# Patient Record
Sex: FEMALE | Race: WHITE | NOT HISPANIC OR LATINO | Employment: FULL TIME | URBAN - METROPOLITAN AREA
[De-identification: names, ages, dates, MRNs, and addresses within clinical notes are randomized per-mention and may not be internally consistent; named-entity substitution may affect disease eponyms.]

---

## 2017-01-11 ENCOUNTER — PATIENT MESSAGE (OUTPATIENT)
Dept: OBSTETRICS AND GYNECOLOGY | Facility: CLINIC | Age: 57
End: 2017-01-11

## 2017-01-16 ENCOUNTER — TELEPHONE (OUTPATIENT)
Dept: OBSTETRICS AND GYNECOLOGY | Facility: CLINIC | Age: 57
End: 2017-01-16

## 2017-01-16 NOTE — TELEPHONE ENCOUNTER
----- Message from Sherri Guerra sent at 1/16/2017 12:04 PM CST -----  Contact: Self  Good afternoon,    Pt is requesting orders for a mammogram.    Pt can be reached at [phone]    Thank you!

## 2017-01-18 ENCOUNTER — TELEPHONE (OUTPATIENT)
Dept: OBSTETRICS AND GYNECOLOGY | Facility: CLINIC | Age: 57
End: 2017-01-18

## 2017-01-18 NOTE — TELEPHONE ENCOUNTER
----- Message from Андрей Hilario sent at 1/17/2017  3:54 PM CST -----  Contact: pt  Pt returning your call. Pt's # [phone]

## 2017-02-10 ENCOUNTER — OFFICE VISIT (OUTPATIENT)
Dept: OBSTETRICS AND GYNECOLOGY | Facility: CLINIC | Age: 57
End: 2017-02-10
Payer: COMMERCIAL

## 2017-02-10 VITALS
BODY MASS INDEX: 31.18 KG/M2 | SYSTOLIC BLOOD PRESSURE: 160 MMHG | HEIGHT: 66 IN | WEIGHT: 194 LBS | DIASTOLIC BLOOD PRESSURE: 100 MMHG

## 2017-02-10 DIAGNOSIS — Z12.39 BREAST CANCER SCREENING: ICD-10-CM

## 2017-02-10 DIAGNOSIS — Z01.419 ENCOUNTER FOR GYNECOLOGICAL EXAMINATION: Primary | ICD-10-CM

## 2017-02-10 PROCEDURE — 3077F SYST BP >= 140 MM HG: CPT | Mod: S$GLB,,, | Performed by: OBSTETRICS & GYNECOLOGY

## 2017-02-10 PROCEDURE — 99396 PREV VISIT EST AGE 40-64: CPT | Mod: S$GLB,,, | Performed by: OBSTETRICS & GYNECOLOGY

## 2017-02-10 PROCEDURE — 3080F DIAST BP >= 90 MM HG: CPT | Mod: S$GLB,,, | Performed by: OBSTETRICS & GYNECOLOGY

## 2017-02-10 PROCEDURE — 99999 PR PBB SHADOW E&M-EST. PATIENT-LVL III: CPT | Mod: PBBFAC,,, | Performed by: OBSTETRICS & GYNECOLOGY

## 2017-02-10 NOTE — MR AVS SNAPSHOT
"    [facility]  [address]  [place] [address]  Phone: [phone]  Fax: [phone]                  Renae Hou   2/10/2017 9:15 AM   Office Visit    Description:  Female : 1960   Provider:  Farrah Cook MD   Department:  [facility]           Reason for Visit     Well Woman           Diagnoses this Visit        Comments    Encounter for gynecological examination    -  Primary     Breast cancer screening                To Do List           Future Appointments        Provider Department Dept Phone    2017 11:40 AM NOM MAMMO2 SCREEN [facility] [phone]      Goals (5 Years of Data)     None      Follow-Up and Disposition     Return in about 1 year (around 2/10/2018).      [facility]     [facility] -  Assistance  Registered nurses in the [facility] provide clinical advisement, health education, appointment booking, and other advisory services.  Call for this free service at [phone].             Medications           Message regarding Medications     Verify the changes and/or additions to your medication regime listed below are the same as discussed with your clinician today.  If any of these changes or additions are incorrect, please notify your healthcare provider.             Verify that the below list of medications is an accurate representation of the medications you are currently taking.  If none reported, the list may be blank. If incorrect, please contact your healthcare provider. Carry this list with you in case of emergency.           Current Medications     omeprazole (PRILOSEC) 40 MG capsule TK 1 C PO QD    VITAMIN D2 50,000 unit capsule TAKE 1 CAPSULE BY MOUTH ONCE WEEKLY.    phentermine (ADIPEX-P) 37.5 mg tablet TK 1 T PO QD           Clinical Reference Information           Your Vitals Were     BP Height Weight Last Period BMI    160/100 5' 6" (1.676 m) " 88 kg (194 lb 0.1 oz) 11/25/2012 31.31 kg/m2      Blood Pressure          Most Recent Value    BP  (!)  160/100      Allergies as of 2/10/2017     Iodine    Shellfish Containing Products    Latex      Immunizations Administered on Date of Encounter - 2/10/2017     None      Orders Placed During Today's Visit     Future Labs/Procedures Expected by Expires    Mammo Digital Screening Bilat with Tomosynthesis CAD  2/10/2017 4/12/2018      Language Assistance Services     ATTENTION: Language assistance services are available, free of charge. Please call [phone].      ATENCIÓN: Si habla español, tiene a callahan disposición servicios gratuitos de asistencia lingüística. Llame al [phone].     CHÚ Ý: N?u b?n nói Ti?ng Vi?t, có các d?ch v? h? tr? ngôn ng? mi?n phí dành cho b?n. G?i s? [phone].         General acute hospital's [facility] complies with applicable Federal civil rights laws and does not discriminate on the basis of race, color, national origin, age, disability, or sex.

## 2017-02-10 NOTE — PROGRESS NOTES
Subjective:       Patient ID: Renae Hou is a 56 y.o. female.    Chief Complaint:  Well Woman (Annual Exam, C/o night sweats wants hormones checked. Last raiza 2 [facility], colonoscopy in July (Maria Alejandra))      History of Present Illness  - here for annual. C/o night sweats occurring most nights. Reports that BP was elevated at GI office but was normal at PCP's office. Is not on BP meds. Has had episodes of pressure in her ears; has seen ENT with negative w/u. Reports that BP at home has been 130s/80s. She reports that her hands are very swollen.    Past Medical History   Diagnosis Date    Fatty liver     Hypertension     Menopause     Obesity     Sleep apnea     Thyroid disease      Thyroid nodules.       Past Surgical History   Procedure Laterality Date    Cholecystectomy      Rectocele repair      Thyroid nodule removal      Bilateral salpingoophorectomy      Hysterectomy  2012     [facility]          Current Outpatient Prescriptions:     omeprazole (PRILOSEC) 40 MG capsule, TK 1 C PO QD, Disp: , Rfl: 2    VITAMIN D2 50,000 unit capsule, TAKE 1 CAPSULE BY MOUTH ONCE WEEKLY., Disp: 4 capsule, Rfl: 3    phentermine (ADIPEX-P) 37.5 mg tablet, TK 1 T PO QD, Disp: , Rfl: 0    Review of patient's allergies indicates:   Allergen Reactions    Iodine Anaphylaxis, Hives, Itching, Rash, Shortness Of Breath and Swelling     Other reaction(s): Difficulty breathing    Shellfish containing products Anaphylaxis, Hives, Itching, Rash, Shortness Of Breath and Swelling     Other reaction(s): Difficulty breathing    Latex        GYN & OB History  Patient's last menstrual period was 2012.   Date of Last Pap: No result found    OB History    Para Term  AB SAB TAB Ectopic Multiple Living   2 2 2       2      # Outcome Date GA Lbr Dieudonne/2nd Weight Sex Delivery Anes PTL Lv   2 Term      Vag-Spont  N Y   1 Term      Vag-Spont  N Y          Social History     Social History    Marital  "status:      Spouse name: N/A    Number of children: N/A    Years of education: N/A     Occupational History    Not on file.     Social History Main Topics    Smoking status: Former Smoker    Smokeless tobacco: Never Used      Comment: smoked socially decades ago - weekends only    Alcohol use Yes      Comment: SELDOMLY    Drug use: No    Sexual activity: Yes     Partners: Male     Birth control/ protection: Surgical, See Surgical Hx      Comment: DL BS&O 12/17/2012,       Other Topics Concern    Not on file     Social History Narrative       Family History   Problem Relation Age of Onset    Hypertension Mother     Hyperlipidemia Mother     Heart disease Mother 55     cad, valvular heart disease    Alcohol abuse Sister     No Known Problems Daughter     No Known Problems Daughter     Breast cancer Neg Hx     Colon cancer Neg Hx     Ovarian cancer Neg Hx     Diabetes Neg Hx        Review of Systems  Review of Systems   Respiratory: Negative for shortness of breath.    Cardiovascular: Negative for chest pain and palpitations.   Gastrointestinal: Negative for blood in stool, nausea and vomiting.   Genitourinary:        - see HPI   Skin: Negative for rash and wound.   Allergic/Immunologic: Negative for immunocompromised state.   Neurological: Negative for dizziness and syncope.   Hematological: Negative for adenopathy.   Psychiatric/Behavioral: Negative for behavioral problems.        Objective:     Vitals:    02/10/17 0907   BP: (!) 160/100   Weight: 88 kg (194 lb 0.1 oz)   Height: 5' 6" (1.676 m)       Physical Exam:   Constitutional: She is oriented to person, place, and time. She appears well-developed and well-nourished.        Pulmonary/Chest: Right breast exhibits no mass, no nipple discharge, no skin change, no tenderness and no swelling. Left breast exhibits no mass, no nipple discharge, no skin change, no tenderness and no swelling. Breasts are symmetrical.        Abdominal: " Soft. She exhibits no distension. There is no tenderness.     Genitourinary: Vagina normal. There is no tenderness or lesion on the right labia. There is no tenderness or lesion on the left labia. Uterus is absent. Right adnexum displays no mass, no tenderness and no fullness. Left adnexum displays no mass, no tenderness and no fullness. No vaginal discharge found. Cervix exhibits absence.           Musculoskeletal: Moves all extremeties.       Neurological: She is alert and oriented to person, place, and time.     Psychiatric: She has a normal mood and affect.        Assessment/ Plan:     Orders Placed This Encounter    Mammo Digital Screening Bilat with Tomosynthesis CAD       Renae Lai was seen today for well woman.    Diagnoses and all orders for this visit:    Encounter for gynecological examination    Breast cancer screening  -     Mammo Digital Screening Bilat with Tomosynthesis CAD; Future    - discussed that patient does not need labwork to check hormones: estradiol will be low and FSH will be high. Discussed HRT as a means of controlling menopausal symptoms. Will have to defer that for now since patient's BP is not controlled. Advised patient to see PCP. Patient will call me when BP is controlled to consider HRT.    Return in about 1 year (around 2/10/2018).

## 2017-02-23 ENCOUNTER — TELEPHONE (OUTPATIENT)
Dept: OBSTETRICS AND GYNECOLOGY | Facility: CLINIC | Age: 57
End: 2017-02-23

## 2017-02-23 NOTE — TELEPHONE ENCOUNTER
Dr Cook pt calling, regarding her mammo orders. Pt said that the billing dept called her trying to collect a payment and they told her the order isn't right for a normal mammo screening.If wrong can you change order or call pt to let her know that it is correct. Please call pt if any question [phone]

## 2017-02-23 NOTE — TELEPHONE ENCOUNTER
Spoke with pt and she stated DIS told her she was responsible to pay $50 for her mammogram.  I informed pt it probably is due to the mammo 3d order.  Pt verbalized understanding and will pay for the mammo order.

## 2017-02-24 ENCOUNTER — HOSPITAL ENCOUNTER (OUTPATIENT)
Dept: RADIOLOGY | Facility: HOSPITAL | Age: 57
Discharge: HOME | End: 2017-02-24
Attending: OBSTETRICS & GYNECOLOGY
Payer: COMMERCIAL

## 2017-02-24 DIAGNOSIS — Z12.31 VISIT FOR SCREENING MAMMOGRAM: ICD-10-CM

## 2017-02-24 DIAGNOSIS — Z12.39 BREAST CANCER SCREENING: ICD-10-CM

## 2017-02-24 PROCEDURE — 77067 SCR MAMMO BI INCL CAD: CPT | Mod: 26,,, | Performed by: RADIOLOGY

## 2017-02-24 PROCEDURE — 77063 BREAST TOMOSYNTHESIS BI: CPT | Mod: 26,,, | Performed by: RADIOLOGY

## 2017-02-24 PROCEDURE — 77067 SCR MAMMO BI INCL CAD: CPT | Mod: TC

## 2017-03-03 ENCOUNTER — TELEPHONE (OUTPATIENT)
Dept: SPINE | Facility: CLINIC | Age: 57
End: 2017-03-03

## 2017-03-03 ENCOUNTER — HOSPITAL ENCOUNTER (OUTPATIENT)
Dept: RADIOLOGY | Facility: HOSPITAL | Age: 57
Discharge: HOME | End: 2017-03-03
Attending: ORTHOPAEDIC SURGERY
Payer: COMMERCIAL

## 2017-03-03 DIAGNOSIS — M54.50 LUMBAR SPINE PAIN: ICD-10-CM

## 2017-03-03 DIAGNOSIS — M54.50 LUMBAR SPINE PAIN: Primary | ICD-10-CM

## 2017-03-03 PROCEDURE — 72100 X-RAY EXAM L-S SPINE 2/3 VWS: CPT | Mod: 26,,, | Performed by: RADIOLOGY

## 2017-03-03 PROCEDURE — 72100 X-RAY EXAM L-S SPINE 2/3 VWS: CPT | Mod: TC

## 2017-03-03 PROCEDURE — 72120 X-RAY BEND ONLY L-S SPINE: CPT | Mod: 26,,, | Performed by: RADIOLOGY

## 2017-03-07 ENCOUNTER — INITIAL CONSULT (OUTPATIENT)
Dept: ORTHOPEDICS | Facility: CLINIC | Age: 57
End: 2017-03-07
Payer: COMMERCIAL

## 2017-03-07 ENCOUNTER — TELEPHONE (OUTPATIENT)
Dept: ORTHOPEDICS | Facility: CLINIC | Age: 57
End: 2017-03-07

## 2017-03-07 VITALS — HEIGHT: 66 IN | DIASTOLIC BLOOD PRESSURE: 93 MMHG | SYSTOLIC BLOOD PRESSURE: 146 MMHG | HEART RATE: 90 BPM

## 2017-03-07 DIAGNOSIS — M54.50 LUMBAR SPINE PAIN: Primary | ICD-10-CM

## 2017-03-07 DIAGNOSIS — Z98.890 HISTORY OF LUMBAR DISCECTOMY: Primary | ICD-10-CM

## 2017-03-07 DIAGNOSIS — M43.10 SPONDYLOLISTHESIS, ACQUIRED: ICD-10-CM

## 2017-03-07 PROCEDURE — 3080F DIAST BP >= 90 MM HG: CPT | Mod: S$GLB,,, | Performed by: ORTHOPAEDIC SURGERY

## 2017-03-07 PROCEDURE — 99204 OFFICE O/P NEW MOD 45 MIN: CPT | Mod: S$GLB,,, | Performed by: ORTHOPAEDIC SURGERY

## 2017-03-07 PROCEDURE — 3077F SYST BP >= 140 MM HG: CPT | Mod: S$GLB,,, | Performed by: ORTHOPAEDIC SURGERY

## 2017-03-07 PROCEDURE — 99999 PR PBB SHADOW E&M-EST. PATIENT-LVL III: CPT | Mod: PBBFAC,,, | Performed by: ORTHOPAEDIC SURGERY

## 2017-03-07 PROCEDURE — 1160F RVW MEDS BY RX/DR IN RCRD: CPT | Mod: S$GLB,,, | Performed by: ORTHOPAEDIC SURGERY

## 2017-03-07 RX ORDER — VALSARTAN 160 MG/1
160 TABLET ORAL DAILY
COMMUNITY
End: 2018-03-14

## 2017-03-09 NOTE — PROGRESS NOTES
DATE: 3/9/2017  PATIENT: Renae Hou    Attending Physician: Dean Zayas M.D.    CHIEF COMPLAINT: Low back pain    HISTORY:  Renae Hou is a 56 y.o. female [employer] employee here for initial evaluation of low back and bilateral leg pain (Back - 5, Leg - 1). The pain has been present for years but is acutely worse over the past 2-3 months. There was no exacerbating incident. She does have a remote history of a lumbar laminectomy/discectomy. The patient describes the pain as aching in the back, particularly on the R side and tingling in the legs.  The pain is worse with getting up in the morning and with lumbar extension and improved by using a heating pad. There is bilateral associated numbness and tingling. There is BLE subjective weakness especially in the morning. Prior treatments have included narcotics, NSAIDs and PT in the past, but no recent injections.    The Patient denies myelopathic symptoms such as handwriting changes or difficulty with buttons/coins/keys. Denies perineal paresthesias, bowel/bladder dysfunction.    PAST MEDICAL/SURGICAL HISTORY:  Past Medical History:   Diagnosis Date    Fatty liver     Hypertension     Menopause     Obesity     Sleep apnea     Thyroid disease     Thyroid nodules.     Past Surgical History:   Procedure Laterality Date    BILATERAL SALPINGOOPHORECTOMY      CHOLECYSTECTOMY      HYSTERECT[facility]  12/17/2012    [facility]     RECTOCELE REPAIR      THYROID NODULE REMOVAL         Current Medications:   Current Outpatient Prescriptions:     omeprazole (PRILOSEC) 40 MG capsule, TK 1 C PO QD, Disp: , Rfl: 2    valsartan (DIOVAN) 160 MG tablet, Take 160 mg by mouth once daily., Disp: , Rfl:     VITAMIN D2 50,000 unit capsule, TAKE 1 CAPSULE BY MOUTH ONCE WEEKLY., Disp: 4 capsule, Rfl: 3    Social History:   Social History     Social History    Marital status:      Spouse name: N/A    Number of children: N/A    Years of education: N/A  "    Occupational History    Not on file.     Social History Main Topics    Smoking status: Former Smoker    Smokeless tobacco: Never Used      Comment: smoked socially decades ago - weekends only    Alcohol use Yes      Comment: SELDOMLY    Drug use: No    Sexual activity: Yes     Partners: Male     Birth control/ protection: Surgical, See Surgical Hx      Comment: [facility] BS&O 12/17/2012,       Other Topics Concern    Not on file     Social History Narrative       REVIEW OF SYSTEMS:  Constitution: Negative. Negative for chills, fever and night sweats.   Cardiovascular: Negative for chest pain and syncope.   Respiratory: Negative for cough and shortness of breath.   Gastrointestinal: See HPI. Negative for nausea/vomiting. Negative for abdominal pain.  Genitourinary: See HPI. Negative for discoloration or dysuria.  Hematologic/Lymphatic: Negative for bleeding/clotting disorders.   Musculoskeletal: Negative for falls and muscle weakness.   Neurological: See HPI. no history of seizures. no history of cranial surgery or shunts.  Neurological: See HPI. No seizures.   Endocrine: Negative for polydipsia, polyphagia and polyuria.   Allergic/Immunologic: Negative for hives and persistent infections.     EXAM:  BP (!) 146/93  Pulse 90  Ht 5' 6" (1.676 m)  LMP 11/25/2012    PHYSICAL EXAMINATION:    General: The patient is a very pleasant 56 y.o. female in no apparent distress, the patient is orientatied to person, place and time.  Psych: Normal mood and affect  HEENT: Vision grossly intact, hearing intact to the spoken word.  Lungs: Respirations unlabored.  Gait: Normal station and gait, no difficulty with toe or heel walk.   Skin: Dorsal lumbar skin negative for rashes, lesions, hairy patches. She does have a well healed midline lumbar scar. There is mild R sided lumbar tenderness to palpation.  Range of motion: Lumbar range of motion is acceptable.  Spinal Balance: Global saggital and coronal spinal balance " acceptable, no significant for scoliosis and kyphosis.  Musculoskeletal: No pain with the range of motion of the bilateral hips. No trochanteric tenderness to palpation.  Vascular: Bilateral lower extremities warm and well perfused, Dorsalis pedis pulses 2+ bilaterally.  Neurological: Normal strength and tone in all major motor groups in the bilateral lower extremities. Normal sensation to light touch in the L2-S1 dermatomes bilaterally.  Deep tendon reflexes symmetric 1+ in the bilateral lower extremities.  Negative Babinski bilaterally. Straight leg raise negative bilaterally.    IMAGING:      Today I personally reviewed AP, Lat and Flex/Ex  upright L-spine that demonstrate Grade I L4/5/S1 degenerative spondylolisthesis.     ASSESSMENT/PLAN:    Diagnoses and all orders for this visit:    History of lumbar discectomy  -     MRI Lumbar Spine W WO Contrast; Future    Spondylolisthesis, acquired  -     MRI Lumbar Spine W WO Contrast; Future      MRI indicated for refractory LBP and BLE radicular symptoms in the setting of a prior discectomy.

## 2017-03-10 ENCOUNTER — HOSPITAL ENCOUNTER (OUTPATIENT)
Dept: RADIOLOGY | Facility: HOSPITAL | Age: 57
Discharge: HOME | End: 2017-03-10
Attending: ORTHOPAEDIC SURGERY
Payer: COMMERCIAL

## 2017-03-10 DIAGNOSIS — M54.50 LUMBAR SPINE PAIN: ICD-10-CM

## 2017-03-10 PROCEDURE — 72148 MRI LUMBAR SPINE W/O DYE: CPT | Mod: TC

## 2017-03-10 PROCEDURE — 72148 MRI LUMBAR SPINE W/O DYE: CPT | Mod: 26,,, | Performed by: RADIOLOGY

## 2017-03-13 DIAGNOSIS — M43.10 SPONDYLOLISTHESIS, ACQUIRED: Primary | ICD-10-CM

## 2017-03-15 ENCOUNTER — TELEPHONE (OUTPATIENT)
Dept: INTERVENTIONAL RADIOLOGY/VASCULAR | Facility: HOSPITAL | Age: 57
End: 2017-03-15

## 2017-03-15 RX ORDER — PREDNISONE 50 MG/1
TABLET ORAL
Qty: 3 TABLET | Refills: 0 | Status: SHIPPED | OUTPATIENT
Start: 2017-03-15 | End: 2017-06-30

## 2017-03-15 NOTE — TELEPHONE ENCOUNTER
Phoned patient twice, also called spouse's number, no answer, left message informing her that her pre procedure prep (prednisone) will be sent to the pharmacy listed ([id number]) on her profile. Left IR clinic contact information and asked the she return my call.

## 2017-03-16 ENCOUNTER — HOSPITAL ENCOUNTER (OUTPATIENT)
Dept: INTERVENTIONAL RADIOLOGY/VASCULAR | Facility: HOSPITAL | Age: 57
Discharge: HOME | End: 2017-03-16
Attending: ORTHOPAEDIC SURGERY
Payer: COMMERCIAL

## 2017-03-16 VITALS
OXYGEN SATURATION: 97 % | SYSTOLIC BLOOD PRESSURE: 139 MMHG | RESPIRATION RATE: 16 BRPM | HEART RATE: 88 BPM | DIASTOLIC BLOOD PRESSURE: 88 MMHG

## 2017-03-16 DIAGNOSIS — M43.10 SPONDYLOLISTHESIS, ACQUIRED: ICD-10-CM

## 2017-03-16 PROCEDURE — 64483 NJX AA&/STRD TFRM EPI L/S 1: CPT | Mod: 50,,, | Performed by: RADIOLOGY

## 2017-03-16 PROCEDURE — 64483 NJX AA&/STRD TFRM EPI L/S 1: CPT | Mod: 50

## 2017-03-16 PROCEDURE — 25500020 PHARM REV CODE 255: Performed by: ORTHOPAEDIC SURGERY

## 2017-03-16 RX ADMIN — IOHEXOL 2 ML: 180 INJECTION INTRAVENOUS at 08:03

## 2017-03-16 NOTE — H&P
Radiology History & Physical      SUBJECTIVE:     Chief Complaint: chronic low back pain    History of Present Illness:  Renae Hou is a 56 y.o. female who presents for bilateral L4-5 TF.  Past Medical History:   Diagnosis Date    Fatty liver     Hypertension     Menopause     Obesity     Sleep apnea     Thyroid disease     Thyroid nodules.     Past Surgical History:   Procedure Laterality Date    BILATERAL SALPINGOOPHORECTOMY      CHOLECYSTECTOMY      HYSTERECTOMY  12/17/2012    [facility] BS[facility]     RECTOCELE REPAIR      THYROID NODULE REMOVAL         Home Meds:   Prior to Admission medications    Medication Sig Start Date End Date Taking? Authorizing Provider   omeprazole (PRILOSEC) 40 MG capsule TK 1 C PO QD 8/3/16   Historical Provider, MD   predniSONE (DELTASONE) 50 MG Tab Take 1 tab 13 hours prior to procedure then take 1 tab 6 hrs prior to procedure then take 1 tab 1 hour prior to procedure 3/15/17   MAURISIO Hendricks   valsartan (DIOVAN) 160 MG tablet Take 160 mg by mouth once daily.    Historical Provider, MD   VITAMIN D2 50,000 unit capsule TAKE 1 CAPSULE BY MOUTH ONCE WEEKLY. 1/12/16   Duc Medina Jr., MD     Anticoagulants/Antiplatelets: no anticoagulation    Allergies:   Review of patient's allergies indicates:   Allergen Reactions    Iodine Anaphylaxis, Hives, Itching, Rash, Shortness Of Breath and Swelling     Other reaction(s): Difficulty breathing    Shellfish containing products Anaphylaxis, Hives, Itching, Rash, Shortness Of Breath and Swelling     Other reaction(s): Difficulty breathing    Latex      Sedation History:  no adverse reactions    Review of Systems:   Hematological: no known coagulopathies  Respiratory: no shortness of breath  Cardiovascular: no chest pain  Gastrointestinal: no abdominal pain  Genito-Urinary: no dysuria  Musculoskeletal: positive for - pain in back - generalized  Neurological: no TIA or stroke symptoms         OBJECTIVE:     Vital Signs (Most  Recent)  Pulse: 88 (03/16/17 0739)  Resp: 16 (03/16/17 0739)  BP: 139/88 (03/16/17 0739)  SpO2: 97 % (03/16/17 0739)    Physical Exam:  ASA: 2  Mallampati: n/a    General: no acute distress  Mental Status: alert and oriented to person, place and time  HEENT: normocephalic, atraumatic  Chest: unlabored breathing  Heart: regular heart rate  Abdomen: nondistended  Extremity: moves all extremities    Laboratory  No results found for: INR    Lab Results   Component Value Date    WBC 5.43 08/12/2016    HGB 13.4 08/12/2016    HCT 38.8 08/12/2016    MCV 90 08/12/2016     08/12/2016      Lab Results   Component Value Date     08/12/2016     08/12/2016    K 3.9 08/12/2016     08/12/2016    CO2 27 08/12/2016    BUN 10 08/12/2016    CREATININE 0.8 08/12/2016    CALCIUM 9.5 08/12/2016    ALT 22 08/12/2016    AST 20 08/12/2016    ALBUMIN 3.6 08/12/2016    BILITOT 0.5 08/12/2016       ASSESSMENT/PLAN:     Sedation Plan: local  Patient will undergo bilateral TF L4-5 NEERAJ.    [facility]  Radiology PGY-3

## 2017-03-16 NOTE — PROGRESS NOTES
bilateral L4-5 TF injection complete, dressing applied. CDI. Pt resting comfortably, denies pain/discomfort. No acute events. Pt given discharge instructions/handouts. Pt verbalizes understanding. Pt to lobby via wheelchair.  to provide transport home.

## 2017-03-16 NOTE — DISCHARGE INSTRUCTIONS
For scheduling: Call Lenka at [phone]    For questions or concerns call: KEISHA MON-FRI 8 AM- 5PM [phone]. Radiology resident on call [phone].    For immediate concerns that are not emergent, you may call our radiology clinic at: [phone]

## 2017-03-16 NOTE — PROGRESS NOTES
Pt arrived to room 189 for bilateral TF L4-5 injections. Pt identified using two pt identifiers. Allergies reviewed. NAD noted. Awaiting consent and H&P at this time. WCTM.

## 2017-03-16 NOTE — IP AVS SNAPSHOT
[facility]  [address]  [facility] [address]  Phone: [phone]           Patient Discharge Instructions     Our goal is to set you up for success. This packet includes information on your condition, medications, and your home care. It will help you to care for yourself so you don't get sicker and need to go back to the hospital.     Please ask your nurse if you have any questions.        There are many details to remember when preparing to leave the hospital. Here is what you will need to do:    1. Take your medicine. If you are prescribed medications, review your Medication List in the following pages. You may have new medications to  at the pharmacy and others that you'll need to stop taking. Review the instructions for how and when to take your medications. Talk with your doctor or nurses if you are unsure of what to do.     2. Go to your follow-up appointments. Specific follow-up information is listed in the following pages. Your may be contacted by a transition nurse or clinical provider about future appointments. Be sure we have all of the phone numbers to reach you, if needed. Please contact your provider's office if you are unable to make an appointment.     3. Watch for warning signs. Your doctor or nurse will give you detailed warning signs to watch for and when to call for assistance. These instructions may also include educational information about your condition. If you experience any of warning signs to your health, call your doctor.               [facility]  Unless otherwise directed by your provider, please contact [facility], our nurse care line that is available for 24/7 assistance.     [phone] (toll-free)    Registered nurses in the [facility] provide clinical advisement, health education, appointment booking, and other advisory services.                    ** Verify the list of medication(s) below is accurate and up  to date. Carry this with you in case of emergency. If your medications have changed, please notify your healthcare provider.             Medication List      TAKE these medications        Additional Info                      omeprazole 40 MG capsule   Commonly known as:  PRILOSEC   Refills:  2    Instructions:  TK 1 C PO QD     Begin Date    AM    Noon    PM    Bedtime       predniSONE 50 MG Tab   Commonly known as:  DELTASONE   Quantity:  3 tablet   Refills:  0   Comments:  Take Benadryl 50mg with the last dose of Prednisone 1 hour before the procedure    Instructions:  Take 1 tab 13 hours prior to procedure then take 1 tab 6 hrs prior to procedure then take 1 tab 1 hour prior to procedure     Begin Date    AM    Noon    PM    Bedtime       valsartan 160 MG tablet   Commonly known as:  DIOVAN   Refills:  0   Dose:  160 mg    Instructions:  Take 160 mg by mouth once daily.     Begin Date    AM    Noon    PM    Bedtime       VITAMIN D2 50,000 unit Cap   Quantity:  4 capsule   Refills:  3   Generic drug:  ergocalciferol    Instructions:  TAKE 1 CAPSULE BY MOUTH ONCE WEEKLY.     Begin Date    AM    Noon    PM    Bedtime                  Please bring to all follow up appointments:    1. A copy of your discharge instructions.  2. All medicines you are currently taking in their original bottles.  3. Identification and insurance card.    Please arrive 15 minutes ahead of scheduled appointment time.    Please call 24 hours in advance if you must reschedule your appointment and/or time.            Discharge Instructions       For scheduling: Call Lenka at [phone]    For questions or concerns call: KEISHA MON-FRI 8 AM- 5PM [phone]. Radiology resident on call [phone].    For immediate concerns that are not emergent, you may call our radiology clinic at: [phone]      Discharge References/Attachments     INJECTION, LUMBAR EPIDURAL: RECOVERY AT HOME (ENGLISH)        Admission Information     Date & Time  Provider Department CSN    3/16/2017  7:30 AM Dean Zayas MD [facility] [id number]      Care Providers     Provider Role Specialty Primary office phone    Dean Zayas MD Attending Provider Orthopedic Surgery [phone]      Your Vitals Were     Last Period                   11/25/2012           Recent Lab Values        10/24/2014 9/29/2015 8/12/2016                     7:38 AM  8:39 AM  7:41 AM         A1C 5.9 5.8 5.7         Comment for A1C at  7:41 AM on 8/12/2016:  According to ADA guidelines, hemoglobin A1C <7.0% represents  optimal control in non-pregnant diabetic patients.  Different  metrics may apply to specific populations.   Standards of Medical Care in Diabetes - 2016.  For the purpose of screening for the presence of diabetes:  <5.7%     Consistent with the absence of diabetes  5.7-6.4%  Consistent with increasing risk for diabetes   (prediabetes)  >or=6.5%  Consistent with diabetes  Currently no consensus exists for use of hemoglobin A1C  for diagnosis of diabetes for children.        Allergies as of 3/16/2017        Reactions    Iodine Anaphylaxis, Hives, Itching, Rash, Shortness Of Breath, Swelling    Other reaction(s): Difficulty breathing    Shellfish Containing Products Anaphylaxis, Hives, Itching, Rash, Shortness Of Breath, Swelling    Other reaction(s): Difficulty breathing    Latex       Advance Directives     An advance directive is a document which, in the event you are no longer able to make decisions for yourself, tells your healthcare team what kind of treatment you do or do not want to receive, or who you would like to make those decisions for you.  If you do not currently have an advance directive, [facility] encourages you to create one.  For more information call:  [phone]), [phone] ([phone]),  or log on to [email or web address].        Language Assistance Services     ATTENTION: Language assistance services are available,  free of charge. Please call [phone].      ATENCIÓN: Si habla español, tiene a callahan disposición servicios gratuitos de asistencia lingüística. Llame al [phone].     CHÚ Ý: N?u b?n nói Ti?ng Vi?t, có các d?ch v? h? tr? ngôn ng? mi?n phí dành cho b?n. G?i s? [phone].         [facility] complies with applicable Federal civil rights laws and does not discriminate on the basis of race, color, national origin, age, disability, or sex.

## 2017-03-16 NOTE — PROCEDURES
Radiology Post-Procedure Note    Pre Op Diagnosis: LBP    Post Op Diagnosis: Same    Procedure: Lumbar Transforaminal NEERAJ    Procedure performed by: Nathaniel Benítez MD; Crispin Burnham (resident)    Written Informed Consent Obtained: Yes    Specimen Removed: NO    Estimated Blood Loss: Minimal    Findings: Contrast injection demonstrating outlining of L4 nerve roots consistent with appropriate positioning in the L4-5 foramina    Level injected: L4-L5  Needle used: 22 gauge  Dose:  40 mg Depo-methylprednisolonex2   10 mL Lidocaine 1% MPF    Patient tolerated procedure well.    Crispin Burnham  Radiology PGY-3

## 2017-05-24 ENCOUNTER — OFFICE VISIT (OUTPATIENT)
Dept: ORTHOPEDICS | Facility: CLINIC | Age: 57
End: 2017-05-24
Payer: COMMERCIAL

## 2017-05-24 VITALS — BODY MASS INDEX: 31.89 KG/M2 | HEIGHT: 66 IN | WEIGHT: 198.44 LBS

## 2017-05-24 DIAGNOSIS — Z98.890 HISTORY OF LUMBAR DISCECTOMY: Primary | ICD-10-CM

## 2017-05-24 DIAGNOSIS — M54.16 LUMBAR RADICULOPATHY: ICD-10-CM

## 2017-05-24 PROCEDURE — 99212 OFFICE O/P EST SF 10 MIN: CPT | Mod: S$GLB,,, | Performed by: ORTHOPAEDIC SURGERY

## 2017-05-24 PROCEDURE — 99999 PR PBB SHADOW E&M-EST. PATIENT-LVL III: CPT | Mod: PBBFAC,,, | Performed by: ORTHOPAEDIC SURGERY

## 2017-05-24 RX ORDER — MELOXICAM 15 MG/1
15 TABLET ORAL DAILY
Qty: 21 TABLET | Refills: 0 | Status: SHIPPED | OUTPATIENT
Start: 2017-05-24 | End: 2017-06-12 | Stop reason: SDUPTHER

## 2017-05-25 NOTE — PROGRESS NOTES
The patient is a 56F with a remote history of a L4/5 discectomy and a known L4/5 spondylolisthesis.    She recently had an epidural steroid injection that was very effective for her back pain.    She returns for evaluation of a 1 week history of LLE radiating pain.    This began spontaneously with no inciting event.    She has had episodes like this before but they have resolved spontaneously.    She has been taking motrin and using a heating pad.[    On exam there is no focal weakness.    Today we discussed options, I have recommended mobic for now.    If not improved in 3 weeks we can consider a MRI.    I spent 15 minutes with the patient of which greater than 1/2 the time was devoted to counciling the patient regarding treatment options.

## 2017-06-16 RX ORDER — MELOXICAM 15 MG/1
TABLET ORAL
Qty: 21 TABLET | Refills: 0 | Status: SHIPPED | OUTPATIENT
Start: 2017-06-16 | End: 2017-09-27 | Stop reason: SDUPTHER

## 2017-06-30 ENCOUNTER — OFFICE VISIT (OUTPATIENT)
Dept: OBSTETRICS AND GYNECOLOGY | Facility: CLINIC | Age: 57
End: 2017-06-30
Payer: COMMERCIAL

## 2017-06-30 VITALS
DIASTOLIC BLOOD PRESSURE: 74 MMHG | WEIGHT: 205.38 LBS | SYSTOLIC BLOOD PRESSURE: 122 MMHG | BODY MASS INDEX: 33.01 KG/M2 | HEIGHT: 66 IN

## 2017-06-30 DIAGNOSIS — R82.90 ABNORMAL URINE ODOR: ICD-10-CM

## 2017-06-30 DIAGNOSIS — R82.90 ABNORMAL URINALYSIS: Primary | ICD-10-CM

## 2017-06-30 PROCEDURE — 87086 URINE CULTURE/COLONY COUNT: CPT

## 2017-06-30 PROCEDURE — 81000 URINALYSIS NONAUTO W/SCOPE: CPT | Mod: S$GLB,,, | Performed by: NURSE PRACTITIONER

## 2017-06-30 PROCEDURE — 99999 PR PBB SHADOW E&M-EST. PATIENT-LVL III: CPT | Mod: PBBFAC,,, | Performed by: NURSE PRACTITIONER

## 2017-06-30 PROCEDURE — 99213 OFFICE O/P EST LOW 20 MIN: CPT | Mod: S$GLB,,, | Performed by: NURSE PRACTITIONER

## 2017-06-30 RX ORDER — PREDNISONE 20 MG/1
TABLET ORAL
Refills: 0 | COMMUNITY
Start: 2017-05-30 | End: 2017-06-30

## 2017-06-30 RX ORDER — HYDROXYZINE HYDROCHLORIDE 25 MG/1
TABLET, FILM COATED ORAL
COMMUNITY
Start: 2017-05-19 | End: 2017-06-30

## 2017-06-30 RX ORDER — LEVOFLOXACIN 500 MG/1
TABLET, FILM COATED ORAL
Refills: 0 | COMMUNITY
Start: 2017-05-30 | End: 2017-06-30

## 2017-06-30 RX ORDER — IVERMECTIN 10 MG/G
CREAM TOPICAL
COMMUNITY
Start: 2017-04-28 | End: 2018-04-02 | Stop reason: CLARIF

## 2017-06-30 RX ORDER — PHENTERMINE HYDROCHLORIDE 37.5 MG/1
TABLET ORAL
Refills: 2 | COMMUNITY
Start: 2017-06-01 | End: 2017-06-30

## 2017-06-30 RX ORDER — CANAGLIFLOZIN 100 MG/1
TABLET, FILM COATED ORAL
Refills: 3 | COMMUNITY
Start: 2017-06-01 | End: 2018-03-14

## 2017-06-30 RX ORDER — PROMETHAZINE HYDROCHLORIDE AND CODEINE PHOSPHATE 6.25; 1 MG/5ML; MG/5ML
SOLUTION ORAL
Refills: 0 | COMMUNITY
Start: 2017-05-30 | End: 2017-06-30

## 2017-07-02 LAB — BACTERIA UR CULT: NO GROWTH

## 2017-07-05 ENCOUNTER — TELEPHONE (OUTPATIENT)
Dept: OBSTETRICS AND GYNECOLOGY | Facility: CLINIC | Age: 57
End: 2017-07-05

## 2017-07-05 LAB
BILIRUB SERPL-MCNC: ABNORMAL MG/DL
BLOOD, POC UA: ABNORMAL
GLUCOSE UR QL STRIP: 1000
KETONES UR QL STRIP: 1.02
LEUKOCYTE ESTERASE URINE, POC: ABNORMAL
NITRITE, POC UA: ABNORMAL
PH, POC UA: 6
PROTEIN, POC: ABNORMAL
SPECIFIC GRAVITY, POC UA: 1
UROBILINOGEN, POC UA: ABNORMAL

## 2017-07-05 NOTE — TELEPHONE ENCOUNTER
Spoke with pt and informed her per Rachele Way NP;  Urine culture negative. Pt expressed verbal understanding. FRANKLIN

## 2017-07-05 NOTE — TELEPHONE ENCOUNTER
----- Message from Rachele Way NP sent at 7/5/2017 11:43 AM CDT -----  Call pt and tell her urine culture negative.

## 2017-07-06 NOTE — PROGRESS NOTES
Chief Complaint   Patient presents with    Follow-up     pt saw her gastro doctor who tested her urine and found leukocytes. Pt was told to fullow up with her OBGYN or PCP.      (Joey pt)      Renae Hou is a 56 y.o. female  who presents today after recent appt with her GI physian.  While at that visit, a clean catch urine was collected, and was (+) for Leukocytes, so her doctor recommended she follow-up with her OBGYN for further evaluation.  She denies having had any symptoms at the time of that visit.  Only symptom she notices now is that she thinks her urine occasionally has an unusual odor.     Patient's last menstrual period was 2012.  She does not complain of vaginal discharge.  She is not sexually active.  She has had HYSTERECTOMY.  She denies vaginal lesions, changes in soaps, detergents, or douching. No known h/o recurrent UTIs.    Associated Symptoms:  None    Past Medical History:   Diagnosis Date    Diabetes mellitus     Fatty liver     Hypertension     Menopause     Obesity     Sleep apnea     Thyroid disease     Thyroid nodules.     Past Surgical History:   Procedure Laterality Date    BILATERAL SALPINGOOPHORECTOMY      CHOLECYSTECTOMY      [facility]  [facility]     RECTOCELE REPAIR      THYROID NODULE REMOVAL       Social History   Substance Use Topics    Smoking status: Former Smoker    Smokeless tobacco: Never Used      Comment: smoked socially decades ago - weekends only    Alcohol use Yes      Comment: SELDOMLY     Family History   Problem Relation Age of Onset    Hypertension Mother     Hyperlipidemia Mother     Heart disease Mother 55     cad, valvular heart disease    Alcohol abuse Sister     No Known Problems Daughter     No Known Problems Daughter     Breast cancer Neg Hx     Colon cancer Neg Hx     Ovarian cancer Neg Hx     Diabetes Neg Hx      OB History    Para Term  AB Living   2 2 2     2   SAB TAB Ectopic  Multiple Live Births           2      # Outcome Date GA Lbr Dieudonne/2nd Weight Sex Delivery Anes PTL Lv   2 Term      Vag-Spont  N LEXI   1 Term      Vag-Spont  N LEXI            Vitals:    06/30/17 1026   BP: 122/74     Body mass index is 33.15 kg/m².    ROS:  GENERAL:  No fever, chills, fatigability or weight loss.  VULVAR:  No pain, no lesions and no itching.  VAGINAL:  No relaxation, no itching, no discharge, no abnormal bleeding; no lesions.     ABDOMEN:  No abdominal pain. No nausea/vomiting. No diarrhea/constipation.  denies flank pain.         BREAST:  Denies pain. No lumps. No discharge.  URINARY:  No incontinence, no nocturia, denies frequency; denies dysuria; denies hematuria.  CARDIOVASCULAR:  No chest pain. No shortness of breath. No leg cramps.  NEUROLOGICAL:  No headaches. No vision changes.    PHYSICAL EXAM:  GEN:  Appears well, in no apparent distress.  Well developed; well nourished.   soft, non-tender; bowel sounds normal  EXTERNAL GENITALIA: normal appearing vulva with no masses, tenderness or lesions  VAGINA: no abnormal discharge or lesions  CERVIX: absent  UTERUS: absent  ADNEXA: no masses palpable and nontender    Urine dipstick shows:   positive for glucose and positive for leukocytes (TRACE).        ASSESSMENT/PLAN:  Abnormal urinalysis  -     Urine culture    Abnormal urine odor  -     Urine culture  -     POCT URINALYSIS      EDUCATION:    Lifestyle changes to treat and prevent UTIs  The lifestyle changes below will help get rid of your UTI. They may also help prevent future UTIs.  · Drink plenty of fluids. This includes water, juice, or other caffeine-free drinks. Fluids help flush bacteria out of your body.  · Empty your bladder. Always empty your bladder when you feel the urge to urinate. And always urinate before going to sleep. Urine that stays in your bladder can lead to infection. Try to urinate before and after sex as well.  · Practice good personal hygiene. Wipe yourself from front to  back after using the toilet. This helps keep bacteria from getting into the urethra.  · Use condoms during sex. These help prevent UTIs caused by sexually transmitted bacteria. Also, avoid using spermicides during sex. These can increase the risk of UTIs. Choose other forms of birth control instead. For women who tend to get UTIs after sex, a low-dose of a preventive antibiotic may be used. Be sure to discuss this option with your health care provider.     ·     Follow up with your health care provider as directed. He or she may test to               make sure the infection has cleared. If necessary, additional treatment may be           started.

## 2017-08-25 ENCOUNTER — CLINICAL SUPPORT (OUTPATIENT)
Dept: INTERNAL MEDICINE | Facility: CLINIC | Age: 57
End: 2017-08-25
Payer: COMMERCIAL

## 2017-08-25 ENCOUNTER — OFFICE VISIT (OUTPATIENT)
Dept: INTERNAL MEDICINE | Facility: CLINIC | Age: 57
End: 2017-08-25
Payer: COMMERCIAL

## 2017-08-25 VITALS
WEIGHT: 202.81 LBS | BODY MASS INDEX: 32.74 KG/M2 | TEMPERATURE: 98 F | DIASTOLIC BLOOD PRESSURE: 94 MMHG | HEART RATE: 88 BPM | SYSTOLIC BLOOD PRESSURE: 128 MMHG

## 2017-08-25 DIAGNOSIS — Z00.00 ROUTINE GENERAL MEDICAL EXAMINATION AT A HEALTH CARE FACILITY: Primary | ICD-10-CM

## 2017-08-25 DIAGNOSIS — L30.4 INTERTRIGO: ICD-10-CM

## 2017-08-25 LAB
25(OH)D3+25(OH)D2 SERPL-MCNC: 38 NG/ML
ALBUMIN SERPL BCP-MCNC: 4.1 G/DL
ALP SERPL-CCNC: 79 U/L
ALT SERPL W/O P-5'-P-CCNC: 23 U/L
ANION GAP SERPL CALC-SCNC: 5 MMOL/L
AST SERPL-CCNC: 24 U/L
BILIRUB SERPL-MCNC: 0.4 MG/DL
BUN SERPL-MCNC: 22 MG/DL
CALCIUM SERPL-MCNC: 9.7 MG/DL
CHLORIDE SERPL-SCNC: 108 MMOL/L
CHOLEST/HDLC SERPL: 3.3 {RATIO}
CO2 SERPL-SCNC: 29 MMOL/L
CREAT SERPL-MCNC: 0.9 MG/DL
ERYTHROCYTE [DISTWIDTH] IN BLOOD BY AUTOMATED COUNT: 14 %
EST. GFR  (AFRICAN AMERICAN): >60 ML/MIN/1.73 M^2
EST. GFR  (NON AFRICAN AMERICAN): >60 ML/MIN/1.73 M^2
ESTIMATED AVG GLUCOSE: 111 MG/DL
GLUCOSE SERPL-MCNC: 90 MG/DL
HBA1C MFR BLD HPLC: 5.5 %
HCT VFR BLD AUTO: 40.4 %
HDL/CHOLESTEROL RATIO: 30 %
HDLC SERPL-MCNC: 220 MG/DL
HDLC SERPL-MCNC: 66 MG/DL
HGB BLD-MCNC: 14.4 G/DL
LDLC SERPL CALC-MCNC: 137.8 MG/DL
MCH RBC QN AUTO: 31 PG
MCHC RBC AUTO-ENTMCNC: 35.6 G/DL
MCV RBC AUTO: 87 FL
NONHDLC SERPL-MCNC: 154 MG/DL
PLATELET # BLD AUTO: 243 K/UL
PMV BLD AUTO: 9.4 FL
POTASSIUM SERPL-SCNC: 4.2 MMOL/L
PROT SERPL-MCNC: 7.4 G/DL
RBC # BLD AUTO: 4.64 M/UL
SODIUM SERPL-SCNC: 142 MMOL/L
TRIGL SERPL-MCNC: 81 MG/DL
TSH SERPL DL<=0.005 MIU/L-ACNC: 1 UIU/ML
WBC # BLD AUTO: 5.63 K/UL

## 2017-08-25 PROCEDURE — 36415 COLL VENOUS BLD VENIPUNCTURE: CPT

## 2017-08-25 PROCEDURE — 84443 ASSAY THYROID STIM HORMONE: CPT

## 2017-08-25 PROCEDURE — 80053 COMPREHEN METABOLIC PANEL: CPT

## 2017-08-25 PROCEDURE — 85027 COMPLETE CBC AUTOMATED: CPT

## 2017-08-25 PROCEDURE — 97750 PHYSICAL PERFORMANCE TEST: CPT | Mod: S$GLB,,, | Performed by: INTERNAL MEDICINE

## 2017-08-25 PROCEDURE — 80061 LIPID PANEL: CPT

## 2017-08-25 PROCEDURE — 99999 PR PBB SHADOW E&M-EST. PATIENT-LVL III: CPT | Mod: PBBFAC,,, | Performed by: INTERNAL MEDICINE

## 2017-08-25 PROCEDURE — 97802 MEDICAL NUTRITION INDIV IN: CPT | Mod: S$GLB,,, | Performed by: INTERNAL MEDICINE

## 2017-08-25 PROCEDURE — 99396 PREV VISIT EST AGE 40-64: CPT | Mod: S$GLB,,, | Performed by: INTERNAL MEDICINE

## 2017-08-25 PROCEDURE — 83036 HEMOGLOBIN GLYCOSYLATED A1C: CPT

## 2017-08-25 PROCEDURE — 82306 VITAMIN D 25 HYDROXY: CPT

## 2017-08-25 RX ORDER — CLOTRIMAZOLE AND BETAMETHASONE DIPROPIONATE 10; .64 MG/G; MG/G
CREAM TOPICAL 2 TIMES DAILY
Qty: 45 G | Refills: 1 | Status: SHIPPED | OUTPATIENT
Start: 2017-08-25 | End: 2018-04-02 | Stop reason: CLARIF

## 2017-08-25 NOTE — PROGRESS NOTES
"Nutrition Assessment  Client name:  Renae Hou  :  1960  Age:  56 y.o.  Gender:  female    Client states:  Very pleasant [employer] employee here for her annual [employer] physical.  Has met with me in the past, discussing weight loss strategies as she lost over 20# following the Opti Fast diet.  No longer follows such diet, however, and has since gained 20#.  Suffered back pain and so, stopped exercising and cooking as it pained her to stand for long periods of time.  Nonetheless, received an epidural steroid injection, which relieved back pain.  Has not resumed exercise although plans to in the near future.  Inquired about a "crash diet" to aid in rapid weight loss as she wishes to lose 20# by her daughter's wedding in late October.  Began drinking Slim Fast shakes two weeks ago in an attempt to reduce caloric intake.  Prior to such, was skipping lunch sporadically and thereafter, overeating in the afternoon and evenings.  Notes that as a result of weight gain, her blood pressure and glucose matt, which prompted prescription of Invokana daily.  Also, began taking various vitamin/mineral supplements as recommended by a friend.  Overall, desires to lose 10-20# by October.  Was very receptive toward nutrition education and recommendations, taking detailed notes throughout consult.    Past Medical History:   Diagnosis Date    Diabetes mellitus     Fatty liver     Hypertension     Menopause     Obesity     Sleep apnea     Thyroid disease     Thyroid nodules.       Social History    Marital status:    Employment:  [employer]  Social History   Substance Use Topics    Smoking status: Former Smoker    Smokeless tobacco: Never Used      Comment: smoked socially decades ago - weekends only    Alcohol use Yes      Comment: SELDOMLY        Current medications:  has a current medication list which includes the following prescription(s): clotrimazole-betamethasone 1-0.05%, invokana, meloxicam, " "omeprazole, soolantra, valsartan, and vitamin d2.  Vitamins, minerals, and/or supplements:  Vitamin D, Vitamin B complex, R-lipoic acid, Curcumin, Quercetin (antioxidant), Hesperidin (antioxidant), Mg, Vitamin C   Food allergies or intolerances:  Shellfish     Food History (*Recall indicative of the past 2 weeks only.)  Breakfast:  1 cup gluten free oatmeal + 1 cup skim milk + ½ banana + 1 scoop vanilla Slim Fast  Lunch:  Slim Fast shake mixed with water + grapefruit slices  Dinner:  1 cup beans without rice/salad/Slim Fast shake/2 slices pizza    Exercise History:  None    Lab Reports   Total Cholesterol:  220    Triglycerides:  81  HDL:  66  LDL:  137.8   Glucose:  90  HbA1c:  5.5%  BP:  128/94     Weight History  Height:  5' 6"     Weight:  202#  BMI:  32.7  % Body Fat:  Unable to assess (Patient canceled fitness consult.)    Diagnosis  RMR (Method:  [address] [employer]):  1528 kcal  Activity Factor:  1.3  JOB:  1986 - 500 = 1486 kcal    1.  Overweight related to inadequate physical activity and previous excessive energy intake as evidenced by BMI:  32.7.     2.  Altered nutrition-related laboratory values related to inadequate physical activity and previous improper food choices as evidenced by TC:  220; LDL:  137.8    Intervention    Goals:  1.  TC < 200; LDL < 130  2.  Achieve 10# weight loss by October 21 (daughter's wedding)  3.  Begin exercising 45 minutes 3x/week or more as tolerated  4.  Consider in-home exercise DVD's, such as Wendy Junior, when weather is inclement  5.  Modify breakfast to include 1 cup oatmeal + 1 cup skim milk + 4 egg whites  6.  Incorporate a mid-morning and mid-afternoon snack, consisting of lean protein and fiber (ex. low fat cheese + 1 serving Trisquits; 1/2 banana + 100 kcal nuts)  7.  Incorporate a 1/2 plate of non-starchy vegetables + 1/4 plate lean protein + 1/4 plate CHO with dinner    Reviewed CMP, lipid panel, and HbA1c, noting improvements in HbA1c despite 19# weight gain.  " Improvements likely attributed to medication initiation.  Nonetheless, elevation in TC and LDL noted, likely due to inactivity, improper food choices, and resulting weight gain.  Had a lengthy discussion regarding weight loss, including the importance of healthy lifestyle changes versus fad diets.  Discussed appropriate weight loss targets and rate of weight loss, strongly encouraging patient to begin exercising weekly.  Reviewed Wendy Junior's walk at home DVD's as an alternative for inclement weather.  Also, reviewed current food diary and provided specific recommendations on ways to improve the nutrient density of each meal and snack.  Provided healthy meal and snack choices and discouraged intake of Slim Fast powder as it contributes added sugar and CHO with little protein.    Handouts provided:  Meal Planning Guide  Restaurant Guide  Eat Fit Shopping List  Eat Fit Esthela  Fast Food Guide  Vitamin/Mineral Guide    Monitoring/Evaluation    Monitor the following:  Weight  BMI  % Body Fat  Caloric intake  Lipid panel  Blood pressure  HbA1c    Follow Up Plan:  Follow up with client in 1-2 years

## 2017-08-29 NOTE — PROGRESS NOTES
"Subjective:       Patient ID: Renae Hou is a 56 y.o. female.    Chief Complaint: Executive Health    HPIVery pleasant lady from [place] here for her Executive Health exam. Overall doing well and had no specific complaints. We discussed the fact that she has been recently started on Invokana for "pre-diabetes" by her local physician in June of this year. She is tolerating this medications well, but we discussed alternative medications that would also have benefit with weight loss, which has been a problem with her, as well controlling blood glucose levels in this setting, ie. Metformin. She is also being followed for fatty liver changes and I emphasized the importance of addressing this issue as well vis a vis future health consequences that could arise from this condition. The importance of weight loss via proper nutrition, exercise was discussed. She has noted a pruritic rash under her abdominal skin fold and, on exam, there is a small patch of erythema likely candidal in nature. I gave her a prescription for Lotrisone cream with instructions on it use. Other results on her her lab work showed elevated cholesterol level at 220 with excellent HDL fraction. All other parameters were unremarkable including liver function tests (transaminases).  Review of Systems   Constitutional: Negative for activity change, appetite change, fatigue and unexpected weight change.   HENT: Negative.    Eyes: Negative for visual disturbance.   Respiratory: Negative for cough, shortness of breath and wheezing.    Cardiovascular: Negative for chest pain, palpitations and leg swelling.   Gastrointestinal: Negative for abdominal distention, abdominal pain and blood in stool.   Genitourinary: Negative for difficulty urinating.   Musculoskeletal: Negative for arthralgias, back pain and joint swelling.   Skin: Positive for rash.   Neurological: Negative for dizziness, weakness, light-headedness and headaches.   Hematological: " Negative.        Objective:      Physical Exam   Constitutional: She is oriented to person, place, and time. She appears well-developed and well-nourished.   HENT:   Head: Normocephalic and atraumatic.   Right Ear: External ear normal.   Left Ear: External ear normal.   Mouth/Throat: Oropharynx is clear and moist. No oropharyngeal exudate.   Eyes: Conjunctivae are normal. Pupils are equal, round, and reactive to light. Right eye exhibits no discharge. Left eye exhibits no discharge. No scleral icterus.   Neck: Normal range of motion. Neck supple. No tracheal deviation present. No thyromegaly present.   Cardiovascular: Normal rate, regular rhythm, normal heart sounds and intact distal pulses.    No murmur heard.  Pulmonary/Chest: Effort normal and breath sounds normal. No respiratory distress. She has no wheezes. She exhibits no tenderness.   Abdominal: Soft. Bowel sounds are normal. She exhibits no distension. There is no tenderness.   Musculoskeletal: Normal range of motion. She exhibits no edema or tenderness.   Lymphadenopathy:     She has no cervical adenopathy.   Neurological: She is alert and oriented to person, place, and time. No cranial nerve deficit.   Skin: Skin is warm and dry. Rash noted. She is not diaphoretic. There is erythema.   Lower abdominal fold intertrigo.   Psychiatric: She has a normal mood and affect. Her behavior is normal. Judgment and thought content normal.   Nursing note and vitals reviewed.      Assessment:       1. Routine general medical examination at a health care facility    2. Intertrigo     3.     Hypercholesterolemia.   4.     Pre-diabetes?   5.     History of fatty liver - normal liver transaminases.  Plan:    1. Discuss with PCP the rationale for Invokana use vs metformin.         2. Adhere to low fat, cholesterol, carbohydrate diet and exercise daily.         3. Prescription for Lotrisone cream provided.         4. Return to clinic in 1 year or sooner if needed.

## 2017-09-03 ENCOUNTER — PATIENT MESSAGE (OUTPATIENT)
Dept: ORTHOPEDICS | Facility: CLINIC | Age: 57
End: 2017-09-03

## 2017-09-13 ENCOUNTER — TELEPHONE (OUTPATIENT)
Dept: ORTHOPEDICS | Facility: CLINIC | Age: 57
End: 2017-09-13

## 2017-09-13 DIAGNOSIS — M54.50 LUMBAR SPINE PAIN: Primary | ICD-10-CM

## 2017-09-20 ENCOUNTER — TELEPHONE (OUTPATIENT)
Dept: ORTHOPEDICS | Facility: CLINIC | Age: 57
End: 2017-09-20

## 2017-09-20 ENCOUNTER — PATIENT MESSAGE (OUTPATIENT)
Dept: ORTHOPEDICS | Facility: CLINIC | Age: 57
End: 2017-09-20

## 2017-09-20 DIAGNOSIS — M25.551 HIP PAIN, ACUTE, RIGHT: Primary | ICD-10-CM

## 2017-09-20 DIAGNOSIS — M25.552 HIP PAIN, ACUTE, LEFT: Primary | ICD-10-CM

## 2017-09-27 ENCOUNTER — HOSPITAL ENCOUNTER (OUTPATIENT)
Dept: RADIOLOGY | Facility: HOSPITAL | Age: 57
Discharge: HOME | End: 2017-09-27
Attending: ORTHOPAEDIC SURGERY
Payer: COMMERCIAL

## 2017-09-27 ENCOUNTER — OFFICE VISIT (OUTPATIENT)
Dept: ORTHOPEDICS | Facility: CLINIC | Age: 57
End: 2017-09-27
Payer: COMMERCIAL

## 2017-09-27 VITALS — HEIGHT: 66 IN | BODY MASS INDEX: 31.34 KG/M2 | WEIGHT: 195 LBS

## 2017-09-27 DIAGNOSIS — M54.50 LUMBAR SPINE PAIN: ICD-10-CM

## 2017-09-27 DIAGNOSIS — M70.62 TROCHANTERIC BURSITIS OF LEFT HIP: Primary | ICD-10-CM

## 2017-09-27 DIAGNOSIS — M25.552 HIP PAIN, ACUTE, LEFT: ICD-10-CM

## 2017-09-27 PROCEDURE — 72120 X-RAY BEND ONLY L-S SPINE: CPT | Mod: 26,,, | Performed by: RADIOLOGY

## 2017-09-27 PROCEDURE — 72100 X-RAY EXAM L-S SPINE 2/3 VWS: CPT | Mod: TC

## 2017-09-27 PROCEDURE — 73502 X-RAY EXAM HIP UNI 2-3 VIEWS: CPT | Mod: TC,LT

## 2017-09-27 PROCEDURE — 73502 X-RAY EXAM HIP UNI 2-3 VIEWS: CPT | Mod: 26,LT,, | Performed by: RADIOLOGY

## 2017-09-27 PROCEDURE — 72100 X-RAY EXAM L-S SPINE 2/3 VWS: CPT | Mod: 26,,, | Performed by: RADIOLOGY

## 2017-09-27 PROCEDURE — 99999 PR PBB SHADOW E&M-EST. PATIENT-LVL III: CPT | Mod: PBBFAC,,, | Performed by: ORTHOPAEDIC SURGERY

## 2017-09-27 PROCEDURE — 3008F BODY MASS INDEX DOCD: CPT | Mod: S$GLB,,, | Performed by: ORTHOPAEDIC SURGERY

## 2017-09-27 PROCEDURE — 99213 OFFICE O/P EST LOW 20 MIN: CPT | Mod: S$GLB,,, | Performed by: ORTHOPAEDIC SURGERY

## 2017-09-27 RX ORDER — MELOXICAM 15 MG/1
15 TABLET ORAL DAILY
Qty: 30 TABLET | Refills: 6 | Status: ON HOLD | OUTPATIENT
Start: 2017-09-27 | End: 2018-04-16 | Stop reason: HOSPADM

## 2017-09-27 RX ORDER — HYDROXYZINE HYDROCHLORIDE 25 MG/1
TABLET, FILM COATED ORAL
COMMUNITY
Start: 2017-09-04 | End: 2018-03-14

## 2017-09-27 RX ORDER — PHENTERMINE HYDROCHLORIDE 37.5 MG/1
TABLET ORAL
Refills: 1 | COMMUNITY
Start: 2017-09-10 | End: 2018-04-02 | Stop reason: CLARIF

## 2017-10-02 NOTE — PROGRESS NOTES
DATE: 10/2/2017  PATIENT: Renae Hou    Attending Physician: Dean Zayas M.D.    CHIEF COMPLAINT: low back pain    HISTORY:  Renae Hou is a 56 y.o. female with DM, HTN, RENAE, and thyroid disease here for initial evaluation of low back and left leg pain. The pain has been present for several years, but has been worse in the past two weeks. She has had ESIs in the past for low back pain which have been helpful. She reports that two weeks ago she was busier at work and noticed the following day that her back pain was worse. Her daughter is getting  in about a month and she would like to try another injection before the wedding. She also has pain over the lateral aspect of her left hip which radiates to her knee. This pain has been present for the past 6 weeks and is worse when she sleeps on her left side. She had been taking Mobic, which was helpful, but her prescription ran out. This pain is not present at rest, only with certain movements. There is no associated numbness and tingling. There is no subjective weakness.    The Patient denies myelopathic symptoms such as handwriting changes or difficulty with buttons/coins/keys. Denies perineal paresthesias, bowel/bladder dysfunction.    PAST MEDICAL/SURGICAL HISTORY:  Past Medical History:   Diagnosis Date    Diabetes mellitus     Fatty liver     Hypertension     Menopause     Obesity     Sleep apnea     Thyroid disease     Thyroid nodules.     Past Surgical History:   Procedure Laterality Date    BACK SURGERY  2002    L4, L5    BILATERAL SALPINGOOPHORECTOMY      CHOLECYSTECTOMY      HYSTERECTOMY  12/17/2012    On license of [facility] BS[facility]     RECTOCELE REPAIR      THYROID NODULE REMOVAL         Current Medications:   Current Outpatient Prescriptions:     ACCU-CHEK MARY ANNE PLUS METER [facility] [place], USE TO TEST BLOOD SUGAR D, Disp: , Rfl: 0    clotrimazole-betamethasone 1-0.05% (LOTRISONE) cream, Apply topically 2 (two) times daily., Disp: 45 g, Rfl: 1     omeprazole (PRILOSEC) 40 MG capsule, TK 1 C PO QD, Disp: , Rfl: 2    SOOLANTRA 1 % Crea, , Disp: , Rfl:     valsartan (DIOVAN) 160 MG tablet, Take 160 mg by mouth once daily., Disp: , Rfl:     hydrOXYzine HCl (ATARAX) 25 MG tablet, , Disp: , Rfl:     INVOKANA 100 mg Tab, TK 1 T PO QD, Disp: , Rfl: 3    meloxicam (MOBIC) 15 MG tablet, Take 1 tablet (15 mg total) by mouth once daily., Disp: 30 tablet, Rfl: 6    phentermine (ADIPEX-P) 37.5 mg tablet, TK 1 T PO QAM, Disp: , Rfl: 1    VITAMIN D2 50,000 unit capsule, TAKE 1 CAPSULE BY MOUTH ONCE WEEKLY., Disp: 4 capsule, Rfl: 3    Social History:   Social History     Social History    Marital status:      Spouse name: N/A    Number of children: N/A    Years of education: N/A     Occupational History    Not on file.     Social History Main Topics    Smoking status: Former Smoker    Smokeless tobacco: Never Used      Comment: smoked socially decades ago - weekends only    Alcohol use Yes      Comment: SELDOMLY    Drug use: No    Sexual activity: Yes     Partners: Male     Birth control/ protection: Surgical, See Surgical Hx      [facility],       Other Topics Concern    Not on file     Social History Narrative    No narrative on file       REVIEW OF SYSTEMS:  Constitution: Negative. Negative for chills, fever and night sweats.   Cardiovascular: Negative for chest pain and syncope.   Respiratory: Negative for cough and shortness of breath.   Gastrointestinal: See HPI. Negative for nausea/vomiting. Negative for abdominal pain.  Genitourinary: See HPI. Negative for discoloration or dysuria.  Hematologic/Lymphatic: Neg for bleeding/clotting disorders.   Musculoskeletal: Negative for falls and muscle weakness.   Neurological: See HPI. no history of seizures. no history of cranial surgery or shunts.  Neurological: See HPI. No seizures.   Endocrine: Negative for polydipsia, polyphagia and polyuria.   Allergic/Immunologic: Negative  "for hives and persistent infections.     EXAM:  Ht 5' 6" (1.676 m)   Wt 88.5 kg (195 lb)   LMP 11/25/2012   BMI 31.47 kg/m²     PHYSICAL EXAMINATION:    General: The patient is a pleasant 56 y.o. female in no apparent distress, the patient is orientatied to person, place and time.  Psych: Normal mood and affect  HEENT: Vision grossly intact, hearing intact to the spoken word.  Lungs: Respirations unlabored.  Gait: Normal station and gait, no difficulty with toe or heel walk.   Skin: Dorsal lumbar skin negative for rashes, lesions, hairy patches and surgical scars. There is mild lumbar tenderness to palpation.  Range of motion: Lumbar range of motion is acceptable.  Spinal Balance: Global saggital and coronal spinal balance acceptable, no significant for scoliosis and kyphosis.  Musculoskeletal: No pain with the range of motion of the bilateral hips. Bilateral trochanteric tenderness to palpation, L >>R   Vascular: Bilateral lower extremities warm and well perfused, Dorsalis pedis pulses 2+ bilaterally.  Neurological: Normal strength and tone in all major motor groups in the bilateral lower extremities. Normal sensation to light touch in the L2-S1 dermatomes bilaterally.  Deep tendon reflexes symmetric in the bilateral lower extremities.  Negative Babinski bilaterally. Straight leg raise negative bilaterally.    IMAGING:      Today I personally reviewed AP, Lat and Flex/Ex  upright L-spine that demonstrate grade I anterolisthesis of L4/5 and L5/S1 with associated facet arthropathy.     ASSESSMENT/PLAN:    Renae Lai was seen today for back pain and pain.    Diagnoses and all orders for this visit:    Trochanteric bursitis of left hip    Other orders  -     meloxicam (MOBIC) 15 MG tablet; Take 1 tablet (15 mg total) by mouth once daily.      Recommend continuing mobic. Will schedule NEERAJ. Troch injection in clinic today with toradol.     Procedure note:  After obtaining verbal consent, the correct site was " identified with the pt. A toradol injection was performed at the left greater trochanter using 1% plain Lidocaine and 30 mg of toradol. This was well tolerated.

## 2017-10-03 ENCOUNTER — PATIENT MESSAGE (OUTPATIENT)
Dept: ORTHOPEDICS | Facility: CLINIC | Age: 57
End: 2017-10-03

## 2017-10-05 ENCOUNTER — PATIENT MESSAGE (OUTPATIENT)
Dept: SPINE | Facility: CLINIC | Age: 57
End: 2017-10-05

## 2017-10-19 ENCOUNTER — PATIENT MESSAGE (OUTPATIENT)
Dept: ORTHOPEDICS | Facility: CLINIC | Age: 57
End: 2017-10-19

## 2017-10-19 ENCOUNTER — TELEPHONE (OUTPATIENT)
Dept: ORTHOPEDICS | Facility: CLINIC | Age: 57
End: 2017-10-19

## 2017-10-19 RX ORDER — HYDROCODONE BITARTRATE AND ACETAMINOPHEN 5; 325 MG/1; MG/1
1 TABLET ORAL EVERY 6 HOURS PRN
Qty: 60 TABLET | Refills: 0 | Status: SHIPPED | OUTPATIENT
Start: 2017-10-19 | End: 2017-10-29

## 2017-10-19 NOTE — TELEPHONE ENCOUNTER
----- Message from Dean Zayas MD sent at 10/19/2017  1:32 PM CDT -----  vicodin for this pt please.

## 2018-01-22 ENCOUNTER — PATIENT MESSAGE (OUTPATIENT)
Dept: OBSTETRICS AND GYNECOLOGY | Facility: CLINIC | Age: 58
End: 2018-01-22

## 2018-01-22 ENCOUNTER — PATIENT MESSAGE (OUTPATIENT)
Dept: ORTHOPEDICS | Facility: CLINIC | Age: 58
End: 2018-01-22

## 2018-01-22 ENCOUNTER — TELEPHONE (OUTPATIENT)
Dept: ORTHOPEDICS | Facility: CLINIC | Age: 58
End: 2018-01-22

## 2018-01-22 DIAGNOSIS — M54.50 LUMBAR SPINE PAIN: Primary | ICD-10-CM

## 2018-01-23 DIAGNOSIS — Z12.31 VISIT FOR SCREENING MAMMOGRAM: Primary | ICD-10-CM

## 2018-02-02 ENCOUNTER — OFFICE VISIT (OUTPATIENT)
Dept: ORTHOPEDICS | Facility: CLINIC | Age: 58
End: 2018-02-02
Payer: COMMERCIAL

## 2018-02-02 ENCOUNTER — HOSPITAL ENCOUNTER (OUTPATIENT)
Dept: RADIOLOGY | Facility: HOSPITAL | Age: 58
Discharge: HOME | End: 2018-02-02
Attending: ORTHOPAEDIC SURGERY
Payer: COMMERCIAL

## 2018-02-02 VITALS — HEIGHT: 66 IN | BODY MASS INDEX: 33.34 KG/M2 | WEIGHT: 207.44 LBS

## 2018-02-02 DIAGNOSIS — M54.50 LUMBAR SPINE PAIN: ICD-10-CM

## 2018-02-02 DIAGNOSIS — Z98.890 HISTORY OF LUMBAR DISCECTOMY: Primary | ICD-10-CM

## 2018-02-02 DIAGNOSIS — M43.10 SPONDYLOLISTHESIS, ACQUIRED: ICD-10-CM

## 2018-02-02 DIAGNOSIS — M54.16 LUMBAR RADICULOPATHY: ICD-10-CM

## 2018-02-02 PROCEDURE — 99999 PR PBB SHADOW E&M-EST. PATIENT-LVL III: CPT | Mod: PBBFAC,,, | Performed by: PHYSICIAN ASSISTANT

## 2018-02-02 PROCEDURE — 99213 OFFICE O/P EST LOW 20 MIN: CPT | Mod: S$GLB,,, | Performed by: PHYSICIAN ASSISTANT

## 2018-02-02 PROCEDURE — 72120 X-RAY BEND ONLY L-S SPINE: CPT | Mod: TC

## 2018-02-02 PROCEDURE — 72110 X-RAY EXAM L-2 SPINE 4/>VWS: CPT | Mod: 26,,, | Performed by: RADIOLOGY

## 2018-02-02 PROCEDURE — 3008F BODY MASS INDEX DOCD: CPT | Mod: S$GLB,,, | Performed by: PHYSICIAN ASSISTANT

## 2018-02-02 RX ORDER — METHYLPREDNISOLONE 4 MG/1
TABLET ORAL
Qty: 1 PACKAGE | Refills: 0 | Status: SHIPPED | OUTPATIENT
Start: 2018-02-02 | End: 2018-03-14

## 2018-02-02 RX ORDER — DIAZEPAM 2 MG/1
2 TABLET ORAL
Qty: 2 TABLET | Refills: 0 | Status: ON HOLD | OUTPATIENT
Start: 2018-02-02 | End: 2018-04-16 | Stop reason: HOSPADM

## 2018-02-02 NOTE — PROGRESS NOTES
"DATE: 2/2/2018  PATIENT: Renae Hou    Attending Physician: Dean Zayas M.D.    HISTORY:  Renae Hou is a 57 y.o. female who returns to me today for follow up of low back pain.  She was last seen by Dr. Zayas 10/2/2017.  At that time she was having pain related to left hip bursitis.  Today she is doing well but notes she has been having worsening tailbone pain and left posterolateral leg pain.  She also reports numbness and tingling in her legs.      The Patient denies myelopathic symptoms such as handwriting changes or difficulty with buttons/coins/keys. Denies perineal paresthesias, bowel/bladder dysfunction.    PMH/PSH/FamHx/SocHx:  Unchanged from prior visit    ROS:  REVIEW OF SYSTEMS:  Constitution: Negative. Negative for chills, fever and night sweats.   HENT: Negative for congestion and headaches.    Eyes: Negative for blurred vision, left vision loss and right vision loss.   Cardiovascular: Negative for chest pain and syncope.   Respiratory: Negative for cough and shortness of breath.    Endocrine: Negative for polydipsia, polyphagia and polyuria.   Hematologic/Lymphatic: Negative for bleeding problem. Does not bruise/bleed easily.   Skin: Negative for dry skin, itching and rash.   Musculoskeletal: Negative for falls and muscle weakness.   Gastrointestinal: Negative for abdominal pain and bowel incontinence.   Allergic/Immunologic: Negative for hives and persistent infections.  Genitourinary: Negative for urinary retention/incontinence and nocturia.   Neurological: Negative for disturbances in coordination, no myelopathic symptoms such as handwriting changes or difficulty with buttons, coins, keys or small objects. No loss of balance and seizures.   Psychiatric/Behavioral: Negative for depression. The patient does not have insomnia.   Denies perineal paresthesias, bowel or bladder incontinence    EXAM:  Ht 5' 6" (1.676 m)   Wt 94.1 kg (207 lb 7.3 oz)   LMP 11/25/2012   BMI 33.48 " kg/m²     My physical examination was notable for the following findings:     Normal station and gait, no difficulty with toe or heel walk.   Dorsal lumbar skin negative for rashes, lesions, hairy patches and surgical scars. There is mild lumbar tenderness to palpation.  Lumbar range of motion is acceptable.  Global saggital and coronal spinal balance acceptable, not significant for scoliosis and kyphosis.  No pain with the range of motion of the bilateral hips. No trochanteric tenderness to palpation.  Bilateral lower extremities warm and well perfused, dorsalis pedis pulses 2+ bilaterally.  Normal strength and tone in all major motor groups in the bilateral lower extremities. Normal sensation to light touch in the L2-S1 dermatomes bilaterally.  Deep tendon reflexes symmetric 2+ in the bilateral lower extremities.  Negative Babinski bilaterally. Straight leg raise positive bilaterally, L>R.      IMAGING:    Today I personally reviewed AP, Lat and Flex/Ex  upright L-spine that demonstrate grade I anterolisthesis of L4/5 and L5/S1.     MRI lumbar spine from 3/10/2017 shows moderate stenosis at L4/5 and mild stenosis at L5/S1.       ASSESSMENT/PLAN:    Renae Lai was seen today for pain.    Diagnoses and all orders for this visit:    History of lumbar discectomy  -     MRI Lumbar Spine Without Contrast; Future    Lumbar radiculopathy  -     MRI Lumbar Spine Without Contrast; Future    Spondylolisthesis, acquired  -     MRI Lumbar Spine Without Contrast; Future    Other orders  -     methylPREDNISolone (MEDROL DOSEPACK) 4 mg tablet; use as directed  -     diazePAM (VALIUM) 2 MG tablet; Take 1 tablet (2 mg total) by mouth On call Procedure (take 1 30 min prior to procedure, may take second if needed).    Will get a new MRI since she is having new and worsening symptoms since the previous MRI.  I will call with results.       Follow-up if symptoms worsen or fail to improve.

## 2018-02-09 ENCOUNTER — HOSPITAL ENCOUNTER (OUTPATIENT)
Dept: RADIOLOGY | Facility: HOSPITAL | Age: 58
Discharge: HOME | End: 2018-02-09
Attending: PHYSICIAN ASSISTANT
Payer: COMMERCIAL

## 2018-02-09 DIAGNOSIS — M54.16 LUMBAR RADICULOPATHY: ICD-10-CM

## 2018-02-09 DIAGNOSIS — Z98.890 HISTORY OF LUMBAR DISCECTOMY: ICD-10-CM

## 2018-02-09 DIAGNOSIS — M43.10 SPONDYLOLISTHESIS, ACQUIRED: ICD-10-CM

## 2018-02-09 PROCEDURE — 72148 MRI LUMBAR SPINE W/O DYE: CPT | Mod: 26,,, | Performed by: RADIOLOGY

## 2018-02-09 PROCEDURE — 72148 MRI LUMBAR SPINE W/O DYE: CPT | Mod: TC

## 2018-02-14 ENCOUNTER — PATIENT MESSAGE (OUTPATIENT)
Dept: ORTHOPEDICS | Facility: CLINIC | Age: 58
End: 2018-02-14

## 2018-02-19 ENCOUNTER — OFFICE VISIT (OUTPATIENT)
Dept: ORTHOPEDICS | Facility: CLINIC | Age: 58
End: 2018-02-19
Payer: COMMERCIAL

## 2018-02-19 DIAGNOSIS — M71.38 SYNOVIAL CYST OF LUMBAR FACET JOINT: ICD-10-CM

## 2018-02-19 DIAGNOSIS — M54.16 LUMBAR RADICULOPATHY: Primary | ICD-10-CM

## 2018-02-19 PROCEDURE — 3008F BODY MASS INDEX DOCD: CPT | Mod: S$GLB,,, | Performed by: ORTHOPAEDIC SURGERY

## 2018-02-19 PROCEDURE — 99999 PR PBB SHADOW E&M-EST. PATIENT-LVL II: CPT | Mod: PBBFAC,,, | Performed by: ORTHOPAEDIC SURGERY

## 2018-02-19 PROCEDURE — 99212 OFFICE O/P EST SF 10 MIN: CPT | Mod: S$GLB,,, | Performed by: ORTHOPAEDIC SURGERY

## 2018-02-19 NOTE — PROGRESS NOTES
The patient returns for follow up.    She has had a few weeks of low back and L buttock pain as well as LLE radicular symptoms.    This is different than her trocanteric pain.    Her recent MRI demonstrates an enlarged Left L4/5 facet cyst as well as a 2 level spondylolisthesis at L4/5/S1.    Today we discussed options, I have recommended a left L4/5 facet cyst aspiration/injection.    I spent 10 minutes with the patient of which greater than 1/2 the time was devoted to counciling the patient regarding treatment options.

## 2018-03-02 ENCOUNTER — SURGERY (OUTPATIENT)
Age: 58
End: 2018-03-02

## 2018-03-02 ENCOUNTER — HOSPITAL ENCOUNTER (OUTPATIENT)
Facility: OTHER | Age: 58
Discharge: HOME | End: 2018-03-02
Attending: ANESTHESIOLOGY | Admitting: ANESTHESIOLOGY
Payer: COMMERCIAL

## 2018-03-02 VITALS
TEMPERATURE: 98 F | HEART RATE: 76 BPM | SYSTOLIC BLOOD PRESSURE: 151 MMHG | RESPIRATION RATE: 18 BRPM | HEIGHT: 65 IN | BODY MASS INDEX: 33.32 KG/M2 | OXYGEN SATURATION: 96 % | DIASTOLIC BLOOD PRESSURE: 89 MMHG | WEIGHT: 200 LBS

## 2018-03-02 DIAGNOSIS — G89.29 CHRONIC PAIN: ICD-10-CM

## 2018-03-02 DIAGNOSIS — M71.38 SYNOVIAL CYST OF LUMBAR FACET JOINT: ICD-10-CM

## 2018-03-02 DIAGNOSIS — M47.9 OSTEOARTHRITIS OF SPINE, UNSPECIFIED SPINAL OSTEOARTHRITIS COMPLICATION STATUS, UNSPECIFIED SPINAL REGION: ICD-10-CM

## 2018-03-02 DIAGNOSIS — M47.816 LUMBAR FACET ARTHROPATHY: Primary | ICD-10-CM

## 2018-03-02 PROCEDURE — 25000003 PHARM REV CODE 250: Performed by: ANESTHESIOLOGY

## 2018-03-02 PROCEDURE — 25500020 PHARM REV CODE 255: Performed by: ANESTHESIOLOGY

## 2018-03-02 PROCEDURE — 64483 NJX AA&/STRD TFRM EPI L/S 1: CPT | Performed by: ANESTHESIOLOGY

## 2018-03-02 PROCEDURE — 64493 INJ PARAVERT F JNT L/S 1 LEV: CPT | Mod: LT,,, | Performed by: ANESTHESIOLOGY

## 2018-03-02 PROCEDURE — 64484 NJX AA&/STRD TFRM EPI L/S EA: CPT | Performed by: ANESTHESIOLOGY

## 2018-03-02 PROCEDURE — 63600175 PHARM REV CODE 636 W HCPCS: Performed by: ANESTHESIOLOGY

## 2018-03-02 PROCEDURE — 64493 INJ PARAVERT F JNT L/S 1 LEV: CPT | Performed by: ANESTHESIOLOGY

## 2018-03-02 PROCEDURE — 64483 NJX AA&/STRD TFRM EPI L/S 1: CPT | Mod: 59,LT,, | Performed by: ANESTHESIOLOGY

## 2018-03-02 PROCEDURE — 99152 MOD SED SAME PHYS/QHP 5/>YRS: CPT | Mod: ,,, | Performed by: ANESTHESIOLOGY

## 2018-03-02 RX ORDER — BUPIVACAINE HYDROCHLORIDE 2.5 MG/ML
INJECTION, SOLUTION EPIDURAL; INFILTRATION; INTRACAUDAL
Status: DISCONTINUED | OUTPATIENT
Start: 2018-03-02 | End: 2018-03-02 | Stop reason: HOSPADM

## 2018-03-02 RX ORDER — LIDOCAINE HYDROCHLORIDE 10 MG/ML
INJECTION INFILTRATION; PERINEURAL
Status: DISCONTINUED | OUTPATIENT
Start: 2018-03-02 | End: 2018-03-02 | Stop reason: HOSPADM

## 2018-03-02 RX ORDER — MIDAZOLAM HYDROCHLORIDE 1 MG/ML
INJECTION INTRAMUSCULAR; INTRAVENOUS
Status: DISCONTINUED | OUTPATIENT
Start: 2018-03-02 | End: 2018-03-02 | Stop reason: HOSPADM

## 2018-03-02 RX ORDER — DIPHENHYDRAMINE HYDROCHLORIDE 50 MG/ML
25 INJECTION INTRAMUSCULAR; INTRAVENOUS ONCE
Status: COMPLETED | OUTPATIENT
Start: 2018-03-02 | End: 2018-03-02

## 2018-03-02 RX ORDER — SODIUM CHLORIDE 9 MG/ML
500 INJECTION, SOLUTION INTRAVENOUS CONTINUOUS
Status: DISCONTINUED | OUTPATIENT
Start: 2018-03-02 | End: 2018-03-02 | Stop reason: HOSPADM

## 2018-03-02 RX ORDER — FENTANYL CITRATE 50 UG/ML
INJECTION, SOLUTION INTRAMUSCULAR; INTRAVENOUS
Status: DISCONTINUED | OUTPATIENT
Start: 2018-03-02 | End: 2018-03-02 | Stop reason: HOSPADM

## 2018-03-02 RX ORDER — DEXAMETHASONE SODIUM PHOSPHATE 4 MG/ML
INJECTION, SOLUTION INTRA-ARTICULAR; INTRALESIONAL; INTRAMUSCULAR; INTRAVENOUS; SOFT TISSUE
Status: DISCONTINUED | OUTPATIENT
Start: 2018-03-02 | End: 2018-03-02 | Stop reason: HOSPADM

## 2018-03-02 RX ADMIN — DIPHENHYDRAMINE HYDROCHLORIDE 25 MG: 50 INJECTION, SOLUTION INTRAMUSCULAR; INTRAVENOUS at 10:03

## 2018-03-02 RX ADMIN — MIDAZOLAM HYDROCHLORIDE 1 MG: 1 INJECTION, SOLUTION INTRAMUSCULAR; INTRAVENOUS at 10:03

## 2018-03-02 RX ADMIN — IOHEXOL 5 ML: 300 INJECTION, SOLUTION INTRAVENOUS at 10:03

## 2018-03-02 RX ADMIN — LIDOCAINE HYDROCHLORIDE 10 ML: 10 INJECTION, SOLUTION INFILTRATION; PERINEURAL at 10:03

## 2018-03-02 RX ADMIN — SODIUM CHLORIDE 500 ML: 900 INJECTION, SOLUTION INTRAVENOUS at 10:03

## 2018-03-02 RX ADMIN — DEXAMETHASONE SODIUM PHOSPHATE 10 MG: 4 INJECTION, SOLUTION INTRAMUSCULAR; INTRAVENOUS at 10:03

## 2018-03-02 RX ADMIN — FENTANYL CITRATE 50 MCG: 50 INJECTION, SOLUTION INTRAMUSCULAR; INTRAVENOUS at 10:03

## 2018-03-02 RX ADMIN — BUPIVACAINE HYDROCHLORIDE 10 ML: 2.5 INJECTION, SOLUTION EPIDURAL; INFILTRATION; INTRACAUDAL; PERINEURAL at 10:03

## 2018-03-02 NOTE — DISCHARGE INSTRUCTIONS
Home Care Instructions Pain Management:    1. DIET:   You may resume your normal diet today.   2. BATHING:   You may shower with luke warm water.  3. DRESSING:   You may remove your bandage today.   4. ACTIVITY LEVEL:   You may resume your normal activities 24 hrs after your procedure.  5. MEDICATIONS:   You may resume your normal medications today.   6. SPECIAL INSTRUCTIONS:   No heat to the injection site for 24 hrs including, bath or shower, heating pad, moist heat, or hot tubs.    Use ice pack to injection site for any pain or discomfort.  Apply ice packs for 20 minute intervals as needed.   If you have received any sedatives by mouth today you may not drive for 12 hours.    If you have received any sedation through your IV, you may not drive for 24 hrs.     PLEASE CALL YOUR DOCTOR IF:  1. Redness or swelling around the injection site.  2. Fever of 101 degrees  3. Drainage (pus) from the injection site.  4. For any continuous bleeding (some dried blood over the incision is normal.)    FOR EMERGENCIES:   If any unusual problems or difficulties occur during clinic hours, call [phone] or [phone].   Adult Procedural Sedation Instructions    Recovery After Procedural Sedation (Adult)  You have been given medicine by vein to make you sleep during your surgery. This may have included both a pain medicine and sleeping medicine. Most of the effects have worn off. But you may still have some drowsiness for the next 6 to 8 hours.  Home care  Follow these guidelines when you get home:  · For the next 8 hours, you should be watched by a responsible adult. This person should make sure your condition is not getting worse.  · Don't drink any alcohol for the next 24 hours.  · Don't drive, operate dangerous machinery, or make important business or personal decisions during the next 24 hours.  Note: Your healthcare provider may tell you not to take any medicine by mouth for pain or sleep in the next 4 hours. These medicines may  react with the medicines you were given in the hospital. This could cause a much stronger response than usual.  Follow-up care  Follow up with your healthcare provider if you are not alert and back to your usual level of activity within 12 hours.  When to seek medical advice  Call your healthcare provider right away if any of these occur:  · Drowsiness gets worse  · Weakness or dizziness gets worse  · Repeated vomiting  · You can't be awakened   Date Last Reviewed: 10/18/2016  © 9123-2738 [employer]. [address] [place] [address]. All rights reserved. This information is not intended as a substitute for professional medical care. Always follow your healthcare professional's instructions.      Thank you for allowing us to care for you today. You may receive a survey about the care we provided. Your feedback is valuable and helps us provide excellent care throughout the community.

## 2018-03-02 NOTE — OP NOTE
"Patient Name: Renae Hou  MRN: [id number]    INFORMED CONSENT: The procedure, risks, benefits and options were discussed with patient. There are no contraindications to the procedure. The patient expressed understanding and agreed to proceed. The personnel performing the procedure was discussed. I verify that I personally obtained Renae Lai's consent prior to the start of the procedure and the signed consent can be found on the patient's chart.    Procedure Date: 03/02/2018    Anesthesia: Topical    Pre Procedure diagnosis: Lumbar radiculopathy [M54.16]  Synovial cyst of lumbar facet joint [M71.38]  1. Lumbar facet arthropathy    2. Osteoarthritis of spine, unspecified spinal osteoarthritis complication status, unspecified spinal region    3. Synovial cyst of lumbar facet joint        Post-Procedure diagnosis: SAME      Sedation: Yes - Fentanyl 50 mcg and Midazolam 2 mg    PROCEDURE: left L4-5 FACET JOINT CYST ASPIRATION AND INJECTION      DESCRIPTION OF PROCEDURE:The patient was brought to the procedure room.  After performing time out. IV access was obtained prior to the procedure. The patient was positioned prone on the fluoroscopy table. Continuous hemodynamic monitoring was initiated including blood pressure, EKG, and pulse oximetry. The area of the lumbar spine was prepped with chlorhexidine and draped into a sterile field.Fluoroscopy was used to identify the location of left L4-5  joints respectivly. Skin anesthesia was achieved using 3 cc of Lidocaine 1% over the injection sites. A 25 gauge, 3 1/2" spinal needle was slowly inserted at each joint using APand oblique fluoroscopic imaging. Negative aspiration for 0.2 ML of serosanguinous fluid was confirmed. 0.3 cc of Omnipaque  dye was inserted to confirm placement A combination of 1 ml bupivacaine 0.25% and 5 mg decadron was injected at all joints. The needles were removed and bleeding was nil. A sterile dressing was applied. No specimens collected. " Renae Lai was taken back to the recovery room for further observation.         PROCEDURE:Left L4-5   TRANSFORAMINAL EPIDURAL STEROID INJECTION      DESCRIPTION OF PROCEDURE:  . The skin was prepped with chlorhexidine three times and draped in a sterile fashion. Skin anesthesia was achieved using 3 mL of lidocaine 1% over the respective injection site.     An oblique fluoroscopic view was obtained, with the superior articular process of the inferior vertebral body aligned with the pedicle. The tip of a 22-gauge 3.5-inch Quincke-type spinal needle was advanced toward the 6 oclock position of the pedicle under intermittent fluoroscopic guidance. Confirmation of proper needle position was made with AP, oblique, and lateral fluoroscopic views. Negative aspiration for blood or CSF was confirmed. 2 mL of Omnipaque 300 was injected. Live fluoroscopic imaging revealed a clear outline of the spinal nerve with proximal spread of agent through the neural foramen into the anterior epidural space. A total combination of 3 mL of Lidocaine 0.5% and 10 mg decadron was injected at each level. Contrast spread was noted from L3 to L5 level. There was no pain on injection. The needle was removed and bleeding was nil.  A sterile dressing was applied. Renae Lai was taken back to the recovery room for further observation.     Blood Loss: Nill  Specimen: None    Brad Cadet MD

## 2018-03-02 NOTE — DISCHARGE SUMMARY
Discharge Note  Short Stay      SUMMARY     Admit Date: 3/2/2018    Attending Physician: Brad Cadet      Discharge Physician: Brad Cadet      Discharge Date: 3/2/2018 11:11 AM    Final Diagnosis: Lumbar radiculopathy [M54.16]  Synovial cyst of lumbar facet joint [M71.38]    Disposition: Home or self care    Patient Instructions:   Current Discharge Medication List      CONTINUE these medications which have NOT CHANGED    Details   ACCU-CHEK MARY ANNE PLUS METER Misc USE TO TEST BLOOD SUGAR D  Refills: 0      clotrimazole-betamethasone 1-0.05% (LOTRISONE) cream Apply topically 2 (two) times daily.  Qty: 45 g, Refills: 1    Associated Diagnoses: Intertrigo      diazePAM (VALIUM) 2 MG tablet Take 1 tablet (2 mg total) by mouth On call Procedure (take 1 30 min prior to procedure, may take second if needed).  Qty: 2 tablet, Refills: 0      hydrOXYzine HCl (ATARAX) 25 MG tablet       INVOKANA 100 mg Tab TK 1 T PO QD  Refills: 3      meloxicam (MOBIC) 15 MG tablet Take 1 tablet (15 mg total) by mouth once daily.  Qty: 30 tablet, Refills: 6      methylPREDNISolone (MEDROL DOSEPACK) 4 mg tablet use as directed  Qty: 1 Package, Refills: 0      omeprazole (PRILOSEC) 40 MG capsule TK 1 C PO QD  Refills: 2      phentermine (ADIPEX-P) 37.5 mg tablet TK 1 T PO QAM  Refills: 1      SOOLANTRA 1 % Crea       valsartan (DIOVAN) 160 MG tablet Take 160 mg by mouth once daily.      VITAMIN D2 50,000 unit capsule TAKE 1 CAPSULE BY MOUTH ONCE WEEKLY.  Qty: 4 capsule, Refills: 3                 Discharge Diagnosis: Lumbar radiculopathy [M54.16]  Synovial cyst of lumbar facet joint [M71.38]  Condition on Discharge: Stable.  Diet on Discharge: Same as before.  Activity: as per instruction sheet.  Discharge to: Home with a responsible adult.  Follow up: as per Discharge instructions.

## 2018-03-05 ENCOUNTER — PATIENT MESSAGE (OUTPATIENT)
Dept: ORTHOPEDICS | Facility: CLINIC | Age: 58
End: 2018-03-05

## 2018-03-09 ENCOUNTER — HOSPITAL ENCOUNTER (OUTPATIENT)
Dept: RADIOLOGY | Facility: HOSPITAL | Age: 58
Discharge: HOME | End: 2018-03-09
Attending: OBSTETRICS & GYNECOLOGY
Payer: COMMERCIAL

## 2018-03-09 DIAGNOSIS — Z12.31 VISIT FOR SCREENING MAMMOGRAM: ICD-10-CM

## 2018-03-09 PROCEDURE — 77063 BREAST TOMOSYNTHESIS BI: CPT | Mod: 26,,, | Performed by: RADIOLOGY

## 2018-03-09 PROCEDURE — 77067 SCR MAMMO BI INCL CAD: CPT | Mod: TC

## 2018-03-09 PROCEDURE — 77067 SCR MAMMO BI INCL CAD: CPT | Mod: 26,,, | Performed by: RADIOLOGY

## 2018-03-09 RX ORDER — MELOXICAM 15 MG/1
TABLET ORAL
Qty: 21 TABLET | Refills: 0 | OUTPATIENT
Start: 2018-03-09

## 2018-03-10 ENCOUNTER — OFFICE VISIT (OUTPATIENT)
Dept: URGENT CARE | Facility: CLINIC | Age: 58
End: 2018-03-10
Payer: COMMERCIAL

## 2018-03-10 VITALS
OXYGEN SATURATION: 97 % | SYSTOLIC BLOOD PRESSURE: 159 MMHG | WEIGHT: 210 LBS | HEIGHT: 65 IN | RESPIRATION RATE: 18 BRPM | DIASTOLIC BLOOD PRESSURE: 101 MMHG | HEART RATE: 91 BPM | BODY MASS INDEX: 34.99 KG/M2 | TEMPERATURE: 99 F

## 2018-03-10 DIAGNOSIS — M54.42 LEFT-SIDED LOW BACK PAIN WITH LEFT-SIDED SCIATICA, UNSPECIFIED CHRONICITY: Primary | ICD-10-CM

## 2018-03-10 PROCEDURE — 99214 OFFICE O/P EST MOD 30 MIN: CPT | Mod: S$GLB,,, | Performed by: FAMILY MEDICINE

## 2018-03-10 RX ORDER — HYDROCODONE BITARTRATE AND ACETAMINOPHEN 5; 325 MG/1; MG/1
1 TABLET ORAL EVERY 8 HOURS PRN
Qty: 15 TABLET | Refills: 0 | Status: SHIPPED | OUTPATIENT
Start: 2018-03-10 | End: 2018-03-14 | Stop reason: SDUPTHER

## 2018-03-10 RX ORDER — CYCLOBENZAPRINE HCL 10 MG
10 TABLET ORAL NIGHTLY
Qty: 30 TABLET | Refills: 0 | Status: ON HOLD | OUTPATIENT
Start: 2018-03-10 | End: 2018-04-16 | Stop reason: HOSPADM

## 2018-03-10 NOTE — PATIENT INSTRUCTIONS
Follow up with your doctor in a few days.  Return to the urgent care or go to the ER if symptoms get worse.    Jordi Houston MD

## 2018-03-10 NOTE — PROGRESS NOTES
"Subjective:       Patient ID: Renae Hou is a 57 y.o. female.    Vitals:  height is 5' 5" (1.651 m) and weight is 95.3 kg (210 lb). Her oral temperature is 98.6 °F (37 °C). Her blood pressure is 159/101 (abnormal) and her pulse is 91. Her respiration is 18 and oxygen saturation is 97%.     Chief Complaint: Back Pain    Back Pain   This is a new problem. The current episode started in the past 7 days (3/4/2018). The problem occurs constantly. The problem has been gradually worsening since onset. The pain is present in the gluteal. The quality of the pain is described as aching, burning, cramping, shooting and stabbing. The pain radiates to the left thigh, left knee and left foot. The pain is at a severity of 10/10. The pain is severe. The pain is the same all the time. The symptoms are aggravated by sitting and standing. Associated symptoms include leg pain, numbness, pelvic pain and tingling. Pertinent negatives include no abdominal pain, bladder incontinence, bowel incontinence or dysuria. She has tried heat and NSAIDs for the symptoms. The treatment provided no relief.     Review of Systems   Constitution: Negative for malaise/fatigue.   Skin: Negative for color change and rash.   Musculoskeletal: Positive for back pain and stiffness. Negative for muscle cramps and muscle weakness.   Gastrointestinal: Negative for abdominal pain and bowel incontinence.   Genitourinary: Positive for pelvic pain. Negative for bladder incontinence, dysuria, hematuria and urgency.   Neurological: Positive for numbness and tingling. Negative for disturbances in coordination.       Objective:      Physical Exam   Constitutional: She is oriented to person, place, and time. She appears well-developed and well-nourished.   HENT:   Head: Normocephalic and atraumatic.   Eyes: EOM are normal. Pupils are equal, round, and reactive to light.   Neck: Normal range of motion. Neck supple. No JVD present. No tracheal deviation present. No " thyromegaly present.   Cardiovascular: Normal rate, regular rhythm and normal heart sounds.  Exam reveals no gallop and no friction rub.    No murmur heard.  Pulmonary/Chest: Breath sounds normal. No respiratory distress. She has no wheezes. She has no rales. She exhibits no tenderness.   Abdominal: Soft. Bowel sounds are normal. She exhibits no distension and no mass. There is no tenderness. There is no rebound and no guarding. No hernia.   Musculoskeletal:        Legs:  Lower back and left buttock tenderness, no swelling, no redness, no bruise.  Limited lower back ROM due to pain.   Lymphadenopathy:     She has no cervical adenopathy.   Neurological: She is alert and oriented to person, place, and time. She displays normal reflexes. No cranial nerve deficit. She exhibits normal muscle tone. Coordination normal.   Skin: Skin is warm. Capillary refill takes less than 2 seconds. No rash noted. No erythema. No pallor.   Psychiatric: She has a normal mood and affect. Her behavior is normal. Judgment and thought content normal.   Vitals reviewed.      Assessment:       1. Left-sided low back pain with left-sided sciatica, unspecified chronicity        Plan:         Left-sided low back pain with left-sided sciatica, unspecified chronicity  -     hydrocodone-acetaminophen 5-325mg (NORCO) 5-325 mg per tablet; Take 1 tablet by mouth every 8 (eight) hours as needed for Pain.  Dispense: 15 tablet; Refill: 0  -     cyclobenzaprine (FLEXERIL) 10 MG tablet; Take 1 tablet (10 mg total) by mouth every evening.  Dispense: 30 tablet; Refill: 0      Follow up with your doctor and pain management in a few days.  Return to the urgent care or go to the ER if symptoms get worse.    Jordi Houston MD

## 2018-03-14 ENCOUNTER — OFFICE VISIT (OUTPATIENT)
Dept: ORTHOPEDICS | Facility: CLINIC | Age: 58
End: 2018-03-14
Payer: COMMERCIAL

## 2018-03-14 VITALS — WEIGHT: 210 LBS | HEIGHT: 65 IN | BODY MASS INDEX: 34.99 KG/M2

## 2018-03-14 DIAGNOSIS — M54.16 LUMBAR RADICULOPATHY: ICD-10-CM

## 2018-03-14 DIAGNOSIS — M54.42 LEFT-SIDED LOW BACK PAIN WITH LEFT-SIDED SCIATICA, UNSPECIFIED CHRONICITY: ICD-10-CM

## 2018-03-14 DIAGNOSIS — M71.38 SYNOVIAL CYST OF LUMBAR FACET JOINT: Primary | ICD-10-CM

## 2018-03-14 PROCEDURE — 99212 OFFICE O/P EST SF 10 MIN: CPT | Mod: S$GLB,,, | Performed by: ORTHOPAEDIC SURGERY

## 2018-03-14 PROCEDURE — 99999 PR PBB SHADOW E&M-EST. PATIENT-LVL II: CPT | Mod: PBBFAC,,, | Performed by: ORTHOPAEDIC SURGERY

## 2018-03-14 RX ORDER — HYDROCODONE BITARTRATE AND ACETAMINOPHEN 5; 325 MG/1; MG/1
1 TABLET ORAL EVERY 6 HOURS PRN
Qty: 28 TABLET | Refills: 0 | Status: SHIPPED | OUTPATIENT
Start: 2018-03-14 | End: 2018-03-19

## 2018-03-15 NOTE — PROGRESS NOTES
The patient returns for follow up.    She has LLE radiculopathy in the setting of an L4-5-S1 spondylolisthesis and a L sided L4/5 facet cyst.    She had a recent aspiration/injection but only had 1 day of relief.    She is miserable and not going to work.    Today we discussed options, I have recommended she have another aspiration/injection to attempt to avoid surgery.    I spent 10 minutes with the patient of which greater than 1/2 the time was devoted to counciling the patient regarding treatment options.

## 2018-03-16 ENCOUNTER — HOSPITAL ENCOUNTER (OUTPATIENT)
Facility: OTHER | Age: 58
Discharge: HOME | End: 2018-03-16
Attending: ANESTHESIOLOGY | Admitting: ANESTHESIOLOGY
Payer: COMMERCIAL

## 2018-03-16 ENCOUNTER — SURGERY (OUTPATIENT)
Age: 58
End: 2018-03-16

## 2018-03-16 VITALS
RESPIRATION RATE: 18 BRPM | SYSTOLIC BLOOD PRESSURE: 136 MMHG | HEART RATE: 90 BPM | HEIGHT: 65 IN | OXYGEN SATURATION: 92 % | BODY MASS INDEX: 34.99 KG/M2 | TEMPERATURE: 98 F | DIASTOLIC BLOOD PRESSURE: 96 MMHG | WEIGHT: 210 LBS

## 2018-03-16 DIAGNOSIS — G89.29 CHRONIC PAIN: ICD-10-CM

## 2018-03-16 DIAGNOSIS — M47.816 LUMBAR FACET ARTHROPATHY: ICD-10-CM

## 2018-03-16 DIAGNOSIS — M47.9 OSTEOARTHRITIS OF SPINE, UNSPECIFIED SPINAL OSTEOARTHRITIS COMPLICATION STATUS, UNSPECIFIED SPINAL REGION: Primary | ICD-10-CM

## 2018-03-16 DIAGNOSIS — M71.38 SYNOVIAL CYST OF LUMBAR FACET JOINT: ICD-10-CM

## 2018-03-16 PROCEDURE — 63600175 PHARM REV CODE 636 W HCPCS: Performed by: ANESTHESIOLOGY

## 2018-03-16 PROCEDURE — 64493 INJ PARAVERT F JNT L/S 1 LEV: CPT | Performed by: ANESTHESIOLOGY

## 2018-03-16 PROCEDURE — 64493 INJ PARAVERT F JNT L/S 1 LEV: CPT | Mod: LT,,, | Performed by: ANESTHESIOLOGY

## 2018-03-16 PROCEDURE — 99152 MOD SED SAME PHYS/QHP 5/>YRS: CPT | Mod: ,,, | Performed by: ANESTHESIOLOGY

## 2018-03-16 PROCEDURE — 25000003 PHARM REV CODE 250: Performed by: ANESTHESIOLOGY

## 2018-03-16 RX ORDER — MIDAZOLAM HYDROCHLORIDE 1 MG/ML
INJECTION INTRAMUSCULAR; INTRAVENOUS
Status: DISCONTINUED | OUTPATIENT
Start: 2018-03-16 | End: 2018-03-16 | Stop reason: HOSPADM

## 2018-03-16 RX ORDER — DEXAMETHASONE SODIUM PHOSPHATE 4 MG/ML
INJECTION, SOLUTION INTRA-ARTICULAR; INTRALESIONAL; INTRAMUSCULAR; INTRAVENOUS; SOFT TISSUE
Status: DISCONTINUED | OUTPATIENT
Start: 2018-03-16 | End: 2018-03-16 | Stop reason: HOSPADM

## 2018-03-16 RX ORDER — LIDOCAINE HYDROCHLORIDE 10 MG/ML
INJECTION INFILTRATION; PERINEURAL
Status: DISCONTINUED | OUTPATIENT
Start: 2018-03-16 | End: 2018-03-16 | Stop reason: HOSPADM

## 2018-03-16 RX ORDER — DIPHENHYDRAMINE HYDROCHLORIDE 50 MG/ML
25 INJECTION INTRAMUSCULAR; INTRAVENOUS ONCE
Status: COMPLETED | OUTPATIENT
Start: 2018-03-16 | End: 2018-03-16

## 2018-03-16 RX ORDER — SODIUM CHLORIDE 9 MG/ML
500 INJECTION, SOLUTION INTRAVENOUS CONTINUOUS
Status: DISCONTINUED | OUTPATIENT
Start: 2018-03-16 | End: 2018-03-16 | Stop reason: HOSPADM

## 2018-03-16 RX ORDER — BUPIVACAINE HYDROCHLORIDE 2.5 MG/ML
INJECTION, SOLUTION EPIDURAL; INFILTRATION; INTRACAUDAL
Status: DISCONTINUED | OUTPATIENT
Start: 2018-03-16 | End: 2018-03-16 | Stop reason: HOSPADM

## 2018-03-16 RX ORDER — FENTANYL CITRATE 50 UG/ML
INJECTION, SOLUTION INTRAMUSCULAR; INTRAVENOUS
Status: DISCONTINUED | OUTPATIENT
Start: 2018-03-16 | End: 2018-03-16 | Stop reason: HOSPADM

## 2018-03-16 RX ADMIN — DEXAMETHASONE SODIUM PHOSPHATE 10 MG: 4 INJECTION, SOLUTION INTRAMUSCULAR; INTRAVENOUS at 02:03

## 2018-03-16 RX ADMIN — LIDOCAINE HYDROCHLORIDE 10 ML: 10 INJECTION, SOLUTION INFILTRATION; PERINEURAL at 02:03

## 2018-03-16 RX ADMIN — MIDAZOLAM HYDROCHLORIDE 2 MG: 1 INJECTION, SOLUTION INTRAMUSCULAR; INTRAVENOUS at 02:03

## 2018-03-16 RX ADMIN — DIPHENHYDRAMINE HYDROCHLORIDE 25 MG: 50 INJECTION, SOLUTION INTRAMUSCULAR; INTRAVENOUS at 01:03

## 2018-03-16 RX ADMIN — SODIUM CHLORIDE 500 ML: 0.9 INJECTION, SOLUTION INTRAVENOUS at 01:03

## 2018-03-16 RX ADMIN — BUPIVACAINE HYDROCHLORIDE 10 ML: 2.5 INJECTION, SOLUTION EPIDURAL; INFILTRATION; INTRACAUDAL; PERINEURAL at 02:03

## 2018-03-16 RX ADMIN — FENTANYL CITRATE 50 MCG: 50 INJECTION, SOLUTION INTRAMUSCULAR; INTRAVENOUS at 02:03

## 2018-03-16 NOTE — DISCHARGE INSTRUCTIONS
Home Care Instructions Pain Management:    1. DIET:   You may resume your normal diet today.   2. BATHING:   You may shower with luke warm water.  3. DRESSING:   You may remove your bandage today.   4. ACTIVITY LEVEL:   You may resume your normal activities 24 hrs after your procedure.  5. MEDICATIONS:   You may resume your normal medications today.   6. SPECIAL INSTRUCTIONS:   No heat to the injection site for 24 hrs including, bath or shower, heating pad, moist heat, or hot tubs.    Use ice pack to injection site for any pain or discomfort.  Apply ice packs for 20 minute intervals as needed.   If you have received any sedatives by mouth today you may not drive for 12 hours.    If you have received any sedation through your IV, you may not drive for 24 hrs.     PLEASE CALL YOUR DOCTOR IF:  1. Redness or swelling around the injection site.  2. Fever of 101 degrees  3. Drainage (pus) from the injection site.  4. For any continuous bleeding (some dried blood over the incision is normal.)    FOR EMERGENCIES:   If any unusual problems or difficulties occur during clinic hours, call [phone] or [phone].   Adult Procedural Sedation Instructions    Recovery After Procedural Sedation (Adult)  You have been given medicine by vein to make you sleep during your surgery. This may have included both a pain medicine and sleeping medicine. Most of the effects have worn off. But you may still have some drowsiness for the next 6 to 8 hours.  Home care  Follow these guidelines when you get home:  · For the next 8 hours, you should be watched by a responsible adult. This person should make sure your condition is not getting worse.  · Don't drink any alcohol for the next 24 hours.  · Don't drive, operate dangerous machinery, or make important business or personal decisions during the next 24 hours.  Note: Your healthcare provider may tell you not to take any medicine by mouth for pain or sleep in the next 4 hours. These medicines may  react with the medicines you were given in the hospital. This could cause a much stronger response than usual.  Follow-up care  Follow up with your healthcare provider if you are not alert and back to your usual level of activity within 12 hours.  When to seek medical advice  Call your healthcare provider right away if any of these occur:  · Drowsiness gets worse  · Weakness or dizziness gets worse  · Repeated vomiting  · You can't be awakened   Date Last Reviewed: 10/18/2016  © 7882-9810 [employer]. [address] [place] [address]. All rights reserved. This information is not intended as a substitute for professional medical care. Always follow your healthcare professional's instructions.      Thank you for allowing us to care for you today. You may receive a survey about the care we provided. Your feedback is valuable and helps us provide excellent care throughout the community.

## 2018-03-16 NOTE — OP NOTE
"Patient Name: Renae Hou  MRN: [id number]    INFORMED CONSENT: The procedure, risks, benefits and options were discussed with patient. There are no contraindications to the procedure. The patient expressed understanding and agreed to proceed. The personnel performing the procedure was discussed. I verify that I personally obtained Renae's consent prior to the start of the procedure and the signed consent can be found on the patient's chart.    Procedure Date: 03/16/2018    Anesthesia: Topical    Pre Procedure diagnosis: Synovial cyst of lumbar facet joint [M71.38]  1. Osteoarthritis of spine, unspecified spinal osteoarthritis complication status, unspecified spinal region    2. Lumbar facet arthropathy    3. Synovial cyst of lumbar facet joint    4. Chronic pain      Post-Procedure diagnosis: SAME      Sedation: Yes - Fentanyl 100 mcg and Midazolam 2 mg    PROCEDURE: LEFT L4-5 FACET JOINT CYST ASPIRATION INJECTION      DESCRIPTION OF PROCEDURE:The patient was brought to the procedure room.  After performing time out. IV access was obtained prior to the procedure. The patient was positioned prone on the fluoroscopy table. Continuous hemodynamic monitoring was initiated including blood pressure, EKG, and pulse oximetry. The area of the lumbar spine was prepped with chlorhexidine and draped into a sterile field.Fluoroscopy was used to identify the location of LEFT L4-5  joints respectivly. Skin anesthesia was achieved using 3 cc of Lidocaine 1% over the injection sites. A 25 gauge, 3 1/2" spinal needle was slowly inserted at each joint using APand oblique fluoroscopic imaging. 0.1ml of fluid aspirated. 0.5 cc of Omnipaque  dye was inserted to confirm placement A combination of 2 ml bupivacaine 0.25% and 10 mg decadron was injected at all joints. The needles were removed and bleeding was nil. A sterile dressing was applied. No specimens collected. Renae was taken back to the recovery room for further observation. "     Blood Loss: Nill  Specimen: None    Brad Cadet MD

## 2018-03-16 NOTE — H&P
"HPI  The pt is a 56 yo F who presents for left L4-L5 cyst aspiration and facet injection     PMHx, PSHx, Allergies, Medications reviewed in epic    ROS negative except pain complaints in HPI    OBJECTIVE:    BP (!) 148/94   Pulse 105   Temp 97.8 °F (36.6 °C) (Oral)   Resp 18   Ht 5' 5" (1.651 m)   Wt 95.3 kg (210 lb)   LMP 11/25/2012   SpO2 96%   Breastfeeding? No   BMI 34.95 kg/m²     PHYSICAL EXAMINATION:    GENERAL: Well appearing, in no acute distress, alert and oriented x3.  PSYCH:  Mood and affect appropriate.  SKIN: Skin color, texture, turgor normal, no rashes or lesions.  CV: RRR with palpation of the radial artery.  PULM: No evidence of respiratory difficulty, symmetric chest rise. Clear to auscultation.  NEURO: Cranial nerves grossly intact.    Plan:    Proceed with procedure as planned    Rebel Loredo  03/16/2018    "

## 2018-03-16 NOTE — DISCHARGE SUMMARY
Discharge Note  Short Stay      SUMMARY     Admit Date: 3/16/2018    Attending Physician: Brad Cadet      Discharge Physician: Brad Cadet      Discharge Date: 3/16/2018 2:59 PM    Procedure(s) (LRB):  BLOCK-NERVE-MEDIAL BRANCH-LUMBAR (Left)    Final Diagnosis: Synovial cyst of lumbar facet joint [M71.38]    Disposition: Home or self care    Patient Instructions:   Current Discharge Medication List      CONTINUE these medications which have NOT CHANGED    Details   ACCU-CHEK MARY ANNE PLUS METER Misc USE TO TEST BLOOD SUGAR D  Refills: 0      clotrimazole-betamethasone 1-0.05% (LOTRISONE) cream Apply topically 2 (two) times daily.  Qty: 45 g, Refills: 1    Associated Diagnoses: Intertrigo      cyclobenzaprine (FLEXERIL) 10 MG tablet Take 1 tablet (10 mg total) by mouth every evening.  Qty: 30 tablet, Refills: 0    Associated Diagnoses: Left-sided low back pain with left-sided sciatica, unspecified chronicity      diazePAM (VALIUM) 2 MG tablet Take 1 tablet (2 mg total) by mouth On call Procedure (take 1 30 min prior to procedure, may take second if needed).  Qty: 2 tablet, Refills: 0      hydrocodone-acetaminophen 5-325mg (NORCO) 5-325 mg per tablet Take 1 tablet by mouth every 6 (six) hours as needed for Pain.  Qty: 28 tablet, Refills: 0    Associated Diagnoses: Left-sided low back pain with left-sided sciatica, unspecified chronicity      meloxicam (MOBIC) 15 MG tablet Take 1 tablet (15 mg total) by mouth once daily.  Qty: 30 tablet, Refills: 6      omeprazole (PRILOSEC) 40 MG capsule TK 1 C PO QD  Refills: 2      phentermine (ADIPEX-P) 37.5 mg tablet TK 1 T PO QAM  Refills: 1      SOOLANTRA 1 % Crea                  Discharge Diagnosis: Synovial cyst of lumbar facet joint [M71.38]  Condition on Discharge: Stable.  Diet on Discharge: Same as before.  Activity: as per instruction sheet.  Discharge to: Home with a responsible adult.  Follow up: as per Discharge instructions.

## 2018-03-25 ENCOUNTER — PATIENT MESSAGE (OUTPATIENT)
Dept: ORTHOPEDICS | Facility: CLINIC | Age: 58
End: 2018-03-25

## 2018-03-26 ENCOUNTER — PATIENT MESSAGE (OUTPATIENT)
Dept: ORTHOPEDICS | Facility: CLINIC | Age: 58
End: 2018-03-26

## 2018-03-27 ENCOUNTER — HOSPITAL ENCOUNTER (EMERGENCY)
Facility: HOSPITAL | Age: 58
Discharge: HOME | End: 2018-03-27
Attending: EMERGENCY MEDICINE
Payer: COMMERCIAL

## 2018-03-27 VITALS
RESPIRATION RATE: 20 BRPM | BODY MASS INDEX: 33.32 KG/M2 | TEMPERATURE: 98 F | SYSTOLIC BLOOD PRESSURE: 138 MMHG | HEART RATE: 95 BPM | OXYGEN SATURATION: 98 % | HEIGHT: 65 IN | DIASTOLIC BLOOD PRESSURE: 106 MMHG | WEIGHT: 200 LBS

## 2018-03-27 DIAGNOSIS — M54.9 BACK PAIN WITH SCIATICA: Primary | ICD-10-CM

## 2018-03-27 DIAGNOSIS — M54.30 BACK PAIN WITH SCIATICA: Primary | ICD-10-CM

## 2018-03-27 LAB
BILIRUB UR QL STRIP: NEGATIVE
CLARITY UR REFRACT.AUTO: CLEAR
COLOR UR AUTO: YELLOW
GLUCOSE UR QL STRIP: NEGATIVE
HGB UR QL STRIP: NEGATIVE
KETONES UR QL STRIP: NEGATIVE
LEUKOCYTE ESTERASE UR QL STRIP: NEGATIVE
NITRITE UR QL STRIP: NEGATIVE
PH UR STRIP: 6 [PH] (ref 5–8)
PROT UR QL STRIP: NEGATIVE
SP GR UR STRIP: 1 (ref 1–1.03)
URN SPEC COLLECT METH UR: NORMAL
UROBILINOGEN UR STRIP-ACNC: NEGATIVE EU/DL

## 2018-03-27 PROCEDURE — 63600175 PHARM REV CODE 636 W HCPCS: Performed by: PHYSICIAN ASSISTANT

## 2018-03-27 PROCEDURE — 96372 THER/PROPH/DIAG INJ SC/IM: CPT

## 2018-03-27 PROCEDURE — 81003 URINALYSIS AUTO W/O SCOPE: CPT

## 2018-03-27 PROCEDURE — 99284 EMERGENCY DEPT VISIT MOD MDM: CPT | Mod: ,,, | Performed by: PHYSICIAN ASSISTANT

## 2018-03-27 PROCEDURE — 99283 EMERGENCY DEPT VISIT LOW MDM: CPT | Mod: 25

## 2018-03-27 PROCEDURE — 25000003 PHARM REV CODE 250: Performed by: PHYSICIAN ASSISTANT

## 2018-03-27 RX ORDER — OXYCODONE AND ACETAMINOPHEN 5; 325 MG/1; MG/1
2 TABLET ORAL
Status: COMPLETED | OUTPATIENT
Start: 2018-03-27 | End: 2018-03-27

## 2018-03-27 RX ORDER — KETOROLAC TROMETHAMINE 30 MG/ML
15 INJECTION, SOLUTION INTRAMUSCULAR; INTRAVENOUS
Status: COMPLETED | OUTPATIENT
Start: 2018-03-27 | End: 2018-03-27

## 2018-03-27 RX ORDER — HYDROCODONE BITARTRATE AND ACETAMINOPHEN 10; 325 MG/1; MG/1
TABLET ORAL EVERY 6 HOURS PRN
Status: ON HOLD | COMMUNITY
End: 2018-04-16 | Stop reason: HOSPADM

## 2018-03-27 RX ORDER — ONDANSETRON 4 MG/1
4 TABLET, ORALLY DISINTEGRATING ORAL
Status: COMPLETED | OUTPATIENT
Start: 2018-03-27 | End: 2018-03-27

## 2018-03-27 RX ORDER — TRAMADOL HYDROCHLORIDE 50 MG/1
50 TABLET ORAL EVERY 6 HOURS PRN
COMMUNITY
End: 2018-04-27

## 2018-03-27 RX ORDER — METHYLPREDNISOLONE 4 MG/1
TABLET ORAL
Qty: 1 PACKAGE | Refills: 0 | Status: SHIPPED | OUTPATIENT
Start: 2018-03-27 | End: 2018-04-02 | Stop reason: CLARIF

## 2018-03-27 RX ORDER — ORPHENADRINE CITRATE 30 MG/ML
60 INJECTION INTRAMUSCULAR; INTRAVENOUS
Status: COMPLETED | OUTPATIENT
Start: 2018-03-27 | End: 2018-03-27

## 2018-03-27 RX ADMIN — ONDANSETRON 4 MG: 4 TABLET, ORALLY DISINTEGRATING ORAL at 09:03

## 2018-03-27 RX ADMIN — ORPHENADRINE CITRATE 60 MG: 30 INJECTION INTRAMUSCULAR; INTRAVENOUS at 09:03

## 2018-03-27 RX ADMIN — KETOROLAC TROMETHAMINE 15 MG: 30 INJECTION, SOLUTION INTRAMUSCULAR at 09:03

## 2018-03-27 RX ADMIN — OXYCODONE HYDROCHLORIDE AND ACETAMINOPHEN 2 TABLET: 5; 325 TABLET ORAL at 09:03

## 2018-03-27 NOTE — ED PROVIDER NOTES
Encounter Date: 3/27/2018    SCRIBE #1 NOTE: I, Joanne Freeman, am scribing for, and in the presence of,  Dr. Danielle Eduardo . I have scribed the following portions of the note - the APC attestation.       History     Chief Complaint   Patient presents with    Back Pain     Hx of back pain. Seen by Dr. Zayas for the same.      56 y/o female presents to the ER with chief complaint of back pain.  She has pain in the left low back and buttock radiating into the entire left leg x 1 month. She reports pain worsening over the last 5 days. She denies recent injury or fall.    Patient reports constant pain rated 10/10 and is not scheduled to see her Ortho physician until 6 days.  She is followed by Ortho as well as pain management for this pain.   She is taking tramadol 4-5 times daily, flexeril 4 times daily, meloxicam 15 mg nightly.    She has tingling/numbness in the entire leg and radiating into the groin intermittently (since 3/2 procedure removal of synovial cyst, her physician is aware of this).  She denies bowel or bladder incontinence.    Her pain is better with laying down and is worse with sitting and standing for long periods.  She denies fever, chills, nausea, vomiting or additional complaints at this time.       The history is provided by the patient and medical records.     Review of patient's allergies indicates:   Allergen Reactions    Iodine Anaphylaxis, Hives, Itching, Rash, Shortness Of Breath and Swelling     Other reaction(s): Difficulty breathing    Shellfish containing products Anaphylaxis, Hives, Itching, Rash, Shortness Of Breath and Swelling     Other reaction(s): Difficulty breathing    Latex      Past Medical History:   Diagnosis Date    Diabetes mellitus     Fatty liver     Hypertension     Menopause     Obesity     Sleep apnea     Thyroid disease     Thyroid nodules.     Past Surgical History:   Procedure Laterality Date    BACK SURGERY  2002    L4, L5    BILATERAL  SALPINGOOPHORECTOMY      CHOLECYSTECTOMY      CYST REMOVAL      HYSTERECTOMY  12/17/2012    [facility]     RECTOCELE REPAIR      THYROID NODULE REMOVAL       Family History   Problem Relation Age of Onset    Hypertension Mother     Hyperlipidemia Mother     Heart disease Mother 55     cad, valvular heart disease    Alcohol abuse Sister     No Known Problems Daughter     No Known Problems Daughter     Breast cancer Neg Hx     Colon cancer Neg Hx     Ovarian cancer Neg Hx     Diabetes Neg Hx      Social History   Substance Use Topics    Smoking status: Former Smoker    Smokeless tobacco: Never Used      Comment: smoked socially decades ago - weekends only    Alcohol use Yes      Comment: SELDOMLY     Review of Systems   Constitutional: Negative for chills and fever.   HENT: Negative for sore throat.    Respiratory: Negative for shortness of breath.    Cardiovascular: Negative for chest pain.   Gastrointestinal: Negative for nausea and vomiting.   Genitourinary: Negative for difficulty urinating and dysuria.   Musculoskeletal: Positive for back pain. Negative for joint swelling.   Skin: Negative for rash.   Neurological: Negative for dizziness, syncope, weakness and light-headedness.   Hematological: Does not bruise/bleed easily.   Psychiatric/Behavioral: Negative for confusion.       Physical Exam     Initial Vitals [03/27/18 0822]   BP Pulse Resp Temp SpO2   (!) 164/113 109 16 99.2 °F (37.3 °C) 97 %      MAP       130         Physical Exam    Nursing note and vitals reviewed.  Constitutional: She appears well-developed and well-nourished. No distress.   HENT:   Head: Atraumatic.   Eyes: Conjunctivae and EOM are normal. Pupils are equal, round, and reactive to light.   Neck: Normal range of motion. Neck supple. No spinous process tenderness present.   Cardiovascular: Normal rate and regular rhythm.   Pulmonary/Chest: Breath sounds normal. No respiratory distress. She has no wheezes. She has no rhonchi.  "She has no rales.   Abdominal: Soft. Bowel sounds are normal. She exhibits no pulsatile midline mass. There is no tenderness.   Musculoskeletal:        Lumbar back: She exhibits pain. She exhibits normal range of motion, no bony tenderness, no swelling and no spasm.   Neurological: She is alert and oriented to person, place, and time. She has normal strength and normal reflexes. No cranial nerve deficit or sensory deficit.   Normal Gait.     Skin: No rash noted.   Psychiatric: She has a normal mood and affect.         ED Course   Procedures  Labs Reviewed   URINALYSIS, REFLEX TO URINE CULTURE    Narrative:     Preferred Collection Type->Urine, Clean Catch             Medical Decision Making:   History:   Old Medical Records: I decided to obtain old medical records.  Clinical Tests:   Lab Tests: Reviewed and Ordered       APC / Resident Notes:   The patient's back pain most consistent with lumbosacral radiculopathy.  Pain is ongoing for 1 month without history of trauma.  She had an MRI last month, which showed enlarged Left L4/5 facet cyst as "Multilevel lumbar spondylosis, worse at the level of L4-L5 and L5-S1 resulting in moderate to severe spinal canal stenosis at the level of L4-L5 and mild spinal canal stenosis at L3-L4 and L5-S1.".  She had aspiration of synovial cyst of left facet joint on 3/2, and epidural steroid injection performed by pain management on 3/16, which provided her with only a day of relief per the patient.    On my exam today, there are no signs of saddle anesthesia, incontinence, neurologic deficits, fevers, trauma or midline tenderness on history or physical to suggest cauda equina, infectious process, fracture or subluxation.   Patient was most recently seen by ortho on 2/19 and she is scheduled for f/u on 4/2.    I spoke with her Ortho physician at the patients request.  Dr. Zayas evaluated the patient in the ED and has recommended treatment with medrol dosepak and he will f/u with her " in clinic next week .    Patient has good relief of pain with percocet, norflex and toradol in the ED.     She is stable for discharge and is given ER return precautions and advised to follow up with PCP within 1 week for ER follow exam.     I discussed the care of this pt with my supervising MD.         Venkata Attestation:   Scribe #1: I performed the above scribed service and the documentation accurately describes the services I performed. I attest to the accuracy of the note.    Attending Attestation:     Physician Attestation Statement for NP/PA:   I discussed this assessment and plan of this patient with the NP/PA, but I did not personally examine the patient. The face to face encounter was performed by the NP/PA.    Other NP/PA Attestation Additions:    History of Present Illness: Patient who is followed by Dr. Zayas for her back pain. She reports her home meds are not helping.     Medical Decision Making: PA discussed case with  who agreed with plan to treat patient with steroids.                     Clinical Impression:   The encounter diagnosis was Back pain with sciatica.    Disposition:   Disposition: Discharged  Condition: Stable                        COY Ortiz  03/27/18 4748

## 2018-03-27 NOTE — ED TRIAGE NOTES
Presents to ER with an exacerbation of chronic low back pain that started about 4 days ago.  Pt identifiers checked and correct  LOC: The patient is awake, alert, aware of environment with an appropriate affect. Oriented x3, speaking appropriately  APPEARANCE: Pt resting comfortably, in no acute distress, pt is clean and well groomed, clothing properly fastened  SKIN: Skin warm, dry and intact, normal skin turgor, moist mucus membranes  RESPIRATORY: Airway is open and patent, respirations are spontaneous, even and unlabored, normal effort and rate  MUSCULOSKELETAL: No obvious deformities.    Patient HX:  Deep vaginal cuff pain - left   Sleep apnea   Shellfish allergy   Constipation, chronic   History of major abdominal surgery   Hyperlipidemia   Pre-diabetes   BMI 29.0-29.9,adult   Chronic pain

## 2018-04-02 ENCOUNTER — ANESTHESIA EVENT (OUTPATIENT)
Dept: SURGERY | Facility: HOSPITAL | Age: 58
DRG: 856 | End: 2018-04-02
Payer: COMMERCIAL

## 2018-04-02 ENCOUNTER — OFFICE VISIT (OUTPATIENT)
Dept: SPINE | Facility: CLINIC | Age: 58
End: 2018-04-02
Attending: ORTHOPAEDIC SURGERY
Payer: COMMERCIAL

## 2018-04-02 ENCOUNTER — PATIENT MESSAGE (OUTPATIENT)
Dept: SURGERY | Facility: HOSPITAL | Age: 58
End: 2018-04-02

## 2018-04-02 VITALS
WEIGHT: 200 LBS | DIASTOLIC BLOOD PRESSURE: 105 MMHG | HEART RATE: 71 BPM | BODY MASS INDEX: 33.32 KG/M2 | HEIGHT: 65 IN | SYSTOLIC BLOOD PRESSURE: 167 MMHG

## 2018-04-02 DIAGNOSIS — M54.16 LUMBAR RADICULOPATHY: Primary | ICD-10-CM

## 2018-04-02 DIAGNOSIS — M43.10 SPONDYLOLISTHESIS, ACQUIRED: ICD-10-CM

## 2018-04-02 DIAGNOSIS — M79.606 PAIN OF LOWER EXTREMITY, UNSPECIFIED LATERALITY: Primary | ICD-10-CM

## 2018-04-02 PROCEDURE — 99999 PR PBB SHADOW E&M-EST. PATIENT-LVL III: CPT | Mod: PBBFAC,,, | Performed by: ORTHOPAEDIC SURGERY

## 2018-04-02 PROCEDURE — 99213 OFFICE O/P EST LOW 20 MIN: CPT | Mod: S$GLB,,, | Performed by: ORTHOPAEDIC SURGERY

## 2018-04-02 RX ORDER — SODIUM CHLORIDE 9 MG/ML
INJECTION, SOLUTION INTRAVENOUS CONTINUOUS
Status: CANCELLED | OUTPATIENT
Start: 2018-04-02

## 2018-04-02 RX ORDER — MUPIROCIN 20 MG/G
OINTMENT TOPICAL
Status: CANCELLED | OUTPATIENT
Start: 2018-04-02

## 2018-04-02 NOTE — ANESTHESIA PREPROCEDURE EVALUATION
Anesthesia Assessment: Preoperative EQUATION    Planned Procedure: Procedure(s) (LRB):  FUSION-POSTERIOR LUMBAR INTERBODY FUSION L4/5, possible L5/S1 Depuy SNS: SSEP/EMG 1st Case (N/A)  Requested Anesthesia Type:General  Surgeon: Dean Zayas MD  Service: Neurosurgery  Known or anticipated Date of Surgery:4/10/2018    Optimization:  Anesthesia Preop Clinic Assessment  Indicated    Medical Opinion Indicated  DR NATH      Plan:    Testing:  Hematology Profile, BMP, PT/INR, PTT, T&S and UA     Consultation:IM Perioperative Hospitalist    Navigation: Tests Scheduled.              Consults scheduled.             Results will be tracked by Preop Clinic.             No anesthesia preop clinic visit.         [facility]  Anesthesia Pre-Operative Evaluation         Patient Name: Renae Hou  YOB: 1960  MRN: [id number]    SUBJECTIVE:     Pre-operative evaluation for Procedure(s) (LRB):  FUSION-POSTERIOR LUMBAR INTERBODY FUSION L4/5, possible L5/S1 Depuy SNS: SSEP/EMG 1st Case (N/A)     04/09/2018    Renae Hou is a 57 y.o. female w/ a significant PMHx of GERD (well controlled) RENAE (uses CPAP), HLD, HTN, obesity, and epidural abscess. Originally presented w/ fevers and back pain which radiated down left leg > right leg. MRI concerning for abscess. Orthopedics consults for surgical intervention.    Patient now presents for the above procedure(s).      LDA:       Peripheral IV - Single Lumen 04/07/18 2349 Left Antecubital (Active) 20G   Site Assessment Clean;Dry;Intact;No redness;No swelling 4/9/2018  7:30 AM   Line Status Saline locked 4/9/2018  7:30 AM   Dressing Status Clean;Intact;Dry 4/9/2018  7:30 AM   Dressing Intervention Dressing reinforced 4/8/2018  7:55 PM   Dressing Change Due 04/11/18 4/9/2018  7:30 AM   Site Change Due 04/11/18 4/9/2018  7:30 AM   Reason Not Rotated Not due 4/9/2018  7:30 AM   Number of days: 1       Prev airway: None documented.    Drips:  None documented.      Patient Active Problem List   Diagnosis    Sleep apnea    Shellfish allergy    Constipation, chronic    History of major abdominal surgery    Hyperlipidemia    Pre-diabetes    Class 1 obesity with body mass index (BMI) of 33.0 to 33.9 in adult    Chronic pain    Lumbar radiculopathy    Fever    Acid reflux    Essential hypertension    Fatty liver    Spondylolisthesis at L4-L5 level    Epidural abscess    Spondylolisthesis       Review of patient's allergies indicates:   Allergen Reactions    Iodine Anaphylaxis, Hives, Shortness Of Breath, Itching, Swelling and Rash     Other reaction(s): Difficulty breathing  Neck swelling     Shellfish containing products Anaphylaxis, Hives, Shortness Of Breath, Itching, Swelling and Rash     Other reaction(s): Difficulty breathing  Neck swelling     Latex Swelling     Patient un sure about it       Current Inpatient Medications:      No current facility-administered medications on file prior to encounter.      Current Outpatient Prescriptions on File Prior to Encounter   Medication Sig Dispense Refill    cyclobenzaprine (FLEXERIL) 10 MG tablet Take 1 tablet (10 mg total) by mouth every evening. 30 tablet 0    hydrocodone-acetaminophen 10-325mg (NORCO)  mg Tab Take by mouth every 6 (six) hours as needed for Pain.      omeprazole (PRILOSEC) 40 MG capsule TK 1 C PO QD- as needed for acid reflux  2    traMADol (ULTRAM) 50 mg tablet Take 50 mg by mouth every 6 (six) hours as needed for Pain.      ACCU-CHEK MARY ANNE PLUS METER Mis USE TO TEST BLOOD SUGAR D  0    diazePAM (VALIUM) 2 MG tablet Take 1 tablet (2 mg total) by mouth On call Procedure (take 1 30 min prior to procedure, may take second if needed). 2 tablet 0    meloxicam (MOBIC) 15 MG tablet Take 1 tablet (15 mg total) by mouth once daily. 30 tablet 6       Past Surgical History:   Procedure Laterality Date    BACK SURGERY  2002    L4, L5    BILATERAL SALPINGOOPHORECTOMY       CHOLECYSTECTOMY      CYST REMOVAL      HYSTERECTOMY  12/17/2012    [facility]     RECTOCELE REPAIR      THYROID NODULE REMOVAL         Social History     Social History    Marital status:      Spouse name: N/A    Number of children: N/A    Years of education: N/A     Occupational History    Not on file.     Social History Main Topics    Smoking status: Former Smoker     Quit date: 1988    Smokeless tobacco: Never Used      Comment: smoked socially decades ago - weekends only    Alcohol use Yes      Comment: SELDOMLY    Drug use: No    Sexual activity: Yes     Partners: Male     Birth control/ protection: Surgical, See Surgical Hx      Comment: [facility] 12/17/2012,       Other Topics Concern    Not on file     Social History Narrative    No narrative on file       OBJECTIVE:     Vital Signs Range (Last 24H):  Temp:  [36 °C (96.8 °F)-37.2 °C (98.9 °F)]   Pulse:  [85-96]   Resp:  [16-18]   BP: (123-155)/(74-87)   SpO2:  [94 %-98 %]       CBC:   Recent Labs      04/06/18   1349  04/07/18   2349   WBC  10.04  14.95*   RBC  5.23  4.80   HGB  15.8  14.6   HCT  46.2  42.8   PLT  318  305   MCV  88  89   MCH  30.2  30.4   MCHC  34.2  34.1       CMP:   Recent Labs      04/06/18   1349  04/07/18   2349   NA  140  138   K  3.8  3.9   CL  102  102   CO2  28  25   BUN  14  12   CREATININE  0.9  0.9   GLU  98  111*   CALCIUM  9.8  9.5   ALBUMIN   --   3.7   PROT   --   7.6   ALKPHOS   --   143*   ALT   --   62*   AST   --   60*   BILITOT   --   1.0       INR:  Recent Labs      04/06/18   1349   INR  0.9   APTT  23.4       Diagnostic Studies: No relevant studies.    EKG: No recent studies available.    2D ECHO:  No results found for this or any previous visit.      ASSESSMENT/PLAN:     Anesthesia Evaluation    I have reviewed the Patient Summary Reports.    I have reviewed the Nursing Notes.   I have reviewed the Medications.     Review of Systems  Anesthesia Hx:  No problems with previous Anesthesia   Denies Family Hx of Anesthesia complications.   Denies Personal Hx of Anesthesia complications.   Social:  Social Alcohol Use, Former Smoker    Hematology/Oncology:  Hematology Normal   Oncology Normal   Denies Current/Recent Cancer   EENT/Dental:   denies chronic allergic rhinitis   Cardiovascular:   Exercise tolerance: poor Hypertension Denies MI.  Denies CAD.    Denies CABG/stent.  Denies Dysrhythmias.          hyperlipidemia  Functional Capacity 2 METS    Pulmonary:   Denies COPD.  Denies Asthma.  Denies Recent URI. Sleep Apnea, CPAP    Renal/:   Denies Chronic Renal Disease.     Hepatic/GI:   GERD, well controlled Liver Disease,    Musculoskeletal:   Arthritis     Neurological:   Denies TIA. Denies CVA. Neuromuscular Disease,  Denies Seizures.    Endocrine:   Hypothyroidism  Diabetes    Psych:  Psychiatric Normal  Denies Psychiatric History.          Physical Exam  General:  Well nourished, Obesity    Airway/Jaw/Neck:  Airway Findings: Mouth Opening: Normal Tongue: Normal  General Airway Assessment: Adult  Mallampati: III  Improves to II with phonation.  TM Distance: Normal, at least 6 cm  Jaw/Neck Findings:  Neck ROM: Normal ROM      Dental:  Dental Findings: In tact   Chest/Lungs:  Chest/Lungs Findings: Clear to auscultation, Normal Respiratory Rate     Heart/Vascular:  Heart Findings: Rate: Normal  Rhythm: Regular Rhythm  Sounds: Normal     Abdomen:  Abdomen Findings:  Normal, Soft, Nontender     Musculoskeletal:  Musculoskeletal Findings: Normal   Skin:  Skin Findings: Normal    Mental Status:  Mental Status Findings:  Cooperative, Alert and Oriented         Anesthesia Plan  Type of Anesthesia, risks & benefits discussed:  Anesthesia Type:  general  Patient's Preference:   Intra-op Monitoring Plan: standard ASA monitors and arterial line  Intra-op Monitoring Plan Comments:   Post Op Pain Control Plan: multimodal analgesia, IV/PO Opioids PRN and per primary service following discharge from PACU  Post Op  Pain Control Plan Comments:   Induction:   IV  Beta Blocker:  Patient is not currently on a Beta-Blocker (No further documentation required).       Informed Consent: Patient understands risks and agrees with Anesthesia plan.  Questions answered. Anesthesia consent signed with patient.  ASA Score: 3     Day of Surgery Review of History & Physical:    H&P update referred to the surgeon.         Ready For Surgery From Anesthesia Perspective.

## 2018-04-03 ENCOUNTER — PATIENT MESSAGE (OUTPATIENT)
Dept: SURGERY | Facility: HOSPITAL | Age: 58
End: 2018-04-03

## 2018-04-03 DIAGNOSIS — Z98.890 S/P SPINAL SURGERY: Primary | ICD-10-CM

## 2018-04-05 ENCOUNTER — PATIENT MESSAGE (OUTPATIENT)
Dept: SURGERY | Facility: HOSPITAL | Age: 58
End: 2018-04-05

## 2018-04-05 NOTE — PROGRESS NOTES
The patient returns for follow up.    She has LLE radiculopathy in the setting of an L4-5-S1 spondylolisthesis and a L sided L4/5 facet cyst.    She is having a lot of pain, she has been to the ER and had another dosepak, but is still not able to tolerate working.    She is miserable and not going to work.    Today we discussed options, she has failed extensive conservative management and is ready for surgery.    The plan will be for an L4/5 TLIF, if she has significant L5/S1 instability I will fuse that level as well.    I spent 15 minutes with the patient of which greater than 1/2 the time was devoted to counciling the patient regarding treatment options.

## 2018-04-06 ENCOUNTER — INITIAL CONSULT (OUTPATIENT)
Dept: INTERNAL MEDICINE | Facility: CLINIC | Age: 58
DRG: 856 | End: 2018-04-06
Payer: COMMERCIAL

## 2018-04-06 VITALS
TEMPERATURE: 100 F | HEART RATE: 104 BPM | OXYGEN SATURATION: 97 % | BODY MASS INDEX: 33.33 KG/M2 | DIASTOLIC BLOOD PRESSURE: 101 MMHG | HEIGHT: 66 IN | WEIGHT: 207.38 LBS | SYSTOLIC BLOOD PRESSURE: 158 MMHG

## 2018-04-06 DIAGNOSIS — E66.9 CLASS 1 OBESITY WITH BODY MASS INDEX (BMI) OF 33.0 TO 33.9 IN ADULT, UNSPECIFIED OBESITY TYPE, UNSPECIFIED WHETHER SERIOUS COMORBIDITY PRESENT: ICD-10-CM

## 2018-04-06 DIAGNOSIS — K21.9 GASTROESOPHAGEAL REFLUX DISEASE, ESOPHAGITIS PRESENCE NOT SPECIFIED: ICD-10-CM

## 2018-04-06 DIAGNOSIS — M54.16 LUMBAR RADICULOPATHY: ICD-10-CM

## 2018-04-06 DIAGNOSIS — Z98.890 HISTORY OF MAJOR ABDOMINAL SURGERY: ICD-10-CM

## 2018-04-06 DIAGNOSIS — K76.0 FATTY LIVER: ICD-10-CM

## 2018-04-06 DIAGNOSIS — R73.03 PRE-DIABETES: ICD-10-CM

## 2018-04-06 DIAGNOSIS — G47.30 SLEEP APNEA, UNSPECIFIED TYPE: ICD-10-CM

## 2018-04-06 DIAGNOSIS — R50.9 FEVER, UNSPECIFIED FEVER CAUSE: ICD-10-CM

## 2018-04-06 DIAGNOSIS — Z01.818 PREOP EXAMINATION: Primary | ICD-10-CM

## 2018-04-06 DIAGNOSIS — I10 ESSENTIAL HYPERTENSION: ICD-10-CM

## 2018-04-06 DIAGNOSIS — K59.09 CONSTIPATION, CHRONIC: ICD-10-CM

## 2018-04-06 PROCEDURE — 99244 OFF/OP CNSLTJ NEW/EST MOD 40: CPT | Mod: S$GLB,,, | Performed by: HOSPITALIST

## 2018-04-06 PROCEDURE — 99999 PR PBB SHADOW E&M-EST. PATIENT-LVL III: CPT | Mod: PBBFAC,,, | Performed by: HOSPITALIST

## 2018-04-06 RX ORDER — MUPIROCIN 20 MG/G
OINTMENT TOPICAL
Refills: 0 | COMMUNITY
Start: 2018-04-03 | End: 2018-08-13

## 2018-04-06 RX ORDER — AMLODIPINE BESYLATE 5 MG/1
5 TABLET ORAL DAILY
Qty: 30 TABLET | Refills: 1 | Status: SHIPPED | OUTPATIENT
Start: 2018-04-06 | End: 2018-05-30 | Stop reason: SDUPTHER

## 2018-04-06 NOTE — LETTER
April 6, 2018      Dean Zayas MD  [address] [facility] [address]           [facility] - Pre Op Consult  [address] [address]  Phone: [phone]          Patient: Renae Hou   MR Number: [id number]   YOB: 1960   Date of Visit: 4/6/2018       Dear Dr. Dean Zayas:    Thank you for referring Renae Hou to me for evaluation. Attached you will find relevant portions of my assessment and plan of care.    If you have questions, please do not hesitate to call me. I look forward to following Renae Hou along with you.    Sincerely,    Marleny Corbett MD    Enclosure  CC:  Panchito Lindsay MD    If you would like to receive this communication electronically, please contact [email or web address] or [phone] to request more information on [employer] access.    For providers and/or their staff who would like to refer a patient to [facility], please contact us through our one-stop-shop provider referral line, [facility], at [phone].    If you feel you have received this communication in error or would no longer like to receive these types of communications, please e-mail [email or web address]

## 2018-04-06 NOTE — ASSESSMENT & PLAN NOTE
Possible sleep apnea- I suggest a sleep study and suggest caution with usage of medication that can cause respiratory suppression in the perioperative period  potential ramifications of untreated sleep apnea, which could include daytime sleepiness, hypertension, heart disease and stroke were discussed  Avoidance  supine sleep, weight gain and alcoholic beverages discussed since all of these can worsen RENAE

## 2018-04-06 NOTE — ASSESSMENT & PLAN NOTE
Will commence Amlodipine   Pedal edema effects discussed   Hypertension-  Blood pressure is acceptable . I suggest continuation of Amlodipine - during the entire perioperative period.I suggest addressing pain control as uncontrolled pain can increased blood pressure

## 2018-04-06 NOTE — OUTPATIENT SUBJECTIVE & OBJECTIVE
Outpatient Subjective & Objective     Chief complaint-Preoperative evaluation, Perioperative Medical management, complication reduction plan     Active cardiac conditions- none    Revised cardiac risk index predictors- none    Functional capacity -Examples of physical activity, was active until until Jan 2018 , was taking stairs at her job,   can take 1 flight of stairs----- She can undertake all the above activities without  chest pain,chest tightness, Shortness of breath ,dizziness,lightheadedness making her exercise tolerance more,  than 4 Mets.       Review of Systems   Constitutional: Negative for chills and fever.            Weight gain from reduced activity   HENT:        Sleep anea   Eyes:        No new visual changes   Respiratory:        No cough , phlegm  No Hemoptysis   Cardiovascular:        As noted   Gastrointestinal:        No overt GI/ blood losses  Bowel movements-  constipated   Endocrine:        Prednisone use > 20 mg daily for 3 weeks- None   Genitourinary: Negative for dysuria.        No urinary hesitancy    Musculoskeletal:        As above      Skin: Negative for rash.   Neurological: Negative for syncope.        No unilateral weakness   Hematological:        Current use of Anticoagulants  Current use of Antiplatelet agents  None    Psychiatric/Behavioral:        No Depression,Anxiety       No vascular stenting       Past Surgical History:   Procedure Laterality Date    BACK SURGERY  2002    L4, L5    BILATERAL SALPINGOOPHORECTOMY      CHOLECYSTECTOMY      CYST REMOVAL      HYSTERECTOMY  12/17/2012    [facility]     RECTOCELE REPAIR      THYROID NODULE REMOVAL         No anesthesia, bleeding, cardiac problems , PONV with previous surgeries/procedures.  Medications and Allergies reviewed in epic.   FH- No anesthesia, thrombosis/ bleeding  , early onset heart disease in family   Lives with  , help available post op     Physical Exam   HENT:   Head: Normocephalic.       Physical  "Exam   HENT:   Head: Normocephalic.     Constitutional- Vitals - Body mass index is 33.99 kg/m².,   Vitals:    04/06/18 1209   BP: (!) 158/101   Pulse: 104   Temp: 99.7 °F (37.6 °C)     General appearance-Conscious,Coherent  Eyes- No conjunctival icterus,pupils contact lenses ,  round  and reactive to light   ENT-Oral cavity- moist  , Hearing grossly normal   Neck- No thyromegaly ,Trachea -central, No jugular venous distension,   No Carotid Bruit   Cardiovascular -Heart Sounds- Normal  and  no murmur   , No gallop rhythm   Respiratory - Normal Respiratory Effort, Normal breath sounds,  no wheeze  and  no forced expiratory wheeze    Peripheral pitting pedal edema-- none , no calf pain   Gastrointestinal -Soft abdomen, No palpable masses, Non Tender,Liver,Spleen not palpable. No-- free fluid and shifting dullness  Musculoskeletal- No finger Clubbing. Strength grossly normal   Lymphatic-No Palpable cervical, axillary,Inguinal lymphadenopathy   Psychiatric - normal effect,Orientation  Rt Dorsalis pedis pulses-palpable    Lt Dorsalis pedis pulses- palpable   Rt Posterior tibial pulses -palpable   Left posterior tibial pulses -palpable   Miscellaneous -  no asterixis,  no dupuytren's contracture,  Surgical scarRUQ scars   and  no renal bruit  Blood pressure (!) 158/101, pulse 104, temperature 99.7 °F (37.6 °C), temperature source Oral, height 5' 5.5" (1.664 m), weight 94.1 kg (207 lb 6.4 oz), last menstrual period 11/25/2012, SpO2 97 %.      Investigations  Lab and Imaging have been reviewed in epic.    Review of Medicine tests    EKG- I had independently reviewed the EKG from--8/12/2016   It was reported to be showing     Normal sinus rhythm  Normal ECG  No previous ECGs available    Review of clinical lab tests:  Lab Results   Component Value Date    CREATININE 0.9 08/25/2017    HGB 14.4 08/25/2017     08/25/2017             Review of old records- Was done and information gathered regards to events leading to " surgery and health conditions of significance in the perioperative period.    Outpatient Subjective & Objective

## 2018-04-06 NOTE — PROGRESS NOTES
[facility] - Pre Op Consult  Progress Note    Patient Name: Renae Hou  MRN: [id number]  Date of Evaluation- 04/06/2018  PCP- Panchito Lindsay MD    Future cases for Renae Hou [[id number]]     Case ID Status Date Time Dieudonne Procedure Provider Location    [id number] [facility] 4/10/2018  7:00  FUSION-POSTERIOR LUMBAR INTERBODY FUSION L4/5, possible L5/S1 Depuy SNS: SSEP/EMG 1st Case Dean Zayas MD [[id number]] [facility] OR 2ND FLR          HPI:  History of present illness- I had the pleasure of meeting this pleasant 57 y.o. lady in the pre op clinic prior to her elective spine surgery. The patient is new to me .     I have obtained the history by speaking to the patient and by reviewing the electronic health records.    Events leading up to surgery / History of presenting illness -    Lumbar radiculopathy   Spondylolisthesis, acquired       She has been troubled with severe  low back pain radiating to Left foot and has numbness of the sole of the Left foot and left lateral calf pain for about 3 months  . Pain and numbness increases with activity and decreases with layong down .    No pain from this .No aggravating factors. No relieving factors     Relevant health conditions of significance for the perioperative period/ History of presenting illness -    Patient Active Problem List    Diagnosis Date Noted    Fever 04/06/2018     No urinary burning , cough, phlegm, diarrhea, rash      Acid reflux 04/06/2018    Essential hypertension 04/06/2018     BP elevation in March   Had head ache a few days ago      Fatty liver 04/06/2018    Lumbar radiculopathy 04/02/2018         Hyperlipidemia 08/12/2016    Pre-diabetes 08/12/2016     Had a history of Prediabetes   Last A1c from Aug 2017- 5.5      Class 1 obesity with body mass index (BMI) of 33.0 to 33.9 in adult 08/12/2016    Shellfish allergy 09/29/2015    Constipation, chronic 09/29/2015     On miralax      History of major abdominal surgery 09/11/2015     Had  Hysterectomy , cholecystectomy , rectocele repair      Sleep apnea 12/22/2014     Diagnosed with sleep apnea about 2010   Was given CPAP machine although not using it   Lost weight and not using any more            Subjective/ Objective:          Chief complaint-Preoperative evaluation, Perioperative Medical management, complication reduction plan     Active cardiac conditions- none    Revised cardiac risk index predictors- none    Functional capacity -Examples of physical activity, was active until until Jan 2018 , was taking stairs at her job,   can take 1 flight of stairs----- She can undertake all the above activities without  chest pain,chest tightness, Shortness of breath ,dizziness,lightheadedness making her exercise tolerance more,  than 4 Mets.       Review of Systems   Constitutional: Negative for chills and fever.            Weight gain from reduced activity   HENT:        Sleep anea   Eyes:        No new visual changes   Respiratory:        No cough , phlegm  No Hemoptysis   Cardiovascular:        As noted   Gastrointestinal:        No overt GI/ blood losses  Bowel movements-  constipated   Endocrine:        Prednisone use > 20 mg daily for 3 weeks- None   Genitourinary: Negative for dysuria.        No urinary hesitancy    Musculoskeletal:        As above      Skin: Negative for rash.   Neurological: Negative for syncope.        No unilateral weakness   Hematological:        Current use of Anticoagulants  Current use of Antiplatelet agents  None    Psychiatric/Behavioral:        No Depression,Anxiety       No vascular stenting       Past Surgical History:   Procedure Laterality Date    BACK SURGERY  2002    L4, L5    BILATERAL SALPINGOOPHORECTOMY      CHOLECYSTECTOMY      CYST REMOVAL      [facility]STERECTOMY  12/17/2012    [facility]     RECTOCELE REPAIR      THYROID NODULE REMOVAL         No anesthesia, bleeding, cardiac problems , PONV with previous surgeries/procedures.  Medications and Allergies  "reviewed in epic.   FH- No anesthesia, thrombosis/ bleeding  , early onset heart disease in family   Lives with  , help available post op     Physical Exam   HENT:   Head: Normocephalic.       Physical Exam   HENT:   Head: Normocephalic.     Constitutional- Vitals - Body mass index is 33.99 kg/m².,   Vitals:    04/06/18 1209   BP: (!) 158/101   Pulse: 104   Temp: 99.7 °F (37.6 °C)     General appearance-Conscious,Coherent  Eyes- No conjunctival icterus,pupils contact lenses ,  round  and reactive to light   ENT-Oral cavity- moist  , Hearing grossly normal   Neck- No thyromegaly ,Trachea -central, No jugular venous distension,   No Carotid Bruit   Cardiovascular -Heart Sounds- Normal  and  no murmur   , No gallop rhythm   Respiratory - Normal Respiratory Effort, Normal breath sounds,  no wheeze  and  no forced expiratory wheeze    Peripheral pitting pedal edema-- none , no calf pain   Gastrointestinal -Soft abdomen, No palpable masses, Non Tender,Liver,Spleen not palpable. No-- free fluid and shifting dullness  Musculoskeletal- No finger Clubbing. Strength grossly normal   Lymphatic-No Palpable cervical, axillary,Inguinal lymphadenopathy   Psychiatric - normal effect,Orientation  Rt Dorsalis pedis pulses-palpable    Lt Dorsalis pedis pulses- palpable   Rt Posterior tibial pulses -palpable   Left posterior tibial pulses -palpable   Miscellaneous -  no asterixis,  no dupuytren's contracture,  Surgical scarRUQ scars   and  no renal bruit  Blood pressure (!) 158/101, pulse 104, temperature 99.7 °F (37.6 °C), temperature source Oral, height 5' 5.5" (1.664 m), weight 94.1 kg (207 lb 6.4 oz), last menstrual period 11/25/2012, SpO2 97 %.      Investigations  Lab and Imaging have been reviewed in [facility].    Review of Medicine tests    EKG- I had independently reviewed the EKG from--8/12/2016   It was reported to be showing     Normal sinus rhythm  Normal ECG  No previous ECGs available    Review of clinical lab " tests:  Lab Results   Component Value Date    CREATININE 0.9 08/25/2017    HGB 14.4 08/25/2017     08/25/2017             Review of old records- Was done and information gathered regards to events leading to surgery and health conditions of significance in the perioperative period.        Preoperative cardiac risk assessment-  The patient does not have any active cardiac conditions . Revised cardiac risk index predictors-0 ---.Functional capacity is less and more than 4 Mets. She will be undergoing a Spine procedure that carries a intermediate risk     The estimated risk of the rate of adverse cardiac outcomes  0.4%    No further cardiac work up is indicated prior to proceeding with the surgery     Orders Placed This Encounter    Hemoglobin A1c    amLODIPine (NORVASC) 5 MG tablet       American Society of Anesthesiologists Physical status classification ( ASA ) class: 3     Postoperative pulmonary complication risk assessment:      ARISCAT ( Canet) risk index- risk class -  Low, if duration of surgery is under 3 hours, intermediate, if duration of surgery is over 3 hours        Assessment/Plan:     Lumbar radiculopathy  For surgery     Class 1 obesity with body mass index (BMI) of 33.0 to 33.9 in adult  Suggested weight loss    Constipation, chronic  Constipation- I suggest giving laxative regimen as opioid use,reduced ambulation  can increase the constipation     Sleep apnea    Possible sleep apnea- I suggest a sleep study and suggest caution with usage of medication that can cause respiratory suppression in the perioperative period  potential ramifications of untreated sleep apnea, which could include daytime sleepiness, hypertension, heart disease and stroke were discussed  Avoidance  supine sleep, weight gain and alcoholic beverages discussed since all of these can worsen RENAE         Fever  Due for urinalysis later today     Acid reflux  GERD-  I suggest continuation of the Proton pump inhibitor in the  perioperative period . I suggest aspiration precautions    Essential hypertension  Will commence Amlodipine   Pedal edema effects discussed   Hypertension-  Blood pressure is acceptable . I suggest continuation of Amlodipine - during the entire perioperative period.I suggest addressing pain control as uncontrolled pain can increased blood pressure     Fatty liver    No suggestion of cirrhosis of liver or hepatic decompensation         Preventive perioperative care    Thromboembolic prophylaxis:  Her risk factors for thrombosis include obesity, surgical procedure and age.I suggest  thromboembolic prophylaxis ( mechanical/pharmacological, weighing the risk benefits of pharmacological agent use considering suri procedural bleeding )  during the perioperative period.I suggested being active in the post operative period.      Postoperative pulmonary complication prophylaxis-Risk factors for post operative pulmonary complications include Sleep apnea  ASA class >2- I suggest incentive spirometry use, early ambulation and end tidal carbon dioxide monitoring  , oral care , head end of bed elevation     Renal complication prophylaxis-Risk factors for renal complications include hypertension . I suggest keeping her well hydrated.I suggested drinking 2 litre's of water a day      Surgical site Infection Prophylaxis-I  suggest appropriate antibiotic for Prophylaxis against Surgical site infections     In view of Spine procedure the patient  is at risk of postoperative urinary retention.  I suggest avoidance / minimizing the of  Benzodiazepines,Anticholinergic medication,antihistamines ( Benadryl) , if possible in the perioperative period. I suggest using the minimum possible use of opioids for the minimum period of time in the perioperative period. Benadryl avoidance suggested      This visit was focused on Preoperative evaluation, Perioperative Medical management, complication reduction plans. I suggest that the patient follows  up with primary care or relevant sub specialists for ongoing health care.    I appreciate the opportunity to be involved in this patients care. Please feel free to contact me if there were any questions about this consultation.    Patient is pending optimization- BP control , Labs    Patient was instructed to call and update me about any changes to health, changes to medication, office visits ,testing out side of the suri operative care center , hospitalizations between now and surgery     Marleny Corbett MD  Perioperative Medicine  [facility]   Pager [phone]  ------    4/6- 19 31    UA- no suggestion of UTI   CBC,- Hb,HCT,PLT BMP,INR,APTT-N  O POS.  A1c- 5.9   A1c discussed - weight loss  /106 this afternoon at 16.30   Took Amlodipine 5 PM  BP monitoring , low sodium diet discussed   -----    4/7- 22 42     Patient e mailed     4/7    9:37 a.m.  Temp 99.3  145/93    2:35 p.m.  Temp 99.5  134/86    4:30 p.m.  Temp 99.3  131/89    E mailed about temp 100.7 this evening  Spoke to her about temperature of 100.7   Has inability to put weight on the left lower extremity   Pain left gluteal area / hip area  No respiratory symptoms, rash, diarrhea  No clear focus of infection   Plan is presentation to Emergency room for evaluation of fever , inability to weight bear on the Left lower extremity  Spoke to ER physician and discussed her case   --------  4/8- 16 49     Chart reviewed     MRI     Marked inflammatory changes of the posterior paraspinal soft tissues with a small 1.0 x 0.4 x 0.8 cm rim enhancing collection in the left posterior paraspinous musculature at the level of L5, suggestive of small posterior paraspinous abscess.    Extension of the inflammatory changes into the thecal sac with small rim enhancing 7 mm abscess in the dorsal epidural space at the L4-L5 level.    Marked inflammatory changes and abnormal enhancement in the dorsal and anterior epidural space at the L4 and L5  levels, suggestive of associated phlegmonous changes in the intraspinal regions.    Had infectious disease evaluation     Epidural abscess  Planned for antibiotics   Plan for sending  operative specimens for culture  Called and spoke to her   Does not feel the fever   No weakness of legs, no bowel bladder incontinence  ------    4/9- 8 24     Corresponded to Surgeon on 4/8 -   Spoke to patient this AM  Feels much better  Follow up suggested about BP   /78 this AM  No fever

## 2018-04-06 NOTE — HPI
History of present illness- I had the pleasure of meeting this pleasant 57 y.o. lady in the pre op clinic prior to her elective spine surgery. The patient is new to me .     I have obtained the history by speaking to the patient and by reviewing the electronic health records.    Events leading up to surgery / History of presenting illness -    Lumbar radiculopathy   Spondylolisthesis, acquired       She has been troubled with severe  low back pain radiating to Left foot and has numbness of the sole of the Left foot and left lateral calf pain for about 3 months  . Pain and numbness increases with activity and decreases with layong down .    No pain from this .No aggravating factors. No relieving factors     Relevant health conditions of significance for the perioperative period/ History of presenting illness -    Patient Active Problem List    Diagnosis Date Noted    Fever 04/06/2018     No urinary burning , cough, phlegm, diarrhea, rash      Acid reflux 04/06/2018    Essential hypertension 04/06/2018     BP elevation in March   Had head ache a few days ago      Fatty liver 04/06/2018    Lumbar radiculopathy 04/02/2018         Hyperlipidemia 08/12/2016    Pre-diabetes 08/12/2016     Had a history of Prediabetes   Last A1c from Aug 2017- 5.5      Class 1 obesity with body mass index (BMI) of 33.0 to 33.9 in adult 08/12/2016    Shellfish allergy 09/29/2015    Constipation, chronic 09/29/2015     On miralax      History of major abdominal surgery 09/11/2015     Had Hysterectomy , cholecystectomy , rectocele repair      Sleep apnea 12/22/2014     Diagnosed with sleep apnea about 2010   Was given CPAP machine although not using it   Lost weight and not using any more

## 2018-04-06 NOTE — PATIENT INSTRUCTIONS
Your surgery has been scheduled for:___Tuesday 4/10___     You should report to:  _____[facility], located on the [place] side of the first floor of the [facility] ([phone])  ___X___The Second [facility] Center, located on the [facility] side of the Second floor of the [facility] ([phone])  ______3rd Floor SSCU located on the [facility] side of the [facility] [phone]    Please Note  -Tell your doctors if you take asprin, products containing aspirin, herbal medications or blood thinners, such as Coumadin, Ticlid, or Plavix. (Consult your provider regarding holding or stopping before surgery).  -Arrange for someone to drive you home following surgery. You will not be allowed to leave the surgical facility alone or drive yourself home following sedation and anesthesia.      Outpatient Encounter Prescriptions as of 4/6/2018   Medication Sig Note Dispense Refill    cyclobenzaprine (FLEXERIL) 10 MG tablet Take 1 tablet (10 mg total) by mouth every evening. 4/2/2018: TAKE AS SCHEDULED 30 tablet 0    omeprazole (PRILOSEC) 40 MG capsule TK 1 C PO QD- as needed for acid reflux 4/2/2018: TAKE AS SCHEDULED  2    POLYETHYLENE GLYCOL 3350 (MIRALAX ORAL) Take by mouth daily as needed. constipation       traMADol (ULTRAM) 50 mg tablet Take 50 mg by mouth every 6 (six) hours as needed for Pain. 4/2/2018: TAKE AS SCHEDULED IF NEEDED      ACCU-CHEK MARY ANNE PLUS METER [facility] USE TO TEST BLOOD SUGAR D 9/27/2017: Received from: External Pharmacy  0    amLODIPine (NORVASC) 5 MG tablet Take 1 tablet (5 mg total) by mouth once daily. 4/6/2018: Take as sched PM 30 tablet 1    diazePAM (VALIUM) 2 MG tablet Take 1 tablet (2 mg total) by mouth On call Procedure (take 1 30 min prior to procedure, may take second if needed). 4/6/2018: For MRI 2 tablet 0    hydrocodone-acetaminophen 10-325mg (NORCO)  mg Tab Take by mouth every 6 (six) hours as  needed for Pain. 4/6/2018: Currently out      meloxicam (MOBIC) 15 MG tablet Take 1 tablet (15 mg total) by mouth once daily. 4/2/2018: STOP 7 DAYS PRIOR TO SURGERY 30 tablet 6    mupirocin (BACTROBAN) 2 % ointment    0     No facility-administered encounter medications on file as of 4/6/2018.          Please review the instructions regarding your above listed medications.    Before Surgery  -Stop taking all herbal medications 14 days prior to surgery  -Stop taking asprin, products containing asprin 7 days before surgery  -Stop taking blood thinners   days before surgery  -Refrain from drinking alcoholic beverages for 24 hours before and after surgery  -Stop or limit smoking   days before surgery    Night before Surgery  -DO NOT EAT OR DRINK ANYTHING AFTER MIDNIGHT, INCLUDING GUM, HARD CANDY, MINTS, OR CHEWING TOBACCO  -Take a shower or bath (shower is recommended). Bathe with Hibiciens soap or an antibacterial soap from the neck down. If not supplied by your surgeon, Hibiciens soap will need to be purchased over the counter in the pharmacy. Rinse soap off thoroughly.  -Shampoo your hair with your regular shampoo    The Day of Surgery  -Take another bath or shower with Hibiciens or any antibacterial soap, to reduce the chance of infection.  -Take heart and blood pressure medications with a small sip of water, as advised by the perioperative team.  -Do not take fluid pills  -You may brush your teeth and rinse your mouth, but do not swallow any additional water.  -Do not apply perfumes, powder, body lotions, or deodorant on the day of surgery.  -Nail polish should be removed  -Do not wear makeup or moisturizer  -Wear comfortable clothes, such as a button front shirt and loose fitting pants.  -Leave all jewelry, including body piercings, and valuables at home.  -Bring any devices you will need after surgery such as crutches or canes.  -If you have sleep apnea, please bring your CPAP machine    In the event of your  physical conditions including the onset of a cold or respiratory illness, or if you have to delay or cancel your surgery, please notify your surgeon.     If you have any questions or concerns, please don't hesitate to call.    Sincerely,    Mya [phone]

## 2018-04-06 NOTE — ASSESSMENT & PLAN NOTE
4/7/2018   temp 100.7 this evening  Has inability to put weight on the left lower extremity   Pain left gluteal area / hip area  No respiratory symptoms, rash, diarrhea  No clear focus of infection   Plan is presentation to Emergency room for evaluation of fever , inability to weight bear on the Left lower extremity

## 2018-04-07 ENCOUNTER — HOSPITAL ENCOUNTER (INPATIENT)
Facility: HOSPITAL | Age: 58
LOS: 7 days | Discharge: HOME HEALTH | DRG: 856 | End: 2018-04-16
Attending: FAMILY MEDICINE | Admitting: ORTHOPAEDIC SURGERY
Payer: COMMERCIAL

## 2018-04-07 DIAGNOSIS — R50.9 FEVER: ICD-10-CM

## 2018-04-07 DIAGNOSIS — M43.10 SPONDYLOLISTHESIS: ICD-10-CM

## 2018-04-07 DIAGNOSIS — M60.08 ABSCESS OF PARASPINOUS MUSCLES: ICD-10-CM

## 2018-04-07 DIAGNOSIS — M54.50 LOWER BACK PAIN: ICD-10-CM

## 2018-04-07 DIAGNOSIS — M54.16 LUMBAR RADICULOPATHY: Primary | ICD-10-CM

## 2018-04-07 DIAGNOSIS — M54.9 BACK PAIN: ICD-10-CM

## 2018-04-07 DIAGNOSIS — M43.16 SPONDYLOLISTHESIS AT L4-L5 LEVEL: ICD-10-CM

## 2018-04-07 DIAGNOSIS — R50.9 FEVER, UNSPECIFIED FEVER CAUSE: ICD-10-CM

## 2018-04-07 DIAGNOSIS — G06.2 EPIDURAL ABSCESS: ICD-10-CM

## 2018-04-07 DIAGNOSIS — M43.10 SPONDYLOLISTHESIS, ACQUIRED: ICD-10-CM

## 2018-04-07 PROCEDURE — 63600175 PHARM REV CODE 636 W HCPCS: Performed by: FAMILY MEDICINE

## 2018-04-07 PROCEDURE — 96375 TX/PRO/DX INJ NEW DRUG ADDON: CPT

## 2018-04-07 PROCEDURE — 96365 THER/PROPH/DIAG IV INF INIT: CPT

## 2018-04-07 PROCEDURE — 80053 COMPREHEN METABOLIC PANEL: CPT

## 2018-04-07 PROCEDURE — 96376 TX/PRO/DX INJ SAME DRUG ADON: CPT

## 2018-04-07 PROCEDURE — 25000003 PHARM REV CODE 250: Performed by: FAMILY MEDICINE

## 2018-04-07 PROCEDURE — 85025 COMPLETE CBC W/AUTO DIFF WBC: CPT

## 2018-04-07 PROCEDURE — 87400 INFLUENZA A/B EACH AG IA: CPT | Mod: 59

## 2018-04-07 PROCEDURE — 86140 C-REACTIVE PROTEIN: CPT

## 2018-04-07 PROCEDURE — 96361 HYDRATE IV INFUSION ADD-ON: CPT

## 2018-04-07 PROCEDURE — 96367 TX/PROPH/DG ADDL SEQ IV INF: CPT

## 2018-04-07 PROCEDURE — 99285 EMERGENCY DEPT VISIT HI MDM: CPT | Mod: ,,, | Performed by: PHYSICIAN ASSISTANT

## 2018-04-07 PROCEDURE — 85651 RBC SED RATE NONAUTOMATED: CPT

## 2018-04-07 PROCEDURE — 83605 ASSAY OF LACTIC ACID: CPT

## 2018-04-07 PROCEDURE — 99285 EMERGENCY DEPT VISIT HI MDM: CPT | Mod: 25

## 2018-04-07 PROCEDURE — 81001 URINALYSIS AUTO W/SCOPE: CPT

## 2018-04-07 PROCEDURE — 84145 PROCALCITONIN (PCT): CPT

## 2018-04-07 RX ORDER — ONDANSETRON 4 MG/1
4 TABLET, ORALLY DISINTEGRATING ORAL
Status: COMPLETED | OUTPATIENT
Start: 2018-04-07 | End: 2018-04-07

## 2018-04-07 RX ORDER — MEPERIDINE HYDROCHLORIDE 50 MG/ML
25 INJECTION INTRAMUSCULAR; INTRAVENOUS; SUBCUTANEOUS
Status: COMPLETED | OUTPATIENT
Start: 2018-04-07 | End: 2018-04-07

## 2018-04-07 RX ADMIN — MEPERIDINE HYDROCHLORIDE 25 MG: 50 INJECTION, SOLUTION INTRAMUSCULAR; INTRAVENOUS; SUBCUTANEOUS at 11:04

## 2018-04-07 RX ADMIN — SODIUM CHLORIDE 1000 ML: 0.9 INJECTION, SOLUTION INTRAVENOUS at 11:04

## 2018-04-07 RX ADMIN — ONDANSETRON 4 MG: 4 TABLET, ORALLY DISINTEGRATING ORAL at 11:04

## 2018-04-08 PROBLEM — M43.16 SPONDYLOLISTHESIS AT L4-L5 LEVEL: Status: ACTIVE | Noted: 2018-04-08

## 2018-04-08 PROBLEM — M54.9 BACK PAIN: Status: ACTIVE | Noted: 2018-04-08

## 2018-04-08 PROBLEM — G06.2 EPIDURAL ABSCESS: Status: ACTIVE | Noted: 2018-04-08

## 2018-04-08 LAB
ALBUMIN SERPL BCP-MCNC: 3.7 G/DL
ALP SERPL-CCNC: 143 U/L
ALT SERPL W/O P-5'-P-CCNC: 62 U/L
ANION GAP SERPL CALC-SCNC: 11 MMOL/L
AST SERPL-CCNC: 60 U/L
BACTERIA #/AREA URNS AUTO: NORMAL /HPF
BASOPHILS # BLD AUTO: 0.06 K/UL
BASOPHILS NFR BLD: 0.4 %
BILIRUB SERPL-MCNC: 1 MG/DL
BILIRUB UR QL STRIP: NEGATIVE
BUN SERPL-MCNC: 12 MG/DL
CALCIUM SERPL-MCNC: 9.5 MG/DL
CHLORIDE SERPL-SCNC: 102 MMOL/L
CLARITY UR REFRACT.AUTO: CLEAR
CO2 SERPL-SCNC: 25 MMOL/L
COLOR UR AUTO: YELLOW
CREAT SERPL-MCNC: 0.9 MG/DL
CRP SERPL-MCNC: 75.1 MG/L
DIFFERENTIAL METHOD: ABNORMAL
EOSINOPHIL # BLD AUTO: 0.1 K/UL
EOSINOPHIL NFR BLD: 0.5 %
ERYTHROCYTE [DISTWIDTH] IN BLOOD BY AUTOMATED COUNT: 12.9 %
ERYTHROCYTE [SEDIMENTATION RATE] IN BLOOD BY WESTERGREN METHOD: 50 MM/HR
EST. GFR  (AFRICAN AMERICAN): >60 ML/MIN/1.73 M^2
EST. GFR  (NON AFRICAN AMERICAN): >60 ML/MIN/1.73 M^2
FLUAV AG SPEC QL IA: NEGATIVE
FLUBV AG SPEC QL IA: NEGATIVE
GLUCOSE SERPL-MCNC: 111 MG/DL
GLUCOSE UR QL STRIP: NEGATIVE
HCT VFR BLD AUTO: 42.8 %
HGB BLD-MCNC: 14.6 G/DL
HGB UR QL STRIP: NEGATIVE
HYALINE CASTS UR QL AUTO: 1 /LPF
IMM GRANULOCYTES # BLD AUTO: 0.06 K/UL
IMM GRANULOCYTES NFR BLD AUTO: 0.4 %
KETONES UR QL STRIP: NEGATIVE
LACTATE SERPL-SCNC: 0.7 MMOL/L
LACTATE SERPL-SCNC: 1.9 MMOL/L
LEUKOCYTE ESTERASE UR QL STRIP: ABNORMAL
LYMPHOCYTES # BLD AUTO: 2 K/UL
LYMPHOCYTES NFR BLD: 13.4 %
MCH RBC QN AUTO: 30.4 PG
MCHC RBC AUTO-ENTMCNC: 34.1 G/DL
MCV RBC AUTO: 89 FL
MICROSCOPIC COMMENT: NORMAL
MONOCYTES # BLD AUTO: 1.1 K/UL
MONOCYTES NFR BLD: 7.2 %
NEUTROPHILS # BLD AUTO: 11.7 K/UL
NEUTROPHILS NFR BLD: 78.1 %
NITRITE UR QL STRIP: NEGATIVE
NRBC BLD-RTO: 0 /100 WBC
PH UR STRIP: 6 [PH] (ref 5–8)
PLATELET # BLD AUTO: 305 K/UL
PMV BLD AUTO: 9.1 FL
POTASSIUM SERPL-SCNC: 3.9 MMOL/L
PROCALCITONIN SERPL IA-MCNC: 0.06 NG/ML
PROT SERPL-MCNC: 7.6 G/DL
PROT UR QL STRIP: NEGATIVE
RBC # BLD AUTO: 4.8 M/UL
RBC #/AREA URNS AUTO: 1 /HPF (ref 0–4)
SODIUM SERPL-SCNC: 138 MMOL/L
SP GR UR STRIP: 1.01 (ref 1–1.03)
SPECIMEN SOURCE: NORMAL
SQUAMOUS #/AREA URNS AUTO: 2 /HPF
URN SPEC COLLECT METH UR: ABNORMAL
UROBILINOGEN UR STRIP-ACNC: NEGATIVE EU/DL
WBC # BLD AUTO: 14.95 K/UL
WBC #/AREA URNS AUTO: 3 /HPF (ref 0–5)

## 2018-04-08 PROCEDURE — G8987 SELF CARE CURRENT STATUS: HCPCS | Mod: CJ

## 2018-04-08 PROCEDURE — 25000003 PHARM REV CODE 250: Performed by: PHYSICIAN ASSISTANT

## 2018-04-08 PROCEDURE — 25000003 PHARM REV CODE 250: Performed by: ORTHOPAEDIC SURGERY

## 2018-04-08 PROCEDURE — 63600175 PHARM REV CODE 636 W HCPCS: Performed by: ORTHOPAEDIC SURGERY

## 2018-04-08 PROCEDURE — 63600175 PHARM REV CODE 636 W HCPCS: Performed by: STUDENT IN AN ORGANIZED HEALTH CARE EDUCATION/TRAINING PROGRAM

## 2018-04-08 PROCEDURE — 25000003 PHARM REV CODE 250: Performed by: STUDENT IN AN ORGANIZED HEALTH CARE EDUCATION/TRAINING PROGRAM

## 2018-04-08 PROCEDURE — 25000003 PHARM REV CODE 250: Performed by: INTERNAL MEDICINE

## 2018-04-08 PROCEDURE — 25500020 PHARM REV CODE 255: Performed by: EMERGENCY MEDICINE

## 2018-04-08 PROCEDURE — 87040 BLOOD CULTURE FOR BACTERIA: CPT | Mod: 59

## 2018-04-08 PROCEDURE — G0378 HOSPITAL OBSERVATION PER HR: HCPCS

## 2018-04-08 PROCEDURE — 83605 ASSAY OF LACTIC ACID: CPT

## 2018-04-08 PROCEDURE — 99223 1ST HOSP IP/OBS HIGH 75: CPT | Mod: ,,, | Performed by: INTERNAL MEDICINE

## 2018-04-08 PROCEDURE — 97165 OT EVAL LOW COMPLEX 30 MIN: CPT

## 2018-04-08 PROCEDURE — A9585 GADOBUTROL INJECTION: HCPCS | Performed by: EMERGENCY MEDICINE

## 2018-04-08 PROCEDURE — G8988 SELF CARE GOAL STATUS: HCPCS | Mod: CI

## 2018-04-08 PROCEDURE — 63600175 PHARM REV CODE 636 W HCPCS: Performed by: INTERNAL MEDICINE

## 2018-04-08 PROCEDURE — 63600175 PHARM REV CODE 636 W HCPCS: Performed by: PHYSICIAN ASSISTANT

## 2018-04-08 RX ORDER — POLYETHYLENE GLYCOL 3350 17 G/17G
17 POWDER, FOR SOLUTION ORAL 2 TIMES DAILY PRN
Status: DISCONTINUED | OUTPATIENT
Start: 2018-04-08 | End: 2018-04-16 | Stop reason: HOSPADM

## 2018-04-08 RX ORDER — AMLODIPINE BESYLATE 5 MG/1
5 TABLET ORAL DAILY
Status: DISCONTINUED | OUTPATIENT
Start: 2018-04-08 | End: 2018-04-16 | Stop reason: HOSPADM

## 2018-04-08 RX ORDER — DIPHENHYDRAMINE HCL 25 MG
25 CAPSULE ORAL EVERY 6 HOURS PRN
Status: DISCONTINUED | OUTPATIENT
Start: 2018-04-08 | End: 2018-04-16 | Stop reason: HOSPADM

## 2018-04-08 RX ORDER — METHOCARBAMOL 750 MG/1
750 TABLET, FILM COATED ORAL 3 TIMES DAILY
Status: DISCONTINUED | OUTPATIENT
Start: 2018-04-08 | End: 2018-04-16 | Stop reason: HOSPADM

## 2018-04-08 RX ORDER — CALCIUM CARBONATE 200(500)MG
500 TABLET,CHEWABLE ORAL 3 TIMES DAILY PRN
Status: DISCONTINUED | OUTPATIENT
Start: 2018-04-08 | End: 2018-04-16 | Stop reason: HOSPADM

## 2018-04-08 RX ORDER — ACETAMINOPHEN 325 MG/1
650 TABLET ORAL EVERY 8 HOURS
Status: DISCONTINUED | OUTPATIENT
Start: 2018-04-09 | End: 2018-04-10

## 2018-04-08 RX ORDER — OXYCODONE HYDROCHLORIDE 5 MG/1
10 TABLET ORAL EVERY 4 HOURS PRN
Status: DISCONTINUED | OUTPATIENT
Start: 2018-04-08 | End: 2018-04-10

## 2018-04-08 RX ORDER — PREGABALIN 25 MG/1
75 CAPSULE ORAL NIGHTLY
Status: DISCONTINUED | OUTPATIENT
Start: 2018-04-08 | End: 2018-04-16 | Stop reason: HOSPADM

## 2018-04-08 RX ORDER — PROMETHAZINE HYDROCHLORIDE 12.5 MG/1
12.5 TABLET ORAL EVERY 6 HOURS PRN
Status: DISCONTINUED | OUTPATIENT
Start: 2018-04-08 | End: 2018-04-16 | Stop reason: HOSPADM

## 2018-04-08 RX ORDER — DOCUSATE SODIUM 100 MG/1
100 CAPSULE, LIQUID FILLED ORAL 2 TIMES DAILY
Status: DISCONTINUED | OUTPATIENT
Start: 2018-04-08 | End: 2018-04-10

## 2018-04-08 RX ORDER — ACETAMINOPHEN 325 MG/1
650 TABLET ORAL EVERY 6 HOURS
Status: DISCONTINUED | OUTPATIENT
Start: 2018-04-08 | End: 2018-04-08

## 2018-04-08 RX ORDER — MEPERIDINE HYDROCHLORIDE 50 MG/ML
12.5 INJECTION INTRAMUSCULAR; INTRAVENOUS; SUBCUTANEOUS
Status: COMPLETED | OUTPATIENT
Start: 2018-04-08 | End: 2018-04-08

## 2018-04-08 RX ORDER — OXYCODONE HYDROCHLORIDE 5 MG/1
10 TABLET ORAL ONCE
Status: COMPLETED | OUTPATIENT
Start: 2018-04-08 | End: 2018-04-08

## 2018-04-08 RX ORDER — MORPHINE SULFATE 4 MG/ML
3 INJECTION, SOLUTION INTRAMUSCULAR; INTRAVENOUS EVERY 4 HOURS PRN
Status: DISCONTINUED | OUTPATIENT
Start: 2018-04-08 | End: 2018-04-10

## 2018-04-08 RX ORDER — PANTOPRAZOLE SODIUM 40 MG/1
40 TABLET, DELAYED RELEASE ORAL DAILY
Status: DISCONTINUED | OUTPATIENT
Start: 2018-04-08 | End: 2018-04-16 | Stop reason: HOSPADM

## 2018-04-08 RX ORDER — OXYCODONE HYDROCHLORIDE 5 MG/1
5 TABLET ORAL EVERY 8 HOURS PRN
Status: DISCONTINUED | OUTPATIENT
Start: 2018-04-08 | End: 2018-04-08

## 2018-04-08 RX ORDER — ACETAMINOPHEN 10 MG/ML
1000 INJECTION, SOLUTION INTRAVENOUS EVERY 8 HOURS
Status: COMPLETED | OUTPATIENT
Start: 2018-04-08 | End: 2018-04-08

## 2018-04-08 RX ORDER — VANCOMYCIN 2 GRAM/500 ML IN 0.9 % SODIUM CHLORIDE INTRAVENOUS
2000
Status: DISCONTINUED | OUTPATIENT
Start: 2018-04-08 | End: 2018-04-08

## 2018-04-08 RX ORDER — RAMELTEON 8 MG/1
8 TABLET ORAL NIGHTLY PRN
Status: DISCONTINUED | OUTPATIENT
Start: 2018-04-08 | End: 2018-04-16 | Stop reason: HOSPADM

## 2018-04-08 RX ORDER — ONDANSETRON 4 MG/1
8 TABLET, FILM COATED ORAL EVERY 6 HOURS PRN
Status: DISCONTINUED | OUTPATIENT
Start: 2018-04-08 | End: 2018-04-16 | Stop reason: HOSPADM

## 2018-04-08 RX ORDER — OXYCODONE HYDROCHLORIDE 5 MG/1
5 TABLET ORAL EVERY 4 HOURS PRN
Status: DISCONTINUED | OUTPATIENT
Start: 2018-04-08 | End: 2018-04-10

## 2018-04-08 RX ORDER — GADOBUTROL 604.72 MG/ML
10 INJECTION INTRAVENOUS
Status: COMPLETED | OUTPATIENT
Start: 2018-04-08 | End: 2018-04-08

## 2018-04-08 RX ORDER — OXYCODONE HYDROCHLORIDE 5 MG/1
5 TABLET ORAL EVERY 6 HOURS PRN
Status: DISCONTINUED | OUTPATIENT
Start: 2018-04-08 | End: 2018-04-08

## 2018-04-08 RX ADMIN — CEFTRIAXONE SODIUM 2 G: 2 INJECTION, POWDER, FOR SOLUTION INTRAMUSCULAR; INTRAVENOUS at 03:04

## 2018-04-08 RX ADMIN — ACETAMINOPHEN 1000 MG: 10 INJECTION, SOLUTION INTRAVENOUS at 01:04

## 2018-04-08 RX ADMIN — METHOCARBAMOL 750 MG: 750 TABLET ORAL at 03:04

## 2018-04-08 RX ADMIN — ACETAMINOPHEN 1000 MG: 10 INJECTION, SOLUTION INTRAVENOUS at 09:04

## 2018-04-08 RX ADMIN — DOCUSATE SODIUM 100 MG: 50 CAPSULE, LIQUID FILLED ORAL at 09:04

## 2018-04-08 RX ADMIN — ACETAMINOPHEN 1000 MG: 10 INJECTION, SOLUTION INTRAVENOUS at 04:04

## 2018-04-08 RX ADMIN — GADOBUTROL 10 ML: 604.72 INJECTION INTRAVENOUS at 01:04

## 2018-04-08 RX ADMIN — PANTOPRAZOLE SODIUM 40 MG: 40 TABLET, DELAYED RELEASE ORAL at 09:04

## 2018-04-08 RX ADMIN — ONDANSETRON 8 MG: 4 TABLET, FILM COATED ORAL at 08:04

## 2018-04-08 RX ADMIN — OXYCODONE HYDROCHLORIDE 5 MG: 5 TABLET ORAL at 05:04

## 2018-04-08 RX ADMIN — PREGABALIN 75 MG: 25 CAPSULE ORAL at 09:04

## 2018-04-08 RX ADMIN — OXYCODONE HYDROCHLORIDE 5 MG: 5 TABLET ORAL at 09:04

## 2018-04-08 RX ADMIN — Medication 1250 MG: at 03:04

## 2018-04-08 RX ADMIN — METHOCARBAMOL 750 MG: 750 TABLET ORAL at 09:04

## 2018-04-08 RX ADMIN — OXYCODONE HYDROCHLORIDE 10 MG: 5 TABLET ORAL at 01:04

## 2018-04-08 RX ADMIN — OXYCODONE HYDROCHLORIDE 10 MG: 5 TABLET ORAL at 04:04

## 2018-04-08 RX ADMIN — VANCOMYCIN HYDROCHLORIDE 2000 MG: 10 INJECTION, POWDER, LYOPHILIZED, FOR SOLUTION INTRAVENOUS at 04:04

## 2018-04-08 RX ADMIN — AMLODIPINE BESYLATE 5 MG: 5 TABLET ORAL at 09:04

## 2018-04-08 RX ADMIN — MEPERIDINE HYDROCHLORIDE 12.5 MG: 50 INJECTION, SOLUTION INTRAMUSCULAR; INTRAVENOUS; SUBCUTANEOUS at 02:04

## 2018-04-08 RX ADMIN — MORPHINE SULFATE 3 MG: 4 INJECTION INTRAVENOUS at 08:04

## 2018-04-08 RX ADMIN — MEPERIDINE HYDROCHLORIDE 12.5 MG: 50 INJECTION, SOLUTION INTRAMUSCULAR; INTRAVENOUS; SUBCUTANEOUS at 03:04

## 2018-04-08 RX ADMIN — POLYETHYLENE GLYCOL 3350 17 G: 17 POWDER, FOR SOLUTION ORAL at 05:04

## 2018-04-08 NOTE — SUBJECTIVE & OBJECTIVE
Past Medical History:   Diagnosis Date    Diabetes mellitus     Fatty liver     Hypertension     Menopause     Obesity     Sleep apnea     Thyroid disease     Thyroid nodules.       Past Surgical History:   Procedure Laterality Date    BACK SURGERY  2002    L4, L5    BILATERAL SALPINGOOPHORECTOMY      CHOLECYSTECTOMY      CYST REMOVAL      HYSTERECTOMY  12/17/2012    [facility] BS&[facility]     RECTOCELE REPAIR      THYROID NODULE REMOVAL         Review of patient's allergies indicates:   Allergen Reactions    Iodine Anaphylaxis, Hives, Shortness Of Breath, Itching, Swelling and Rash     Other reaction(s): Difficulty breathing  Neck swelling     Shellfish containing products Anaphylaxis, Hives, Shortness Of Breath, Itching, Swelling and Rash     Other reaction(s): Difficulty breathing  Neck swelling     Latex Swelling     Patient un sure about it       Current Facility-Administered Medications   Medication    acetaminophen (10 mg/mL) injection 1,000 mg    [START ON 4/9/2018] acetaminophen tablet 650 mg    amLODIPine tablet 5 mg    calcium carbonate 200 mg calcium (500 mg) chewable tablet 500 mg    diphenhydrAMINE capsule 25 mg    docusate sodium capsule 100 mg    methocarbamol tablet 750 mg    ondansetron tablet 8 mg    oxyCODONE immediate release tablet 5 mg    pantoprazole EC tablet 40 mg    polyethylene glycol packet 17 g    pregabalin capsule 75 mg    promethazine tablet 12.5 mg    ramelteon tablet 8 mg     Family History     Problem Relation (Age of Onset)    Alcohol abuse Sister    Heart disease Mother (55)    Hyperlipidemia Mother    Hypertension Mother    No Known Problems Daughter, Daughter        Social History Main Topics    Smoking status: Former Smoker     Quit date: 1988    Smokeless tobacco: Never Used      Comment: smoked socially decades ago - weekends only    Alcohol use Yes      Comment: SELDOMLY    Drug use: No    Sexual activity: Yes     Partners: Male     Birth control/  "protection: Surgical, See Surgical Hx      Comment: [facility] BS&O 12/17/2012,       ROS   ROS: denies chest pain, shortness of breath, nausea, vomiting, numbness or tingling of extremities    Objective:     Vital Signs (Most Recent):  Temp: 98.8 °F (37.1 °C) (04/08/18 0600)  Pulse: 91 (04/08/18 0600)  Resp: 18 (04/08/18 0600)  BP: (!) 146/82 (04/08/18 0600)  SpO2: 96 % (04/08/18 0600) Vital Signs (24h Range):  Temp:  [98.2 °F (36.8 °C)-98.8 °F (37.1 °C)] 98.8 °F (37.1 °C)  Pulse:  [] 91  Resp:  [18] 18  SpO2:  [96 %-98 %] 96 %  BP: (126-172)/() 146/82     Weight: 90.7 kg (200 lb)  Height: 5' 5.5" (166.4 cm)  Body mass index is 32.78 kg/m².      Intake/Output Summary (Last 24 hours) at 04/08/18 0808  Last data filed at 04/08/18 0520   Gross per 24 hour   Intake             1300 ml   Output                0 ml   Net             1300 ml       Ortho/SPM Exam  General: The patient is a 57 y.o. female in no apparent distress, the patient is orientatied to person, place and time.  Psych: Normal mood and affect  HEENT: Vision grossly intact, hearing intact to the spoken word.  Lungs: Respirations unlabored.  Gait: Normal station and gait, no difficulty with toe or heel walk.   Skin: Cervical skin negative for rashes, lesions, hairy patches and surgical scars.  Range of motion: Cervical range of motion is acceptable. There is no tenderness to palpation.  Spinal Balance: Global saggital and coronal spinal balance acceptable, no significant for scoliosis and kyphosis.  Musculoskeletal: No pain with the range of motion of the bilateral shoulders and elbows. Normal bulk and contour of the bilateral hands.  Vascular: Bilateral hands warm and well perfused, Radial pulses 2+ bilaterally.  Neurological: Normal strength and tone in all major motor groups in the bilateral upper extremities. Normal sensation to light touch in the C5-T1 dermatomes bilaterally.  Neurological: Decreased strength to left knee extension and " hip flexion, otherwise normal strength and tone in all major motor groups in the bilateral upper and lower extremities. Decreased sensation over L4 and L5 nerve distribution, otherwise normal sensation to light touch in the L2-S1 dermatomes bilaterally.  Deep tendon reflexes symmetric in the bilateral upper and lower extremities.  Negative Inverted Radial Reflex and Zavala's bilaterally. Negative Babinski bilaterally.     Significant Labs: All pertinent labs within the past 24 hours have been reviewed.    Significant Imaging:  X-ray lumbar spine: L4-L5 grade 1 sponylolisthesis  MRI lumbar spine with and without contrast: increased signal within paraspinal musculature and epidural fluid collection

## 2018-04-08 NOTE — HPI
"Renae Hou is a 58 yo female with PMH of HTN, HLD presenting with acute on chronic exacerbation of low back pain and left leg pain. The pain has been worsening since Tuesday.  There is associated numbness and tingling to left lower extremity. There is subjective weakness.  Had a ESIs on 3/2/18 and 3/16/18. Did not take her temperature at home but felt "warm". Denies bowel or bladder problems. Has some weakness to left leg but no numbness.  MRI obtained and shows 1.0 x 0.4 x 0.8 cm rim enhancing fluid collection suggestive of paraspinous abscess at L5 with another 7 mm rim enhancing abscess L4-5. Patient is afebrile; WBC count is 14,000. Blood cultures are pending- NGTD. Was given vanc and rocephin in the ER. Currently not on antibiotics; ID consulted for recs.  "

## 2018-04-08 NOTE — PT/OT/SLP EVAL
Occupational Therapy   Evaluation    Name: Renae Hou  MRN: [id number]  Admitting Diagnosis:  <principal problem not specified>      Recommendations:     Discharge Recommendations: home health OT (may progress to home no needs)  Discharge Equipment Recommendations:   (TBD post-sx)  Barriers to discharge:  None    History:     Occupational Profile:  Living Environment: Pt lives with  in 2SH with BHR, cannot reach both at once. Home has tub and walk-in shower with built-in seat and grab bars.  Previous level of function: PTA, pt reports independence with ADLs, but decline in IADL completion 2/2 back pain. Pt was independent with all ADL and IADL, including driving, prior to Jan when back pain began. Pt's  completes all IADLs beginning 2-3 weeks ago and pt does not drive 2/2 use of multiple pain medications. Pt has not been able to work since back pain is intolerable while sitting upright at her desk. Pt has used a RW since Jan due to LLE pain and decreased ability to WB through LLE due to pain. Before pain worsened, pt was active in the community and enjoyed shopping and visiting friends  Roles and Routines: wife, works as admin for [employer]  Equipment Owned:  walker, rolling, grab bar  Assistance upon Discharge:  to assist as needed following d/c from hospital    Past Medical History:   Diagnosis Date    Diabetes mellitus     Fatty liver     Hypertension     Menopause     Obesity     Sleep apnea     Thyroid disease     Thyroid nodules.       Past Surgical History:   Procedure Laterality Date    BACK SURGERY  2002    L4, L5    BILATERAL SALPINGOOPHORECTOMY      CHOLECYSTECTOMY      CYST REMOVAL      HYSTERECTOMY  12/17/2012    [facility]     RECTOCELE REPAIR      THYROID NODULE REMOVAL         Subjective     Chief Complaint: pain  Patient/Family stated goals: regain function  Communicated with: RN prior to session.  Pain/Comfort:  · Pain Rating 1: 0/10  · Location - Side 1:  Left  · Location - Orientation 1: lower  · Location 1: back  · Pain Addressed 1: Reposition, Distraction  · Pain Rating Post-Intervention 1:  (Pt c/o pain with sitting EOB and ambulation, however did not rate)    Patients cultural, spiritual, Christianity conflicts given the current situation: none reported    Objective:     Patient found with:  (no lines connected)    General Precautions: Standard, fall   Orthopedic Precautions:N/A   Braces: N/A     Occupational Performance:    Bed Mobility:    · Patient completed Scooting/Bridging with modified independence and with side rail  · Patient completed Supine to Sit with modified independence, with side rail and elevated HOB  · Patient completed Sit to Supine with moderate assistance, with side rail and elevated HOB    Functional Mobility/Transfers:  · Patient completed Sit <> Stand Transfer with stand by assistance  with  no assistive device   · Patient completed Toilet Transfer Step Transfer technique with stand by assistance with  grab bars  · Functional Mobility: Pt ambulate 75 ft with SBA using hallway handrails; Pt noted to have decreased WB on LLE 2/2 pain    Activities of Daily Living:  · LB Dressing: maximal assistance to doff/don socks while seated EOB 2/2 pain    Cognitive/Visual Perceptual:  Cognitive/Psychosocial Skills:     -       Oriented to: Person, Place, Time and Situation   -       Follows Commands/attention:Follows multistep  commands  -       Communication: clear/fluent  -       Memory: No Deficits noted  -       Safety awareness/insight to disability: intact   -       Mood/Affect/Coping skills/emotional control: Appropriate to situation  Visual/Perceptual:      -Intact    Physical Exam:  Balance:    -       good sitting balance; good static standing balance, fair dynamic standing balance  Postural examination/scapula alignment:    -       No postural abnormalities identified  Skin integrity: Visible skin intact  Edema:  None noted  Sensation:    -    "    Impaired  Pt reports no numbness/tingling in UE, however LLE has numbness/tingling 2/2 pinched Lumbar nerve  Dominant hand:    -       L hand, pt reports ability to use R hand for tasks as well  Upper Extremity Range of Motion:     -       Right Upper Extremity: WFL, Pt c/o back pain at end range  -       Left Upper Extremity: WFL, Pt c/o back pain at end range  Upper Extremity Strength:    -       Right Upper Extremity: WFL  -       Left Upper Extremity: WFL   Strength:    -       Right Upper Extremity: WFL  -       Left Upper Extremity: WFL  Fine Motor Coordination:    -       Intact  Gross motor coordination:   WFL    Patient left HOB elevated with all lines intact, call button in reach and RN notified    AMPA 6 Click:  AMPAC Total Score: 18    Treatment & Education:  Pt educated on role of OT/POC and d/c plan of HH OT  Pt educated that OT will follow up post-sx and confirm d/c rec based on level of function that date  White board/communication board updated  Education:    Assessment:     Renae Hou is a 57 y.o. female with a medical diagnosis of <principal problem not specified>.  She presents with pain as limiting factor in safe functional mobility and self care.  Performance deficits affecting function are weakness, impaired sensation, impaired functional mobilty, impaired self care skills, impaired balance, pain, gait instability.      Rehab Prognosis:  Good; patient would benefit from acute skilled OT services to address these deficits and reach maximum level of function.         Clinical Decision Makin.  OT Low:  "Pt evaluation falls under low complexity for evaluation coding due to performance deficits noted in 1-3 areas as stated above and 0 co-morbities affecting current functional status. Data obtained from problem focused assessments. No modifications or assistance was required for completion of evaluation. Only brief occupational profile and history review completed."     Plan: "     Patient to be seen 3 x/week to address the above listed problems via self-care/home management, therapeutic activities, therapeutic exercises  · Plan of Care Expires: 05/08/18  · Plan of Care Reviewed with: patient    This Plan of care has been discussed with the patient who was involved in its development and understands and is in agreement with the identified goals and treatment plan    GOALS:    Occupational Therapy Goals        Problem: Occupational Therapy Goal    Goal Priority Disciplines Outcome Interventions   Occupational Therapy Goal     OT, PT/OT Ongoing (interventions implemented as appropriate)    Description:  Goals to be met by: 4/22/18     Patient will increase functional independence with ADLs by performing:    UE Dressing with Clinch.  LE Dressing with Clinch.  Grooming while standing at sink with Set-up Assistance.  Toileting from toilet with Clinch for hygiene and clothing management.   Toilet transfer to toilet with Clinch.  Ambulation for household distance with independence (no use of hallway rail)                      Time Tracking:     OT Date of Treatment: 04/08/18  OT Start Time: 0757  OT Stop Time: 0812  OT Total Time (min): 15 min    Billable Minutes:Evaluation 15    Morenita Garcia OT  4/8/2018

## 2018-04-08 NOTE — ED PROVIDER NOTES
Encounter Date: 4/7/2018    SCRIBE #1 NOTE: I, Beatriz Valdes, am scribing for, and in the presence of,  JANIE Dowell MD. I have scribed the following portions of the note - the APC attestation.       History     Chief Complaint   Patient presents with    Back Pain     pt reports lower back pain that radiates down legs bilaterally, states that  she is scheduled for disc fusion surgery on Tuesday, states that she took Tramadol and Flexirl at home with no relief      57 year old female with HTN, GERD, Fatty liver, DM, Thyroid disease presenting to the ED with the chief complaint of lower back pain and fever. Patient reports having chronic lower back pain for several months. She describes it as midline lower back radiating to her left buttocks, groin, and down her left leg. She has associated numbness and paresthesias in her left foot. Patient is scheduled to have an L4/5 fusion surgery on 4/10/18. She underwent pre-op evaluation 2 days ago and was noted to have a temperature of 99.7. Patient's pre-op lab work otherwise unremarkable. Patient was instructed to monitor her temperature and come to the ED for evaluation if she developed a fever. Patient reports having a temperature of 100.7 today at home. Patient reports having mild trouble urinating today, otherwise denies other infectious symptoms. She had 2 epidural steroid injections in March and denies having any subsequent complications. No recent trauma or other inciting injuries.           Review of patient's allergies indicates:   Allergen Reactions    Iodine Anaphylaxis, Hives, Shortness Of Breath, Itching, Swelling and Rash     Other reaction(s): Difficulty breathing  Neck swelling     Shellfish containing products Anaphylaxis, Hives, Shortness Of Breath, Itching, Swelling and Rash     Other reaction(s): Difficulty breathing  Neck swelling     Latex Swelling     Patient un sure about it     Past Medical History:   Diagnosis Date    Diabetes mellitus      Fatty liver     Hypertension     Menopause     Obesity     Sleep apnea     Thyroid disease     Thyroid nodules.     Past Surgical History:   Procedure Laterality Date    BACK SURGERY  2002    L4, L5    BILATERAL SALPINGOOPHORECTOMY      CHOLECYSTECTOMY      CYST REMOVAL      [facility]ST[facility]CT[facility]  12/17/2012    [facility]     RECTOCELE REPAIR      THYROID NODULE REMOVAL       Family History   Problem Relation Age of Onset    Hypertension Mother     Hyperlipidemia Mother     Heart disease Mother 55     cad, valvular heart disease    Alcohol abuse Sister     No Known Problems Daughter     No Known Problems Daughter     Breast cancer Neg Hx     Colon cancer Neg Hx     Ovarian cancer Neg Hx     Diabetes Neg Hx      Social History   Substance Use Topics    Smoking status: Former Smoker     Quit date: 1988    Smokeless tobacco: Never Used      Comment: smoked socially decades ago - weekends only    Alcohol use Yes      Comment: SELDOMLY     Review of Systems   Constitutional: Positive for fever. Negative for chills and fatigue.   HENT: Negative for congestion, sinus pain, sinus pressure, sore throat and trouble swallowing.    Eyes: Negative for pain and redness.   Respiratory: Negative for cough and shortness of breath.    Cardiovascular: Negative for chest pain.   Gastrointestinal: Negative for abdominal pain, diarrhea, nausea and vomiting.   Genitourinary: Positive for difficulty urinating. Negative for dysuria, flank pain, hematuria, pelvic pain and vaginal pain.   Musculoskeletal: Positive for back pain. Negative for neck pain and neck stiffness.   Skin: Negative for rash and wound.   Neurological: Negative for weakness, light-headedness and headaches.       Physical Exam     Initial Vitals [04/07/18 2040]   BP Pulse Resp Temp SpO2   (!) 172/102 (!) 115 18 98.2 °F (36.8 °C) 96 %      MAP       125.33         Physical Exam    Constitutional: She appears well-developed and well-nourished. She is not  diaphoretic. No distress.   HENT:   Head: Normocephalic and atraumatic.   Mouth/Throat: Oropharynx is clear and moist. No oropharyngeal exudate.   Eyes: EOM are normal. Pupils are equal, round, and reactive to light.   Neck: Normal range of motion. Neck supple.   Cardiovascular: Regular rhythm and intact distal pulses. Tachycardia present.    Pulmonary/Chest: Breath sounds normal. No respiratory distress. She has no wheezes.   Abdominal: Soft. Bowel sounds are normal. She exhibits no distension. There is no tenderness.   No CVA tenderness   Musculoskeletal:   There is midline lumbar tenderness. Positive L SLR.    Neurological: She is alert and oriented to person, place, and time. She has normal strength.   Skin: Skin is warm and dry. No abscess noted. No erythema.       Imaging Results          X-Ray Lumbar Spine Ap And Lateral (In process)                MRI Lumbar Spine W WO Cont (Final result)  Result time 04/08/18 03:15:28   Procedure changed from MRI Lumbar Spine With Contrast     Final result by Pavel Ingram MD (04/08/18 03:15:28)                 Impression:      No MR evidence of discitis osteomyelitis.    Marked inflammatory changes of the posterior paraspinal soft tissues with a small 1.0 x 0.4 x 0.8 cm rim enhancing collection in the left posterior paraspinous musculature at the level of L5, suggestive of small posterior paraspinous abscess.    Extension of the inflammatory changes into the thecal sac with small rim enhancing 7 mm abscess in the dorsal epidural space at the L4-L5 level.    Marked inflammatory changes and abnormal enhancement in the dorsal and anterior epidural space at the L4 and L5 levels, suggestive of associated phlegmonous changes in the intraspinal regions.    Multilevel spondylosis of the lumbar spine with greatest level of involvement at L4-5 resulting in severe central spinal canal stenosis, grossly similar to prior examination dated 02/09/2018.    COMMUNICATION  This critical  result was discovered/received at 02:40. The critical information above was relayed directly to Delmer EUBANKS on 4/08/2018 at 02:58.    Electronically signed by resident: Patrick Dixon  Date:    04/08/2018  Time:    01:58    Electronically signed by: Pavel Ingram MD  Date:    04/08/2018  Time:    03:15             Narrative:    EXAMINATION:  MRI LUMBAR SPINE W WO CONTRAST    CLINICAL HISTORY:  Low back pain, >6wks conservative tx, persistent-progressive sx, surgical candidate;Fever and Lumbar LBP, no other infectious symptoms. R/O discitis, epidural abscess, osteomyelitis; Schedule for L4/5 fusion on 4/10/18; Low back pain    TECHNIQUE:  Multiplanar, multisequence MR images were acquired from the thoracolumbar junction to the sacrum without and with 10 mL Gadavist intravenous contrast.    COMPARISON:  MRI lumbar spine without contrast 02/09/2018    FINDINGS:  Alignment: There is stepwise anterolisthesis of L4 on L5 and L5 on S1.  Lumbar spine demonstrates preservation of the normal lumbar lordosis.    Vertebrae: Vertebral body heights are maintained without marrow signal abnormality to suggest acute fracture or infiltrative marrow replacement process.    Discs: There is desiccation of the L4-5 and L5-S1 disc space with mild disc space height loss.  Remaining intervertebral disc spaces are well preserved.    Cord: Visualized conus and lumbar spinal nerve roots are normal in signal.  Spinal cord terminates at the L1 vertebral level.    Degenerative findings:    L1-L2: Mild bilateral facet hypertrophy without significant spinal canal stenosis or neural foraminal narrowing.    L2-L3: Mild bilateral facet hypertrophy without significant spinal canal stenosis or neural foraminal narrowing.    L3-L4: Mild bilateral facet hypertrophy and circumferential disc bulge results in mild central spinal canal stenosis without significant neural foraminal narrowing.    L4-L5: Grade 1 anterolisthesis along with moderate bilateral facet  hypertrophy, circumferential disc bulge results in severe central spinal canal stenosis as well as mild bilateral neural foraminal narrowing    L5-S1: Grade 1 anterolisthesis along with severe bilateral facet hypertrophy and circumferential disc bulge results in mild-to-moderate spinal canal stenosis along with mild left and moderate right neural foraminal narrowing.    Paraspinal muscles & soft tissues: There is STIR signal abnormality throughout the paraspinal soft tissues with a small rim enhancing fluid collection in the left posterior paraspinal musculature measuring approximately 1.0 x 0.4 cm x 0.8 (axial series 11, image 34) at the level of L5.  Additionally, there is extension of enhancing material into the spinal canal at the L4-5 vertebral level with phlegmon throughout both the dorsal and ventral epidural space and a small epidural fluid collection in the left dorsal lateral epidural space measuring approximately 0.7 cm (axial series 3, image 35).  No evidence for spondylodiscitis.                               X-Ray Chest AP Portable (Final result)  Result time 04/08/18 02:13:08    Final result by Joaquin Daniel MD (04/08/18 02:13:08)                 Impression:      No acute finding or detrimental change.      Electronically signed by: Joaquin Daniel MD  Date:    04/08/2018  Time:    02:13             Narrative:    EXAMINATION:  XR CHEST AP PORTABLE    CLINICAL HISTORY:  Fever, unspecified    TECHNIQUE:  Single frontal view of the chest was performed.    COMPARISON:  10/24/2014.    FINDINGS:  Cardiac silhouette is not enlarged.  No focal consolidation.  No sizable pleural effusion.  No pneumothorax.  No detrimental change.                              ED Course   Procedures  Labs Reviewed   CBC W/ AUTO DIFFERENTIAL - Abnormal; Notable for the following:        Result Value    WBC 14.95 (*)     MPV 9.1 (*)     Gran # (ANC) 11.7 (*)     Immature Grans (Abs) 0.06 (*)     Mono # 1.1 (*)     Gran% 78.1  (*)     Lymph% 13.4 (*)     All other components within normal limits   COMPREHENSIVE METABOLIC PANEL - Abnormal; Notable for the following:     Glucose 111 (*)     Alkaline Phosphatase 143 (*)     AST 60 (*)     ALT 62 (*)     All other components within normal limits   URINALYSIS, REFLEX TO URINE CULTURE - Abnormal; Notable for the following:     Leukocytes, UA Trace (*)     All other components within normal limits    Narrative:     Preferred Collection Type->Urine, Clean Catch   SEDIMENTATION RATE, MANUAL - Abnormal; Notable for the following:     Sed Rate 50 (*)     All other components within normal limits   C-REACTIVE PROTEIN - Abnormal; Notable for the following:     CRP 75.1 (*)     All other components within normal limits   CULTURE, BLOOD   CULTURE, BLOOD   LACTIC ACID, PLASMA   PROCALCITONIN   INFLUENZA A AND B ANTIGEN   URINALYSIS MICROSCOPIC    Narrative:     Preferred Collection Type->Urine, Clean Catch   LACTIC ACID, PLASMA             Medical Decision Making:   History:   Old Medical Records: I decided to obtain old medical records.  Clinical Tests:   Lab Tests: Reviewed and Ordered  Radiological Study: Ordered and Reviewed       APC / Resident Notes:   57 year old female with HTN, GERD, Fatty liver, DM, Thyroid disease presenting to the ED c/o lower back pain and fever. DDx includes but not limited to UTI, discitis, epidural abscess, osteomyelitis. Initial work-up started in intake. Will follow-up labs and imaging. Will pursue additional lower back imaging pending work-up. Patient given Demerol for pain.     1:05 AM - Syd Andersen PA-C  Work-up shows leukocytosis 14.9 (10 on 4/6), elevated CRP 75.1, Negative UTI on UA, Lactate 1.9, Influenza negative. Will proceed with Lumbar MRI to evaluate for infectious pathology.     3:18 AM - Syd Andersen PA-C  Received a call from radiology regarding MRI results and that there are inflammatory changes and a fluid collection around L4/L5 concerning for  paraspinous abscess. Patient started on Vanc and Rocephin in the ED. Discussed with ortho and they will come see her. They will admit the patient to their service for ongoing management. All of the patient's questions were answered. I have discussed the care of this patient with my supervising physician.                Attending Attestation:     Physician Attestation Statement for NP/PA:   I discussed this assessment and plan of this patient with the NP/PA, but I did not personally examine the patient. The face to face encounter was performed by the NP/PA.                     Clinical Impression:   The primary encounter diagnosis was Abscess of paraspinous muscles. Diagnoses of Fever, Lower back pain, and Back pain were also pertinent to this visit.    Disposition:   Disposition: Admitted                        Syd Andersen PA-C  04/08/18 0548

## 2018-04-08 NOTE — ED PROVIDER NOTES
Encounter Date: 4/7/2018    SCRIBE #1 NOTE: I, Griselda Brand, am scribing for, and in the presence of,  Dr. Chavez. I have scribed the following portions of the note - Other sections scribed: ESA MILLER.       History     Chief Complaint   Patient presents with    Back Pain     pt reports lower back pain that radiates down legs bilaterally, states that  she is scheduled for disc fusion surgery on Tuesday, states that she took Tramadol and Flexirl at home with no relief        Time patient was seen by the provider: 11:09 PM      This is a 57 y.o. female with co-morbidities including HTN, thyroid disease, sleep apnea, obesity, fatty liver and DM who presents to the ED with a chief complaint of back pain x 2 weeks, worsened last night. Patient endorses severe low back pain that radiates down her buttocks and legs, including her groin area and anterior thighs. States that she has missed 2 weeks of work because she has been unable to sit up or put pressure on her buttocks. She reportedly took 2 Tramadols this afternoon at approximately 5:00 PM with minimal relief. She additionally endorses fever of 100.7 this afternoon, notes that she has also had some difficulty producing urine recently. Patient denies sore throat, congestion, abdominal pain, nausea, vomiting, diarrhea.   She is having a disc fusion surgery on 4/10/18, and pre-op tests done yesterday showed low-grade fever that her Internist told her to monitor, but no evidence of signs of infection. She told her doctor about her 100.7 fever today and was told to report to the ED for further evaluation.       The history is provided by the patient and medical records.     Review of patient's allergies indicates:   Allergen Reactions    Iodine Anaphylaxis, Hives, Shortness Of Breath, Itching, Swelling and Rash     Other reaction(s): Difficulty breathing  Neck swelling     Shellfish containing products Anaphylaxis, Hives, Shortness Of Breath, Itching, Swelling and Rash      Other reaction(s): Difficulty breathing  Neck swelling     Latex Swelling     Patient un sure about it     Past Medical History:   Diagnosis Date    Diabetes mellitus     Fatty liver     Hypertension     Menopause     Obesity     Sleep apnea     Thyroid disease     Thyroid nodules.     Past Surgical History:   Procedure Laterality Date    BACK SURGERY  2002    L4, L5    BILATERAL SALPINGOOPHORECTOMY      CHOLECYSTECTOMY      CYST REMOVAL      [facility]  12/17/2012    [facility]     RECTOCELE REPAIR      THYROID NODULE REMOVAL       Family History   Problem Relation Age of Onset    Hypertension Mother     Hyperlipidemia Mother     Heart disease Mother 55     cad, valvular heart disease    Alcohol abuse Sister     No Known Problems Daughter     No Known Problems Daughter     Breast cancer Neg Hx     Colon cancer Neg Hx     Ovarian cancer Neg Hx     Diabetes Neg Hx      Social History   Substance Use Topics    Smoking status: Former Smoker     Quit date: 1988    Smokeless tobacco: Never Used      Comment: smoked socially decades ago - weekends only    Alcohol use Yes      Comment: SELDOMLY     Review of Systems   Constitutional: Positive for fever.   HENT: Negative for congestion and sore throat.    Respiratory: Negative for cough and shortness of breath.    Cardiovascular: Negative for chest pain.   Gastrointestinal: Negative for abdominal pain, diarrhea, nausea and vomiting.   Genitourinary: Positive for decreased urine volume.   Musculoskeletal: Positive for back pain.   Skin: Negative for rash and wound.   Neurological: Negative for dizziness and light-headedness.   Psychiatric/Behavioral: Negative for behavioral problems.       Physical Exam     Initial Vitals [04/07/18 2040]   BP Pulse Resp Temp SpO2   (!) 172/102 (!) 115 18 98.2 °F (36.8 °C) 96 %      MAP       125.33         Physical Exam    ED Course   Procedures  Labs Reviewed - No data to display          Medical Decision  Making:   History:   Old Medical Records: I decided to obtain old medical records.            Scribe Attestation:   Scribe #1: I performed the above scribed service and the documentation accurately describes the services I performed. I attest to the accuracy of the note.       See my progress note for initial intake impression and plan.         Clinical Impression:   There were no encounter diagnoses.                           Deni Chavez MD  04/08/18 [id number]

## 2018-04-08 NOTE — ED NOTES
Renae Hou, an 57 y.o. female presents to the ED  C/o lower back  Pain that radiates down both her legs but more the left than right. She reports   Sx  Is scheduled for Tuesday . Took rx meds with no relief. Rates pain 20/10 pain and describes it as pressure, burning, and stabbing - and is constant.   Denies loss of bowel and bladder    Chief Complaint   Patient presents with    Back Pain     pt reports lower back pain that radiates down legs bilaterally, states that  she is scheduled for disc fusion surgery on Tuesday, states that she took Tramadol and Flexirl at home with no relief      Review of patient's allergies indicates:   Allergen Reactions    Iodine Anaphylaxis, Hives, Shortness Of Breath, Itching, Swelling and Rash     Other reaction(s): Difficulty breathing  Neck swelling     Shellfish containing products Anaphylaxis, Hives, Shortness Of Breath, Itching, Swelling and Rash     Other reaction(s): Difficulty breathing  Neck swelling     Latex Swelling     Patient un sure about it     Past Medical History:   Diagnosis Date    Diabetes mellitus     Fatty liver     Hypertension     Menopause     Obesity     Sleep apnea     Thyroid disease     Thyroid nodules.

## 2018-04-08 NOTE — CONSULTS
"[facility]  Infectious Disease  Consult Note    Patient Name: Renae Hou  MRN: [id number]  Admission Date: 4/7/2018  Hospital Length of Stay: 0 days  Attending Physician: Dean Zayas MD  Primary Care Provider: Panchito Lindsay MD     Isolation Status: No active isolations    Patient information was obtained from patient and past medical records.      Inpatient consult to Infectious Diseases  Consult performed by: MELLISSA VITALE  Consult ordered by: TYREE GRACIA        Assessment/Plan:     Epidural abscess    57 year old female admitted with acute on chronic back pain; MRI shows lumbar epidural abscesses; ID consulted for abx recs:  - afebrile; WBC count 14,000  - blood culture NGTD  - MRI with abscesses noted at L4 and L5; no diskitis or osteomyelitis noted  - scheduled for fusion with Dr. Zayas on 4/10    Plan:  1. Recommend holding antibiotics given patient stable until patient goes to OR for washout and cultures are obtained; ideally, would prefer that hardware not be placed into infected space if possible  2. Would start empiric vancomycin and rocephin following surgery; start antibiotics over night if patient clinically declines or blood cultures turn positive  3. Will follow with you          Thank you for your consult. I will follow-up with patient. Please contact us if you have any additional questions.  COY Vega, pager: [phone]  Infectious Disease  [facility]    Subjective:     Principal Problem: <principal problem not specified>    HPI: Renae Hou is a 58 yo female with PMH of HTN, HLD presenting with acute on chronic exacerbation of low back pain and left leg pain. The pain has been worsening since Tuesday.  There is associated numbness and tingling to left lower extremity. There is subjective weakness.  Had a ESIs on 3/2/18 and 3/16/18. Did not take her temperature at home but felt "warm". Denies bowel or bladder " problems. Has some weakness to left leg but no numbness.  MRI obtained and shows 1.0 x 0.4 x 0.8 cm rim enhancing fluid collection suggestive of paraspinous abscess at L5 with another 7 mm rim enhancing abscess L4-5. Patient is afebrile; WBC count is 14,000. Blood cultures are pending- NGTD. Was given vanc and rocephin in the ER. Currently not on antibiotics; ID consulted for recs.    Past Medical History:   Diagnosis Date    Diabetes mellitus     Fatty liver     Hypertension     Menopause     Obesity     Sleep apnea     Thyroid disease     Thyroid nodules.       Past Surgical History:   Procedure Laterality Date    BACK SURGERY  2002    L4, L5    BILATERAL SALPINGOOPHORECTOMY      CHOLECYSTECTOMY      CYST REMOVAL      HYSTERECTOMY  12/17/2012    [facility]     RECTOCELE REPAIR      THYROID NODULE REMOVAL         Review of patient's allergies indicates:   Allergen Reactions    Iodine Anaphylaxis, Hives, Shortness Of Breath, Itching, Swelling and Rash     Other reaction(s): Difficulty breathing  Neck swelling     Shellfish containing products Anaphylaxis, Hives, Shortness Of Breath, Itching, Swelling and Rash     Other reaction(s): Difficulty breathing  Neck swelling     Latex Swelling     Patient un sure about it       Medications:  Prescriptions Prior to Admission   Medication Sig    amLODIPine (NORVASC) 5 MG tablet Take 1 tablet (5 mg total) by mouth once daily.    cyclobenzaprine (FLEXERIL) 10 MG tablet Take 1 tablet (10 mg total) by mouth every evening.    hydrocodone-acetaminophen 10-325mg (NORCO)  mg Tab Take by mouth every 6 (six) hours as needed for Pain.    omeprazole (PRILOSEC) 40 MG capsule TK 1 C PO QD- as needed for acid reflux    POLYETHYLENE GLYCOL 3350 (MIRALAX ORAL) Take by mouth daily as needed. constipation    traMADol (ULTRAM) 50 mg tablet Take 50 mg by mouth every 6 (six) hours as needed for Pain.    ACCU-CHEK MARY ANNE PLUS METER [facility] [place]OD SUGAR D     diazePAM (VALIUM) 2 MG tablet Take 1 tablet (2 mg total) by mouth On call Procedure (take 1 30 min prior to procedure, may take second if needed).    meloxicam (MOBIC) 15 MG tablet Take 1 tablet (15 mg total) by mouth once daily.    mupirocin (BACTROBAN) 2 % ointment      Antibiotics     None        Antifungals     None        Antivirals     None           Immunization History   Administered Date(s) Administered    Tdap 08/12/2016       Family History     Problem Relation (Age of Onset)    Alcohol abuse Sister    Heart disease Mother (55)    Hyperlipidemia Mother    Hypertension Mother    No Known Problems Daughter, Daughter        Social History     Social History    Marital status:      Spouse name: N/A    Number of children: N/A    Years of education: N/A     Social History Main Topics    Smoking status: Former Smoker     Quit date: 1988    Smokeless tobacco: Never Used      Comment: smoked socially decades ago - weekends only    Alcohol use Yes      Comment: SELDOMLY    Drug use: No    Sexual activity: Yes     Partners: Male     Birth control/ protection: Surgical, See Surgical Hx      Comment: [facility] BS&O 12/17/2012,       Other Topics Concern    None     Social History Narrative    None     Review of Systems   Constitutional: Positive for chills. Negative for diaphoresis and fever.   Respiratory: Negative for cough and shortness of breath.    Cardiovascular: Negative for chest pain and leg swelling.   Gastrointestinal: Negative for abdominal pain, constipation, diarrhea, nausea and vomiting.   Genitourinary: Negative for dysuria, frequency and hematuria.   Musculoskeletal: Positive for arthralgias (leg pain) and back pain. Negative for myalgias.   Skin: Negative for rash and wound.   Neurological: Positive for weakness. Negative for dizziness, light-headedness and headaches.   Psychiatric/Behavioral: Negative for agitation and behavioral problems. The patient is not nervous/anxious.       Objective:     Vital Signs (Most Recent):  Temp: 97.9 °F (36.6 °C) (04/08/18 1143)  Pulse: 91 (04/08/18 1143)  Resp: 18 (04/08/18 1143)  BP: (!) 155/84 (04/08/18 1143)  SpO2: 97 % (04/08/18 1143) Vital Signs (24h Range):  Temp:  [97.7 °F (36.5 °C)-98.8 °F (37.1 °C)] 97.9 °F (36.6 °C)  Pulse:  [] 91  Resp:  [16-18] 18  SpO2:  [93 %-98 %] 97 %  BP: (126-172)/() 155/84     Weight: 90.7 kg (200 lb)  Body mass index is 32.78 kg/m².    Estimated Creatinine Clearance: 77.5 mL/min (based on SCr of 0.9 mg/dL).    Physical Exam   Constitutional: She is oriented to person, place, and time. She appears well-developed and well-nourished. No distress.   HENT:   Head: Normocephalic and atraumatic.   Mouth/Throat: Mucous membranes are normal.   Eyes: Conjunctivae and EOM are normal. No scleral icterus.   Neck: Normal range of motion.   Cardiovascular: Normal rate, regular rhythm and normal heart sounds.  Exam reveals no gallop and no friction rub.    No murmur heard.  Pulmonary/Chest: Effort normal and breath sounds normal. No respiratory distress. She has no wheezes. She has no rales.   Abdominal: Soft. Bowel sounds are normal. She exhibits no distension and no mass. There is no hepatosplenomegaly. There is no tenderness. There is no rebound and no guarding.   Musculoskeletal: She exhibits no edema.   Pain with ROM of left hip  TTP lumbar spine   Neurological: She is alert and oriented to person, place, and time. She has normal strength. No cranial nerve deficit or sensory deficit.   Skin: Skin is warm, dry and intact. No rash noted.   Psychiatric: She has a normal mood and affect.       Significant Labs:   Blood Culture:   Recent Labs  Lab 04/08/18  0314   LABBLOO No Growth to date  No Growth to date     CBC:   Recent Labs  Lab 04/06/18  1349 04/07/18  2349   WBC 10.04 14.95*   HGB 15.8 14.6   HCT 46.2 42.8    305     CMP:   Recent Labs  Lab 04/06/18  1349 04/07/18  2349    138   K 3.8 3.9     102   CO2 28 25   GLU 98 111*   BUN 14 12   CREATININE 0.9 0.9   CALCIUM 9.8 9.5   PROT  --  7.6   ALBUMIN  --  3.7   BILITOT  --  1.0   ALKPHOS  --  143*   AST  --  60*   ALT  --  62*   ANIONGAP 10 11   EGFRNONAA >60.0 >60.0     Wound Culture: No results for input(s): LABAERO in the last 4320 hours.    Significant Imaging: I have reviewed all pertinent imaging results/findings within the past 24 hours.

## 2018-04-08 NOTE — ASSESSMENT & PLAN NOTE
57 year old female admitted with acute on chronic back pain; MRI shows lumbar epidural abscesses; ID consulted for abx recs:  - afebrile; WBC count 14,000  - blood culture NGTD  - MRI with abscesses noted at L4 and L5; no diskitis or osteomyelitis noted  - scheduled for fusion with Dr. Zayas on 4/10    Plan:  1. Recommend holding antibiotics given patient stable until patient goes to OR for washout and cultures are obtained; ideally, would prefer that hardware not be placed into infected space if possible  2. Would start empiric vancomycin and rocephin following surgery; start antibiotics over night if patient clinically declines or blood cultures turn positive  3. Will follow with you

## 2018-04-08 NOTE — ED NOTES
Assisted pt with patient comfort measures placing a wedge under pt's knees. Placed pt on BP cuff and pulse ox.

## 2018-04-08 NOTE — HPI
Renae Hou is a 56 yo female with PMH of HTN, HLD presenting with acute on chronic exacerbation of low back pain and left leg pain. The pain has been worsening since Tuesday.  There is associated numbness and tingling to left lower extremity. There is subjective weakness.  Had a ESIs on 3/2/18 and 3/16/18. Had a Tmax of 100.7 at home, but denies subjective fevers, chills, or sweats.    The Patient denies myelopathic symptoms such as handwriting changes or difficulty with buttons/coins/keys. Denies perineal paresthesias, bowel/bladder dysfunction.

## 2018-04-08 NOTE — PROVIDER PROGRESS NOTES - EMERGENCY DEPT.
Encounter Date: 4/7/2018    ED Physician Progress Notes           Patient is a 57-year-old female who was triaged to intake because of back pain.  In fact she is scheduled for lumbar disc surgery in 3 days.  She underwent her preop evaluation yesterday.  She was noted to have a low-grade fever, but no other infectious symptoms or physical signs.  She was told by the internist to monitor her temperature and otherther vital signs and report results to him today.    When she reported a temperature of 100.7  to this physician earlier today, she was referred to the emergency room for evaluation of fever and intractable back pain.  Patient is been having increasingly intractable back pain for at least the last few weeks.  Interestingly, she did undergo 2 epidural steroid injections last month.  She got only short-term relief from these.  She is not having any symptoms that would suggest acute respiratory, GI, or  or skin infection.  I'm concerned about the possibility of discitis given her recent epidural procedures and her complaints of intractable pain unresponsive to previously effective medicines.  We will start appropriate labs, initial hydration.  She does not appear to be septic.    Nevertheless, her evaluation may be complex and I will refer to the main ED for further disposition.  I will defer any decision on imaging to the ED physicians.

## 2018-04-08 NOTE — SUBJECTIVE & OBJECTIVE
Past Medical History:   Diagnosis Date    Diabetes mellitus     Fatty liver     Hypertension     Menopause     Obesity     Sleep apnea     Thyroid disease     Thyroid nodules.       Past Surgical History:   Procedure Laterality Date    BACK SURGERY  2002    L4, L5    BILATERAL SALPINGOOPHORECTOMY      CHOLECYSTECTOMY      CYST REMOVAL      HYSTERECTOMY  12/17/2012    [facility]     RECTOCELE REPAIR      THYROID NODULE REMOVAL         Review of patient's allergies indicates:   Allergen Reactions    Iodine Anaphylaxis, Hives, Shortness Of Breath, Itching, Swelling and Rash     Other reaction(s): Difficulty breathing  Neck swelling     Shellfish containing products Anaphylaxis, Hives, Shortness Of Breath, Itching, Swelling and Rash     Other reaction(s): Difficulty breathing  Neck swelling     Latex Swelling     Patient un sure about it       Medications:  Prescriptions Prior to Admission   Medication Sig    amLODIPine (NORVASC) 5 MG tablet Take 1 tablet (5 mg total) by mouth once daily.    cyclobenzaprine (FLEXERIL) 10 MG tablet Take 1 tablet (10 mg total) by mouth every evening.    hydrocodone-acetaminophen 10-325mg (NORCO)  mg Tab Take by mouth every 6 (six) hours as needed for Pain.    omeprazole (PRILOSEC) 40 MG capsule TK 1 C PO QD- as needed for acid reflux    POLYETHYLENE GLYCOL 3350 (MIRALAX ORAL) Take by mouth daily as needed. constipation    traMADol (ULTRAM) 50 mg tablet Take 50 mg by mouth every 6 (six) hours as needed for Pain.    ACCU-CHEK MARY ANNE PLUS METER [facility] USE TO TEST BLOOD SUGAR D    diazePAM (VALIUM) 2 MG tablet Take 1 tablet (2 mg total) by mouth On call Procedure (take 1 30 min prior to procedure, may take second if needed).    meloxicam (MOBIC) 15 MG tablet Take 1 tablet (15 mg total) by mouth once daily.    mupirocin (BACTROBAN) 2 % ointment      Antibiotics     None        Antifungals     None        Antivirals     None           Immunization History    Administered Date(s) Administered    Tdap 08/12/2016       Family History     Problem Relation (Age of Onset)    Alcohol abuse Sister    Heart disease Mother (55)    Hyperlipidemia Mother    Hypertension Mother    No Known Problems Daughter, Daughter        Social History     Social History    Marital status:      Spouse name: N/A    Number of children: N/A    Years of education: N/A     Social History Main Topics    Smoking status: Former Smoker     Quit date: 1988    Smokeless tobacco: Never Used      Comment: smoked socially decades ago - weekends only    Alcohol use Yes      Comment: SELDOMLY    Drug use: No    Sexual activity: Yes     Partners: Male     Birth control/ protection: Surgical, See Surgical Hx      Comment: [facility] BS[facility]O 12/17/2012,       Other Topics Concern    None     Social History Narrative    None     Review of Systems   Constitutional: Positive for chills. Negative for diaphoresis and fever.   Respiratory: Negative for cough and shortness of breath.    Cardiovascular: Negative for chest pain and leg swelling.   Gastrointestinal: Negative for abdominal pain, constipation, diarrhea, nausea and vomiting.   Genitourinary: Negative for dysuria, frequency and hematuria.   Musculoskeletal: Positive for arthralgias (leg pain) and back pain. Negative for myalgias.   Skin: Negative for rash and wound.   Neurological: Positive for weakness. Negative for dizziness, light-headedness and headaches.   Psychiatric/Behavioral: Negative for agitation and behavioral problems. The patient is not nervous/anxious.      Objective:     Vital Signs (Most Recent):  Temp: 97.9 °F (36.6 °C) (04/08/18 1143)  Pulse: 91 (04/08/18 1143)  Resp: 18 (04/08/18 1143)  BP: (!) 155/84 (04/08/18 1143)  SpO2: 97 % (04/08/18 1143) Vital Signs (24h Range):  Temp:  [97.7 °F (36.5 °C)-98.8 °F (37.1 °C)] 97.9 °F (36.6 °C)  Pulse:  [] 91  Resp:  [16-18] 18  SpO2:  [93 %-98 %] 97 %  BP: (126-172)/() 155/84      Weight: 90.7 kg (200 lb)  Body mass index is 32.78 kg/m².    Estimated Creatinine Clearance: 77.5 mL/min (based on SCr of 0.9 mg/dL).    Physical Exam   Constitutional: She is oriented to person, place, and time. She appears well-developed and well-nourished. No distress.   HENT:   Head: Normocephalic and atraumatic.   Mouth/Throat: Mucous membranes are normal.   Eyes: Conjunctivae and EOM are normal. No scleral icterus.   Neck: Normal range of motion.   Cardiovascular: Normal rate, regular rhythm and normal heart sounds.  Exam reveals no gallop and no friction rub.    No murmur heard.  Pulmonary/Chest: Effort normal and breath sounds normal. No respiratory distress. She has no wheezes. She has no rales.   Abdominal: Soft. Bowel sounds are normal. She exhibits no distension and no mass. There is no hepatosplenomegaly. There is no tenderness. There is no rebound and no guarding.   Musculoskeletal: She exhibits no edema.   Pain with ROM of left hip  TTP lumbar spine   Neurological: She is alert and oriented to person, place, and time. She has normal strength. No cranial nerve deficit or sensory deficit.   Skin: Skin is warm, dry and intact. No rash noted.   Psychiatric: She has a normal mood and affect.       Significant Labs:   Blood Culture:   Recent Labs  Lab 04/08/18  0314   LABBLOO No Growth to date  No Growth to date     CBC:   Recent Labs  Lab 04/06/18  1349 04/07/18  2349   WBC 10.04 14.95*   HGB 15.8 14.6   HCT 46.2 42.8    305     CMP:   Recent Labs  Lab 04/06/18  1349 04/07/18  2349    138   K 3.8 3.9    102   CO2 28 25   GLU 98 111*   BUN 14 12   CREATININE 0.9 0.9   CALCIUM 9.8 9.5   PROT  --  7.6   ALBUMIN  --  3.7   BILITOT  --  1.0   ALKPHOS  --  143*   AST  --  60*   ALT  --  62*   ANIONGAP 10 11   EGFRNONAA >60.0 >60.0     Wound Culture: No results for input(s): LABAERO in the last 4320 hours.    Significant Imaging: I have reviewed all pertinent imaging results/findings  within the past 24 hours.

## 2018-04-08 NOTE — H&P
[facility]  Orthopedics  H&P    Patient Name: Renae Hou  MRN: [id number]  Admission Date: 4/7/2018  Primary Care Provider: Panchito Lindsay MD      Subjective:     Principal Problem:<principal problem not specified>    Chief Complaint:   Chief Complaint   Patient presents with    Back Pain     pt reports lower back pain that radiates down legs bilaterally, states that  she is scheduled for disc fusion surgery on Tuesday, states that she took Tramadol and Flexirl at home with no relief         HPI: Renae Hou is a 56 yo female with PMH of HTN, HLD presenting with acute on chronic exacerbation of low back pain and left leg pain. The pain has been worsening since Tuesday.  There is associated numbness and tingling to left lower extremity. There is subjective weakness.  Had a ESIs on 3/2/18 and 3/16/18. Had a Tmax of 100.7 at home, but denies subjective fevers, chills, or sweats.    The Patient denies myelopathic symptoms such as handwriting changes or difficulty with buttons/coins/keys. Denies perineal paresthesias, bowel/bladder dysfunction.    Past Medical History:   Diagnosis Date    Diabetes mellitus     Fatty liver     Hypertension     Menopause     Obesity     Sleep apnea     Thyroid disease     Thyroid nodules.       Past Surgical History:   Procedure Laterality Date    BACK SURGERY  2002    L4, L5    BILATERAL SALPINGOOPHORECTOMY      CHOLECYSTECTOMY      CYST REMOVAL      HYSTERECTOMY  12/17/2012    [facility]     RECTOCELE REPAIR      THYROID NODULE REMOVAL         Review of patient's allergies indicates:   Allergen Reactions    Iodine Anaphylaxis, Hives, Shortness Of Breath, Itching, Swelling and Rash     Other reaction(s): Difficulty breathing  Neck swelling     Shellfish containing products Anaphylaxis, Hives, Shortness Of Breath, Itching, Swelling and Rash     Other reaction(s): Difficulty breathing  Neck swelling     Latex Swelling     Patient un sure  "about it       Current Facility-Administered Medications   Medication    acetaminophen (10 mg/mL) injection 1,000 mg    [START ON 4/9/2018] acetaminophen tablet 650 mg    amLODIPine tablet 5 mg    calcium carbonate 200 mg calcium (500 mg) chewable tablet 500 mg    diphenhydrAMINE capsule 25 mg    docusate sodium capsule 100 mg    methocarbamol tablet 750 mg    ondansetron tablet 8 mg    oxyCODONE immediate release tablet 5 mg    pantoprazole EC tablet 40 mg    polyethylene glycol packet 17 g    pregabalin capsule 75 mg    promethazine tablet 12.5 mg    ramelteon tablet 8 mg     Family History     Problem Relation (Age of Onset)    Alcohol abuse Sister    Heart disease Mother (55)    Hyperlipidemia Mother    Hypertension Mother    No Known Problems Daughter, Daughter        Social History Main Topics    Smoking status: Former Smoker     Quit date: 1988    Smokeless tobacco: Never Used      Comment: smoked socially decades ago - weekends only    Alcohol use Yes      Comment: SELDOMLY    Drug use: No    Sexual activity: Yes     Partners: Male     Birth control/ protection: Surgical, See Surgical Hx      [facility],       ROS   ROS: denies chest pain, shortness of breath, nausea, vomiting, numbness or tingling of extremities    Objective:     Vital Signs (Most Recent):  Temp: 98.8 °F (37.1 °C) (04/08/18 0600)  Pulse: 91 (04/08/18 0600)  Resp: 18 (04/08/18 0600)  BP: (!) 146/82 (04/08/18 0600)  SpO2: 96 % (04/08/18 0600) Vital Signs (24h Range):  Temp:  [98.2 °F (36.8 °C)-98.8 °F (37.1 °C)] 98.8 °F (37.1 °C)  Pulse:  [] 91  Resp:  [18] 18  SpO2:  [96 %-98 %] 96 %  BP: (126-172)/() 146/82     Weight: 90.7 kg (200 lb)  Height: 5' 5.5" (166.4 cm)  Body mass index is 32.78 kg/m².      Intake/Output Summary (Last 24 hours) at 04/08/18 0808  Last data filed at 04/08/18 0520   Gross per 24 hour   Intake             1300 ml   Output                0 ml   Net             " 1300 ml       Ortho/SPM Exam  General: The patient is a 57 y.o. female in no apparent distress, the patient is orientatied to person, place and time.  Psych: Normal mood and affect  HEENT: Vision grossly intact, hearing intact to the spoken word.  Lungs: Respirations unlabored.  Gait: Normal station and gait, no difficulty with toe or heel walk.   Skin: Cervical skin negative for rashes, lesions, hairy patches and surgical scars.  Range of motion: Cervical range of motion is acceptable. There is no tenderness to palpation.  Spinal Balance: Global saggital and coronal spinal balance acceptable, no significant for scoliosis and kyphosis.  Musculoskeletal: No pain with the range of motion of the bilateral shoulders and elbows. Normal bulk and contour of the bilateral hands.  Vascular: Bilateral hands warm and well perfused, Radial pulses 2+ bilaterally.  Neurological: Normal strength and tone in all major motor groups in the bilateral upper extremities. Normal sensation to light touch in the C5-T1 dermatomes bilaterally.  Neurological: Decreased strength to left knee extension and hip flexion, otherwise normal strength and tone in all major motor groups in the bilateral upper and lower extremities. Decreased sensation over L4 and L5 nerve distribution, otherwise normal sensation to light touch in the L2-S1 dermatomes bilaterally.  Deep tendon reflexes symmetric in the bilateral upper and lower extremities.  Negative Inverted Radial Reflex and Zavala's bilaterally. Negative Babinski bilaterally.     Significant Labs: All pertinent labs within the past 24 hours have been reviewed.    Significant Imaging:  X-ray lumbar spine: L4-L5 grade 1 sponylolisthesis  MRI lumbar spine with and without contrast: increased signal within paraspinal musculature and epidural fluid collection      Assessment/Plan:     Spondylolisthesis at L4-L5 level    57 y.o. female with L4-5 grade 1 spondylolisthesis and associated left sided facet  cyst with acute on chronic exacerbation of back pain    Pain control: multimodal  PT/OT: WBAT   DVT PPx: FCDs at all times when not ambulating  ID consult placed    Dispo: to OR Tuesday for L4-5 TLIF              Christiano Og MD  Orthopedics  [facility]

## 2018-04-08 NOTE — ASSESSMENT & PLAN NOTE
57 y.o. female with L4-5 grade 1 spondylolisthesis and associated left sided facet cyst with acute on chronic exacerbation of back pain    Pain control: multimodal  PT/OT: WBAT   DVT PPx: FCDs at all times when not ambulating  ID consult placed    Dispo: to OR Tuesday for L4-5 TLIF

## 2018-04-08 NOTE — NURSING
Pt arrived to unit from ED via stretcher. Ambulated from stretcher to bed independently. Pt alert and oriented. Pt states that pain is down from a 10+ to a 6. Oriented to bell and call light. Will monitor.

## 2018-04-08 NOTE — PLAN OF CARE
Problem: Occupational Therapy Goal  Goal: Occupational Therapy Goal  Goals to be met by: 4/22/18     Patient will increase functional independence with ADLs by performing:    UE Dressing with [place].  LE Dressing with [place].  Grooming while standing at sink with Set-up Assistance.  Toileting from toilet with [place] for hygiene and clothing management.   Toilet transfer to toilet with [place].  Ambulation for household distance with independence (no use of hallway rail)    Outcome: Ongoing (interventions implemented as appropriate)  Eval completed; goals established    Comments: Initiate OT LEE Garcia OT  4/8/2018

## 2018-04-08 NOTE — PLAN OF CARE
Problem: Patient Care Overview  Goal: Plan of Care Review  Outcome: Ongoing (interventions implemented as appropriate)  POC reviewed with patient and . AAOx4, VSS per patient trend. PRN pain medication and heat packs utilized throughout shift, managing effectively. Worked with OT, tolerated well, family to bring walker from home. PRN Miralax given x 1 for constipation. IV abx administered per MD order. No evidence of distress, call light within reach, remains free of falls.  at bedside, will continue to monitor.

## 2018-04-09 PROBLEM — M43.10 SPONDYLOLISTHESIS: Status: ACTIVE | Noted: 2018-04-09

## 2018-04-09 PROCEDURE — 99232 SBSQ HOSP IP/OBS MODERATE 35: CPT | Mod: ,,, | Performed by: PHYSICIAN ASSISTANT

## 2018-04-09 PROCEDURE — 63600175 PHARM REV CODE 636 W HCPCS: Performed by: INTERNAL MEDICINE

## 2018-04-09 PROCEDURE — 63600175 PHARM REV CODE 636 W HCPCS: Performed by: ORTHOPAEDIC SURGERY

## 2018-04-09 PROCEDURE — 25000003 PHARM REV CODE 250: Performed by: STUDENT IN AN ORGANIZED HEALTH CARE EDUCATION/TRAINING PROGRAM

## 2018-04-09 PROCEDURE — 20600001 HC STEP DOWN PRIVATE ROOM

## 2018-04-09 PROCEDURE — 25000003 PHARM REV CODE 250: Performed by: ORTHOPAEDIC SURGERY

## 2018-04-09 PROCEDURE — 97161 PT EVAL LOW COMPLEX 20 MIN: CPT

## 2018-04-09 PROCEDURE — 25000003 PHARM REV CODE 250: Performed by: INTERNAL MEDICINE

## 2018-04-09 RX ADMIN — ACETAMINOPHEN 650 MG: 325 TABLET ORAL at 05:04

## 2018-04-09 RX ADMIN — MORPHINE SULFATE 3 MG: 4 INJECTION INTRAVENOUS at 02:04

## 2018-04-09 RX ADMIN — DOCUSATE SODIUM 100 MG: 50 CAPSULE, LIQUID FILLED ORAL at 08:04

## 2018-04-09 RX ADMIN — ACETAMINOPHEN 650 MG: 325 TABLET ORAL at 02:04

## 2018-04-09 RX ADMIN — METHOCARBAMOL 750 MG: 750 TABLET ORAL at 08:04

## 2018-04-09 RX ADMIN — Medication 1250 MG: at 04:04

## 2018-04-09 RX ADMIN — CEFTRIAXONE SODIUM 2 G: 2 INJECTION, POWDER, FOR SOLUTION INTRAMUSCULAR; INTRAVENOUS at 02:04

## 2018-04-09 RX ADMIN — PREGABALIN 75 MG: 25 CAPSULE ORAL at 09:04

## 2018-04-09 RX ADMIN — OXYCODONE HYDROCHLORIDE 10 MG: 5 TABLET ORAL at 11:04

## 2018-04-09 RX ADMIN — Medication 1250 MG: at 03:04

## 2018-04-09 RX ADMIN — METHOCARBAMOL 750 MG: 750 TABLET ORAL at 02:04

## 2018-04-09 RX ADMIN — AMLODIPINE BESYLATE 5 MG: 5 TABLET ORAL at 08:04

## 2018-04-09 RX ADMIN — OXYCODONE HYDROCHLORIDE 10 MG: 5 TABLET ORAL at 07:04

## 2018-04-09 RX ADMIN — PANTOPRAZOLE SODIUM 40 MG: 40 TABLET, DELAYED RELEASE ORAL at 08:04

## 2018-04-09 RX ADMIN — ACETAMINOPHEN 650 MG: 325 TABLET ORAL at 09:04

## 2018-04-09 RX ADMIN — OXYCODONE HYDROCHLORIDE 5 MG: 5 TABLET ORAL at 06:04

## 2018-04-09 RX ADMIN — POLYETHYLENE GLYCOL 3350 17 G: 17 POWDER, FOR SOLUTION ORAL at 08:04

## 2018-04-09 NOTE — PLAN OF CARE
Problem: Physical Therapy Goal  Goal: Physical Therapy Goal  Goals to be met by: 10 days ()    Patient will increase functional independence with mobility by performin. Supine to sit with Modified [place]  2. Sit to supine with Modified [place]  3. Sit to stand transfer with Modified [place]  4. Gait  x 200 feet with Supervision using no assistive device  5. Lower extremity exercise program x30 reps per handout, with independence to improve muscular strength and endurance.       Outcome: Ongoing (interventions implemented as appropriate)  PT evaluation completed. POC initiated.    Shayy Lema, PT, DPT  2018

## 2018-04-09 NOTE — SUBJECTIVE & OBJECTIVE
"Principal Problem:<principal problem not specified>    Principal Orthopedic Problem: L4/5 spondylolisthesis    Interval History: ROBERT overnight. Pain well controlled. Afebrile.     Review of patient's allergies indicates:   Allergen Reactions    Iodine Anaphylaxis, Hives, Shortness Of Breath, Itching, Swelling and Rash     Other reaction(s): Difficulty breathing  Neck swelling     Shellfish containing products Anaphylaxis, Hives, Shortness Of Breath, Itching, Swelling and Rash     Other reaction(s): Difficulty breathing  Neck swelling     Latex Swelling     Patient un sure about it       Current Facility-Administered Medications   Medication    acetaminophen tablet 650 mg    amLODIPine tablet 5 mg    calcium carbonate 200 mg calcium (500 mg) chewable tablet 500 mg    cefTRIAXone (ROCEPHIN) 2 g in dextrose 5 % 50 mL IVPB    diphenhydrAMINE capsule 25 mg    docusate sodium capsule 100 mg    methocarbamol tablet 750 mg    morphine injection 3 mg    ondansetron tablet 8 mg    oxyCODONE immediate release tablet 10 mg    oxyCODONE immediate release tablet 5 mg    pantoprazole EC tablet 40 mg    polyethylene glycol packet 17 g    pregabalin capsule 75 mg    promethazine tablet 12.5 mg    ramelteon tablet 8 mg    vancomycin (VANCOCIN) 1,250 mg in sodium chloride 0.9% 250 mL IVPB     Objective:     Vital Signs (Most Recent):  Temp: 98.6 °F (37 °C) (04/09/18 0730)  Pulse: 93 (04/09/18 0730)  Resp: 18 (04/09/18 0730)  BP: 133/78 (04/09/18 0730)  SpO2: 95 % (04/09/18 0730) Vital Signs (24h Range):  Temp:  [96.8 °F (36 °C)-98.7 °F (37.1 °C)] 98.6 °F (37 °C)  Pulse:  [85-98] 93  Resp:  [16-18] 18  SpO2:  [93 %-98 %] 95 %  BP: (123-155)/(74-87) 133/78     Weight: 90.7 kg (200 lb)  Height: 5' 5.5" (166.4 cm)  Body mass index is 32.78 kg/m².      Intake/Output Summary (Last 24 hours) at 04/09/18 0819  Last data filed at 04/09/18 0404   Gross per 24 hour   Intake              950 ml   Output                0 ml "   Net              950 ml       Ortho/SPM Exam    UE:   D B T WF WE HI  R 5 5 5 5 5 5  L 5 5 5 5 5 5    SILT C5-T1  2+ Radial Pulse  Negative Zavala's  Normoreflexic Biceps, Triceps, Brachiradialis    LE:   IP Q H TA EHL GS  R 5 5 5 5 5 5  L 5 5 5 5 5 5    SILT L2-S1  2+ DP, PT pulses  Negative Clonus  Negative Babinski  Normoreflexic Knee Jerk and Achilles     Significant Labs:   CBC:   Recent Labs  Lab 04/07/18  2349   WBC 14.95*   HGB 14.6   HCT 42.8        All pertinent labs within the past 24 hours have been reviewed.    Significant Imaging: None

## 2018-04-09 NOTE — SUBJECTIVE & OBJECTIVE
Interval History: No AEON.  Afebrile. Back pain improved and controlled.  The patient denies any recent fever, chills, or sweats.      Review of Systems   Constitutional: Negative for activity change, chills, diaphoresis and fever.   Respiratory: Negative for cough, shortness of breath and wheezing.    Cardiovascular: Negative for chest pain.   Gastrointestinal: Negative for abdominal pain, constipation, diarrhea, nausea and vomiting.   Genitourinary: Negative for dysuria, frequency and urgency.   Musculoskeletal: Positive for back pain.   Neurological: Negative for dizziness.   Hematological: Does not bruise/bleed easily.     Objective:     Vital Signs (Most Recent):  Temp: 98.6 °F (37 °C) (04/09/18 0730)  Pulse: 93 (04/09/18 0730)  Resp: 18 (04/09/18 0730)  BP: 133/78 (04/09/18 0730)  SpO2: 95 % (04/09/18 0730) Vital Signs (24h Range):  Temp:  [96.8 °F (36 °C)-98.7 °F (37.1 °C)] 98.6 °F (37 °C)  Pulse:  [85-96] 93  Resp:  [16-18] 18  SpO2:  [94 %-98 %] 95 %  BP: (123-155)/(74-87) 133/78     Weight: 90.7 kg (200 lb)  Body mass index is 32.78 kg/m².    Estimated Creatinine Clearance: 77.5 mL/min (based on SCr of 0.9 mg/dL).    Physical Exam   Constitutional: She is oriented to person, place, and time. She appears well-developed and well-nourished. No distress.   HENT:   Head: Normocephalic and atraumatic.   Mouth/Throat: Mucous membranes are normal.   Eyes: Conjunctivae and EOM are normal. No scleral icterus.   Neck: Normal range of motion.   Cardiovascular: Normal rate, regular rhythm and normal heart sounds.  Exam reveals no gallop and no friction rub.    No murmur heard.  Pulmonary/Chest: Effort normal and breath sounds normal. No respiratory distress. She has no wheezes. She has no rales.   Abdominal: Soft. Bowel sounds are normal. She exhibits no distension and no mass. There is no hepatosplenomegaly. There is no tenderness. There is no rebound and no guarding.   Musculoskeletal: She exhibits no edema.   Pain  with ROM of left hip  TTP lumbar spine   Neurological: She is alert and oriented to person, place, and time. She has normal strength. No cranial nerve deficit or sensory deficit.   Skin: Skin is warm, dry and intact. No rash noted.   Psychiatric: She has a normal mood and affect.       Significant Labs:   Blood Culture:   Recent Labs  Lab 04/08/18  0314   LABBLOO No Growth to date  No Growth to date  No Growth to date  No Growth to date     CBC:   Recent Labs  Lab 04/07/18  2349   WBC 14.95*   HGB 14.6   HCT 42.8        CMP:   Recent Labs  Lab 04/07/18  2349      K 3.9      CO2 25   *   BUN 12   CREATININE 0.9   CALCIUM 9.5   PROT 7.6   ALBUMIN 3.7   BILITOT 1.0   ALKPHOS 143*   AST 60*   ALT 62*   ANIONGAP 11   EGFRNONAA >60.0     Wound Culture: No results for input(s): LABAERO in the last 4320 hours.  All pertinent labs within the past 24 hours have been reviewed.    Significant Imaging: I have reviewed all pertinent imaging results/findings within the past 24 hours.   X-Ray Lumbar Spine Ap And Lateral [[id number]] Resulted: 04/08/18 0520   Order Status: Completed Updated: 04/08/18 0523   Narrative:     EXAMINATION:  XR LUMBAR SPINE AP AND LATERAL    CLINICAL HISTORY:  low back pain, standing films;    TECHNIQUE:  AP, lateral and spot images were performed of the lumbar spine.    COMPARISON:  There is    FINDINGS:  Mild grade 1 anterolisthesis of L4 on L5 and L5 on S1 with mild disc space narrowing, discogenic change and facet arthropathy at these levels.  There is no evidence to suggest acute fracture..  Cholecystostomy clips present in the right upper quadrant.  Nonobstructive bowel gas pattern.  No free air or portal venous gas.   Impression:       No evidence of acute fracture.  Grade 1 anterolisthesis of L4 on L5 and L5 on S1 with associated facet arthropathy.    Electronically signed by resident: Patrick Dixon  Date: 04/08/2018  Time: 05:08    Electronically signed by:   MD Keny  Date: 04/08/2018  Time: 05:20   MRI Lumbar Spine W WO Cont [[id number]] Resulted: 04/08/18 0315   Order Status: Completed Updated: 04/08/18 0318   Narrative:     EXAMINATION:  MRI LUMBAR SPINE W WO CONTRAST    CLINICAL HISTORY:  Low back pain, >6wks conservative tx, persistent-progressive sx, surgical candidate;Fever and Lumbar LBP, no other infectious symptoms. R/O discitis, epidural abscess, osteomyelitis; Schedule for L4/5 fusion on 4/10/18; Low back pain    TECHNIQUE:  Multiplanar, multisequence MR images were acquired from the thoracolumbar junction to the sacrum without and with 10 mL Gadavist intravenous contrast.    COMPARISON:  MRI lumbar spine without contrast 02/09/2018    FINDINGS:  Alignment: There is stepwise anterolisthesis of L4 on L5 and L5 on S1.  Lumbar spine demonstrates preservation of the normal lumbar lordosis.    Vertebrae: Vertebral body heights are maintained without marrow signal abnormality to suggest acute fracture or infiltrative marrow replacement process.    Discs: There is desiccation of the L4-5 and L5-S1 disc space with mild disc space height loss.  Remaining intervertebral disc spaces are well preserved.    Cord: Visualized conus and lumbar spinal nerve roots are normal in signal.  Spinal cord terminates at the L1 vertebral level.    Degenerative findings:    L1-L2: Mild bilateral facet hypertrophy without significant spinal canal stenosis or neural foraminal narrowing.    L2-L3: Mild bilateral facet hypertrophy without significant spinal canal stenosis or neural foraminal narrowing.    L3-L4: Mild bilateral facet hypertrophy and circumferential disc bulge results in mild central spinal canal stenosis without significant neural foraminal narrowing.    L4-L5: Grade 1 anterolisthesis along with moderate bilateral facet hypertrophy, circumferential disc bulge results in severe central spinal canal stenosis as well as mild bilateral neural foraminal narrowing    L5-S1:  Grade 1 anterolisthesis along with severe bilateral facet hypertrophy and circumferential disc bulge results in mild-to-moderate spinal canal stenosis along with mild left and moderate right neural foraminal narrowing.    Paraspinal muscles & soft tissues: There is STIR signal abnormality throughout the paraspinal soft tissues with a small rim enhancing fluid collection in the left posterior paraspinal musculature measuring approximately 1.0 x 0.4 cm x 0.8 (axial series 11, image 34) at the level of L5.  Additionally, there is extension of enhancing material into the spinal canal at the L4-5 vertebral level with phlegmon throughout both the dorsal and ventral epidural space and a small epidural fluid collection in the left dorsal lateral epidural space measuring approximately 0.7 cm (axial series 3, image 35).  No evidence for spondylodiscitis.   Impression:       No MR evidence of discitis osteomyelitis.    Marked inflammatory changes of the posterior paraspinal soft tissues with a small 1.0 x 0.4 x 0.8 cm rim enhancing collection in the left posterior paraspinous musculature at the level of L5, suggestive of small posterior paraspinous abscess.    Extension of the inflammatory changes into the thecal sac with small rim enhancing 7 mm abscess in the dorsal epidural space at the L4-L5 level.    Marked inflammatory changes and abnormal enhancement in the dorsal and anterior epidural space at the L4 and L5 levels, suggestive of associated phlegmonous changes in the intraspinal regions.    Multilevel spondylosis of the lumbar spine with greatest level of involvement at L4-5 resulting in severe central spinal canal stenosis, grossly similar to prior examination dated 02/09/2018.    COMMUNICATION  This critical result was discovered/received at 02:40. The critical information above was relayed directly to Delmer EUBANKS on 4/08/2018 at 02:58.    Electronically signed by resident: Patrick Dixon  Date: 04/08/2018  Time:  01:58    Electronically signed by: Pavel Ingram MD  Date: 04/08/2018  Time: 03:15   X-Ray Chest AP Portable [[id number]] Resulted: 04/08/18 0213   Order Status: Completed Updated: 04/08/18 0216   Narrative:     EXAMINATION:  XR CHEST AP PORTABLE    CLINICAL HISTORY:  Fever, unspecified    TECHNIQUE:  Single frontal view of the chest was performed.    COMPARISON:  10/24/2014.    FINDINGS:  Cardiac silhouette is not enlarged.  No focal consolidation.  No sizable pleural effusion.  No pneumothorax.  No detrimental change.   Impression:       No acute finding or detrimental change.      Electronically signed by: Joaquin Daniel MD  Date: 04/08/2018  Time: 02:13

## 2018-04-09 NOTE — PLAN OF CARE
Problem: Patient Care Overview  Goal: Plan of Care Review  Outcome: Ongoing (interventions implemented as appropriate)  Pt AAOx4, VSS. PRN pain medication administered as requested. IV abx continued. NPO after midnight for surgery in AM . Pt verbalizes understanding. No acute changes during shift. Will continue to monitor.

## 2018-04-09 NOTE — PT/OT/SLP EVAL
"Physical Therapy Evaluation    Patient Name:  Renae Hou   MRN:  [id number]    Recommendations:     Discharge Recommendations:  home health PT   Discharge Equipment Recommendations: none   Barriers to discharge: None    Assessment:     Renae Hou is a 57 y.o. female admitted with a medical diagnosis of L4/5 spondylolisthesis. She presents with the following impairments/functional limitations:  weakness, impaired endurance, impaired self care skills, gait instability, impaired balance, impaired cardiopulmonary response to activity. Pt with scheduled L4-L5 TLIF on following day. Currently, pt able to ambulate with and without RW pending pain tolerance; however, pt not at baseline of mobility and req assistance for prolonged standing activities. Appropriate for discharge to home health PT services pending post surgical mobility level; reassessment will be needed for accurate DC placement.    Rehab Prognosis:  Good; patient would benefit from acute skilled PT services to address these deficits and reach maximum level of function.      Recent Surgery: * No surgery found *      Plan:     During this hospitalization, patient to be seen 3 x/week to address the above listed problems via therapeutic activities, therapeutic exercises, gait training  · Plan of Care Expires:  05/09/18   Plan of Care Reviewed with: patient    Subjective     Communicated with nsg prior to session.  Patient found supine in bed upon PT entry to room, agreeable to evaluation.      Chief Complaint: discomfort with movement  Patient comments/goals: " I am walking better today." per pt during session.   Pain/Comfort:  · Pain Rating 1: 0/10    Patients cultural, spiritual, Anabaptism conflicts given the current situation:      Living Environment:  Pt lives with  in 2 story home c/ (B) HR. Able to live on 1st level . Walk in shower and tub/shower present with built in seat and grab bars. (I) with mobility until last week; began req RW " usage for safety and assistance. Working and driving.     Objective:     Patient found with:   all lines intact    General Precautions: Standard, fall   Orthopedic Precautions:spinal precautions   Braces: N/A       Exams:  Cognitive Exam  Patient is oriented to Person, Place, Time and Situation and follows 100% of multi-step commands    Fine Motor Coordination    -       Intact  RLE heel shin and LLE heel shin   Postural Exam Patient presented with the following abnormalities:    -       No postural abnormalities identified   Sensation    -       Intact  light/touch (B) LE   Skin Integrity/Edema     -       Skin integrity: Visible skin intact  -       Edema: None noted in (B) LE   R LE ROM WFL   R LE Strength  WFL   L LE ROM WFL   L LE Strength  WFL       Functional Mobility  Bed Mobility  Scooting: stand by assistance  Supine to Sit: stand by assistance   Sit to Supine: stand by assistance   Transfers Sit to Stand:  stand by assistance with no AD for 1 trials     Gait Gait Distance: 200 ft without AD  Assistance Level: contact guard assistance  Description: slowed connie with minimal reciprocal arm swing during gait. Narrow base of support with difficulty avoiding objects due to back pain/discomfort         Balance   Static Sitting supervision   Dynamic Sitting supervision   Static Standing stand by assistance   Dynamic Standing contact guard assistance         AM-PAC 6 CLICK MOBILITY  Total Score:18       Therapeutic Activities and Exercises:    PT educated pt on the following  - role of PT  - PT POC (including frequency and duration while in hospital)  - discharge recommendation ( PT) and equipment needs (none)  - level of assistance currently req (1 person supervision)and safety precautions with [facility] [place]   All questions and concerns answered and addressed. White board updated with pertinent information. Ns notified.       Patient left supine with all lines intact and call button in reach.    GOALS:     Physical Therapy Goals        Problem: Physical Therapy Goal    Goal Priority Disciplines Outcome Goal Variances Interventions   Physical Therapy Goal     PT/OT, PT Ongoing (interventions implemented as appropriate)     Description:  Goals to be met by: 10 days ()    Patient will increase functional independence with mobility by performin. Supine to sit with Modified [place]  2. Sit to supine with Modified [place]  3. Sit to stand transfer with Modified [place]  4. Gait  x 200 feet with Supervision using no assistive device  5. Lower extremity exercise program x30 reps per handout, with independence to improve muscular strength and endurance.                         History:     Past Medical History:   Diagnosis Date    Diabetes mellitus     Fatty liver     Hypertension     Menopause     Obesity     Sleep apnea     Thyroid disease     Thyroid nodules.       Past Surgical History:   Procedure Laterality Date    BACK SURGERY      L4, L5    BILATERAL SALPINGOOPHORECTOMY      CHOLECYSTECTOMY      CYST REMOVAL      HYSTERECTOMY  2012    [facility]     RECTOCELE REPAIR      THYROID NODULE REMOVAL         Clinical Decision Making:     History  Co-morbidities and personal factors that may impact the plan of care Examination  Body Structures and Functions, activity limitations and participation restrictions that may impact the plan of care Clinical Presentation   Decision Making/ Complexity Score   Co-morbidities:   [] Time since onset of injury / illness / exacerbation  [] Status of current condition  []Patient's cognitive status and safety concerns    [] Multiple Medical Problems (see med hx)  Personal Factors:   [] Patient's age  [x] Prior Level of function   [x] Patient's home situation (environment and family support)  [] Patient's level of motivation  [] Expected progression of patient      HISTORY:(criteria)    [] 67613 - no personal factors/history    [x] 97263 - has  1-2 personal factor/comorbidity     [] 48736 - has >3 personal factor/comorbidity     Body Regions:  [] Objective examination findings  [] Head     []  Neck  [] Trunk   [] Upper Extremity  [x] Lower Extremity    Body Systems:  [] For communication ability, affect, cognition, language, and learning style: the assessment of the ability to make needs known, consciousness, orientation (person, place, and time), expected emotional /behavioral responses, and learning preferences (eg, learning barriers, education  needs)  [x] For the neuromuscular system: a general assessment of gross coordinated movement (eg, balance, gait, locomotion, transfers, and transitions) and motor function  (motor control and motor learning)  [] For the musculoskeletal system: the assessment of gross symmetry, gross range of motion, gross strength, height, and weight  [] For the integumentary system: the assessment of pliability(texture), presence of scar formation, skin color, and skin integrity  [] For cardiovascular/pulmonary system: the assessment of heart rate, respiratory rate, blood pressure, and edema     Activity limitations:    [] Patient's cognitive status and saf ety concerns          [x] Status of current condition      [] Weight bearing restriction  [] Cardiopulmunary Restriction    Participation Restrictions:   [] Goals and goal agreement with the patient     [] Rehab potential (prognosis) and probable outcome      Examination of Body System: (criteria)    [x] 04987 - addressing 1-2 elements    [] 24961 - addressing a total of 3 or more elements     [] 95242 -  Addressing a total of 4 or more elements         Clinical Presentation: (criteria)  Stable - 88998     On examination of body system using standardized tests and measures patient presents with 3 or more elements from any of the following: body structures and functions, activity limitations, and/or participation restrictions.  Leading to a clinical presentation that is  considered stable and/or uncomplicated                              Clinical Decision Making  (Eval Complexity):  Choose One     Time Tracking:     PT Received On: 04/09/18  PT Start Time: 1100     PT Stop Time: 1108  PT Total Time (min): 8 min     Billable Minutes: Evaluation 8      Shayy Lema, PT , DPT  04/09/2018

## 2018-04-09 NOTE — PLAN OF CARE
D/C assessment completed. Plan is for pt. to go to surgery tomorrow. Pt. lives with spouse. Will return home with family. SW/CM will continue to follow and facilitate any d/c needs identified.     Payor: [facility] / Plan: [facility] CHOICE PLUS / Product Type: Commercial /      Panchito Lindsay MD       [facility] [id number]  [place], [place] - [address] AT [facility] [address]  [address]  [place] [address]  Phone: [phone] Fax: [phone]         04/09/18 1105   Discharge Assessment   Assessment Type Discharge Planning Assessment   Confirmed/corrected address and phone number on facesheet? Yes   Assessment information obtained from? Patient   Expected Length of Stay (days) 4   Prior to hospitilization cognitive status: Alert/Oriented   Prior to hospitalization functional status: Independent   Current cognitive status: Alert/Oriented   Current Functional Status: Independent   Lives With spouse   Able to Return to Prior Arrangements yes   Is patient able to care for self after discharge? Yes  (with assistance from family)   Who are your caregiver(s) and their phone number(s)? Spouse  Yohan  [phone]   Patient's perception of discharge disposition home or selfcare   Readmission Within The Last 30 Days no previous admission in last 30 days   Patient currently being followed by outpatient case management? No   Patient currently receives any other outside agency services? No   Equipment Currently Used at Home none   Do you have any problems affording any of your prescribed medications? No   Is the patient taking medications as prescribed? yes   Does the patient have transportation home? Yes   Transportation Available family or friend will provide   Discharge Plan A Home;Home Health   Discharge Plan B Home;Home with family   Patient/Family In Agreement With Plan yes

## 2018-04-09 NOTE — PLAN OF CARE
Problem: Patient Care Overview  Goal: Plan of Care Review  No acute changes overnight. Pts pain well controlled. IV abx continued. Remains afebrile. No other issues noted. Will monitor.

## 2018-04-09 NOTE — PROGRESS NOTES
[facility]  Orthopedics  Progress Note    Patient Name: Renae Hou  MRN: [id number]  Admission Date: 4/7/2018  Hospital Length of Stay: 0 days  Attending Provider: Dean Zayas MD  Primary Care Provider: Panchito Lindsay MD    Subjective:     Principal Problem:<principal problem not specified>    Principal Orthopedic Problem: L4/5 spondylolisthesis    Interval History: ROBERT overnight. Pain well controlled. Afebrile.     Review of patient's allergies indicates:   Allergen Reactions    Iodine Anaphylaxis, Hives, Shortness Of Breath, Itching, Swelling and Rash     Other reaction(s): Difficulty breathing  Neck swelling     Shellfish containing products Anaphylaxis, Hives, Shortness Of Breath, Itching, Swelling and Rash     Other reaction(s): Difficulty breathing  Neck swelling     Latex Swelling     Patient un sure about it       Current Facility-Administered Medications   Medication    acetaminophen tablet 650 mg    amLODIPine tablet 5 mg    calcium carbonate 200 mg calcium (500 mg) chewable tablet 500 mg    cefTRIAXone (ROCEPHIN) 2 g in dextrose 5 % 50 mL IVPB    diphenhydrAMINE capsule 25 mg    docusate sodium capsule 100 mg    methocarbamol tablet 750 mg    morphine injection 3 mg    ondansetron tablet 8 mg    oxyCODONE immediate release tablet 10 mg    oxyCODONE immediate release tablet 5 mg    pantoprazole EC tablet 40 mg    polyethylene glycol packet 17 g    pregabalin capsule 75 mg    promethazine tablet 12.5 mg    ramelteon tablet 8 mg    vancomycin (VANCOCIN) 1,250 mg in sodium chloride 0.9% 250 mL IVPB     Objective:     Vital Signs (Most Recent):  Temp: 98.6 °F (37 °C) (04/09/18 0730)  Pulse: 93 (04/09/18 0730)  Resp: 18 (04/09/18 0730)  BP: 133/78 (04/09/18 0730)  SpO2: 95 % (04/09/18 0730) Vital Signs (24h Range):  Temp:  [96.8 °F (36 °C)-98.7 °F (37.1 °C)] 98.6 °F (37 °C)  Pulse:  [85-98] 93  Resp:  [16-18] 18  SpO2:  [93 %-98 %] 95 %  BP:  "(123-155)/(74-87) 133/78     Weight: 90.7 kg (200 lb)  Height: 5' 5.5" (166.4 cm)  Body mass index is 32.78 kg/m².      Intake/Output Summary (Last 24 hours) at 04/09/18 0819  Last data filed at 04/09/18 0404   Gross per 24 hour   Intake              950 ml   Output                0 ml   Net              950 ml       Ortho/SPM Exam    UE:   D B T WF WE HI  R 5 5 5 5 5 5  L 5 5 5 5 5 5    SILT C5-T1  2+ Radial Pulse  Negative Zavala's  Normoreflexic Biceps, Triceps, Brachiradialis    LE:   IP Q H TA EHL GS  R 5 5 5 5 5 5  L 5 5 5 5 5 5    SILT L2-S1  2+ DP, PT pulses  Negative Clonus  Negative Babinski  Normoreflexic Knee Jerk and Achilles     Significant Labs:   CBC:   Recent Labs  Lab 04/07/18  2349   WBC 14.95*   HGB 14.6   HCT 42.8        All pertinent labs within the past 24 hours have been reviewed.    Significant Imaging: None    Assessment/Plan:     Spondylolisthesis at L4-L5 level    57 y.o. female with L4-5 grade 1 spondylolisthesis and associated left sided facet cyst with acute on chronic exacerbation of back pain -- possible injection contamination     Pain control: multimodal  PT/OT: WBAT   DVT PPx: FCDs at all times when not ambulating  ID saw and evaluated--started on broad spec Abx     Dispo: to OR Tuesday for L4-5 TLIF. NPO at midnight                 Lito Ochoa MD  Orthopedics  [facility]  "

## 2018-04-09 NOTE — ASSESSMENT & PLAN NOTE
57 y.o. female with L4-5 grade 1 spondylolisthesis and associated left sided facet cyst with acute on chronic exacerbation of back pain -- possible injection contamination     Pain control: multimodal  PT/OT: WBAT   DVT PPx: FCDs at all times when not ambulating  ID saw and evaluated--started on broad spec Abx     Dispo: to OR Tuesday for L4-5 TLIF. NPO at midnight

## 2018-04-10 ENCOUNTER — ANESTHESIA (OUTPATIENT)
Dept: SURGERY | Facility: HOSPITAL | Age: 58
DRG: 856 | End: 2018-04-10
Payer: COMMERCIAL

## 2018-04-10 PROBLEM — M54.16 LUMBAR RADICULOPATHY: Status: ACTIVE | Noted: 2018-04-10

## 2018-04-10 PROBLEM — M43.10 SPONDYLOLISTHESIS, ACQUIRED: Status: ACTIVE | Noted: 2018-04-10

## 2018-04-10 PROBLEM — G06.2 EPIDURAL ABSCESS: Status: ACTIVE | Noted: 2018-04-10

## 2018-04-10 PROBLEM — M60.08 ABSCESS OF PARASPINOUS MUSCLES: Status: ACTIVE | Noted: 2018-04-10

## 2018-04-10 PROBLEM — M43.10 SPONDYLOLISTHESIS, ACQUIRED: Status: ACTIVE | Noted: 2018-04-07

## 2018-04-10 LAB
ABO + RH BLD: NORMAL
ANION GAP SERPL CALC-SCNC: 10 MMOL/L
BASOPHILS # BLD AUTO: 0.03 K/UL
BASOPHILS NFR BLD: 0.4 %
BLD GP AB SCN CELLS X3 SERPL QL: NORMAL
BUN SERPL-MCNC: 8 MG/DL
CALCIUM SERPL-MCNC: 8.4 MG/DL
CHLORIDE SERPL-SCNC: 105 MMOL/L
CO2 SERPL-SCNC: 24 MMOL/L
CREAT SERPL-MCNC: 0.7 MG/DL
DIFFERENTIAL METHOD: NORMAL
EOSINOPHIL # BLD AUTO: 0.3 K/UL
EOSINOPHIL NFR BLD: 3.4 %
ERYTHROCYTE [DISTWIDTH] IN BLOOD BY AUTOMATED COUNT: 13.2 %
EST. GFR  (AFRICAN AMERICAN): >60 ML/MIN/1.73 M^2
EST. GFR  (NON AFRICAN AMERICAN): >60 ML/MIN/1.73 M^2
GLUCOSE SERPL-MCNC: 159 MG/DL
GRAM STN SPEC: NORMAL
HCT VFR BLD AUTO: 33.8 %
HCT VFR BLD AUTO: 38.6 %
HGB BLD-MCNC: 11.7 G/DL
HGB BLD-MCNC: 12.9 G/DL
IMM GRANULOCYTES # BLD AUTO: 0.02 K/UL
IMM GRANULOCYTES NFR BLD AUTO: 0.3 %
LYMPHOCYTES # BLD AUTO: 1.8 K/UL
LYMPHOCYTES NFR BLD: 24.1 %
MCH RBC QN AUTO: 30.1 PG
MCHC RBC AUTO-ENTMCNC: 33.4 G/DL
MCV RBC AUTO: 90 FL
MONOCYTES # BLD AUTO: 0.6 K/UL
MONOCYTES NFR BLD: 8.3 %
NEUTROPHILS # BLD AUTO: 4.8 K/UL
NEUTROPHILS NFR BLD: 63.5 %
NRBC BLD-RTO: 0 /100 WBC
PLATELET # BLD AUTO: 293 K/UL
PMV BLD AUTO: 9.5 FL
POTASSIUM SERPL-SCNC: 3.5 MMOL/L
RBC # BLD AUTO: 4.28 M/UL
SODIUM SERPL-SCNC: 139 MMOL/L
VANCOMYCIN TROUGH SERPL-MCNC: 10.3 UG/ML
WBC # BLD AUTO: 7.59 K/UL

## 2018-04-10 PROCEDURE — 20600001 HC STEP DOWN PRIVATE ROOM

## 2018-04-10 PROCEDURE — 87075 CULTR BACTERIA EXCEPT BLOOD: CPT

## 2018-04-10 PROCEDURE — 63600175 PHARM REV CODE 636 W HCPCS: Performed by: ANESTHESIOLOGY

## 2018-04-10 PROCEDURE — 0SG00AJ FUSION OF LUMBAR VERTEBRAL JOINT WITH INTERBODY FUSION DEVICE, POSTERIOR APPROACH, ANTERIOR COLUMN, OPEN APPROACH: ICD-10-PCS | Performed by: ORTHOPAEDIC SURGERY

## 2018-04-10 PROCEDURE — 87206 SMEAR FLUORESCENT/ACID STAI: CPT

## 2018-04-10 PROCEDURE — 22214 INCIS 1 VERTEBRAL SEG LUMBAR: CPT | Mod: 51,62,, | Performed by: ORTHOPAEDIC SURGERY

## 2018-04-10 PROCEDURE — 63600175 PHARM REV CODE 636 W HCPCS: Performed by: ORTHOPAEDIC SURGERY

## 2018-04-10 PROCEDURE — 0QB20ZZ EXCISION OF RIGHT PELVIC BONE, OPEN APPROACH: ICD-10-PCS | Performed by: ORTHOPAEDIC SURGERY

## 2018-04-10 PROCEDURE — 22633 ARTHRD CMBN 1NTRSPC LUMBAR: CPT | Mod: 62,,, | Performed by: NEUROLOGICAL SURGERY

## 2018-04-10 PROCEDURE — 63600175 PHARM REV CODE 636 W HCPCS: Performed by: NURSE ANESTHETIST, CERTIFIED REGISTERED

## 2018-04-10 PROCEDURE — 87070 CULTURE OTHR SPECIMN AEROBIC: CPT | Mod: 59

## 2018-04-10 PROCEDURE — 85014 HEMATOCRIT: CPT

## 2018-04-10 PROCEDURE — 85018 HEMOGLOBIN: CPT

## 2018-04-10 PROCEDURE — 22842 INSERT SPINE FIXATION DEVICE: CPT | Mod: ,,, | Performed by: ORTHOPAEDIC SURGERY

## 2018-04-10 PROCEDURE — 25000003 PHARM REV CODE 250: Performed by: ORTHOPAEDIC SURGERY

## 2018-04-10 PROCEDURE — 36000710: Performed by: ORTHOPAEDIC SURGERY

## 2018-04-10 PROCEDURE — 22214 INCIS 1 VERTEBRAL SEG LUMBAR: CPT | Mod: 51,62,, | Performed by: NEUROLOGICAL SURGERY

## 2018-04-10 PROCEDURE — 71000039 HC RECOVERY, EACH ADD'L HOUR: Performed by: ORTHOPAEDIC SURGERY

## 2018-04-10 PROCEDURE — 22633 ARTHRD CMBN 1NTRSPC LUMBAR: CPT | Mod: 62,,, | Performed by: ORTHOPAEDIC SURGERY

## 2018-04-10 PROCEDURE — 87102 FUNGUS ISOLATION CULTURE: CPT | Mod: 59

## 2018-04-10 PROCEDURE — 37000009 HC ANESTHESIA EA ADD 15 MINS: Performed by: ORTHOPAEDIC SURGERY

## 2018-04-10 PROCEDURE — D9220A PRA ANESTHESIA: Mod: ANES,,, | Performed by: ANESTHESIOLOGY

## 2018-04-10 PROCEDURE — 0SG0071 FUSION OF LUMBAR VERTEBRAL JOINT WITH AUTOLOGOUS TISSUE SUBSTITUTE, POSTERIOR APPROACH, POSTERIOR COLUMN, OPEN APPROACH: ICD-10-PCS | Performed by: ORTHOPAEDIC SURGERY

## 2018-04-10 PROCEDURE — 37000008 HC ANESTHESIA 1ST 15 MINUTES: Performed by: ORTHOPAEDIC SURGERY

## 2018-04-10 PROCEDURE — 99499 UNLISTED E&M SERVICE: CPT | Mod: ,,, | Performed by: PHYSICIAN ASSISTANT

## 2018-04-10 PROCEDURE — 27800903 OPTIME MED/SURG SUP & DEVICES OTHER IMPLANTS: Performed by: ORTHOPAEDIC SURGERY

## 2018-04-10 PROCEDURE — 63267 EXCISE INTRSPINL LESION LMBR: CPT | Mod: 59,62,, | Performed by: ORTHOPAEDIC SURGERY

## 2018-04-10 PROCEDURE — 63267 EXCISE INTRSPINL LESION LMBR: CPT | Mod: 59,62,, | Performed by: NEUROLOGICAL SURGERY

## 2018-04-10 PROCEDURE — 63600175 PHARM REV CODE 636 W HCPCS: Performed by: INTERNAL MEDICINE

## 2018-04-10 PROCEDURE — 99221 1ST HOSP IP/OBS SF/LOW 40: CPT | Mod: 57,,, | Performed by: ORTHOPAEDIC SURGERY

## 2018-04-10 PROCEDURE — D9220A PRA ANESTHESIA: Mod: CRNA,,, | Performed by: NURSE ANESTHETIST, CERTIFIED REGISTERED

## 2018-04-10 PROCEDURE — 0SB20ZZ EXCISION OF LUMBAR VERTEBRAL DISC, OPEN APPROACH: ICD-10-PCS | Performed by: ORTHOPAEDIC SURGERY

## 2018-04-10 PROCEDURE — 25000003 PHARM REV CODE 250: Performed by: NURSE ANESTHETIST, CERTIFIED REGISTERED

## 2018-04-10 PROCEDURE — 86901 BLOOD TYPING SEROLOGIC RH(D): CPT

## 2018-04-10 PROCEDURE — 0SG3071 FUSION OF LUMBOSACRAL JOINT WITH AUTOLOGOUS TISSUE SUBSTITUTE, POSTERIOR APPROACH, POSTERIOR COLUMN, OPEN APPROACH: ICD-10-PCS | Performed by: ORTHOPAEDIC SURGERY

## 2018-04-10 PROCEDURE — 20937 SP BONE AGRFT MORSEL ADD-ON: CPT | Mod: ,,, | Performed by: ORTHOPAEDIC SURGERY

## 2018-04-10 PROCEDURE — 00BY0ZZ EXCISION OF LUMBAR SPINAL CORD, OPEN APPROACH: ICD-10-PCS | Performed by: ORTHOPAEDIC SURGERY

## 2018-04-10 PROCEDURE — 85025 COMPLETE CBC W/AUTO DIFF WBC: CPT

## 2018-04-10 PROCEDURE — 20936 SP BONE AGRFT LOCAL ADD-ON: CPT | Mod: ,,, | Performed by: ORTHOPAEDIC SURGERY

## 2018-04-10 PROCEDURE — 0SG30AJ FUSION OF LUMBOSACRAL JOINT WITH INTERBODY FUSION DEVICE, POSTERIOR APPROACH, ANTERIOR COLUMN, OPEN APPROACH: ICD-10-PCS | Performed by: ORTHOPAEDIC SURGERY

## 2018-04-10 PROCEDURE — C9290 INJ, BUPIVACAINE LIPOSOME: HCPCS | Performed by: ORTHOPAEDIC SURGERY

## 2018-04-10 PROCEDURE — 87205 SMEAR GRAM STAIN: CPT | Mod: 59

## 2018-04-10 PROCEDURE — 25000003 PHARM REV CODE 250: Performed by: INTERNAL MEDICINE

## 2018-04-10 PROCEDURE — 22853 INSJ BIOMECHANICAL DEVICE: CPT | Mod: ,,, | Performed by: ORTHOPAEDIC SURGERY

## 2018-04-10 PROCEDURE — C1729 CATH, DRAINAGE: HCPCS | Performed by: ORTHOPAEDIC SURGERY

## 2018-04-10 PROCEDURE — 22634 ARTHRD CMBN 1NTRSPC EA ADDL: CPT | Mod: 62,,, | Performed by: NEUROLOGICAL SURGERY

## 2018-04-10 PROCEDURE — 87116 MYCOBACTERIA CULTURE: CPT | Mod: 59

## 2018-04-10 PROCEDURE — 25000003 PHARM REV CODE 250: Performed by: STUDENT IN AN ORGANIZED HEALTH CARE EDUCATION/TRAINING PROGRAM

## 2018-04-10 PROCEDURE — 0KBG0ZZ EXCISION OF LEFT TRUNK MUSCLE, OPEN APPROACH: ICD-10-PCS | Performed by: ORTHOPAEDIC SURGERY

## 2018-04-10 PROCEDURE — 80048 BASIC METABOLIC PNL TOTAL CA: CPT

## 2018-04-10 PROCEDURE — 36000711: Performed by: ORTHOPAEDIC SURGERY

## 2018-04-10 PROCEDURE — 80202 ASSAY OF VANCOMYCIN: CPT

## 2018-04-10 PROCEDURE — 22634 ARTHRD CMBN 1NTRSPC EA ADDL: CPT | Mod: 62,,, | Performed by: ORTHOPAEDIC SURGERY

## 2018-04-10 PROCEDURE — 27201423 OPTIME MED/SURG SUP & DEVICES STERILE SUPPLY: Performed by: ORTHOPAEDIC SURGERY

## 2018-04-10 PROCEDURE — 63600175 PHARM REV CODE 636 W HCPCS

## 2018-04-10 PROCEDURE — 71000033 HC RECOVERY, INTIAL HOUR: Performed by: ORTHOPAEDIC SURGERY

## 2018-04-10 PROCEDURE — 86920 COMPATIBILITY TEST SPIN: CPT

## 2018-04-10 PROCEDURE — 94761 N-INVAS EAR/PLS OXIMETRY MLT: CPT

## 2018-04-10 PROCEDURE — C1713 ANCHOR/SCREW BN/BN,TIS/BN: HCPCS | Performed by: ORTHOPAEDIC SURGERY

## 2018-04-10 PROCEDURE — 87176 TISSUE HOMOGENIZATION CULTR: CPT

## 2018-04-10 DEVICE — SCREW BONE SPINAL CORT 5X45MM: Type: IMPLANTABLE DEVICE | Site: SPINE LUMBAR | Status: FUNCTIONAL

## 2018-04-10 DEVICE — SPACER T-PAL 10X28X9MM: Type: IMPLANTABLE DEVICE | Site: SPINE LUMBAR | Status: FUNCTIONAL

## 2018-04-10 DEVICE — MATRIX BONE CELLR VIVIGEN 5CC: Type: IMPLANTABLE DEVICE | Site: SPINE LUMBAR | Status: FUNCTIONAL

## 2018-04-10 DEVICE — SPACER T-PAL 10X28X10MM: Type: IMPLANTABLE DEVICE | Site: SPINE LUMBAR | Status: FUNCTIONAL

## 2018-04-10 DEVICE — SCREW INNER SINGLE SET TITANIU: Type: IMPLANTABLE DEVICE | Site: SPINE LUMBAR | Status: FUNCTIONAL

## 2018-04-10 DEVICE — SCREW BONE SPINAL 5X40MM TIT: Type: IMPLANTABLE DEVICE | Site: SPINE LUMBAR | Status: FUNCTIONAL

## 2018-04-10 DEVICE — ROD SPINAL EXPEDIUM 65MM: Type: IMPLANTABLE DEVICE | Site: SPINE LUMBAR | Status: FUNCTIONAL

## 2018-04-10 RX ORDER — FENTANYL CITRATE 50 UG/ML
25 INJECTION, SOLUTION INTRAMUSCULAR; INTRAVENOUS
Status: DISCONTINUED | OUTPATIENT
Start: 2018-04-10 | End: 2018-04-16 | Stop reason: HOSPADM

## 2018-04-10 RX ORDER — FENTANYL CITRATE 50 UG/ML
INJECTION, SOLUTION INTRAMUSCULAR; INTRAVENOUS
Status: COMPLETED
Start: 2018-04-10 | End: 2018-04-10

## 2018-04-10 RX ORDER — LIDOCAINE HYDROCHLORIDE AND EPINEPHRINE 10; 10 MG/ML; UG/ML
INJECTION, SOLUTION INFILTRATION; PERINEURAL
Status: DISCONTINUED | OUTPATIENT
Start: 2018-04-10 | End: 2018-04-10 | Stop reason: HOSPADM

## 2018-04-10 RX ORDER — ROCURONIUM BROMIDE 10 MG/ML
INJECTION, SOLUTION INTRAVENOUS
Status: DISCONTINUED | OUTPATIENT
Start: 2018-04-10 | End: 2018-04-10

## 2018-04-10 RX ORDER — FENTANYL CITRATE 50 UG/ML
25 INJECTION, SOLUTION INTRAMUSCULAR; INTRAVENOUS EVERY 5 MIN PRN
Status: COMPLETED | OUTPATIENT
Start: 2018-04-10 | End: 2018-04-10

## 2018-04-10 RX ORDER — SODIUM CHLORIDE 0.9 % (FLUSH) 0.9 %
3 SYRINGE (ML) INJECTION
Status: DISCONTINUED | OUTPATIENT
Start: 2018-04-10 | End: 2018-04-10 | Stop reason: HOSPADM

## 2018-04-10 RX ORDER — OXYCODONE HYDROCHLORIDE 5 MG/1
5 TABLET ORAL
Status: DISCONTINUED | OUTPATIENT
Start: 2018-04-10 | End: 2018-04-16 | Stop reason: HOSPADM

## 2018-04-10 RX ORDER — MUPIROCIN 20 MG/G
OINTMENT TOPICAL
Status: DISCONTINUED | OUTPATIENT
Start: 2018-04-10 | End: 2018-04-10

## 2018-04-10 RX ORDER — OXYCODONE HYDROCHLORIDE 5 MG/1
15 TABLET ORAL
Status: DISCONTINUED | OUTPATIENT
Start: 2018-04-10 | End: 2018-04-16 | Stop reason: HOSPADM

## 2018-04-10 RX ORDER — HYDROMORPHONE HYDROCHLORIDE 1 MG/ML
1 INJECTION, SOLUTION INTRAMUSCULAR; INTRAVENOUS; SUBCUTANEOUS
Status: DISCONTINUED | OUTPATIENT
Start: 2018-04-10 | End: 2018-04-10

## 2018-04-10 RX ORDER — OXYCODONE HYDROCHLORIDE 5 MG/1
TABLET ORAL
Status: DISPENSED
Start: 2018-04-10 | End: 2018-04-10

## 2018-04-10 RX ORDER — PROMETHAZINE HYDROCHLORIDE 12.5 MG/1
12.5 TABLET ORAL EVERY 6 HOURS PRN
Qty: 30 TABLET | Refills: 0 | Status: SHIPPED | OUTPATIENT
Start: 2018-04-10 | End: 2018-07-06

## 2018-04-10 RX ORDER — METHOCARBAMOL 750 MG/1
750 TABLET, FILM COATED ORAL 3 TIMES DAILY
Qty: 30 TABLET | Refills: 0 | Status: SHIPPED | OUTPATIENT
Start: 2018-04-10 | End: 2018-04-20

## 2018-04-10 RX ORDER — MAG HYDROX/ALUMINUM HYD/SIMETH 200-200-20
30 SUSPENSION, ORAL (FINAL DOSE FORM) ORAL
Status: DISCONTINUED | OUTPATIENT
Start: 2018-04-10 | End: 2018-04-16 | Stop reason: HOSPADM

## 2018-04-10 RX ORDER — GLYCOPYRROLATE 0.2 MG/ML
INJECTION INTRAMUSCULAR; INTRAVENOUS
Status: DISCONTINUED | OUTPATIENT
Start: 2018-04-10 | End: 2018-04-10

## 2018-04-10 RX ORDER — KETAMINE HYDROCHLORIDE 100 MG/ML
INJECTION, SOLUTION INTRAMUSCULAR; INTRAVENOUS
Status: DISCONTINUED | OUTPATIENT
Start: 2018-04-10 | End: 2018-04-10

## 2018-04-10 RX ORDER — OXYCODONE HYDROCHLORIDE 5 MG/1
10 TABLET ORAL
Status: DISCONTINUED | OUTPATIENT
Start: 2018-04-10 | End: 2018-04-16 | Stop reason: HOSPADM

## 2018-04-10 RX ORDER — PROPOFOL 10 MG/ML
VIAL (ML) INTRAVENOUS
Status: DISCONTINUED | OUTPATIENT
Start: 2018-04-10 | End: 2018-04-10

## 2018-04-10 RX ORDER — SUCCINYLCHOLINE CHLORIDE 20 MG/ML
INJECTION INTRAMUSCULAR; INTRAVENOUS
Status: DISCONTINUED | OUTPATIENT
Start: 2018-04-10 | End: 2018-04-10

## 2018-04-10 RX ORDER — OXYCODONE HCL 10 MG/1
10 TABLET, FILM COATED, EXTENDED RELEASE ORAL EVERY 12 HOURS
Status: COMPLETED | OUTPATIENT
Start: 2018-04-10 | End: 2018-04-11

## 2018-04-10 RX ORDER — BACITRACIN 50000 [IU]/1
INJECTION, POWDER, FOR SOLUTION INTRAMUSCULAR
Status: DISCONTINUED | OUTPATIENT
Start: 2018-04-10 | End: 2018-04-10 | Stop reason: HOSPADM

## 2018-04-10 RX ORDER — LIDOCAINE HCL/PF 100 MG/5ML
SYRINGE (ML) INTRAVENOUS
Status: DISCONTINUED | OUTPATIENT
Start: 2018-04-10 | End: 2018-04-10

## 2018-04-10 RX ORDER — SODIUM CHLORIDE 9 MG/ML
INJECTION, SOLUTION INTRAVENOUS CONTINUOUS
Status: DISCONTINUED | OUTPATIENT
Start: 2018-04-10 | End: 2018-04-10

## 2018-04-10 RX ORDER — ACETAMINOPHEN 10 MG/ML
INJECTION, SOLUTION INTRAVENOUS
Status: DISCONTINUED | OUTPATIENT
Start: 2018-04-10 | End: 2018-04-10

## 2018-04-10 RX ORDER — BUPIVACAINE HYDROCHLORIDE AND EPINEPHRINE 5; 5 MG/ML; UG/ML
INJECTION, SOLUTION EPIDURAL; INTRACAUDAL; PERINEURAL
Status: DISCONTINUED | OUTPATIENT
Start: 2018-04-10 | End: 2018-04-10 | Stop reason: HOSPADM

## 2018-04-10 RX ORDER — NEOSTIGMINE METHYLSULFATE 1 MG/ML
INJECTION, SOLUTION INTRAVENOUS
Status: DISCONTINUED | OUTPATIENT
Start: 2018-04-10 | End: 2018-04-10

## 2018-04-10 RX ORDER — MUPIROCIN 20 MG/G
1 OINTMENT TOPICAL 2 TIMES DAILY
Status: COMPLETED | OUTPATIENT
Start: 2018-04-10 | End: 2018-04-15

## 2018-04-10 RX ORDER — DEXAMETHASONE SODIUM PHOSPHATE 4 MG/ML
INJECTION, SOLUTION INTRA-ARTICULAR; INTRALESIONAL; INTRAMUSCULAR; INTRAVENOUS; SOFT TISSUE
Status: DISCONTINUED | OUTPATIENT
Start: 2018-04-10 | End: 2018-04-10

## 2018-04-10 RX ORDER — VANCOMYCIN HYDROCHLORIDE 1 G/20ML
INJECTION, POWDER, LYOPHILIZED, FOR SOLUTION INTRAVENOUS
Status: DISCONTINUED | OUTPATIENT
Start: 2018-04-10 | End: 2018-04-10 | Stop reason: HOSPADM

## 2018-04-10 RX ORDER — MIDAZOLAM HYDROCHLORIDE 1 MG/ML
INJECTION, SOLUTION INTRAMUSCULAR; INTRAVENOUS
Status: DISCONTINUED | OUTPATIENT
Start: 2018-04-10 | End: 2018-04-10

## 2018-04-10 RX ORDER — ACETAMINOPHEN 10 MG/ML
1000 INJECTION, SOLUTION INTRAVENOUS EVERY 6 HOURS
Status: DISPENSED | OUTPATIENT
Start: 2018-04-10 | End: 2018-04-11

## 2018-04-10 RX ORDER — BUPIVACAINE HYDROCHLORIDE AND EPINEPHRINE 2.5; 5 MG/ML; UG/ML
INJECTION, SOLUTION EPIDURAL; INFILTRATION; INTRACAUDAL; PERINEURAL
Status: DISCONTINUED | OUTPATIENT
Start: 2018-04-10 | End: 2018-04-10 | Stop reason: HOSPADM

## 2018-04-10 RX ORDER — FENTANYL CITRATE 50 UG/ML
INJECTION, SOLUTION INTRAMUSCULAR; INTRAVENOUS
Status: DISCONTINUED | OUTPATIENT
Start: 2018-04-10 | End: 2018-04-10

## 2018-04-10 RX ORDER — DOCUSATE SODIUM 100 MG/1
100 CAPSULE, LIQUID FILLED ORAL 2 TIMES DAILY PRN
Qty: 60 CAPSULE | Refills: 0 | Status: SHIPPED | OUTPATIENT
Start: 2018-04-10 | End: 2018-07-06

## 2018-04-10 RX ORDER — ONDANSETRON 2 MG/ML
INJECTION INTRAMUSCULAR; INTRAVENOUS
Status: DISCONTINUED | OUTPATIENT
Start: 2018-04-10 | End: 2018-04-10

## 2018-04-10 RX ORDER — SODIUM CHLORIDE 9 MG/ML
INJECTION, SOLUTION INTRAVENOUS CONTINUOUS
Status: ACTIVE | OUTPATIENT
Start: 2018-04-10 | End: 2018-04-11

## 2018-04-10 RX ORDER — OXYCODONE AND ACETAMINOPHEN 10; 325 MG/1; MG/1
1 TABLET ORAL EVERY 4 HOURS PRN
Qty: 90 TABLET | Refills: 0 | Status: SHIPPED | OUTPATIENT
Start: 2018-04-10 | End: 2018-05-03 | Stop reason: SDUPTHER

## 2018-04-10 RX ADMIN — LIDOCAINE HYDROCHLORIDE 75 MG: 20 INJECTION, SOLUTION INTRAVENOUS at 07:04

## 2018-04-10 RX ADMIN — SODIUM CHLORIDE: 0.9 INJECTION, SOLUTION INTRAVENOUS at 11:04

## 2018-04-10 RX ADMIN — ONDANSETRON 4 MG: 2 INJECTION INTRAMUSCULAR; INTRAVENOUS at 10:04

## 2018-04-10 RX ADMIN — OXYCODONE HYDROCHLORIDE 15 MG: 5 TABLET ORAL at 06:04

## 2018-04-10 RX ADMIN — PROPOFOL 150 MG: 10 INJECTION, EMULSION INTRAVENOUS at 07:04

## 2018-04-10 RX ADMIN — KETAMINE HYDROCHLORIDE 30 MG: 100 INJECTION, SOLUTION, CONCENTRATE INTRAMUSCULAR; INTRAVENOUS at 08:04

## 2018-04-10 RX ADMIN — SODIUM CHLORIDE 1000 ML: 0.9 INJECTION, SOLUTION INTRAVENOUS at 06:04

## 2018-04-10 RX ADMIN — ACETAMINOPHEN 1000 MG: 10 INJECTION, SOLUTION INTRAVENOUS at 08:04

## 2018-04-10 RX ADMIN — NEOSTIGMINE METHYLSULFATE 4 MG: 1 INJECTION INTRAVENOUS at 10:04

## 2018-04-10 RX ADMIN — FENTANYL CITRATE 25 MCG: 50 INJECTION, SOLUTION INTRAMUSCULAR; INTRAVENOUS at 12:04

## 2018-04-10 RX ADMIN — SODIUM CHLORIDE, SODIUM GLUCONATE, SODIUM ACETATE, POTASSIUM CHLORIDE, MAGNESIUM CHLORIDE, SODIUM PHOSPHATE, DIBASIC, AND POTASSIUM PHOSPHATE: .53; .5; .37; .037; .03; .012; .00082 INJECTION, SOLUTION INTRAVENOUS at 09:04

## 2018-04-10 RX ADMIN — OXYCODONE HYDROCHLORIDE 10 MG: 10 TABLET, FILM COATED, EXTENDED RELEASE ORAL at 08:04

## 2018-04-10 RX ADMIN — CEFTRIAXONE SODIUM 2 G: 2 INJECTION, POWDER, FOR SOLUTION INTRAMUSCULAR; INTRAVENOUS at 03:04

## 2018-04-10 RX ADMIN — OXYCODONE HYDROCHLORIDE 10 MG: 5 TABLET ORAL at 11:04

## 2018-04-10 RX ADMIN — FENTANYL CITRATE 25 MCG: 50 INJECTION, SOLUTION INTRAMUSCULAR; INTRAVENOUS at 11:04

## 2018-04-10 RX ADMIN — FENTANYL CITRATE 50 MCG: 50 INJECTION, SOLUTION INTRAMUSCULAR; INTRAVENOUS at 09:04

## 2018-04-10 RX ADMIN — ACETAMINOPHEN 1000 MG: 10 INJECTION, SOLUTION INTRAVENOUS at 02:04

## 2018-04-10 RX ADMIN — PREGABALIN 75 MG: 25 CAPSULE ORAL at 08:04

## 2018-04-10 RX ADMIN — MIDAZOLAM HYDROCHLORIDE 2 MG: 1 INJECTION, SOLUTION INTRAMUSCULAR; INTRAVENOUS at 07:04

## 2018-04-10 RX ADMIN — FENTANYL CITRATE 50 MCG: 50 INJECTION, SOLUTION INTRAMUSCULAR; INTRAVENOUS at 11:04

## 2018-04-10 RX ADMIN — METHOCARBAMOL 750 MG: 750 TABLET ORAL at 03:04

## 2018-04-10 RX ADMIN — OXYCODONE HYDROCHLORIDE 15 MG: 5 TABLET ORAL at 10:04

## 2018-04-10 RX ADMIN — Medication 1250 MG: at 04:04

## 2018-04-10 RX ADMIN — DEXAMETHASONE SODIUM PHOSPHATE 8 MG: 4 INJECTION, SOLUTION INTRAMUSCULAR; INTRAVENOUS at 07:04

## 2018-04-10 RX ADMIN — MUPIROCIN 1 G: 20 OINTMENT TOPICAL at 08:04

## 2018-04-10 RX ADMIN — ROCURONIUM BROMIDE 40 MG: 10 INJECTION, SOLUTION INTRAVENOUS at 08:04

## 2018-04-10 RX ADMIN — SODIUM CHLORIDE, SODIUM GLUCONATE, SODIUM ACETATE, POTASSIUM CHLORIDE, MAGNESIUM CHLORIDE, SODIUM PHOSPHATE, DIBASIC, AND POTASSIUM PHOSPHATE: .53; .5; .37; .037; .03; .012; .00082 INJECTION, SOLUTION INTRAVENOUS at 08:04

## 2018-04-10 RX ADMIN — METHOCARBAMOL 750 MG: 750 TABLET ORAL at 08:04

## 2018-04-10 RX ADMIN — OXYCODONE HYDROCHLORIDE 10 MG: 10 TABLET, FILM COATED, EXTENDED RELEASE ORAL at 12:04

## 2018-04-10 RX ADMIN — FENTANYL CITRATE 100 MCG: 50 INJECTION, SOLUTION INTRAMUSCULAR; INTRAVENOUS at 07:04

## 2018-04-10 RX ADMIN — SUCCINYLCHOLINE CHLORIDE 140 MG: 20 INJECTION, SOLUTION INTRAMUSCULAR; INTRAVENOUS at 07:04

## 2018-04-10 RX ADMIN — MUPIROCIN: 20 OINTMENT TOPICAL at 06:04

## 2018-04-10 RX ADMIN — GLYCOPYRROLATE 0.4 MG: 0.2 INJECTION, SOLUTION INTRAMUSCULAR; INTRAVENOUS at 10:04

## 2018-04-10 RX ADMIN — ROCURONIUM BROMIDE 10 MG: 10 INJECTION, SOLUTION INTRAVENOUS at 07:04

## 2018-04-10 NOTE — SUBJECTIVE & OBJECTIVE
"Principal Problem:<principal problem not specified>    Principal Orthopedic Problem: L4/5 spondylolisthesis    Interval History: ROBERT overnight. Pain well controlled. Afebrile. NPO since  Midnight.     Review of patient's allergies indicates:   Allergen Reactions    Iodine Anaphylaxis, Hives, Shortness Of Breath, Itching, Swelling and Rash     Other reaction(s): Difficulty breathing  Neck swelling     Shellfish containing products Anaphylaxis, Hives, Shortness Of Breath, Itching, Swelling and Rash     Other reaction(s): Difficulty breathing  Neck swelling     Latex Swelling     Patient un sure about it       Current Facility-Administered Medications   Medication    0.9%  NaCl infusion    acetaminophen tablet 650 mg    amLODIPine tablet 5 mg    calcium carbonate 200 mg calcium (500 mg) chewable tablet 500 mg    cefTRIAXone (ROCEPHIN) 2 g in dextrose 5 % 50 mL IVPB    diphenhydrAMINE capsule 25 mg    docusate sodium capsule 100 mg    methocarbamol tablet 750 mg    morphine injection 3 mg    mupirocin 2 % ointment    ondansetron tablet 8 mg    oxyCODONE immediate release tablet 10 mg    oxyCODONE immediate release tablet 5 mg    pantoprazole EC tablet 40 mg    polyethylene glycol packet 17 g    pregabalin capsule 75 mg    promethazine tablet 12.5 mg    ramelteon tablet 8 mg    vancomycin (VANCOCIN) 1,250 mg in sodium chloride 0.9% 250 mL IVPB     Objective:     Vital Signs (Most Recent):  Temp: 96.8 °F (36 °C) (04/10/18 0300)  Pulse: 90 (04/10/18 0300)  Resp: 18 (04/10/18 0300)  BP: 120/75 (04/10/18 0300)  SpO2: (!) 64 % (04/10/18 0300) Vital Signs (24h Range):  Temp:  [96.8 °F (36 °C)-100.4 °F (38 °C)] 96.8 °F (36 °C)  Pulse:  [] 90  Resp:  [18] 18  SpO2:  [64 %-98 %] 64 %  BP: (116-152)/(59-85) 120/75     Weight: 90.7 kg (200 lb)  Height: 5' 5.5" (166.4 cm)  Body mass index is 32.78 kg/m².    No intake or output data in the 24 hours ending 04/10/18 0610    Ortho/SPM " Exam      UE:   D B T WF WE HI  R 5 5 5 5 5 5  L 5 5 5 5 5 5    SILT C5-T1  2+ Radial Pulse  Negative Zavala's  Normoreflexic Biceps, Triceps, Brachiradialis    LE:   IP Q H TA EHL GS  R 5 5 5 5 5 5  L 5 5 5 5 5 5    SILT L2-S1  2+ DP, PT pulses  Negative Clonus  Negative Babinski  Normoreflexic Knee Jerk and Achilles     Significant Labs:   CBC:     Recent Labs  Lab 04/10/18  0409   WBC 7.59   HGB 12.9   HCT 38.6        All pertinent labs within the past 24 hours have been reviewed.    Significant Imaging: None

## 2018-04-10 NOTE — OP NOTE
DATE OF PROCEDURE:  04/10/2018    SURGEON:  Dean Zayas M.D.    CO-SURGEON:  Duc Patiño M.D. of the Neurosurgery Service.    PREOPERATIVE DIAGNOSES:  L4-L5 and L5-S1, grade I spondylolisthesis with large   left-sided L4-L5 facet cyst, possible epidural abscess as well as paraspinal   muscle abscess.    POSTOPERATIVE DIAGNOSES:  L4-L5 and L5-S1, grade I spondylolisthesis with large   left-sided L4-L5 facet cyst, possible epidural abscess as well as paraspinal   muscle abscess.    PROCEDURES PERFORMED:  1.  Posterior lumbar interbody fusion, L4-L5 and L5-S1.  2.  Posterior segmental instrumentation, L4 to S1.  3.  L4 hemilaminectomy and L5 laminectomy for evacuation of intraspinal   extradural mass:  left-sided facet cyst.  4.  Application of titanium intervertebral spacer device to L4-L5 and L5-S1 disk   defects.  5 . Jesus osteotomy at the L4-L5 level.  6.  Right-sided iliac crest bone graft, nonstructural, harvested through a   separate fascial incision.    ANESTHESIA:  General endotracheal anesthesia.    ESTIMATED BLOOD LOSS:  400 mL    IMPLANTS USED:  DePuy Expedium and a size 9 titanium T-PAL cage at the L4-L5   disk space and a size 10 titanium T-PAL disk spacer at the L5-S1 level.    SPECIMENS:  Paraspinal mass sent for pathology as well as culture, as well as   epidural cultures and culture of facet cyst.    NEUROLOGICAL MONITORING:  Somatosensory-evoked potentials, free running EMG as   well as EMG stimulation of all screws.  Please note that there were no changes   to stable and reliable bilateral upper and lower extremity SSEP throughout the   entire procedure.  No significant EMG activity.  All screws stimulated at or   greater than 20 milliamperes.    REASON FOR OPERATION AND BRIEF HISTORY AND PHYSICAL:  Renae Hou is a   57-year-old female with known 2-level lumbar spondylolisthesis, a large   left-sided L4-L5 facet cyst and refractory left lower extremity radiculopathy.    The patient  recently presented after epidural injection for fevers, elevated   white blood cell count, inflammatory markers where imaging demonstrated a   potential left paraspinal muscle abscess as well as epidural extension of the   infection.  An extensive discussion was had with the patient regarding the   options for treatment.  I have recommended that we proceed forward with   operative intervention consisting of a L4 to S1 laminectomy and fusion,   decompression of her facet cyst and evacuation of any infection.  Informed   consent was thus obtained.  We decided to proceed for surgery.    DESCRIPTION OF PROCEDURE:  The patient met in the preoperative area where her   low back was marked as the operative site.  Subsequently, she was brought to the   Operating Room where general endotracheal anesthesia was induced in standard   fashion.  A Blackmon catheter was then inserted in standard sterile fashion.  The   patient was then flipped prone onto a Randall table with 4 post-pads.  The arms   were held in 90/90 position.  The head was secured on facial pillow.  All bony   prominences were padded and personally checked by me, paying special attention   to the eyes, nose, mouth, axilla, elbows, chest, hips, hands, genitalia and   knees and feet.  Being satisfied with positioning and confirming this to be   adequate with Anesthesia, the patient's lower back was prepped and draped in   normal sterile fashion.    A full timeout was then called identifying the patient, the procedural site and   levels, the availability of all instruments and implants and no specific   nursing, surgical, anesthetic or neurological monitoring concerns.  Finding it   was safe to proceed with surgery, the patient was given a weight appropriate   dose of antibiotics by the Anesthesia Service.    Next, we infiltrated the planned incision with 10 mL of 1% lidocaine with   epinephrine.    Next, we made a midline lumbar skin incision and performed a  dissection down to   the dorsal fascia.  I then performed a suprafascial dissection out to the   right-sided PSIS and made a fascial incision directly overlying it.  I then   performed a subperiosteal exposure of the right PSIS and used a Leksell rongeur   to make a small cortical window.  A straight curette was used to remove   approximately 25 mL of cancellous iliac crest bone graft.  The bone graft site   was then thoroughly irrigated, packed with Gelfoam and vancomycin and the   overlying fascia was closed with #1 Vicryl in a running fashion.  My attention   then returned to midline where we made a midline fascial incision and performed   a midline exposure of what we took to be the L4, L5 and S1 lamina; L3-L4, L4-L5   and L5-S1 facet joints and the bilateral L4, L5 and S1 transverse processes as   well as sacral ala.  Please note that a questionable left paraspinal muscle   abscess was encountered and completely excised.  At the conclusion of my   preliminary exposure, I placed a marker in what we took the left S1 pedicle,   took a lateral radiograph and thus confirmed level.  I then finalized my   exposure.  Next, I performed a standard L4-L5 and L5-S1 facetectomies with an   osteotome.  Next, I placed freehand pedicle screws using anatomic landmarks and   freehand technique into the L4, L5 and S1 pedicles bilaterally.  AP and lateral   radiographs were taken, demonstrating correct levels as adequate position of all   implants.    Next, I turned my attention to the neurological decompression portion of the   procedure.  A high speed drill was used to thin the L4 lamina to reveal the   origin of the ligamentum flavum.  An osteotome was then used to remove any   residual inferior articular process of L4 bilaterally to reveal the ligamentum   flavum.  We then released ligamentum flavum from its origin and resected it with   a Kerrison punch.  A dorsal mass was identified in the region of the left L4-L5   facet  and we left it intact for now.  We then continued our decompression from   side to side.  We then performed Jesus osteotomies in a standard fashion with an   osteotome.  Next, we performed our L4-L5 interbody from the left side by   protecting the thecal sac with a nerve root retractor and coagulating any   epidural vessels with bipolar electrocautery.  We then performed a subtotal   L4-L5 diskectomy in standard fashion.  This was then sized for a size 9 spacer.    This was then thoroughly irrigated.  Iliac crest bone graft was packed   anteriorly into the cage and a size 9 titanium T-PAL cage was then inserted in   standard fashion and confirmed to be in adequate position as well as the correct   level fluoroscopically.  Next, we continued our decompression of L5 using a   high-speed drill to perform a trough style L5 laminectomy.  Next, we entered   into the left-sided L4-L5 facet cyst.  It was very adherent to the dura.  It   contained a combination of mucinous material as well as a thick synovial type   fluid that was sent for culture.  We then thoroughly evacuated the cyst.  Next,   we performed a posterior lumbar interbody fusion at L5-S1 in a standard fashion   similar to at L4-L5, except using a size 10 titanium T-PAL spacer.  The wound   was then thoroughly irrigated with pulsatile lavage and AP and lateral   radiographs taken, demonstrating correct levels as well as adequate position of   all cages.  Rods were then measured, cut, contoured and locked into place after   gentle compression to improve lumbar lordosis.  Final radiographs taken,   demonstrating correct levels as well as adequate position of all implants.  The   wound was then thoroughly irrigated.  All bony surfaces from L4 to S1   decorticated with a high-speed drill and the patient's local bone graft mixed   with 5 mL of ViviGen and 1 gram vancomycin powder was laid dorsally along the   transverse processes from L4 to S1.  A deep drain was  then placed and the   overlying fascia was closed with #1 Vicryl in an interrupted figure-of-eight   fashion.  The superficial layer was then irrigated and closed over a drain with   2-0 Vicryl, 3-0 Monocryl, Dermabond and Steri-Strips.  Please note, we placed an   additional 500 mg of vancomycin powder into the superficial space.  A soft   dressing was applied.  The drains were secured in place with 2-0 silk suture.    The patient then flipped supine, extubated and transferred to the Recovery Room   in stable condition.      JUSTIFICATION OF COSURGEON:  This was a complex spine deformity.  The combined   efforts of two attending surgeons were indicated to help expedite surgery,   decrease blood loss and improve outcomes.

## 2018-04-10 NOTE — PLAN OF CARE
Problem: Patient Care Overview  Goal: Plan of Care Review  Outcome: Ongoing (interventions implemented as appropriate)  Patient came back from surgery at 3 pm. Patient reports no pain when pain was 10 before surgery. Patient started on IV antibiotics.

## 2018-04-10 NOTE — ASSESSMENT & PLAN NOTE
57 year old female admitted with acute on chronic back pain; MRI shows lumbar epidural abscesses; ID consulted for abx recs:  - blood culture NGTD  - MRI with abscesses noted at L4 and L5; no diskitis or osteomyelitis noted - most likely secondary to lisy  - scheduled for fusion with Dr. Zayas on 4/10  - stable non septic    Plan:  1. Continue Vanc and ceftriaxone  2. Sx in am  3. Will follow with you

## 2018-04-10 NOTE — PROGRESS NOTES
Patient in OR at time of being seen.  Chart reviewed:  Tmax 100.4 and T current 98.2  WBC WNL.   Blood cultures NGTD    Plan:  Will see in am  Continue current abx    Guero Johnson PA-C

## 2018-04-10 NOTE — PROGRESS NOTES
[facility]  Orthopedics  Progress Note    Patient Name: Renae Hou  MRN: [id number]  Admission Date: 4/7/2018  Hospital Length of Stay: 1 days  Attending Provider: Dean Zayas MD  Primary Care Provider: Panchito Lindsay MD  Follow-up For: Procedure(s) (LRB):  FUSION-POSTERIOR LUMBAR INTERBODY FUSION L4/5, possible L5/S1 Depuy SNS: SSEP/EMG 1st Case (N/A)    Post-Operative Day: Day of Surgery  Subjective:     Principal Problem:<principal problem not specified>    Principal Orthopedic Problem: L4/5 spondylolisthesis    Interval History: ROBERT overnight. Pain well controlled. Afebrile. NPO since  Midnight.     Review of patient's allergies indicates:   Allergen Reactions    Iodine Anaphylaxis, Hives, Shortness Of Breath, Itching, Swelling and Rash     Other reaction(s): Difficulty breathing  Neck swelling     Shellfish containing products Anaphylaxis, Hives, Shortness Of Breath, Itching, Swelling and Rash     Other reaction(s): Difficulty breathing  Neck swelling     Latex Swelling     Patient un sure about it       Current Facility-Administered Medications   Medication    0.9%  NaCl infusion    acetaminophen tablet 650 mg    amLODIPine tablet 5 mg    calcium carbonate 200 mg calcium (500 mg) chewable tablet 500 mg    cefTRIAXone (ROCEPHIN) 2 g in dextrose 5 % 50 mL IVPB    diphenhydrAMINE capsule 25 mg    docusate sodium capsule 100 mg    methocarbamol tablet 750 mg    morphine injection 3 mg    mupirocin 2 % ointment    ondansetron tablet 8 mg    oxyCODONE immediate release tablet 10 mg    oxyCODONE immediate release tablet 5 mg    pantoprazole EC tablet 40 mg    polyethylene glycol packet 17 g    pregabalin capsule 75 mg    promethazine tablet 12.5 mg    ramelteon tablet 8 mg    vancomycin (VANCOCIN) 1,250 mg in sodium chloride 0.9% 250 mL IVPB     Objective:     Vital Signs (Most Recent):  Temp: 96.8 °F (36 °C) (04/10/18 0300)  Pulse: 90 (04/10/18 0300)  Resp: 18  "(04/10/18 0300)  BP: 120/75 (04/10/18 0300)  SpO2: (!) 64 % (04/10/18 0300) Vital Signs (24h Range):  Temp:  [96.8 °F (36 °C)-100.4 °F (38 °C)] 96.8 °F (36 °C)  Pulse:  [] 90  Resp:  [18] 18  SpO2:  [64 %-98 %] 64 %  BP: (116-152)/(59-85) 120/75     Weight: 90.7 kg (200 lb)  Height: 5' 5.5" (166.4 cm)  Body mass index is 32.78 kg/m².    No intake or output data in the 24 hours ending 04/10/18 0610    Ortho/SPM Exam      UE:   D B T WF WE HI  R 5 5 5 5 5 5  L 5 5 5 5 5 5    SILT C5-T1  2+ Radial Pulse  Negative Zavala's  Normoreflexic Biceps, Triceps, Brachiradialis    LE:   IP Q H TA EHL GS  R 5 5 5 5 5 5  L 5 5 5 5 5 5    SILT L2-S1  2+ DP, PT pulses  Negative Clonus  Negative Babinski  Normoreflexic Knee Jerk and Achilles     Significant Labs:   CBC:     Recent Labs  Lab 04/10/18  0409   WBC 7.59   HGB 12.9   HCT 38.6        All pertinent labs within the past 24 hours have been reviewed.    Significant Imaging: None    Assessment/Plan:     Spondylolisthesis at L4-L5 level    57 y.o. female with L4-5 grade 1 spondylolisthesis and associated left sided facet cyst with acute on chronic exacerbation of back pain -- possible injection contamination     Pain control: multimodal  PT/OT: WBAT   DVT PPx: FCDs at all times when not ambulating  Continue IV abx per ID recs. Will obtain cx in OR    Dispo: to OR today for L4-5 TLIF.                 Lito Ochoa MD  Orthopedics  [facility]  "

## 2018-04-10 NOTE — ASSESSMENT & PLAN NOTE
57 y.o. female with L4-5 grade 1 spondylolisthesis and associated left sided facet cyst with acute on chronic exacerbation of back pain -- possible injection contamination     Pain control: multimodal  PT/OT: WBAT   DVT PPx: FCDs at all times when not ambulating  Continue IV abx per ID recs. Will obtain cx in OR    Dispo: to OR today for L4-5 TLIF.

## 2018-04-10 NOTE — PT/OT/SLP DISCHARGE
Physical Therapy Discharge Summary    Name: Renae Hou  MRN: [id number]   Principal Problem: <principal problem not specified>     Patient Discharged from acute Physical Therapy on 4/10/2018.  Please refer to prior PT noted date on 4/10/2018 for functional status.     Assessment:     Patient appropriate for care in another setting.    Objective:     GOALS:    Physical Therapy Goals        Problem: Physical Therapy Goal    Goal Priority Disciplines Outcome Goal Variances Interventions   Physical Therapy Goal     PT/OT, PT Ongoing (interventions implemented as appropriate)     Description:  Goals to be met by: 10 days ()    Patient will increase functional independence with mobility by performin. Supine to sit with Modified [place]  2. Sit to supine with Modified [place]  3. Sit to stand transfer with Modified [place]  4. Gait  x 200 feet with Supervision using no assistive device  5. Lower extremity exercise program x30 reps per handout, with independence to improve muscular strength and endurance.                         Reasons for Discontinuation of Therapy Services  Transfer to alternate level of care.  Pt underwent surgical procedure on 4/10    Plan:     Patient Discharged to: TBDMateus Lema, PT, DPT  4/10/2018

## 2018-04-10 NOTE — PLAN OF CARE
Problem: Physical Therapy Goal  Goal: Physical Therapy Goal  Goals to be met by: 10 days ()    Patient will increase functional independence with mobility by performin. Supine to sit with Modified [place]  2. Sit to supine with Modified [place]  3. Sit to stand transfer with Modified [place]  4. Gait  x 200 feet with Supervision using no assistive device  5. Lower extremity exercise program x30 reps per handout, with independence to improve muscular strength and endurance.        Outcome: Outcome(s) achieved Date Met: 04/10/18  Orders discontinued due to pt undergoing surgical procedure on 4/10. Will need new orders when medically appropriate.    Shayy Lema, PT, DPT  4/10/2018

## 2018-04-10 NOTE — NURSING TRANSFER
Nursing Transfer Note      4/10/2018     Transfer to 1048    Transfer via stretcher    Transfer with scd's on, ivpump, 2 hemavac drains burroughs w/urimeter, eugenio's on    Transported by PCT    Medicines sent: NS , ofirmive infusing    Chart send with patient:yes    Notified: returning to patient room 1048 after 1400 visit    Patient reassessed at:4/10/2018 1415

## 2018-04-10 NOTE — TRANSFER OF CARE
"Anesthesia Transfer of Care Note    Patient: Renae Hou    Procedure(s) Performed: Procedure(s) (LRB):  FUSION-POSTERIOR LUMBAR INTERBODY FUSION L4/5, possible L5/S1 Depuy SNS: SSEP/EMG 1st Case (N/A)    Patient location: PACU    Anesthesia Type: general    Transport from OR: Transported from OR on 6-10 L/min O2 by face mask with adequate spontaneous ventilation    Post pain: adequate analgesia    Post assessment: no apparent anesthetic complications    Post vital signs: stable    Level of consciousness: awake, alert and oriented    Nausea/Vomiting: no nausea/vomiting    Complications: none    Transfer of care protocol was followed      Last vitals:   Visit Vitals  /75 (BP Location: Right arm, Patient Position: Lying)   Pulse 90   Temp 36 °C (96.8 °F) (Oral)   Resp 18   Ht 5' 5.5" (1.664 m)   Wt 90.7 kg (200 lb)   LMP 11/25/2012   SpO2 (!) 64%   Breastfeeding? No   BMI 32.78 kg/m²     "

## 2018-04-10 NOTE — PROGRESS NOTES
"[facility]  Infectious Disease  Progress Note    Patient Name: Renae Hou  MRN: [id number]  Admission Date: 4/7/2018  Length of Stay: 0 days  Attending Physician: Dean Zayas MD  Primary Care Provider: Panchito Lindsay MD    Isolation Status: No active isolations  Assessment/Plan:      Epidural abscess    57 year old female admitted with acute on chronic back pain; MRI shows lumbar epidural abscesses; ID consulted for abx recs:  - blood culture NGTD  - MRI with abscesses noted at L4 and L5; no diskitis or osteomyelitis noted - most likely secondary to lisy  - scheduled for fusion with Dr. Zayas on 4/10  - stable non septic    Plan:  1. Continue Vanc and ceftriaxone  2. Sx in am  3. Will follow with you            Anticipated Disposition: tbd    Thank you for your consult. I will follow-up with patient. Please contact us if you have any additional questions.    COY Clement  Infectious Disease  [facility]    Subjective:     Principal Problem:<principal problem not specified>    HPI: Renae Hou is a 56 yo female with PMH of HTN, HLD presenting with acute on chronic exacerbation of low back pain and left leg pain. The pain has been worsening since Tuesday.  There is associated numbness and tingling to left lower extremity. There is subjective weakness.  Had a ESIs on 3/2/18 and 3/16/18. Did not take her temperature at home but felt "warm". Denies bowel or bladder problems. Has some weakness to left leg but no numbness.  MRI obtained and shows 1.0 x 0.4 x 0.8 cm rim enhancing fluid collection suggestive of paraspinous abscess at L5 with another 7 mm rim enhancing abscess L4-5. Patient is afebrile; WBC count is 14,000. Blood cultures are pending- NGTD. Was given vanc and rocephin in the ER. Currently not on antibiotics; ID consulted for recs.  Interval History: No AEON.  Afebrile. Back pain improved and controlled.  The patient denies any recent fever, " chills, or sweats.      Review of Systems   Constitutional: Negative for activity change, chills, diaphoresis and fever.   Respiratory: Negative for cough, shortness of breath and wheezing.    Cardiovascular: Negative for chest pain.   Gastrointestinal: Negative for abdominal pain, constipation, diarrhea, nausea and vomiting.   Genitourinary: Negative for dysuria, frequency and urgency.   Musculoskeletal: Positive for back pain.   Neurological: Negative for dizziness.   Hematological: Does not bruise/bleed easily.     Objective:     Vital Signs (Most Recent):  Temp: 98.6 °F (37 °C) (04/09/18 0730)  Pulse: 93 (04/09/18 0730)  Resp: 18 (04/09/18 0730)  BP: 133/78 (04/09/18 0730)  SpO2: 95 % (04/09/18 0730) Vital Signs (24h Range):  Temp:  [96.8 °F (36 °C)-98.7 °F (37.1 °C)] 98.6 °F (37 °C)  Pulse:  [85-96] 93  Resp:  [16-18] 18  SpO2:  [94 %-98 %] 95 %  BP: (123-155)/(74-87) 133/78     Weight: 90.7 kg (200 lb)  Body mass index is 32.78 kg/m².    Estimated Creatinine Clearance: 77.5 mL/min (based on SCr of 0.9 mg/dL).    Physical Exam   Constitutional: She is oriented to person, place, and time. She appears well-developed and well-nourished. No distress.   HENT:   Head: Normocephalic and atraumatic.   Mouth/Throat: Mucous membranes are normal.   Eyes: Conjunctivae and EOM are normal. No scleral icterus.   Neck: Normal range of motion.   Cardiovascular: Normal rate, regular rhythm and normal heart sounds.  Exam reveals no gallop and no friction rub.    No murmur heard.  Pulmonary/Chest: Effort normal and breath sounds normal. No respiratory distress. She has no wheezes. She has no rales.   Abdominal: Soft. Bowel sounds are normal. She exhibits no distension and no mass. There is no hepatosplenomegaly. There is no tenderness. There is no rebound and no guarding.   Musculoskeletal: She exhibits no edema.   Pain with ROM of left hip  TTP lumbar spine   Neurological: She is alert and oriented to person, place, and time. She  has normal strength. No cranial nerve deficit or sensory deficit.   Skin: Skin is warm, dry and intact. No rash noted.   Psychiatric: She has a normal mood and affect.       Significant Labs:   Blood Culture:   Recent Labs  Lab 04/08/18 0314   LABBLOO No Growth to date  No Growth to date  No Growth to date  No Growth to date     CBC:   Recent Labs  Lab 04/07/18  2349   WBC 14.95*   HGB 14.6   HCT 42.8        CMP:   Recent Labs  Lab 04/07/18  2349      K 3.9      CO2 25   *   BUN 12   CREATININE 0.9   CALCIUM 9.5   PROT 7.6   ALBUMIN 3.7   BILITOT 1.0   ALKPHOS 143*   AST 60*   ALT 62*   ANIONGAP 11   EGFRNONAA >60.0     Wound Culture: No results for input(s): LABAERO in the last 4320 hours.  All pertinent labs within the past 24 hours have been reviewed.    Significant Imaging: I have reviewed all pertinent imaging results/findings within the past 24 hours.   X-Ray Lumbar Spine Ap And Lateral [[id number]] Resulted: 04/08/18 0520   Order Status: Completed Updated: 04/08/18 0523   Narrative:     EXAMINATION:  XR LUMBAR SPINE AP AND LATERAL    CLINICAL HISTORY:  low back pain, standing films;    TECHNIQUE:  AP, lateral and spot images were performed of the lumbar spine.    COMPARISON:  There is    FINDINGS:  Mild grade 1 anterolisthesis of L4 on L5 and L5 on S1 with mild disc space narrowing, discogenic change and facet arthropathy at these levels.  There is no evidence to suggest acute fracture..  Cholecystostomy clips present in the right upper quadrant.  Nonobstructive bowel gas pattern.  No free air or portal venous gas.   Impression:       No evidence of acute fracture.  Grade 1 anterolisthesis of L4 on L5 and L5 on S1 with associated facet arthropathy.    Electronically signed by resident: Patrick Dixon  Date: 04/08/2018  Time: 05:08    Electronically signed by: Cara Bustillo MD  Date: 04/08/2018  Time: 05:20   MRI Lumbar Spine W WO Cont [[id number]] Resulted: 04/08/18 0315   Order  Status: Completed Updated: 04/08/18 0318   Narrative:     EXAMINATION:  MRI LUMBAR SPINE W WO CONTRAST    CLINICAL HISTORY:  Low back pain, >6wks conservative tx, persistent-progressive sx, surgical candidate;Fever and Lumbar LBP, no other infectious symptoms. R/O discitis, epidural abscess, osteomyelitis; Schedule for L4/5 fusion on 4/10/18; Low back pain    TECHNIQUE:  Multiplanar, multisequence MR images were acquired from the thoracolumbar junction to the sacrum without and with 10 mL Gadavist intravenous contrast.    COMPARISON:  MRI lumbar spine without contrast 02/09/2018    FINDINGS:  Alignment: There is stepwise anterolisthesis of L4 on L5 and L5 on S1.  Lumbar spine demonstrates preservation of the normal lumbar lordosis.    Vertebrae: Vertebral body heights are maintained without marrow signal abnormality to suggest acute fracture or infiltrative marrow replacement process.    Discs: There is desiccation of the L4-5 and L5-S1 disc space with mild disc space height loss.  Remaining intervertebral disc spaces are well preserved.    Cord: Visualized conus and lumbar spinal nerve roots are normal in signal.  Spinal cord terminates at the L1 vertebral level.    Degenerative findings:    L1-L2: Mild bilateral facet hypertrophy without significant spinal canal stenosis or neural foraminal narrowing.    L2-L3: Mild bilateral facet hypertrophy without significant spinal canal stenosis or neural foraminal narrowing.    L3-L4: Mild bilateral facet hypertrophy and circumferential disc bulge results in mild central spinal canal stenosis without significant neural foraminal narrowing.    L4-L5: Grade 1 anterolisthesis along with moderate bilateral facet hypertrophy, circumferential disc bulge results in severe central spinal canal stenosis as well as mild bilateral neural foraminal narrowing    L5-S1: Grade 1 anterolisthesis along with severe bilateral facet hypertrophy and circumferential disc bulge results in  mild-to-moderate spinal canal stenosis along with mild left and moderate right neural foraminal narrowing.    Paraspinal muscles & soft tissues: There is STIR signal abnormality throughout the paraspinal soft tissues with a small rim enhancing fluid collection in the left posterior paraspinal musculature measuring approximately 1.0 x 0.4 cm x 0.8 (axial series 11, image 34) at the level of L5.  Additionally, there is extension of enhancing material into the spinal canal at the L4-5 vertebral level with phlegmon throughout both the dorsal and ventral epidural space and a small epidural fluid collection in the left dorsal lateral epidural space measuring approximately 0.7 cm (axial series 3, image 35).  No evidence for spondylodiscitis.   Impression:       No MR evidence of discitis osteomyelitis.    Marked inflammatory changes of the posterior paraspinal soft tissues with a small 1.0 x 0.4 x 0.8 cm rim enhancing collection in the left posterior paraspinous musculature at the level of L5, suggestive of small posterior paraspinous abscess.    Extension of the inflammatory changes into the thecal sac with small rim enhancing 7 mm abscess in the dorsal epidural space at the L4-L5 level.    Marked inflammatory changes and abnormal enhancement in the dorsal and anterior epidural space at the L4 and L5 levels, suggestive of associated phlegmonous changes in the intraspinal regions.    Multilevel spondylosis of the lumbar spine with greatest level of involvement at L4-5 resulting in severe central spinal canal stenosis, grossly similar to prior examination dated 02/09/2018.    COMMUNICATION  This critical result was discovered/received at 02:40. The critical information above was relayed directly to Delmer EUBANKS on 4/08/2018 at 02:58.    Electronically signed by resident: Patrick Dixon  Date: 04/08/2018  Time: 01:58    Electronically signed by: Pavel Ingram MD  Date: 04/08/2018  Time: 03:15   X-Ray Chest AP Portable [[id number]]  Resulted: 04/08/18 0213   Order Status: Completed Updated: 04/08/18 0216   Narrative:     EXAMINATION:  XR CHEST AP PORTABLE    CLINICAL HISTORY:  Fever, unspecified    TECHNIQUE:  Single frontal view of the chest was performed.    COMPARISON:  10/24/2014.    FINDINGS:  Cardiac silhouette is not enlarged.  No focal consolidation.  No sizable pleural effusion.  No pneumothorax.  No detrimental change.   Impression:       No acute finding or detrimental change.      Electronically signed by: Joaquin Daniel MD  Date: 04/08/2018  Time: 02:13

## 2018-04-11 LAB
ANION GAP SERPL CALC-SCNC: 9 MMOL/L
BUN SERPL-MCNC: 7 MG/DL
CALCIUM SERPL-MCNC: 8.5 MG/DL
CHLORIDE SERPL-SCNC: 106 MMOL/L
CO2 SERPL-SCNC: 23 MMOL/L
CREAT SERPL-MCNC: 0.7 MG/DL
EST. GFR  (AFRICAN AMERICAN): >60 ML/MIN/1.73 M^2
EST. GFR  (NON AFRICAN AMERICAN): >60 ML/MIN/1.73 M^2
GLUCOSE SERPL-MCNC: 98 MG/DL
HCT VFR BLD AUTO: 32 %
HGB BLD-MCNC: 10.7 G/DL
POTASSIUM SERPL-SCNC: 3.6 MMOL/L
SODIUM SERPL-SCNC: 138 MMOL/L

## 2018-04-11 PROCEDURE — 25000003 PHARM REV CODE 250: Performed by: ORTHOPAEDIC SURGERY

## 2018-04-11 PROCEDURE — 63600175 PHARM REV CODE 636 W HCPCS: Performed by: ORTHOPAEDIC SURGERY

## 2018-04-11 PROCEDURE — 25000003 PHARM REV CODE 250: Performed by: INTERNAL MEDICINE

## 2018-04-11 PROCEDURE — 97168 OT RE-EVAL EST PLAN CARE: CPT

## 2018-04-11 PROCEDURE — 85014 HEMATOCRIT: CPT

## 2018-04-11 PROCEDURE — 97165 OT EVAL LOW COMPLEX 30 MIN: CPT

## 2018-04-11 PROCEDURE — 36415 COLL VENOUS BLD VENIPUNCTURE: CPT

## 2018-04-11 PROCEDURE — 94799 UNLISTED PULMONARY SVC/PX: CPT

## 2018-04-11 PROCEDURE — 85018 HEMOGLOBIN: CPT

## 2018-04-11 PROCEDURE — 80048 BASIC METABOLIC PNL TOTAL CA: CPT

## 2018-04-11 PROCEDURE — 25000003 PHARM REV CODE 250: Performed by: STUDENT IN AN ORGANIZED HEALTH CARE EDUCATION/TRAINING PROGRAM

## 2018-04-11 PROCEDURE — 20600001 HC STEP DOWN PRIVATE ROOM

## 2018-04-11 PROCEDURE — 97530 THERAPEUTIC ACTIVITIES: CPT

## 2018-04-11 PROCEDURE — 63600175 PHARM REV CODE 636 W HCPCS: Performed by: INTERNAL MEDICINE

## 2018-04-11 RX ORDER — ACETAMINOPHEN 325 MG/1
650 TABLET ORAL EVERY 6 HOURS PRN
Status: DISCONTINUED | OUTPATIENT
Start: 2018-04-11 | End: 2018-04-16 | Stop reason: HOSPADM

## 2018-04-11 RX ADMIN — CEFTRIAXONE SODIUM 2 G: 2 INJECTION, POWDER, FOR SOLUTION INTRAMUSCULAR; INTRAVENOUS at 02:04

## 2018-04-11 RX ADMIN — OXYCODONE HYDROCHLORIDE 15 MG: 5 TABLET ORAL at 04:04

## 2018-04-11 RX ADMIN — PANTOPRAZOLE SODIUM 40 MG: 40 TABLET, DELAYED RELEASE ORAL at 09:04

## 2018-04-11 RX ADMIN — METHOCARBAMOL 750 MG: 750 TABLET ORAL at 08:04

## 2018-04-11 RX ADMIN — FENTANYL CITRATE 25 MCG: 50 INJECTION, SOLUTION INTRAMUSCULAR; INTRAVENOUS at 07:04

## 2018-04-11 RX ADMIN — Medication 1250 MG: at 03:04

## 2018-04-11 RX ADMIN — OXYCODONE HYDROCHLORIDE 10 MG: 10 TABLET, FILM COATED, EXTENDED RELEASE ORAL at 08:04

## 2018-04-11 RX ADMIN — MUPIROCIN 1 G: 20 OINTMENT TOPICAL at 09:04

## 2018-04-11 RX ADMIN — OXYCODONE HYDROCHLORIDE 10 MG: 5 TABLET ORAL at 02:04

## 2018-04-11 RX ADMIN — AMLODIPINE BESYLATE 5 MG: 5 TABLET ORAL at 09:04

## 2018-04-11 RX ADMIN — ACETAMINOPHEN 650 MG: 325 TABLET ORAL at 05:04

## 2018-04-11 RX ADMIN — OXYCODONE HYDROCHLORIDE 10 MG: 10 TABLET, FILM COATED, EXTENDED RELEASE ORAL at 09:04

## 2018-04-11 RX ADMIN — PREGABALIN 75 MG: 25 CAPSULE ORAL at 08:04

## 2018-04-11 RX ADMIN — ONDANSETRON 8 MG: 4 TABLET, FILM COATED ORAL at 04:04

## 2018-04-11 RX ADMIN — METHOCARBAMOL 750 MG: 750 TABLET ORAL at 09:04

## 2018-04-11 RX ADMIN — SODIUM CHLORIDE: 0.9 INJECTION, SOLUTION INTRAVENOUS at 09:04

## 2018-04-11 RX ADMIN — METHOCARBAMOL 750 MG: 750 TABLET ORAL at 02:04

## 2018-04-11 RX ADMIN — ACETAMINOPHEN 1000 MG: 10 INJECTION, SOLUTION INTRAVENOUS at 09:04

## 2018-04-11 RX ADMIN — MUPIROCIN 1 G: 20 OINTMENT TOPICAL at 10:04

## 2018-04-11 RX ADMIN — Medication 1250 MG: at 04:04

## 2018-04-11 NOTE — PLAN OF CARE
Problem: Patient Care Overview  Goal: Plan of Care Review  Outcome: Ongoing (interventions implemented as appropriate)  Patient AAO x4, calm and cooperative. No acute events overnight. Pain medication administered for pain control as needed. No fall and injuries overnight. Antibiotics administered per MD orders. Drain output per flow sheet. Pt stable will continue to monitor.

## 2018-04-11 NOTE — PROGRESS NOTES
[facility]  Orthopedics  Progress Note    Patient Name: Renae Hou  MRN: [id number]  Admission Date: 4/7/2018  Hospital Length of Stay: 2 days  Attending Provider: Dean Zayas MD  Primary Care Provider: Panchito Lindsay MD  Follow-up For: Procedure(s) (LRB):  FUSION-POSTERIOR LUMBAR INTERBODY FUSION L4/5, L5/S1 (N/A)    Post-Operative Day: 1 Day Post-Op  Subjective:     Principal Problem:Spondylolisthesis at L4-L5 level    Principal Orthopedic Problem: L4/5 spondylolisthesis    Interval History: ROBERT overnight. Pain 9/10 this AM but hasnt gotten IV breakthrough meds. Afebrile. Some numbness in her left heel but this is consistent with baseline     Review of patient's allergies indicates:   Allergen Reactions    Iodine Anaphylaxis, Hives, Shortness Of Breath, Itching, Swelling and Rash     Other reaction(s): Difficulty breathing  Neck swelling     Shellfish containing products Anaphylaxis, Hives, Shortness Of Breath, Itching, Swelling and Rash     Other reaction(s): Difficulty breathing  Neck swelling     Latex Swelling     Patient un sure about it       Current Facility-Administered Medications   Medication    0.9%  NaCl infusion    acetaminophen (10 mg/mL) injection 1,000 mg    aluminum-magnesium hydroxide-simethicone 200-200-20 mg/5 mL suspension 30 mL    amLODIPine tablet 5 mg    calcium carbonate 200 mg calcium (500 mg) chewable tablet 500 mg    cefTRIAXone (ROCEPHIN) 2 g in dextrose 5 % 50 mL IVPB    diphenhydrAMINE capsule 25 mg    fentaNYL injection 25 mcg    methocarbamol tablet 750 mg    mupirocin 2 % ointment 1 g    ondansetron tablet 8 mg    oxyCODONE 12 hr tablet 10 mg    oxyCODONE immediate release tablet 10 mg    oxyCODONE immediate release tablet 15 mg    oxyCODONE immediate release tablet 5 mg    pantoprazole EC tablet 40 mg    polyethylene glycol packet 17 g    pregabalin capsule 75 mg    promethazine tablet 12.5 mg    ramelteon tablet 8 mg     "vancomycin (VANCOCIN) 1,250 mg in sodium chloride 0.9% 250 mL IVPB     Objective:     Vital Signs (Most Recent):  Temp: 99.3 °F (37.4 °C) (04/11/18 0508)  Pulse: 108 (04/11/18 0508)  Resp: 18 (04/11/18 0508)  BP: (!) 141/78 (04/11/18 0508)  SpO2: 95 % (04/11/18 0508) Vital Signs (24h Range):  Temp:  [97.9 °F (36.6 °C)-99.3 °F (37.4 °C)] 99.3 °F (37.4 °C)  Pulse:  [] 108  Resp:  [15-20] 18  SpO2:  [91 %-99 %] 95 %  BP: (121-177)/() 141/78     Weight: 90.7 kg (200 lb)  Height: 5' 5" (165.1 cm)  Body mass index is 33.28 kg/m².      Intake/Output Summary (Last 24 hours) at 04/11/18 0757  Last data filed at 04/11/18 0517   Gross per 24 hour   Intake             3325 ml   Output             3655 ml   Net             -330 ml       Ortho/SPM Exam      UE:   D B T WF WE HI  R 5 5 5 5 5 5  L 5 5 5 5 5 5    SILT C5-T1  2+ Radial Pulse  Negative Zavala's  Normoreflexic Biceps, Triceps, Brachiradialis    LE:   IP Q H TA EHL GS  R 5 5 5 5 5 5  L 5 5 5 5 5 5    SILT L2-S1  2+ DP, PT pulses  Negative Clonus  Negative Babinski  Normoreflexic Knee Jerk and Achilles     Significant Labs:   CBC:     Recent Labs  Lab 04/10/18  0409 04/10/18  1142 04/11/18  0448   WBC 7.59  --   --    HGB 12.9 11.7* 10.7*   HCT 38.6 33.8* 32.0*     --   --      All pertinent labs within the past 24 hours have been reviewed.    Significant Imaging: None    Assessment/Plan:     * Spondylolisthesis at L4-L5 level    57 y.o. female POD1 L4-S1 TLIF, possible infected facet     Pain control: multimodal  PT/OT: WBAT   DVT PPx: FCDs at all times when not ambulating  Continue IV abx per ID recs. FU OR cultures.   Hb 11 this AM  Drains to stay in until Friday    Dispo: home possibly friday                Lito Ochoa MD  Orthopedics  [facility]  "

## 2018-04-11 NOTE — ASSESSMENT & PLAN NOTE
57 y.o. female POD1 L4-S1 TLIF, possible infected facet     Pain control: multimodal  PT/OT: WBAT   DVT PPx: FCDs at all times when not ambulating  Continue IV abx per ID recs. FU OR cultures.   Hb 11 this AM  Drains to stay in until Friday    Dispo: home possibly friday

## 2018-04-11 NOTE — PT/OT/SLP DISCHARGE
Occupational Therapy Discharge Summary    Renae Hou  MRN: [id number]   Principal Problem: Spondylolisthesis at L4-L5 level      Patient Discharged from acute Occupational Therapy on 4/10/18.  Please refer to prior OT note dated 4/8/18 for functional status.    Assessment:      Patient has not met goals.    Objective:     GOALS:    Occupational Therapy Goals        Problem: Occupational Therapy Goal    Goal Priority Disciplines Outcome Interventions   Occupational Therapy Goal     OT, PT/OT Ongoing (interventions implemented as appropriate)    Description:  Goals to be met by: 4/22/18     Patient will increase functional independence with ADLs by performing:    UE Dressing with [place].  LE Dressing with [place].  Grooming while standing at sink with Set-up Assistance.  Toileting from toilet with [place] for hygiene and clothing management.   Toilet transfer to toilet with [place].  Ambulation for household distance with independence (no use of hallway rail)                      Reasons for Discontinuation of Therapy Services  Pt had sx on 4/10.      Plan:     Patient Discharged to: Surgery    MARIETTA Marino  4/11/2018

## 2018-04-11 NOTE — ASSESSMENT & PLAN NOTE
57 year old female admitted with acute on chronic back pain; MRI shows lumbar epidural abscesses; ID consulted for abx recs:  - blood culture NGTD  - MRI with abscesses noted at L4 and L5; no diskitis or osteomyelitis noted - most likely secondary to lisy  - s/p fusion with Dr. Zayas on 4/10 - possible abscess found  - OR cultures nGTD  - stable non septic    Plan:  1. Continue Vanc and ceftriaxone - vanc trough goal 15-20, trough prior to 4th dose  2. FU cultures  3. Will follow with you

## 2018-04-11 NOTE — PLAN OF CARE
Problem: Fall Risk (Adult)  Intervention: Safety Precautions  Remains free of falls       Problem: Patient Care Overview  Goal: Plan of Care Review  Outcome: Ongoing (interventions implemented as appropriate)  Pt still has drains in with bloody output, iv antibiotics, pt was advanced from clear liquids to regular diet, voided, still pending Bm, +gas

## 2018-04-11 NOTE — PROGRESS NOTES
"[facility]  Infectious Disease  Progress Note    Patient Name: Renae Huo  MRN: [id number]  Admission Date: 4/7/2018  Length of Stay: 2 days  Attending Physician: Dean Zayas MD  Primary Care Provider: Panchito Lindsay MD    Isolation Status: No active isolations  Assessment/Plan:      Epidural abscess    57 year old female admitted with acute on chronic back pain; MRI shows lumbar epidural abscesses; ID consulted for abx recs:  - blood culture NGTD  - MRI with abscesses noted at L4 and L5; no diskitis or osteomyelitis noted - most likely secondary to lisy  - s/p fusion with Dr. Zayas on 4/10 - possible abscess found  - OR cultures nGTD  - stable non septic    Plan:  1. Continue Vanc and ceftriaxone - vanc trough goal 15-20, trough prior to 4th dose  2. FU cultures  3. Will follow with you            Anticipated Disposition: tbd    Thank you for your consult. I will follow-up with patient. Please contact us if you have any additional questions.    COY Clement  Infectious Disease  [facility]    Subjective:     Principal Problem:Spondylolisthesis at L4-L5 level    HPI: Renae Hou is a 58 yo female with PMH of HTN, HLD presenting with acute on chronic exacerbation of low back pain and left leg pain. The pain has been worsening since Tuesday.  There is associated numbness and tingling to left lower extremity. There is subjective weakness.  Had a ESIs on 3/2/18 and 3/16/18. Did not take her temperature at home but felt "warm". Denies bowel or bladder problems. Has some weakness to left leg but no numbness.  MRI obtained and shows 1.0 x 0.4 x 0.8 cm rim enhancing fluid collection suggestive of paraspinous abscess at L5 with another 7 mm rim enhancing abscess L4-5. Patient is afebrile; WBC count is 14,000. Blood cultures are pending- NGTD. Was given vanc and rocephin in the ER. Currently not on antibiotics; ID consulted for recs.  Interval History: No " AEON.  Afebrile and WBC WNL.  C/O back pain but different and overall improved.  The patient denies any recent fever, chills, or sweats.       Review of Systems   Constitutional: Negative for activity change, chills, diaphoresis and fever.   Respiratory: Negative for cough, shortness of breath and wheezing.    Cardiovascular: Negative for chest pain.   Gastrointestinal: Negative for abdominal pain, constipation, diarrhea, nausea and vomiting.   Genitourinary: Negative for dysuria, frequency and urgency.   Musculoskeletal: Positive for back pain.   Neurological: Negative for dizziness.   Hematological: Does not bruise/bleed easily.     Objective:     Vital Signs (Most Recent):  Temp: (!) 101.3 °F (38.5 °C) (04/11/18 1748)  Pulse: 108 (04/11/18 1515)  Resp: 18 (04/11/18 1515)  BP: 125/73 (04/11/18 1515)  SpO2: (!) 94 % (04/11/18 1515) Vital Signs (24h Range):  Temp:  [98.7 °F (37.1 °C)-101.3 °F (38.5 °C)] 101.3 °F (38.5 °C)  Pulse:  [105-109] 108  Resp:  [16-18] 18  SpO2:  [94 %-98 %] 94 %  BP: (125-177)/() 125/73     Weight: 90.7 kg (200 lb)  Body mass index is 33.28 kg/m².    Estimated Creatinine Clearance: 98.7 mL/min (based on SCr of 0.7 mg/dL).    Physical Exam   Constitutional: She is oriented to person, place, and time. She appears well-developed and well-nourished. No distress.   HENT:   Head: Normocephalic and atraumatic.   Mouth/Throat: Mucous membranes are normal.   Eyes: Conjunctivae and EOM are normal. No scleral icterus.   Neck: Normal range of motion.   Cardiovascular: Normal rate, regular rhythm and normal heart sounds.  Exam reveals no gallop and no friction rub.    No murmur heard.  Pulmonary/Chest: Effort normal and breath sounds normal. No respiratory distress. She has no wheezes. She has no rales.   Abdominal: Soft. Bowel sounds are normal. She exhibits no distension and no mass. There is no hepatosplenomegaly. There is no tenderness. There is no rebound and no guarding.   Musculoskeletal:  She exhibits no edema.   Neurological: She is alert and oriented to person, place, and time. She has normal strength. No cranial nerve deficit or sensory deficit.   Skin: Skin is warm, dry and intact. No rash noted.   Psychiatric: She has a normal mood and affect.       Significant Labs:   Blood Culture:     Recent Labs  Lab 04/08/18  0314   LABBLOO No Growth to date  No Growth to date  No Growth to date  No Growth to date  No Growth to date  No Growth to date  No Growth to date  No Growth to date     CBC:     Recent Labs  Lab 04/10/18  0409 04/10/18  1142 04/11/18  0448   WBC 7.59  --   --    HGB 12.9 11.7* 10.7*   HCT 38.6 33.8* 32.0*     --   --      CMP:     Recent Labs  Lab 04/10/18  1142 04/11/18  0448    138   K 3.5 3.6    106   CO2 24 23   * 98   BUN 8 7   CREATININE 0.7 0.7   CALCIUM 8.4* 8.5*   ANIONGAP 10 9   EGFRNONAA >60.0 >60.0     Wound Culture:     Recent Labs  Lab 04/10/18  1132 04/10/18  1137 04/10/18  1145   LABAERO No growth No growth No growth     All pertinent labs within the past 24 hours have been reviewed.    Significant Imaging: I have reviewed all pertinent imaging results/findings within the past 24 hours.

## 2018-04-11 NOTE — SUBJECTIVE & OBJECTIVE
Interval History: No AEON.  Afebrile and WBC WNL.  C/O back pain but different and overall improved.  The patient denies any recent fever, chills, or sweats.       Review of Systems   Constitutional: Negative for activity change, chills, diaphoresis and fever.   Respiratory: Negative for cough, shortness of breath and wheezing.    Cardiovascular: Negative for chest pain.   Gastrointestinal: Negative for abdominal pain, constipation, diarrhea, nausea and vomiting.   Genitourinary: Negative for dysuria, frequency and urgency.   Musculoskeletal: Positive for back pain.   Neurological: Negative for dizziness.   Hematological: Does not bruise/bleed easily.     Objective:     Vital Signs (Most Recent):  Temp: (!) 101.3 °F (38.5 °C) (04/11/18 1748)  Pulse: 108 (04/11/18 1515)  Resp: 18 (04/11/18 1515)  BP: 125/73 (04/11/18 1515)  SpO2: (!) 94 % (04/11/18 1515) Vital Signs (24h Range):  Temp:  [98.7 °F (37.1 °C)-101.3 °F (38.5 °C)] 101.3 °F (38.5 °C)  Pulse:  [105-109] 108  Resp:  [16-18] 18  SpO2:  [94 %-98 %] 94 %  BP: (125-177)/() 125/73     Weight: 90.7 kg (200 lb)  Body mass index is 33.28 kg/m².    Estimated Creatinine Clearance: 98.7 mL/min (based on SCr of 0.7 mg/dL).    Physical Exam   Constitutional: She is oriented to person, place, and time. She appears well-developed and well-nourished. No distress.   HENT:   Head: Normocephalic and atraumatic.   Mouth/Throat: Mucous membranes are normal.   Eyes: Conjunctivae and EOM are normal. No scleral icterus.   Neck: Normal range of motion.   Cardiovascular: Normal rate, regular rhythm and normal heart sounds.  Exam reveals no gallop and no friction rub.    No murmur heard.  Pulmonary/Chest: Effort normal and breath sounds normal. No respiratory distress. She has no wheezes. She has no rales.   Abdominal: Soft. Bowel sounds are normal. She exhibits no distension and no mass. There is no hepatosplenomegaly. There is no tenderness. There is no rebound and no guarding.    Musculoskeletal: She exhibits no edema.   Neurological: She is alert and oriented to person, place, and time. She has normal strength. No cranial nerve deficit or sensory deficit.   Skin: Skin is warm, dry and intact. No rash noted.   Psychiatric: She has a normal mood and affect.       Significant Labs:   Blood Culture:     Recent Labs  Lab 04/08/18  0314   LABBLOO No Growth to date  No Growth to date  No Growth to date  No Growth to date  No Growth to date  No Growth to date  No Growth to date  No Growth to date     CBC:     Recent Labs  Lab 04/10/18  0409 04/10/18  1142 04/11/18  0448   WBC 7.59  --   --    HGB 12.9 11.7* 10.7*   HCT 38.6 33.8* 32.0*     --   --      CMP:     Recent Labs  Lab 04/10/18  1142 04/11/18  0448    138   K 3.5 3.6    106   CO2 24 23   * 98   BUN 8 7   CREATININE 0.7 0.7   CALCIUM 8.4* 8.5*   ANIONGAP 10 9   EGFRNONAA >60.0 >60.0     Wound Culture:     Recent Labs  Lab 04/10/18  1132 04/10/18  1137 04/10/18  1145   LABAERO No growth No growth No growth     All pertinent labs within the past 24 hours have been reviewed.    Significant Imaging: I have reviewed all pertinent imaging results/findings within the past 24 hours.

## 2018-04-11 NOTE — PLAN OF CARE
Problem: Occupational Therapy Goal  Goal: Occupational Therapy Goal  Goals to be met by: 4/18/18    Patient will increase functional independence with ADLs by performing:    LE Dressing with Set-up Assistance.  Grooming while standing at sink with Set-up Assistance.  Toileting from toilet with Set-up Assistance for hygiene and clothing management.   Rolling to Bilateral with [place].   Supine to sit with [place].  Stand pivot transfers with Modified [place].  Toilet transfer to toilet with Modified [place].         Outcome: Ongoing (interventions implemented as appropriate)  Evaluation completed and POC established.    MARIETTA Segovia

## 2018-04-11 NOTE — SUBJECTIVE & OBJECTIVE
Principal Problem:Spondylolisthesis at L4-L5 level    Principal Orthopedic Problem: L4/5 spondylolisthesis    Interval History: ROBERT overnight. Pain 9/10 this AM but hasnt gotten IV breakthrough meds. Afebrile. Some numbness in her left heel but this is consistent with baseline     Review of patient's allergies indicates:   Allergen Reactions    Iodine Anaphylaxis, Hives, Shortness Of Breath, Itching, Swelling and Rash     Other reaction(s): Difficulty breathing  Neck swelling     Shellfish containing products Anaphylaxis, Hives, Shortness Of Breath, Itching, Swelling and Rash     Other reaction(s): Difficulty breathing  Neck swelling     Latex Swelling     Patient un sure about it       Current Facility-Administered Medications   Medication    0.9%  NaCl infusion    acetaminophen (10 mg/mL) injection 1,000 mg    aluminum-magnesium hydroxide-simethicone 200-200-20 mg/5 mL suspension 30 mL    amLODIPine tablet 5 mg    calcium carbonate 200 mg calcium (500 mg) chewable tablet 500 mg    cefTRIAXone (ROCEPHIN) 2 g in dextrose 5 % 50 mL IVPB    diphenhydrAMINE capsule 25 mg    fentaNYL injection 25 mcg    methocarbamol tablet 750 mg    mupirocin 2 % ointment 1 g    ondansetron tablet 8 mg    oxyCODONE 12 hr tablet 10 mg    oxyCODONE immediate release tablet 10 mg    oxyCODONE immediate release tablet 15 mg    oxyCODONE immediate release tablet 5 mg    pantoprazole EC tablet 40 mg    polyethylene glycol packet 17 g    pregabalin capsule 75 mg    promethazine tablet 12.5 mg    ramelteon tablet 8 mg    vancomycin (VANCOCIN) 1,250 mg in sodium chloride 0.9% 250 mL IVPB     Objective:     Vital Signs (Most Recent):  Temp: 99.3 °F (37.4 °C) (04/11/18 0508)  Pulse: 108 (04/11/18 0508)  Resp: 18 (04/11/18 0508)  BP: (!) 141/78 (04/11/18 0508)  SpO2: 95 % (04/11/18 0508) Vital Signs (24h Range):  Temp:  [97.9 °F (36.6 °C)-99.3 °F (37.4 °C)] 99.3 °F (37.4 °C)  Pulse:  [] 108  Resp:  [15-20] 18  SpO2:   "[91 %-99 %] 95 %  BP: (121-177)/() 141/78     Weight: 90.7 kg (200 lb)  Height: 5' 5" (165.1 cm)  Body mass index is 33.28 kg/m².      Intake/Output Summary (Last 24 hours) at 04/11/18 0757  Last data filed at 04/11/18 0517   Gross per 24 hour   Intake             3325 ml   Output             3655 ml   Net             -330 ml       Ortho/SPM Exam      UE:   D B T WF WE HI  R 5 5 5 5 5 5  L 5 5 5 5 5 5    SILT C5-T1  2+ Radial Pulse  Negative Zavala's  Normoreflexic Biceps, Triceps, Brachiradialis    LE:   IP Q H TA EHL GS  R 5 5 5 5 5 5  L 5 5 5 5 5 5    SILT L2-S1  2+ DP, PT pulses  Negative Clonus  Negative Babinski  Normoreflexic Knee Jerk and Achilles     Significant Labs:   CBC:     Recent Labs  Lab 04/10/18  0409 04/10/18  1142 04/11/18  0448   WBC 7.59  --   --    HGB 12.9 11.7* 10.7*   HCT 38.6 33.8* 32.0*     --   --      All pertinent labs within the past 24 hours have been reviewed.    Significant Imaging: None  "

## 2018-04-11 NOTE — OP NOTE
DATE OF SURGERY: 4/10/18    PREOPERATIVE DIAGNOSIS:  1. L4-L5 and L5-S1, grade I spondylolisthesis with large   left-sided L4-L5 facet cyst, possible epidural abscess as well as paraspinal   muscle abscess.    POSTOPERATIVE DIAGNOSIS:  Same.    PROCEDURE PERFORMED:  1. Posterior lumbar fusion, L4-S1  2. Posterior segmental fixation with pedicle screws and rods, L4-S1 (DePuy Synthes)  3. Transforaminal lumbar interbody fusion, L4-5 and L5-S1  4.  Application of titanium interbody spacer, L4-5 and L5-S1 (DePuy Synthes)  5.  L4 hemilaminectomy and L5 laminectomy for evacuation of intraspinal   extradural mass:  left-sided facet cyst.  6. Jesus osteotomy, L4-5  7.  [place] of nonstructural iliac crest autograft through a separate fascial incision  8.  Use of intraoperative fluoroscopy  9.  Use of intraoperative neuro-monitoring with stimulation  10. Vivigen and ocal  bone grafting    PRIMARY SURGEON: Dean Zayas M.D.    CO-SURGEON: Duc Patiño M.D.    ANESTHESIA: GETA    ESTIMATED BLOOD LOSS: 400mL    COMPLICATIONS: None    SPECIMENS SENT: Paraspinal mass sent for pathology and culture; cultures of epidural tissue and facet cyst    FINDINGS: None    INDICATIONS:    See. Dr. Zayas's note.    PROCEDURE:    For details about patient intubation, anesthesia induction, positioning, and localization please see Dr. Zayas's separate operative note.  My involvement in this case began at the time of the incision.    A linear incision was made from L4-S1 using a #10 blade.  Midline suprafascial dissection was carried out with Bovie electrocautery.  The right posterior superior iliac crest was then exposed and a fascial incision was made with the Bovie electrocautery over the iliac crest.  Cancellous iliac crest autograft was harvested with curettes and saved for later arthrodesis.  The cavity was irrigated with sterile normal saline and packed with Gelfoam and vancomycin powder.  A watertight closure was achieved with  a running UR6 needle.    A midline fascial incision was made with Bovie electrocautery.  Subperiosteal dissection was carried out with Bovie electrocautery and Arzola elevators to expose the posterior elements from L4-S1. During the exposure, paraspinal soft tissue suspicious for infection was resected and sent for microbiology.   A pedicle finder was placed into the presumed left S1 pedicle and confirmed on lateral x-ray.  Medial facetectomies were performed bilaterally at L4-5 and L5-S1 with the osteotome.  L4-S1 pedicle screws were placed bilaterally using freehand technique and anatomic landmarks. AP and lateral x-ray confirmed excellent position of the hardware. The screws were also stimulated with the neuro monitoring probe, and found to stimulate above a threshold of 20 milliamperes.    Attention was turned to performance of a decompressive left hemilaminectomy and left foraminotomy at L4-5 using a combination of the Leksell rongeur, Kerrison punches, and curettes. We also performed a L4-5 Jesus osteotomy in the usual fashion with the osteotome. We encountered a large compressive left sided facet cyst adherent to dura along the left lateral recess, and we carefully peeled it off the dura and decompressed it. We also cultured the contents of this cyst and sent it to microbiology. At the end of these maneuvers the thecal sac and nerve roots at L4-5 were found to be well decompressed using a [place] probe.    Attention was turned to performance of a transforaminal lumbar interbody fusion at L4-5 from a left-sided approach.  A nerve root retractor was used to retract the thecal sac medially and expose the L4-5 disc space.  Epidural veins were cauterized and divided.  An annulotomy was performed with a 15 blade.  A pituitary rongeur was used to remove disc material.  The endplates were prepared with disc desi, rasps, and curettes.  A size 9 trial was placed into the disc space and found to fit snugly.  The  previously harvested nonstructural iliac crest was packed into the disc space and a size 9 titanium cage, packed with more iliac crest, was countersunk into the disc space. AP and lateral x-ray showed excellent position of all hardware.     We then performed a full laminectomy and a TLIF at L5-S1 using identical maneuvers. At this level we used a size 10 titanium cage, packed with more iliac crest.    Titanium rods were then sized, contoured and reduced into the tulip heads. Set screws were finally tightened.  The wound was copiously irrigated with sterile normal saline and a dilute Betadine solution.  The transverse processes and sacral alae from L4-S1 were decorticated bilaterally with a high-speed drill.  The remaining iliac crest and Vivigen were placed posteriorly for arthrodesis from L4-S1. Final xrays showed excellent position of all hardware.    This concludes my involvement in this case.  For details about wound closure, drain placement, patient extubation and OR disposition please see Dr. Zayas's separate operative note.  During my involvement the patient appeared to tolerate the procedure well from a hemodynamic and neuro monitoring standpoint, and all counts were correct.    JUSTIFICATION OF COSURGEON:  This was a complex spine deformity.  The combined   efforts of two attending surgeons were indicated to help expedite surgery,   decrease blood loss and improve outcomes.

## 2018-04-12 LAB
ANION GAP SERPL CALC-SCNC: 7 MMOL/L
BASOPHILS # BLD AUTO: 0.03 K/UL
BASOPHILS NFR BLD: 0.3 %
BUN SERPL-MCNC: 5 MG/DL
CALCIUM SERPL-MCNC: 8.4 MG/DL
CHLORIDE SERPL-SCNC: 100 MMOL/L
CO2 SERPL-SCNC: 29 MMOL/L
CREAT SERPL-MCNC: 0.7 MG/DL
DIFFERENTIAL METHOD: ABNORMAL
EOSINOPHIL # BLD AUTO: 0.2 K/UL
EOSINOPHIL NFR BLD: 1.4 %
ERYTHROCYTE [DISTWIDTH] IN BLOOD BY AUTOMATED COUNT: 13.3 %
EST. GFR  (AFRICAN AMERICAN): >60 ML/MIN/1.73 M^2
EST. GFR  (NON AFRICAN AMERICAN): >60 ML/MIN/1.73 M^2
GLUCOSE SERPL-MCNC: 115 MG/DL
HCT VFR BLD AUTO: 30.1 %
HGB BLD-MCNC: 10 G/DL
IMM GRANULOCYTES # BLD AUTO: 0.06 K/UL
IMM GRANULOCYTES NFR BLD AUTO: 0.6 %
LYMPHOCYTES # BLD AUTO: 1.5 K/UL
LYMPHOCYTES NFR BLD: 14.5 %
MCH RBC QN AUTO: 29.7 PG
MCHC RBC AUTO-ENTMCNC: 33.2 G/DL
MCV RBC AUTO: 89 FL
MONOCYTES # BLD AUTO: 0.7 K/UL
MONOCYTES NFR BLD: 6.8 %
NEUTROPHILS # BLD AUTO: 8 K/UL
NEUTROPHILS NFR BLD: 76.4 %
NRBC BLD-RTO: 0 /100 WBC
PLATELET # BLD AUTO: 312 K/UL
PMV BLD AUTO: 8.8 FL
POTASSIUM SERPL-SCNC: 3.4 MMOL/L
RBC # BLD AUTO: 3.37 M/UL
SODIUM SERPL-SCNC: 136 MMOL/L
VANCOMYCIN TROUGH SERPL-MCNC: 7.6 UG/ML
WBC # BLD AUTO: 10.5 K/UL

## 2018-04-12 PROCEDURE — 25000003 PHARM REV CODE 250: Performed by: STUDENT IN AN ORGANIZED HEALTH CARE EDUCATION/TRAINING PROGRAM

## 2018-04-12 PROCEDURE — 99233 SBSQ HOSP IP/OBS HIGH 50: CPT | Mod: ,,, | Performed by: NURSE PRACTITIONER

## 2018-04-12 PROCEDURE — 63600175 PHARM REV CODE 636 W HCPCS: Performed by: INTERNAL MEDICINE

## 2018-04-12 PROCEDURE — 85025 COMPLETE CBC W/AUTO DIFF WBC: CPT

## 2018-04-12 PROCEDURE — 97530 THERAPEUTIC ACTIVITIES: CPT

## 2018-04-12 PROCEDURE — 20600001 HC STEP DOWN PRIVATE ROOM

## 2018-04-12 PROCEDURE — 63600175 PHARM REV CODE 636 W HCPCS: Performed by: ORTHOPAEDIC SURGERY

## 2018-04-12 PROCEDURE — 80202 ASSAY OF VANCOMYCIN: CPT

## 2018-04-12 PROCEDURE — 25000003 PHARM REV CODE 250: Performed by: ORTHOPAEDIC SURGERY

## 2018-04-12 PROCEDURE — 80048 BASIC METABOLIC PNL TOTAL CA: CPT

## 2018-04-12 PROCEDURE — 25000003 PHARM REV CODE 250: Performed by: INTERNAL MEDICINE

## 2018-04-12 PROCEDURE — 36415 COLL VENOUS BLD VENIPUNCTURE: CPT

## 2018-04-12 PROCEDURE — 97164 PT RE-EVAL EST PLAN CARE: CPT

## 2018-04-12 RX ORDER — HEPARIN SODIUM 5000 [USP'U]/ML
5000 INJECTION, SOLUTION INTRAVENOUS; SUBCUTANEOUS EVERY 8 HOURS
Status: DISCONTINUED | OUTPATIENT
Start: 2018-04-12 | End: 2018-04-16 | Stop reason: HOSPADM

## 2018-04-12 RX ADMIN — HEPARIN SODIUM 5000 UNITS: 5000 INJECTION, SOLUTION INTRAVENOUS; SUBCUTANEOUS at 02:04

## 2018-04-12 RX ADMIN — ACETAMINOPHEN 650 MG: 325 TABLET ORAL at 11:04

## 2018-04-12 RX ADMIN — POLYETHYLENE GLYCOL 3350 17 G: 17 POWDER, FOR SOLUTION ORAL at 03:04

## 2018-04-12 RX ADMIN — Medication 1250 MG: at 05:04

## 2018-04-12 RX ADMIN — METHOCARBAMOL 750 MG: 750 TABLET ORAL at 02:04

## 2018-04-12 RX ADMIN — ONDANSETRON 8 MG: 4 TABLET, FILM COATED ORAL at 06:04

## 2018-04-12 RX ADMIN — MUPIROCIN 1 G: 20 OINTMENT TOPICAL at 10:04

## 2018-04-12 RX ADMIN — PREGABALIN 75 MG: 25 CAPSULE ORAL at 09:04

## 2018-04-12 RX ADMIN — Medication 1250 MG: at 03:04

## 2018-04-12 RX ADMIN — METHOCARBAMOL 750 MG: 750 TABLET ORAL at 09:04

## 2018-04-12 RX ADMIN — PANTOPRAZOLE SODIUM 40 MG: 40 TABLET, DELAYED RELEASE ORAL at 09:04

## 2018-04-12 RX ADMIN — CEFTRIAXONE SODIUM 2 G: 2 INJECTION, POWDER, FOR SOLUTION INTRAMUSCULAR; INTRAVENOUS at 04:04

## 2018-04-12 RX ADMIN — PROMETHAZINE HYDROCHLORIDE 12.5 MG: 12.5 TABLET ORAL at 10:04

## 2018-04-12 RX ADMIN — OXYCODONE HYDROCHLORIDE 5 MG: 5 TABLET ORAL at 06:04

## 2018-04-12 RX ADMIN — AMLODIPINE BESYLATE 5 MG: 5 TABLET ORAL at 09:04

## 2018-04-12 RX ADMIN — ACETAMINOPHEN 650 MG: 325 TABLET ORAL at 08:04

## 2018-04-12 RX ADMIN — OXYCODONE HYDROCHLORIDE 5 MG: 5 TABLET ORAL at 03:04

## 2018-04-12 NOTE — SUBJECTIVE & OBJECTIVE
Principal Problem:Spondylolisthesis at L4-L5 level    Principal Orthopedic Problem: L4/5 spondylolisthesis    Interval History: ROBERT overnight. Febrile to 101 overnight. [place] removed yesterday. Pain controlled. Feels like she needs to have a BM. Nausea with dinner yesterday      Review of patient's allergies indicates:   Allergen Reactions    Iodine Anaphylaxis, Hives, Shortness Of Breath, Itching, Swelling and Rash     Other reaction(s): Difficulty breathing  Neck swelling     Shellfish containing products Anaphylaxis, Hives, Shortness Of Breath, Itching, Swelling and Rash     Other reaction(s): Difficulty breathing  Neck swelling     Latex Swelling     Patient un sure about it       Current Facility-Administered Medications   Medication    acetaminophen tablet 650 mg    aluminum-magnesium hydroxide-simethicone 200-200-20 mg/5 mL suspension 30 mL    amLODIPine tablet 5 mg    calcium carbonate 200 mg calcium (500 mg) chewable tablet 500 mg    cefTRIAXone (ROCEPHIN) 2 g in dextrose 5 % 50 mL IVPB    diphenhydrAMINE capsule 25 mg    fentaNYL injection 25 mcg    methocarbamol tablet 750 mg    mupirocin 2 % ointment 1 g    ondansetron tablet 8 mg    oxyCODONE immediate release tablet 10 mg    oxyCODONE immediate release tablet 15 mg    oxyCODONE immediate release tablet 5 mg    pantoprazole EC tablet 40 mg    polyethylene glycol packet 17 g    pregabalin capsule 75 mg    promethazine tablet 12.5 mg    ramelteon tablet 8 mg    vancomycin (VANCOCIN) 1,250 mg in sodium chloride 0.9% 250 mL IVPB     Objective:     Vital Signs (Most Recent):  Temp: 99.9 °F (37.7 °C) (04/12/18 0613)  Pulse: (!) 112 (04/12/18 0430)  Resp: 18 (04/12/18 0430)  BP: 138/86 (04/12/18 0430)  SpO2: 95 % (04/12/18 0430) Vital Signs (24h Range):  Temp:  [99 °F (37.2 °C)-101.3 °F (38.5 °C)] 99.9 °F (37.7 °C)  Pulse:  [105-119] 112  Resp:  [18] 18  SpO2:  [94 %-96 %] 95 %  BP: (125-144)/(73-89) 138/86     Weight: 90.7 kg (200  "lb)  Height: 5' 5" (165.1 cm)  Body mass index is 33.28 kg/m².      Intake/Output Summary (Last 24 hours) at 04/12/18 0737  Last data filed at 04/12/18 0500   Gross per 24 hour   Intake             1700 ml   Output              585 ml   Net             1115 ml       Ortho/SPM Exam      UE:   D B T WF WE HI  R 5 5 5 5 5 5  L 5 5 5 5 5 5    SILT C5-T1  2+ Radial Pulse  Negative Zavala's  Normoreflexic Biceps, Triceps, Brachiradialis    LE:   IP Q H TA EHL GS  R 5 5 5 5 5 5  L 5 5 5 5 5 5    SILT L2-S1  2+ DP, PT pulses  Negative Clonus  Negative Babinski  Normoreflexic Knee Jerk and Achilles     Significant Labs:   CBC:     Recent Labs  Lab 04/10/18  1142 04/11/18  0448   HGB 11.7* 10.7*   HCT 33.8* 32.0*     All pertinent labs within the past 24 hours have been reviewed.    Significant Imaging: None  "

## 2018-04-12 NOTE — PT/OT/SLP RE-EVAL
"Physical Therapy Re-evaluation    Patient Name:  Renae Hou   MRN:  [id number]    Recommendations:     Discharge Recommendations:  outpatient PT   Discharge Equipment Recommendations: none   Barriers to discharge: None    Assessment:     Renae Hou is a 57 y.o. female admitted with a medical diagnosis of Spondylolisthesis at L4-L5 level.  She presents with the following impairments/functional limitations:  weakness, impaired endurance, impaired balance, gait instability, orthopedic precautions, impaired cardiopulmonary response to activity. During today's re-evaluation, pt req RW usage to ensure stability and to assist with mobility with incr pain occurring. Overall, pt is ambulatory and with improved pain management and mobility, is expected to continue improve with mobility req less assistance from RW. Appropriate for discharge to OP PT to address remaining deficits at MD discretion.    Rehab Prognosis:  Good; patient would benefit from acute skilled PT services to address these deficits and reach maximum level of function.      Recent Surgery: Procedure(s) (LRB):  FUSION-POSTERIOR LUMBAR INTERBODY FUSION L4/5, L5/S1 (N/A) 2 Days Post-Op    Plan:     During this hospitalization, patient to be seen 3 x/week to address the above listed problems via gait training, therapeutic activities, therapeutic exercises  · Plan of Care Expires:  05/12/18   Plan of Care Reviewed with: patient    Subjective     Communicated with nsg prior to session.  Patient found supine in bed upon PT entry to room, agreeable to evaluation.      Chief Complaint: discomfort in hips  Patient comments/goals: "I don't know why this hurts so much" per pt during session.   Pain/Comfort:  · Pain Rating 1: 5/10  · Location - Side 1: Bilateral  · Location 1: hip  · Pain Addressed 1: Reposition, Distraction  · Pain Rating Post-Intervention 1: 0/10    Patients cultural, spiritual, Congregational conflicts given the current situation:  "       Objective:     Patient found with: hemovac     General Precautions: Standard, fall   Orthopedic Precautions:spinal precautions   Braces: N/A       Exams:  Cognitive Exam  Patient is oriented to Person, Place, Time and Situation and follows 100% of multi-step commands    Fine Motor Coordination    -       Intact  RLE heel shin and LLE heel shin   Postural Exam Patient presented with the following abnormalities:    -       No postural abnormalities identified   Sensation    -       Intact  light/touch (B) LE   Skin Integrity/Edema     -       Skin integrity: Visible skin intact  -       Edema: Mild to (B) LE   R LE ROM WFL   R LE Strength  WFL   L LE ROM WFL   L LE Strength  WFL       Functional Mobility  Bed Mobility  Scooting: supervision  Supine to Sit: supervision      Transfers Sit to Stand:  supervision with rolling walker for 2 trials     Gait Gait Distance: 150 ft with RW  Assistance Level: contact guard assistance  Description: slowed connie in hallway with incr reliance of RW through (B) UE support. Wide base of support denoted as well.          Balance   Static Sitting contact guard assistance   Dynamic Sitting contact guard assistance   Static Standing contact guard assistance   Dynamic Standing contact guard assistance       AM-PAC 6 CLICK MOBILITY  Total Score:18       Therapeutic Activities and Exercises:    PT educated pt on the following  - role of PT  - PT POC (including frequency and duration while in hospital)  - discharge recommendation (OP PT) and equipment needs (none)  - level of assistance currently req (1 person assistance)and safety precautions with [facility] staff   All questions and concerns answered and addressed. White board updated with pertinent information. Nsg notified.       Therapist provided education regarding spinal precautions, ambulation in hospital with RW, ambulation with family, and safety with both. Educated on progression of mobility and OP PT services once discharged  from PT services. Pt verbalized agreement.     Patient left up in chair with all lines intact and call button in reach.    GOALS:    Physical Therapy Goals     Not on file          Multidisciplinary Problems (Resolved)        Problem: Physical Therapy Goal    Goal Priority Disciplines Outcome Goal Variances Interventions   Physical Therapy Goal   (Resolved)     PT/OT, PT Outcome(s) achieved     Description:  Goals to be met by: 10 days ()    Patient will increase functional independence with mobility by performin. Supine to sit with Modified [place]  2. Sit to supine with Modified [place]  3. Sit to stand transfer with Modified [place]  4. Gait  x 200 feet with Supervision using no assistive device  5. Lower extremity exercise program x30 reps per handout, with independence to improve muscular strength and endurance.                         History:     Past Medical History:   Diagnosis Date    Fatty liver     Hypertension     Menopause     Obesity     Sleep apnea     Thyroid disease     Thyroid nodules.       Past Surgical History:   Procedure Laterality Date    BACK SURGERY      L4, L5    BILATERAL SALPINGOOPHORECTOMY      CHOLECYSTECTOMY      CYST REMOVAL      [facility]     RECTOCELE REPAIR      THYROID NODULE REMOVAL         Time Tracking:     PT Received On: 18  PT Start Time: 08     PT Stop Time: 0855  PT Total Time (min): 17 min     Billable Minutes: Re-eval 8 and Therapeutic Activity 9      Shayy Lema, PT, DPT  2018

## 2018-04-12 NOTE — ANESTHESIA POSTPROCEDURE EVALUATION
"Anesthesia Post Evaluation    Patient: Renae Hou    Procedure(s) Performed: Procedure(s) (LRB):  FUSION-POSTERIOR LUMBAR INTERBODY FUSION L4/5, L5/S1 (N/A)    Final Anesthesia Type: general  Patient location during evaluation: PACU  Patient participation: Yes- Able to Participate  Level of consciousness: awake and alert  Post-procedure vital signs: reviewed and stable  Pain management: adequate  Airway patency: patent  PONV status at discharge: No PONV  Anesthetic complications: no      Cardiovascular status: stable  Respiratory status: unassisted and spontaneous ventilation  Hydration status: euvolemic  Follow-up not needed.        Visit Vitals  BP (!) 145/83 (BP Location: Left arm, Patient Position: Lying)   Pulse 108   Temp 37.6 °C (99.7 °F) (Oral)   Resp 16   Ht 5' 5" (1.651 m)   Wt 90.7 kg (200 lb)   LMP 11/25/2012   SpO2 (!) 93%   Breastfeeding? No   BMI 33.28 kg/m²       Pain/Ai Score: Pain Assessment Performed: Yes (4/12/2018  5:59 AM)  Presence of Pain: denies (4/12/2018  5:59 AM)  Pain Rating Prior to Med Admin: 5 (4/12/2018  6:17 AM)  Pain Rating Post Med Admin: 2 (4/11/2018  9:26 PM)      "

## 2018-04-12 NOTE — SUBJECTIVE & OBJECTIVE
Interval History:  POD #2 L4-S1 TLIF, evacuation of ngozi side facet cyst, resection of paraspinal soft tissue suspicious for infection.   Fevers last night and this morning.  Blackmon was removed. Denies cough, SOB, dysuria. Complains of back pain.  Mild nausea, and headache.  No vomiting diarrhea.  No congestion/sore throat. No complaints calf pain/tenderness. Complains of fatigue. She has been up ambulating.      Surgical cultures NGTD. Gram stains negative.     Review of Systems   Constitutional: Positive for fever. Negative for activity change, chills and diaphoresis.   HENT: Negative for congestion, rhinorrhea and sore throat.    Respiratory: Negative for cough, shortness of breath and wheezing.    Cardiovascular: Negative for chest pain and leg swelling.   Gastrointestinal: Positive for constipation and nausea. Negative for abdominal pain, diarrhea and vomiting.   Genitourinary: Negative for dysuria, frequency and urgency.   Musculoskeletal: Positive for back pain.   Neurological: Positive for headaches. Negative for dizziness.   Hematological: Does not bruise/bleed easily.   Psychiatric/Behavioral: Negative for agitation and behavioral problems.     Objective:     Vital Signs (Most Recent):  Temp: 99.7 °F (37.6 °C) (04/12/18 0741)  Pulse: 108 (04/12/18 0741)  Resp: 16 (04/12/18 0741)  BP: (!) 145/83 (04/12/18 0741)  SpO2: (!) 93 % (04/12/18 0741) Vital Signs (24h Range):  Temp:  [99.3 °F (37.4 °C)-101.3 °F (38.5 °C)] 99.7 °F (37.6 °C)  Pulse:  [105-119] 108  Resp:  [16-18] 16  SpO2:  [93 %-96 %] 93 %  BP: (125-145)/(73-89) 145/83     Weight: 90.7 kg (200 lb)  Body mass index is 33.28 kg/m².    Estimated Creatinine Clearance: 98.7 mL/min (based on SCr of 0.7 mg/dL).    Physical Exam   Constitutional: She is oriented to person, place, and time. She appears well-developed and well-nourished. No distress.   HENT:   Head: Normocephalic and atraumatic.   Mouth/Throat: Mucous membranes are normal.   Eyes: Conjunctivae  and EOM are normal. No scleral icterus.   Neck: Normal range of motion.   Cardiovascular: Normal rate, regular rhythm and normal heart sounds.  Exam reveals no gallop and no friction rub.    No murmur heard.  Pulmonary/Chest: Effort normal and breath sounds normal. No respiratory distress. She has no wheezes. She has no rales.   Abdominal: Soft. Bowel sounds are normal. She exhibits no distension and no mass. There is no hepatosplenomegaly. There is no tenderness. There is no rebound and no guarding.   Musculoskeletal: She exhibits no edema.   Lumbar surgical site with 2 drains intact, mild sanguinous drainage.     No lower extremity tenderness, edema, erythema    Neurological: She is alert and oriented to person, place, and time. She has normal strength. No cranial nerve deficit or sensory deficit.   Skin: Skin is warm, dry and intact. No rash noted.   Psychiatric: She has a normal mood and affect. Her behavior is normal.       Significant Labs:   Blood Culture:     Recent Labs  Lab 04/08/18  0314   LABBLOO No Growth to date  No Growth to date  No Growth to date  No Growth to date  No Growth to date  No Growth to date  No Growth to date  No Growth to date  No Growth to date  No Growth to date     CBC:     Recent Labs  Lab 04/10/18  1142 04/11/18  0448   HGB 11.7* 10.7*   HCT 33.8* 32.0*     CMP:     Recent Labs  Lab 04/10/18  1142 04/11/18  0448    138   K 3.5 3.6    106   CO2 24 23   * 98   BUN 8 7   CREATININE 0.7 0.7   CALCIUM 8.4* 8.5*   ANIONGAP 10 9   EGFRNONAA >60.0 >60.0     Wound Culture:     Recent Labs  Lab 04/10/18  1132 04/10/18  1137 04/10/18  1145   LABAERO No growth No growth No growth     All pertinent labs within the past 24 hours have been reviewed.    Significant Imaging: I have reviewed all pertinent imaging results/findings within the past 24 hours.

## 2018-04-12 NOTE — ASSESSMENT & PLAN NOTE
57 y.o. female POD2 L4-S1 TLIF, possible infected facet     Pain control: multimodal  PT/OT: WBAT   DVT PPx: FCDs at all times when not ambulating  Continue IV abx per ID recs. FU OR cultures.   Hb stable   Drains to stay in until Friday    Dispo: home possibly Friday pending ID recs. PICC line?

## 2018-04-12 NOTE — ASSESSMENT & PLAN NOTE
57 year old female admitted with acute on chronic back pain; MRI shows lumbar epidural abscesses at L4-L5. Possibly 2/2 recent epidural steroid injections?  Now POD #2 L4-S1 TLIF.  Paraspinal infected soft tissue found, possible infected facet, left cyst removed.   Surgical cultures NGTD.  Gram stain negative. Blood cultures NGTD.   Febrile to 101.3 over past 24 hours.  No leukocytosis.  Blackmon has been removed, no dysuria, no cough, no new symptoms.      Plan:  1. Continue IV Vancomycin and ceftriaxone.  Vancomycin trough low at 7.6 on 1250 q 12 hours.  Increase to 1500 q 12 hours (done)  2. Cbc, bmp today (ordered)  3. Follow up cultures.   4. Blood cultures if fevers persist > 101.  5.  Anticipate 6 weeks of IV antibiotics.   6.  Will follow up tomorrow.     Data reviewed and plan discussed with ID staff  Discussed with Primary Team

## 2018-04-12 NOTE — PROGRESS NOTES
[facility]  Orthopedics  Progress Note    Patient Name: Renae Hou  MRN: [id number]  Admission Date: 4/7/2018  Hospital Length of Stay: 3 days  Attending Provider: Dean Zayas MD  Primary Care Provider: Panchito Lindsay MD  Follow-up For: Procedure(s) (LRB):  FUSION-POSTERIOR LUMBAR INTERBODY FUSION L4/5, L5/S1 (N/A)    Post-Operative Day: 2 Days Post-Op  Subjective:     Principal Problem:Spondylolisthesis at L4-L5 level    Principal Orthopedic Problem: L4/5 spondylolisthesis    Interval History: ROBERT overnight. Febrile to 101 overnight. Blackmon removed yesterday. Pain controlled. Feels like she needs to have a BM. Nausea with dinner yesterday      Review of patient's allergies indicates:   Allergen Reactions    Iodine Anaphylaxis, Hives, Shortness Of Breath, Itching, Swelling and Rash     Other reaction(s): Difficulty breathing  Neck swelling     Shellfish containing products Anaphylaxis, Hives, Shortness Of Breath, Itching, Swelling and Rash     Other reaction(s): Difficulty breathing  Neck swelling     Latex Swelling     Patient un sure about it       Current Facility-Administered Medications   Medication    acetaminophen tablet 650 mg    aluminum-magnesium hydroxide-simethicone 200-200-20 mg/5 mL suspension 30 mL    amLODIPine tablet 5 mg    calcium carbonate 200 mg calcium (500 mg) chewable tablet 500 mg    cefTRIAXone (ROCEPHIN) 2 g in dextrose 5 % 50 mL IVPB    diphenhydrAMINE capsule 25 mg    fentaNYL injection 25 mcg    methocarbamol tablet 750 mg    mupirocin 2 % ointment 1 g    ondansetron tablet 8 mg    oxyCODONE immediate release tablet 10 mg    oxyCODONE immediate release tablet 15 mg    oxyCODONE immediate release tablet 5 mg    pantoprazole EC tablet 40 mg    polyethylene glycol packet 17 g    pregabalin capsule 75 mg    promethazine tablet 12.5 mg    ramelteon tablet 8 mg    vancomycin (VANCOCIN) 1,250 mg in sodium chloride 0.9% 250 mL IVPB  "    Objective:     Vital Signs (Most Recent):  Temp: 99.9 °F (37.7 °C) (04/12/18 0613)  Pulse: (!) 112 (04/12/18 0430)  Resp: 18 (04/12/18 0430)  BP: 138/86 (04/12/18 0430)  SpO2: 95 % (04/12/18 0430) Vital Signs (24h Range):  Temp:  [99 °F (37.2 °C)-101.3 °F (38.5 °C)] 99.9 °F (37.7 °C)  Pulse:  [105-119] 112  Resp:  [18] 18  SpO2:  [94 %-96 %] 95 %  BP: (125-144)/(73-89) 138/86     Weight: 90.7 kg (200 lb)  Height: 5' 5" (165.1 cm)  Body mass index is 33.28 kg/m².      Intake/Output Summary (Last 24 hours) at 04/12/18 0737  Last data filed at 04/12/18 0500   Gross per 24 hour   Intake             1700 ml   Output              585 ml   Net             1115 ml       Ortho/SPM Exam      UE:   D B T WF WE HI  R 5 5 5 5 5 5  L 5 5 5 5 5 5    SILT C5-T1  2+ Radial Pulse  Negative Zavala's  Normoreflexic Biceps, Triceps, Brachiradialis    LE:   IP Q H TA EHL GS  R 5 5 5 5 5 5  L 5 5 5 5 5 5    SILT L2-S1  2+ DP, PT pulses  Negative Clonus  Negative Babinski  Normoreflexic Knee Jerk and Achilles     Significant Labs:   CBC:     Recent Labs  Lab 04/10/18  1142 04/11/18  0448   HGB 11.7* 10.7*   HCT 33.8* 32.0*     All pertinent labs within the past 24 hours have been reviewed.    Significant Imaging: None    Assessment/Plan:     * Spondylolisthesis at L4-L5 level    57 y.o. female POD2 L4-S1 TLIF, possible infected facet     Pain control: multimodal  PT/OT: WBAT   DVT PPx: FCDs at all times when not ambulating  Continue IV abx per ID recs. FU OR cultures.   Hb stable   Drains to stay in until Friday    Dispo: home possibly Friday pending ID recs. PICC line?                Lito Ochoa MD  Orthopedics  [facility]  "

## 2018-04-12 NOTE — PLAN OF CARE
Problem: Patient Care Overview  Goal: Plan of Care Review  Outcome: Ongoing (interventions implemented as appropriate)  Pt remains free from falls. Assisted pt to bathroom several times, uses walker. Pain controlled. Refuses to wear teds/Scds. Inc to back clean dry and intact. Hemovac's with minimal drainage. Pt c/o constipation, Miralax given without results.

## 2018-04-12 NOTE — PLAN OF CARE
"Problem: Patient Care Overview  Goal: Plan of Care Review  Outcome: Ongoing (interventions implemented as appropriate)  Patient with new leg discomfort described by her as "extreme restlessness not really pain". Patient with low grade fever and headache resolved by tylenol. Ambulating to bathroom with walker. Drains intact but no output. Incision dressing intact and dry. Still no bowel movement and patient has poor appetite. Low grade fever and tachycardia, all other vitals WNL.       "

## 2018-04-13 LAB
BACTERIA BLD CULT: NORMAL
BACTERIA BLD CULT: NORMAL
BACTERIA SPEC AEROBE CULT: NO GROWTH
BACTERIA SPEC AEROBE CULT: NO GROWTH
BASOPHILS # BLD AUTO: 0.03 K/UL
BASOPHILS NFR BLD: 0.3 %
BILIRUB UR QL STRIP: NEGATIVE
CLARITY UR REFRACT.AUTO: CLEAR
COLOR UR AUTO: NORMAL
CRP SERPL-MCNC: 181 MG/L
DIFFERENTIAL METHOD: ABNORMAL
EOSINOPHIL # BLD AUTO: 0.3 K/UL
EOSINOPHIL NFR BLD: 3.4 %
ERYTHROCYTE [DISTWIDTH] IN BLOOD BY AUTOMATED COUNT: 13.2 %
ERYTHROCYTE [SEDIMENTATION RATE] IN BLOOD BY WESTERGREN METHOD: 98 MM/HR
GLUCOSE UR QL STRIP: NEGATIVE
HCT VFR BLD AUTO: 29 %
HGB BLD-MCNC: 9.7 G/DL
HGB UR QL STRIP: NEGATIVE
IMM GRANULOCYTES # BLD AUTO: 0.03 K/UL
IMM GRANULOCYTES NFR BLD AUTO: 0.3 %
KETONES UR QL STRIP: NEGATIVE
LEUKOCYTE ESTERASE UR QL STRIP: NEGATIVE
LYMPHOCYTES # BLD AUTO: 1.8 K/UL
LYMPHOCYTES NFR BLD: 18.5 %
MCH RBC QN AUTO: 29.7 PG
MCHC RBC AUTO-ENTMCNC: 33.4 G/DL
MCV RBC AUTO: 89 FL
MONOCYTES # BLD AUTO: 0.6 K/UL
MONOCYTES NFR BLD: 5.9 %
NEUTROPHILS # BLD AUTO: 7.1 K/UL
NEUTROPHILS NFR BLD: 71.6 %
NITRITE UR QL STRIP: NEGATIVE
NRBC BLD-RTO: 0 /100 WBC
PH UR STRIP: 8 [PH] (ref 5–8)
PLATELET # BLD AUTO: 341 K/UL
PMV BLD AUTO: 8.9 FL
PROCALCITONIN SERPL IA-MCNC: 0.14 NG/ML
PROT UR QL STRIP: NEGATIVE
RBC # BLD AUTO: 3.27 M/UL
SP GR UR STRIP: 1.01 (ref 1–1.03)
URN SPEC COLLECT METH UR: NORMAL
UROBILINOGEN UR STRIP-ACNC: NEGATIVE EU/DL
WBC # BLD AUTO: 9.89 K/UL

## 2018-04-13 PROCEDURE — 85651 RBC SED RATE NONAUTOMATED: CPT

## 2018-04-13 PROCEDURE — 63600175 PHARM REV CODE 636 W HCPCS: Performed by: INTERNAL MEDICINE

## 2018-04-13 PROCEDURE — 99233 SBSQ HOSP IP/OBS HIGH 50: CPT | Mod: ,,, | Performed by: PHYSICIAN ASSISTANT

## 2018-04-13 PROCEDURE — 81003 URINALYSIS AUTO W/O SCOPE: CPT

## 2018-04-13 PROCEDURE — 36415 COLL VENOUS BLD VENIPUNCTURE: CPT

## 2018-04-13 PROCEDURE — 86140 C-REACTIVE PROTEIN: CPT

## 2018-04-13 PROCEDURE — 63600175 PHARM REV CODE 636 W HCPCS: Performed by: NURSE PRACTITIONER

## 2018-04-13 PROCEDURE — 25000003 PHARM REV CODE 250: Performed by: ORTHOPAEDIC SURGERY

## 2018-04-13 PROCEDURE — 20600001 HC STEP DOWN PRIVATE ROOM

## 2018-04-13 PROCEDURE — 25000003 PHARM REV CODE 250: Performed by: INTERNAL MEDICINE

## 2018-04-13 PROCEDURE — 63600175 PHARM REV CODE 636 W HCPCS: Performed by: PHYSICIAN ASSISTANT

## 2018-04-13 PROCEDURE — 25000003 PHARM REV CODE 250: Performed by: STUDENT IN AN ORGANIZED HEALTH CARE EDUCATION/TRAINING PROGRAM

## 2018-04-13 PROCEDURE — 25000003 PHARM REV CODE 250: Performed by: NURSE PRACTITIONER

## 2018-04-13 PROCEDURE — 87040 BLOOD CULTURE FOR BACTERIA: CPT | Mod: 59

## 2018-04-13 PROCEDURE — 85025 COMPLETE CBC W/AUTO DIFF WBC: CPT

## 2018-04-13 PROCEDURE — 84145 PROCALCITONIN (PCT): CPT

## 2018-04-13 RX ORDER — CEFEPIME HYDROCHLORIDE 2 G/1
2 INJECTION, POWDER, FOR SOLUTION INTRAVENOUS
Status: DISCONTINUED | OUTPATIENT
Start: 2018-04-13 | End: 2018-04-14

## 2018-04-13 RX ADMIN — PREGABALIN 75 MG: 25 CAPSULE ORAL at 09:04

## 2018-04-13 RX ADMIN — METHOCARBAMOL 750 MG: 750 TABLET ORAL at 08:04

## 2018-04-13 RX ADMIN — ACETAMINOPHEN 650 MG: 325 TABLET ORAL at 05:04

## 2018-04-13 RX ADMIN — AMLODIPINE BESYLATE 5 MG: 5 TABLET ORAL at 08:04

## 2018-04-13 RX ADMIN — VANCOMYCIN HYDROCHLORIDE 1500 MG: 10 INJECTION, POWDER, LYOPHILIZED, FOR SOLUTION INTRAVENOUS at 09:04

## 2018-04-13 RX ADMIN — MUPIROCIN 1 G: 20 OINTMENT TOPICAL at 08:04

## 2018-04-13 RX ADMIN — VANCOMYCIN HYDROCHLORIDE 1500 MG: 10 INJECTION, POWDER, LYOPHILIZED, FOR SOLUTION INTRAVENOUS at 05:04

## 2018-04-13 RX ADMIN — PANTOPRAZOLE SODIUM 40 MG: 40 TABLET, DELAYED RELEASE ORAL at 08:04

## 2018-04-13 RX ADMIN — CEFEPIME 2 G: 2 INJECTION, POWDER, FOR SOLUTION INTRAVENOUS at 10:04

## 2018-04-13 RX ADMIN — METHOCARBAMOL 750 MG: 750 TABLET ORAL at 02:04

## 2018-04-13 RX ADMIN — METHOCARBAMOL 750 MG: 750 TABLET ORAL at 09:04

## 2018-04-13 RX ADMIN — OXYCODONE HYDROCHLORIDE 10 MG: 5 TABLET ORAL at 10:04

## 2018-04-13 RX ADMIN — MUPIROCIN 1 G: 20 OINTMENT TOPICAL at 09:04

## 2018-04-13 NOTE — SUBJECTIVE & OBJECTIVE
Interval History: No AEON.   Tmax 101, T current 99.2.  Blood and OR cultures no growth. Does not feel well.  Sweats resolved and feels slight feverish today. The patient denies any recent fever, chills, or sweats.      Review of Systems   Constitutional: Negative for activity change, chills, diaphoresis and fever.   Respiratory: Negative for cough, shortness of breath and wheezing.    Cardiovascular: Negative for chest pain.   Gastrointestinal: Negative for abdominal pain, constipation, diarrhea, nausea and vomiting.   Genitourinary: Negative for dysuria, frequency and urgency.   Neurological: Negative for dizziness.   Hematological: Does not bruise/bleed easily.     Objective:     Vital Signs (Most Recent):  Temp: 99.1 °F (37.3 °C) (04/13/18 0744)  Pulse: 94 (04/13/18 0744)  Resp: 16 (04/13/18 0744)  BP: 119/66 (04/13/18 0744)  SpO2: (!) 94 % (04/13/18 0744) Vital Signs (24h Range):  Temp:  [99.1 °F (37.3 °C)-101 °F (38.3 °C)] 99.1 °F (37.3 °C)  Pulse:  [] 94  Resp:  [16-18] 16  SpO2:  [94 %-98 %] 94 %  BP: (101-127)/(57-69) 119/66     Weight: 90.7 kg (200 lb)  Body mass index is 33.28 kg/m².    Estimated Creatinine Clearance: 98.7 mL/min (based on SCr of 0.7 mg/dL).    Physical Exam   Constitutional: She is oriented to person, place, and time. She appears well-developed and well-nourished. No distress.   HENT:   Head: Normocephalic and atraumatic.   Mouth/Throat: Mucous membranes are normal.   Eyes: Conjunctivae and EOM are normal. No scleral icterus.   Neck: Normal range of motion.   Cardiovascular: Normal rate, regular rhythm and normal heart sounds.  Exam reveals no gallop and no friction rub.    No murmur heard.  Pulmonary/Chest: Effort normal and breath sounds normal. No respiratory distress. She has no wheezes. She has no rales.   Abdominal: Soft. Bowel sounds are normal. She exhibits no distension and no mass. There is no hepatosplenomegaly. There is no tenderness. There is no rebound and no  guarding.   Musculoskeletal: She exhibits no edema.   Lumbar surgical site with 2 drains intact, mild sanguinous drainage.     No lower extremity tenderness, edema, erythema    Neurological: She is alert and oriented to person, place, and time. She has normal strength. No cranial nerve deficit or sensory deficit.   Skin: Skin is warm, dry and intact. No rash noted.   Possible slight rash on back   Psychiatric: She has a normal mood and affect. Her behavior is normal.       Significant Labs:   Blood Culture:   Recent Labs  Lab 04/08/18  0314   LABBLOO No growth after 5 days.  No growth after 5 days.     CBC:   Recent Labs  Lab 04/12/18  1039 04/13/18  0541   WBC 10.50 9.89   HGB 10.0* 9.7*   HCT 30.1* 29.0*    341     CMP:   Recent Labs  Lab 04/12/18  1039      K 3.4*      CO2 29   *   BUN 5*   CREATININE 0.7   CALCIUM 8.4*   ANIONGAP 7*   EGFRNONAA >60.0     Respiratory Culture: No results for input(s): GSRESP, RESPIRATORYC in the last 4320 hours.  Urine Culture: No results for input(s): LABURIN in the last 4320 hours.  Urine Studies:   Recent Labs  Lab 04/07/18  2349   COLORU Yellow   APPEARANCEUA Clear   PHUR 6.0   SPECGRAV 1.010   PROTEINUA Negative   GLUCUA Negative   KETONESU Negative   BILIRUBINUA Negative   OCCULTUA Negative   NITRITE Negative   UROBILINOGEN Negative   LEUKOCYTESUR Trace*   RBCUA 1   WBCUA 3   BACTERIA Rare   SQUAMEPITHEL 2   HYALINECASTS 1     Wound Culture:   Recent Labs  Lab 04/10/18  1132 04/10/18  1137 04/10/18  1145   LABAERO No growth No growth No growth     All pertinent labs within the past 24 hours have been reviewed.    Significant Imaging: I have reviewed all pertinent imaging results/findings within the past 24 hours.   X-Ray Lumbar Spine Ap And Lateral [[id number]] Resulted: 04/13/18 1026   Order Status: Completed Updated: 04/13/18 1029   Narrative:     EXAMINATION:  XR LUMBAR SPINE AP AND LATERAL    CLINICAL HISTORY:  Spondylolisthesis;post op. sitting  or standing please;    TECHNIQUE:  Two views of the lumbar spine were obtained, with AP, lateral, and lumbosacral lateral projections submitted.    COMPARISON:  Comparison is made to 04/08/2018.    FINDINGS:  Postoperative changes are now identified relating to an L4/S1 spinal fusion procedure.  Bilateral pedicle screws are seen within L4, L5, and S1, attached to vertical stabilization bars, and there are disc implants/spacers at L4-5 and L5-S1 as well.  There also appears to have been a posterior osseous fusion from L4-S1 and a L5 laminectomy.  Minor degrees of anterolisthesis of L4 in relation to L5 and L5 in relation to S1 are again observed, with alignment at levels superior to L4-5 being normal.  No interval disc narrowing.  Vertebral endplates are well defined.  No osseous destructive process.  Right upper quadrant surgical clips are again incidentally observed.   Impression:       Postoperative changes of L4/S1 instrumented spinal fusion are now observed.  No unusual postoperative findings or detrimental interval change since the preoperative exam of 04/08/2018 appreciated.      Electronically signed by: Otis Pascual MD  Date: 04/13/2018  Time: 10:26   SURG Fl Surgery FLuoro Greater Than 1 Hour [[id number]] Resulted: 04/10/18 1043   Order Status: Completed Updated: 04/10/18 1043   Narrative:     See OP Notes for results.    Impression:     See OP Notes for results.             This procedure was auto-finalized by: [facility] [employer]     X-Ray Lumbar Spine Ap And Lateral [[id number]] Resulted: 04/08/18 0520   Order Status: Completed Updated: 04/08/18 0523   Narrative:     EXAMINATION:  XR LUMBAR SPINE AP AND LATERAL    CLINICAL HISTORY:  low back pain, standing films;    TECHNIQUE:  AP, lateral and spot images were performed of the lumbar spine.    COMPARISON:  There is    FINDINGS:  Mild grade 1 anterolisthesis of L4 on L5 and L5 on S1 with mild disc space narrowing, discogenic change and facet arthropathy at  these levels.  There is no evidence to suggest acute fracture..  Cholecystostomy clips present in the right upper quadrant.  Nonobstructive bowel gas pattern.  No free air or portal venous gas.   Impression:       No evidence of acute fracture.  Grade 1 anterolisthesis of L4 on L5 and L5 on S1 with associated facet arthropathy.    Electronically signed by resident: Patrick Dixon  Date: 04/08/2018  Time: 05:08    Electronically signed by: Cara Bustillo MD  Date: 04/08/2018  Time: 05:20   MRI Lumbar Spine W WO Cont [[id number]] Resulted: 04/08/18 0315   Order Status: Completed Updated: 04/08/18 0318   Narrative:     EXAMINATION:  MRI LUMBAR SPINE W WO CONTRAST    CLINICAL HISTORY:  Low back pain, >6wks conservative tx, persistent-progressive sx, surgical candidate;Fever and Lumbar LBP, no other infectious symptoms. R/O discitis, epidural abscess, osteomyelitis; Schedule for L4/5 fusion on 4/10/18; Low back pain    TECHNIQUE:  Multiplanar, multisequence MR images were acquired from the thoracolumbar junction to the sacrum without and with 10 mL Gadavist intravenous contrast.    COMPARISON:  MRI lumbar spine without contrast 02/09/2018    FINDINGS:  Alignment: There is stepwise anterolisthesis of L4 on L5 and L5 on S1.  Lumbar spine demonstrates preservation of the normal lumbar lordosis.    Vertebrae: Vertebral body heights are maintained without marrow signal abnormality to suggest acute fracture or infiltrative marrow replacement process.    Discs: There is desiccation of the L4-5 and L5-S1 disc space with mild disc space height loss.  Remaining intervertebral disc spaces are well preserved.    Cord: Visualized conus and lumbar spinal nerve roots are normal in signal.  Spinal cord terminates at the L1 vertebral level.    Degenerative findings:    L1-L2: Mild bilateral facet hypertrophy without significant spinal canal stenosis or neural foraminal narrowing.    L2-L3: Mild bilateral facet hypertrophy without significant  spinal canal stenosis or neural foraminal narrowing.    L3-L4: Mild bilateral facet hypertrophy and circumferential disc bulge results in mild central spinal canal stenosis without significant neural foraminal narrowing.    L4-L5: Grade 1 anterolisthesis along with moderate bilateral facet hypertrophy, circumferential disc bulge results in severe central spinal canal stenosis as well as mild bilateral neural foraminal narrowing    L5-S1: Grade 1 anterolisthesis along with severe bilateral facet hypertrophy and circumferential disc bulge results in mild-to-moderate spinal canal stenosis along with mild left and moderate right neural foraminal narrowing.    Paraspinal muscles & soft tissues: There is STIR signal abnormality throughout the paraspinal soft tissues with a small rim enhancing fluid collection in the left posterior paraspinal musculature measuring approximately 1.0 x 0.4 cm x 0.8 (axial series 11, image 34) at the level of L5.  Additionally, there is extension of enhancing material into the spinal canal at the L4-5 vertebral level with phlegmon throughout both the dorsal and ventral epidural space and a small epidural fluid collection in the left dorsal lateral epidural space measuring approximately 0.7 cm (axial series 3, image 35).  No evidence for spondylodiscitis.   Impression:       No MR evidence of discitis osteomyelitis.    Marked inflammatory changes of the posterior paraspinal soft tissues with a small 1.0 x 0.4 x 0.8 cm rim enhancing collection in the left posterior paraspinous musculature at the level of L5, suggestive of small posterior paraspinous abscess.    Extension of the inflammatory changes into the thecal sac with small rim enhancing 7 mm abscess in the dorsal epidural space at the L4-L5 level.    Marked inflammatory changes and abnormal enhancement in the dorsal and anterior epidural space at the L4 and L5 levels, suggestive of associated phlegmonous changes in the intraspinal  regions.    Multilevel spondylosis of the lumbar spine with greatest level of involvement at L4-5 resulting in severe central spinal canal stenosis, grossly similar to prior examination dated 02/09/2018.    COMMUNICATION  This critical result was discovered/received at 02:40. The critical information above was relayed directly to Delmer EUBANKS on 4/08/2018 at 02:58.    Electronically signed by resident: Patrick Dixon  Date: 04/08/2018  Time: 01:58    Electronically signed by: Pavel Ingram MD  Date: 04/08/2018  Time: 03:15   X-Ray Chest AP Portable [[id number]] Resulted: 04/08/18 0213   Order Status: Completed Updated: 04/08/18 0216   Narrative:     EXAMINATION:  XR CHEST AP PORTABLE    CLINICAL HISTORY:  Fever, unspecified    TECHNIQUE:  Single frontal view of the chest was performed.    COMPARISON:  10/24/2014.    FINDINGS:  Cardiac silhouette is not enlarged.  No focal consolidation.  No sizable pleural effusion.  No pneumothorax.  No detrimental change.   Impression:       No acute finding or detrimental change.      Electronically signed by: Joaquin Daniel MD  Date: 04/08/2018  Time: 02:13

## 2018-04-13 NOTE — PLAN OF CARE
POD 3 L4-S1 TLIF. PT/OT recommending outpatient rehab and DME. Rolling walker ordered for bedside delivery this morning. Discharge pending final ID recommendations. PICC line order placed this morning by MD. Potential for IV antibiotics x 6 weeks. SW and CM will continue to follow for any additional needs. Plan A to discharge home with family support and plans for outpatient rehab as soon as medically stable. Plan B to discharge home with home health.       04/13/18 1117   Discharge Reassessment   Assessment Type Discharge Planning Reassessment   Provided patient/caregiver education on the expected discharge date and the discharge plan Yes   Do you have any problems affording any of your prescribed medications? TBD   Discharge Plan A Home with family;Other  (outpateint rehab)   Discharge Plan B Home with family;Home Health   Patient choice form signed by patient/caregiver N/A   Can the patient answer the patient profile reliably? Yes, cognitively intact   How does the patient rate their overall health at the present time? Good   Describe the patient's ability to walk at the present time. Walks with the help of equipment   How often would a person be available to care for the patient? Whenever needed   Number of comorbid conditions (as recorded on the chart) Five or more   During the past month, has the patient often been bothered by feeling down, depressed or hopeless? No   During the past month, has the patient often been bothered by little interest or pleasure in doing things? No

## 2018-04-13 NOTE — PROGRESS NOTES
[facility]  Infectious Disease  Progress Note    Patient Name: Renae Hou  MRN: [id number]  Admission Date: 4/7/2018  Length of Stay: 4 days  Attending Physician: Dean Zayas MD  Primary Care Provider: Panchito Lindsay MD    Isolation Status: No active isolations  Assessment/Plan:      Epidural abscess    57 year old female admitted with acute on chronic back pain; MRI shows lumbar epidural abscesses at L4-L5. Possibly 2/2 recent epidural steroid injections?  Now POD #3 L4-S1 TLIF.  Paraspinal infected soft tissue found, possible infected facet, left cyst removed.   Surgical cultures NGTD.  Gram stain negative. Blood cultures NGTD.   Febrile to 101 over past 24 hours.  No leukocytosis.  Blackmon has been removed, no dysuria, no cough, no new symptoms. Patient feels unwell but no identifiable source of fever.     Plan:  1. Continue IV Vancomycin but dc ceftriaxone and start cefepime for broader coverage given fever.  Vancomycin trough low at 7.6 on 1250 q 12 hours.  Increased to 1500 q 12 hours (done).  Trough at 1730 4/14  2. Check UA and consider CXR if fevers persist.  3. Follow up cultures.   4. Blood cultures today.  5.  Anticipate 6 weeks of IV antibiotics.   6. Instructed patient to use spirometry or cough 2x/hr   7. Will follow up tomorrow.     Data reviewed and plan discussed with ID staff          Contact Dr. Bere Painter MD after hours and Delia Flores NP during day with any questions.    Anticipated Disposition: tbd    Thank you for your consult. I will follow-up with patient. Please contact us if you have any additional questions.    COY Clement  Infectious Disease  [facility]    Subjective:     Principal Problem:Spondylolisthesis at L4-L5 level    HPI: Renae Hou is a 56 yo female with PMH of HTN, HLD presenting with acute on chronic exacerbation of low back pain and left leg pain. The pain has been worsening since Tuesday.  There is  "associated numbness and tingling to left lower extremity. There is subjective weakness.  Had a ESIs on 3/2/18 and 3/16/18. Did not take her temperature at home but felt "warm". Denies bowel or bladder problems. Has some weakness to left leg but no numbness.  MRI obtained and shows 1.0 x 0.4 x 0.8 cm rim enhancing fluid collection suggestive of paraspinous abscess at L5 with another 7 mm rim enhancing abscess L4-5. Patient is afebrile; WBC count is 14,000. Blood cultures are pending- NGTD. Was given vanc and rocephin in the ER. Currently not on antibiotics; ID consulted for recs.  Interval History: No AEON.   Tmax 101, T current 99.2.  Blood and OR cultures no growth. Does not feel well.  Sweats resolved and feels slight feverish today. The patient denies any recent fever, chills, or sweats.      Review of Systems   Constitutional: Negative for activity change, chills, diaphoresis and fever.   Respiratory: Negative for cough, shortness of breath and wheezing.    Cardiovascular: Negative for chest pain.   Gastrointestinal: Negative for abdominal pain, constipation, diarrhea, nausea and vomiting.   Genitourinary: Negative for dysuria, frequency and urgency.   Neurological: Negative for dizziness.   Hematological: Does not bruise/bleed easily.     Objective:     Vital Signs (Most Recent):  Temp: 99.1 °F (37.3 °C) (04/13/18 0744)  Pulse: 94 (04/13/18 0744)  Resp: 16 (04/13/18 0744)  BP: 119/66 (04/13/18 0744)  SpO2: (!) 94 % (04/13/18 0744) Vital Signs (24h Range):  Temp:  [99.1 °F (37.3 °C)-101 °F (38.3 °C)] 99.1 °F (37.3 °C)  Pulse:  [] 94  Resp:  [16-18] 16  SpO2:  [94 %-98 %] 94 %  BP: (101-127)/(57-69) 119/66     Weight: 90.7 kg (200 lb)  Body mass index is 33.28 kg/m².    Estimated Creatinine Clearance: 98.7 mL/min (based on SCr of 0.7 mg/dL).    Physical Exam   Constitutional: She is oriented to person, place, and time. She appears well-developed and well-nourished. No distress.   HENT:   Head: Normocephalic " and atraumatic.   Mouth/Throat: Mucous membranes are normal.   Eyes: Conjunctivae and EOM are normal. No scleral icterus.   Neck: Normal range of motion.   Cardiovascular: Normal rate, regular rhythm and normal heart sounds.  Exam reveals no gallop and no friction rub.    No murmur heard.  Pulmonary/Chest: Effort normal and breath sounds normal. No respiratory distress. She has no wheezes. She has no rales.   Abdominal: Soft. Bowel sounds are normal. She exhibits no distension and no mass. There is no hepatosplenomegaly. There is no tenderness. There is no rebound and no guarding.   Musculoskeletal: She exhibits no edema.   Lumbar surgical site with 2 drains intact, mild sanguinous drainage.     No lower extremity tenderness, edema, erythema    Neurological: She is alert and oriented to person, place, and time. She has normal strength. No cranial nerve deficit or sensory deficit.   Skin: Skin is warm, dry and intact. No rash noted.   Possible slight rash on back   Psychiatric: She has a normal mood and affect. Her behavior is normal.       Significant Labs:   Blood Culture:   Recent Labs  Lab 04/08/18  0314   LABBLOO No growth after 5 days.  No growth after 5 days.     CBC:   Recent Labs  Lab 04/12/18  1039 04/13/18  0541   WBC 10.50 9.89   HGB 10.0* 9.7*   HCT 30.1* 29.0*    341     CMP:   Recent Labs  Lab 04/12/18  1039      K 3.4*      CO2 29   *   BUN 5*   CREATININE 0.7   CALCIUM 8.4*   ANIONGAP 7*   EGFRNONAA >60.0     Respiratory Culture: No results for input(s): GSRESP, RESPIRATORYC in the last 4320 hours.  Urine Culture: No results for input(s): LABURIN in the last 4320 hours.  Urine Studies:   Recent Labs  Lab 04/07/18  2349   COLORU Yellow   APPEARANCEUA Clear   PHUR 6.0   SPECGRAV 1.010   PROTEINUA Negative   GLUCUA Negative   KETONESU Negative   BILIRUBINUA Negative   OCCULTUA Negative   NITRITE Negative   UROBILINOGEN Negative   LEUKOCYTESUR Trace*   RBCUA 1   WBCUA 3    BACTERIA Rare   SQUAMEPITHEL 2   HYALINECASTS 1     Wound Culture:   Recent Labs  Lab 04/10/18  1132 04/10/18  1137 04/10/18  1145   LABAERO No growth No growth No growth     All pertinent labs within the past 24 hours have been reviewed.    Significant Imaging: I have reviewed all pertinent imaging results/findings within the past 24 hours.   X-Ray Lumbar Spine Ap And Lateral [[id number]] Resulted: 04/13/18 1026   Order Status: Completed Updated: 04/13/18 1029   Narrative:     EXAMINATION:  XR LUMBAR SPINE AP AND LATERAL    CLINICAL HISTORY:  Spondylolisthesis;post op. sitting or standing please;    TECHNIQUE:  Two views of the lumbar spine were obtained, with AP, lateral, and lumbosacral lateral projections submitted.    COMPARISON:  Comparison is made to 04/08/2018.    FINDINGS:  Postoperative changes are now identified relating to an L4/S1 spinal fusion procedure.  Bilateral pedicle screws are seen within L4, L5, and S1, attached to vertical stabilization bars, and there are disc implants/spacers at L4-5 and L5-S1 as well.  There also appears to have been a posterior osseous fusion from L4-S1 and a L5 laminectomy.  Minor degrees of anterolisthesis of L4 in relation to L5 and L5 in relation to S1 are again observed, with alignment at levels superior to L4-5 being normal.  No interval disc narrowing.  Vertebral endplates are well defined.  No osseous destructive process.  Right upper quadrant surgical clips are again incidentally observed.   Impression:       Postoperative changes of L4/S1 instrumented spinal fusion are now observed.  No unusual postoperative findings or detrimental interval change since the preoperative exam of 04/08/2018 appreciated.      Electronically signed by: Otis Pascual MD  Date: 04/13/2018  Time: 10:26   SURG Fl Surgery FLuoro Greater Than 1 Hour [[id number]] Resulted: 04/10/18 1043   Order Status: Completed Updated: 04/10/18 1043   Narrative:     See OP Notes for results.    Impression:      See OP Notes for results.             This procedure was auto-finalized by: [facility]     X-Ray Lumbar Spine Ap And Lateral [[id number]] Resulted: 04/08/18 0520   Order Status: Completed Updated: 04/08/18 0523   Narrative:     EXAMINATION:  XR LUMBAR SPINE AP AND LATERAL    CLINICAL HISTORY:  low back pain, standing films;    TECHNIQUE:  AP, lateral and spot images were performed of the lumbar spine.    COMPARISON:  There is    FINDINGS:  Mild grade 1 anterolisthesis of L4 on L5 and L5 on S1 with mild disc space narrowing, discogenic change and facet arthropathy at these levels.  There is no evidence to suggest acute fracture..  Cholecystostomy clips present in the right upper quadrant.  Nonobstructive bowel gas pattern.  No free air or portal venous gas.   Impression:       No evidence of acute fracture.  Grade 1 anterolisthesis of L4 on L5 and L5 on S1 with associated facet arthropathy.    Electronically signed by resident: Patrick Dixon  Date: 04/08/2018  Time: 05:08    Electronically signed by: Cara Bustillo MD  Date: 04/08/2018  Time: 05:20   MRI Lumbar Spine W WO Cont [[id number]] Resulted: 04/08/18 0315   Order Status: Completed Updated: 04/08/18 0318   Narrative:     EXAMINATION:  MRI LUMBAR SPINE W WO CONTRAST    CLINICAL HISTORY:  Low back pain, >6wks conservative tx, persistent-progressive sx, surgical candidate;Fever and Lumbar LBP, no other infectious symptoms. R/O discitis, epidural abscess, osteomyelitis; Schedule for L4/5 fusion on 4/10/18; Low back pain    TECHNIQUE:  Multiplanar, multisequence MR images were acquired from the thoracolumbar junction to the sacrum without and with 10 mL Gadavist intravenous contrast.    COMPARISON:  MRI lumbar spine without contrast 02/09/2018    FINDINGS:  Alignment: There is stepwise anterolisthesis of L4 on L5 and L5 on S1.  Lumbar spine demonstrates preservation of the normal lumbar lordosis.    Vertebrae: Vertebral body heights are maintained without  marrow signal abnormality to suggest acute fracture or infiltrative marrow replacement process.    Discs: There is desiccation of the L4-5 and L5-S1 disc space with mild disc space height loss.  Remaining intervertebral disc spaces are well preserved.    Cord: Visualized conus and lumbar spinal nerve roots are normal in signal.  Spinal cord terminates at the L1 vertebral level.    Degenerative findings:    L1-L2: Mild bilateral facet hypertrophy without significant spinal canal stenosis or neural foraminal narrowing.    L2-L3: Mild bilateral facet hypertrophy without significant spinal canal stenosis or neural foraminal narrowing.    L3-L4: Mild bilateral facet hypertrophy and circumferential disc bulge results in mild central spinal canal stenosis without significant neural foraminal narrowing.    L4-L5: Grade 1 anterolisthesis along with moderate bilateral facet hypertrophy, circumferential disc bulge results in severe central spinal canal stenosis as well as mild bilateral neural foraminal narrowing    L5-S1: Grade 1 anterolisthesis along with severe bilateral facet hypertrophy and circumferential disc bulge results in mild-to-moderate spinal canal stenosis along with mild left and moderate right neural foraminal narrowing.    Paraspinal muscles & soft tissues: There is STIR signal abnormality throughout the paraspinal soft tissues with a small rim enhancing fluid collection in the left posterior paraspinal musculature measuring approximately 1.0 x 0.4 cm x 0.8 (axial series 11, image 34) at the level of L5.  Additionally, there is extension of enhancing material into the spinal canal at the L4-5 vertebral level with phlegmon throughout both the dorsal and ventral epidural space and a small epidural fluid collection in the left dorsal lateral epidural space measuring approximately 0.7 cm (axial series 3, image 35).  No evidence for spondylodiscitis.   Impression:       No MR evidence of discitis  osteomyelitis.    Marked inflammatory changes of the posterior paraspinal soft tissues with a small 1.0 x 0.4 x 0.8 cm rim enhancing collection in the left posterior paraspinous musculature at the level of L5, suggestive of small posterior paraspinous abscess.    Extension of the inflammatory changes into the thecal sac with small rim enhancing 7 mm abscess in the dorsal epidural space at the L4-L5 level.    Marked inflammatory changes and abnormal enhancement in the dorsal and anterior epidural space at the L4 and L5 levels, suggestive of associated phlegmonous changes in the intraspinal regions.    Multilevel spondylosis of the lumbar spine with greatest level of involvement at L4-5 resulting in severe central spinal canal stenosis, grossly similar to prior examination dated 02/09/2018.    COMMUNICATION  This critical result was discovered/received at 02:40. The critical information above was relayed directly to Delmer EUBANKS on 4/08/2018 at 02:58.    Electronically signed by resident: Patrick Dixon  Date: 04/08/2018  Time: 01:58    Electronically signed by: Pavel Ingram MD  Date: 04/08/2018  Time: 03:15   X-Ray Chest AP Portable [[id number]] Resulted: 04/08/18 0213   Order Status: Completed Updated: 04/08/18 0216   Narrative:     EXAMINATION:  XR CHEST AP PORTABLE    CLINICAL HISTORY:  Fever, unspecified    TECHNIQUE:  Single frontal view of the chest was performed.    COMPARISON:  10/24/2014.    FINDINGS:  Cardiac silhouette is not enlarged.  No focal consolidation.  No sizable pleural effusion.  No pneumothorax.  No detrimental change.   Impression:       No acute finding or detrimental change.      Electronically signed by: Joaqiun Daniel MD  Date: 04/08/2018  Time: 02:13

## 2018-04-13 NOTE — SUBJECTIVE & OBJECTIVE
Principal Problem:Spondylolisthesis at L4-L5 level    Principal Orthopedic Problem: L4/5 spondylolisthesis    Interval History: ROBERT overnight. Febrile to 101 overnight. Main complaint is headaches that come along with fever, not positional. Pain very well controlled. ID continues to follow.       Review of patient's allergies indicates:   Allergen Reactions    Iodine Anaphylaxis, Hives, Shortness Of Breath, Itching, Swelling and Rash     Other reaction(s): Difficulty breathing  Neck swelling     Shellfish containing products Anaphylaxis, Hives, Shortness Of Breath, Itching, Swelling and Rash     Other reaction(s): Difficulty breathing  Neck swelling     Latex Swelling     Patient un sure about it       Current Facility-Administered Medications   Medication    acetaminophen tablet 650 mg    aluminum-magnesium hydroxide-simethicone 200-200-20 mg/5 mL suspension 30 mL    amLODIPine tablet 5 mg    calcium carbonate 200 mg calcium (500 mg) chewable tablet 500 mg    cefTRIAXone (ROCEPHIN) 2 g in dextrose 5 % 50 mL IVPB    diphenhydrAMINE capsule 25 mg    fentaNYL injection 25 mcg    heparin (porcine) injection 5,000 Units    methocarbamol tablet 750 mg    mupirocin 2 % ointment 1 g    ondansetron tablet 8 mg    oxyCODONE immediate release tablet 10 mg    oxyCODONE immediate release tablet 15 mg    oxyCODONE immediate release tablet 5 mg    pantoprazole EC tablet 40 mg    polyethylene glycol packet 17 g    pregabalin capsule 75 mg    promethazine tablet 12.5 mg    ramelteon tablet 8 mg    vancomycin (VANCOCIN) 1,500 mg in sodium chloride 0.9% 250 mL IVPB     Objective:     Vital Signs (Most Recent):  Temp: 100 °F (37.8 °C) (04/13/18 0453)  Pulse: 102 (04/13/18 0453)  Resp: 18 (04/13/18 0453)  BP: 127/62 (04/13/18 0453)  SpO2: 97 % (04/13/18 0453) Vital Signs (24h Range):  Temp:  [99.2 °F (37.3 °C)-101 °F (38.3 °C)] 100 °F (37.8 °C)  Pulse:  [102-114] 102  Resp:  [16-18] 18  SpO2:  [93 %-98 %] 97  "%  BP: (101-145)/(57-83) 127/62     Weight: 90.7 kg (200 lb)  Height: 5' 5" (165.1 cm)  Body mass index is 33.28 kg/m².      Intake/Output Summary (Last 24 hours) at 04/13/18 0620  Last data filed at 04/12/18 1800   Gross per 24 hour   Intake              800 ml   Output                0 ml   Net              800 ml       Ortho/SPM Exam      UE:   D B T WF WE HI  R 5 5 5 5 5 5  L 5 5 5 5 5 5    SILT C5-T1  2+ Radial Pulse  Negative Zavala's  Normoreflexic Biceps, Triceps, Brachiradialis    LE:   IP Q H TA EHL GS  R 5 5 5 5 5 5  L 5 5 5 5 5 5    SILT L2-S1  2+ DP, PT pulses  Negative Clonus  Negative Babinski  Normoreflexic Knee Jerk and Achilles     Significant Labs:   CBC:     Recent Labs  Lab 04/12/18  1039   WBC 10.50   HGB 10.0*   HCT 30.1*        All pertinent labs within the past 24 hours have been reviewed.    Significant Imaging: None  "

## 2018-04-13 NOTE — ASSESSMENT & PLAN NOTE
57 y.o. female POD3 L4-S1 TLIF, possible infected facet     Pain control: multimodal  PT/OT: WBAT   DVT PPx: FCDs at all times when not ambulating  Continue IV abx per ID recs. FU OR cultures. Final ID recs pending. Will need IV abx at home  Drains to be removed this AM -- x rays to follow   Heparin TID    Dispo: home once final ID recs in. PICC line order placed

## 2018-04-13 NOTE — PROGRESS NOTES
[facility]  Orthopedics  Progress Note    Patient Name: Renae Hou  MRN: [id number]  Admission Date: 4/7/2018  Hospital Length of Stay: 4 days  Attending Provider: Dean Zayas MD  Primary Care Provider: Panchito Lindsay MD  Follow-up For: Procedure(s) (LRB):  FUSION-POSTERIOR LUMBAR INTERBODY FUSION L4/5, L5/S1 (N/A)    Post-Operative Day: 3 Days Post-Op  Subjective:     Principal Problem:Spondylolisthesis at L4-L5 level    Principal Orthopedic Problem: L4/5 spondylolisthesis    Interval History: ROBERT overnight. Febrile to 101 overnight. Main complaint is headaches that come along with fever, not positional. Pain very well controlled. ID continues to follow.       Review of patient's allergies indicates:   Allergen Reactions    Iodine Anaphylaxis, Hives, Shortness Of Breath, Itching, Swelling and Rash     Other reaction(s): Difficulty breathing  Neck swelling     Shellfish containing products Anaphylaxis, Hives, Shortness Of Breath, Itching, Swelling and Rash     Other reaction(s): Difficulty breathing  Neck swelling     Latex Swelling     Patient un sure about it       Current Facility-Administered Medications   Medication    acetaminophen tablet 650 mg    aluminum-magnesium hydroxide-simethicone 200-200-20 mg/5 mL suspension 30 mL    amLODIPine tablet 5 mg    calcium carbonate 200 mg calcium (500 mg) chewable tablet 500 mg    cefTRIAXone (ROCEPHIN) 2 g in dextrose 5 % 50 mL IVPB    diphenhydrAMINE capsule 25 mg    fentaNYL injection 25 mcg    heparin (porcine) injection 5,000 Units    methocarbamol tablet 750 mg    mupirocin 2 % ointment 1 g    ondansetron tablet 8 mg    oxyCODONE immediate release tablet 10 mg    oxyCODONE immediate release tablet 15 mg    oxyCODONE immediate release tablet 5 mg    pantoprazole EC tablet 40 mg    polyethylene glycol packet 17 g    pregabalin capsule 75 mg    promethazine tablet 12.5 mg    ramelteon tablet 8 mg    vancomycin  "(VANCOCIN) 1,500 mg in sodium chloride 0.9% 250 mL IVPB     Objective:     Vital Signs (Most Recent):  Temp: 100 °F (37.8 °C) (04/13/18 0453)  Pulse: 102 (04/13/18 0453)  Resp: 18 (04/13/18 0453)  BP: 127/62 (04/13/18 0453)  SpO2: 97 % (04/13/18 0453) Vital Signs (24h Range):  Temp:  [99.2 °F (37.3 °C)-101 °F (38.3 °C)] 100 °F (37.8 °C)  Pulse:  [102-114] 102  Resp:  [16-18] 18  SpO2:  [93 %-98 %] 97 %  BP: (101-145)/(57-83) 127/62     Weight: 90.7 kg (200 lb)  Height: 5' 5" (165.1 cm)  Body mass index is 33.28 kg/m².      Intake/Output Summary (Last 24 hours) at 04/13/18 0620  Last data filed at 04/12/18 1800   Gross per 24 hour   Intake              800 ml   Output                0 ml   Net              800 ml       Ortho/SPM Exam      UE:   D B T WF WE HI  R 5 5 5 5 5 5  L 5 5 5 5 5 5    SILT C5-T1  2+ Radial Pulse  Negative Zavala's  Normoreflexic Biceps, Triceps, Brachiradialis    LE:   IP Q H TA EHL GS  R 5 5 5 5 5 5  L 5 5 5 5 5 5    SILT L2-S1  2+ DP, PT pulses  Negative Clonus  Negative Babinski  Normoreflexic Knee Jerk and Achilles     Significant Labs:   CBC:     Recent Labs  Lab 04/12/18  1039   WBC 10.50   HGB 10.0*   HCT 30.1*        All pertinent labs within the past 24 hours have been reviewed.    Significant Imaging: None    Assessment/Plan:     * Spondylolisthesis at L4-L5 level    57 y.o. female POD3 L4-S1 TLIF, possible infected facet     Pain control: multimodal  PT/OT: WBAT   DVT PPx: FCDs at all times when not ambulating  Continue IV abx per ID recs. FU OR cultures. Final ID recs pending. Will need IV abx at home  Drains to be removed this AM -- x rays to follow   Heparin TID    Dispo: home once final ID recs in. PICC line order placed                Lito Ochoa MD  Orthopedics  [facility]  "

## 2018-04-13 NOTE — PROGRESS NOTES
"[facility]  Infectious Disease  Progress Note    Patient Name: Renae Hou  MRN: [id number]  Admission Date: 4/7/2018  Length of Stay: 3 days  Attending Physician: Dean Zayas MD  Primary Care Provider: Panchito Lindsay MD    Isolation Status: No active isolations  Assessment/Plan:      Epidural abscess    57 year old female admitted with acute on chronic back pain; MRI shows lumbar epidural abscesses at L4-L5. Possibly 2/2 recent epidural steroid injections?  Now POD #2 L4-S1 TLIF.  Paraspinal infected soft tissue found, possible infected facet, left cyst removed.   Surgical cultures NGTD.  Gram stain negative. Blood cultures NGTD.   Febrile to 101.3 over past 24 hours.  No leukocytosis.  Blackmon has been removed, no dysuria, no cough, no new symptoms.      Plan:  1. Continue IV Vancomycin and ceftriaxone.  Vancomycin trough low at 7.6 on 1250 q 12 hours.  Increase to 1500 q 12 hours (done)  2. Cbc, bmp today (ordered)  3. Follow up cultures.   4. Blood cultures if fevers persist > 101.  5.  Anticipate 6 weeks of IV antibiotics.   6.  Will follow up tomorrow.     Data reviewed and plan discussed with ID staff  Discussed with Primary Team               Thank you.   Please call for any questions or concerns.  PADMINI Ríos, ANP-C  [phone]    Subjective:     Principal Problem:Spondylolisthesis at L4-L5 level    HPI: Renae Hou is a 58 yo female with PMH of HTN, HLD presenting with acute on chronic exacerbation of low back pain and left leg pain. The pain has been worsening since Tuesday.  There is associated numbness and tingling to left lower extremity. There is subjective weakness.  Had a ESIs on 3/2/18 and 3/16/18. Did not take her temperature at home but felt "warm". Denies bowel or bladder problems. Has some weakness to left leg but no numbness.  MRI obtained and shows 1.0 x 0.4 x 0.8 cm rim enhancing fluid collection suggestive of paraspinous abscess at L5 with another 7 " mm rim enhancing abscess L4-5. Patient is afebrile; WBC count is 14,000. Blood cultures are pending- NGTD. Was given vanc and rocephin in the ER. Currently not on antibiotics; ID consulted for recs.  Interval History:  POD #2 L4-S1 TLIF, evacuation of ngozi side facet cyst, resection of paraspinal soft tissue suspicious for infection.   Fevers last night and this morning.  Blackmon was removed. Denies cough, SOB, dysuria. Complains of back pain.  Mild nausea, and headache.  No vomiting diarrhea.  No congestion/sore throat. No complaints calf pain/tenderness. Complains of fatigue. She has been up ambulating.      Surgical cultures NGTD. Gram stains negative.     Review of Systems   Constitutional: Positive for fever. Negative for activity change, chills and diaphoresis.   HENT: Negative for congestion, rhinorrhea and sore throat.    Respiratory: Negative for cough, shortness of breath and wheezing.    Cardiovascular: Negative for chest pain and leg swelling.   Gastrointestinal: Positive for constipation and nausea. Negative for abdominal pain, diarrhea and vomiting.   Genitourinary: Negative for dysuria, frequency and urgency.   Musculoskeletal: Positive for back pain.   Neurological: Positive for headaches. Negative for dizziness.   Hematological: Does not bruise/bleed easily.   Psychiatric/Behavioral: Negative for agitation and behavioral problems.     Objective:     Vital Signs (Most Recent):  Temp: 99.7 °F (37.6 °C) (04/12/18 0741)  Pulse: 108 (04/12/18 0741)  Resp: 16 (04/12/18 0741)  BP: (!) 145/83 (04/12/18 0741)  SpO2: (!) 93 % (04/12/18 0741) Vital Signs (24h Range):  Temp:  [99.3 °F (37.4 °C)-101.3 °F (38.5 °C)] 99.7 °F (37.6 °C)  Pulse:  [105-119] 108  Resp:  [16-18] 16  SpO2:  [93 %-96 %] 93 %  BP: (125-145)/(73-89) 145/83     Weight: 90.7 kg (200 lb)  Body mass index is 33.28 kg/m².    Estimated Creatinine Clearance: 98.7 mL/min (based on SCr of 0.7 mg/dL).    Physical Exam   Constitutional: She is oriented  to person, place, and time. She appears well-developed and well-nourished. No distress.   HENT:   Head: Normocephalic and atraumatic.   Mouth/Throat: Mucous membranes are normal.   Eyes: Conjunctivae and EOM are normal. No scleral icterus.   Neck: Normal range of motion.   Cardiovascular: Normal rate, regular rhythm and normal heart sounds.  Exam reveals no gallop and no friction rub.    No murmur heard.  Pulmonary/Chest: Effort normal and breath sounds normal. No respiratory distress. She has no wheezes. She has no rales.   Abdominal: Soft. Bowel sounds are normal. She exhibits no distension and no mass. There is no hepatosplenomegaly. There is no tenderness. There is no rebound and no guarding.   Musculoskeletal: She exhibits no edema.   Lumbar surgical site with 2 drains intact, mild sanguinous drainage.     No lower extremity tenderness, edema, erythema    Neurological: She is alert and oriented to person, place, and time. She has normal strength. No cranial nerve deficit or sensory deficit.   Skin: Skin is warm, dry and intact. No rash noted.   Psychiatric: She has a normal mood and affect. Her behavior is normal.       Significant Labs:   Blood Culture:     Recent Labs  Lab 04/08/18  0314   LABBLOO No Growth to date  No Growth to date  No Growth to date  No Growth to date  No Growth to date  No Growth to date  No Growth to date  No Growth to date  No Growth to date  No Growth to date     CBC:     Recent Labs  Lab 04/10/18  1142 04/11/18  0448   HGB 11.7* 10.7*   HCT 33.8* 32.0*     CMP:     Recent Labs  Lab 04/10/18  1142 04/11/18  0448    138   K 3.5 3.6    106   CO2 24 23   * 98   BUN 8 7   CREATININE 0.7 0.7   CALCIUM 8.4* 8.5*   ANIONGAP 10 9   EGFRNONAA >60.0 >60.0     Wound Culture:     Recent Labs  Lab 04/10/18  1132 04/10/18  1137 04/10/18  1145   LABAERO No growth No growth No growth     All pertinent labs within the past 24 hours have been reviewed.    Significant Imaging:  I have reviewed all pertinent imaging results/findings within the past 24 hours.

## 2018-04-13 NOTE — PLAN OF CARE
Problem: Patient Care Overview  Goal: Plan of Care Review  Outcome: Ongoing (interventions implemented as appropriate)  Patient AAO x4, calm and cooperative. Family at bedside.  Pt temp at start of shift 101.0F, Tylenol administered. Vanc dose increased by Infectious disease team. No complaint of pain overnight. Pt stable will continue to monitor.

## 2018-04-13 NOTE — PLAN OF CARE
PT/OT recs for rolling walker. CM ordered a rolling walker from [facility] with [facility]. DME for bedside delivery.    Patricia Kirkland RN, CM  [facility]  [phone]

## 2018-04-13 NOTE — ASSESSMENT & PLAN NOTE
57 year old female admitted with acute on chronic back pain; MRI shows lumbar epidural abscesses at L4-L5. Possibly 2/2 recent epidural steroid injections?  Now POD #3 L4-S1 TLIF.  Paraspinal infected soft tissue found, possible infected facet, left cyst removed.   Surgical cultures NGTD.  Gram stain negative. Blood cultures NGTD.   Febrile to 101 over past 24 hours.  No leukocytosis.  Blackmon has been removed, no dysuria, no cough, no new symptoms. Patient feels unwell but no identifiable source of fever.     Plan:  1. Continue IV Vancomycin but dc ceftriaxone and start cefepime for broader coverage given fever.  Vancomycin trough low at 7.6 on 1250 q 12 hours.  Increased to 1500 q 12 hours (done).  Trough at 1730 4/14  2. Check UA and consider CXR if fevers persist.  3. Follow up cultures.   4. Blood cultures today.  5.  Anticipate 6 weeks of IV antibiotics.   6. Instructed patient to use spirometry or cough 2x/hr   7. Will follow up tomorrow.     Data reviewed and plan discussed with ID staff

## 2018-04-14 LAB
BACTERIA SPEC AEROBE CULT: NO GROWTH
BLD PROD TYP BPU: NORMAL
BLD PROD TYP BPU: NORMAL
BLOOD UNIT EXPIRATION DATE: NORMAL
BLOOD UNIT EXPIRATION DATE: NORMAL
BLOOD UNIT TYPE CODE: 5100
BLOOD UNIT TYPE CODE: 5100
BLOOD UNIT TYPE: NORMAL
BLOOD UNIT TYPE: NORMAL
CODING SYSTEM: NORMAL
CODING SYSTEM: NORMAL
DISPENSE STATUS: NORMAL
DISPENSE STATUS: NORMAL
TRANS ERYTHROCYTES VOL PATIENT: NORMAL ML
TRANS ERYTHROCYTES VOL PATIENT: NORMAL ML
VANCOMYCIN TROUGH SERPL-MCNC: 20.3 UG/ML

## 2018-04-14 PROCEDURE — 36569 INSJ PICC 5 YR+ W/O IMAGING: CPT

## 2018-04-14 PROCEDURE — 94799 UNLISTED PULMONARY SVC/PX: CPT

## 2018-04-14 PROCEDURE — 25000003 PHARM REV CODE 250: Performed by: STUDENT IN AN ORGANIZED HEALTH CARE EDUCATION/TRAINING PROGRAM

## 2018-04-14 PROCEDURE — 25000003 PHARM REV CODE 250: Performed by: NURSE PRACTITIONER

## 2018-04-14 PROCEDURE — 20600001 HC STEP DOWN PRIVATE ROOM

## 2018-04-14 PROCEDURE — 63600175 PHARM REV CODE 636 W HCPCS: Performed by: NURSE PRACTITIONER

## 2018-04-14 PROCEDURE — 63600175 PHARM REV CODE 636 W HCPCS: Performed by: PHYSICIAN ASSISTANT

## 2018-04-14 PROCEDURE — C1751 CATH, INF, PER/CENT/MIDLINE: HCPCS

## 2018-04-14 PROCEDURE — 80202 ASSAY OF VANCOMYCIN: CPT

## 2018-04-14 PROCEDURE — 99233 SBSQ HOSP IP/OBS HIGH 50: CPT | Mod: ,,, | Performed by: NURSE PRACTITIONER

## 2018-04-14 PROCEDURE — 25000003 PHARM REV CODE 250: Performed by: ORTHOPAEDIC SURGERY

## 2018-04-14 PROCEDURE — 02HV33Z INSERTION OF INFUSION DEVICE INTO SUPERIOR VENA CAVA, PERCUTANEOUS APPROACH: ICD-10-PCS | Performed by: INTERNAL MEDICINE

## 2018-04-14 PROCEDURE — 76937 US GUIDE VASCULAR ACCESS: CPT

## 2018-04-14 RX ADMIN — MUPIROCIN 1 G: 20 OINTMENT TOPICAL at 10:04

## 2018-04-14 RX ADMIN — OXYCODONE HYDROCHLORIDE 10 MG: 5 TABLET ORAL at 04:04

## 2018-04-14 RX ADMIN — METHOCARBAMOL 750 MG: 750 TABLET ORAL at 03:04

## 2018-04-14 RX ADMIN — VANCOMYCIN HYDROCHLORIDE 1500 MG: 10 INJECTION, POWDER, LYOPHILIZED, FOR SOLUTION INTRAVENOUS at 01:04

## 2018-04-14 RX ADMIN — VANCOMYCIN HYDROCHLORIDE 1500 MG: 10 INJECTION, POWDER, LYOPHILIZED, FOR SOLUTION INTRAVENOUS at 07:04

## 2018-04-14 RX ADMIN — PREGABALIN 75 MG: 25 CAPSULE ORAL at 10:04

## 2018-04-14 RX ADMIN — ACETAMINOPHEN 650 MG: 325 TABLET ORAL at 06:04

## 2018-04-14 RX ADMIN — CEFTRIAXONE SODIUM 2 G: 2 INJECTION, POWDER, FOR SOLUTION INTRAMUSCULAR; INTRAVENOUS at 10:04

## 2018-04-14 RX ADMIN — CEFEPIME 2 G: 2 INJECTION, POWDER, FOR SOLUTION INTRAVENOUS at 10:04

## 2018-04-14 RX ADMIN — PANTOPRAZOLE SODIUM 40 MG: 40 TABLET, DELAYED RELEASE ORAL at 10:04

## 2018-04-14 RX ADMIN — METHOCARBAMOL 750 MG: 750 TABLET ORAL at 10:04

## 2018-04-14 RX ADMIN — AMLODIPINE BESYLATE 5 MG: 5 TABLET ORAL at 10:04

## 2018-04-14 NOTE — PROGRESS NOTES
[facility]  Orthopedics  Progress Note    Patient Name: Renae Hou  MRN: [id number]  Admission Date: 4/7/2018  Hospital Length of Stay: 5 days  Attending Provider: Dean Zayas MD  Primary Care Provider: Panchito Lindsay MD  Follow-up For: Procedure(s) (LRB):  FUSION-POSTERIOR LUMBAR INTERBODY FUSION L4/5, L5/S1 (N/A)    Post-Operative Day: 4 Days Post-Op  Subjective:     Principal Problem:Spondylolisthesis at L4-L5 level    Principal Orthopedic Problem: L4/5 spondylolisthesis    Interval History: ROBERT overnight. AF.  Pain very well controlled.  ID continues to follow.   No PT yesterday.    Review of patient's allergies indicates:   Allergen Reactions    Iodine Anaphylaxis, Hives, Shortness Of Breath, Itching, Swelling and Rash     Other reaction(s): Difficulty breathing  Neck swelling     Shellfish containing products Anaphylaxis, Hives, Shortness Of Breath, Itching, Swelling and Rash     Other reaction(s): Difficulty breathing  Neck swelling     Latex Swelling     Patient un sure about it       Current Facility-Administered Medications   Medication    acetaminophen tablet 650 mg    aluminum-magnesium hydroxide-simethicone 200-200-20 mg/5 mL suspension 30 mL    amLODIPine tablet 5 mg    calcium carbonate 200 mg calcium (500 mg) chewable tablet 500 mg    ceFEPIme injection 2 g    diphenhydrAMINE capsule 25 mg    fentaNYL injection 25 mcg    heparin (porcine) injection 5,000 Units    methocarbamol tablet 750 mg    mupirocin 2 % ointment 1 g    ondansetron tablet 8 mg    oxyCODONE immediate release tablet 10 mg    oxyCODONE immediate release tablet 15 mg    oxyCODONE immediate release tablet 5 mg    pantoprazole EC tablet 40 mg    polyethylene glycol packet 17 g    pregabalin capsule 75 mg    promethazine tablet 12.5 mg    ramelteon tablet 8 mg    vancomycin (VANCOCIN) 1,500 mg in sodium chloride 0.9% 250 mL IVPB     Objective:     Vital Signs (Most Recent):  Temp:  "98.4 °F (36.9 °C) (04/14/18 1036)  Pulse: 94 (04/14/18 1036)  Resp: 16 (04/14/18 1036)  BP: 122/72 (04/14/18 1036)  SpO2: 95 % (04/14/18 1036) Vital Signs (24h Range):  Temp:  [98.4 °F (36.9 °C)-99.5 °F (37.5 °C)] 98.4 °F (36.9 °C)  Pulse:  [] 94  Resp:  [16] 16  SpO2:  [91 %-100 %] 95 %  BP: (108-132)/(64-72) 122/72     Weight: 90.7 kg (200 lb)  Height: 5' 5" (165.1 cm)  Body mass index is 33.28 kg/m².      Intake/Output Summary (Last 24 hours) at 04/14/18 1415  Last data filed at 04/14/18 0600   Gross per 24 hour   Intake              370 ml   Output                0 ml   Net              370 ml       Ortho/SPM Exam      UE:   D B T WF WE HI  R 5 5 5 5 5 5  L 5 5 5 5 5 5    SILT C5-T1  2+ Radial Pulse  Negative Zavala's  Normoreflexic Biceps, Triceps, Brachiradialis    LE:   IP Q H TA EHL GS  R 5 5 5 5 5 5  L 5 5 5 5 5 5    SILT L2-S1  2+ DP, PT pulses  Negative Clonus  Negative Babinski  Normoreflexic Knee Jerk and Achilles     Significant Labs:   CBC:     Recent Labs  Lab 04/13/18  0541   WBC 9.89   HGB 9.7*   HCT 29.0*        All pertinent labs within the past 24 hours have been reviewed.    Significant Imaging: None    Assessment/Plan:     * Spondylolisthesis at L4-L5 level    57 y.o. female POD4 L4-S1 TLIF, possible infected facet     Pain control: multimodal  PT/OT: WBAT   DVT PPx: FCDs at all times when not ambulating  Continue IV abx per ID recs. FU OR cultures. Final ID recs pending. Will need IV abx at home  Heparin TID    Dispo: home once final ID recs in. PICC line order placed                Jean Cantu MD  Orthopedics  [facility]  "

## 2018-04-14 NOTE — PLAN OF CARE
Problem: Patient Care Overview  Goal: Plan of Care Review  Outcome: Ongoing (interventions implemented as appropriate)  Patient AAO x4,calm and cooperative. Family at bedside. Antibiotics administered during shift. Pt Afebrile overnight. PRN pain meds administered for pain management. Pt stable will continue to monitor.

## 2018-04-14 NOTE — SUBJECTIVE & OBJECTIVE
"Principal Problem:Spondylolisthesis at L4-L5 level    Principal Orthopedic Problem: L4/5 spondylolisthesis    Interval History: ROBERT overnight. AF.  Pain very well controlled.  ID continues to follow.   No PT yesterday.    Review of patient's allergies indicates:   Allergen Reactions    Iodine Anaphylaxis, Hives, Shortness Of Breath, Itching, Swelling and Rash     Other reaction(s): Difficulty breathing  Neck swelling     Shellfish containing products Anaphylaxis, Hives, Shortness Of Breath, Itching, Swelling and Rash     Other reaction(s): Difficulty breathing  Neck swelling     Latex Swelling     Patient un sure about it       Current Facility-Administered Medications   Medication    acetaminophen tablet 650 mg    aluminum-magnesium hydroxide-simethicone 200-200-20 mg/5 mL suspension 30 mL    amLODIPine tablet 5 mg    calcium carbonate 200 mg calcium (500 mg) chewable tablet 500 mg    ceFEPIme injection 2 g    diphenhydrAMINE capsule 25 mg    fentaNYL injection 25 mcg    heparin (porcine) injection 5,000 Units    methocarbamol tablet 750 mg    mupirocin 2 % ointment 1 g    ondansetron tablet 8 mg    oxyCODONE immediate release tablet 10 mg    oxyCODONE immediate release tablet 15 mg    oxyCODONE immediate release tablet 5 mg    pantoprazole EC tablet 40 mg    polyethylene glycol packet 17 g    pregabalin capsule 75 mg    promethazine tablet 12.5 mg    ramelteon tablet 8 mg    vancomycin (VANCOCIN) 1,500 mg in sodium chloride 0.9% 250 mL IVPB     Objective:     Vital Signs (Most Recent):  Temp: 98.4 °F (36.9 °C) (04/14/18 1036)  Pulse: 94 (04/14/18 1036)  Resp: 16 (04/14/18 1036)  BP: 122/72 (04/14/18 1036)  SpO2: 95 % (04/14/18 1036) Vital Signs (24h Range):  Temp:  [98.4 °F (36.9 °C)-99.5 °F (37.5 °C)] 98.4 °F (36.9 °C)  Pulse:  [] 94  Resp:  [16] 16  SpO2:  [91 %-100 %] 95 %  BP: (108-132)/(64-72) 122/72     Weight: 90.7 kg (200 lb)  Height: 5' 5" (165.1 cm)  Body mass index is 33.28 " kg/m².      Intake/Output Summary (Last 24 hours) at 04/14/18 1415  Last data filed at 04/14/18 0600   Gross per 24 hour   Intake              370 ml   Output                0 ml   Net              370 ml       Ortho/SPM Exam      UE:   D B T WF WE HI  R 5 5 5 5 5 5  L 5 5 5 5 5 5    SILT C5-T1  2+ Radial Pulse  Negative Zavala's  Normoreflexic Biceps, Triceps, Brachiradialis    LE:   IP Q H TA EHL GS  R 5 5 5 5 5 5  L 5 5 5 5 5 5    SILT L2-S1  2+ DP, PT pulses  Negative Clonus  Negative Babinski  Normoreflexic Knee Jerk and Achilles     Significant Labs:   CBC:     Recent Labs  Lab 04/13/18  0541   WBC 9.89   HGB 9.7*   HCT 29.0*        All pertinent labs within the past 24 hours have been reviewed.    Significant Imaging: None

## 2018-04-14 NOTE — PLAN OF CARE
Problem: Patient Care Overview  Goal: Plan of Care Review  Outcome: Ongoing (interventions implemented as appropriate)  Patient ambulating in joseph multiple times. She has been refusing the scheduled heparin and SCDs/TEDs, education was done and patient states she understands. No fevers on day shift and no tylenol was given either. Blood cultures drawn in the morning. Patient's pain in back is minimal, mainly the discomfort comes from her legs. No neurological changes. She was able to have a bowel movement today. Vitals WNL.

## 2018-04-14 NOTE — ASSESSMENT & PLAN NOTE
57 y.o. female POD4 L4-S1 TLIF, possible infected facet     Pain control: multimodal  PT/OT: WBAT   DVT PPx: FCDs at all times when not ambulating  Continue IV abx per ID recs. FU OR cultures. Final ID recs pending. Will need IV abx at home  Heparin TID    Dispo: home once final ID recs in. PICC line order placed

## 2018-04-14 NOTE — PROGRESS NOTES
"[facility]  Infectious Disease  Progress Note    Patient Name: Renae Huo  MRN: [id number]  Admission Date: 4/7/2018  Length of Stay: 5 days  Attending Physician: Dean Zayas MD  Primary Care Provider: Panchito Lindsay MD    Isolation Status: No active isolations  Assessment/Plan:      Epidural abscess    57 year old female admitted with acute on chronic back pain; MRI shows lumbar epidural abscesses at L4-L5. Possibly 2/2 recent epidural steroid injections?  Now POD #4 L4-S1 TLIF.  Paraspinal infected soft tissue found, possible infected facet, left cyst removed.   Surgical remain with cultures NGTD.  Gram stain negative.  Had been febrile, but no fevers past 36 hours.  Abx broadened yesterday to cefepime, but looking at med sheet, fevers had resolved prior to initiation of cefepime.  No leukocytosis.  Repeat blood cultures NGTD.  U/A negative.  No respiratory symptoms.  No obvious source.  Today patient feels "great", no complaints, eager to go home    Plan:  1.  Continue IV Vancomycin 1500 q 12 hours.  Trough pending for 1730.  Will make adjustment based on trough  2.  Stop cefepime and start ceftriaxone 2 grams IV q 24 hours.   3.  Will need weekly cbc, cmp, esr, crp and vancomycin trough (on Monday's please) and fax results to ID at [phone]  4.  Anticipate 6 weeks of IV antibiotics from date of surgery - Estimated end date 5/22/18  5.  ID follow up 10-14 days.   6.  Will have final recommendations in a.m.  When can adjust vancomycin dose     Data reviewed and plan discussed with ID staff  Discussed with Primary Team       Data reviewed and plan discussed with ID staff          Thank you.   Please call for any questions or concerns.  Delia Flores, APRN, ANP-C  [phone]    Subjective:     Principal Problem:Spondylolisthesis at L4-L5 level    HPI: Renae Hou is a 56 yo female with PMH of HTN, HLD presenting with acute on chronic exacerbation of low back pain and left leg " "pain. The pain has been worsening since Tuesday.  There is associated numbness and tingling to left lower extremity. There is subjective weakness.  Had a ESIs on 3/2/18 and 3/16/18. Did not take her temperature at home but felt "warm". Denies bowel or bladder problems. Has some weakness to left leg but no numbness.  MRI obtained and shows 1.0 x 0.4 x 0.8 cm rim enhancing fluid collection suggestive of paraspinous abscess at L5 with another 7 mm rim enhancing abscess L4-5. Patient is afebrile; WBC count is 14,000. Blood cultures are pending- NGTD. Was given vanc and rocephin in the ER. Currently not on antibiotics; ID consulted for recs.  Interval History: Feels great today and is ready to go home. Afebrile past 36 hours.  PICC placed. Blood cultures negative. U/A negative.  Tissue cultures with NGTD.      Review of Systems   Constitutional: Negative for activity change, chills, diaphoresis and fever.   Respiratory: Negative for cough, shortness of breath and wheezing.    Cardiovascular: Negative for chest pain.   Gastrointestinal: Negative for abdominal pain, constipation, diarrhea, nausea and vomiting.   Genitourinary: Negative for dysuria, frequency and urgency.   Neurological: Negative for dizziness.   Hematological: Does not bruise/bleed easily.     Objective:     Vital Signs (Most Recent):  Temp: 98.4 °F (36.9 °C) (04/14/18 1036)  Pulse: 94 (04/14/18 1036)  Resp: 16 (04/14/18 1036)  BP: 122/72 (04/14/18 1036)  SpO2: 95 % (04/14/18 1036) Vital Signs (24h Range):  Temp:  [98.4 °F (36.9 °C)-99.5 °F (37.5 °C)] 98.4 °F (36.9 °C)  Pulse:  [] 94  Resp:  [16] 16  SpO2:  [91 %-100 %] 95 %  BP: (108-132)/(64-72) 122/72     Weight: 90.7 kg (200 lb)  Body mass index is 33.28 kg/m².    Estimated Creatinine Clearance: 98.7 mL/min (based on SCr of 0.7 mg/dL).    Physical Exam   Constitutional: She is oriented to person, place, and time. She appears well-developed and well-nourished. No distress.   HENT:   Head: " Normocephalic and atraumatic.   Mouth/Throat: Mucous membranes are normal.   Eyes: Conjunctivae and EOM are normal. No scleral icterus.   Neck: Normal range of motion.   Cardiovascular: Normal rate, regular rhythm and normal heart sounds.  Exam reveals no gallop and no friction rub.    No murmur heard.  Pulmonary/Chest: Effort normal and breath sounds normal. No respiratory distress. She has no wheezes. She has no rales.   Abdominal: Soft. Bowel sounds are normal. She exhibits no distension and no mass. There is no hepatosplenomegaly. There is no tenderness. There is no rebound and no guarding.   Musculoskeletal: She exhibits no edema.   Drains removed. Surgical dressings c/d/i    No lower extremity tenderness, edema, erythema    Neurological: She is alert and oriented to person, place, and time. She has normal strength. No cranial nerve deficit or sensory deficit.   Skin: Skin is warm, dry and intact. No rash noted.   No rash observed     Psychiatric: She has a normal mood and affect. Her behavior is normal.       Significant Labs:   Blood Culture:   Recent Labs  Lab 04/08/18  0314 04/13/18  0934   LABBLOO No growth after 5 days.  No growth after 5 days. No Growth to date  No Growth to date  No Growth to date  No Growth to date     CBC:   Recent Labs  Lab 04/13/18  0541   WBC 9.89   HGB 9.7*   HCT 29.0*        CMP: No results for input(s): NA, K, CL, CO2, GLU, BUN, CREATININE, CALCIUM, PROT, ALBUMIN, BILITOT, ALKPHOS, AST, ALT, ANIONGAP, EGFRNONAA in the last 48 hours.    Invalid input(s): ESTGFAFRICA  Urine Culture: No results for input(s): LABURIN in the last 4320 hours.  Urine Studies:   Recent Labs  Lab 04/07/18  2349 04/13/18  1854   COLORU Yellow Straw   APPEARANCEUA Clear Clear   PHUR 6.0 8.0   SPECGRAV 1.010 1.010   PROTEINUA Negative Negative   GLUCUA Negative Negative   KETONESU Negative Negative   BILIRUBINUA Negative Negative   OCCULTUA Negative Negative   NITRITE Negative Negative    UROBILINOGEN Negative Negative   LEUKOCYTESUR Trace* Negative   RBCUA 1  --    WBCUA 3  --    BACTERIA Rare  --    SQUAMEPITHEL 2  --    HYALINECASTS 1  --      Wound Culture:   Recent Labs  Lab 04/10/18  1132 04/10/18  1137 04/10/18  1145   LABAERO No growth No growth No growth       Significant Imaging: I have reviewed all pertinent imaging results/findings within the past 24 hours.

## 2018-04-14 NOTE — ASSESSMENT & PLAN NOTE
"57 year old female admitted with acute on chronic back pain; MRI shows lumbar epidural abscesses at L4-L5. Possibly 2/2 recent epidural steroid injections?  Now POD #4 L4-S1 TLIF.  Paraspinal infected soft tissue found, possible infected facet, left cyst removed.   Surgical remain with cultures NGTD.  Gram stain negative.  Had been febrile, but no fevers past 36 hours.  Abx broadened yesterday to cefepime, but looking at med sheet, fevers had resolved prior to initiation of cefepime.  No leukocytosis.  Repeat blood cultures NGTD.  U/A negative.  No respiratory symptoms.  No obvious source.  Today patient feels "great", no complaints, eager to go home    Plan:  1.  Continue IV Vancomycin 1500 q 12 hours.  Trough pending for 1730.  Will make adjustment based on trough  2.  Stop cefepime and start ceftriaxone 2 grams IV q 24 hours.   3.  Will need weekly cbc, cmp, esr, crp and vancomycin trough (on Monday's please) and fax results to ID at [phone]  4.  Anticipate 6 weeks of IV antibiotics from date of surgery - Estimated end date 5/22/18  5.  ID follow up 10-14 days.   6.  Will have final recommendations in a.m.  When can adjust vancomycin dose     Data reviewed and plan discussed with ID staff  Discussed with Primary Team       Data reviewed and plan discussed with ID staff  "

## 2018-04-14 NOTE — SUBJECTIVE & OBJECTIVE
Interval History: Feels great today and is ready to go home. Afebrile past 36 hours.  PICC placed. Blood cultures negative. U/A negative.  Tissue cultures with NGTD.      Review of Systems   Constitutional: Negative for activity change, chills, diaphoresis and fever.   Respiratory: Negative for cough, shortness of breath and wheezing.    Cardiovascular: Negative for chest pain.   Gastrointestinal: Negative for abdominal pain, constipation, diarrhea, nausea and vomiting.   Genitourinary: Negative for dysuria, frequency and urgency.   Neurological: Negative for dizziness.   Hematological: Does not bruise/bleed easily.     Objective:     Vital Signs (Most Recent):  Temp: 98.4 °F (36.9 °C) (04/14/18 1036)  Pulse: 94 (04/14/18 1036)  Resp: 16 (04/14/18 1036)  BP: 122/72 (04/14/18 1036)  SpO2: 95 % (04/14/18 1036) Vital Signs (24h Range):  Temp:  [98.4 °F (36.9 °C)-99.5 °F (37.5 °C)] 98.4 °F (36.9 °C)  Pulse:  [] 94  Resp:  [16] 16  SpO2:  [91 %-100 %] 95 %  BP: (108-132)/(64-72) 122/72     Weight: 90.7 kg (200 lb)  Body mass index is 33.28 kg/m².    Estimated Creatinine Clearance: 98.7 mL/min (based on SCr of 0.7 mg/dL).    Physical Exam   Constitutional: She is oriented to person, place, and time. She appears well-developed and well-nourished. No distress.   HENT:   Head: Normocephalic and atraumatic.   Mouth/Throat: Mucous membranes are normal.   Eyes: Conjunctivae and EOM are normal. No scleral icterus.   Neck: Normal range of motion.   Cardiovascular: Normal rate, regular rhythm and normal heart sounds.  Exam reveals no gallop and no friction rub.    No murmur heard.  Pulmonary/Chest: Effort normal and breath sounds normal. No respiratory distress. She has no wheezes. She has no rales.   Abdominal: Soft. Bowel sounds are normal. She exhibits no distension and no mass. There is no hepatosplenomegaly. There is no tenderness. There is no rebound and no guarding.   Musculoskeletal: She exhibits no edema.   Drains  removed. Surgical dressings c/d/i    No lower extremity tenderness, edema, erythema    Neurological: She is alert and oriented to person, place, and time. She has normal strength. No cranial nerve deficit or sensory deficit.   Skin: Skin is warm, dry and intact. No rash noted.   No rash observed     Psychiatric: She has a normal mood and affect. Her behavior is normal.       Significant Labs:   Blood Culture:   Recent Labs  Lab 04/08/18  0314 04/13/18  0934   LABBLOO No growth after 5 days.  No growth after 5 days. No Growth to date  No Growth to date  No Growth to date  No Growth to date     CBC:   Recent Labs  Lab 04/13/18  0541   WBC 9.89   HGB 9.7*   HCT 29.0*        CMP: No results for input(s): NA, K, CL, CO2, GLU, BUN, CREATININE, CALCIUM, PROT, ALBUMIN, BILITOT, ALKPHOS, AST, ALT, ANIONGAP, EGFRNONAA in the last 48 hours.    Invalid input(s): ESTGFAFRICA  Urine Culture: No results for input(s): LABURIN in the last 4320 hours.  Urine Studies:   Recent Labs  Lab 04/07/18  2349 04/13/18  1854   COLORU Yellow Straw   APPEARANCEUA Clear Clear   PHUR 6.0 8.0   SPECGRAV 1.010 1.010   PROTEINUA Negative Negative   GLUCUA Negative Negative   KETONESU Negative Negative   BILIRUBINUA Negative Negative   OCCULTUA Negative Negative   NITRITE Negative Negative   UROBILINOGEN Negative Negative   LEUKOCYTESUR Trace* Negative   RBCUA 1  --    WBCUA 3  --    BACTERIA Rare  --    SQUAMEPITHEL 2  --    HYALINECASTS 1  --      Wound Culture:   Recent Labs  Lab 04/10/18  1132 04/10/18  1137 04/10/18  1145   LABAERO No growth No growth No growth       Significant Imaging: I have reviewed all pertinent imaging results/findings within the past 24 hours.

## 2018-04-14 NOTE — CONSULTS
Double lumen PICC placed to(R) BRACHIAL vein.   36cm in length, 0cm exposed, and 41cm arm circumference.  Lot # [id number]

## 2018-04-14 NOTE — PLAN OF CARE
[facility]    HOME HEALTH ORDERS  FACE TO FACE ENCOUNTER    Patient Name: Renae Hou  YOB: 1960    PCP: Panchito Lindsay MD   PCP Address: [address] / [place] [address]  PCP Phone Number: [phone]  PCP Fax: [phone]    Encounter Date: 04/14/2018    Admit to Home Health    Diagnoses:  Active Hospital Problems    Diagnosis  POA    *Spondylolisthesis at L4-L5 level [M43.16]  Not Applicable    Epidural abscess [G06.2]  Yes    Abscess of paraspinous muscles [M62.89]  Yes    Lumbar radiculopathy [M54.16]  Yes    Spondylolisthesis, acquired [M43.10]  Yes     Added automatically from request for surgery [id number]      Spondylolisthesis [M43.10]  Not Applicable      Resolved Hospital Problems    Diagnosis Date Resolved POA   No resolved problems to display.       Future Appointments  Date Time Provider Department Center   5/1/2018 10:00 AM Mila Jones PA-C [facility]           I have seen and examined this patient face to face today. My clinical findings that support the need for the home health skilled services and home bound status are the following:  Medical restrictions requiring assistance of another human to leave home due to  IV infusion Needs.    Allergies:  Review of patient's allergies indicates:   Allergen Reactions    Iodine Anaphylaxis, Hives, Shortness Of Breath, Itching, Swelling and Rash     Other reaction(s): Difficulty breathing  Neck swelling     Shellfish containing products Anaphylaxis, Hives, Shortness Of Breath, Itching, Swelling and Rash     Other reaction(s): Difficulty breathing  Neck swelling     Latex Swelling     Patient un sure about it       Diet: regular diet    Activities: activity as tolerated    Nursing:   SN to complete comprehensive assessment including routine vital signs. Instruct on disease process and s/s of complications to report to MD. Review/verify medication list sent home with the patient at  time of discharge  and instruct patient/caregiver as needed. Frequency may be adjusted depending on start of care date.    Notify MD if SBP > 160 or < 90; DBP > 90 or < 50; HR > 120 or < 50; Temp > 101; Other:         CONSULTS:    Physical Therapy to evaluate and treat. Evaluate for home safety and equipment needs; Establish/upgrade home exercise program. Perform / instruct on therapeutic exercises, gait training, transfer training, and Range of Motion.  Occupational Therapy to evaluate and treat. Evaluate home environment for safety and equipment needs. Perform/Instruct on transfers, ADL training, ROM, and therapeutic exercises.  Aide to provide assistance with personal care, ADLs, and vital signs.    MISCELLANEOUS CARE:  Routine Skin for Bedridden Patients: Instruct patient/caregiver to apply moisture barrier cream to all skin folds and wet areas in perineal area daily and after baths and all bowel movements.  Home Infusion Therapy:   SN to perform Infusion Therapy/Central Line Care.  Review Central Line Care & Central Line Flush with patient.    Administer (drug and dose): vancomycin 1500mg q12 hours    Last dose given: 4/16/18 0600                         Home dose due: 4/16/18 1800  Estimated end date: 5/22/18    Administer (drug and dose): ceftriaxone 2g q24 hours    Last dose given: 4/15/18 at 2300                        Home dose due: 4/16/18 at 2300  Estimated end date: 5/22/18    Scrub the Hub: Prior to accessing the line, always perform a 30 second alcohol scrub  Each lumen of the central line is to be flushed at least daily with 10 mL Normal Saline and 3 mL Heparin flush (100 units/mL)  Skilled Nurse (SN) may draw blood from IV access  Blood Draw Procedure:   - Aspirate at least 5 mL of blood   - Discard   - Obtain specimen   - Change posiflow cap   - Flush with 20 mL Normal Saline followed by a                 3-5 mL Heparin flush (100 units/mL)  Central :   - Sterile dressing changes  are done weekly and as needed.   - Use chlor-hexadine scrub to cleanse site, apply Biopatch to insertion site,       apply securement device dressing   - Posi-flow caps are changed weekly and after EVERY lab draw.   - If sterile gauze is under dressing to control oozing,                 dressing change must be performed every 24 hours until gauze is not needed.    Will need weekly cbc, cmp, esr, crp and vancomycin trough (on Monday's please) and fax results to ID at [phone]    WOUND CARE ORDERS  n/a      Medications: Review discharge medications with patient and family and provide education.      Current Discharge Medication List      START taking these medications    Details   docusate sodium (COLACE) 100 MG capsule Take 1 capsule (100 mg total) by mouth 2 (two) times daily as needed for Constipation.  Qty: 60 capsule, Refills: 0      methocarbamol (ROBAXIN) 750 MG Tab Take 1 tablet (750 mg total) by mouth 3 (three) times daily.  Qty: 30 tablet, Refills: 0      oxyCODONE-acetaminophen (PERCOCET)  mg per tablet Take 1 tablet by mouth every 4 (four) hours as needed for Pain.  Qty: 90 tablet, Refills: 0      promethazine (PHENERGAN) 12.5 MG Tab Take 1 tablet (12.5 mg total) by mouth every 6 (six) hours as needed (for nausea).  Qty: 30 tablet, Refills: 0         CONTINUE these medications which have NOT CHANGED    Details   amLODIPine (NORVASC) 5 MG tablet Take 1 tablet (5 mg total) by mouth once daily.  Qty: 30 tablet, Refills: 1      hydrocodone-acetaminophen 10-325mg (NORCO)  mg Tab Take by mouth every 6 (six) hours as needed for Pain.      omeprazole (PRILOSEC) 40 MG capsule TK 1 C PO QD- as needed for acid reflux  Refills: 2      POLYETHYLENE GLYCOL 3350 (MIRALAX ORAL) Take by mouth daily as needed. constipation      traMADol (ULTRAM) 50 mg tablet Take 50 mg by mouth every 6 (six) hours as needed for Pain.      ACCU-CHEK MARY ANNE PLUS METER Misc USE TO TEST BLOOD SUGAR D  Refills: 0      diazePAM  (VALIUM) 2 MG tablet Take 1 tablet (2 mg total) by mouth On call Procedure (take 1 30 min prior to procedure, may take second if needed).  Qty: 2 tablet, Refills: 0      meloxicam (MOBIC) 15 MG tablet Take 1 tablet (15 mg total) by mouth once daily.  Qty: 30 tablet, Refills: 6      mupirocin (BACTROBAN) 2 % ointment Refills: 0         STOP taking these medications       cyclobenzaprine (FLEXERIL) 10 MG tablet Comments:   Reason for Stopping:               I certify that this patient is confined to her home and needs intermittent skilled nursing care, physical therapy and occupational therapy.

## 2018-04-15 PROCEDURE — 63600175 PHARM REV CODE 636 W HCPCS: Performed by: NURSE PRACTITIONER

## 2018-04-15 PROCEDURE — 97530 THERAPEUTIC ACTIVITIES: CPT

## 2018-04-15 PROCEDURE — 25000003 PHARM REV CODE 250: Performed by: NURSE PRACTITIONER

## 2018-04-15 PROCEDURE — 25000003 PHARM REV CODE 250: Performed by: STUDENT IN AN ORGANIZED HEALTH CARE EDUCATION/TRAINING PROGRAM

## 2018-04-15 PROCEDURE — 20600001 HC STEP DOWN PRIVATE ROOM

## 2018-04-15 PROCEDURE — 25000003 PHARM REV CODE 250: Performed by: ORTHOPAEDIC SURGERY

## 2018-04-15 RX ADMIN — MUPIROCIN 1 G: 20 OINTMENT TOPICAL at 01:04

## 2018-04-15 RX ADMIN — AMLODIPINE BESYLATE 5 MG: 5 TABLET ORAL at 08:04

## 2018-04-15 RX ADMIN — METHOCARBAMOL 750 MG: 750 TABLET ORAL at 09:04

## 2018-04-15 RX ADMIN — METHOCARBAMOL 750 MG: 750 TABLET ORAL at 05:04

## 2018-04-15 RX ADMIN — METHOCARBAMOL 750 MG: 750 TABLET ORAL at 08:04

## 2018-04-15 RX ADMIN — VANCOMYCIN HYDROCHLORIDE 1500 MG: 10 INJECTION, POWDER, LYOPHILIZED, FOR SOLUTION INTRAVENOUS at 05:04

## 2018-04-15 RX ADMIN — OXYCODONE HYDROCHLORIDE 10 MG: 5 TABLET ORAL at 01:04

## 2018-04-15 RX ADMIN — PREGABALIN 75 MG: 25 CAPSULE ORAL at 09:04

## 2018-04-15 RX ADMIN — VANCOMYCIN HYDROCHLORIDE 1500 MG: 10 INJECTION, POWDER, LYOPHILIZED, FOR SOLUTION INTRAVENOUS at 06:04

## 2018-04-15 RX ADMIN — PANTOPRAZOLE SODIUM 40 MG: 40 TABLET, DELAYED RELEASE ORAL at 08:04

## 2018-04-15 NOTE — PROGRESS NOTES
[facility]  Infectious Disease  Progress Note    Patient Name: Renae Hou  MRN: [id number]  Admission Date: 4/7/2018  Length of Stay: 6 days  Attending Physician: Dean Zayas MD  Primary Care Provider: Panchito Lindsay MD    Isolation Status: No active isolations  Assessment/Plan:      Epidural abscess       57 year old female admitted with acute on chronic back pain; MRI shows lumbar epidural abscesses at L4-L5. Possibly 2/2 recent epidural steroid injections?  Now POD #5 L4-S1 TLIF.  Paraspinal infected soft tissue found, possible infected facet, left cyst removed.   Surgical remain with cultures NGTD.  Gram stain negative.  Had been febrile, but no fevers past 60 hours.  Abx broadened to cefepime on 4/13, but looking at med sheet, fevers had resolved prior to initiation of cefepime.  No leukocytosis.  Repeat blood cultures NGTD.  U/A negative.  No respiratory symptoms.  Patient feels good. Ready to go home.      Plan/Discharge Recommendations:  1.  Continue IV Vancomycin 1500 q 12 hours.  Trough of 20.3 drawn yesterday was not true trough as am dose had been given several hours late. True trough likely lower.  Please ask Home Health to repeat in am.    2. Will check bmp and cbc this am  3.  Continue ceftriaxone 2 grams IV q 24 hours.   4.  Will need weekly cbc, cmp, esr, crp and vancomycin trough (on Monday's please) and fax results to ID at [phone]  5.  Anticipate 6 weeks of IV antibiotics from date of surgery - Estimated end date 5/22/18  6.  ID follow up 10-14 days.     Data reviewed and plan discussed with ID staff  Discussed with Primary Team                   Subjective:     Principal Problem:Spondylolisthesis at L4-L5 level    HPI: Renae Hou is a 56 yo female with PMH of HTN, HLD presenting with acute on chronic exacerbation of low back pain and left leg pain. The pain has been worsening since Tuesday.  There is associated numbness and tingling to left lower  "extremity. There is subjective weakness.  Had a ESIs on 3/2/18 and 3/16/18. Did not take her temperature at home but felt "warm". Denies bowel or bladder problems. Has some weakness to left leg but no numbness.  MRI obtained and shows 1.0 x 0.4 x 0.8 cm rim enhancing fluid collection suggestive of paraspinous abscess at L5 with another 7 mm rim enhancing abscess L4-5. Patient is afebrile; WBC count is 14,000. Blood cultures are pending- NGTD. Was given vanc and rocephin in the ER. Currently not on antibiotics; ID consulted for recs.    Interval History: No acute events overnight. Remains afebrile after antibiotic de-escalation.  Continues to feel good.  Vanc trough yesterday 20.3, but was not true trough as review of MAR shows morning dose had been given several hours late.  For discharge today.     Review of Systems   Constitutional: Negative for activity change, chills, diaphoresis and fever.   Respiratory: Negative for cough, shortness of breath and wheezing.    Cardiovascular: Negative for chest pain.   Gastrointestinal: Negative for abdominal pain, constipation, diarrhea, nausea and vomiting.   Genitourinary: Negative for dysuria, frequency and urgency.   Neurological: Negative for dizziness.   Hematological: Does not bruise/bleed easily.     Objective:     Vital Signs (Most Recent):  Temp: 98.8 °F (37.1 °C) (04/15/18 0353)  Pulse: 99 (04/15/18 0353)  Resp: 14 (04/15/18 0353)  BP: 121/78 (04/15/18 0353)  SpO2: 95 % (04/15/18 0353) Vital Signs (24h Range):  Temp:  [98.4 °F (36.9 °C)-98.9 °F (37.2 °C)] 98.8 °F (37.1 °C)  Pulse:  [] 99  Resp:  [14-18] 14  SpO2:  [95 %-98 %] 95 %  BP: (121-131)/(67-79) 121/78     Weight: 90.7 kg (200 lb)  Body mass index is 33.28 kg/m².    Estimated Creatinine Clearance: 98.7 mL/min (based on SCr of 0.7 mg/dL).    Physical Exam   Constitutional: She is oriented to person, place, and time. She appears well-developed and well-nourished. No distress.   HENT:   Head: " Normocephalic and atraumatic.   Mouth/Throat: Mucous membranes are normal.   Eyes: Conjunctivae and EOM are normal. No scleral icterus.   Neck: Normal range of motion.   Cardiovascular: Normal rate, regular rhythm and normal heart sounds.  Exam reveals no gallop and no friction rub.    No murmur heard.  Pulmonary/Chest: Effort normal and breath sounds normal. No respiratory distress. She has no wheezes. She has no rales.   Abdominal: Soft. Bowel sounds are normal. She exhibits no distension and no mass. There is no hepatosplenomegaly. There is no tenderness. There is no rebound and no guarding.   Musculoskeletal: She exhibits no edema.   Drains removed. Surgical dressings c/d/i    No lower extremity tenderness, edema, erythema    Neurological: She is alert and oriented to person, place, and time. She has normal strength. No cranial nerve deficit or sensory deficit.   Skin: Skin is warm, dry and intact. No rash noted.   No rash observed     Psychiatric: She has a normal mood and affect. Her behavior is normal.       Significant Labs:   BMP: No results for input(s): GLU, NA, K, CL, CO2, BUN, CREATININE, CALCIUM, MG in the last 48 hours.  CBC: No results for input(s): WBC, HGB, HCT, PLT in the last 48 hours.  CMP: No results for input(s): NA, K, CL, CO2, GLU, BUN, CREATININE, CALCIUM, PROT, ALBUMIN, BILITOT, ALKPHOS, AST, ALT, ANIONGAP, EGFRNONAA in the last 48 hours.    Invalid input(s): ESTGFAFRICA  Wound Culture:   Recent Labs  Lab 04/10/18  1132 04/10/18  1137 04/10/18  1145   LABAERO No growth No growth No growth       Significant Imaging: I have reviewed all pertinent imaging results/findings within the past 24 hours.

## 2018-04-15 NOTE — PLAN OF CARE
CM asked by Cindy, charge nurse on 10th floor, to review pt and see if she can d/c home today with HH and IV ABX. CM reviewed chart, per previous CM note they were waiting on final ID recs. She stated pt has d/c order for this morning. Advised her I will review to see if d/c today would be possible and call her back.     0900- Spoke to Priya, on call for [facility], advised her of this pt, and she stated they do not have a referral on this pt. She stated this pt will require prior auth from her insurance co before they can provide services and that can not be obtained until tomorrow. Advised I will add [facility] tot he treatment team and fax over the HH orders and ID note to [phone]. She stated the office will work on auth 1st thing in the  Morning. Charge nurse Cindy up-dated on this information.     Information faxed to [facility] at the nu,[facility] above with fax confirmation e-mail recv'd.     Pt will need HH arranged.

## 2018-04-15 NOTE — NURSING
Called and spoke with Ortho resident re: barriers for d/c home today. See CM note. MD will cancel d/c order. Patient updated and bedside RN updated.

## 2018-04-15 NOTE — PLAN OF CARE
Problem: Occupational Therapy Goal  Goal: Occupational Therapy Goal  Goals to be met by: 4/18/18    Patient will increase functional independence with ADLs by performing:    LE Dressing with Set-up Assistance.  Grooming while standing at sink with Set-up Assistance.  Toileting from toilet with Set-up Assistance for hygiene and clothing management.   Rolling to Bilateral with [place].   Supine to sit with [place].  Stand pivot transfers with Modified [place].  Toilet transfer to toilet with Modified [place].          Outcome: Outcome(s) achieved Date Met: 04/15/18  Pt d/c'ed from OT 4/15/2018 as pt with no further acute OT needs at this time.

## 2018-04-15 NOTE — SUBJECTIVE & OBJECTIVE
Interval History: No acute events overnight. Remains afebrile after antibiotic de-escalation.  Continues to feel good.  Vanc trough yesterday 20.3, but was not true trough as review of MAR shows morning dose had been given several hours late.  For discharge today.     Review of Systems   Constitutional: Negative for activity change, chills, diaphoresis and fever.   Respiratory: Negative for cough, shortness of breath and wheezing.    Cardiovascular: Negative for chest pain.   Gastrointestinal: Negative for abdominal pain, constipation, diarrhea, nausea and vomiting.   Genitourinary: Negative for dysuria, frequency and urgency.   Neurological: Negative for dizziness.   Hematological: Does not bruise/bleed easily.     Objective:     Vital Signs (Most Recent):  Temp: 98.8 °F (37.1 °C) (04/15/18 0353)  Pulse: 99 (04/15/18 0353)  Resp: 14 (04/15/18 0353)  BP: 121/78 (04/15/18 0353)  SpO2: 95 % (04/15/18 0353) Vital Signs (24h Range):  Temp:  [98.4 °F (36.9 °C)-98.9 °F (37.2 °C)] 98.8 °F (37.1 °C)  Pulse:  [] 99  Resp:  [14-18] 14  SpO2:  [95 %-98 %] 95 %  BP: (121-131)/(67-79) 121/78     Weight: 90.7 kg (200 lb)  Body mass index is 33.28 kg/m².    Estimated Creatinine Clearance: 98.7 mL/min (based on SCr of 0.7 mg/dL).    Physical Exam   Constitutional: She is oriented to person, place, and time. She appears well-developed and well-nourished. No distress.   HENT:   Head: Normocephalic and atraumatic.   Mouth/Throat: Mucous membranes are normal.   Eyes: Conjunctivae and EOM are normal. No scleral icterus.   Neck: Normal range of motion.   Cardiovascular: Normal rate, regular rhythm and normal heart sounds.  Exam reveals no gallop and no friction rub.    No murmur heard.  Pulmonary/Chest: Effort normal and breath sounds normal. No respiratory distress. She has no wheezes. She has no rales.   Abdominal: Soft. Bowel sounds are normal. She exhibits no distension and no mass. There is no hepatosplenomegaly. There is no  tenderness. There is no rebound and no guarding.   Musculoskeletal: She exhibits no edema.   Surgical dressings c/d/i       Neurological: She is alert and oriented to person, place, and time. She has normal strength. No cranial nerve deficit or sensory deficit.   Skin: Skin is warm, dry and intact. No rash noted.   Psychiatric: She has a normal mood and affect. Her behavior is normal.       Significant Labs:   BMP: No results for input(s): GLU, NA, K, CL, CO2, BUN, CREATININE, CALCIUM, MG in the last 48 hours.  CBC: No results for input(s): WBC, HGB, HCT, PLT in the last 48 hours.  CMP: No results for input(s): NA, K, CL, CO2, GLU, BUN, CREATININE, CALCIUM, PROT, ALBUMIN, BILITOT, ALKPHOS, AST, ALT, ANIONGAP, EGFRNONAA in the last 48 hours.    Invalid input(s): ESTGFAFR[place]  Wound Culture:   Recent Labs  Lab 04/10/18  1132 04/10/18  1137 04/10/18  1145   LABAERO No growth No growth No growth       Significant Imaging: I have reviewed all pertinent imaging results/findings within the past 24 hours.

## 2018-04-15 NOTE — PROGRESS NOTES
[facility]  Orthopedics  Progress Note    Patient Name: Renae Hou  MRN: [id number]  Admission Date: 4/7/2018  Hospital Length of Stay: 6 days  Attending Provider: Dean Zayas MD  Primary Care Provider: Panchito Lindsay MD  Follow-up For: Procedure(s) (LRB):  FUSION-POSTERIOR LUMBAR INTERBODY FUSION L4/5, L5/S1 (N/A)    Post-Operative Day: 5 Days Post-Op  Subjective:     Principal Problem:Spondylolisthesis at L4-L5 level    Principal Orthopedic Problem: L4/5 spondylolisthesis    Interval History: ROBERT overnight. AF.  Pain very well controlled.  ID continues to follow.   No PT yesterday.    Review of patient's allergies indicates:   Allergen Reactions    Iodine Anaphylaxis, Hives, Shortness Of Breath, Itching, Swelling and Rash     Other reaction(s): Difficulty breathing  Neck swelling     Shellfish containing products Anaphylaxis, Hives, Shortness Of Breath, Itching, Swelling and Rash     Other reaction(s): Difficulty breathing  Neck swelling     Latex Swelling     Patient un sure about it       Current Facility-Administered Medications   Medication    acetaminophen tablet 650 mg    aluminum-magnesium hydroxide-simethicone 200-200-20 mg/5 mL suspension 30 mL    amLODIPine tablet 5 mg    calcium carbonate 200 mg calcium (500 mg) chewable tablet 500 mg    cefTRIAXone (ROCEPHIN) 2 g in dextrose 5 % 50 mL IVPB    diphenhydrAMINE capsule 25 mg    fentaNYL injection 25 mcg    heparin (porcine) injection 5,000 Units    methocarbamol tablet 750 mg    mupirocin 2 % ointment 1 g    ondansetron tablet 8 mg    oxyCODONE immediate release tablet 10 mg    oxyCODONE immediate release tablet 15 mg    oxyCODONE immediate release tablet 5 mg    pantoprazole EC tablet 40 mg    polyethylene glycol packet 17 g    pregabalin capsule 75 mg    promethazine tablet 12.5 mg    ramelteon tablet 8 mg    vancomycin (VANCOCIN) 1,500 mg in sodium chloride 0.9% 250 mL IVPB     Objective:  "    Vital Signs (Most Recent):  Temp: 98.8 °F (37.1 °C) (04/15/18 0353)  Pulse: 99 (04/15/18 0353)  Resp: 14 (04/15/18 0353)  BP: 121/78 (04/15/18 0353)  SpO2: 95 % (04/15/18 0353) Vital Signs (24h Range):  Temp:  [98.4 °F (36.9 °C)-98.9 °F (37.2 °C)] 98.8 °F (37.1 °C)  Pulse:  [] 99  Resp:  [14-18] 14  SpO2:  [95 %-98 %] 95 %  BP: (121-131)/(67-79) 121/78     Weight: 90.7 kg (200 lb)  Height: 5' 5" (165.1 cm)  Body mass index is 33.28 kg/m².      Intake/Output Summary (Last 24 hours) at 04/15/18 0711  Last data filed at 04/15/18 0515   Gross per 24 hour   Intake              850 ml   Output                0 ml   Net              850 ml       Ortho/SPM Exam      UE:   D B T WF WE HI  R 5 5 5 5 5 5  L 5 5 5 5 5 5    SILT C5-T1  2+ Radial Pulse  Negative Zavala's  Normoreflexic Biceps, Triceps, Brachiradialis    LE:   IP Q H TA EHL GS  R 5 5 5 5 5 5  L 5 5 5 5 5 5    SILT L2-S1  2+ DP, PT pulses  Negative Clonus  Negative Babinski  Normoreflexic Knee Jerk and Achilles     Significant Labs:   CBC:   No results for input(s): WBC, HGB, HCT, PLT in the last 48 hours.  All pertinent labs within the past 24 hours have been reviewed.    Significant Imaging: None    Assessment/Plan:     * Spondylolisthesis at L4-L5 level    57 y.o. female POD5 L4-S1 TLIF, possible infected facet     Pain control: multimodal  PT/OT: WBAT   DVT PPx: FCDs at all times when not ambulating  Continue IV abx per ID recs. FU OR cultures. Final ID recs pending. Will need IV abx at home  Heparin TID    Dispo: home once final ID recs in                Jean Cantu MD  Orthopedics  [facility]  "

## 2018-04-15 NOTE — PLAN OF CARE
Problem: Patient Care Overview  Goal: Plan of Care Review  Outcome: Ongoing (interventions implemented as appropriate)  POC reviewed with patient. AAOx4. VSS. No complaints of pain. No nausea. Afebrile. IV abx administered. Pt ambulating to restroom w/ walker. Refusing heparin/SCDs. NAD noted. Will continue to monitor.

## 2018-04-15 NOTE — PT/OT/SLP PROGRESS
Occupational Therapy   Treatment & Discharge    Name: Renae Hou  MRN: [id number]  Admitting Diagnosis:  Spondylolisthesis at L4-L5 level  5 Days Post-Op    Recommendations:     Discharge Recommendations: outpatient PT  Discharge Equipment Recommendations:  walker, rolling  Barriers to discharge:  None    Subjective     Communicated with: RN (margarette) prior to session. Pt agreeable to therapy treatment.   Pain/Comfort:  · Pain Rating 1: 0/10  · Pain Rating Post-Intervention 1: 0/10    Patients cultural, spiritual, Quaker conflicts given the current situation: none reported     Objective:     Patient found with: telemetry    General Precautions: Standard, fall   Orthopedic Precautions:spinal precautions   Braces: N/A     Occupational Performance:    Bed Mobility:    · Patient completed Rolling/Turning to Right with supervision  · Utilizing log roll method  · Patient completed Scooting/Bridging with supervision  · Patient completed Supine to Sit with supervision   · From side-lying position with HOB elevated     Functional Mobility/Transfers:  · Patient completed Sit <> Stand Transfer with supervision  with  rolling walker from EOB with bed in lowest position   · Functional Mobility: Pt engaging in functional mobility to simulate household distances approx 200 ft with supervision utilizing RW for support, without any noted LOB or rest breaks in order to maximize activity tolerance required for engagement in occupations of choice.     Activities of Daily Living:  · LB Dressing: stand by assistance     · Pt educated on compensatory strategies for LB dressing to maintain spinal precautions   · Pt able to access BLE's to don/doff socks via 4 point position while seated at EOB    Patient left seated at EOB with all lines intact, call button in reach and RN notified    AMPA 6 Click:  AMPA Total Score: 24    Treatment & Education:  -Pt re-educated on spinal precautions and impact on ADLs  -Communicated d/c from  skilled acute OT  -Communication board updated   Education:    Assessment:     Renae Hou is a 57 y.o. female with a medical diagnosis of Spondylolisthesis at L4-L5 level. Pt with no further acute OT needs at this time. Will d/c OT orders 4/15/2018.     Plan:     Patient to be seen 3 x/week to address the above listed problems via self-care/home management, therapeutic activities, therapeutic exercises  · Plan of Care Expires: 04/15/18  · Plan of Care Reviewed with: patient    This Plan of care has been discussed with the patient who was involved in its development and understands and is in agreement with the identified goals and treatment plan    GOALS:    Occupational Therapy Goals     Not on file          Multidisciplinary Problems (Resolved)        Problem: Occupational Therapy Goal    Goal Priority Disciplines Outcome Interventions   Occupational Therapy Goal   (Resolved)     OT, PT/OT Outcome(s) achieved    Description:  Goals to be met by: 4/18/18    Patient will increase functional independence with ADLs by performing:    LE Dressing with Set-up Assistance.  Grooming while standing at sink with Set-up Assistance.  Toileting from toilet with Set-up Assistance for hygiene and clothing management.   Rolling to Bilateral with [place].   Supine to sit with [place].  Stand pivot transfers with Modified [place].  Toilet transfer to toilet with Modified [place].                           Time Tracking:     OT Date of Treatment: 04/15/18  OT Start Time: 1036  OT Stop Time: 1047  OT Total Time (min): 11 min    Billable Minutes:Therapeutic Activity 11 minutes    Bee Leach, OT  4/15/2018

## 2018-04-15 NOTE — PLAN OF CARE
Problem: Patient Care Overview  Goal: Plan of Care Review  Patient afebrile throughout shift. Vital signs stable. Patient able to ambulate well with walker and ambulated in the joseph today with her . Patient only reported pain later in the evening when asked and received po pain medication once.

## 2018-04-16 VITALS
DIASTOLIC BLOOD PRESSURE: 74 MMHG | BODY MASS INDEX: 33.32 KG/M2 | TEMPERATURE: 98 F | HEIGHT: 65 IN | SYSTOLIC BLOOD PRESSURE: 128 MMHG | HEART RATE: 95 BPM | RESPIRATION RATE: 18 BRPM | WEIGHT: 200 LBS | OXYGEN SATURATION: 100 %

## 2018-04-16 PROCEDURE — 25000003 PHARM REV CODE 250: Performed by: NURSE PRACTITIONER

## 2018-04-16 PROCEDURE — 97116 GAIT TRAINING THERAPY: CPT

## 2018-04-16 PROCEDURE — 25000003 PHARM REV CODE 250: Performed by: STUDENT IN AN ORGANIZED HEALTH CARE EDUCATION/TRAINING PROGRAM

## 2018-04-16 PROCEDURE — 63600175 PHARM REV CODE 636 W HCPCS: Performed by: NURSE PRACTITIONER

## 2018-04-16 RX ADMIN — METHOCARBAMOL 750 MG: 750 TABLET ORAL at 02:04

## 2018-04-16 RX ADMIN — VANCOMYCIN HYDROCHLORIDE 1500 MG: 10 INJECTION, POWDER, LYOPHILIZED, FOR SOLUTION INTRAVENOUS at 06:04

## 2018-04-16 RX ADMIN — CEFTRIAXONE SODIUM 2 G: 2 INJECTION, POWDER, FOR SOLUTION INTRAMUSCULAR; INTRAVENOUS at 12:04

## 2018-04-16 RX ADMIN — PANTOPRAZOLE SODIUM 40 MG: 40 TABLET, DELAYED RELEASE ORAL at 08:04

## 2018-04-16 RX ADMIN — METHOCARBAMOL 750 MG: 750 TABLET ORAL at 08:04

## 2018-04-16 RX ADMIN — AMLODIPINE BESYLATE 5 MG: 5 TABLET ORAL at 08:04

## 2018-04-16 NOTE — PROGRESS NOTES
[facility]  Orthopedics  Progress Note    Patient Name: Renae Hou  MRN: [id number]  Admission Date: 4/7/2018  Hospital Length of Stay: 7 days  Attending Provider: Dean Zayas MD  Primary Care Provider: Panchito Lindsay MD  Follow-up For: Procedure(s) (LRB):  FUSION-POSTERIOR LUMBAR INTERBODY FUSION L4/5, L5/S1 (N/A)    Post-Operative Day: 6 Days Post-Op  Subjective:     Principal Problem:Spondylolisthesis at L4-L5 level    Principal Orthopedic Problem: L4/5 spondylolisthesis    Interval History: ROBERT overnight. AF.  Pain very well controlled.  Waiting for HH IV abx. Wants to go home.     Review of patient's allergies indicates:   Allergen Reactions    Iodine Anaphylaxis, Hives, Shortness Of Breath, Itching, Swelling and Rash     Other reaction(s): Difficulty breathing  Neck swelling     Shellfish containing products Anaphylaxis, Hives, Shortness Of Breath, Itching, Swelling and Rash     Other reaction(s): Difficulty breathing  Neck swelling     Latex Swelling     Patient un sure about it       Current Facility-Administered Medications   Medication    acetaminophen tablet 650 mg    aluminum-magnesium hydroxide-simethicone 200-200-20 mg/5 mL suspension 30 mL    amLODIPine tablet 5 mg    calcium carbonate 200 mg calcium (500 mg) chewable tablet 500 mg    cefTRIAXone (ROCEPHIN) 2 g in dextrose 5 % 50 mL IVPB    diphenhydrAMINE capsule 25 mg    fentaNYL injection 25 mcg    heparin (porcine) injection 5,000 Units    methocarbamol tablet 750 mg    ondansetron tablet 8 mg    oxyCODONE immediate release tablet 10 mg    oxyCODONE immediate release tablet 15 mg    oxyCODONE immediate release tablet 5 mg    pantoprazole EC tablet 40 mg    polyethylene glycol packet 17 g    pregabalin capsule 75 mg    promethazine tablet 12.5 mg    ramelteon tablet 8 mg    vancomycin (VANCOCIN) 1,500 mg in sodium chloride 0.9% 250 mL IVPB     Objective:     Vital Signs (Most Recent):  Temp:  "98.7 °F (37.1 °C) (04/16/18 0730)  Pulse: 92 (04/16/18 0730)  Resp: 18 (04/16/18 0730)  BP: 118/72 (04/16/18 0730)  SpO2: 95 % (04/16/18 0730) Vital Signs (24h Range):  Temp:  [98.6 °F (37 °C)-99.1 °F (37.3 °C)] 98.7 °F (37.1 °C)  Pulse:  [] 92  Resp:  [13-18] 18  SpO2:  [95 %-99 %] 95 %  BP: (118-147)/(72-92) 118/72     Weight: 90.7 kg (200 lb)  Height: 5' 5" (165.1 cm)  Body mass index is 33.28 kg/m².      Intake/Output Summary (Last 24 hours) at 04/16/18 0828  Last data filed at 04/16/18 0300   Gross per 24 hour   Intake              550 ml   Output                0 ml   Net              550 ml       Ortho/SPM Exam      UE:   D B T WF WE HI  R 5 5 5 5 5 5  L 5 5 5 5 5 5    SILT C5-T1  2+ Radial Pulse  Negative Zavala's  Normoreflexic Biceps, Triceps, Brachiradialis    LE:   IP Q H TA EHL GS  R 5 5 5 5 5 5  L 5 5 5 5 5 5    SILT L2-S1  2+ DP, PT pulses  Negative Clonus  Negative Babinski  Normoreflexic Knee Jerk and Achilles     Significant Labs:   CBC:   No results for input(s): WBC, HGB, HCT, PLT in the last 48 hours.  All pertinent labs within the past 24 hours have been reviewed.    Significant Imaging: None    Assessment/Plan:     * Spondylolisthesis at L4-L5 level    57 y.o. female POD6 L4-S1 TLIF, possible infected facet     Pain control: multimodal  PT/OT: WBAT   DVT PPx: FCDs at all times when not ambulating  Continue IV abx per ID recs. FU OR cultures. Final ID recs pending. Will need IV abx at home  Heparin TID    Dispo: home today. Awaiting set up for                 Lito Ochoa MD  Orthopedics  [facility]  "

## 2018-04-16 NOTE — ASSESSMENT & PLAN NOTE
57 y.o. female POD6 L4-S1 TLIF, possible infected facet     Pain control: multimodal  PT/OT: WBAT   DVT PPx: FCDs at all times when not ambulating  Continue IV abx per ID recs. FU OR cultures. Final ID recs pending. Will need IV abx at home  Heparin TID    Dispo: home today. Awaiting set up for HH

## 2018-04-16 NOTE — PLAN OF CARE
4:01 PM  SW received call from Latoya with [facility] who stated they will deliver medications to pt's home tonight. Pt is due to get next dose at hospital tonight at 6:00PM. Informed RN, Inna.     Moon Hannah, STEPHEN   [facility]  Ext [phone]'

## 2018-04-16 NOTE — PLAN OF CARE
Addendum on 4/16/18 at 1045: CM notified that [facility] can not accept patient's insurance either. SW to set patient up with [facility]; see SW notes for details.    Addendum on 4/16/18 at 0945: CM notified that [facility] ([facility]) can not accept patient's insurance. SW to set patient up with [facility].    POD 6 L4-S1 TLIF. PT/OT ordered to eval and treat. PT/OT recommended outpatient rehab and a rolling walker. Patient's rolling walker delivered to patient's bedside last week. Discharge pending SW setting up IV infusions with [facility]. Estimated end date 5/22/18. SW and CM will continue to follow for any additional needs. Discharge and follow-up instructions to be completed by the bedside nurse.    Future Appointments  Date Time Provider Department Center   4/27/2018 11:00 AM Bere Painter MD [facility] ID [address]   5/1/2018 10:00 AM Mila Jones PA-C [facility] SP[facility] [address]      04/16/18 0910   Final Note   Assessment Type Final Discharge Note   Discharge Disposition Home  ([facility] for IV antibiotics)   What phone number can be called within the next 1-3 days to see how you are doing after discharge? ([phone])   Hospital Follow Up  Appt(s) scheduled? Yes   Discharge plans and expectations educations in teach back method with documentation complete? Yes

## 2018-04-16 NOTE — PLAN OF CARE
Problem: Patient Care Overview  Goal: Plan of Care Review  Outcome: Ongoing (interventions implemented as appropriate)  no changes. Patient to discharge after 18:00 dose of vanco finished infusing.

## 2018-04-16 NOTE — PLAN OF CARE
Problem: Physical Therapy Goal  Goal: Physical Therapy Goal  Goals to be met by: 10 days ()    Patient will increase functional independence with mobility by performin. Supine to sit with Modified [place] Met 18  2. Sit to supine with Modified [place]  3. Sit to stand transfer with Modified [place] Met 18  4. Gait  x 200 feet with Supervision using no assistive device   5. Lower extremity exercise program x30 reps per handout, with independence to improve muscular strength and endurance.        Outcome: Ongoing (interventions implemented as appropriate)  2 goals met today

## 2018-04-16 NOTE — SUBJECTIVE & OBJECTIVE
"Principal Problem:Spondylolisthesis at L4-L5 level    Principal Orthopedic Problem: L4/5 spondylolisthesis    Interval History: ROBERT overnight. AF.  Pain very well controlled.  Waiting for HH IV abx. Wants to go home.     Review of patient's allergies indicates:   Allergen Reactions    Iodine Anaphylaxis, Hives, Shortness Of Breath, Itching, Swelling and Rash     Other reaction(s): Difficulty breathing  Neck swelling     Shellfish containing products Anaphylaxis, Hives, Shortness Of Breath, Itching, Swelling and Rash     Other reaction(s): Difficulty breathing  Neck swelling     Latex Swelling     Patient un sure about it       Current Facility-Administered Medications   Medication    acetaminophen tablet 650 mg    aluminum-magnesium hydroxide-simethicone 200-200-20 mg/5 mL suspension 30 mL    amLODIPine tablet 5 mg    calcium carbonate 200 mg calcium (500 mg) chewable tablet 500 mg    cefTRIAXone (ROCEPHIN) 2 g in dextrose 5 % 50 mL IVPB    diphenhydrAMINE capsule 25 mg    fentaNYL injection 25 mcg    heparin (porcine) injection 5,000 Units    methocarbamol tablet 750 mg    ondansetron tablet 8 mg    oxyCODONE immediate release tablet 10 mg    oxyCODONE immediate release tablet 15 mg    oxyCODONE immediate release tablet 5 mg    pantoprazole EC tablet 40 mg    polyethylene glycol packet 17 g    pregabalin capsule 75 mg    promethazine tablet 12.5 mg    ramelteon tablet 8 mg    vancomycin (VANCOCIN) 1,500 mg in sodium chloride 0.9% 250 mL IVPB     Objective:     Vital Signs (Most Recent):  Temp: 98.7 °F (37.1 °C) (04/16/18 0730)  Pulse: 92 (04/16/18 0730)  Resp: 18 (04/16/18 0730)  BP: 118/72 (04/16/18 0730)  SpO2: 95 % (04/16/18 0730) Vital Signs (24h Range):  Temp:  [98.6 °F (37 °C)-99.1 °F (37.3 °C)] 98.7 °F (37.1 °C)  Pulse:  [] 92  Resp:  [13-18] 18  SpO2:  [95 %-99 %] 95 %  BP: (118-147)/(72-92) 118/72     Weight: 90.7 kg (200 lb)  Height: 5' 5" (165.1 cm)  Body mass index is 33.28 " kg/m².      Intake/Output Summary (Last 24 hours) at 04/16/18 0828  Last data filed at 04/16/18 0300   Gross per 24 hour   Intake              550 ml   Output                0 ml   Net              550 ml       Ortho/SPM Exam      UE:   D B T WF WE HI  R 5 5 5 5 5 5  L 5 5 5 5 5 5    SILT C5-T1  2+ Radial Pulse  Negative Zavala's  Normoreflexic Biceps, Triceps, Brachiradialis    LE:   IP Q H TA EHL GS  R 5 5 5 5 5 5  L 5 5 5 5 5 5    SILT L2-S1  2+ DP, PT pulses  Negative Clonus  Negative Babinski  Normoreflexic Knee Jerk and Achilles     Significant Labs:   CBC:   No results for input(s): WBC, HGB, HCT, PLT in the last 48 hours.  All pertinent labs within the past 24 hours have been reviewed.    Significant Imaging: None

## 2018-04-16 NOTE — PT/OT/SLP PROGRESS
Physical Therapy Treatment    Patient Name:  Renae Hou   MRN:  [id number]    Recommendations:     Discharge Recommendations:  outpatient PT   Discharge Equipment Recommendations: none   Barriers to discharge: None    Assessment:     Renae Hou is a 57 y.o. female admitted with a medical diagnosis of Spondylolisthesis at L4-L5 level.  She presents with the following impairments/functional limitations:  weakness, impaired endurance, gait instability, impaired balance, orthopedic precautions demonstrating mod I for bed mobility and transfers, supervision for ambulation using Rw, and SBA for stair negotiation with L rail. Pt demonstrates good stability and safety awareness with ability to maintain spinal precautions during all functional mobility. Pt is appropriate to return home with assistance from spouse upon acute discharge with OP PT recommendations.     Rehab Prognosis:  good; patient would benefit from acute skilled PT services to address these deficits and reach maximum level of function.      Recent Surgery: Procedure(s) (LRB):  FUSION-POSTERIOR LUMBAR INTERBODY FUSION L4/5, L5/S1 (N/A) 6 Days Post-Op    Plan:     During this hospitalization, patient to be seen 3 x/week to address the above listed problems via gait training, therapeutic activities, therapeutic exercises  · Plan of Care Expires:  05/12/18   Plan of Care Reviewed with: patient    Subjective     Communicated with pt and nurse prior to session.  Patient found R sidelying in bed upon PT entry to room, agreeable to treatment.      Chief Complaint: slight weakness and impaired balance requiring use of RW  Patient comments/goals: to return home  Pain/Comfort:  · Pain Rating 1: 0/10  · Pain Rating Post-Intervention 1: 0/10    Patients cultural, spiritual, Baptist conflicts given the current situation: none    Objective:     Patient found with: PICC line     General Precautions: Standard, fall   Orthopedic Precautions:spinal precautions    Braces: N/A     Functional Mobility:  · Bed Mobility:     · Supine to Sit: modified independence  · Transfers:     · Sit to Stand:  modified independence with rolling walker  · Gait: 400 feet using RW with supervision with pt demonstrating good mechanics but slightly slow connie  · Balance: pt is able to stand to accept min challenges without LOB  · Stairs:  Pt ascended/descended 5 stair(s) with No Assistive Device with left handrail with Stand-by Assistance.       AM-PAC 6 CLICK MOBILITY  Turning over in bed (including adjusting bedclothes, sheets and blankets)?: 4  Sitting down on and standing up from a chair with arms (e.g., wheelchair, bedside commode, etc.): 4  Moving from lying on back to sitting on the side of the bed?: 4  Moving to and from a bed to a chair (including a wheelchair)?: 4  Need to walk in hospital room?: 3  Climbing 3-5 steps with a railing?: 3  Total Score: 22       Therapeutic Activities and Exercises:   PT provided pt education on:   -role of PT and POC   -importance of OOB activity   -LE exercises to perform independently   -encourage amb in hallway with assistance from spouse/staff      Patient left up in chair with call button in reach..    GOALS:    Physical Therapy Goals        Problem: Physical Therapy Goal    Goal Priority Disciplines Outcome Goal Variances Interventions   Physical Therapy Goal     PT/OT, PT Ongoing (interventions implemented as appropriate)     Description:  Goals to be met by: 10 days ()    Patient will increase functional independence with mobility by performin. Supine to sit with Modified [place] Met 18  2. Sit to supine with Modified [place]  3. Sit to stand transfer with Modified [place] Met 18  4. Gait  x 200 feet with Supervision using no assistive device   5. Lower extremity exercise program x30 reps per handout, with independence to improve muscular strength and endurance.                          Time Tracking:     PT  Received On: 04/16/18  PT Start Time: 1121     PT Stop Time: 1131  PT Total Time (min): 10 min     Billable Minutes: Gait Training 10    Treatment Type: Treatment  PT/PTA: PT     PTA Visit Number: 0     Coleen Rush, PT  04/16/2018

## 2018-04-16 NOTE — PLAN OF CARE
9:41 AM  QUINCY received notification that [facility] cannot take pt's insurance. Contacted Priya with [facility] to indicate if she can accept insurance. [facility] cannot take pt. Called Cailin with [employer] who stated they will forward referral to another company that may be able to accept pt.   10:45 AM  QUINCY received call from Cailin with [employer] who stated the infusion company, [facility] is in network with pt's insurance. Called [facility] [phone] and spoke with Cuco who stated to fax information to [phone]. Faxed clinicals. [facility] stated a representative will come to hospital to teach pt and they will also find nursing for home health.       Moon Hannah, STEPHEN   [facility]  Ext [phone]

## 2018-04-16 NOTE — DISCHARGE INSTRUCTIONS
DR. SARA SINGLETON    POSTOPERATIVE LUMBAR FUSION INSTRUCTIONS    Antibiotics: You do not need additional antibiotics at home.    NSAIDs: Please refrain from taking ibuprofen (Advil), naproxen (Aleve), and other non steroidal anti-inflammatory medications (NSAIDs) as they may inhibit bone healing in the postoperative period.    Wound Care: You may remove your dressing and shower 7 days after surgery. Until then please keep your wound clean and dry. Sponge baths are acceptable. Do not go in a pool or hot tub until seen in clinic. Please leave the small steri-strips covering your wound in place until they fall off naturally (2 weeks). You may notice clear suture ends hanging from the sides of your incense after the steri-strips are removed, it is ok to clip these with scissors.    Brace: You may be prescribed a brace, please wear this when up and walking, it is not necessary to wear at night when sleeping.    Pain: You will be given a prescription for pain medicines and muscle relaxers before discharge. If you pain is not adequately controlled with these medications, please call the number below.    Infection: Signs of infection include increasing wound drainage and redness around the wound, as well as a temperature over 101.5 degrees. It is unnecessary to take your temperature on a routine basis. Please call the below number if you are concerned about an infection.    Driving and Work: It is ok to return to driving and work as long as you are not taking narcotic pain medications and can walk greater than 100 feet. Please do not lift over 10 pounds or participate in exercise or sports until cleared by Dr. Zayas.    Deep Venous Thrombosis (Blood Clots): Symptoms include swelling in the legs and shortness of breath. Please call the office or proceed to the nearest emergency room if you have any of these symptoms.    Physical Therapy: The best physical therapy after surgery is walking. Please try to walk as much as  possible.    Follow-up: You will be scheduled for a follow-up appointment in 2-3 weeks.    Questions: During business hours please call [phone] for routine questions. For after hours questions please call [phone] and ask to speak with the orthopaedic resident on call.

## 2018-04-16 NOTE — PLAN OF CARE
Problem: Patient Care Overview  Goal: Plan of Care Review  Outcome: Ongoing (interventions implemented as appropriate)  POC reviewed with pt and family. Questions and concerns addressed. No acute events overnight,VSS.  Ambulating in room with walker. Dressing to back CDI. Pt refusing SCD's and heparin. Continuing to monitor.

## 2018-04-17 ENCOUNTER — PATIENT MESSAGE (OUTPATIENT)
Dept: INFECTIOUS DISEASES | Facility: CLINIC | Age: 58
End: 2018-04-17

## 2018-04-17 ENCOUNTER — TELEPHONE (OUTPATIENT)
Dept: INFECTIOUS DISEASES | Facility: HOSPITAL | Age: 58
End: 2018-04-17

## 2018-04-17 ENCOUNTER — PATIENT MESSAGE (OUTPATIENT)
Dept: SPINE | Facility: CLINIC | Age: 58
End: 2018-04-17

## 2018-04-17 LAB
BACTERIA SPEC ANAEROBE CULT: NORMAL

## 2018-04-17 NOTE — TELEPHONE ENCOUNTER
Telephone call to patient.  She was to have vancomycin trough drawn yesterday. Infusion company is [employer]?  Unclear is she has Home Health drawing blood or if she is going to infusion company for blood draws.  No answer. Left voicemail to call me

## 2018-04-17 NOTE — DISCHARGE SUMMARY
[facility]  Spine Surgery  Discharge Summary      Patient Name: Renae Hou  MRN: [id number]  Admission Date: 4/7/2018  Hospital Length of Stay: 7 days  Discharge Date and Time: 4/16/2018  8:17 PM  Attending Physician: Stephanie att. providers found   Discharging Provider: Lito Ochoa MD  Primary Care Provider: Panchito Lindsay MD     HPI: \    Renae Hou is a 57 y.o. year old female who presented to [facility] with a chief complaint of back and leg pain. After a thorough history and physical with appropriate imaging, the patient was found to have a spondylolisthesis and facet cyst. The decision was made to proceed with L4-S1 TLID for surgical fixation. No guarantees were made. Verbal and written consent was obtained. All risks and benefits were explained in full detail and the patient and family agreed to proceed. On the day of surgery, the patient underwent a TLIF without complication. There was concern for infection of the facet cyst and she was placed on IV abx. They recommended 6 weeks of IV abx.The patient was transported from the operative suite to the PACU in stable condition. The patient was then transferred to the floor for post surgical care and close monitoring. No complications were encountered. All electrolytes were replaced as necessary and the patient remained hemodynamically stable throughout their hospital stay. A PICC line was placed. Physical therapy was consulted to assist with mobility and transfers and they felt that his progress was sufficient for discharge home. The patient's pain was well controlled on oral medications and they wanted to be discharged home. The patient was scheduled for a follow up appointment in 3 weeks with Dr. Zayas.  Wound care instructions were explained in detail to the patient and family.             Procedure(s) (LRB):  FUSION-POSTERIOR LUMBAR INTERBODY FUSION L4/5, L5/S1 (N/A)         Consults:   Consults         Status Ordering Provider  "    Inpatient consult to Infectious Diseases  Once     Provider:  (Not yet assigned)    Completed TYREE GRACIA     Inpatient consult to Orthopedic Surgery  Once     Provider:  (Not yet assigned)    Completed CHIN GARCIA     Inpatient consult to PICC team ([facility])  Once     Provider:  (Not yet assigned)    Completed SANDRA CAT Diagnostic Studies: Microbiology: Wound Culture: NGTD    Pending Diagnostic Studies:     Procedure Component Value Units Date/Time    VANCOMYCIN, TROUGH before 4th dose [[id number]] Collected:  04/14/18 1730    Order Status:  Sent Lab Status:  No result     Specimen:  Blood from Blood         Final Active Diagnoses:    Diagnosis Date Noted POA    PRINCIPAL PROBLEM:  Spondylolisthesis at L4-L5 level [M43.16] 04/08/2018 Not Applicable    Epidural abscess [G06.2] 04/10/2018 Yes    Abscess of paraspinous muscles [M62.89] 04/10/2018 Yes    Lumbar radiculopathy [M54.16] 04/10/2018 Yes    Spondylolisthesis, acquired [M43.10] 04/10/2018 Yes    Spondylolisthesis [M43.10] 04/09/2018 Not Applicable      Problems Resolved During this Admission:    Diagnosis Date Noted Date Resolved POA      Discharged Condition: good    Disposition: Home or Self Care    Follow Up:  Follow-up Information     Dean Zayas MD In 3 weeks.    Specialties:  Orthopedic Surgery, Spine Surgery  Why:  For wound re-check, For suture removal  Contact information:  [address] [facility] [address]  [phone]                 Patient Instructions:     WALKER FOR HOME USE   Order Specific Question Answer Comments   Type of Walker: Adult (5'4"-6'6")    With wheels? Yes    Height: 5' 5" (1.651 m)    Weight: 90.7 kg (200 lb)    Length of need (1-99 months): 99    Does patient have medical equipment at home? none    Please check all that apply: Patient is unable to safely ambulate without equipment.    Please check all that apply: Walker will be used for gait training.    Vendor: " [facility]    Expected Date of Delivery: 4/13/2018      Activity as tolerated     Notify your health care provider if you experience any of the following:  temperature >100.4     Notify your health care provider if you experience any of the following:  persistent nausea and vomiting or diarrhea     Notify your health care provider if you experience any of the following:  severe uncontrolled pain     Notify your health care provider if you experience any of the following:  redness, tenderness, or signs of infection (pain, swelling, redness, odor or green/yellow discharge around incision site)     Notify your health care provider if you experience any of the following:  difficulty breathing or increased cough     Notify your health care provider if you experience any of the following:  severe persistent headache     Notify your health care provider if you experience any of the following:  worsening rash     Notify your health care provider if you experience any of the following:  persistent dizziness, light-headedness, or visual disturbances     Notify your health care provider if you experience any of the following:  increased confusion or weakness     Change dressing (specify)   Order Comments: See patient instructions       Medications:  Reconciled Home Medications:      Medication List      START taking these medications    cefTRIAXone 2 g in dextrose 5 % 50 mL 2 g/50 mL Pgbk IVPB  Commonly known as:  ROCEPHIN  Inject 50 mLs (2 g total) into the vein once daily.     docusate sodium 100 MG capsule  Commonly known as:  COLACE  Take 1 capsule (100 mg total) by mouth 2 (two) times daily as needed for Constipation.     methocarbamol 750 MG Tab  Commonly known as:  ROBAXIN  Take 1 tablet (750 mg total) by mouth 3 (three) times daily.     oxyCODONE-acetaminophen  mg per tablet  Commonly known as:  PERCOCET  Take 1 tablet by mouth every 4 (four) hours as needed for Pain.     promethazine 12.5 MG Tab  Commonly known  as:  PHENERGAN  Take 1 tablet (12.5 mg total) by mouth every 6 (six) hours as needed (for nausea).     sodium chloride 0.9% SolP 250 mL with vancomycin 1,000 mg SolR 1,500 mg  Inject 1,500 mg into the vein every 12 (twelve) hours.        CONTINUE taking these medications    ACCU-CHEK MARY ANNE PLUS METER Misc  Generic drug:  blood-glucose meter  USE TO TEST BLOOD SUGAR D     amLODIPine 5 MG tablet  Commonly known as:  NORVASC  Take 1 tablet (5 mg total) by mouth once daily.     MIRALAX ORAL  Take by mouth daily as needed. constipation     mupirocin 2 % ointment  Commonly known as:  BACTROBAN     omeprazole 40 MG capsule  Commonly known as:  PRILOSEC  TK 1 C PO QD- as needed for acid reflux     traMADol 50 mg tablet  Commonly known as:  ULTRAM  Take 50 mg by mouth every 6 (six) hours as needed for Pain.        STOP taking these medications    cyclobenzaprine 10 MG tablet  Commonly known as:  FLEXERIL     diazePAM 2 MG tablet  Commonly known as:  VALIUM     hydrocodone-acetaminophen 10-325mg  mg Tab  Commonly known as:  NORCO     meloxicam 15 MG tablet  Commonly known as:  SIVA Ochoa MD  General Surgery  [facility]

## 2018-04-18 LAB
BACTERIA BLD CULT: NORMAL
BACTERIA BLD CULT: NORMAL

## 2018-04-19 NOTE — PT/OT/SLP DISCHARGE
Physical Therapy Discharge Summary    Name: Renae Hou  MRN: [id number]   Principal Problem: Spondylolisthesis at L4-L5 level     Patient Discharged from acute Physical Therapy on 17.  Please refer to prior PT noted date on 18 for functional status.     Assessment:     Goals partially met.    Objective:     GOALS:    Physical Therapy Goals        Problem: Physical Therapy Goal    Goal Priority Disciplines Outcome Goal Variances Interventions   Physical Therapy Goal     PT/OT, PT Ongoing (interventions implemented as appropriate)     Description:  Goals to be met by: 10 days ()    Patient will increase functional independence with mobility by performin. Supine to sit with Modified [place] [address]  2. Sit to supine with Modified [place]  3. Sit to stand transfer with Modified [place] Met 18  4. Gait  x 200 feet with Supervision using no assistive device   5. Lower extremity exercise program x30 reps per handout, with independence to improve muscular strength and endurance.                          Reasons for Discontinuation of Therapy Services  Transfer to alternate level of care.      Plan:     Patient Discharged to: Home with Home Health Service with recs for progression to OPPT.    Coleen Rush, PT  2018

## 2018-04-19 NOTE — PHYSICIAN QUERY
PT Name: Renae Hou  MR #: [id number]    Physician Query Form - Cause and Effect Relationship Clarification      CDS: Norma Medrano RN            Contact information: [email or web address]    This form is a permanent document in the medical record.     Query Date: April 19, 2018    By submitting this query, we are merely seeking further clarification of documentation. Please utilize your independent clinical judgment when addressing the question(s) below.    The Medical record contains the following:  Supporting Clinical Findings   Location in record   57 y.o. female with L4-5 grade 1 spondylolisthesis and associated left sided facet cyst with acute on chronic exacerbation of back pain -- possible injection contamination                                                                   Ortho PN 4/9   The patient recently presented after epidural injection for fevers, elevated white blood cell count, inflammatory markers where imaging demonstrated a potential left paraspinal muscle abscess as well as epidural extension of the infection  Please note that a questionable left paraspinal muscle   abscess was encountered and completely excised                                                                                     Op note 4/11   57 year old female admitted with acute on chronic back pain; MRI shows lumbar epidural abscesses at L4-L5. Possibly 2/2 recent epidural steroid injections? ID PN 4/15     Provider, please clarify if there is any correlation between the epidural steroid injection and the epidural/paraspinal abscess .           Are the conditions:     [  ] Due to or associated with each other     [  ] Unrelated to each other     [x  ] Other (Please Specify): _____________it is possible____________     [  ] Clinically Undetermined

## 2018-04-20 ENCOUNTER — TELEPHONE (OUTPATIENT)
Dept: INFECTIOUS DISEASES | Facility: CLINIC | Age: 58
End: 2018-04-20

## 2018-04-20 NOTE — TELEPHONE ENCOUNTER
Infectious Disease - Lab follow up   Labs of 4/20/18    Dx: Epidural abscess L4-L5 - Cultures negative to date  Antibiotics: IV vancomycin 1500 q 12 hours.  IV ceftriaxone 2 grams q 24 hours   Estimated End Date:  5/22/18    WBC - 5.7  H/H - 10/30  Platelets - 504  Creatinine - .6  ESR - 1  CRP - 16.8 ( baseline 181)  AST/ALT - 29/34  Vancomycin Trough - 12.7    Would hold dose steady for now. Repeat is scheduled for Monday.    Has follow up 4/27 with Dr. Painter

## 2018-04-23 ENCOUNTER — TELEPHONE (OUTPATIENT)
Dept: INFECTIOUS DISEASES | Facility: CLINIC | Age: 58
End: 2018-04-23

## 2018-04-23 NOTE — TELEPHONE ENCOUNTER
WBC - 5.7  H/H - 10/30  Platelets - 504  Creatinine - .6  ESR - 1  CRP - 16.8 ( baseline 181)

## 2018-04-24 ENCOUNTER — TELEPHONE (OUTPATIENT)
Dept: INFECTIOUS DISEASES | Facility: CLINIC | Age: 58
End: 2018-04-24

## 2018-04-24 NOTE — TELEPHONE ENCOUNTER
4/23/2018  LFTs increasing, will need to continue to monitor ALT/AST    WBC 5.3  Vanc trough 14.7    ALT 96  AST 85  AlkPhos 290    CRP 12.8 (<5)

## 2018-04-25 ENCOUNTER — PATIENT MESSAGE (OUTPATIENT)
Dept: INFECTIOUS DISEASES | Facility: CLINIC | Age: 58
End: 2018-04-25

## 2018-04-26 ENCOUNTER — TELEPHONE (OUTPATIENT)
Dept: INFECTIOUS DISEASES | Facility: CLINIC | Age: 58
End: 2018-04-26

## 2018-04-26 RX ORDER — METHOCARBAMOL 750 MG/1
750 TABLET, FILM COATED ORAL 3 TIMES DAILY
Qty: 90 TABLET | Refills: 0 | Status: SHIPPED | OUTPATIENT
Start: 2018-04-26 | End: 2018-05-25 | Stop reason: SDUPTHER

## 2018-04-26 NOTE — TELEPHONE ENCOUNTER
Patient reports not feeling well today, fatigued, difficult to get out of bed, took temperature 100.6  Denied any redness, swelling, tenderness over picc line  No back pain, no pain over surgical site  No abdominal pain, no diarrhea    On last labs, LFTs were rising - possible cholestatic picture from ceftriaxone. Advised patient to discontinue ceftriaxone for now     Patient with follow-up appointment tomorrow.

## 2018-04-27 ENCOUNTER — OFFICE VISIT (OUTPATIENT)
Dept: INFECTIOUS DISEASES | Facility: CLINIC | Age: 58
End: 2018-04-27
Payer: COMMERCIAL

## 2018-04-27 ENCOUNTER — INFUSION (OUTPATIENT)
Dept: INFECTIOUS DISEASES | Facility: HOSPITAL | Age: 58
End: 2018-04-27
Attending: INTERNAL MEDICINE
Payer: COMMERCIAL

## 2018-04-27 ENCOUNTER — HOSPITAL ENCOUNTER (EMERGENCY)
Facility: HOSPITAL | Age: 58
Discharge: HOME | End: 2018-04-27
Attending: FAMILY MEDICINE
Payer: COMMERCIAL

## 2018-04-27 VITALS
HEIGHT: 66 IN | WEIGHT: 209.44 LBS | TEMPERATURE: 99 F | BODY MASS INDEX: 33.66 KG/M2 | HEART RATE: 110 BPM | SYSTOLIC BLOOD PRESSURE: 156 MMHG | DIASTOLIC BLOOD PRESSURE: 98 MMHG

## 2018-04-27 VITALS
HEART RATE: 117 BPM | OXYGEN SATURATION: 98 % | RESPIRATION RATE: 16 BRPM | TEMPERATURE: 99 F | SYSTOLIC BLOOD PRESSURE: 135 MMHG | WEIGHT: 209 LBS | HEIGHT: 68 IN | BODY MASS INDEX: 31.67 KG/M2 | DIASTOLIC BLOOD PRESSURE: 89 MMHG

## 2018-04-27 VITALS — DIASTOLIC BLOOD PRESSURE: 90 MMHG | HEART RATE: 95 BPM | SYSTOLIC BLOOD PRESSURE: 150 MMHG

## 2018-04-27 DIAGNOSIS — R50.9 FEVER, UNSPECIFIED FEVER CAUSE: ICD-10-CM

## 2018-04-27 DIAGNOSIS — G06.2 EPIDURAL ABSCESS: Primary | ICD-10-CM

## 2018-04-27 DIAGNOSIS — M60.08 ABSCESS OF PARASPINOUS MUSCLES: ICD-10-CM

## 2018-04-27 DIAGNOSIS — T78.40XA ALLERGIC REACTION, INITIAL ENCOUNTER: Primary | ICD-10-CM

## 2018-04-27 DIAGNOSIS — T82.898A OCCLUDED PICC LINE, INITIAL ENCOUNTER: Primary | ICD-10-CM

## 2018-04-27 DIAGNOSIS — R74.01 TRANSAMINITIS: ICD-10-CM

## 2018-04-27 DIAGNOSIS — M43.16 SPONDYLOLISTHESIS AT L4-L5 LEVEL: ICD-10-CM

## 2018-04-27 LAB
ALBUMIN SERPL BCP-MCNC: 3.7 G/DL
ALP SERPL-CCNC: 277 U/L
ALT SERPL W/O P-5'-P-CCNC: 82 U/L
ANION GAP SERPL CALC-SCNC: 11 MMOL/L
AST SERPL-CCNC: 55 U/L
BASOPHILS # BLD AUTO: 0.06 K/UL
BASOPHILS NFR BLD: 0.8 %
BILIRUB SERPL-MCNC: 0.2 MG/DL
BUN SERPL-MCNC: 12 MG/DL
CALCIUM SERPL-MCNC: 9.9 MG/DL
CHLORIDE SERPL-SCNC: 106 MMOL/L
CO2 SERPL-SCNC: 25 MMOL/L
CREAT SERPL-MCNC: 0.8 MG/DL
DIFFERENTIAL METHOD: ABNORMAL
EOSINOPHIL # BLD AUTO: 0.3 K/UL
EOSINOPHIL NFR BLD: 3.3 %
ERYTHROCYTE [DISTWIDTH] IN BLOOD BY AUTOMATED COUNT: 14.6 %
EST. GFR  (AFRICAN AMERICAN): >60 ML/MIN/1.73 M^2
EST. GFR  (NON AFRICAN AMERICAN): >60 ML/MIN/1.73 M^2
GLUCOSE SERPL-MCNC: 146 MG/DL
HCT VFR BLD AUTO: 36.5 %
HGB BLD-MCNC: 12 G/DL
IMM GRANULOCYTES # BLD AUTO: 0.06 K/UL
IMM GRANULOCYTES NFR BLD AUTO: 0.8 %
LYMPHOCYTES # BLD AUTO: 1.4 K/UL
LYMPHOCYTES NFR BLD: 18.1 %
MCH RBC QN AUTO: 30.8 PG
MCHC RBC AUTO-ENTMCNC: 32.9 G/DL
MCV RBC AUTO: 94 FL
MONOCYTES # BLD AUTO: 0.3 K/UL
MONOCYTES NFR BLD: 3.4 %
NEUTROPHILS # BLD AUTO: 5.8 K/UL
NEUTROPHILS NFR BLD: 73.6 %
NRBC BLD-RTO: 0 /100 WBC
PLATELET # BLD AUTO: 407 K/UL
PMV BLD AUTO: 8.7 FL
POTASSIUM SERPL-SCNC: 4.5 MMOL/L
PROT SERPL-MCNC: 7.8 G/DL
RBC # BLD AUTO: 3.9 M/UL
SODIUM SERPL-SCNC: 142 MMOL/L
WBC # BLD AUTO: 7.83 K/UL

## 2018-04-27 PROCEDURE — 96372 THER/PROPH/DIAG INJ SC/IM: CPT | Mod: 59

## 2018-04-27 PROCEDURE — 63600175 PHARM REV CODE 636 W HCPCS: Performed by: INTERNAL MEDICINE

## 2018-04-27 PROCEDURE — 99999 PR PBB SHADOW E&M-EST. PATIENT-LVL III: CPT | Mod: PBBFAC,,, | Performed by: INTERNAL MEDICINE

## 2018-04-27 PROCEDURE — 99283 EMERGENCY DEPT VISIT LOW MDM: CPT

## 2018-04-27 PROCEDURE — 99283 EMERGENCY DEPT VISIT LOW MDM: CPT | Mod: ,,, | Performed by: PHYSICIAN ASSISTANT

## 2018-04-27 PROCEDURE — 96374 THER/PROPH/DIAG INJ IV PUSH: CPT

## 2018-04-27 PROCEDURE — 96375 TX/PRO/DX INJ NEW DRUG ADDON: CPT

## 2018-04-27 PROCEDURE — 85025 COMPLETE CBC W/AUTO DIFF WBC: CPT

## 2018-04-27 PROCEDURE — 99215 OFFICE O/P EST HI 40 MIN: CPT | Mod: S$GLB,,, | Performed by: INTERNAL MEDICINE

## 2018-04-27 PROCEDURE — 25000003 PHARM REV CODE 250: Performed by: INTERNAL MEDICINE

## 2018-04-27 PROCEDURE — 80053 COMPREHEN METABOLIC PANEL: CPT

## 2018-04-27 RX ORDER — ERGOCALCIFEROL 1.25 MG/1
CAPSULE ORAL
COMMUNITY
End: 2018-08-13

## 2018-04-27 RX ORDER — DICYCLOMINE HYDROCHLORIDE 20 MG/1
TABLET ORAL
COMMUNITY
End: 2018-07-06

## 2018-04-27 RX ORDER — THYROID 60 MG/1
TABLET ORAL
COMMUNITY
End: 2018-07-06

## 2018-04-27 RX ORDER — HYDROXYZINE HYDROCHLORIDE 25 MG/1
TABLET, FILM COATED ORAL
COMMUNITY
End: 2018-07-06

## 2018-04-27 RX ORDER — GABAPENTIN 300 MG/1
CAPSULE ORAL
COMMUNITY
End: 2018-07-06

## 2018-04-27 RX ORDER — ACETAMINOPHEN AND CODEINE PHOSPHATE 300; 30 MG/1; MG/1
TABLET ORAL
COMMUNITY
End: 2018-04-27

## 2018-04-27 RX ORDER — AMOXICILLIN 875 MG/1
TABLET, FILM COATED ORAL
COMMUNITY
End: 2018-04-27

## 2018-04-27 RX ORDER — DIPHENHYDRAMINE HYDROCHLORIDE 50 MG/ML
25 INJECTION INTRAMUSCULAR; INTRAVENOUS ONCE
Status: COMPLETED | OUTPATIENT
Start: 2018-04-27 | End: 2018-04-27

## 2018-04-27 RX ORDER — CLOTRIMAZOLE AND BETAMETHASONE DIPROPIONATE 10; .64 MG/G; MG/G
CREAM TOPICAL
COMMUNITY
End: 2019-07-10

## 2018-04-27 RX ORDER — HYDROCODONE BITARTRATE AND ACETAMINOPHEN 5; 325 MG/1; MG/1
TABLET ORAL
COMMUNITY
End: 2018-04-27

## 2018-04-27 RX ORDER — VALSARTAN 80 MG/1
TABLET ORAL
COMMUNITY
End: 2018-04-27

## 2018-04-27 RX ORDER — LISINOPRIL 10 MG/1
TABLET ORAL
COMMUNITY
End: 2018-04-27

## 2018-04-27 RX ORDER — METHYLPREDNISOLONE SOD SUCC 125 MG
125 VIAL (EA) INJECTION ONCE
Status: COMPLETED | OUTPATIENT
Start: 2018-04-27 | End: 2018-04-27

## 2018-04-27 RX ORDER — PHENTERMINE HYDROCHLORIDE 37.5 MG/1
CAPSULE ORAL
COMMUNITY
End: 2018-04-27

## 2018-04-27 RX ORDER — CELECOXIB 200 MG/1
CAPSULE ORAL
COMMUNITY
End: 2018-07-06

## 2018-04-27 RX ORDER — CEFUROXIME AXETIL 250 MG/1
TABLET ORAL
COMMUNITY
End: 2018-04-27

## 2018-04-27 RX ORDER — PHENTERMINE HYDROCHLORIDE 37.5 MG/1
TABLET ORAL
COMMUNITY
End: 2018-07-06

## 2018-04-27 RX ORDER — PENICILLIN V POTASSIUM 500 MG/1
TABLET, FILM COATED ORAL
COMMUNITY
End: 2018-04-27

## 2018-04-27 RX ORDER — SULFAMETHOXAZOLE AND TRIMETHOPRIM 800; 160 MG/1; MG/1
TABLET ORAL
COMMUNITY
End: 2018-07-06

## 2018-04-27 RX ORDER — CYCLOBENZAPRINE HCL 5 MG
TABLET ORAL
COMMUNITY
End: 2018-04-27

## 2018-04-27 RX ORDER — CLONIDINE HYDROCHLORIDE 0.1 MG/1
TABLET ORAL
COMMUNITY
End: 2018-04-27

## 2018-04-27 RX ORDER — EPINEPHRINE 0.3 MG/.3ML
0.3 INJECTION SUBCUTANEOUS ONCE
Status: COMPLETED | OUTPATIENT
Start: 2018-04-27 | End: 2018-04-27

## 2018-04-27 RX ORDER — LEVOFLOXACIN 500 MG/1
TABLET, FILM COATED ORAL
COMMUNITY
End: 2018-04-27

## 2018-04-27 RX ORDER — HYDROCHLOROTHIAZIDE 12.5 MG/1
TABLET ORAL
COMMUNITY
End: 2018-07-06

## 2018-04-27 RX ORDER — CLONIDINE HYDROCHLORIDE 0.2 MG/1
TABLET ORAL
COMMUNITY
End: 2018-07-06

## 2018-04-27 RX ORDER — CLINDAMYCIN HYDROCHLORIDE 300 MG/1
CAPSULE ORAL
COMMUNITY
End: 2018-07-06

## 2018-04-27 RX ORDER — IBUPROFEN AND FAMOTIDINE 26.6; 8 MG/1; MG/1
TABLET ORAL
COMMUNITY
End: 2018-04-27

## 2018-04-27 RX ORDER — BUTALBITAL, ACETAMINOPHEN AND CAFFEINE 50; 325; 40 MG/1; MG/1; MG/1
TABLET ORAL
COMMUNITY
End: 2018-04-27

## 2018-04-27 RX ORDER — VALSARTAN 160 MG/1
TABLET ORAL
COMMUNITY
End: 2018-07-06

## 2018-04-27 RX ORDER — ETODOLAC 400 MG/1
TABLET, FILM COATED ORAL
COMMUNITY
End: 2018-04-27

## 2018-04-27 RX ADMIN — EPINEPHRINE 0.3 MG: 0.3 INJECTION INTRAMUSCULAR at 03:04

## 2018-04-27 RX ADMIN — METHYLPREDNISOLONE SODIUM SUCCINATE 125 MG: 125 INJECTION, POWDER, FOR SOLUTION INTRAMUSCULAR; INTRAVENOUS at 03:04

## 2018-04-27 RX ADMIN — DIPHENHYDRAMINE HYDROCHLORIDE 25 MG: 50 INJECTION INTRAMUSCULAR; INTRAVENOUS at 02:04

## 2018-04-27 RX ADMIN — ALTEPLASE 2 MG: 2.2 INJECTION, POWDER, LYOPHILIZED, FOR SOLUTION INTRAVENOUS at 01:04

## 2018-04-27 NOTE — ED NOTES
LOC: The patient is awake, alert, aware of environment with an appropriate affect. Oriented x4, speaking appropriately  APPEARANCE: Pt resting comfortably, in no acute distress, pt is clean and well groomed, clothing properly fastened  SKIN:The skin is warm and dry, color consistent with ethnicity, patient has normal skin turgor and moist mucus membranes  MUSCULOSKELETAL: Patient moving all extremities spontaneously, no obvious swelling or deformities noted. Right brachial PICC noted.   NEUROLOGIC: PERRLA, facial expression is symmetrical, patient moving all extremities spontaneously, normal sensation in all extremities when touched with a finger.  Follows all commands appropriately  HEAD/FACE: The head is rounded; normocephalic and symmetrical.   RESPIRATORY:Airway is open and patent, raspy voice noted, respirations are spontaneous, patient has a normal effort and rate, no accessory muscle use noted. RR 20 equal and unlabored.   CARDIAC: Normal rate and rhythm, no peripheral edema noted, capillary refill < 3 seconds, bilateral radial pulses 2+.  ABDOMEN: S/ND/NT, normoactive bowel sounds present in all four quadrants. Normal stool pattern. Jugular veins are non-distended.

## 2018-04-27 NOTE — ED TRIAGE NOTES
"Pt arrives to the ED via personal transport from the the infusion area/ [facility] with CC of an allergic reaction to CathFlow. Pt chest tightness, restlessness, and flushing. Pt was given Benadryl, SQ epi, and solumedrol. Pt reports that the first dose of Benadryl "helped but everything came back". Pt reports that the second dose of Benadryl, SQ Epi, and Solumedrol. Pt airway is open and patent. Pt is alert and oriented and in no acute distress at this time.    "

## 2018-04-27 NOTE — ED PROVIDER NOTES
Encounter Date: 4/27/2018       History     Chief Complaint   Patient presents with    Allergic Reaction     allergic reaction to cathflow at infusion clinic, was given benadryl, solumedrol, and subq epi.       The patient is a 57-year-old female with past medical history of hypertension who was recently discharged on IV antibiotics who presents to the emergency department due to of allergic reaction.  Patient states she went for lab work today and PICC was flushed with cath flow.  Patient states that after this occurrence she started having tightness in her throat as well as severe agitation around 215.  Patient states this resolved after 30 min.  Patient denies any fevers, chills, chest pain, nausea, vomiting, or any other complaints.          Review of patient's allergies indicates:   Allergen Reactions    Cathflo activase [alteplase]      Tightness in chest, raspy throat, restless legs, hotness all over    Iodine Anaphylaxis, Hives, Shortness Of Breath, Itching, Swelling and Rash     Other reaction(s): Difficulty breathing  Neck swelling     Shellfish containing products Anaphylaxis, Hives, Shortness Of Breath, Itching, Swelling and Rash     Other reaction(s): Difficulty breathing  Neck swelling     Heparin analogues      Restless legs, tightness in chest, and hot all over    Latex Swelling     Patient un sure about it    Iodine and iodide containing products      Past Medical History:   Diagnosis Date    Fatty liver     Hypertension     Menopause     Obesity     Sleep apnea     Thyroid disease     Thyroid nodules.     Past Surgical History:   Procedure Laterality Date    BACK SURGERY  2002    L4, L5    BILATERAL SALPINGOOPHORECTOMY      CHOLECYSTECTOMY      CYST REMOVAL      HYSTERECT[facility]  12/17/2012    [facility]     RECTOCELE REPAIR      THYROID NODULE REMOVAL       Family History   Problem Relation Age of Onset    Hypertension Mother     Hyperlipidemia Mother     Heart disease Mother 55      cad, valvular heart disease    Alcohol abuse Sister     No Known Problems Daughter     No Known Problems Daughter     Breast cancer Neg Hx     Colon cancer Neg Hx     Ovarian cancer Neg Hx     Diabetes Neg Hx      Social History   Substance Use Topics    Smoking status: Former Smoker     Packs/day: 0.25     Types: Cigarettes     Quit date: 1988    Smokeless tobacco: Never Used      Comment: smoked socially decades ago - weekends only    Alcohol use Yes      Comment: SELDOMLY     Review of Systems   Constitutional: Negative for activity change, appetite change, diaphoresis, fatigue and fever.   HENT: Negative for congestion, dental problem, drooling, ear pain, facial swelling, sore throat and trouble swallowing.         Throat tightness    Eyes: Negative for pain, discharge and visual disturbance.   Respiratory: Negative for apnea, cough, chest tightness and shortness of breath.    Cardiovascular: Negative for chest pain and palpitations.   Gastrointestinal: Negative for abdominal distention, anal bleeding, blood in stool, diarrhea, nausea and vomiting.   Endocrine: Negative for cold intolerance and polydipsia.   Genitourinary: Negative for decreased urine volume, difficulty urinating, enuresis, frequency and hematuria.   Musculoskeletal: Negative for arthralgias, gait problem, myalgias and neck stiffness.   Skin: Negative for color change and pallor.   Allergic/Immunologic: Negative for environmental allergies.   Neurological: Negative for dizziness, syncope, numbness and headaches.   Psychiatric/Behavioral: Positive for agitation. Negative for confusion and dysphoric mood.       Physical Exam     Initial Vitals [04/27/18 1522]   BP Pulse Resp Temp SpO2   (!) 141/89 (!) 112 18 99.5 °F (37.5 °C) 99 %      MAP       106.33         Physical Exam    Nursing note and vitals reviewed.  Constitutional: She appears well-developed and well-nourished. She is not diaphoretic. No distress.   HENT:   Head:  Normocephalic and atraumatic.   Neck: Normal range of motion. Neck supple.   Cardiovascular: Normal rate, regular rhythm and normal heart sounds. Exam reveals no gallop and no friction rub.    No murmur heard.  Pulmonary/Chest: Breath sounds normal. She has no wheezes. She has no rhonchi. She has no rales.   Abdominal: Soft. Bowel sounds are normal. There is no tenderness. There is no rebound and no guarding.   Musculoskeletal: Normal range of motion.   Neurological: She is alert and oriented to person, place, and time.   Skin: Skin is warm and dry. No rash noted. No erythema.   Psychiatric: She has a normal mood and affect.         ED Course   Procedures  Labs Reviewed   CBC W/ AUTO DIFFERENTIAL - Abnormal; Notable for the following:        Result Value    RBC 3.90 (*)     Hematocrit 36.5 (*)     RDW 14.6 (*)     Platelets 407 (*)     MPV 8.7 (*)     Immature Granulocytes 0.8 (*)     Immature Grans (Abs) 0.06 (*)     Gran% 73.6 (*)     Mono% 3.4 (*)     All other components within normal limits   COMPREHENSIVE METABOLIC PANEL - Abnormal; Notable for the following:     Glucose 146 (*)     Alkaline Phosphatase 277 (*)     AST 55 (*)     ALT 82 (*)     All other components within normal limits                   APC / Resident Notes:   The patient is a 57-year-old female with past medical history of hypertension who was recently discharged on IV antibiotics who presents to the emergency department due to of allergic reaction.  Physical exam reveals female no acute distress. Heart regular rate and rhythm.  Lungs clear to auscultation bilaterally.  Abdomen soft nontender nondistended. Will obtain lab work.  If patient remains asymptomatic will discharge home in stable condition.  Plan of care discussed with attending physician and he is agreeable.                 Clinical Impression:   The encounter diagnosis was Allergic reaction, initial encounter.    Disposition:   Disposition: Discharged  Condition:  Stable                        Ann Marie Mcfarland PA-C  04/27/18 2040

## 2018-04-27 NOTE — PROGRESS NOTES
Flushed line per protocol.  Line flushes fine but no blood return noted.  Cathtflo ordered and administered.  After 30 minute dwell time, pt began with complaints of restless legs, tightness in chest and feeling like throat was closing.  Cathflo withdrawn from line.  Vitals obtained and stable. Dr. Painter notified and ordered IV benadryl.  Benadryl 25 mg administered IV.  Pt began having immediate relief of symptoms.  Pt re accessed 10 minutes later, symptoms almost completley resolved.  CATHFLO & HEPARIN ADDED TO MEDICATION ALLERGY LIST.       About 5 minutes later, chest tightness returned along with restless legs and trouble swallowing. Pt connected to EKG machine, vitals obtained and Dr. Painter at pt side.  Rapid response called.  Additional dose of benadryl 25mg IV, Solumedrol 100mg IV, and one dose of Epi pen 0.3mg ordered and administered per Dr. Painter.  Rapid response team arrived and transported pt to ED via wheel chair with .

## 2018-04-27 NOTE — PROGRESS NOTES
Subjective:      Patient ID: Renae Hou is a 57 y.o. female.    Chief Complaint: Follow-up for epidural abscess    History of Present Illness  57-year-old female with history of HTN presents for follow-up of epidural abscess. Patient presented to ED 4/8/2018 with acute on chronic back pain and left leg pain, weakness, numbness, tingling. Patient had NEERAJ 3/2/2018, 3/16/18. MRI showed 1.0 x 0.4 x 0.8 cm rim enhancing fluid collection suggestive of paraspinous abscess at L5 with another 7 mm rim enhancing abscess L4-5.  Patient was given dose of vanc / ceftriaxone in ED. On 4/10/2018, patient underwent posterior lumbar interbody fusion, L4-L5 and L5-S1, L4 hemilaminectomy and L5 laminectomy for evacuation of intraspinal extradural mass (cyst), and application of titanium intervertebral spacer device to L4-L5 and L5-S1 disk defects. OR cultures were negative.  Patient was placed on vancomycin and ceftriaxone.  Patient had episode of low grade fevers - patient noted improvement in symptoms with switch to cefepime.  She was then transitioned back to ceftriaxone given no evidence of Pseudomonas infection.  She also noted feeling warm/sweaty after receiving SC heparin in hospital - she was put in the shower to cool off, but heparin was not added to her allergy list.    Since discharge, patient reports having daily low-grade fever 99 in mid-afternoon. Associated with malaise, fatigue. Starting yesterday, patient started having pain and swelling behind her left ear along with fever to 100.6. Denied having sore throat, congestion, difficulty hearing. She was advised to discontinue her ceftriaxone given recent elevation in LFTs. Denied any pain, tenderness, swelling around PICC line, although noted nurse unable to draw labs from PICC line. Denied any SOB, CP, sore throat, cough, n/v/d, abdominal pain. Denied having any back pain.     Review of Systems   Constitution: Positive for chills, fever, weakness, malaise/fatigue  and night sweats. Negative for decreased appetite, weight gain and weight loss.   HENT: Positive for ear pain. Negative for congestion, hearing loss, hoarse voice, sore throat and tinnitus.    Eyes: Negative for blurred vision, redness and visual disturbance.   Cardiovascular: Negative for chest pain, leg swelling and palpitations.   Respiratory: Negative for cough, hemoptysis, shortness of breath and sputum production.    Hematologic/Lymphatic: Negative for adenopathy. Does not bruise/bleed easily.   Skin: Negative for dry skin, itching, rash and suspicious lesions.   Musculoskeletal: Negative for back pain, joint pain, myalgias and neck pain.   Gastrointestinal: Negative for abdominal pain, constipation, diarrhea, heartburn, nausea and vomiting.   Genitourinary: Negative for dysuria, flank pain, frequency, hematuria, hesitancy and urgency.   Neurological: Negative for dizziness, headaches, numbness and paresthesias.   Psychiatric/Behavioral: Negative for depression and memory loss. The patient does not have insomnia and is not nervous/anxious.      Objective:   Physical Exam   Constitutional: She is oriented to person, place, and time. She appears well-developed and well-nourished. No distress.   HENT:   Head: Normocephalic and atraumatic.   Right Ear: External ear normal.   Left Ear: External ear normal.   Mouth/Throat: Oropharynx is clear and moist. No oropharyngeal exudate.   Eyes: Conjunctivae and EOM are normal.   Neck: Normal range of motion. Neck supple.   Pain over posterior left ear   Cardiovascular: Normal rate, regular rhythm and normal heart sounds.  Exam reveals no friction rub.    No murmur heard.  Pulmonary/Chest: Effort normal and breath sounds normal. No respiratory distress. She has no wheezes. She has no rales.   Abdominal: Soft. Bowel sounds are normal. She exhibits no distension. There is no tenderness. There is no guarding.   Musculoskeletal: Normal range of motion. She exhibits no edema.    Back incision 10 cm - c/d/i, no surrounding redness, swelling, tenderness   Neurological: She is alert and oriented to person, place, and time.   Skin: Skin is warm and dry. No rash noted. She is not diaphoretic. No erythema.   Psychiatric: She has a normal mood and affect. Her behavior is normal.   Vitals reviewed.    Significant labs reviewed:  4/23/2018     WBC 5.3  Vanc trough 14.7     ALT 96  AST 85  AlkPhos 290     CRP 12.8 (<5)    Assessment:   57-year-old female   - Paraspinous abscess at L5, L4-5 epidural abscess - culture negative    - s/p posterior lumbar interbody fusion, L4-L5 and L5-S1, L4 hemilaminectomy and L5 laminectomy for evacuation of intraspinal extradural mass (cyst), and application of titanium intervertebral spacer device to L4-L5 and L5-S1 disk defects 410/2018   - Low grade fevers  - Transaminitis    Concern for possible line infection given persistent low grade fevers - patient declined admission for further evaluation.  Currently, patient well appearing with no evidence of sepsis - will obtain blood cultures and exchange PICC line as outpatient. Patient with no back pain, but did note feeling better inpatient when ceftriaxone switched to cefepime.  Also, patient with transaminitis with elevated alkphos - concern for adverse reaction from ceftriaxone - will discontinue ceftriaxone and start cefepime.    Plan:   - Apply alteplase for clotted PICC line  - CBC with diff, CMP, blood cultures x2 today  - Discontinue ceftriaxone  - Start cefepime 2g q8 hours  - Continue vancomycin, goal trough 15-20  - Replace PICC line - okay to exchange over wire    Bere Painter MD MPH  Infectious Diseases [facility]    ADDENDUM:    Patient developed chest tightness, sweating after administration of alteplase.  She was given diphenhydramine 25 mg IV x2, methylpred 100 mg x1, epi 0.3 mg IM  She was sent to the ED for further monitoring.  Alteplase added to her allergy list.    Patient then noted she had similar  feeling of warmth, restless leg with heparin SC in hospital - one episode she was put in the shower to cool off.  Unclear if patient's ongoing malaise and low grade fevers at home related to heparin flushes.    ADDENDUM #2  Patient was discharged from the ED  Tried to call both patient and  at home for instructions on line flushes given possible adverse reaction to heparin.

## 2018-04-28 ENCOUNTER — PATIENT MESSAGE (OUTPATIENT)
Dept: INFECTIOUS DISEASES | Facility: CLINIC | Age: 58
End: 2018-04-28

## 2018-04-30 ENCOUNTER — TELEPHONE (OUTPATIENT)
Dept: INFECTIOUS DISEASES | Facility: HOSPITAL | Age: 58
End: 2018-04-30

## 2018-04-30 ENCOUNTER — TELEPHONE (OUTPATIENT)
Dept: INFECTIOUS DISEASES | Facility: CLINIC | Age: 58
End: 2018-04-30

## 2018-04-30 NOTE — TELEPHONE ENCOUNTER
Patient's medication was changed form Ceftriaxon to Cefepime 2g q8 hours IV , per Madina Jackson.    Patient's IV infusion is with [facility] in [place], [place].    Spoke to Romulo Cleveland of IV infusion medication change. [phone].

## 2018-05-01 ENCOUNTER — OFFICE VISIT (OUTPATIENT)
Dept: INFECTIOUS DISEASES | Facility: CLINIC | Age: 58
End: 2018-05-01
Payer: COMMERCIAL

## 2018-05-01 ENCOUNTER — OFFICE VISIT (OUTPATIENT)
Dept: ORTHOPEDICS | Facility: CLINIC | Age: 58
End: 2018-05-01
Payer: COMMERCIAL

## 2018-05-01 ENCOUNTER — HOSPITAL ENCOUNTER (OUTPATIENT)
Dept: INTERVENTIONAL RADIOLOGY/VASCULAR | Facility: HOSPITAL | Age: 58
Discharge: HOME | End: 2018-05-01
Attending: INTERNAL MEDICINE
Payer: COMMERCIAL

## 2018-05-01 VITALS
WEIGHT: 211 LBS | DIASTOLIC BLOOD PRESSURE: 91 MMHG | TEMPERATURE: 98 F | HEART RATE: 115 BPM | SYSTOLIC BLOOD PRESSURE: 139 MMHG | HEIGHT: 68 IN | BODY MASS INDEX: 31.98 KG/M2

## 2018-05-01 VITALS — WEIGHT: 211 LBS | HEIGHT: 68 IN | BODY MASS INDEX: 31.98 KG/M2

## 2018-05-01 DIAGNOSIS — Z98.1 S/P LUMBAR FUSION: Primary | ICD-10-CM

## 2018-05-01 DIAGNOSIS — Z79.2 LONG TERM (CURRENT) USE OF ANTIBIOTICS: ICD-10-CM

## 2018-05-01 DIAGNOSIS — R74.01 TRANSAMINITIS: ICD-10-CM

## 2018-05-01 DIAGNOSIS — G06.2 EPIDURAL ABSCESS: Primary | ICD-10-CM

## 2018-05-01 DIAGNOSIS — M43.16 SPONDYLOLISTHESIS AT L4-L5 LEVEL: ICD-10-CM

## 2018-05-01 DIAGNOSIS — M60.08 ABSCESS OF PARASPINOUS MUSCLES: ICD-10-CM

## 2018-05-01 PROBLEM — R50.9 FEVER: Status: RESOLVED | Noted: 2018-04-06 | Resolved: 2018-05-01

## 2018-05-01 PROCEDURE — 99215 OFFICE O/P EST HI 40 MIN: CPT | Mod: S$GLB,,, | Performed by: INTERNAL MEDICINE

## 2018-05-01 PROCEDURE — 99024 POSTOP FOLLOW-UP VISIT: CPT | Mod: S$GLB,,, | Performed by: PHYSICIAN ASSISTANT

## 2018-05-01 PROCEDURE — 99999 PR PBB SHADOW E&M-EST. PATIENT-LVL III: CPT | Mod: PBBFAC,,, | Performed by: INTERNAL MEDICINE

## 2018-05-01 PROCEDURE — 99999 PR PBB SHADOW E&M-EST. PATIENT-LVL IV: CPT | Mod: PBBFAC,,, | Performed by: PHYSICIAN ASSISTANT

## 2018-05-01 NOTE — PROGRESS NOTES
Date: 05/01/2018    Supervising Physician: Dean Zayas M.D.    Date of Surgery: 4/10/2018    Procedure: L4/5/S1 TLIF for grade I spondylolisthesis with large   left-sided L4-L5 facet cyst, possible epidural abscess as well as paraspinal muscle abscess.    History: Renae Hou is seen today for follow-up following the above listed procedure. Overall the patient is doing well but today notes she does not have any pain.   Pain is well controlled with current pain medication.  She is trying to taking only the muscle relaxer instead of the percocet.   she denies fever, chills, and sweats since the time of the surgery.  She saw ID this morning.  She is still on IV antibiotics.       Exam: Post op dressing taken down.  Incision is healing well, clean, dry and intact.   There is no sign of infection. Neuro exam is stable. No signs of DVT.    Radiographs: no new imaging today    Assessment/Plan: 3 weeks post op.    Doing well postoperatively. No refills required.  No lifting more than 10 lbs.      I will plan to see the patient back for the next postop visit in 3 weeks with imaging.     Thank you for the opportunity to participate in this patient's care. Please give me a call if there are any concerns or questions.

## 2018-05-02 NOTE — PROGRESS NOTES
Subjective:      Patient ID: Renae Hou is a 57 y.o. female.     Chief Complaint: Follow-up for epidural abscess     History of Present Illness  57-year-old female with history of HTN presents for follow-up of epidural abscess.     Summary of illness:  Patient presented to ED 4/8/2018 with acute on chronic back pain and left leg pain, weakness, numbness, tingling. Patient had NEERAJ 3/2/2018, 3/16/18. MRI showed 1.0 x 0.4 x 0.8 cm rim enhancing fluid collection suggestive of paraspinous abscess at L5 with another 7 mm rim enhancing abscess L4-5.  Patient was given dose of vanc / ceftriaxone in ED. On 4/10/2018, patient underwent posterior lumbar interbody fusion, L4-L5 and L5-S1, L4 hemilaminectomy and L5 laminectomy for evacuation of intraspinal extradural mass (cyst), and application of titanium intervertebral spacer device to L4-L5 and L5-S1 disk defects. OR cultures were negative.  Patient was placed on vancomycin and ceftriaxone.  Patient had episode of low grade fevers - patient noted improvement in symptoms with switch to cefepime.  She was then transitioned back to ceftriaxone given no evidence of Pseudomonas infection.  She also noted feeling warm/sweaty after receiving SC heparin in hospital - she was put in the shower to cool off, but heparin was not added to her allergy list.       Review of Systems   Constitutional: Positive for chills. Negative for diaphoresis, fever and weight loss.   HENT: Negative for congestion, sinus pain and sore throat.    Eyes: Negative for photophobia and pain.   Respiratory: Negative for cough, sputum production and shortness of breath.    Cardiovascular: Negative for chest pain and leg swelling.   Gastrointestinal: Negative for abdominal pain, diarrhea, nausea and vomiting.   Genitourinary: Negative for dysuria and hematuria.   Musculoskeletal: Negative for joint pain.   Skin: Negative for rash.   Neurological: Negative for focal weakness and headaches.    Psychiatric/Behavioral: Negative for depression. The patient is not nervous/anxious.          Objective:   Physical Exam   Constitutional: She is oriented to person, place, and time. She appears well-developed and well-nourished. No distress.   HENT:   Head: Normocephalic and atraumatic.   Eyes: Conjunctivae and EOM are normal.   Neck: Normal range of motion. Neck supple.   Cardiovascular: Normal rate.    Pulmonary/Chest: Effort normal. No respiratory distress.   Abdominal: Soft. She exhibits no distension.   Musculoskeletal: Normal range of motion. She exhibits no edema.   Neurological: She is alert and oriented to person, place, and time.   Skin: Skin is warm and dry. No rash noted. She is not diaphoretic. No erythema.   PICC line with no surrounding redness, swelling, pain.   Psychiatric: She has a normal mood and affect. Her behavior is normal.   Vitals reviewed.       Significant labs reviewed:  4/30/2018  WBC 6.0  Cr 0.6  ALT 46  AST 25  AlkPhos 199  CRP 3.9    Vanc trough 13.3    4/23/2018     WBC 5.3  Vanc trough 14.7     ALT 96  AST 85  AlkPhos 290     CRP 12.8 (<5)     Assessment:   57-year-old female   - Paraspinous abscess at L5, L4-5 epidural abscess - culture negative    - s/p posterior lumbar interbody fusion, L4-L5 and L5-S1, L4 hemilaminectomy and L5 laminectomy for evacuation of intraspinal extradural mass (cyst), and application of titanium intervertebral spacer device to L4-L5 and L5-S1 disk defects 410/2018   - Transaminitis, resolved off ceftriaxone  - TPA, heparin allergy    Patient reports feeling better after ceftriaxone and heparin were discontinued - also notable decrease in LFTs.     Plan:   - Start cefepime 2g q8 hours today  - Continue vancomycin for now  - qweekly CBC with diff, CMP, ERS, CRP  - Follow-up in 3 weeks     Bere Painter MD MPH  Infectious Diseases [facility]

## 2018-05-03 ENCOUNTER — PATIENT MESSAGE (OUTPATIENT)
Dept: SPINE | Facility: CLINIC | Age: 58
End: 2018-05-03

## 2018-05-03 DIAGNOSIS — Z98.890 S/P SPINAL SURGERY: Primary | ICD-10-CM

## 2018-05-04 ENCOUNTER — PATIENT MESSAGE (OUTPATIENT)
Dept: ORTHOPEDICS | Facility: CLINIC | Age: 58
End: 2018-05-04

## 2018-05-04 RX ORDER — OXYCODONE AND ACETAMINOPHEN 10; 325 MG/1; MG/1
1 TABLET ORAL EVERY 4 HOURS PRN
Qty: 90 TABLET | Refills: 0 | Status: SHIPPED | OUTPATIENT
Start: 2018-05-04 | End: 2018-05-25

## 2018-05-05 ENCOUNTER — PATIENT MESSAGE (OUTPATIENT)
Dept: INTERNAL MEDICINE | Facility: CLINIC | Age: 58
End: 2018-05-05

## 2018-05-09 ENCOUNTER — TELEPHONE (OUTPATIENT)
Dept: INFECTIOUS DISEASES | Facility: CLINIC | Age: 58
End: 2018-05-09

## 2018-05-09 NOTE — TELEPHONE ENCOUNTER
5/7/2018  WBC 6.3  Hgb 11.3  Plt 311    Vanc trough 12.6    Cr 0.6    CRP 3.2    Vanc not currently therapeutic -  Patient with no cultures with MRSA - will ask patient to discontinue vancomycin, continue cefepime alone

## 2018-05-10 LAB
FUNGUS SPEC CULT: NORMAL

## 2018-05-17 ENCOUNTER — TELEPHONE (OUTPATIENT)
Dept: INFECTIOUS DISEASES | Facility: CLINIC | Age: 58
End: 2018-05-17

## 2018-05-21 ENCOUNTER — OFFICE VISIT (OUTPATIENT)
Dept: ORTHOPEDICS | Facility: CLINIC | Age: 58
End: 2018-05-21
Payer: COMMERCIAL

## 2018-05-21 ENCOUNTER — HOSPITAL ENCOUNTER (OUTPATIENT)
Dept: RADIOLOGY | Facility: HOSPITAL | Age: 58
Discharge: HOME | End: 2018-05-21
Attending: PHYSICIAN ASSISTANT
Payer: COMMERCIAL

## 2018-05-21 ENCOUNTER — INFUSION (OUTPATIENT)
Dept: INFECTIOUS DISEASES | Facility: HOSPITAL | Age: 58
End: 2018-05-21
Attending: PHYSICIAN ASSISTANT
Payer: COMMERCIAL

## 2018-05-21 ENCOUNTER — OFFICE VISIT (OUTPATIENT)
Dept: INFECTIOUS DISEASES | Facility: CLINIC | Age: 58
End: 2018-05-21
Payer: COMMERCIAL

## 2018-05-21 ENCOUNTER — OFFICE VISIT (OUTPATIENT)
Dept: INTERNAL MEDICINE | Facility: CLINIC | Age: 58
End: 2018-05-21
Payer: COMMERCIAL

## 2018-05-21 VITALS
HEIGHT: 66 IN | DIASTOLIC BLOOD PRESSURE: 88 MMHG | BODY MASS INDEX: 35.47 KG/M2 | SYSTOLIC BLOOD PRESSURE: 129 MMHG | WEIGHT: 220.69 LBS | HEART RATE: 105 BPM | HEIGHT: 66 IN | TEMPERATURE: 98 F | WEIGHT: 220.69 LBS | BODY MASS INDEX: 35.47 KG/M2

## 2018-05-21 VITALS — DIASTOLIC BLOOD PRESSURE: 96 MMHG | HEART RATE: 68 BPM | SYSTOLIC BLOOD PRESSURE: 150 MMHG

## 2018-05-21 DIAGNOSIS — I10 ESSENTIAL HYPERTENSION: Primary | ICD-10-CM

## 2018-05-21 DIAGNOSIS — Z98.1 S/P LUMBAR FUSION: Primary | ICD-10-CM

## 2018-05-21 DIAGNOSIS — M60.08 ABSCESS OF PARASPINOUS MUSCLES: ICD-10-CM

## 2018-05-21 DIAGNOSIS — M43.16 SPONDYLOLISTHESIS AT L4-L5 LEVEL: ICD-10-CM

## 2018-05-21 DIAGNOSIS — R53.83 FATIGUE, UNSPECIFIED TYPE: ICD-10-CM

## 2018-05-21 DIAGNOSIS — E55.9 VITAMIN D DEFICIENCY: ICD-10-CM

## 2018-05-21 DIAGNOSIS — G06.2 EPIDURAL ABSCESS: Primary | ICD-10-CM

## 2018-05-21 DIAGNOSIS — R73.9 HYPERGLYCEMIA: ICD-10-CM

## 2018-05-21 DIAGNOSIS — Z98.1 S/P LUMBAR FUSION: ICD-10-CM

## 2018-05-21 PROCEDURE — 99024 POSTOP FOLLOW-UP VISIT: CPT | Mod: S$GLB,,, | Performed by: PHYSICIAN ASSISTANT

## 2018-05-21 PROCEDURE — 99999 PR PBB SHADOW E&M-EST. PATIENT-LVL IV: CPT | Mod: PBBFAC,,, | Performed by: PHYSICIAN ASSISTANT

## 2018-05-21 PROCEDURE — 99999 PR PBB SHADOW E&M-EST. PATIENT-LVL II: CPT | Mod: PBBFAC,,, | Performed by: INTERNAL MEDICINE

## 2018-05-21 PROCEDURE — 72100 X-RAY EXAM L-S SPINE 2/3 VWS: CPT | Mod: TC

## 2018-05-21 PROCEDURE — 72100 X-RAY EXAM L-S SPINE 2/3 VWS: CPT | Mod: 26,,, | Performed by: RADIOLOGY

## 2018-05-21 PROCEDURE — 99214 OFFICE O/P EST MOD 30 MIN: CPT | Mod: S$GLB,,, | Performed by: INTERNAL MEDICINE

## 2018-05-21 PROCEDURE — 99999 PR PBB SHADOW E&M-EST. PATIENT-LVL III: CPT | Mod: PBBFAC,,, | Performed by: INTERNAL MEDICINE

## 2018-05-21 NOTE — PROGRESS NOTES
Subjective:       Patient ID: Renae Hou is a 57 y.o. female.    Chief Complaint: Follow-up    HP[place]leasant [place] here for post follow up. She is s/p L4/L5/S1 TLIF 2o Grade I spondylolisthesis as well as excision of a large L sided L4?L5 facet cyst and epidural and paraspinal abscess excision as well on 4-. She did well post op, but reports having had issues with the use of heparin while hospitalized. Immediately upon receiving the heparin she felt hot, became flushed, felt weak. She refused to receive any more heparin even in flushing the PICC line that was placed. She was advised not to take any more heparin as it could result in a mores severe rxn. Fortunately her PICC line was removed today as she completed her 6 weeks of abx's as per ID. She reports also, feeling sx's similar to hypoglycemia as she becomes weak, diaphoretic, tremulous; is motivated to eat sweets and sx's resolve. She salcedo not have hx of DM, but during her last [facility] exam, I did caution her about her FBS that were in the pre-diabetic range. Her blood work during her hospitalization last month did show elevated glucose level, not sure if fasting or not. I will repeat fasting labs and proceed from there. She was also started on amlodipine by the physician who saw her for her pre op clearance as her pressures were elevated at the time. I may decide to change this if her blood work merits a change, ie. shows evidence of DM.  Review of Systems   Constitutional: Positive for appetite change, fatigue and unexpected weight change.        As per HPI.  Has gained over 10 # since her hospitalization.   Respiratory: Negative for cough and shortness of breath.    Cardiovascular: Negative for chest pain, palpitations and leg swelling.   Gastrointestinal: Negative for abdominal distention, abdominal pain and blood in stool.   Endocrine: Positive for polyphagia. Negative for polydipsia and polyuria.   Genitourinary: Negative  for difficulty urinating.   Musculoskeletal: Negative for arthralgias, back pain and joint swelling.   Neurological: Negative for dizziness, weakness, light-headedness and headaches.   Hematological: Negative.        Objective:      Physical Exam   Constitutional: She is oriented to person, place, and time. She appears well-developed and well-nourished. No distress.   HENT:   Head: Normocephalic and atraumatic.   Right Ear: External ear normal.   Left Ear: External ear normal.   Mouth/Throat: Oropharynx is clear and moist. No oropharyngeal exudate.   Neck: Normal range of motion. Neck supple. No JVD present. No thyromegaly present.   Cardiovascular: Normal rate, regular rhythm, normal heart sounds and intact distal pulses.    No murmur heard.  Pulmonary/Chest: Effort normal and breath sounds normal. No respiratory distress. She exhibits no tenderness.   Abdominal: Soft. Bowel sounds are normal. She exhibits no distension. There is no tenderness.   Musculoskeletal: Normal range of motion. She exhibits no edema.   Lymphadenopathy:     She has no cervical adenopathy.   Neurological: She is alert and oriented to person, place, and time. No cranial nerve deficit. Coordination normal.   Skin: Skin is warm and dry. No rash noted. She is not diaphoretic. No erythema.   Psychiatric: She has a normal mood and affect. Her behavior is normal. Judgment and thought content normal.   Nursing note and vitals reviewed.      Assessment:       1. Fatigue, unspecified type    2. Hyperglycemia    3. Vitamin D deficiency    4. Abscess of paraspinous muscles    5. Essential hypertension      6.    S/p L spine surgery as detailed above.  Plan:    1. Obtain further labs.         2. Call with results.         3. Continue with current medications.         4. Follow up with [facility] as indicated.         5. RTC prn and as scheduled for her annual exam.

## 2018-05-21 NOTE — PROGRESS NOTES
Date: 05/21/2018    Supervising Physician: Dean Zayas M.D.    Date of Surgery: 4/10/2018    Procedure: L4/5/S1 TLIF for grade I spondylolisthesis with large   left-sided L4-L5 facet cyst, possible epidural abscess as well as paraspinal muscle abscess.    History: Renae Hou is seen today for follow-up following the above listed procedure. Overall the patient is doing well but today notes she does not have any pain.   She has occasional burning in the left leg but this is miproving.  Pain is well controlled with current pain medication.  She is taking robaxin or percocet occasionally.   she denies fever, chills, and sweats since the time of the surgery.  She saw ID this morning.  She has discontinued antibiotics.       Exam:  Incision is healing well.   There is no sign of infection. Neuro exam is stable. No signs of DVT.    Radiographs: imaging today shows hardware in place, no evidence of failure.     Assessment/Plan: 6 weeks post op.    Doing well postoperatively. No refills required.  No lifting more than 15 lbs.  She will return to work with half days next week.  She will return full time 6/11.    I will plan to see the patient back for the next postop visit in 6 weeks with imaging.     Thank you for the opportunity to participate in this patient's care. Please give me a call if there are any concerns or questions.

## 2018-05-21 NOTE — LETTER
May 21, 2018                 [facility]  [address]  [place] [address]  Phone: [phone]   Patient: Renae Hou   MR Number: [id number]   YOB: 1960   Date of Visit: 5/21/2018     To whom it may concern,    Renae Hou was seen in the [facility]opedic Spine clinic 5/21/2018.  She is able to return to work with half days starting 5/29/2018.  She will return to full days starting 6/11/2018.      If you have any questions or concerns, please contact the office at [phone].        Sincerely,        Mila Jones PA-C

## 2018-05-21 NOTE — PROGRESS NOTES
Subjective:      Patient ID: Renae Hou is a 57 y.o. female.     Chief Complaint: Follow-up for epidural abscess     History of Present Illness  57-year-old female with history of HTN presents for follow-up of epidural abscess.     Summary of illness:  Patient presented to ED 4/8/2018 with acute on chronic back pain and left leg pain, weakness, numbness, tingling. Patient had NEERAJ 3/2/2018, 3/16/18. MRI showed 1.0 x 0.4 x 0.8 cm rim enhancing fluid collection suggestive of paraspinous abscess at L5 with another 7 mm rim enhancing abscess L4-5.  Patient was given dose of vanc / ceftriaxone in ED. On 4/10/2018, patient underwent posterior lumbar interbody fusion, L4-L5 and L5-S1, L4 hemilaminectomy and L5 laminectomy for evacuation of intraspinal extradural mass (cyst), and application of titanium intervertebral spacer device to L4-L5 and L5-S1 disk defects. OR cultures were negative.  Patient was placed on vancomycin and ceftriaxone.  Patient had episode of low grade fevers - patient noted improvement in symptoms with switch to cefepime.  She was then transitioned back to ceftriaxone given no evidence of Pseudomonas infection.  She also noted feeling warm/sweaty after receiving SC heparin in hospital - she was put in the shower to cool off, but heparin was not added to her allergy list.     On last visit, patient was not feeling well, reported low-grade fevers, malaise, fatigue. Patient also with clotted PICC line - TPA administered but patient subsequently developed anaphylaxis (throat closing, difficulty breathing, hives). She was given epi, diphenhydramine, and solumedrol, sent to ED for further evaluation, discharged home as patient stabilized. Heparin flushes, vancomycin, and ceftriaxone were discontinued.  Patient started on cefepime.       Patient reports after stopping heparin, she started feeling better with resolution of fevers, malaise, and fatigue.  Patient denied having any low back pain.  She does  have some shooting pain in her left leg, worsens after her physical therapy. Incision has healed, no drainage, surrounding redness, swelling, pain.     Review of Systems   Constitutional: Positive for diaphoresis. Negative for chills, fever and weight loss.   HENT: Negative for congestion, sinus pain and sore throat.    Eyes: Negative for photophobia and pain.   Respiratory: Negative for cough, sputum production and shortness of breath.    Cardiovascular: Negative for chest pain and leg swelling.   Gastrointestinal: Negative for abdominal pain, diarrhea, nausea and vomiting.   Genitourinary: Negative for dysuria and hematuria.   Musculoskeletal: Negative for joint pain.   Skin: Negative for rash.   Neurological: Negative for focal weakness and headaches.   Psychiatric/Behavioral: Negative for depression. The patient is not nervous/anxious.         Objective:   Physical Exam   Constitutional: She is oriented to person, place, and time. She appears well-developed and well-nourished. No distress.   HENT:   Head: Normocephalic and atraumatic.   Eyes: Conjunctivae and EOM are normal.   Neck: Normal range of motion. Neck supple.   Cardiovascular: Normal rate.    Pulmonary/Chest: Effort normal. No respiratory distress.   Abdominal: Soft. She exhibits no distension.   Musculoskeletal: Normal range of motion. She exhibits no edema.   Back incision 10 cm - c/d/i, no surrounding redness, swelling, tenderness   Neurological: She is alert and oriented to person, place, and time.   Skin: Skin is warm and dry. No rash noted. She is not diaphoretic. No erythema.   Psychiatric: She has a normal mood and affect. Her behavior is normal.   Vitals reviewed.         Significant labs reviewed:  5/14/2018     WBC 6.0  Hgb 11.7  Plt 333     Cr 0.6     Assessment:   57-year-old female   - Chronic low back pain 2/2 spondylosthesis  - Paraspinous abscess at L5, L4-5 epidural abscess - culture negative    - s/p posterior lumbar interbody  fusion, L4-L5 and L5-S1, L4 hemilaminectomy and L5 laminectomy for evacuation of intraspinal extradural mass (cyst), and application of titanium intervertebral spacer device to L4-L5 and L5-S1 disk defects 4/10/2018      Patient completed 6 week course of antibiotics with resolution of back pain - will discontinue antibiotics.  Patient with washout and placement of hardware - however,     Plan:   - Discontinue cefepime  - Pull PICC line  - Follow-up with neurosurgery    RTC prn    Bere Painter MD MPH  Infectious Diseases [facility]

## 2018-05-22 ENCOUNTER — PATIENT MESSAGE (OUTPATIENT)
Dept: SPINE | Facility: CLINIC | Age: 58
End: 2018-05-22

## 2018-05-22 ENCOUNTER — LAB VISIT (OUTPATIENT)
Dept: LAB | Facility: HOSPITAL | Age: 58
End: 2018-05-22
Attending: INTERNAL MEDICINE
Payer: COMMERCIAL

## 2018-05-22 DIAGNOSIS — E55.9 VITAMIN D DEFICIENCY: ICD-10-CM

## 2018-05-22 DIAGNOSIS — R53.83 FATIGUE, UNSPECIFIED TYPE: ICD-10-CM

## 2018-05-22 DIAGNOSIS — R73.9 HYPERGLYCEMIA: ICD-10-CM

## 2018-05-22 LAB
25(OH)D3+25(OH)D2 SERPL-MCNC: 16 NG/ML
ALBUMIN SERPL BCP-MCNC: 3.8 G/DL
ALP SERPL-CCNC: 117 U/L
ALT SERPL W/O P-5'-P-CCNC: 27 U/L
ANION GAP SERPL CALC-SCNC: 11 MMOL/L
AST SERPL-CCNC: 25 U/L
BILIRUB SERPL-MCNC: 0.4 MG/DL
BUN SERPL-MCNC: 17 MG/DL
CALCIUM SERPL-MCNC: 9.8 MG/DL
CHLORIDE SERPL-SCNC: 106 MMOL/L
CO2 SERPL-SCNC: 26 MMOL/L
CREAT SERPL-MCNC: 0.7 MG/DL
EST. GFR  (AFRICAN AMERICAN): >60 ML/MIN/1.73 M^2
EST. GFR  (NON AFRICAN AMERICAN): >60 ML/MIN/1.73 M^2
ESTIMATED AVG GLUCOSE: 103 MG/DL
GLUCOSE SERPL-MCNC: 115 MG/DL
HBA1C MFR BLD HPLC: 5.2 %
POTASSIUM SERPL-SCNC: 4.4 MMOL/L
PROT SERPL-MCNC: 7.1 G/DL
SODIUM SERPL-SCNC: 143 MMOL/L
TSH SERPL DL<=0.005 MIU/L-ACNC: 1.08 UIU/ML

## 2018-05-22 PROCEDURE — 83036 HEMOGLOBIN GLYCOSYLATED A1C: CPT

## 2018-05-22 PROCEDURE — 84443 ASSAY THYROID STIM HORMONE: CPT

## 2018-05-22 PROCEDURE — 82306 VITAMIN D 25 HYDROXY: CPT

## 2018-05-22 PROCEDURE — 80053 COMPREHEN METABOLIC PANEL: CPT

## 2018-05-22 PROCEDURE — 36415 COLL VENOUS BLD VENIPUNCTURE: CPT | Mod: PO

## 2018-05-24 ENCOUNTER — PATIENT MESSAGE (OUTPATIENT)
Dept: SPINE | Facility: CLINIC | Age: 58
End: 2018-05-24

## 2018-05-25 DIAGNOSIS — Z98.890 S/P SPINAL SURGERY: ICD-10-CM

## 2018-05-25 RX ORDER — OXYCODONE AND ACETAMINOPHEN 5; 325 MG/1; MG/1
1 TABLET ORAL EVERY 4 HOURS PRN
Qty: 42 TABLET | Refills: 0 | Status: SHIPPED | OUTPATIENT
Start: 2018-05-25 | End: 2018-07-06

## 2018-05-25 RX ORDER — METHOCARBAMOL 750 MG/1
750 TABLET, FILM COATED ORAL 3 TIMES DAILY
Qty: 90 TABLET | Refills: 0 | Status: SHIPPED | OUTPATIENT
Start: 2018-05-25 | End: 2018-06-24

## 2018-05-25 RX ORDER — OXYCODONE AND ACETAMINOPHEN 10; 325 MG/1; MG/1
1 TABLET ORAL EVERY 4 HOURS PRN
Qty: 90 TABLET | Refills: 0 | Status: CANCELLED | OUTPATIENT
Start: 2018-05-25

## 2018-05-30 ENCOUNTER — PATIENT MESSAGE (OUTPATIENT)
Dept: INTERNAL MEDICINE | Facility: CLINIC | Age: 58
End: 2018-05-30

## 2018-05-30 DIAGNOSIS — I10 HYPERTENSION, UNSPECIFIED TYPE: Primary | ICD-10-CM

## 2018-05-31 ENCOUNTER — TELEPHONE (OUTPATIENT)
Dept: PAIN MEDICINE | Facility: CLINIC | Age: 58
End: 2018-05-31

## 2018-05-31 RX ORDER — AMLODIPINE BESYLATE 5 MG/1
5 TABLET ORAL DAILY
Qty: 90 TABLET | Refills: 2 | Status: SHIPPED | OUTPATIENT
Start: 2018-05-31 | End: 2018-06-14

## 2018-05-31 NOTE — TELEPHONE ENCOUNTER
Hello, this is staff I've received your request I'm returning your call from the pain management clinic at [facility]. I just wanted to let you know that I'm working on getting an answer for you by you call back . Pt was informed via voice mail to contact our office to schedule f/u

## 2018-05-31 NOTE — TELEPHONE ENCOUNTER
Contacted and spoke with pt regarding f/u . Pt stated she no longer needs pain management,as the pt had surgery in April.

## 2018-06-01 ENCOUNTER — PATIENT MESSAGE (OUTPATIENT)
Dept: ORTHOPEDICS | Facility: CLINIC | Age: 58
End: 2018-06-01

## 2018-06-06 ENCOUNTER — PATIENT MESSAGE (OUTPATIENT)
Dept: INTERNAL MEDICINE | Facility: CLINIC | Age: 58
End: 2018-06-06

## 2018-06-08 DIAGNOSIS — Z00.00 ROUTINE GENERAL MEDICAL EXAMINATION AT A HEALTH CARE FACILITY: Primary | ICD-10-CM

## 2018-06-11 ENCOUNTER — PATIENT MESSAGE (OUTPATIENT)
Dept: RESEARCH | Facility: HOSPITAL | Age: 58
End: 2018-06-11

## 2018-06-12 LAB
ACID FAST MOD KINY STN SPEC: NORMAL
MYCOBACTERIUM SPEC QL CULT: NORMAL

## 2018-06-13 ENCOUNTER — PATIENT MESSAGE (OUTPATIENT)
Dept: ORTHOPEDICS | Facility: CLINIC | Age: 58
End: 2018-06-13

## 2018-06-14 ENCOUNTER — OFFICE VISIT (OUTPATIENT)
Dept: INTERNAL MEDICINE | Facility: CLINIC | Age: 58
End: 2018-06-14
Payer: COMMERCIAL

## 2018-06-14 VITALS
BODY MASS INDEX: 35.72 KG/M2 | TEMPERATURE: 98 F | HEART RATE: 70 BPM | WEIGHT: 221.31 LBS | DIASTOLIC BLOOD PRESSURE: 100 MMHG | SYSTOLIC BLOOD PRESSURE: 158 MMHG

## 2018-06-14 DIAGNOSIS — I10 ESSENTIAL HYPERTENSION: Primary | ICD-10-CM

## 2018-06-14 DIAGNOSIS — Z98.890 HISTORY OF LUMBOSACRAL SPINE SURGERY: ICD-10-CM

## 2018-06-14 DIAGNOSIS — R10.84 GENERALIZED ABDOMINAL PAIN: ICD-10-CM

## 2018-06-14 DIAGNOSIS — E55.9 VITAMIN D DEFICIENCY: ICD-10-CM

## 2018-06-14 PROCEDURE — 99214 OFFICE O/P EST MOD 30 MIN: CPT | Mod: S$GLB,,, | Performed by: INTERNAL MEDICINE

## 2018-06-14 PROCEDURE — 99999 PR PBB SHADOW E&M-EST. PATIENT-LVL III: CPT | Mod: PBBFAC,,, | Performed by: INTERNAL MEDICINE

## 2018-06-14 RX ORDER — HYDROCHLOROTHIAZIDE 12.5 MG/1
12.5 TABLET ORAL DAILY
Qty: 90 TABLET | Refills: 2 | Status: SHIPPED | OUTPATIENT
Start: 2018-06-14 | End: 2018-07-27 | Stop reason: ALTCHOICE

## 2018-06-14 RX ORDER — ASPIRIN 325 MG
50000 TABLET, DELAYED RELEASE (ENTERIC COATED) ORAL
Qty: 12 CAPSULE | Refills: 0 | Status: SHIPPED | OUTPATIENT
Start: 2018-06-14 | End: 2018-08-31 | Stop reason: SDUPTHER

## 2018-06-14 NOTE — PROGRESS NOTES
"Subjective:       Patient ID: Renae Hou is a 57 y.o. female.    Chief Complaint: Follow-up and Hypertension    HPIMiromelia Hou is here today for follow up HTN. She is s/p L spine surgery and doing well. She was started on amlodipine while hospitalized for BP issues. She continued it post op, but called my office last week c/o LE edema and stopped her medication. We looked over her medicine records that indicate that hse had been on other BP meds in the past, but she stopped them as well. I cautioned her about contacting her PCP or myself in these instances and not stop the meds suddenly. She reports feeling "bloated" and also reports pain and swelling of her abdomen R quadrant. On exam of that area, I palpate no masses although she did exhibit some tenderness to palpation. I will obtain abdominal US and proceed from there.  I discussed her recent blood work that showed decreased vitamin D levels at 16. I gave her rx kamaljit supplementation 50 K units weekly x 12 weeks. All other parameters were unremarkable.  Review of Systems   Constitutional: Positive for fatigue.   Respiratory: Negative for cough and shortness of breath.    Cardiovascular: Negative for chest pain and leg swelling.   Gastrointestinal: Positive for abdominal distention and abdominal pain. Negative for blood in stool, nausea and vomiting.   Genitourinary: Negative for difficulty urinating and dysuria.   All other systems reviewed and are negative.      Objective:      Physical Exam   Constitutional: She is oriented to person, place, and time. She appears well-developed and well-nourished. No distress.   HENT:   Head: Normocephalic and atraumatic.   Right Ear: External ear normal.   Left Ear: External ear normal.   Mouth/Throat: Oropharynx is clear and moist. No oropharyngeal exudate.   Cardiovascular: Normal rate, regular rhythm, normal heart sounds and intact distal pulses.    Pulmonary/Chest: Effort normal and breath sounds normal. No " respiratory distress. She has no wheezes. She exhibits no tenderness.   Abdominal: Soft. Bowel sounds are normal. She exhibits no distension and no mass. There is tenderness. There is no guarding.   Mild tenderness R quadrant.   Musculoskeletal: Normal range of motion. She exhibits no edema.   Neurological: She is alert and oriented to person, place, and time.   Skin: Skin is warm and dry. She is not diaphoretic.   Psychiatric: She has a normal mood and affect. Her behavior is normal.   Nursing note and vitals reviewed.      Assessment:       1. Essential hypertension    2. Vitamin D deficiency    3. Generalized abdominal pain     4.     S/P L spine surgery.   Plan:    1. Start HCTZ 12. 5 mgs daily.         2. Start vitamin D 50 K units weekly x 12 weeks.         3. Obtain abdominal US. Call with results.         4. RTC 1 month for follow up.

## 2018-06-15 ENCOUNTER — HOSPITAL ENCOUNTER (OUTPATIENT)
Dept: RADIOLOGY | Facility: HOSPITAL | Age: 58
Discharge: HOME | End: 2018-06-15
Attending: INTERNAL MEDICINE
Payer: COMMERCIAL

## 2018-06-15 DIAGNOSIS — R10.84 GENERALIZED ABDOMINAL PAIN: ICD-10-CM

## 2018-06-15 PROCEDURE — 76700 US EXAM ABDOM COMPLETE: CPT | Mod: TC

## 2018-06-15 PROCEDURE — 76700 US EXAM ABDOM COMPLETE: CPT | Mod: 26,,, | Performed by: RADIOLOGY

## 2018-06-18 ENCOUNTER — PATIENT MESSAGE (OUTPATIENT)
Dept: INTERNAL MEDICINE | Facility: CLINIC | Age: 58
End: 2018-06-18

## 2018-07-05 ENCOUNTER — PATIENT MESSAGE (OUTPATIENT)
Dept: ORTHOPEDICS | Facility: CLINIC | Age: 58
End: 2018-07-05

## 2018-07-05 DIAGNOSIS — M79.672 LEFT FOOT PAIN: Primary | ICD-10-CM

## 2018-07-06 ENCOUNTER — HOSPITAL ENCOUNTER (OUTPATIENT)
Dept: RADIOLOGY | Facility: HOSPITAL | Age: 58
Discharge: HOME | End: 2018-07-06
Attending: PHYSICIAN ASSISTANT
Payer: COMMERCIAL

## 2018-07-06 ENCOUNTER — OFFICE VISIT (OUTPATIENT)
Dept: ORTHOPEDICS | Facility: CLINIC | Age: 58
End: 2018-07-06
Attending: PHYSICIAN ASSISTANT
Payer: COMMERCIAL

## 2018-07-06 VITALS — BODY MASS INDEX: 35.57 KG/M2 | HEIGHT: 66 IN | WEIGHT: 221.31 LBS

## 2018-07-06 DIAGNOSIS — Z98.1 S/P LUMBAR FUSION: Primary | ICD-10-CM

## 2018-07-06 DIAGNOSIS — Z98.1 S/P LUMBAR FUSION: ICD-10-CM

## 2018-07-06 DIAGNOSIS — M79.672 LEFT FOOT PAIN: ICD-10-CM

## 2018-07-06 PROCEDURE — 73630 X-RAY EXAM OF FOOT: CPT | Mod: TC,LT

## 2018-07-06 PROCEDURE — 72100 X-RAY EXAM L-S SPINE 2/3 VWS: CPT | Mod: 26,,, | Performed by: RADIOLOGY

## 2018-07-06 PROCEDURE — 73630 X-RAY EXAM OF FOOT: CPT | Mod: 26,LT,, | Performed by: RADIOLOGY

## 2018-07-06 PROCEDURE — 72100 X-RAY EXAM L-S SPINE 2/3 VWS: CPT | Mod: TC

## 2018-07-06 PROCEDURE — 99999 PR PBB SHADOW E&M-EST. PATIENT-LVL III: CPT | Mod: PBBFAC,,, | Performed by: PHYSICIAN ASSISTANT

## 2018-07-06 PROCEDURE — 99024 POSTOP FOLLOW-UP VISIT: CPT | Mod: S$GLB,,, | Performed by: PHYSICIAN ASSISTANT

## 2018-07-06 NOTE — PROGRESS NOTES
Date: 07/06/2018    Supervising Physician: Dean Zayas M.D.    Date of Surgery: 4/10/2018    Procedure: L4/5/S1 TLIF for grade I spondylolisthesis with large   left-sided L4-L5 facet cyst, possible epidural abscess as well as paraspinal muscle abscess.    History: Renae Hou is seen today for follow-up following the above listed procedure. Overall the patient is doing well but today notes she does not have any pain.   She does have pain in the left foot that has been present since surgery.  She is not taking anything for pain.  She has discontinued antibiotics.       Exam:  Incision is healed.   There is no sign of infection. Neuro exam is stable. No signs of DVT.    Radiographs: imaging today shows hardware in place, no evidence of failure.     Assessment/Plan: 12 weeks post op.    Doing well postoperatively. Progress activities as tolerated.      I will plan to see the patient back for the next postop visit in 3 months.     Thank you for the opportunity to participate in this patient's care. Please give me a call if there are any concerns or questions.

## 2018-07-13 ENCOUNTER — TELEPHONE (OUTPATIENT)
Dept: INTERNAL MEDICINE | Facility: CLINIC | Age: 58
End: 2018-07-13

## 2018-07-14 ENCOUNTER — OFFICE VISIT (OUTPATIENT)
Dept: URGENT CARE | Facility: CLINIC | Age: 58
End: 2018-07-14
Payer: COMMERCIAL

## 2018-07-14 VITALS
HEART RATE: 102 BPM | SYSTOLIC BLOOD PRESSURE: 147 MMHG | DIASTOLIC BLOOD PRESSURE: 101 MMHG | RESPIRATION RATE: 19 BRPM | OXYGEN SATURATION: 97 % | WEIGHT: 221 LBS | TEMPERATURE: 100 F | BODY MASS INDEX: 35.52 KG/M2 | HEIGHT: 66 IN

## 2018-07-14 DIAGNOSIS — J32.9 SINUSITIS, UNSPECIFIED CHRONICITY, UNSPECIFIED LOCATION: Primary | ICD-10-CM

## 2018-07-14 PROCEDURE — 96372 THER/PROPH/DIAG INJ SC/IM: CPT | Mod: S$GLB,,, | Performed by: NURSE PRACTITIONER

## 2018-07-14 PROCEDURE — 3008F BODY MASS INDEX DOCD: CPT | Mod: CPTII,S$GLB,, | Performed by: NURSE PRACTITIONER

## 2018-07-14 PROCEDURE — 3077F SYST BP >= 140 MM HG: CPT | Mod: CPTII,S$GLB,, | Performed by: NURSE PRACTITIONER

## 2018-07-14 PROCEDURE — 3080F DIAST BP >= 90 MM HG: CPT | Mod: CPTII,S$GLB,, | Performed by: NURSE PRACTITIONER

## 2018-07-14 PROCEDURE — 99214 OFFICE O/P EST MOD 30 MIN: CPT | Mod: 25,S$GLB,, | Performed by: NURSE PRACTITIONER

## 2018-07-14 RX ORDER — BETAMETHASONE SODIUM PHOSPHATE AND BETAMETHASONE ACETATE 3; 3 MG/ML; MG/ML
6 INJECTION, SUSPENSION INTRA-ARTICULAR; INTRALESIONAL; INTRAMUSCULAR; SOFT TISSUE
Status: COMPLETED | OUTPATIENT
Start: 2018-07-14 | End: 2018-07-14

## 2018-07-14 RX ORDER — AZITHROMYCIN 250 MG/1
TABLET, FILM COATED ORAL
Qty: 6 TABLET | Refills: 0 | Status: SHIPPED | OUTPATIENT
Start: 2018-07-14 | End: 2018-08-13

## 2018-07-14 RX ADMIN — BETAMETHASONE SODIUM PHOSPHATE AND BETAMETHASONE ACETATE 6 MG: 3; 3 INJECTION, SUSPENSION INTRA-ARTICULAR; INTRALESIONAL; INTRAMUSCULAR; SOFT TISSUE at 01:07

## 2018-07-14 NOTE — PATIENT INSTRUCTIONS
Use an antihistmine such as Claritin, Zyrtec or Allegra to dry you out.     Use pseudoephedrine (behind the counter) to decongest. Pseudoephedrine  30 mg up to 240 mg /day. It can raise your blood pressure and give you palpitations.    Use mucinex (guaifenisin) to break up mucous up to 2400mg/day to loosen any mucous. The mucinex DM pill has a cough suppressant that can be used at night to stop the tickle at the back of your throat.    Use Nasal Saline to mechanically move any post nasal drip from your eustachian tube or from the back of your throat.    Use Afrin in each nare for no longer than 3 days, as it is addictive. It can also dry out your mucous membranes and cause elevated blood pressure.    Use Flonase 1-2 sprays/nostril per day. It is a local acting steroid nasal spray, if you develop a bloody nose, stop using the medication immediately.    Use warm salt water gargles to ease your throat pain. Warm salt water gargles as needed for sore throat-  1/2 tsp salt to 1 cup warm water, gargle as desired.    Sometimes Nyquil at night is beneficial to help you get some rest, however it is sedating and it does have an antihistamine, and tylenol.    Take Antibiotics as directed and to completion.    Tylenol or Ibuprofen as needed for fevers.    Follow-up with your PCP or return to Urgent Care for worsening of symptoms.    Sinusitis (Antibiotic Treatment)    The sinuses are air-filled spaces within the bones of the face. They connect to the inside of the nose. Sinusitis is an inflammation of the tissue lining the sinus cavity. Sinus inflammation can occur during a cold. It can also be due to allergies to pollens and other particles in the air. Sinusitis can cause symptoms of sinus congestion and fullness. A sinus infection causes fever, headache and facial pain. There is often green or yellow drainage from the nose or into the back of the throat (post-nasal drip). You have been given antibiotics to treat this  condition.  Home care:  · Take the full course of antibiotics as instructed. Do not stop taking them, even if you feel better.  · Drink plenty of water, hot tea, and other liquids. This may help thin mucus. It also may promote sinus drainage.  · Heat may help soothe painful areas of the face. Use a towel soaked in hot water. Or,  the shower and direct the hot spray onto your face. Using a vaporizer along with a menthol rub at night may also help.   · An expectorant containing guaifenesin may help thin the mucus and promote drainage from the sinuses.  · Over-the-counter decongestants may be used unless a similar medicine was prescribed. Nasal sprays work the fastest. Use one that contains phenylephrine or oxymetazoline. First blow the nose gently. Then use the spray. Do not use these medicines more often than directed on the label or symptoms may get worse. You may also use tablets containing pseudoephedrine. Avoid products that combine ingredients, because side effects may be increased. Read labels. You can also ask the pharmacist for help. (NOTE: Persons with high blood pressure should not use decongestants. They can raise blood pressure.)  · Over-the-counter antihistamines may help if allergies contributed to your sinusitis.    · Do not use nasal rinses or irrigation during an acute sinus infection, unless told to by your health care provider. Rinsing may spread the infection to other sinuses.  · Use acetaminophen or ibuprofen to control pain, unless another pain medicine was prescribed. (If you have chronic liver or kidney disease or ever had a stomach ulcer, talk with your doctor before using these medicines. Aspirin should never be used in anyone under 18 years of age who is ill with a fever. It may cause severe liver damage.)  · Don't smoke. This can worsen symptoms.  Follow-up care  Follow up with your healthcare provider or our staff if you are not improving within the next week.  When to seek  medical advice  Call your healthcare provider if any of these occur:  · Facial pain or headache becoming more severe  · Stiff neck  · Unusual drowsiness or confusion  · Swelling of the forehead or eyelids  · Vision problems, including blurred or double vision  · Fever of 100.4ºF (38ºC) or higher, or as directed by your healthcare provider  · Seizure  · Breathing problems  · Symptoms not resolving within 10 days  Date Last Reviewed: 4/13/2015  © 0516-9569 [employer]. [address] [place] [id number]. All rights reserved. This information is not intended as a substitute for professional medical care. Always follow your healthcare professional's instructions.      Please return here or go to the Emergency Department for any concerns or worsening of condition.  If you were prescribed antibiotics, please take them to completion.  If you were prescribed a narcotic medication, do not drive or operate heavy equipment or machinery while taking these medications.  Please follow up with your primary care doctor or specialist as needed.    If you  smoke, please stop smoking.

## 2018-07-14 NOTE — PROGRESS NOTES
"Subjective:       Patient ID: Renae Hou is a 57 y.o. female.    Vitals:  height is 5' 6" (1.676 m) and weight is 100.2 kg (221 lb). Her oral temperature is 100 °F (37.8 °C). Her blood pressure is 147/101 (abnormal) and her pulse is 102. Her respiration is 19 and oxygen saturation is 97%.     Chief Complaint: URI    Patient presents with complaints of worsening sinus congestion with fevers x 2 days. Patient reports fevers at home up to 100.2. Denies any chills, chest pains, SOB, abdominal pain, nausea, vomiting, sore throat, or recent illnesses. Patient reports taking tylenol for her symptoms and fever.       URI    This is a new problem. The current episode started yesterday. The problem has been unchanged. There has been no fever. Associated symptoms include congestion, headaches, a plugged ear sensation (left ear) and sinus pain. Pertinent negatives include no abdominal pain, chest pain, coughing, ear pain, nausea, neck pain, sore throat or wheezing. She has tried acetaminophen and antihistamine for the symptoms. The treatment provided mild relief.     Review of Systems   Constitution: Negative for chills, fever and malaise/fatigue.   HENT: Positive for congestion, hoarse voice and sinus pain. Negative for ear pain and sore throat.    Eyes: Negative for discharge and redness.   Cardiovascular: Negative for chest pain, dyspnea on exertion and leg swelling.   Respiratory: Negative for cough, shortness of breath, sputum production and wheezing.    Musculoskeletal: Positive for myalgias. Negative for neck pain.   Gastrointestinal: Negative for abdominal pain and nausea.   Neurological: Positive for headaches.       Objective:      Physical Exam   Constitutional: She is oriented to person, place, and time. She appears well-developed and well-nourished. She is cooperative.  Non-toxic appearance. She does not appear ill. No distress.   Hoarseness   HENT:   Head: Normocephalic and atraumatic.   Right Ear: Hearing, " tympanic membrane, external ear and ear canal normal. No drainage or tenderness. Tympanic membrane is not injected and not erythematous. No middle ear effusion.   Left Ear: Hearing, external ear and ear canal normal. No drainage or tenderness. No foreign bodies. Tympanic membrane is not injected and not erythematous. A middle ear effusion (clear) is present.   Nose: Mucosal edema and rhinorrhea present. No nasal deformity. No epistaxis. Right sinus exhibits maxillary sinus tenderness. Right sinus exhibits no frontal sinus tenderness. Left sinus exhibits maxillary sinus tenderness. Left sinus exhibits no frontal sinus tenderness.   Mouth/Throat: Uvula is midline and mucous membranes are normal. No trismus in the jaw. Normal dentition. No uvula swelling. No oropharyngeal exudate, posterior oropharyngeal edema or posterior oropharyngeal erythema.   Eyes: Conjunctivae and lids are normal. Right eye exhibits no discharge. Left eye exhibits no discharge. No scleral icterus.   Sclera clear bilat   Neck: Trachea normal, normal range of motion, full passive range of motion without pain and phonation normal. Neck supple.   Cardiovascular: Normal rate, regular rhythm, normal heart sounds, intact distal pulses and normal pulses.    Pulmonary/Chest: Effort normal and breath sounds normal. No respiratory distress.   Abdominal: Soft. Normal appearance and bowel sounds are normal. She exhibits no distension, no pulsatile midline mass and no mass. There is no tenderness.   Musculoskeletal: Normal range of motion. She exhibits no edema or deformity.   Lymphadenopathy:     She has no cervical adenopathy.   Neurological: She is alert and oriented to person, place, and time. She exhibits normal muscle tone. Coordination normal.   Skin: Skin is warm, dry and intact. She is not diaphoretic. No pallor.   Psychiatric: She has a normal mood and affect. Her speech is normal and behavior is normal. Judgment and thought content normal.  Cognition and memory are normal.   Nursing note and vitals reviewed.      Assessment:       1. Sinusitis, unspecified chronicity, unspecified location        Plan:         Sinusitis, unspecified chronicity, unspecified location  -     betamethasone acetate-betamethasone sodium phosphate injection 6 mg; Inject 1 mL (6 mg total) into the muscle one time.  -     azithromycin (Z-KIMI) 250 MG tablet; Take 2 tablets by mouth on day 1; Take 1 tablet by mouth on days 2-5  Dispense: 6 tablet; Refill: 0      Patient Instructions     Use an antihistmine such as Claritin, Zyrtec or Allegra to dry you out.     Use pseudoephedrine (behind the counter) to decongest. Pseudoephedrine  30 mg up to 240 mg /day. It can raise your blood pressure and give you palpitations.    Use mucinex (guaifenisin) to break up mucous up to 2400mg/day to loosen any mucous. The mucinex DM pill has a cough suppressant that can be used at night to stop the tickle at the back of your throat.    Use Nasal Saline to mechanically move any post nasal drip from your eustachian tube or from the back of your throat.    Use Afrin in each nare for no longer than 3 days, as it is addictive. It can also dry out your mucous membranes and cause elevated blood pressure.    Use Flonase 1-2 sprays/nostril per day. It is a local acting steroid nasal spray, if you develop a bloody nose, stop using the medication immediately.    Use warm salt water gargles to ease your throat pain. Warm salt water gargles as needed for sore throat-  1/2 tsp salt to 1 cup warm water, gargle as desired.    Sometimes Nyquil at night is beneficial to help you get some rest, however it is sedating and it does have an antihistamine, and tylenol.    Take Antibiotics as directed and to completion.    Tylenol or Ibuprofen as needed for fevers.    Follow-up with your PCP or return to Urgent Care for worsening of symptoms.    Sinusitis (Antibiotic Treatment)    The sinuses are air-filled spaces within  the bones of the face. They connect to the inside of the nose. Sinusitis is an inflammation of the tissue lining the sinus cavity. Sinus inflammation can occur during a cold. It can also be due to allergies to pollens and other particles in the air. Sinusitis can cause symptoms of sinus congestion and fullness. A sinus infection causes fever, headache and facial pain. There is often green or yellow drainage from the nose or into the back of the throat (post-nasal drip). You have been given antibiotics to treat this condition.  Home care:  · Take the full course of antibiotics as instructed. Do not stop taking them, even if you feel better.  · Drink plenty of water, hot tea, and other liquids. This may help thin mucus. It also may promote sinus drainage.  · Heat may help soothe painful areas of the face. Use a towel soaked in hot water. Or,  the shower and direct the hot spray onto your face. Using a vaporizer along with a menthol rub at night may also help.   · An expectorant containing guaifenesin may help thin the mucus and promote drainage from the sinuses.  · Over-the-counter decongestants may be used unless a similar medicine was prescribed. Nasal sprays work the fastest. Use one that contains phenylephrine or oxymetazoline. First blow the nose gently. Then use the spray. Do not use these medicines more often than directed on the label or symptoms may get worse. You may also use tablets containing pseudoephedrine. Avoid products that combine ingredients, because side effects may be increased. Read labels. You can also ask the pharmacist for help. (NOTE: Persons with high blood pressure should not use decongestants. They can raise blood pressure.)  · Over-the-counter antihistamines may help if allergies contributed to your sinusitis.    · Do not use nasal rinses or irrigation during an acute sinus infection, unless told to by your health care provider. Rinsing may spread the infection to other  sinuses.  · Use acetaminophen or ibuprofen to control pain, unless another pain medicine was prescribed. (If you have chronic liver or kidney disease or ever had a stomach ulcer, talk with your doctor before using these medicines. Aspirin should never be used in anyone under 18 years of age who is ill with a fever. It may cause severe liver damage.)  · Don't smoke. This can worsen symptoms.  Follow-up care  Follow up with your healthcare provider or our staff if you are not improving within the next week.  When to seek medical advice  Call your healthcare provider if any of these occur:  · Facial pain or headache becoming more severe  · Stiff neck  · Unusual drowsiness or confusion  · Swelling of the forehead or eyelids  · Vision problems, including blurred or double vision  · Fever of 100.4ºF (38ºC) or higher, or as directed by your healthcare provider  · Seizure  · Breathing problems  · Symptoms not resolving within 10 days  Date Last Reviewed: 4/13/2015  © 6371-8920 [employer]. [address] [place] [address], [place] [address]. All rights reserved. This information is not intended as a substitute for professional medical care. Always follow your healthcare professional's instructions.      Please return here or go to the Emergency Department for any concerns or worsening of condition.  If you were prescribed antibiotics, please take them to completion.  If you were prescribed a narcotic medication, do not drive or operate heavy equipment or machinery while taking these medications.  Please follow up with your primary care doctor or specialist as needed.    If you  smoke, please stop smoking.

## 2018-07-18 ENCOUNTER — PATIENT MESSAGE (OUTPATIENT)
Dept: ORTHOPEDICS | Facility: CLINIC | Age: 58
End: 2018-07-18

## 2018-07-27 ENCOUNTER — OFFICE VISIT (OUTPATIENT)
Dept: INTERNAL MEDICINE | Facility: CLINIC | Age: 58
End: 2018-07-27
Payer: COMMERCIAL

## 2018-07-27 ENCOUNTER — OFFICE VISIT (OUTPATIENT)
Dept: ORTHOPEDICS | Facility: CLINIC | Age: 58
End: 2018-07-27
Payer: COMMERCIAL

## 2018-07-27 VITALS — DIASTOLIC BLOOD PRESSURE: 98 MMHG | HEART RATE: 68 BPM | SYSTOLIC BLOOD PRESSURE: 140 MMHG

## 2018-07-27 VITALS — BODY MASS INDEX: 33.56 KG/M2 | HEIGHT: 68 IN | WEIGHT: 221.44 LBS

## 2018-07-27 DIAGNOSIS — I10 ESSENTIAL HYPERTENSION: Primary | ICD-10-CM

## 2018-07-27 DIAGNOSIS — K76.0 FATTY LIVER: ICD-10-CM

## 2018-07-27 DIAGNOSIS — Z98.1 STATUS POST LUMBAR SPINAL FUSION: Primary | ICD-10-CM

## 2018-07-27 DIAGNOSIS — G06.2 EPIDURAL ABSCESS: ICD-10-CM

## 2018-07-27 PROCEDURE — 3077F SYST BP >= 140 MM HG: CPT | Mod: CPTII,S$GLB,, | Performed by: INTERNAL MEDICINE

## 2018-07-27 PROCEDURE — 99213 OFFICE O/P EST LOW 20 MIN: CPT | Mod: S$GLB,,, | Performed by: INTERNAL MEDICINE

## 2018-07-27 PROCEDURE — 3077F SYST BP >= 140 MM HG: CPT | Mod: CPTII,S$GLB,, | Performed by: ORTHOPAEDIC SURGERY

## 2018-07-27 PROCEDURE — 3080F DIAST BP >= 90 MM HG: CPT | Mod: CPTII,S$GLB,, | Performed by: INTERNAL MEDICINE

## 2018-07-27 PROCEDURE — 3080F DIAST BP >= 90 MM HG: CPT | Mod: CPTII,S$GLB,, | Performed by: ORTHOPAEDIC SURGERY

## 2018-07-27 PROCEDURE — 99999 PR PBB SHADOW E&M-EST. PATIENT-LVL III: CPT | Mod: PBBFAC,,, | Performed by: INTERNAL MEDICINE

## 2018-07-27 PROCEDURE — 99999 PR PBB SHADOW E&M-EST. PATIENT-LVL II: CPT | Mod: PBBFAC,,, | Performed by: ORTHOPAEDIC SURGERY

## 2018-07-27 PROCEDURE — 99212 OFFICE O/P EST SF 10 MIN: CPT | Mod: S$GLB,,, | Performed by: ORTHOPAEDIC SURGERY

## 2018-07-27 PROCEDURE — 3008F BODY MASS INDEX DOCD: CPT | Mod: CPTII,S$GLB,, | Performed by: ORTHOPAEDIC SURGERY

## 2018-07-27 RX ORDER — PREGABALIN 75 MG/1
75 CAPSULE ORAL NIGHTLY
Qty: 30 CAPSULE | Refills: 2 | Status: SHIPPED | OUTPATIENT
Start: 2018-07-27 | End: 2018-09-07

## 2018-07-27 RX ORDER — LOSARTAN POTASSIUM AND HYDROCHLOROTHIAZIDE 12.5; 5 MG/1; MG/1
1 TABLET ORAL DAILY
Qty: 90 TABLET | Refills: 3 | Status: SHIPPED | OUTPATIENT
Start: 2018-07-27 | End: 2019-01-18

## 2018-07-27 NOTE — PROGRESS NOTES
Subjective:       Patient ID: Renae Hou is a 57 y.o. female.    Chief Complaint: Hypertension    HPIMiss Renae is here today for follow up HTN. I had started her on HCTZ last visit 2o HTN> She had been placed on amlodipine by another physician, but she did not tolerate the LE edema it caused. I started her on HCTZ which she has tolerated well, but has noted PM/AM headaches as before and have not improved. She also has had issues with allergies lately and was recently seen in  for this and given a Z-Pack. She is still with some allergy sx's, but reports the development of diarrhea this week. I will monitor and consider testing for C-dif if not improved. She is also having knee pain issues and has appt with Ortho later today.  I also gave her copy of the abdominal US report that showed evidence of fatty liver. I discussed the implications of this finding vis jimmie future health issues and encouraged her to loose weight. Specific recommendations were provided.  Review of Systems   Respiratory: Negative for cough and shortness of breath.    Cardiovascular: Negative for chest pain, palpitations and leg swelling.       Objective:      Physical Exam   Constitutional: She is oriented to person, place, and time. She appears well-developed and well-nourished. No distress.   HENT:   Head: Normocephalic and atraumatic.   Right Ear: External ear normal.   Left Ear: External ear normal.   Mouth/Throat: Oropharynx is clear and moist. No oropharyngeal exudate.   Cardiovascular: Normal rate, regular rhythm, normal heart sounds and intact distal pulses.    No murmur heard.  Pulmonary/Chest: Effort normal and breath sounds normal. No respiratory distress. She has no wheezes. She exhibits no tenderness.   Abdominal: Soft. Bowel sounds are normal. She exhibits no distension and no mass. There is no tenderness.   Musculoskeletal: Normal range of motion. She exhibits no edema.   Neurological: She is alert and oriented to person,  place, and time. No cranial nerve deficit.   Skin: Skin is dry. She is not diaphoretic.   Psychiatric: She has a normal mood and affect. Her behavior is normal.   Nursing note and vitals reviewed.      Assessment:       1. Essential hypertension    2. Fatty liver        Plan:      1. Stop HCTZ 12.5 mgs.           2. Start Losartan/HCTZ 50/12.5 mgs daily.           3. Monitor BP; keep diary.           4. Loose weight.           5. RTC 1 month.

## 2018-07-31 NOTE — PROGRESS NOTES
Date of Surgery: 4/10/2018    Procedure: L4/5/S1 TLIF for grade I spondylolisthesis with large   left-sided L4-L5 facet cyst, possible epidural abscess as well as paraspinal muscle abscess.    History: Renae Hou is seen today for follow-up following the above listed procedure. Overall the patient is doing well, she has had 2 episodes of her left leg giving way. She also has L dorsal foot pain, this has been present since surgery.    Exam:  Incision is healed.   There is no sign of infection. Neuro exam is stable. No signs of DVT. There is mild allodynia over the L dorsal foot.    Radiographs: imaging today shows hardware in place, no evidence of failure.     Assessment/Plan: 3.5 months postop    Doing well postoperatively. Continue activities as tolerated. We will watch this pain.    I spent 10 minutes with the patient of which greater than 1/2 the time was devoted to counciling the patient regarding treatment options.

## 2018-08-08 ENCOUNTER — PATIENT MESSAGE (OUTPATIENT)
Dept: OBSTETRICS AND GYNECOLOGY | Facility: CLINIC | Age: 58
End: 2018-08-08

## 2018-08-13 ENCOUNTER — OFFICE VISIT (OUTPATIENT)
Dept: OBSTETRICS AND GYNECOLOGY | Facility: CLINIC | Age: 58
End: 2018-08-13
Payer: COMMERCIAL

## 2018-08-13 ENCOUNTER — PATIENT MESSAGE (OUTPATIENT)
Dept: INTERNAL MEDICINE | Facility: CLINIC | Age: 58
End: 2018-08-13

## 2018-08-13 VITALS
BODY MASS INDEX: 35.98 KG/M2 | HEIGHT: 66 IN | DIASTOLIC BLOOD PRESSURE: 88 MMHG | SYSTOLIC BLOOD PRESSURE: 124 MMHG | WEIGHT: 223.88 LBS

## 2018-08-13 DIAGNOSIS — N89.8 VAGINAL ODOR: Primary | ICD-10-CM

## 2018-08-13 DIAGNOSIS — R10.2 VAGINAL PAIN: ICD-10-CM

## 2018-08-13 DIAGNOSIS — N39.3 STRESS INCONTINENCE IN FEMALE: ICD-10-CM

## 2018-08-13 LAB
BILIRUB SERPL-MCNC: ABNORMAL MG/DL
BLOOD URINE, POC: ABNORMAL
COLOR, POC UA: YELLOW
GLUCOSE UR QL STRIP: ABNORMAL
KETONES UR QL STRIP: ABNORMAL
LEUKOCYTE ESTERASE URINE, POC: ABNORMAL
NITRITE, POC UA: ABNORMAL
PH, POC UA: 5
PROTEIN, POC: ABNORMAL
SPECIFIC GRAVITY, POC UA: 1.02
UROBILINOGEN, POC UA: ABNORMAL

## 2018-08-13 PROCEDURE — 81002 URINALYSIS NONAUTO W/O SCOPE: CPT | Mod: S$GLB,,, | Performed by: NURSE PRACTITIONER

## 2018-08-13 PROCEDURE — 3008F BODY MASS INDEX DOCD: CPT | Mod: CPTII,S$GLB,, | Performed by: NURSE PRACTITIONER

## 2018-08-13 PROCEDURE — 99213 OFFICE O/P EST LOW 20 MIN: CPT | Mod: S$GLB,,, | Performed by: NURSE PRACTITIONER

## 2018-08-13 PROCEDURE — 87086 URINE CULTURE/COLONY COUNT: CPT

## 2018-08-13 PROCEDURE — 87660 TRICHOMONAS VAGIN DIR PROBE: CPT

## 2018-08-13 PROCEDURE — 3079F DIAST BP 80-89 MM HG: CPT | Mod: CPTII,S$GLB,, | Performed by: NURSE PRACTITIONER

## 2018-08-13 PROCEDURE — 99999 PR PBB SHADOW E&M-EST. PATIENT-LVL III: CPT | Mod: PBBFAC,,, | Performed by: NURSE PRACTITIONER

## 2018-08-13 PROCEDURE — 3074F SYST BP LT 130 MM HG: CPT | Mod: CPTII,S$GLB,, | Performed by: NURSE PRACTITIONER

## 2018-08-13 RX ORDER — PREDNISONE 20 MG/1
TABLET ORAL
Refills: 0 | COMMUNITY
Start: 2018-08-10 | End: 2018-08-31 | Stop reason: ALTCHOICE

## 2018-08-14 ENCOUNTER — TELEPHONE (OUTPATIENT)
Dept: GASTROENTEROLOGY | Facility: CLINIC | Age: 58
End: 2018-08-14

## 2018-08-14 ENCOUNTER — PATIENT MESSAGE (OUTPATIENT)
Dept: OBSTETRICS AND GYNECOLOGY | Facility: CLINIC | Age: 58
End: 2018-08-14

## 2018-08-14 DIAGNOSIS — R63.5 UNEXPLAINED WEIGHT GAIN: ICD-10-CM

## 2018-08-14 DIAGNOSIS — R14.0 ABDOMINAL BLOATING: Primary | ICD-10-CM

## 2018-08-14 LAB
CANDIDA RRNA VAG QL PROBE: NEGATIVE
G VAGINALIS RRNA GENITAL QL PROBE: NEGATIVE
T VAGINALIS RRNA GENITAL QL PROBE: NEGATIVE

## 2018-08-14 NOTE — TELEPHONE ENCOUNTER
----- Message from Eligio Garcia MA sent at 8/14/2018 11:39 AM CDT -----  Contact: Pt   Pt called and would like to schedule a appt regarding stomach pain.      Pt can be reached at [phone].      Thanks

## 2018-08-15 ENCOUNTER — TELEPHONE (OUTPATIENT)
Dept: GASTROENTEROLOGY | Facility: CLINIC | Age: 58
End: 2018-08-15

## 2018-08-15 LAB
BACTERIA UR CULT: NORMAL
BACTERIA UR CULT: NORMAL

## 2018-08-15 NOTE — TELEPHONE ENCOUNTER
Spoke with pt. I let her know that we are working on her referral. Will fax request of information form to Dr. Bess for colonoscopy and any additional records.

## 2018-08-15 NOTE — TELEPHONE ENCOUNTER
----- Message from Martha Raul sent at 8/14/2018  1:15 PM CDT -----  Contact: self - [phone]  power - returning your call re appt - please call patient at

## 2018-08-16 ENCOUNTER — TELEPHONE (OUTPATIENT)
Dept: OBSTETRICS AND GYNECOLOGY | Facility: CLINIC | Age: 58
End: 2018-08-16

## 2018-08-16 NOTE — TELEPHONE ENCOUNTER
----- Message from Rachele Way NP sent at 8/16/2018  4:57 PM CDT -----  Call pt and tell her urine culture negative.  (If taking antibiotics, she can stop them)

## 2018-08-19 NOTE — PROGRESS NOTES
Chief Complaint: Problem:     Chief Complaint   Patient presents with    Urinary Frequency     unable to empty all the way - leaking - rectal issues as well - pt states never fully empty     Vaginal Discharge     vaginal odor        (Dr. Cook patient)    Last Pap:  No result found HYST      HPI:      Renae Hou is a 57 y.o.  who presents today for c/o vaginal odor & occasional discharge, as well as intermittent increased frequency of urination.  State she also leaks urine on occasion (with cough, sneeze, laugh).    LMP: Patient's last menstrual period was 2012.   Ms. Hou is currently sexually active with a single male partner.   She declines STD screening today with her examination.      Past Medical History:   Diagnosis Date    Fatty liver     Hypertension     Menopause     Obesity     Sleep apnea     Thyroid disease     Thyroid nodules.     Past Surgical History:   Procedure Laterality Date    BACK SURGERY      L4, L5    BILATERAL SALPINGOOPHORECTOMY      CHOLECYSTECTOMY      CYST REMOVAL      HYST[facility]CT[facility]  2012    [facility]     RECTOCELE REPAIR      THYROID NODULE REMOVAL       Social History     Socioeconomic History    Marital status:      Spouse name: Not on file    Number of children: Not on file    Years of education: Not on file    Highest education level: Not on file   Social Needs    Financial resource strain: Not on file    Food insecurity - worry: Not on file    Food insecurity - inability: Not on file    Transportation needs - medical: Not on file    Transportation needs - non-medical: Not on file   Occupational History    Not on file   Tobacco Use    Smoking status: Former Smoker     Packs/day: 0.25     Types: Cigarettes     Last attempt to quit:      Years since quittin.6    Smokeless tobacco: Never Used    Tobacco comment: smoked socially decades ago - weekends only   Substance and Sexual Activity    Alcohol use: Yes      "Comment: SELDOMLY    Drug use: No    Sexual activity: Yes     Partners: Male     Birth control/protection: Surgical, See Surgical Hx     Comment: [facility] BS&O 2012,     Other Topics Concern    Not on file   Social History Narrative    Not on file     Family History   Problem Relation Age of Onset    Hypertension Mother     Hyperlipidemia Mother     Heart disease Mother 55        cad, valvular heart disease    Alcohol abuse Sister     No Known Problems Daughter     No Known Problems Daughter     Breast cancer Neg Hx     Colon cancer Neg Hx     Ovarian cancer Neg Hx     Diabetes Neg Hx      OB History      Para Term  AB Living    2 2 2     2    SAB TAB Ectopic Multiple Live Births            2          ROS:     GENERAL: Denies unintentional weight gain or weight loss. Feeling well overall.   SKIN: Denies rash or lesions.   HEENT: Denies headaches, or vision changes.   CARDIOVASCULAR: Denies palpitations or chest pain.   RESPIRATORY: Denies shortness of breath or dyspnea on exertion.  BREASTS: Denies pain, lumps, or nipple discharge.   ABDOMEN: Denies abdominal pain, constipation, diarrhea, nausea, vomiting, change in appetite.  URINARY: Reports increased frequency and occasional stress incontinence; denies dysuria, hematuria.  NEUROLOGIC: Denies syncope or weakness.   PSYCHIATRIC: Denies depression, anxiety or mood swings.  VULVAR: No pain, no lesions and no itching.  VAGINAL: No relaxation, no itching, no abnormal bleeding and no lesions.  Reports vaginal odor and occasional d/c.    Physical Exam:      PHYSICAL EXAM:  /88   Ht 5' 6" (1.676 m)   Wt 101.5 kg (223 lb 14 oz)   LMP 2012   BMI 36.13 kg/m²   Body mass index is 36.13 kg/m².     APPEARANCE: Well nourished, well developed, in no acute distress.  PSYCH: Appropriate mood and affect.  CHEST: Normal respiratory effort.  ABDOMEN: Soft.  No tenderness or masses.    PELVIC: Normal external genitalia without " lesions.  Normal hair distribution.  Adequate perineal body, normal urethral meatus.  Vagina moist and well rugated without lesions; scant amount white discharge.  Cervix surgically absent.  No significant cystocele or rectocele.  Bimanual exam shows uterus to be surgically absent.  Adnexa without masses or tenderness.      Assessment/Plan:     Vaginal odor  -     Vaginosis Screen by DNA Probe    Stress incontinence in female  -     Ambulatory Referral to Urogynecology  -     POCT urine dipstick without microscope  -     Urine culture    Vaginal pain  -     Ambulatory Referral to Urogynecology      Counseling:     Patient was counseled today on current ASCCP pap guidelines, the recommendation for yearly pelvic exams, healthy diet and exercise routines, annual mammograms and breast self awareness. She is to see her PCP for other health maintenance.     Follow-up if symptoms worsen or fail to improve.

## 2018-08-21 ENCOUNTER — PATIENT MESSAGE (OUTPATIENT)
Dept: INTERNAL MEDICINE | Facility: CLINIC | Age: 58
End: 2018-08-21

## 2018-08-22 ENCOUNTER — TELEPHONE (OUTPATIENT)
Dept: GASTROENTEROLOGY | Facility: CLINIC | Age: 58
End: 2018-08-22

## 2018-08-22 NOTE — TELEPHONE ENCOUNTER
----- Message from Radha Watkins MA sent at 8/21/2018 12:08 PM CDT -----  Contact: self [phone]      ----- Message -----  From: Rena Banda  Sent: 8/21/2018  11:41 AM  To: [facility] Clinical Staff    Pt called she would like to see a gastro doctor for her stomach problems, but she doesn't know what kind of stomach problems she is having    Contact: self [phone]

## 2018-08-24 ENCOUNTER — TELEPHONE (OUTPATIENT)
Dept: BARIATRICS | Facility: CLINIC | Age: 58
End: 2018-08-24

## 2018-08-24 NOTE — TELEPHONE ENCOUNTER
Received message from Dr. Ratliff.  Called patient.  Discussed bariatric surgery options.  Scheduled bariatric class seminar for 9/11/18.  Pt to also verify insurance benefits with her insurance company.  appt slip mailed.

## 2018-08-30 ENCOUNTER — TELEPHONE (OUTPATIENT)
Dept: BARIATRICS | Facility: CLINIC | Age: 58
End: 2018-08-30

## 2018-08-30 NOTE — TELEPHONE ENCOUNTER
----- Message from Berta Villalobos sent at 8/30/2018 11:24 AM CDT -----  Pt is calling asking to speak with the nurse in regards to her insurance verification, please call the pt at [phone]

## 2018-08-31 ENCOUNTER — OFFICE VISIT (OUTPATIENT)
Dept: INTERNAL MEDICINE | Facility: CLINIC | Age: 58
End: 2018-08-31
Payer: COMMERCIAL

## 2018-08-31 VITALS
WEIGHT: 225 LBS | HEIGHT: 66 IN | SYSTOLIC BLOOD PRESSURE: 118 MMHG | DIASTOLIC BLOOD PRESSURE: 79 MMHG | BODY MASS INDEX: 36.16 KG/M2

## 2018-08-31 DIAGNOSIS — E55.9 VITAMIN D DEFICIENCY: ICD-10-CM

## 2018-08-31 DIAGNOSIS — I10 ESSENTIAL HYPERTENSION: Primary | ICD-10-CM

## 2018-08-31 DIAGNOSIS — N39.3 SUI (STRESS URINARY INCONTINENCE, FEMALE): ICD-10-CM

## 2018-08-31 DIAGNOSIS — R19.7 DIARRHEA, UNSPECIFIED TYPE: ICD-10-CM

## 2018-08-31 PROCEDURE — 99999 PR PBB SHADOW E&M-EST. PATIENT-LVL III: CPT | Mod: PBBFAC,,, | Performed by: INTERNAL MEDICINE

## 2018-08-31 PROCEDURE — 3074F SYST BP LT 130 MM HG: CPT | Mod: CPTII,S$GLB,, | Performed by: INTERNAL MEDICINE

## 2018-08-31 PROCEDURE — 3078F DIAST BP <80 MM HG: CPT | Mod: CPTII,S$GLB,, | Performed by: INTERNAL MEDICINE

## 2018-08-31 PROCEDURE — 99213 OFFICE O/P EST LOW 20 MIN: CPT | Mod: S$GLB,,, | Performed by: INTERNAL MEDICINE

## 2018-08-31 PROCEDURE — 3008F BODY MASS INDEX DOCD: CPT | Mod: CPTII,S$GLB,, | Performed by: INTERNAL MEDICINE

## 2018-08-31 RX ORDER — CHOLECALCIFEROL (VITAMIN D3) 1250 MCG
CAPSULE ORAL
Qty: 12 CAPSULE | Refills: 0 | Status: SHIPPED | OUTPATIENT
Start: 2018-08-31 | End: 2018-09-07 | Stop reason: ALTCHOICE

## 2018-09-01 ENCOUNTER — LAB VISIT (OUTPATIENT)
Dept: LAB | Facility: HOSPITAL | Age: 58
End: 2018-09-01
Attending: INTERNAL MEDICINE
Payer: COMMERCIAL

## 2018-09-01 ENCOUNTER — PATIENT MESSAGE (OUTPATIENT)
Dept: INTERNAL MEDICINE | Facility: CLINIC | Age: 58
End: 2018-09-01

## 2018-09-01 DIAGNOSIS — R19.7 DIARRHEA, UNSPECIFIED TYPE: ICD-10-CM

## 2018-09-01 PROCEDURE — 87338 HPYLORI STOOL AG IA: CPT

## 2018-09-04 ENCOUNTER — PATIENT MESSAGE (OUTPATIENT)
Dept: ORTHOPEDICS | Facility: CLINIC | Age: 58
End: 2018-09-04

## 2018-09-04 NOTE — PROGRESS NOTES
Subjective:       Patient ID: Renae Hou is a 57 y.o. female.    Chief Complaint: Follow-up (Blood Pressure ); GI Problem; Sinusitis; Rash (Rt hand ); and Diarrhea    HPIMiss Renae is here today for follow up HTN. Overall doing well. Her BP is better controlled and feels well. Her HA have resolved and is tolerating the BP med well. She reports however episodic diarrhea - at times watery - w/o significant abdominal pain. She gets an occassional cramp, but resolves on its own. I will obtain stool for H pylori and consider C diff if indicated. She also reports issue with urinary leakage that happens sporadically. Hs some nocturia, but no hx of UTI's. I will male arrangements for her to be seem in UroGyn for evaluation.  Review of Systems   All other systems reviewed and are negative.      Objective:      Physical Exam   Constitutional: She appears well-developed and well-nourished. No distress.   Cardiovascular: Normal rate, regular rhythm, normal heart sounds and intact distal pulses.   No murmur heard.  Pulmonary/Chest: Effort normal and breath sounds normal. No respiratory distress. She has no wheezes. She exhibits no tenderness.   Abdominal: Soft. Bowel sounds are normal. She exhibits no distension and no mass. There is no tenderness.   Musculoskeletal: Normal range of motion. She exhibits no edema or tenderness.   Neurological: She is alert.   Skin: Skin is warm and dry. She is not diaphoretic.   Psychiatric: She has a normal mood and affect. Her behavior is normal.   Vitals reviewed.      Assessment:       1. Essential hypertension    2. Diarrhea, unspecified type    3. CHRISTINE (stress urinary incontinence, female)        Plan:      1. Obtain studies as above.         2. Continue with current BP meds.         3. Refer to GYN.         4. Call with results and treat accordingly.         5. RTC 3 months or sooner if needed.

## 2018-09-06 ENCOUNTER — TELEPHONE (OUTPATIENT)
Dept: UROGYNECOLOGY | Facility: CLINIC | Age: 58
End: 2018-09-06

## 2018-09-06 DIAGNOSIS — Z98.1 STATUS POST LUMBAR SPINAL FUSION: Primary | ICD-10-CM

## 2018-09-06 NOTE — TELEPHONE ENCOUNTER
----- Message from Priya Catherine sent at 9/6/2018 12:38 PM CDT -----  Contact: NICOLE JACOB [[id number]]            Name of Who is Calling: NICOLE JACOB [[id number]]      What is the request in detail: patient would like to schedule a sooner appt than 10/31/18 with doctor. Please call     Can the clinic reply by [facility]: no    What Number to Call Back if not in [facility]: [phone]

## 2018-09-06 NOTE — TELEPHONE ENCOUNTER
Spoke with pt and rescheduled her consult appointment for a sooner day as pt requested, new appointment letter placed in the mail, pt voiced understanding and call was ended.

## 2018-09-07 ENCOUNTER — HOSPITAL ENCOUNTER (OUTPATIENT)
Dept: RADIOLOGY | Facility: HOSPITAL | Age: 58
Discharge: HOME | End: 2018-09-07
Attending: PHYSICIAN ASSISTANT
Payer: COMMERCIAL

## 2018-09-07 ENCOUNTER — OFFICE VISIT (OUTPATIENT)
Dept: ORTHOPEDICS | Facility: CLINIC | Age: 58
End: 2018-09-07
Payer: COMMERCIAL

## 2018-09-07 VITALS — HEIGHT: 66 IN | BODY MASS INDEX: 36.17 KG/M2 | WEIGHT: 225.06 LBS

## 2018-09-07 DIAGNOSIS — Z98.1 STATUS POST LUMBAR SPINAL FUSION: ICD-10-CM

## 2018-09-07 DIAGNOSIS — Z98.1 STATUS POST LUMBAR SPINAL FUSION: Primary | ICD-10-CM

## 2018-09-07 PROCEDURE — 72100 X-RAY EXAM L-S SPINE 2/3 VWS: CPT | Mod: 26,,, | Performed by: RADIOLOGY

## 2018-09-07 PROCEDURE — 3008F BODY MASS INDEX DOCD: CPT | Mod: CPTII,S$GLB,, | Performed by: PHYSICIAN ASSISTANT

## 2018-09-07 PROCEDURE — 72100 X-RAY EXAM L-S SPINE 2/3 VWS: CPT | Mod: TC

## 2018-09-07 PROCEDURE — 99213 OFFICE O/P EST LOW 20 MIN: CPT | Mod: S$GLB,,, | Performed by: PHYSICIAN ASSISTANT

## 2018-09-07 PROCEDURE — 99999 PR PBB SHADOW E&M-EST. PATIENT-LVL III: CPT | Mod: PBBFAC,,, | Performed by: PHYSICIAN ASSISTANT

## 2018-09-07 RX ORDER — PREGABALIN 150 MG/1
150 CAPSULE ORAL NIGHTLY
Qty: 30 CAPSULE | Refills: 2 | Status: SHIPPED | OUTPATIENT
Start: 2018-09-07 | End: 2018-11-30

## 2018-09-07 NOTE — PROGRESS NOTES
Date: 09/07/2018    Supervising Physician: Dean Zayas M.D.    Date of Surgery: 4/10/2018    Procedure: L4/5/S1 TLIF for grade I spondylolisthesis with large   left-sided L4-L5 facet cyst, possible epidural abscess as well as paraspinal muscle abscess.        History: Renae Hou is seen today for follow-up following the above listed procedure. Overall the patient is doing well but today notes over the last month she has had increased back pain at night.   She says the lyrica is helping the pain in her left leg, but she is having trouble sleeping because of pain in the back.  No fevers or chills.  She has tried robaxin occasionally for pain with little relief.       Exam: Incision is healed.   There is no sign of infection. Neuro exam is stable. No signs of DVT.    Radiographs: imaging today shows hardware in place, no evidence of failure.     CRP - 7.9  CBC - normal  ESR- pending      Assessment/Plan:     Patient seen and examined with Dr. Zayas.  There is no evidence of hardware failure on imaging.  Labs are normal.  There is no evidence of infection.  Referral for PT placed today.  Will increase Lyrica to 150mg nightly.

## 2018-09-08 LAB — H PYLORI AG STL QL: NOT DETECTED

## 2018-09-10 ENCOUNTER — PATIENT MESSAGE (OUTPATIENT)
Dept: ORTHOPEDICS | Facility: CLINIC | Age: 58
End: 2018-09-10

## 2018-09-10 ENCOUNTER — PATIENT MESSAGE (OUTPATIENT)
Dept: INTERNAL MEDICINE | Facility: CLINIC | Age: 58
End: 2018-09-10

## 2018-09-11 ENCOUNTER — PATIENT MESSAGE (OUTPATIENT)
Dept: SPINE | Facility: CLINIC | Age: 58
End: 2018-09-11

## 2018-09-19 ENCOUNTER — PATIENT MESSAGE (OUTPATIENT)
Dept: INTERNAL MEDICINE | Facility: CLINIC | Age: 58
End: 2018-09-19

## 2018-09-19 ENCOUNTER — INITIAL CONSULT (OUTPATIENT)
Dept: UROGYNECOLOGY | Facility: CLINIC | Age: 58
End: 2018-09-19
Payer: COMMERCIAL

## 2018-09-19 VITALS
BODY MASS INDEX: 37.21 KG/M2 | DIASTOLIC BLOOD PRESSURE: 86 MMHG | SYSTOLIC BLOOD PRESSURE: 132 MMHG | HEIGHT: 66 IN | WEIGHT: 231.5 LBS

## 2018-09-19 DIAGNOSIS — N39.46 MIXED STRESS AND URGE URINARY INCONTINENCE: ICD-10-CM

## 2018-09-19 DIAGNOSIS — N81.11 CYSTOCELE, MIDLINE: ICD-10-CM

## 2018-09-19 DIAGNOSIS — N81.6 RECTOCELE, FEMALE: ICD-10-CM

## 2018-09-19 DIAGNOSIS — R35.1 NOCTURIA MORE THAN TWICE PER NIGHT: ICD-10-CM

## 2018-09-19 DIAGNOSIS — N39.41 URGE INCONTINENCE: Primary | ICD-10-CM

## 2018-09-19 DIAGNOSIS — N95.2 VAGINAL ATROPHY: ICD-10-CM

## 2018-09-19 DIAGNOSIS — N94.10 DYSPAREUNIA, FEMALE: ICD-10-CM

## 2018-09-19 DIAGNOSIS — K59.09 CHRONIC CONSTIPATION: ICD-10-CM

## 2018-09-19 PROCEDURE — 99245 OFF/OP CONSLTJ NEW/EST HI 55: CPT | Mod: 25,S$GLB,, | Performed by: OBSTETRICS & GYNECOLOGY

## 2018-09-19 PROCEDURE — 99999 PR PBB SHADOW E&M-EST. PATIENT-LVL III: CPT | Mod: PBBFAC,,, | Performed by: OBSTETRICS & GYNECOLOGY

## 2018-09-19 PROCEDURE — 51701 INSERT BLADDER CATHETER: CPT | Mod: S$GLB,,, | Performed by: OBSTETRICS & GYNECOLOGY

## 2018-09-19 PROCEDURE — 87086 URINE CULTURE/COLONY COUNT: CPT

## 2018-09-19 RX ORDER — ESTRADIOL 0.1 MG/G
CREAM VAGINAL
Qty: 42.5 G | Refills: 11 | Status: SHIPPED | OUTPATIENT
Start: 2018-09-19 | End: 2018-11-30

## 2018-09-19 RX ORDER — ERGOCALCIFEROL 1.25 MG/1
50000 CAPSULE ORAL
COMMUNITY
End: 2018-11-02

## 2018-09-19 NOTE — PATIENT INSTRUCTIONS
Bladder Irritants  Certain foods and drinks have been associated with worsening symptoms of urinary frequency, urgency, urge incontinence, or bladder pain. If you suffer from any of these conditions, you may wish to try eliminating one or more of these foods from your diet and see if your symptoms improve. If bladder symptoms are related to dietary factors, strict adherence to a diet thateliminates the food should bring marked relief in 10 days. Once you are feeling better, you can begin to add foods back into your diet, one at a time. If symptoms return, you will be able to identify the irritant. As you add foods back to your diet it is very important that you drink significant amounts of water.    -----------------------------------------------------------------------------------------------  List of Common Bladder Irritants*  Alcoholic beverages  Apples and apple juice  Cantaloupe  Carbonated beverages  Chili and spicy foods  Chocolate  Citrus fruit  Coffee (including decaffeinated)  Cranberries and cranberry juice  Grapes  Guava  Milk Products: milk, cheese, cottage cheese, yogurt, ice cream  Peaches  Pineapple  Plums  Strawberries  Sugar especially artificial sweeteners, saccharin, aspartame, corn sweeteners, honey, fructose, sucrose, lactose  Tea  Tomatoes and tomato juice  Vitamin B complex  Vinegar  *Most people are not sensitive to ALL of these products; your goal is to find the foods that make YOUR symptoms worse.  ---------------------------------------------------------------------------------------------------    Low-acid fruit substitutions include apricots, papaya, pears and watermelon. Coffee drinkers can drink Kava or other lowacid instant drinks. Tea drinkers can substitute non-citrus herbal and sun brewed teas. Calcium carbonate co-buffered with calcium ascorbate can be substituted for Vitamin C. Prelief is a dietary supplement that works as an acid blocker for the bladder.    Where to get more  information:        Overcoming Bladder Disorders by Zeina Douglas and Kay Segal, 1990        You Dont Have to Live with Cystitis! By Grisel Myles, 1988  · http://www.urologymanagement.org/oab    ---------------------------------------------------------------------------------    Fiber Information Sheet  Your doctor has recommended that you follow a high fiber diet. The addition of fiber to your diet can make an enormous difference in your bowel control and regularity. Fiber helps people whether they lose stool or have trouble with constipation. Fiber works by bulking the stool and keeping it formed, yet making the movement soft and easy to pass. Fiber helps keep moisture within the stool so that neither diarrhea nor hard stool occurs. Fiber makes the bowels work more regularly, but it is not a laxative. An additional bonus from eating a high fiber diet is that your risk of cancer is reduced, too.    Most of us eat some high fiber foods already, but nearly all of us do not eat the necessary amount. For example, a slice of whole wheat bread contains only about 10% of the daily recommended amount of fiber. This means if you are relying on only whole wheat bread to meet the recommended fiber requirements, you would need to eat  between 10-18 slices every day! Please note that fiber is NOT in any meat or dairy product. It is only found in grains, vegetables and fruits. The recommended daily fiber intake is 20-25 grams. Foods having high fiber content include:     Fiber One Cereal, ½ cup 13.0 g   Todd beans, ¾ cup 10.4 g   Wheat bran cereal, 1 oz 10.0 g   Kidney beans, ¾ cup 9.3 g   All Bran Cereal, ½ cup 6.0 g   Oat Bran Cereal, hot, 1 oz 4.0 g   Banana, 1medium 3.8 g   Canned pears, ½ cup 3.7 g   3 prunes or ¼ cup raisins 3.5 g   Whole Wheat Total, 1 cup 3.0 g   Carrots, ½ cup 3.2 g   Apple, small 2.8 g   Broccoli, ½ cup 2.8 g   Cauliflower, ½ cup 2.6 g   Oatmeal, 1 oz 2.5  g   Whole Wheat Toast 2.0 g   Cheerios, 1 1/3 cup 2.0 g   Baked potato with skin 2.0 g   Corn, ½ cup 1.9 g   Popcorn, 3 cups 1.9 g   Orange, medium 1.9 g   Granola bar 1.0 g   Lettuce, ½ cup 0.9 g    If you dont think that you can get enough fiber through your everyday diet, there are many good fiber supplements you can take along with eating your high fiber diet. Some of these are: Metamucil (1 heaping teaspoon or 1-2 wafers), Citrucel (1 tablespoon), Fiberall (1-2 wafers or 1 teaspoon), Perdium (2 rounded teaspoons) and 1-2 teaspoons unprocessed bran (to mix with foods)    You may need to use the fiber supplement up to 3-4 times daily to produce normal elimination. Please follow specific package directions or call us for help in regulating the dose. You may notice some bloating and/or increased gas at first. These symptoms can be relieved by adding fiber to your diet slowly. Once your body gets used to this increased fiber, these symptoms will go away.   ---------------------------------------------------------------------------  1) Lower extremity swelling:  --increased  --please let Dr. Lindsay know    2)  Mixed urinary incontinence, urge > stress:    --urine C&S  --mild LE/PB decreased sensation; consider contribution but sacral reflexes and motor grossly intact:  Continue PT to rehab areas  --work on weight loss: even losing 5-10 lbs can help  --Empty bladder every 3 hours.  Empty well: wait a minute, lean forward on toilet.    --Avoid dietary irritants (see sheet).  Keep diary x 3-5 days to determine your irritants.  --KEGELS: do 10 in AM and 10 in PM, holding each x 10 seconds.  When you feel urge to go, STOP, KEGEL, and when urge has passed, then go to bathroom.  Consider PT in future.    --URGE: consider medication in future. Takes 2-4 weeks to see if will have effect.  For dry mouth: get sour, sugar free lozenge or gum.    --STRESS:  Pessary vs. Sling.     3)  Vaginal atrophy (dryness):  Use 0.5 gram  of estrogen cream in vagina nightly x 2 weeks, then twice a week thereafter. Also use small amount with finger around opening at same frequency.   --using can help urinary urgency/frequency/bladder spasms, help with reducing UTIs, and may may intercourse more comfortable    4)  Stage 2 cystocele/rectocele:  --discussed  --observation for now    5)  Dyspareunia (pain with intercourse):  --triggered by manipulation of scar band at mid cuff  --treat vaginal dryness to see if better if tissue more supple  --if not, could consider surgical release    6)  Nocturia (nighttime urination): stop fluids 2 hours before bed/no water by bed.  If have leg swelling:  Elevate feet above chest x 1 hour before bed to get excess fluid off.  Can also use support hose (knee highs).    --please let Dr. Lindsay know about increased swelling in LE    7)  Constipation:  --hydrate well  Controlling constipation may help bladder urgency/leakage and fiber may better control cholesterol and blood glucose.  Start daily fiber.  Tasteless is benefiber.  Take 1 tsp of fiber powder (psyllium or other sugar-free powder).  Mix in 8 oz of water.  Take x 3-5 days.  Then, increase fiber by 1 tsp every 3-5 days until stool is easy to pass.  Stop and continue at that dose.   Do not exceed 6 tsps/day.  May also use over the counter stool softener 1-2 x/day.  AVOID laxatives.  --try to use miralax as a last resort    8)  RTC 3 months.

## 2018-09-19 NOTE — PROGRESS NOTES
[facility] [address] [place] [address]    Renae Hou  [id number]  1960  September 30, 2018    Consulting Physician: Panchito Lindsay MD   GYN: Joey Sloan M.D.: Panchito Lindsay MD    Chief Complaint   Patient presents with    Consult     incontinence/leakage/prolapse       HPI:     1)  UI:  (+) CHRISTINE (cough, sneeze).  Most leakage is when she stands up after sitting--feels some pressure/things moving & when they settle, she has large gush.  Also has some leakage when bends to tie shoes. (+) UUI (mostly key in lock).  Had UI before but feels it's worse since disc fusion in Apr 2018.  Does have some L leg numbness residual since surgery.  On lyrica for nerve inflammation L.  Starting PT today.  Also has variable ability to feel urge to urinate--sometimes this is present/sometimes not.  Can also urinate better after she has a BM.    (--) pads. Changes underwear more frequently.   Think she will need to start.   Daytime frequency: Q 5 hours. Was going more frequently during back surgery.  Nocturia: Yes: 3/night.   (--) dysuria,  (--) hematuria,  (--) frequent UTIs.  (--) complete bladder emptying. PV to help with variable 2nd volume.     2)  POP:  Feels like something is not quite right in vagina.   (--) vaginal bleeding. (--) vaginal discharge. (--) sexually active--avoiding due to pain deep on L side (chronic)--was told she needed estrogen but this caused burning.  (+) dyspareunia.  (--)  Vaginal dryness.  (--) vaginal estrogen use.     3)  BM:  (+) constipation/straining. Takes miralax as needed. Lately, feels BMs are better.     (--) chronic diarrhea. Did have some loose stool x 2 months this summer. All eval neg.   (--) hematochezia. Has some BRB on wiping--has hemorrhoids.   (--) fecal incontinence.  (--) fecal smearing/urgency.  (--) complete evacuation. H/o rectocele repair in past--used to splint vaginally.  Now, has to splint perianally.      Past Medical History  Past Medical History:   Diagnosis Date    Abscess of paraspinous muscles     Allergy 2018    Epidural abscess 4/10/2018    Fatty liver     GERD (gastroesophageal reflux disease) 2015    Hypertension     Lumbar radiculopathy     Menopause     Obesity     Obstructive sleep apnea on CPAP     Thyroid disease     Thyroid nodules.        Past Surgical History  Past Surgical History:   Procedure Laterality Date    ADENOIDECTOMY      BACK SURGERY      L4, L5    BILATERAL SALPINGOOPHORECTOMY      BLOCK-NERVE-MEDIAL BRANCH-LUMBAR Left 3/16/2018    [facility]    CHOLECYSTECTOMY      CYST REMOVAL      NEERAJ-TRANSFORAMINAL Left 3/2/2018    [facility]    FUSION-POSTERIOR LUMBAR INTERBODY FUSION L4/5, L5/S1 N/A 4/10/2018    [facility] 2ND F[facility]    HYSTERECTOMY  2012    [facility]     INJECTION-FACET Left 3/2/2018    [facility]    RECTOCELE REPAIR      SPINE SURGERY      THYROID NODULE REMOVAL      TONSILLECTOMY  1995   Rectocele repair ~ (Juwan IV).    LSC adriana    Hysterectomy: Yes   Date: .  Indication: menorrhagia/fibroids.    Type:  robotic  Cervix present: No  Ovaries present: No  Other procedures at time of hysterectomy:  none    Past Ob History     x 2.  C/s x 0.    Largest infant weight: 7#2oz.   no FAVD. no episiotomy.  +ob lac.     Gynecologic History  LMP: Patient's last menstrual period was 2012.  Age of menarche: 13  Age of menopause: with RATL2012  Menstrual history: h/o menorrhagia  Pap test: post RATLH.  History of abnormal paps: No.  History of STIs:  No  Mammogram: Date of last: 2018.  Result: Normal  Colonoscopy: Date of last: ~.  Result:  Benign polyps per report.  Repeat due:  ~.    DEXA:  Date of last: recently.  Result:  Normal per report.      Family  History  Family History   Problem Relation Age of Onset    Hypertension Mother     Hyperlipidemia Mother     Heart disease Mother 55        cad, valvular heart disease    Alcohol abuse Sister     Stroke Father     No Known Problems Daughter     No Known Problems Daughter     Breast cancer Neg Hx     Colon cancer Neg Hx     Ovarian cancer Neg Hx     Diabetes Neg Hx       Colon CA: No  Breast CA: No  GYN CA: No   CA: No    Social History  Social History     Tobacco Use   Smoking Status Former Smoker    Packs/day: 0.25    Years: 32.00    Pack years: 8.00    Types: Cigarettes    Last attempt to quit:     Years since quittin.7   Smokeless Tobacco Never Used   Tobacco Comment    smoked socially decades ago - weekends only     Social History     Substance and Sexual Activity   Alcohol Use No    Frequency: Never    Comment: SELDOMLY   .    Social History     Substance and Sexual Activity   Drug Use No     The patient is .  Resides in [place] [address].  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      Allergies  Review of patient's allergies indicates:   Allergen Reactions    Cathflo activase [alteplase] Anaphylaxis     Tightness in chest, raspy throat, restless legs, hotness all over    Iodine Anaphylaxis, Hives, Shortness Of Breath, Itching, Swelling and Rash     Other reaction(s): Difficulty breathing  Neck swelling     Shellfish containing products Anaphylaxis, Hives, Shortness Of Breath, Itching, Swelling and Rash     Other reaction(s): Difficulty breathing  Neck swelling     Heparin analogues      Restless legs, tightness in chest, and hot all over    Latex Swelling     Patient un sure about it    Iodine and iodide containing products        Medications  Current Outpatient Medications on File Prior to Visit   Medication Sig Dispense Refill    clotrimazole-betamethasone 1-0.05% (LOTRISONE) cream clotrimazole-betamethasone 1 %-0.05 % topical  "cream      ergocalciferol (VITAMIN D2) 50,000 unit Cap Take 50,000 Units by mouth every 7 days.      losartan-hydrochlorothiazide 50-12.5 mg (HYZAAR) 50-12.5 mg per tablet Take 1 tablet by mouth once daily. 90 tablet 3    POLYETHYLENE GLYCOL 3350 (MIRALAX ORAL) Take by mouth daily as needed. constipation      pregabalin (LYRICA) 150 MG capsule Take 1 capsule (150 mg total) by mouth nightly. 30 capsule 2     No current facility-administered medications on file prior to visit.        Review of Systems A 14 point ROS was reviewed with pertinent positives as noted above in the history of present illness.      Constitutional: negative  Eyes: negative  Endocrine: negative  Gastrointestinal: negative  Cardiovascular: negative  Respiratory: negative  Allergic/Immunologic: negative  Integumentary: negative  Psychiatric: negative  Musculoskeletal: negative   Ear/Nose/Throat: negative  Neurologic: negative  Genitourinary: SEE HPI  Hematologic/Lymphatic: negative   Breast: negative    Urogynecologic Exam  /86 (BP Location: Left arm, Patient Position: Sitting, BP Method: Large (Manual))   Ht 5' 6" (1.676 m)   Wt 105 kg (231 lb 7.7 oz)   LMP 11/25/2012   BMI 37.36 kg/m²     GENERAL APPEARANCE:  The patient is well-developed, well-nourished.   Neck:  Supple with no thyromegaly, no carotid bruits.  Heart:  Regular rate and rhythm, no murmurs, rubs or gallops.  Lungs:  Clear.  No CVA tenderness.  Abdomen:  Soft, nontender, nondistended, no hepatosplenomegaly.  Incisions:  LSC well-healed    PELVIC:    External genitalia:  Normal Bartholins, Skenes and labia bilaterally.    Urethra:  No caruncle, diverticulum or masses.  (+) hypermobility.    Vagina:  Atrophy (+) , no bladder masses or tender, no discharge.  Small band of tissue at mid cuff--TTP--this is where she feels dyspareunia.   Cervix:  absent  Uterus: uterus absent  Adnexa: Not palpable.    POP-Q:  Aa 0; Ba 0; C -7; Ap 0; Bp 0.  Genital hiatus 3, perineal body " 2, total vaginal length 10-11.    NEUROLOGIC:  Cranial nerves 2 through 12 intact.  Strength 5/5.  DTRs 2+ lower extremities.  S2 through 4 normal, mild decrease sensation L vs R (also in LE).  Sacral reflexes intact.    EXT: JUDD, 2+ pulses bilaterally, no C/C/E    COUGH STRESS TEST:  negative  KEGEL: 1 /5    RECTAL:    External:  Normal, (--) hemorrhoids, (--) dovetailing.   Internal: deferred    PVR: 20 mL    Impression    1. Urge incontinence    2. Vaginal atrophy    3. Dyspareunia, female    4. Cystocele, midline    5. Rectocele, female    6. Chronic constipation    7. Mixed stress and urge urinary incontinence    8. Nocturia more than twice per night        Initial Plan  The patient was counseled regarding these issues. The patient was given a summary sheet containing each of these issues with possible options for evaluation and management. When appropriate, we also reviewed computer-generated diagrams specific to their diagnoses..  All questions were addressed to the patient's satisfaction.    1) Lower extremity swelling:  --increased  --please let Dr. Lindsay know    2)  Mixed urinary incontinence, urge > stress:    --urine C&S  --mild LE/PB decreased sensation; consider contribution but sacral reflexes and motor grossly intact:  Continue PT to rehab areas  --work on weight loss: even losing 5-10 lbs can help  --Empty bladder every 3 hours.  Empty well: wait a minute, lean forward on toilet.    --Avoid dietary irritants (see sheet).  Keep diary x 3-5 days to determine your irritants.  --KEGELS: do 10 in AM and 10 in PM, holding each x 10 seconds.  When you feel urge to go, STOP, KEGEL, and when urge has passed, then go to bathroom.  Consider PT in future.    --URGE: consider medication in future. Takes 2-4 weeks to see if will have effect.  For dry mouth: get sour, sugar free lozenge or gum.    --STRESS:  Pessary vs. Sling.     3)  Vaginal atrophy (dryness):  Use 0.5 gram of estrogen cream in vagina  nightly x 2 weeks, then twice a week thereafter. Also use small amount with finger around opening at same frequency.   --using can help urinary urgency/frequency/bladder spasms, help with reducing UTIs, and may may intercourse more comfortable    4)  Stage 2 cystocele/rectocele:  --discussed  --observation for now    5)  Dyspareunia (pain with intercourse):  --triggered by manipulation of scar band at mid cuff  --treat vaginal dryness to see if better if tissue more supple  --if not, could consider surgical release    6)  Nocturia (nighttime urination): stop fluids 2 hours before bed/no water by bed.  If have leg swelling:  Elevate feet above chest x 1 hour before bed to get excess fluid off.  Can also use support hose (knee highs).    --please let Dr. Lindsay know about increased swelling in LE    7)  Constipation:  --hydrate well  Controlling constipation may help bladder urgency/leakage and fiber may better control cholesterol and blood glucose.  Start daily fiber.  Tasteless is benefiber.  Take 1 tsp of fiber powder (psyllium or other sugar-free powder).  Mix in 8 oz of water.  Take x 3-5 days.  Then, increase fiber by 1 tsp every 3-5 days until stool is easy to pass.  Stop and continue at that dose.   Do not exceed 6 tsps/day.  May also use over the counter stool softener 1-2 x/day.  AVOID laxatives.  --try to use miralax as a last resort    8)  RTC 3 months.     Approximately 60 min were spent in consult, 90 % in discussion.     Thank you for requesting consultation of your patient.  I look forward to participating in their care.    Jarocho Cabrera  Female Pelvic Medicine and Reconstructive Surgery  [facility] [place], [place]

## 2018-09-19 NOTE — LETTER
September 30, 2018      Panchito Lindsay MD  [address]  [facility] [id number]           [facility] [address] [place] [address]  Phone: [phone]          Patient: Renae Hou   MR Number: [id number]   YOB: 1960   Date of Visit: 9/19/2018       Dear Dr. Panchito Lindsay:    Thank you for referring Renae Hou to me for evaluation. Attached you will find relevant portions of my assessment and plan of care.    If you have questions, please do not hesitate to call me. I look forward to following Renae Hou along with you.    Sincerely,    Jarocho Cabrera MD    Enclosure  CC:  No Recipients    If you would like to receive this communication electronically, please contact [email or web address] or [phone] to request more information on [employer] Link access.    For providers and/or their staff who would like to refer a patient to [facility], please contact us through our one-stop-shop provider referral line, [facility] , at [phone].    If you feel you have received this communication in error or would no longer like to receive these types of communications, please e-mail [email or web address]

## 2018-09-20 ENCOUNTER — PATIENT MESSAGE (OUTPATIENT)
Dept: INTERNAL MEDICINE | Facility: CLINIC | Age: 58
End: 2018-09-20

## 2018-09-21 ENCOUNTER — TELEPHONE (OUTPATIENT)
Dept: INTERNAL MEDICINE | Facility: CLINIC | Age: 58
End: 2018-09-21

## 2018-09-21 ENCOUNTER — TELEPHONE (OUTPATIENT)
Dept: VASCULAR SURGERY | Facility: CLINIC | Age: 58
End: 2018-09-21

## 2018-09-21 ENCOUNTER — INITIAL CONSULT (OUTPATIENT)
Dept: BARIATRICS | Facility: CLINIC | Age: 58
End: 2018-09-21
Payer: COMMERCIAL

## 2018-09-21 ENCOUNTER — LAB VISIT (OUTPATIENT)
Dept: LAB | Facility: HOSPITAL | Age: 58
End: 2018-09-21
Attending: INTERNAL MEDICINE
Payer: COMMERCIAL

## 2018-09-21 VITALS
BODY MASS INDEX: 37.09 KG/M2 | HEIGHT: 66 IN | WEIGHT: 230.81 LBS | DIASTOLIC BLOOD PRESSURE: 64 MMHG | HEART RATE: 101 BPM | SYSTOLIC BLOOD PRESSURE: 118 MMHG

## 2018-09-21 DIAGNOSIS — G47.33 OBSTRUCTIVE SLEEP APNEA ON CPAP: ICD-10-CM

## 2018-09-21 DIAGNOSIS — R60.0 LOCALIZED EDEMA: Primary | ICD-10-CM

## 2018-09-21 DIAGNOSIS — R10.33 PERIUMBILICAL PAIN: ICD-10-CM

## 2018-09-21 DIAGNOSIS — R60.9 EDEMA, UNSPECIFIED TYPE: ICD-10-CM

## 2018-09-21 DIAGNOSIS — K21.9 GASTROESOPHAGEAL REFLUX DISEASE, ESOPHAGITIS PRESENCE NOT SPECIFIED: ICD-10-CM

## 2018-09-21 DIAGNOSIS — E66.09 CLASS 2 OBESITY DUE TO EXCESS CALORIES WITH BODY MASS INDEX (BMI) OF 37.0 TO 37.9 IN ADULT, UNSPECIFIED WHETHER SERIOUS COMORBIDITY PRESENT: Primary | ICD-10-CM

## 2018-09-21 DIAGNOSIS — I10 ESSENTIAL HYPERTENSION: ICD-10-CM

## 2018-09-21 DIAGNOSIS — Z98.890 HISTORY OF MAJOR ABDOMINAL SURGERY: ICD-10-CM

## 2018-09-21 DIAGNOSIS — K76.0 FATTY LIVER: ICD-10-CM

## 2018-09-21 DIAGNOSIS — E78.5 HYPERLIPIDEMIA, UNSPECIFIED HYPERLIPIDEMIA TYPE: ICD-10-CM

## 2018-09-21 DIAGNOSIS — R60.9 EDEMA, UNSPECIFIED TYPE: Primary | ICD-10-CM

## 2018-09-21 DIAGNOSIS — R10.31 RIGHT LOWER QUADRANT ABDOMINAL PAIN: ICD-10-CM

## 2018-09-21 DIAGNOSIS — R06.09 DOE (DYSPNEA ON EXERTION): ICD-10-CM

## 2018-09-21 DIAGNOSIS — G47.33 OSA ON CPAP: ICD-10-CM

## 2018-09-21 DIAGNOSIS — R73.03 PRE-DIABETES: ICD-10-CM

## 2018-09-21 DIAGNOSIS — R60.0 EDEMA, LOWER EXTREMITY: Primary | ICD-10-CM

## 2018-09-21 DIAGNOSIS — R10.9 ABDOMINAL PAIN, UNSPECIFIED ABDOMINAL LOCATION: ICD-10-CM

## 2018-09-21 PROBLEM — G06.2 EPIDURAL ABSCESS: Status: RESOLVED | Noted: 2018-04-10 | Resolved: 2018-09-21

## 2018-09-21 LAB
ANION GAP SERPL CALC-SCNC: 11 MMOL/L
BACTERIA UR CULT: NO GROWTH
BUN SERPL-MCNC: 17 MG/DL
CALCIUM SERPL-MCNC: 9.9 MG/DL
CHLORIDE SERPL-SCNC: 103 MMOL/L
CO2 SERPL-SCNC: 27 MMOL/L
CREAT SERPL-MCNC: 0.9 MG/DL
EST. GFR  (AFRICAN AMERICAN): >60 ML/MIN/1.73 M^2
EST. GFR  (NON AFRICAN AMERICAN): >60 ML/MIN/1.73 M^2
GLUCOSE SERPL-MCNC: 82 MG/DL
POTASSIUM SERPL-SCNC: 3.9 MMOL/L
SODIUM SERPL-SCNC: 141 MMOL/L

## 2018-09-21 PROCEDURE — 99999 PR PBB SHADOW E&M-EST. PATIENT-LVL V: CPT | Mod: PBBFAC,,, | Performed by: NURSE PRACTITIONER

## 2018-09-21 PROCEDURE — 99205 OFFICE O/P NEW HI 60 MIN: CPT | Mod: S$GLB,,, | Performed by: NURSE PRACTITIONER

## 2018-09-21 PROCEDURE — 3078F DIAST BP <80 MM HG: CPT | Mod: CPTII,S$GLB,, | Performed by: NURSE PRACTITIONER

## 2018-09-21 PROCEDURE — 36415 COLL VENOUS BLD VENIPUNCTURE: CPT

## 2018-09-21 PROCEDURE — 3074F SYST BP LT 130 MM HG: CPT | Mod: CPTII,S$GLB,, | Performed by: NURSE PRACTITIONER

## 2018-09-21 PROCEDURE — 80048 BASIC METABOLIC PNL TOTAL CA: CPT

## 2018-09-21 PROCEDURE — 3008F BODY MASS INDEX DOCD: CPT | Mod: CPTII,S$GLB,, | Performed by: NURSE PRACTITIONER

## 2018-09-21 NOTE — LETTER
September 21, 2018      Panchito Lindsay MD  [address]  [facility] [address]       [facility] - Bariatric Surgery  [address]  [place] [address]  Phone: [phone]  Fax: [phone]   Patient: Renae Hou   MR Number: [id number]   YOB: 1960   Date of Visit: 9/21/2018     Dear Dr. Lindsay:    Thank you for referring Renae Hou to me for evaluation. Below are the relevant portions of my assessment and plan of care.    BARIATRIC NEW PATIENT EVALUATION     CHIEF COMPLAINT:   Morbid Obesity Body mass index is 37.26 kg/m². and inability to lose weight.     HISTORY OF PRESENT ILLNESS:  Renae Hou  is a 57 y.o. female presenting for morbid obesity Body mass index is 37.26 kg/m². and inability to lose weight. The patient has tried OptiFast and was able to lose 30 pounds. She maintained wt loss for 6 months until she began experiencing severe back pain. Her highest wt is 230#. Wt difficulty began in adulthood.     GERD. On PPI daily for 2 years.   Denies previous EGD.    DIAGNOSIS:  1. Morbid Obesity with Body mass index is 37.26 kg/m². and inability to lose weight.  2. Co-morbidities: prediabetes, HTN, HLD, GERD, RENAE on CPAP, chronic back pain  H/o multiple abdominal surgeries.     PLAN:The patient is a potential candidate for Bariatric Surgery. she is interested in sleeve gastrectomy with Dr. Ratliff. We discussed that sleeve can make reflux worse. The surgery and post-op care were discussed in detail with the patient. All questions were answered.     She understands that Bariatric surgery is a tool to aid in weight loss and that she needs to be committed to the diet and exercise post-operatively for successful weight loss.  Discussed expected weight loss outcomes after surgery which is 50% of the excess weight on her frame.  Discussed with patient that bariatric surgery is not the easy way out and that it will take plenty of dedication on the patient's part to  be successful. Also discussed the possibility of weight regain if the patient strays from the diet guidelines or exercise requirements. Patient verbalized understanding and wishes to proceed with the work-up.     ORDERS:  1. Chest X-Ray, EKG and EGD   Let's see what the EGD says given concern for worsening reflux with sleeve.   2. Psychological Consult, Bariatric Dietician Consult and PCP Clearance  3. Bariatric Labs: BMP, CBC, Folate Serum, H. pylori, HgA1C, Hepatic Panel/LFT, Iron & TIBC, Lipid Profile, Magnesium, Phosphate, T3, T4, TSH, Free T4, Vitamin B12, and Vitamin B1.  4. She would like referral to GI. KUB today.  5. Please follow-up with Sleep Med given ESS score  6. 6 month MSD per insurance    If you have questions, please do not hesitate to call me. I look forward to following Renae along with you.    Sincerely,      MAURISIO Mancuso   Section of Bariatric Surgery  [facility]    MARTIR/jeanmarie

## 2018-09-21 NOTE — PATIENT INSTRUCTIONS
Prior to surgery you will need to complete:  - Dietitian consult and follow up appointments as needed  - EGD  - PCP clearance  - Labs  - Chest X-ray  - EKG  - Psychological evaluation, Please call psychiatry [phone] to schedule  - GI referral was placed per your request  - 6 month MSD    In preparation for bariatric surgery, please complete the following:   · Discuss your current medications with your primary care provider, remember your medications will need to be crushed, chewable, or in liquid form for the first 3-6 months after a gastric bypass or sleeve.  For a gastric band, your medications will need to be crushed indefinitely.    · If you take a blood thinner such as: Coumadin (warfarin), Pradaxa (dabigatran), or Plavix (clopidogrel), you will need to speak with your prescribing provider on how or if this medication can be stopped before surgery.   · If you take a medication for depression or anxiety, you will need to begin crushing or opening the capsule 1-3 months prior to surgery.  Remember to discuss this with the psychologist or psychiatrist that you see.   · If you take medication for arthritis on a daily basis that is considered a non-steroidal anti-inflammatory (NSAID), please discuss with your prescribing physician an alternative medication.  After having gastric bypass or gastric sleeve, this group of medications is not appropriate to take due to increased risk of bleeding stomach ulcers.      DEFINITIONS  Appointments: Pre-scheduled meetings or consultations with any physician, advanced practice provider, dietitian, or psychologist, and labs, imaging studies, sleep studies, etc.   Late cancellation: Cancelling an appointment 24-48 hours prior to scheduled time.  No-Show appointment:  is when    You do NOT arrive to your appointment at the time its scheduled.   You call to cancel or cancel via [facility] less than 24 hours in advance of your scheduled appointment   You show up 15 minutes  AFTER your scheduled appointment time without any notification of being late.     POLICY  1. You are allowed up to 3 cancellations for appointments.    After 3 cancellations your case will be placed on hold for 2 months and after that time you can resume the program.   2. You are allowed only 1 no-show for an appointment.    You will be re-scheduled one time and if there is a 2nd no-show at any point, your case will be placed on hold for 3 months.  After 3 months you can resume the program.     3. Upon resuming the program after being placed on hold for either above mentioned reasons, if you have a late cancel or no show for any appointment, the bariatric team will review if youre an appropriate candidate for surgery at the monthly meeting.

## 2018-09-21 NOTE — PROGRESS NOTES
"BARIATRIC NEW PATIENT EVALUATION    CHIEF COMPLAINT:   Morbid Obesity Body mass index is 37.26 kg/m². and inability to lose weight.    HISTORY OF PRESENT ILLNESS:  Renae Hou  is a 57 y.o. female presenting for morbid obesity Body mass index is 37.26 kg/m². and inability to lose weight. The patient has tried OptiFast and was able to lose 30 pounds. She maintained wt loss for 6 months until she began experiencing severe back pain. Her highest wt is 230#. Wt difficulty began in adulthood.    GERD. On PPI daily for 2 years.   Denies previous EGD.    Recent L4-S1 fusion 4/2018.  She reports wt gain and abd pain, mild intermittent umbilical and RLQ "feels bruised", since surgery.  She has gained 30 pounds since her surgery.  HB was rare, now overnight 2-3x/week since recent back surgery.  She reports that she has discussed these changes with her PCP.  She feels she is retaining fluid and will meet with PCP today.  She also requests referral to GI to discuss her abd pain.    PAST MEDICAL HISTORY:  Past Medical History:   Diagnosis Date    Abscess of paraspinous muscles     Allergy 4/2018    Epidural abscess 4/10/2018    Fatty liver     GERD (gastroesophageal reflux disease) 2015    Hypertension     Lumbar radiculopathy     Menopause     Obesity     Obstructive sleep apnea on CPAP     Thyroid disease     Thyroid nodules.     PAST SURGICAL HISTORY:  Past Surgical History:   Procedure Laterality Date    ADENOIDECTOMY  1995    BACK SURGERY  2002    L4, L5    BILATERAL SALPINGOOPHORECTOMY      BLOCK-NERVE-MEDIAL BRANCH-LUMBAR Left 3/16/2018    [facility]    CHOLECYSTECTOMY      CYST REMOVAL      NEERAJ-TRANSFORAMINAL Left 3/2/2018    [facility]    FUSION-POSTERIOR LUMBAR INTERBODY FUSION L4/5, L5/S1 N/A 4/10/2018    [facility] 2ND FLR    HYSTERECTOMY  12/17/2012    [facility]     " INJECTION-FACET Left 3/2/2018    [facility] PAIN MGT    RECTOCELE REPAIR      SPINE SURGERY  2018    THYROID NODULE REMOVAL      TONSILLECTOMY       FAMILY HISTORY:  Family History   Problem Relation Age of Onset    Hypertension Mother     Hyperlipidemia Mother     Heart disease Mother 55        cad, valvular heart disease    Alcohol abuse Sister     Stroke Father     No Known Problems Daughter     No Known Problems Daughter     Breast cancer Neg Hx     Colon cancer Neg Hx     Ovarian cancer Neg Hx     Diabetes Neg Hx      SOCIAL HISTORY:  Social History     Socioeconomic History    Marital status:      Spouse name: Not on file    Number of children: Not on file    Years of education: Not on file    Highest education level: Not on file   Social Needs    Financial resource strain: Not on file    Food insecurity - worry: Not on file    Food insecurity - inability: Not on file    Transportation needs - medical: Not on file    Transportation needs - non-medical: Not on file   Occupational History    Not on file   Tobacco Use    Smoking status: Former Smoker     Packs/day: 0.25     Years: 32.00     Pack years: 8.00     Types: Cigarettes     Last attempt to quit:      Years since quittin.7    Smokeless tobacco: Never Used    Tobacco comment: smoked socially decades ago - weekends only   Substance and Sexual Activity    Alcohol use: No     Frequency: Never     Comment: SELDOMLY    Drug use: No    Sexual activity: Not Currently     Partners: Male     Birth control/protection: See Surgical Hx, None     Comment: [facility] BS&O 2012,     Other Topics Concern    Not on file   Social History Narrative    . 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       MEDICATIONS:  Current Outpatient Medications   Medication Sig Dispense Refill    clotrimazole-betamethasone 1-0.05% (LOTRISONE) cream clotrimazole-betamethasone 1 %-0.05 % topical cream      ergocalciferol  (VITAMIN D2) 50,000 unit Cap Take 50,000 Units by mouth every 7 days.      losartan-hydrochlorothiazide 50-12.5 mg (HYZAAR) 50-12.5 mg per tablet Take 1 tablet by mouth once daily. 90 tablet 3    omeprazole (PRILOSEC) 40 MG capsule TK 1 C PO QD- as needed for acid reflux  2    POLYETHYLENE GLYCOL 3350 (MIRALAX ORAL) Take by mouth daily as needed. constipation      pregabalin (LYRICA) 150 MG capsule Take 1 capsule (150 mg total) by mouth nightly. 30 capsule 2    estradiol (ESTRACE) 0.01 % (0.1 mg/gram) vaginal cream 0.5 g in vagina nightly x 2 weeks, then twice a week thereafter. Also use small amount with finger around opening at same frequency. 42.5 g 11     No current facility-administered medications for this visit.        ALLERGIES:  Review of patient's allergies indicates:   Allergen Reactions    Cathflo activase [alteplase] Anaphylaxis     Tightness in chest, raspy throat, restless legs, hotness all over    Iodine Anaphylaxis, Hives, Shortness Of Breath, Itching, Swelling and Rash     Other reaction(s): Difficulty breathing  Neck swelling     Shellfish containing products Anaphylaxis, Hives, Shortness Of Breath, Itching, Swelling and Rash     Other reaction(s): Difficulty breathing  Neck swelling     Heparin analogues      Restless legs, tightness in chest, and hot all over    Latex Swelling     Patient un sure about it    Iodine and iodide containing products        ROS:  Review of Systems   Constitutional: Negative for chills, fever and malaise/fatigue.   Respiratory: Negative for cough, shortness of breath and wheezing.    Cardiovascular: Negative for chest pain and palpitations.   Gastrointestinal: Positive for abdominal pain (umbilical, RLQ) and heartburn. Negative for blood in stool, constipation, diarrhea, melena, nausea and vomiting.   Genitourinary: Negative for frequency and urgency.   Musculoskeletal: Positive for back pain, joint pain, myalgias and neck pain.   Neurological: Negative for  "dizziness, tingling and headaches.   Psychiatric/Behavioral: Negative for depression. The patient is not nervous/anxious.        PE:  Vitals:    09/21/18 1307   BP: 118/64   Pulse: 101   Weight: 104.7 kg (230 lb 13.2 oz)   Height: 5' 6" (1.676 m)       Physical Exam   Constitutional: She is oriented to person, place, and time. She appears well-developed and well-nourished. No distress.   Morbidly obese   HENT:   Head: Normocephalic and atraumatic.   Eyes: Conjunctivae are normal. Right eye exhibits no discharge. Left eye exhibits no discharge. No scleral icterus.   Neck: Neck supple.   Cardiovascular: Normal rate, regular rhythm and normal heart sounds. Exam reveals no gallop and no friction rub.   No murmur heard.  Pulmonary/Chest: Effort normal and breath sounds normal. No respiratory distress. She has no wheezes. She has no rales.   Abdominal: Soft. Bowel sounds are normal. She exhibits no distension and no mass. There is tenderness (epigastric and mild RLQ tenderness). There is no rebound and no guarding. No hernia.   Musculoskeletal: Normal range of motion. She exhibits no edema.   Neurological: She is alert and oriented to person, place, and time.   Skin: Skin is warm, dry and intact. Capillary refill takes less than 2 seconds. No rash noted. She is not diaphoretic. No erythema. No pallor.   Psychiatric: She has a normal mood and affect. Her speech is normal and behavior is normal. Judgment and thought content normal.   Nursing note and vitals reviewed.     DIAGNOSIS:  1. Morbid Obesity with Body mass index is 37.26 kg/m². and inability to lose weight.  2. Co-morbidities: prediabetes, HTN, HLD, GERD, RENAE on CPAP, chronic back pain  H/o multiple abdominal surgeries.    PLAN:  The patient is a potential candidate for Bariatric Surgery. she is interested in sleeve gastrectomy with Dr. Ratliff. We discussed that sleeve can make reflux worse. The surgery and post-op care were discussed in detail with the " patient. All questions were answered.    she understands that bariatric surgery is a tool to aid in weight loss and that she needs to be committed to the diet and exercise post-operatively for successful weight loss.  Discussed expected weight loss outcomes after surgery which is 50% of the excess weight on her frame.  Discussed with patient that bariatric surgery is not the easy way out and that it will take plenty of dedication on the patient's part to be successful. Also discussed the possibility of weight regain if the patient strays from the diet guidelines or exercise requirements. Patient verbalized understanding and wishes to proceed with the work-up.    ORDERS:  1. Chest X-Ray, EKG and EGD   Let's see what the EGD says given concern for worsening reflux with sleeve.   2. Psychological Consult, Bariatric Dietician Consult and PCP Clearance  3. Bariatric Labs: BMP, CBC, Folate Serum, H. pylori, HgA1C, Hepatic Panel/LFT, Iron & TIBC, Lipid Profile, Magnesium, Phosphate, T3, T4, TSH, Free T4, Vitamin B12, and Vitamin B1.  4. She would like referral to GI. JODEE today.  5. Please follow-up with Sleep Med given ESS score  6. 6 month MSD per insurance      Primary Physician: Panchito Lindsay MD  RTC: As scheduled.    60 minute visit, over 50% of time spent counseling patient face to face on surgical options, risks, benefits, expected diet, recommended exercise regimen, and expected weight loss.

## 2018-09-26 ENCOUNTER — PATIENT MESSAGE (OUTPATIENT)
Dept: ORTHOPEDICS | Facility: CLINIC | Age: 58
End: 2018-09-26

## 2018-09-28 ENCOUNTER — CLINICAL SUPPORT (OUTPATIENT)
Dept: INTERNAL MEDICINE | Facility: CLINIC | Age: 58
End: 2018-09-28
Payer: COMMERCIAL

## 2018-09-28 ENCOUNTER — OFFICE VISIT (OUTPATIENT)
Dept: INTERNAL MEDICINE | Facility: CLINIC | Age: 58
End: 2018-09-28
Payer: COMMERCIAL

## 2018-09-28 ENCOUNTER — HOSPITAL ENCOUNTER (OUTPATIENT)
Dept: CARDIOLOGY | Facility: CLINIC | Age: 58
Discharge: HOME | End: 2018-09-28
Payer: COMMERCIAL

## 2018-09-28 ENCOUNTER — CLINICAL SUPPORT (OUTPATIENT)
Dept: INTERNAL MEDICINE | Facility: CLINIC | Age: 58
End: 2018-09-28
Attending: INTERNAL MEDICINE
Payer: COMMERCIAL

## 2018-09-28 ENCOUNTER — IMMUNIZATION (OUTPATIENT)
Dept: INTERNAL MEDICINE | Facility: CLINIC | Age: 58
End: 2018-09-28
Payer: COMMERCIAL

## 2018-09-28 ENCOUNTER — HOSPITAL ENCOUNTER (OUTPATIENT)
Dept: RADIOLOGY | Facility: HOSPITAL | Age: 58
Discharge: HOME | End: 2018-09-28
Attending: NURSE PRACTITIONER
Payer: COMMERCIAL

## 2018-09-28 VITALS
HEIGHT: 66 IN | TEMPERATURE: 98 F | OXYGEN SATURATION: 98 % | WEIGHT: 233 LBS | BODY MASS INDEX: 37.45 KG/M2 | SYSTOLIC BLOOD PRESSURE: 126 MMHG | DIASTOLIC BLOOD PRESSURE: 70 MMHG | HEART RATE: 86 BPM

## 2018-09-28 DIAGNOSIS — Z00.00 ROUTINE GENERAL MEDICAL EXAMINATION AT A HEALTH CARE FACILITY: Primary | ICD-10-CM

## 2018-09-28 DIAGNOSIS — E66.09 CLASS 2 OBESITY DUE TO EXCESS CALORIES WITH BODY MASS INDEX (BMI) OF 37.0 TO 37.9 IN ADULT, UNSPECIFIED WHETHER SERIOUS COMORBIDITY PRESENT: ICD-10-CM

## 2018-09-28 DIAGNOSIS — Z23 NEED FOR INFLUENZA VACCINATION: ICD-10-CM

## 2018-09-28 DIAGNOSIS — G47.33 OSA ON CPAP: ICD-10-CM

## 2018-09-28 DIAGNOSIS — R06.02 SHORTNESS OF BREATH: ICD-10-CM

## 2018-09-28 DIAGNOSIS — I10 ESSENTIAL HYPERTENSION: ICD-10-CM

## 2018-09-28 DIAGNOSIS — R63.5 WEIGHT GAIN: ICD-10-CM

## 2018-09-28 DIAGNOSIS — Z00.00 ROUTINE GENERAL MEDICAL EXAMINATION AT A HEALTH CARE FACILITY: ICD-10-CM

## 2018-09-28 DIAGNOSIS — K21.9 GASTROESOPHAGEAL REFLUX DISEASE, ESOPHAGITIS PRESENCE NOT SPECIFIED: ICD-10-CM

## 2018-09-28 DIAGNOSIS — R73.03 PRE-DIABETES: ICD-10-CM

## 2018-09-28 DIAGNOSIS — R60.0 LOWER EXTREMITY EDEMA: Primary | ICD-10-CM

## 2018-09-28 DIAGNOSIS — E78.5 HYPERLIPIDEMIA, UNSPECIFIED HYPERLIPIDEMIA TYPE: ICD-10-CM

## 2018-09-28 DIAGNOSIS — R10.9 ABDOMINAL PAIN, UNSPECIFIED ABDOMINAL LOCATION: ICD-10-CM

## 2018-09-28 LAB
25(OH)D3+25(OH)D2 SERPL-MCNC: 56 NG/ML
ALBUMIN SERPL BCP-MCNC: 3.7 G/DL
ALP SERPL-CCNC: 92 U/L
ALT SERPL W/O P-5'-P-CCNC: 38 U/L
ANION GAP SERPL CALC-SCNC: 9 MMOL/L
AST SERPL-CCNC: 30 U/L
BILIRUB SERPL-MCNC: 0.6 MG/DL
BUN SERPL-MCNC: 16 MG/DL
CALCIUM SERPL-MCNC: 9.6 MG/DL
CHLORIDE SERPL-SCNC: 108 MMOL/L
CHOLEST SERPL-MCNC: 230 MG/DL
CHOLEST/HDLC SERPL: 4 {RATIO}
CO2 SERPL-SCNC: 24 MMOL/L
CREAT SERPL-MCNC: 0.9 MG/DL
ERYTHROCYTE [DISTWIDTH] IN BLOOD BY AUTOMATED COUNT: 14.4 %
EST. GFR  (AFRICAN AMERICAN): >60 ML/MIN/1.73 M^2
EST. GFR  (NON AFRICAN AMERICAN): >60 ML/MIN/1.73 M^2
ESTIMATED AVG GLUCOSE: 128 MG/DL
GLUCOSE SERPL-MCNC: 117 MG/DL
HBA1C MFR BLD HPLC: 6.1 %
HCT VFR BLD AUTO: 39.8 %
HDLC SERPL-MCNC: 58 MG/DL
HDLC SERPL: 25.2 %
HGB BLD-MCNC: 13.1 G/DL
LDLC SERPL CALC-MCNC: 132.4 MG/DL
MCH RBC QN AUTO: 29.2 PG
MCHC RBC AUTO-ENTMCNC: 32.9 G/DL
MCV RBC AUTO: 89 FL
NONHDLC SERPL-MCNC: 172 MG/DL
PLATELET # BLD AUTO: 297 K/UL
PMV BLD AUTO: 9.6 FL
POTASSIUM SERPL-SCNC: 3.9 MMOL/L
PROT SERPL-MCNC: 7 G/DL
RBC # BLD AUTO: 4.48 M/UL
SODIUM SERPL-SCNC: 141 MMOL/L
TRIGL SERPL-MCNC: 198 MG/DL
TSH SERPL DL<=0.005 MIU/L-ACNC: 1.43 UIU/ML
WBC # BLD AUTO: 7.12 K/UL

## 2018-09-28 PROCEDURE — 82306 VITAMIN D 25 HYDROXY: CPT

## 2018-09-28 PROCEDURE — 3079F DIAST BP 80-89 MM HG: CPT | Mod: CPTII,S$GLB,, | Performed by: INTERNAL MEDICINE

## 2018-09-28 PROCEDURE — 80061 LIPID PANEL: CPT

## 2018-09-28 PROCEDURE — 99214 OFFICE O/P EST MOD 30 MIN: CPT | Mod: S$GLB,,, | Performed by: FAMILY MEDICINE

## 2018-09-28 PROCEDURE — 3074F SYST BP LT 130 MM HG: CPT | Mod: CPTII,S$GLB,, | Performed by: FAMILY MEDICINE

## 2018-09-28 PROCEDURE — 3078F DIAST BP <80 MM HG: CPT | Mod: CPTII,S$GLB,, | Performed by: FAMILY MEDICINE

## 2018-09-28 PROCEDURE — 90471 IMMUNIZATION ADMIN: CPT | Mod: S$GLB,,, | Performed by: INTERNAL MEDICINE

## 2018-09-28 PROCEDURE — 84443 ASSAY THYROID STIM HORMONE: CPT

## 2018-09-28 PROCEDURE — 74019 RADEX ABDOMEN 2 VIEWS: CPT | Mod: TC

## 2018-09-28 PROCEDURE — 99999 PR PBB SHADOW E&M-EST. PATIENT-LVL III: CPT | Mod: PBBFAC,,, | Performed by: INTERNAL MEDICINE

## 2018-09-28 PROCEDURE — 93000 ELECTROCARDIOGRAM COMPLETE: CPT | Mod: S$GLB,,, | Performed by: INTERNAL MEDICINE

## 2018-09-28 PROCEDURE — 99999 PR PBB SHADOW E&M-EST. PATIENT-LVL I: CPT | Mod: PBBFAC,,,

## 2018-09-28 PROCEDURE — 3008F BODY MASS INDEX DOCD: CPT | Mod: CPTII,S$GLB,, | Performed by: FAMILY MEDICINE

## 2018-09-28 PROCEDURE — 97802 MEDICAL NUTRITION INDIV IN: CPT | Mod: S$GLB,,, | Performed by: INTERNAL MEDICINE

## 2018-09-28 PROCEDURE — 80053 COMPREHEN METABOLIC PANEL: CPT

## 2018-09-28 PROCEDURE — 85027 COMPLETE CBC AUTOMATED: CPT

## 2018-09-28 PROCEDURE — 99386 PREV VISIT NEW AGE 40-64: CPT | Mod: S$GLB,,, | Performed by: INTERNAL MEDICINE

## 2018-09-28 PROCEDURE — 90686 IIV4 VACC NO PRSV 0.5 ML IM: CPT | Mod: S$GLB,,, | Performed by: INTERNAL MEDICINE

## 2018-09-28 PROCEDURE — 99999 PR PBB SHADOW E&M-EST. PATIENT-LVL IV: CPT | Mod: PBBFAC,,, | Performed by: FAMILY MEDICINE

## 2018-09-28 PROCEDURE — 97750 PHYSICAL PERFORMANCE TEST: CPT | Mod: S$GLB,,, | Performed by: INTERNAL MEDICINE

## 2018-09-28 PROCEDURE — 83036 HEMOGLOBIN GLYCOSYLATED A1C: CPT

## 2018-09-28 PROCEDURE — 3074F SYST BP LT 130 MM HG: CPT | Mod: CPTII,S$GLB,, | Performed by: INTERNAL MEDICINE

## 2018-09-28 PROCEDURE — 74019 RADEX ABDOMEN 2 VIEWS: CPT | Mod: 26,,, | Performed by: RADIOLOGY

## 2018-09-28 RX ORDER — METFORMIN HYDROCHLORIDE 500 MG/1
500 TABLET ORAL
Qty: 90 TABLET | Refills: 3 | Status: SHIPPED | OUTPATIENT
Start: 2018-09-28 | End: 2018-11-30

## 2018-09-28 RX ORDER — PANTOPRAZOLE SODIUM 40 MG/1
40 TABLET, DELAYED RELEASE ORAL DAILY
Qty: 14 TABLET | Refills: 0 | Status: SHIPPED | OUTPATIENT
Start: 2018-09-28 | End: 2018-11-02 | Stop reason: SDUPTHER

## 2018-09-28 NOTE — PROGRESS NOTES
"Nutrition Assessment  Client name:  Renae Hou  :  1960  Age:  57 y.o.  Gender:  female    Client states:  Very pleasant [employer] employee here for her annual [employer] physical.  Has met with me in the past, discussing weight loss strategies although unfortunately admits that she has unintentionally gained over 30# this past year and 10# over the past two weeks.  Attributes recent weight gain to fluid retention, complaining of increased swelling in joints, which she plans to discuss with MD today.  Underwent back surgery this past April and is currently participating in physical therapy although admits that despite improvement in back pain since surgery, she is experiencing more SOB and GERD, which she realizes is the result of weight gain.  Does not formally exercise at this time despite knowledge of its benefits and importance.  Adds that her "diet has been horrible this year" as she and her  have been dining out more frequently and selecting more unhealthy foods.  Consulted with bariatric surgery earlier this month as she desires to undergo the sleeve gastrectomy next year.  Understands the benefits versus risks of the procedure and the importance of lifestyle modifications pre and post.  Explains that this past year was difficult for her as she underwent back surgery and thereafter, developed infections and new drug allergies.  Is open-minded toward nutrition education and recommendations received and requested sample meal plans as she suffers from fatigue at night and so, lacks the desire to cook.  Plans to speak with  regarding such as he returns home before her and so, could begin preparing dinner.  Overall, desires to lose weight so as to improve chronic disease state and feel better, physically and mentally.     Anthropometrics  Height:  5' 5.5"     Weight:  234#  BMI:  38.4  % Body Fat:  44.23%  UBW (2017):  202#  UBW (2016):  183#    Clinical Signs/Symptoms  N/V/D:  " None  Appetite (Good, Fair, or Poor):  Good      Past Medical History:   Diagnosis Date    Abscess of paraspinous muscles     Allergy 2018    Epidural abscess 4/10/2018    Fatty liver     GERD (gastroesophageal reflux disease)     Hypertension     Lumbar radiculopathy     Menopause     Obesity     Obstructive sleep apnea on CPAP     Thyroid disease     Thyroid nodules.       Past Surgical History:   Procedure Laterality Date    ADENOIDECTOMY      BACK SURGERY      L4, L5    BILATERAL SALPINGOOPHORECTOMY      BLOCK-NERVE-MEDIAL BRANCH-LUMBAR Left 3/16/2018    [facility]    CHOLECYSTECTOMY      CYST REMOVAL      NEERAJ-TRANSFORAMINAL Left 3/2/2018    [facility]    FUSION-POSTERIOR LUMBAR INTERBODY FUSION L4/5, L5/S1 N/A 4/10/2018    [facility] 2ND FLR    HYSTERECTOMY  2012    [facility]     INJECTION-FACET Left 3/2/2018    [facility]    RECTOCELE REPAIR      SPINE SURGERY      THYROID NODULE REMOVAL      TONSILLECTOMY         Medications    has a current medication list which includes the following prescription(s): clotrimazole-betamethasone 1-0.05%, ergocalciferol, estradiol, losartan-hydrochlorothiazide 50-12.5 mg, omeprazole, polyethylene glycol 3350, and pregabalin.    Vitamins, Minerals, and/or Supplements:  Vitamin D     Food Allergies or Intolerances:  Shellfish     Social History    Marital status:    Employment:  [employer]    Social History     Tobacco Use    Smoking status: Former Smoker     Packs/day: 0.25     Years: 32.00     Pack years: 8.00     Types: Cigarettes     Last attempt to quit:      Years since quittin.7    Smokeless tobacco: Never Used    Tobacco comment: smoked socially decades ago - weekends only   Substance Use Topics    Alcohol use: No      Frequency: Never     Comment: SELDOMLY        Lab Reports   Total Cholesterol:  230    Triglycerides:  198  HDL:  58  LDL:  132.4   Glucose:  117  HbA1c:  6.1%  BP:  132/90     Food History  Breakfast:  Yogurt + fruit + granola + coffee  Mid-morning Snack:  None  Lunch:  6 inch [employer] tuna fish sandwich/skips  Mid-afternoon Snack:  None  Dinner:  Fish tacos made with snapper, spicy gonzalez, and cabbage/hamburger/pizza  H.S. Snack:  None  *Fluid intake:  Water    Exercise History:  None    Cultural/Spiritual/Personal Preferences:  None identified    Support System:  Spouse    State of Change:  Contemplation    Barriers to Change:  Physical limitations    Diagnosis    1.  Overweight related to inadequate physical activity, excessive energy intake, and improper food choices as evidenced by BMI:  38.4.     2.  Altered nutrition-related laboratory values related to inadequate physical activity, improper food choices, and >30# weight gain x 1 year as evidenced by TC:  230; TGL:  198; LDL:  132.4.    Intervention    RMR (Method:  Body [place]):  2010 kcal  Activity Factor:  1.3  JOB:  2613 - 1000 = 1613 kcal    Goals:  1.  Achieve 10% weight loss x 6 months, or goal weight of 207#  2.  Begin exercising, aiming for 30 minutes 5x/week as tolerated  3.  Reduce dining out frequency by 50%, increasing number of meals prepared at home  4.  Replace one meal with meal replacement shake (ex. Premier Protein shake + 2 servings fruit)  5.  Aim for 4800-6172 kcal daily    Nutrition Education  Had a lengthy discussion with patient regarding the complications of obesity and weight loss history as medical records indicate a 32# weight gain since last year, totaling 51# weight gain since 2016, which patient attributes to increased energy intake, poor food choices, and inactivity.  Has since consulted with the bariatric department as she wishes to pursue the sleeve gastrectomy next year.  Discussed the risks versus benefits of weight loss surgery  in addition to explaining the importance of lifestyle modifications pre and post surgery for long term success.  Encouraged patient to achieve 10% weight loss over the next 6 months via improved food choices and physical activity initiation.  Advised reduction in dining out frequency by 50% and conversely, increased number of meals prepared at home.  Provided patient with 5-day sample menus as requested and encouraged her to speak with  regarding cooking assistance and support.  Stressed the overall importance of behavior modifications so as to promote sustainable weight loss and improved chronic disease state, advising patient to focus on one change at a time for enhanced goal adoption and long term compliance.       Patient verbalized understanding of nutrition education and recommendations received.    Handouts Provided  Meal Planning Guide  Restaurant Guide  Eat Fit Shopping List  Eat Fit Esthela  Fast Food Guide  Vitamin/Mineral Guide  1800 kcal 5-Day Menus    Monitoring/Evaluation    Monitor the following:  Weight  BMI  % Body Fat  Caloric intake  Lipid panel  Blood pressure  Glucose  HbA1c    Follow Up Plan:  Follow up with client in 1-2 years

## 2018-09-28 NOTE — PROGRESS NOTES
Subjective:      Patient ID: Renae Hou is a 57 y.o. female.    Chief Complaint: Establish Care      HPI:  Renae Hou is a 57 year old female with chronic pain, fatty liver, gastroesophageal reflux disease, hypertension, hyperlipidemia, lumbar radiculopathy, obesity, prediabetes, spondylolisthesis, and obstructive sleep apnea who presents to clinic today to establish care and with a chief complaint of ankle swelling and weight gain.    Had blood work done per previous PCP today; glucose 117, total cholesterol 230, triglycerides 198, 10 year risk score 3.4%, A1C 6.1% (from 5.2% previously).  Also had abdominal X-ray due to umbilical pain which radiates to the right lower quadrant, no significant abnormality noted on exam.  Abdominal pain is intermittent, mild, not present currently.    Ankle swelling:  Intermittent issue; began a couple of months ago.  Occurs at least once per week.  States she feels mildly short of breath when her ankles are swollen.  Denies associated wheezing or orthopnea.  Sleeps on 1-2 pillows but does keep her bed inclined.  Endorses ~10 lbs. weight gain over the past 2 weeks.  States her mother had a history of congestive heart failure.  States she has an ultrasound of the veins for her lower extremities scheduled; ordered per previous PCP.    GERD:  Worsening.  Waking patient up from sleep at night with vomitus in her throat.  Sleeps on an inclined bed without improvement.  Taking Prilosec 40 mg by mouth daily.  Has not noticed any particular foods to be triggers for her symptoms.  Does not smoke, does not drink.  Has appointment with GI in November.    Hypertension:  Within goal today.  Taking losartan-hydrochlorothiazide 50-12.5 mg by mouth daily.  Denies any recent associated headaches or vision changes.  Does not add salt to her food or when cooking.  Does not exercise.    Hyperlipidemia:  10 year risk score 3.4%.  Counseled on importance of low cholesterol diet, increased daily  aerobic exercise, and weight loss.  Recommended Mediterranean diet.    Obesity:  States she has gained 10 lbs over the past 2 weeks.  Does not exercise.  Seen in bariatric clinic.  Has appointment with nutritionist scheduled.    Prediabetes:  A1C 6.1% from 5.2% previously.  Not currently taking any diabetic medications.    Obstructive sleep apnea:  Has CPAP machine at home but does not use.  Symptoms stable.    Vitamin D deficiency:  Vitamin D level normal on recent blood work.  Taking vitamin D2 50,000 units once weekly.    Health Care Maintenance:  Influenza vaccination:  Will get today.  Last tetanus booster:  8/12/16  Last routine labs:  Today.  Last mammogram:  3/9/18  Last colonoscopy:  August 2017 per Dr. Bess, [facility]      Past Medical History:   Diagnosis Date    Abscess of paraspinous muscles     Allergy 4/2018    Epidural abscess 4/10/2018    Fatty liver     GERD (gastroesophageal reflux disease) 2015    Hypertension     Lumbar radiculopathy     Menopause     Obesity     Obstructive sleep apnea on CPAP     Thyroid disease     Thyroid nodules.       Past Surgical History:   Procedure Laterality Date    ADENOIDECTOMY  1995    BACK SURGERY  2002    L4, L5    BILATERAL SALPINGOOPHORECTOMY      BLOCK-NERVE-MEDIAL BRANCH-LUMBAR Left 3/16/2018    [facility]    CHOLECYSTECTOMY      CYST REMOVAL      NEERAJ-TRANSFORAMINAL Left 3/2/2018    [facility]    FUSION-POSTERIOR LUMBAR INTERBODY FUSION L4/5, L5/S1 N/A 4/10/2018    [facility] OR 2ND FLR    HYSTERECTOMY  12/17/2012    [facility]     INJECTION-FACET Left 3/2/2018    [facility]    RECTOCELE REPAIR      SPINE SURGERY  2018    THYROID NODULE REMOVAL      TONSILLECTOMY  1995       Family History   Problem Relation Age of Onset    Hypertension Mother     Hyperlipidemia Mother     Heart  disease Mother 55        cad, valvular heart disease    Alcohol abuse Sister     Stroke Father     No Known Problems Daughter     No Known Problems Daughter     Breast cancer Neg Hx     Colon cancer Neg Hx     Ovarian cancer Neg Hx     Diabetes Neg Hx        Social History     Socioeconomic History    Marital status:      Spouse name: None    Number of children: None    Years of education: None    Highest education level: None   Social Needs    Financial resource strain: None    Food insecurity - worry: None    Food insecurity - inability: None    Transportation needs - medical: None    Transportation needs - non-medical: None   Occupational History    None   Tobacco Use    Smoking status: Former Smoker     Packs/day: 0.25     Years: 32.00     Pack years: 8.00     Types: Cigarettes     Last attempt to quit:      Years since quittin.7    Smokeless tobacco: Never Used    Tobacco comment: smoked socially decades ago - weekends only   Substance and Sexual Activity    Alcohol use: No     Frequency: Never     Comment: SELDOMLY    Drug use: No    Sexual activity: Not Currently     Partners: Male     Birth control/protection: See Surgical Hx, None     Comment: [facility] BS&O 2012,     Other Topics Concern    None   Social History Narrative    . 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       Review of Systems   Constitutional: Positive for activity change and unexpected weight change. Negative for chills, fatigue and fever.   HENT: Negative for congestion, hearing loss, nosebleeds, rhinorrhea, sore throat and trouble swallowing.    Eyes: Negative for pain, discharge and visual disturbance.   Respiratory: Negative for cough, chest tightness, shortness of breath and wheezing.    Cardiovascular: Negative for chest pain and palpitations.   Gastrointestinal: Negative for abdominal distention, abdominal pain, blood in stool, constipation, nausea and vomiting.   Endocrine: Negative for polydipsia and  "polyuria.   Genitourinary: Negative for decreased urine volume, difficulty urinating, dysuria, hematuria, menstrual problem and urgency.   Musculoskeletal: Positive for arthralgias and joint swelling. Negative for back pain, myalgias and neck pain.   Skin: Negative for color change and rash.   Neurological: Negative for dizziness, tremors, weakness, light-headedness, numbness and headaches.   Psychiatric/Behavioral: Negative for agitation, behavioral problems, confusion and dysphoric mood. The patient is not nervous/anxious.      Objective:     Vitals:    09/28/18 1423   BP: 126/70   BP Location: Left arm   Patient Position: Sitting   BP Method: Large (Manual)   Pulse: 86   Temp: 98 °F (36.7 °C)   TempSrc: Oral   SpO2: 98%   Weight: 105.7 kg (233 lb)   Height: 5' 6" (1.676 m)       Physical Exam   Constitutional: She is oriented to person, place, and time. She appears well-developed and well-nourished.   Obese   HENT:   Head: Normocephalic and atraumatic.   Right Ear: External ear normal.   Left Ear: External ear normal.   Nose: Nose normal.   Mouth/Throat: Oropharynx is clear and moist.   Eyes: Conjunctivae and EOM are normal. Pupils are equal, round, and reactive to light.   Neck: Normal range of motion. Neck supple. No tracheal deviation present.   Cardiovascular: Normal rate, regular rhythm and normal heart sounds. Exam reveals no gallop and no friction rub.   No murmur heard.  1+ pitting edema to bilateral ankles   Pulmonary/Chest: Effort normal and breath sounds normal. No respiratory distress. She has no wheezes. She has no rales.   Abdominal: Soft. Bowel sounds are normal. She exhibits no distension. There is no tenderness. There is no rebound and no guarding.   Musculoskeletal: Normal range of motion. She exhibits no edema or deformity.        Right ankle: She exhibits swelling.        Left ankle: She exhibits swelling.   Lymphadenopathy:     She has no cervical adenopathy.   Neurological: She is alert and " oriented to person, place, and time. Coordination normal.   Skin: Skin is warm and dry.   Psychiatric: She has a normal mood and affect. Her behavior is normal.   Nursing note and vitals reviewed.     Assessment:      1. Lower extremity edema    2. Weight gain    3. Shortness of breath    4. Pre-diabetes    5. Essential hypertension    6. Hyperlipidemia, unspecified hyperlipidemia type    7. Class 2 obesity due to excess calories with body mass index (BMI) of 37.0 to 37.9 in adult, unspecified whether serious comorbidity present    8. Gastroesophageal reflux disease, esophagitis presence not specified    9. RENAE on CPAP    10. Need for influenza vaccination      Plan:   Renae was seen today for establish care.    Diagnoses and all orders for this visit:    Lower extremity edema; weight gain; shortness of breath  -     2D Echo w/ Color Flow Doppler; Future ordered to rule out congestive heart failure.  Recommended patient adhere to a low sodium diet, increased daily aerobic exercise, elevated the legs when at rest, and OTC compression stockings.    Pre-diabetes  -     Worsening with recent A1C 6.1% from 5.2% previously.  Start metFORMIN (GLUCOPHAGE) 500 MG tablet; Take 1 tablet (500 mg total) by mouth daily with breakfast.  Counseled patient on potential adverse effects of this medication with instructions to notify me if experiencing any adverse effects.  Return to clinic in 6 months for repeat A1C.  Counseled patient on the importance of avoidance of simple sugars and increased daily aerobic exercise.    Essential hypertension        -     Well controlled today, continue current regimen.    Hyperlipidemia, unspecified hyperlipidemia type        -     Counseled patient on the importance of a low fat/low cholesterol diet such as the Mediterranean diet, increased daily aerobic exercise, and weight loss.    Class 2 obesity due to excess calories with body mass index (BMI) of 37.0 to 37.9 in adult, unspecified whether  serious comorbidity present        -     Counseled patient on the importance of a low fat/low cholesterol diet such as the Mediterranean diet, increased daily aerobic exercise, and weight loss.  Recommended patient start a calorie count.  To follow up with nutritionist.  Continue follow up with bariatric clinic.    Gastroesophageal reflux disease, esophagitis presence not specified  -     Suspend Prilosec.  Start pantoprazole (PROTONIX) 40 MG tablet; Take 1 tablet (40 mg total) by mouth once daily for 14 days.  Counseled patient on the risks of long term PPI use including osteoporosis.  Protonix to be used for only 2 weeks for symptom control, then recommended patient try OTC Zantac for daily maintenance, if ineffective ok to restart Prilosec and follow up with GI in November.  Instructed patient to call or return to clinic if symptoms worsening.    RENAE on CPAP        -     Recommended patient begin using her CPAP machine again.    Need for influenza vaccination        -     Fluzone to be administered today.

## 2018-09-28 NOTE — PROGRESS NOTES
Subjective:       Patient ID: Renae Hou is a 57 y.o. female.    Chief Complaint: No chief complaint on file.    HPI   Pt. Has no significant cardiovascular or pulmonary history. Pt. Has a Hx of hypertension for which she takes an ARB.    Physical Limitations: Pt. Had L4/L5/S1 fusion in April.  She still has some discomfort when standing and walking for long periods of time as well as when lifting and sleeping.  She is seeing a physical therapist twice a week.  The push-up, curl-up, and sit and reach tests have been deferred.  Pt. Reported putting on about 30 lbs since her surgery.  She stated that her ankles have been extremely swollen and she has been putting on a lot of weight in a short period of time.  She also mentioned that she is short of breath and lethargic when her ankles are swollen.  I checked her ankles for pitting and did not find any evidence of pitting.  I explained to her what pitting is and showed her how she can check for it herself.  I explained to her that these symptoms of rapid weight gain, edema, and SOB can be signs of a heart issue and that she should discuss it with her doctor.  I also encouraged her to see a cardiologist and inquire about getting an echo done.  I told her that she needs to get clearance from both her doctor and PT before beginning any exercise program.       Current exercise routine:  Patient does not follow any formal exercise or flexibility routine at the current time other than going to physical therapy twice a week.    Goals:  Pt. Has a long term goal weight of 150 lbs and a year goal weight of 190 lbs.    Fun Facts: Pt. Was very friendly and engaged.  She is concerned about her recent weight gain and seemed motivated to make the necessary changes to better her health.      Review of Systems    Objective:     The fitness evaluation results are as follows:  D.O.S. 9/28/2018 8/12/2016   Height (in): 65.5 65.75   Weight (lbs): 234 183   BMI: 38.64409 29.937467    Body Fat (%): 44.23 34.70   Waist (cm): 112 93   Hip (cm): 127 107   WHR: 0.88 0.87   RBP (mmHg): 118/82 130/80   RHR (bpm): 70 68    Strength R (lbs)t: 68.67652 67.825828    Strength Lt (lbs): 68.72051 69.935271   Push-up Assessment: Deferred 0   Curl-up Assessment: Deferred 0   Flexibility Testing (cm): Deferred 31   REE (kcals): 2010 1260       Physical Exam    Assessment:     Age/gender stratified assessment:  Resting BP: Within Normal Limits   Body Fat %: Poor   WHR Risk Factor: High Risk    Strength R: average    Strength L: above average   Upper Body Endurance: Deferred   Abdominal Endurance: Deferred   Lower body Flexibiltiy: Deferred       1. Routine general medical examination at a health care facility        Plan:       Recommended fitness guidelines:    -150 minutes of moderate intensity aerobic exercise per week or 75 minutes of vigorous intensity aerobic exercise per week.  Get clearance from your doctor and physical therapist before beginning any exercise regimen.      -2 to 4 days per week of resistance training for each muscle group.  Once cleared by your doctor and physical therapist, start to incorporate the upper and lower body resistance training program along with the core stabilization program given during the evaluation.    -Daily stretching with a hold of at least 30 seconds per muscle group.  Practice the stretches given to you by your physical therapist, daily.

## 2018-09-30 PROBLEM — N81.11 CYSTOCELE, MIDLINE: Status: ACTIVE | Noted: 2018-09-30

## 2018-09-30 PROBLEM — N95.2 VAGINAL ATROPHY: Status: ACTIVE | Noted: 2018-09-30

## 2018-09-30 PROBLEM — R35.1 NOCTURIA MORE THAN TWICE PER NIGHT: Status: ACTIVE | Noted: 2018-09-30

## 2018-09-30 PROBLEM — N94.10 DYSPAREUNIA, FEMALE: Status: ACTIVE | Noted: 2018-09-30

## 2018-09-30 PROBLEM — N81.6 RECTOCELE, FEMALE: Status: ACTIVE | Noted: 2018-09-30

## 2018-09-30 PROBLEM — N39.46 MIXED STRESS AND URGE URINARY INCONTINENCE: Status: ACTIVE | Noted: 2018-09-30

## 2018-10-05 ENCOUNTER — HOSPITAL ENCOUNTER (OUTPATIENT)
Dept: VASCULAR SURGERY | Facility: CLINIC | Age: 58
Discharge: HOME | End: 2018-10-05
Attending: SURGERY
Payer: COMMERCIAL

## 2018-10-05 ENCOUNTER — HOSPITAL ENCOUNTER (OUTPATIENT)
Dept: RADIOLOGY | Facility: HOSPITAL | Age: 58
Discharge: HOME | End: 2018-10-05
Attending: NURSE PRACTITIONER
Payer: COMMERCIAL

## 2018-10-05 ENCOUNTER — CLINICAL SUPPORT (OUTPATIENT)
Dept: BARIATRICS | Facility: CLINIC | Age: 58
End: 2018-10-05
Payer: COMMERCIAL

## 2018-10-05 ENCOUNTER — HOSPITAL ENCOUNTER (OUTPATIENT)
Dept: CARDIOLOGY | Facility: CLINIC | Age: 58
Discharge: HOME | End: 2018-10-05
Attending: FAMILY MEDICINE
Payer: COMMERCIAL

## 2018-10-05 ENCOUNTER — HOSPITAL ENCOUNTER (OUTPATIENT)
Dept: CARDIOLOGY | Facility: CLINIC | Age: 58
Discharge: HOME | End: 2018-10-05
Payer: COMMERCIAL

## 2018-10-05 VITALS — BODY MASS INDEX: 37.52 KG/M2 | HEIGHT: 66 IN | WEIGHT: 233.44 LBS

## 2018-10-05 VITALS
DIASTOLIC BLOOD PRESSURE: 86 MMHG | BODY MASS INDEX: 37.64 KG/M2 | SYSTOLIC BLOOD PRESSURE: 126 MMHG | HEART RATE: 64 BPM | WEIGHT: 233.19 LBS

## 2018-10-05 DIAGNOSIS — E66.9 CLASS 2 OBESITY WITH BODY MASS INDEX (BMI) OF 37.0 TO 37.9 IN ADULT, UNSPECIFIED OBESITY TYPE, UNSPECIFIED WHETHER SERIOUS COMORBIDITY PRESENT: ICD-10-CM

## 2018-10-05 DIAGNOSIS — E66.09 CLASS 2 OBESITY DUE TO EXCESS CALORIES WITH BODY MASS INDEX (BMI) OF 37.0 TO 37.9 IN ADULT, UNSPECIFIED WHETHER SERIOUS COMORBIDITY PRESENT: ICD-10-CM

## 2018-10-05 DIAGNOSIS — R60.0 LOWER EXTREMITY EDEMA: ICD-10-CM

## 2018-10-05 DIAGNOSIS — Z71.3 DIETARY COUNSELING: ICD-10-CM

## 2018-10-05 DIAGNOSIS — G47.33 OSA ON CPAP: ICD-10-CM

## 2018-10-05 DIAGNOSIS — R60.0 EDEMA, LOWER EXTREMITY: ICD-10-CM

## 2018-10-05 DIAGNOSIS — R06.02 SHORTNESS OF BREATH: ICD-10-CM

## 2018-10-05 DIAGNOSIS — R73.03 PRE-DIABETES: ICD-10-CM

## 2018-10-05 LAB
DIASTOLIC DYSFUNCTION: NO
ESTIMATED PA SYSTOLIC PRESSURE: 19.32
RETIRED EF AND QEF - SEE NOTES: 65 (ref 55–65)
TRICUSPID VALVE REGURGITATION: NORMAL

## 2018-10-05 PROCEDURE — 71046 X-RAY EXAM CHEST 2 VIEWS: CPT | Mod: TC,FY

## 2018-10-05 PROCEDURE — 99999 PR PBB SHADOW E&M-EST. PATIENT-LVL II: CPT | Mod: PBBFAC,,, | Performed by: DIETITIAN, REGISTERED

## 2018-10-05 PROCEDURE — 93970 EXTREMITY STUDY: CPT | Mod: S$GLB,,, | Performed by: SURGERY

## 2018-10-05 PROCEDURE — 93306 TTE W/DOPPLER COMPLETE: CPT | Mod: S$GLB,,, | Performed by: INTERNAL MEDICINE

## 2018-10-05 PROCEDURE — 99499 UNLISTED E&M SERVICE: CPT | Mod: S$GLB,,, | Performed by: DIETITIAN, REGISTERED

## 2018-10-05 PROCEDURE — 93000 ELECTROCARDIOGRAM COMPLETE: CPT | Mod: S$GLB,,, | Performed by: INTERNAL MEDICINE

## 2018-10-05 PROCEDURE — 71046 X-RAY EXAM CHEST 2 VIEWS: CPT | Mod: 26,,, | Performed by: RADIOLOGY

## 2018-10-05 PROCEDURE — 97802 MEDICAL NUTRITION INDIV IN: CPT | Mod: S$GLB,,, | Performed by: DIETITIAN, REGISTERED

## 2018-10-05 NOTE — PROGRESS NOTES
Subjective:       Patient ID: Renae Hou is a 57 y.o. female.    Chief Complaint: Executive Health    HPIVery pleasant lady from [place] here for her Executive Health exam. Overall doing well today, but had been having blood pressure issue these last few weeks. I saw her on a couple of occassions prior to this visito monitor her blood pressures. She was having issues with the amlodipine causing significant edema of her legs. This medications was stopped and she was placed on hydrochlorothiazide (HCTZ) that improved her edema, but her blood pressure continued to be on the high. I therefore started her on the combination of Losartan/HCTZ and has tolerated the medication well and her pressures improved. She reports feeling better regarding her headaches that she was experiencing, but is not pleased with her body habitus as she has gained some weight these las few months. We discussed the importance of proper nutrition, exercise, weight loss vis a vis future health issues. She appreared motivated to do so.  I am sending copies of her studies including blood work that showed mild increase in fasting glucose level at 117 with A1-C at 6.1. Her lipid profile showed moderately increase in cholesterol and triglycerides. All other parameters were within normal limits including EKG.  Review of Systems   All other systems reviewed and are negative.      Objective:      Physical Exam   Constitutional: She is oriented to person, place, and time. She appears well-developed and well-nourished. No distress.   HENT:   Head: Normocephalic.   Right Ear: External ear normal.   Left Ear: External ear normal.   Mouth/Throat: Oropharynx is clear and moist. No oropharyngeal exudate.   Eyes: Conjunctivae and EOM are normal. Pupils are equal, round, and reactive to light. Right eye exhibits no discharge. Left eye exhibits no discharge. No scleral icterus.   Neck: Normal range of motion. Neck supple. No JVD present. No thyromegaly  present.   Cardiovascular: Normal rate, regular rhythm, normal heart sounds and intact distal pulses.   Pulmonary/Chest: Effort normal and breath sounds normal. No respiratory distress. She has no wheezes. She exhibits no tenderness.   Abdominal: Soft. Bowel sounds are normal. She exhibits no distension and no mass. There is no tenderness.   Musculoskeletal: Normal range of motion. She exhibits edema. She exhibits no tenderness.   Lymphadenopathy:     She has no cervical adenopathy.   Neurological: She is alert and oriented to person, place, and time. No cranial nerve deficit. Coordination normal.   Skin: Skin is warm and dry. No rash noted. She is not diaphoretic. No erythema.   Psychiatric: She has a normal mood and affect. Her behavior is normal. Judgment and thought content normal.   Nursing note and vitals reviewed.      Assessment:       1. Routine general medical examination at a health care facility     2.     Hypertension - improved.   3.     Hyperlipidemia.   4.     Pre-diabetes.   5.     Lower extremity edema - multifactorial.      Plan:    1. Continue with current medications.         2. Adhere to dietary/exercise recommendations as discussed.         3. Return to clinic in 3 months or sooner if needed.

## 2018-10-05 NOTE — PATIENT INSTRUCTIONS
Goals:   Continue to review Bariatric Nutrition Guidebook at home and call with any questions.  Work on expanding variety of vegetables.  Work on gradually cutting back on starchy CHO in the diet.  Begin trying various protein supplements to determine preference.  5-6 meals per day.  Start including protein supplements in the diet plan daily.  More grocery shopping and meal preparation at home.  Increase exercise.  keep food log.    Meal Ideas for Regular Bariatric Diet  *Recipes and products available at [email or web address]      Breakfast: (15-20g protein)    - Egg white omelet: 2 egg whites or ½ cup Egg Beaters. (Optional proteins: cheese, shrimp, black beans, chicken, sliced turkey) (Optional veggies: tomatoes, salsa, spinach, mushrooms, onions, green peppers, or small slice avocado)     - Egg and sausage: 1 egg or ¼ cup Egg Beaters (any variety), with 1 pastor or 2 links of Turkey sausage or Veggie breakfast sausage ([employer] or [employer])    - Crust-less breakfast quiche: To make a glass pie dish, mix 4oz part skim Ricotta, 1 cup skim milk, and 2 eggs as your base. Add protein: shredded cheese, sliced lean ham or turkey, turkey grubbs/sausage. Add veggies: tomato, onion, green onion, mushroom, green pepper, spinach, etc.    - Yogurt parfait: Mix 1 - 6oz container Dannon Light N Fit vanilla yogurt, with ¼ cup crushed unsalted nuts    - Cottage cheese and fruit: ½ cup part-skim cottage cheese or ricotta cheese topped with fresh fruit or sugar free preserves     - Yahaira Vargas's Vanilla Egg custard* (add 2 Tbsp instant coffee granules to make Cappuccino Custard*)    - Hi-Protein café latte (skim milk, decaf coffee, 1 scoop protein powder). Optional to add Sugar free syrup or extract flavoring.    - Breakfast Lox: spread fat free cream cheese on slices of smoked salmon. Serve over scrambled or egg over easy (sauteed with nonstick cookspray) OR on a cucumber slice    - Eggwhich: Scramble or cook 1 large egg  over easy using nonstick cookspray. Place between 2 slices of Monegasque grubbs and low fat cheese.     Lunch: (20-30g protein)    - ½ cup Black bean soup (Homemade or Progresso), with ¼ cup shredded low-fat cheese. Top with chopped tomato or fresh salsa.     - Lean deli turkey breast and low-fat sliced cheese, mustard or light gonzalez to moisten, rolled up together, or wrapped in a Cristian lettuce leaf    - Chicken salad made from dinner leftovers, moisten with low-fat salad dressing or light gonzalez. Also try leftover salmon, shrimp, tuna or boiled eggs. Serve ½ cup over dark green salad    - Fat-free canned refried beans, topped with ¼ cup shredded low-fat cheese. Top with chopped tomato or fresh salsa.     - Greek salad: Top mixed greens with 1-2oz grilled chicken, tomatoes, red onions, 2-3 kalamata olives, and sprinkle lightly with feta cheese. Spritz with Balsamic vinegar to taste.     - Crust-less lunch quiche: To make a glass pie dish, mix 4oz part skim Ricotta, 1 cup skim milk, and 2 eggs as your base. Add protein: shredded cheese, sliced lean ham or turkey, shrimp, chicken. Add veggies: tomato, onion, green onion, mushroom, green pepper, spinach, artichoke, broccoli, etc.    - Pizza bake: spread a  alanna evan mushroom with tomato sauce, low-fat shredded mozzarella and turkey pepperoni or Leslie grubbs. Add any veggies. Roast for 10-15 minutes, until cheese melted.     - Cucumber crab bites: Spread ¼ cup crab dip (lump crabmeat + light cream cheese and green onions) over sliced cucumber.     - Chicken with light spinach and artichoke dip*: Puree in : 6oz cooked and drained spinach, 2 cloves garlic, 1 can cannelloni beans, ½ cup chopped green onions, 1 can drained artichoke hearts (not marinated in oil), lemon juice and basil. Mix in 2oz chopped up chicken.    Supper: (20-30g protein)    - Serve grilled fish over dark green salad tossed with low-fat dressing, served with grilled asparagus pendleton     -  Rotisserie chicken salad: served with sliced strawberries, walnuts, fat-free feta cheese crumbles and 1 tbsp Bernals Own Light Raspberry [place] Vinaigrette    - Shrimp cocktail: Dip cold boiled shrimp in homemade low-sugar cocktail sauce (1/2 cup Tiff One Carb ketchup, 2 tbsp horseradish, 1/4 tsp hot sauce, 1 tsp Worcestershire sauce, 1 tbsp freshly-squeezed lemon juice). Serve with dark green salad, walnuts, and crumbled blue cheese drizzled with olive oil and Balsamic vinegar    - Tuna Melt: Spread tuna salad onto 2 thick slices of tomato. Top with low-fat cheese and broil until cheese is melted. May also be made with chicken salad of shrimp salad. [place] with different types of cheeses.    - Chicken or beef fajitas (no tortilla, rice, beans, chips). Top meat and veggies w/ fresh salsa, fat free sour cream.     - Homemade low-fat Chili using extra lean ground beef or ground turkey. Top with shredded cheese and salsa as desired. May add dollop fat-free sour cream if desired    - Chicken parmesan: Top chicken breast w/ low sugar marinara sauce, mozzarella cheese and bake until chicken reaches 165*.  Serve w/ spaghetti SQUASH or Kazakh cut green beans    - Dinner Omelet with shrimp or chicken and onion, green peppers and chives.    - No noodle lasagna: Use sliced zucchini or eggplant in place of noodles.  Layer with part skim ricotta cheese and low sugar meat sauce (use very lean ground beef or ground turkey).    - Mexican chicken bake: Bake chunks of chicken breast or thigh with taco seasoning, Pace brand enchilada sauce, green onions and low-fat cheese. Serve with ¼ cup black beans or fat free refried beans topped with chopped tomatoes or salsa.    - Giuseppe frozen meatballs, simmered in Classico Marinara sauce. Different flavors of salsa or spaghetti sauce create different dishes! Sprinkle with parmesan cheese. Serve with grilled or steamed veggies, or a dark green salad.    - Simmer boneless skinless  chicken thigh chunks in Classico Marinara sauce or roasted salsa until tender with chopped onion, bell pepper, garlic, mushrooms, spinach, etc.     - Hamburger or veggie burger, without the bun, dressed the way you like. Served with grilled or steamed veggies.    - Eggplant parmesan: Bake slices of eggplant at 350 degrees for 15 minutes. Layer tomato sauce, sliced eggplant and low-fat mozzarella cheese in a baking dish and cover with foil. Bake 30-40 more minutes or until bubbly. Uncover and bake at 400 degrees for about 15 more minutes, or until top is slightly crisp.    - Fish tacos: grilled/baked white fish, wrapped in Cristian lettuce leaf, topped with salsa, shredded low-fat cheese, and light coleslaw.    - Chicken hilton: Sprinkle chicken w/ 1 tsp of hidden mySBX ranch dip mix. Then grill chicken and top with black beans, salsa and 1 tsp fat free sour cream.     - Cauliflower pizza crust: Use cauliflower as crust (see recipe on pinterest, no flour!). Top w/ low fat cheese, turkey pepperoni and veggies and bake again    - chicken or turkey crust pizza: use ground chicken or turkey instead of cauliflower, spread in [place] and bake at 350 for about 20-30 minutes(may want to add garlic, black pepper, oregano and other herbs to ground meat mixture).  Remove and top w/ low fat cheese, turkey pepperoni and veggies and bake again for another 10 minutes or until cheese is browned.     Snacks: (100-200 calories; >5g protein)    - 1 low-fat cheese stick with 8 cherry tomatoes or 1 serving fresh fruit  - 4 thin slices fat-free turkey breast and 1 slice low-fat cheese  - 4 thin slices fat-free honey ham with wedge of melon  - 6-8 edamame pods (equivalent to about 1/4 cup edamame without pods).   - 1/4 cup unsalted nuts with ½ cup fruit  - 6-oz container Dannon Light n Fit vanilla yogurt, topped with 1oz unsalted nuts         - apple, celery or baby carrots spread with 2 Tbsp PB2  - apple slices with 1 oz slice low-fat  cheese  - Apple slices dipped in 2 Tbsp of PB2  - celery, cucumber, bell pepper or baby carrots dipped in ¼ cup hummus bean spread or light spinach and artichoke dip (*recipe in lunch section)  - celery, cucumber, baby carrots dipped in high protein greek yogurt (Mix 16 oz plain greek yogurt + 1 packet of hidden valley ranch dip mix)  - Jero Links Beef Steak - 14g protein! (similar to beef jerky)  - 2 wedges [place] - Light Herb & Garlic Cheese with sliced cucumber or green bell pepper  - 1/2 cup low-fat cottage cheese with ¼ cup fruit or ¼ cup salsa  - RTD Protein drinks: Atkins, Low Carb Slim Fast, EAS light, Muscle Milk Light, etc.  - Homemade Protein drinks: GNC Soy95, Isopure, Nectar, UNJURY, Whey Gourmet, etc. Mix 1 scoop powder with 8oz skim/1% milk or light soymilk.  - Protein bars: Atkins, EAS, Pure Protein, Think Thin, Detour, etc. Must have 0-4 grams sugar - Read the label.    Takeout Options: No more than twice/week  Deli - Salads (no pasta or rice), meats, cheeses. Roasted chicken. Lox (salmon)    Mexican - Platters which don't include tortillas, chips, or rice. Go easy on the beans. Example: Fajitas without the tortillas. Ask the  not to bring chips to the table if they are too tempting.    Greek - Meat or fish and vegetable, but no bread or rice. Including hummus, baba ganoush, etc, is OK. Most sit-down Greek restaurants can provide you with cucumber slices for dipping instead of peyton bread.    Fast Food (Avoid as much as possible) - Salads (no croutons and limit salad dressing to 2 tbsp), grilled chicken sandwich without the bun and ask for no gonzalez. Normas low fat chili or Taco Bell pintos and cheese.    BBQ - The meats are fine if you ask for sauces on the side, but most of the traditional side dishes are loaded with carbs. Basim slaw, baked beans and BBQ sauce are typically made with sugar.    Chinese - Nothing deep-fried, no rice or noodles. Many Chinese sauces have starch and sugar in  them, so you'll have to use your judgement. If you find that these sauces trigger cravings, or cause Dumping, you can ask for the sauce to be made without sugar or just use soy sauce.    1200- 1500 calorie Sample meal plan  80-120g protein per day.   Protein drinks and bars: 0-4 grams sugar  Drink lots of water throughout the day and exercise!  MENU # 1  Breakfast: 2 eggs, 1 turkey sausage pastor, 1 apple  Snack: Atkins bar  Lunch: 2 roll-ups (sliced turkey, low-fat sliced cheese, mustard), 12 baby carrots dipped in 1 Tbsp natural peanut butter  Mid-Day Snack: ¼ cup unsalted almonds, ½ cup fruit  Dinner: 1 chicken thigh simmered in tomato sauce + 2 Tbsp mozzarella cheese, ½ cup black beans, 1/2 cup steamed carrots  Evening Snack: 1/2 cup grapes with 4 cubes low-fat cheddar cheese   ___________________________________________________  MENU # 2  Breakfast: 200 calorie protein drink  Mid-morning snack : ¼ cup unsalted nuts, medium banana  Lunch: 3oz tuna or chicken salad made with 2 tbsp light gonzalez, over salad with tomatoes and cucumbers.   Snack: low-fat cheese stick  Dinner: 3oz hamburger pastor, slice of low-fat cheese, 1 cup boiled yellow squash and zucchini.   Snack: 6oz light yogurt  _______________________________________________________  Menu #3  Breakfast: 6oz plain Greek yogurt + fruit (½ banana, ½ cup fruit - pineapple, blueberries, strawberries, peach), may add Splenda to nataly.  Lunch: Grilled  chicken breast w/ slice low-fat pepper louie cheese, 1/2 cup grilled/baked asparagus and small salad with Salad Spritzer.    Mid-Day snack: 200 calorie protein drink   Dinner: 4oz Grilled fish, ½ cup white beans, ½ cup cooked spinach  Evening Snack: fudgsicle no-sugar-added    Menu # 4  Breakfast: 1 scoop vanilla protein powder + 4oz skim milk + 4oz coffee   Snack: Pure protein bar  Lunch: 2 Lettuce tacos: 3oz seasoned ground turkey wrapped in a Cristian lettuce leaves with 1 Tbsp shredded cheese and dollop low-fat  sour cream  Snack: ½ cup cottage cheese, ½ cup pineapple chunks  Dinner: Shrimp omelet: 2 eggs, ½ cup shrimp, green onions, and shredded cheese  ______________________________________________________  Menu #5  Breakfast: 1 cup low-fat cottage cheese, ½ cup peaches (no sugar added)  Snack: 1 apple, 4 cubes cheese  Lunch: 3oz baked pork chop, 1 cup okra and tomato stew  Snack: 1/2 cup black beans + salsa + dollop light sour cream  Dinner: Caprese chicken salad: 3 oz chicken breast, 1oz fresh mozzarella, sliced tomato, 1 Tbsp olive oil, basil  Snack: sugar-free popsicle    Menu #6  Breakfast: ½ cup part-skim ricotta cheese, 2 Tbsp sugar-free strawberry preserves, sprinkle of slivered almonds  Snack: 1 orange  Lunch: 3 oz canned chicken, 1oz shredded cheddar cheese, ½  cup black beans, 2 Tbsp salsa  Snack: 200 calorie Protein drink  Dinner: 4 oz grilled salmon steak, over mixed green salad with 2 tbsp light dressing      Bariatric Diet Grocery List      High in Protein:   ? Canned tuna or chicken (packed in water)  ? Lean ground turkey breast or ground round  ? Turkey or chicken (no skin)  ? Lean pork or beef   ? Scrambled, poached, or boiled eggs  ? Baked or broiled fish and seafood (not fried!)  ? Beans, edamame and lentils  ? Low fat deli meats: turkey, chicken, ham, roast beef  ? 1% or Skim Milk, Lactaid, or Soymilk  ? Low-fat cheese, cottage cheese, mozzarella string cheese, ricotta  ? Light yogurt, FF/SF frozen yogurt, custard, SF pudding  ? Protein drinks and protein bars with 0-4 grams sugar     Fruits, Vegetables and Snacks   - Green beans, broccoli, cauliflower, spinach, asparagus, carrots, lima beans, yellow squash, zucchini, etc.  - Apple, pineapple, peach, grapes, banana, watermelon, oranges, etc.  - Fruit canned in its own juice or in water (not in syrup)  - Raw veggies  - Lettuce: dark greens like spinach and Cristian  - Unsalted Nuts  - Jero Links beef jerky     Fluids:   Skim/1% milk, Lactaid,  Soymilk  Sugar-free beverages  (decaf and non-carbonated)  Decaf coffee & decaf tea   Water

## 2018-10-05 NOTE — PROGRESS NOTES
"NUTRITIONAL CONSULT    Referring Physician: Duc Ratliff M.D.  Reason for MNT Referral: Initial assessment for sleeve gastrectomy work-up    PAST MEDICAL HISTORY:   57 y.o. female  Body mass index is 37.68 kg/m²..  Weight history includes in 1987 was about 130lbs before kids, exercised and felt good.  Dieting attempts include OptiFast and was able to lose 30 pounds.    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       CLINICAL DATA:  57 y.o.-year-old White female.  Height: 5'6"  Weight: 233 lbs  IBW: 144 lbs  BMI: 37.68  The patient's goal weight (50% EBW): 188 lbs  Personal goal weight: 150 lbs    Goal for Bariatric Surgery: to improve health, to improve quality of life and to lose weight    NUTRITIONAL NEEDS:  1659 x 1.2 activity factor = 1991 kcal -750 kcal for wt loss = 1240 Calories (using [place]. Jeor Equation)  79-98 Grams Protein (1.2-1.5 gm/kg IBW)    NUTRITION & HEALTH HISTORY:  Greatest challenge: not knowing what to eat and lack of energy    Current diet recall:   Breakfast: yogurt with berries and granola  Lunch: barcenas   Dinner: omelete or hummus with peyton bread, shish-kabob or hamburger or will start buying blue apron (next week x 3/wk)  Snack: triscuits with cheese or chips and salsa    Current Diet:  Meal pattern: 3  Meals and 0-1 snacks  Protein supplements: 1/day  Vegetables: Likes a variety. Eats once per week.  Fruits: Likes a variety. Eats almost daily.  Beverages: water and coffee with creamer and sometimes with sugar or without  Dining out: Daily. Mostly restaurants and take-out.  Cooking at home: Weekly. Mostly baked and grilled meat, fish, starchy CHO and vegetables.    Exercise:  Current " exercise: PT x 2/wk - and does exercises daily - will start walking when it gets cooler  Restrictions to exercise: back pain    Vitamins / Minerals / Herbs:   none      Labs:   Reviewed     Food Allergies:   Iodine and shellfish     Social:  Works regular daytime shifts.  Lives with .  Grocery shopping and food prep patient completes.  Patient believes the household will be supportive after surgery.  Alcohol: Socially. Once to twice a year  Smoking: None.    ASSESSMENT:  · Patient reports attempts at weight loss, only to regain lost weight.  · Patient demonstrated knowledge of healthy eating behaviors and exercise patterns; admits to not eating healthy and not exercising at this point.  · Patient states willingness to change lifestyle and make behavior modifications as evidenced by plan to choose healthier choices (will go to store today and ordered Blue apron next week)..    Insurance requires 6 medically supervised diet prior to consideration for bariatric surgery.    Barriers to Education: none    Stage of change: determination    NUTRITION DIAGNOSIS:    Obesity related to Excessive calorie intake and Physical inactivity as evidence by BMI.    BARIATRIC DIET DISCUSSION/PLAN:  Discussed diet after surgery and related to patient's food record.  Reviewed nutrition guidelines for before and after surgery.  Answered all questions.  Continue to review Bariatric Nutrition Guidebook at home and call with any questions.  Work on expanding variety of vegetables.  Work on gradually cutting back on starchy CHO in the diet.  Begin trying various protein supplements to determine preference.  5-6 meals per day.  Start including protein supplements in the diet plan daily.  More grocery shopping and meal preparation at home.  Increase exercise.  Return to clinic.  keep food log    RECOMMENDATIONS:  Patient is a potential candidate for bariatric surgery.    Needs 5 additional visit(s) with RD.    Patient verbalized  understanding.    Expect good  compliance after surgery at this time.    Communicated nutrition plan with bariatric team.    SESSION TIME:  60 minutes

## 2018-10-07 ENCOUNTER — PATIENT MESSAGE (OUTPATIENT)
Dept: INTERNAL MEDICINE | Facility: CLINIC | Age: 58
End: 2018-10-07

## 2018-10-08 ENCOUNTER — TELEPHONE (OUTPATIENT)
Dept: INTERNAL MEDICINE | Facility: CLINIC | Age: 58
End: 2018-10-08

## 2018-10-08 ENCOUNTER — PATIENT MESSAGE (OUTPATIENT)
Dept: INTERNAL MEDICINE | Facility: CLINIC | Age: 58
End: 2018-10-08

## 2018-10-08 DIAGNOSIS — I82.409 ACUTE DEEP VEIN THROMBOSIS (DVT) OF OTHER VEIN OF LOWER EXTREMITY, UNSPECIFIED LATERALITY: Primary | ICD-10-CM

## 2018-10-08 NOTE — TELEPHONE ENCOUNTER
Called and discussed starting anticoagulation therapy with patient.  Will start Xarelto 15 mg BID for 21 days then 20 mg PO QD.  Discussed possible risks and adverse effects.  Cautioned patient on falls.  To see me Friday.  Patient demonstrated her understanding, all questions answered.

## 2018-10-08 NOTE — TELEPHONE ENCOUNTER
----- Message from Cristal Stoll sent at 10/8/2018  3:03 PM CDT -----  Contact: self/[phone]  Type: Returning a call    Who left a message?     When did the practice call? Today    Comments:   Please advise.        Thanks

## 2018-10-09 ENCOUNTER — TELEPHONE (OUTPATIENT)
Dept: ORTHOPEDICS | Facility: CLINIC | Age: 58
End: 2018-10-09

## 2018-10-09 ENCOUNTER — TELEPHONE (OUTPATIENT)
Dept: PSYCHIATRY | Facility: CLINIC | Age: 58
End: 2018-10-09

## 2018-10-09 ENCOUNTER — PATIENT MESSAGE (OUTPATIENT)
Dept: ORTHOPEDICS | Facility: CLINIC | Age: 58
End: 2018-10-09

## 2018-10-10 ENCOUNTER — PATIENT MESSAGE (OUTPATIENT)
Dept: ORTHOPEDICS | Facility: CLINIC | Age: 58
End: 2018-10-10

## 2018-10-10 ENCOUNTER — TELEPHONE (OUTPATIENT)
Dept: BARIATRICS | Facility: CLINIC | Age: 58
End: 2018-10-10

## 2018-10-10 ENCOUNTER — DOCUMENTATION ONLY (OUTPATIENT)
Dept: BARIATRICS | Facility: CLINIC | Age: 58
End: 2018-10-10

## 2018-10-10 NOTE — TELEPHONE ENCOUNTER
----- Message from MAURISIO Mancuso sent at 10/5/2018  9:40 AM CDT -----  Please have her start a daily 500mcg B12 supplement.

## 2018-10-10 NOTE — TELEPHONE ENCOUNTER
----- Message from Nestor Tamayo sent at 10/10/2018  4:18 PM CDT -----  Patient Returning Call from [facility]    Who Left Message for Patient:Jean  Communication Preference:[phone]  Additional Information:

## 2018-10-12 ENCOUNTER — OFFICE VISIT (OUTPATIENT)
Dept: INTERNAL MEDICINE | Facility: CLINIC | Age: 58
End: 2018-10-12
Payer: COMMERCIAL

## 2018-10-12 VITALS
TEMPERATURE: 99 F | SYSTOLIC BLOOD PRESSURE: 116 MMHG | OXYGEN SATURATION: 99 % | BODY MASS INDEX: 36.6 KG/M2 | DIASTOLIC BLOOD PRESSURE: 72 MMHG | WEIGHT: 227.75 LBS | HEIGHT: 66 IN | HEART RATE: 93 BPM

## 2018-10-12 DIAGNOSIS — I82.492 DEEP VEIN THROMBOSIS (DVT) OF OTHER VEIN OF LEFT LOWER EXTREMITY, UNSPECIFIED CHRONICITY: Primary | ICD-10-CM

## 2018-10-12 PROCEDURE — 3078F DIAST BP <80 MM HG: CPT | Mod: CPTII,S$GLB,, | Performed by: FAMILY MEDICINE

## 2018-10-12 PROCEDURE — 99999 PR PBB SHADOW E&M-EST. PATIENT-LVL IV: CPT | Mod: PBBFAC,,, | Performed by: FAMILY MEDICINE

## 2018-10-12 PROCEDURE — 3074F SYST BP LT 130 MM HG: CPT | Mod: CPTII,S$GLB,, | Performed by: FAMILY MEDICINE

## 2018-10-12 PROCEDURE — 99213 OFFICE O/P EST LOW 20 MIN: CPT | Mod: S$GLB,,, | Performed by: FAMILY MEDICINE

## 2018-10-12 PROCEDURE — 3008F BODY MASS INDEX DOCD: CPT | Mod: CPTII,S$GLB,, | Performed by: FAMILY MEDICINE

## 2018-10-12 NOTE — PROGRESS NOTES
Subjective:      Patient ID: Renae Hou is a 57 y.o. female.    Chief Complaint: Follow-up      HPI:  Renae Hou is a 57 year old female with chronic pain, fatty liver, gastroesophageal reflux disease, hypertension, hyperlipidemia, lumbar radiculopathy, obesity, prediabetes, spondylolisthesis, and obstructive sleep apnea who presents to clinic today for follow up on DVT.    Had US Left lower extremity per previous PCP which showed chronic DVT of the left peroneal vein and significant reflux in the GSV at the mid thigh as well as the proximal FV.  Started on Xarelto 15 mg PO BID for 21 days, then 20 mg PO QD.  1st blood clot for patient.  Has suspended her estrace cream, will follow up with her OBGYN regarding this.  Denies history of tobacco use, OCPs, recent travel, or known clotting disorder.  Did have some limited mobility after low back surgery in April of this year and subsequent post-operative complications.  Has been having some lower extremity edema for approximately 1 month and muscle cramping to left lower extremity.    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       Past Surgical History:   Procedure Laterality Date    ADENOIDECTOMY  1995    BACK SURGERY  2002    L4, L5    BILATERAL SALPINGOOPHORECTOMY      BLOCK-NERVE-MEDIAL BRANCH-LUMBAR Left 3/16/2018    [facility]    CHOLECYSTECTOMY      CYST REMOVAL      NEERAJ-TRANSFORAMINAL Left 3/2/2018    [facility]    FUSION-POSTERIOR LUMBAR INTERBODY FUSION L4/5, L5/S1 N/A 4/10/2018    [facility] at [facility] OR 2ND FLR    HYSTERECTOMY  12/17/2012    [facility]  BS&O     INJECTION-FACET Left 3/2/2018    [facility] PAIN MGT    RECTOCELE REPAIR      SPINE SURGERY  2018    THYROID NODULE REMOVAL      TONSILLECTOMY         Family History   Problem Relation Age of Onset    Hypertension Mother     Hyperlipidemia Mother     Heart disease Mother 55        cad, valvular heart disease    Alcohol abuse Sister     Stroke Father     No Known Problems Daughter     No Known Problems Daughter     Breast cancer Neg Hx     Colon cancer Neg Hx     Ovarian cancer Neg Hx     Diabetes Neg Hx        Social History     Socioeconomic History    Marital status:      Spouse name: None    Number of children: None    Years of education: None    Highest education level: None   Social Needs    Financial resource strain: None    Food insecurity - worry: None    Food insecurity - inability: None    Transportation needs - medical: None    Transportation needs - non-medical: None   Occupational History    None   Tobacco Use    Smoking status: Former Smoker     Packs/day: 0.25     Years: 32.00     Pack years: 8.00     Types: Cigarettes     Last attempt to quit:      Years since quittin.8    Smokeless tobacco: Never Used    Tobacco comment: smoked socially decades ago - weekends only   Substance and Sexual Activity    Alcohol use: No     Frequency: Never     Comment: SELDOMLY    Drug use: No    Sexual activity: Not Currently     Partners: Male     Birth control/protection: See Surgical Hx, None     Comment: [facility] BS&O 2012,     Other Topics Concern    None   Social History Narrative    . 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       Review of Systems   Constitutional: Negative for activity change, chills, fatigue, fever and unexpected weight change.   HENT: Negative for congestion, hearing loss, nosebleeds, rhinorrhea, sore throat and trouble swallowing.    Eyes: Negative for pain, discharge and visual disturbance.   Respiratory:  "Negative for cough, chest tightness, shortness of breath and wheezing.    Cardiovascular: Negative for chest pain and palpitations.   Gastrointestinal: Negative for abdominal distention, abdominal pain, blood in stool, constipation, diarrhea, nausea and vomiting.   Endocrine: Negative for polydipsia and polyuria.   Genitourinary: Negative for decreased urine volume, difficulty urinating, dysuria and hematuria.   Musculoskeletal: Negative for arthralgias, back pain, joint swelling, myalgias and neck pain.   Skin: Negative for color change and rash.   Neurological: Negative for dizziness, tremors, weakness, light-headedness, numbness and headaches.   Psychiatric/Behavioral: Negative for agitation, behavioral problems, confusion and dysphoric mood. The patient is not nervous/anxious.      Objective:     Vitals:    10/12/18 1526   BP: 116/72   Pulse: 93   Temp: 99.2 °F (37.3 °C)   SpO2: 99%   Weight: 103.3 kg (227 lb 11.8 oz)   Height: 5' 5.5" (1.664 m)       Physical Exam   Constitutional: She is oriented to person, place, and time. She appears well-developed and well-nourished.   HENT:   Head: Normocephalic and atraumatic.   Right Ear: External ear normal.   Left Ear: External ear normal.   Nose: Nose normal.   Mouth/Throat: Oropharynx is clear and moist.   Eyes: Conjunctivae and EOM are normal. Pupils are equal, round, and reactive to light.   Neck: Normal range of motion. Neck supple. No tracheal deviation present.   Cardiovascular: Normal rate, regular rhythm and normal heart sounds. Exam reveals no gallop and no friction rub.   No murmur heard.  Pulmonary/Chest: Effort normal and breath sounds normal. No respiratory distress. She has no wheezes. She has no rales.   Abdominal: Soft. Bowel sounds are normal. She exhibits no distension. There is no tenderness. There is no rebound and no guarding.   Musculoskeletal: Normal range of motion. She exhibits no edema or deformity.        Right lower leg: She exhibits no " tenderness, no swelling and no deformity.        Left lower leg: She exhibits no tenderness, no swelling and no deformity.   Neurological: She is alert and oriented to person, place, and time. Coordination normal.   Skin: Skin is warm and dry.   Psychiatric: She has a normal mood and affect. Her behavior is normal.   Nursing note and vitals reviewed.     Assessment:      1. Deep vein thrombosis (DVT) of other vein of left lower extremity, unspecified chronicity      Plan:   Renae was seen today for follow-up.    Diagnoses and all orders for this visit:    Deep vein thrombosis (DVT) of other vein of left lower extremity, unspecified chronicity  -     Complete course of Xarelto 15 mg PO BID for 21 days then start rivaroxaban (XARELTO) 20 mg Tab; Take 1 tablet (20 mg total) by mouth daily with dinner or evening meal for at least 3 months.  Return to clinic for any worsening of her lower extremity swelling.  Instructed patient to seek immediate medical attention for the development of any significant chest pain, shortness of breath, or palpitations.  Tylenol PRN myalgias/cramps.  Continue daily exercise.  RTC after completion of 3 months of Xarelto to reassess.

## 2018-10-14 ENCOUNTER — PATIENT MESSAGE (OUTPATIENT)
Dept: INTERNAL MEDICINE | Facility: CLINIC | Age: 58
End: 2018-10-14

## 2018-10-19 ENCOUNTER — TELEPHONE (OUTPATIENT)
Dept: BARIATRICS | Facility: CLINIC | Age: 58
End: 2018-10-19

## 2018-10-19 ENCOUNTER — DOCUMENTATION ONLY (OUTPATIENT)
Dept: BARIATRICS | Facility: CLINIC | Age: 58
End: 2018-10-19

## 2018-10-19 NOTE — PROGRESS NOTES
Blood thinner Xaralto- Not doing endoscope yet due to blood clot. Will be on the blood thinner until January 2018.

## 2018-10-23 ENCOUNTER — OFFICE VISIT (OUTPATIENT)
Dept: PSYCHIATRY | Facility: CLINIC | Age: 58
End: 2018-10-23
Payer: COMMERCIAL

## 2018-10-23 DIAGNOSIS — E66.01 MORBID OBESITY DUE TO EXCESS CALORIES: ICD-10-CM

## 2018-10-23 DIAGNOSIS — I10 ESSENTIAL HYPERTENSION: ICD-10-CM

## 2018-10-23 DIAGNOSIS — E78.5 HYPERLIPIDEMIA, UNSPECIFIED HYPERLIPIDEMIA TYPE: ICD-10-CM

## 2018-10-23 DIAGNOSIS — M54.16 LUMBAR RADICULOPATHY: ICD-10-CM

## 2018-10-23 DIAGNOSIS — K21.9 GASTROESOPHAGEAL REFLUX DISEASE, ESOPHAGITIS PRESENCE NOT SPECIFIED: ICD-10-CM

## 2018-10-23 DIAGNOSIS — R73.03 PRE-DIABETES: ICD-10-CM

## 2018-10-23 DIAGNOSIS — G47.33 OBSTRUCTIVE SLEEP APNEA ON CPAP: ICD-10-CM

## 2018-10-23 DIAGNOSIS — Z01.818 PREOP EXAMINATION: Primary | ICD-10-CM

## 2018-10-23 PROCEDURE — 96102 PR PSYCHOLOGIC TESTING BY TECHNICIAN: CPT | Mod: 59,S$GLB,, | Performed by: PSYCHOLOGIST

## 2018-10-23 PROCEDURE — 96101 PR PSYCHOLOGIC TESTING BY PSYCH/PHYS: CPT | Mod: S$GLB,,, | Performed by: PSYCHOLOGIST

## 2018-10-23 PROCEDURE — 99999 PR PBB SHADOW E&M-EST. PATIENT-LVL II: CPT | Mod: PBBFAC,,, | Performed by: PSYCHOLOGIST

## 2018-10-23 PROCEDURE — 90791 PSYCH DIAGNOSTIC EVALUATION: CPT | Mod: S$GLB,,, | Performed by: PSYCHOLOGIST
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
## 2018-10-23 NOTE — Clinical Note
CLEAR.  There are no overt psychological contraindications for bariatric surgery. Please do not hesitate to contact me with any questions or concerns. JR Gokul

## 2018-10-23 NOTE — PSYCH TESTING
[facility] [address] [place]  [address]  [phone]    REPORT OF PSYCHOLOGICAL TESTING    NAME: Renae Hou  OC #: [id number]  : 1960    REFERRED BY: Duc Ratliff Jr., M.D.    EVALUATED BY:  Araceli Howard, Ph.D., Clinical Psychologist  ADAIR Rader Psychometrician    DATES OF EVALUATION: 10/18/2018, 10/23/2018    EVALUATION PROCEDURES AND TIMES:  Conducted by Psychologist:  Interpretation and report of test data  Integration of information from diagnostic interview, medical record, and testing data  Conducted by Technician:  [place] Multiphasic Personality Inventory - 2 - Restructured Form (MMPI-2-RF)  [facility] Behavioral Medicine Diagnostic (MBMD)  CPT Codes and Time:  50098 - 2 hours; 88551 - 2 hours    EVALUATION FINDINGS:  Before this evaluation was initiated, the purposes and process of the assessment and the limits of confidentiality were discussed with the patient who expressed understanding of these issues and orally consented to proceed with the evaluation.    Ms. Renae Hou is a 58-year-old White  female who is pursuing bariatric surgery to improve her health and quality of life.  She denied past psychiatric history and treatment.  She denied any history of suicidality or non-suicidal self-injury.  She does not report current psychiatric problems, adjustment issues, or eating disorder symptoms.      Ms. Hou has struggled with weight since  when her first daughter was born.  Factors that have contributed to her weight gain over the years include:  breads, pasta, potatoes, and fast food.  She occasionally engaged in late-night eating, but has not done this since 2017.  In addition, Ms. Hou acknowledges that she was an emotional eater, with stress as her primary emotional trigger to overeat.  Her triggers included raising children, finances, going to work, caregiving for her father ([phone]), and her s rehabilitation  for alcohol abuse.  She has not engaged in emotional eating since 2013.  She is working on increasing her coping mechanisms for stress, including playing games on her phone, doing crossword puzzles, and playing [employer].  She has tried many weight loss methods on her own (i.e., Weight Watchers, Optifast) with some success (down to 180 lb. with Optifast), and believes that her biggest weight loss challenge is lack of structure and meal preparation.  Her motivation for seeking surgery now is to improve health and quality of life.  Her postsurgical goals include bending over easier, improving health conditions, breathing better, improving self-image (I dont want to keep buying bigger clothes), and being present for her granddaughter and future grandchildren.    At her initial consultation with MAURISIO Mancuso, on 09/21/2018, her BMI was 37.26.  Her medical comorbidities include: Obstructive sleep apnea on CPAP; Essential hypertension; Gastroesophageal reflux disease, esophagitis presence not specified; Hyperlipidemia, unspecified hyperlipidemia type; Pre-diabetes; Fatty liver; History of major abdominal surgery; Right lower quadrant abdominal pain; Periumbilical pain; Abdominal pain, unspecified abdominal location.  She completed a nutritional consultation with Rosette Umana RD, bariatric nutritionist, and reports that she has made many changes to her diet since beginning the program.  She has a good understanding regarding the risks and benefits of the procedure and appears motivated for change.  She was fully cooperative and engaged in the assessment process.     Ms. Hou produced an interpretable MMPI-2-RF profile. Of note, validity scale results suggest Ms. Hou tended to portray herself in an overly positive and well-adjusted presentation, which may indicate an underestimation of problems assessed by this test.  Still, this type of responding is not an uncommon style for candidates to assume given the  demands of the testing situation.  This profile should be interpreted with these issues in mind.  Interpersonally, Ms. Hou describes others as trustworthy and does not hold cynical beliefs about them.  Although there are no indications of emotional problems, disordered thinking, or behavioral issues, these problems cannot be ruled-out due to indications of under-reporting described earlier.     The MBMD indicated that Ms. Hou is not reporting any significant psychiatric distress at this time. She typically demonstrates conformity to the expectations of others, particularly those of medical professionals. She is generally afraid of expressing strong negative emotions, and this tendency may result in tension-related somatic ailments. Though she identified no coping skills that are measured by this assessment, testing suggests that she is quite capable of handling the psychological discomfort of surgery.  She also reports strong social support from family and friends.  According to test data, psychological factors are unlikely to contribute to excessive medical complications for Ms. Hou.  Her responses suggest that, compared to other patients seeking bariatric surgery, she is likely to experience an improved quality of life after surgery, and she is likely to follow through on making behavioral changes that will lead to optimal surgery results.  Test data does not suggest the need for psychological intervention at this time.    DIAGNOSTIC IMPRESSIONS:    ICD-10-CM ICD-9-CM   1. Preop examination Z01.818 V72.84   2. Morbid obesity due to excess calories E66.01 278.01   3. Obstructive sleep apnea on CPAP G47.33 327.23    Z99.89 V46.8   4. Gastroesophageal reflux disease, esophagitis presence not specified K21.9 530.81   5. Pre-diabetes R73.03 790.29   6. Essential hypertension I10 401.9   7. Hyperlipidemia, unspecified hyperlipidemia type E78.5 272.4   8. Lumbar radiculopathy M54.16 724.4       SUMMARY AND  RECOMMENDATIONS:  Ms. Hou has a long history of weight problems and is pursuing bariatric surgery at this time in an effort to improve her health and quality of life.  Test results should be considered valid, and indicate that she reports no current psychiatric symptoms or adjustment problems.  She reports good social support, demonstrates several characteristics that make her a good candidate for surgery, and testing suggests that she will benefit in multiple ways from bariatric surgery.  Test results show NO overt psychological contraindications for surgery.

## 2018-10-26 ENCOUNTER — DOCUMENTATION ONLY (OUTPATIENT)
Dept: BARIATRICS | Facility: CLINIC | Age: 58
End: 2018-10-26

## 2018-10-26 NOTE — PROGRESS NOTES
GI  Consult 11/08/18 awaiting CT and EGD results  Bariatric Dashboard updated  - Ashley Sanches PA-C

## 2018-10-30 ENCOUNTER — PATIENT MESSAGE (OUTPATIENT)
Dept: ORTHOPEDICS | Facility: CLINIC | Age: 58
End: 2018-10-30

## 2018-10-30 ENCOUNTER — TELEPHONE (OUTPATIENT)
Dept: ORTHOPEDICS | Facility: CLINIC | Age: 58
End: 2018-10-30

## 2018-10-30 NOTE — TELEPHONE ENCOUNTER
----- Message from Brad Tamayo sent at 10/30/2018 10:12 AM CDT -----  Contact: self  Patient states received a message from staff to call the office in regards to her appointment Patient can be reach at

## 2018-11-02 ENCOUNTER — OFFICE VISIT (OUTPATIENT)
Dept: GASTROENTEROLOGY | Facility: CLINIC | Age: 58
End: 2018-11-02
Payer: COMMERCIAL

## 2018-11-02 VITALS
HEIGHT: 66 IN | DIASTOLIC BLOOD PRESSURE: 85 MMHG | BODY MASS INDEX: 37.31 KG/M2 | WEIGHT: 232.13 LBS | SYSTOLIC BLOOD PRESSURE: 122 MMHG | HEART RATE: 87 BPM

## 2018-11-02 DIAGNOSIS — K21.9 GASTROESOPHAGEAL REFLUX DISEASE, ESOPHAGITIS PRESENCE NOT SPECIFIED: ICD-10-CM

## 2018-11-02 DIAGNOSIS — R10.84 GENERALIZED ABDOMINAL PAIN: ICD-10-CM

## 2018-11-02 DIAGNOSIS — R14.0 BLOATING: Primary | ICD-10-CM

## 2018-11-02 PROCEDURE — 99204 OFFICE O/P NEW MOD 45 MIN: CPT | Mod: S$GLB,,, | Performed by: INTERNAL MEDICINE

## 2018-11-02 PROCEDURE — 99999 PR PBB SHADOW E&M-EST. PATIENT-LVL IV: CPT | Mod: PBBFAC,,, | Performed by: INTERNAL MEDICINE

## 2018-11-02 PROCEDURE — 3074F SYST BP LT 130 MM HG: CPT | Mod: CPTII,S$GLB,, | Performed by: INTERNAL MEDICINE

## 2018-11-02 PROCEDURE — 3079F DIAST BP 80-89 MM HG: CPT | Mod: CPTII,S$GLB,, | Performed by: INTERNAL MEDICINE

## 2018-11-02 PROCEDURE — 3008F BODY MASS INDEX DOCD: CPT | Mod: CPTII,S$GLB,, | Performed by: INTERNAL MEDICINE

## 2018-11-02 RX ORDER — PANTOPRAZOLE SODIUM 40 MG/1
40 TABLET, DELAYED RELEASE ORAL DAILY
Qty: 30 TABLET | Refills: 3 | Status: SHIPPED | OUTPATIENT
Start: 2018-11-02 | End: 2019-01-18

## 2018-11-02 NOTE — PROGRESS NOTES
REASON FOR VISIT:  Heartburn, acid regurgitation and abdominal bloating.    HISTORY OF PRESENT ILLNESS:  Ms. Hou is a 58-year-old who has been   complaining of retrosternal burning and acid regurgitation ongoing for many   months, but mostly after her low back surgery where she had multiple lumbar   vertebrae fused.  She has been complaining of retrosternal burning and acid   regurgitation usually in the evening after her supper and even wakes her up from   sleep.  She also feels constantly bloated in her abdomen with discomfort in the   right upper and left upper quadrants.  She denies any vomiting.  Her appetite   is normal.  She does not notice any particular issues with dairy products.  It   is not certain if she has been tested for gluten sensitivity in the past.    Recently, she was given a two-week course of pantoprazole, which seemed to help   her with the heartburn issues.  Her bowels move regular.  No blood in the stool.    She has had multiple colonoscopies in the past for colon polyps and it is done   every five years with prior gastroenterologist.    PAST MEDICAL, SURGICAL, SOCIAL AND FAMILY HISTORY:  Reviewed.    MEDICATIONS AND ALLERGIES:  Reviewed.    REVIEW OF SYSTEMS:  CONSTITUTIONAL:  No fever, no chills, no weight loss.  Appetite is normal.  EYES:  No visual changes.  ENT:  No odynophagia or hoarseness of voice.  CARDIOVASCULAR:  No angina or palpitation.  RESPIRATORY:  No shortness of breath or wheezing.  GENITOURINARY:  No dysuria or frequency.  MUSCULOSKELETAL:  No myalgia, no arthralgia.  SKIN:  No pruritus or eczema.  NEUROLOGIC:  No headache, no seizure.  PSYCHIATRIC:  No anxiety or depression.  GASTROINTESTINAL:  See HPI.    PHYSICAL EXAMINATION:  VITAL SIGNS:  See EPIC.  GENERAL:  Awake, alert and oriented x3, in no acute distress.  NECK:  Supple, no carotid bruit.  No cervical adenopathy.  ABDOMEN:  Obese, soft, nontender and nondistended.  No masses appreciable.    Bowel sounds are  normal.  Ascites not clinically detectable.  No abdominal   bruits heard.  EYES:  Conjunctivae anicteric.  ENT:  Oropharynx, mucosa moist.  CARDIOVASCULAR:  S1, S2 normal.  RESPIRATORY:  Bilateral air entry equal.  SKIN:  No palmar erythema or spider angiomata.  NEUROLOGIC:  No asterixis.  PSYCHIATRY:  Affect appropriate.  LOWER EXTREMITY:  No pedal edema.    IMPRESSION:  1.  Heartburn with acid regurgitation.  2.  Chronic abdominal bloating with upper abdominal pain.    RECOMMENDATIONS:  1.  Check tTg-IgA, total IgA.  2.  Schedule CT of the abdomen and pelvis with p.o. and IV contrast.  3.  We will consider an upper endoscopy after the above investigation.  4.  The patient will start taking pantoprazole 40 mg 30 minutes before supper.  5.  Further recommendation based on above.      ACT/HN  dd: 11/02/2018 16:55:50 (CDT)  td: 11/03/2018 10:17:58 (CDT)  Doc ID   #[id number]  Job ID #[id number]    CC:

## 2018-11-02 NOTE — LETTER
November 2, 2018      MAURISIO Mancuso  [address] [address]           [facility]  [address] [address]  Phone: [phone]  Fax: [phone]          Patient: Renae Hou   MR Number: [id number]   YOB: 1960   Date of Visit: 11/2/2018       Dear Mel Robles:    Thank you for referring Renae Hou to me for evaluation. Attached you will find relevant portions of my assessment and plan of care.    If you have questions, please do not hesitate to call me. I look forward to following Renae Hou along with you.    Sincerely,    Raphael Eduardo MD    Enclosure  CC:  No Recipients    If you would like to receive this communication electronically, please contact [email or web address] or [phone] to request more information on [facility] access.    For providers and/or their staff who would like to refer a patient to [facility], please contact us through our one-stop-shop provider referral line, Bret Subramanian, at [phone].    If you feel you have received this communication in error or would no longer like to receive these types of communications, please e-mail [email or web address]

## 2018-11-05 ENCOUNTER — TELEPHONE (OUTPATIENT)
Dept: GASTROENTEROLOGY | Facility: CLINIC | Age: 58
End: 2018-11-05

## 2018-11-05 NOTE — TELEPHONE ENCOUNTER
----- Message from Raphael Eduardo MD sent at 11/5/2018  2:00 PM CST -----  Blood test for Celiac disease is negative.

## 2018-11-06 ENCOUNTER — TELEPHONE (OUTPATIENT)
Dept: GASTROENTEROLOGY | Facility: CLINIC | Age: 58
End: 2018-11-06

## 2018-11-06 NOTE — TELEPHONE ENCOUNTER
----- Message from Mary Hodge sent at 11/6/2018  1:12 PM CST -----  Contact: Pt:[phone]  .Patient Returning Call from [facility]    Who Left Message for Patient:Liliam Johnson MA  Communication Preference:Pt:[phone]  Additional Information:

## 2018-11-06 NOTE — TELEPHONE ENCOUNTER
----- Message from Mary Burgos MA sent at 11/6/2018  1:45 PM CST -----  Contact: cell# [phone]   Patient Returning Call from [facility]    Who Left Message for Patient: Renatocee    Communication Preference: cell# [phone]    Additional Information: Returning a call re: her lab results

## 2018-11-07 ENCOUNTER — PATIENT MESSAGE (OUTPATIENT)
Dept: ORTHOPEDICS | Facility: CLINIC | Age: 58
End: 2018-11-07

## 2018-11-08 ENCOUNTER — HOSPITAL ENCOUNTER (OUTPATIENT)
Facility: HOSPITAL | Age: 58
Discharge: HOME | End: 2018-11-09
Attending: EMERGENCY MEDICINE | Admitting: HOSPITALIST
Payer: COMMERCIAL

## 2018-11-08 ENCOUNTER — DOCUMENTATION ONLY (OUTPATIENT)
Dept: CARDIOLOGY | Facility: CLINIC | Age: 58
End: 2018-11-08

## 2018-11-08 ENCOUNTER — PATIENT MESSAGE (OUTPATIENT)
Dept: ORTHOPEDICS | Facility: CLINIC | Age: 58
End: 2018-11-08

## 2018-11-08 DIAGNOSIS — G47.33 OBSTRUCTIVE SLEEP APNEA ON CPAP: ICD-10-CM

## 2018-11-08 DIAGNOSIS — I10 ESSENTIAL HYPERTENSION: ICD-10-CM

## 2018-11-08 DIAGNOSIS — I82.5Z2 CHRONIC DEEP VEIN THROMBOSIS (DVT) OF DISTAL VEIN OF LEFT LOWER EXTREMITY: ICD-10-CM

## 2018-11-08 DIAGNOSIS — R07.9 CHEST PAIN: Primary | ICD-10-CM

## 2018-11-08 PROBLEM — I82.432 DEEP VEIN THROMBOSIS (DVT) OF POPLITEAL VEIN OF LEFT LOWER EXTREMITY: Status: ACTIVE | Noted: 2018-11-08

## 2018-11-08 PROBLEM — I82.432 DEEP VEIN THROMBOSIS (DVT) OF POPLITEAL VEIN OF LEFT LOWER EXTREMITY: Status: RESOLVED | Noted: 2018-11-08 | Resolved: 2018-11-08

## 2018-11-08 PROBLEM — I82.409 DEEP VEIN THROMBOSIS (DVT) OF LOWER EXTREMITY: Status: ACTIVE | Noted: 2018-11-08

## 2018-11-08 LAB
ALBUMIN SERPL BCP-MCNC: 3.6 G/DL
ALP SERPL-CCNC: 88 U/L
ALT SERPL W/O P-5'-P-CCNC: 30 U/L
ANION GAP SERPL CALC-SCNC: 9 MMOL/L
ASCENDING AORTA: 2.56 CM
AST SERPL-CCNC: 27 U/L
BASOPHILS # BLD AUTO: 0.06 K/UL
BASOPHILS NFR BLD: 0.8 %
BILIRUB SERPL-MCNC: 0.4 MG/DL
BNP SERPL-MCNC: <10 PG/ML
BSA FOR ECHO PROCEDURE: 2.19 M2
BUN SERPL-MCNC: 17 MG/DL
BUN SERPL-MCNC: 18 MG/DL (ref 6–30)
CALCIUM SERPL-MCNC: 9.1 MG/DL
CHLORIDE SERPL-SCNC: 104 MMOL/L (ref 95–110)
CHLORIDE SERPL-SCNC: 106 MMOL/L
CHOLEST SERPL-MCNC: 190 MG/DL
CHOLEST/HDLC SERPL: 4 {RATIO}
CO2 SERPL-SCNC: 25 MMOL/L
CREAT SERPL-MCNC: 0.9 MG/DL
CREAT SERPL-MCNC: 0.9 MG/DL (ref 0.5–1.4)
CV ECHO LV RWT: 0.3 CM
CV STRESS BASE HR: 77
DIASTOLIC BLOOD PRESSURE: 88
DIFFERENTIAL METHOD: NORMAL
DOP CALC LVOT AREA: 3.08 CM2
DOP CALC LVOT DIAMETER: 1.98 CM
DOP CALC LVOT STROKE VOLUME: 62.84 CM3
DOP CALCLVOT PEAK VEL VTI: 20.42 CM
E WAVE DECELERATION TIME: 234.03 MSEC
E/A RATIO: 0.89
E/E' RATIO: 9.44
ECHO LV POSTERIOR WALL: 0.63 CM (ref 0.6–1.1)
EOSINOPHIL # BLD AUTO: 0.4 K/UL
EOSINOPHIL NFR BLD: 5.1 %
ERYTHROCYTE [DISTWIDTH] IN BLOOD BY AUTOMATED COUNT: 14.2 %
EST. GFR  (AFRICAN AMERICAN): >60 ML/MIN/1.73 M^2
EST. GFR  (NON AFRICAN AMERICAN): >60 ML/MIN/1.73 M^2
FRACTIONAL SHORTENING: 21 % (ref 28–44)
GLUCOSE SERPL-MCNC: 106 MG/DL
GLUCOSE SERPL-MCNC: 111 MG/DL (ref 70–110)
HCT VFR BLD AUTO: 39 %
HCT VFR BLD CALC: 38 %PCV (ref 36–54)
HDLC SERPL-MCNC: 48 MG/DL
HDLC SERPL: 25.3 %
HGB BLD-MCNC: 13.4 G/DL
IMM GRANULOCYTES # BLD AUTO: 0.02 K/UL
IMM GRANULOCYTES NFR BLD AUTO: 0.3 %
INTERVENTRICULAR SEPTUM: 0.9 CM (ref 0.6–1.1)
IVRT: 0.08 MSEC
LA MAJOR: 5.73 CM
LA MINOR: 5.66 CM
LA WIDTH: 3.76 CM
LDLC SERPL CALC-MCNC: 112 MG/DL
LEFT ATRIUM SIZE: 3.16 CM
LEFT ATRIUM VOLUME INDEX: 26.3 ML/M2
LEFT ATRIUM VOLUME: 57.51 CM3
LEFT INTERNAL DIMENSION IN SYSTOLE: 3.32 CM (ref 2.1–4)
LEFT VENTRICLE DIASTOLIC VOLUME INDEX: 35.8 ML/M2
LEFT VENTRICLE DIASTOLIC VOLUME: 78.4 ML
LEFT VENTRICLE MASS INDEX: 43.6 G/M2
LEFT VENTRICLE SYSTOLIC VOLUME INDEX: 20.5 ML/M2
LEFT VENTRICLE SYSTOLIC VOLUME: 44.79 ML
LEFT VENTRICULAR INTERNAL DIMENSION IN DIASTOLE: 4.2 CM (ref 3.5–6)
LEFT VENTRICULAR MASS: 95.49 G
LV LATERAL E/E' RATIO: 8.5
LV SEPTAL E/E' RATIO: 10.63
LYMPHOCYTES # BLD AUTO: 1.9 K/UL
LYMPHOCYTES NFR BLD: 25.4 %
MAGNESIUM SERPL-MCNC: 2.1 MG/DL
MCH RBC QN AUTO: 31 PG
MCHC RBC AUTO-ENTMCNC: 34.4 G/DL
MCV RBC AUTO: 90 FL
MONOCYTES # BLD AUTO: 0.5 K/UL
MONOCYTES NFR BLD: 7.1 %
MV PEAK A VEL: 0.96 M/S
MV PEAK E VEL: 0.85 M/S
MV STENOSIS PRESSURE HALF TIME: 67.87 MS
MV VALVE AREA P 1/2 METHOD: 3.24 CM2
NEUTROPHILS # BLD AUTO: 4.5 K/UL
NEUTROPHILS NFR BLD: 61.3 %
NONHDLC SERPL-MCNC: 142 MG/DL
NRBC BLD-RTO: 0 /100 WBC
OHS CV CPX 1 MINUTE RECOVERY HEART RATE: 101 BPM
OHS CV CPX 85 PERCENT MAX PREDICTED HEART RATE MALE: 132
OHS CV CPX ESTIMATED METS: 7
OHS CV CPX MAX PREDICTED HEART RATE: 155
OHS CV CPX PATIENT IS FEMALE: 1
OHS CV CPX PATIENT IS MALE: 0
OHS CV CPX PEAK DIASTOLIC BLOOD PRESSURE: 94 MMHG
OHS CV CPX PEAK HEAR RATE: 146
OHS CV CPX PEAK RATE PRESSURE PRODUCT: NORMAL
OHS CV CPX PEAK SYSTOLIC BLOOD PRESSURE: 139
OHS CV CPX PERCENT MAX PREDICTED HEART RATE ACHIEVED: 94
OHS CV CPX PERCENT TARGET HEART RATE ACHIEVED: 110.61
OHS CV CPX RATE PRESSURE PRODUCT PRESENTING: 9933
OHS CV CPX TARGET HEART RATE: 132
PLATELET # BLD AUTO: 283 K/UL
PMV BLD AUTO: 9.4 FL
POC IONIZED CALCIUM: 1.07 MMOL/L (ref 1.06–1.42)
POC TCO2 (MEASURED): 27 MMOL/L (ref 23–29)
POTASSIUM BLD-SCNC: 4.3 MMOL/L (ref 3.5–5.1)
POTASSIUM SERPL-SCNC: 4.5 MMOL/L
PROT SERPL-MCNC: 7.1 G/DL
PULM VEIN S/D RATIO: 1.7
PV PEAK D VEL: 0.3 M/S
PV PEAK S VEL: 0.51 M/S
RA MAJOR: 4.56 CM
RA WIDTH: 3.65 CM
RBC # BLD AUTO: 4.32 M/UL
RIGHT VENTRICULAR END-DIASTOLIC DIMENSION: 3.47 CM
SAMPLE: ABNORMAL
SINUS: 2.66 CM
SODIUM BLD-SCNC: 141 MMOL/L (ref 136–145)
SODIUM SERPL-SCNC: 140 MMOL/L
STJ: 2.46 CM
STRESS ECHO POST EXERCISE DUR MIN: 4 MIN
STRESS ECHO POST EXERCISE DUR SEC: 45
SYSTOLIC BLOOD PRESSURE: 129
TDI LATERAL: 0.1
TDI SEPTAL: 0.08
TDI: 0.09
TRICUSPID ANNULAR PLANE SYSTOLIC EXCURSION: 1.19 CM
TRIGL SERPL-MCNC: 150 MG/DL
TROPONIN I SERPL DL<=0.01 NG/ML-MCNC: <0.006 NG/ML
TROPONIN I SERPL DL<=0.01 NG/ML-MCNC: <0.006 NG/ML
WBC # BLD AUTO: 7.31 K/UL

## 2018-11-08 PROCEDURE — 99220 PR INITIAL OBSERVATION CARE,LEVL III: CPT | Mod: ,,, | Performed by: PHYSICIAN ASSISTANT

## 2018-11-08 PROCEDURE — 85025 COMPLETE CBC W/AUTO DIFF WBC: CPT

## 2018-11-08 PROCEDURE — 93010 ELECTROCARDIOGRAM REPORT: CPT | Mod: 77,,, | Performed by: INTERNAL MEDICINE

## 2018-11-08 PROCEDURE — 99285 EMERGENCY DEPT VISIT HI MDM: CPT | Mod: ,,,

## 2018-11-08 PROCEDURE — 93005 ELECTROCARDIOGRAM TRACING: CPT

## 2018-11-08 PROCEDURE — 83880 ASSAY OF NATRIURETIC PEPTIDE: CPT

## 2018-11-08 PROCEDURE — G0378 HOSPITAL OBSERVATION PER HR: HCPCS

## 2018-11-08 PROCEDURE — 94660 CPAP INITIATION&MGMT: CPT

## 2018-11-08 PROCEDURE — 84484 ASSAY OF TROPONIN QUANT: CPT

## 2018-11-08 PROCEDURE — 27000190 HC CPAP FULL FACE MASK W/VALVE

## 2018-11-08 PROCEDURE — 96360 HYDRATION IV INFUSION INIT: CPT

## 2018-11-08 PROCEDURE — 93005 ELECTROCARDIOGRAM TRACING: CPT | Mod: 59

## 2018-11-08 PROCEDURE — 83735 ASSAY OF MAGNESIUM: CPT

## 2018-11-08 PROCEDURE — 80061 LIPID PANEL: CPT

## 2018-11-08 PROCEDURE — 93010 ELECTROCARDIOGRAM REPORT: CPT | Mod: ,,, | Performed by: INTERNAL MEDICINE

## 2018-11-08 PROCEDURE — 63600175 PHARM REV CODE 636 W HCPCS

## 2018-11-08 PROCEDURE — 80053 COMPREHEN METABOLIC PANEL: CPT

## 2018-11-08 PROCEDURE — 99900035 HC TECH TIME PER 15 MIN (STAT)

## 2018-11-08 PROCEDURE — 25000003 PHARM REV CODE 250: Performed by: PHYSICIAN ASSISTANT

## 2018-11-08 PROCEDURE — 99285 EMERGENCY DEPT VISIT HI MDM: CPT | Mod: 25

## 2018-11-08 PROCEDURE — 25000003 PHARM REV CODE 250: Performed by: EMERGENCY MEDICINE

## 2018-11-08 RX ORDER — ONDANSETRON 2 MG/ML
4 INJECTION INTRAMUSCULAR; INTRAVENOUS EVERY 8 HOURS PRN
Status: DISCONTINUED | OUTPATIENT
Start: 2018-11-08 | End: 2018-11-09 | Stop reason: HOSPADM

## 2018-11-08 RX ORDER — LOSARTAN POTASSIUM AND HYDROCHLOROTHIAZIDE 12.5; 5 MG/1; MG/1
1 TABLET ORAL DAILY
Status: DISCONTINUED | OUTPATIENT
Start: 2018-11-09 | End: 2018-11-09 | Stop reason: HOSPADM

## 2018-11-08 RX ORDER — NAPROXEN SODIUM 220 MG/1
162 TABLET, FILM COATED ORAL
Status: COMPLETED | OUTPATIENT
Start: 2018-11-08 | End: 2018-11-08

## 2018-11-08 RX ORDER — NITROGLYCERIN 0.4 MG/1
0.4 TABLET SUBLINGUAL
Status: COMPLETED | OUTPATIENT
Start: 2018-11-08 | End: 2018-11-08

## 2018-11-08 RX ORDER — HYDROCODONE BITARTRATE AND ACETAMINOPHEN 10; 325 MG/1; MG/1
1 TABLET ORAL EVERY 4 HOURS PRN
Status: DISCONTINUED | OUTPATIENT
Start: 2018-11-08 | End: 2018-11-09 | Stop reason: HOSPADM

## 2018-11-08 RX ORDER — HYDROCODONE BITARTRATE AND ACETAMINOPHEN 5; 325 MG/1; MG/1
1 TABLET ORAL EVERY 4 HOURS PRN
Status: DISCONTINUED | OUTPATIENT
Start: 2018-11-08 | End: 2018-11-09 | Stop reason: HOSPADM

## 2018-11-08 RX ORDER — NITROGLYCERIN 0.4 MG/1
0.4 TABLET SUBLINGUAL
Status: DISCONTINUED | OUTPATIENT
Start: 2018-11-08 | End: 2018-11-08

## 2018-11-08 RX ORDER — SODIUM CHLORIDE 0.9 % (FLUSH) 0.9 %
5 SYRINGE (ML) INJECTION
Status: DISCONTINUED | OUTPATIENT
Start: 2018-11-08 | End: 2018-11-09 | Stop reason: HOSPADM

## 2018-11-08 RX ORDER — PANTOPRAZOLE SODIUM 40 MG/1
40 TABLET, DELAYED RELEASE ORAL DAILY
Status: DISCONTINUED | OUTPATIENT
Start: 2018-11-09 | End: 2018-11-09 | Stop reason: HOSPADM

## 2018-11-08 RX ORDER — PREGABALIN 75 MG/1
150 CAPSULE ORAL NIGHTLY
Status: DISCONTINUED | OUTPATIENT
Start: 2018-11-08 | End: 2018-11-09 | Stop reason: HOSPADM

## 2018-11-08 RX ADMIN — PREGABALIN 150 MG: 75 CAPSULE ORAL at 08:11

## 2018-11-08 RX ADMIN — SODIUM CHLORIDE 500 ML: 0.9 INJECTION, SOLUTION INTRAVENOUS at 08:11

## 2018-11-08 RX ADMIN — NITROGLYCERIN 0.4 MG: 0.4 TABLET SUBLINGUAL at 10:11

## 2018-11-08 RX ADMIN — ASPIRIN 81 MG CHEWABLE TABLET 162 MG: 81 TABLET CHEWABLE at 08:11

## 2018-11-08 NOTE — MEDICAL/APP STUDENT
History     Chief Complaint   Patient presents with    Chest Pain     has blood clot in leg and is on xarelto; woke up this morning with tightness in chest and sweating; denies SOB.      Pt is a 57yo presenting to the ED for chest pain. Pt stated that the pain woke her up at 1am on 11/8/2018. She described the pain as a pressure, band-like, bilaterally under her breast. She then went back to sleep and awoke to go to work. At work, she noticed a centralized, substernal pressure and diaphoresis which prompted her to come to the ED. She denies pain radiation to her neck or arm. No SOB.    Pt has a hx of HTN, DM, HLD, and DVT on Xarelto. She states she has been having a week history of leg pain preceding this chest pain. However she denies leg swelling or erythema. She has no hx of MI or chest pain similar to this event.      The history is provided by the patient.       Past Medical History:   Diagnosis Date    Abscess of paraspinous muscles     Allergy 4/2018    DVT (deep venous thrombosis)     left leg    Epidural abscess 4/10/2018    Fatty liver     GERD (gastroesophageal reflux disease) 2015    Hypertension     Lumbar radiculopathy     Menopause     Obesity     Obstructive sleep apnea on CPAP     Thyroid disease     Thyroid nodules.       Past Surgical History:   Procedure Laterality Date    ADENOIDECTOMY  1995    BACK SURGERY  2002    L4, L5    BILATERAL SALPINGOOPHORECTOMY      BLOCK-NERVE-MEDIAL BRANCH-LUMBAR Left 3/16/2018    [facility]    CHOLECYSTECTOMY      CYST REMOVAL      NEERAJ-TRANSFORAMINAL Left 3/2/2018    [facility]    FUSION-POSTERIOR LUMBAR INTERBODY FUSION L4/5, L5/S1 N/A 4/10/2018    [facility]ND F[facility]    HYSTERECTOMY  12/17/2012    [facility]     INJECTION-FACET Left 3/2/2018    [facility]    RECTOCELE REPAIR      SPINE  SURGERY  2018    THYROID NODULE REMOVAL      TONSILLECTOMY         Family History   Problem Relation Age of Onset    Hypertension Mother     Hyperlipidemia Mother     Heart disease Mother 55        cad, valvular heart disease    Alcohol abuse Sister     Stroke Father     No Known Problems Daughter     No Known Problems Daughter     Breast cancer Neg Hx     Colon cancer Neg Hx     Ovarian cancer Neg Hx     Diabetes Neg Hx     Esophageal cancer Neg Hx        Social History     Tobacco Use    Smoking status: Former Smoker     Packs/day: 0.25     Years: 32.00     Pack years: 8.00     Types: Cigarettes     Last attempt to quit:      Years since quittin.8    Smokeless tobacco: Never Used    Tobacco comment: smoked socially decades ago - weekends only   Substance Use Topics    Alcohol use: No     Frequency: Never    Drug use: No       Review of Systems    Physical Exam   /76   Pulse 75   Temp 98 °F (36.7 °C) (Oral)   Resp 20   Wt 104.3 kg (230 lb)   LMP 2012   SpO2 96%   Breastfeeding? No   BMI 37.69 kg/m²     Physical Exam    ED Course

## 2018-11-08 NOTE — ED NOTES
Pt resting quietly on stretcher; remains on continuous cardiac and pulse ox monitoring with non-invasive blood pressure to cycle every 30 minutes.  VS stable; NSR noted. Pt reporting no chest pain at this time.  Admission orders received.  Pt updated on admission process; awaiting bed assignment. Bed locked in lowest position; side rails up and locked x 2; call light, bedside table, and personal belongings within reach.  Pt instructed to alert nurse for assistance and before attempting to get out of bed; verbalizes understanding.  Pt denies needs or complaints at this time; will continue to monitor.

## 2018-11-08 NOTE — ED PROVIDER NOTES
Encounter Date: 11/8/2018       History     Chief Complaint   Patient presents with    Chest Pain     has blood clot in leg and is on xarelto; woke up this morning with tightness in chest and sweating; denies SOB.      Pt is a 59yo presenting to the ED for chest pain. Pt stated that the pain woke her up at 1am today. She described the pain as a pressure, band-like, bilaterally under her breast. She then went back to sleep and awoke to go to work. At work, she noticed a centralized, substernal pressure and diaphoresis which prompted her to come to the ED. She c/o radiation to left arm. No SOB. Pain is not pleuritic.     Pt has a hx of HTN, DM, HLD, and DVT on Xarelto. She states she has been having a week history of leg pain preceding this chest pain. However she denies leg swelling or erythema. She has no hx of MI or chest pain similar to this event.                 Review of patient's allergies indicates:   Allergen Reactions    Cathflo activase [alteplase] Anaphylaxis     Tightness in chest, raspy throat, restless legs, hotness all over    Iodine Anaphylaxis, Hives, Shortness Of Breath, Itching, Swelling and Rash     Other reaction(s): Difficulty breathing  Neck swelling     Shellfish containing products Anaphylaxis, Hives, Shortness Of Breath, Itching, Swelling and Rash     Other reaction(s): Difficulty breathing  Neck swelling     Heparin analogues      Restless legs, tightness in chest, and hot all over    Latex Swelling     Patient un sure about it    Iodine and iodide containing products      Past Medical History:   Diagnosis Date    Abscess of paraspinous muscles     Allergy 4/2018    DVT (deep venous thrombosis)     left leg    Epidural abscess 4/10/2018    Fatty liver     GERD (gastroesophageal reflux disease) 2015    Hypertension     Lumbar radiculopathy     Menopause     Obesity     Obstructive sleep apnea on CPAP     Thyroid disease     Thyroid nodules.     Past Surgical History:    Procedure Laterality Date    ADENOIDECTOMY  1995    BACK SURGERY      L4, L5    BILATERAL SALPINGOOPHORECTOMY      BLOCK-NERVE-MEDIAL BRANCH-LUMBAR Left 3/16/2018    [facility]    CHOLECYSTECTOMY      CYST REMOVAL      NEERAJ-TRANSFORAMINAL Left 3/2/2018    [facility]    FUSION-POSTERIOR LUMBAR INTERBODY FUSION L4/5, L5/S1 N/A 4/10/2018    [facility] OR 2ND FLR    [facility]Y  2012    [facility]     INJECTION-FACET Left 3/2/2018    [facility]    RECTOCELE REPAIR      SPINE SURGERY      THYROID NODULE REMOVAL      TONSILLECTOMY       Family History   Problem Relation Age of Onset    Hypertension Mother     Hyperlipidemia Mother     Heart disease Mother 55        cad, valvular heart disease    Alcohol abuse Sister     Stroke Father     No Known Problems Daughter     No Known Problems Daughter     Breast cancer Neg Hx     Colon cancer Neg Hx     Ovarian cancer Neg Hx     Diabetes Neg Hx     Esophageal cancer Neg Hx      Social History     Tobacco Use    Smoking status: Former Smoker     Packs/day: 0.25     Years: 32.00     Pack years: 8.00     Types: Cigarettes     Last attempt to quit:      Years since quittin.8    Smokeless tobacco: Never Used    Tobacco comment: smoked socially decades ago - weekends only   Substance Use Topics    Alcohol use: No     Frequency: Never    Drug use: No     Review of Systems   Constitutional: Negative for chills, diaphoresis, fatigue and fever.   HENT: Negative for sore throat.    Eyes: Negative for visual disturbance.   Respiratory: Negative for chest tightness and shortness of breath.    Cardiovascular: Positive for chest pain. Negative for palpitations and leg swelling.   Gastrointestinal: Negative for abdominal pain, nausea and vomiting.   Genitourinary: Negative for dysuria.    Musculoskeletal: Negative for back pain and myalgias.   Skin: Negative for rash.   Neurological: Negative for syncope, weakness, light-headedness and headaches.   Hematological: Does not bruise/bleed easily.   Psychiatric/Behavioral: The patient is not nervous/anxious.        Physical Exam     Initial Vitals [11/08/18 0757]   BP Pulse Resp Temp SpO2   133/88 83 18 98 °F (36.7 °C) 97 %      MAP       --         Physical Exam    Vitals reviewed.  Constitutional: Vital signs are normal. She appears well-developed and well-nourished. She is not diaphoretic. No distress.   HENT:   Head: Normocephalic and atraumatic.   Nose: Nose normal.   Mouth/Throat: Oropharynx is clear and moist.   Eyes: EOM and lids are normal. Pupils are equal, round, and reactive to light. Lids are everted and swept, no foreign bodies found.   Neck: Trachea normal and normal range of motion. Neck supple.   Cardiovascular: Normal rate, regular rhythm, intact distal pulses and normal pulses.   Pulmonary/Chest: She has no wheezes. She has no rhonchi. She has no rales. She exhibits no tenderness.   Abdominal: Soft. Normal appearance and bowel sounds are normal. There is no tenderness. There is no rebound and no guarding.   Musculoskeletal: She exhibits no edema.   Neurological: She is alert and oriented to person, place, and time. No sensory deficit.   Skin: Skin is warm. Capillary refill takes less than 2 seconds. No rash noted. No cyanosis.   Psychiatric: She has a normal mood and affect.         ED Course   Procedures  Labs Reviewed   ISTAT PROCEDURE - Abnormal; Notable for the following components:       Result Value    POC Glucose 111 (*)     All other components within normal limits   CBC W/ AUTO DIFFERENTIAL   COMPREHENSIVE METABOLIC PANEL   TROPONIN I   B-TYPE NATRIURETIC PEPTIDE   MAGNESIUM   LIPID PANEL   TROPONIN I   ISTAT CHEM8        ECG Results          Repeat EKG 12-lead (Final result)  Result time 11/08/18 11:50:56    Final result by  Interface, [facility] (11/08/18 11:50:56)                 Narrative:    Test Reason : R07.9,  Blood Pressure : 119/074 mmHG  Vent. Rate : 068 BPM     Atrial Rate : 068 BPM     P-R Int : 156 ms          QRS Dur : 084 ms      QT Int : 406 ms       P-R-T Axes : 040 038 049 degrees     QTc Int : 431 ms    Normal sinus rhythm  Normal ECG  When compared with ECG of 05-OCT-2018 07:53,  No significant change was found  Confirmed by SOHAM RAYMUNDO MD ([id number]) on 11/8/2018 11:50:47 AM    Referred By: ERASTO   SELF           Confirmed By:SOHAM RAYMUNDO MD                            Imaging Results          X-Ray Chest PA And Lateral (Final result)  Result time 11/08/18 11:13:43    Final result by Kadeem Palencia III, MD (11/08/18 11:13:43)                 Impression:      See above    No acute process seen.      Electronically signed by: Kadeem Palencia MD  Date:    11/08/2018  Time:    11:13             Narrative:    EXAMINATION:  XR CHEST PA AND LATERAL    CLINICAL HISTORY:  Chest pain, unspecified    FINDINGS:  Heart size is normal.  Lungs are clear.                                 Medical Decision Making:   History:   Old Medical Records: I decided to obtain old medical records.  Old Records Summarized: records from clinic visits.  Initial Assessment:   Pt is a 57yo presenting to the ED for chest pain. Pain began around 1a this morning below bilateral breast. Patient fell back asleep then chest pain returned mid sternal with associated diaphoresis and radiation to left arm. Patient normotensive in NaD.  Differential Diagnosis:   DDX includes but is not limited to ACS, electrolyte abnormality, musculoskeletal. Considered but do not suspect PE. Patient anticoagulated, not tachypnic or tachycardic and denies shortness of breath or pleuritic chest pain.   Independently Interpreted Test(s):   I have ordered and independently interpreted X-rays - see summary below.  I have ordered and independently interpreted EKG Reading(s) - see  summary below  Clinical Tests:   Lab Tests: Ordered and Reviewed  Radiological Study: Ordered and Reviewed  Medical Tests: Ordered and Reviewed  ED Management:  EKG shows NSR at rate of 83bpm.     1st troponin is negative. Repeat EKG shows NSR. Pain relieved with 1 nitro .4mg. Will admit to obs for troponin trend.    I have discussed the treatment and management of this patient with my supervisory physician, and we agree on the plan of care.                         Clinical Impression:   The encounter diagnosis was Chest pain.      Disposition:   Disposition: Placed in Observation  Condition: Stable                        Jazmine Carreon PA-C  11/08/18 4556

## 2018-11-08 NOTE — ED TRIAGE NOTES
"Pt states she felt "drained" when she got to work.  Report onset of "sweating profusely" and "tightness in chest".  Pt reports waking up around 1am with the feeling of a "band under breasts" which lasted approx 1 hr.  Pt states she saw the nurse at work who recommended she come to ED.    "

## 2018-11-08 NOTE — PROGRESS NOTES
Patient identified by 2 identifiers. Denies previous reactions to blood transfusions and allergies reviewed.  Procedure explained & consent obtained.  18 g IV in place to Lt FA, flushed w/ 10cc NS pre & post contrast administration.  3cc Optison administered, echo images obtained.  Pt tolerated procedure well.  Pt transferred back to room via stretcher accompanied by escort in stable condition.

## 2018-11-08 NOTE — PROGRESS NOTES
Pt arrived to OBS 3083 from ED via stretcher. Pt aao x 4 ambulated from stretcher to chair with steady gait. Pt denies CP and SOB. Telemetry monitor in place. Pt oriented to OBS floor and RM.  POC reviewed with Pt and SO. Diet menu given. Safety precautions maintained. Bed in low position wheels locked. Side rails up x 2. Call light within reach.

## 2018-11-08 NOTE — ED NOTES
Report received from offgoing nurseLurdes; care assumed.     Normal rate, regular rhythm.  Heart sounds S1, S2.  No murmurs, rubs or gallops.

## 2018-11-09 ENCOUNTER — PATIENT MESSAGE (OUTPATIENT)
Dept: GASTROENTEROLOGY | Facility: CLINIC | Age: 58
End: 2018-11-09

## 2018-11-09 VITALS
WEIGHT: 230 LBS | HEART RATE: 82 BPM | RESPIRATION RATE: 17 BRPM | BODY MASS INDEX: 38.32 KG/M2 | SYSTOLIC BLOOD PRESSURE: 127 MMHG | HEIGHT: 65 IN | OXYGEN SATURATION: 99 % | TEMPERATURE: 98 F | DIASTOLIC BLOOD PRESSURE: 78 MMHG

## 2018-11-09 PROBLEM — R07.9 CHEST PAIN: Status: RESOLVED | Noted: 2018-11-08 | Resolved: 2018-11-09

## 2018-11-09 PROCEDURE — 94761 N-INVAS EAR/PLS OXIMETRY MLT: CPT

## 2018-11-09 PROCEDURE — 27000221 HC OXYGEN, UP TO 24 HOURS

## 2018-11-09 PROCEDURE — 25000003 PHARM REV CODE 250: Performed by: PHYSICIAN ASSISTANT

## 2018-11-09 PROCEDURE — G0378 HOSPITAL OBSERVATION PER HR: HCPCS

## 2018-11-09 PROCEDURE — 25000003 PHARM REV CODE 250

## 2018-11-09 PROCEDURE — 99900035 HC TECH TIME PER 15 MIN (STAT)

## 2018-11-09 PROCEDURE — 99217 PR OBSERVATION CARE DISCHARGE: CPT | Mod: ,,, | Performed by: PHYSICIAN ASSISTANT

## 2018-11-09 RX ADMIN — HYDROCODONE BITARTRATE AND ACETAMINOPHEN 1 TABLET: 10; 325 TABLET ORAL at 01:11

## 2018-11-09 RX ADMIN — PANTOPRAZOLE SODIUM 40 MG: 40 TABLET, DELAYED RELEASE ORAL at 08:11

## 2018-11-09 RX ADMIN — LOSARTAN POTASSIUM AND HYDROCHLOROTHIAZIDE 1 TABLET: 12.5; 5 TABLET ORAL at 08:11

## 2018-11-09 NOTE — NURSING
Patient discharged. IV discontinued and canula intact. Patient has steady gait and preferred to walk to her car where her  is waiting. No respiratory distress noted.

## 2018-11-09 NOTE — NURSING
Discharge noted. IV discontinued with canula intact. Not distress noted. Patient's  will be coming to get patient.

## 2018-11-09 NOTE — ASSESSMENT & PLAN NOTE
58 y.o. to who presents with new onset chest pain/tightness. Pain is not reproducible on exam.    - EKG without ST-segment elevation or depression  - initial troponin 0.006, will trend Q6H  - Relieved with nitroglycerin x 1  - CXR demonstrating no acute process, BNP <10  - Exercise stress ECHO ordered for further evaluation   - risk factors: family history of CAD/MI, HTN, HLD, hx of smoker  - CBC, CMP (goal Mag>2, K>4) daily; lipid panel ordered  - cardiac telemetry

## 2018-11-09 NOTE — SUBJECTIVE & OBJECTIVE
Past Medical History:   Diagnosis Date    Abscess of paraspinous muscles     Allergy 4/2018    DVT (deep venous thrombosis)     left leg    Epidural abscess 4/10/2018    Fatty liver     GERD (gastroesophageal reflux disease) 2015    Hypertension     Lumbar radiculopathy     Menopause     Obesity     Obstructive sleep apnea on CPAP     Thyroid disease     Thyroid nodules.       Past Surgical History:   Procedure Laterality Date    ADENOIDECTOMY  1995    BACK SURGERY  2002    L4, L5    BILATERAL SALPINGOOPHORECTOMY      BLOCK-NERVE-MEDIAL BRANCH-LUMBAR Left 3/16/2018    [facility]    CHOLECYSTECTOMY      CYST REMOVAL      NEERAJ-TRANSFORAMINAL Left 3/2/2018    [facility]    FUSION-POSTERIOR LUMBAR INTERBODY FUSION L4/5, L5/S1 N/A 4/10/2018    [facility] at [facility] OR 2ND FLR    HYSTERECTOMY  12/17/2012    [facility]     INJECTION-FACET Left 3/2/2018    [facility] at [facility]    RECTOCELE REPAIR      SPINE SURGERY  2018    THYROID NODULE REMOVAL      TONSILLECTOMY  1995       Review of patient's allergies indicates:   Allergen Reactions    Cathflo activase [alteplase] Anaphylaxis     Tightness in chest, raspy throat, restless legs, hotness all over    Iodine Anaphylaxis, Hives, Shortness Of Breath, Itching, Swelling and Rash     Other reaction(s): Difficulty breathing  Neck swelling     Shellfish containing products Anaphylaxis, Hives, Shortness Of Breath, Itching, Swelling and Rash     Other reaction(s): Difficulty breathing  Neck swelling     Heparin analogues      Restless legs, tightness in chest, and hot all over    Latex Swelling     Patient un sure about it    Iodine and iodide containing products        No current facility-administered medications on file prior to encounter.      Current Outpatient Medications on File Prior to Encounter   Medication Sig     losartan-hydrochlorothiazide 50-12.5 mg (HYZAAR) 50-12.5 mg per tablet Take 1 tablet by mouth once daily.    metFORMIN (GLUCOPHAGE) 500 MG tablet Take 1 tablet (500 mg total) by mouth daily with breakfast.    pantoprazole (PROTONIX) 40 MG tablet Take 1 tablet (40 mg total) by mouth once daily.    pregabalin (LYRICA) 150 MG capsule Take 1 capsule (150 mg total) by mouth nightly.    rivaroxaban (XARELTO) 20 mg Tab Take 1 tablet (20 mg total) by mouth daily with dinner or evening meal.    clotrimazole-betamethasone 1-0.05% (LOTRISONE) cream clotrimazole-betamethasone 1 %-0.05 % topical cream    estradiol (ESTRACE) 0.01 % (0.1 mg/gram) vaginal cream 0.5 g in vagina nightly x 2 weeks, then twice a week thereafter. Also use small amount with finger around opening at same frequency.    POLYETHYLENE GLYCOL 3350 (MIRALAX ORAL) Take by mouth daily as needed. constipation     Family History     Problem Relation (Age of Onset)    Alcohol abuse Sister    Heart disease Mother (55)    Hyperlipidemia Mother    Hypertension Mother    No Known Problems Daughter, Daughter    Stroke Father        Tobacco Use    Smoking status: Former Smoker     Packs/day: 0.25     Years: 32.00     Pack years: 8.00     Types: Cigarettes     Last attempt to quit:      Years since quittin.8    Smokeless tobacco: Never Used    Tobacco comment: smoked socially decades ago - weekends only   Substance and Sexual Activity    Alcohol use: No     Frequency: Never    Drug use: No    Sexual activity: Not Currently     Partners: Male     Birth control/protection: See Surgical Hx, None     Comment: VINI BS&O 2012,       Review of Systems   Constitutional: Positive for diaphoresis. Negative for activity change, appetite change and fever.   HENT: Negative for congestion and dental problem.    Eyes: Negative for photophobia and visual disturbance.   Respiratory: Negative for chest tightness, shortness of breath and wheezing.     Cardiovascular: Positive for chest pain. Negative for palpitations.   Gastrointestinal: Negative for abdominal distention and abdominal pain.   Genitourinary: Negative for difficulty urinating and dyspareunia.   Musculoskeletal: Negative for arthralgias, gait problem, neck pain and neck stiffness.   Skin: Negative for color change and pallor.   Neurological: Positive for headaches. Negative for dizziness, syncope, facial asymmetry and weakness.   Psychiatric/Behavioral: Negative for agitation and behavioral problems.     Objective:     Vital Signs (Most Recent):  Temp: 98 °F (36.7 °C) (11/08/18 1207)  Pulse: 82 (11/08/18 1500)  Resp: 15 (11/08/18 1310)  BP: (!) 143/80 (11/08/18 1310)  SpO2: 96 % (11/08/18 1310) Vital Signs (24h Range):  Temp:  [98 °F (36.7 °C)] 98 °F (36.7 °C)  Pulse:  [70-83] 82  Resp:  [13-20] 15  SpO2:  [95 %-100 %] 96 %  BP: (105-143)/(64-88) 143/80     Weight: 104.3 kg (230 lb)  Body mass index is 38.27 kg/m².    Physical Exam   Constitutional: She is oriented to person, place, and time. She appears well-nourished. No distress.   HENT:   Head: Normocephalic.   Eyes: EOM are normal.   Neck: Normal range of motion.   Cardiovascular: Normal rate and regular rhythm.   Pulmonary/Chest: Effort normal and breath sounds normal. No respiratory distress. She has no wheezes. She exhibits no tenderness.   Abdominal: Soft. Distention: baseline.   Neurological: She is alert and oriented to person, place, and time. No cranial nerve deficit.   Skin: Skin is warm and dry.   Psychiatric: She has a normal mood and affect. Her behavior is normal.   Nursing note and vitals reviewed.        CRANIAL NERVES     CN III, IV, VI   Extraocular motions are normal.        Significant Labs: All pertinent labs within the past 24 hours have been reviewed.    Significant Imaging: I have reviewed all pertinent imaging results/findings within the past 24 hours.

## 2018-11-09 NOTE — PLAN OF CARE
Problem: Patient Care Overview  Goal: Plan of Care Review  Outcome: Ongoing (interventions implemented as appropriate)  Nadn; pt remained free from chest pain, falls, injury throughout the night; no complaints or needs

## 2018-11-09 NOTE — ASSESSMENT & PLAN NOTE
- c/w Xarelto  - throbbing with episode today, concern for showering into chest  - VQ scan and lower ext doppler ordered

## 2018-11-09 NOTE — HPI
Renae Hou is a 58 y.o. lady with HTN, DM, HLD, and DVT on Xarelto who presented with  chest pain/tightness. The pt reports that the initial episode woke her up at 1am. She described it as a band-like sensation, bilaterally under her breast. She then went back to sleep and symptoms improved. While at work this morning, she experienced a centralized, substernal pressure-like sensation in her chest that occurred while sitting at her desk. She reports associated diaphoresis and throbbing in her left leg but denies dyspnea, headaches, palpitations, or radiation to her neck or arm.     She states she has been having a week history of leg pain preceding this chest pain. However she denies leg swelling or erythema. She has no hx of MI or chest pain but does report a significant family history.

## 2018-11-09 NOTE — PLAN OF CARE
Problem: Patient Care Overview  Goal: Plan of Care Review  Patient alert and oriented x 3. Denies chest pain. Tele NSR. Had VQ this afternoon. Medicated for afternoon headache. Patient sleep well with the Cpap machine and wants to wear one at home. Steady gait walking. No respiratory distress at rest noted. Good appetite noted. Rest between care. Call light in reach. Reviewed POC with patient.

## 2018-11-09 NOTE — PLAN OF CARE
11/09/18 1437   Discharge Assessment   Assessment Type Discharge Planning Assessment   Confirmed/corrected address and phone number on facesheet? Yes   Assessment information obtained from? Patient   Expected Length of Stay (days) 1   Communicated expected length of stay with patient/caregiver yes   Prior to hospitilization cognitive status: Alert/Oriented   Prior to hospitalization functional status: Independent   Current cognitive status: Alert/Oriented   Current Functional Status: Independent   Lives With spouse  (spouse, Yohan Hou ([phone]))   Able to Return to Prior Arrangements yes   Is patient able to care for self after discharge? Yes   Patient's perception of discharge disposition home or selfcare   Readmission Within The Last 30 Days no previous admission in last 30 days   Patient currently being followed by outpatient case management? No   Patient currently receives any other outside agency services? No   Equipment Currently Used at Home glucometer;BIPAP;shower chair;grab bar   Do you have any problems affording any of your prescribed medications? No   Is the patient taking medications as prescribed? yes   Does the patient have transportation home? Yes   Transportation Available family or friend will provide  (spouse)   Does the patient receive services at the [facility]? No   Discharge Plan A Home with family   Discharge Plan B Home Health   Patient/Family In Agreement With Plan yes     Dx: chest pain  Pharm: [facility]  Hosp f/u appt: Dr. Franko Garza (PCP) on 11/23/18 at 0900

## 2018-11-09 NOTE — H&P
[facility] Hospital Medicine  History & Physical    Patient Name: Renae Hou  MRN: [id number]  Admission Date: 11/8/2018  Attending Physician: Cindy Chavez MD   Primary Care Provider: Franko Garza MD    [facility] Medicine Team: [facility] E David Givens PA-C     Patient information was obtained from patient and ER records.     Subjective:     Principal Problem:Chest pain    Chief Complaint:   Chief Complaint   Patient presents with    Chest Pain     has blood clot in leg and is on xarelto; woke up this morning with tightness in chest and sweating; denies SOB.         HPI: Renae Hou is a 58 y.o. lady with HTN, DM, HLD, and DVT on Xarelto who presented with  chest pain/tightness. The pt reports that the initial episode woke her up at 1am. She described it as a band-like sensation, bilaterally under her breast. She then went back to sleep and symptoms improved. While at work this morning, she experienced a centralized, substernal pressure-like sensation in her chest that occurred while sitting at her desk. She reports associated diaphoresis and throbbing in her left leg but denies dyspnea, headaches, palpitations, or radiation to her neck or arm.     She states she has been having a week history of leg pain preceding this chest pain. However she denies leg swelling or erythema. She has no hx of MI or chest pain but does report a significant family history.         Past Medical History:   Diagnosis Date    Abscess of paraspinous muscles     Allergy 4/2018    DVT (deep venous thrombosis)     left leg    Epidural abscess 4/10/2018    Fatty liver     GERD (gastroesophageal reflux disease) 2015    Hypertension     Lumbar radiculopathy     Menopause     Obesity     Obstructive sleep apnea on CPAP     Thyroid disease     Thyroid nodules.       Past Surgical History:   Procedure Laterality Date    ADENOIDECTOMY  1995    BACK SURGERY  2002    L4, L5    BILATERAL  SALPINGOOPHORECTOMY      BLOCK-NERVE-MEDIAL BRANCH-LUMBAR Left 3/16/2018    [facility]    CHOLECYSTECTOMY      CYST REMOVAL      NEERAJ-TRANSFORAMINAL Left 3/2/2018    [facility]    FUSION-POSTERIOR LUMBAR INTERBODY FUSION L4/5, L5/S1 N/A 4/10/2018    [facility]    HYSTERECTOMY  12/17/2012    [facility] BS[facility]     INJECTION-FACET Left 3/2/2018    [facility]    RECTOCELE REPAIR      SPINE SURGERY  2018    THYROID NODULE REMOVAL      TONSILLECTOMY  1995       Review of patient's allergies indicates:   Allergen Reactions    Cathflo activase [alteplase] Anaphylaxis     Tightness in chest, raspy throat, restless legs, hotness all over    Iodine Anaphylaxis, Hives, Shortness Of Breath, Itching, Swelling and Rash     Other reaction(s): Difficulty breathing  Neck swelling     Shellfish containing products Anaphylaxis, Hives, Shortness Of Breath, Itching, Swelling and Rash     Other reaction(s): Difficulty breathing  Neck swelling     Heparin analogues      Restless legs, tightness in chest, and hot all over    Latex Swelling     Patient un sure about it    Iodine and iodide containing products        No current facility-administered medications on file prior to encounter.      Current Outpatient Medications on File Prior to Encounter   Medication Sig    losartan-hydrochlorothiazide 50-12.5 mg (HYZAAR) 50-12.5 mg per tablet Take 1 tablet by mouth once daily.    metFORMIN (GLUCOPHAGE) 500 MG tablet Take 1 tablet (500 mg total) by mouth daily with breakfast.    pantoprazole (PROTONIX) 40 MG tablet Take 1 tablet (40 mg total) by mouth once daily.    pregabalin (LYRICA) 150 MG capsule Take 1 capsule (150 mg total) by mouth nightly.    rivaroxaban (XARELTO) 20 mg Tab Take 1 tablet (20 mg total) by mouth daily with dinner or evening meal.     clotrimazole-betamethasone 1-0.05% (LOTRISONE) cream clotrimazole-betamethasone 1 %-0.05 % topical cream    estradiol (ESTRACE) 0.01 % (0.1 mg/gram) vaginal cream 0.5 g in vagina nightly x 2 weeks, then twice a week thereafter. Also use small amount with finger around opening at same frequency.    POLYETHYLENE GLYCOL 3350 (MIRALAX ORAL) Take by mouth daily as needed. constipation     Family History     Problem Relation (Age of Onset)    Alcohol abuse Sister    Heart disease Mother (55)    Hyperlipidemia Mother    Hypertension Mother    No Known Problems Daughter, Daughter    Stroke Father        Tobacco Use    Smoking status: Former Smoker     Packs/day: 0.25     Years: 32.00     Pack years: 8.00     Types: Cigarettes     Last attempt to quit:      Years since quittin.8    Smokeless tobacco: Never Used    Tobacco comment: smoked socially decades ago - weekends only   Substance and Sexual Activity    Alcohol use: No     Frequency: Never    Drug use: No    Sexual activity: Not Currently     Partners: Male     Birth control/protection: See Surgical Hx, None     Comment: VINI BS&O 2012,       Review of Systems   Constitutional: Positive for diaphoresis. Negative for activity change, appetite change and fever.   HENT: Negative for congestion and dental problem.    Eyes: Negative for photophobia and visual disturbance.   Respiratory: Negative for chest tightness, shortness of breath and wheezing.    Cardiovascular: Positive for chest pain. Negative for palpitations.   Gastrointestinal: Negative for abdominal distention and abdominal pain.   Genitourinary: Negative for difficulty urinating and dyspareunia.   Musculoskeletal: Negative for arthralgias, gait problem, neck pain and neck stiffness.   Skin: Negative for color change and pallor.   Neurological: Positive for headaches. Negative for dizziness, syncope, facial asymmetry and weakness.   Psychiatric/Behavioral: Negative for agitation and  behavioral problems.     Objective:     Vital Signs (Most Recent):  Temp: 98 °F (36.7 °C) (11/08/18 1207)  Pulse: 82 (11/08/18 1500)  Resp: 15 (11/08/18 1310)  BP: (!) 143/80 (11/08/18 1310)  SpO2: 96 % (11/08/18 1310) Vital Signs (24h Range):  Temp:  [98 °F (36.7 °C)] 98 °F (36.7 °C)  Pulse:  [70-83] 82  Resp:  [13-20] 15  SpO2:  [95 %-100 %] 96 %  BP: (105-143)/(64-88) 143/80     Weight: 104.3 kg (230 lb)  Body mass index is 38.27 kg/m².    Physical Exam   Constitutional: She is oriented to person, place, and time. She appears well-nourished. No distress.   HENT:   Head: Normocephalic.   Eyes: EOM are normal.   Neck: Normal range of motion.   Cardiovascular: Normal rate and regular rhythm.   Pulmonary/Chest: Effort normal and breath sounds normal. No respiratory distress. She has no wheezes. She exhibits no tenderness.   Abdominal: Soft. Distention: baseline.   Neurological: She is alert and oriented to person, place, and time. No cranial nerve deficit.   Skin: Skin is warm and dry.   Psychiatric: She has a normal mood and affect. Her behavior is normal.   Nursing note and vitals reviewed.        CRANIAL NERVES     CN III, IV, VI   Extraocular motions are normal.        Significant Labs: All pertinent labs within the past 24 hours have been reviewed.    Significant Imaging: I have reviewed all pertinent imaging results/findings within the past 24 hours.    Assessment/Plan:     * Chest pain    58 y.o. to who presents with new onset chest pain/tightness. Pain is not reproducible on exam.    - EKG without ST-segment elevation or depression  - initial troponin 0.006, will trend Q6H  - Relieved with nitroglycerin x 1  - CXR demonstrating no acute process, BNP <10  - Exercise stress ECHO ordered for further evaluation   - risk factors: family history of CAD/MI, HTN, HLD, hx of smoker  - CBC, CMP (goal Mag>2, K>4) daily; lipid panel ordered  - cardiac telemetry     Deep vein thrombosis (DVT) of lower extremity    - c/w  Xarelto  - throbbing with episode today, concern for showering into chest  - VQ scan and lower ext doppler ordered     Hypertension    Stable  - c/w home meds     Obstructive sleep apnea on CPAP    - CPAP ordered       VTE Risk Mitigation (From admission, onward)        Ordered     rivaroxaban tablet 20 mg  With dinner      11/08/18 1930             David Givens PA-C  Department of Hospital Medicine   [facility]

## 2018-11-12 NOTE — PLAN OF CARE
11/12/18 0658   Final Note   Assessment Type Final Discharge Note     Patient discharged home with no needs on 11/9/18.

## 2018-11-14 ENCOUNTER — PATIENT MESSAGE (OUTPATIENT)
Dept: BARIATRICS | Facility: CLINIC | Age: 58
End: 2018-11-14

## 2018-11-15 NOTE — HOSPITAL COURSE
Renae Hou was placed in observation for ACS rule out. Troponin's were negative and cardiac stress test was negative for ischemia (exercised for 4 minutes and 45 seconds on a high ramp protocol, corresponding to a functional capacity of 7 METS, achieving a peak heart rate of 146 bpm, which is 94% of the age predicted maximum heart rate. The patient achieved 110.61% of the 132 bpm target heart rate). VQ scan was also obtained as pt with recent distal DVT (on anticoagulation) and reported pain in the leg at the time of symptoms--- VQ revealed low probability for PE. Pt discharged home with follow-up.

## 2018-11-15 NOTE — DISCHARGE SUMMARY
[facility] Hospital Medicine  Discharge Summary      Patient Name: Renae Hou  MRN: [id number]  Admission Date: 11/8/2018  Hospital Length of Stay: 0 days  Discharge Date and Time: 11/9/2018  5:59 PM  Attending Physician: Cindy Chavez MD  Discharging Provider: David Givens PA-C  Primary Care Provider: Franko Garza MD  Hospital Medicine Team: [facility] David Givens PA-C    HPI:   Renae Hou is a 58 y.o. lady with HTN, DM, HLD, and DVT on Xarelto who presented with  chest pain/tightness. The pt reports that the initial episode woke her up at 1am. She described it as a band-like sensation, bilaterally under her breast. She then went back to sleep and symptoms improved. While at work this morning, she experienced a centralized, substernal pressure-like sensation in her chest that occurred while sitting at her desk. She reports associated diaphoresis and throbbing in her left leg but denies dyspnea, headaches, palpitations, or radiation to her neck or arm.     She states she has been having a week history of leg pain preceding this chest pain. However she denies leg swelling or erythema. She has no hx of MI or chest pain but does report a significant family history.         * No surgery found *      Hospital Course:   Renae Hou was placed in observation for ACS rule out. Troponin's were negative and cardiac stress test was negative for ischemia (exercised for 4 minutes and 45 seconds on a high ramp protocol, corresponding to a functional capacity of 7 METS, achieving a peak heart rate of 146 bpm, which is 94% of the age predicted maximum heart rate. The patient achieved 110.61% of the 132 bpm target heart rate). VQ scan was also obtained as pt with recent distal DVT (on anticoagulation) and reported pain in the leg at the time of symptoms--- VQ revealed low probability for PE. Pt discharged home with follow-up.       Consults:     Deep vein thrombosis (DVT) of  lower extremity    Distal DVT no appreciated on repeat imaging   - c/w Xarelto  - throbbing with episode today, concern for showering into chest therefore VQ scan completed but was neg     Hypertension    Stable  - c/w home meds       Final Active Diagnoses:    Diagnosis Date Noted POA    Deep vein thrombosis (DVT) of lower extremity [I82.409] 11/08/2018 Yes    Hypertension [I10]  Yes    Obstructive sleep apnea on CPAP [G47.33, Z99.89]  Not Applicable      Problems Resolved During this Admission:    Diagnosis Date Noted Date Resolved POA    PRINCIPAL PROBLEM:  Chest pain [R07.9] 11/08/2018 11/09/2018 Yes    Deep vein thrombosis (DVT) of popliteal vein of left lower extremity [I82.432] 11/08/2018 11/08/2018 Unknown       Discharged Condition: stable    Disposition: Home or Self Care    Follow Up:  Follow-up Information     Franko Garza MD On 11/23/2018.    Specialty:  Family Medicine  Why:  at 9:00 AM  Contact information:  [address]  [place] [id number] [phone]                 Patient Instructions:      Diet Cardiac     Notify your health care provider if you experience any of the following:  severe uncontrolled pain     Notify your health care provider if you experience any of the following:  persistent dizziness, light-headedness, or visual disturbances     Activity as tolerated       Significant Diagnostic Studies: Labs: All labs within the past 24 hours have been reviewed    Pending Diagnostic Studies:     None         Medications:  Reconciled Home Medications:      Medication List      CONTINUE taking these medications    clotrimazole-betamethasone 1-0.05% cream  Commonly known as:  LOTRISONE  clotrimazole-betamethasone 1 %-0.05 % topical cream     estradiol 0.01 % (0.1 mg/gram) vaginal cream  Commonly known as:  ESTRACE  0.5 g in vagina nightly x 2 weeks, then twice a week thereafter. Also use small amount with finger around opening at same frequency.     losartan-hydrochlorothiazide  50-12.5 mg 50-12.5 mg per tablet  Commonly known as:  HYZAAR  Take 1 tablet by mouth once daily.     metFORMIN 500 MG tablet  Commonly known as:  GLUCOPHAGE  Take 1 tablet (500 mg total) by mouth daily with breakfast.     MIRALAX ORAL  Take by mouth daily as needed. constipation     pantoprazole 40 MG tablet  Commonly known as:  PROTONIX  Take 1 tablet (40 mg total) by mouth once daily.     pregabalin 150 MG capsule  Commonly known as:  LYRICA  Take 1 capsule (150 mg total) by mouth nightly.     rivaroxaban 20 mg Tab  Commonly known as:  XARELTO  Take 1 tablet (20 mg total) by mouth daily with dinner or evening meal.            Indwelling Lines/Drains at time of discharge:   Lines/Drains/Airways          None          Time spent on the discharge of patient: 30 minutes  Patient was seen and examined on the date of discharge and determined to be suitable for discharge.         David Givens PA-C  Department of Hospital Medicine  [facility]

## 2018-11-15 NOTE — ASSESSMENT & PLAN NOTE
Distal DVT no appreciated on repeat imaging   - c/w Xarelto  - throbbing with episode today, concern for showering into chest therefore VQ scan completed but was neg

## 2018-11-23 ENCOUNTER — HOSPITAL ENCOUNTER (OUTPATIENT)
Dept: RADIOLOGY | Facility: HOSPITAL | Age: 58
Discharge: HOME | End: 2018-11-23
Attending: INTERNAL MEDICINE
Payer: COMMERCIAL

## 2018-11-23 ENCOUNTER — OFFICE VISIT (OUTPATIENT)
Dept: INTERNAL MEDICINE | Facility: CLINIC | Age: 58
End: 2018-11-23
Payer: COMMERCIAL

## 2018-11-23 ENCOUNTER — CLINICAL SUPPORT (OUTPATIENT)
Dept: BARIATRICS | Facility: CLINIC | Age: 58
End: 2018-11-23
Payer: COMMERCIAL

## 2018-11-23 VITALS
BODY MASS INDEX: 39.05 KG/M2 | SYSTOLIC BLOOD PRESSURE: 118 MMHG | OXYGEN SATURATION: 97 % | TEMPERATURE: 99 F | HEIGHT: 65 IN | WEIGHT: 234.38 LBS | DIASTOLIC BLOOD PRESSURE: 80 MMHG | HEART RATE: 87 BPM

## 2018-11-23 VITALS — BODY MASS INDEX: 39.07 KG/M2 | WEIGHT: 234.81 LBS

## 2018-11-23 DIAGNOSIS — E66.9 OBESITY (BMI 30-39.9): ICD-10-CM

## 2018-11-23 DIAGNOSIS — I82.5Z2 CHRONIC DEEP VEIN THROMBOSIS (DVT) OF DISTAL VEIN OF LEFT LOWER EXTREMITY: ICD-10-CM

## 2018-11-23 DIAGNOSIS — R07.9 EXERTIONAL CHEST PAIN: ICD-10-CM

## 2018-11-23 DIAGNOSIS — I10 ESSENTIAL HYPERTENSION: ICD-10-CM

## 2018-11-23 DIAGNOSIS — G47.33 OBSTRUCTIVE SLEEP APNEA ON CPAP: ICD-10-CM

## 2018-11-23 DIAGNOSIS — Z71.3 DIETARY COUNSELING: ICD-10-CM

## 2018-11-23 DIAGNOSIS — R10.84 GENERALIZED ABDOMINAL PAIN: ICD-10-CM

## 2018-11-23 PROCEDURE — 99213 OFFICE O/P EST LOW 20 MIN: CPT | Mod: S$GLB,,, | Performed by: FAMILY MEDICINE

## 2018-11-23 PROCEDURE — 3074F SYST BP LT 130 MM HG: CPT | Mod: CPTII,S$GLB,, | Performed by: FAMILY MEDICINE

## 2018-11-23 PROCEDURE — A9698 NON-RAD CONTRAST MATERIALNOC: HCPCS | Performed by: INTERNAL MEDICINE

## 2018-11-23 PROCEDURE — 3008F BODY MASS INDEX DOCD: CPT | Mod: CPTII,S$GLB,, | Performed by: FAMILY MEDICINE

## 2018-11-23 PROCEDURE — 74176 CT ABD & PELVIS W/O CONTRAST: CPT | Mod: 26,,, | Performed by: RADIOLOGY

## 2018-11-23 PROCEDURE — 97803 MED NUTRITION INDIV SUBSEQ: CPT | Mod: S$GLB,,, | Performed by: DIETITIAN, REGISTERED

## 2018-11-23 PROCEDURE — 74176 CT ABD & PELVIS W/O CONTRAST: CPT | Mod: TC

## 2018-11-23 PROCEDURE — 99999 PR PBB SHADOW E&M-EST. PATIENT-LVL III: CPT | Mod: PBBFAC,,, | Performed by: DIETITIAN, REGISTERED

## 2018-11-23 PROCEDURE — 99499 UNLISTED E&M SERVICE: CPT | Mod: S$GLB,,, | Performed by: DIETITIAN, REGISTERED

## 2018-11-23 PROCEDURE — 99999 PR PBB SHADOW E&M-EST. PATIENT-LVL IV: CPT | Mod: PBBFAC,,, | Performed by: FAMILY MEDICINE

## 2018-11-23 PROCEDURE — 25500020 PHARM REV CODE 255: Performed by: INTERNAL MEDICINE

## 2018-11-23 PROCEDURE — 3079F DIAST BP 80-89 MM HG: CPT | Mod: CPTII,S$GLB,, | Performed by: FAMILY MEDICINE

## 2018-11-23 RX ADMIN — Medication 450 ML: at 01:11

## 2018-11-23 RX ADMIN — Medication 450 ML: at 02:11

## 2018-11-23 NOTE — PATIENT INSTRUCTIONS
1200- 1500 calorie Sample meal plan  80-120g protein per day.   Protein drinks and bars: 0-4 grams sugar  Drink lots of water throughout the day and exercise!  MENU # 1  Breakfast: 2 eggs, 1 turkey sausage pastor, 1 apple  Snack: Atkins bar  Lunch: 2 roll-ups (sliced turkey, low-fat sliced cheese, mustard), 12 baby carrots dipped in 1 Tbsp natural peanut butter  Mid-Day Snack: ¼ cup unsalted almonds, ½ cup fruit  Dinner: 1 chicken thigh simmered in tomato sauce + 2 Tbsp mozzarella cheese, ½ cup black beans, 1/2 cup steamed carrots  Evening Snack: 1/2 cup grapes with 4 cubes low-fat cheddar cheese   ___________________________________________________  MENU # 2  Breakfast: 200 calorie protein drink  Mid-morning snack : ¼ cup unsalted nuts, medium banana  Lunch: 3oz tuna or chicken salad made with 2 tbsp light gonzalez, over salad with tomatoes and cucumbers.   Snack: low-fat cheese stick  Dinner: 3oz hamburger pastor, slice of low-fat cheese, 1 cup boiled yellow squash and zucchini.   Snack: 6oz light yogurt  _______________________________________________________  Menu #3  Breakfast: 6oz plain Greek yogurt + fruit (½ banana, ½ cup fruit - pineapple, blueberries, strawberries, peach), may add Splenda to nataly.  Lunch: Grilled  chicken breast w/ slice low-fat pepper louie cheese, 1/2 cup grilled/baked asparagus and small salad with Salad Spritzer.    Mid-Day snack: 200 calorie protein drink   Dinner: 4oz Grilled fish, ½ cup white beans, ½ cup cooked spinach  Evening Snack: fudgsicle no-sugar-added    Menu # 4  Breakfast: 1 scoop vanilla protein powder + 4oz skim milk + 4oz coffee   Snack: Pure protein bar  Lunch: 2 Lettuce tacos: 3oz seasoned ground turkey wrapped in a Cristian lettuce leaves with 1 Tbsp shredded cheese and dollop low-fat sour cream  Snack: ½ cup cottage cheese, ½ cup pineapple chunks  Dinner: Shrimp omelet: 2 eggs, ½ cup shrimp, green onions, and shredded  cheese  ______________________________________________________  Menu #5  Breakfast: 1 cup low-fat cottage cheese, ½ cup peaches (no sugar added)  Snack: 1 apple, 4 cubes cheese  Lunch: 3oz baked pork chop, 1 cup okra and tomato stew  Snack: 1/2 cup black beans + salsa + dollop light sour cream  Dinner: Caprese chicken salad: 3 oz chicken breast, 1oz fresh mozzarella, sliced tomato, 1 Tbsp olive oil, basil  Snack: sugar-free popsicle    Menu #6  Breakfast: ½ cup part-skim ricotta cheese, 2 Tbsp sugar-free strawberry preserves, sprinkle of slivered almonds  Snack: 1 orange  Lunch: 3 oz canned chicken, 1oz shredded cheddar cheese, ½  cup black beans, 2 Tbsp salsa  Snack: 200 calorie Protein drink  Dinner: 4 oz grilled salmon steak, over mixed green salad with 2 tbsp light dressing    Meal Ideas for Regular Bariatric Diet  *Recipes and products available at [email or web address].com      Breakfast: (15-20g protein)    - Egg white omelet: 2 egg whites or ½ cup Egg Beaters. (Optional proteins: cheese, shrimp, black beans, chicken, sliced turkey) (Optional veggies: tomatoes, salsa, spinach, mushrooms, onions, green peppers, or small slice avocado)     - Egg and sausage: 1 egg or ¼ cup Egg Beaters (any variety), with 1 pastor or 2 links of Turkey sausage or Veggie breakfast sausage (Moogi or InSequent)    - Crust-less breakfast quiche: To make a glass pie dish, mix 4oz part skim Ricotta, 1 cup skim milk, and 2 eggs as your base. Add protein: shredded cheese, sliced lean ham or turkey, turkey grubbs/sausage. Add veggies: tomato, onion, green onion, mushroom, green pepper, spinach, etc.    - Yogurt parfait: Mix 1 - 6oz container Dannon Light N Fit vanilla yogurt, with ¼ cup crushed unsalted nuts    - Cottage cheese and fruit: ½ cup part-skim cottage cheese or ricotta cheese topped with fresh fruit or sugar free preserves     - Yahaira Vargas's Vanilla Egg custard* (add 2 Tbsp instant coffee granules to make Cappuccino  Custard*)    - Hi-Protein café latte (skim milk, decaf coffee, 1 scoop protein powder). Optional to add Sugar free syrup or extract flavoring.    - Breakfast Lox: spread fat free cream cheese on slices of smoked salmon. Serve over scrambled or egg over easy (sauteed with nonstick cookspray) OR on a cucumber slice    - Eggwhich: Scramble or cook 1 large egg over easy using nonstick cookspray. Place between 2 slices of Welsh grubbs and low fat cheese.     Lunch: (20-30g protein)    - ½ cup Black bean soup (Homemade or Progresso), with ¼ cup shredded low-fat cheese. Top with chopped tomato or fresh salsa.     - Lean deli turkey breast and low-fat sliced cheese, mustard or light gonzalez to moisten, rolled up together, or wrapped in a Cristian lettuce leaf    - Chicken salad made from dinner leftovers, moisten with low-fat salad dressing or light gonzalez. Also try leftover salmon, shrimp, tuna or boiled eggs. Serve ½ cup over dark green salad    - Fat-free canned refried beans, topped with ¼ cup shredded low-fat cheese. Top with chopped tomato or fresh salsa.     - Greek salad: Top mixed greens with 1-2oz grilled chicken, tomatoes, red onions, 2-3 kalamata olives, and sprinkle lightly with feta cheese. Spritz with Balsamic vinegar to taste.     - Crust-less lunch quiche: To make a glass pie dish, mix 4oz part skim Ricotta, 1 cup skim milk, and 2 eggs as your base. Add protein: shredded cheese, sliced lean ham or turkey, shrimp, chicken. Add veggies: tomato, onion, green onion, mushroom, green pepper, spinach, artichoke, broccoli, etc.    - Pizza bake: spread a  alanna evan mushroom with tomato sauce, low-fat shredded mozzarella and turkey pepperoni or [place] grubbs. Add any veggies. Roast for 10-15 minutes, until cheese melted.     - Cucumber crab bites: Spread ¼ cup crab dip (lump crabmeat + light cream cheese and green onions) over sliced cucumber.     - Chicken with light spinach and artichoke dip*: Puree in food  processor: 6oz cooked and drained spinach, 2 cloves garlic, 1 can cannelloni beans, ½ cup chopped green onions, 1 can drained artichoke hearts (not marinated in oil), lemon juice and basil. Mix in 2oz chopped up chicken.    Supper: (20-30g protein)    - Serve grilled fish over dark green salad tossed with low-fat dressing, served with grilled asparagus pendleton     - Rotisserie chicken salad: served with sliced strawberries, walnuts, fat-free feta cheese crumbles and 1 tbsp Bernals Own Light Raspberry [place] Vinaigrette    - Shrimp cocktail: Dip cold boiled shrimp in homemade low-sugar cocktail sauce (1/2 cup Tiff One Carb ketchup, 2 tbsp horseradish, 1/4 tsp hot sauce, 1 tsp Worcestershire sauce, 1 tbsp freshly-squeezed lemon juice). Serve with dark green salad, walnuts, and crumbled blue cheese drizzled with olive oil and Balsamic vinegar    - Tuna Melt: Spread tuna salad onto 2 thick slices of tomato. Top with low-fat cheese and broil until cheese is melted. May also be made with chicken salad of shrimp salad. [place] with different types of cheeses.    - Chicken or beef fajitas (no tortilla, rice, beans, chips). Top meat and veggies w/ fresh salsa, fat free sour cream.     - Homemade low-fat Chili using extra lean ground beef or ground turkey. Top with shredded cheese and salsa as desired. May add dollop fat-free sour cream if desired    - Chicken parmesan: Top chicken breast w/ low sugar marinara sauce, mozzarella cheese and bake until chicken reaches 165*.  Serve w/ spaghetti SQUASH or Danish cut green beans    - Dinner Omelet with shrimp or chicken and onion, green peppers and chives.    - No noodle lasagna: Use sliced zucchini or eggplant in place of noodles.  Layer with part skim ricotta cheese and low sugar meat sauce (use very lean ground beef or ground turkey).    - Mexican chicken bake: Bake chunks of chicken breast or thigh with taco seasoning, Pace brand enchilada sauce, green onions and low-fat  cheese. Serve with ¼ cup black beans or fat free refried beans topped with chopped tomatoes or salsa.    - Giuseppe frozen meatballs, simmered in Classico Marinara sauce. Different flavors of salsa or spaghetti sauce create different dishes! Sprinkle with parmesan cheese. Serve with grilled or steamed veggies, or a dark green salad.    - Simmer boneless skinless chicken thigh chunks in Classico Marinara sauce or roasted salsa until tender with chopped onion, bell pepper, garlic, mushrooms, spinach, etc.     - Hamburger or veggie burger, without the bun, dressed the way you like. Served with grilled or steamed veggies.    - Eggplant parmesan: Bake slices of eggplant at 350 degrees for 15 minutes. Layer tomato sauce, sliced eggplant and low-fat mozzarella cheese in a baking dish and cover with foil. Bake 30-40 more minutes or until bubbly. Uncover and bake at 400 degrees for about 15 more minutes, or until top is slightly crisp.    - Fish tacos: grilled/baked white fish, wrapped in Cristian lettuce leaf, topped with salsa, shredded low-fat cheese, and light coleslaw.    - Chicken hilton: Sprinkle chicken w/ 1 tsp of hidden valley ranch dip mix. Then grill chicken and top with black beans, salsa and 1 tsp fat free sour cream.     - Cauliflower pizza crust: Use cauliflower as crust (see recipe on pricilla, no flour!). Top w/ low fat cheese, turkey pepperoni and veggies and bake again    - chicken or turkey crust pizza: use ground chicken or turkey instead of cauliflower, spread in [place] and bake at 350 for about 20-30 minutes(may want to add garlic, black pepper, oregano and other herbs to ground meat mixture).  Remove and top w/ low fat cheese, turkey pepperoni and veggies and bake again for another 10 minutes or until cheese is browned.     Snacks: (100-200 calories; >5g protein)    - 1 low-fat cheese stick with 8 cherry tomatoes or 1 serving fresh fruit  - 4 thin slices fat-free turkey breast and 1 slice low-fat  cheese  - 4 thin slices fat-free honey ham with wedge of melon  - 6-8 edamame pods (equivalent to about 1/4 cup edamame without pods).   - 1/4 cup unsalted nuts with ½ cup fruit  - 6-oz container Dannon Light n Fit vanilla yogurt, topped with 1oz unsalted nuts         - apple, celery or baby carrots spread with 2 Tbsp PB2  - apple slices with 1 oz slice low-fat cheese  - Apple slices dipped in 2 Tbsp of PB2  - celery, cucumber, bell pepper or baby carrots dipped in ¼ cup hummus bean spread or light spinach and artichoke dip (*recipe in lunch section)  - celery, cucumber, baby carrots dipped in high protein greek yogurt (Mix 16 oz plain greek yogurt + 1 packet of hidden valley ranch dip mix)  - Jero Links Beef Steak - 14g protein! (similar to beef jerky)  - 2 wedges [place] - Light Herb & Garlic Cheese with sliced cucumber or green bell pepper  - 1/2 cup low-fat cottage cheese with ¼ cup fruit or ¼ cup salsa  - RTD Protein drinks: Atkins, Low Carb Slim Fast, EAS light, Muscle Milk Light, etc.  - Homemade Protein drinks: GNC Soy95, Isopure, Nectar, UNJURY, Whey Gourmet, etc. Mix 1 scoop powder with 8oz skim/1% milk or light soymilk.  - Protein bars: Atkins, EAS, Pure Protein, Think Thin, Detour, etc. Must have 0-4 grams sugar - Read the label.    Takeout Options: No more than twice/week  Deli - Salads (no pasta or rice), meats, cheeses. Roasted chicken. Lox (salmon)    Mexican - Platters which don't include tortillas, chips, or rice. Go easy on the beans. Example: Fajitas without the tortillas. Ask the  not to bring chips to the table if they are too tempting.    Greek - Meat or fish and vegetable, but no bread or rice. Including hummus, baba ganoush, etc, is OK. Most sit-down Greek restaurants can provide you with cucumber slices for dipping instead of peyton bread.    Fast Food (Avoid as much as possible) - Salads (no croutons and limit salad dressing to 2 tbsp), grilled chicken sandwich without the bun  and ask for no gonzalez. Normas low fat chili or Taco Bell pintos and cheese.    BBQ - The meats are fine if you ask for sauces on the side, but most of the traditional side dishes are loaded with carbs. Basim slaw, baked beans and BBQ sauce are typically made with sugar.    Chinese - Nothing deep-fried, no rice or noodles. Many Chinese sauces have starch and sugar in them, so you'll have to use your judgement. If you find that these sauces trigger cravings, or cause Dumping, you can ask for the sauce to be made without sugar or just use soy sauce.

## 2018-11-23 NOTE — PROGRESS NOTES
Subjective:      Patient ID: Renae Hou is a 58 y.o. female.    Chief Complaint: Follow-up      HPI:  Renae Hou is a 57 year old female with chronic pain, fatty liver, gastroesophageal reflux disease, hypertension, hyperlipidemia, lumbar radiculopathy, obesity, prediabetes, spondylolisthesis, and obstructive sleep apnea who presents to clinic today for hospital follow up.    Hospitalized 11/8/18 - 11/9/18 due to chest pressure and tightness; substernal and band like under her ribs.  Was walking from elevator to desk at work when symptoms began.  Endorses associated diaphoresis and shortness of breath at that time.  Did not improve with rest.  Saw nurse at work (Micaela) and told to go to the ED; also told her pulse was high at that time.  Presented to ED with chest pain and associated leg pain.  Given nitroglycerin, symptoms resolved after administration of nitroglycerin.  Kept for observation.  Troponins negative.  Stress echo showed no signs of ischemia.  VQ scan with low probability for PE. US LE without signs of DVT; patient notes that the US tech did not examine the area in her calf where her clot was previously noted to.  Discharged home.  States she has had some mild recurrences of her symptoms; once it woke her up from sleep 11/11/18.  Stayed awake for 1.5 hours then was able to go back to sleep.  Also complains of right upper back pain when walking for a while which is fairly reproducible.  Denies any associated anxiety.  States her chest discomfort is not reproducible to palpation.  New problem for patient.  Denies associated palpitations.  Denies presence of symptoms currently.  Of note patient also on Protonix for GERD; has CT ABD/pelvis pending ordered per GI.      Past Medical History:   Diagnosis Date    Abscess of paraspinous muscles     Allergy 4/2018    DVT (deep venous thrombosis)     left leg    Epidural abscess 4/10/2018    Fatty liver     GERD (gastroesophageal reflux disease)  2015    Hypertension     Lumbar radiculopathy     Menopause     Obesity     Obstructive sleep apnea on CPAP     Thyroid disease     Thyroid nodules.       Past Surgical History:   Procedure Laterality Date    ADENOIDECTOMY  1995    BACK SURGERY      L4, L5    BILATERAL SALPINGOOPHORECTOMY      BLOCK-NERVE-MEDIAL BRANCH-LUMBAR Left 3/16/2018    [facility]    CHOLECYSTECTOMY      CYST REMOVAL      NEERAJ-TRANSFORAMINAL Left 3/2/2018    [facility]    FUSION-POSTERIOR LUMBAR INTERBODY FUSION L4/5, L5/S1 N/A 4/10/2018    [facility]formed [facility]as [facility] at [facility] OR 2ND [facility]    HYSTERECTOMY  2012    [facility]     INJECTION-FACET Left 3/2/2018    [facility]    RECTOCELE REPAIR      SPINE SURGERY      THYROID NODULE REMOVAL      TONSILLECTOMY         Family History   Problem Relation Age of Onset    Hypertension Mother     Hyperlipidemia Mother     Heart disease Mother 55        cad, valvular heart disease    Alcohol abuse Sister     Stroke Father     No Known Problems Daughter     No Known Problems Daughter     Breast cancer Neg Hx     Colon cancer Neg Hx     Ovarian cancer Neg Hx     Diabetes Neg Hx     Esophageal cancer Neg Hx        Social History     Socioeconomic History    Marital status:      Spouse name: None    Number of children: None    Years of education: None    Highest education level: None   Social Needs    Financial resource strain: None    Food insecurity - worry: None    Food insecurity - inability: None    Transportation needs - medical: None    Transportation needs - non-medical: None   Occupational History    None   Tobacco Use    Smoking status: Former Smoker     Packs/day: 0.25     Years: 32.00     Pack years: 8.00     Types: Cigarettes     Last attempt to quit:      Years since quittin.9    Smokeless  "tobacco: Never Used    Tobacco comment: smoked socially decades ago - weekends only   Substance and Sexual Activity    Alcohol use: No     Frequency: Never    Drug use: No    Sexual activity: Not Currently     Partners: Male     Birth control/protection: See Surgical Hx, None     Comment: [facility] BS&O 12/17/2012,     Other Topics Concern    None   Social History Narrative    . 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       Review of Systems   Constitutional: Negative for chills, fatigue and fever.   HENT: Negative for congestion, hearing loss, nosebleeds, rhinorrhea, sore throat and trouble swallowing.    Eyes: Negative for pain and visual disturbance.   Respiratory: Negative for cough, shortness of breath and wheezing.    Cardiovascular: Negative for chest pain and palpitations.   Gastrointestinal: Negative for abdominal distention, abdominal pain, constipation, diarrhea, nausea and vomiting.   Genitourinary: Negative for decreased urine volume, difficulty urinating, dysuria, hematuria and urgency.   Musculoskeletal: Negative for arthralgias, back pain and myalgias.   Skin: Negative for color change and rash.   Neurological: Negative for dizziness, tremors, weakness, light-headedness, numbness and headaches.   Psychiatric/Behavioral: Negative for agitation, behavioral problems and confusion. The patient is not nervous/anxious.      Objective:     Vitals:    11/23/18 0855   BP: 118/80   BP Location: Left arm   Patient Position: Sitting   BP Method: Large (Manual)   Pulse: 87   Temp: 98.5 °F (36.9 °C)   SpO2: 97%   Weight: 106.3 kg (234 lb 5.6 oz)   Height: 5' 5" (1.651 m)       Physical Exam   Constitutional: She is oriented to person, place, and time. She appears well-developed and well-nourished.   Obese.   HENT:   Head: Normocephalic and atraumatic.   Right Ear: External ear normal.   Left Ear: External ear normal.   Nose: Nose normal.   Mouth/Throat: Oropharynx is clear and moist.   Eyes: Conjunctivae and EOM are normal. " Pupils are equal, round, and reactive to light.   Neck: Normal range of motion. Neck supple. No tracheal deviation present.   Cardiovascular: Normal rate, regular rhythm and normal heart sounds. Exam reveals no gallop and no friction rub.   No murmur heard.  Pulmonary/Chest: Effort normal and breath sounds normal. No respiratory distress. She has no wheezes. She has no rales.   Abdominal: Soft. Bowel sounds are normal. She exhibits no distension. There is no tenderness. There is no rebound and no guarding.   Musculoskeletal: Normal range of motion. She exhibits no edema or deformity.   No tenderness to palpation over the sternum or anterior chest.   Lymphadenopathy:     She has no cervical adenopathy.   Neurological: She is alert and oriented to person, place, and time. Coordination normal.   Skin: Skin is warm and dry.   Psychiatric: She has a normal mood and affect. Her behavior is normal.   Nursing note and vitals reviewed.     Assessment:      1. Exertional chest pain    2. Chronic deep vein thrombosis (DVT) of distal vein of left lower extremity      Plan:   Renae was seen today for follow-up.    Diagnoses and all orders for this visit:    Exertional chest pain        -     Ambulatory Referral to Cardiology for further evaluation and recommendations.  Asymptomatic currently.  Noted improvement with nitroglycerin.  Symptoms likely not cardiac in origin with negative work up during hospitalization though would prefer reassurance from cardiology.  Denies associated palpitations.  Denies associated anxiety.  Does have GERD, on Protonix which may be a contributing factor.  Pain not reproducible on exam.    Chronic deep vein thrombosis (DVT) of distal vein of left lower extremity  -     To follow up in January once completed 3 months of anticoagulation; consider repeat US at that time.

## 2018-11-23 NOTE — PROGRESS NOTES
NUTRITION NOTE    Referring Physician: Duc Ratliff M.D.  Reason for MNT Referral: 6 months Medically Supervised Diet pending Gastric Sleeve    Patient presents for 2nd visit for MSD with weight at a standstill over the past month. Started making some dietary and lifestyle changes. Bought Optifast shakes (4g sugar, 16g prot and 160 krysten) and bars (16g prot and 1-2g sugar) and plans to start using them soon. Started doing [employer] meals 3 nights/week to limit dining out. Those meals are healthy, but not quite low carb. Still drinking coffee daily with sugar.    CLINICAL DATA:  58 y.o. female.    Current Weight: 234 lbs  Weight Change Since Initial Visit: 0 lbs  Body mass index is 39.07 kg/m².    NUTRITIONAL NEEDS:  1659 x 1.2 activity factor = 1991 kcal -750 kcal for wt loss = 1240 Calories (using [address] [address] Jeor Equation)  79-98 Grams Protein (1.2-1.5 gm/kg IBW)    CURRENT DIET:  Regular diet.  Verbal Diet Recall: Food records are not present.    B: 1 slice ww toast with PB  L: 16oz cup yogurt with granola  D: [employer] meals 3 times/week (grilled chicken with carrots and farro salad OR Greek-spiced beef, cous cous, kale with tahini dressing OR jalapeno burger on bun with goat cheese and roasted carrots, chicken tostadas with black beans and sweet potatoes) or home cooked meal (pork loin with peas - did not eat the mashed potatoes OR bowl of lima beans with smoked sausage)    Diet Includes:   Meal Pattern: Same.  Protein Supplements: none.  Snacking: Not a snacker. Snacks on weekends include healthy choices, junk foods and sweets. Yogurt, granola bars.  Vegetables: Likes a variety. Eats daily.  Fruits: Likes a variety. Eats almost daily.  Beverages: water, diet tea, coffee with sugar and 1% milk  Dining Out:  Reduced lately.   Cooking at home: Daily. Mostly baked, grilled and smothered meat, fish, starchy CHO and vegetables.    Exercise:  Current exercise: PT x 2/wk   Restrictions to exercise: back  pain     Vitamins / Minerals / Herbs:   Vit D otc 2000 IU daily  B12 swallow pill  Bought B-complex - not taking       Labs:   Reviewed      Food Allergies:   Iodine and shellfish      Social:  Works regular daytime shifts.  Lives with .  Grocery shopping and food prep patient completes.  Patient believes the household will be supportive after surgery.  Alcohol: Socially. Once to twice a year  Smoking: None.    ASSESSMENT:  Patient demonstrates some willingness to change lifestyle habits as evidenced by increased vegetables, reduced dining out and healthier cooking at home.    Doing fairly well with working on greatest challenges (not knowing what to eat and lack of energy).    Barriers to Education:  none  Stage of Change:  contemplation    NUTRITION DIAGNOSIS:  Obesity related to Excessive carbohydrate intake as evidence by BMI.  Status: Same    PLAN:    Diet: Adjust diet plan.  Continue to review Bariatric Nutrition Guidebook at home and call with any questions.  Work on Bariatric Nutrition Checklist.  Work on expanding variety of vegetables.  Work on gradually cutting back on starchy CHO in the diet.  5-6 meals per day.  Start including protein supplements in the diet plan daily.   Eliminate sugar from coffee.  Try adding salad to dinner meal and cutting back on starchy CHO portion.    Exercise: Increase.   Walking with a goal of 2-3 times/week    Behavior Modification: Begin to document food & activity logs daily.    Weight loss prior to surgery (5-10% TBW): 11-23 lbs    Return to clinic in one month.  Needs 4 additional visit(s) with RD for MSD.    Communicated nutrition plan with bariatric team.    SESSION TIME:  30 minutes

## 2018-11-27 ENCOUNTER — TELEPHONE (OUTPATIENT)
Dept: GASTROENTEROLOGY | Facility: CLINIC | Age: 58
End: 2018-11-27

## 2018-11-27 ENCOUNTER — TELEPHONE (OUTPATIENT)
Dept: CARDIOLOGY | Facility: CLINIC | Age: 58
End: 2018-11-27

## 2018-11-27 DIAGNOSIS — R00.2 PALPITATION: Primary | ICD-10-CM

## 2018-11-27 NOTE — TELEPHONE ENCOUNTER
----- Message from Raphael Eduardo MD sent at 11/27/2018  7:59 AM CST -----  CT scan looks fine. Is she feeling better on Pantoprazole?

## 2018-11-28 ENCOUNTER — TELEPHONE (OUTPATIENT)
Dept: GASTROENTEROLOGY | Facility: CLINIC | Age: 58
End: 2018-11-28

## 2018-11-28 ENCOUNTER — PATIENT MESSAGE (OUTPATIENT)
Dept: GASTROENTEROLOGY | Facility: CLINIC | Age: 58
End: 2018-11-28

## 2018-11-28 NOTE — TELEPHONE ENCOUNTER
Dr. Eduardo,  Patient states she feeling better while taking the pantoprazole and she will call if she needs a follow up appt  'She states she has an enlarged liver also   Please advise

## 2018-11-28 NOTE — TELEPHONE ENCOUNTER
----- Message from Rena Paul MA sent at 11/27/2018  3:06 PM CST -----  Contact: self  [phone]  Patient Returning Call from [facility]    Who Left Message for Patient:  adonis   Communication Preference:  Phone# above  Additional Information:  na

## 2018-11-29 ENCOUNTER — PATIENT MESSAGE (OUTPATIENT)
Dept: CARDIOLOGY | Facility: CLINIC | Age: 58
End: 2018-11-29

## 2018-11-30 ENCOUNTER — OFFICE VISIT (OUTPATIENT)
Dept: CARDIOLOGY | Facility: CLINIC | Age: 58
End: 2018-11-30
Payer: COMMERCIAL

## 2018-11-30 ENCOUNTER — OFFICE VISIT (OUTPATIENT)
Dept: ORTHOPEDICS | Facility: CLINIC | Age: 58
End: 2018-11-30
Payer: COMMERCIAL

## 2018-11-30 VITALS
BODY MASS INDEX: 38.67 KG/M2 | WEIGHT: 232.13 LBS | HEART RATE: 94 BPM | HEIGHT: 66 IN | DIASTOLIC BLOOD PRESSURE: 80 MMHG | BODY MASS INDEX: 37.35 KG/M2 | HEIGHT: 65 IN | SYSTOLIC BLOOD PRESSURE: 120 MMHG | WEIGHT: 232.38 LBS

## 2018-11-30 DIAGNOSIS — R07.89 OTHER CHEST PAIN: Primary | ICD-10-CM

## 2018-11-30 DIAGNOSIS — Z98.1 S/P LUMBAR FUSION: Primary | ICD-10-CM

## 2018-11-30 PROCEDURE — 3008F BODY MASS INDEX DOCD: CPT | Mod: CPTII,S$GLB,, | Performed by: PHYSICIAN ASSISTANT

## 2018-11-30 PROCEDURE — 3079F DIAST BP 80-89 MM HG: CPT | Mod: CPTII,S$GLB,,

## 2018-11-30 PROCEDURE — 3008F BODY MASS INDEX DOCD: CPT | Mod: CPTII,S$GLB,,

## 2018-11-30 PROCEDURE — 99204 OFFICE O/P NEW MOD 45 MIN: CPT | Mod: S$GLB,,,

## 2018-11-30 PROCEDURE — 99999 PR PBB SHADOW E&M-EST. PATIENT-LVL IV: CPT | Mod: PBBFAC,,,

## 2018-11-30 PROCEDURE — 3078F DIAST BP <80 MM HG: CPT | Mod: CPTII,S$GLB,, | Performed by: PHYSICIAN ASSISTANT

## 2018-11-30 PROCEDURE — 99999 PR PBB SHADOW E&M-EST. PATIENT-LVL III: CPT | Mod: PBBFAC,,, | Performed by: PHYSICIAN ASSISTANT

## 2018-11-30 PROCEDURE — 3074F SYST BP LT 130 MM HG: CPT | Mod: CPTII,S$GLB,,

## 2018-11-30 PROCEDURE — 3074F SYST BP LT 130 MM HG: CPT | Mod: CPTII,S$GLB,, | Performed by: PHYSICIAN ASSISTANT

## 2018-11-30 PROCEDURE — 99213 OFFICE O/P EST LOW 20 MIN: CPT | Mod: S$GLB,,, | Performed by: PHYSICIAN ASSISTANT

## 2018-11-30 NOTE — PROGRESS NOTES
"    Cardiology Clinic Note  Reason for Visit: Chest pain    HPI:   This is a 57yo lady/gentleman with history of HLD, RENAE, obesity, HTN, fatty liver, prediabetes, post-op DVT after disk fusion, and worsening GERD who presented to the ED with chest pain on 11/08.     As per prior note: The pain occurred when she "was walking from elevator to desk at work when symptoms began.  Endorses associated diaphoresis and shortness of breath at that time.  Did not improve with rest.  Saw nurse at work (Micaela) and told to go to the ED; also told her pulse was high at that time.  Presented to ED with chest pain and associated leg pain.  Given nitroglycerin, symptoms resolved after administration of nitroglycerin.  Kept for observation.  Troponins negative.  Stress echo showed no signs of ischemia.  VQ scan with low probability for PE. US LE without signs of DVT; patient notes that the US tech did not examine the area in her calf where her clot was previously noted to."     On further chart review, her blood pressure have been in the 110s, and even during ED encounter SBP was 134.    She had similar symptoms at rest that woke her from sleep on 11/11. She had a band-like pain across her chest, that evolved to be pressure-like. She had similar symptoms at night, and reports worsening GERD.    She has stopped all medications except for GERD and blood pressure as she felt like they were making her feel poorly. Denies symptoms of claudication      ROS:    Constitution: Negative for fever, chills, weight loss or gain, positive for malaise  HENT: Negative for sore throat, rhinorrhea, or headache.  Eyes: Negative for blurred or double vision.   Cardiovascular: See above  Pulmonary: Negative for SOB   Gastrointestinal: Negative for abdominal pain, nausea, vomiting, or diarrhea.   : Negative for dysuria.   Neurological: Negative for focal weakness or sensory changes.  PMH:     Past Medical History:   Diagnosis Date    Abscess of " paraspinous muscles     Allergy 4/2018    DVT (deep venous thrombosis)     left leg    Epidural abscess 4/10/2018    Fatty liver     GERD (gastroesophageal reflux disease) 2015    Hypertension     Lumbar radiculopathy     Menopause     Obesity     Obstructive sleep apnea on CPAP     Thyroid disease     Thyroid nodules.     Past Surgical History:   Procedure Laterality Date    ADENOIDECTOMY  1995    BACK SURGERY  2002    L4, L5    BILATERAL SALPINGOOPHORECTOMY      BLOCK-NERVE-MEDIAL BRANCH-LUMBAR Left 3/16/2018    [facility]    CHOLECYSTECTOMY      CYST REMOVAL      NEERAJ-TRANSFORAMINAL Left 3/2/2018    [facility]    FUSION-POSTERIOR LUMBAR INTERBODY FUSION L4/5, L5/S1 N/A 4/10/2018    [facility]    HYSTERECTOMY  12/17/2012    [facility]     INJECTION-FACET Left 3/2/2018    [facility] at [facility]    RECTOCELE REPAIR      SPINE SURGERY  2018    THYROID NODULE REMOVAL      TONSILLECTOMY  1995     Allergies:     Review of patient's allergies indicates:   Allergen Reactions    Cathflo activase [alteplase] Anaphylaxis     Tightness in chest, raspy throat, restless legs, hotness all over    Iodine Anaphylaxis, Hives, Shortness Of Breath, Itching, Swelling and Rash     Other reaction(s): Difficulty breathing  Neck swelling     Shellfish containing products Anaphylaxis, Hives, Shortness Of Breath, Itching, Swelling and Rash     Other reaction(s): Difficulty breathing  Neck swelling     Heparin analogues      Restless legs, tightness in chest, and hot all over    Latex Swelling     Patient un sure about it    Iodine and iodide containing products      Medications:     Current Outpatient Medications on File Prior to Visit   Medication Sig Dispense Refill    clotrimazole-betamethasone 1-0.05% (LOTRISONE) cream clotrimazole-betamethasone 1 %-0.05 %  "topical cream      losartan-hydrochlorothiazide 50-12.5 mg (HYZAAR) 50-12.5 mg per tablet Take 1 tablet by mouth once daily. 90 tablet 3    pantoprazole (PROTONIX) 40 MG tablet Take 1 tablet (40 mg total) by mouth once daily. 30 tablet 3    POLYETHYLENE GLYCOL 3350 (MIRALAX ORAL) Take by mouth daily as needed. constipation      pregabalin (LYRICA) 150 MG capsule Take 1 capsule (150 mg total) by mouth nightly. 30 capsule 2     No current facility-administered medications on file prior to visit.      Social History:     Social History     Tobacco Use    Smoking status: Former Smoker     Packs/day: 0.25     Years: 32.00     Pack years: 8.00     Types: Cigarettes     Last attempt to quit:      Years since quittin.9    Smokeless tobacco: Never Used    Tobacco comment: smoked socially decades ago - weekends only   Substance Use Topics    Alcohol use: No     Frequency: Never     Comment: very rare     Family History:     Family History   Problem Relation Age of Onset    Hypertension Mother     Hyperlipidemia Mother     Heart disease Mother 55        cad, valvular heart disease    Alcohol abuse Sister     Stroke Father     No Known Problems Daughter     No Known Problems Daughter     Breast cancer Neg Hx     Colon cancer Neg Hx     Ovarian cancer Neg Hx     Diabetes Neg Hx     Esophageal cancer Neg Hx      Physical Exam:   /80 (BP Location: Left arm, Patient Position: Sitting, BP Method: Large (Automatic))   Pulse 94   Ht 5' 5.5" (1.664 m)   Wt 105.4 kg (232 lb 5.8 oz)   LMP 2012   BMI 38.08 kg/m²      Constitutional: No distress, obese, conversant  HEENT: Sclera anicteric, PERRLA, EOMI  Neck: No JVD, no masses, good movement  CV: RRR, S1 and S2 normal, no additional heart sounds or murmurs.  Pulm: Clear to auscultation bilaterally with symmetrical expansion.  GI: Abdomen soft, non-tender, good bowel sounds  Extremities: Both extremities intact and grossly normal, skin is warm, " 1+ edema noted. 1+ DP pulses on legs bilaterally, 1+ PT on left, no PT palpable on right.  Skin: No ecchymosis, erythema, or ulcers  Psych: AOx3, appropriate affect  Neuro: CNII-XII intact, no focal deficits      Labs:     Lab Results   Component Value Date     11/08/2018    K 4.5 11/08/2018     11/08/2018    CO2 25 11/08/2018    BUN 17 11/08/2018    CREATININE 0.9 11/08/2018    ANIONGAP 9 11/08/2018     Lab Results   Component Value Date    HGBA1C 6.1 (H) 10/05/2018     Lab Results   Component Value Date    BNP <10 11/08/2018    Lab Results   Component Value Date    WBC 7.31 11/08/2018    HGB 13.4 11/08/2018    HCT 38 11/08/2018     11/08/2018    GRAN 4.5 11/08/2018    GRAN 61.3 11/08/2018     Lab Results   Component Value Date    CHOL 190 11/08/2018    HDL 48 11/08/2018    LDLCALC 112.0 11/08/2018    TRIG 150 11/08/2018          Imaging:    Stress ECHO 11/08  · Left ventricle ejection fraction is low normal at 55%  · Left ventricle shows concentric remodeling.  · Normal LV diastolic function.  · No wall motion abnormalities.  · RV systolic function is normal.  · The patient reported SOB (non-anginal) and chest discomfort during the stress test.  · There were no arrhythmias during stress.  · The test was stopped secondary to shortness of breath.  · No pericardial effusion.  · Overall, the patient's exercise capacity was mildly impaired.  · The EKG portion of this study is negative for myocardial ischemia.  · The stress echo portion of this study is negative for myocardial ischemia.  · No ischemia by ECG and echo despite the symptoms at rest that worsened with stress and later returned to the baseline level      EKG: NSR, normal axis, non-specific T-wave change in AvL  Assessment:   1. Chest pain -- in the setting of negative stress testing, normal EKG, and low ASCVD this is likely Reflux associated. If not improving with reflux management will consider coronary CT for further risk  stratification  2. GERD -- poorly controlled  3. HLD - well controlled with  (ASCVD = 3.4%)  4. HTN -- well controlled  5. DVT -- continue xarelto    Plan:   Recommended she restart the Xarelto, if symptoms of fatigue recur, will switch to Eliquis 5mg BID  Can continue to hold metformin and estrace cream  Continue her blood pressure medication  Discussed dietary strategies and head of bed elevation to limit GERD  Counseled on exercise  RTC PRN if symptoms persist    Signed:  William Newsome MD  Cardiology Fellow  Pager - [phone]  11/30/2018 1:10 PM

## 2018-11-30 NOTE — PROGRESS NOTES
Date: 11/30/2018    Supervising Physician: Dean Zayas M.D.    Date of Surgery: 4/10/2018    Procedure: L4/5/S1 TLIF for grade I spondylolisthesis with large   left-sided L4-L5 facet cyst, possible epidural abscess as well as paraspinal muscle abscess.        History: Renae Hou is seen today for follow-up following the above listed procedure. Overall the patient is doing well but today notes she does not have any pain.  She is not taking anything for pain.  She completed PT and is very pleased.  She has no complaints today.       Exam: Incision is healed.   There is no sign of infection. Neuro exam is stable. No signs of DVT.    Radiographs: no new imaging today.      Assessment/Plan:     Patient is doing well.  Follow up at a year from surgery.

## 2018-12-07 ENCOUNTER — PATIENT MESSAGE (OUTPATIENT)
Dept: ORTHOPEDICS | Facility: CLINIC | Age: 58
End: 2018-12-07

## 2018-12-08 ENCOUNTER — PATIENT MESSAGE (OUTPATIENT)
Dept: INTERNAL MEDICINE | Facility: CLINIC | Age: 58
End: 2018-12-08

## 2018-12-10 ENCOUNTER — PATIENT MESSAGE (OUTPATIENT)
Dept: PSYCHIATRY | Facility: CLINIC | Age: 58
End: 2018-12-10

## 2018-12-10 NOTE — TELEPHONE ENCOUNTER
Hi - not sure if one of you may be able to help her with this.  I'm thinking she meant to e-mail the bariatric department since I am unable to prescribed medications.  Thanks!    JR

## 2018-12-10 NOTE — TELEPHONE ENCOUNTER
I was going to tell her Dr. Florentino, but I thought it'd be best to let one of you respond since you will know better than me.  Thanks again.  JR

## 2018-12-17 ENCOUNTER — TELEPHONE (OUTPATIENT)
Dept: BARIATRICS | Facility: CLINIC | Age: 58
End: 2018-12-17

## 2018-12-17 NOTE — TELEPHONE ENCOUNTER
12/17/18 at 4:14pm - called to reschedule appt, left voicemail.    ----- Message from Paddy Sanches PA-C sent at 12/17/2018  9:49 AM CST -----  Jen Dinero,  This lady would like to reschedule with you from Dec 19 th. I received a message in my in box. If you could reach out to her and let her know your schedule that'd be great.     Ashley

## 2018-12-19 ENCOUNTER — PATIENT MESSAGE (OUTPATIENT)
Dept: BARIATRICS | Facility: CLINIC | Age: 58
End: 2018-12-19

## 2018-12-26 ENCOUNTER — OFFICE VISIT (OUTPATIENT)
Dept: URGENT CARE | Facility: CLINIC | Age: 58
End: 2018-12-26
Payer: COMMERCIAL

## 2018-12-26 VITALS
TEMPERATURE: 101 F | HEART RATE: 106 BPM | HEIGHT: 65 IN | RESPIRATION RATE: 19 BRPM | WEIGHT: 232 LBS | SYSTOLIC BLOOD PRESSURE: 158 MMHG | OXYGEN SATURATION: 96 % | BODY MASS INDEX: 38.65 KG/M2 | DIASTOLIC BLOOD PRESSURE: 94 MMHG

## 2018-12-26 DIAGNOSIS — R50.9 FEVER, UNSPECIFIED FEVER CAUSE: Primary | ICD-10-CM

## 2018-12-26 DIAGNOSIS — R52 BODY ACHES: ICD-10-CM

## 2018-12-26 DIAGNOSIS — J06.9 UPPER RESPIRATORY TRACT INFECTION, UNSPECIFIED TYPE: ICD-10-CM

## 2018-12-26 DIAGNOSIS — J98.01 ACUTE BRONCHOSPASM: ICD-10-CM

## 2018-12-26 LAB
CTP QC/QA: YES
FLUAV AG NPH QL: NEGATIVE
FLUBV AG NPH QL: NEGATIVE

## 2018-12-26 PROCEDURE — 3080F DIAST BP >= 90 MM HG: CPT | Mod: CPTII,S$GLB,, | Performed by: INTERNAL MEDICINE

## 2018-12-26 PROCEDURE — 87804 INFLUENZA ASSAY W/OPTIC: CPT | Mod: QW,S$GLB,, | Performed by: INTERNAL MEDICINE

## 2018-12-26 PROCEDURE — 3077F SYST BP >= 140 MM HG: CPT | Mod: CPTII,S$GLB,, | Performed by: INTERNAL MEDICINE

## 2018-12-26 PROCEDURE — 99214 OFFICE O/P EST MOD 30 MIN: CPT | Mod: 25,S$GLB,, | Performed by: INTERNAL MEDICINE

## 2018-12-26 PROCEDURE — 96372 THER/PROPH/DIAG INJ SC/IM: CPT | Mod: S$GLB,,, | Performed by: INTERNAL MEDICINE

## 2018-12-26 PROCEDURE — 3008F BODY MASS INDEX DOCD: CPT | Mod: CPTII,S$GLB,, | Performed by: INTERNAL MEDICINE

## 2018-12-26 RX ORDER — BETAMETHASONE SODIUM PHOSPHATE AND BETAMETHASONE ACETATE 3; 3 MG/ML; MG/ML
9 INJECTION, SUSPENSION INTRA-ARTICULAR; INTRALESIONAL; INTRAMUSCULAR; SOFT TISSUE ONCE
Status: COMPLETED | OUTPATIENT
Start: 2018-12-26 | End: 2018-12-26

## 2018-12-26 RX ADMIN — BETAMETHASONE SODIUM PHOSPHATE AND BETAMETHASONE ACETATE 9 MG: 3; 3 INJECTION, SUSPENSION INTRA-ARTICULAR; INTRALESIONAL; INTRAMUSCULAR; SOFT TISSUE at 04:12

## 2018-12-26 NOTE — PROGRESS NOTES
"Subjective:       Patient ID: Renae Hou is a 58 y.o. female.    Vitals:  height is 5' 5" (1.651 m) and weight is 105.2 kg (232 lb). Her oral temperature is 101.3 °F (38.5 °C) (abnormal). Her blood pressure is 158/94 (abnormal) and her pulse is 106. Her respiration is 19 and oxygen saturation is 96%.     Chief Complaint: Fever and Neck Pain    Fever    This is a new problem. The current episode started yesterday. The problem has been gradually worsening. The maximum temperature noted was 102 to 102.9 F. The temperature was taken using an oral thermometer. Associated symptoms include congestion and headaches. Pertinent negatives include no chest pain, coughing, diarrhea, nausea, rash, sore throat, urinary pain or vomiting. Treatments tried: Motrin, tylenol. The treatment provided mild relief.   Neck Pain    This is a new problem. The current episode started yesterday. The problem occurs constantly. The problem has been gradually worsening. The pain is associated with nothing. The pain is at a severity of 7/10. The pain is moderate. Nothing aggravates the symptoms. The pain is same all the time. Associated symptoms include a fever and headaches. Pertinent negatives include no chest pain or weakness. Treatments tried: Motrin. The treatment provided moderate relief.       Constitution: Positive for fever. Negative for chills and fatigue.   HENT: Positive for congestion. Negative for sore throat.    Neck: Positive for neck pain. Negative for painful lymph nodes.   Cardiovascular: Negative for chest pain and leg swelling.   Eyes: Negative for double vision and blurred vision.   Respiratory: Negative for cough and shortness of breath.    Gastrointestinal: Negative for nausea, vomiting and diarrhea.   Genitourinary: Negative for dysuria, frequency, urgency and history of kidney stones.   Musculoskeletal: Negative for joint pain, joint swelling, muscle cramps and muscle ache.   Skin: Negative for color change, pale, " rash and bruising.   Allergic/Immunologic: Negative for seasonal allergies.   Neurological: Positive for headaches. Negative for dizziness, history of vertigo, light-headedness and passing out.   Hematologic/Lymphatic: Negative for swollen lymph nodes.   Psychiatric/Behavioral: Negative for nervous/anxious, sleep disturbance and depression. The patient is not nervous/anxious.        Objective:      Physical Exam   Constitutional: She appears well-developed and well-nourished. She appears ill.   HENT:   Head: Normocephalic and atraumatic.   Nose: Mucosal edema present.   Mouth/Throat: Posterior oropharyngeal edema and posterior oropharyngeal erythema present.   Eyes: Conjunctivae and EOM are normal. Pupils are equal, round, and reactive to light.   Neck: Normal range of motion. Neck supple.   Cardiovascular: Normal rate and regular rhythm.   Pulmonary/Chest: Effort normal.   Scant exp wheeze   Nursing note and vitals reviewed.      Assessment:       1. Fever, unspecified fever cause    2. Body aches    3. Acute bronchospasm    4. Upper respiratory tract infection, unspecified type        Plan:         Fever, unspecified fever cause  -     POCT Influenza A/B    Body aches  -     POCT Influenza A/B    Acute bronchospasm  -     betamethasone acetate-betamethasone sodium phosphate injection 9 mg    Upper respiratory tract infection, unspecified type

## 2019-01-03 ENCOUNTER — TELEPHONE (OUTPATIENT)
Dept: INTERNAL MEDICINE | Facility: CLINIC | Age: 59
End: 2019-01-03

## 2019-01-10 ENCOUNTER — CLINICAL SUPPORT (OUTPATIENT)
Dept: BARIATRICS | Facility: CLINIC | Age: 59
End: 2019-01-10
Payer: COMMERCIAL

## 2019-01-10 VITALS — WEIGHT: 233.69 LBS | HEIGHT: 65 IN | BODY MASS INDEX: 38.93 KG/M2

## 2019-01-10 DIAGNOSIS — G47.33 OSA ON CPAP: ICD-10-CM

## 2019-01-10 DIAGNOSIS — R73.03 PRE-DIABETES: ICD-10-CM

## 2019-01-10 DIAGNOSIS — E66.9 CLASS 2 OBESITY WITH BODY MASS INDEX (BMI) OF 38.0 TO 38.9 IN ADULT, UNSPECIFIED OBESITY TYPE, UNSPECIFIED WHETHER SERIOUS COMORBIDITY PRESENT: ICD-10-CM

## 2019-01-10 DIAGNOSIS — Z71.3 NUTRITIONAL COUNSELING: ICD-10-CM

## 2019-01-10 DIAGNOSIS — I10 ESSENTIAL HYPERTENSION: ICD-10-CM

## 2019-01-10 PROCEDURE — 99499 NO LOS: ICD-10-PCS | Mod: S$GLB,,, | Performed by: DIETITIAN, REGISTERED

## 2019-01-10 PROCEDURE — 97803 PR MED NUTR THER, SUBSQ, INDIV, EA 15 MIN: ICD-10-PCS | Mod: S$GLB,,, | Performed by: DIETITIAN, REGISTERED

## 2019-01-10 PROCEDURE — 99999 PR PBB SHADOW E&M-EST. PATIENT-LVL II: CPT | Mod: PBBFAC,,, | Performed by: DIETITIAN, REGISTERED

## 2019-01-10 PROCEDURE — 99499 UNLISTED E&M SERVICE: CPT | Mod: S$GLB,,, | Performed by: DIETITIAN, REGISTERED

## 2019-01-10 PROCEDURE — 99999 PR PBB SHADOW E&M-EST. PATIENT-LVL II: ICD-10-PCS | Mod: PBBFAC,,, | Performed by: DIETITIAN, REGISTERED

## 2019-01-10 PROCEDURE — 97803 MED NUTRITION INDIV SUBSEQ: CPT | Mod: S$GLB,,, | Performed by: DIETITIAN, REGISTERED

## 2019-01-10 NOTE — PROGRESS NOTES
NUTRITION NOTE     Referring Physician: Duc Ratliff M.D.  Reason for MNT Referral: 6 months Medically Supervised Diet pending Gastric Sleeve     Patient presents for 3rd visit for MSD with -1lbs weight loss over the past month. Started making some dietary and lifestyle changes. Pt planning on seeing Dr. Florentino next month - c/o always hungry especially at night.     CLINICAL DATA:  58 y.o. female.     Current Weight: 233 lbs  Weight Change Since Initial Visit: -1 lbs  Body mass index is 38.89 kg/m².     NUTRITIONAL NEEDS:  1659 x 1.2 activity factor = 1991 kcal -750 kcal for wt loss = 1240 Calories (using [place] [address] Jeor Equation)  79-98 Grams Protein (1.2-1.5 gm/kg IBW)     CURRENT DIET:  Regular diet.  Verbal Diet Recall: Food records are not present.     B: yogurt or 2 eggs  L: protein bar  Snack: protein shake  D: meatballs x 2 large or cereal with skim milk     Diet Includes:   Meal Pattern: Improved  Protein Supplements: none.  Snacking: Not a snacker. Snacks on weekends include healthy choices. Yogurt, granola bars.  Vegetables: Likes a variety. Eats daily.  Fruits: Likes a variety. Eats almost daily.  Beverages: water, diet tea, coffee without sugar and skim milk  Dining Out:  Reduced lately.   Cooking at home: Daily. Mostly baked, grilled and smothered meat, fish and vegetables.     Exercise:  Current exercise: planning to start gym (x3/wk)  Restrictions to exercise: none (back pain better)     Vitamins / Minerals / Herbs:   Vit D otc 2000 IU daily - not taking at this time      Labs:   Reviewed      Food Allergies:   Iodine and shellfish - also going to allergist within next month     Social:  Works regular daytime shifts.  Lives with .  Grocery shopping and food prep patient completes.  Patient believes the household will be supportive after surgery.  Alcohol: Socially. Once to twice a year  Smoking: None.     ASSESSMENT:  Patient demonstrates some willingness to change lifestyle habits as  evidenced by increased vegetables, reduced dining out and healthier cooking at home.     Doing fair with working on greatest challenges (not knowing what to eat and lack of energy).     Barriers to Education:  none  Stage of Change:  determination and action     NUTRITION DIAGNOSIS:  Obesity related to Excessive carbohydrate intake as evidence by BMI.  Status: Same     PLAN:     Diet: Adjust diet plan.  Work on expanding variety of vegetables.  Work on gradually cutting back on starchy CHO in the diet.  5-6 meals per day.  Start including protein supplements in the diet plan daily.      Exercise: Increase.   Walking with a goal of 2-3 times/week     Behavior Modification: Continue to document food & activity logs daily.     Weight loss prior to surgery (5-10% TBW): 11-23 lbs     Return to clinic in one month.  Needs 3 additional visit(s) with RD for MSD.     Communicated nutrition plan with bariatric team.     SESSION TIME:  30 minutes

## 2019-01-13 ENCOUNTER — PATIENT MESSAGE (OUTPATIENT)
Dept: OBSTETRICS AND GYNECOLOGY | Facility: CLINIC | Age: 59
End: 2019-01-13

## 2019-01-15 DIAGNOSIS — Z12.31 VISIT FOR SCREENING MAMMOGRAM: Primary | ICD-10-CM

## 2019-01-18 ENCOUNTER — OFFICE VISIT (OUTPATIENT)
Dept: UROGYNECOLOGY | Facility: CLINIC | Age: 59
End: 2019-01-18
Payer: COMMERCIAL

## 2019-01-18 VITALS
DIASTOLIC BLOOD PRESSURE: 90 MMHG | HEIGHT: 65 IN | SYSTOLIC BLOOD PRESSURE: 150 MMHG | BODY MASS INDEX: 39.45 KG/M2 | WEIGHT: 236.75 LBS

## 2019-01-18 DIAGNOSIS — K59.09 CHRONIC CONSTIPATION: ICD-10-CM

## 2019-01-18 DIAGNOSIS — N81.11 CYSTOCELE, MIDLINE: ICD-10-CM

## 2019-01-18 DIAGNOSIS — N94.10 DYSPAREUNIA, FEMALE: ICD-10-CM

## 2019-01-18 DIAGNOSIS — N39.46 URINARY INCONTINENCE, MIXED: ICD-10-CM

## 2019-01-18 DIAGNOSIS — I82.5Z2 CHRONIC DEEP VEIN THROMBOSIS (DVT) OF DISTAL VEIN OF LEFT LOWER EXTREMITY: ICD-10-CM

## 2019-01-18 DIAGNOSIS — N39.46 MIXED STRESS AND URGE URINARY INCONTINENCE: ICD-10-CM

## 2019-01-18 DIAGNOSIS — G89.29 OTHER CHRONIC PAIN: ICD-10-CM

## 2019-01-18 DIAGNOSIS — N81.6 RECTOCELE, FEMALE: ICD-10-CM

## 2019-01-18 DIAGNOSIS — R10.2 VAGINAL PAIN: Primary | ICD-10-CM

## 2019-01-18 DIAGNOSIS — N95.2 VAGINAL ATROPHY: ICD-10-CM

## 2019-01-18 DIAGNOSIS — R35.1 NOCTURIA MORE THAN TWICE PER NIGHT: ICD-10-CM

## 2019-01-18 PROCEDURE — 3008F BODY MASS INDEX DOCD: CPT | Mod: CPTII,S$GLB,, | Performed by: OBSTETRICS & GYNECOLOGY

## 2019-01-18 PROCEDURE — 3080F DIAST BP >= 90 MM HG: CPT | Mod: CPTII,S$GLB,, | Performed by: OBSTETRICS & GYNECOLOGY

## 2019-01-18 PROCEDURE — 99213 PR OFFICE/OUTPT VISIT, EST, LEVL III, 20-29 MIN: ICD-10-PCS | Mod: S$GLB,,, | Performed by: OBSTETRICS & GYNECOLOGY

## 2019-01-18 PROCEDURE — 3008F PR BODY MASS INDEX (BMI) DOCUMENTED: ICD-10-PCS | Mod: CPTII,S$GLB,, | Performed by: OBSTETRICS & GYNECOLOGY

## 2019-01-18 PROCEDURE — 99999 PR PBB SHADOW E&M-EST. PATIENT-LVL IV: CPT | Mod: PBBFAC,,, | Performed by: OBSTETRICS & GYNECOLOGY

## 2019-01-18 PROCEDURE — 99213 OFFICE O/P EST LOW 20 MIN: CPT | Mod: S$GLB,,, | Performed by: OBSTETRICS & GYNECOLOGY

## 2019-01-18 PROCEDURE — 3080F PR MOST RECENT DIASTOLIC BLOOD PRESSURE >= 90 MM HG: ICD-10-PCS | Mod: CPTII,S$GLB,, | Performed by: OBSTETRICS & GYNECOLOGY

## 2019-01-18 PROCEDURE — 99999 PR PBB SHADOW E&M-EST. PATIENT-LVL IV: ICD-10-PCS | Mod: PBBFAC,,, | Performed by: OBSTETRICS & GYNECOLOGY

## 2019-01-18 PROCEDURE — 3077F PR MOST RECENT SYSTOLIC BLOOD PRESSURE >= 140 MM HG: ICD-10-PCS | Mod: CPTII,S$GLB,, | Performed by: OBSTETRICS & GYNECOLOGY

## 2019-01-18 PROCEDURE — 3077F SYST BP >= 140 MM HG: CPT | Mod: CPTII,S$GLB,, | Performed by: OBSTETRICS & GYNECOLOGY

## 2019-01-18 RX ORDER — SILVER SULFADIAZINE 10 G/1000G
CREAM TOPICAL DAILY
Qty: 50 G | Refills: 11 | Status: SHIPPED | OUTPATIENT
Start: 2019-01-18 | End: 2019-07-10

## 2019-01-18 RX ORDER — OMEPRAZOLE 40 MG/1
CAPSULE, DELAYED RELEASE ORAL
Refills: 4 | COMMUNITY
Start: 2018-12-17 | End: 2019-01-18

## 2019-01-18 RX ORDER — LIDOCAINE HYDROCHLORIDE 20 MG/ML
JELLY TOPICAL 2 TIMES DAILY PRN
Qty: 30 ML | Refills: 11 | Status: SHIPPED | OUTPATIENT
Start: 2019-01-18 | End: 2019-07-10

## 2019-01-18 RX ORDER — MOMETASONE FUROATE 1 MG/G
OINTMENT TOPICAL
Refills: 3 | COMMUNITY
Start: 2018-11-21 | End: 2019-07-10

## 2019-01-18 NOTE — PROGRESS NOTES
Urogyn follow up  01/27/2019    [facility]  [address] [address]    Renae Hou  [id number]  1960      Renae Hou is a 58 y.o. here for a urogyn follow up.    1)  UI:  (+) CHRISTINE (cough, sneeze).  Most leakage is when she stands up after sitting--feels some pressure/things moving & when they settle, she has large gush.  Also has some leakage when bends to tie shoes. (+) UUI (mostly key in lock).  Had UI before but feels it's worse since disc fusion in Apr 2018.  Does have some L leg numbness residual since surgery.  On lyrica for nerve inflammation L.  Starting PT today.  Also has variable ability to feel urge to urinate--sometimes this is present/sometimes not.  Can also urinate better after she has a BM.    (--) pads. Changes underwear more frequently.   Think she will need to start.   Daytime frequency: Q 5 hours. Was going more frequently during back surgery.  Nocturia: Yes: 3/night.   (--) dysuria,  (--) hematuria,  (--) frequent UTIs.  (--) complete bladder emptying. PV to help with variable 2nd volume.     2)  POP:  Feels like something is not quite right in vagina.   (--) vaginal bleeding. (--) vaginal discharge. (--) sexually active--avoiding due to pain deep on L side (chronic)--was told she needed estrogen but this caused burning.  (+) dyspareunia.  (--)  Vaginal dryness.  (--) vaginal estrogen use.     3)  BM:  (+) constipation/straining. Takes miralax as needed. Lately, feels BMs are better.     (--) chronic diarrhea. Did have some loose stool x 2 months this summer. All eval neg.   (--) hematochezia. Has some BRB on wiping--has hemorrhoids.   (--) fecal incontinence.  (--) fecal smearing/urgency.  (--) complete evacuation. H/o rectocele repair in past--used to splint vaginally.  Now, has to splint perianally.     --initial exam:  PELVIC:    External genitalia:  Normal Bartholins, Skenes and labia bilaterally.    Urethra:  No caruncle,  diverticulum or masses.  (+) hypermobility.    Vagina:  Atrophy (+) , no bladder masses or tender, no discharge.  Small band of tissue at mid cuff--TTP--this is where she feels dyspareunia.   Cervix:  absent  Uterus: uterus absent  Adnexa: Not palpable.    POP-Q:  Aa 0; Ba 0; C -7; Ap 0; Bp 0.  Genital hiatus 3, perineal body 2, total vaginal length 10-11.    Past Medical History  Past Medical History:   Diagnosis Date    Abscess of paraspinous muscles     Allergy 2018    DVT (deep venous thrombosis)     left leg    Epidural abscess 4/10/2018    Fatty liver     GERD (gastroesophageal reflux disease)     Hypertension     Lumbar radiculopathy     Menopause     Obesity     Obstructive sleep apnea on CPAP     Thyroid disease     Thyroid nodules.        Past Surgical History  Past Surgical History:   Procedure Laterality Date    ADENOIDECTOMY      BACK SURGERY      L4, L5    BILATERAL SALPINGOOPHORECTOMY      BLOCK-NERVE-MEDIAL BRANCH-LUMBAR Left 3/16/2018    [facility]    CHOLECYSTECTOMY      CYST REMOVAL      NEERAJ-TRANSFORAMINAL Left 3/2/2018    [facility] at [facility]    FUSION-POSTERIOR LUMBAR INTERBODY FUSION L4/5, L5/S1 N/A 4/10/2018    [facility] OR 2ND FLR    HYSTERECTOMY  2012    [facility]     INJECTION-FACET Left 3/2/2018    [facility] at [facility]    RECTOCELE REPAIR      SPINE SURGERY  2018    THYROID NODULE REMOVAL      TONSILLECTOMY     Rectocele repair ~ (Juwan IV).    LSC adriana    Hysterectomy: Yes   Date: .  Indication: menorrhagia/fibroids.    Type:  robotic  Cervix present: No  Ovaries present: No  Other procedures at time of hysterectomy:  none    Past Ob History     x 2.  C/s x 0.    Largest infant weight: 7#2oz.   no FAVD. no episiotomy.  +ob lac.     Gynecologic History  LMP: Patient's last menstrual period  was 11/25/2012.  Age of menarche: 13  Age of menopause: with RATLH 2012  Menstrual history: h/o menorrhagia  Pap test: post RATLH.  History of abnormal paps: No.  History of STIs:  No  Mammogram: Date of last: 1/2018.  Result: Normal  Colonoscopy: Date of last: ~2017.  Result:  Benign polyps per report.  Repeat due:  ~2022.    DEXA:  Date of last: recently.  Result:  Normal per report.      Issues include:  Patient Active Problem List   Diagnosis    RENAE on CPAP    Shellfish allergy    Chronic constipation    History of major abdominal surgery    Hyperlipidemia    Pre-diabetes    Class 2 obesity due to excess calories in adult    Chronic pain    Lumbar radiculopathy    Acid reflux    Essential hypertension    Fatty liver    Spondylolisthesis at L4-L5 level    Spondylolisthesis    Abscess of paraspinous muscles    Spondylolisthesis, acquired    Obstructive sleep apnea on CPAP    Hypertension    GERD (gastroesophageal reflux disease)    Vaginal atrophy    Dyspareunia, female    Nocturia more than twice per night    Mixed stress and urge urinary incontinence    Rectocele, female    Cystocele, midline    Edema, lower extremity    Deep vein thrombosis (DVT) of lower extremity    Exertional chest pain       History since last visit:     1) Lower extremity swelling:  --was Dx with VTE--receiving Tx    2)  Mixed urinary incontinence, urge > stress:    --only did Kegels with back PT  --after done urinating, has a small gush of urine  --was having mild LE/PB decreased sensation; consider contribution but sacral reflexes and motor grossly intact:  Continue PT to rehab areas--improved  --has been trying to work more  --Empty bladder every 3 hours.  Empty well: wait a minute, lean forward on toilet.    --Avoid dietary irritants (see sheet).  Keep diary x 3-5 days to determine your irritants.  --KEGELS: do 10 in AM and 10 in PM, holding each x 10 seconds.  When you feel urge to go, STOP, KEGEL, and when  "urge has passed, then go to bathroom.  Consider PT in future.    --URGE: consider medication in future. Takes 2-4 weeks to see if will have effect.  For dry mouth: get sour, sugar free lozenge or gum.    --STRESS:  Pessary vs. Sling.     3)  Vaginal atrophy (dryness):  Did start but stopped due to VTE.     4)  Stage 2 cystocele/rectocele:  --no symptoms    5)  Dyspareunia (pain with intercourse):  --hasn't been really sexually active    6)  Nocturia (nighttime urination):   --trying to limit fluid    7)  Constipation:  --improved  --not taking anything  --trying to eat lots of salads    Medications:    Current Outpatient Medications:     clotrimazole-betamethasone 1-0.05% (LOTRISONE) cream, clotrimazole-betamethasone 1 %-0.05 % topical cream, Disp: , Rfl:     mometasone (ELOCON) 0.1 % ointment, ALTON TO RASH BID, Disp: , Rfl: 3    POLYETHYLENE GLYCOL 3350 (MIRALAX ORAL), Take by mouth daily as needed. constipation, Disp: , Rfl:     lidocaine HCL 2% (XYLOCAINE) 2 % jelly, Apply topically 2 (two) times daily as needed., Disp: 30 mL, Rfl: 11    omeprazole (PRILOSEC) 40 MG capsule, TK 1 C PO QD, Disp: , Rfl: 4    phentermine (ADIPEX-P) 37.5 mg tablet, Take 1 tablet (37.5 mg total) by mouth before breakfast., Disp: 30 tablet, Rfl: 2    silver sulfADIAZINE 1% (SILVADENE) 1 % cream, Apply topically once daily., Disp: 50 g, Rfl: 11    ROS:  As per HPI.      Exam  BP (!) 150/90   Ht 5' 5" (1.651 m)   Wt 107.4 kg (236 lb 12.4 oz)   LMP 11/25/2012   BMI 39.40 kg/m²   General: alert and oriented, no acute distress  Respiratory: normal respiratory effort  Abd: soft, non-tender, non-distended    Pelvic  deferred    Impression  1. Vaginal pain  silver sulfADIAZINE 1% (SILVADENE) 1 % cream    lidocaine HCL 2% (XYLOCAINE) 2 % jelly   2. Urinary incontinence, mixed  Ambulatory consult to Physical Therapy   3. Other chronic pain     4. Cystocele, midline     5. Dyspareunia, female     6. Mixed stress and urge urinary " incontinence     7. Nocturia more than twice per night     8. Vaginal atrophy     9. Chronic deep vein thrombosis (DVT) of distal vein of left lower extremity     10. Chronic constipation     11. Rectocele, female       We reviewed the above issues and discussed options for short-term versus long-term management of her problems.   Plan:     1)  Mixed urinary incontinence, urge > stress:    --urine C&S  --mild LE/PB decreased sensation; consider contribution but sacral reflexes and motor grossly intact:  Continue PT to rehab areas  --work on weight loss: even losing 5-10 lbs can help  --Empty bladder every 3 hours.  Empty well: wait a minute, lean forward on toilet.    --Avoid dietary irritants (see sheet).  Keep diary x 3-5 days to determine your irritants.  --start pelvic floor PT.  Call in a few days to make appt:  Kalie Ng ([employer] [facility] [employer]): (p) [phone].  (f) [phone].    --URGE: consider medication in future. Takes 2-4 weeks to see if will have effect.  For dry mouth: get sour, sugar free lozenge or gum.    --STRESS:  Pessary vs. Sling.     2)  Vaginal atrophy (dryness):    --if ok with MD for blood clot: Use 0.5 gram of estrogen cream in vagina nightly x 2 weeks, then twice a week thereafter. Also use small amount with finger around opening at same frequency.   --for now: use silvadene cream: dime-sized amount with finger in vagina 3-7 times a week at night.   --using can help urinary urgency/frequency/bladder spasms, help with reducing UTIs, and may may intercourse more comfortable    3)  Stage 2 cystocele/rectocele:  --discussed  --observation for now    5)  Dyspareunia (pain with intercourse):  --start PT:  Use silvadene before/after if needed. Use lidocaine gel before/after if needed (will numb).   --triggered by manipulation of scar band at mid cuff  --treat vaginal dryness to see if better if tissue more supple  --if not, could consider surgical release    4)  Nocturia  (nighttime urination): stop fluids 2 hours before bed/no water by bed.  If have leg swelling:  Elevate feet above chest x 1 hour before bed to get excess fluid off.  Can also use support hose (knee highs).      5)  Constipation:  --hydrate well  --high fiber diet  Controlling constipation may help bladder urgency/leakage and fiber may better control cholesterol and blood glucose.  Start daily fiber.  Tasteless is benefiber.  Take 1 tsp of fiber powder (psyllium or other sugar-free powder).  Mix in 8 oz of water.  Take x 3-5 days.  Then, increase fiber by 1 tsp every 3-5 days until stool is easy to pass.  Stop and continue at that dose.   Do not exceed 6 tsps/day.  May also use over the counter stool softener 1-2 x/day.  AVOID laxatives.  --try to use miralax as a last resort    6)  RTC 3 months.     30 minutes were spent in face to face time with this patient  100 % of this time was spent in counseling and/or coordination of care     Jarocho Cabrera MD  [facility]  Division of Female Pelvic Medicine and Reconstructive Surgery  Department of Obstetrics & Gynecology

## 2019-01-18 NOTE — PATIENT INSTRUCTIONS
1)  Mixed urinary incontinence, urge > stress:    --urine C&S  --mild LE/PB decreased sensation; consider contribution but sacral reflexes and motor grossly intact:  Continue PT to rehab areas  --work on weight loss: even losing 5-10 lbs can help  --Empty bladder every 3 hours.  Empty well: wait a minute, lean forward on toilet.    --Avoid dietary irritants (see sheet).  Keep diary x 3-5 days to determine your irritants.  --start pelvic floor PT.  Call in a few days to make appt:  Kalie Ng ([facility] [employer]): (p) [phone].  (f) [phone].    --URGE: consider medication in future. Takes 2-4 weeks to see if will have effect.  For dry mouth: get sour, sugar free lozenge or gum.    --STRESS:  Pessary vs. Sling.     2)  Vaginal atrophy (dryness):    --if ok with MD for blood clot: Use 0.5 gram of estrogen cream in vagina nightly x 2 weeks, then twice a week thereafter. Also use small amount with finger around opening at same frequency.   --for now: use silvadene cream: dime-sized amount with finger in vagina 3-7 times a week at night.   --using can help urinary urgency/frequency/bladder spasms, help with reducing UTIs, and may may intercourse more comfortable    3)  Stage 2 cystocele/rectocele:  --discussed  --observation for now    5)  Dyspareunia (pain with intercourse):  --start PT:  Use silvadene before/after if needed. Use lidocaine gel before/after if needed (will numb).   --triggered by manipulation of scar band at mid cuff  --treat vaginal dryness to see if better if tissue more supple  --if not, could consider surgical release    4)  Nocturia (nighttime urination): stop fluids 2 hours before bed/no water by bed.  If have leg swelling:  Elevate feet above chest x 1 hour before bed to get excess fluid off.  Can also use support hose (knee highs).      5)  Constipation:  --hydrate well  --high fiber diet  Controlling constipation may help bladder urgency/leakage and fiber may better  control cholesterol and blood glucose.  Start daily fiber.  Tasteless is benefiber.  Take 1 tsp of fiber powder (psyllium or other sugar-free powder).  Mix in 8 oz of water.  Take x 3-5 days.  Then, increase fiber by 1 tsp every 3-5 days until stool is easy to pass.  Stop and continue at that dose.   Do not exceed 6 tsps/day.  May also use over the counter stool softener 1-2 x/day.  AVOID laxatives.  --try to use miralax as a last resort    6)  RTC 3 months.

## 2019-01-21 ENCOUNTER — PATIENT MESSAGE (OUTPATIENT)
Dept: BARIATRICS | Facility: CLINIC | Age: 59
End: 2019-01-21

## 2019-01-23 ENCOUNTER — PATIENT MESSAGE (OUTPATIENT)
Dept: ADMINISTRATIVE | Facility: OTHER | Age: 59
End: 2019-01-23

## 2019-01-24 ENCOUNTER — PATIENT MESSAGE (OUTPATIENT)
Dept: INTERNAL MEDICINE | Facility: CLINIC | Age: 59
End: 2019-01-24

## 2019-01-24 NOTE — PROGRESS NOTES
Subjective:       Patient ID: Renae Hou is a 58 y.o. female.    Chief Complaint: Consult    CC:    Current attempts at weight loss: New pt to me, referred by No referring provider defined for this encounter. , with Patient Active Problem List:     RENAE on CPAP     Shellfish allergy     Chronic constipation     History of major abdominal surgery     Hyperlipidemia     Pre-diabetes     Class 2 obesity due to excess calories in adult     Chronic pain     Lumbar radiculopathy     Acid reflux     Essential hypertension     Fatty liver     Spondylolisthesis at L4-L5 level     Spondylolisthesis     Abscess of paraspinous muscles     Spondylolisthesis, acquired     Obstructive sleep apnea on CPAP     Hypertension     GERD (gastroesophageal reflux disease)     Vaginal atrophy     Dyspareunia, female     Nocturia more than twice per night     Mixed stress and urge urinary incontinence     Rectocele, female     Cystocele, midline     Edema, lower extremity     Deep vein thrombosis (DVT) of lower extremity     Exertional chest pain     Lab Results       Component                Value               Date                       ALT                      30                  11/08/2018                 AST                      27                  11/08/2018                 ALKPHOS                  88                  11/08/2018                 BILITOT                  0.4                 11/08/2018                Lab Results       Component                Value               Date                       HGBA1C                   6.1 (H)             10/05/2018                 HGBA1C                   6.1 (H)             09/28/2018                 HGBA1C                   5.2                 05/22/2018            Lab Results       Component                Value               Date                       LDLCALC                  112.0               11/08/2018                 CREATININE               0.9                 11/08/2018               States constantly hungry.  Waked up in middle of night hungry. SHe wakes up starving in middle of night. Has made it hard to stick with her diet changes for surgery. Still has about 2- 3 months to go before surgery.    Previous diet attempts:  WW years ago. Optifast at . Lost a lot of weight. Kept off until she got to her back surgery.     History of medication for loss: Was last on phentermine about a 1 year ago. Only SE.     Heaviest weight:     Lightest weight: 130#    Goal weight:236#      Last eye exam:   Last year.   No glaucoma per pt                                Provider:    Typical eating patterns:      B: yogurt or 2 eggs  L: protein bar  Snack: protein shake  D: meatballs x 2 large or cereal with skim milk     Diet Includes:   Meal Pattern: Improved  Protein Supplements: none.  Snacking: Not a snacker. Snacks on weekends include healthy choices. Yogurt, granola bars.  Vegetables: Likes a variety. Eats daily.  Fruits: Likes a variety. Eats almost daily.  Beverages: water, diet tea, coffee without sugar and skim milk  Dining Out:  Reduced lately.   Cooking at home: Daily. Mostly baked, grilled and smothered meat, fish and vegetables.  Willingness to change:     EKG: · Left ventricle ejection fraction is low normal at 55%  · Left ventricle shows concentric remodeling.  · Normal LV diastolic function.  · No wall motion abnormalities.  · RV systolic function is normal.  · The patient reported SOB (non-anginal) and chest discomfort during the stress test.  · There were no arrhythmias during stress.  · The test was stopped secondary to shortness of breath.  · No pericardial effusion.  · Overall, the patient's exercise capacity was mildly impaired.  · The EKG portion of this study is negative for myocardial ischemia.  · The stress echo portion of this study is negative for myocardial ischemia.  · No ischemia by ECG and echo despite the symptoms at rest that worsened with stress and later returned to the  "baseline level    BMR: 1632      Review of Systems   Constitutional: Negative for chills and fever.   Respiratory: Negative for shortness of breath.         Uses CPAP,but not regularly. Mask is uncomfortable   Cardiovascular: Positive for leg swelling. Negative for chest pain.   Gastrointestinal: Positive for constipation. Negative for diarrhea.        Denies GERD   Genitourinary: Positive for urgency. Negative for difficulty urinating and dysuria.   Musculoskeletal: Positive for arthralgias. Negative for back pain.   Neurological: Negative for dizziness and light-headedness.   Psychiatric/Behavioral: Negative for dysphoric mood. The patient is not nervous/anxious.        Objective:     /72   Pulse 84   Ht 5' 5" (1.651 m)   Wt 105 kg (231 lb 7.7 oz)   LMP 11/25/2012   BMI 38.52 kg/m²    Physical Exam   Constitutional: She is oriented to person, place, and time. She appears well-developed. No distress.   Obese   HENT:   Head: Normocephalic and atraumatic.   Mouth/Throat: No oropharyngeal exudate.   Eyes: EOM are normal. Pupils are equal, round, and reactive to light. No scleral icterus.   Neck: Normal range of motion. Neck supple. No thyromegaly present.   Cardiovascular: Normal rate and normal heart sounds. Exam reveals no gallop and no friction rub.   No murmur heard.  Pulmonary/Chest: Effort normal and breath sounds normal. No respiratory distress. She has no wheezes.   Abdominal: Soft. Bowel sounds are normal. She exhibits no distension. There is no tenderness.   2 finger breadth diastasis recti   Musculoskeletal: Normal range of motion. She exhibits edema.   Neurological: She is alert and oriented to person, place, and time. No cranial nerve deficit.   Skin: Skin is warm and dry. No erythema.   Psychiatric: She has a normal mood and affect. Her behavior is normal. Judgment normal.   Vitals reviewed.      Assessment:       1. Class 2 severe obesity with body mass index (BMI) of 35 to 39.9 with serious " comorbidity        Plan:         1. Class 2 severe obesity with body mass index (BMI) of 35 to 39.9 with serious comorbidity    - phentermine (ADIPEX-P) 37.5 mg tablet; Take 1 tablet (37.5 mg total) by mouth before breakfast.  Dispense: 30 tablet; Refill: 2    Patient warned of common side effects of phentermine including anxiety, insomnia, palpitations and increased blood pressure. It was also explained that it is for short-term usage along with diet and exercise, and that stopping the medication without making lifestyle changes will result in regain of weight. Patient states understanding.     Weight loss medications are controlled substances.  They require routine follow up. Prescription or pills that are lost or destroyed will not be replaced.       Start phentermine with 1/2 pill a day for at least 1 week to see if that will control your appetite.  Go up to a full pill when needed.     To taper phentermine on last Rx: Take 1 pill daily for 2 weeks, then 1/2 pill daily for 1 week. Take 1/2 pill every other day until complete.     Get the full face mask for CPAP.     Follow diet instructions per Rosette.

## 2019-01-25 ENCOUNTER — PATIENT MESSAGE (OUTPATIENT)
Dept: INTERNAL MEDICINE | Facility: CLINIC | Age: 59
End: 2019-01-25

## 2019-01-25 ENCOUNTER — OFFICE VISIT (OUTPATIENT)
Dept: BARIATRICS | Facility: CLINIC | Age: 59
End: 2019-01-25
Payer: COMMERCIAL

## 2019-01-25 ENCOUNTER — PATIENT MESSAGE (OUTPATIENT)
Dept: BARIATRICS | Facility: CLINIC | Age: 59
End: 2019-01-25

## 2019-01-25 ENCOUNTER — PATIENT MESSAGE (OUTPATIENT)
Dept: OBSTETRICS AND GYNECOLOGY | Facility: CLINIC | Age: 59
End: 2019-01-25

## 2019-01-25 ENCOUNTER — OFFICE VISIT (OUTPATIENT)
Dept: INTERNAL MEDICINE | Facility: CLINIC | Age: 59
End: 2019-01-25
Payer: COMMERCIAL

## 2019-01-25 VITALS
OXYGEN SATURATION: 96 % | BODY MASS INDEX: 38.57 KG/M2 | WEIGHT: 231.5 LBS | HEART RATE: 86 BPM | DIASTOLIC BLOOD PRESSURE: 72 MMHG | TEMPERATURE: 99 F | SYSTOLIC BLOOD PRESSURE: 128 MMHG | HEIGHT: 65 IN

## 2019-01-25 VITALS
HEIGHT: 65 IN | HEART RATE: 84 BPM | SYSTOLIC BLOOD PRESSURE: 130 MMHG | BODY MASS INDEX: 38.57 KG/M2 | WEIGHT: 231.5 LBS | DIASTOLIC BLOOD PRESSURE: 72 MMHG

## 2019-01-25 DIAGNOSIS — E66.09 CLASS 2 OBESITY DUE TO EXCESS CALORIES WITH BODY MASS INDEX (BMI) OF 37.0 TO 37.9 IN ADULT, UNSPECIFIED WHETHER SERIOUS COMORBIDITY PRESENT: ICD-10-CM

## 2019-01-25 DIAGNOSIS — I82.5Z2 CHRONIC DEEP VEIN THROMBOSIS (DVT) OF DISTAL VEIN OF LEFT LOWER EXTREMITY: Primary | ICD-10-CM

## 2019-01-25 DIAGNOSIS — E66.01 CLASS 2 SEVERE OBESITY WITH BODY MASS INDEX (BMI) OF 35 TO 39.9 WITH SERIOUS COMORBIDITY: Primary | ICD-10-CM

## 2019-01-25 DIAGNOSIS — I10 ESSENTIAL HYPERTENSION: ICD-10-CM

## 2019-01-25 PROCEDURE — 3078F PR MOST RECENT DIASTOLIC BLOOD PRESSURE < 80 MM HG: ICD-10-PCS | Mod: CPTII,S$GLB,, | Performed by: INTERNAL MEDICINE

## 2019-01-25 PROCEDURE — 3074F SYST BP LT 130 MM HG: CPT | Mod: CPTII,S$GLB,, | Performed by: FAMILY MEDICINE

## 2019-01-25 PROCEDURE — 3078F PR MOST RECENT DIASTOLIC BLOOD PRESSURE < 80 MM HG: ICD-10-PCS | Mod: CPTII,S$GLB,, | Performed by: FAMILY MEDICINE

## 2019-01-25 PROCEDURE — 99215 PR OFFICE/OUTPT VISIT, EST, LEVL V, 40-54 MIN: ICD-10-PCS | Mod: S$GLB,,, | Performed by: INTERNAL MEDICINE

## 2019-01-25 PROCEDURE — 3008F BODY MASS INDEX DOCD: CPT | Mod: CPTII,S$GLB,, | Performed by: FAMILY MEDICINE

## 2019-01-25 PROCEDURE — 99213 OFFICE O/P EST LOW 20 MIN: CPT | Mod: S$GLB,,, | Performed by: FAMILY MEDICINE

## 2019-01-25 PROCEDURE — 3078F DIAST BP <80 MM HG: CPT | Mod: CPTII,S$GLB,, | Performed by: INTERNAL MEDICINE

## 2019-01-25 PROCEDURE — 99999 PR PBB SHADOW E&M-EST. PATIENT-LVL IV: CPT | Mod: PBBFAC,,, | Performed by: INTERNAL MEDICINE

## 2019-01-25 PROCEDURE — 99213 PR OFFICE/OUTPT VISIT, EST, LEVL III, 20-29 MIN: ICD-10-PCS | Mod: S$GLB,,, | Performed by: FAMILY MEDICINE

## 2019-01-25 PROCEDURE — 99215 OFFICE O/P EST HI 40 MIN: CPT | Mod: S$GLB,,, | Performed by: INTERNAL MEDICINE

## 2019-01-25 PROCEDURE — 3008F PR BODY MASS INDEX (BMI) DOCUMENTED: ICD-10-PCS | Mod: CPTII,S$GLB,, | Performed by: FAMILY MEDICINE

## 2019-01-25 PROCEDURE — 3075F PR MOST RECENT SYSTOLIC BLOOD PRESS GE 130-139MM HG: ICD-10-PCS | Mod: CPTII,S$GLB,, | Performed by: INTERNAL MEDICINE

## 2019-01-25 PROCEDURE — 3008F BODY MASS INDEX DOCD: CPT | Mod: CPTII,S$GLB,, | Performed by: INTERNAL MEDICINE

## 2019-01-25 PROCEDURE — 99999 PR PBB SHADOW E&M-EST. PATIENT-LVL IV: ICD-10-PCS | Mod: PBBFAC,,, | Performed by: INTERNAL MEDICINE

## 2019-01-25 PROCEDURE — 99999 PR PBB SHADOW E&M-EST. PATIENT-LVL IV: ICD-10-PCS | Mod: PBBFAC,,, | Performed by: FAMILY MEDICINE

## 2019-01-25 PROCEDURE — 3078F DIAST BP <80 MM HG: CPT | Mod: CPTII,S$GLB,, | Performed by: FAMILY MEDICINE

## 2019-01-25 PROCEDURE — 3008F PR BODY MASS INDEX (BMI) DOCUMENTED: ICD-10-PCS | Mod: CPTII,S$GLB,, | Performed by: INTERNAL MEDICINE

## 2019-01-25 PROCEDURE — 99999 PR PBB SHADOW E&M-EST. PATIENT-LVL IV: CPT | Mod: PBBFAC,,, | Performed by: FAMILY MEDICINE

## 2019-01-25 PROCEDURE — 3074F PR MOST RECENT SYSTOLIC BLOOD PRESSURE < 130 MM HG: ICD-10-PCS | Mod: CPTII,S$GLB,, | Performed by: FAMILY MEDICINE

## 2019-01-25 PROCEDURE — 3075F SYST BP GE 130 - 139MM HG: CPT | Mod: CPTII,S$GLB,, | Performed by: INTERNAL MEDICINE

## 2019-01-25 RX ORDER — OMEPRAZOLE 40 MG/1
CAPSULE, DELAYED RELEASE ORAL
Refills: 4 | COMMUNITY
Start: 2019-01-19 | End: 2019-08-01 | Stop reason: SDUPTHER

## 2019-01-25 RX ORDER — PHENTERMINE HYDROCHLORIDE 37.5 MG/1
37.5 TABLET ORAL
Qty: 30 TABLET | Refills: 2 | Status: SHIPPED | OUTPATIENT
Start: 2019-01-25 | End: 2019-02-24

## 2019-01-25 NOTE — PATIENT INSTRUCTIONS
Patient warned of common side effects of phentermine including anxiety, insomnia, palpitations and increased blood pressure. It was also explained that it is for short-term usage along with diet and exercise, and that stopping the medication without making lifestyle changes will result in regain of weight. Patient states understanding.     Weight loss medications are controlled substances.  They require routine follow up. Prescription or pills that are lost or destroyed will not be replaced.       Start phentermine with 1/2 pill a day for at least 1 week to see if that will control your appetite.  Go up to a full pill when needed.     To taper phentermine on last Rx: Take 1 pill daily for 2 weeks, then 1/2 pill daily for 1 week. Take 1/2 pill every other day until complete.     Get the full face mask for CPAP.     Follow diet instructions per Rosette.

## 2019-01-25 NOTE — PROGRESS NOTES
Subjective:      Patient ID: Renae Hou is a 58 y.o. female.    Chief Complaint: Follow-up      HPI:  Renae Hou is a 58 year old female with chronic pain, fatty liver, gastroesophageal reflux disease, hypertension, hyperlipidemia, lumbar radiculopathy, obesity, prediabetes, spondylolisthesis, and obstructive sleep apnea who presents to clinic today for follow up on DVT and hypertension    9/21/18 US Venous legs bilateral Color flow evaluation of the lower extremity demonstrates a chronic, occlusive thrombus in one of the paired peroneal veins. There is no evidence of venous thrombosis in the remaining deep or superficial veins. Incidental finding of significant reflux is noted in the GSV at the mid thigh as well as the proximal FV.    11/8/18 US venous legs bilateral No evidence of deep venous thrombosis in either lower extremity.  States US tech was not as thorough as previous.    States she does still get intermittent bilateral lower extremity edema in the evenings left worse than right.  Using compression stockings, low sodium diet, and elevating when at rest.  Completed 3 months of Xarelto, no longer taking.    Discontinued lisinopril-HCTZ 50-12.5 1-2 weeks ago with consultation with cardiology.  Feels much better than she did before.  BP within goal today.  Has set up and received materials for digital HTN program.    To meet with nutritionist regarding weight.      Past Medical History:   Diagnosis Date    Abscess of paraspinous muscles     Allergy 4/2018    DVT (deep venous thrombosis)     left leg    Epidural abscess 4/10/2018    Fatty liver     GERD (gastroesophageal reflux disease) 2015    Hypertension     Lumbar radiculopathy     Menopause     Obesity     Obstructive sleep apnea on CPAP     Thyroid disease     Thyroid nodules.       Past Surgical History:   Procedure Laterality Date    ADENOIDECTOMY  1995    BACK SURGERY  2002    L4, L5    BILATERAL SALPINGOOPHORECTOMY       BLOCK-NERVE-MEDIAL BRANCH-LUMBAR Left 3/16/2018    [facility]    CHOLECYSTECTOMY      CYST REMOVAL      NEERAJ-TRANSFORAMINAL Left 3/2/2018    [facility]    FUSION-POSTERIOR LUMBAR INTERBODY FUSION L4/5, L5/S1 N/A 4/10/2018    [facility]    HYSTERECTOMY  2012    [facility]     INJECTION-FACET Left 3/2/2018    [facility]    RECTOCELE REPAIR      SPINE SURGERY  2018    THYROID NODULE REMOVAL      TONSILLECTOMY         Family History   Problem Relation Age of Onset    Hypertension Mother     Hyperlipidemia Mother     Heart disease Mother 55        cad, valvular heart disease    Alcohol abuse Sister     Stroke Father     No Known Problems Daughter     No Known Problems Daughter     Breast cancer Neg Hx     Colon cancer Neg Hx     Ovarian cancer Neg Hx     Diabetes Neg Hx     Esophageal cancer Neg Hx        Social History     Socioeconomic History    Marital status:      Spouse name: None    Number of children: None    Years of education: None    Highest education level: None   Social Needs    Financial resource strain: None    Food insecurity - worry: None    Food insecurity - inability: None    Transportation needs - medical: None    Transportation needs - non-medical: None   Occupational History    None   Tobacco Use    Smoking status: Former Smoker     Packs/day: 0.25     Years: 32.00     Pack years: 8.00     Types: Cigarettes     Last attempt to quit:      Years since quittin.0    Smokeless tobacco: Never Used    Tobacco comment: smoked socially decades ago - weekends only   Substance and Sexual Activity    Alcohol use: No     Frequency: Never     Comment: very rare    Drug use: No    Sexual activity: Not Currently     Partners: Male     Birth control/protection: See Surgical Hx, None      "Comment: [facility] BS[facility] 12/17/2012,     Other Topics Concern    None   Social History Narrative    . 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       Review of Systems   Constitutional: Negative for activity change, chills, fatigue, fever and unexpected weight change.   HENT: Negative for congestion, hearing loss, nosebleeds, rhinorrhea, sore throat and trouble swallowing.    Eyes: Negative for pain, discharge and visual disturbance.   Respiratory: Negative for cough, chest tightness, shortness of breath and wheezing.    Cardiovascular: Negative for chest pain and palpitations.   Gastrointestinal: Negative for abdominal distention, abdominal pain, blood in stool, constipation, diarrhea, nausea and vomiting.   Endocrine: Negative for polydipsia and polyuria.   Genitourinary: Negative for decreased urine volume, difficulty urinating, dysuria, hematuria, menstrual problem and urgency.   Musculoskeletal: Positive for joint swelling. Negative for arthralgias, back pain, myalgias and neck pain.   Skin: Negative for color change and rash.   Neurological: Negative for dizziness, tremors, weakness, light-headedness, numbness and headaches.   Psychiatric/Behavioral: Negative for agitation, behavioral problems, confusion and dysphoric mood. The patient is not nervous/anxious.      Objective:     Vitals:    01/25/19 1159   BP: 128/72   BP Location: Left arm   Patient Position: Sitting   BP Method: Large (Manual)   Pulse: 86   Temp: 98.5 °F (36.9 °C)   TempSrc: Oral   SpO2: 96%   Weight: 105 kg (231 lb 8 oz)   Height: 5' 5" (1.651 m)       Physical Exam   Constitutional: She is oriented to person, place, and time. She appears well-developed and well-nourished.   HENT:   Head: Normocephalic and atraumatic.   Right Ear: External ear normal.   Left Ear: External ear normal.   Nose: Nose normal.   Mouth/Throat: Oropharynx is clear and moist.   Eyes: Conjunctivae and EOM are normal. Pupils are equal, round, and reactive to light.   Neck: Normal range " of motion. Neck supple. No tracheal deviation present.   Cardiovascular: Normal rate, regular rhythm and normal heart sounds. Exam reveals no gallop and no friction rub.   No murmur heard.  Pulmonary/Chest: Effort normal and breath sounds normal. No respiratory distress. She has no wheezes. She has no rales.   Abdominal: Soft. Bowel sounds are normal. She exhibits no distension. There is no tenderness. There is no rebound and no guarding.   Musculoskeletal: Normal range of motion. She exhibits no edema or deformity.   Left calf circumference 45 cm, right calf circumference 46 cm.  Gilda's sign negative.   Neurological: She is alert and oriented to person, place, and time. Coordination normal.   Skin: Skin is warm and dry.   Psychiatric: She has a normal mood and affect. Her behavior is normal.   Nursing note and vitals reviewed.     Assessment:      1. Chronic deep vein thrombosis (DVT) of distal vein of left lower extremity    2. Essential hypertension    3. Class 2 obesity due to excess calories with body mass index (BMI) of 37.0 to 37.9 in adult, unspecified whether serious comorbidity present      Plan:   Renae was seen today for follow-up.    Diagnoses and all orders for this visit:    Chronic deep vein thrombosis (DVT) of distal vein of left lower extremity  -     US Lower Extremity Veins Bilateral; Future; will restart Xarelto if DVT persistent    Essential hypertension        -     Within goal today.  Continue digital HTN program, check BP daily, to notify me if persistently above 130/80.    Class 2 obesity due to excess calories with body mass index (BMI) of 37.0 to 37.9 in adult, unspecified whether serious comorbidity present        -     Follow up with nutritionist.  Continue current regimen as prescribed by bariatric medicine.  Increase daily aerobic exercise.

## 2019-01-28 ENCOUNTER — HOSPITAL ENCOUNTER (OUTPATIENT)
Dept: RADIOLOGY | Facility: HOSPITAL | Age: 59
Discharge: HOME | End: 2019-01-28
Attending: FAMILY MEDICINE
Payer: COMMERCIAL

## 2019-01-28 ENCOUNTER — PATIENT OUTREACH (OUTPATIENT)
Dept: OTHER | Facility: OTHER | Age: 59
End: 2019-01-28

## 2019-01-28 DIAGNOSIS — I82.5Z2 CHRONIC DEEP VEIN THROMBOSIS (DVT) OF DISTAL VEIN OF LEFT LOWER EXTREMITY: ICD-10-CM

## 2019-01-28 PROCEDURE — 93970 EXTREMITY STUDY: CPT | Mod: TC

## 2019-01-28 PROCEDURE — 93970 EXTREMITY STUDY: CPT | Mod: 26,,, | Performed by: RADIOLOGY

## 2019-01-28 PROCEDURE — 93970 US LOWER EXTREMITY VEINS BILATERAL: ICD-10-PCS | Mod: 26,,, | Performed by: RADIOLOGY

## 2019-01-28 NOTE — LETTER
Beatriz Lynch PharmD  [address] [place] [address]     Dear Renae Hou,    Welcome to the [facility]!           My name is Beatriz Lynch PharmD and I am your dedicated Digital Medicine clinician.  As an expert in medication management, I will help ensure that the medications you are taking continue to provide you with the intended benefits.        I am Tanja Yeh and I will be your health  for the duration of the program.  My  job is to help you identify lifestyle changes to improve your blood pressure control.  We will talk about nutrition, exercise, and other ways that you may be able to adjust your current habits to better your health. Together, we will work to improve your overall health and encourage you to meet your goals for a healthier lifestyle.    What we expect from YOU:    You will need to take blood pressure readings multiple times a week and no less than one reading per week.   It is important that you take your measurements at different times during the day, when possible.     What you should expect from your Digital Medicine Care Team:   We will provide you with education about high blood pressure, including lifestyle changes that could help you to control your blood pressure.   We will review your weekly readings and provide you with monthly blood pressure progress reports after you have been in the program for more than 30 days.   We will send monthly progress reports on your blood pressure control to your physician so they can follow along with your progress as well.    You will be able to reach me by phone at  or through your [facility] account by clicking my name under Care Team on the right side of the home screen.    I look forward to working with you to achieve your blood pressure goals!    Sincerely,  Beatriz Lynch PharmD  Your personal clinician    Please visit [email or web address] to  learn more about high blood pressure and what you can do lower your blood pressure.                                                                                           Renae Hou  [address] [facility] [address]

## 2019-01-28 NOTE — PROGRESS NOTES
Digital Medicine Enrollment Call    Introduced Mrs. eRnae Hou to Digital Medicine.     Discussed program expectations and requirements.    Introduced digital medicine care team.     Reviewed the importance of self-monitoring for digital medicine participation.     Reviewed that the Digital Medicine team is not available for emergencies and instructed the patient to call 911 or [facility] ([phone] or [phone]) if one arises.                Last 5 Patient Entered Readings                                      Current 30 Day Average: 139/91     Recent Readings 1/28/2019 1/28/2019 1/28/2019 1/27/2019 1/27/2019    SBP (mmHg) 141 130 133 142 145    DBP (mmHg) 90 84 88 86 91    Pulse 82 83 82 80 84

## 2019-02-07 ENCOUNTER — LAB VISIT (OUTPATIENT)
Dept: LAB | Facility: HOSPITAL | Age: 59
End: 2019-02-07
Payer: COMMERCIAL

## 2019-02-07 ENCOUNTER — OFFICE VISIT (OUTPATIENT)
Dept: ALLERGY | Facility: CLINIC | Age: 59
End: 2019-02-07
Payer: COMMERCIAL

## 2019-02-07 VITALS
OXYGEN SATURATION: 98 % | HEIGHT: 65 IN | SYSTOLIC BLOOD PRESSURE: 130 MMHG | BODY MASS INDEX: 38.35 KG/M2 | DIASTOLIC BLOOD PRESSURE: 84 MMHG | WEIGHT: 230.19 LBS | HEART RATE: 83 BPM

## 2019-02-07 DIAGNOSIS — Z88.9 HISTORY OF ADVERSE DRUG REACTION: ICD-10-CM

## 2019-02-07 DIAGNOSIS — J31.0 RHINITIS, UNSPECIFIED TYPE: Primary | ICD-10-CM

## 2019-02-07 DIAGNOSIS — R21 RASH: ICD-10-CM

## 2019-02-07 DIAGNOSIS — J31.0 RHINITIS, UNSPECIFIED TYPE: ICD-10-CM

## 2019-02-07 PROCEDURE — 3075F SYST BP GE 130 - 139MM HG: CPT | Mod: CPTII,S$GLB,, | Performed by: STUDENT IN AN ORGANIZED HEALTH CARE EDUCATION/TRAINING PROGRAM

## 2019-02-07 PROCEDURE — 3008F BODY MASS INDEX DOCD: CPT | Mod: CPTII,S$GLB,, | Performed by: STUDENT IN AN ORGANIZED HEALTH CARE EDUCATION/TRAINING PROGRAM

## 2019-02-07 PROCEDURE — 99999 PR PBB SHADOW E&M-EST. PATIENT-LVL IV: ICD-10-PCS | Mod: PBBFAC,,, | Performed by: STUDENT IN AN ORGANIZED HEALTH CARE EDUCATION/TRAINING PROGRAM

## 2019-02-07 PROCEDURE — 86003 ALLG SPEC IGE CRUDE XTRC EA: CPT

## 2019-02-07 PROCEDURE — 99203 PR OFFICE/OUTPT VISIT, NEW, LEVL III, 30-44 MIN: ICD-10-PCS | Mod: S$GLB,,, | Performed by: STUDENT IN AN ORGANIZED HEALTH CARE EDUCATION/TRAINING PROGRAM

## 2019-02-07 PROCEDURE — 3079F PR MOST RECENT DIASTOLIC BLOOD PRESSURE 80-89 MM HG: ICD-10-PCS | Mod: CPTII,S$GLB,, | Performed by: STUDENT IN AN ORGANIZED HEALTH CARE EDUCATION/TRAINING PROGRAM

## 2019-02-07 PROCEDURE — 3008F PR BODY MASS INDEX (BMI) DOCUMENTED: ICD-10-PCS | Mod: CPTII,S$GLB,, | Performed by: STUDENT IN AN ORGANIZED HEALTH CARE EDUCATION/TRAINING PROGRAM

## 2019-02-07 PROCEDURE — 3079F DIAST BP 80-89 MM HG: CPT | Mod: CPTII,S$GLB,, | Performed by: STUDENT IN AN ORGANIZED HEALTH CARE EDUCATION/TRAINING PROGRAM

## 2019-02-07 PROCEDURE — 86003 ALLG SPEC IGE CRUDE XTRC EA: CPT | Mod: 59

## 2019-02-07 PROCEDURE — 82785 ASSAY OF IGE: CPT

## 2019-02-07 PROCEDURE — 99203 OFFICE O/P NEW LOW 30 MIN: CPT | Mod: S$GLB,,, | Performed by: STUDENT IN AN ORGANIZED HEALTH CARE EDUCATION/TRAINING PROGRAM

## 2019-02-07 PROCEDURE — 99999 PR PBB SHADOW E&M-EST. PATIENT-LVL IV: CPT | Mod: PBBFAC,,, | Performed by: STUDENT IN AN ORGANIZED HEALTH CARE EDUCATION/TRAINING PROGRAM

## 2019-02-07 PROCEDURE — 36415 COLL VENOUS BLD VENIPUNCTURE: CPT

## 2019-02-07 PROCEDURE — 3075F PR MOST RECENT SYSTOLIC BLOOD PRESS GE 130-139MM HG: ICD-10-PCS | Mod: CPTII,S$GLB,, | Performed by: STUDENT IN AN ORGANIZED HEALTH CARE EDUCATION/TRAINING PROGRAM

## 2019-02-07 RX ORDER — FLUTICASONE PROPIONATE 50 MCG
2 SPRAY, SUSPENSION (ML) NASAL 2 TIMES DAILY
Qty: 2 BOTTLE | Refills: 5 | Status: SHIPPED | OUTPATIENT
Start: 2019-02-07 | End: 2019-07-12

## 2019-02-07 NOTE — LETTER
February 7, 2019      Franko Garza MD  [address]  [facility] [address]           [facility] - Allergy/ Immunology  [address]  [facility] [address]  Phone: [phone]  Fax: [phone]          Patient: Renae Hou   MR Number: [id number]   YOB: 1960   Date of Visit: 2/7/2019       Dear Dr. Franko Garza:    Thank you for referring Renae Hou to me for evaluation. Attached you will find relevant portions of my assessment and plan of care.    If you have questions, please do not hesitate to call me. I look forward to following Renae Hou along with you.    Sincerely,    Iva Miranda MD    Enclosure  CC:  No Recipients    If you would like to receive this communication electronically, please contact [email or web address] or [phone] to request more information on [employer] access.    For providers and/or their staff who would like to refer a patient to [facility], please contact us through our one-stop-shop provider referral line, [facility], at [phone].    If you feel you have received this communication in error or would no longer like to receive these types of communications, please e-mail [email or web address]

## 2019-02-07 NOTE — PROGRESS NOTES
Allergy Clinic Note  [facility]    Subjective:      Patient ID: Renae Hou is a 58 y.o. female.    Chief Complaint: Other (systemics to shellfish and certain meds.  has epi pen, eye swelling and tearing and nasal congestion)      Referring Provider: Franko Garza    History of Present Illness:  58-year-old female is referred for evaluation of multiple potentially allergic syndromes.    Problem 1:  Rhinitis.  Client reports that in her office environment she experiences nasal congestion and red runny eyes.  Other symptoms include runny nose, postnasal drip, nasal itching, sneezing, puffy eyelids, and deep ear itching.  She denies any chest symptoms, skin symptoms, or throat symptoms.  Symptoms occur predominantly in the office which she describes as dirty.  She has had no previous testing.  She obtains relief from Allegra D or Claritin.    Problem 2:  Shellfish allergy.  Client reports that for the last several decades eating shrimp causes throat tightness and chest tightness sometimes associated with a rash.  To this date she continues to experience chest pain, labored breathing when being near cooking or we heating shellfish.  On 1 occasion she also had chemosis.  She has no problems eating fish.    Problem 3:  Possible food allergy.  Patient reports postprandial abdominal pain and bloating as well as a diffuse swollen sensation.  It is not clear to me whether there is visible swelling.    Problem 4: adverse reaction to heparin.  Patient reports that when she was given heparin infusion in the hospital she experienced a hot sensation, flushing and heart racing associated with an inability to stay still.  She experience similar but milder symptoms when using a heparin flush for her PICC line at home.    Other allergic syndromes  - Band-Aids cause swollen angry skin around the site  - left ear popping  - adverse reaction to Alteplase      Additional History:   Past medical history is  significant for chronic back pain and hypertension.  She is status post tonsillectomy and possible adenoidectomy at age 32.  Family history is significant for seafood allergy in her daughter and rhinitis and another daughter. Client   reports that she quit smoking about 31 years ago. Her smoking use included cigarettes. She has a 8.00 pack-year smoking history. she has never used smokeless tobacco.  Exposures are notable for occasional dogs in the home (grand pets) and her  smokes outside..  She describes her office environment as discussed sting she thinks there may be mold.  No exposure to unusual substances.     Patient Active Problem List   Diagnosis    RENAE on CPAP    Shellfish allergy    Chronic constipation    History of major abdominal surgery    Hyperlipidemia    Pre-diabetes    Class 2 obesity due to excess calories in adult    Chronic pain    Lumbar radiculopathy    Acid reflux    Essential hypertension    Fatty liver    Spondylolisthesis at L4-L5 level    Spondylolisthesis    Abscess of paraspinous muscles    Spondylolisthesis, acquired    Obstructive sleep apnea on CPAP    Hypertension    GERD (gastroesophageal reflux disease)    Vaginal atrophy    Dyspareunia, female    Nocturia more than twice per night    Mixed stress and urge urinary incontinence    Rectocele, female    Cystocele, midline    Edema, lower extremity    Deep vein thrombosis (DVT) of lower extremity    Exertional chest pain     Current Outpatient Medications on File Prior to Visit   Medication Sig Dispense Refill    phentermine (ADIPEX-P) 37.5 mg tablet Take 1 tablet (37.5 mg total) by mouth before breakfast. 30 tablet 2    clotrimazole-betamethasone 1-0.05% (LOTRISONE) cream clotrimazole-betamethasone 1 %-0.05 % topical cream      lidocaine HCL 2% (XYLOCAINE) 2 % jelly Apply topically 2 (two) times daily as needed. 30 mL 11    mometasone (ELOCON) 0.1 % ointment ALTON TO RASH BID  3    omeprazole  "(PRILOSEC) 40 MG capsule TK 1 C PO QD  4    POLYETHYLENE GLYCOL 3350 (MIRALAX ORAL) Take by mouth daily as needed. constipation      silver sulfADIAZINE 1% (SILVADENE) 1 % cream Apply topically once daily. 50 g 11     No current facility-administered medications on file prior to visit.          Review of Systems   Constitutional: Negative for chills and fever.   HENT: Positive for congestion and sore throat. Negative for ear discharge and nosebleeds.         Ear crackling   Eyes: Negative for discharge and redness.   Respiratory: Negative for hemoptysis, sputum production and stridor.    Gastrointestinal: Negative for blood in stool, melena and vomiting.   Genitourinary: Negative for hematuria.   Skin: Positive for itching. Negative for rash.   Neurological: Negative for seizures and loss of consciousness.       Objective:   /84   Pulse 83   Ht 5' 5" (1.651 m)   Wt 104.4 kg (230 lb 2.6 oz)   LMP 11/25/2012   SpO2 98%   BMI 38.30 kg/m²       Physical Exam   Constitutional: She is oriented to person, place, and time and well-developed, well-nourished, and in no distress.   HENT:   Head: Normocephalic and atraumatic.   Nose: Nose normal.   Mouth/Throat: No oropharyngeal exudate.   Right TM normal.  Left TM retracted.  Right nares red and friable.  Left nares pink without significant turbinates swelling.  Oropharynx is benign.  Tongue is not coated.   Eyes: Conjunctivae are normal. No scleral icterus.   Neck: Neck supple.   Cardiovascular: Normal rate, regular rhythm, normal heart sounds and intact distal pulses.   Pulmonary/Chest: Effort normal and breath sounds normal. No stridor. No respiratory distress. She has no wheezes.   Abdominal: She exhibits no distension. There is no tenderness.   Musculoskeletal: She exhibits no edema or deformity.   Lymphadenopathy:     She has no cervical adenopathy.   Neurological: She is alert and oriented to person, place, and time.   Skin: No rash noted. No erythema. "   Psychiatric: Memory and affect normal.       Data: none      Assessment:     1. Rhinitis, unspecified type    2. Rash    3. History of adverse drug reaction to heparin        Plan:     Renae was seen today for other.    Diagnoses and all orders for this visit:    Rhinitis, unspecified type  -     IgE; Future  -     Dermatophagoides [place]; Future  -     Dermatophagoides Pteronyssinus; Future  -     [place]; Future  -     Lenny; Future  -     [place]; Future  -     English Plantain; Future  -     Oak Pecan; Future  -     Pecan; Future  -     M[place]; Future  -     Ragweed; Future  -     Alternaria; Future  -     Aspergillus; Future  -     Cat; Future  -     Cockroach; Future  -     Dog; Future  -     Allergen - Botrytis Cinerea; Future  -     Chaetomium globosum IgE; Future  -     Cladosporium IgE; Future  -     Curvularia lunata IgE; Future  -     Setomalanomma rostrata IgE; Future  -     Allergen, Rhizopus Nigricans; Future  -     Stemphyllium IgE; Future  -     Phoma betae IgE; Future  -     Penicillium IgE; Future    Rash  -     Rast Allergen-Latex; Future    History of adverse drug reaction to heparin       -     skin testing to heparin next visit      Patient Instructions   Testing  Blood work for airborne allergy testing today       Check portal in one week for results or call [phone]       Contact me with questions or concerns       I will contact you if anything needs immediate attention.    Skin testing to foods and heparin in the future.  Need to be off Allegra-D/Claritin/all antihistamine for 7 days prior.      Treatment    Flonase (= fluticasone) nasal spray:  2 squirts each nostril twice a day   Remember to aim out toward your ear.   Needs to be used regularly 5-14 days for full effect.              Follow-up in about 2 years (around 2/7/2021) for skin testing to foods and heparin..    Iva Miranda MD

## 2019-02-07 NOTE — PATIENT INSTRUCTIONS
Testing  Blood work for airborne allergy testing today       Check portal in one week for results or call [phone]       Contact me with questions or concerns       I will contact you if anything needs immediate attention.    Skin testing to foods and heparin in the future.  Need to be off Allegra-D/Claritin/all antihistamine for 7 days prior.      Treatment    Flonase (= fluticasone) nasal spray:  2 squirts each nostril twice a day   Remember to aim out toward your ear.   Needs to be used regularly 5-14 days for full effect.

## 2019-02-08 LAB — IGE SERPL-ACNC: <35 IU/ML

## 2019-02-11 LAB
A ALTERNATA IGE QN: <0.35 KU/L
A FUMIGATUS IGE QN: <0.35 KU/L
ALLERGEN CHAETOMIUM GLOBOSUM IGE: <0.35 KU/L
ALLERGEN LATEX IGE: <0.35 KU/L
B CINEREA IGE QN: <0.35 KU/L
BERMUDA GRASS IGE QN: <0.35 KU/L
C HERBARUM IGE QN: <0.35 KU/L
C LUNATA IGE QN: <0.35 KU/L
CAT DANDER IGE QN: <0.35 KU/L
CEDAR IGE QN: <0.35 KU/L
CHAETOMIUM GLOB. CLASS: NORMAL
D FARINAE IGE QN: 1.21 KU/L
D PTERONYSS IGE QN: 1.16 KU/L
DEPRECATED A ALTERNATA IGE RAST QL: NORMAL
DEPRECATED A FUMIGATUS IGE RAST QL: NORMAL
DEPRECATED B CINEREA IGE RAST QL: NORMAL
DEPRECATED BERMUDA GRASS IGE RAST QL: NORMAL
DEPRECATED C HERBARUM IGE RAST QL: NORMAL
DEPRECATED C LUNATA IGE RAST QL: NORMAL
DEPRECATED CAT DANDER IGE RAST QL: NORMAL
DEPRECATED CEDAR IGE RAST QL: NORMAL
DEPRECATED D FARINAE IGE RAST QL: ABNORMAL
DEPRECATED D PTERONYSS IGE RAST QL: ABNORMAL
DEPRECATED DOG DANDER IGE RAST QL: NORMAL
DEPRECATED ELDER IGE RAST QL: NORMAL
DEPRECATED ENGL PLANTAIN IGE RAST QL: NORMAL
DEPRECATED P BETAE IGE RAST QL: NORMAL
DEPRECATED P NOTATUM IGE RAST QL: NORMAL
DEPRECATED PECAN/HICK TREE IGE RAST QL: NORMAL
DEPRECATED R NIGRICANS IGE RAST QL: NORMAL
DEPRECATED ROACH IGE RAST QL: NORMAL
DEPRECATED S ROSTRATA IGE RAST QL: NORMAL
DEPRECATED TIMOTHY IGE RAST QL: NORMAL
DEPRECATED WEST RAGWEED IGE RAST QL: NORMAL
DEPRECATED WHITE OAK IGE RAST QL: NORMAL
DOG DANDER IGE QN: <0.35 KU/L
ELDER IGE QN: <0.35 KU/L
ENGL PLANTAIN IGE QN: <0.35 KU/L
LATEX CLASS: NORMAL
P BETAE IGE QN: <0.35 KU/L
P NOTATUM IGE QN: <0.35 KU/L
PECAN/HICK TREE IGE QN: <0.35 KU/L
R NIGRICANS IGE QN: <0.35 KU/L
ROACH IGE QN: <0.35 KU/L
S ROSTRATA IGE QN: <0.35 KU/L
STEMPHYLIUM HERBARUM CLASS: NORMAL
STEMPHYLLIUM, IGE: <0.35 KU/L
TIMOTHY IGE QN: <0.35 KU/L
WEST RAGWEED IGE QN: <0.35 KU/L
WHITE OAK IGE QN: <0.35 KU/L

## 2019-02-18 NOTE — PROGRESS NOTES
Allergy Clinic Note  [facility]    Subjective:          Chief Complaint: Allergy Testing (food and heparin)      Allergy problem list  Rhinitis  History of shellfish allergy manifest by throat tightness, chest tightness, and labored breathing  Rule out food allergy manifest by postprandial abdominal pain and bloating  Rule out heparin allergy manifest by hot sensation, flushing and heart racing  Status post tonsillectomy  History of remote smoking   current passive smoke exposure  (hypertension)    History of Present Illness: Renae Hou is a 58 y.o. female with history of seafood allergy, GI symptoms and immediate symptoms after heparin injection who returns at my request to follow up symptoms and to assess potential triggers including food and heparin    No new problems or complaints.    Additional History:   Interval Hx is unremarkable. Client  reports that she quit smoking about 31 years ago. Her smoking use included cigarettes. She has a 8.00 pack-year smoking history. she has never used smokeless tobacco.   Past medical, family, and social histories are unchanged.       Patient Active Problem List   Diagnosis    RENAE on CPAP    Shellfish allergy    Chronic constipation    History of major abdominal surgery    Hyperlipidemia    Pre-diabetes    Class 2 obesity due to excess calories in adult    Chronic pain    Lumbar radiculopathy    Acid reflux    Essential hypertension    Fatty liver    Spondylolisthesis at L4-L5 level    Spondylolisthesis    Abscess of paraspinous muscles    Spondylolisthesis, acquired    Obstructive sleep apnea on CPAP    Hypertension    GERD (gastroesophageal reflux disease)    Vaginal atrophy    Dyspareunia, female    Nocturia more than twice per night    Mixed stress and urge urinary incontinence    Rectocele, female    Cystocele, midline    Edema, lower extremity    Deep vein thrombosis (DVT) of lower extremity    Exertional chest pain  "    Current Outpatient Medications on File Prior to Visit   Medication Sig Dispense Refill    clotrimazole-betamethasone 1-0.05% (LOTRISONE) cream clotrimazole-betamethasone 1 %-0.05 % topical cream      fluticasone (FLONASE) 50 mcg/actuation nasal spray 2 sprays (100 mcg total) by Each Nare route 2 (two) times daily. 2 Bottle 5    lidocaine HCL 2% (XYLOCAINE) 2 % jelly Apply topically 2 (two) times daily as needed. 30 mL 11    mometasone (ELOCON) 0.1 % ointment ALTON TO RASH BID  3    omeprazole (PRILOSEC) 40 MG capsule TK 1 C PO QD  4    phentermine (ADIPEX-P) 37.5 mg tablet Take 1 tablet (37.5 mg total) by mouth before breakfast. 30 tablet 2    POLYETHYLENE GLYCOL 3350 (MIRALAX ORAL) Take by mouth daily as needed. constipation      silver sulfADIAZINE 1% (SILVADENE) 1 % cream Apply topically once daily. 50 g 11     No current facility-administered medications on file prior to visit.          Review of Systems   Constitutional: Negative for chills and fever.   HENT: Negative for ear discharge and nosebleeds.    Eyes: Negative for discharge and redness.   Respiratory: Negative for hemoptysis, sputum production and stridor.    Cardiovascular: Negative for chest pain and palpitations.   Gastrointestinal: Negative for blood in stool, melena and vomiting.   Genitourinary: Negative for dysuria and hematuria.   Skin: Negative for itching and rash.   Neurological: Negative for seizures and loss of consciousness.       Objective:   Temp 98.5 °F (36.9 °C) (Oral)   Ht 5' 5" (1.651 m)   Wt 104.1 kg (229 lb 8 oz)   LMP 11/25/2012   BMI 38.19 kg/m²       Physical Exam    Data:   Prick to prick testing with heparin for IV flush - negative with appropriate controls  Skin prick to foods (02/21/2019) entirely negative, including crab, lobster, and shrimp.    Aeroallergen testing by the serum Immunocap method (02/07/2019) was mildly positive to dust mites only.  She was negative to an extended panel of molds and also to " latex.   Class II   dust mites (Df and Dp)  Total serum IgE <35  Assessment:     1. Abdominal pain, postprandial    2. Flushing reaction    3. Shellfish allergy    4. Allergic rhinitis due to house dust mite        Plan:         Renae was seen today for allergy testing.    Diagnoses and all orders for this visit:    Abdominal pain, postprandial  No evidence of food allergy by skin testing.  Consider GI evaluation    Flushing reaction following heparin injected is not due to an IgE mediated allergy.  It can be avoided by injecting or infusing more slowly    Shellfish allergy  Skin tests are now negative   recommend blinded challenge at high risk allergy clinic    Allergic rhinitis due to house dust mite.  This explains worsening rhinitis symptoms at work.         Patient Instructions   Flushing reaction from heparin  This is not a true allergy (skin test is negative.  It can be avoided by giving the injection/infusion very slowly    Hx seafood allergy  Skin tests negative  Recommend blinded challenge in the High Risk Allergy Clinic    Mild dust mite allergy  This explains worsened symptoms at work  Consider dust proof cover on pillow.    Abdominal pain and bloating after meals  Consider seeing a GI specialist      No Follow-up on file.    Iva Miranda MD

## 2019-02-21 ENCOUNTER — OFFICE VISIT (OUTPATIENT)
Dept: ALLERGY | Facility: CLINIC | Age: 59
End: 2019-02-21
Payer: COMMERCIAL

## 2019-02-21 VITALS — TEMPERATURE: 99 F | HEIGHT: 65 IN | BODY MASS INDEX: 38.24 KG/M2 | WEIGHT: 229.5 LBS

## 2019-02-21 DIAGNOSIS — Z91.013 SHELLFISH ALLERGY: ICD-10-CM

## 2019-02-21 DIAGNOSIS — R10.9 ABDOMINAL PAIN, UNSPECIFIED ABDOMINAL LOCATION: Primary | ICD-10-CM

## 2019-02-21 DIAGNOSIS — J30.89 ALLERGIC RHINITIS DUE TO HOUSE DUST MITE: ICD-10-CM

## 2019-02-21 DIAGNOSIS — R23.2 FLUSHING REACTION: ICD-10-CM

## 2019-02-21 PROCEDURE — 95004 PR ALLERGY SKIN TESTS,ALLERGENS: ICD-10-PCS | Mod: S$GLB,,, | Performed by: STUDENT IN AN ORGANIZED HEALTH CARE EDUCATION/TRAINING PROGRAM

## 2019-02-21 PROCEDURE — 95004 PERQ TESTS W/ALRGNC XTRCS: CPT | Mod: S$GLB,,, | Performed by: STUDENT IN AN ORGANIZED HEALTH CARE EDUCATION/TRAINING PROGRAM

## 2019-02-21 PROCEDURE — 95018 PR PERQ&IC ALLG TEST DRUGS/BIOL: ICD-10-PCS | Mod: S$GLB,,, | Performed by: STUDENT IN AN ORGANIZED HEALTH CARE EDUCATION/TRAINING PROGRAM

## 2019-02-21 PROCEDURE — 99999 PR PBB SHADOW E&M-EST. PATIENT-LVL IV: ICD-10-PCS | Mod: PBBFAC,,, | Performed by: STUDENT IN AN ORGANIZED HEALTH CARE EDUCATION/TRAINING PROGRAM

## 2019-02-21 PROCEDURE — 3008F BODY MASS INDEX DOCD: CPT | Mod: CPTII,S$GLB,, | Performed by: STUDENT IN AN ORGANIZED HEALTH CARE EDUCATION/TRAINING PROGRAM

## 2019-02-21 PROCEDURE — 99999 PR PBB SHADOW E&M-EST. PATIENT-LVL IV: CPT | Mod: PBBFAC,,, | Performed by: STUDENT IN AN ORGANIZED HEALTH CARE EDUCATION/TRAINING PROGRAM

## 2019-02-21 PROCEDURE — 95018 ALL TSTG PERQ&IQ DRUGS/BIOL: CPT | Mod: S$GLB,,, | Performed by: STUDENT IN AN ORGANIZED HEALTH CARE EDUCATION/TRAINING PROGRAM

## 2019-02-21 PROCEDURE — 99213 OFFICE O/P EST LOW 20 MIN: CPT | Mod: 25,S$GLB,, | Performed by: STUDENT IN AN ORGANIZED HEALTH CARE EDUCATION/TRAINING PROGRAM

## 2019-02-21 PROCEDURE — 99213 PR OFFICE/OUTPT VISIT, EST, LEVL III, 20-29 MIN: ICD-10-PCS | Mod: 25,S$GLB,, | Performed by: STUDENT IN AN ORGANIZED HEALTH CARE EDUCATION/TRAINING PROGRAM

## 2019-02-21 PROCEDURE — 3008F PR BODY MASS INDEX (BMI) DOCUMENTED: ICD-10-PCS | Mod: CPTII,S$GLB,, | Performed by: STUDENT IN AN ORGANIZED HEALTH CARE EDUCATION/TRAINING PROGRAM

## 2019-02-21 NOTE — Clinical Note
No evidence heparin allergy by skin testing.  If needed in future, recommend injecting/infusing very slowly.Thank you.Iva

## 2019-02-21 NOTE — PATIENT INSTRUCTIONS
Flushing reaction from heparin  This is not a true allergy (skin test is negative.  It can be avoided by giving the injection/infusion very slowly    Hx seafood allergy  Skin tests negative  Recommend blinded challenge in the High Risk Allergy Clinic    Mild dust mite allergy  Recommend dust proof cover on pillow.    Abdominal pain and bloating after meals  Consider seeing a GI specialist

## 2019-02-22 ENCOUNTER — CLINICAL SUPPORT (OUTPATIENT)
Dept: BARIATRICS | Facility: CLINIC | Age: 59
End: 2019-02-22
Payer: COMMERCIAL

## 2019-02-22 VITALS — WEIGHT: 223.13 LBS | HEIGHT: 65 IN | BODY MASS INDEX: 37.18 KG/M2

## 2019-02-22 DIAGNOSIS — G47.33 OBSTRUCTIVE SLEEP APNEA ON CPAP: ICD-10-CM

## 2019-02-22 DIAGNOSIS — Z71.3 NUTRITIONAL COUNSELING: ICD-10-CM

## 2019-02-22 DIAGNOSIS — R73.03 PRE-DIABETES: ICD-10-CM

## 2019-02-22 DIAGNOSIS — E66.9 CLASS 2 OBESITY WITH BODY MASS INDEX (BMI) OF 37.0 TO 37.9 IN ADULT, UNSPECIFIED OBESITY TYPE, UNSPECIFIED WHETHER SERIOUS COMORBIDITY PRESENT: ICD-10-CM

## 2019-02-22 DIAGNOSIS — I10 ESSENTIAL HYPERTENSION: ICD-10-CM

## 2019-02-22 PROCEDURE — 99999 PR PBB SHADOW E&M-EST. PATIENT-LVL III: ICD-10-PCS | Mod: PBBFAC,,, | Performed by: DIETITIAN, REGISTERED

## 2019-02-22 PROCEDURE — 99499 UNLISTED E&M SERVICE: CPT | Mod: S$GLB,,, | Performed by: DIETITIAN, REGISTERED

## 2019-02-22 PROCEDURE — 97803 PR MED NUTR THER, SUBSQ, INDIV, EA 15 MIN: ICD-10-PCS | Mod: S$GLB,,, | Performed by: DIETITIAN, REGISTERED

## 2019-02-22 PROCEDURE — 99999 PR PBB SHADOW E&M-EST. PATIENT-LVL III: CPT | Mod: PBBFAC,,, | Performed by: DIETITIAN, REGISTERED

## 2019-02-22 PROCEDURE — 99499 NO LOS: ICD-10-PCS | Mod: S$GLB,,, | Performed by: DIETITIAN, REGISTERED

## 2019-02-22 PROCEDURE — 97803 MED NUTRITION INDIV SUBSEQ: CPT | Mod: S$GLB,,, | Performed by: DIETITIAN, REGISTERED

## 2019-02-22 NOTE — PATIENT INSTRUCTIONS
Adjust diet plan.  Continue cutting back on starchy CHO in the diet.  5-6 meals per day.  Start including protein supplements in the diet plan daily.      Exercise: Increase.   Walking with a goal of 2-3 times/week     Behavior Modification: Continue to document food & activity logs daily.

## 2019-02-22 NOTE — PROGRESS NOTES
NUTRITION NOTE     Referring Physician: Duc Ratliff M.D.  Reason for MNT Referral: 6 months Medically Supervised Diet pending Gastric Sleeve     Patient presents for 4th visit for MSD with -10lbs weight loss over the past month. Started making some dietary and lifestyle changes. Pt started on phentermine, now not hungry all day long and more energy.    CLINICAL DATA:  58 y.o. female.     Current Weight: 222 lbs  Weight Change Since Initial Visit: -11 lbs  Body mass index is 37.13 kg/m².     NUTRITIONAL NEEDS:  1659 x 1.2 activity factor = 1991 kcal -750 kcal for wt loss = 1240 Calories (using [place] [address] Jeor Equation)  79-98 Grams Protein (1.2-1.5 gm/kg IBW)     CURRENT DIET:  Regular diet.  Verbal Diet Recall: Food records are not present.     Breakfast: yogurt with fruit and granola (1/2) or 2 eggs  Snack: rest of yogurt  Lunch: tuna with whole wheat tortillas  Snack: none  Dinner: salad with protein or eggs or hamburger pastor with cauliflower tater tots     Diet Includes:   Meal Pattern: Improved  Protein Supplements: none. Has the optifast shakes  Snacking: Not a snacker. Snacks on weekends include healthy choices. Yogurt, granola bars.  Vegetables: Likes a variety. Eats daily.  Fruits: Likes a variety. Eats almost daily.  Beverages: water, diet tea, coffee without sugar and skim milk  Dining Out:  Reduced lately.   Cooking at home: Daily. Mostly baked, grilled and smothered meat, fish and vegetables.     Exercise:  Current exercise: Increased - walking at lunch or night and taking stairs at work   Restrictions to exercise: back feeling better      Vitamins / Minerals / Herbs:   Vit D otc 2000 IU daily      Labs:   Reviewed      Food Allergies:   Iodine and shellfish - also going to allergist within next month     Social:  Works regular daytime shifts.  Lives with .  Grocery shopping and food prep patient completes.  Patient believes the household will be supportive after  surgery.  Alcohol: Socially. Once to twice a year  Smoking: None.     ASSESSMENT:  Patient demonstrates some willingness to change lifestyle habits as evidenced by increased vegetables, reduced dining out and healthier cooking at home.     Doing fair with working on greatest challenges (not knowing what to eat and lack of energy).     Barriers to Education:  none  Stage of Change:  determination and action     NUTRITION DIAGNOSIS:  Obesity related to Excessive carbohydrate intake as evidence by BMI.  Status: Improved     PLAN:     Diet: Adjust diet plan.  Continue cutting back on starchy CHO in the diet.  5-6 meals per day.  Start including protein supplements in the diet plan daily.      Exercise: Increase.   Walking with a goal of 2-3 times/week     Behavior Modification: Continue to document food & activity logs daily.     Weight loss prior to surgery (5-10% TBW): 11-23 lbs     Return to clinic in one month.  Needs 2 additional visit(s) with RD for MSD.     Communicated nutrition plan with bariatric team.     SESSION TIME:  30 minutes

## 2019-02-27 ENCOUNTER — PATIENT OUTREACH (OUTPATIENT)
Dept: OTHER | Facility: OTHER | Age: 59
End: 2019-02-27

## 2019-02-27 NOTE — PROGRESS NOTES
"Last 5 Patient Entered Readings                                      Current 30 Day Average: 143/94     Recent Readings 2/18/2019 2/11/2019 2/7/2019 2/6/2019 2/5/2019    SBP (mmHg) 146 141 147 143 153    DBP (mmHg) 95 88 95 89 87    Pulse 86 73 80 82 89        Pt attributes lack of readings to getting home from work late and not having time to take it. Asked pt to take 2-3 readings per week so that we can monitor.     Digital Medicine: Health  Introduction    Introduced Mrs. Renae Hou to Digital Medicine. Discussed health  role and recommended lifestyle modifications.    Lifestyle Assessment:  Current Dietary Habits(i.e. low sodium, food labels, dining out): Pt reports working with bariatrics RD and working on decreasing portions and carb intake. Discussed benefits of maintaining a low sodium diet on BP control and AHA sodium recs. Also discussed meal prep as a way to achieve this and other dietary goals. Will send info for meal prep bernardino in [employer] message.     Exercise: Discussed importance of regular activity on BP control. Pt currently focusing more on diet at this time, so will address more at future encounters.     Alcohol/Tobacco: Deferred.    Medication Adherence: Pt not currently on meds. States that she was "feeling bad" on all meds, so PCP stopped them to see what BP was without them.    Reviewed AHA/AACE recommendations:  Limit sodium intake to <2000mg/day  Perform 150 minutes of physical activity per week    Reviewed the importance of self-monitoring, medication adherence, and that the health  can be used as a resource for lifestyle modifications to help reduce or maintain a healthy lifestyle.  Reviewed that the Digital Medicine team is not available for emergencies and instructed the patient to call 911 or [facility] ([phone] or [phone]) if one arises.    "

## 2019-02-28 ENCOUNTER — PATIENT OUTREACH (OUTPATIENT)
Dept: OTHER | Facility: OTHER | Age: 59
End: 2019-02-28
Payer: COMMERCIAL

## 2019-02-28 PROCEDURE — 99091 COLLJ & INTERPJ DATA EA 30 D: CPT | Mod: ,,, | Performed by: PHARMACIST

## 2019-02-28 PROCEDURE — 99091 PR DIGITAL MEDICINE SERVICES, HYPERTENSION, ESTABLISHED: ICD-10-PCS | Mod: ,,, | Performed by: PHARMACIST

## 2019-02-28 NOTE — PROGRESS NOTES
Last 5 Patient Entered Readings                                      Current 30 Day Average: 144/94     Recent Readings 2/27/2019 2/27/2019 2/18/2019 2/11/2019 2/7/2019    SBP (mmHg) 152 152 146 141 147    DBP (mmHg) 93 103 95 88 95    Pulse 67 72 86 73 80        Called patient for digital medicine clinical pharmacist introduction. No answer. Left message for call back    Beatriz Hughes Pharm.D.   Digital Medicine Clinical Pharmacist  [phone]

## 2019-03-06 PROCEDURE — 99091 PR DIGITAL MEDICINE SERVICES, HYPERTENSION, ESTABLISHED: ICD-10-PCS | Mod: ,,,

## 2019-03-06 PROCEDURE — 99091 COLLJ & INTERPJ DATA EA 30 D: CPT | Mod: ,,,

## 2019-03-07 RX ORDER — PHENTERMINE HYDROCHLORIDE 37.5 MG/1
37.5 CAPSULE ORAL EVERY MORNING
COMMUNITY
End: 2019-03-22

## 2019-03-07 NOTE — PROGRESS NOTES
Last 5 Patient Entered Readings                                      Current 30 Day Average: 146/91     Recent Readings 3/6/2019 3/6/2019 2/28/2019 2/27/2019 2/27/2019    SBP (mmHg) 144 151 140 152 152    DBP (mmHg) 87 82 87 93 103    Pulse 100 104 83 67 72        Called patient for digital medicine clinical pharmacist introduction. Patient is not currently taking any BP medications. She reports taking losartan/ HCTZ in the past but she felt bad and was advised to stop it. She reports sitting on the sofa to monitor BP. She hopes to lose weight and manage her BP without medications.     Depression- not indicated per on boarding questionnaire.     RENAE- diagnosed. Not on CPAP    1) BP exceeds goal of <130/<80. Encouraged sodium restriction and exercise.   2) Discussed proper BP monitoring technique and BP goal  3) Patient knows to contact me with any non-urgent clinical changes or concerns. Go to ED or call [facility] on Call for emergencies.   4) Will defer lifestyle counseling to digital medicine health .   5) Will continue to follow up.     Beatriz Lynch, Pharm.D.   Digital Medicine Clinical Pharmacist  [phone]

## 2019-03-22 ENCOUNTER — APPOINTMENT (OUTPATIENT)
Dept: RADIOLOGY | Facility: OTHER | Age: 59
End: 2019-03-22
Attending: OBSTETRICS & GYNECOLOGY
Payer: COMMERCIAL

## 2019-03-22 ENCOUNTER — OFFICE VISIT (OUTPATIENT)
Dept: OBSTETRICS AND GYNECOLOGY | Facility: CLINIC | Age: 59
End: 2019-03-22
Payer: COMMERCIAL

## 2019-03-22 VITALS
SYSTOLIC BLOOD PRESSURE: 142 MMHG | BODY MASS INDEX: 37.15 KG/M2 | DIASTOLIC BLOOD PRESSURE: 90 MMHG | HEIGHT: 65 IN | WEIGHT: 223 LBS | HEIGHT: 65 IN | BODY MASS INDEX: 36.87 KG/M2 | WEIGHT: 221.31 LBS

## 2019-03-22 DIAGNOSIS — Z01.419 ENCOUNTER FOR GYNECOLOGICAL EXAMINATION: Primary | ICD-10-CM

## 2019-03-22 DIAGNOSIS — Z12.31 VISIT FOR SCREENING MAMMOGRAM: ICD-10-CM

## 2019-03-22 PROCEDURE — 3080F DIAST BP >= 90 MM HG: CPT | Mod: CPTII,S$GLB,, | Performed by: OBSTETRICS & GYNECOLOGY

## 2019-03-22 PROCEDURE — 77067 SCR MAMMO BI INCL CAD: CPT | Mod: 26,,, | Performed by: RADIOLOGY

## 2019-03-22 PROCEDURE — 99396 PREV VISIT EST AGE 40-64: CPT | Mod: S$GLB,,, | Performed by: OBSTETRICS & GYNECOLOGY

## 2019-03-22 PROCEDURE — 77063 MAMMO DIGITAL SCREENING BILAT WITH TOMOSYNTHESIS_CAD: ICD-10-PCS | Mod: 26,,, | Performed by: RADIOLOGY

## 2019-03-22 PROCEDURE — 3080F PR MOST RECENT DIASTOLIC BLOOD PRESSURE >= 90 MM HG: ICD-10-PCS | Mod: CPTII,S$GLB,, | Performed by: OBSTETRICS & GYNECOLOGY

## 2019-03-22 PROCEDURE — 99999 PR PBB SHADOW E&M-EST. PATIENT-LVL III: ICD-10-PCS | Mod: PBBFAC,,, | Performed by: OBSTETRICS & GYNECOLOGY

## 2019-03-22 PROCEDURE — 77067 SCR MAMMO BI INCL CAD: CPT | Mod: TC,PN

## 2019-03-22 PROCEDURE — 3077F PR MOST RECENT SYSTOLIC BLOOD PRESSURE >= 140 MM HG: ICD-10-PCS | Mod: CPTII,S$GLB,, | Performed by: OBSTETRICS & GYNECOLOGY

## 2019-03-22 PROCEDURE — 77067 MAMMO DIGITAL SCREENING BILAT WITH TOMOSYNTHESIS_CAD: ICD-10-PCS | Mod: 26,,, | Performed by: RADIOLOGY

## 2019-03-22 PROCEDURE — 3077F SYST BP >= 140 MM HG: CPT | Mod: CPTII,S$GLB,, | Performed by: OBSTETRICS & GYNECOLOGY

## 2019-03-22 PROCEDURE — 77063 BREAST TOMOSYNTHESIS BI: CPT | Mod: 26,,, | Performed by: RADIOLOGY

## 2019-03-22 PROCEDURE — 99396 PR PREVENTIVE VISIT,EST,40-64: ICD-10-PCS | Mod: S$GLB,,, | Performed by: OBSTETRICS & GYNECOLOGY

## 2019-03-22 PROCEDURE — 99999 PR PBB SHADOW E&M-EST. PATIENT-LVL III: CPT | Mod: PBBFAC,,, | Performed by: OBSTETRICS & GYNECOLOGY

## 2019-03-22 RX ORDER — PHENTERMINE HYDROCHLORIDE 37.5 MG/1
TABLET ORAL
Refills: 2 | COMMUNITY
Start: 2019-02-24 | End: 2019-05-24

## 2019-03-22 NOTE — PROGRESS NOTES
Subjective:       Patient ID: Renae Hou is a 58 y.o. female.    Chief Complaint:  Well Woman (mammo 3/22/2019 )      History of Present Illness  - here for annual. Had mammogram this am. Feels like has a vaginal odor. Uses only Dove soap to clean. Thinks it's because she's overweight and sweats a lot.    Past Medical History:   Diagnosis Date    Abscess of paraspinous muscles     Allergy 4/2018    DVT (deep venous thrombosis)     left leg    Epidural abscess 4/10/2018    Fatty liver     GERD (gastroesophageal reflux disease) 2015    Hypertension     Lumbar radiculopathy     Menopause     Obesity     Obstructive sleep apnea on CPAP     Thyroid disease     Thyroid nodules.       Past Surgical History:   Procedure Laterality Date    ADENOIDECTOMY  1995    BACK SURGERY  2002    L4, L5    BILATERAL SALPINGOOPHORECTOMY      BLOCK-NERVE-MEDIAL BRANCH-LUMBAR Left 3/16/2018    [facility]    CHOLECYSTECTOMY      CYST REMOVAL      NEERAJ-TRANSFORAMINAL Left 3/2/2018    [facility]    FUSION-POSTERIOR LUMBAR INTERBODY FUSION L4/5, L5/S1 N/A 4/10/2018    [facility]    HYSTERECTOMY  12/17/2012    [facility]     INJECTION-FACET Left 3/2/2018    [facility]    RECTOCELE REPAIR      SPINE SURGERY  2018    THYROID NODULE REMOVAL      TONSILLECTOMY  1995         Current Outpatient Medications:     clotrimazole-betamethasone 1-0.05% (LOTRISONE) cream, clotrimazole-betamethasone 1 %-0.05 % topical cream, Disp: , Rfl:     fluticasone (FLONASE) 50 mcg/actuation nasal spray, 2 sprays (100 mcg total) by Each Nare route 2 (two) times daily., Disp: 2 Bottle, Rfl: 5    lidocaine HCL 2% (XYLOCAINE) 2 % jelly, Apply topically 2 (two) times daily as needed., Disp: 30 mL, Rfl: 11    mometasone (ELOCON) 0.1 % ointment, ALTON TO RASH BID, Disp: , Rfl: 3     omeprazole (PRILOSEC) 40 MG capsule, TK 1 C PO QD, Disp: , Rfl: 4    phentermine (ADIPEX-P) 37.5 mg tablet, TK 1 T PO D B YUE, Disp: , Rfl: 2    POLYETHYLENE GLYCOL 3350 (MIRALAX ORAL), Take by mouth daily as needed. constipation, Disp: , Rfl:     silver sulfADIAZINE 1% (SILVADENE) 1 % cream, Apply topically once daily., Disp: 50 g, Rfl: 11    Review of patient's allergies indicates:   Allergen Reactions    Cathflo activase [alteplase] Anaphylaxis     Tightness in chest, raspy throat, restless legs, hotness all over    Heparin analogues      Restless legs, tightness in chest, and hot all over    Shellfish containing products Anaphylaxis, Hives, Shortness Of Breath, Itching, Swelling and Rash     Other reaction(s): Difficulty breathing  Neck swelling     Latex Swelling     Patient un sure about it    Iodine and iodide containing products        GYN & OB History  Patient's last menstrual period was 2012.   Date of Last Pap: No result found    OB History    Para Term  AB Living   2 2 2     2   SAB TAB Ectopic Multiple Live Births           2      # Outcome Date GA Lbr Dieudonne/2nd Weight Sex Delivery Anes PTL Lv   2 Term      Vag-Spont  N LEXI   1 Term      Vag-Spont  N LEXI          Social History     Socioeconomic History    Marital status:      Spouse name: Not on file    Number of children: Not on file    Years of education: Not on file    Highest education level: Not on file   Social Needs    Financial resource strain: Not on file    Food insecurity - worry: Not on file    Food insecurity - inability: Not on file    Transportation needs - medical: Not on file    Transportation needs - non-medical: Not on file   Occupational History    Not on file   Tobacco Use    Smoking status: Former Smoker     Packs/day: 0.25     Years: 32.00     Pack years: 8.00     Types: Cigarettes     Last attempt to quit:      Years since quittin.2    Smokeless tobacco: Never Used     "Tobacco comment: smoked socially decades ago - weekends only   Substance and Sexual Activity    Alcohol use: No     Frequency: Never     Comment: very rare    Drug use: No    Sexual activity: Not Currently     Partners: Male     Birth control/protection: See Surgical Hx, None     Comment: [facility] 12/17/2012,     Other Topics Concern    Not on file   Social History Narrative    . 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       Family History   Problem Relation Age of Onset    Hypertension Mother     Hyperlipidemia Mother     Heart disease Mother 55        cad, valvular heart disease    Alcohol abuse Sister     Stroke Father     No Known Problems Daughter     No Known Problems Daughter     Breast cancer Neg Hx     Colon cancer Neg Hx     Ovarian cancer Neg Hx     Diabetes Neg Hx     Esophageal cancer Neg Hx        Review of Systems  Review of Systems   Respiratory: Negative for shortness of breath.    Cardiovascular: Negative for chest pain and palpitations.   Gastrointestinal: Negative for blood in stool, nausea and vomiting.   Genitourinary:        - see HPI   Skin: Negative for rash and wound.   Allergic/Immunologic: Negative for immunocompromised state.   Neurological: Negative for dizziness and syncope.   Hematological: Negative for adenopathy.   Psychiatric/Behavioral: Negative for behavioral problems.        Objective:     Vitals:    03/22/19 0952   BP: (!) 142/90   Weight: 100.4 kg (221 lb 5.5 oz)   Height: 5' 5" (1.651 m)       Physical Exam:   Constitutional: She is oriented to person, place, and time. She appears well-developed and well-nourished.        Pulmonary/Chest: Right breast exhibits no mass, no nipple discharge, no skin change, no tenderness and no swelling. Left breast exhibits no mass, no nipple discharge, no skin change, no tenderness and no swelling. Breasts are symmetrical.        Abdominal: Soft. She exhibits no distension. There is no tenderness.     Genitourinary: Vagina normal. " There is no tenderness or lesion on the right labia. There is no tenderness or lesion on the left labia. Uterus is absent. Right adnexum displays no mass, no tenderness and no fullness. Left adnexum displays no mass, no tenderness and no fullness. No vaginal discharge found. Cervix exhibits absence.           Musculoskeletal: Moves all extremeties.       Neurological: She is alert and oriented to person, place, and time.     Psychiatric: She has a normal mood and affect.        Assessment/ Plan:          Renae was seen today for well woman.    Diagnoses and all orders for this visit:    Encounter for gynecological examination    - reassured: no vaginal discharge/odor    Follow-up in about 1 year (around 3/22/2020) for annual exam.

## 2019-03-28 ENCOUNTER — PATIENT MESSAGE (OUTPATIENT)
Dept: BARIATRICS | Facility: CLINIC | Age: 59
End: 2019-03-28

## 2019-04-01 ENCOUNTER — PATIENT OUTREACH (OUTPATIENT)
Dept: OTHER | Facility: OTHER | Age: 59
End: 2019-04-01

## 2019-04-01 ENCOUNTER — PATIENT MESSAGE (OUTPATIENT)
Dept: ORTHOPEDICS | Facility: CLINIC | Age: 59
End: 2019-04-01

## 2019-04-01 DIAGNOSIS — I10 ESSENTIAL HYPERTENSION: Primary | ICD-10-CM

## 2019-04-01 NOTE — PROGRESS NOTES
Last 5 Patient Entered Readings                                      Current 30 Day Average: 144/92     Recent Readings 3/31/2019 3/25/2019 3/22/2019 3/20/2019 3/20/2019    SBP (mmHg) 150 153 142 140 150    DBP (mmHg) 96 94 96 93 96    Pulse 83 101 93 99 99          Called patient for BP follow up. No answer. Left message for call back    Beatriz Hughes, Pharm.D.   [facility] Clinical Pharmacist  [phone]

## 2019-04-02 DIAGNOSIS — Z00.00 ROUTINE GENERAL MEDICAL EXAMINATION AT A HEALTH CARE FACILITY: Primary | ICD-10-CM

## 2019-04-03 ENCOUNTER — PATIENT OUTREACH (OUTPATIENT)
Dept: OTHER | Facility: OTHER | Age: 59
End: 2019-04-03

## 2019-04-03 ENCOUNTER — PATIENT MESSAGE (OUTPATIENT)
Dept: BARIATRICS | Facility: CLINIC | Age: 59
End: 2019-04-03

## 2019-04-03 NOTE — PROGRESS NOTES
Last 5 Patient Entered Readings                                      Current 30 Day Average: 145/92     Recent Readings 4/2/2019 3/31/2019 3/25/2019 3/22/2019 3/20/2019    SBP (mmHg) 147 150 153 142 140    DBP (mmHg) 89 96 94 96 93    Pulse 79 83 101 93 99            Digital Medicine: Health  Follow Up    Left voicemail to follow up with Mrs. Renae Hou.  Current BP average 145/92 mmHg is not at goal, <130/80 mmHg.  WCB in 1 week.

## 2019-04-05 ENCOUNTER — CLINICAL SUPPORT (OUTPATIENT)
Dept: BARIATRICS | Facility: CLINIC | Age: 59
End: 2019-04-05
Payer: COMMERCIAL

## 2019-04-05 VITALS — BODY MASS INDEX: 37.25 KG/M2 | WEIGHT: 223.56 LBS | HEIGHT: 65 IN

## 2019-04-05 DIAGNOSIS — Z71.3 NUTRITIONAL COUNSELING: ICD-10-CM

## 2019-04-05 DIAGNOSIS — R73.03 PRE-DIABETES: ICD-10-CM

## 2019-04-05 DIAGNOSIS — E66.9 CLASS 2 OBESITY WITH BODY MASS INDEX (BMI) OF 37.0 TO 37.9 IN ADULT, UNSPECIFIED OBESITY TYPE, UNSPECIFIED WHETHER SERIOUS COMORBIDITY PRESENT: ICD-10-CM

## 2019-04-05 DIAGNOSIS — I10 ESSENTIAL HYPERTENSION: ICD-10-CM

## 2019-04-05 DIAGNOSIS — G47.33 OSA ON CPAP: ICD-10-CM

## 2019-04-05 PROCEDURE — 97803 PR MED NUTR THER, SUBSQ, INDIV, EA 15 MIN: ICD-10-PCS | Mod: S$GLB,,, | Performed by: DIETITIAN, REGISTERED

## 2019-04-05 PROCEDURE — 99499 NO LOS: ICD-10-PCS | Mod: S$GLB,,, | Performed by: DIETITIAN, REGISTERED

## 2019-04-05 PROCEDURE — 99499 UNLISTED E&M SERVICE: CPT | Mod: S$GLB,,, | Performed by: DIETITIAN, REGISTERED

## 2019-04-05 PROCEDURE — 97803 MED NUTRITION INDIV SUBSEQ: CPT | Mod: S$GLB,,, | Performed by: DIETITIAN, REGISTERED

## 2019-04-05 PROCEDURE — 99999 PR PBB SHADOW E&M-EST. PATIENT-LVL II: ICD-10-PCS | Mod: PBBFAC,,, | Performed by: DIETITIAN, REGISTERED

## 2019-04-05 PROCEDURE — 99999 PR PBB SHADOW E&M-EST. PATIENT-LVL II: CPT | Mod: PBBFAC,,, | Performed by: DIETITIAN, REGISTERED

## 2019-04-05 NOTE — PROGRESS NOTES
NUTRITION NOTE     Referring Physician: Duc Ratliff M.D.  Reason for MNT Referral: 6 months Medically Supervised Diet pending Gastric Sleeve     Patient presents for 5th visit for MSD with +1lbs weight gain over the past month. Started making some dietary and lifestyle changes. Pt started on phentermine, now not hungry all day long and more energy.     CLINICAL DATA:  58 y.o. female.     Current Weight: 223 lbs  Weight Change Since Initial Visit: -10 lbs  Body mass index is 37.13 kg/m².     NUTRITIONAL NEEDS:  1659 x 1.2 activity factor = 1991 kcal -750 kcal for wt loss = 1240 Calories (using [place] [address]. Jeor Equation)  79-98 Grams Protein (1.2-1.5 gm/kg IBW)     CURRENT DIET:  Regular diet.  Verbal Diet Recall: Food records are not present.     Breakfast: yogurt with fruit and granola (1/2) or 2 eggs  Snack: rest of yogurt  Lunch: salad with chicken  Snack: none  Dinner: salad with protein or eggs or hamburger pastor with cauliflower tater tots or veal parmesan (without pasta) or fast food     Diet Includes:   Meal Pattern: Improved  Protein Supplements: none. Has the optifast shakes  Snacking: Not a snacker. Snacks on weekends include healthy choices. Yogurt, granola bars.  Vegetables: Likes a variety. Eats daily.  Fruits: Likes a variety. Eats almost daily.  Beverages: water, diet tea, coffee without sugar and skim milk  Dining Out:  Reduced lately.   Cooking at home: Daily. Mostly baked, grilled and smothered meat, fish and vegetables.     Exercise:  Current exercise: Decreased due to work being busy  Restrictions to exercise: back feeling better      Vitamins / Minerals / Herbs:   Vit D otc 2000 IU daily      Labs:   Reviewed      Food Allergies:   Iodine and shellfish - but seeing allergist next month to do more testing (doing the drinks)     Social:  Works regular daytime shifts.  Lives with .  Grocery shopping and food prep patient completes.  Patient believes the household will be supportive  after surgery.  Alcohol: Socially. Once to twice a year  Smoking: None.     ASSESSMENT:  Patient demonstrates some willingness to change lifestyle habits as evidenced by increased vegetables, reduced dining out and healthier cooking at home.     Doing fair with working on greatest challenges (not knowing what to eat and lack of energy).     Barriers to Education:  none  Stage of Change:  determination and action     NUTRITION DIAGNOSIS:  Obesity related to Excessive carbohydrate intake as evidence by BMI.  Status: Improved     PLAN:     Diet: Adjust diet plan.  Continue cutting back on starchy CHO in the diet.  5-6 meals per day. Try shakes for snack before dinner.  Start including protein supplements in the diet plan daily.   Needs EKG scheduled.     Exercise: Increase.   Walking with a goal of 2-3 times/week     Behavior Modification: Continue to document food & activity logs daily.     Weight loss prior to surgery (5-10% TBW): 11-23 lbs     Return to clinic in one month.  Needs 1 additional visit(s) with RD for MSD.     Communicated nutrition plan with bariatric team.     SESSION TIME:  30 minutes

## 2019-04-05 NOTE — PATIENT INSTRUCTIONS
Adjust diet plan.  Continue cutting back on starchy CHO in the diet.  5-6 meals per day. Try shakes for snack before dinner.  Start including protein supplements in the diet plan daily.   Needs EKG scheduled.     Exercise: Increase.   Walking with a goal of 2-3 times/week     Behavior Modification: Continue to document food & activity logs daily.    Healthy Eating on the go ~ Pre and Post-Surgery     (*) Means this meal is appropriate for pre-surgery consumption because it is >30g of protein per meal. Post-surgery, may want to split meal in half or save a small portion for a snack.     Vanitas Kitchen  Item  Calories  Protein (g)   Mediterranean Lamb [place]  350 22   *Chicken Kabobs 290 41   *[place] Kabobs 330 40   Shrimp Kabobs 170 23   *Steak Kabobs 490 42   *Cauliflower Rice Bowl- chicken (salmon, lamb)  490 41   Mediterranean Chicken 260 34   *Protein Power Plate 520 41   Side items: Greek side salad, fruit salad, roasted vegetables, baked falafel       Mcelroys   Item  Calories Protein (g)   Artisan Grilled Chicken [place], no bun  <380 36   *Tinoco Ranch Grilled Chicken Salad, no dressing <320 42   Double Hamburger, no bun <440 25   Side items: apple slices, side salad       Normas   Item Calories Protein (g)   Grilled Chicken [place], no bun  <370 34   Kitzmiller Chicken Salad, half size, no dressing 320  22   Apple Pecan Chicken Salad, half size, no dressing  340 20   Large Chili 250 21   Side items: garden or caesar side salad (no croutons), apple bites       Burger Jaime   Item Calories Protein (g)   Grilled Chicken [place], no bun <470 37   Whopper Jr., no bun <310 13   Double Hamburger, no bun  <390 23   *Chicken Garden Salad, no croutons, use ½ packet of dressing  <520 40   Side items: garden side salad,         Chick-yvette-A  Item Calories Protein (g)   Grilled Chicken [place], no bun  <310 29   Grilled Nuggets, 8 count 140 25   Grilled Chicken Market Salad with light balsamic dressing 330 28   Small  Chicken Tortilla Soup, no tortilla strips 340 23   Side items: side salad, superfood side salad, carrot raisin salad, fruit cup,          Sonic  Item Calories Protein (g)   Jr. Kerr, no bun  <330 15   Classic Grilled Chicken [place], no bun <490 31   Hearty Witt, no fritos 140 10       Jatins   Item Calories Protein (g)   Blackened Chicken Tenders, 3 piece 170 26   Side items: green beans             Gray Pacheco   Item Calories Protein (g)   Unwich: any sandwich made wrapped in lettuce instead of bread. Ask for no gonzalez.      Original Roast Beef Unwich 260 16   Hudson Breast Unwich 230 14   Perfect Ethiopian Unwich 340 22   Ham and Provolone Unwich 350 19   Tuna Salad Unwich 280 11   The Veggie Unwich 410 17   Side items: dill pickle          Chipotle  Item Calories Protein (g)   *Salad with your choice of meat, beans, fresh tomato salsa, fajita veggies, and guacamole (NO rice) ~375 ~40        Applebees  Item Calories Protein (g)   Grilled Chicken Breast 290 38   Turkmen Shrimp Salad, no wonton strips, use ½ dressing <390 26   Blackened Shrimp Caesar Salad, no croutons, use ½ dressing  <660 25   *Grilled Chicken Caesar Salad, no croutons, use ½ dressing <770 47   Notus [place] with Maple Mustard Glaze 350 37   Side Grilled Shrimp Skewer 110 27   Side items: steamed broccoli, garlicky green beans,               [employer]  Item Calories Protein (g)   *Spinach & Mushroom Omelette, no hollandaise sauce  <890 46   *Garden Omelette 840 46   Egg White Vegetable Omelette 380 29   *Grilled Chicken & Veggie Salad, use ½ dressing  <680 38     Olive Garden   Item Calories Protein (g)   *Herb-Grilled [place] 460 45   House salad with, no croutons. (Add grilled chicken or shrimp) <400 25   Side items: steamed broccoli       Walk Ons   Tuna Tini 410 30   Venison Witt- Bowl 330 14   Chicken Berry Pecan Salad      Side items: seasonal fruit, broccoli, green beans side salad

## 2019-04-09 RX ORDER — HYDROCHLOROTHIAZIDE 12.5 MG/1
12.5 TABLET ORAL DAILY
Qty: 30 TABLET | Refills: 5 | Status: SHIPPED | OUTPATIENT
Start: 2019-04-09 | End: 2019-05-10

## 2019-04-09 NOTE — PROGRESS NOTES
"Last 5 Patient Entered Readings                                      Current 30 Day Average: 145/93     Recent Readings 4/8/2019 4/7/2019 4/4/2019 4/3/2019 4/2/2019    SBP (mmHg) 150 143 141 147 147    DBP (mmHg) 94 94 93 87 89    Pulse 101 81 81 92 79        Called patient for BP follow up. She is doing well with no complaints related to BP. She says she is very busy with work and her daughter just had a baby. She says she doesn't always eat like she should. She reports eating grilled chicken salads with vinaigrette dressing. Her  seasons the chicken with jorge chachere's. She is open to starting a BP medications but says "I don't like valsartan." She says she tried ACE-I in the past and experienced a cough and CCB caused swelling.     1) BP exceeds goal of <130/<80. Start HCTZ 12.5 mg QD  2) Patient knows to contact me with any non-urgent clinical changes or concerns. Go to ED or call [facility] for emergencies.   3) Will defer lifestyle counseling to digital medicine health .   4) Will continue to follow up. BMP 4/24/19    Beatriz Lynch Pharm.D.   Digital Medicine Clinical Pharmacist  [phone]        "

## 2019-04-10 PROCEDURE — 99091 COLLJ & INTERPJ DATA EA 30 D: CPT | Mod: ,,,

## 2019-04-10 PROCEDURE — 99091 PR DIGITAL MEDICINE SERVICES, HYPERTENSION, ESTABLISHED: ICD-10-PCS | Mod: ,,,

## 2019-04-11 NOTE — ASSESSMENT & PLAN NOTE
57 year old female admitted with acute on chronic back pain; MRI shows lumbar epidural abscesses at L4-L5. Possibly 2/2 recent epidural steroid injections?  Now POD #5 L4-S1 TLIF.  Paraspinal infected soft tissue found, possible infected facet, left cyst removed.   Surgical remain with cultures NGTD.  Gram stain negative.  Had been febrile, but no fevers past 60 hours.  Abx broadened to cefepime on 4/13, but looking at med sheet, fevers had resolved prior to initiation of cefepime.  No leukocytosis.  Repeat blood cultures NGTD.  U/A negative.  No respiratory symptoms.  Patient feels good. Ready to go home.      Plan/Discharge Recommendations:  1.  Continue IV Vancomycin 1500 q 12 hours.  Trough of 20.3 drawn yesterday was not true trough as am dose had been given several hours late. True trough likely lower.  Please ask Home Health to repeat in am.    2. Will check bmp and cbc this am  3.  Continue ceftriaxone 2 grams IV q 24 hours.   4.  Will need weekly cbc, cmp, esr, crp and vancomycin trough (on Monday's please) and fax results to ID at [phone]  5.  Anticipate 6 weeks of IV antibiotics from date of surgery - Estimated end date 5/22/18  6.  ID follow up 10-14 days.     Data reviewed and plan discussed with ID staff  Discussed with Primary Team          Single Mechanical/Accidental Fall

## 2019-04-16 NOTE — PROGRESS NOTES
Last 5 Patient Entered Readings                                      Current 30 Day Average: 144/92     Recent Readings 4/15/2019 4/14/2019 4/13/2019 4/12/2019 4/11/2019    SBP (mmHg) 138 143 136 143 133    DBP (mmHg) 84 91 89 90 88    Pulse 92 97 85 88 105            Digital Medicine: Health  Follow Up    Left voicemail to follow up with Mrs. Renae Hou.  Current BP average 144/92 mmHg is not at goal, <130/80 mmHg.  WCB in 1 week.    4/18 - Pt returned call when I was unavailable. Called pt back and LMFCB.

## 2019-04-18 NOTE — PROGRESS NOTES
Last 5 Patient Entered Readings                                      Current 30 Day Average: 143/91     Recent Readings 4/17/2019 4/16/2019 4/15/2019 4/14/2019 4/13/2019    SBP (mmHg) 135 132 138 143 136    DBP (mmHg) 86 81 84 91 89    Pulse 107 112 92 97 85        Digital Medicine: Health  Follow Up    Lifestyle Modifications:    1.Dietary Modifications (Sodium intake <2,000mg/day, food labels, dining out): Pt reports difficulty with maintaining a low sodium and balanced diet 2/2 a busy work schedule. States that work is going to start to slow down and that she plans to get back on track with meal planning and eating more balanced meals. Encouraged to follow through with this.     2.Physical Activity: Deferred.     3.Medication Therapy: Patient has been compliant with the medication regimen.    4.Patient has the following medication side effects/concerns: Pt noted some HUA with starting HCTZ, Advised pt to discuss this with PharmD.  (Frequency/Alleviating factors/Precipitating factors, etc.)     Follow up with Mrs. Renae Hou completed. No further questions or concerns. Will continue to follow up to achieve health goals.

## 2019-04-25 ENCOUNTER — PATIENT OUTREACH (OUTPATIENT)
Dept: OTHER | Facility: OTHER | Age: 59
End: 2019-04-25

## 2019-04-25 ENCOUNTER — LAB VISIT (OUTPATIENT)
Dept: LAB | Facility: HOSPITAL | Age: 59
End: 2019-04-25
Payer: COMMERCIAL

## 2019-04-25 DIAGNOSIS — I10 ESSENTIAL HYPERTENSION: ICD-10-CM

## 2019-04-25 LAB
ANION GAP SERPL CALC-SCNC: 10 MMOL/L (ref 8–16)
BUN SERPL-MCNC: 15 MG/DL (ref 6–20)
CALCIUM SERPL-MCNC: 9.7 MG/DL (ref 8.7–10.5)
CHLORIDE SERPL-SCNC: 104 MMOL/L (ref 95–110)
CO2 SERPL-SCNC: 29 MMOL/L (ref 23–29)
CREAT SERPL-MCNC: 0.8 MG/DL (ref 0.5–1.4)
EST. GFR  (AFRICAN AMERICAN): >60 ML/MIN/1.73 M^2
EST. GFR  (NON AFRICAN AMERICAN): >60 ML/MIN/1.73 M^2
GLUCOSE SERPL-MCNC: 114 MG/DL (ref 70–110)
POTASSIUM SERPL-SCNC: 3.5 MMOL/L (ref 3.5–5.1)
SODIUM SERPL-SCNC: 143 MMOL/L (ref 136–145)

## 2019-04-25 PROCEDURE — 36415 COLL VENOUS BLD VENIPUNCTURE: CPT

## 2019-04-25 PROCEDURE — 80048 BASIC METABOLIC PNL TOTAL CA: CPT

## 2019-04-25 NOTE — PROGRESS NOTES
Last 5 Patient Entered Readings                                      Current 30 Day Average: 143/91     Recent Readings 4/24/2019 4/23/2019 4/22/2019 4/22/2019 4/22/2019    SBP (mmHg) 136 146 145 152 154    DBP (mmHg) 87 88 93 93 94    Pulse 88 98 81 80 80        Hypertension Medications     hydroCHLOROthiazide (HYDRODIURIL) 12.5 MG Tab Take 1 tablet (12.5 mg total) by mouth once daily.     Called patient for BP follow up. Patient says that since starting the HCTZ she feels more swollen and feels like she is holding more fluids. She forgot to get labs done yesterday but plans to go today.     1) BP exceeds goal of <130/<80. Continue HCTZ.   2) Patient knows to contact me with any non-urgent clinical changes or concerns. Go to ED or call [facility] for emergencies.   3) Will defer lifestyle counseling to digital medicine health .   4) Will continue to follow up. BMP today. Will consider increasing HCTZ     Beatriz Lynch, Pharm.D.   Digital Medicine Clinical Pharmacist  [phone]

## 2019-04-26 ENCOUNTER — PATIENT OUTREACH (OUTPATIENT)
Dept: OTHER | Facility: OTHER | Age: 59
End: 2019-04-26

## 2019-04-26 NOTE — PROGRESS NOTES
Last 5 Patient Entered Readings                                      Current 30 Day Average: 143/91     Recent Readings 4/25/2019 4/24/2019 4/23/2019 4/22/2019 4/22/2019    SBP (mmHg) 145 136 146 145 152    DBP (mmHg) 88 87 88 93 93    Pulse 92 88 98 81 80        Hypertension Medications     hydroCHLOROthiazide (HYDRODIURIL) 12.5 MG Tab Take 1 tablet (12.5 mg total) by mouth once daily.     Called patient for BP/ BMP follow up.  She still complains of edema and says that her feet are retaining fluid. She denies eating salty foods. She says in the past she was retaining fluids and she got off of the BP medications and it resolved.     1) BP exceeds goal of <130/<80. Increase HCTZ to 25 mg QD   2) Reviewed BMP. K 3.5. Encouraged increased sources of dietary K.   3) Patient knows to contact me with any non-urgent clinical changes or concerns. Go to ED or call [facility] for emergencies.   4) Will defer lifestyle counseling to digital medicine health .   5) Will continue to follow up. Will schedule follow up BMP at next encounter.     Beatriz Lynch, Pharm.D.  [facility] Digital Medicine Clinical Pharmacist  [phone]

## 2019-04-30 ENCOUNTER — TELEPHONE (OUTPATIENT)
Dept: ENDOSCOPY | Facility: HOSPITAL | Age: 59
End: 2019-04-30

## 2019-04-30 NOTE — TELEPHONE ENCOUNTER
Attempted to contact patient to confirm appt for 5/3/19.    Patient did not answer and message was left for her to call Endoscopy scheduling department if she needs to cancel or reschedule

## 2019-05-01 ENCOUNTER — PATIENT MESSAGE (OUTPATIENT)
Dept: ORTHOPEDICS | Facility: CLINIC | Age: 59
End: 2019-05-01

## 2019-05-01 ENCOUNTER — TELEPHONE (OUTPATIENT)
Dept: ORTHOPEDICS | Facility: CLINIC | Age: 59
End: 2019-05-01

## 2019-05-01 DIAGNOSIS — M50.30 DDD (DEGENERATIVE DISC DISEASE), CERVICAL: Primary | ICD-10-CM

## 2019-05-02 ENCOUNTER — ANESTHESIA EVENT (OUTPATIENT)
Dept: ENDOSCOPY | Facility: HOSPITAL | Age: 59
End: 2019-05-02
Payer: COMMERCIAL

## 2019-05-03 ENCOUNTER — ANESTHESIA (OUTPATIENT)
Dept: ENDOSCOPY | Facility: HOSPITAL | Age: 59
End: 2019-05-03
Payer: COMMERCIAL

## 2019-05-03 ENCOUNTER — HOSPITAL ENCOUNTER (OUTPATIENT)
Facility: HOSPITAL | Age: 59
Discharge: HOME | End: 2019-05-03
Attending: INTERNAL MEDICINE | Admitting: INTERNAL MEDICINE
Payer: COMMERCIAL

## 2019-05-03 VITALS
DIASTOLIC BLOOD PRESSURE: 83 MMHG | OXYGEN SATURATION: 97 % | TEMPERATURE: 98 F | HEART RATE: 84 BPM | SYSTOLIC BLOOD PRESSURE: 136 MMHG | RESPIRATION RATE: 16 BRPM

## 2019-05-03 DIAGNOSIS — K21.9 GERD (GASTROESOPHAGEAL REFLUX DISEASE): ICD-10-CM

## 2019-05-03 PROCEDURE — 63600175 PHARM REV CODE 636 W HCPCS: Performed by: NURSE ANESTHETIST, CERTIFIED REGISTERED

## 2019-05-03 PROCEDURE — 25000003 PHARM REV CODE 250: Performed by: INTERNAL MEDICINE

## 2019-05-03 PROCEDURE — 43239 EGD BIOPSY SINGLE/MULTIPLE: CPT | Mod: ,,, | Performed by: INTERNAL MEDICINE

## 2019-05-03 PROCEDURE — 88342 TISSUE SPECIMEN TO PATHOLOGY - SURGERY: ICD-10-PCS | Mod: 26,,, | Performed by: PATHOLOGY

## 2019-05-03 PROCEDURE — 27201012 HC FORCEPS, HOT/COLD, DISP: Performed by: INTERNAL MEDICINE

## 2019-05-03 PROCEDURE — 37000009 HC ANESTHESIA EA ADD 15 MINS: Performed by: INTERNAL MEDICINE

## 2019-05-03 PROCEDURE — 43239 PR EGD, FLEX, W/BIOPSY, SGL/MULTI: ICD-10-PCS | Mod: ,,, | Performed by: INTERNAL MEDICINE

## 2019-05-03 PROCEDURE — E9220 PRA ENDO ANESTHESIA: HCPCS | Mod: ,,, | Performed by: NURSE ANESTHETIST, CERTIFIED REGISTERED

## 2019-05-03 PROCEDURE — 88305 TISSUE EXAM BY PATHOLOGIST: CPT | Performed by: PATHOLOGY

## 2019-05-03 PROCEDURE — 43239 EGD BIOPSY SINGLE/MULTIPLE: CPT | Performed by: INTERNAL MEDICINE

## 2019-05-03 PROCEDURE — E9220 PRA ENDO ANESTHESIA: ICD-10-PCS | Mod: ,,, | Performed by: NURSE ANESTHETIST, CERTIFIED REGISTERED

## 2019-05-03 PROCEDURE — 88342 IMHCHEM/IMCYTCHM 1ST ANTB: CPT | Mod: 26,,, | Performed by: PATHOLOGY

## 2019-05-03 PROCEDURE — 88305 TISSUE SPECIMEN TO PATHOLOGY - SURGERY: ICD-10-PCS | Mod: 26,,, | Performed by: PATHOLOGY

## 2019-05-03 PROCEDURE — 25000003 PHARM REV CODE 250: Performed by: NURSE ANESTHETIST, CERTIFIED REGISTERED

## 2019-05-03 PROCEDURE — 37000008 HC ANESTHESIA 1ST 15 MINUTES: Performed by: INTERNAL MEDICINE

## 2019-05-03 PROCEDURE — 88305 TISSUE EXAM BY PATHOLOGIST: CPT | Mod: 26,,, | Performed by: PATHOLOGY

## 2019-05-03 RX ORDER — GLYCOPYRROLATE 0.2 MG/ML
INJECTION INTRAMUSCULAR; INTRAVENOUS
Status: DISCONTINUED | OUTPATIENT
Start: 2019-05-03 | End: 2019-05-03

## 2019-05-03 RX ORDER — ONDANSETRON 2 MG/ML
INJECTION INTRAMUSCULAR; INTRAVENOUS
Status: DISCONTINUED | OUTPATIENT
Start: 2019-05-03 | End: 2019-05-03

## 2019-05-03 RX ORDER — SODIUM CHLORIDE 9 MG/ML
INJECTION, SOLUTION INTRAVENOUS CONTINUOUS
Status: DISCONTINUED | OUTPATIENT
Start: 2019-05-03 | End: 2019-05-03 | Stop reason: HOSPADM

## 2019-05-03 RX ORDER — LIDOCAINE HCL/PF 100 MG/5ML
SYRINGE (ML) INTRAVENOUS
Status: DISCONTINUED | OUTPATIENT
Start: 2019-05-03 | End: 2019-05-03

## 2019-05-03 RX ORDER — PROPOFOL 10 MG/ML
VIAL (ML) INTRAVENOUS
Status: DISCONTINUED | OUTPATIENT
Start: 2019-05-03 | End: 2019-05-03

## 2019-05-03 RX ADMIN — PROPOFOL 30 MG: 10 INJECTION, EMULSION INTRAVENOUS at 09:05

## 2019-05-03 RX ADMIN — GLYCOPYRROLATE 0.2 MG: 0.2 INJECTION, SOLUTION INTRAMUSCULAR; INTRAVENOUS at 09:05

## 2019-05-03 RX ADMIN — PROPOFOL 150 MG: 10 INJECTION, EMULSION INTRAVENOUS at 09:05

## 2019-05-03 RX ADMIN — SODIUM CHLORIDE: 0.9 INJECTION, SOLUTION INTRAVENOUS at 09:05

## 2019-05-03 RX ADMIN — ONDANSETRON 4 MG: 2 INJECTION INTRAMUSCULAR; INTRAVENOUS at 09:05

## 2019-05-03 RX ADMIN — LIDOCAINE HYDROCHLORIDE 140 MG: 20 INJECTION, SOLUTION INTRAVENOUS at 09:05

## 2019-05-03 NOTE — PROVATION PATIENT INSTRUCTIONS
Discharge Summary/Instructions after an Endoscopic Procedure  Patient Name: Renae Hou  Patient MRN: [id number]  Patient YOB: 1960  Friday, May 03, 2019  Carlin Sanchez MD  RESTRICTIONS:  During your procedure today, you received medications for sedation.  These   medications may affect your judgment, balance and coordination.  Therefore,   for 24 hours, you have the following restrictions:   - DO NOT drive a car, operate machinery, make legal/financial decisions,   sign important papers or drink alcohol.    ACTIVITY:  Today: no heavy lifting, straining or running due to procedural   sedation/anesthesia.  The following day: return to full activity including work.  DIET:  Eat and drink normally unless instructed otherwise.     TREATMENT FOR COMMON SIDE EFFECTS:  - Mild abdominal pain, nausea, belching, bloating or excessive gas:  rest,   eat lightly and use a heating pad.  - Sore Throat: treat with throat lozenges and/or gargle with warm salt   water.  - Because air was used during the procedure, expelling large amounts of air   from your rectum or belching is normal.  - If a bowel prep was taken, you may not have a bowel movement for 1-3 days.    This is normal.  SYMPTOMS TO WATCH FOR AND REPORT TO YOUR PHYSICIAN:  1. Abdominal pain or bloating, other than gas cramps.  2. Chest pain.  3. Back pain.  4. Signs of infection such as: chills or fever occurring within 24 hours   after the procedure.  5. Rectal bleeding, which would show as bright red, maroon, or black stools.   (A tablespoon of blood from the rectum is not serious, especially if   hemorrhoids are present.)  6. Vomiting.  7. Weakness or dizziness.  GO DIRECTLY TO THE NEAREST EMERGENCY ROOM IF YOU HAVE ANY OF THE FOLLOWING:      Difficulty breathing              Chills and/or fever over 101 F   Persistent vomiting and/or vomiting blood   Severe abdominal pain   Severe chest pain   Black, tarry stools   Bleeding- more than one  tablespoon   Any other symptom or condition that you feel may need urgent attention  Your doctor recommends these additional instructions:  If any biopsies were taken, your doctors clinic will contact you in 1 to 2   weeks with any results.  - Discharge patient to home.   - Follow an antireflux regimen.   - Await pathology results.   - Telephone endoscopist for pathology results in 2 weeks.   - The findings and recommendations were discussed with the patient.   - Return to referring physician.  For questions, problems or results please call your physician - Carlin Sanchez MD at Work:  [phone].  [facility], EMERGENCY ROOM PHONE NUMBER: [phone]  IF A COMPLICATION OR EMERGENCY SITUATION ARISES AND YOU ARE UNABLE TO REACH   YOUR PHYSICIAN - GO DIRECTLY TO THE EMERGENCY ROOM.  Carlin Sanchez MD  5/3/2019 9:58:14 AM  This report has been verified and signed electronically.  [employer]ATION

## 2019-05-03 NOTE — ANESTHESIA PREPROCEDURE EVALUATION
05/03/2019  Renae Hou is a 58 y.o., female.  Past Medical History:   Diagnosis Date    Abscess of paraspinous muscles     Allergy 4/2018    DVT (deep venous thrombosis)     left leg    Epidural abscess 4/10/2018    Fatty liver     GERD (gastroesophageal reflux disease) 2015    Hypertension     Lumbar radiculopathy     Menopause     Obesity     Obstructive sleep apnea on CPAP     Thyroid disease     Thyroid nodules.     Past Surgical History:   Procedure Laterality Date    ADENOIDECTOMY  1995    BACK SURGERY  2002    L4, L5    BILATERAL SALPINGOOPHORECTOMY      BLOCK-NERVE-MEDIAL BRANCH-LUMBAR Left 3/16/2018    [facility]    CHOLECYSTECTOMY      CYST REMOVAL      NEERAJ-TRANSFORAMINAL Left 3/2/2018    [facility]    FUSION-POSTERIOR LUMBAR INTERBODY FUSION L4/5, L5/S1 N/A 4/10/2018    [facility]ND F[facility]    HYSTERECTOMY  12/17/2012    [facility] BS[facility]     INJECTION-FACET Left 3/2/2018    [facility] at [facility]    RECTOCELE REPAIR      SPINE SURGERY  2018    THYROID NODULE REMOVAL      TONSILLECTOMY  1995         Anesthesia Evaluation    I have reviewed the Patient Summary Reports.     I have reviewed the Medications.     Review of Systems  Anesthesia Hx:  No problems with previous Anesthesia  Neg history of prior surgery. Denies Family Hx of Anesthesia complications.   Denies Personal Hx of Anesthesia complications.   Hematology/Oncology:  Hematology Normal   Oncology Normal     EENT/Dental:EENT/Dental Normal   Cardiovascular:   Exercise tolerance: good Hypertension    Pulmonary:   Sleep Apnea    Renal/:  Renal/ Normal     Hepatic/GI:   GERD Liver Disease,    Musculoskeletal:  Musculoskeletal Normal    Neurological:   Neuromuscular Disease,     Endocrine:  Endocrine Normal    Dermatological:  Skin Normal    Psych:  Psychiatric Normal           Physical Exam  General:  Well nourished    Airway/Jaw/Neck:       Eyes/Ears/Nose:  EYES/EARS/NOSE FINDINGS: Normal    Chest/Lungs:  Chest/Lungs Clear    Heart/Vascular:  Heart Findings: Normal Heart murmur: negative Vascular Findings: Normal    Abdomen:  Abdomen Findings: Normal    Musculoskeletal:  Musculoskeletal Findings: Normal   Skin:  Skin Findings: Normal    Mental Status:  Mental Status Findings:  Cooperative, Alert and Oriented         Anesthesia Plan  Type of Anesthesia, risks & benefits discussed:  Anesthesia Type:  general  Patient's Preference: general  Intra-op Monitoring Plan: standard ASA monitors  Intra-op Monitoring Plan Comments:   Post Op Pain Control Plan: multimodal analgesia  Post Op Pain Control Plan Comments:   Induction:   IV  Beta Blocker:         Informed Consent: Patient understands risks and agrees with Anesthesia plan.  Questions answered. Anesthesia consent signed with patient.  ASA Score: 2     Day of Surgery Review of History & Physical: I have interviewed and examined the patient. I have reviewed the patient's H&P dated:  There are no significant changes.  H&P update referred to the surgeon.         Ready For Surgery From Anesthesia Perspective.

## 2019-05-03 NOTE — PLAN OF CARE
Plan of care discussed with patient. All questions and concerns addressed and answered. Free of pain or distress. PIV removed without complications. VS stable. Procedure report and discharge instructions gone over and patient aware of restrictions and when to resume medications. Patient in agreement.    1032  Dr. Luo at bedside

## 2019-05-03 NOTE — H&P
[facility]  History & Physical    Subjective:      Chief Complaint/Reason for Admission:    EGD    Renae Hou is a 58 y.o. female.    Past Medical History:   Diagnosis Date    Abscess of paraspinous muscles     Allergy 2018    DVT (deep venous thrombosis)     left leg    Epidural abscess 4/10/2018    Fatty liver     GERD (gastroesophageal reflux disease)     Hypertension     Lumbar radiculopathy     Menopause     Obesity     Obstructive sleep apnea on CPAP     Thyroid disease     Thyroid nodules.     Past Surgical History:   Procedure Laterality Date    ADENOIDECTOMY      BACK SURGERY      L4, L5    BILATERAL SALPINGOOPHORECTOMY      BLOCK-NERVE-MEDIAL BRANCH-LUMBAR Left 3/16/2018    [facility]    CHOLECYSTECTOMY      CYST REMOVAL      NEERAJ-TRANSFORAMINAL Left 3/2/2018    [facility]    FUSION-POSTERIOR LUMBAR INTERBODY FUSION L4/5, L5/S1 N/A 4/10/2018    [facility] 2ND FLR    HYSTERECTOMY  2012    [facility]     INJECTION-FACET Left 3/2/2018    [facility]    RECTOCELE REPAIR      SPINE SURGERY      THYROID NODULE REMOVAL      TONSILLECTOMY       Family History   Problem Relation Age of Onset    Hypertension Mother     Hyperlipidemia Mother     Heart disease Mother 55        cad, valvular heart disease    Alcohol abuse Sister     Stroke Father     No Known Problems Daughter     No Known Problems Daughter     Breast cancer Neg Hx     Colon cancer Neg Hx     Ovarian cancer Neg Hx     Diabetes Neg Hx     Esophageal cancer Neg Hx      Social History     Tobacco Use    Smoking status: Former Smoker     Packs/day: 0.25     Years: 32.00     Pack years: 8.00     Types: Cigarettes     Last attempt to quit:      Years since quittin.3    Smokeless tobacco: Never Used     Tobacco comment: smoked socially decades ago - weekends only   Substance Use Topics    Alcohol use: No     Frequency: Never     Comment: very rare    Drug use: No       PTA Medications   Medication Sig    hydroCHLOROthiazide (HYDRODIURIL) 12.5 MG Tab Take 1 tablet (12.5 mg total) by mouth once daily.    clotrimazole-betamethasone 1-0.05% (LOTRISONE) cream clotrimazole-betamethasone 1 %-0.05 % topical cream    fluticasone (FLONASE) 50 mcg/actuation nasal spray 2 sprays (100 mcg total) by Each Nare route 2 (two) times daily.    lidocaine HCL 2% (XYLOCAINE) 2 % jelly Apply topically 2 (two) times daily as needed.    mometasone (ELOCON) 0.1 % ointment ALTON TO RASH BID    omeprazole (PRILOSEC) 40 MG capsule TK 1 C PO QD    phentermine (ADIPEX-P) 37.5 mg tablet TK 1 T PO D B YUE    POLYETHYLENE GLYCOL 3350 (MIRALAX ORAL) Take by mouth daily as needed. constipation    silver sulfADIAZINE 1% (SILVADENE) 1 % cream Apply topically once daily.     Review of patient's allergies indicates:   Allergen Reactions    Cathflo activase [alteplase] Anaphylaxis     Tightness in chest, raspy throat, restless legs, hotness all over    Heparin analogues      Restless legs, tightness in chest, and hot all over    Shellfish containing products Anaphylaxis, Hives, Shortness Of Breath, Itching, Swelling and Rash     Other reaction(s): Difficulty breathing  Neck swelling     Latex Swelling     Patient un sure about it    Iodine and iodide containing products         Review of Systems   Constitutional: Negative for chills, fever and weight loss.   Respiratory: Negative for shortness of breath and wheezing.    Cardiovascular: Negative for chest pain.   Gastrointestinal: Negative for abdominal pain.       Objective:      Vital Signs (Most Recent)  Temp: 98.8 °F (37.1 °C) (05/03/19 0909)  Pulse: 86 (05/03/19 0909)  Resp: 14 (05/03/19 0909)  BP: 130/88 (05/03/19 0909)  SpO2: 98 % (05/03/19 0909)    Vital Signs Range (Last 24H):  Temp:   [98.8 °F (37.1 °C)]   Pulse:  [86]   Resp:  [14]   BP: (130)/(88)   SpO2:  [98 %]     Physical Exam   Constitutional: She appears well-developed and well-nourished.   Cardiovascular: Normal rate.   Pulmonary/Chest: Effort normal.   Abdominal: Soft.   Skin: Skin is warm and dry.   Psychiatric: She has a normal mood and affect. Her behavior is normal. Judgment and thought content normal.         Assessment:      Active Hospital Problems    Diagnosis  POA    GERD (gastroesophageal reflux disease) [K21.9]  Yes      Resolved Hospital Problems   No resolved problems to display.       Plan:    EGD for pre bariatric surgery evaluation.

## 2019-05-03 NOTE — TRANSFER OF CARE
Anesthesia Transfer of Care Note    Patient: Renae Hou    Procedure(s) Performed: Procedure(s) (LRB):  ESOPHAGOGASTRODUODENOSCOPY (EGD) (N/A)    Patient location: PACU    Anesthesia Type: general    Transport from OR: Transported from OR on room air with adequate spontaneous ventilation    Post pain: adequate analgesia    Post assessment: no apparent anesthetic complications and tolerated procedure well    Post vital signs: stable    Level of consciousness: sedated    Nausea/Vomiting: no nausea/vomiting    Complications: none    Transfer of care protocol was followed      Last vitals:   Visit Vitals  /88 (BP Location: Left arm, Patient Position: Sitting)   Pulse 86   Temp 37.1 °C (98.8 °F) (Oral)   Resp 14   LMP 11/25/2012   SpO2 98%   Breastfeeding? No

## 2019-05-05 PROCEDURE — 99091 PR DIGITAL MEDICINE SERVICES, HYPERTENSION, ESTABLISHED: ICD-10-PCS | Mod: ,,,

## 2019-05-05 PROCEDURE — 99091 COLLJ & INTERPJ DATA EA 30 D: CPT | Mod: ,,,

## 2019-05-06 ENCOUNTER — OFFICE VISIT (OUTPATIENT)
Dept: ORTHOPEDICS | Facility: CLINIC | Age: 59
End: 2019-05-06
Payer: COMMERCIAL

## 2019-05-06 ENCOUNTER — HOSPITAL ENCOUNTER (OUTPATIENT)
Dept: RADIOLOGY | Facility: HOSPITAL | Age: 59
Discharge: HOME | End: 2019-05-06
Attending: PHYSICIAN ASSISTANT
Payer: COMMERCIAL

## 2019-05-06 VITALS — BODY MASS INDEX: 37.8 KG/M2 | WEIGHT: 226.88 LBS | HEIGHT: 65 IN

## 2019-05-06 DIAGNOSIS — M54.12 CERVICAL RADICULOPATHY: Primary | ICD-10-CM

## 2019-05-06 DIAGNOSIS — M50.30 DDD (DEGENERATIVE DISC DISEASE), CERVICAL: ICD-10-CM

## 2019-05-06 PROCEDURE — 3008F BODY MASS INDEX DOCD: CPT | Mod: CPTII,S$GLB,, | Performed by: PHYSICIAN ASSISTANT

## 2019-05-06 PROCEDURE — 99999 PR PBB SHADOW E&M-EST. PATIENT-LVL III: ICD-10-PCS | Mod: PBBFAC,,, | Performed by: PHYSICIAN ASSISTANT

## 2019-05-06 PROCEDURE — 72050 X-RAY EXAM NECK SPINE 4/5VWS: CPT | Mod: 26,,, | Performed by: RADIOLOGY

## 2019-05-06 PROCEDURE — 99999 PR PBB SHADOW E&M-EST. PATIENT-LVL III: CPT | Mod: PBBFAC,,, | Performed by: PHYSICIAN ASSISTANT

## 2019-05-06 PROCEDURE — 72050 X-RAY EXAM NECK SPINE 4/5VWS: CPT | Mod: TC

## 2019-05-06 PROCEDURE — 3008F PR BODY MASS INDEX (BMI) DOCUMENTED: ICD-10-PCS | Mod: CPTII,S$GLB,, | Performed by: PHYSICIAN ASSISTANT

## 2019-05-06 PROCEDURE — 99213 PR OFFICE/OUTPT VISIT, EST, LEVL III, 20-29 MIN: ICD-10-PCS | Mod: S$GLB,,, | Performed by: PHYSICIAN ASSISTANT

## 2019-05-06 PROCEDURE — 72050 XR CERVICAL SPINE AP LAT WITH FLEX EXTEN: ICD-10-PCS | Mod: 26,,, | Performed by: RADIOLOGY

## 2019-05-06 PROCEDURE — 99213 OFFICE O/P EST LOW 20 MIN: CPT | Mod: S$GLB,,, | Performed by: PHYSICIAN ASSISTANT

## 2019-05-06 RX ORDER — MELOXICAM 15 MG/1
15 TABLET ORAL DAILY
Qty: 30 TABLET | Refills: 0 | Status: SHIPPED | OUTPATIENT
Start: 2019-05-06 | End: 2019-06-05

## 2019-05-06 RX ORDER — METHOCARBAMOL 750 MG/1
750 TABLET, FILM COATED ORAL 3 TIMES DAILY PRN
Qty: 30 TABLET | Refills: 0 | Status: SHIPPED | OUTPATIENT
Start: 2019-05-06 | End: 2019-08-23

## 2019-05-06 NOTE — PROGRESS NOTES
DATE: 5/6/2019  PATIENT: Renae Hou    Supervising Physician: Dean Zayas M.D.    CHIEF COMPLAINT: neck pain    HISTORY:  Renae Hou is a 58 y.o. female previously seen by us for back pain, s/p L4/5/S1 TLIF for grade I spondylolisthesis with large   left-sided L4-L5 facet cyst, possible epidural abscess as well as paraspinal muscle abscess here for initial evaluation of neck and right arm pain (Neck - 7, Arm - 7). The pain has been present for many years but has progressively worsened over the last 3 months. The patient describes the pain as aching. The pain is worse with extension and at night and improved by nothing in particular. There is associated numbness and tingling, primarily in the thumb. There is no subjective weakness. Prior treatments have included motrin and physical therapy, but no ESIs or surgery.     The patient denies myelopathic symptoms such as handwriting changes or difficulty with buttons/coins/keys. Denies perineal paresthesias, bowel/bladder dysfunction.    PAST MEDICAL/SURGICAL HISTORY:  Past Medical History:   Diagnosis Date    Abscess of paraspinous muscles     Allergy 4/2018    DVT (deep venous thrombosis)     left leg    Epidural abscess 4/10/2018    Fatty liver     GERD (gastroesophageal reflux disease) 2015    Hypertension     Lumbar radiculopathy     Menopause     Obesity     Obstructive sleep apnea on CPAP     Thyroid disease     Thyroid nodules.     Past Surgical History:   Procedure Laterality Date    ADENOIDECTOMY  1995    BACK SURGERY  2002    L4, L5    BILATERAL SALPINGOOPHORECTOMY      BLOCK-NERVE-MEDIAL BRANCH-LUMBAR Left 3/16/2018    [facility]    CHOLECYSTECTOMY      CYST REMOVAL      NEERAJ-TRANSFORAMINAL Left 3/2/2018    [facility]    ESOPHAGOGASTRODUODENOSCOPY (EGD) N/A 5/3/2019    [facility]     FUSION-POSTERIOR LUMBAR INTERBODY FUSION L4/5, L5/S1 N/A 4/10/2018    Per[facility] at [facility] OR 2ND FLR    HYSTERECTOMY  12/17/2012    On license of [facility] BS&O     INJECTION-FACET Left 3/2/2018    [facility] PAIN MGT    RECTOCELE REPAIR      SPINE SURGERY  2018    THYROID NODULE REMOVAL      TONSILLECTOMY  1995       Medications:  Current Outpatient Medications on File Prior to Visit   Medication Sig Dispense Refill    clotrimazole-betamethasone 1-0.05% (LOTRISONE) cream clotrimazole-betamethasone 1 %-0.05 % topical cream      fluticasone (FLONASE) 50 mcg/actuation nasal spray 2 sprays (100 mcg total) by Each Nare route 2 (two) times daily. 2 Bottle 5    hydroCHLOROthiazide (HYDRODIURIL) 12.5 MG Tab Take 1 tablet (12.5 mg total) by mouth once daily. 30 tablet 5    lidocaine HCL 2% (XYLOCAINE) 2 % jelly Apply topically 2 (two) times daily as needed. 30 mL 11    mometasone (ELOCON) 0.1 % ointment ALTON TO RASH BID  3    omeprazole (PRILOSEC) 40 MG capsule TK 1 C PO QD  4    phentermine (ADIPEX-P) 37.5 mg tablet TK 1 T PO D B YUE  2    POLYETHYLENE GLYCOL 3350 (MIRALAX ORAL) Take by mouth daily as needed. constipation      silver sulfADIAZINE 1% (SILVADENE) 1 % cream Apply topically once daily. 50 g 11     No current facility-administered medications on file prior to visit.        Social History:   Social History     Socioeconomic History    Marital status:      Spouse name: Not on file    Number of children: Not on file    Years of education: Not on file    Highest education level: Not on file   Occupational History    Not on file   Social Needs    Financial resource strain: Not on file    Food insecurity:     Worry: Not on file     Inability: Not on file    Transportation needs:     Medical: Not on file     Non-medical: Not on file   Tobacco Use    Smoking status: Former Smoker     Packs/day: 0.25     Years: 32.00     Pack years: 8.00     Types:  "Cigarettes     Last attempt to quit: 1988     Years since quittin.3    Smokeless tobacco: Never Used    Tobacco comment: smoked socially decades ago - weekends only   Substance and Sexual Activity    Alcohol use: No     Frequency: Never     Comment: very rare    Drug use: No    Sexual activity: Not Currently     Partners: Male     Birth control/protection: See Surgical Hx, None     Comment: [facility] BS&O 2012,     Lifestyle    Physical activity:     Days per week: Not on file     Minutes per session: Not on file    Stress: Not on file   Relationships    Social connections:     Talks on phone: Not on file     Gets together: Not on file     Attends Restoration service: Not on file     Active member of club or organization: Not on file     Attends meetings of clubs or organizations: Not on file     Relationship status: Not on file   Other Topics Concern    Not on file   Social History Narrative    . 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       REVIEW OF SYSTEMS:  Constitution: Negative. Negative for chills, fever and night sweats.   Cardiovascular: Negative for chest pain and syncope.   Respiratory: Negative for cough and shortness of breath.   Gastrointestinal: See HPI. Negative for nausea/vomiting. Negative for abdominal pain.  Genitourinary: See HPI. Negative for discoloration or dysuria.  Skin: Negative for dry skin, itching and rash.   Hematologic/Lymphatic: Negative for bleeding problem. Does not bruise/bleed easily.   Musculoskeletal: Negative for falls and muscle weakness.   Neurological: See HPI. No seizures.   Endocrine: Negative for polydipsia, polyphagia and polyuria.   Allergic/Immunologic: Negative for hives and persistent infections.  Psychiatric/Behavioral: Negative for depression and insomnia.         EXAM:  Ht 5' 5" (1.651 m)   Wt 102.9 kg (226 lb 13.7 oz)   LMP 2012   BMI 37.75 kg/m²     General: The patient is a very pleasant 58 y.o. female in no apparent distress, the patient is " oriented to person, place and time.  Psych: Normal mood and affect  HEENT: Vision grossly intact, hearing intact to the spoken word.  Lungs: Respirations unlabored.  Gait: Normal station and gait, no difficulty with toe or heel walk.   Skin: Cervical skin negative for rashes, lesions, hairy patches and surgical scars.  Range of motion: Cervical range of motion is acceptable. There is mild tenderness to palpation.  Spinal Balance: Global saggital and coronal spinal balance acceptable, no significant for scoliosis and kyphosis.  Musculoskeletal: No pain with the range of motion of the bilateral shoulders and elbows. Normal bulk and contour of the bilateral hands.  Vascular: Bilateral hands warm and well perfused, radial pulses 2+ bilaterally.  Neurological: Normal strength and tone in all major motor groups in the bilateral upper and lower extremities. Normal sensation to light touch in the C5-T1 and L2-S1 dermatomes bilaterally.  Deep tendon reflexes symmetric 2+ in the bilateral upper and lower extremities.  Negative Inverted Radial Reflex and Zavala's bilaterally. Negative Babinski bilaterally.     IMAGING:   Today I personally reviewed AP, Lat and Flex/Ex  upright C-spine films that demonstrate C4/5 and C6/7 disc space narrowing.  There is retrolisthesis of C4/5.       Body mass index is 37.75 kg/m².    Hemoglobin A1C   Date Value Ref Range Status   10/05/2018 6.1 (H) 4.0 - 5.6 % Final     Comment:     ADA Screening Guidelines:  5.7-6.4%  Consistent with prediabetes  >or=6.5%  Consistent with diabetes  High levels of fetal hemoglobin interfere with the HbA1C  assay. Heterozygous hemoglobin variants (HbS, HgC, etc)do  not significantly interfere with this assay.   However, presence of multiple variants may affect accuracy.     09/28/2018 6.1 (H) 4.0 - 5.6 % Final     Comment:     ADA Screening Guidelines:  5.7-6.4%  Consistent with prediabetes  >or=6.5%  Consistent with diabetes  High levels of fetal hemoglobin  interfere with the HbA1C  assay. Heterozygous hemoglobin variants (HbS, HgC, etc)do  not significantly interfere with this assay.   However, presence of multiple variants may affect accuracy.     05/22/2018 5.2 4.0 - 5.6 % Final     Comment:     According to ADA guidelines, hemoglobin A1c <7.0% represents  optimal control in non-pregnant diabetic patients. Different  metrics may apply to specific patient populations.   Standards of Medical Care in Diabetes-2016.  For the purpose of screening for the presence of diabetes:  <5.7%     Consistent with the absence of diabetes  5.7-6.4%  Consistent with increasing risk for diabetes   (prediabetes)  >or=6.5%  Consistent with diabetes  Currently, no consensus exists for use of hemoglobin A1c  for diagnosis of diabetes for children.  This Hemoglobin A1c assay has significant interference with fetal   hemoglobin   (HbF). The results are invalid for patients with abnormal amounts of   HbF,   including those with known Hereditary Persistence   of Fetal Hemoglobin. Heterozygous hemoglobin variants (HbAS, HbAC,   HbAD, HbAE, HbA2) do not significantly interfere with this assay;   however, presence of multiple variants in a sample may impact the %   interference.             ASSESSMENT/PLAN:    Renae was seen today for neck pain and shoulder pain.    Diagnoses and all orders for this visit:    Cervical radiculopathy  -     MRI Cervical Spine Without Contrast; Future    Other orders  -     meloxicam (MOBIC) 15 MG tablet; Take 1 tablet (15 mg total) by mouth once daily.  -     methocarbamol (ROBAXIN) 750 MG Tab; Take 1 tablet (750 mg total) by mouth 3 (three) times daily as needed. As needed for muscle spasms      MRI cervical spine.  Follow up after the MRI to discuss results and further treatment.       No follow-ups on file.

## 2019-05-07 NOTE — ANESTHESIA POSTPROCEDURE EVALUATION
Anesthesia Post Evaluation    Patient: Renae Hou    Procedure(s) Performed: Procedure(s) (LRB):  ESOPHAGOGASTRODUODENOSCOPY (EGD) (N/A)    Final Anesthesia Type: general  Patient location during evaluation: PACU  Patient participation: Yes- Able to Participate  Level of consciousness: awake and alert and oriented  Pain management: adequate  Airway patency: patent  PONV status at discharge: No PONV  Anesthetic complications: no      Cardiovascular status: blood pressure returned to baseline and hemodynamically stable  Respiratory status: unassisted  Hydration status: euvolemic  Follow-up not needed.            Event Time     Out of Recovery 10:37:57          Pain/Ai Score: No data recorded

## 2019-05-10 ENCOUNTER — TELEPHONE (OUTPATIENT)
Dept: ENDOSCOPY | Facility: HOSPITAL | Age: 59
End: 2019-05-10

## 2019-05-10 ENCOUNTER — PATIENT MESSAGE (OUTPATIENT)
Dept: BARIATRICS | Facility: CLINIC | Age: 59
End: 2019-05-10

## 2019-05-10 ENCOUNTER — HOSPITAL ENCOUNTER (OUTPATIENT)
Dept: RADIOLOGY | Facility: HOSPITAL | Age: 59
Discharge: HOME | End: 2019-05-10
Attending: PHYSICIAN ASSISTANT
Payer: COMMERCIAL

## 2019-05-10 ENCOUNTER — PATIENT OUTREACH (OUTPATIENT)
Dept: OTHER | Facility: OTHER | Age: 59
End: 2019-05-10

## 2019-05-10 DIAGNOSIS — M54.12 CERVICAL RADICULOPATHY: ICD-10-CM

## 2019-05-10 DIAGNOSIS — I10 ESSENTIAL HYPERTENSION: ICD-10-CM

## 2019-05-10 PROCEDURE — 72141 MRI NECK SPINE W/O DYE: CPT | Mod: 26,,, | Performed by: RADIOLOGY

## 2019-05-10 PROCEDURE — 72141 MRI CERVICAL SPINE WITHOUT CONTRAST: ICD-10-PCS | Mod: 26,,, | Performed by: RADIOLOGY

## 2019-05-10 PROCEDURE — 72141 MRI NECK SPINE W/O DYE: CPT | Mod: TC

## 2019-05-10 RX ORDER — HYDROCHLOROTHIAZIDE 25 MG/1
25 TABLET ORAL DAILY
Qty: 30 TABLET | Refills: 5 | Status: SHIPPED | OUTPATIENT
Start: 2019-05-10 | End: 2019-08-02

## 2019-05-10 NOTE — PROGRESS NOTES
Last 5 Patient Entered Readings                                      Current 30 Day Average: 140/90     Recent Readings 5/9/2019 5/8/2019 5/7/2019 5/6/2019 5/5/2019    SBP (mmHg) 132 121 122 145 139    DBP (mmHg) 84 84 80 95 88    Pulse 100 96 106 91 80        Hypertension Medications     hydroCHLOROthiazide (HYDRODIURIL) 12.5 MG Tab Take 1 tablet (12.5 mg total) by mouth once daily.- has been taking 25 mg QD     Called patient for BP follow up. Overall, she is doing well today. She says that she has noticed her BP is trending down. She also says the swelling in her feet and ankles is better. She says that she has not been sleeping very well, but no complaints otherwise. She thinks this may be related to work- related stress. She has an employee health appointment in a couple of weeks and will likely get routine labs done.     1) BP exceeds goal of <130/<80. Slightly trending down. Continue current BP medications.   2) Patient knows to contact me with any non-urgent clinical changes or concerns. Go to ED or call [facility] for emergencies.   3) Will defer lifestyle counseling to digital medicine health .   4) Will continue to follow up. Will follow future labs     Beatriz Hughes, Pharm.D.   Digital Medicine Clinical Pharmacist  [phone]

## 2019-05-13 DIAGNOSIS — Z00.00 ROUTINE GENERAL MEDICAL EXAMINATION AT A HEALTH CARE FACILITY: Primary | ICD-10-CM

## 2019-05-14 ENCOUNTER — OFFICE VISIT (OUTPATIENT)
Dept: ORTHOPEDICS | Facility: CLINIC | Age: 59
End: 2019-05-14
Payer: COMMERCIAL

## 2019-05-14 ENCOUNTER — PATIENT MESSAGE (OUTPATIENT)
Dept: ALLERGY | Facility: CLINIC | Age: 59
End: 2019-05-14

## 2019-05-14 VITALS — WEIGHT: 228.38 LBS | BODY MASS INDEX: 38.05 KG/M2 | HEIGHT: 65 IN

## 2019-05-14 DIAGNOSIS — M54.12 CERVICAL RADICULOPATHY: Primary | ICD-10-CM

## 2019-05-14 PROCEDURE — 99213 OFFICE O/P EST LOW 20 MIN: CPT | Mod: S$GLB,,, | Performed by: PHYSICIAN ASSISTANT

## 2019-05-14 PROCEDURE — 3008F PR BODY MASS INDEX (BMI) DOCUMENTED: ICD-10-PCS | Mod: CPTII,S$GLB,, | Performed by: PHYSICIAN ASSISTANT

## 2019-05-14 PROCEDURE — 3008F BODY MASS INDEX DOCD: CPT | Mod: CPTII,S$GLB,, | Performed by: PHYSICIAN ASSISTANT

## 2019-05-14 PROCEDURE — 99213 PR OFFICE/OUTPT VISIT, EST, LEVL III, 20-29 MIN: ICD-10-PCS | Mod: S$GLB,,, | Performed by: PHYSICIAN ASSISTANT

## 2019-05-14 PROCEDURE — 99999 PR PBB SHADOW E&M-EST. PATIENT-LVL III: ICD-10-PCS | Mod: PBBFAC,,, | Performed by: PHYSICIAN ASSISTANT

## 2019-05-14 PROCEDURE — 99999 PR PBB SHADOW E&M-EST. PATIENT-LVL III: CPT | Mod: PBBFAC,,, | Performed by: PHYSICIAN ASSISTANT

## 2019-05-14 NOTE — PROGRESS NOTES
"DATE: 5/15/2019  PATIENT: Renae Hou    Attending Physician: Dean Zayas M.D.    HISTORY:  Renae Hou is a 58 y.o. female who returns to me today for MRI results.  She was last seen by me 5/6/2019.  Today she is doing well but notes the medications help somewhat but she still has pain in the neck and right arm.    The Patient denies myelopathic symptoms such as handwriting changes or difficulty with buttons/coins/keys. Denies perineal paresthesias, bowel/bladder dysfunction.    PMH/PSH/FamHx/SocHx:  Unchanged from prior visit    ROS:  REVIEW OF SYSTEMS:  Constitution: Negative. Negative for chills, fever and night sweats.   HENT: Negative for congestion and headaches.    Eyes: Negative for blurred vision, left vision loss and right vision loss.   Cardiovascular: Negative for chest pain and syncope.   Respiratory: Negative for cough and shortness of breath.    Endocrine: Negative for polydipsia, polyphagia and polyuria.   Hematologic/Lymphatic: Negative for bleeding problem. Does not bruise/bleed easily.   Skin: Negative for dry skin, itching and rash.   Musculoskeletal: Negative for falls and muscle weakness.   Gastrointestinal: Negative for abdominal pain and bowel incontinence.   Allergic/Immunologic: Negative for hives and persistent infections.  Genitourinary: Negative for urinary retention/incontinence and nocturia.   Neurological: Negative for disturbances in coordination, no myelopathic symptoms such as handwriting changes or difficulty with buttons, coins, keys or small objects. No loss of balance and seizures.   Psychiatric/Behavioral: Negative for depression. The patient does not have insomnia.   Denies myelopathic symptoms, perineal paresthesias, bowel or bladder incontinence    EXAM:  Ht 5' 5" (1.651 m)   Wt 103.6 kg (228 lb 6.3 oz)   LMP 11/25/2012   BMI 38.01 kg/m²     Physical exam stable.  Neuro exam stable.     IMAGING:  No new imaging today.    Today I personally re-reviewed " AP, Lat and Flex/Ex  upright C-spine films that demonstrate C4/5 and C6/7 disc space narrowing.  There is retrolisthesis of C4/5.     MRI cervical spine demonstrates moderate right neural foraminal narrowing at C4/5.       ASSESSMENT/PLAN:    Renae was seen today for follow-up.    Diagnoses and all orders for this visit:    Cervical radiculopathy        Plan for cervical NEERAJ.  We will get this scheduled with IR.        No follow-ups on file.

## 2019-05-16 ENCOUNTER — PATIENT MESSAGE (OUTPATIENT)
Dept: BARIATRICS | Facility: CLINIC | Age: 59
End: 2019-05-16

## 2019-05-20 ENCOUNTER — PATIENT MESSAGE (OUTPATIENT)
Dept: ORTHOPEDICS | Facility: CLINIC | Age: 59
End: 2019-05-20

## 2019-05-21 ENCOUNTER — PATIENT MESSAGE (OUTPATIENT)
Dept: ORTHOPEDICS | Facility: CLINIC | Age: 59
End: 2019-05-21

## 2019-05-21 ENCOUNTER — OFFICE VISIT (OUTPATIENT)
Dept: ALLERGY | Facility: CLINIC | Age: 59
End: 2019-05-21
Payer: COMMERCIAL

## 2019-05-21 VITALS
BODY MASS INDEX: 37.57 KG/M2 | SYSTOLIC BLOOD PRESSURE: 140 MMHG | OXYGEN SATURATION: 96 % | DIASTOLIC BLOOD PRESSURE: 90 MMHG | WEIGHT: 225.5 LBS | HEART RATE: 91 BPM | HEIGHT: 65 IN

## 2019-05-21 DIAGNOSIS — M54.12 CERVICAL RADICULOPATHY: Primary | ICD-10-CM

## 2019-05-21 DIAGNOSIS — Z91.013 PERSONAL HISTORY OF ALLERGY TO SHELLFISH: Primary | ICD-10-CM

## 2019-05-21 PROCEDURE — 99499 NO LOS: ICD-10-PCS | Mod: S$GLB,,, | Performed by: ALLERGY & IMMUNOLOGY

## 2019-05-21 PROCEDURE — 95076 PR INGESTION CHALLENGE TEST; INITIAL 120 MIN: ICD-10-PCS | Mod: S$GLB,,, | Performed by: ALLERGY & IMMUNOLOGY

## 2019-05-21 PROCEDURE — 95079 INGEST CHALLENGE ADDL 60 MIN: CPT | Mod: S$GLB,,, | Performed by: ALLERGY & IMMUNOLOGY

## 2019-05-21 PROCEDURE — 95079 PR INGESTION CHALLENGE TEST; EA ADD'L 60 MIN: ICD-10-PCS | Mod: S$GLB,,, | Performed by: ALLERGY & IMMUNOLOGY

## 2019-05-21 PROCEDURE — 99999 PR PBB SHADOW E&M-EST. PATIENT-LVL IV: CPT | Mod: PBBFAC,,, | Performed by: ALLERGY & IMMUNOLOGY

## 2019-05-21 PROCEDURE — 95076 INGEST CHALLENGE INI 120 MIN: CPT | Mod: S$GLB,,, | Performed by: ALLERGY & IMMUNOLOGY

## 2019-05-21 PROCEDURE — 99999 PR PBB SHADOW E&M-EST. PATIENT-LVL IV: ICD-10-PCS | Mod: PBBFAC,,, | Performed by: ALLERGY & IMMUNOLOGY

## 2019-05-21 PROCEDURE — 99499 UNLISTED E&M SERVICE: CPT | Mod: S$GLB,,, | Performed by: ALLERGY & IMMUNOLOGY

## 2019-05-21 NOTE — PROGRESS NOTES
"Subjective:       Patient ID: Renae Hou is a 58 y.o. female.    Chief Complaint:  Other (here for double blind shrimp challenge, no antihistamines, a little anxious)      HPI     Renae Hou is a 58 y.o. female with PMH of negative SPT to shellfish but with reactions concerning for food allergy vs globus reaction.  She returns today for a double blind shrimp challenge.     She reports feeling at her baseline with no acute complaints.       Review of Systems   Constitutional: Negative for fever.   HENT: Negative for sore throat.    Respiratory: Negative for cough, shortness of breath and wheezing.    Cardiovascular: Negative for chest pain.   Gastrointestinal: Negative for abdominal pain, nausea and vomiting.   Skin: Negative for rash.   Neurological: Negative for headaches.        Objective:    Physical Exam   Constitutional: She is oriented to person, place, and time. She appears well-developed and well-nourished. No distress.   HENT:   Head: Normocephalic and atraumatic.   Mouth/Throat: Oropharynx is clear and moist. No oropharyngeal exudate.   Eyes: Conjunctivae are normal.   Neck: Normal range of motion.   Pulmonary/Chest: Effort normal.   Musculoskeletal: Normal range of motion.   Neurological: She is alert and oriented to person, place, and time.   Skin: Skin is warm. No rash noted.   Psychiatric: She has a normal mood and affect.   BP (!) 140/90   Pulse 91   Ht 5' 5" (1.651 m)   Wt 102.3 kg (225 lb 8.5 oz)   LMP 11/25/2012   SpO2 96%   BMI 37.53 kg/m²       Laboratory:   Prick to prick testing with heparin for IV flush - negative with appropriate controls  Skin prick to foods (02/21/2019) entirely negative, including crab, lobster, and shrimp.    PROCEDURE:  Double Blind Shrimp Challenge    Cake 1: At 10am, patient had 1/8 of what was later found out to be a placebo (salmon cake).  At 10:05am, she started c/o tongue prickling and throat discomfort/hoarse voice.  No objective findings or " other complaints.  As this was later found out to be placebo, we moved on to the second cake.    Cake 2: At 10:14am she was given about 1/8 of a seafood cake.  She did not have any acute complaints.  At 10:34am, she was given 1/4 of the cake.  She had no acute complaints.  This cake was then revealed to be another placebo (salmon).     Cake 3: At 10:44am, she was given 1/2 of a seafood cake.  At 11:05 am she was given the other half of the seafood cake.  No complaints with either dose.  This cake was then revealed to be a shrimp cake.    Cake 4: At 11:26am, she was given a seafood cake.  After about 10 minutes, she complained of some mild tongue tingling that resolved after about 5-10 minutes.  This cake was then revealed to be another shrimp cake.    At 11:56am, she was given 1 fried shrimp (she refused to eat more than 1).  She was observed for about an hour with no signs or symptoms of an adverse reaction.    She was discharged home at 11:58am.       Assessment:       1. Personal history of allergy to shellfish         Plan:       Renae was seen today for other.    Diagnoses and all orders for this visit:    Personal history of allergy to shellfish: She has had negative skin testing to shellfish.  She successfully passed a double blind shrimp challenge today.  Ok to continue eat shellfish at home.      Lauren Hook MD  Allergy Fellow

## 2019-05-24 ENCOUNTER — CLINICAL SUPPORT (OUTPATIENT)
Dept: INTERNAL MEDICINE | Facility: CLINIC | Age: 59
End: 2019-05-24
Attending: INTERNAL MEDICINE
Payer: COMMERCIAL

## 2019-05-24 ENCOUNTER — HOSPITAL ENCOUNTER (OUTPATIENT)
Dept: RADIOLOGY | Facility: CLINIC | Age: 59
Discharge: HOME | End: 2019-05-24
Attending: INTERNAL MEDICINE
Payer: COMMERCIAL

## 2019-05-24 ENCOUNTER — CLINICAL SUPPORT (OUTPATIENT)
Dept: INTERNAL MEDICINE | Facility: CLINIC | Age: 59
End: 2019-05-24
Payer: COMMERCIAL

## 2019-05-24 ENCOUNTER — CLINICAL SUPPORT (OUTPATIENT)
Dept: BARIATRICS | Facility: CLINIC | Age: 59
End: 2019-05-24
Payer: COMMERCIAL

## 2019-05-24 ENCOUNTER — PATIENT MESSAGE (OUTPATIENT)
Dept: OTHER | Facility: OTHER | Age: 59
End: 2019-05-24

## 2019-05-24 ENCOUNTER — PATIENT MESSAGE (OUTPATIENT)
Dept: INTERNAL MEDICINE | Facility: CLINIC | Age: 59
End: 2019-05-24

## 2019-05-24 ENCOUNTER — OFFICE VISIT (OUTPATIENT)
Dept: INTERNAL MEDICINE | Facility: CLINIC | Age: 59
End: 2019-05-24
Payer: COMMERCIAL

## 2019-05-24 ENCOUNTER — HOSPITAL ENCOUNTER (OUTPATIENT)
Dept: RADIOLOGY | Facility: HOSPITAL | Age: 59
Discharge: HOME | End: 2019-05-24
Attending: INTERNAL MEDICINE
Payer: COMMERCIAL

## 2019-05-24 ENCOUNTER — HOSPITAL ENCOUNTER (OUTPATIENT)
Dept: CARDIOLOGY | Facility: CLINIC | Age: 59
Discharge: HOME | End: 2019-05-24
Payer: COMMERCIAL

## 2019-05-24 VITALS
SYSTOLIC BLOOD PRESSURE: 130 MMHG | DIASTOLIC BLOOD PRESSURE: 88 MMHG | HEART RATE: 88 BPM | OXYGEN SATURATION: 98 % | WEIGHT: 227.06 LBS | HEIGHT: 65 IN | BODY MASS INDEX: 37.83 KG/M2

## 2019-05-24 VITALS — HEIGHT: 65 IN | BODY MASS INDEX: 37.5 KG/M2 | WEIGHT: 225.06 LBS

## 2019-05-24 DIAGNOSIS — Z00.00 ROUTINE GENERAL MEDICAL EXAMINATION AT A HEALTH CARE FACILITY: ICD-10-CM

## 2019-05-24 DIAGNOSIS — Z71.3 NUTRITIONAL COUNSELING: ICD-10-CM

## 2019-05-24 DIAGNOSIS — G47.33 OSA ON CPAP: ICD-10-CM

## 2019-05-24 DIAGNOSIS — E66.9 CLASS 2 OBESITY WITH BODY MASS INDEX (BMI) OF 37.0 TO 37.9 IN ADULT, UNSPECIFIED OBESITY TYPE, UNSPECIFIED WHETHER SERIOUS COMORBIDITY PRESENT: ICD-10-CM

## 2019-05-24 DIAGNOSIS — I10 ESSENTIAL HYPERTENSION: ICD-10-CM

## 2019-05-24 DIAGNOSIS — Z00.00 ROUTINE GENERAL MEDICAL EXAMINATION AT A HEALTH CARE FACILITY: Primary | ICD-10-CM

## 2019-05-24 DIAGNOSIS — R73.03 PRE-DIABETES: ICD-10-CM

## 2019-05-24 DIAGNOSIS — Z13.89 SCREENING FOR MULTIPLE CONDITIONS: Primary | ICD-10-CM

## 2019-05-24 LAB
25(OH)D3+25(OH)D2 SERPL-MCNC: 29 NG/ML (ref 30–96)
ALBUMIN SERPL BCP-MCNC: 3.9 G/DL (ref 3.5–5.2)
ALP SERPL-CCNC: 89 U/L (ref 55–135)
ALT SERPL W/O P-5'-P-CCNC: 37 U/L (ref 10–44)
ANION GAP SERPL CALC-SCNC: 8 MMOL/L (ref 8–16)
AST SERPL-CCNC: 30 U/L (ref 10–40)
BILIRUB SERPL-MCNC: 0.4 MG/DL (ref 0.1–1)
BUN SERPL-MCNC: 19 MG/DL (ref 6–20)
CALCIUM SERPL-MCNC: 10.1 MG/DL (ref 8.7–10.5)
CHLORIDE SERPL-SCNC: 104 MMOL/L (ref 95–110)
CHOLEST SERPL-MCNC: 209 MG/DL (ref 120–199)
CHOLEST/HDLC SERPL: 3.7 {RATIO} (ref 2–5)
CO2 SERPL-SCNC: 31 MMOL/L (ref 23–29)
CREAT SERPL-MCNC: 0.8 MG/DL (ref 0.5–1.4)
ERYTHROCYTE [DISTWIDTH] IN BLOOD BY AUTOMATED COUNT: 13.5 % (ref 11.5–14.5)
EST. GFR  (AFRICAN AMERICAN): >60 ML/MIN/1.73 M^2
EST. GFR  (NON AFRICAN AMERICAN): >60 ML/MIN/1.73 M^2
ESTIMATED AVG GLUCOSE: 126 MG/DL (ref 68–131)
GLUCOSE SERPL-MCNC: 119 MG/DL (ref 70–110)
HBA1C MFR BLD HPLC: 6 % (ref 4–5.6)
HCT VFR BLD AUTO: 44.8 % (ref 37–48.5)
HDLC SERPL-MCNC: 57 MG/DL (ref 40–75)
HDLC SERPL: 27.3 % (ref 20–50)
HGB BLD-MCNC: 14.9 G/DL (ref 12–16)
LDLC SERPL CALC-MCNC: 126 MG/DL (ref 63–159)
MCH RBC QN AUTO: 30.6 PG (ref 27–31)
MCHC RBC AUTO-ENTMCNC: 33.3 G/DL (ref 32–36)
MCV RBC AUTO: 92 FL (ref 82–98)
NONHDLC SERPL-MCNC: 152 MG/DL
PLATELET # BLD AUTO: 300 K/UL (ref 150–350)
PMV BLD AUTO: 9.4 FL (ref 9.2–12.9)
POTASSIUM SERPL-SCNC: 4.4 MMOL/L (ref 3.5–5.1)
PROT SERPL-MCNC: 7.2 G/DL (ref 6–8.4)
RBC # BLD AUTO: 4.87 M/UL (ref 4–5.4)
SODIUM SERPL-SCNC: 143 MMOL/L (ref 136–145)
TRIGL SERPL-MCNC: 130 MG/DL (ref 30–150)
TSH SERPL DL<=0.005 MIU/L-ACNC: 1.49 UIU/ML (ref 0.4–4)
WBC # BLD AUTO: 6.76 K/UL (ref 3.9–12.7)

## 2019-05-24 PROCEDURE — 99999 PR PBB SHADOW E&M-EST. PATIENT-LVL IV: ICD-10-PCS | Mod: PBBFAC,,, | Performed by: INTERNAL MEDICINE

## 2019-05-24 PROCEDURE — 83036 HEMOGLOBIN GLYCOSYLATED A1C: CPT

## 2019-05-24 PROCEDURE — 97802 MEDICAL NUTRITION INDIV IN: CPT | Mod: S$GLB,,, | Performed by: INTERNAL MEDICINE

## 2019-05-24 PROCEDURE — 99396 PREV VISIT EST AGE 40-64: CPT | Mod: S$GLB,,, | Performed by: INTERNAL MEDICINE

## 2019-05-24 PROCEDURE — 99999 PR PBB SHADOW E&M-EST. PATIENT-LVL III: CPT | Mod: PBBFAC,,, | Performed by: DIETITIAN, REGISTERED

## 2019-05-24 PROCEDURE — 71046 XR CHEST PA AND LATERAL: ICD-10-PCS | Mod: 26,,, | Performed by: RADIOLOGY

## 2019-05-24 PROCEDURE — 99499 NO LOS: ICD-10-PCS | Mod: S$GLB,,, | Performed by: DIETITIAN, REGISTERED

## 2019-05-24 PROCEDURE — 99499 UNLISTED E&M SERVICE: CPT | Mod: S$GLB,,, | Performed by: DIETITIAN, REGISTERED

## 2019-05-24 PROCEDURE — 84443 ASSAY THYROID STIM HORMONE: CPT

## 2019-05-24 PROCEDURE — 99396 PR PREVENTIVE VISIT,EST,40-64: ICD-10-PCS | Mod: S$GLB,,, | Performed by: INTERNAL MEDICINE

## 2019-05-24 PROCEDURE — 97750 PHYSICAL PERFORMANCE TEST: CPT | Mod: S$GLB,,, | Performed by: INTERNAL MEDICINE

## 2019-05-24 PROCEDURE — 99999 PR PBB SHADOW E&M-EST. PATIENT-LVL III: ICD-10-PCS | Mod: PBBFAC,,, | Performed by: DIETITIAN, REGISTERED

## 2019-05-24 PROCEDURE — 93005 ELECTROCARDIOGRAM TRACING: CPT | Mod: PBBFAC | Performed by: INTERNAL MEDICINE

## 2019-05-24 PROCEDURE — 77080 DXA BONE DENSITY AXIAL: CPT | Mod: TC

## 2019-05-24 PROCEDURE — 93010 ELECTROCARDIOGRAM REPORT: CPT | Mod: S$PBB,,, | Performed by: INTERNAL MEDICINE

## 2019-05-24 PROCEDURE — 97803 MED NUTRITION INDIV SUBSEQ: CPT | Mod: S$GLB,,, | Performed by: DIETITIAN, REGISTERED

## 2019-05-24 PROCEDURE — 97750 PR PHYSICAL PERFORMANCE TEST: ICD-10-PCS | Mod: S$GLB,,, | Performed by: INTERNAL MEDICINE

## 2019-05-24 PROCEDURE — 82306 VITAMIN D 25 HYDROXY: CPT

## 2019-05-24 PROCEDURE — 71046 X-RAY EXAM CHEST 2 VIEWS: CPT | Mod: TC,FY

## 2019-05-24 PROCEDURE — 99999 PR PBB SHADOW E&M-EST. PATIENT-LVL IV: CPT | Mod: PBBFAC,,, | Performed by: INTERNAL MEDICINE

## 2019-05-24 PROCEDURE — 97802 PR MED NUTR THER, 1ST, INDIV, EA 15 MIN: ICD-10-PCS | Mod: S$GLB,,, | Performed by: INTERNAL MEDICINE

## 2019-05-24 PROCEDURE — 80061 LIPID PANEL: CPT

## 2019-05-24 PROCEDURE — 93010 EKG 12-LEAD: ICD-10-PCS | Mod: S$PBB,,, | Performed by: INTERNAL MEDICINE

## 2019-05-24 PROCEDURE — 80053 COMPREHEN METABOLIC PANEL: CPT

## 2019-05-24 PROCEDURE — 77080 DEXA BONE DENSITY SPINE HIP: ICD-10-PCS | Mod: 26,,, | Performed by: INTERNAL MEDICINE

## 2019-05-24 PROCEDURE — 77080 DXA BONE DENSITY AXIAL: CPT | Mod: 26,,, | Performed by: INTERNAL MEDICINE

## 2019-05-24 PROCEDURE — 97803 PR MED NUTR THER, SUBSQ, INDIV, EA 15 MIN: ICD-10-PCS | Mod: S$GLB,,, | Performed by: DIETITIAN, REGISTERED

## 2019-05-24 PROCEDURE — 71046 X-RAY EXAM CHEST 2 VIEWS: CPT | Mod: 26,,, | Performed by: RADIOLOGY

## 2019-05-24 PROCEDURE — 85027 COMPLETE CBC AUTOMATED: CPT

## 2019-05-24 NOTE — PROGRESS NOTES
Subjective:       Patient ID: Renae Hou is a 58 y.o. female.    Chief Complaint: No chief complaint on file.    HPI   Pt. Has no significant cardiovascular or pulmonary history.  Patient has a history of hypertension for which she takes a diuretic.    Physical Limitations: Patient has surgery on L4/L5/S1 last year.  While her low back is feeling better, she now experiences numbness is her left leg from time to time.  Patient has a bulging disc at C4/C5 for which she will be seeing a doctor to receive an injection.  Patient also stated that he knees felt sore and were giving her trouble today.  The push-up, curl-up, and sit and reach test were deferred due to this new neck issue.      Current exercise routine:  Patient currently attends physical therapy where she performs core strengthening exercises and stretches, once a week.  Patient stretches daily.  Patient does not follow any more aerobic or resistance training program at the current time.     Goals:  Patient has a long term goal weight of 150 lbs and a year goal weight of 190 lbs.  Patient stated that she has gone through the 6 month prep process to receive bariatric surgery and she is hoping to schedule the surgery for late June of this year.    Fun Facts:  Patient was very friendly and engaged.  Patient expressed an interest in starting a regina class.  Patient is motivated to lose weight but doesn't seem like she's ready to put in the physical work to do so.      Review of Systems    Objective:     The fitness evaluation results are as follows:  D.O.S. 5/24/2019 9/28/2018 8/12/2016   Height (in): 65.25 65.5 65.75   Weight (lbs): 226 234 183   BMI: 37.704523 38.80130 29.073739   Body Fat (%): 43.75 44.23 34.70   Waist (cm): 111 112 93   Hip (cm): 124 127 107   WHR: 0.90 0.88 0.87   RBP (mmHg): 134/90 118/82 130/80   RHR (bpm): 78 70 68    Strength R (lbs)t: 61.015778 68.026998 67.987015    Strength Lt (lbs): 73.209070 68.339616 69.861668    Push-up Assessment: Deferred Deferred 0   Curl-up Assessment: Deferred Deferred 0   Flexibility Testing (cm): Deferred Deferred 31   REE (kcals): 2250 2010 1260         Physical Exam    Assessment:     Age/gender stratified assessment:  Resting BP: Elevated   Body Fat %: Poor   WHR Risk Factor: High Risk    Strength R: Average    Strength L: Above Average   Upper Body Endurance: Deferred   Abdominal Endurance: Deferred   Lower body Flexibiltiy: Deferred       1. Routine general medical examination at a health care facility        Plan:       Recommended fitness guidelines:    -150 minutes of moderate intensity aerobic exercise per week or 75 minutes of vigorous intensity aerobic exercise per week.  Try to walk for 30 minutes, 5 days a week.    -2 to 4 days per week of resistance training for each muscle group.  Ask your physcial therapist for some safe resistance training exercises.      -Daily stretching with a hold of at least 30 seconds per muscle group.  Practice your stretches prescribed by the physical therapist, daily.

## 2019-05-24 NOTE — PROGRESS NOTES
NUTRITION NOTE     Referring Physician: Duc Ratliff M.D.  Reason for MNT Referral: 6 months Medically Supervised Diet pending Gastric Sleeve     Patient presents for 6th visit for MSD with +2lbs weight gain over the past month. Started making some dietary and lifestyle changes. Pt started on phentermine but has stopped due to BP meds and not sleeping well. Pt reports did have some cake this past month - niece got .     CLINICAL DATA:  58 y.o. female.     Current Weight: 225 lbs  Weight Change Since Initial Visit: -8 lbs  Body mass index is 37.46 kg/m².     NUTRITIONAL NEEDS:  1659 x 1.2 activity factor = 1991 kcal -750 kcal for wt loss = 1240 Calories (using [place] [address] Jeor Equation)  79-98 Grams Protein (1.2-1.5 gm/kg IBW)     CURRENT DIET:  Regular diet.  Verbal Diet Recall: Food records are not present.     Breakfast: yogurt with fruit and granola (1/2) or 2 eggs  Snack: kind protein bar or none  Lunch: salad with chicken or protein soup  Snack: kind protein bar or none  Dinner: salad with grilled chicken or eggs or hamburger pastor/steak with cauliflower tater tots/brussel sprouts/broccoli or taco meat with avocado     Diet Includes:   Meal Pattern: Improved  Protein Supplements: protein broth, protein hot chocolate - from bariatric pal   Snacking: Inadequate but consuming sometimes;   Vegetables: Likes a variety. Eats daily.  Fruits: Likes a variety. Eats almost daily.  Beverages: water, diet tea, coffee without sugar and skim milk  Dining Out:  Reduced lately.   Cooking at home: Daily. Mostly baked, grilled and smothered meat, fish and vegetables.     Exercise:  Current exercise: PT on Tuesdays  Restrictions to exercise: back feeling better      Vitamins / Minerals / Herbs:   Vit D otc 2000 IU daily   MV     Labs:   Reviewed      Food Allergies:   Iodine and shellfish - allergist reported no allergy but pt still feels she did not tolerate well (still had iching and throat felt like it wanted to  close)     Social:  Works regular daytime shifts.  Lives with .  Grocery shopping and food prep patient completes.  Patient believes the household will be supportive after surgery.  Alcohol: Socially. Once to twice a year  Smoking: None.     ASSESSMENT:  Patient demonstrates some willingness to change lifestyle habits as evidenced by increased vegetables, reduced dining out and healthier cooking at home.     Doing fair with working on greatest challenges (not knowing what to eat and lack of energy).     Barriers to Education:  none  Stage of Change:  determination and action     NUTRITION DIAGNOSIS:  Obesity related to Excessive carbohydrate intake as evidence by BMI.  Status: Improved     PLAN:  Pt is a good candidate for surgery.     Diet: Adjust diet plan.  Continue cutting back on starchy CHO in the diet.  5-6 meals per day. Try shakes for snack before dinner.  Start including protein supplements in the diet plan daily.   Start shopping for your bariatric vitamins     Exercise: Increase.   Walking with a goal of 2-3 times/week     Behavior Modification: Continue to document food & activity logs daily.     Weight loss prior to surgery (5-10% TBW): 11-23 lbs     Communicated nutrition plan with bariatric team.     SESSION TIME:  30 minutes

## 2019-05-24 NOTE — PROGRESS NOTES
"Nutrition Assessment  Client name:  Renae Hou  :  1960  Age:  58 y.o.  Gender:  female    Client states:  Very pleasant [employer] employee here for her annual Executive Health physical.  Familiar to me due to previous nutrition consults, during which time we discussed weight loss strategies.  Met with bariatric RD earlier this morning for her sixth assessment as she is undergoing the gastric sleeve procedure this summer.  Met with RD over the past six months, in which she lost ~10# and thereafter, regained ~5#.  Admits to "good days and bad days" in regards to her eating habits.  Reviewed Facebook posts of other patients who have undergone the procedure and realizes the importance of medical compliance pre and post surgery.  Is confident she will be a "good patient!"  Admits, however, that her food and activity logs have been inconsistent here lately as she has had heightened stress at work.  Is not formally exercising at this time although began physical therapy this past week to reduce neck pain.  Also, underwent allergy testing which returned negative.  Appears doubtful regarding results, however, recalling recent symptoms after consuming seafood/shellfish.  Overall, is excited and anxious for surgery as she is frustrated with her current weight status and ready to feel better.  Hopes that surgery will help to resolve her ongoing feelings of hunger.       Anthropometrics  Height:  5' 5.25"     Weight:  226#  BMI:  37.3  % Body Fat:  43.75%    Clinical Signs/Symptoms  N/V/D:  None  Appetite (Good, Fair, or Poor):  Good      Past Medical History:   Diagnosis Date    Abscess of paraspinous muscles     Allergy 2018    DVT (deep venous thrombosis)     left leg    Epidural abscess 4/10/2018    Fatty liver     GERD (gastroesophageal reflux disease) 2015    Hypertension     Lumbar radiculopathy     Menopause     Obesity     Obstructive sleep apnea on CPAP     Thyroid disease     Thyroid nodules. "       Past Surgical History:   Procedure Laterality Date    ADENOIDECTOMY  1995    BACK SURGERY      L4, L5    BILATERAL SALPINGOOPHORECTOMY      BLOCK-NERVE-MEDIAL BRANCH-LUMBAR Left 3/16/2018    [facility]    CHOLECYSTECTOMY      CYST REMOVAL      NEERAJ-TRANSFORAMINAL Left 3/2/2018    [facility] at [facility]    ESOPHAGOGASTRODUODENOSCOPY (EGD) N/A 5/3/2019    [facility]formed by Carlin [facility] at [facility] (4TH FLR)    FUSION-POSTERIOR LUMBAR INTERBODY FUSION L4/5, L5/S1 N/A 4/10/2018    Performed by Dean [facility]as [facility] at [facility] OR 2ND FLR    HYSTERECTOMY  2012    [facility]     INJECTION-FACET Left 3/2/2018    [facility] at [facility]    RECTOCELE REPAIR      SPINE SURGERY      THYROID NODULE REMOVAL      TONSILLECTOMY         Medications    has a current medication list which includes the following prescription(s): clotrimazole-betamethasone 1-0.05%, fluticasone propionate, hydrochlorothiazide, lidocaine hcl 2%, meloxicam, methocarbamol, mometasone, omeprazole, polyethylene glycol 3350, and silver sulfadiazine 1%.    Vitamins, Minerals, and/or Supplements:  MVI, Vitamin D     Food/Medication Interactions:  Reviewed     Food Allergies or Intolerances:  NKFA although believes she has a shellfish allergy     Social History    Marital status:    Employment:  [employer]    Social History     Tobacco Use    Smoking status: Former Smoker     Packs/day: 0.25     Years: 32.00     Pack years: 8.00     Types: Cigarettes     Last attempt to quit:      Years since quittin.4    Smokeless tobacco: Never Used    Tobacco comment: smoked socially decades ago - weekends only   Substance Use Topics    Alcohol use: No     Frequency: Never     Comment: very rare        Lab Reports   Total Cholesterol:  209    Triglycerides:  130  HDL:  57  LDL:  126   Glucose:  119  HbA1c:  6%  BP:  130/88      Food History  Breakfast:  1 boiled egg/yogurt  Mid-morning Snack:  Kind bar  Lunch:  Bariatric soup/salad/skips/[employer] tuna sandwich  Mid-afternoon Snack:  None  Dinner:  Salad with grilled chicken/taco salad/hamburger without bun + broccoli  H.S. Snack:  Orange juice  *Fluid intake:  Orange juice, water, coffee    Exercise History:  Physical therapy 1x/week    Cultural/Spiritual/Personal Preferences:  None identified    Support System:  Spouse    State of Change:  Action    Barriers to Change:  None    Diagnosis    1.  Overweight related to inadequate physical activity, excessive energy intake, and improper food choices as evidenced by BMI:  37.3.     2.  Altered nutrition-related laboratory values related to inadequate physical activity, improper food choices, and obesity as evidenced by glucose:  119; HbA1c:  6%.    Intervention    RMR (Method:  Body Clinton):  2250 kcal  Activity Factor:  1.3  JOB:  2925 - 500 = 2425 kcal (*or as directed by bariatric team)    Goals:  1.  Adhere to dietary recommendations provided by bariatric RD  2.  Achieve medical compliance pre and post bariatric surgery  3.  FBS < 110; HbA1c < 5.7%    Nutrition Education  Reviewed CMP, lipid panel, and HbA1c, noting improvement in lipids despite maintenance of borderline high FBS and HbA1c, indicative of pre-diabetes according to ADA.  Explained such parameters to patient in addition to risk factors for DM.  Fitness consult revealed 8# weight loss and ~0.5% body fat reduction since last year.  Again, discussed the risks versus benefits of weight loss surgery in addition to stressing the importance of lifestyle modifications pre and post surgery for long term success.  Strongly encouraged medical compliance and open communication with bariatric team.  Deferred questions regarding specific bariatric supplements to bariatric RD.      Patient verbalized understanding of nutrition education and recommendations received.    Handouts  Provided  Patient politely declined handouts as she has received them in the past.    Monitoring/Evaluation    Monitor the following:  Weight  BMI  % Body Fat  Caloric intake  Lipid panel  Glucose  HbA1c    Follow Up Plan:  Communication with referring healthcare provider is unnecessary at this time as patient presented as part of annual wellness exam.  However, will follow up with patient in 1-2 years.

## 2019-05-24 NOTE — LETTER
May 27, 2019    Renae Hou  [facility] [facility] [place] [address]             [facility] - Internal Medicine  [address]  [place] [address]  Phone: [phone]  Fax: [phone] Dear Ms. Hou:    It was a pleasure to meet you and perform your Executive Physical.    This letter will serve as a brief summary of the history, physical findings, and laboratory/studies performed and recommendations at this time.    Reason for visit:  Executive health preventive physical examination    Past medical history:  DVT, hypertension, sleep apnea, thyroid disease, status post back surgery, status post cholecystectomy, status post hysterectomy, thyroid nodule removal    Medications:  See med list    Allergies:  Heparin, iodine    Family history:  Coronary artery disease    REVIEW OF SYSTEMS:  GENERAL:  No fever, chills, fatigability, or weight loss.    SKIN:  No rashes, itching, or changes in color or texture of skin.    HEAD:  No headaches or recent head trauma.    Vital signs blood pressure 130/88, pulse 88, weight 103 kg    HEENT:  Pupils are equal, round, and reactive to light.  Tympanic            membranes are clear bilaterally.  Oropharynx is within normal limits.        NECK:  Supple.                                                               LYMPH:  No neck or axillary lymphadenopathy.                                 LUNGS:  Clear to auscultation bilaterally.                                   CARDIOVASCULAR:  Regular rate and rhythm without murmurs, rubs, or gallops.  ABDOMEN:  Positive bowel sounds, nontender, and nondistended.  No            hepatosplenomegaly.                                                          EXTREMITIES:  No cyanosis, clubbing, or edema.                                   NEURO:  Cranial nerves 2-12 are grossly intact.  No sensory deficits.        Motor 5/5 all extremities.  Deep tendon reflexes +2 throughout.    Laboratory data/studies attached.      As we discussed  while you were in the office, you are in the pre diabetes range.  The nutritionist will discuss this with you at your visit today.  I recommend you take an over-the-counter vitamin-D supplement daily.  You reported having a colonoscopy outside of our institution.    I would recommend a high fiber, lean protein diet that is high in complex (slow) carbohydrates such as non-starchy vegetables and whole grains.  Regular vigorous exercise 3-4 x weekly for 30-45 minutes would also be ideal.       At this time, you appear to be in good medical condition.  I look forward to seeing you again next year.        If you have any questions or concerns, please don't hesitate to call.    Sincerely,      Patricia Mcneill MD

## 2019-05-24 NOTE — PATIENT INSTRUCTIONS
Adjust diet plan.  Continue cutting back on starchy CHO in the diet.  5-6 meals per day. Try shakes for snack before dinner.  Start including protein supplements in the diet plan daily.   Start shopping for your bariatric vitamins     Exercise: Increase.   Walking with a goal of 2-3 times/week     Behavior Modification: Continue to document food & activity logs daily.    Required Vitamin/Mineral Supplements:    1. Multivitamins - one taken twice a day       2. Iron- 18 mg total daily (may be included in multivitamin)     3. Super B-complex with 50 mg Thiamine (Vitamin B1)- once daily  4. Calcium Citrate + Vitamin D- 500 mg three times per day  5. Vitamin B12- 500 mcg sublingual daily or monthly injections     Sleeve Gastrectomy: No swallowing large whole vitamins/minerals for 2 weeks after surgery  Gastric Bypass: No swallowing large whole vitamins/minerals for 1 month after surgery    *NO gummy multivitamins due to poor quality and sugar content.  *Do NOT take calcium citrate and Iron within 2 hours of each other due to poor absorption.  *Pills may be swallowed if the size of a No. 2 pencil eraser (or smaller). They may be cut to this size with a pill cutter and swallowed if tolerated. Compare to this size:        Suggested vitamins and where to find them:    [facility]:   o Flintstones Complete (chewables, not gummies) or Centrum Adult Multivitamins  o Super B-Complex tablets with 50 mg Thiamine  o Calcium Citrate petite tablets (NOT CALTRATE brand)  o Sublingual Vitamin B12    Order from www.bariatricadvantage.com  o Chewables-Complete OR Ultra multi formula w/ iron capsules  o Iron chewables  o Calcium Citrate Chewy bites-500 (chocolate, peanut butter and caramel flavored) OR unflavored powder mix OR tablets  o Sublingual Vitamin B12 (black cherry or peppermint flavored)    Order from www.celebratevitamins.com  o Multi-complete chewable OR capsules  o Calcium citrate: Celebrate soft chews OR Calcet creamy bites  OR Calcium PLUS tablets  o B-12 sublingual quick melt  *Available for purchase in [facility]    [facility]:  o Flintstones complete   o [place] Liquid calcium Citrate  o B-1, 100mg tablet  o Sublingual B12  *Celebrate protein powders, snacks and vitamins are available for purchase as well.

## 2019-05-27 ENCOUNTER — PATIENT MESSAGE (OUTPATIENT)
Dept: ORTHOPEDICS | Facility: CLINIC | Age: 59
End: 2019-05-27

## 2019-05-27 ENCOUNTER — TELEPHONE (OUTPATIENT)
Dept: INTERVENTIONAL RADIOLOGY/VASCULAR | Facility: CLINIC | Age: 59
End: 2019-05-27

## 2019-05-27 NOTE — TELEPHONE ENCOUNTER
Left message for pt to return my call. Need to schedule IR procedure.  Please forward call to [phone].  Thanks

## 2019-05-27 NOTE — TELEPHONE ENCOUNTER
Patient returned call. Schedule pt for IR procedure Monday 6/10/2019 at 10:30 am. Appointment confirm and letter mail.

## 2019-05-28 NOTE — PROGRESS NOTES
[facility]  [address]  [facility] [address]  Phone: [phone]  Fax: [phone] Dear Ms. Hou:    It was a pleasure to meet you and perform your Executive Physical.    This letter will serve as a brief summary of the history, physical findings, and laboratory/studies performed and recommendations at this time.    Reason for visit:  Executive health preventive physical examination    Past medical history:  DVT, hypertension, sleep apnea, thyroid disease, status post back surgery, status post cholecystectomy, status post hysterectomy, thyroid nodule removal    Medications:  See med list    Allergies:  Heparin, iodine    Family history:  Coronary artery disease    REVIEW OF SYSTEMS:  GENERAL:  No fever, chills, fatigability, or weight loss.    SKIN:  No rashes, itching, or changes in color or texture of skin.    HEAD:  No headaches or recent head trauma.    Vital signs blood pressure 130/88, pulse 88, weight 103 kg    HEENT:  Pupils are equal, round, and reactive to light.  Tympanic            membranes are clear bilaterally.  Oropharynx is within normal limits.        NECK:  Supple.                                                               LYMPH:  No neck or axillary lymphadenopathy.                                 LUNGS:  Clear to auscultation bilaterally.                                   CARDIOVASCULAR:  Regular rate and rhythm without murmurs, rubs, or gallops.  ABDOMEN:  Positive bowel sounds, nontender, and nondistended.  No            hepatosplenomegaly.                                                          EXTREMITIES:  No cyanosis, clubbing, or edema.                                   NEURO:  Cranial nerves 2-12 are grossly intact.  No sensory deficits.        Motor 5/5 all extremities.  Deep tendon reflexes +2 throughout.    Laboratory data/studies attached.      As we discussed while you were in the office, you are in the pre diabetes range.  The nutritionist  will discuss this with you at your visit today.  I recommend you take an over-the-counter vitamin-D supplement daily.  You reported having a colonoscopy outside of our institution.    I would recommend a high fiber, lean protein diet that is high in complex (slow) carbohydrates such as non-starchy vegetables and whole grains.  Regular vigorous exercise 3-4 x weekly for 30-45 minutes would also be ideal.       At this time, you appear to be in good medical condition.  I look forward to seeing you again next year.        If you have any questions or concerns, please don't hesitate to call.    Sincerely,      Patricia Mcneill MD

## 2019-05-31 ENCOUNTER — PATIENT OUTREACH (OUTPATIENT)
Dept: OTHER | Facility: OTHER | Age: 59
End: 2019-05-31

## 2019-05-31 NOTE — PROGRESS NOTES
Last 5 Patient Entered Readings                                      Current 30 Day Average: 134/85     Recent Readings 5/30/2019 5/29/2019 5/27/2019 5/26/2019 5/24/2019    SBP (mmHg) 132 129 139 141 124    DBP (mmHg) 81 82 90 87 80    Pulse 104 93 80 94 76            Digital Medicine: Health  Follow Up    Left voicemail to follow up with Mrs. Renae Hou.  Current BP average 134/85 mmHg is not at goal, <130/80 mmHg.  WCB in 2 weeks.

## 2019-06-02 PROCEDURE — 99091 PR DIGITAL MEDICINE SERVICES, HYPERTENSION, ESTABLISHED: ICD-10-PCS | Mod: ,,,

## 2019-06-02 PROCEDURE — 99091 COLLJ & INTERPJ DATA EA 30 D: CPT | Mod: ,,,

## 2019-06-03 ENCOUNTER — PATIENT MESSAGE (OUTPATIENT)
Dept: BARIATRICS | Facility: CLINIC | Age: 59
End: 2019-06-03

## 2019-06-06 ENCOUNTER — TELEPHONE (OUTPATIENT)
Dept: BARIATRICS | Facility: CLINIC | Age: 59
End: 2019-06-06

## 2019-06-06 DIAGNOSIS — G47.33 OSA ON CPAP: ICD-10-CM

## 2019-06-06 DIAGNOSIS — I82.4Y9 DEEP VEIN THROMBOSIS (DVT) OF PROXIMAL LOWER EXTREMITY, UNSPECIFIED CHRONICITY, UNSPECIFIED LATERALITY: Primary | ICD-10-CM

## 2019-06-06 DIAGNOSIS — Z98.84 S/P LAPAROSCOPIC SLEEVE GASTRECTOMY: ICD-10-CM

## 2019-06-06 DIAGNOSIS — I10 ESSENTIAL HYPERTENSION: ICD-10-CM

## 2019-06-06 DIAGNOSIS — E66.9 OBESITY (BMI 30-39.9): ICD-10-CM

## 2019-06-06 DIAGNOSIS — E66.9 OBESITY, UNSPECIFIED CLASSIFICATION, UNSPECIFIED OBESITY TYPE, UNSPECIFIED WHETHER SERIOUS COMORBIDITY PRESENT: ICD-10-CM

## 2019-06-06 DIAGNOSIS — R73.03 PRE-DIABETES: ICD-10-CM

## 2019-06-06 NOTE — TELEPHONE ENCOUNTER
Spoke with patient and scheduled preop appts/ surgery date/ post op appts. All dates and times agreed upon. Pt aware that must have PCP clearance within 60 days of surgery date signed and in chart for preop clearance. Pt aware that protein liquid diet start date is 7/16/19. Pt aware all appts can be seen in my [facility] patient portal at this time and appt reminders mailed to patient's address today by RN. Given office phone and fax number for any future questions/concerns.  Also discussed not having any procedures involving anesthesia or antibiotics for 30 days post op and she does not have issues with UTI's.

## 2019-06-07 ENCOUNTER — TELEPHONE (OUTPATIENT)
Dept: INTERVENTIONAL RADIOLOGY/VASCULAR | Facility: HOSPITAL | Age: 59
End: 2019-06-07

## 2019-06-10 ENCOUNTER — HOSPITAL ENCOUNTER (OUTPATIENT)
Dept: INTERVENTIONAL RADIOLOGY/VASCULAR | Facility: HOSPITAL | Age: 59
Discharge: HOME | End: 2019-06-10
Attending: PHYSICIAN ASSISTANT
Payer: COMMERCIAL

## 2019-06-10 VITALS
SYSTOLIC BLOOD PRESSURE: 152 MMHG | DIASTOLIC BLOOD PRESSURE: 85 MMHG | HEART RATE: 82 BPM | RESPIRATION RATE: 16 BRPM | OXYGEN SATURATION: 96 %

## 2019-06-10 DIAGNOSIS — M54.12 CERVICAL RADICULOPATHY: ICD-10-CM

## 2019-06-10 PROCEDURE — 25500020 PHARM REV CODE 255: Performed by: PHYSICIAN ASSISTANT

## 2019-06-10 PROCEDURE — 63600175 PHARM REV CODE 636 W HCPCS: Performed by: RADIOLOGY

## 2019-06-10 PROCEDURE — 64479 NJX AA&/STRD TFRM EPI C/T 1: CPT | Mod: RT,,, | Performed by: RADIOLOGY

## 2019-06-10 PROCEDURE — 64479 IR EPIDURAL TRANSFORAMINAL INJ 1ST VERT CERV THOR UNI: ICD-10-PCS | Mod: RT,,, | Performed by: RADIOLOGY

## 2019-06-10 PROCEDURE — A4215 STERILE NEEDLE: HCPCS

## 2019-06-10 RX ORDER — MIDAZOLAM HYDROCHLORIDE 1 MG/ML
INJECTION INTRAMUSCULAR; INTRAVENOUS CODE/TRAUMA/SEDATION MEDICATION
Status: COMPLETED | OUTPATIENT
Start: 2019-06-10 | End: 2019-06-10

## 2019-06-10 RX ADMIN — IOHEXOL 1 ML: 180 INJECTION INTRAVENOUS at 10:06

## 2019-06-10 RX ADMIN — MIDAZOLAM HYDROCHLORIDE 2 MG: 1 INJECTION, SOLUTION INTRAMUSCULAR; INTRAVENOUS at 10:06

## 2019-06-10 NOTE — SEDATION DOCUMENTATION
Pt arrived to room 190 for cervical NEERAJ . Pt awake, alert, and oriented. Awaiting consent. 22 gauge PIV to right forearm for meds.

## 2019-06-10 NOTE — DISCHARGE INSTRUCTIONS
For scheduling: Call Lenka at [phone]    For questions or concerns call: KEISHA MON-FRI 8 AM- 5PM [phone]. Radiology resident on call [phone].    For immediate concerns that are not emergent, you may call our radiology clinic at: [phone]

## 2019-06-10 NOTE — PROCEDURES
Radiology Post-Procedure Note    Pre Op Diagnosis: right cervical radiculopathy    Post Op Diagnosis: Same    Procedure: Cervical TFESI    Procedure performed by: Duc Aguirre MD    Written Informed Consent Obtained: Yes    Specimen Removed: NO    Estimated Blood Loss: Minimal    Findings: good needle positin in the right C4-5 foramen was confirmed by injection of 1cc Omnipaque 180 contrast    Level injected: C4-5 right  Needle used: 22 gauge  Dose:  10 mg Dexamethasone   2 mL Lidocaine 1% MPF    Patient tolerated procedure well.    Duc Aguirre MD  Attending Radiologist  Interventional Neuroradiology

## 2019-06-10 NOTE — SEDATION DOCUMENTATION
Pt given discharge instructions and handout and voices understanding. Dsg to right neck CDI.  IV d/c'd with cath tip intact.  NAD noted.  Pt to garage via w/c acc. By RN to meet family.

## 2019-06-10 NOTE — H&P
Radiology Minor Procedure Note   r  Procedure Requested: Transforaminal NEERAJ right C4-5    History of Present Illness:  Renae Hou is a 58 y.o. female with history of right neck pain and cervical radiculopathy.  Past Medical History:   Diagnosis Date    Abscess of paraspinous muscles     Allergy 4/2018    DVT (deep venous thrombosis)     left leg    Epidural abscess 4/10/2018    Fatty liver     GERD (gastroesophageal reflux disease) 2015    Hypertension     Lumbar radiculopathy     Menopause     Obesity     Obstructive sleep apnea on CPAP     Thyroid disease     Thyroid nodules.     Past Surgical History:   Procedure Laterality Date    ADENOIDECTOMY  1995    BACK SURGERY  2002    L4, L5    BILATERAL SALPINGOOPHORECTOMY      BLOCK-NERVE-MEDIAL BRANCH-LUMBAR Left 3/16/2018    [facility]    CHOLECYSTECTOMY      CYST REMOVAL      NEERAJ-TRANSFORAMINAL Left 3/2/2018    [facility]    ESOPHAGOGASTRODUODENOSCOPY (EGD) N/A 5/3/2019    Performed by Carlin Sanchez MD at [facility] ENDO (4TH FLR)    FUSION-POSTERIOR LUMBAR INTERBODY FUSION L4/5, L5/S1 N/A 4/10/2018    Performed by Dean Zayas MD at [facility] OR 2ND FLR    HYSTERECTOMY  12/17/2012    [facility]     INJECTION-FACET Left 3/2/2018    [facility]    RECTOCELE REPAIR      SPINE SURGERY  2018    THYROID NODULE REMOVAL      TONSILLECTOMY  1995       Allergies:   Review of patient's allergies indicates:   Allergen Reactions    Cathflo activase [alteplase] Anaphylaxis     Tightness in chest, raspy throat, restless legs, hotness all over    Heparin analogues      Restless legs, tightness in chest, and hot all over    Latex Swelling     Patient un sure about it       Current Inpatient Meds:   Home Meds:   Prior to Admission medications    Medication Sig Start Date End Date Taking? Authorizing Provider    clotrimazole-betamethasone 1-0.05% (LOTRISONE) cream clotrimazole-betamethasone 1 %-0.05 % topical cream    Historical Provider, MD   fluticasone (FLONASE) 50 mcg/actuation nasal spray 2 sprays (100 mcg total) by Each Nare route 2 (two) times daily. 2/7/19   Iva Miranda MD   hydroCHLOROthiazide (HYDRODIURIL) 25 MG tablet Take 1 tablet (25 mg total) by mouth once daily. 5/10/19 5/9/20  Franko Garza MD   lidocaine HCL 2% (XYLOCAINE) 2 % jelly Apply topically 2 (two) times daily as needed. 1/18/19   Jarocho Cabrera MD   methocarbamol (ROBAXIN) 750 MG Tab Take 1 tablet (750 mg total) by mouth 3 (three) times daily as needed. As needed for muscle spasms 5/6/19   Mila Jones PA-C   mometasone (ELOCON) 0.1 % ointment ALTON TO RASH BID 11/21/18   Historical Provider, MD   omeprazole (PRILOSEC) 40 MG capsule TK 1 C PO QD 1/19/19   Historical Provider, MD   POLYETHYLENE GLYCOL 3350 (MIRALAX ORAL) Take by mouth daily as needed. constipation    Historical Provider, MD   silver sulfADIAZINE 1% (SILVADENE) 1 % cream Apply topically once daily. 1/18/19   Jarocho Cabrera MD      Anticoagulants/Antiplatelets: no anticoagulation    Labs:  Lab Results   Component Value Date    INR 0.9 04/06/2018       Lab Results   Component Value Date    WBC 6.76 05/24/2019    HGB 14.9 05/24/2019    HCT 44.8 05/24/2019    MCV 92 05/24/2019     05/24/2019      Lab Results   Component Value Date     (H) 05/24/2019     05/24/2019    K 4.4 05/24/2019     05/24/2019    CO2 31 (H) 05/24/2019    BUN 19 05/24/2019    CREATININE 0.8 05/24/2019    CALCIUM 10.1 05/24/2019    MG 2.1 11/08/2018    ALT 37 05/24/2019    AST 30 05/24/2019    ALBUMIN 3.9 05/24/2019       Objective:  Vitals:     PE:  No Upper Extremity clubbing, cyanosis or edema    Plan: Transforaminal NEERAJ right C4-5 TFESI  Duc Aguirre MD  Attending Radiologist  Interventional Neuroradiology

## 2019-06-14 ENCOUNTER — PATIENT MESSAGE (OUTPATIENT)
Dept: BARIATRICS | Facility: CLINIC | Age: 59
End: 2019-06-14

## 2019-06-17 NOTE — PROGRESS NOTES
Last 5 Patient Entered Readings                                      Current 30 Day Average: 133/83     Recent Readings 6/16/2019 6/15/2019 6/14/2019 6/13/2019 6/12/2019    SBP (mmHg) 131 134 138 133 131    DBP (mmHg) 87 89 93 85 81    Pulse 74 81 92 80 98          Digital Medicine: Health  Follow Up    Called pt for f/u. States that she has been working on maintaining a low sodium diet but limiting processed foods, cooking at home, and not cooking with salt. She will also be having bariatric surgery on 7/23. Reports little change in activity. Praised pt for making dietary changes and encouraged to continue. Also discussed benefits of regular activity on wt loss and BP control.    Follow up with  Renae LANA Hou completed. No further questions or concerns. Will continue to follow up to achieve health goals.

## 2019-06-22 ENCOUNTER — NURSE TRIAGE (OUTPATIENT)
Dept: ADMINISTRATIVE | Facility: CLINIC | Age: 59
End: 2019-06-22

## 2019-06-22 NOTE — TELEPHONE ENCOUNTER
Reason for Disposition   Minor cut or scratch    Protocols used: CUTS AND [id number]  Pt states she was walking in [employer] parking lot and ran into metal sticking out the back of a truck. metal cut face on area of mole about the size of pencil eraser. Pt denies lac open > 1/4 inch.  - bleeding stopped after holding pressure for a few minutes , rates pain 6. Tetanus 2016. rec home care. Warning given when to call back. Call back wit questions

## 2019-07-10 ENCOUNTER — DOCUMENTATION ONLY (OUTPATIENT)
Dept: BARIATRICS | Facility: CLINIC | Age: 59
End: 2019-07-10

## 2019-07-10 ENCOUNTER — OFFICE VISIT (OUTPATIENT)
Dept: BARIATRICS | Facility: CLINIC | Age: 59
End: 2019-07-10
Payer: COMMERCIAL

## 2019-07-10 VITALS
SYSTOLIC BLOOD PRESSURE: 127 MMHG | WEIGHT: 231.06 LBS | DIASTOLIC BLOOD PRESSURE: 74 MMHG | BODY MASS INDEX: 38.5 KG/M2 | HEART RATE: 92 BPM | HEIGHT: 65 IN

## 2019-07-10 DIAGNOSIS — K21.9 GASTROESOPHAGEAL REFLUX DISEASE, ESOPHAGITIS PRESENCE NOT SPECIFIED: ICD-10-CM

## 2019-07-10 DIAGNOSIS — I82.5Z2 CHRONIC DEEP VEIN THROMBOSIS (DVT) OF DISTAL VEIN OF LEFT LOWER EXTREMITY: ICD-10-CM

## 2019-07-10 DIAGNOSIS — E66.09 CLASS 2 OBESITY DUE TO EXCESS CALORIES WITH BODY MASS INDEX (BMI) OF 38.0 TO 38.9 IN ADULT, UNSPECIFIED WHETHER SERIOUS COMORBIDITY PRESENT: Primary | ICD-10-CM

## 2019-07-10 DIAGNOSIS — G47.33 OSA ON CPAP: ICD-10-CM

## 2019-07-10 DIAGNOSIS — I10 ESSENTIAL HYPERTENSION: ICD-10-CM

## 2019-07-10 PROCEDURE — 3008F PR BODY MASS INDEX (BMI) DOCUMENTED: ICD-10-PCS | Mod: CPTII,S$GLB,, | Performed by: SURGERY

## 2019-07-10 PROCEDURE — 3078F PR MOST RECENT DIASTOLIC BLOOD PRESSURE < 80 MM HG: ICD-10-PCS | Mod: CPTII,S$GLB,, | Performed by: SURGERY

## 2019-07-10 PROCEDURE — 3074F PR MOST RECENT SYSTOLIC BLOOD PRESSURE < 130 MM HG: ICD-10-PCS | Mod: CPTII,S$GLB,, | Performed by: SURGERY

## 2019-07-10 PROCEDURE — 3078F DIAST BP <80 MM HG: CPT | Mod: CPTII,S$GLB,, | Performed by: SURGERY

## 2019-07-10 PROCEDURE — 99091 COLLJ & INTERPJ DATA EA 30 D: CPT | Mod: ,,,

## 2019-07-10 PROCEDURE — 99091 PR DIGITAL MEDICINE SERVICES, HYPERTENSION, ESTABLISHED: ICD-10-PCS | Mod: ,,,

## 2019-07-10 PROCEDURE — 3074F SYST BP LT 130 MM HG: CPT | Mod: CPTII,S$GLB,, | Performed by: SURGERY

## 2019-07-10 PROCEDURE — 3008F BODY MASS INDEX DOCD: CPT | Mod: CPTII,S$GLB,, | Performed by: SURGERY

## 2019-07-10 PROCEDURE — 99999 PR PBB SHADOW E&M-EST. PATIENT-LVL III: CPT | Mod: PBBFAC,,, | Performed by: SURGERY

## 2019-07-10 PROCEDURE — 99999 PR PBB SHADOW E&M-EST. PATIENT-LVL III: ICD-10-PCS | Mod: PBBFAC,,, | Performed by: SURGERY

## 2019-07-10 PROCEDURE — 99213 PR OFFICE/OUTPT VISIT, EST, LEVL III, 20-29 MIN: ICD-10-PCS | Mod: S$GLB,,, | Performed by: SURGERY

## 2019-07-10 PROCEDURE — 99213 OFFICE O/P EST LOW 20 MIN: CPT | Mod: S$GLB,,, | Performed by: SURGERY

## 2019-07-10 RX ORDER — OMEPRAZOLE 40 MG/1
40 CAPSULE, DELAYED RELEASE ORAL EVERY MORNING
Qty: 30 CAPSULE | Refills: 2 | Status: SHIPPED | OUTPATIENT
Start: 2019-07-10 | End: 2021-05-14

## 2019-07-10 RX ORDER — ONDANSETRON 8 MG/1
8 TABLET, ORALLY DISINTEGRATING ORAL EVERY 6 HOURS PRN
Qty: 30 TABLET | Refills: 0 | Status: SHIPPED | OUTPATIENT
Start: 2019-07-10 | End: 2019-10-30

## 2019-07-10 RX ORDER — POLYETHYLENE GLYCOL 3350 17 G/17G
17 POWDER, FOR SOLUTION ORAL DAILY
Qty: 510 G | Refills: 3 | Status: SHIPPED | OUTPATIENT
Start: 2019-07-10 | End: 2019-10-30

## 2019-07-10 RX ORDER — PROMETHAZINE HYDROCHLORIDE 25 MG/1
SUPPOSITORY RECTAL
Qty: 15 SUPPOSITORY | Refills: 0 | Status: SHIPPED | OUTPATIENT
Start: 2019-07-10 | End: 2019-10-30

## 2019-07-10 RX ORDER — HYDROCODONE BITARTRATE AND ACETAMINOPHEN 7.5; 325 MG/15ML; MG/15ML
15 SOLUTION ORAL 4 TIMES DAILY PRN
Qty: 473 ML | Refills: 0 | Status: SHIPPED | OUTPATIENT
Start: 2019-07-10 | End: 2019-08-23

## 2019-07-10 RX ORDER — METOCLOPRAMIDE HYDROCHLORIDE 5 MG/ML
10 INJECTION INTRAMUSCULAR; INTRAVENOUS ONCE
Status: CANCELLED | OUTPATIENT
Start: 2019-07-10 | End: 2019-07-10

## 2019-07-10 RX ORDER — FAMOTIDINE 10 MG/ML
20 INJECTION INTRAVENOUS ONCE
Status: CANCELLED | OUTPATIENT
Start: 2019-07-10 | End: 2019-07-10

## 2019-07-10 RX ORDER — SODIUM CITRATE AND CITRIC ACID MONOHYDRATE 334; 500 MG/5ML; MG/5ML
30 SOLUTION ORAL ONCE
Status: CANCELLED | OUTPATIENT
Start: 2019-07-10 | End: 2019-07-10

## 2019-07-10 NOTE — H&P (VIEW-ONLY)
Subjective:  The patient is a 58 y.o. obese female who presents for pre op for gastric sleeve surgery.  The patient has tried OptiFast and was able to lose 30 pounds. She maintained wt loss for 6 months until she began experiencing severe back pain. Her highest wt is 230#. Wt difficulty began in adulthood.  All workup has been reviewed in clinic today and there is nothing on the review that would prevent us from proceeding with surgery.  All questions were answered in clinic today prior to leaving.  Body mass index is 38.45 kg/m².       Patient Active Problem List    Diagnosis Date Noted    Exertional chest pain 11/23/2018    Deep vein thrombosis (DVT) of lower extremity 11/08/2018    Edema, lower extremity     Vaginal atrophy 09/30/2018    Dyspareunia, female 09/30/2018    Nocturia more than twice per night 09/30/2018    Mixed stress and urge urinary incontinence 09/30/2018    Rectocele, female 09/30/2018    Cystocele, midline 09/30/2018    Obstructive sleep apnea on CPAP     Hypertension     Lumbar radiculopathy 04/10/2018    Abscess of paraspinous muscles 04/10/2018    Spondylolisthesis, acquired 04/10/2018    Spondylolisthesis 04/09/2018    Spondylolisthesis at L4-L5 level 04/08/2018    Acid reflux 04/06/2018    Essential hypertension 04/06/2018    Fatty liver 04/06/2018    Chronic pain 03/02/2018    Hyperlipidemia 08/12/2016    Pre-diabetes 08/12/2016    Class 2 obesity due to excess calories in adult 08/12/2016    Shellfish allergy 09/29/2015    Chronic constipation 09/29/2015    History of major abdominal surgery 09/11/2015    GERD (gastroesophageal reflux disease) 01/01/2015    RENAE on CPAP 12/22/2014     Past Medical History:   Diagnosis Date    Abscess of paraspinous muscles     Allergy 4/2018    DVT (deep venous thrombosis)     left leg    Epidural abscess 4/10/2018    Fatty liver     GERD (gastroesophageal reflux disease) 2015    Hypertension     Lumbar radiculopathy      Menopause     Obesity     Obstructive sleep apnea on CPAP     Thyroid disease     Thyroid nodules.      Past Surgical History:   Procedure Laterality Date    ADENOIDECTOMY      BACK SURGERY      L4, L5    BILATERAL SALPINGOOPHORECTOMY      BLOCK-NERVE-MEDIAL BRANCH-LUMBAR Left 3/16/2018    [facility]    CHOLECYSTECTOMY      CYST REMOVAL      NEERAJ-TRANSFORAMINAL Left 3/2/2018    [facility]    ESOPHAGOGASTRODUODENOSCOPY (EGD) N/A 5/3/2019    [facility]formed by Carlin [facility] at [facility] ENDO (4TH FLR)    FUSION-POSTERIOR LUMBAR INTERBODY FUSION L4/5, L5/S1 N/A 4/10/2018    Performed by Dean [facility]as [facility] at [facility] OR 2ND FLR    HYSTERECTOMY  2012    [facility]     INJECTION-FACET Left 3/2/2018    [facility]    RECTOCELE REPAIR      SPINE SURGERY  2018    THYROID NODULE REMOVAL      TONSILLECTOMY          (Not in a hospital admission)  Review of patient's allergies indicates:   Allergen Reactions    Cathflo activase [alteplase] Anaphylaxis     Tightness in chest, raspy throat, restless legs, hotness all over    Heparin analogues      Restless legs, tightness in chest, and hot all over    Latex Swelling     Patient un sure about it      Social History     Tobacco Use    Smoking status: Former Smoker     Packs/day: 0.25     Years: 32.00     Pack years: 8.00     Types: Cigarettes     Last attempt to quit:      Years since quittin.5    Smokeless tobacco: Never Used    Tobacco comment: smoked socially decades ago - weekends only   Substance Use Topics    Alcohol use: No     Frequency: Monthly or less     Drinks per session: 1 or 2     Binge frequency: Never     Comment: very rare      Family History   Problem Relation Age of Onset    Hypertension Mother     Hyperlipidemia Mother     Heart disease Mother 55        cad, valvular heart disease    Alcohol  "abuse Sister     Stroke Father     No Known Problems Daughter     No Known Problems Daughter     Breast cancer Neg Hx     Colon cancer Neg Hx     Ovarian cancer Neg Hx     Diabetes Neg Hx     Esophageal cancer Neg Hx         Review of Systems  Constitutional: negative for anorexia, chills and fatigue  Eyes: negative for icterus, irritation and redness  Respiratory: negative for cough and dyspnea on exertion  Cardiovascular: negative for chest pain and chest pressure/discomfort  Gastrointestinal: negative for abdominal pain, change in bowel habits, constipation and diarrhea  Musculoskeletal:negative for arthralgias and back pain  Neurological: negative for coordination problems and dizziness  Behavioral/Psych: negative for anxiety and bad mood    Objective:  Vital signs in last 24 hours:  Vitals:    07/10/19 1516   Pulse: 92   Weight: 104.8 kg (231 lb 0.7 oz)   Height: 5' 5" (1.651 m)       Presurgery Weight: 231 lbs  Excess Weight: 90  Weight History Current Weight Total Weight Loss EWL   1/25/2019 231 -231 -   7/10/2019 231 0 0       General appearance: alert, appears stated age and cooperative  Head: Normocephalic, without obvious abnormality, atraumatic  Eyes: negative findings: lids and lashes normal and conjunctivae and sclerae normal  Neck: supple, symmetrical, trachea midline and thyroid not enlarged, symmetric, no tenderness/mass/nodules  Lungs: clear to auscultation bilaterally  Heart: regular rate and rhythm, S1, S2 normal, no murmur, click, rub or gallop  Abdomen: soft, non-tender; bowel sounds normal; no masses,  no organomegaly  Extremities: extremities normal, atraumatic, no cyanosis or edema  Skin: Skin color, texture, turgor normal. No rashes or lesions  Neurologic: Grossly normal    Data Review:  Psych: Cleared  Nutrition: Cleared  PCP: Awaiting  CXR: WNL  EKG: WNL  Vascular: Awaiting recs  · Echo: Left ventricle ejection fraction is low normal at 55%  · Left ventricle shows concentric " remodeling.  · Normal LV diastolic function.  · No wall motion abnormalities.  · RV systolic function is normal.  · The patient reported SOB (non-anginal) and chest discomfort during the stress test.  · There were no arrhythmias during stress.  · The test was stopped secondary to shortness of breath.  · No pericardial effusion.  · Overall, the patient's exercise capacity was mildly impaired.  · The EKG portion of this study is negative for myocardial ischemia.  · The stress echo portion of this study is negative for myocardial ischemia.  No ischemia by ECG and echo despite the symptoms at rest that worsened with stress and later returned to the baseline level  Labs: No abnormalities that would prevent proceeding with surgery.    Assessment/Plan:  Morbid obesity with failure of conservative therapy.    The patient was informed that risks include, but are not limited to: death, leak, obstruction, bleeding, and sepsis. Any of these could require further surgery. Other risks include DVT, PE, pneumonia, wound dehiscence, hernia, wound infection, the need for dilatations and the inability to lose appropriate weight and keep it off.     We discussed that our goal is to ameliorate her medical problems and not to obtain a specific body mass index. She understands the risks and benefits and wishes to proceed with the procedure. She has signed a consent form.       Duc Ratliff MD

## 2019-07-10 NOTE — PROGRESS NOTES
Pt will use bariatric pal hot chocolate and soup, bariatric pal latte mixed with water protein shakes.  If using protein powder, reminded pt of mixing with water for days 1 and 2, by day 3 may try using skim, 1% milk or unsweetened almond/soy milk.  By Day 4, may use RTD protein shakes as tolerated  Pt has the following vitamins and minerals to start taking once discharged from hospital  Multivitamin Multivitamoin Bariatric Pal 45 mg iron  B-complex with 50 mg capsule  Calcium citrate has at home 500 mg three times per day - has at home  Vitamin B-12 injection   Reviewed dosage and timing of vitamin/mineral guidelines.  Reviewed nutritional guidelines for protein and fluid requirements for week 1 and week 2 post-surgery.  Handout provided to log protein and fluid daily.  1 ounce medicine cups provided for patient to measure fluid intake after surgery.  Reviewed common nutritional concerns and prevention tips after bariatric surgery  Pt verbalized understanding of information provided with appropriate questions and comments.

## 2019-07-10 NOTE — PROGRESS NOTES
Subjective:  The patient is a 58 y.o. obese female who presents for pre op for gastric sleeve surgery.  The patient has tried OptiFast and was able to lose 30 pounds. She maintained wt loss for 6 months until she began experiencing severe back pain. Her highest wt is 230#. Wt difficulty began in adulthood.  All workup has been reviewed in clinic today and there is nothing on the review that would prevent us from proceeding with surgery.  All questions were answered in clinic today prior to leaving.  Body mass index is 38.45 kg/m².       Patient Active Problem List    Diagnosis Date Noted    Exertional chest pain 11/23/2018    Deep vein thrombosis (DVT) of lower extremity 11/08/2018    Edema, lower extremity     Vaginal atrophy 09/30/2018    Dyspareunia, female 09/30/2018    Nocturia more than twice per night 09/30/2018    Mixed stress and urge urinary incontinence 09/30/2018    Rectocele, female 09/30/2018    Cystocele, midline 09/30/2018    Obstructive sleep apnea on CPAP     Hypertension     Lumbar radiculopathy 04/10/2018    Abscess of paraspinous muscles 04/10/2018    Spondylolisthesis, acquired 04/10/2018    Spondylolisthesis 04/09/2018    Spondylolisthesis at L4-L5 level 04/08/2018    Acid reflux 04/06/2018    Essential hypertension 04/06/2018    Fatty liver 04/06/2018    Chronic pain 03/02/2018    Hyperlipidemia 08/12/2016    Pre-diabetes 08/12/2016    Class 2 obesity due to excess calories in adult 08/12/2016    Shellfish allergy 09/29/2015    Chronic constipation 09/29/2015    History of major abdominal surgery 09/11/2015    GERD (gastroesophageal reflux disease) 01/01/2015    RENAE on CPAP 12/22/2014     Past Medical History:   Diagnosis Date    Abscess of paraspinous muscles     Allergy 4/2018    DVT (deep venous thrombosis)     left leg    Epidural abscess 4/10/2018    Fatty liver     GERD (gastroesophageal reflux disease) 2015    Hypertension     Lumbar radiculopathy      Menopause     Obesity     Obstructive sleep apnea on CPAP     Thyroid disease     Thyroid nodules.      Past Surgical History:   Procedure Laterality Date    ADENOIDECTOMY      BACK SURGERY      L4, L5    BILATERAL SALPINGOOPHORECTOMY      BLOCK-NERVE-MEDIAL BRANCH-LUMBAR Left 3/16/2018    [facility]    CHOLECYSTECTOMY      CYST REMOVAL      NEERAJ-TRANSFORAMINAL Left 3/2/2018    [facility]    ESOPHAGOGASTRODUODENOSCOPY (EGD) N/A 5/3/2019    [facility] ENDO (4TH FLR)    FUSION-POSTERIOR LUMBAR INTERBODY FUSION L4/5, L5/S1 N/A 4/10/2018    [facility] at [facility] OR 2ND FLR    HYSTERECTOMY  2012    [facility]     INJECTION-FACET Left 3/2/2018    [facility]    RECTOCELE REPAIR      SPINE SURGERY  2018    THYROID NODULE REMOVAL      TONSILLECTOMY          (Not in a hospital admission)  Review of patient's allergies indicates:   Allergen Reactions    Cathflo activase [alteplase] Anaphylaxis     Tightness in chest, raspy throat, restless legs, hotness all over    Heparin analogues      Restless legs, tightness in chest, and hot all over    Latex Swelling     Patient un sure about it      Social History     Tobacco Use    Smoking status: Former Smoker     Packs/day: 0.25     Years: 32.00     Pack years: 8.00     Types: Cigarettes     Last attempt to quit:      Years since quittin.5    Smokeless tobacco: Never Used    Tobacco comment: smoked socially decades ago - weekends only   Substance Use Topics    Alcohol use: No     Frequency: Monthly or less     Drinks per session: 1 or 2     Binge frequency: Never     Comment: very rare      Family History   Problem Relation Age of Onset    Hypertension Mother     Hyperlipidemia Mother     Heart disease Mother 55        cad, valvular heart disease    Alcohol  "abuse Sister     Stroke Father     No Known Problems Daughter     No Known Problems Daughter     Breast cancer Neg Hx     Colon cancer Neg Hx     Ovarian cancer Neg Hx     Diabetes Neg Hx     Esophageal cancer Neg Hx         Review of Systems  Constitutional: negative for anorexia, chills and fatigue  Eyes: negative for icterus, irritation and redness  Respiratory: negative for cough and dyspnea on exertion  Cardiovascular: negative for chest pain and chest pressure/discomfort  Gastrointestinal: negative for abdominal pain, change in bowel habits, constipation and diarrhea  Musculoskeletal:negative for arthralgias and back pain  Neurological: negative for coordination problems and dizziness  Behavioral/Psych: negative for anxiety and bad mood    Objective:  Vital signs in last 24 hours:  Vitals:    07/10/19 1516   Pulse: 92   Weight: 104.8 kg (231 lb 0.7 oz)   Height: 5' 5" (1.651 m)       Presurgery Weight: 231 lbs  Excess Weight: 90  Weight History Current Weight Total Weight Loss EWL   1/25/2019 231 -231 -   7/10/2019 231 0 0       General appearance: alert, appears stated age and cooperative  Head: Normocephalic, without obvious abnormality, atraumatic  Eyes: negative findings: lids and lashes normal and conjunctivae and sclerae normal  Neck: supple, symmetrical, trachea midline and thyroid not enlarged, symmetric, no tenderness/mass/nodules  Lungs: clear to auscultation bilaterally  Heart: regular rate and rhythm, S1, S2 normal, no murmur, click, rub or gallop  Abdomen: soft, non-tender; bowel sounds normal; no masses,  no organomegaly  Extremities: extremities normal, atraumatic, no cyanosis or edema  Skin: Skin color, texture, turgor normal. No rashes or lesions  Neurologic: Grossly normal    Data Review:  Psych: Cleared  Nutrition: Cleared  PCP: Awaiting  CXR: WNL  EKG: WNL  Vascular: Awaiting recs  · Echo: Left ventricle ejection fraction is low normal at 55%  · Left ventricle shows concentric " remodeling.  · Normal LV diastolic function.  · No wall motion abnormalities.  · RV systolic function is normal.  · The patient reported SOB (non-anginal) and chest discomfort during the stress test.  · There were no arrhythmias during stress.  · The test was stopped secondary to shortness of breath.  · No pericardial effusion.  · Overall, the patient's exercise capacity was mildly impaired.  · The EKG portion of this study is negative for myocardial ischemia.  · The stress echo portion of this study is negative for myocardial ischemia.  No ischemia by ECG and echo despite the symptoms at rest that worsened with stress and later returned to the baseline level  Labs: No abnormalities that would prevent proceeding with surgery.    Assessment/Plan:  Morbid obesity with failure of conservative therapy.    The patient was informed that risks include, but are not limited to: death, leak, obstruction, bleeding, and sepsis. Any of these could require further surgery. Other risks include DVT, PE, pneumonia, wound dehiscence, hernia, wound infection, the need for dilatations and the inability to lose appropriate weight and keep it off.     We discussed that our goal is to ameliorate her medical problems and not to obtain a specific body mass index. She understands the risks and benefits and wishes to proceed with the procedure. She has signed a consent form.       Duc Ratliff MD

## 2019-07-11 ENCOUNTER — OFFICE VISIT (OUTPATIENT)
Dept: VASCULAR SURGERY | Facility: CLINIC | Age: 59
End: 2019-07-11
Attending: SURGERY
Payer: COMMERCIAL

## 2019-07-11 ENCOUNTER — HOSPITAL ENCOUNTER (OUTPATIENT)
Dept: VASCULAR SURGERY | Facility: CLINIC | Age: 59
Discharge: HOME | End: 2019-07-11
Attending: SURGERY
Payer: COMMERCIAL

## 2019-07-11 ENCOUNTER — PATIENT MESSAGE (OUTPATIENT)
Dept: BARIATRICS | Facility: CLINIC | Age: 59
End: 2019-07-11

## 2019-07-11 VITALS
TEMPERATURE: 99 F | BODY MASS INDEX: 36.63 KG/M2 | WEIGHT: 227.94 LBS | HEART RATE: 88 BPM | SYSTOLIC BLOOD PRESSURE: 131 MMHG | DIASTOLIC BLOOD PRESSURE: 88 MMHG | HEIGHT: 66 IN

## 2019-07-11 DIAGNOSIS — I82.4Y9 DEEP VEIN THROMBOSIS (DVT) OF PROXIMAL LOWER EXTREMITY, UNSPECIFIED CHRONICITY, UNSPECIFIED LATERALITY: ICD-10-CM

## 2019-07-11 DIAGNOSIS — I82.409 DEEP VEIN THROMBOSIS (DVT) OF LOWER EXTREMITY, UNSPECIFIED CHRONICITY, UNSPECIFIED LATERALITY, UNSPECIFIED VEIN: Primary | ICD-10-CM

## 2019-07-11 DIAGNOSIS — Z86.718 PERSONAL HISTORY OF DVT (DEEP VEIN THROMBOSIS): ICD-10-CM

## 2019-07-11 DIAGNOSIS — I82.412 DEEP VEIN THROMBOSIS (DVT) OF FEMORAL VEIN OF LEFT LOWER EXTREMITY, UNSPECIFIED CHRONICITY: Primary | ICD-10-CM

## 2019-07-11 DIAGNOSIS — Z01.818 PRE-OP EVALUATION: ICD-10-CM

## 2019-07-11 PROCEDURE — 99214 OFFICE O/P EST MOD 30 MIN: CPT | Mod: S$GLB,,, | Performed by: SURGERY

## 2019-07-11 PROCEDURE — 99999 PR PBB SHADOW E&M-EST. PATIENT-LVL IV: ICD-10-PCS | Mod: PBBFAC,,, | Performed by: SURGERY

## 2019-07-11 PROCEDURE — 99999 PR PBB SHADOW E&M-EST. PATIENT-LVL IV: CPT | Mod: PBBFAC,,, | Performed by: SURGERY

## 2019-07-11 PROCEDURE — 93970 EXTREMITY STUDY: CPT | Mod: S$GLB,,, | Performed by: SURGERY

## 2019-07-11 PROCEDURE — 93970 PR US DUPLEX, UPPER OR LOWER EXT VENOUS,COMPLETE BILAT: ICD-10-PCS | Mod: S$GLB,,, | Performed by: SURGERY

## 2019-07-11 PROCEDURE — 99214 PR OFFICE/OUTPT VISIT, EST, LEVL IV, 30-39 MIN: ICD-10-PCS | Mod: S$GLB,,, | Performed by: SURGERY

## 2019-07-11 NOTE — LETTER
July 12, 2019      Duc Ratliff Jr., MD  [address] [address]           [facility]  [address]  [facility] [address]  Phone: [phone]  Fax: [phone]          Patient: Renae Hou   MR Number: [id number]   YOB: 1960   Date of Visit: 7/11/2019       Dear Dr. Duc Ratliff Jr.:    Thank you for referring Renae Hou to me for evaluation. Attached you will find relevant portions of my assessment and plan of care.    If you have questions, please do not hesitate to call me. I look forward to following Renae Hou along with you.    Sincerely,    Rodney Rico MD    Enclosure  CC:  No Recipients    If you would like to receive this communication electronically, please contact [email or web address] or [phone] to request more information on [employer] Link access.    For providers and/or their staff who would like to refer a patient to [facility], please contact us through our one-stop-shop provider referral line, [facility], at [phone].    If you feel you have received this communication in error or would no longer like to receive these types of communications, please e-mail [email or web address]

## 2019-07-11 NOTE — H&P (VIEW-ONLY)
Renae Hou  07/11/2019    HPI:  Patient is a 58 y.o. female with a h/o HTN, GERD who is here today for evaluation of  IVC filter placement with upcoming lap sleeve gastrectomy planned for 7/23/19. She has a hx of DVT previously (LLE) in July 2018 after L4-L5-S1 fusion. She denies PE. She was started on Xarelto to tx the DVT, she finished the Rx in January. Outside of this event, she has never had DVT/PE previously. She c/o mild L leg swelling and discomfort that onset last year and occurs intermittently (daily, usually while at rest). She describes it as an ache.   No ASA, Statin, or other anticoag currently.   Denies MI/stroke  Tobacco use: denies, quit 31 years ago    Past Medical History:   Diagnosis Date    Abscess of paraspinous muscles     Allergy 4/2018    DVT (deep venous thrombosis)     left leg    Epidural abscess 4/10/2018    Fatty liver     GERD (gastroesophageal reflux disease) 2015    Hypertension     Lumbar radiculopathy     Menopause     Obesity     Obstructive sleep apnea on CPAP     Thyroid disease     Thyroid nodules.     Past Surgical History:   Procedure Laterality Date    ADENOIDECTOMY  1995    BACK SURGERY  2002    L4, L5    BILATERAL SALPINGOOPHORECTOMY      BLOCK-NERVE-MEDIAL BRANCH-LUMBAR Left 3/16/2018    [facility]    CHOLECYSTECTOMY      CYST REMOVAL      NEERAJ-TRANSFORAMINAL Left 3/2/2018    [facility]    ESOPHAGOGASTRODUODENOSCOPY (EGD) N/A 5/3/2019    [facility] (4TH FLR)    FUSION-POSTERIOR LUMBAR INTERBODY FUSION L4/5, L5/S1 N/A 4/10/2018    [facility] at [facility] OR 2ND FLR    HYSTERECTOMY  12/17/2012    [facility]     INJECTION-FACET Left 3/2/2018    [facility]    RECTOCELE REPAIR      SPINE SURGERY  2018    THYROID NODULE REMOVAL      TONSILLECTOMY  1995     Family History    Problem Relation Age of Onset    Hypertension Mother     Hyperlipidemia Mother     Heart disease Mother 55        cad, valvular heart disease    Alcohol abuse Sister     Stroke Father     No Known Problems Daughter     No Known Problems Daughter     Breast cancer Neg Hx     Colon cancer Neg Hx     Ovarian cancer Neg Hx     Diabetes Neg Hx     Esophageal cancer Neg Hx      Social History     Socioeconomic History    Marital status:      Spouse name: Not on file    Number of children: Not on file    Years of education: Not on file    Highest education level: Not on file   Occupational History    Not on file   Social Needs    Financial resource strain: Not hard at all    Food insecurity:     Worry: Never true     Inability: Never true    Transportation needs:     Medical: No     Non-medical: No   Tobacco Use    Smoking status: Former Smoker     Packs/day: 0.25     Years: 32.00     Pack years: 8.00     Types: Cigarettes     Last attempt to quit:      Years since quittin.5    Smokeless tobacco: Never Used    Tobacco comment: smoked socially decades ago - weekends only   Substance and Sexual Activity    Alcohol use: No     Frequency: Monthly or less     Drinks per session: 1 or 2     Binge frequency: Never     Comment: very rare    Drug use: No    Sexual activity: Not Currently     Partners: Male     Birth control/protection: See Surgical Hx, None     Comment: VINI BS&O 2012,     Lifestyle    Physical activity:     Days per week: Patient refused     Minutes per session: 0 min    Stress: Not at all   Relationships    Social connections:     Talks on phone: More than three times a week     Gets together: Once a week     Attends Jew service: Not on file     Active member of club or organization: No     Attends meetings of clubs or organizations: Patient refused     Relationship status:    Other Topics Concern    Not on file   Social History Narrative     . 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       Current Outpatient Medications:     fluticasone (FLONASE) 50 mcg/actuation nasal spray, 2 sprays (100 mcg total) by Each Nare route 2 (two) times daily., Disp: 2 Bottle, Rfl: 5    hydroCHLOROthiazide (HYDRODIURIL) 25 MG tablet, Take 1 tablet (25 mg total) by mouth once daily., Disp: 30 tablet, Rfl: 5    hydrocodone-acetaminophen (HYCET) solution 7.5-325 mg/15mL, Take 15 mLs by mouth 4 (four) times daily as needed for Pain., Disp: 473 mL, Rfl: 0    methocarbamol (ROBAXIN) 750 MG Tab, Take 1 tablet (750 mg total) by mouth 3 (three) times daily as needed. As needed for muscle spasms, Disp: 30 tablet, Rfl: 0    omeprazole (PRILOSEC) 40 MG capsule, TK 1 C PO QD, Disp: , Rfl: 4    omeprazole (PRILOSEC) 40 MG capsule, Take 1 capsule (40 mg total) by mouth every morning. Open capsule and take with apple sauce, Disp: 30 capsule, Rfl: 2    ondansetron (ZOFRAN-ODT) 8 MG TbDL, Dissolve 1 tablet (8 mg total) by mouth every 6 (six) hours as needed., Disp: 30 tablet, Rfl: 0    polyethylene glycol (GLYCOLAX) 17 gram/dose powder, Mix 1 capful (17 g) with liquid and take by mouth once daily., Disp: 510 g, Rfl: 3    POLYETHYLENE GLYCOL 3350 (MIRALAX ORAL), Take by mouth daily as needed. constipation, Disp: , Rfl:     promethazine (PHENERGAN) 25 MG suppository, Unwrap and place 1 rectally every 6 hours as needed, Disp: 15 suppository, Rfl: 0    REVIEW OF SYSTEMS:  General: negative; ENT: negative; Allergy and Immunology: negative; Hematological and Lymphatic: Negative; Endocrine: negative; Respiratory: no cough, shortness of breath, or wheezing; Cardiovascular: no chest pain or dyspnea on exertion; Gastrointestinal: no abdominal pain/back, change in bowel habits, or bloody stools; Genito-Urinary: no dysuria, trouble voiding, or hematuria; Musculoskeletal: negative  Neurological: no TIA or stroke symptoms; Psychiatric: no nervousness, anxiety or depression.    PHYSICAL EXAM:   Right Arm BP -  Sittin/92 (19 1139)  Left Arm BP - Sittin/88 (19 1139)  Pulse: 88  Temp: 98.5 °F (36.9 °C)      General appearance:  Alert, well-appearing, and in no distress.  Oriented to person, place, and time   Neurological: Normal speech, no focal findings noted; CN II - XII grossly intact           Musculoskeletal: Digits/nail without cyanosis/clubbing.  Normal muscle strength/tone.                 Neck: Supple, no significant adenopathy; thyroid is not enlarged                  No carotid bruit can be auscultated                Chest:  Clear to auscultation, no wheezes, rales or rhonchi, symmetric air entry     No use of accessory muscles             Cardiac: Normal rate and regular rhythm, S1 and S2 normal; PMI non-displaced          Abdomen: Soft, nontender, nondistended, no masses or organomegaly     No rebound tenderness noted; bowel sounds normal     Pulsatile aortic mass is not palpable.     No groin adenopathy      Extremities:   2+ femoral pulses bilaterally     2+ pedal pulses palpable.     No pre-tibial edema     No ulcerations    LAB RESULTS:  Lab Results   Component Value Date    K 4.4 2019    K 3.5 2019    K 4.5 2018    CREATININE 0.8 2019    CREATININE 0.8 2019    CREATININE 0.9 2018     Lab Results   Component Value Date    WBC 6.76 2019    WBC 7.31 2018    WBC 6.43 10/05/2018    HCT 44.8 2019    HCT 38 2018    HCT 39.0 2018     2019     2018     10/05/2018     Lab Results   Component Value Date    HGBA1C 6.0 (H) 2019    HGBA1C 6.1 (H) 10/05/2018    HGBA1C 6.1 (H) 2018     IMAGING:    IMP/PLAN:  58 y.o. female with PMHx of HTN, GERD who presents for evaluation for IVC filter placement prior to planned lap sleeve gastrectomy with Dr. Ratliff. She had a DVT in her L leg last year following L4-L5-S1 fusion in 2018.  Given her previous VTE following surgery and upcoming  bariatric surgery, will plan for IVC filter placement and retrieval 4-6 weeks postop. She understands the risks, including filter malposition, thrombosis, and venous injury, and wishes to proceed.     1) IVC Filter Placement in the coming weeks    Rodney Rico MD FACS  Vascular/Endovascular Surgery

## 2019-07-11 NOTE — PROGRESS NOTES
Renae Hou  07/11/2019    HPI:  Patient is a 58 y.o. female with a h/o HTN, GERD who is here today for evaluation of  IVC filter placement with upcoming lap sleeve gastrectomy planned for 7/23/19. She has a hx of DVT previously (LLE) in July 2018 after L4-L5-S1 fusion. She denies PE. She was started on Xarelto to tx the DVT, she finished the Rx in January. Outside of this event, she has never had DVT/PE previously. She c/o mild L leg swelling and discomfort that onset last year and occurs intermittently (daily, usually while at rest). She describes it as an ache.   No ASA, Statin, or other anticoag currently.   Denies MI/stroke  Tobacco use: denies, quit 31 years ago    Past Medical History:   Diagnosis Date    Abscess of paraspinous muscles     Allergy 4/2018    DVT (deep venous thrombosis)     left leg    Epidural abscess 4/10/2018    Fatty liver     GERD (gastroesophageal reflux disease) 2015    Hypertension     Lumbar radiculopathy     Menopause     Obesity     Obstructive sleep apnea on CPAP     Thyroid disease     Thyroid nodules.     Past Surgical History:   Procedure Laterality Date    ADENOIDECTOMY  1995    BACK SURGERY  2002    L4, L5    BILATERAL SALPINGOOPHORECTOMY      BLOCK-NERVE-MEDIAL BRANCH-LUMBAR Left 3/16/2018    [facility]    CHOLECYSTECTOMY      CYST REMOVAL      NEERAJ-TRANSFORAMINAL Left 3/2/2018    [facility]    ESOPHAGOGASTRODUODENOSCOPY (EGD) N/A 5/3/2019    [facility] ENDO (4TH FLR)    FUSION-POSTERIOR LUMBAR INTERBODY FUSION L4/5, L5/S1 N/A 4/10/2018    [facility] at [facility] OR 2ND FLR    HYSTERECTOMY  12/17/2012    [facility]     INJECTION-FACET Left 3/2/2018    [facility]    RECTOCELE REPAIR      SPINE SURGERY  2018    THYROID NODULE REMOVAL      TONSILLECTOMY  1995     Family History    Problem Relation Age of Onset    Hypertension Mother     Hyperlipidemia Mother     Heart disease Mother 55        cad, valvular heart disease    Alcohol abuse Sister     Stroke Father     No Known Problems Daughter     No Known Problems Daughter     Breast cancer Neg Hx     Colon cancer Neg Hx     Ovarian cancer Neg Hx     Diabetes Neg Hx     Esophageal cancer Neg Hx      Social History     Socioeconomic History    Marital status:      Spouse name: Not on file    Number of children: Not on file    Years of education: Not on file    Highest education level: Not on file   Occupational History    Not on file   Social Needs    Financial resource strain: Not hard at all    Food insecurity:     Worry: Never true     Inability: Never true    Transportation needs:     Medical: No     Non-medical: No   Tobacco Use    Smoking status: Former Smoker     Packs/day: 0.25     Years: 32.00     Pack years: 8.00     Types: Cigarettes     Last attempt to quit:      Years since quittin.5    Smokeless tobacco: Never Used    Tobacco comment: smoked socially decades ago - weekends only   Substance and Sexual Activity    Alcohol use: No     Frequency: Monthly or less     Drinks per session: 1 or 2     Binge frequency: Never     Comment: very rare    Drug use: No    Sexual activity: Not Currently     Partners: Male     Birth control/protection: See Surgical Hx, None     Comment: VINI BS&O 2012,     Lifestyle    Physical activity:     Days per week: Patient refused     Minutes per session: 0 min    Stress: Not at all   Relationships    Social connections:     Talks on phone: More than three times a week     Gets together: Once a week     Attends Catholic service: Not on file     Active member of club or organization: No     Attends meetings of clubs or organizations: Patient refused     Relationship status:    Other Topics Concern    Not on file   Social History Narrative     . 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       Current Outpatient Medications:     fluticasone (FLONASE) 50 mcg/actuation nasal spray, 2 sprays (100 mcg total) by Each Nare route 2 (two) times daily., Disp: 2 Bottle, Rfl: 5    hydroCHLOROthiazide (HYDRODIURIL) 25 MG tablet, Take 1 tablet (25 mg total) by mouth once daily., Disp: 30 tablet, Rfl: 5    hydrocodone-acetaminophen (HYCET) solution 7.5-325 mg/15mL, Take 15 mLs by mouth 4 (four) times daily as needed for Pain., Disp: 473 mL, Rfl: 0    methocarbamol (ROBAXIN) 750 MG Tab, Take 1 tablet (750 mg total) by mouth 3 (three) times daily as needed. As needed for muscle spasms, Disp: 30 tablet, Rfl: 0    omeprazole (PRILOSEC) 40 MG capsule, TK 1 C PO QD, Disp: , Rfl: 4    omeprazole (PRILOSEC) 40 MG capsule, Take 1 capsule (40 mg total) by mouth every morning. Open capsule and take with apple sauce, Disp: 30 capsule, Rfl: 2    ondansetron (ZOFRAN-ODT) 8 MG TbDL, Dissolve 1 tablet (8 mg total) by mouth every 6 (six) hours as needed., Disp: 30 tablet, Rfl: 0    polyethylene glycol (GLYCOLAX) 17 gram/dose powder, Mix 1 capful (17 g) with liquid and take by mouth once daily., Disp: 510 g, Rfl: 3    POLYETHYLENE GLYCOL 3350 (MIRALAX ORAL), Take by mouth daily as needed. constipation, Disp: , Rfl:     promethazine (PHENERGAN) 25 MG suppository, Unwrap and place 1 rectally every 6 hours as needed, Disp: 15 suppository, Rfl: 0    REVIEW OF SYSTEMS:  General: negative; ENT: negative; Allergy and Immunology: negative; Hematological and Lymphatic: Negative; Endocrine: negative; Respiratory: no cough, shortness of breath, or wheezing; Cardiovascular: no chest pain or dyspnea on exertion; Gastrointestinal: no abdominal pain/back, change in bowel habits, or bloody stools; Genito-Urinary: no dysuria, trouble voiding, or hematuria; Musculoskeletal: negative  Neurological: no TIA or stroke symptoms; Psychiatric: no nervousness, anxiety or depression.    PHYSICAL EXAM:   Right Arm BP -  Sittin/92 (19 1139)  Left Arm BP - Sittin/88 (19 1139)  Pulse: 88  Temp: 98.5 °F (36.9 °C)      General appearance:  Alert, well-appearing, and in no distress.  Oriented to person, place, and time   Neurological: Normal speech, no focal findings noted; CN II - XII grossly intact           Musculoskeletal: Digits/nail without cyanosis/clubbing.  Normal muscle strength/tone.                 Neck: Supple, no significant adenopathy; thyroid is not enlarged                  No carotid bruit can be auscultated                Chest:  Clear to auscultation, no wheezes, rales or rhonchi, symmetric air entry     No use of accessory muscles             Cardiac: Normal rate and regular rhythm, S1 and S2 normal; PMI non-displaced          Abdomen: Soft, nontender, nondistended, no masses or organomegaly     No rebound tenderness noted; bowel sounds normal     Pulsatile aortic mass is not palpable.     No groin adenopathy      Extremities:   2+ femoral pulses bilaterally     2+ pedal pulses palpable.     No pre-tibial edema     No ulcerations    LAB RESULTS:  Lab Results   Component Value Date    K 4.4 2019    K 3.5 2019    K 4.5 2018    CREATININE 0.8 2019    CREATININE 0.8 2019    CREATININE 0.9 2018     Lab Results   Component Value Date    WBC 6.76 2019    WBC 7.31 2018    WBC 6.43 10/05/2018    HCT 44.8 2019    HCT 38 2018    HCT 39.0 2018     2019     2018     10/05/2018     Lab Results   Component Value Date    HGBA1C 6.0 (H) 2019    HGBA1C 6.1 (H) 10/05/2018    HGBA1C 6.1 (H) 2018     IMAGING:    IMP/PLAN:  58 y.o. female with PMHx of HTN, GERD who presents for evaluation for IVC filter placement prior to planned lap sleeve gastrectomy with Dr. Ratliff. She had a DVT in her L leg last year following L4-L5-S1 fusion in 2018.  Given her previous VTE following surgery and upcoming  bariatric surgery, will plan for IVC filter placement and retrieval 4-6 weeks postop. She understands the risks, including filter malposition, thrombosis, and venous injury, and wishes to proceed.     1) IVC Filter Placement in the coming weeks    Rodney Rico MD FACS  Vascular/Endovascular Surgery

## 2019-07-12 ENCOUNTER — OFFICE VISIT (OUTPATIENT)
Dept: INTERNAL MEDICINE | Facility: CLINIC | Age: 59
End: 2019-07-12
Payer: COMMERCIAL

## 2019-07-12 VITALS
SYSTOLIC BLOOD PRESSURE: 138 MMHG | HEART RATE: 113 BPM | HEIGHT: 65 IN | WEIGHT: 230.19 LBS | BODY MASS INDEX: 38.35 KG/M2 | OXYGEN SATURATION: 97 % | DIASTOLIC BLOOD PRESSURE: 82 MMHG | TEMPERATURE: 99 F

## 2019-07-12 DIAGNOSIS — Z01.818 PRE-OP EXAMINATION: Primary | ICD-10-CM

## 2019-07-12 PROCEDURE — 99999 PR PBB SHADOW E&M-EST. PATIENT-LVL III: CPT | Mod: PBBFAC,,, | Performed by: FAMILY MEDICINE

## 2019-07-12 PROCEDURE — 3008F BODY MASS INDEX DOCD: CPT | Mod: CPTII,S$GLB,, | Performed by: FAMILY MEDICINE

## 2019-07-12 PROCEDURE — 93010 ELECTROCARDIOGRAM REPORT: CPT | Mod: S$GLB,,, | Performed by: INTERNAL MEDICINE

## 2019-07-12 PROCEDURE — 3079F DIAST BP 80-89 MM HG: CPT | Mod: CPTII,S$GLB,, | Performed by: FAMILY MEDICINE

## 2019-07-12 PROCEDURE — 3079F PR MOST RECENT DIASTOLIC BLOOD PRESSURE 80-89 MM HG: ICD-10-PCS | Mod: CPTII,S$GLB,, | Performed by: FAMILY MEDICINE

## 2019-07-12 PROCEDURE — 3075F PR MOST RECENT SYSTOLIC BLOOD PRESS GE 130-139MM HG: ICD-10-PCS | Mod: CPTII,S$GLB,, | Performed by: FAMILY MEDICINE

## 2019-07-12 PROCEDURE — 93010 EKG 12-LEAD: ICD-10-PCS | Mod: S$GLB,,, | Performed by: INTERNAL MEDICINE

## 2019-07-12 PROCEDURE — 93005 EKG 12-LEAD: ICD-10-PCS | Mod: S$GLB,,, | Performed by: FAMILY MEDICINE

## 2019-07-12 PROCEDURE — 99213 PR OFFICE/OUTPT VISIT, EST, LEVL III, 20-29 MIN: ICD-10-PCS | Mod: S$GLB,,, | Performed by: FAMILY MEDICINE

## 2019-07-12 PROCEDURE — 93005 ELECTROCARDIOGRAM TRACING: CPT | Mod: S$GLB,,, | Performed by: FAMILY MEDICINE

## 2019-07-12 PROCEDURE — 99213 OFFICE O/P EST LOW 20 MIN: CPT | Mod: S$GLB,,, | Performed by: FAMILY MEDICINE

## 2019-07-12 PROCEDURE — 3008F PR BODY MASS INDEX (BMI) DOCUMENTED: ICD-10-PCS | Mod: CPTII,S$GLB,, | Performed by: FAMILY MEDICINE

## 2019-07-12 PROCEDURE — 99999 PR PBB SHADOW E&M-EST. PATIENT-LVL III: ICD-10-PCS | Mod: PBBFAC,,, | Performed by: FAMILY MEDICINE

## 2019-07-12 PROCEDURE — 3075F SYST BP GE 130 - 139MM HG: CPT | Mod: CPTII,S$GLB,, | Performed by: FAMILY MEDICINE

## 2019-07-12 NOTE — PROGRESS NOTES
Subjective:      Patient ID: Renae Hou is a 58 y.o. female.    Chief Complaint: Pre-op Exam      HPI:  Renae Hou is a 58 year old female with chronic pain, fatty liver, gastroesophageal reflux disease, hypertension, hyperlipidemia, lumbar radiculopathy, obesity, prediabetes, spondylolisthesis, and obstructive sleep apnea who presents to clinic today for surgical clearance.    To have gastric sleeve 7/23/19 per Dr. Ratliff.  Also to have IVC filter placed 7/19/19 per Dr. Rico.    ?Can take care of self, such as eat, dress, or use the toilet (1 MET):  YES  ?Can walk up a flight of steps or a hill or walk on level ground at 3 to 4 mph (4 METs):  YES  ?Can do heavy work around the house, such as scrubbing floors or lifting or moving heavy furniture, or climb two flights of stairs (between 4 and 10 METs):  YES  ?Can participate in strenuous sports such as swimming, singles tennis, football, basketball, and skiing (>10 METs):  YES    Had back surgery last year; denies adverse reactions to anesthesia.  Did have adverse reaction to heparin and alteplase.  Heparin caused her to feel agitated and like her body was on fire.  Alteplase caused chest tightness, difficulty breathing, and restless legs.      Past Medical History:   Diagnosis Date    Abscess of paraspinous muscles     Allergy 4/2018    DVT (deep venous thrombosis)     left leg    Epidural abscess 4/10/2018    Fatty liver     GERD (gastroesophageal reflux disease) 2015    Hypertension     Lumbar radiculopathy     Menopause     Obesity     Obstructive sleep apnea on CPAP     Thyroid disease     Thyroid nodules.       Past Surgical History:   Procedure Laterality Date    ADENOIDECTOMY  1995    BACK SURGERY  2002    L4, L5    BILATERAL SALPINGOOPHORECTOMY      BLOCK-NERVE-MEDIAL BRANCH-LUMBAR Left 3/16/2018    [facility]    CHOLECYSTECTOMY      CYST REMOVAL      NEERAJ-TRANSFORAMINAL Left  3/2/2018    [facility] Bard [facility] [place] [facility]    ESOPHAGOGASTRODUODENOSCOPY (EGD) N/A 5/3/2019    [facility] ENDO (4TH FLR)    FUSION-POSTERIOR LUMBAR INTERBODY FUSION L4/5, L5/S1 N/A 4/10/2018    [facility] at [facility] OR 2ND FLR    HYSTERECTOMY  2012    [facility]     INJECTION-FACET Left 3/2/2018    [facility]    RECTOCELE REPAIR      SPINE SURGERY      THYROID NODULE REMOVAL      TONSILLECTOMY         Family History   Problem Relation Age of Onset    Hypertension Mother     Hyperlipidemia Mother     Heart disease Mother 55        cad, valvular heart disease    Alcohol abuse Sister     Stroke Father     No Known Problems Daughter     No Known Problems Daughter     Breast cancer Neg Hx     Colon cancer Neg Hx     Ovarian cancer Neg Hx     Diabetes Neg Hx     Esophageal cancer Neg Hx        Social History     Socioeconomic History    Marital status:      Spouse name: Not on file    Number of children: Not on file    Years of education: Not on file    Highest education level: Not on file   Occupational History    Not on file   Social Needs    Financial resource strain: Not hard at all    Food insecurity:     Worry: Never true     Inability: Never true    Transportation needs:     Medical: No     Non-medical: No   Tobacco Use    Smoking status: Former Smoker     Packs/day: 0.25     Years: 32.00     Pack years: 8.00     Types: Cigarettes     Last attempt to quit:      Years since quittin.5    Smokeless tobacco: Never Used    Tobacco comment: smoked socially decades ago - weekends only   Substance and Sexual Activity    Alcohol use: No     Frequency: Monthly or less     Drinks per session: 1 or 2     Binge frequency: Never     Comment: very rare    Drug use: No    Sexual activity: Not Currently     Partners: Male     Birth control/protection: See  "Surgical Hx, None     Comment: VINI BS&O 12/17/2012,     Lifestyle    Physical activity:     Days per week: Patient refused     Minutes per session: 0 min    Stress: Not at all   Relationships    Social connections:     Talks on phone: More than three times a week     Gets together: Once a week     Attends Yazidism service: Not on file     Active member of club or organization: No     Attends meetings of clubs or organizations: Patient refused     Relationship status:    Other Topics Concern    Not on file   Social History Narrative    . 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       Review of Systems   Constitutional: Negative for activity change and unexpected weight change.   HENT: Negative for hearing loss, rhinorrhea and trouble swallowing.    Eyes: Negative for discharge and visual disturbance.   Respiratory: Negative for chest tightness and wheezing.    Cardiovascular: Negative for chest pain and palpitations.   Gastrointestinal: Negative for blood in stool, constipation, diarrhea and vomiting.   Endocrine: Negative for polydipsia and polyuria.   Genitourinary: Negative for difficulty urinating, dysuria, hematuria and menstrual problem.   Musculoskeletal: Negative for neck pain.   Neurological: Negative for weakness and headaches.   Psychiatric/Behavioral: Negative for confusion and dysphoric mood.     Objective:     Vitals:    07/12/19 1521   BP: 138/82   BP Location: Left arm   Patient Position: Sitting   BP Method: Medium (Manual)   Pulse: (!) 113   Temp: 98.7 °F (37.1 °C)   TempSrc: Oral   SpO2: 97%   Weight: 104.4 kg (230 lb 2.6 oz)   Height: 5' 5" (1.651 m)       Physical Exam   Constitutional: She is oriented to person, place, and time. She appears well-developed and well-nourished.   HENT:   Head: Normocephalic and atraumatic.   Right Ear: External ear normal.   Left Ear: External ear normal.   Nose: Nose normal.   Mouth/Throat: Oropharynx is clear and moist.   Eyes: Pupils are equal, round, and " reactive to light. Conjunctivae and EOM are normal.   Neck: Normal range of motion. Neck supple. No tracheal deviation present.   Cardiovascular: Normal rate, regular rhythm and normal heart sounds. Exam reveals no gallop and no friction rub.   No murmur heard.  Pulmonary/Chest: Effort normal and breath sounds normal. No respiratory distress. She has no wheezes. She has no rales.   Abdominal: Soft. Bowel sounds are normal. She exhibits no distension. There is no tenderness. There is no rebound and no guarding.   Musculoskeletal: Normal range of motion. She exhibits no edema or deformity.   Neurological: She is alert and oriented to person, place, and time. Coordination normal.   Skin: Skin is warm and dry.   Psychiatric: She has a normal mood and affect. Her behavior is normal.   Nursing note and vitals reviewed.     Assessment:      1. Pre-op examination      Plan:   Renae was seen today for pre-op exam.    Diagnoses and all orders for this visit:    Pre-op examination  -     IN OFFICE EKG 12-LEAD (to Muse):  Normal sinus rhythm, normal ECG, no signs of ischemia noted        -     Low risk patient for intermediate risk surgery.  Cleared for surgery and anesthesia.

## 2019-07-16 ENCOUNTER — TELEPHONE (OUTPATIENT)
Dept: BARIATRICS | Facility: CLINIC | Age: 59
End: 2019-07-16

## 2019-07-16 NOTE — TELEPHONE ENCOUNTER
Called patient to discuss liquid diet and vitamins.    Pt using     Discussion:  -  gms of protein per day  - 600-800 calories per day   - Less than 4gm sugar per shake  - SF, Decaf, non-carbonated Fluids  - No Fruits, juices, yogurt or pudding on liquid diet  - No vitamins or minerals for 1 week prior to surgery    Remind pt per nursing and medical team to inform our department if taking antibiotics within the 30 days post bariatric surgery or it any other surgeries/procedures are scheduled within 30 days after bariatric surgery.    Reminded pt of pre-op and surgery dates.    Pt to call back with any questions.  ----- Message from Tasha Baird RN sent at 6/6/2019  4:18 PM CDT -----  Regarding: liquid diet  1 week liquid diet starts 7/16/19  sleeve surgery scheduled 7/23/19  preop appt scheduled 7/10/19

## 2019-07-17 ENCOUNTER — PATIENT MESSAGE (OUTPATIENT)
Dept: BARIATRICS | Facility: CLINIC | Age: 59
End: 2019-07-17

## 2019-07-17 NOTE — TELEPHONE ENCOUNTER
Called pt to discuss preop liquid diet.  Left Vm that I would send msg via [facility] because I am at another [facility] location today or will call her back tomorrow.

## 2019-07-17 NOTE — TELEPHONE ENCOUNTER
----- Message from Avni Ruth sent at 7/17/2019  1:06 PM CDT -----  Contact: pt  Patient Returning Call from [facility]    Who Left Message for Patient: Neisha     Communication Preference: [phone]     Additional Information:

## 2019-07-18 ENCOUNTER — TELEPHONE (OUTPATIENT)
Dept: VASCULAR SURGERY | Facility: CLINIC | Age: 59
End: 2019-07-18

## 2019-07-19 ENCOUNTER — HOSPITAL ENCOUNTER (OUTPATIENT)
Facility: HOSPITAL | Age: 59
Discharge: HOME | End: 2019-07-19
Attending: SURGERY | Admitting: SURGERY
Payer: COMMERCIAL

## 2019-07-19 VITALS
HEIGHT: 65 IN | BODY MASS INDEX: 38.32 KG/M2 | TEMPERATURE: 97 F | HEART RATE: 74 BPM | WEIGHT: 230 LBS | OXYGEN SATURATION: 97 % | DIASTOLIC BLOOD PRESSURE: 73 MMHG | RESPIRATION RATE: 16 BRPM | SYSTOLIC BLOOD PRESSURE: 110 MMHG

## 2019-07-19 DIAGNOSIS — I82.409 DEEP VEIN THROMBOSIS (DVT) OF LOWER EXTREMITY, UNSPECIFIED CHRONICITY, UNSPECIFIED LATERALITY, UNSPECIFIED VEIN: ICD-10-CM

## 2019-07-19 DIAGNOSIS — I82.5Z2 CHRONIC DEEP VEIN THROMBOSIS (DVT) OF DISTAL VEIN OF LEFT LOWER EXTREMITY: Primary | ICD-10-CM

## 2019-07-19 PROCEDURE — 37191 PR INSERT IVC FILTER: ICD-10-PCS | Mod: ,,, | Performed by: SURGERY

## 2019-07-19 PROCEDURE — 99152 MOD SED SAME PHYS/QHP 5/>YRS: CPT | Performed by: SURGERY

## 2019-07-19 PROCEDURE — 37191 INS ENDOVAS VENA CAVA FILTR: CPT | Performed by: SURGERY

## 2019-07-19 PROCEDURE — 99152 MOD SED SAME PHYS/QHP 5/>YRS: CPT | Mod: ,,, | Performed by: SURGERY

## 2019-07-19 PROCEDURE — C1769 GUIDE WIRE: HCPCS | Performed by: SURGERY

## 2019-07-19 PROCEDURE — 99152 PR MOD CONSCIOUS SEDATION, SAME PHYS, 5+ YRS, FIRST 15 MIN: ICD-10-PCS | Mod: ,,, | Performed by: SURGERY

## 2019-07-19 PROCEDURE — 25500020 PHARM REV CODE 255: Performed by: SURGERY

## 2019-07-19 PROCEDURE — C1880 VENA CAVA FILTER: HCPCS | Performed by: SURGERY

## 2019-07-19 PROCEDURE — C1894 INTRO/SHEATH, NON-LASER: HCPCS | Performed by: SURGERY

## 2019-07-19 PROCEDURE — 25000003 PHARM REV CODE 250: Performed by: SURGERY

## 2019-07-19 PROCEDURE — 63600175 PHARM REV CODE 636 W HCPCS: Performed by: SURGERY

## 2019-07-19 PROCEDURE — 99153 MOD SED SAME PHYS/QHP EA: CPT | Performed by: SURGERY

## 2019-07-19 PROCEDURE — 37191 INS ENDOVAS VENA CAVA FILTR: CPT | Mod: ,,, | Performed by: SURGERY

## 2019-07-19 DEVICE — OPTION ELITE RETRIEVABLE VENA CAVA FILTER SUITABLE FOR JUGULAR OR FEMORAL DELIVERY
Type: IMPLANTABLE DEVICE | Site: VEIN | Status: FUNCTIONAL
Brand: OPTION ELITE RETRIEVABLE VENA CAVA FILTER SYSTEM

## 2019-07-19 RX ORDER — FENTANYL CITRATE 50 UG/ML
INJECTION, SOLUTION INTRAMUSCULAR; INTRAVENOUS
Status: DISCONTINUED | OUTPATIENT
Start: 2019-07-19 | End: 2019-07-19 | Stop reason: HOSPADM

## 2019-07-19 RX ORDER — LIDOCAINE HYDROCHLORIDE 20 MG/ML
INJECTION, SOLUTION EPIDURAL; INFILTRATION; INTRACAUDAL; PERINEURAL
Status: DISCONTINUED | OUTPATIENT
Start: 2019-07-19 | End: 2019-07-19 | Stop reason: HOSPADM

## 2019-07-19 RX ORDER — MIDAZOLAM HYDROCHLORIDE 1 MG/ML
INJECTION, SOLUTION INTRAMUSCULAR; INTRAVENOUS
Status: DISCONTINUED | OUTPATIENT
Start: 2019-07-19 | End: 2019-07-19 | Stop reason: HOSPADM

## 2019-07-19 RX ORDER — LIDOCAINE HYDROCHLORIDE 10 MG/ML
1 INJECTION, SOLUTION EPIDURAL; INFILTRATION; INTRACAUDAL; PERINEURAL ONCE
Status: DISCONTINUED | OUTPATIENT
Start: 2019-07-19 | End: 2019-07-19 | Stop reason: HOSPADM

## 2019-07-19 RX ORDER — MUPIROCIN 20 MG/G
OINTMENT TOPICAL
Status: DISCONTINUED | OUTPATIENT
Start: 2019-07-19 | End: 2019-07-19 | Stop reason: HOSPADM

## 2019-07-19 NOTE — Clinical Note
[employer] Option Elite Retrievable Vena Cava Filter placement. REF [id number] LOT [id number] EXP 11/27/2021

## 2019-07-19 NOTE — PROGRESS NOTES
Pt DC'd per MD order. Discharge instructions given including activity, wound care, S&S of infections, future appointments, and when to call MD. Medications reviewed including when to take next dose. PIV DC'd, catheter tip intact. Pt refused transport and ambulated off unit to meet  at front entrance.

## 2019-07-19 NOTE — BRIEF OP NOTE
Brief Operative Note  Date: 07/19/2019    Surgeon(s) and Role:     * Rodney Rico MD - Primary    Pre-op Diagnosis:  Deep vein thrombosis (DVT) of lower extremity, unspecified chronicity, unspecified laterality, unspecified vein [I82.409];    Post-op Diagnosis:  Same    Procedure(s):    1) IVC filter placement   2) venogram  3) US guided access L CFV      Surgeon:Rodney Rico MD  Vascular & Endovascular Surgery           Anesthesia: Local MAC    Findings/Key Components:  Successful IVC filter placement     EBL: Minimal         Specimens (From admission, onward)    None          I attest to being present for the procedure and performing the case.  Rodney Rico MD  Vascular & Endovascular Surgery     Discharge Note  SUMMARY    Admit Date: 7/19/2019    Attending Physician: Rodney Rico MD  Vascular & Endovascular Surgery       Discharge Physician: Rodney Rico MD  Vascular & Endovascular Surgery       Discharge Date: 07/19/2019    Final Diagnosis: Deep vein thrombosis (DVT) of lower extremity, unspecified chronicity, unspecified laterality, unspecified vein [I82.409]    Disposition: Home or self-care    Patient Instructions:   Current Discharge Medication List      CONTINUE these medications which have NOT CHANGED    Details   hydroCHLOROthiazide (HYDRODIURIL) 25 MG tablet Take 1 tablet (25 mg total) by mouth once daily.  Qty: 30 tablet, Refills: 5    Associated Diagnoses: Essential hypertension      !! omeprazole (PRILOSEC) 40 MG capsule TK 1 C PO QD  Refills: 4      !! omeprazole (PRILOSEC) 40 MG capsule Take 1 capsule (40 mg total) by mouth every morning. Open capsule and take with apple sauce  Qty: 30 capsule, Refills: 2    Associated Diagnoses: Class 2 obesity due to excess calories with body mass index (BMI) of 38.0 to 38.9 in adult, unspecified whether serious comorbidity present      !! polyethylene glycol (GLYCOLAX) 17 gram/dose powder Mix 1 capful (17 g) with liquid and take by  mouth once daily.  Qty: 510 g, Refills: 3    Associated Diagnoses: Class 2 obesity due to excess calories with body mass index (BMI) of 38.0 to 38.9 in adult, unspecified whether serious comorbidity present      !! POLYETHYLENE GLYCOL 3350 (MIRALAX ORAL) Take by mouth daily as needed. constipation      hydrocodone-acetaminophen (HYCET) solution 7.5-325 mg/15mL Take 15 mLs by mouth 4 (four) times daily as needed for Pain.  Qty: 473 mL, Refills: 0    Associated Diagnoses: Class 2 obesity due to excess calories with body mass index (BMI) of 38.0 to 38.9 in adult, unspecified whether serious comorbidity present      methocarbamol (ROBAXIN) 750 MG Tab Take 1 tablet (750 mg total) by mouth 3 (three) times daily as needed. As needed for muscle spasms  Qty: 30 tablet, Refills: 0      ondansetron (ZOFRAN-ODT) 8 MG TbDL Dissolve 1 tablet (8 mg total) by mouth every 6 (six) hours as needed.  Qty: 30 tablet, Refills: 0    Associated Diagnoses: Class 2 obesity due to excess calories with body mass index (BMI) of 38.0 to 38.9 in adult, unspecified whether serious comorbidity present      promethazine (PHENERGAN) 25 MG suppository Unwrap and place 1 rectally every 6 hours as needed  Qty: 15 suppository, Refills: 0    Associated Diagnoses: Class 2 obesity due to excess calories with body mass index (BMI) of 38.0 to 38.9 in adult, unspecified whether serious comorbidity present       !! - Potential duplicate medications found. Please discuss with provider.          Diet:  Resume pre-operative diet    Activity:  Ad nella    Follow-up:  Follow-up in clinic with Dr Rico within 1-2 weeks; please call clinic nurse at

## 2019-07-19 NOTE — INTERVAL H&P NOTE
The patient has been examined and the H&P has been reviewed:    I concur with the findings and no changes have occurred since H&P was written.    Anesthesia/Surgery risks, benefits and alternative options discussed and understood by patient/family.          Active Hospital Problems    Diagnosis  POA    Deep vein thrombosis (DVT) of lower extremity [I82.409]  Yes      Resolved Hospital Problems   No resolved problems to display.

## 2019-07-19 NOTE — PLAN OF CARE
Problem: Adult Inpatient Plan of Care  Goal: Plan of Care Review  Outcome: Ongoing (interventions implemented as appropriate)  Received report from Wendy. Patient s/p IVC filter, AAOx3. VSS, no c/o pain or discomfort at this time, resp even and unlabored. Gauze/tegaderm dressing to L groin is CDI. No active bleeding. No hematoma noted. Post procedure protocol reviewed with patient. Understanding verbalized. Nurse call bell within reach. Will continue to monitor per post procedure protocol.

## 2019-07-19 NOTE — Clinical Note
dry, intact, no bleeding and no hematoma. RCFV hemostasis achieved, gauze and tegaderm to site. No complications

## 2019-07-19 NOTE — DISCHARGE SUMMARY
Ms. Hou is a 59 yo female who presents for IVC Filter placement on 7/19/19 with upcoming lap sleeve gastrectomy and PMHx of DVT. She tolerated the procedure well and there were no complications. The patient was tolerating a clear liquid diet and ambulating without difficulty. Pain was well-controlled with prn meds. Follow up information was provided and Renae Hou was deemed ready for discharge.

## 2019-07-19 NOTE — PLAN OF CARE
Problem: Adult Inpatient Plan of Care  Goal: Plan of Care Review  Outcome: Ongoing (interventions implemented as appropriate)  Patient arrived to room. PIV placed. Admit assessment completed. Plan of care discussed with patient. Will monitor. Report called to Rocael

## 2019-07-19 NOTE — DISCHARGE INSTRUCTIONS
1. Do not strain or lift anything greater than 5 lb for 1 week.  2. Do not drive or operate any dangerous machinery for 24 hours.   3. Keep the dressing on, clean, and dry for 24 hours.   4. After 24 hours, the dressing may be removed and a shower is allowed.   5. Clean the area with mild soap and water and then cover it with a bandage.   6. Once the skin has healed, bathing in a tub or swimming is allowed.   7. Inspect the groin site daily and report to the physician any swelling at the site that   cannot be controlled with manual pressure for 10 minutes, unusual pain at the   access site or affected extremity, unusual swelling at the access site, or signs or   symptoms of infection such as redness, pain, or fever.   Call 911 if you have:   Bleeding from the puncture site that you cannot stop by doing the following:   Relax and lie down right away. Keep your leg flat and apply firm pressure to the site using your fingers and a gauze pad. Keep the pressure on for 20 minutes. Continue this until the bleeding stops. This may take awhile. When bleeding stops, cover the site with a sterile bandage and keep your leg still as much as possible.      Recovery After Procedural Sedation (Adult)  You have been given medicine by vein to make you sleep during your surgery. This may have included both a pain medicine and sleeping medicine. Most of the effects have worn off. But you may still have some drowsiness for the next 6 to 8 hours.  Home care  Follow these guidelines when you get home:  · For the next 8 hours, you should be watched by a responsible adult. This person should make sure your condition is not getting worse.  · Don't drink any alcohol for the next 24 hours.  · Don't drive, operate dangerous machinery, or make important business or personal decisions during the next 24 hours.  Note: Your healthcare provider may tell you not to take any medicine by mouth for pain or sleep in the next 4 hours. These medicines may  react with the medicines you were given in the hospital. This could cause a much stronger response than usual.  Follow-up care  Follow up with your healthcare provider if you are not alert and back to your usual level of activity within 12 hours.  When to seek medical advice  Call your healthcare provider right away if any of these occur:  · Drowsiness gets worse  · Weakness or dizziness gets worse  · Repeated vomiting  · You can't be awakened   Date Last Reviewed: 10/18/2016  © 8305-7181 [employer], [employer]. [address] [place] [address], [place] [address]. All rights reserved. This information is not intended as a substitute for professional medical care. Always follow your healthcare professional's instructions.

## 2019-07-22 ENCOUNTER — TELEPHONE (OUTPATIENT)
Dept: BARIATRICS | Facility: CLINIC | Age: 59
End: 2019-07-22

## 2019-07-22 ENCOUNTER — ANESTHESIA EVENT (OUTPATIENT)
Dept: SURGERY | Facility: HOSPITAL | Age: 59
DRG: 621 | End: 2019-07-22
Payer: COMMERCIAL

## 2019-07-22 NOTE — OP NOTE
DATE OF PROCEDURE:  07/19/2019    SURGEON:  Rodney Rico M.D.    PREOPERATIVE DIAGNOSIS:  History of DVT.    POSTOPERATIVE DIAGNOSES:  1.  History of DVT.  2.  Upcoming bariatric procedure.    PROCEDURES PERFORMED:  1.  IVC filter placement.  2.  Venogram.  3.  Ultrasound-guided access to the left common femoral vein.  4.  Conscious sedation time, 60 minutes.    ESTIMATED BLOOD LOSS:  Minimal.    COMPLICATIONS:  None.    ANESTHESIA:  Moderate sedation plus local.    DESCRIPTION OF PROCEDURE:  The patient is a 58-year-old female who recently   presented to my office for evaluation for IVC filter placement.  She is   undergoing bariatric surgery in the upcoming week.  She has a history of   previous left lower extremity DVT in July 2018, following an L4, L5 and S1   spinal fusion.  She denies history of pulmonary embolism.  Based on her history   of venous thromboembolism and upcoming bariatric surgery, the decision to place   a temporary IVC filter was made.    The patient was identified as Renae Hou and brought to the Catheterization   Laboratory.  She was positioned supine on the table and prepped and draped in   standard sterile fashion to include a wide prep of both groins.  After a team   wide timeout during which the procedure was agreed upon by all room staff,   ultrasound was used to examine both right and left common femoral veins.  It was   noted that the right common femoral vein appeared to be stenotic.  The left   appeared to be widely patent and a subcutaneous wheal of 1% lidocaine was raised   over the left common femoral artery.  Using ultrasound guidance, a   micropuncture needle was guided into the left common femoral artery and a   micropuncture wire was advanced under fluoroscopic guidance into the iliac vein.    The micropuncture needle was removed and a micropuncture sheath was placed   over the wire.  A 1-cm skin incision was made.  The tract was dilated with the   hemostat and  through the micropuncture sheath, a 0.035 Wholey wire was advanced   under fluoroscopic guidance into the inferior vena cava with no difficulty.    The IVC filter sheath was placed over the wire, which was then removed.  A   venogram clearly demonstrated the inferior vena cava anatomy and lowest renal   vein.  The filter was brought onto the field, flushed and advanced through the   catheter in a normal fashion.  Based on the angiographic road mapping, the   filter was deployed without incident.  At this point, repeat venogram was   pursued showing good placement of the filter and all wires and catheters were   removed.  Ten minutes of venous pressure were held over the access points and   hemostasis was ensured prior to application of the bandage.  The patient was   transferred to the Recovery Room in stable condition.    Rodney BROOKS, was present for the entirety of the operation and   supervised 60 minutes of moderate sedation.  For exact dosages of medications,   please see the nursing record.      CB/IN  dd: 07/22/2019 12:32:29 (CDT)  td: 07/22/2019 12:49:51 (CDT)  Doc ID   #[id number]  Job ID #[id number]    CC:

## 2019-07-22 NOTE — ANESTHESIA PREPROCEDURE EVALUATION
[facility]  Anesthesia Pre-Operative Evaluation         Patient Name: Renae Hou  YOB: 1960  MRN: [id number]    SUBJECTIVE:     Pre-operative evaluation for Procedure(s) (LRB):  GASTRECTOMY, SLEEVE, LAPAROSCOPIC, with intraop EGD [id number] (N/A)     07/22/2019    Renae Hou is a 58 y.o. female w/ a significant PMHx of DVT s/p IVC filter 7/11/19, GERD (well controlled), RENAE (uses CPAP), HLD, HTN, obesity. She developed a DVT without PE in July 2018 following lumbar fusion. She was started on Xarelto, which she completed in January 2019. IVC filter placed prophylactically given previous provoked DVT.    Patient now presents for the above procedure(s).      LDA: None documented.     Prev airway: 4/10/18 for lumbar fusion: ETT by DL, grade 1 view, complicated by short neck and obesity. No complications. No mask ventilation attempted    Drips: None documented.      Patient Active Problem List   Diagnosis    RENAE on CPAP    Shellfish allergy    Chronic constipation    History of major abdominal surgery    Hyperlipidemia    Pre-diabetes    Class 2 obesity due to excess calories in adult    Chronic pain    Lumbar radiculopathy    Acid reflux    Essential hypertension    Fatty liver    Spondylolisthesis at L4-L5 level    Spondylolisthesis    Abscess of paraspinous muscles    Spondylolisthesis, acquired    Obstructive sleep apnea on CPAP    Hypertension    GERD (gastroesophageal reflux disease)    Vaginal atrophy    Dyspareunia, female    Nocturia more than twice per night    Mixed stress and urge urinary incontinence    Rectocele, female    Cystocele, midline    Edema, lower extremity    Deep vein thrombosis (DVT) of lower extremity    Exertional chest pain    Personal history of DVT (deep vein thrombosis)    Pre-op evaluation       Review of patient's allergies indicates:   Allergen Reactions    Cathflo activase [alteplase] Anaphylaxis     Tightness in  chest, raspy throat, restless legs, hotness all over    Heparin analogues      Restless legs, tightness in chest, and hot all over    Latex Swelling     Patient un sure about it       Current Inpatient Medications:      No current facility-administered medications on file prior to encounter.      Current Outpatient Medications on File Prior to Encounter   Medication Sig Dispense Refill    hydroCHLOROthiazide (HYDRODIURIL) 25 MG tablet Take 1 tablet (25 mg total) by mouth once daily. 30 tablet 5    methocarbamol (ROBAXIN) 750 MG Tab Take 1 tablet (750 mg total) by mouth 3 (three) times daily as needed. As needed for muscle spasms 30 tablet 0    omeprazole (PRILOSEC) 40 MG capsule TK 1 C PO QD  4    POLYETHYLENE GLYCOL 3350 (MIRALAX ORAL) Take by mouth daily as needed. constipation         Past Surgical History:   Procedure Laterality Date    ADENOIDECTOMY  1995    BACK SURGERY  2002    L4, L5    BILATERAL SALPINGOOPHORECTOMY      BLOCK-NERVE-MEDIAL BRANCH-LUMBAR Left 3/16/2018    [facility]    CHOLECYSTECTOMY      CYST REMOVAL      NEERAJ-TRANSFORAMINAL Left 3/2/2018    [facility]    ESOPHAGOGASTRODUODENOSCOPY (EGD) N/A 5/3/2019    [facility] ENDO (4TH FLR)    FUSION-POSTERIOR LUMBAR INTERBODY FUSION L4/5, L5/S1 N/A 4/10/2018    [facility]formed by Dean [facility]ayas [facility] at [facility] OR 2ND FLR    HYSTERECTOMY  12/17/2012    [facility]     INJECTION-FACET Left 3/2/2018    [facility]    IVC filter placement Right 7/19/2019    [facility] at [facility] CATH LAB    RECTOCELE REPAIR      SPINE SURGERY  2018    THYROID NODULE REMOVAL      TONSILLECTOMY  1995       Social History     Socioeconomic History    Marital status:      Spouse name: Not on file    Number of children: Not on file    Years of education: Not on file    Highest  education level: Not on file   Occupational History    Not on file   Social Needs    Financial resource strain: Not hard at all    Food insecurity:     Worry: Never true     Inability: Never true    Transportation needs:     Medical: No     Non-medical: No   Tobacco Use    Smoking status: Former Smoker     Packs/day: 0.25     Years: 32.00     Pack years: 8.00     Types: Cigarettes     Last attempt to quit:      Years since quittin.5    Smokeless tobacco: Never Used    Tobacco comment: smoked socially decades ago - weekends only   Substance and Sexual Activity    Alcohol use: No     Frequency: Monthly or less     Drinks per session: 1 or 2     Binge frequency: Never     Comment: very rare    Drug use: No    Sexual activity: Not Currently     Partners: Male     Birth control/protection: See Surgical Hx, None     Comment: DLALEIDA BS&O 2012,     Lifestyle    Physical activity:     Days per week: Patient refused     Minutes per session: 0 min    Stress: Not at all   Relationships    Social connections:     Talks on phone: More than three times a week     Gets together: Once a week     Attends Sikh service: Not on file     Active member of club or organization: No     Attends meetings of clubs or organizations: Patient refused     Relationship status:    Other Topics Concern    Not on file   Social History Narrative    . 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       OBJECTIVE:     Vital Signs Range (Last 24H):         Significant Labs:  Lab Results   Component Value Date    WBC 6.76 2019    HGB 14.9 2019    HCT 44.8 2019     2019    CHOL 209 (H) 2019    TRIG 130 2019    HDL 57 2019    ALT 37 2019    AST 30 2019     2019    K 4.4 2019     2019    CREATININE 0.8 2019    BUN 19 2019    CO2 31 (H) 2019    TSH 1.494 2019    INR 0.9 2018    HGBA1C 6.0 (H) 2019        Diagnostic Studies: No relevant studies.    EKG:   Vent. Rate : 083 BPM     Atrial Rate : 083 BPM     P-R Int : 154 ms          QRS Dur : 084 ms      QT Int : 370 ms       P-R-T Axes : 042 028 058 degrees     QTc Int : 434 ms    Normal sinus rhythm  Normal ECG  When compared with ECG of 24-MAY-2019 08:18,  No significant change was found  Confirmed by Santi Ziegler MD ([id number]) on 7/12/2019 5:42:43 PM    2D ECHO:  Echo stress test 11/8/2018:  · Left ventricle ejection fraction is low normal at 55%  · Left ventricle shows concentric remodeling.  · Normal LV diastolic function.  · No wall motion abnormalities.  · RV systolic function is normal.  · The patient reported SOB (non-anginal) and chest discomfort during the stress test.  · There were no arrhythmias during stress.  · The test was stopped secondary to shortness of breath.  · No pericardial effusion.  · Overall, the patient's exercise capacity was mildly impaired.  · The EKG portion of this study is negative for myocardial ischemia.  · The stress echo portion of this study is negative for myocardial ischemia.  · No ischemia by ECG and echo despite the symptoms at rest that worsened with stress and later returned to the baseline level      ASSESSMENT/PLAN:         Anesthesia Evaluation    I have reviewed the Patient Summary Reports.    I have reviewed the Nursing Notes.   I have reviewed the Medications.     Review of Systems  Anesthesia Hx:  No problems with previous Anesthesia  Denies Family Hx of Anesthesia complications.   Denies Personal Hx of Anesthesia complications.   Social:  Social Alcohol Use, Former Smoker    Hematology/Oncology:  Hematology Normal   Oncology Normal   Denies Current/Recent Cancer   EENT/Dental:   denies chronic allergic rhinitis   Cardiovascular:   Exercise tolerance: poor Hypertension, well controlled Denies MI.  Denies CAD.    Denies CABG/stent.  Denies Dysrhythmias.          hyperlipidemia Walks a mile without difficulty.  Functional Capacity 2 METS    Pulmonary:   Denies COPD.  Denies Asthma.  Denies Shortness of breath.  Denies Recent URI. Sleep Apnea    Renal/:   Denies Chronic Renal Disease.     Hepatic/GI:   GERD, well controlled Liver Disease,    Musculoskeletal:   Arthritis     Neurological:   Denies TIA. Denies CVA. Neuromuscular Disease,  Denies Seizures.    Endocrine:   Denies Diabetes. Hypothyroidism  Diabetes    Psych:  Psychiatric Normal  Denies Psychiatric History.          Physical Exam  General:  Well nourished, Obesity    Airway/Jaw/Neck:  Airway Findings: Mouth Opening: Normal Tongue: Normal  General Airway Assessment: Adult, Average  Mallampati: II  TM Distance: Normal, at least 6 cm        Eyes/Ears/Nose:  EYES/EARS/NOSE FINDINGS: Normal    Chest/Lungs:  Chest/Lungs Clear    Heart/Vascular:  Heart Findings: Normal Heart murmur: negative Vascular Findings: Normal    Abdomen:  Abdomen Findings: Normal    Musculoskeletal:  Musculoskeletal Findings: Normal   Skin:  Skin Findings: Normal    Mental Status:  Mental Status Findings:  Cooperative, Alert and Oriented         Anesthesia Plan  Type of Anesthesia, risks & benefits discussed:  Anesthesia Type:  general  Patient's Preference:   Intra-op Monitoring Plan: standard ASA monitors  Intra-op Monitoring Plan Comments:   Post Op Pain Control Plan: multimodal analgesia, IV/PO Opioids PRN and per primary service following discharge from PACU  Post Op Pain Control Plan Comments:   Induction:   IV  Beta Blocker:  Patient is not currently on a Beta-Blocker (No further documentation required).       Informed Consent:    ASA Score: 3     Day of Surgery Review of History & Physical:    H&P update referred to the surgeon.         Ready For Surgery From Anesthesia Perspective.     Below is cardiology note just after the echo     EKG: NSR, normal axis, non-specific T-wave change in AvL  Assessment:   1. Chest pain -- in the setting of negative stress testing, normal EKG, and low  ASCVD this is likely Reflux associated. If not improving with reflux management will consider coronary CT for further risk stratification  2. GERD -- poorly controlled  3. HLD - well controlled with  (ASCVD = 3.4%)  4. HTN -- well controlled  5. DVT -- continue xarelto     Plan:   Recommended she restart the Xarelto, if symptoms of fatigue recur, will switch to Eliquis 5mg BID  Can continue to hold metformin and estrace cream  Continue her blood pressure medication  Discussed dietary strategies and head of bed elevation to limit GERD  Counseled on exercise  RTC PRN if symptoms persist

## 2019-07-22 NOTE — TELEPHONE ENCOUNTER
Notified patient of arrival time to the [facility] at 1045 with expected surgery start time 1245 on 7/23/19.   Instructed patient regarding pre-op instructions including npo status, showering and preop medications to hold/take per anesthesia/preop.  Instructed patient on the s/s of dehydration and for patient to call at the first sign of dehydration.  Informed patient that someone from bariatrics will be call them 1 week post op to review diet/fluid intake and to ensure adequate hydration.   Pt verbalized understanding. Pt given office phone number for any additional questions/concerns. Also reminded not to take ABx or have elective procedures for 30 days after surgery unless she checks with us first.  She verbalized understanding.

## 2019-07-23 ENCOUNTER — HOSPITAL ENCOUNTER (INPATIENT)
Facility: HOSPITAL | Age: 59
LOS: 1 days | Discharge: HOME | DRG: 621 | End: 2019-07-24
Attending: SURGERY | Admitting: SURGERY
Payer: COMMERCIAL

## 2019-07-23 ENCOUNTER — ANESTHESIA (OUTPATIENT)
Dept: SURGERY | Facility: HOSPITAL | Age: 59
DRG: 621 | End: 2019-07-23
Payer: COMMERCIAL

## 2019-07-23 DIAGNOSIS — E66.01 CLASS 2 SEVERE OBESITY DUE TO EXCESS CALORIES WITH SERIOUS COMORBIDITY AND BODY MASS INDEX (BMI) OF 38.0 TO 38.9 IN ADULT: Primary | ICD-10-CM

## 2019-07-23 DIAGNOSIS — E66.09 CLASS 2 OBESITY DUE TO EXCESS CALORIES WITH BODY MASS INDEX (BMI) OF 38.0 TO 38.9 IN ADULT, UNSPECIFIED WHETHER SERIOUS COMORBIDITY PRESENT: ICD-10-CM

## 2019-07-23 PROBLEM — E66.812 CLASS 2 OBESITY DUE TO EXCESS CALORIES WITH BODY MASS INDEX (BMI) OF 38.0 TO 38.9 IN ADULT: Status: ACTIVE | Noted: 2019-07-23

## 2019-07-23 PROCEDURE — 63600175 PHARM REV CODE 636 W HCPCS: Performed by: STUDENT IN AN ORGANIZED HEALTH CARE EDUCATION/TRAINING PROGRAM

## 2019-07-23 PROCEDURE — 43775 LAP SLEEVE GASTRECTOMY: CPT | Mod: ,,, | Performed by: SURGERY

## 2019-07-23 PROCEDURE — 36000710: Performed by: SURGERY

## 2019-07-23 PROCEDURE — 12000002 HC ACUTE/MED SURGE SEMI-PRIVATE ROOM

## 2019-07-23 PROCEDURE — 71000039 HC RECOVERY, EACH ADD'L HOUR: Performed by: SURGERY

## 2019-07-23 PROCEDURE — 25000003 PHARM REV CODE 250: Performed by: GENERAL PRACTICE

## 2019-07-23 PROCEDURE — D9220A PRA ANESTHESIA: ICD-10-PCS | Mod: ANES,,, | Performed by: ANESTHESIOLOGY

## 2019-07-23 PROCEDURE — 37000009 HC ANESTHESIA EA ADD 15 MINS: Performed by: SURGERY

## 2019-07-23 PROCEDURE — S0028 INJECTION, FAMOTIDINE, 20 MG: HCPCS | Performed by: SURGERY

## 2019-07-23 PROCEDURE — 27800903 OPTIME MED/SURG SUP & DEVICES OTHER IMPLANTS: Performed by: SURGERY

## 2019-07-23 PROCEDURE — 27201423 OPTIME MED/SURG SUP & DEVICES STERILE SUPPLY: Performed by: SURGERY

## 2019-07-23 PROCEDURE — 25000003 PHARM REV CODE 250: Performed by: NURSE ANESTHETIST, CERTIFIED REGISTERED

## 2019-07-23 PROCEDURE — 63600175 PHARM REV CODE 636 W HCPCS

## 2019-07-23 PROCEDURE — S0028 INJECTION, FAMOTIDINE, 20 MG: HCPCS | Performed by: GENERAL PRACTICE

## 2019-07-23 PROCEDURE — D9220A PRA ANESTHESIA: Mod: CRNA,,, | Performed by: NURSE ANESTHETIST, CERTIFIED REGISTERED

## 2019-07-23 PROCEDURE — 94761 N-INVAS EAR/PLS OXIMETRY MLT: CPT

## 2019-07-23 PROCEDURE — 27000221 HC OXYGEN, UP TO 24 HOURS

## 2019-07-23 PROCEDURE — 63600175 PHARM REV CODE 636 W HCPCS: Performed by: GENERAL PRACTICE

## 2019-07-23 PROCEDURE — 63600175 PHARM REV CODE 636 W HCPCS: Performed by: SURGERY

## 2019-07-23 PROCEDURE — 37000008 HC ANESTHESIA 1ST 15 MINUTES: Performed by: SURGERY

## 2019-07-23 PROCEDURE — 63600175 PHARM REV CODE 636 W HCPCS: Performed by: ANESTHESIOLOGY

## 2019-07-23 PROCEDURE — D9220A PRA ANESTHESIA: ICD-10-PCS | Mod: CRNA,,, | Performed by: NURSE ANESTHETIST, CERTIFIED REGISTERED

## 2019-07-23 PROCEDURE — 36000711: Performed by: SURGERY

## 2019-07-23 PROCEDURE — 25000003 PHARM REV CODE 250: Performed by: ANESTHESIOLOGY

## 2019-07-23 PROCEDURE — 43775 PR LAP, GAST RESTRICT PROC, LONGITUDINAL GASTRECTOMY: ICD-10-PCS | Mod: ,,, | Performed by: SURGERY

## 2019-07-23 PROCEDURE — 25000003 PHARM REV CODE 250: Performed by: STUDENT IN AN ORGANIZED HEALTH CARE EDUCATION/TRAINING PROGRAM

## 2019-07-23 PROCEDURE — 88307 TISSUE EXAM BY PATHOLOGIST: CPT | Mod: 26,,, | Performed by: PATHOLOGY

## 2019-07-23 PROCEDURE — D9220A PRA ANESTHESIA: Mod: ANES,,, | Performed by: ANESTHESIOLOGY

## 2019-07-23 PROCEDURE — 25000003 PHARM REV CODE 250: Performed by: SURGERY

## 2019-07-23 PROCEDURE — 63600175 PHARM REV CODE 636 W HCPCS: Performed by: NURSE ANESTHETIST, CERTIFIED REGISTERED

## 2019-07-23 PROCEDURE — 88307 TISSUE EXAM BY PATHOLOGIST: CPT | Performed by: PATHOLOGY

## 2019-07-23 PROCEDURE — 71000033 HC RECOVERY, INTIAL HOUR: Performed by: SURGERY

## 2019-07-23 PROCEDURE — S0028 INJECTION, FAMOTIDINE, 20 MG: HCPCS | Performed by: NURSE ANESTHETIST, CERTIFIED REGISTERED

## 2019-07-23 PROCEDURE — 88307 TISSUE SPECIMEN TO PATHOLOGY - SURGERY: ICD-10-PCS | Mod: 26,,, | Performed by: PATHOLOGY

## 2019-07-23 DEVICE — SEAMGUARD ESCHELON 60 MM.: Type: IMPLANTABLE DEVICE | Site: ABDOMEN | Status: FUNCTIONAL

## 2019-07-23 RX ORDER — CEFAZOLIN SODIUM 1 G/3ML
2 INJECTION, POWDER, FOR SOLUTION INTRAMUSCULAR; INTRAVENOUS
Status: COMPLETED | OUTPATIENT
Start: 2019-07-23 | End: 2019-07-23

## 2019-07-23 RX ORDER — SODIUM CHLORIDE 9 MG/ML
INJECTION, SOLUTION INTRAVENOUS CONTINUOUS
Status: DISCONTINUED | OUTPATIENT
Start: 2019-07-23 | End: 2019-07-23

## 2019-07-23 RX ORDER — FAMOTIDINE 10 MG/ML
20 INJECTION INTRAVENOUS 2 TIMES DAILY
Status: DISCONTINUED | OUTPATIENT
Start: 2019-07-23 | End: 2019-07-24

## 2019-07-23 RX ORDER — FAMOTIDINE 10 MG/ML
INJECTION INTRAVENOUS
Status: DISCONTINUED | OUTPATIENT
Start: 2019-07-23 | End: 2019-07-23

## 2019-07-23 RX ORDER — SODIUM CHLORIDE 0.9 % (FLUSH) 0.9 %
3 SYRINGE (ML) INJECTION
Status: DISCONTINUED | OUTPATIENT
Start: 2019-07-23 | End: 2019-07-23

## 2019-07-23 RX ORDER — MIDAZOLAM HYDROCHLORIDE 1 MG/ML
INJECTION, SOLUTION INTRAMUSCULAR; INTRAVENOUS
Status: DISCONTINUED | OUTPATIENT
Start: 2019-07-23 | End: 2019-07-23

## 2019-07-23 RX ORDER — PROPOFOL 10 MG/ML
VIAL (ML) INTRAVENOUS
Status: DISCONTINUED | OUTPATIENT
Start: 2019-07-23 | End: 2019-07-23

## 2019-07-23 RX ORDER — PHENYLEPHRINE HYDROCHLORIDE 10 MG/ML
INJECTION INTRAVENOUS
Status: DISCONTINUED | OUTPATIENT
Start: 2019-07-23 | End: 2019-07-23

## 2019-07-23 RX ORDER — ROCURONIUM BROMIDE 10 MG/ML
INJECTION, SOLUTION INTRAVENOUS
Status: DISCONTINUED | OUTPATIENT
Start: 2019-07-23 | End: 2019-07-23

## 2019-07-23 RX ORDER — HYDROMORPHONE HCL IN 0.9% NACL 6 MG/30 ML
PATIENT CONTROLLED ANALGESIA SYRINGE INTRAVENOUS CONTINUOUS
Status: DISCONTINUED | OUTPATIENT
Start: 2019-07-23 | End: 2019-07-24

## 2019-07-23 RX ORDER — LIDOCAINE HCL/PF 100 MG/5ML
SYRINGE (ML) INTRAVENOUS
Status: DISCONTINUED | OUTPATIENT
Start: 2019-07-23 | End: 2019-07-23

## 2019-07-23 RX ORDER — BUPIVACAINE HYDROCHLORIDE 2.5 MG/ML
INJECTION, SOLUTION EPIDURAL; INFILTRATION; INTRACAUDAL
Status: DISCONTINUED | OUTPATIENT
Start: 2019-07-23 | End: 2019-07-23 | Stop reason: HOSPADM

## 2019-07-23 RX ORDER — SODIUM CHLORIDE 9 MG/ML
INJECTION, SOLUTION INTRAVENOUS CONTINUOUS
Status: DISCONTINUED | OUTPATIENT
Start: 2019-07-23 | End: 2019-07-24

## 2019-07-23 RX ORDER — METOCLOPRAMIDE HYDROCHLORIDE 5 MG/ML
10 INJECTION INTRAMUSCULAR; INTRAVENOUS ONCE
Status: COMPLETED | OUTPATIENT
Start: 2019-07-23 | End: 2019-07-23

## 2019-07-23 RX ORDER — FENTANYL CITRATE 50 UG/ML
INJECTION, SOLUTION INTRAMUSCULAR; INTRAVENOUS
Status: DISCONTINUED | OUTPATIENT
Start: 2019-07-23 | End: 2019-07-23

## 2019-07-23 RX ORDER — FAMOTIDINE 10 MG/ML
INJECTION INTRAVENOUS
Status: DISPENSED
Start: 2019-07-23 | End: 2019-07-24

## 2019-07-23 RX ORDER — ACETAMINOPHEN 10 MG/ML
1000 INJECTION, SOLUTION INTRAVENOUS ONCE
Status: COMPLETED | OUTPATIENT
Start: 2019-07-23 | End: 2019-07-23

## 2019-07-23 RX ORDER — DEXAMETHASONE SODIUM PHOSPHATE 4 MG/ML
INJECTION, SOLUTION INTRA-ARTICULAR; INTRALESIONAL; INTRAMUSCULAR; INTRAVENOUS; SOFT TISSUE
Status: DISCONTINUED | OUTPATIENT
Start: 2019-07-23 | End: 2019-07-23

## 2019-07-23 RX ORDER — SODIUM CITRATE AND CITRIC ACID MONOHYDRATE 334; 500 MG/5ML; MG/5ML
30 SOLUTION ORAL ONCE
Status: COMPLETED | OUTPATIENT
Start: 2019-07-23 | End: 2019-07-23

## 2019-07-23 RX ORDER — FAMOTIDINE 10 MG/ML
20 INJECTION INTRAVENOUS ONCE
Status: COMPLETED | OUTPATIENT
Start: 2019-07-23 | End: 2019-07-23

## 2019-07-23 RX ORDER — MEPERIDINE HYDROCHLORIDE 50 MG/ML
12.5 INJECTION INTRAMUSCULAR; INTRAVENOUS; SUBCUTANEOUS EVERY 10 MIN PRN
Status: DISCONTINUED | OUTPATIENT
Start: 2019-07-23 | End: 2019-07-23 | Stop reason: HOSPADM

## 2019-07-23 RX ORDER — LIDOCAINE HYDROCHLORIDE AND EPINEPHRINE 10; 10 MG/ML; UG/ML
INJECTION, SOLUTION INFILTRATION; PERINEURAL
Status: DISCONTINUED | OUTPATIENT
Start: 2019-07-23 | End: 2019-07-23 | Stop reason: HOSPADM

## 2019-07-23 RX ORDER — ACETAMINOPHEN 10 MG/ML
INJECTION, SOLUTION INTRAVENOUS
Status: DISCONTINUED | OUTPATIENT
Start: 2019-07-23 | End: 2019-07-23

## 2019-07-23 RX ORDER — NALOXONE HCL 0.4 MG/ML
0.02 VIAL (ML) INJECTION
Status: DISCONTINUED | OUTPATIENT
Start: 2019-07-23 | End: 2019-07-24

## 2019-07-23 RX ORDER — HYDROMORPHONE HCL IN 0.9% NACL 6 MG/30 ML
PATIENT CONTROLLED ANALGESIA SYRINGE INTRAVENOUS
Status: COMPLETED
Start: 2019-07-23 | End: 2019-07-23

## 2019-07-23 RX ORDER — HYDROMORPHONE HYDROCHLORIDE 1 MG/ML
0.2 INJECTION, SOLUTION INTRAMUSCULAR; INTRAVENOUS; SUBCUTANEOUS EVERY 5 MIN PRN
Status: DISCONTINUED | OUTPATIENT
Start: 2019-07-23 | End: 2019-07-23 | Stop reason: HOSPADM

## 2019-07-23 RX ORDER — ONDANSETRON 2 MG/ML
INJECTION INTRAMUSCULAR; INTRAVENOUS
Status: DISCONTINUED | OUTPATIENT
Start: 2019-07-23 | End: 2019-07-23

## 2019-07-23 RX ORDER — ONDANSETRON 2 MG/ML
4 INJECTION INTRAMUSCULAR; INTRAVENOUS EVERY 6 HOURS PRN
Status: DISCONTINUED | OUTPATIENT
Start: 2019-07-23 | End: 2019-07-24

## 2019-07-23 RX ADMIN — FENTANYL CITRATE 50 MCG: 50 INJECTION, SOLUTION INTRAMUSCULAR; INTRAVENOUS at 02:07

## 2019-07-23 RX ADMIN — LIDOCAINE HYDROCHLORIDE 100 MG: 20 INJECTION, SOLUTION INTRAVENOUS at 02:07

## 2019-07-23 RX ADMIN — ACETAMINOPHEN 1000 MG: 10 INJECTION, SOLUTION INTRAVENOUS at 02:07

## 2019-07-23 RX ADMIN — SODIUM CHLORIDE: 0.9 INJECTION, SOLUTION INTRAVENOUS at 03:07

## 2019-07-23 RX ADMIN — MIDAZOLAM HYDROCHLORIDE 2 MG: 1 INJECTION, SOLUTION INTRAMUSCULAR; INTRAVENOUS at 02:07

## 2019-07-23 RX ADMIN — SODIUM CHLORIDE 1000 ML: 0.9 INJECTION, SOLUTION INTRAVENOUS at 11:07

## 2019-07-23 RX ADMIN — SODIUM CITRATE AND CITRIC ACID MONOHYDRATE 30 ML: 500; 334 SOLUTION ORAL at 11:07

## 2019-07-23 RX ADMIN — PHENYLEPHRINE HYDROCHLORIDE 100 MCG: 10 INJECTION INTRAVENOUS at 03:07

## 2019-07-23 RX ADMIN — FAMOTIDINE 20 MG: 10 INJECTION, SOLUTION INTRAVENOUS at 02:07

## 2019-07-23 RX ADMIN — ACETAMINOPHEN 1000 MG: 10 INJECTION, SOLUTION INTRAVENOUS at 09:07

## 2019-07-23 RX ADMIN — CEFAZOLIN 2 G: 330 INJECTION, POWDER, FOR SOLUTION INTRAMUSCULAR; INTRAVENOUS at 02:07

## 2019-07-23 RX ADMIN — ROCURONIUM BROMIDE 10 MG: 10 INJECTION, SOLUTION INTRAVENOUS at 02:07

## 2019-07-23 RX ADMIN — ONDANSETRON 4 MG: 2 INJECTION INTRAMUSCULAR; INTRAVENOUS at 02:07

## 2019-07-23 RX ADMIN — FAMOTIDINE 20 MG: 10 INJECTION, SOLUTION INTRAVENOUS at 11:07

## 2019-07-23 RX ADMIN — ROCURONIUM BROMIDE 40 MG: 10 INJECTION, SOLUTION INTRAVENOUS at 02:07

## 2019-07-23 RX ADMIN — FENTANYL CITRATE 50 MCG: 50 INJECTION, SOLUTION INTRAMUSCULAR; INTRAVENOUS at 03:07

## 2019-07-23 RX ADMIN — FAMOTIDINE 20 MG: 10 INJECTION, SOLUTION INTRAVENOUS at 09:07

## 2019-07-23 RX ADMIN — DEXAMETHASONE SODIUM PHOSPHATE 8 MG: 4 INJECTION, SOLUTION INTRAMUSCULAR; INTRAVENOUS at 02:07

## 2019-07-23 RX ADMIN — ONDANSETRON 4 MG: 2 INJECTION INTRAMUSCULAR; INTRAVENOUS at 04:07

## 2019-07-23 RX ADMIN — Medication: at 03:07

## 2019-07-23 RX ADMIN — SODIUM CHLORIDE, SODIUM GLUCONATE, SODIUM ACETATE, POTASSIUM CHLORIDE, MAGNESIUM CHLORIDE, SODIUM PHOSPHATE, DIBASIC, AND POTASSIUM PHOSPHATE: .53; .5; .37; .037; .03; .012; .00082 INJECTION, SOLUTION INTRAVENOUS at 02:07

## 2019-07-23 RX ADMIN — SUGAMMADEX 200 MG: 100 INJECTION, SOLUTION INTRAVENOUS at 03:07

## 2019-07-23 RX ADMIN — METOCLOPRAMIDE 10 MG: 5 INJECTION, SOLUTION INTRAMUSCULAR; INTRAVENOUS at 11:07

## 2019-07-23 RX ADMIN — PROMETHAZINE HYDROCHLORIDE 6.25 MG: 25 INJECTION INTRAMUSCULAR; INTRAVENOUS at 05:07

## 2019-07-23 RX ADMIN — PROPOFOL 200 MG: 10 INJECTION, EMULSION INTRAVENOUS at 02:07

## 2019-07-23 RX ADMIN — ONDANSETRON 4 MG: 2 INJECTION INTRAMUSCULAR; INTRAVENOUS at 03:07

## 2019-07-23 RX ADMIN — PROMETHAZINE HYDROCHLORIDE 6.25 MG: 25 INJECTION INTRAMUSCULAR; INTRAVENOUS at 04:07

## 2019-07-23 NOTE — ANESTHESIA RELEASE NOTE
"Anesthesia Release from PACU Note    Patient: Renae Hou    Procedure(s) Performed: Procedure(s) (LRB):  GASTRECTOMY, SLEEVE, LAPAROSCOPIC, with intraop EGD 23770 (N/A)    Anesthesia type: general    Post pain: Adequate analgesia    Post assessment: no apparent anesthetic complications, tolerated procedure well and no evidence of recall    Last Vitals:   Visit Vitals  BP (!) 143/80   Pulse 89   Temp 36.4 °C (97.5 °F) (Oral)   Resp 20   Ht 5' 5" (1.651 m)   Wt 100.9 kg (222 lb 7.1 oz)   LMP 11/25/2012   SpO2 (!) 92%   BMI 37.02 kg/m²       Post vital signs: stable    Level of consciousness: awake, alert  and oriented    Nausea/Vomiting: no nausea/no vomiting    Complications: none    Airway Patency: patent    Respiratory: unassisted    Cardiovascular: stable and blood pressure at baseline    Hydration: euvolemic  "

## 2019-07-23 NOTE — BRIEF OP NOTE
[facility]  Brief Operative Note    SUMMARY     Surgery Date: 7/23/2019     Surgeon(s) and Role:     * Duc Ratliff Jr., MD - Primary     * Rashel Allred MD - Resident - Assisting    Pre-op Diagnosis:  Deep vein thrombosis (DVT) of proximal lower extremity, unspecified chronicity, unspecified laterality [I82.4Y9]  RENAE on CPAP [G47.33, Z99.89]  Essential hypertension [I10]  Pre-diabetes [R73.03]  Obesity (BMI 30-39.9) [E66.9]  Obesity, unspecified classification, unspecified obesity type, unspecified whether serious comorbidity present [E66.9]  BMI 37.0-37.9, adult [Z68.37]    Post-op Diagnosis:  Post-Op Diagnosis Codes:     * Deep vein thrombosis (DVT) of proximal lower extremity, unspecified chronicity, unspecified laterality [I82.4Y9]     * RENAE on CPAP [G47.33, Z99.89]     * Essential hypertension [I10]     * Pre-diabetes [R73.03]     * Obesity (BMI 30-39.9) [E66.9]     * Obesity, unspecified classification, unspecified obesity type, unspecified whether serious comorbidity present [E66.9]     * BMI 37.0-37.9, adult [Z68.37]    Procedure(s) (LRB):  GASTRECTOMY, SLEEVE, LAPAROSCOPIC, with intraop EGD 27129 (N/A)    Anesthesia: General    Description of the findings of the procedure: Sleeve over 42 Fr Bougie. Stomach patent without evidence of leak on intra-op EGD.    Estimated Blood Loss: 5 mL    Total IV Fluids: Per Anesthesia         Specimens:   Specimen (12h ago, onward)    Start     Ordered    07/23/19 1522  Specimen to Pathology - Surgery  Once     Comments:  1. Stomach - permanent     Start Status     07/23/19 1522 Collected (07/23/19 1522) Order ID: [id number]       07/23/19 1522

## 2019-07-23 NOTE — TRANSFER OF CARE
"Anesthesia Transfer of Care Note    Patient: Renae Hou    Procedure(s) Performed: Procedure(s) (LRB):  GASTRECTOMY, SLEEVE, LAPAROSCOPIC, with intraop EGD 40627 (N/A)    Patient location: PACU    Anesthesia Type: general    Transport from OR: Transported from OR on 6-10 L/min O2 by face mask with adequate spontaneous ventilation    Post pain: adequate analgesia    Post assessment: no apparent anesthetic complications and tolerated procedure well    Post vital signs: stable    Level of consciousness: awake, alert and oriented    Nausea/Vomiting: nausea and no vomiting    Complications: none    Transfer of care protocol was followed      Last vitals:   Visit Vitals  /79 (BP Location: Right arm, Patient Position: Lying)   Pulse 107   Temp 36.8 °C (98.2 °F) (Oral)   Resp 20   Ht 5' 5" (1.651 m)   Wt 100.9 kg (222 lb 7.1 oz)   LMP 11/25/2012   SpO2 95%   BMI 37.02 kg/m²     "

## 2019-07-23 NOTE — OP NOTE
DATE OF PROCEDURE: 07/23/2019   SERVICE: Bariatric Surgery.   Surgeon(s) and Role:     * Duc Ratliff Jr., MD - Primary     * Rashel Allred MD - Resident - Assisting  PREOPERATIVE DIAGNOSES: Morbid obesity with a Body mass index is 37.02 kg/m².   along with benign hypertension and obstructive sleep apnea.   POSTOPERATIVE DIAGNOSES:Same  PROCEDURE: Laparoscopic sleeve gastrectomy and EGD.  ANESTHESIA: General endotracheal and local.   DESCRIPTION OF PROCEDURE: The patient was taken to the Operating Room, placed under general anesthesia, prepped and draped in sterile fashion. At this time,   incision was made approximately 15 cm from xiphoid and 5 cm to the left of   midline after infiltrating with local anesthetic. Using Optiview trocar,   intraabdominal access was obtained under direct visualization without difficulty   and pneumoperitoneum was obtained. Further ports were then placed including   bilateral anterior axillary subcostal 5 mm ports and midclavicular subcostal 5   mm port on the right and a second 12 port approximately 15 cm from xiphoid just   to right of midline. These were all placed after infiltrating with local   anesthetic and under direct visualization. Once the ports were placed, the   patient was placed in steep reverse Trendelenburg. A liver retractor was   placed. The hiatus was then examined. No hernia was noted. Once this was   complete, we turned attention to the sleeve itself. An area on the greater   curve approximately 6 cm proximal from the pylorus was identified and using a   Harmonic scalpel, the gastrocolic ligament was opened, exposing the lesser sac.   We continued to take down attachments along the greater curve including the   short gastrics to the angle of His, freeing the lateral part of the stomach. A   42-[place] bougie was then guided by Anesthesia into the stomach and from the   laparoscopic view guided along the lesser curve. Once the bougie was in   position  along the lesser curve, we began the sleeve with a green load stapler   with SeamGuard at the area 6 cm proximal from the pylorus using the bougie as a   guide on the lesser curve and fired the green load stapler beginning the sleeve.   Further staple loads, blue with SeamGuard were fired along the bougie on the   lesser curve towards the angle of His, completely freeing the lateral part of   the stomach. Once this was complete, the stomach was removed from the incision   approximately 15 cm from xiphoid just to right of midline after it was slightly   incising the skin and stretching the fascia with a Latoya. The specimen was   removed and passed off the table with ease. At this time, an 0 Vicryl suture   was then used to close the fascial defect on a suture passer device under direct   visualization. The suture was placed in figure-of-8 fashion, however, it was   not tied down at this time, the patient was laid flat. The bougie was removed.   Attention then turned to an EGD. The scope was placed in the oropharynx,   guided down the esophagus and into the sleeve through the sleeve and into the   antrum with ease. At this time, the area was infiltrated with air and we slowly   pulled the scope back inspecting the sleeve itself. The staple line showed no   signs of bleeding or other abnormalities. The sleeve itself was in good   condition with no abnormalities, no twisting or obstruction and nice uniform   size. After inspection, we suctioned all the air from the antrum and sleeve and   removed the scope under direct visualization, turned our attention back to the   laparoscopic view. We inspected the staple line, no signs of bleeding or other   abnormalities from the staple line. The liver retractor was removed. The ports   were removed under direct visualization. The suture was then placed in the   fascia of the incision 15 cm from xiphoid just to right of midline, was tied   down closing the fascia of this incision  and the incision was irrigated   copiously. At this time, the skin of all five port sites was closed with 4-0   Monocryl suture in subcuticular fashion. Mastisol and Steri-Strips were placed.   The patient was allowed to awake from general anesthesia, transferred to bed   for transfer to Recovery.   COMPLICATIONS: None.   SPONGE COUNT: Correct.   BLOOD LOSS: 15 mL.   FLUIDS: Per Anesthesia.   BLOOD GIVEN: None.   DRAINS: None.   SPECIMENS: Stomach.   CONDITION OF THE PATIENT: Good.   I was present for the entire procedure.

## 2019-07-23 NOTE — INTERVAL H&P NOTE
The patient has been examined and the H&P has been reviewed:    I concur with the findings and no changes have occurred since H&P was written.    Anesthesia/Surgery risks, benefits and alternative options discussed and understood by patient/family.          Active Hospital Problems    Diagnosis  POA    Class 2 obesity due to excess calories with body mass index (BMI) of 38.0 to 38.9 in adult [E66.09, Z68.38]  Not Applicable      Resolved Hospital Problems   No resolved problems to display.

## 2019-07-24 VITALS
OXYGEN SATURATION: 97 % | SYSTOLIC BLOOD PRESSURE: 157 MMHG | TEMPERATURE: 99 F | BODY MASS INDEX: 37.06 KG/M2 | DIASTOLIC BLOOD PRESSURE: 72 MMHG | RESPIRATION RATE: 16 BRPM | HEIGHT: 65 IN | HEART RATE: 74 BPM | WEIGHT: 222.44 LBS

## 2019-07-24 LAB
ANION GAP SERPL CALC-SCNC: 8 MMOL/L (ref 8–16)
BASOPHILS # BLD AUTO: 0.04 K/UL (ref 0–0.2)
BASOPHILS NFR BLD: 0.3 % (ref 0–1.9)
BUN SERPL-MCNC: 10 MG/DL (ref 6–20)
CALCIUM SERPL-MCNC: 8.8 MG/DL (ref 8.7–10.5)
CHLORIDE SERPL-SCNC: 106 MMOL/L (ref 95–110)
CO2 SERPL-SCNC: 27 MMOL/L (ref 23–29)
CREAT SERPL-MCNC: 0.8 MG/DL (ref 0.5–1.4)
DIFFERENTIAL METHOD: ABNORMAL
EOSINOPHIL # BLD AUTO: 0 K/UL (ref 0–0.5)
EOSINOPHIL NFR BLD: 0.3 % (ref 0–8)
ERYTHROCYTE [DISTWIDTH] IN BLOOD BY AUTOMATED COUNT: 12.7 % (ref 11.5–14.5)
EST. GFR  (AFRICAN AMERICAN): >60 ML/MIN/1.73 M^2
EST. GFR  (NON AFRICAN AMERICAN): >60 ML/MIN/1.73 M^2
GLUCOSE SERPL-MCNC: 117 MG/DL (ref 70–110)
HCT VFR BLD AUTO: 42.4 % (ref 37–48.5)
HGB BLD-MCNC: 14.2 G/DL (ref 12–16)
IMM GRANULOCYTES # BLD AUTO: 0.05 K/UL (ref 0–0.04)
IMM GRANULOCYTES NFR BLD AUTO: 0.4 % (ref 0–0.5)
LYMPHOCYTES # BLD AUTO: 1.2 K/UL (ref 1–4.8)
LYMPHOCYTES NFR BLD: 10.2 % (ref 18–48)
MAGNESIUM SERPL-MCNC: 2.2 MG/DL (ref 1.6–2.6)
MCH RBC QN AUTO: 31.1 PG (ref 27–31)
MCHC RBC AUTO-ENTMCNC: 33.5 G/DL (ref 32–36)
MCV RBC AUTO: 93 FL (ref 82–98)
MONOCYTES # BLD AUTO: 0.6 K/UL (ref 0.3–1)
MONOCYTES NFR BLD: 5.1 % (ref 4–15)
NEUTROPHILS # BLD AUTO: 9.7 K/UL (ref 1.8–7.7)
NEUTROPHILS NFR BLD: 83.7 % (ref 38–73)
NRBC BLD-RTO: 0 /100 WBC
PHOSPHATE SERPL-MCNC: 3.1 MG/DL (ref 2.7–4.5)
PLATELET # BLD AUTO: 305 K/UL (ref 150–350)
PMV BLD AUTO: 9.7 FL (ref 9.2–12.9)
POTASSIUM SERPL-SCNC: 4.6 MMOL/L (ref 3.5–5.1)
RBC # BLD AUTO: 4.56 M/UL (ref 4–5.4)
SODIUM SERPL-SCNC: 141 MMOL/L (ref 136–145)
WBC # BLD AUTO: 11.61 K/UL (ref 3.9–12.7)

## 2019-07-24 PROCEDURE — 84100 ASSAY OF PHOSPHORUS: CPT

## 2019-07-24 PROCEDURE — 99900035 HC TECH TIME PER 15 MIN (STAT)

## 2019-07-24 PROCEDURE — 25000003 PHARM REV CODE 250: Performed by: STUDENT IN AN ORGANIZED HEALTH CARE EDUCATION/TRAINING PROGRAM

## 2019-07-24 PROCEDURE — 94761 N-INVAS EAR/PLS OXIMETRY MLT: CPT

## 2019-07-24 PROCEDURE — 94799 UNLISTED PULMONARY SVC/PX: CPT

## 2019-07-24 PROCEDURE — 25000003 PHARM REV CODE 250: Performed by: GENERAL PRACTICE

## 2019-07-24 PROCEDURE — 27000646 HC AEROBIKA DEVICE

## 2019-07-24 PROCEDURE — 94664 DEMO&/EVAL PT USE INHALER: CPT

## 2019-07-24 PROCEDURE — 25000003 PHARM REV CODE 250: Performed by: SURGERY

## 2019-07-24 PROCEDURE — 36415 COLL VENOUS BLD VENIPUNCTURE: CPT

## 2019-07-24 PROCEDURE — 83735 ASSAY OF MAGNESIUM: CPT

## 2019-07-24 PROCEDURE — 80048 BASIC METABOLIC PNL TOTAL CA: CPT

## 2019-07-24 PROCEDURE — 85025 COMPLETE CBC W/AUTO DIFF WBC: CPT

## 2019-07-24 RX ORDER — HYDROCODONE BITARTRATE AND ACETAMINOPHEN 7.5; 325 MG/15ML; MG/15ML
15 SOLUTION ORAL EVERY 4 HOURS PRN
Status: DISCONTINUED | OUTPATIENT
Start: 2019-07-24 | End: 2019-07-24 | Stop reason: HOSPADM

## 2019-07-24 RX ORDER — SIMETHICONE 80 MG
1 TABLET,CHEWABLE ORAL 3 TIMES DAILY PRN
Status: DISCONTINUED | OUTPATIENT
Start: 2019-07-24 | End: 2019-07-24 | Stop reason: HOSPADM

## 2019-07-24 RX ORDER — FAMOTIDINE 20 MG/1
20 TABLET, FILM COATED ORAL 2 TIMES DAILY
Status: DISCONTINUED | OUTPATIENT
Start: 2019-07-24 | End: 2019-07-24 | Stop reason: HOSPADM

## 2019-07-24 RX ORDER — ONDANSETRON 4 MG/1
4 TABLET, FILM COATED ORAL EVERY 6 HOURS PRN
Status: DISCONTINUED | OUTPATIENT
Start: 2019-07-24 | End: 2019-07-24 | Stop reason: HOSPADM

## 2019-07-24 RX ORDER — PROMETHAZINE HYDROCHLORIDE 6.25 MG/5ML
12.5 SYRUP ORAL EVERY 6 HOURS PRN
Status: DISCONTINUED | OUTPATIENT
Start: 2019-07-24 | End: 2019-07-24 | Stop reason: HOSPADM

## 2019-07-24 RX ADMIN — SODIUM CHLORIDE: 0.9 INJECTION, SOLUTION INTRAVENOUS at 07:07

## 2019-07-24 RX ADMIN — FAMOTIDINE 20 MG: 20 TABLET, FILM COATED ORAL at 09:07

## 2019-07-24 RX ADMIN — ONDANSETRON 4 MG: 4 TABLET, FILM COATED ORAL at 03:07

## 2019-07-24 RX ADMIN — HYDROCODONE BITARTRATE AND ACETAMINOPHEN 15 ML: 7.5; 325 SOLUTION ORAL at 11:07

## 2019-07-24 RX ADMIN — SIMETHICONE CHEW TAB 80 MG 80 MG: 80 TABLET ORAL at 10:07

## 2019-07-24 RX ADMIN — SODIUM CHLORIDE: 0.9 INJECTION, SOLUTION INTRAVENOUS at 12:07

## 2019-07-24 NOTE — PLAN OF CARE
Problem: Adult Inpatient Plan of Care  Goal: Plan of Care Review  Outcome: Ongoing (interventions implemented as appropriate)  Quiet hours. No complaints. Ambulating well. VSS.  NPO maintained. Will start water challenge as 7am. iv fluids infusing. Safety maintained.

## 2019-07-24 NOTE — PLAN OF CARE
Problem: Adult Inpatient Plan of Care  Goal: Plan of Care Review  Outcome: Ongoing (interventions implemented as appropriate)  Pt is AAOx4. VSS. No falls/injury as pt calls for assistance when needed. Pt able to ambulate independently and has walked in halls. No s/s of skin breakdown as pt is independent with re-positioning self. Pain being monitored and controlled with PRN meds. No s/s of infection noted, steri strips clean, dry, intact with scant amount of dried drainage noted. Bed in lowest position. Call light within reach. Will continue to monitor.

## 2019-07-24 NOTE — SUBJECTIVE & OBJECTIVE
Interval History:   No acute events overnight. Afebrile. Mild intermittent HTN. Otherwise VSS. No tachycardia. Pain well controlled with PCA. Voiding. No nausea or vomiting.      Medications:  Continuous Infusions:  Scheduled Meds:   famotidine (PF)  20 mg Intravenous BID     PRN Meds:naloxone, ondansetron, promethazine (PHENERGAN) IVPB     Review of patient's allergies indicates:   Allergen Reactions    Cathflo activase [alteplase] Anaphylaxis     Tightness in chest, raspy throat, restless legs, hotness all over    Heparin analogues      Restless legs, tightness in chest, and hot all over    Iodine and iodide containing products Anaphylaxis, Hives, Shortness Of Breath, Itching, Swelling and Rash    Shellfish containing products Anaphylaxis, Hives, Shortness Of Breath, Itching, Swelling and Rash          Latex Swelling           Objective:     Vital Signs (Most Recent):  Temp: 98.4 °F (36.9 °C) (07/24/19 0745)  Pulse: 76 (07/24/19 0745)  Resp: 18 (07/24/19 0745)  BP: (!) 153/81 (07/24/19 0745)  SpO2: 96 % (07/24/19 0745) Vital Signs (24h Range):  Temp:  [97.5 °F (36.4 °C)-98.5 °F (36.9 °C)] 98.4 °F (36.9 °C)  Pulse:  [] 76  Resp:  [16-20] 18  SpO2:  [92 %-99 %] 96 %  BP: (128-171)/(72-95) 153/81     Weight: 100.9 kg (222 lb 7.1 oz)  Body mass index is 37.02 kg/m².    Intake/Output - Last 3 Shifts       07/22 0700 - 07/23 0659 07/23 0700 - 07/24 0659 07/24 0700 - 07/25 0659    P.O.  0     I.V. (mL/kg)  3436.7 (34.1)     IV Piggyback  100     Total Intake(mL/kg)  3536.7 (35.1)     Net  +3536.7            Urine Occurrence  3 x           Physical Exam   Constitutional: She is oriented to person, place, and time. She appears well-developed and well-nourished. No distress.   HENT:   Head: Normocephalic and atraumatic.   Eyes: EOM are normal.   Neck: Normal range of motion.   Cardiovascular: Normal rate, regular rhythm and intact distal pulses.   Pulmonary/Chest: Effort normal. No respiratory distress. She has  no wheezes.   Abdominal:   Obese  Soft  Non-distended  Appropriate surgical site tenderness  Incisions well approximated with Steri-Strips  Bowel sounds present   Musculoskeletal: She exhibits no edema or deformity.   Neurological: She is alert and oriented to person, place, and time.   Skin: Skin is warm and dry. No rash noted. She is not diaphoretic. No erythema.   Psychiatric: She has a normal mood and affect. Her behavior is normal.   Nursing note and vitals reviewed.      Significant Labs:  CBC:   Recent Labs   Lab 07/24/19  0455   WBC 11.61   RBC 4.56   HGB 14.2   HCT 42.4      MCV 93   MCH 31.1*   MCHC 33.5     BMP:   Recent Labs   Lab 07/24/19  0455   *   CALCIUM 8.8      K 4.6   CO2 27      BUN 10   CREATININE 0.8       Significant Diagnostics:  None

## 2019-07-24 NOTE — NURSING
Water challenge started.  30 ml medicine cups filled with 15 mls of water. Instructed to sip one every 15 minutes and write the times down on the paper given at bedside. Pen given to use. Stated she understands.

## 2019-07-24 NOTE — PLAN OF CARE
Patient lives in a 2 story house, w/4 LAINA, w/sheridans, w/her spouse. Patient is independent & agile. No needs are determined.        07/24/19 1255   Discharge Assessment   Assessment Type Discharge Planning Assessment   Confirmed/corrected address and phone number on facesheet? Yes   Assessment information obtained from? Patient;Medical Record   Expected Length of Stay (days)   (1)   Communicated expected length of stay with patient/caregiver yes   Prior to hospitilization cognitive status: Alert/Oriented;No Deficits   Prior to hospitalization functional status: Independent   Current cognitive status: Alert/Oriented;No Deficits   Current Functional Status: Independent   Facility Arrived From:   (N/A)   Lives With spouse   Able to Return to Prior Arrangements yes   Is patient able to care for self after discharge? Yes   Who are your caregiver(s) and their phone number(s)?   (Yohan Hou Spouse     [phone]   )   Patient's perception of discharge disposition home or selfcare   Readmission Within the Last 30 Days no previous admission in last 30 days   Patient currently being followed by outpatient case management? No   Patient currently receives any other outside agency services? No   Equipment Currently Used at Home none   Do you have any problems affording any of your prescribed medications? No   Is the patient taking medications as prescribed? yes   Does the patient have transportation home? Yes   Transportation Anticipated family or friend will provide   Dialysis Name and Scheduled days   (N/A)   Does the patient receive services at the [facility]madin Clinic? No   Discharge Plan A Home with family   Discharge Plan B Home with family   DME Needed Upon Discharge  none   Patient/Family in Agreement with Plan yes

## 2019-07-24 NOTE — NURSING TRANSFER
Nursing Transfer Note      7/23/2019     Transfer to 556A from PACU    Transfer via stretcher    Transfer with PCA, ETCO2 monitor    Transported by PACU PCT    Medicines sent: yes      Chart send with patient: yes    Notified: spouse

## 2019-07-24 NOTE — PROGRESS NOTES
[facility]  Bariatric Surgery  Progress Note    Subjective:     History of Present Illness:  The patient is a 58 y.o. obese female who presents for pre op for gastric sleeve surgery. The patient has tried OptiFast and was able to lose 30 pounds. She maintained wt loss for 6 months until she began experiencing severe back pain. Her highest wt is 230#. Wt difficulty began in adulthood. All workup has been reviewed in clinic today and there is nothing on the review that would prevent us from proceeding with surgery. All questions were answered in clinic today prior to leaving.  Body mass index is 38.45 kg/m².     Post-Op Info:  Procedure(s) (LRB):  GASTRECTOMY, SLEEVE, LAPAROSCOPIC, with intraop EGD 12659 (N/A)   1 Day Post-Op     Interval History:   No acute events overnight. Afebrile. Mild intermittent HTN. Otherwise VSS. No tachycardia. Pain well controlled with PCA. Voiding. No nausea or vomiting.      Medications:  Continuous Infusions:  Scheduled Meds:   famotidine (PF)  20 mg Intravenous BID     PRN Meds:naloxone, ondansetron, promethazine (PHENERGAN) IVPB     Review of patient's allergies indicates:   Allergen Reactions    Cathflo activase [alteplase] Anaphylaxis     Tightness in chest, raspy throat, restless legs, hotness all over    Heparin analogues      Restless legs, tightness in chest, and hot all over    Iodine and iodide containing products Anaphylaxis, Hives, Shortness Of Breath, Itching, Swelling and Rash    Shellfish containing products Anaphylaxis, Hives, Shortness Of Breath, Itching, Swelling and Rash          Latex Swelling           Objective:     Vital Signs (Most Recent):  Temp: 98.4 °F (36.9 °C) (07/24/19 0745)  Pulse: 76 (07/24/19 0745)  Resp: 18 (07/24/19 0745)  BP: (!) 153/81 (07/24/19 0745)  SpO2: 96 % (07/24/19 0745) Vital Signs (24h Range):  Temp:  [97.5 °F (36.4 °C)-98.5 °F (36.9 °C)] 98.4 °F (36.9 °C)  Pulse:  [] 76  Resp:  [16-20] 18  SpO2:  [92 %-99 %]  96 %  BP: (128-171)/(72-95) 153/81     Weight: 100.9 kg (222 lb 7.1 oz)  Body mass index is 37.02 kg/m².    Intake/Output - Last 3 Shifts       07/22 0700 - 07/23 0659 07/23 0700 - 07/24 0659 07/24 0700 - 07/25 0659    P.O.  0     I.V. (mL/kg)  3436.7 (34.1)     IV Piggyback  100     Total Intake(mL/kg)  3536.7 (35.1)     Net  +3536.7            Urine Occurrence  3 x           Physical Exam   Constitutional: She is oriented to person, place, and time. She appears well-developed and well-nourished. No distress.   HENT:   Head: Normocephalic and atraumatic.   Eyes: EOM are normal.   Neck: Normal range of motion.   Cardiovascular: Normal rate, regular rhythm and intact distal pulses.   Pulmonary/Chest: Effort normal. No respiratory distress. She has no wheezes.   Abdominal:   Obese  Soft  Non-distended  Appropriate surgical site tenderness  Incisions well approximated with Steri-Strips  Bowel sounds present   Musculoskeletal: She exhibits no edema or deformity.   Neurological: She is alert and oriented to person, place, and time.   Skin: Skin is warm and dry. No rash noted. She is not diaphoretic. No erythema.   Psychiatric: She has a normal mood and affect. Her behavior is normal.   Nursing note and vitals reviewed.      Significant Labs:  CBC:   Recent Labs   Lab 07/24/19  0455   WBC 11.61   RBC 4.56   HGB 14.2   HCT 42.4      MCV 93   MCH 31.1*   MCHC 33.5     BMP:   Recent Labs   Lab 07/24/19  0455   *   CALCIUM 8.8      K 4.6   CO2 27      BUN 10   CREATININE 0.8       Significant Diagnostics:  None    Assessment/Plan:     * Class 2 obesity due to excess calories with body mass index (BMI) of 38.0 to 38.9 in adult  57 y/o obese female s/p laparoscopic vertical sleeve gastrectomy (7/23/19)    - Water protocol  - Continue PCA for now  - Bariatric clear liquid diet and transition to oral pain control regimen when tolerating water  - Encourage OOB, ambulation in halls  - Pulmonary hygiene -  IS, ACAPELLA, deep breathing  - Mechanical (SCDs) DVT prophylaxis  - Holding chemical DVT prophylaxis due to severe allergy to heparin analogues  - Pepcid  - Replace electrolytes PRN        Rashel Allred MD  Surgery Resident, PGY-V  Pager: [phone]  7/24/2019 8:24 AM

## 2019-07-24 NOTE — HOSPITAL COURSE
Admitted following laparoscopic sleeve gastrectomy on 07/23. Post-operative course was uncomplicated and she tolerated the procedure well. On POD 1 patient was started on water protocol which she tolerated and was then advanced to clear liquid bariatric diet and oral liquid pain medication. She did well without any nausea or vomiting. Her pain was well controlled off of the dilaudid PCA and on oral liquid pain medication. Was able to ambulate around room and down the hallway without difficulty. At time of discharge patient was hemodynamically stable without complaints. She will follow up in clinic in 2 weeks and will follow the bariatric diet provided to her in clinic. All of her post-operative medication prescriptions were filled prior to surgery.

## 2019-07-24 NOTE — ASSESSMENT & PLAN NOTE
59 y/o obese female s/p laparoscopic vertical sleeve gastrectomy (7/23/19)    - Water protocol  - Continue PCA for now  - Bariatric clear liquid diet and transition to oral pain control regimen when tolerating water  - Encourage OOB, ambulation in halls  - Pulmonary hygiene - IS, ACAPELLA, deep breathing  - Mechanical (SCDs) DVT prophylaxis  - Holding chemical DVT prophylaxis due to severe allergy to heparin analogues  - Pepcid  - Replace electrolytes PRN

## 2019-07-24 NOTE — PROGRESS NOTES
Pt ready for discharge. Discharge instructions given and explained to pt. Pt verbalizes understanding. Pt already received prescriptions prior to hospitalization. PIV removed. No further questions from pt at this time. Pt to be discharged via wheelchair. Will cont to monitor until discharge.

## 2019-07-24 NOTE — NURSING
Received admit from pacu via stretcher 57 y/o w/f s/p gastric/sleeve. Aaox4, no distress noted. No complaints at present. Will continue to monitor.

## 2019-07-24 NOTE — HPI
The patient is a 58 y.o. obese female who presents for pre op for gastric sleeve surgery. The patient has tried OptiFast and was able to lose 30 pounds. She maintained wt loss for 6 months until she began experiencing severe back pain. Her highest wt is 230#. Wt difficulty began in adulthood. All workup has been reviewed in clinic today and there is nothing on the review that would prevent us from proceeding with surgery. All questions were answered in clinic today prior to leaving.  Body mass index is 38.45 kg/m².

## 2019-07-25 NOTE — PLAN OF CARE
07/24/19 1530   Final Note   Assessment Type Final Discharge Note   Anticipated Discharge Disposition Home   What phone number can be called within the next 1-3 days to see how you are doing after discharge?   ([phone])   Hospital Follow Up  Appt(s) scheduled? Yes   Discharge plans and expectations educations in teach back method with documentation complete? Yes   Right Care Referral Info   Post Acute Recommendation No Care

## 2019-07-26 ENCOUNTER — PATIENT OUTREACH (OUTPATIENT)
Dept: ADMINISTRATIVE | Facility: CLINIC | Age: 59
End: 2019-07-26

## 2019-07-26 NOTE — TELEPHONE ENCOUNTER
C3 nurse attempted to contact patient. No answer. The following message was left for the patient to return the call:  Good afternoon, my name is Sharon and I am a registered nurse calling on behalf of [facility] from the Care Coordination Center.  This is a Transitional Care call for Renae Hou relating to a recent discharge.  When you have a moment please contact us at [phone] between the hours of 8 am and 4 pm Monday through Friday. If you have questions or issues, a nurse is available 24 hours every day at our ON CALL # thats [phone]. On behalf of [facility], thank you, and have a nice day.    The patient does not have a scheduled HOSFU appointment within 7-14 days post hospital discharge date 7/24/2019. Message sent to Physician staff to assist with HOSFU appointment scheduling.

## 2019-07-27 NOTE — DISCHARGE SUMMARY
[facility]  General Surgery  Discharge Summary      Patient Name: Renae Hou  MRN: [id number]  Admission Date: 7/23/2019  Hospital Length of Stay: 1 days  Discharge Date and Time: 7/24/2019  6:25 PM  Attending Physician: No att. providers found   Discharging Provider: Sabina Dumont MD  Primary Care Provider: Franko Garza MD    HPI:   The patient is a 58 y.o. obese female who presents for pre op for gastric sleeve surgery. The patient has tried OptiFast and was able to lose 30 pounds. She maintained wt loss for 6 months until she began experiencing severe back pain. Her highest wt is 230#. Wt difficulty began in adulthood. All workup has been reviewed in clinic today and there is nothing on the review that would prevent us from proceeding with surgery. All questions were answered in clinic today prior to leaving.  Body mass index is 38.45 kg/m².     Procedure(s) (LRB):  GASTRECTOMY, SLEEVE, LAPAROSCOPIC, with intraop EGD 14510 (N/A)      Indwelling Lines/Drains at time of discharge:   Lines/Drains/Airways          None        Hospital Course: Admitted following laparoscopic sleeve gastrectomy on 07/23. Post-operative course was uncomplicated and she tolerated the procedure well. On POD 1 patient was started on water protocol which she tolerated and was then advanced to clear liquid bariatric diet and oral liquid pain medication. She did well without any nausea or vomiting. Her pain was well controlled off of the dilaudid PCA and on oral liquid pain medication. Was able to ambulate around room and down the hallway without difficulty. At time of discharge patient was hemodynamically stable without complaints. She will follow up in clinic in 2 weeks and will follow the bariatric diet provided to her in clinic. All of her post-operative medication prescriptions were filled prior to surgery.     Consults:     Significant Diagnostic Studies: Labs: BMP: No results for input(s): GLU, NA, K, CL,  CO2, BUN, CREATININE, CALCIUM, MG in the last 48 hours. and CBC No results for input(s): WBC, HGB, HCT, PLT in the last 48 hours.    Pending Diagnostic Studies:     None        Final Active Diagnoses:    Diagnosis Date Noted POA    PRINCIPAL PROBLEM:  Class 2 obesity due to excess calories with body mass index (BMI) of 38.0 to 38.9 in adult [E66.09, Z68.38] 07/23/2019 Not Applicable      Problems Resolved During this Admission:      Discharged Condition: good    Disposition: Home or Self Care    Follow Up:  Follow-up Information     Duc Ratliff Jr, MD On 8/2/2019.    Specialties:  General Surgery, Bariatrics  Why:  Post-op Appt: 8/2/19 at 1:30 PM  Contact information:  [address]  [facility] [id number] [phone]                 Patient Instructions:      Diet clear liquid   Order Comments: Follow diet plan discussed in clinic.     Lifting restrictions   Order Comments: No lifting more than 10 lbs for 6 weeks.     No driving until:   Order Comments: No longer taking narcotic pain medication and pain well controlled.     Notify your health care provider if you experience any of the following:  increased confusion or weakness     Notify your health care provider if you experience any of the following:  persistent dizziness, light-headedness, or visual disturbances     Notify your health care provider if you experience any of the following:  worsening rash     Notify your health care provider if you experience any of the following:  severe persistent headache     Notify your health care provider if you experience any of the following:  difficulty breathing or increased cough     Notify your health care provider if you experience any of the following:  redness, tenderness, or signs of infection (pain, swelling, redness, odor or green/yellow discharge around incision site)     Notify your health care provider if you experience any of the following:  severe uncontrolled pain     Notify your health care  provider if you experience any of the following:  persistent nausea and vomiting or diarrhea     Notify your health care provider if you experience any of the following:  temperature >100.4     No dressing needed   Order Comments: Steri strip bandages will fall off on their own in 2 weeks. Okay to shower tonight. No bathing in bathtub or swimming for 2 weeks.     Medications:  Reconciled Home Medications:      Medication List      CONTINUE taking these medications    hydroCHLOROthiazide 25 MG tablet  Commonly known as:  HYDRODIURIL  Take 1 tablet (25 mg total) by mouth once daily.     hydrocodone-apap 7.5-325 MG/15 ML oral solution  Commonly known as:  HYCET  Take 15 mLs by mouth 4 (four) times daily as needed for Pain.     methocarbamol 750 MG Tab  Commonly known as:  ROBAXIN  Take 1 tablet (750 mg total) by mouth 3 (three) times daily as needed. As needed for muscle spasms     * MIRALAX ORAL  Take by mouth daily as needed. constipation     * polyethylene glycol 17 gram/dose powder  Commonly known as:  GLYCOLAX  Mix 1 capful (17 g) with liquid and take by mouth once daily.     * omeprazole 40 MG capsule  Commonly known as:  PRILOSEC  TK 1 C PO QD     * omeprazole 40 MG capsule  Commonly known as:  PRILOSEC  Take 1 capsule (40 mg total) by mouth every morning. Open capsule and take with apple sauce     ondansetron 8 MG Tbdl  Commonly known as:  ZOFRAN-ODT  Dissolve 1 tablet (8 mg total) by mouth every 6 (six) hours as needed.     PROMETHEGAN 25 MG suppository  Generic drug:  promethazine  Unwrap and place 1 rectally every 6 hours as needed         * This list has 4 medication(s) that are the same as other medications prescribed for you. Read the directions carefully, and ask your doctor or other care provider to review them with you.              Time spent on the discharge of patient: 30 minutes    Sabina Dumont MD  General Surgery  [facility]

## 2019-07-29 ENCOUNTER — PATIENT MESSAGE (OUTPATIENT)
Dept: BARIATRICS | Facility: CLINIC | Age: 59
End: 2019-07-29

## 2019-07-30 ENCOUNTER — TELEPHONE (OUTPATIENT)
Dept: BARIATRICS | Facility: CLINIC | Age: 59
End: 2019-07-30

## 2019-07-30 NOTE — TELEPHONE ENCOUNTER
Called no answer.  Left vm with contact information to return phone call----- Message from Rosette Umana RD sent at 7/9/2019  9:08 AM CDT -----  1 week post op follow-up

## 2019-08-01 ENCOUNTER — PATIENT OUTREACH (OUTPATIENT)
Dept: OTHER | Facility: OTHER | Age: 59
End: 2019-08-01

## 2019-08-01 ENCOUNTER — OFFICE VISIT (OUTPATIENT)
Dept: BARIATRICS | Facility: CLINIC | Age: 59
End: 2019-08-01
Payer: COMMERCIAL

## 2019-08-01 ENCOUNTER — PATIENT MESSAGE (OUTPATIENT)
Dept: ADMINISTRATIVE | Facility: OTHER | Age: 59
End: 2019-08-01

## 2019-08-01 ENCOUNTER — CLINICAL SUPPORT (OUTPATIENT)
Dept: BARIATRICS | Facility: CLINIC | Age: 59
End: 2019-08-01
Payer: COMMERCIAL

## 2019-08-01 ENCOUNTER — LAB VISIT (OUTPATIENT)
Dept: LAB | Facility: HOSPITAL | Age: 59
End: 2019-08-01
Payer: COMMERCIAL

## 2019-08-01 VITALS
BODY MASS INDEX: 36.4 KG/M2 | HEART RATE: 91 BPM | SYSTOLIC BLOOD PRESSURE: 90 MMHG | DIASTOLIC BLOOD PRESSURE: 60 MMHG | WEIGHT: 218.5 LBS | HEIGHT: 65 IN

## 2019-08-01 DIAGNOSIS — Z98.890 POST-OPERATIVE STATE: Primary | ICD-10-CM

## 2019-08-01 DIAGNOSIS — I82.4Y9 DEEP VEIN THROMBOSIS (DVT) OF PROXIMAL LOWER EXTREMITY, UNSPECIFIED CHRONICITY, UNSPECIFIED LATERALITY: ICD-10-CM

## 2019-08-01 DIAGNOSIS — R63.4 WEIGHT LOSS: ICD-10-CM

## 2019-08-01 DIAGNOSIS — Z98.84 S/P LAPAROSCOPIC SLEEVE GASTRECTOMY: ICD-10-CM

## 2019-08-01 DIAGNOSIS — E66.9 OBESITY (BMI 30-39.9): ICD-10-CM

## 2019-08-01 DIAGNOSIS — K21.9 GASTROESOPHAGEAL REFLUX DISEASE, ESOPHAGITIS PRESENCE NOT SPECIFIED: ICD-10-CM

## 2019-08-01 DIAGNOSIS — I10 ESSENTIAL HYPERTENSION: ICD-10-CM

## 2019-08-01 DIAGNOSIS — G47.33 OSA ON CPAP: ICD-10-CM

## 2019-08-01 DIAGNOSIS — R73.03 PRE-DIABETES: ICD-10-CM

## 2019-08-01 DIAGNOSIS — E66.9 OBESITY, UNSPECIFIED CLASSIFICATION, UNSPECIFIED OBESITY TYPE, UNSPECIFIED WHETHER SERIOUS COMORBIDITY PRESENT: ICD-10-CM

## 2019-08-01 DIAGNOSIS — E66.01 CLASS 2 SEVERE OBESITY DUE TO EXCESS CALORIES WITH SERIOUS COMORBIDITY AND BODY MASS INDEX (BMI) OF 38.0 TO 38.9 IN ADULT: ICD-10-CM

## 2019-08-01 DIAGNOSIS — E78.5 HYPERLIPIDEMIA, UNSPECIFIED HYPERLIPIDEMIA TYPE: ICD-10-CM

## 2019-08-01 LAB
ALBUMIN SERPL BCP-MCNC: 3.8 G/DL (ref 3.5–5.2)
ALP SERPL-CCNC: 85 U/L (ref 55–135)
ALT SERPL W/O P-5'-P-CCNC: 38 U/L (ref 10–44)
ANION GAP SERPL CALC-SCNC: 11 MMOL/L (ref 8–16)
AST SERPL-CCNC: 29 U/L (ref 10–40)
BASOPHILS # BLD AUTO: 0.06 K/UL (ref 0–0.2)
BASOPHILS NFR BLD: 0.8 % (ref 0–1.9)
BILIRUB SERPL-MCNC: 0.4 MG/DL (ref 0.1–1)
BUN SERPL-MCNC: 15 MG/DL (ref 6–20)
CALCIUM SERPL-MCNC: 9.4 MG/DL (ref 8.7–10.5)
CHLORIDE SERPL-SCNC: 96 MMOL/L (ref 95–110)
CO2 SERPL-SCNC: 32 MMOL/L (ref 23–29)
CREAT SERPL-MCNC: 0.9 MG/DL (ref 0.5–1.4)
DIFFERENTIAL METHOD: ABNORMAL
EOSINOPHIL # BLD AUTO: 0.3 K/UL (ref 0–0.5)
EOSINOPHIL NFR BLD: 4.4 % (ref 0–8)
ERYTHROCYTE [DISTWIDTH] IN BLOOD BY AUTOMATED COUNT: 12.5 % (ref 11.5–14.5)
EST. GFR  (AFRICAN AMERICAN): >60 ML/MIN/1.73 M^2
EST. GFR  (NON AFRICAN AMERICAN): >60 ML/MIN/1.73 M^2
GLUCOSE SERPL-MCNC: 95 MG/DL (ref 70–110)
HCT VFR BLD AUTO: 41.5 % (ref 37–48.5)
HGB BLD-MCNC: 14.3 G/DL (ref 12–16)
IMM GRANULOCYTES # BLD AUTO: 0.02 K/UL (ref 0–0.04)
IMM GRANULOCYTES NFR BLD AUTO: 0.3 % (ref 0–0.5)
LYMPHOCYTES # BLD AUTO: 2.7 K/UL (ref 1–4.8)
LYMPHOCYTES NFR BLD: 34.7 % (ref 18–48)
MCH RBC QN AUTO: 31.2 PG (ref 27–31)
MCHC RBC AUTO-ENTMCNC: 34.5 G/DL (ref 32–36)
MCV RBC AUTO: 90 FL (ref 82–98)
MONOCYTES # BLD AUTO: 0.5 K/UL (ref 0.3–1)
MONOCYTES NFR BLD: 6.9 % (ref 4–15)
NEUTROPHILS # BLD AUTO: 4.1 K/UL (ref 1.8–7.7)
NEUTROPHILS NFR BLD: 52.9 % (ref 38–73)
NRBC BLD-RTO: 0 /100 WBC
PLATELET # BLD AUTO: 336 K/UL (ref 150–350)
PMV BLD AUTO: 8.9 FL (ref 9.2–12.9)
POTASSIUM SERPL-SCNC: 3.2 MMOL/L (ref 3.5–5.1)
PROT SERPL-MCNC: 7.1 G/DL (ref 6–8.4)
RBC # BLD AUTO: 4.59 M/UL (ref 4–5.4)
SODIUM SERPL-SCNC: 139 MMOL/L (ref 136–145)
VIT B12 SERPL-MCNC: 525 PG/ML (ref 210–950)
WBC # BLD AUTO: 7.78 K/UL (ref 3.9–12.7)

## 2019-08-01 PROCEDURE — 99999 PR PBB SHADOW E&M-EST. PATIENT-LVL IV: CPT | Mod: PBBFAC,,, | Performed by: PHYSICIAN ASSISTANT

## 2019-08-01 PROCEDURE — 99024 PR POST-OP FOLLOW-UP VISIT: ICD-10-PCS | Mod: S$GLB,,, | Performed by: PHYSICIAN ASSISTANT

## 2019-08-01 PROCEDURE — 36415 COLL VENOUS BLD VENIPUNCTURE: CPT

## 2019-08-01 PROCEDURE — 99999 PR PBB SHADOW E&M-EST. PATIENT-LVL I: ICD-10-PCS | Mod: PBBFAC,,, | Performed by: DIETITIAN, REGISTERED

## 2019-08-01 PROCEDURE — 84425 ASSAY OF VITAMIN B-1: CPT

## 2019-08-01 PROCEDURE — 82607 VITAMIN B-12: CPT

## 2019-08-01 PROCEDURE — 99999 PR PBB SHADOW E&M-EST. PATIENT-LVL I: CPT | Mod: PBBFAC,,, | Performed by: DIETITIAN, REGISTERED

## 2019-08-01 PROCEDURE — 99999 PR PBB SHADOW E&M-EST. PATIENT-LVL IV: ICD-10-PCS | Mod: PBBFAC,,, | Performed by: PHYSICIAN ASSISTANT

## 2019-08-01 PROCEDURE — 85025 COMPLETE CBC W/AUTO DIFF WBC: CPT

## 2019-08-01 PROCEDURE — 80053 COMPREHEN METABOLIC PANEL: CPT

## 2019-08-01 PROCEDURE — 99499 NO LOS: ICD-10-PCS | Mod: S$GLB,,, | Performed by: DIETITIAN, REGISTERED

## 2019-08-01 PROCEDURE — 99499 UNLISTED E&M SERVICE: CPT | Mod: S$GLB,,, | Performed by: DIETITIAN, REGISTERED

## 2019-08-01 PROCEDURE — 99024 POSTOP FOLLOW-UP VISIT: CPT | Mod: S$GLB,,, | Performed by: PHYSICIAN ASSISTANT

## 2019-08-01 NOTE — PROGRESS NOTES
Last 5 Patient Entered Readings                                      Current 30 Day Average: 118/80     Recent Readings 7/30/2019 7/29/2019 7/28/2019 7/27/2019 7/25/2019    SBP (mmHg) 105 105 129 107 143    DBP (mmHg) 71 76 90 76 98    Pulse 84 87 94 101 101            Digital Medicine: Health  Follow Up    Left voicemail to follow up with Mrs. Renae Hou.  Current BP average 118/80 mmHg is at goal, <130/80 mmHg.  WCB in 3 weeks.

## 2019-08-01 NOTE — PROGRESS NOTES
BARIATRIC POST-OPERATIVE VISIT:    HPI:  Renae Hou is a 58 y.o. year old female presents for 2 week post op visit following s/p gastric sleeve.  she is doing well and tolerating the diet without difficulty.  she has no complaints.    Pt states that she is doing well. Felt really good after surgery.     Denies: nausea, vomiting, abdominal pain, changes in bowel movement pattern, fever, chills, dysphagia, chest pain, and shortness of breath.    Review of Systems   Constitutional: Negative for activity change and appetite change.   HENT: Negative for tinnitus.    Eyes: Negative for visual disturbance.   Respiratory: Negative for chest tightness and shortness of breath.    Gastrointestinal: Negative for abdominal pain, constipation, diarrhea and nausea.   Genitourinary: Negative for decreased urine volume and dysuria.   Musculoskeletal: Negative for gait problem and joint swelling.   Skin: Negative for rash.   Neurological: Negative for dizziness, light-headedness and headaches.   Psychiatric/Behavioral: Negative for self-injury and suicidal ideas. The patient is not nervous/anxious.        EXERCISE & VITAMINS:  See Bariatric Assessment  Not taking vitamin   MEDICATIONS/ALLERGIES:  Have been reviewed.    DIET: Liquid Bariatric Diet.  ~  30 grams protein.  40 fl oz SF clear beverage.        Soup ( broth x 3 times)     Skim milk with ovateen         See Dietician note from today for a more detailed assessment.      Physical Exam   Constitutional: She is oriented to person, place, and time. She appears well-developed and well-nourished.   HENT:   Head: Normocephalic and atraumatic.   Eyes: Pupils are equal, round, and reactive to light. Conjunctivae and EOM are normal.   Neck: Normal range of motion. Neck supple. No JVD present. No thyromegaly present.   Cardiovascular: Normal rate, regular rhythm, normal heart sounds and intact distal pulses.   No murmur heard.  Pulmonary/Chest: Effort normal and breath sounds  normal. No respiratory distress. She has no wheezes.   Abdominal: Soft. Bowel sounds are normal. She exhibits no distension and no mass. There is no tenderness.   Musculoskeletal: Normal range of motion. She exhibits no edema or tenderness.   Lymphadenopathy:     She has no cervical adenopathy.   Neurological: She is alert and oriented to person, place, and time. Coordination normal.   Skin: Skin is warm and dry. Capillary refill takes less than 2 seconds. She is not diaphoretic. No erythema.   Psychiatric: She has a normal mood and affect. Her behavior is normal. Judgment and thought content normal.     Vitals:    08/01/19 1536   BP: 90/60   Pulse: 91         ASSESSMENT:  - Morbid obesity s/p sleeve gastrectomy on 07/23/2019.  - Co-morbidities: deep vein thrombosis, dyslipidemia, GERD, hypertension and obstructive sleep apnea  - Good Weight loss, 13#'s and 14% EWL  - NO Exercise routine  - Good Diet  - No Vitamin regimen    PLAN:  - Discussed vitamin regimen and importance  - Discussed BP with patient      - Will start taking daily prior to taking medication ( on HCTZ 25 mg)       - Will contact PCP for medication eval      -  Monitor for dizziness lightheadedness and visual changes .   - Ursodiol 300 mg twice daily for 6 months  - Anti-Acid medication, PPI daily for 3 months  - No lifting more than 10 lbs for 6 weeks total   - Miralax daily for constipation  - Emphasized the importance of regular exercise and adherence to bariatric diet to achieve maximum weight loss.  - Encouraged patient to start/continue regular exercise.  - Follow-up with dietician to advance diet.  - Continue daily vitamins and medications.  - RTC in 2 weeks or sooner if needed.  - Call the office for any issues.  - Check labs today.

## 2019-08-01 NOTE — PROGRESS NOTES
NUTRITION NOTE    Referring Physician: Duc Ratliff M.D.  Reason for MNT Referral: Follow-up 2 Weeks s/p Gastric Sleeve    PAST MEDICAL HISTORY:  Denies nausea, vomiting, constipation and diarrhea.  Reports problems, including inadequate protein intake.    Past Medical History:   Diagnosis Date    Abscess of paraspinous muscles     Allergy 4/2018    DVT (deep venous thrombosis)     left leg    Epidural abscess 4/10/2018    Fatty liver     GERD (gastroesophageal reflux disease) 2015    Hypertension     Lumbar radiculopathy     Menopause     Obesity     Obstructive sleep apnea on CPAP     Thyroid disease     Thyroid nodules.       CLINICAL DATA:  58 y.o. female.    There were no vitals filed for this visit.    Current Weight: 218.5 lbs  BMI: 36.36  Total Weight Loss: 13 lbs  Excess Weight Loss: 14%    LABS:  Not available at time of visit    CURRENT DIET:  Bariatric Liquid Diet    Diet Recall: 8 grams of protein/day; 64+oz of fluids/day    Diet Includes: 2-3 bottles per day and 3 cups of broth  Protein Supplements:  Skim milk    EXERCISE:  See BA    VITAMINS/MINERALS:  See BA not taking any    ASSESSMENT:  Doing poorly overall.  Inadequate protein intake.  Adequate fluid intake.    BARIATRIC DIET DISCUSSION:  Instructed and provided written materials on bariatric pureed diet plan.  Reinforced post-op nutrition guidelines.    PLAN/RECOMMONDATIONS:  Advance to bariatric pureed diet.  Increase protein intake.  Use unflavored isopure or unjury  Maintain fluid intake.  Continue light exercise.  Begin taking all appropriate vitamins & minerals.      Return to clinic in 2 weeks.    SESSION TIME: 15 minutes

## 2019-08-01 NOTE — PATIENT INSTRUCTIONS
High Protein Pureed Diet    2 weeks after gastric bypass and sleeve you may be ready to add pureed food to your diet.  All food should be the consistency of baby food, or thinner.  Follow pureed diet for the next 2 weeks.    Protein - It is very important to pay attention to protein intake during this time.      Inadequate protein intake can cause:  ? Delayed Wound Healing  ? Hair Loss  ? Muscle Breakdown    Meal Plan - Eat 3-4 meals per day (2-4 tbsp each), with protein supplements in between to meet protein needs.  Meeting protein needs daily will help increase healing, decrease muscle loss, and increase weight loss.  Your goal is  grams of protein a day.    Protein First - Always eat the foods with the highest protein first.  Foods high in protein include milk, yogurt, cheese, egg whites, and blenderized meat, seafood, and beans.    Fluids - Keep track in your journal of how much you are drinking; you should try to drink at least 64oz of fluids every day.      Foods allowed: Portion size Protein (g)   ? Sugar-free clear liquids As desired 0   ? Skim or 1% milk ½ cup 4   ? Sugar free pudding, light yogurt, custard (use skim or 1% milk in preparation) 3 oz 2.5   ? Strained baby food meats, or home-made pureed lean meats and shrimp 1 oz 7   ? Beans (red, white, black, lima, martinez, fat free refried, hummus) and lentils ¼ cup 4   ? Low-fat/fat free cheese.(cottage cheese, mozzarella string cheese, ricotta cheese, Laughing Cow, Baby Bell, cheddar, etc) ¼ cup 7-8   ? Scrambled eggs or Egg Beaters 1 or ¼ cup 6   ? Edamame or Tofu, mashed ¼ cup 5   ? Unflavored protein powder (add to 1 scoop to  98% fat free soups or SF pudding) 3 Tbsp 9   ? *PB2: peanut powder (45 calories) 2 Tbsp 5     *PB2 powdered peanut butter: 45 calories vs. 190 calories in 2 tbsp of regular peanut butter. Purchase online at [employer], or  at various [employer], [employer], [employer], [employer] and [employer].      Bariatric Liquid/Pureed Sample  Menu    3-4 small meals plus 2-3 protein drinks per day.    8am 1 egg or ¼ cup Egg Beaters   9am 1 cup water, or decaf coffee or tea   10am Protein drink, 30g protein   11am 2 tbsp low-fat cottage cheese, and 1 tbsp pureed peaches   12pm 1 cup water, or sugar-free lemonade    1pm 2 tbsp pureed chicken, and 1 tbsp pureed carrots    2pm 1 cup water, or sugar-free lemonade   3pm Protein drink, 30g protein   5pm 1 cup water    6pm 1 cup hi-protein creamy chicken soup 14g protein (see Recipe below)   7pm 1 cup water, or sugar-free fruit punch    8pm 1 cup water     This sample menu provides approx. 80g protein and 64oz fluids.  Liquid protein supplements should contain 20-30g protein and less than 4 grams of sugar each.    ? Sip fluids continuously in between meals.  Drink at least ¼ cup every 15 minutes.  ? For fluids: ¼ cup = 2 oz = 4 tbsp       RECIPE IDEAS for Bariatric Pureed Diet:    Hi-Protein Creamy Chicken Soup: (10g protein per 1 cup serving)  Empty 1 can of 98% fat free cream of chicken soup into saucepan. Then  blend 1 scoop of unflavored protein powder with 1 can of skim milk until smooth.  Add protein milk to saucepan and heat to warm. (Note: Do NOT boil. Protein powder may clump if heated too hot).     Hi-Protein Pudding: (14g protein per ½ cup serving)  Add 2 scoops protein powder to 2 cups cold skim milk and mix well.  Stir in dry Jell-O Sugar-Free Instant Pudding mix.  Chill and Enjoy!    Tuna Mousse (12g protein per ¼ cup serving) Page 135 in book Eating Well After Weight Loss Surgery.  In a  or , combine all ingredients and pulse until smooth.  2 6-ounce cans tuna packed in water, drained  2 tbsp low-fat mayonnaise  2 tbsp fat-free sour cream  2 tbsp fat-free cream cheese, softened  ½ cup shallots, finely chopped  1 tbsp lemon juice  ¼ tsp ground pepper  ½ tsp celery seed    Chocolate Peanut Butter Mousse  (28g protein total)  6oz plain Greek yogurt  4 tbsp chocolate PB2

## 2019-08-02 ENCOUNTER — PATIENT OUTREACH (OUTPATIENT)
Dept: OTHER | Facility: OTHER | Age: 59
End: 2019-08-02

## 2019-08-02 DIAGNOSIS — I10 ESSENTIAL HYPERTENSION: ICD-10-CM

## 2019-08-02 RX ORDER — HYDROCHLOROTHIAZIDE 25 MG/1
12.5 TABLET ORAL DAILY
Qty: 30 TABLET | Refills: 5
Start: 2019-08-02 | End: 2019-08-16 | Stop reason: DRUGHIGH

## 2019-08-02 NOTE — PROGRESS NOTES
"Last 5 Patient Entered Readings                                      Current 30 Day Average: 118/80     Recent Readings 8/1/2019 7/30/2019 7/29/2019 7/28/2019 7/27/2019    SBP (mmHg) 118 105 105 129 107    DBP (mmHg) 69 71 76 90 76    Pulse 93 84 87 94 101        Hypertension Medications     hydroCHLOROthiazide (HYDRODIURIL) 25 MG tablet Take 1 tablet (25 mg total) by mouth once daily.     Patient reports that she had bariatric surgery last week. She says that yesterday in clinic, her BP was 90/60 mmHg. She denies symptoms of hypotension, but she says it's hard to tell because she has been feeling "off" since her surgery.     1) 30-day BP average exceeds goal of <130/<80. K 3.2 on last labs. Trial of HCTZ 12.5 mg QD.   2) Patient knows to contact me with any non-urgent clinical changes or concerns. Go to ED or call [facility] for emergencies.   3) Will defer lifestyle counseling to digital medicine health . Encouraged exercise and sodium restriction.   4) Will continue to follow up.     Beatriz Hughes, Pharm.D.   Digital Medicine Clinical Pharmacist  [phone]          "

## 2019-08-02 NOTE — PROGRESS NOTES
Last 5 Patient Entered Readings                                      Current 30 Day Average: 118/80     Recent Readings 8/1/2019 7/30/2019 7/29/2019 7/28/2019 7/27/2019    SBP (mmHg) 118 105 105 129 107    DBP (mmHg) 69 71 76 90 76    Pulse 93 84 87 94 101        Hypertension Medications     hydroCHLOROthiazide (HYDRODIURIL) 25 MG tablet Take 1 tablet (25 mg total) by mouth once daily.     Called patient for follow up. She was not available and said she would call back.       Beatriz Hughes, Pharm.D.   Digital Medicine Clinical Pharmacist  [phone]

## 2019-08-04 PROCEDURE — 99091 COLLJ & INTERPJ DATA EA 30 D: CPT | Mod: ,,,

## 2019-08-04 PROCEDURE — 99091 PR DIGITAL MEDICINE SERVICES, HYPERTENSION, ESTABLISHED: ICD-10-PCS | Mod: ,,,

## 2019-08-05 ENCOUNTER — PATIENT MESSAGE (OUTPATIENT)
Dept: ADMINISTRATIVE | Facility: OTHER | Age: 59
End: 2019-08-05

## 2019-08-05 DIAGNOSIS — E87.6 LOW SERUM POTASSIUM: Primary | ICD-10-CM

## 2019-08-05 RX ORDER — POTASSIUM CHLORIDE 1.5 G/1.58G
20 POWDER, FOR SOLUTION ORAL 2 TIMES DAILY
Qty: 6 PACKET | Refills: 0 | Status: SHIPPED | OUTPATIENT
Start: 2019-08-05 | End: 2019-08-08

## 2019-08-06 LAB — VIT B1 BLD-MCNC: 76 UG/L (ref 38–122)

## 2019-08-06 NOTE — PATIENT INSTRUCTIONS
Adjust diet plan.  Work on expanding variety of vegetables.  Work on gradually cutting back on starchy CHO in the diet.  5-6 meals per day.  Start including protein supplements in the diet plan daily.      Exercise: Increase.      Behavior Modification: Begin to document food & activity logs daily.    1200- 1500 calorie Sample meal plan  80-120g protein per day.   Protein drinks and bars: 0-4 grams sugar  Drink lots of water throughout the day and exercise!  MENU # 1  Breakfast: 2 eggs, 1 turkey sausage pastor, 1 apple  Snack: Atkins bar  Lunch: 2 roll-ups (sliced turkey, low-fat sliced cheese, mustard), 12 baby carrots dipped in 1 Tbsp natural peanut butter  Mid-Day Snack: ¼ cup unsalted almonds, ½ cup fruit  Dinner: 1 chicken thigh simmered in tomato sauce + 2 Tbsp mozzarella cheese, ½ cup black beans, 1/2 cup steamed carrots  Evening Snack: 1/2 cup grapes with 4 cubes low-fat cheddar cheese   ___________________________________________________  MENU # 2  Breakfast: 200 calorie protein drink  Mid-morning snack : ¼ cup unsalted nuts, medium banana  Lunch: 3oz tuna or chicken salad made with 2 tbsp light gonzalez, over salad with tomatoes and cucumbers.   Snack: low-fat cheese stick  Dinner: 3oz hamburger pastor, slice of low-fat cheese, 1 cup boiled yellow squash and zucchini.   Snack: 6oz light yogurt  _______________________________________________________  Menu #3  Breakfast: 6oz plain Greek yogurt + fruit (½ banana, ½ cup fruit - pineapple, blueberries, strawberries, peach), may add Splenda to nataly.  Lunch: Grilled  chicken breast w/ slice low-fat pepper louie cheese, 1/2 cup grilled/baked asparagus and small salad with Salad Spritzer.    Mid-Day snack: 200 calorie protein drink   Dinner: 4oz Grilled fish, ½ cup white beans, ½ cup cooked spinach  Evening Snack: fudgsicle no-sugar-added    Menu # 4  Breakfast: 1 scoop vanilla protein powder + 4oz skim milk + 4oz coffee   Snack: Pure protein bar  Lunch: 2 Lettuce  Message left for patient to return call to [facility], ask for Dr Chaves office. Today I am at [facility] ext [phone] until 5pm.    When patient returns call please advise patient can be seen on 8/22/19 at 1115 or 1515 at  for MRI results. Please also verify patient's preferred pharmacy for prescription to be sent to.        tacos: 3oz seasoned ground turkey wrapped in a Cristian lettuce leaves with 1 Tbsp shredded cheese and dollop low-fat sour cream  Snack: ½ cup cottage cheese, ½ cup pineapple chunks  Dinner: Shrimp omelet: 2 eggs, ½ cup shrimp, green onions, and shredded cheese  ______________________________________________________  Menu #5  Breakfast: 1 cup low-fat cottage cheese, ½ cup peaches (no sugar added)  Snack: 1 apple, 4 cubes cheese  Lunch: 3oz baked pork chop, 1 cup okra and tomato stew  Snack: 1/2 cup black beans + salsa + dollop light sour cream  Dinner: Caprese chicken salad: 3 oz chicken breast, 1oz fresh mozzarella, sliced tomato, 1 Tbsp olive oil, basil  Snack: sugar-free popsicle    Menu #6  Breakfast: ½ cup part-skim ricotta cheese, 2 Tbsp sugar-free strawberry preserves, sprinkle of slivered almonds  Snack: 1 orange  Lunch: 3 oz canned chicken, 1oz shredded cheddar cheese, ½  cup black beans, 2 Tbsp salsa  Snack: 200 calorie Protein drink  Dinner: 4 oz grilled salmon steak, over mixed green salad with 2 tbsp light dressing

## 2019-08-16 ENCOUNTER — PATIENT OUTREACH (OUTPATIENT)
Dept: OTHER | Facility: OTHER | Age: 59
End: 2019-08-16

## 2019-08-16 DIAGNOSIS — I10 ESSENTIAL HYPERTENSION: Primary | ICD-10-CM

## 2019-08-16 RX ORDER — HYDROCHLOROTHIAZIDE 12.5 MG/1
12.5 TABLET ORAL DAILY
Qty: 30 TABLET | Refills: 5 | Status: SHIPPED | OUTPATIENT
Start: 2019-08-16 | End: 2019-09-06 | Stop reason: DRUGHIGH

## 2019-08-16 NOTE — PROGRESS NOTES
Last 5 Patient Entered Readings                                      Current 30 Day Average: 113/75     Recent Readings 8/15/2019 8/15/2019 8/14/2019 8/13/2019 8/12/2019    SBP (mmHg) 105 124 100 100 114    DBP (mmHg) 65 79 64 63 69    Pulse 84 89 72 82 66        Hypertension Medications     hydroCHLOROthiazide (HYDRODIURIL) 25 MG tablet Take 0.5 tablets (12.5 mg total) by mouth once daily.     Called patient for BP follow up. No answer. Left message for call back      Beatriz Hughes, Pharm.D.   [employer] [facility] Clinical Pharmacist  [phone]

## 2019-08-16 NOTE — PROGRESS NOTES
Last 5 Patient Entered Readings                                      Current 30 Day Average: 113/75     Recent Readings 8/15/2019 8/15/2019 8/14/2019 8/13/2019 8/12/2019    SBP (mmHg) 105 124 100 100 114    DBP (mmHg) 65 79 64 63 69    Pulse 84 89 72 82 66        Hypertension Medications     hydroCHLOROthiazide (HYDRODIURIL) 12.5 MG Tab Take 1 tablet (12.5 mg total) by mouth once daily.     Patient returned my call for BP follow up. She says that she has been tired lately. Otherwise, no complaints. BP was 98/ 67 mmHg on 87/19. Patient says she felt fine overall but was tired. She says that she skipped next dose of HCTZ.     1) 30-day BP average meets goal of <130/<80. Continue HCTZ 12.5 mg QD. Continue daily BP readings.   2) Patient knows to contact me with any non-urgent clinical changes or concerns. Go to ED or call [facility] for emergencies.   3) Will defer lifestyle counseling to digital medicine health .   4) Will continue to follow up. Will assess future BP readings and consider stopping HCTZ or HCTZ moshe Hughes, Pharm.D.   Digital Medicine Clinical Pharmacist  [phone]

## 2019-08-23 ENCOUNTER — OFFICE VISIT (OUTPATIENT)
Dept: BARIATRICS | Facility: CLINIC | Age: 59
End: 2019-08-23
Payer: COMMERCIAL

## 2019-08-23 ENCOUNTER — LAB VISIT (OUTPATIENT)
Dept: LAB | Facility: HOSPITAL | Age: 59
End: 2019-08-23
Payer: COMMERCIAL

## 2019-08-23 ENCOUNTER — CLINICAL SUPPORT (OUTPATIENT)
Dept: BARIATRICS | Facility: CLINIC | Age: 59
End: 2019-08-23
Payer: COMMERCIAL

## 2019-08-23 VITALS
HEART RATE: 95 BPM | BODY MASS INDEX: 35.16 KG/M2 | DIASTOLIC BLOOD PRESSURE: 76 MMHG | HEIGHT: 65 IN | WEIGHT: 211 LBS | SYSTOLIC BLOOD PRESSURE: 118 MMHG

## 2019-08-23 DIAGNOSIS — Z98.84 S/P LAPAROSCOPIC SLEEVE GASTRECTOMY: ICD-10-CM

## 2019-08-23 DIAGNOSIS — E78.5 HYPERLIPIDEMIA, UNSPECIFIED HYPERLIPIDEMIA TYPE: ICD-10-CM

## 2019-08-23 DIAGNOSIS — E66.01 CLASS 2 SEVERE OBESITY DUE TO EXCESS CALORIES WITH SERIOUS COMORBIDITY AND BODY MASS INDEX (BMI) OF 38.0 TO 38.9 IN ADULT: ICD-10-CM

## 2019-08-23 DIAGNOSIS — Z98.890 POST-OPERATIVE STATE: ICD-10-CM

## 2019-08-23 DIAGNOSIS — R63.4 WEIGHT LOSS: ICD-10-CM

## 2019-08-23 DIAGNOSIS — K76.0 FATTY LIVER: ICD-10-CM

## 2019-08-23 DIAGNOSIS — E87.6 LOW SERUM POTASSIUM: Primary | ICD-10-CM

## 2019-08-23 DIAGNOSIS — E87.6 LOW SERUM POTASSIUM: ICD-10-CM

## 2019-08-23 DIAGNOSIS — I10 ESSENTIAL HYPERTENSION: ICD-10-CM

## 2019-08-23 DIAGNOSIS — K21.9 GASTROESOPHAGEAL REFLUX DISEASE, ESOPHAGITIS PRESENCE NOT SPECIFIED: ICD-10-CM

## 2019-08-23 LAB — POTASSIUM SERPL-SCNC: 3.3 MMOL/L (ref 3.5–5.1)

## 2019-08-23 PROCEDURE — 99999 PR PBB SHADOW E&M-EST. PATIENT-LVL IV: ICD-10-PCS | Mod: PBBFAC,,, | Performed by: PHYSICIAN ASSISTANT

## 2019-08-23 PROCEDURE — 99999 PR PBB SHADOW E&M-EST. PATIENT-LVL I: CPT | Mod: PBBFAC,,, | Performed by: DIETITIAN, REGISTERED

## 2019-08-23 PROCEDURE — 99999 PR PBB SHADOW E&M-EST. PATIENT-LVL I: ICD-10-PCS | Mod: PBBFAC,,, | Performed by: DIETITIAN, REGISTERED

## 2019-08-23 PROCEDURE — 99024 PR POST-OP FOLLOW-UP VISIT: ICD-10-PCS | Mod: S$GLB,,, | Performed by: PHYSICIAN ASSISTANT

## 2019-08-23 PROCEDURE — 99499 NO LOS: ICD-10-PCS | Mod: S$GLB,,, | Performed by: DIETITIAN, REGISTERED

## 2019-08-23 PROCEDURE — 99499 UNLISTED E&M SERVICE: CPT | Mod: S$GLB,,, | Performed by: DIETITIAN, REGISTERED

## 2019-08-23 PROCEDURE — 36415 COLL VENOUS BLD VENIPUNCTURE: CPT

## 2019-08-23 PROCEDURE — 99999 PR PBB SHADOW E&M-EST. PATIENT-LVL IV: CPT | Mod: PBBFAC,,, | Performed by: PHYSICIAN ASSISTANT

## 2019-08-23 PROCEDURE — 99024 POSTOP FOLLOW-UP VISIT: CPT | Mod: S$GLB,,, | Performed by: PHYSICIAN ASSISTANT

## 2019-08-23 PROCEDURE — 84132 ASSAY OF SERUM POTASSIUM: CPT

## 2019-08-23 RX ORDER — POTASSIUM CHLORIDE 20 MEQ/1
20 TABLET, EXTENDED RELEASE ORAL 2 TIMES DAILY
Qty: 6 TABLET | Refills: 0 | Status: SHIPPED | OUTPATIENT
Start: 2019-08-23 | End: 2019-08-26

## 2019-08-23 RX ORDER — UBIDECARENONE 75 MG
500 CAPSULE ORAL DAILY
COMMUNITY
End: 2021-05-14

## 2019-08-23 RX ORDER — IBUPROFEN 200 MG
1 CAPSULE ORAL DAILY
COMMUNITY
End: 2019-12-06

## 2019-08-23 NOTE — PATIENT INSTRUCTIONS
Bariatric Soft Diet           - Start Soft Diet 2 weeks after gastric banding  -   Start Soft Diet 4 weeks after gastric bypass and sleeve    As your stomach heals, your doctor will progress your diet to soft foods.  This diet usually lasts for 2-3 months, but can last longer depending on each individual. Soft foods are those which can be easily mashed with a fork.    Remember these principles:   No liquids with meals. Do no drink 30 minutes before meals and wait 30 minutes to 1 hour after meals to start drinking.   Sip on water, sugar-free beverages or non-fat milk throughout the day.  You will need to continue drinking at least 1 protein drink daily to meet protein needs.   100% fruit juice (no sugar added) is allowed, but limit to 4oz a day because it is high in calories and does not contain any protein.   Chew foods slowly; one meal should take 20-30 minutes.   Eat 3-5 meals per day, without any additional snacking.   Stop eating as soon as you feel full.   Avoid using table sugar and foods made with refined sugar, which can trigger dumping syndrome.   Marinating meats with a low sugar marinade, adding low-fat salad dressing, or adding low calorie gravy (made from powder and water) can help meats to digest easier.     Adding Vegetables and Fruits:    As long as you are consuming >80g total protein daily from combination of foods and protein drinks, you may start adding small bites of fruits and vegetables to your meals. Cooked, tender vegetables and ripe fruits without the peel are tolerated best.    Avoid fruit canned in syrup, sugary fruit juices, and vegetables cooked with oil, butter or grubbs.  Bariatric SOFT Diet    EAT THESE FOODS AVOID THESE FOODS   High in Protein: High in Fat/Sugar:   ? Canned tuna or chicken (packed in water)  ? Lean ground turkey breast or ground round  ? Turkey or chicken (no skin); cooked tender and cut in small pieces  ? Lean pork or beef (cook in crock pot until very  tender; cut in small pieces  ? Scrambled, poached, or boiled eggs  ? Baked, broiled, grilled or boiled fish and seafood (not fried!)  ? Silken tofu, Edamame (soybeans)  ? Beans, hummus and lentils  ? Lean deli meats (turkey and chicken breast, ham, roast beef)  ? 1% or Skim Milk, Lactaid, or Soymilk  ? Low-fat or fat-free cottage cheese, soft cheese, mozzarella string cheese, or ricotta  ? Light yogurt, Greek yogurt, SF pudding High fat milk (whole, 2%)  Butter, margarine, oil, mayonnaise  Sour cream, cream cheese, salad dressing  Ice Cream  Cakes, cookies, pies, desserts  Candy  Luncheon meats (bologna, salami, chopped ham)  Sausage, Tinoco  Gravy  Fried Foods  ___________________________________  Tough/Crunchy--------------------------------  Tough or dry meats  Corn   Granola/cereal with nuts  Shredded Coconut    May add after 3 months:  Raw veggies  Lettuce  Plain, Unsalted Nuts and Seeds  Protein bars with 0-4 grams of sugar   As long as you are getting >80g PRO: Starchy Carbohydrates. At goal weight, some may include whole grains in small amounts.   Cooked tender vegetables without peel  Ripe fruits without peel  Frozen fruits with no added sugar  Fruit canned in its own juice or in water  Fat free, sugar free, frozen yogurt White and wheat Bread, Rice, Pasta   Cereals (including grits, oatmeal)   Crackers, Pretzels, Chips, Granola  Corn, Popcorn, Peas  White Potatoes, Sweet potatoes  Flour and corn tortillas     Fluids: Always Avoid:   Skim/1% milk, Lactaid, Soymilk  Water and Sugar-free beverages  (decaf and non-carbonated)  Decaf coffee & decaf tea  Sugary drinks  Carbonated drinks  Alcohol  Drinking through straws     Protein Content of Foods Recommended after                   Weight Loss Surgery    Food Name Portion Calories Protein (gms)   Almonds (unsalted) 1/4 cup 160 6   [place] milk, unsweetened 1 cup 30  1   Beef, Roast 1 oz 46 8   Beef, Steak, sirloin, trimmed 1 oz 55 9   Catfish, broiled or baked 1  oz 30 5   Cheese, American FF 1 oz 40 6   Cheese, Cottage 1% fat ¼ cup 41 7   Cheese, Parmesan, grated ¼ cup 128 12   Cheese, Mozzarella, part skim 1 oz 78 8   Cheese, part skim Ricotta ¼ cup 90 8   Chicken, white breast w/o skin 1 oz 46 9   Chicken, leg w/o skin 1 oz 54 7   Crab, steamed ¼ cup  40 9   Crawfish tails, boiled ¼ cup 35 8   Edamame, shelled ¼ cup 50 4   Egg 1 78 6   Ham, lean 5% 1 oz 44 7   Hamburger, lean 1 oz 56 7   Hummus ¼ cup 100 5   Lobster, steamed 1 oz 26 5   Milk, skim or 1%, soy  1 cup 90 8   Pork Tenderloin 1 oz 46 7   Pudding, SF 1 serv 60 2   Red beans ¼ cup 56 4   Refried beans, fat free ¼ cup 65 4   [place], baked 1 oz 52 7   Shrimp, steamed 1 oz 28 6   Soymilk, plain ½ cup 40 3   Tilapia, white fish, cooked 1 oz 36 8   Tofu ¼ cup 47 5   Trout 1 oz 48 7   Tuna, canned in water 1 oz 37 8   Turkey, white meat 1 oz 35 7   Veal Loin 1 oz 50 7   Yogurt, SF, frozen vanilla 3 oz 72 3.5   Yogurt, Fruit, FF, light 3 oz 40 2.5   Yogurt, Greek 3 oz 70 8     *Abbreviations: SF=sugar free, LF=low fat, FF= fat free, gms=grams  *3oz of cooked meat/protein = size of deck of cards or ladies palm   *1oz cheese = 1inch cube or 1 slice American cheese    Sample Menu for Bariatric Soft Diet  For Gastric Bypass and Sleeve            3 meals + 2 protein drinks  Remember: No drinking with meals.    Time of Day Day 1 Day 2   7am:    1 egg (or ¼ cup Egg Beaters) ¼ cup low-fat cottage cheese, 1 tbsp berries   8am: 1 cup water/SF beverage     9am: 1 cup water/SF beverage     10am:  Protein drink  Protein drink   11am: 1 cup water/SF beverage     12pm:    1-2 oz grilled shrimp, ¼ cup green beans   1-2oz canned chicken, shredded cheese, 1 tbsp salsa   1pm: 1 cup water/SF beverage     3pm:  Protein drink   Protein drink   4pm: 1 cup water/SF beverage     6pm:  ½ cup low fat chili, 1oz low-fat cheese, ¼ cup broccoli 2 oz grilled fish,  ¼  cup lima beans   7pm: 1 cup water/SF beverage       This sample menu provides  approx. 80g protein total, including about 40g protein from foods and at least 40g protein from protein drinks.  Drinking protein drinks daily helps decrease muscle loss, increase weight loss, and prevent hair loss.    ? Sip fluids continuously in between meals.    ? For fluids: 1 cup = 8 oz   ? For food: ¼ cup = 4 tablespoons = 1oz  ? No drinking from 30 minutes before meals to 30 minutes after meals.  ? 3oz meat is approx. the size of a deck of cards.    ? A food scale will help you determine portion size (Can be purchased at [employer])

## 2019-08-23 NOTE — PROGRESS NOTES
BARIATRIC POST-OPERATIVE VISIT:    HPI:  Renae Hou is a 58 y.o. year old female presents for 4 week post op visit following s/p gastric sleeve.  she is doing well and tolerating the diet without difficulty.  she has no complaints.    Pt states that she is doing well.   PCP lowered HCTZ to 12.5 mg.     Ask about B12 shots due to her not liking to take vitamins. Will get sublingual tablets    Denies: nausea, vomiting, abdominal pain, changes in bowel movement pattern, fever, chills, dysphagia, chest pain, and shortness of breath.    Review of Systems   Constitutional: Negative for activity change and appetite change.   HENT: Negative for tinnitus.    Eyes: Negative for visual disturbance.   Respiratory: Negative for chest tightness and shortness of breath.    Gastrointestinal: Negative for abdominal pain, constipation, diarrhea and nausea.   Genitourinary: Negative for decreased urine volume and dysuria.   Musculoskeletal: Negative for gait problem and joint swelling.   Skin: Negative for rash.   Neurological: Negative for dizziness, light-headedness and headaches.   Psychiatric/Behavioral: Negative for self-injury and suicidal ideas. The patient is not nervous/anxious.        EXERCISE & VITAMINS:  See Bariatric Assessment  Taking vitamins intermittently   MEDICATIONS/ALLERGIES:  Have been reviewed.    DIET: Purred Bariatric Diet.  ~ 70grams protein.  40 fl oz SF clear beverage.      2 protein shakes  Baked fish   Shredded chicken   Spinach       See Dietician note from today for a more detailed assessment.      Physical Exam   Constitutional: She is oriented to person, place, and time. She appears well-developed and well-nourished.   HENT:   Head: Normocephalic and atraumatic.   Eyes: Pupils are equal, round, and reactive to light. Conjunctivae and EOM are normal.   Neck: Normal range of motion. Neck supple. No JVD present. No thyromegaly present.   Cardiovascular: Normal rate, regular rhythm, normal heart  sounds and intact distal pulses.   No murmur heard.  Pulmonary/Chest: Effort normal and breath sounds normal. No respiratory distress. She has no wheezes.   Abdominal: Soft. Bowel sounds are normal. She exhibits no distension and no mass. There is no tenderness.   Musculoskeletal: Normal range of motion. She exhibits no edema or tenderness.   Lymphadenopathy:     She has no cervical adenopathy.   Neurological: She is alert and oriented to person, place, and time. Coordination normal.   Skin: Skin is warm and dry. Capillary refill takes less than 2 seconds. She is not diaphoretic. No erythema.   Psychiatric: She has a normal mood and affect. Her behavior is normal. Judgment and thought content normal.     Vitals:    08/23/19 1103   BP: 118/76   Pulse: 95         ASSESSMENT:  - Morbid obesity s/p sleeve gastrectomy on 07/23/2019.  - Co-morbidities: deep vein thrombosis, dyslipidemia, GERD, hypertension and obstructive sleep apnea  - Good Weight loss, 20.5#'s and 23% EWL  - NO Exercise routine  - Good Diet  - Ok Vitamin regimen    PLAN:  - Discussed vitamin regimen and importance  - Low potassium will repeat treatment and have her recheck in 7 days.   - Ursodiol 300 mg twice daily for 6 months  - Anti-Acid medication, PPI daily for 3 months  - No lifting more than 10 lbs for 6 weeks total   - Miralax daily for constipation  - Emphasized the importance of regular exercise and adherence to bariatric diet to achieve maximum weight loss.  - Encouraged patient to start/continue regular exercise.  - Follow-up with dietician to advance diet.  - Continue daily vitamins and medications.  - RTC as needed  - Call the office for any issues.  - Check labs today.

## 2019-08-23 NOTE — PROGRESS NOTES
NUTRITION NOTE    Referring Physician: Duc Ratliff M.D.  Reason for MNT Referral: Follow-up 4 Weeks s/p Gastric Sleeve    PAST MEDICAL HISTORY:  Denies nausea, vomiting, constipation and diarrhea.  Reports doing well.    Past Medical History:   Diagnosis Date    Abscess of paraspinous muscles     Allergy 4/2018    DVT (deep venous thrombosis)     left leg    Epidural abscess 4/10/2018    Fatty liver     GERD (gastroesophageal reflux disease) 2015    Hypertension     Lumbar radiculopathy     Menopause     Obesity     Obstructive sleep apnea on CPAP     Thyroid disease     Thyroid nodules.       CLINICAL DATA:  58 y.o. female.    CURRENT DIET:  Bariatric Pureed Diet    Diet Recall:  grams of protein/day; 48 oz of fluids/day    Diet Includes: cheeses, eggs, greek yogurt, hummus, tender shredded chicken br, asparagus soup, spinach  Meal Pattern: 2-3 meal(s) + 2 protein supplement(s)  Adequate protein supplement intake. Premier shakes  Adequate dairy intake.  Adequate vegetable intake.    EXERCISE:  Adequate light exercise. bought a bike and plans to ride outside a few times/week.    Restrictions to Exercise: None.    VITAMINS/MINERALS:  Irregular    2000 IU Vit D    ASSESSMENT:  Doing well overall.  Adequate protein intake.  Adequate fluid intake.  Advancing diet appropriately.  Exercising.  Adequate vitamins & minerals.    BARIATRIC DIET DISCUSSION:  Instructed and provided written materials on bariatric soft diet plan.  Reinforced post-op nutrition guidelines.    PLAN / RECOMMENDATIONS:  Advance to bariatric soft diet.  Maintain protein intake.  Maintain fluid intake.  Continue light exercise.  Continue appropriate vitamins & minerals.    Return to clinic in 2 months.    SESSION TIME: 15 minutes

## 2019-08-26 ENCOUNTER — PATIENT OUTREACH (OUTPATIENT)
Dept: OTHER | Facility: OTHER | Age: 59
End: 2019-08-26

## 2019-08-26 NOTE — PROGRESS NOTES
Last 5 Patient Entered Readings                                      Current 30 Day Average: 109/70     Recent Readings 8/25/2019 8/24/2019 8/23/2019 8/22/2019 8/21/2019    SBP (mmHg) 115 107 100 108 101    DBP (mmHg) 77 67 62 68 69    Pulse 88 78 83 85 76        Hypertension Medications     hydroCHLOROthiazide (HYDRODIURIL) 12.5 MG Tab Take 1 tablet (12.5 mg total) by mouth once daily.     Called patient for BP follow up. No answer. Left message for call back      Beatriz Hughes, Pharm.D.   [facility] Clinical Pharmacist  [phone]

## 2019-08-26 NOTE — PROGRESS NOTES
Last 5 Patient Entered Readings                                      Current 30 Day Average: 109/70     Recent Readings 8/25/2019 8/24/2019 8/23/2019 8/22/2019 8/21/2019    SBP (mmHg) 115 107 100 108 101    DBP (mmHg) 77 67 62 68 69    Pulse 88 78 83 85 76        Hypertension Medications     hydroCHLOROthiazide (HYDRODIURIL) 12.5 MG Tab Take 1 tablet (12.5 mg total) by mouth once daily.     Patient returned my call for BP follow up. She complains of feeling tired but contributes this to B12 deficiency. Otherwise, no complaints.     1) 30-day BP average meets goal of <130/<80. Continue HCTZ 12.5 mg QD  2) Reviewed BMP. K 3.3. Patient was started on KCL supplementation  3) Patient knows to contact me with any non-urgent clinical changes or concerns. Go to ED or call [facility] for emergencies.   4) Will defer lifestyle counseling to digital medicine health .   5) Will continue to follow up.     Beatriz Hughes, Pharm.D.   [facility] Clinical Pharmacist  [phone]

## 2019-08-29 ENCOUNTER — PATIENT MESSAGE (OUTPATIENT)
Dept: VASCULAR SURGERY | Facility: CLINIC | Age: 59
End: 2019-08-29

## 2019-09-01 PROCEDURE — 99091 PR DIGITAL MEDICINE SERVICES, HYPERTENSION, ESTABLISHED: ICD-10-PCS | Mod: ,,,

## 2019-09-01 PROCEDURE — 99091 COLLJ & INTERPJ DATA EA 30 D: CPT | Mod: ,,,

## 2019-09-05 ENCOUNTER — PATIENT OUTREACH (OUTPATIENT)
Dept: OTHER | Facility: OTHER | Age: 59
End: 2019-09-05

## 2019-09-05 NOTE — PROGRESS NOTES
Last 5 Patient Entered Readings                                      Current 30 Day Average: 107/68     Recent Readings 9/4/2019 9/4/2019 9/4/2019 9/4/2019 9/3/2019    SBP (mmHg) 99 88 89 98 114    DBP (mmHg) 60 59 58 64 69    Pulse 71 66 67 70 87        Hypertension Medications     hydroCHLOROthiazide (HYDRODIURIL) 12.5 MG Tab Take 1 tablet (12.5 mg total) by mouth once daily.     Called patient for BP follow up and to address low BP alert. No answer. Left message for call back      Beatriz Hughes, Pharm.D.  [facility] Clinical Pharmacist  [phone]

## 2019-09-06 NOTE — PROGRESS NOTES
Last 5 Patient Entered Readings                                      Current 30 Day Average: 107/67     Recent Readings 9/5/2019 9/4/2019 9/4/2019 9/4/2019 9/4/2019    SBP (mmHg) 119 99 88 89 98    DBP (mmHg) 64 60 59 58 64    Pulse 80 71 66 67 70        Hypertension Medications     hydroCHLOROthiazide (HYDRODIURIL) 12.5 MG Tab Take 1 tablet (12.5 mg total) by mouth once daily.     Patient returned my call for follow up. Patient says last week she had an episode at work where she started to feel faint and sick. She says she drank a high protein smoothie and thinks this made her feel bad. She called the nurse at work and her BP was 150/100 mmHg. She says she has been feeling fine since then. She says she has just een feeling a little tired. Patient says she stopped taking HCTZ two days ago.     1) 30-day BP average meets goal of <130/<80. Remain of HCTZ for 1-2 weeks. Will assess BP.   2) Patient knows to contact me with any non-urgent clinical changes or concerns. Go to ED or call [facility] on [facility] for emergencies.   3) Will defer lifestyle counseling to digital medicine health .   4) Will continue to follow up.     Beatriz Hughes, Pharm.D.   Digital Medicine Clinical Pharmacist  [phone]

## 2019-09-06 NOTE — PROGRESS NOTES
Last 5 Patient Entered Readings                                      Current 30 Day Average: 107/67     Recent Readings 9/5/2019 9/4/2019 9/4/2019 9/4/2019 9/4/2019    SBP (mmHg) 119 99 88 89 98    DBP (mmHg) 64 60 59 58 64    Pulse 80 71 66 67 70        Hypertension Medications     hydroCHLOROthiazide (HYDRODIURIL) 12.5 MG Tab Take 1 tablet (12.5 mg total) by mouth once daily.     Called patient for BP follow up and to address low BP alert. No answer. Left message for call back. Will send [employer] message.       Beatriz Hughes, Pharm.D.   Digital Medicine Clinical Pharmacist  [phone]

## 2019-09-20 ENCOUNTER — PATIENT OUTREACH (OUTPATIENT)
Dept: OTHER | Facility: OTHER | Age: 59
End: 2019-09-20

## 2019-09-20 NOTE — PROGRESS NOTES
Called patient for BP follow up after stopping HCTZ. No answer. Left message for call back    30-day BP averaged meets goal of <130/<80 mmHg.

## 2019-10-04 ENCOUNTER — TELEPHONE (OUTPATIENT)
Dept: BARIATRICS | Facility: CLINIC | Age: 59
End: 2019-10-04

## 2019-10-04 NOTE — TELEPHONE ENCOUNTER
Left voicemail for pt to give us a call so that we can reschedule her appt because Mel will not be here that day.

## 2019-10-07 RX ORDER — HYDROCHLOROTHIAZIDE 12.5 MG/1
12.5 TABLET ORAL
COMMUNITY
End: 2020-01-02

## 2019-10-07 NOTE — PROGRESS NOTES
Called patient for BP follow after stopping HCTZ. No answer. Left message for call back    30-day BP average meets goal of <130/<80 mmHg.

## 2019-10-07 NOTE — PROGRESS NOTES
Digital Medicine: Clinician Follow-Up    Patient returned my call for follow up. She complains of occasional edema and reports taking HCTZ 12.5 mg prn. Otherwise, she is doing well with no complaints.     The history is provided by the patient.     Follow Up  Follow-up reason(s): medication change follow-up      Medication Change: stop therapy            Sleep Apnea  Patient previously diagnosed with RENAE She reports she is not currently using CPAP.     Medication Affordability  Patient is currently not having problems affording medications      INTERVENTION(S)  recommended diet modifications and recommended physical activity    PLAN  patient verbalizes understanding and patient amenable to changes    30-day BP average meets goal of <130/<80 mmHg. Continue HCTZ 12.5 mg prn for edema.   Will address RENAE at next encounter.         There are no preventive care reminders to display for this patient.    Last 5 Patient Entered Readings                                      Current 30 Day Average: 108/71     Recent Readings 10/6/2019 10/4/2019 10/3/2019 10/1/2019 9/30/2019    SBP (mmHg) 110 104 109 114 102    DBP (mmHg) 71 72 70 76 62    Pulse 79 75 86 86 76        Hypertension Medications     hydroCHLOROthiazide (HYDRODIURIL) 12.5 MG Tab Take 12.5 mg by mouth as needed.

## 2019-10-17 ENCOUNTER — PATIENT MESSAGE (OUTPATIENT)
Dept: BARIATRICS | Facility: CLINIC | Age: 59
End: 2019-10-17

## 2019-10-18 ENCOUNTER — LAB VISIT (OUTPATIENT)
Dept: LAB | Facility: HOSPITAL | Age: 59
End: 2019-10-18
Payer: COMMERCIAL

## 2019-10-18 DIAGNOSIS — E66.9 OBESITY (BMI 30-39.9): ICD-10-CM

## 2019-10-18 DIAGNOSIS — I10 ESSENTIAL HYPERTENSION: ICD-10-CM

## 2019-10-18 DIAGNOSIS — E66.9 OBESITY, UNSPECIFIED CLASSIFICATION, UNSPECIFIED OBESITY TYPE, UNSPECIFIED WHETHER SERIOUS COMORBIDITY PRESENT: ICD-10-CM

## 2019-10-18 DIAGNOSIS — I82.4Y9 DEEP VEIN THROMBOSIS (DVT) OF PROXIMAL LOWER EXTREMITY, UNSPECIFIED CHRONICITY, UNSPECIFIED LATERALITY: ICD-10-CM

## 2019-10-18 DIAGNOSIS — G47.33 OSA ON CPAP: ICD-10-CM

## 2019-10-18 DIAGNOSIS — Z98.84 S/P LAPAROSCOPIC SLEEVE GASTRECTOMY: ICD-10-CM

## 2019-10-18 DIAGNOSIS — R73.03 PRE-DIABETES: ICD-10-CM

## 2019-10-18 LAB
25(OH)D3+25(OH)D2 SERPL-MCNC: 32 NG/ML (ref 30–96)
ALBUMIN SERPL BCP-MCNC: 3.8 G/DL (ref 3.5–5.2)
ALP SERPL-CCNC: 106 U/L (ref 55–135)
ALT SERPL W/O P-5'-P-CCNC: 24 U/L (ref 10–44)
ANION GAP SERPL CALC-SCNC: 11 MMOL/L (ref 8–16)
AST SERPL-CCNC: 27 U/L (ref 10–40)
BASOPHILS # BLD AUTO: 0.05 K/UL (ref 0–0.2)
BASOPHILS NFR BLD: 0.9 % (ref 0–1.9)
BILIRUB SERPL-MCNC: 0.6 MG/DL (ref 0.1–1)
BUN SERPL-MCNC: 14 MG/DL (ref 6–20)
CALCIUM SERPL-MCNC: 9.7 MG/DL (ref 8.7–10.5)
CHLORIDE SERPL-SCNC: 105 MMOL/L (ref 95–110)
CHOLEST SERPL-MCNC: 211 MG/DL (ref 120–199)
CHOLEST/HDLC SERPL: 4 {RATIO} (ref 2–5)
CO2 SERPL-SCNC: 26 MMOL/L (ref 23–29)
CREAT SERPL-MCNC: 0.9 MG/DL (ref 0.5–1.4)
DIFFERENTIAL METHOD: NORMAL
EOSINOPHIL # BLD AUTO: 0.3 K/UL (ref 0–0.5)
EOSINOPHIL NFR BLD: 4.5 % (ref 0–8)
ERYTHROCYTE [DISTWIDTH] IN BLOOD BY AUTOMATED COUNT: 13 % (ref 11.5–14.5)
EST. GFR  (AFRICAN AMERICAN): >60 ML/MIN/1.73 M^2
EST. GFR  (NON AFRICAN AMERICAN): >60 ML/MIN/1.73 M^2
GLUCOSE SERPL-MCNC: 107 MG/DL (ref 70–110)
HCT VFR BLD AUTO: 43.5 % (ref 37–48.5)
HDLC SERPL-MCNC: 53 MG/DL (ref 40–75)
HDLC SERPL: 25.1 % (ref 20–50)
HGB BLD-MCNC: 14.2 G/DL (ref 12–16)
IMM GRANULOCYTES # BLD AUTO: 0.01 K/UL (ref 0–0.04)
IMM GRANULOCYTES NFR BLD AUTO: 0.2 % (ref 0–0.5)
IRON SERPL-MCNC: 120 UG/DL (ref 30–160)
LDLC SERPL CALC-MCNC: 135 MG/DL (ref 63–159)
LYMPHOCYTES # BLD AUTO: 2.3 K/UL (ref 1–4.8)
LYMPHOCYTES NFR BLD: 41.1 % (ref 18–48)
MCH RBC QN AUTO: 30.1 PG (ref 27–31)
MCHC RBC AUTO-ENTMCNC: 32.6 G/DL (ref 32–36)
MCV RBC AUTO: 92 FL (ref 82–98)
MONOCYTES # BLD AUTO: 0.3 K/UL (ref 0.3–1)
MONOCYTES NFR BLD: 6.2 % (ref 4–15)
NEUTROPHILS # BLD AUTO: 2.6 K/UL (ref 1.8–7.7)
NEUTROPHILS NFR BLD: 47.1 % (ref 38–73)
NONHDLC SERPL-MCNC: 158 MG/DL
NRBC BLD-RTO: 0 /100 WBC
PLATELET # BLD AUTO: 293 K/UL (ref 150–350)
PMV BLD AUTO: 9.9 FL (ref 9.2–12.9)
POTASSIUM SERPL-SCNC: 4.2 MMOL/L (ref 3.5–5.1)
PROT SERPL-MCNC: 7 G/DL (ref 6–8.4)
RBC # BLD AUTO: 4.71 M/UL (ref 4–5.4)
SATURATED IRON: 31 % (ref 20–50)
SODIUM SERPL-SCNC: 142 MMOL/L (ref 136–145)
TOTAL IRON BINDING CAPACITY: 385 UG/DL (ref 250–450)
TRANSFERRIN SERPL-MCNC: 260 MG/DL (ref 200–375)
TRIGL SERPL-MCNC: 115 MG/DL (ref 30–150)
VIT B12 SERPL-MCNC: 507 PG/ML (ref 210–950)
WBC # BLD AUTO: 5.5 K/UL (ref 3.9–12.7)

## 2019-10-18 PROCEDURE — 85025 COMPLETE CBC W/AUTO DIFF WBC: CPT

## 2019-10-18 PROCEDURE — 80053 COMPREHEN METABOLIC PANEL: CPT

## 2019-10-18 PROCEDURE — 80061 LIPID PANEL: CPT

## 2019-10-18 PROCEDURE — 83540 ASSAY OF IRON: CPT

## 2019-10-18 PROCEDURE — 82306 VITAMIN D 25 HYDROXY: CPT

## 2019-10-18 PROCEDURE — 84425 ASSAY OF VITAMIN B-1: CPT

## 2019-10-18 PROCEDURE — 82607 VITAMIN B-12: CPT

## 2019-10-22 LAB — VIT B1 BLD-MCNC: 54 UG/L (ref 38–122)

## 2019-10-29 ENCOUNTER — PATIENT MESSAGE (OUTPATIENT)
Dept: BARIATRICS | Facility: CLINIC | Age: 59
End: 2019-10-29

## 2019-10-30 ENCOUNTER — OFFICE VISIT (OUTPATIENT)
Dept: BARIATRICS | Facility: CLINIC | Age: 59
End: 2019-10-30
Payer: COMMERCIAL

## 2019-10-30 ENCOUNTER — CLINICAL SUPPORT (OUTPATIENT)
Dept: BARIATRICS | Facility: CLINIC | Age: 59
End: 2019-10-30
Payer: COMMERCIAL

## 2019-10-30 VITALS
DIASTOLIC BLOOD PRESSURE: 62 MMHG | SYSTOLIC BLOOD PRESSURE: 110 MMHG | HEART RATE: 93 BPM | WEIGHT: 200.19 LBS | BODY MASS INDEX: 33.35 KG/M2 | HEIGHT: 65 IN

## 2019-10-30 DIAGNOSIS — Z98.890 POSTOPERATIVE STATE: Primary | ICD-10-CM

## 2019-10-30 DIAGNOSIS — R63.4 WEIGHT LOSS: ICD-10-CM

## 2019-10-30 DIAGNOSIS — Z98.84 S/P LAPAROSCOPIC SLEEVE GASTRECTOMY: ICD-10-CM

## 2019-10-30 PROBLEM — E66.811 CLASS 1 OBESITY DUE TO EXCESS CALORIES IN ADULT: Status: ACTIVE | Noted: 2019-07-23

## 2019-10-30 PROCEDURE — 99499 NO LOS: ICD-10-PCS | Mod: S$GLB,,, | Performed by: DIETITIAN, REGISTERED

## 2019-10-30 PROCEDURE — 99999 PR PBB SHADOW E&M-EST. PATIENT-LVL I: CPT | Mod: PBBFAC,,, | Performed by: DIETITIAN, REGISTERED

## 2019-10-30 PROCEDURE — 99024 PR POST-OP FOLLOW-UP VISIT: ICD-10-PCS | Mod: S$GLB,,, | Performed by: NURSE PRACTITIONER

## 2019-10-30 PROCEDURE — 99999 PR PBB SHADOW E&M-EST. PATIENT-LVL IV: CPT | Mod: PBBFAC,,, | Performed by: NURSE PRACTITIONER

## 2019-10-30 PROCEDURE — 99999 PR PBB SHADOW E&M-EST. PATIENT-LVL I: ICD-10-PCS | Mod: PBBFAC,,, | Performed by: DIETITIAN, REGISTERED

## 2019-10-30 PROCEDURE — 99024 POSTOP FOLLOW-UP VISIT: CPT | Mod: S$GLB,,, | Performed by: NURSE PRACTITIONER

## 2019-10-30 PROCEDURE — 99999 PR PBB SHADOW E&M-EST. PATIENT-LVL IV: ICD-10-PCS | Mod: PBBFAC,,, | Performed by: NURSE PRACTITIONER

## 2019-10-30 PROCEDURE — 99499 UNLISTED E&M SERVICE: CPT | Mod: S$GLB,,, | Performed by: DIETITIAN, REGISTERED

## 2019-10-30 NOTE — PATIENT INSTRUCTIONS
Fruits and Vegetables       Include 1-2 servings of fruit daily.      1 serving of fruit includes ½ cup unsweetened applesauce, ½ medium banana, tennis ball size piece of fruit, 17 grapes, 1 cup melon, 1 cup strawberries, ¼ cup dried fruit     Include 2-3 servings of vegetables daily. 1 serving is 1 cup raw or ½ cup cooked.     Non-starchy vegetables include artichoke, asparagus, baby corn, bamboo shoots, beans: green/Italian/wax, bean sprouts, beets, broccoli, Ronks sprouts, cabbage, carrots, cauliflower, celery, cucumber, eggplant, green onions or scallions, greens, jicama, leeks, mushrooms, okra, onions, pea pods, peppers, radishes, spinach, summer squash, tomatoes and salsa, turnips, vegetable juice cocktail, water chestnuts, zucchini      Remember these principles:   No liquids with meals. Do no drink 30 minutes before meals and wait 30 minutes to 1 hour after meals to start drinking.   Eat PROTEIN rich foods first at every meal or snack.   Sip on water, sugar-free beverages or non-fat milk throughout the day.  You will need to continue drinking at least 1 protein drink daily to meet protein needs.   100% fruit juice (no sugar added) is allowed, but limit to 4oz a day because it is high in calories and does not contain any protein.   Chew foods slowly; one meal should take 20-30 minutes.   Eat 5 meals (3 solid meals and 2 protein shakes) per day, without any additional snacking.   Stop eating as soon as you feel full.   Avoid using table sugar and foods made with refined sugar, which can trigger dumping syndrome.   Marinating meats with a low sugar marinade, adding low-fat salad dressing, or adding low calorie gravy (made from powder and water) can help meats to digest easier.     May add:  Raw veggies  Lettuce: start with baby spinach leaves  Plain, Unsalted Nuts and Seeds: almonds, peanuts, pistachios.  1 serving daily or less  Protein bars with 0-4 grams of sugar, see attached  "handout    Snacks: (100-200 calories; >5g protein)    - 1 low-fat cheese stick with 8 cherry tomatoes or 1 serving fresh fruit  - 4 thin slices fat-free turkey breast and 1 slice low-fat cheese  - 4 thin slices fat-free honey ham with wedge of melon  - 2 slices of turkey grubbs  - Boiled eggs (can buy at [employer] already boiled w/ shell removed)  - for convenience,  Eagle read, snack, go (deli meat and cheese rolls)  - P3 packets (Protein packs w/ cheese, nuts, lean deli meat)  - MHP Fit and Lean Protein Pudding (find at [employer] - per 1 cup serving = 100 calories, 15 g protein, 0 g sugar)  - 6-8 edamame pods (equivalent to about 1/4 cup edamame without pods).   - 1/4 cup unsalted nuts with ½ cup fruit  - 6-oz container Dannon Light n Fit vanilla yogurt, topped with 1oz unsalted nuts         - apple, celery or baby carrots spread with 2 Tbsp PB2  - apple slices with 1 oz slice low-fat cheese  - Apple slices dipped in 2 Tbsp of PB2  - 2 Tbsp PB2 mixed in light or greek yogurt or sugar-free pudding  - celery, cucumber, bell pepper or baby carrots dipped in ¼ cup hummus bean spread   - celery, cucumber, baby carrots dipped in high protein greek yogurt (Mix 16 oz plain greek yogurt + 1 packet of hidden valley ranch dip mix)  - Jero Links Beef Steak - 14g protein! (similar to beef jerky but very lean)  - 2 wedges Laughing Cow - Light Herb & Garlic Cheese with sliced cucumber or green bell pepper  - 1/2 cup low-fat cottage cheese with ¼ cup fruit or ¼ cup salsa  - 1/2 cup low fat cottage cheese with 10-15 cherry tomatoes  - 8 oz glass of FAIRLIFE fat free milk (13 g protein)  - 8 oz glass of FAIRLIFE fat free milk + 1 packet of sugar-free hot cocoa  - Add Atkins advantage Cafe Caramel shake to decaf coffee. Serve hot or blend with ice for "frappaccino" like drink  - RTD Protein drinks: Atkins, Low Carb Slim Fast, EAS light, Muscle Milk Light, etc.  - Homemade Protein drinks: [facility] Soy95, Isopure, Nectar, " UNJURY, Whey Gourmet, etc. Mix 1 scoop powder with 8oz skim/1% milk or light soymilk.  - Protein bars: Atkins, EAS, Pure Protein,  Quest, Think Thin, Detour, etc. Must have 0-4 grams sugar - Read the label.    ** Be CREATIVE. You can always snack on bites of grilled chicken or tuna salad made with low fat gonzalez, if needed!

## 2019-10-30 NOTE — PROGRESS NOTES
"BARIATRIC POST-OPERATIVE VISIT:    HPI:  Renae Hou is a 59 y.o. year old female presents for 3 month post op visit following s/p gastric sleeve.  she is doing well and tolerating the diet without difficulty.  she has no complaints. Not taking vitamins, notes they do not sit well in her stomach and that she has always had issues with vitamins (pre surgery). States she has tried multiple kinds, with/without food, different times of day. On PPI prn, max once every other week.    Bariatric Diet. 3 meals (BF yogurt, HORACE soup, DIN ~2oz protein), 1 snack (appleasauce of KIND protein bar)   ~ low protein.  64+ fl oz SF clear beverage.    See Dietician note from today for a more detailed assessment.    Exercise- bicycle    Review of Systems   Constitutional: Negative for activity change and appetite change.   HENT: Negative for trouble swallowing.    Eyes: Negative for visual disturbance.   Respiratory: Negative for chest tightness and shortness of breath.    Cardiovascular: Negative for chest pain.   Gastrointestinal: Negative for abdominal distention, abdominal pain, blood in stool, constipation, diarrhea, nausea and vomiting.        Denies dysphagia   Musculoskeletal: Negative for back pain and neck pain.   Skin: Negative for rash.   Neurological: Negative for dizziness, light-headedness and headaches.   Psychiatric/Behavioral: Negative for decreased concentration, self-injury and suicidal ideas. The patient is not nervous/anxious.      /62   Pulse 93   Ht 5' 5" (1.651 m)   Wt 90.8 kg (200 lb 2.8 oz)   LMP 11/25/2012   BMI 33.31 kg/m²     Physical Exam   Constitutional: She is oriented to person, place, and time. She appears well-developed and well-nourished.   HENT:   Head: Normocephalic and atraumatic.   Eyes: Pupils are equal, round, and reactive to light. Conjunctivae and EOM are normal.   Neck: Normal range of motion. Neck supple. No JVD present. No thyromegaly present.   Cardiovascular: Normal " rate, regular rhythm, normal heart sounds and intact distal pulses.   No murmur heard.  Pulmonary/Chest: Effort normal and breath sounds normal. No respiratory distress. She has no wheezes.   Abdominal: Soft. Bowel sounds are normal. She exhibits no distension and no mass. There is no tenderness.   Musculoskeletal: Normal range of motion. She exhibits no edema or tenderness.   Lymphadenopathy:     She has no cervical adenopathy.   Neurological: She is alert and oriented to person, place, and time. Coordination normal.   Skin: Skin is warm and dry. Capillary refill takes less than 2 seconds. She is not diaphoretic. No erythema.   Psychiatric: She has a normal mood and affect. Her behavior is normal. Judgment and thought content normal.     Vitals:    10/30/19 1455   BP: 110/62   Pulse: 93     ASSESSMENT:  - Morbid obesity s/p sleeve gastrectomy on 07/23/2019.  - Co-morbidities: deep vein thrombosis, dyslipidemia, GERD, hypertension and obstructive sleep apnea  - Good Weight loss, 31#'s and 34% EWL  - Fair Exercise routine  - Fair Diet  - No Vitamin regimen    PLAN:  - Discussed vitamin regimen and importance  Will consider bariatric MVI patches in her case.   The patient verbalized understanding of the risks of thiamine deficiency, including and not limited to permanent neurologic deficits/damage and blindness. Educated on s/sx B1 deficiency.  Reviewed issue with RD. Ensure B1 is adequate. Can consider B1, B12 SL.  Advised f/u blood work to which she agrees.  - Anti-Acid medication, try Zantac instead of PPI as needed.  - Emphasized the importance of regular exercise and adherence to bariatric diet to achieve maximum weight loss.  - Follow-up with dietician to advance diet.  - Discussed with patient the importance of obtaining 80 gm protein on a daily basis.  Offered suggestions on how to achieve this.   - Continue daily vitamins and medications.  - RTC as needed  - Call the office for any issues.  - Labs  reviewed  Contact vascular to remove IVC.

## 2019-10-30 NOTE — PROGRESS NOTES
NUTRITION NOTE    Referring Physician: Duc Ratliff M.D.  Reason for MNT Referral: Follow-up  3 months s/p Gastric Sleeve    PAST MEDICAL HISTORY:    Denies nausea, vomiting, constipation and diarrhea.  Reports problems, including cant tolerate any vitamins.    Past Medical History:   Diagnosis Date    Abscess of paraspinous muscles     Allergy 4/2018    DVT (deep venous thrombosis)     left leg    Epidural abscess 4/10/2018    Fatty liver     GERD (gastroesophageal reflux disease) 2015    Hypertension     Lumbar radiculopathy     Menopause     Obesity     Obstructive sleep apnea on CPAP     Thyroid disease     Thyroid nodules.       CLINICAL DATA:  59 y.o. female.    There were no vitals filed for this visit.    Current Weight: 200 lbs  BMI: 33.31  Total Weight Loss: 30.8 lbs  Excess Weight Loss: 34 %    LABS:  Reviewed.    CURRENT DIET:  Regular Diet.  Diet Recall: ~40 grams of protein/day; 64 oz of fluids/day    Breakfast:  Greek yogurt, applesauce: lunch: asparagus soup; Dinner:  2 oz. of chicken or fish veggies: snack:  Kind bars, fruit, sometimes  A protein shake       Meal Pattern: 2 meal(s) + 1-2 snack(s) + 0 protein supplement(s)  Inadequate protein supplement intake.  Inadequate dairy intake.  Adequate vegetable intake. Tolerates raw vegetables and lettuce.  Adequate fruit intake.  Starchy CHO:  green peas, oatmeal (a few times)     Other:     EXERCISE:  Adequate light exercise.    Restrictions to Exercise: None.    VITAMINS / MINERALS:  Multivitamins:   B-Complex:   Calcium Citrate + Vitamin D:   Vitamin B12:     Not taking any vitamins     ASSESSMENT:  Doing fairly well overall.  Weight loss.  Adequate calorie intake.  Inadequate protein intake.  Adequate fluid intake.  Following diet inappropriately.  Exercising.  Inadequate vitamins & minerals.    BARIATRIC DIET DISCUSSION:  Instructed and provided written materials on bariatric diet plan.  Reinforced post-op nutrition  guidelines.    PLAN / RECOMMENDATIONS:  May begin to incorporate raw vegetables, lettuce, unsalted nuts, and light popcorn as tolerated.  May begin to swallow whole pills as tolerated.  Back on track with diet plan.  Adjust diet by  No starchy carbs, protein at all meals   Increase protein intake.  Maintain fluid intake.  Continue exercise.  Increase appropriate vitamins & minerals.  Adjust vitamins & minerals by:  Try patch vitamins - can not tolerate any vitamins not even liquid or sublingual  .  No vitamin deficiencies. Will  have labs rechecked in 3 months.     Return to clinic in  3 months.    SESSION TIME: 15  minutes

## 2019-10-31 DIAGNOSIS — Z95.828 S/P IVC FILTER: Primary | ICD-10-CM

## 2019-11-08 ENCOUNTER — PATIENT MESSAGE (OUTPATIENT)
Dept: VASCULAR SURGERY | Facility: CLINIC | Age: 59
End: 2019-11-08

## 2019-11-09 ENCOUNTER — PATIENT MESSAGE (OUTPATIENT)
Dept: ADMINISTRATIVE | Facility: OTHER | Age: 59
End: 2019-11-09

## 2019-11-14 ENCOUNTER — PATIENT MESSAGE (OUTPATIENT)
Dept: BARIATRICS | Facility: CLINIC | Age: 59
End: 2019-11-14

## 2019-11-18 ENCOUNTER — PATIENT OUTREACH (OUTPATIENT)
Dept: OTHER | Facility: OTHER | Age: 59
End: 2019-11-18

## 2019-11-22 ENCOUNTER — PATIENT MESSAGE (OUTPATIENT)
Dept: ORTHOPEDICS | Facility: CLINIC | Age: 59
End: 2019-11-22

## 2019-11-25 ENCOUNTER — TELEPHONE (OUTPATIENT)
Dept: ORTHOPEDICS | Facility: CLINIC | Age: 59
End: 2019-11-25

## 2019-11-25 DIAGNOSIS — M51.36 DDD (DEGENERATIVE DISC DISEASE), LUMBAR: Primary | ICD-10-CM

## 2019-12-05 ENCOUNTER — PATIENT OUTREACH (OUTPATIENT)
Dept: ADMINISTRATIVE | Facility: OTHER | Age: 59
End: 2019-12-05

## 2019-12-06 ENCOUNTER — OFFICE VISIT (OUTPATIENT)
Dept: VASCULAR SURGERY | Facility: CLINIC | Age: 59
End: 2019-12-06
Attending: SURGERY
Payer: COMMERCIAL

## 2019-12-06 ENCOUNTER — TELEPHONE (OUTPATIENT)
Dept: VASCULAR SURGERY | Facility: CLINIC | Age: 59
End: 2019-12-06

## 2019-12-06 ENCOUNTER — HOSPITAL ENCOUNTER (OUTPATIENT)
Dept: VASCULAR SURGERY | Facility: CLINIC | Age: 59
Discharge: HOME | End: 2019-12-06
Attending: SURGERY
Payer: COMMERCIAL

## 2019-12-06 VITALS
HEIGHT: 65 IN | WEIGHT: 189.63 LBS | DIASTOLIC BLOOD PRESSURE: 75 MMHG | HEART RATE: 65 BPM | TEMPERATURE: 98 F | SYSTOLIC BLOOD PRESSURE: 114 MMHG | BODY MASS INDEX: 31.59 KG/M2

## 2019-12-06 DIAGNOSIS — Z95.828 PRESENCE OF IVC FILTER: Primary | ICD-10-CM

## 2019-12-06 DIAGNOSIS — Z95.828 S/P IVC FILTER: ICD-10-CM

## 2019-12-06 PROCEDURE — 3008F PR BODY MASS INDEX (BMI) DOCUMENTED: ICD-10-PCS | Mod: CPTII,S$GLB,, | Performed by: SURGERY

## 2019-12-06 PROCEDURE — 3074F PR MOST RECENT SYSTOLIC BLOOD PRESSURE < 130 MM HG: ICD-10-PCS | Mod: CPTII,S$GLB,, | Performed by: SURGERY

## 2019-12-06 PROCEDURE — 3008F BODY MASS INDEX DOCD: CPT | Mod: CPTII,S$GLB,, | Performed by: SURGERY

## 2019-12-06 PROCEDURE — 3078F DIAST BP <80 MM HG: CPT | Mod: CPTII,S$GLB,, | Performed by: SURGERY

## 2019-12-06 PROCEDURE — 3074F SYST BP LT 130 MM HG: CPT | Mod: CPTII,S$GLB,, | Performed by: SURGERY

## 2019-12-06 PROCEDURE — 99999 PR PBB SHADOW E&M-EST. PATIENT-LVL III: ICD-10-PCS | Mod: PBBFAC,,, | Performed by: SURGERY

## 2019-12-06 PROCEDURE — 99214 PR OFFICE/OUTPT VISIT, EST, LEVL IV, 30-39 MIN: ICD-10-PCS | Mod: S$GLB,,, | Performed by: SURGERY

## 2019-12-06 PROCEDURE — 93970 PR US DUPLEX, UPPER OR LOWER EXT VENOUS,COMPLETE BILAT: ICD-10-PCS | Mod: S$GLB,,, | Performed by: SURGERY

## 2019-12-06 PROCEDURE — 99214 OFFICE O/P EST MOD 30 MIN: CPT | Mod: S$GLB,,, | Performed by: SURGERY

## 2019-12-06 PROCEDURE — 3078F PR MOST RECENT DIASTOLIC BLOOD PRESSURE < 80 MM HG: ICD-10-PCS | Mod: CPTII,S$GLB,, | Performed by: SURGERY

## 2019-12-06 PROCEDURE — 93970 EXTREMITY STUDY: CPT | Mod: S$GLB,,, | Performed by: SURGERY

## 2019-12-06 PROCEDURE — 99999 PR PBB SHADOW E&M-EST. PATIENT-LVL III: CPT | Mod: PBBFAC,,, | Performed by: SURGERY

## 2019-12-10 ENCOUNTER — PATIENT MESSAGE (OUTPATIENT)
Dept: ORTHOPEDICS | Facility: CLINIC | Age: 59
End: 2019-12-10

## 2019-12-11 NOTE — PROGRESS NOTES
[facility]: Health  Follow-Up      Follow Up  Follow-up reason(s): reading review and routine education    Pt reports headache with BP reading's in the 120's/70's. She states that she took BP meds 2 days and plans to stop taking it now that readings are lower.    INTERVENTION(S)  encouragement/support and denied questions    PLAN  continue monitoring    Praised pt for maintaining healthy eating habits and discussed the benefits of regular activity on BP control and wt. Encouraged to continue.    There are no preventive care reminders to display for this patient.    Last 5 Patient Entered Readings                                      Current 30 Day Average: 101/63     Recent Readings 12/10/2019 12/9/2019 12/8/2019 12/5/2019 12/5/2019    SBP (mmHg) 101 97 100 97 127    DBP (mmHg) 63 62 63 57 77    Pulse 93 83 71 75 61            Diet Screening       Pt states that she is doing well with eating habits and is happy with the progress she has made since her bariatric surgery.    Physical Activity Screening       Pt is not able to exercise due to long work hours and lack of time. She states that she has been using a step counter and is averaging about 5015-9408 steps per day and hopes to increase to 3000 steps per day.     Intervention(s): goal setting     SDOH: Deferred.

## 2019-12-17 ENCOUNTER — PATIENT OUTREACH (OUTPATIENT)
Dept: ADMINISTRATIVE | Facility: OTHER | Age: 59
End: 2019-12-17

## 2019-12-18 ENCOUNTER — HOSPITAL ENCOUNTER (OUTPATIENT)
Dept: RADIOLOGY | Facility: HOSPITAL | Age: 59
Discharge: HOME | End: 2019-12-18
Attending: PHYSICIAN ASSISTANT
Payer: COMMERCIAL

## 2019-12-18 ENCOUNTER — PATIENT MESSAGE (OUTPATIENT)
Dept: ORTHOPEDICS | Facility: CLINIC | Age: 59
End: 2019-12-18

## 2019-12-18 ENCOUNTER — OFFICE VISIT (OUTPATIENT)
Dept: ORTHOPEDICS | Facility: CLINIC | Age: 59
End: 2019-12-18
Payer: COMMERCIAL

## 2019-12-18 VITALS — HEIGHT: 65 IN | WEIGHT: 191.56 LBS | BODY MASS INDEX: 31.92 KG/M2

## 2019-12-18 DIAGNOSIS — M54.16 LUMBAR RADICULOPATHY: ICD-10-CM

## 2019-12-18 DIAGNOSIS — Z98.1 S/P LUMBAR FUSION: Primary | ICD-10-CM

## 2019-12-18 DIAGNOSIS — M51.36 DDD (DEGENERATIVE DISC DISEASE), LUMBAR: ICD-10-CM

## 2019-12-18 DIAGNOSIS — M70.62 TROCHANTERIC BURSITIS OF LEFT HIP: ICD-10-CM

## 2019-12-18 PROCEDURE — 99213 OFFICE O/P EST LOW 20 MIN: CPT | Mod: S$GLB,,, | Performed by: PHYSICIAN ASSISTANT

## 2019-12-18 PROCEDURE — 3008F BODY MASS INDEX DOCD: CPT | Mod: CPTII,S$GLB,, | Performed by: PHYSICIAN ASSISTANT

## 2019-12-18 PROCEDURE — 3008F PR BODY MASS INDEX (BMI) DOCUMENTED: ICD-10-PCS | Mod: CPTII,S$GLB,, | Performed by: PHYSICIAN ASSISTANT

## 2019-12-18 PROCEDURE — 99999 PR PBB SHADOW E&M-EST. PATIENT-LVL III: ICD-10-PCS | Mod: PBBFAC,,, | Performed by: PHYSICIAN ASSISTANT

## 2019-12-18 PROCEDURE — 72120 XR LUMBAR SPINE AP AND LAT WITH FLEX/EXT: ICD-10-PCS | Mod: 26,,, | Performed by: RADIOLOGY

## 2019-12-18 PROCEDURE — 72100 XR LUMBAR SPINE AP AND LAT WITH FLEX/EXT: ICD-10-PCS | Mod: 26,,, | Performed by: RADIOLOGY

## 2019-12-18 PROCEDURE — 72120 X-RAY BEND ONLY L-S SPINE: CPT | Mod: TC

## 2019-12-18 PROCEDURE — 72120 X-RAY BEND ONLY L-S SPINE: CPT | Mod: 26,,, | Performed by: RADIOLOGY

## 2019-12-18 PROCEDURE — 99213 PR OFFICE/OUTPT VISIT, EST, LEVL III, 20-29 MIN: ICD-10-PCS | Mod: S$GLB,,, | Performed by: PHYSICIAN ASSISTANT

## 2019-12-18 PROCEDURE — 99999 PR PBB SHADOW E&M-EST. PATIENT-LVL III: CPT | Mod: PBBFAC,,, | Performed by: PHYSICIAN ASSISTANT

## 2019-12-18 PROCEDURE — 72100 X-RAY EXAM L-S SPINE 2/3 VWS: CPT | Mod: 26,,, | Performed by: RADIOLOGY

## 2019-12-18 RX ORDER — METHYLPREDNISOLONE 4 MG/1
TABLET ORAL
Qty: 21 TABLET | Refills: 0 | Status: SHIPPED | OUTPATIENT
Start: 2019-12-18 | End: 2020-01-02

## 2019-12-18 RX ORDER — METHYLPREDNISOLONE 4 MG/1
TABLET ORAL
Qty: 1 PACKAGE | Refills: 0 | Status: SHIPPED | OUTPATIENT
Start: 2019-12-18 | End: 2019-12-18

## 2019-12-18 NOTE — PROGRESS NOTES
Renae Hou  12/6/2019    HPI:  Presents for IVC filter retrieval about 5 months following placement.      Previously:      Patient is a 59 y.o. female with a h/o HTN, GERD who is here today for evaluation of  IVC filter placement with upcoming lap sleeve gastrectomy planned for 7/23/19. She has a hx of DVT previously (LLE) in July 2018 after L4-L5-S1 fusion. She denies PE. She was started on Xarelto to tx the DVT, she finished the Rx in January. Outside of this event, she has never had DVT/PE previously. She c/o mild L leg swelling and discomfort that onset last year and occurs intermittently (daily, usually while at rest). She describes it as an ache.   No ASA, Statin, or other anticoag currently.   Denies MI/stroke  Tobacco use: denies, quit 31 years ago    Past Medical History:   Diagnosis Date    Abscess of paraspinous muscles     Allergy 4/2018    DVT (deep venous thrombosis)     left leg    Epidural abscess 4/10/2018    Fatty liver     GERD (gastroesophageal reflux disease) 2015    Hypertension     Lumbar radiculopathy     Menopause     Obesity     Obstructive sleep apnea on CPAP     Thyroid disease     Thyroid nodules.     Past Surgical History:   Procedure Laterality Date    ADENOIDECTOMY  1995    BACK SURGERY  2002    L4, L5    BILATERAL SALPINGOOPHORECTOMY      CHOLECYSTECTOMY      CYST REMOVAL      ESOPHAGOGASTRODUODENOSCOPY N/A 5/3/2019    Procedure: ESOPHAGOGASTRODUODENOSCOPY (EGD);  Surgeon: Carlin Sanchez MD;  Location: [facility] ([address] [place] [address]);  Service: Endoscopy;  Laterality: N/A;    HYSTERECTOMY  12/17/2012    Formerly [facility] BS&O     INSERTION OF INFERIOR VENA CAVAL FILTER Right 7/19/2019    Procedure: IVC filter placement;  Surgeon: Rodney Rico MD;  Location: [facility] CATH LAB;  Service: Peripheral Vascular;  Laterality: Right;    LAPAROSCOPIC SLEEVE GASTRECTOMY N/A 7/23/2019    Procedure: GASTRECTOMY, SLEEVE, LAPAROSCOPIC, with intraop EGD [id number];  Surgeon: Duc Ratliff  MD Noy;  Location: [facility] OR [address] [place] [address];  Service: General;  Laterality: N/A;    RECTOCELE REPAIR      SPINE SURGERY  2018    THYROID NODULE REMOVAL      TONSILLECTOMY       Family History   Problem Relation Age of Onset    Hypertension Mother     Hyperlipidemia Mother     Heart disease Mother 55        cad, valvular heart disease    Alcohol abuse Sister     Stroke Father     No Known Problems Daughter     No Known Problems Daughter     Breast cancer Neg Hx     Colon cancer Neg Hx     Ovarian cancer Neg Hx     Diabetes Neg Hx     Esophageal cancer Neg Hx      Social History     Socioeconomic History    Marital status:      Spouse name: Not on file    Number of children: Not on file    Years of education: Not on file    Highest education level: Not on file   Occupational History    Not on file   Social Needs    Financial resource strain: Not hard at all    Food insecurity:     Worry: Never true     Inability: Never true    Transportation needs:     Medical: No     Non-medical: No   Tobacco Use    Smoking status: Former Smoker     Packs/day: 0.25     Years: 32.00     Pack years: 8.00     Types: Cigarettes     Last attempt to quit:      Years since quittin.9    Smokeless tobacco: Never Used    Tobacco comment: smoked socially decades ago - weekends only   Substance and Sexual Activity    Alcohol use: No     Frequency: Monthly or less     Drinks per session: 1 or 2     Binge frequency: Never     Comment: very rare    Drug use: No    Sexual activity: Not Currently     Partners: Male     Birth control/protection: See Surgical Hx, None     Comment: [facility] BS&O 2012,     Lifestyle    Physical activity:     Days per week: Patient refused     Minutes per session: 0 min    Stress: Not at all   Relationships    Social connections:     Talks on phone: More than three times a week     Gets together: Once a week     Attends [employer] service: Not on file     Active member  of club or organization: No     Attends meetings of clubs or organizations: Patient refused     Relationship status:    Other Topics Concern    Not on file   Social History Narrative    . 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       Current Outpatient Medications:     cyanocobalamin 500 MCG tablet, Take 500 mcg by mouth once daily., Disp: , Rfl:     ergocalciferol, vitamin D2, (VITAMIN D ORAL), Take 1 tablet by mouth once daily., Disp: , Rfl:     hydroCHLOROthiazide (HYDRODIURIL) 12.5 MG Tab, Take 12.5 mg by mouth as needed., Disp: , Rfl:     omeprazole (PRILOSEC) 40 MG capsule, Take 1 capsule (40 mg total) by mouth every morning. Open capsule and take with apple sauce, Disp: 30 capsule, Rfl: 2    REVIEW OF SYSTEMS:  General: negative; ENT: negative; Allergy and Immunology: negative; Hematological and Lymphatic: Negative; Endocrine: negative; Respiratory: no cough, shortness of breath, or wheezing; Cardiovascular: no chest pain or dyspnea on exertion; Gastrointestinal: no abdominal pain/back, change in bowel habits, or bloody stools; Genito-Urinary: no dysuria, trouble voiding, or hematuria; Musculoskeletal: negative  Neurological: no TIA or stroke symptoms; Psychiatric: no nervousness, anxiety or depression.    PHYSICAL EXAM:   Right Arm BP - Sittin/75 (19 1548)  Left Arm BP - Sittin/79 (19 1548)  Pulse: 65  Temp: 98.4 °F (36.9 °C)      General appearance:  Alert, well-appearing, and in no distress.  Oriented to person, place, and time   Neurological: Normal speech, no focal findings noted; CN II - XII grossly intact           Musculoskeletal: Digits/nail without cyanosis/clubbing.  Normal muscle strength/tone.                 Neck: Supple, no significant adenopathy; thyroid is not enlarged                  No carotid bruit can be auscultated                Chest:  Clear to auscultation, no wheezes, rales or rhonchi, symmetric air entry     No use of accessory muscles              Cardiac: Normal rate and regular rhythm, S1 and S2 normal; PMI non-displaced          Abdomen: Soft, nontender, nondistended, no masses or organomegaly     No rebound tenderness noted; bowel sounds normal     Pulsatile aortic mass is not palpable.     No groin adenopathy      Extremities:   2+ femoral pulses bilaterally     2+ pedal pulses palpable.     No pre-tibial edema     No ulcerations    LAB RESULTS:  Lab Results   Component Value Date    K 4.2 10/18/2019    K 3.3 (L) 08/23/2019    K 3.2 (L) 08/01/2019    CREATININE 0.9 10/18/2019    CREATININE 0.9 08/01/2019    CREATININE 0.8 07/24/2019     Lab Results   Component Value Date    WBC 5.50 10/18/2019    WBC 7.78 08/01/2019    WBC 11.61 07/24/2019    HCT 43.5 10/18/2019    HCT 41.5 08/01/2019    HCT 42.4 07/24/2019     10/18/2019     08/01/2019     07/24/2019     Lab Results   Component Value Date    HGBA1C 6.0 (H) 05/24/2019    HGBA1C 6.1 (H) 10/05/2018    HGBA1C 6.1 (H) 09/28/2018     IMAGING:  EXAM PROSPER: 12/06/2019 14:20  REF PHYS: JL VALDOVINOS      Indication:  ivc filter.  Results:  Right Leg:        Compression       Color fill        Thrombosis    Common Femoral    complete          complete          none  Femoral prox      complete          complete          none  Femoral mid       complete          complete          none  Femoral distal    complete          complete          none  Popliteal Fossa   complete          complete          none  Posterior Tibial  complete          complete          none  Peroneal          complete          complete          none  GSV               complete          complete          none  SFJ               complete          complete          none    Left Leg:         Compression       Color fill        Thrombosis    Common Femoral    complete          complete          none  Femoral prox      complete          complete          none  Femoral mid       complete          complete          none  Femoral  distal    complete          complete          none  Popliteal Fossa   complete          complete          none  Posterior Tibial  complete          complete          none  Peroneal          complete          complete          none  GSV               complete          complete          none  SFJ               complete          complete          none      Report Summary:  Impression:   Right Leg:  Color flow evaluation of the lower extremity demonstrates fully compressible and patent veins. There is no evidence of venous thrombosis in the deep or superficial veins. Incidental finding of significant reflux noted in the mis femoral vein.    Left Leg:  Color flow evaluation of the lower extremity demonstrates fully compressible and patent veins. There is no evidence of venous thrombosis in the deep or superficial veins. Incidental finding of significant reflux noted in the proximal femoral   vein.        IMP/PLAN:  59 y.o. female with PMHx of HTN, GERD who presents for evaluation for IVC filter retrieval.  She has delayed follow up for 4 months which will increase difficulty of retrieval.      1) IVC Filter retrieval in the coming weeks - risks and benefits explained in detail     Rodney Rico MD FACS  Vascular/Endovascular Surgery

## 2019-12-18 NOTE — PROGRESS NOTES
Date: 12/18/2019    Supervising Physician: Dean Zayas M.D.    Date of Surgery: 4/10/2018    Procedure: L4/5/S1 TLIF for grade I spondylolisthesis with large   left-sided L4-L5 facet cyst, possible epidural abscess as well as paraspinal muscle abscess.      History: Renae Hou is seen today for follow-up following the above listed procedure. Overall the patient is doing well but today notes she started having left lateral hip and leg pain a couple months ago.  The pain radiates to the medial aspect of the knee and down the anterior leg.  The pain is worse with laying on her side at night.  She has tried NSAIDs with little relief.        Exam: Incision is healed.   There is no sign of infection. Neuro exam is stable. No signs of DVT.  There is mild left greater troch tenderness to palpation.     Radiographs: imaging today shows hardware in place, no evidence of failure.       Assessment/Plan:     She is having symptoms of lumbar radiculopathy vs hip bursitis.  Medrol dose pack sent to the pharmacy. She will follow up with me next week.  We may consider a new MRI at that time if indicated.

## 2019-12-18 NOTE — H&P (VIEW-ONLY)
Renae Hou  12/6/2019    HPI:  Presents for IVC filter retrieval about 5 months following placement.      Previously:      Patient is a 59 y.o. female with a h/o HTN, GERD who is here today for evaluation of  IVC filter placement with upcoming lap sleeve gastrectomy planned for 7/23/19. She has a hx of DVT previously (LLE) in July 2018 after L4-L5-S1 fusion. She denies PE. She was started on Xarelto to tx the DVT, she finished the Rx in January. Outside of this event, she has never had DVT/PE previously. She c/o mild L leg swelling and discomfort that onset last year and occurs intermittently (daily, usually while at rest). She describes it as an ache.   No ASA, Statin, or other anticoag currently.   Denies MI/stroke  Tobacco use: denies, quit 31 years ago    Past Medical History:   Diagnosis Date    Abscess of paraspinous muscles     Allergy 4/2018    DVT (deep venous thrombosis)     left leg    Epidural abscess 4/10/2018    Fatty liver     GERD (gastroesophageal reflux disease) 2015    Hypertension     Lumbar radiculopathy     Menopause     Obesity     Obstructive sleep apnea on CPAP     Thyroid disease     Thyroid nodules.     Past Surgical History:   Procedure Laterality Date    ADENOIDECTOMY  1995    BACK SURGERY  2002    L4, L5    BILATERAL SALPINGOOPHORECTOMY      CHOLECYSTECTOMY      CYST REMOVAL      ESOPHAGOGASTRODUODENOSCOPY N/A 5/3/2019    Procedure: ESOPHAGOGASTRODUODENOSCOPY (EGD);  Surgeon: Carlin Sanchez MD;  Location: [facility] ([address] [place] [address]);  Service: Endoscopy;  Laterality: N/A;    HYSTERECTOMY  12/17/2012    [facility] BS&O     INSERTION OF INFERIOR VENA CAVAL FILTER Right 7/19/2019    Procedure: IVC filter placement;  Surgeon: Rodney Rico MD;  Location: [facility] CATH LAB;  Service: Peripheral Vascular;  Laterality: Right;    LAPAROSCOPIC SLEEVE GASTRECTOMY N/A 7/23/2019    Procedure: GASTRECTOMY, SLEEVE, LAPAROSCOPIC, with intraop EGD 25024;  Surgeon: Duc Ratliff  MD Noy;  Location: [facility] OR [address] [place] [address];  Service: General;  Laterality: N/A;    RECTOCELE REPAIR      SPINE SURGERY  2018    THYROID NODULE REMOVAL      TONSILLECTOMY       Family History   Problem Relation Age of Onset    Hypertension Mother     Hyperlipidemia Mother     Heart disease Mother 55        cad, valvular heart disease    Alcohol abuse Sister     Stroke Father     No Known Problems Daughter     No Known Problems Daughter     Breast cancer Neg Hx     Colon cancer Neg Hx     Ovarian cancer Neg Hx     Diabetes Neg Hx     Esophageal cancer Neg Hx      Social History     Socioeconomic History    Marital status:      Spouse name: Not on file    Number of children: Not on file    Years of education: Not on file    Highest education level: Not on file   Occupational History    Not on file   Social Needs    Financial resource strain: Not hard at all    Food insecurity:     Worry: Never true     Inability: Never true    Transportation needs:     Medical: No     Non-medical: No   Tobacco Use    Smoking status: Former Smoker     Packs/day: 0.25     Years: 32.00     Pack years: 8.00     Types: Cigarettes     Last attempt to quit:      Years since quittin.9    Smokeless tobacco: Never Used    Tobacco comment: smoked socially decades ago - weekends only   Substance and Sexual Activity    Alcohol use: No     Frequency: Monthly or less     Drinks per session: 1 or 2     Binge frequency: Never     Comment: very rare    Drug use: No    Sexual activity: Not Currently     Partners: Male     Birth control/protection: See Surgical Hx, None     Comment: [facility] BS&O 2012,     Lifestyle    Physical activity:     Days per week: Patient refused     Minutes per session: 0 min    Stress: Not at all   Relationships    Social connections:     Talks on phone: More than three times a week     Gets together: Once a week     Attends [employer] service: Not on file     Active member  of club or organization: No     Attends meetings of clubs or organizations: Patient refused     Relationship status:    Other Topics Concern    Not on file   Social History Narrative    . 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       Current Outpatient Medications:     cyanocobalamin 500 MCG tablet, Take 500 mcg by mouth once daily., Disp: , Rfl:     ergocalciferol, vitamin D2, (VITAMIN D ORAL), Take 1 tablet by mouth once daily., Disp: , Rfl:     hydroCHLOROthiazide (HYDRODIURIL) 12.5 MG Tab, Take 12.5 mg by mouth as needed., Disp: , Rfl:     omeprazole (PRILOSEC) 40 MG capsule, Take 1 capsule (40 mg total) by mouth every morning. Open capsule and take with apple sauce, Disp: 30 capsule, Rfl: 2    REVIEW OF SYSTEMS:  General: negative; ENT: negative; Allergy and Immunology: negative; Hematological and Lymphatic: Negative; Endocrine: negative; Respiratory: no cough, shortness of breath, or wheezing; Cardiovascular: no chest pain or dyspnea on exertion; Gastrointestinal: no abdominal pain/back, change in bowel habits, or bloody stools; Genito-Urinary: no dysuria, trouble voiding, or hematuria; Musculoskeletal: negative  Neurological: no TIA or stroke symptoms; Psychiatric: no nervousness, anxiety or depression.    PHYSICAL EXAM:   Right Arm BP - Sittin/75 (19 1548)  Left Arm BP - Sittin/79 (19 1548)  Pulse: 65  Temp: 98.4 °F (36.9 °C)      General appearance:  Alert, well-appearing, and in no distress.  Oriented to person, place, and time   Neurological: Normal speech, no focal findings noted; CN II - XII grossly intact           Musculoskeletal: Digits/nail without cyanosis/clubbing.  Normal muscle strength/tone.                 Neck: Supple, no significant adenopathy; thyroid is not enlarged                  No carotid bruit can be auscultated                Chest:  Clear to auscultation, no wheezes, rales or rhonchi, symmetric air entry     No use of accessory muscles              Cardiac: Normal rate and regular rhythm, S1 and S2 normal; PMI non-displaced          Abdomen: Soft, nontender, nondistended, no masses or organomegaly     No rebound tenderness noted; bowel sounds normal     Pulsatile aortic mass is not palpable.     No groin adenopathy      Extremities:   2+ femoral pulses bilaterally     2+ pedal pulses palpable.     No pre-tibial edema     No ulcerations    LAB RESULTS:  Lab Results   Component Value Date    K 4.2 10/18/2019    K 3.3 (L) 08/23/2019    K 3.2 (L) 08/01/2019    CREATININE 0.9 10/18/2019    CREATININE 0.9 08/01/2019    CREATININE 0.8 07/24/2019     Lab Results   Component Value Date    WBC 5.50 10/18/2019    WBC 7.78 08/01/2019    WBC 11.61 07/24/2019    HCT 43.5 10/18/2019    HCT 41.5 08/01/2019    HCT 42.4 07/24/2019     10/18/2019     08/01/2019     07/24/2019     Lab Results   Component Value Date    HGBA1C 6.0 (H) 05/24/2019    HGBA1C 6.1 (H) 10/05/2018    HGBA1C 6.1 (H) 09/28/2018     IMAGING:  EXAM PROSPER: 12/06/2019 14:20  REF PHYS: JL VALDOVINOS      Indication:  ivc filter.  Results:  Right Leg:        Compression       Color fill        Thrombosis    Common Femoral    complete          complete          none  Femoral prox      complete          complete          none  Femoral mid       complete          complete          none  Femoral distal    complete          complete          none  Popliteal Fossa   complete          complete          none  Posterior Tibial  complete          complete          none  Peroneal          complete          complete          none  GSV               complete          complete          none  SFJ               complete          complete          none    Left Leg:         Compression       Color fill        Thrombosis    Common Femoral    complete          complete          none  Femoral prox      complete          complete          none  Femoral mid       complete          complete          none  Femoral  distal    complete          complete          none  Popliteal Fossa   complete          complete          none  Posterior Tibial  complete          complete          none  Peroneal          complete          complete          none  GSV               complete          complete          none  SFJ               complete          complete          none      Report Summary:  Impression:   Right Leg:  Color flow evaluation of the lower extremity demonstrates fully compressible and patent veins. There is no evidence of venous thrombosis in the deep or superficial veins. Incidental finding of significant reflux noted in the mis femoral vein.    Left Leg:  Color flow evaluation of the lower extremity demonstrates fully compressible and patent veins. There is no evidence of venous thrombosis in the deep or superficial veins. Incidental finding of significant reflux noted in the proximal femoral   vein.        IMP/PLAN:  59 y.o. female with PMHx of HTN, GERD who presents for evaluation for IVC filter retrieval.  She has delayed follow up for 4 months which will increase difficulty of retrieval.      1) IVC Filter retrieval in the coming weeks - risks and benefits explained in detail     Rodney Rico MD FACS  Vascular/Endovascular Surgery

## 2019-12-19 ENCOUNTER — IMMUNIZATION (OUTPATIENT)
Dept: PHARMACY | Facility: CLINIC | Age: 59
End: 2019-12-19
Payer: COMMERCIAL

## 2019-12-19 ENCOUNTER — ANESTHESIA EVENT (OUTPATIENT)
Dept: SURGERY | Facility: HOSPITAL | Age: 59
End: 2019-12-19
Payer: COMMERCIAL

## 2019-12-19 ENCOUNTER — TELEPHONE (OUTPATIENT)
Dept: VASCULAR SURGERY | Facility: CLINIC | Age: 59
End: 2019-12-19

## 2019-12-19 NOTE — TELEPHONE ENCOUNTER
----- Message from Julius Hong sent at 12/19/2019  2:12 PM CST -----  Contact: Pt      The Pt states that she hasn't received a call from Pre Opp team about when to stop eating, drinking, etc.    Phone # [phone]

## 2019-12-19 NOTE — PRE-PROCEDURE INSTRUCTIONS
Preop instructions:     NPO instructions: NO solids foods,non-clear liquids including milk, milk products, creamers, gum, mints, or hard candy after 2300 the night prior to your surgery or 8 hours prior to your SX start time.     For Peripheral Vascular Surgery patients - you may only have sips of water with your AM medications one (1) hour prior to your arrival to the hospital. 0400    If you have received specific instructions from your Surgeon/Surgeon's staff, please follow their instructions.     Showering instructions, directions, leave all valuables at home, medication instructions for PM prior & am of procedure explained. Patient stated an understanding.      Patient denies any side effects or issues with anesthesia or sedation.    Pt's  - Yohan - will be driving pt home upon d/c.  Arrival to [facility] - 0500

## 2019-12-20 ENCOUNTER — HOSPITAL ENCOUNTER (OUTPATIENT)
Facility: HOSPITAL | Age: 59
Discharge: HOME | End: 2019-12-20
Attending: SURGERY | Admitting: SURGERY
Payer: COMMERCIAL

## 2019-12-20 ENCOUNTER — ANESTHESIA (OUTPATIENT)
Dept: SURGERY | Facility: HOSPITAL | Age: 59
End: 2019-12-20
Payer: COMMERCIAL

## 2019-12-20 VITALS
OXYGEN SATURATION: 100 % | DIASTOLIC BLOOD PRESSURE: 70 MMHG | WEIGHT: 191 LBS | BODY MASS INDEX: 31.78 KG/M2 | TEMPERATURE: 98 F | HEART RATE: 61 BPM | RESPIRATION RATE: 16 BRPM | SYSTOLIC BLOOD PRESSURE: 116 MMHG

## 2019-12-20 DIAGNOSIS — Z95.828 PRESENCE OF IVC FILTER: ICD-10-CM

## 2019-12-20 PROCEDURE — 37193 PR REMOVE IVC FILTER: ICD-10-PCS | Mod: ,,, | Performed by: SURGERY

## 2019-12-20 PROCEDURE — 63600175 PHARM REV CODE 636 W HCPCS: Performed by: STUDENT IN AN ORGANIZED HEALTH CARE EDUCATION/TRAINING PROGRAM

## 2019-12-20 PROCEDURE — 37000008 HC ANESTHESIA 1ST 15 MINUTES: Performed by: SURGERY

## 2019-12-20 PROCEDURE — 63600175 PHARM REV CODE 636 W HCPCS: Performed by: SURGERY

## 2019-12-20 PROCEDURE — 37000009 HC ANESTHESIA EA ADD 15 MINS: Performed by: SURGERY

## 2019-12-20 PROCEDURE — D9220A PRA ANESTHESIA: ICD-10-PCS | Mod: ,,, | Performed by: ANESTHESIOLOGY

## 2019-12-20 PROCEDURE — 25000003 PHARM REV CODE 250: Performed by: STUDENT IN AN ORGANIZED HEALTH CARE EDUCATION/TRAINING PROGRAM

## 2019-12-20 PROCEDURE — 36000707: Performed by: SURGERY

## 2019-12-20 PROCEDURE — C1773 RET DEV, INSERTABLE: HCPCS | Performed by: SURGERY

## 2019-12-20 PROCEDURE — 25500020 PHARM REV CODE 255: Performed by: SURGERY

## 2019-12-20 PROCEDURE — 37193 REM ENDOVAS VENA CAVA FILTER: CPT | Mod: ,,, | Performed by: SURGERY

## 2019-12-20 PROCEDURE — 36000706: Performed by: SURGERY

## 2019-12-20 PROCEDURE — 25000003 PHARM REV CODE 250: Performed by: SURGERY

## 2019-12-20 PROCEDURE — C1894 INTRO/SHEATH, NON-LASER: HCPCS | Performed by: SURGERY

## 2019-12-20 PROCEDURE — C1769 GUIDE WIRE: HCPCS | Performed by: SURGERY

## 2019-12-20 PROCEDURE — 71000015 HC POSTOP RECOV 1ST HR: Performed by: SURGERY

## 2019-12-20 PROCEDURE — D9220A PRA ANESTHESIA: Mod: ,,, | Performed by: ANESTHESIOLOGY

## 2019-12-20 RX ORDER — PROPOFOL 10 MG/ML
VIAL (ML) INTRAVENOUS
Status: DISCONTINUED | OUTPATIENT
Start: 2019-12-20 | End: 2019-12-20

## 2019-12-20 RX ORDER — SODIUM CHLORIDE 0.9 % (FLUSH) 0.9 %
10 SYRINGE (ML) INJECTION
Status: DISCONTINUED | OUTPATIENT
Start: 2019-12-20 | End: 2019-12-20 | Stop reason: HOSPADM

## 2019-12-20 RX ORDER — LIDOCAINE HYDROCHLORIDE 10 MG/ML
1 INJECTION, SOLUTION EPIDURAL; INFILTRATION; INTRACAUDAL; PERINEURAL ONCE
Status: COMPLETED | OUTPATIENT
Start: 2019-12-20 | End: 2019-12-20

## 2019-12-20 RX ORDER — LIDOCAINE HYDROCHLORIDE 10 MG/ML
INJECTION INFILTRATION; PERINEURAL
Status: DISCONTINUED | OUTPATIENT
Start: 2019-12-20 | End: 2019-12-20 | Stop reason: HOSPADM

## 2019-12-20 RX ORDER — DIPHENHYDRAMINE HYDROCHLORIDE 50 MG/ML
INJECTION INTRAMUSCULAR; INTRAVENOUS
Status: DISCONTINUED | OUTPATIENT
Start: 2019-12-20 | End: 2019-12-20

## 2019-12-20 RX ORDER — METHYLPREDNISOLONE ACETATE 80 MG/ML
INJECTION, SUSPENSION INTRA-ARTICULAR; INTRALESIONAL; INTRAMUSCULAR; SOFT TISSUE
Status: DISCONTINUED | OUTPATIENT
Start: 2019-12-20 | End: 2019-12-20

## 2019-12-20 RX ORDER — LIDOCAINE HCL/PF 100 MG/5ML
SYRINGE (ML) INTRAVENOUS
Status: DISCONTINUED | OUTPATIENT
Start: 2019-12-20 | End: 2019-12-20

## 2019-12-20 RX ORDER — MUPIROCIN 20 MG/G
OINTMENT TOPICAL
Status: DISCONTINUED | OUTPATIENT
Start: 2019-12-20 | End: 2019-12-20 | Stop reason: HOSPADM

## 2019-12-20 RX ORDER — MIDAZOLAM HYDROCHLORIDE 1 MG/ML
INJECTION, SOLUTION INTRAMUSCULAR; INTRAVENOUS
Status: DISCONTINUED | OUTPATIENT
Start: 2019-12-20 | End: 2019-12-20

## 2019-12-20 RX ORDER — ACETAMINOPHEN 500 MG
1000 TABLET ORAL
Status: COMPLETED | OUTPATIENT
Start: 2019-12-20 | End: 2019-12-20

## 2019-12-20 RX ORDER — FENTANYL CITRATE 50 UG/ML
25 INJECTION, SOLUTION INTRAMUSCULAR; INTRAVENOUS EVERY 5 MIN PRN
Status: DISCONTINUED | OUTPATIENT
Start: 2019-12-20 | End: 2019-12-20 | Stop reason: HOSPADM

## 2019-12-20 RX ORDER — CEFAZOLIN SODIUM 1 G/3ML
2 INJECTION, POWDER, FOR SOLUTION INTRAMUSCULAR; INTRAVENOUS
Status: COMPLETED | OUTPATIENT
Start: 2019-12-20 | End: 2019-12-20

## 2019-12-20 RX ORDER — SODIUM CHLORIDE 9 MG/ML
INJECTION, SOLUTION INTRAVENOUS CONTINUOUS
Status: DISCONTINUED | OUTPATIENT
Start: 2019-12-20 | End: 2019-12-20 | Stop reason: HOSPADM

## 2019-12-20 RX ORDER — IODIXANOL 320 MG/ML
INJECTION, SOLUTION INTRAVASCULAR
Status: DISCONTINUED | OUTPATIENT
Start: 2019-12-20 | End: 2019-12-20 | Stop reason: HOSPADM

## 2019-12-20 RX ADMIN — SODIUM CHLORIDE: 0.9 INJECTION, SOLUTION INTRAVENOUS at 05:12

## 2019-12-20 RX ADMIN — PROPOFOL 20 MG: 10 INJECTION, EMULSION INTRAVENOUS at 07:12

## 2019-12-20 RX ADMIN — METHYLPREDNISOLONE ACETATE 200 MG: 80 INJECTION, SUSPENSION INTRA-ARTICULAR; INTRALESIONAL; INTRAMUSCULAR; SOFT TISSUE at 07:12

## 2019-12-20 RX ADMIN — ACETAMINOPHEN 1000 MG: 500 TABLET ORAL at 06:12

## 2019-12-20 RX ADMIN — PROPOFOL 10 MG: 10 INJECTION, EMULSION INTRAVENOUS at 07:12

## 2019-12-20 RX ADMIN — MIDAZOLAM HYDROCHLORIDE 2 MG: 1 INJECTION, SOLUTION INTRAMUSCULAR; INTRAVENOUS at 06:12

## 2019-12-20 RX ADMIN — LIDOCAINE HYDROCHLORIDE 50 MG: 20 INJECTION, SOLUTION INTRAVENOUS at 07:12

## 2019-12-20 RX ADMIN — LIDOCAINE HYDROCHLORIDE 0.2 MG: 10 INJECTION, SOLUTION EPIDURAL; INFILTRATION; INTRACAUDAL; PERINEURAL at 05:12

## 2019-12-20 RX ADMIN — CEFAZOLIN 2 G: 330 INJECTION, POWDER, FOR SOLUTION INTRAMUSCULAR; INTRAVENOUS at 07:12

## 2019-12-20 RX ADMIN — MUPIROCIN: 20 OINTMENT TOPICAL at 05:12

## 2019-12-20 RX ADMIN — DIPHENHYDRAMINE HYDROCHLORIDE 50 MG: 50 INJECTION, SOLUTION INTRAMUSCULAR; INTRAVENOUS at 07:12

## 2019-12-20 RX ADMIN — PROPOFOL 30 MG: 10 INJECTION, EMULSION INTRAVENOUS at 07:12

## 2019-12-20 NOTE — TRANSFER OF CARE
Anesthesia Transfer of Care Note    Patient: Renae Hou    Procedure(s) Performed: Procedure(s) (LRB):  REMOVAL-FILTER-IVC (N/A)    Patient location: [facility]    Anesthesia Type: MAC    Transport from OR: Transported from OR on room air with adequate spontaneous ventilation    Post pain: adequate analgesia    Post assessment: no apparent anesthetic complications and tolerated procedure well    Post vital signs: stable    Level of consciousness: awake, alert and oriented    Nausea/Vomiting: no nausea/vomiting    Complications: none    Transfer of care protocol was followed      Last vitals:   Visit Vitals  BP (!) 118/59   Pulse (!) 53   Temp 36.4 °C (97.5 °F) (Temporal)   Resp 17   Wt 86.6 kg (191 lb)   LMP 11/25/2012   SpO2 100%   Breastfeeding? No   BMI 31.78 kg/m²

## 2019-12-20 NOTE — OR NURSING
Verbal order given by Dr. Calhoun to anesthesia for benadryl and solumedrol to be given for iodine contrast allergy. See anesthesia documentation for administration.

## 2019-12-20 NOTE — INTERVAL H&P NOTE
The patient has been examined and the H&P has been reviewed:    I concur with the findings and no changes have occurred since H&P was written.    Anesthesia/Surgery risks, benefits and alternative options discussed and understood by patient/family.          Active Hospital Problems    Diagnosis  POA    Presence of IVC filter [Z95.828]  Not Applicable      Resolved Hospital Problems   No resolved problems to display.

## 2019-12-20 NOTE — ANESTHESIA POSTPROCEDURE EVALUATION
Anesthesia Post Evaluation    Patient: Renae Hou    Procedure(s) Performed: Procedure(s) (LRB):  REMOVAL-FILTER-IVC (N/A)    Final Anesthesia Type: MAC    Patient location during evaluation: [facility]  Patient participation: Yes- Able to Participate  Level of consciousness: awake and alert and oriented  Post-procedure vital signs: reviewed and stable  Pain management: adequate  Airway patency: patent  RENAE mitigation strategies: Intraoperative administration of CPAP, nasopharyngeal airway, or oral appliance during sedation and Multimodal analgesia  PONV status at discharge: No PONV  Anesthetic complications: no      Cardiovascular status: hemodynamically stable  Respiratory status: unassisted  Hydration status: euvolemic  Follow-up not needed.          Vitals Value Taken Time   /70 12/20/2019  8:55 AM   Temp 36.5 °C (97.7 °F) 12/20/2019  8:55 AM   Pulse 61 12/20/2019  8:55 AM   Resp 16 12/20/2019  8:55 AM   SpO2 100 % 12/20/2019  8:55 AM         No case tracking events are documented in the log.      Pain/Ai Score: Pain Rating Prior to Med Admin: 0 (12/20/2019  6:49 AM)

## 2019-12-21 NOTE — OP NOTE
Surgery Date: 12/20/2019     Surgeon: Rodney Rico MD     Assistant: Natanael Calhoun MD    Pre-op Diagnosis:    1. Presence of IVC filter    Post-op Diagnosis:    Same    Procedure: Retrieval IVC Filter          Inferior Venacavagram          U/S Guided Right Internal Jugular vein Access    Anesthesia: Local/RN IV sedation    Procedure in Findings: No thrombus on filter. Removed without complication.    Estimated Blood Loss: 5 mL           Implants: None            Disposition: PACU - hemodynamically stable.           Condition: Good    Procedure:    After obtaining informed consent, the patient was brought to the cath lab and positioned on the table. The right neck was prepped and draped in sterile fashion. Using ultrasound guidance, the right internal jugular vein was accessed with a 21g micropuncture needle. The vessel was widely patent and adequate for endovascular access. A recording was placed on the chart. We then passed the retrieval catheter over a wire into the IVC. An inferior venocavagram revealed no thrombus on the filter. The filter was then removed intact using a gooseneck snare. Pressure was held for 2 minutes and a sterile dressing applied.    Patient tolerated procedure well.     Dr. Rico was scrubbed and present for entire procedure.     Natanael Calhoun MD   Fellow, Vascular/Endovascular Surgery

## 2019-12-21 NOTE — BRIEF OP NOTE
[facility]  Brief Operative Note    Surgery Date: 12/20/2019     Surgeon(s) and Role:     * Rodney Rico MD - Primary     * Natanael Calhoun MD - Fellow    Assisting Surgeon: None    Pre-op Diagnosis:  Presence of IVC filter [Z95.828]    Post-op Diagnosis:  Post-Op Diagnosis Codes:     * Presence of IVC filter [Z95.828]    Procedure(s) (LRB):  REMOVAL-FILTER-IVC (N/A)    Anesthesia: Local MAC    Description of the findings of the procedure(s): No thrombus noted in filter. Filter successfully removed.    Estimated Blood Loss: 5 cc         Specimens:   Specimen (12h ago, onward)    None            Discharge Note    OUTCOME: Patient tolerated treatment/procedure well without complication and is now ready for discharge.    DISPOSITION: Home or Self Care    FINAL DIAGNOSIS:  <principal problem not specified>    FOLLOWUP: None

## 2019-12-26 ENCOUNTER — PATIENT MESSAGE (OUTPATIENT)
Dept: VASCULAR SURGERY | Facility: CLINIC | Age: 59
End: 2019-12-26

## 2019-12-27 ENCOUNTER — OFFICE VISIT (OUTPATIENT)
Dept: URGENT CARE | Facility: CLINIC | Age: 59
End: 2019-12-27
Payer: COMMERCIAL

## 2019-12-27 ENCOUNTER — PATIENT MESSAGE (OUTPATIENT)
Dept: VASCULAR SURGERY | Facility: CLINIC | Age: 59
End: 2019-12-27

## 2019-12-27 VITALS
DIASTOLIC BLOOD PRESSURE: 79 MMHG | HEART RATE: 77 BPM | WEIGHT: 191 LBS | OXYGEN SATURATION: 98 % | BODY MASS INDEX: 31.82 KG/M2 | RESPIRATION RATE: 16 BRPM | HEIGHT: 65 IN | TEMPERATURE: 98 F | SYSTOLIC BLOOD PRESSURE: 113 MMHG

## 2019-12-27 DIAGNOSIS — R42 VERTIGO: Primary | ICD-10-CM

## 2019-12-27 DIAGNOSIS — E87.6 HYPOKALEMIA: ICD-10-CM

## 2019-12-27 LAB
GLUCOSE SERPL-MCNC: 92 MG/DL (ref 70–110)
POC ANION GAP: 15 MMOL/L (ref 10–20)
POC BUN: 18 MMOL/L (ref 8–26)
POC CHLORIDE: 101 MMOL/L (ref 98–109)
POC CREATININE: 0.8 MG/DL (ref 0.6–1.3)
POC HEMATOCRIT: 44 %PCV (ref 37–47)
POC HEMOGLOBIN: 15 G/DL (ref 12.5–16)
POC ICA: 1.17 MMOL/L (ref 1.12–1.32)
POC POTASSIUM: 2.9 MMOL/L (ref 3.5–4.9)
POC SODIUM: 141 MMOL/L (ref 138–146)
POC TCO2: 29 MMOL/L (ref 24–29)

## 2019-12-27 PROCEDURE — 93010 EKG 12-LEAD: ICD-10-PCS | Mod: S$GLB,,, | Performed by: INTERNAL MEDICINE

## 2019-12-27 PROCEDURE — 93010 ELECTROCARDIOGRAM REPORT: CPT | Mod: S$GLB,,, | Performed by: INTERNAL MEDICINE

## 2019-12-27 PROCEDURE — 99214 OFFICE O/P EST MOD 30 MIN: CPT | Mod: S$GLB,,, | Performed by: FAMILY MEDICINE

## 2019-12-27 PROCEDURE — 80047 BASIC METABLC PNL IONIZED CA: CPT | Mod: QW,S$GLB,, | Performed by: FAMILY MEDICINE

## 2019-12-27 PROCEDURE — 99214 PR OFFICE/OUTPT VISIT, EST, LEVL IV, 30-39 MIN: ICD-10-PCS | Mod: S$GLB,,, | Performed by: FAMILY MEDICINE

## 2019-12-27 PROCEDURE — 93005 ELECTROCARDIOGRAM TRACING: CPT | Mod: S$GLB,,, | Performed by: FAMILY MEDICINE

## 2019-12-27 PROCEDURE — 93005 EKG 12-LEAD: ICD-10-PCS | Mod: S$GLB,,, | Performed by: FAMILY MEDICINE

## 2019-12-27 PROCEDURE — 80047 POCT CHEMISTRY PANEL: ICD-10-PCS | Mod: QW,S$GLB,, | Performed by: FAMILY MEDICINE

## 2019-12-27 RX ORDER — POTASSIUM CHLORIDE 20 MEQ/1
20 TABLET, EXTENDED RELEASE ORAL DAILY
Qty: 10 TABLET | Refills: 0 | Status: SHIPPED | OUTPATIENT
Start: 2019-12-27 | End: 2020-01-02 | Stop reason: SDUPTHER

## 2019-12-27 NOTE — PATIENT INSTRUCTIONS
Vertigo (Unknown Cause)    In addition to helping with hearing, the inner ear is part of the balance center of your body. Problems with the inner ear can a false feeling of motion. This is called vertigo. Often, it feels as if you or the room is spinning. A vertigo attack may cause sudden nausea, vomiting and heavy sweating. Severe vertigo causes a loss of balance and can cause you to fall. During vertigo, small head movements and changes in body position will often make the symptoms worse. You may also have ringing in the ears called tinnitus.  An episode of vertigo may last seconds, minutes or hours. Once you are over the first episode, it may never come back. However, symptoms may return off and on.  The cause of your vertigo is not yet known. Possible causes of vertigo include:  · Inflammation of the inner ear  · Disease of the nerves to the inner ear  · Movement of calcium particles in the inner ear  · Poor blood flow to the balance centers of the brain  · Migraine headaches  Home care  · If symptoms are severe, rest quietly in bed. Change positions very slowly. There is usually one position that will feel best, such as lying on one side or lying on your back with your head slightly raised on pillows.  · Do not drive a car or work with dangerous machinery until symptoms have been gone for at least one week.  · Take medicine as prescribed to relieve your symptoms. Unless another medicine was prescribed for symptoms of nausea, vomiting, and dizziness, you may use over-the-counter motion sickness pills. Ask your pharmacist for suggestions.  Follow-up care  Follow up with your healthcare provider or as directed. If you are referred to a specialist or for testing, make the appointment promptly.  When to seek medical advice  Call your healthcare provider if any of the following occur:  · Fever of 100.4°F (38ºC) or higher, or as directed by your healthcare provider  · Vertigo worsens or is not controlled  by prescribed medicine   · Repeated vomiting not relieved by prescribed medicine   · Severe headache  · Confusion  · Weakness of an arm or leg or one side of the face  · Difficulty with speech or vision  · Loss of consciousness   · Seizure  Date Last Reviewed: 8/16/2015 © 2000-2017 [employer]. [address] [place] [address]. All rights reserved. This information is not intended as a substitute for professional medical care. Always follow your healthcare professional's instructions.    Discharge Instructions for Hypokalemia  You have been diagnosed with hypokalemia. This means you have a low level of potassium in your blood. Potassium helps your nerve and muscle cells work as they should. These cells include the cells in your heart. A low level of potassium in the blood can cause serious problems, such as abnormal heart rhythms and even heart attack.  Diet changes  Eat more potassium-rich foods:  · Bananas  · Oranges and orange juice  · Tomatoes, tomato sauce, and tomato juice  · Leafy green vegetables, such as spinach, kale, salad greens, collards, and chard  · Melons (all kinds)  · Pomegranates  · Peas  · Beans  · Potatoes  · Sweet potatoes  · Avocados, including guacamole  · Vegetable juices, such as V8  · Fruit juices  · All nuts and seeds  · Fish, including tuna, halibut, salmon, cod, snapper, geronimo, swordfish, and perch  · Milk, including fat-free, low-fat, whole, chocolate, and buttermilk  · Soy milk  Other home care  · Take a potassium supplement as directed by your healthcare provider.  · After strenuous exercise or any activity that causes you to sweat a lot, grab a beverage high in potassium. This includes chocolate milk, coconut water, orange juice, or low-sodium vegetable juices.  · Be sure to eat foods or drink fluids that contain potassium if you are having diarrhea or vomiting.  · Have your potassium levels checked regularly.  · Take all medicines exactly as  directed.  · Tell your healthcare provider about all prescription and ove-the-counter medicines you are taking. This includes herbal products.  · Avoid foods that are high in salt. Avoid canned and prepared foods that are high in salt.  Follow-up  · Make a follow-up appointment as directed by our staff.  · Keep all follow-up appointments. Your healthcare provider needs to monitor your condition closely.     When to call your healthcare provider  Call your provider right away or go to the emergency room if you have any of the following:  · Vomiting  · Fatigue  · Diarrhea  · Rapid, irregular heartbeat  · Shortness of breath  · Chest pain  · Muscle cramps, spasms, or twitching  · Weakness  · Paralysis   Date Last Reviewed: 6/20/2015  © 7673-0139 [employer]. [address] [place] [address], [place] [address]. All rights reserved. This information is not intended as a substitute for professional medical care. Always follow your healthcare professional's instructions.          Follow up with your doctor in a few days.  Go to the ER if symptoms get worse.    Jordi Houston MD

## 2019-12-30 ENCOUNTER — PATIENT MESSAGE (OUTPATIENT)
Dept: INTERNAL MEDICINE | Facility: CLINIC | Age: 59
End: 2019-12-30

## 2019-12-31 ENCOUNTER — PATIENT MESSAGE (OUTPATIENT)
Dept: ORTHOPEDICS | Facility: CLINIC | Age: 59
End: 2019-12-31

## 2019-12-31 NOTE — TELEPHONE ENCOUNTER
Spoke with pt, she stated that she went to urgent care fro dizziness and her KCL was low. She was given K-Dur 20 meq but she is still experiencing dizziness.  She was advised to follow with her regular pcp in a couple days.    Thanks     Radha.

## 2020-01-02 ENCOUNTER — OFFICE VISIT (OUTPATIENT)
Dept: INTERNAL MEDICINE | Facility: CLINIC | Age: 60
End: 2020-01-02
Payer: COMMERCIAL

## 2020-01-02 ENCOUNTER — PATIENT OUTREACH (OUTPATIENT)
Dept: ADMINISTRATIVE | Facility: OTHER | Age: 60
End: 2020-01-02

## 2020-01-02 ENCOUNTER — LAB VISIT (OUTPATIENT)
Dept: LAB | Facility: HOSPITAL | Age: 60
End: 2020-01-02
Attending: FAMILY MEDICINE
Payer: COMMERCIAL

## 2020-01-02 VITALS
TEMPERATURE: 99 F | WEIGHT: 192.69 LBS | OXYGEN SATURATION: 98 % | SYSTOLIC BLOOD PRESSURE: 123 MMHG | BODY MASS INDEX: 32.1 KG/M2 | HEART RATE: 78 BPM | HEIGHT: 65 IN | DIASTOLIC BLOOD PRESSURE: 95 MMHG

## 2020-01-02 DIAGNOSIS — E87.6 HYPOKALEMIA: ICD-10-CM

## 2020-01-02 DIAGNOSIS — H91.91 DECREASED HEARING OF RIGHT EAR: ICD-10-CM

## 2020-01-02 DIAGNOSIS — R51.9 LEFT-SIDED HEADACHE: ICD-10-CM

## 2020-01-02 DIAGNOSIS — Z98.1 S/P LUMBAR FUSION: Primary | ICD-10-CM

## 2020-01-02 DIAGNOSIS — R11.0 NAUSEA: ICD-10-CM

## 2020-01-02 DIAGNOSIS — R42 DIZZINESS: Primary | ICD-10-CM

## 2020-01-02 PROBLEM — Z86.718 HISTORY OF DVT (DEEP VEIN THROMBOSIS): Status: ACTIVE | Noted: 2018-11-08

## 2020-01-02 PROCEDURE — 3078F PR MOST RECENT DIASTOLIC BLOOD PRESSURE < 80 MM HG: ICD-10-PCS | Mod: CPTII,S$GLB,, | Performed by: FAMILY MEDICINE

## 2020-01-02 PROCEDURE — 3008F PR BODY MASS INDEX (BMI) DOCUMENTED: ICD-10-PCS | Mod: CPTII,S$GLB,, | Performed by: FAMILY MEDICINE

## 2020-01-02 PROCEDURE — 99999 PR PBB SHADOW E&M-EST. PATIENT-LVL IV: CPT | Mod: PBBFAC,,, | Performed by: FAMILY MEDICINE

## 2020-01-02 PROCEDURE — 84132 ASSAY OF SERUM POTASSIUM: CPT

## 2020-01-02 PROCEDURE — 3078F DIAST BP <80 MM HG: CPT | Mod: CPTII,S$GLB,, | Performed by: FAMILY MEDICINE

## 2020-01-02 PROCEDURE — 3074F SYST BP LT 130 MM HG: CPT | Mod: CPTII,S$GLB,, | Performed by: FAMILY MEDICINE

## 2020-01-02 PROCEDURE — 99214 OFFICE O/P EST MOD 30 MIN: CPT | Mod: S$GLB,,, | Performed by: FAMILY MEDICINE

## 2020-01-02 PROCEDURE — 3008F BODY MASS INDEX DOCD: CPT | Mod: CPTII,S$GLB,, | Performed by: FAMILY MEDICINE

## 2020-01-02 PROCEDURE — 99214 PR OFFICE/OUTPT VISIT, EST, LEVL IV, 30-39 MIN: ICD-10-PCS | Mod: S$GLB,,, | Performed by: FAMILY MEDICINE

## 2020-01-02 PROCEDURE — 3074F PR MOST RECENT SYSTOLIC BLOOD PRESSURE < 130 MM HG: ICD-10-PCS | Mod: CPTII,S$GLB,, | Performed by: FAMILY MEDICINE

## 2020-01-02 PROCEDURE — 99999 PR PBB SHADOW E&M-EST. PATIENT-LVL IV: ICD-10-PCS | Mod: PBBFAC,,, | Performed by: FAMILY MEDICINE

## 2020-01-02 PROCEDURE — 36415 COLL VENOUS BLD VENIPUNCTURE: CPT

## 2020-01-02 RX ORDER — POTASSIUM CHLORIDE 20 MEQ/1
20 TABLET, EXTENDED RELEASE ORAL DAILY
Qty: 90 TABLET | Refills: 3 | Status: SHIPPED | OUTPATIENT
Start: 2020-01-02 | End: 2021-05-14

## 2020-01-02 NOTE — PROGRESS NOTES
"Subjective:      Patient ID: Renae Hou is a 59 y.o. female.    Chief Complaint: Dizziness (12-20-19 to present ); Nausea (12-20-19 to present ); and Follow-up ()      HPI:  Renae Hou is a 59 year old female with chronic pain, fatty liver, gastroesophageal reflux disease, hypertension, hyperlipidemia, lumbar radiculopathy, obesity, prediabetes, spondylolisthesis, and obstructive sleep apnea who presents to clinic today for urgent care follow up, low potassium, and dizziness.    Presented to urgent care 12/27/19 with a chief complaint of dizziness, new onset, occurring every few minutes, lasting ~5 minutes, moderate improvement with rest.  POCT chem panel showed potassium of 2.9, otherwise unremarkable.  Started on KCl 20 mEq by mouth daily for 10 days.  EKG obtained at that time showed sinus bradycardia otherwise normal.  Patient was referred to ENT at that time as well.    Today patient states her symptoms started after having IVC filter removed 12/20/19.  States her dizziness started a day or two after this procedure.  States she takes HCTZ "as needed."  States the last time she took HCTZ was 12/24/19.  States she takes HCTZ when she feels "fluidy" or if BP > 120 systolic.  States her potassium has been "quirky" over the years, was noted to be low around the time of her previous hysterectomy.  Has had history of gastric sleeve.      Dizziness described as both light headedness and room spinning.  States she gets a pins and needles sensation to the left side of her head intermittently as well.  Endorses associated nausea from the dizziness.  States she has noticed muffled hearing as well.  States she cannot get in to ENT until February.  Denies associated headaches, vomiting.        Past Medical History:   Diagnosis Date    Abscess of paraspinous muscles     Allergy 4/2018    DVT (deep venous thrombosis)     left leg    Epidural abscess 4/10/2018    Fatty liver     GERD (gastroesophageal reflux " disease) 2015    Hypertension     Lumbar radiculopathy     Menopause     Obesity     Obstructive sleep apnea on CPAP     no longer uses CPAP after weight loss from gastric sleeve    Thyroid disease     Thyroid nodules.       Past Surgical History:   Procedure Laterality Date    ADENOIDECTOMY  1995    BACK SURGERY  2002    L4, L5    BILATERAL SALPINGOOPHORECTOMY      CHOLECYSTECTOMY      CYST REMOVAL      ESOPHAGOGASTRODUODENOSCOPY N/A 5/3/2019    Procedure: ESOPHAGOGASTRODUODENOSCOPY (EGD);  Surgeon: Carlin Sanchez MD;  Location: [facility] (4TH FLR);  Service: Endoscopy;  Laterality: N/A;    HYSTERECTOMY  12/17/2012    [facility] BS&O     INSERTION OF INFERIOR VENA CAVAL FILTER Right 7/19/2019    Procedure: IVC filter placement;  Surgeon: Rodney Rico MD;  Location: [facility] CATH LAB;  Service: Peripheral Vascular;  Laterality: Right;    IVC FILTER RETRIEVAL N/A 12/20/2019    Procedure: REMOVAL-FILTER-IVC;  Surgeon: Rodney Rico MD;  Location: [facility] OR 2ND [facility];  Service: Peripheral Vascular;  Laterality: N/A;    LAPAROSCOPIC SLEEVE GASTRECTOMY N/A 7/23/2019    Procedure: GASTRECTOMY, SLEEVE, LAPAROSCOPIC, with intraop EGD 15211;  Surgeon: Duc Ratliff Jr., MD;  Location: [facility] OR [address] [place] [address];  Service: General;  Laterality: N/A;    RECTOCELE REPAIR      SPINE SURGERY  2018    THYROID NODULE REMOVAL      TONSILLECTOMY  1995       Family History   Problem Relation Age of Onset    Hypertension Mother     Hyperlipidemia Mother     Heart disease Mother 55        cad, valvular heart disease    Alcohol abuse Sister     Stroke Father     No Known Problems Daughter     No Known Problems Daughter     Breast cancer Neg Hx     Colon cancer Neg Hx     Ovarian cancer Neg Hx     Diabetes Neg Hx     Esophageal cancer Neg Hx        Social History     Socioeconomic History    Marital status:      Spouse name: Not on file    Number of children: Not on file    Years of education: Not  on file    Highest education level: Not on file   Occupational History    Not on file   Social Needs    Financial resource strain: Not hard at all    Food insecurity:     Worry: Never true     Inability: Never true    Transportation needs:     Medical: No     Non-medical: No   Tobacco Use    Smoking status: Former Smoker     Packs/day: 0.25     Years: 32.00     Pack years: 8.00     Types: Cigarettes     Last attempt to quit:      Years since quittin.0    Smokeless tobacco: Never Used    Tobacco comment: smoked socially decades ago - weekends only   Substance and Sexual Activity    Alcohol use: No     Frequency: Monthly or less     Drinks per session: 1 or 2     Binge frequency: Never     Comment: very rare    Drug use: No    Sexual activity: Not Currently     Partners: Male     Birth control/protection: See Surgical Hx, None     Comment: [facility] BS&O 2012,     Lifestyle    Physical activity:     Days per week: Patient refused     Minutes per session: 0 min    Stress: Not at all   Relationships    Social connections:     Talks on phone: More than three times a week     Gets together: Once a week     Attends [employer] service: Not on file     Active member of club or organization: No     Attends meetings of clubs or organizations: Patient refused     Relationship status:    Other Topics Concern    Not on file   Social History Narrative    . 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       Review of Systems   Constitutional: Negative for chills, fatigue and fever.   HENT: Positive for hearing loss. Negative for congestion, nosebleeds, rhinorrhea, sore throat and trouble swallowing.    Eyes: Negative for pain and visual disturbance.   Respiratory: Negative for cough, shortness of breath and wheezing.    Cardiovascular: Negative for chest pain and palpitations.   Gastrointestinal: Positive for nausea. Negative for abdominal distention, abdominal pain, constipation, diarrhea and vomiting.  "  Genitourinary: Negative for decreased urine volume, difficulty urinating, dysuria, hematuria and urgency.   Musculoskeletal: Negative for arthralgias, back pain and myalgias.   Skin: Negative for color change and rash.   Neurological: Positive for dizziness and light-headedness. Negative for tremors, weakness, numbness and headaches.   Psychiatric/Behavioral: Negative for agitation, behavioral problems and confusion. The patient is not nervous/anxious.      Objective:     Vitals:    01/02/20 1125   BP: 106/72   BP Location: Right arm   Patient Position: Sitting   BP Method: Medium (Manual)   Pulse: 83   Temp: 98.8 °F (37.1 °C)   TempSrc: Oral   SpO2: 98%   Weight: 87.4 kg (192 lb 10.9 oz)   Height: 5' 5" (1.651 m)       Physical Exam   Constitutional: She is oriented to person, place, and time. She appears well-developed and well-nourished.   HENT:   Head: Normocephalic and atraumatic.   Right Ear: External ear normal.   Left Ear: External ear normal.   Nose: Nose normal.   Mouth/Throat: Oropharynx is clear and moist.   Carlee Hallpike maneuver negative bilaterally though patient did note dizziness when rising from lying to sitting.   Eyes: Pupils are equal, round, and reactive to light. Conjunctivae and EOM are normal.   Neck: Normal range of motion. Neck supple. No tracheal deviation present.   Cardiovascular: Normal rate, regular rhythm and normal heart sounds. Exam reveals no gallop and no friction rub.   No murmur heard.  Pulmonary/Chest: Effort normal and breath sounds normal. No respiratory distress. She has no wheezes. She has no rales.   Abdominal: Soft. Bowel sounds are normal. She exhibits no distension. There is no tenderness. There is no rebound and no guarding.   Musculoskeletal: Normal range of motion. She exhibits no edema or deformity.   Neurological: She is alert and oriented to person, place, and time. Coordination normal.   Skin: Skin is warm and dry.   Psychiatric: She has a normal mood and " affect. Her behavior is normal.   Nursing note and vitals reviewed.     Assessment:      1. Dizziness    2. Decreased hearing of right ear    3. Left-sided headache    4. Nausea    5. Hypokalemia      Plan:   Renae was seen today for dizziness, nausea and follow-up.    Diagnoses and all orders for this visit:    Dizziness  -     MRI Brain Without Contrast; Future  -     BP mildly low today, [place]-Hallpike negative bilaterally; suspect patient's symptoms are related to hypotension with patient reporting she will take HCTZ PRN SBP ~120.  Orthostatic vital signs negative (BP actually matt).  Recommended she discontinue HCTZ altogether.  Recommended increasing daily fluid intake with electrolytes, rise to a standing position slowly, liberalize sodium intake, isometric hand  when standing, and OTC compression stockings vs. Abdominal binder if no improvement.    Decreased hearing of right ear  -     MRI Brain Without Contrast; Future to rule out Schwannoma; follow up with ENT    Left-sided headache  -     MRI Brain Without Contrast; Future    Nausea  -     MRI Brain Without Contrast; Future; will avoid Zofran secondary to hypokalemia at this time.  Can consider phenergan but with co-morbid dizziness do not want to worsen.  Recommended OTC Pepto-bismol PRN and altagracia.  To notify me if no improvement, consider phenergan PRN at that time.    Hypokalemia  -     Potassium; Future now  -     Potassium; Future in 2 weeks  -     Continue potassium chloride SA (K-DUR,KLOR-CON) 20 MEQ tablet; Take 1 tablet (20 mEq total) by mouth once daily.

## 2020-01-03 ENCOUNTER — PATIENT MESSAGE (OUTPATIENT)
Dept: ORTHOPEDICS | Facility: CLINIC | Age: 60
End: 2020-01-03

## 2020-01-03 ENCOUNTER — OFFICE VISIT (OUTPATIENT)
Dept: ORTHOPEDICS | Facility: CLINIC | Age: 60
End: 2020-01-03
Payer: COMMERCIAL

## 2020-01-03 VITALS — WEIGHT: 192 LBS | HEIGHT: 65 IN | BODY MASS INDEX: 31.99 KG/M2

## 2020-01-03 DIAGNOSIS — M70.62 TROCHANTERIC BURSITIS OF LEFT HIP: Primary | ICD-10-CM

## 2020-01-03 LAB — POTASSIUM SERPL-SCNC: 4.6 MMOL/L (ref 3.5–5.1)

## 2020-01-03 PROCEDURE — 3008F BODY MASS INDEX DOCD: CPT | Mod: CPTII,S$GLB,, | Performed by: PHYSICIAN ASSISTANT

## 2020-01-03 PROCEDURE — 3008F PR BODY MASS INDEX (BMI) DOCUMENTED: ICD-10-PCS | Mod: CPTII,S$GLB,, | Performed by: PHYSICIAN ASSISTANT

## 2020-01-03 PROCEDURE — 99999 PR PBB SHADOW E&M-EST. PATIENT-LVL III: CPT | Mod: PBBFAC,,, | Performed by: PHYSICIAN ASSISTANT

## 2020-01-03 PROCEDURE — 99213 PR OFFICE/OUTPT VISIT, EST, LEVL III, 20-29 MIN: ICD-10-PCS | Mod: 25,S$GLB,, | Performed by: PHYSICIAN ASSISTANT

## 2020-01-03 PROCEDURE — 99999 PR PBB SHADOW E&M-EST. PATIENT-LVL III: ICD-10-PCS | Mod: PBBFAC,,, | Performed by: PHYSICIAN ASSISTANT

## 2020-01-03 PROCEDURE — 99213 OFFICE O/P EST LOW 20 MIN: CPT | Mod: 25,S$GLB,, | Performed by: PHYSICIAN ASSISTANT

## 2020-01-03 PROCEDURE — 20610 PR DRAIN/INJECT LARGE JOINT/BURSA: ICD-10-PCS | Mod: LT,S$GLB,, | Performed by: PHYSICIAN ASSISTANT

## 2020-01-03 PROCEDURE — 20610 DRAIN/INJ JOINT/BURSA W/O US: CPT | Mod: LT,S$GLB,, | Performed by: PHYSICIAN ASSISTANT

## 2020-01-03 RX ORDER — TRIAMCINOLONE ACETONIDE 40 MG/ML
40 INJECTION, SUSPENSION INTRA-ARTICULAR; INTRAMUSCULAR
Status: COMPLETED | OUTPATIENT
Start: 2020-01-03 | End: 2020-01-03

## 2020-01-03 RX ADMIN — TRIAMCINOLONE ACETONIDE 40 MG: 40 INJECTION, SUSPENSION INTRA-ARTICULAR; INTRAMUSCULAR at 01:01

## 2020-01-03 NOTE — PROGRESS NOTES
Date: 01/03/2020    Supervising Physician: Dean Zayas M.D.    Date of Surgery: 4/10/2018    Procedure: L4/5/S1 TLIF for grade I spondylolisthesis with large   left-sided L4-L5 facet cyst, possible epidural abscess as well as paraspinal muscle abscess.      History: Renae Hou is seen today for follow-up following the above listed procedure. Overall the patient is doing well but today notes she continues to have left lateral hip pain that is keeping her up at night.  She is miserable.  She remembers having similar symptoms prior to surgery and had a hip bursa injection at that time that gave her great relief for almost 2 years.       Exam: Incision is healed.   There is no sign of infection. Neuro exam is stable. No signs of DVT.  There is moderate left greater troch tenderness to palpation.     Radiographs: no new imaging today.       Assessment/Plan:     We will do a hip bursa injection today.  Follow up as needed.     PROCEDURE:  I have explained the risks, benefits, and alternatives of the procedure in detail.  The patient voices understanding and all questions have been answered.  The patient agrees to proceed as planned. So after I performed a sterile pre of the skin in the normal fashion the left greater trochanteric area is injected from the lateral approach using an 2 inch 22 gauge needle with a combination of 4cc 1% lidocaine and 40 mg of kenalog.  The patient is cautioned an immediate relief of pain is secondary to the local anesthetic and will be temporary.  After the anesthetic wears off there may be a increase in pain that may last for a few hours or a few days and they should use ice to help alleviate this flair up of pain.

## 2020-01-07 ENCOUNTER — HOSPITAL ENCOUNTER (OUTPATIENT)
Dept: RADIOLOGY | Facility: HOSPITAL | Age: 60
Discharge: HOME | End: 2020-01-07
Attending: FAMILY MEDICINE
Payer: COMMERCIAL

## 2020-01-07 DIAGNOSIS — H91.91 DECREASED HEARING OF RIGHT EAR: ICD-10-CM

## 2020-01-07 DIAGNOSIS — R11.0 NAUSEA: ICD-10-CM

## 2020-01-07 DIAGNOSIS — R42 DIZZINESS: ICD-10-CM

## 2020-01-07 DIAGNOSIS — R51.9 LEFT-SIDED HEADACHE: ICD-10-CM

## 2020-01-07 PROCEDURE — 70551 MRI BRAIN STEM W/O DYE: CPT | Mod: 26,,, | Performed by: RADIOLOGY

## 2020-01-07 PROCEDURE — 70551 MRI BRAIN STEM W/O DYE: CPT | Mod: TC

## 2020-01-07 PROCEDURE — 70551 MRI BRAIN WITHOUT CONTRAST: ICD-10-PCS | Mod: 26,,, | Performed by: RADIOLOGY

## 2020-01-09 ENCOUNTER — TELEPHONE (OUTPATIENT)
Dept: INTERNAL MEDICINE | Facility: CLINIC | Age: 60
End: 2020-01-09

## 2020-01-09 NOTE — TELEPHONE ENCOUNTER
"----- Message from Duc Aguirre MD sent at 1/9/2020  5:04 PM CST -----  I do not think it is significant.  I would not recommend follow up.  As an isolated focal finding, it is not worrisome.  We see them often.  Thanks for asking.  Praveen  ----- Message -----  From: Franko Garza MD  Sent: 1/8/2020   9:00 AM CST  To: Duc Aguirre MD    Hey Dr. Aguirre,    My name is Franko Garza and I'm one of the primary care docs at [facility].  I recently had an MRI brain of the above patient, Renae Hou, completed to rule out any associated Schwannoma due to some dizziness/hearing changes the patient endorsed.  When reviewing the results I noted you commented, "On the susceptibility weighted images is microhemorrhage in incidentally seen."  I just wanted to make sure this was not a significant finding and that it did not require any further follow up.  Thanks for your time and help with this.    Franko Garza      "

## 2020-01-10 ENCOUNTER — PATIENT MESSAGE (OUTPATIENT)
Dept: INTERNAL MEDICINE | Facility: CLINIC | Age: 60
End: 2020-01-10

## 2020-01-17 ENCOUNTER — LAB VISIT (OUTPATIENT)
Dept: LAB | Facility: HOSPITAL | Age: 60
End: 2020-01-17
Payer: COMMERCIAL

## 2020-01-17 DIAGNOSIS — E87.6 HYPOKALEMIA: ICD-10-CM

## 2020-01-17 LAB — POTASSIUM SERPL-SCNC: 3.9 MMOL/L (ref 3.5–5.1)

## 2020-01-17 PROCEDURE — 84132 ASSAY OF SERUM POTASSIUM: CPT

## 2020-01-17 PROCEDURE — 36415 COLL VENOUS BLD VENIPUNCTURE: CPT

## 2020-01-24 ENCOUNTER — PATIENT MESSAGE (OUTPATIENT)
Dept: OBSTETRICS AND GYNECOLOGY | Facility: CLINIC | Age: 60
End: 2020-01-24

## 2020-01-27 ENCOUNTER — PATIENT MESSAGE (OUTPATIENT)
Dept: OBSTETRICS AND GYNECOLOGY | Facility: CLINIC | Age: 60
End: 2020-01-27

## 2020-01-29 ENCOUNTER — PATIENT OUTREACH (OUTPATIENT)
Dept: ADMINISTRATIVE | Facility: OTHER | Age: 60
End: 2020-01-29

## 2020-01-31 ENCOUNTER — LAB VISIT (OUTPATIENT)
Dept: LAB | Facility: HOSPITAL | Age: 60
End: 2020-01-31
Payer: COMMERCIAL

## 2020-01-31 ENCOUNTER — OFFICE VISIT (OUTPATIENT)
Dept: BARIATRICS | Facility: CLINIC | Age: 60
End: 2020-01-31
Payer: COMMERCIAL

## 2020-01-31 ENCOUNTER — CLINICAL SUPPORT (OUTPATIENT)
Dept: BARIATRICS | Facility: CLINIC | Age: 60
End: 2020-01-31
Payer: COMMERCIAL

## 2020-01-31 VITALS
SYSTOLIC BLOOD PRESSURE: 117 MMHG | WEIGHT: 187.38 LBS | HEART RATE: 84 BPM | BODY MASS INDEX: 31.22 KG/M2 | HEIGHT: 65 IN | DIASTOLIC BLOOD PRESSURE: 80 MMHG

## 2020-01-31 DIAGNOSIS — E66.9 OBESITY (BMI 30-39.9): ICD-10-CM

## 2020-01-31 DIAGNOSIS — I10 ESSENTIAL HYPERTENSION: ICD-10-CM

## 2020-01-31 DIAGNOSIS — Z71.9 HEALTH COUNSELING: ICD-10-CM

## 2020-01-31 DIAGNOSIS — G47.33 OSA ON CPAP: ICD-10-CM

## 2020-01-31 DIAGNOSIS — E66.9 OBESITY, UNSPECIFIED CLASSIFICATION, UNSPECIFIED OBESITY TYPE, UNSPECIFIED WHETHER SERIOUS COMORBIDITY PRESENT: ICD-10-CM

## 2020-01-31 DIAGNOSIS — Z98.84 S/P LAPAROSCOPIC SLEEVE GASTRECTOMY: ICD-10-CM

## 2020-01-31 DIAGNOSIS — I82.4Y9 DEEP VEIN THROMBOSIS (DVT) OF PROXIMAL LOWER EXTREMITY, UNSPECIFIED CHRONICITY, UNSPECIFIED LATERALITY: ICD-10-CM

## 2020-01-31 DIAGNOSIS — R73.03 PRE-DIABETES: ICD-10-CM

## 2020-01-31 DIAGNOSIS — Z98.84 S/P LAPAROSCOPIC SLEEVE GASTRECTOMY: Primary | ICD-10-CM

## 2020-01-31 DIAGNOSIS — R63.4 WEIGHT LOSS: ICD-10-CM

## 2020-01-31 LAB
25(OH)D3+25(OH)D2 SERPL-MCNC: 33 NG/ML (ref 30–96)
ALBUMIN SERPL BCP-MCNC: 3.8 G/DL (ref 3.5–5.2)
ALP SERPL-CCNC: 111 U/L (ref 55–135)
ALT SERPL W/O P-5'-P-CCNC: 41 U/L (ref 10–44)
ANION GAP SERPL CALC-SCNC: 9 MMOL/L (ref 8–16)
AST SERPL-CCNC: 31 U/L (ref 10–40)
BASOPHILS # BLD AUTO: 0.04 K/UL (ref 0–0.2)
BASOPHILS NFR BLD: 0.8 % (ref 0–1.9)
BILIRUB SERPL-MCNC: 0.5 MG/DL (ref 0.1–1)
BUN SERPL-MCNC: 19 MG/DL (ref 6–20)
CALCIUM SERPL-MCNC: 9.7 MG/DL (ref 8.7–10.5)
CHLORIDE SERPL-SCNC: 105 MMOL/L (ref 95–110)
CHOLEST SERPL-MCNC: 207 MG/DL (ref 120–199)
CHOLEST/HDLC SERPL: 3.2 {RATIO} (ref 2–5)
CO2 SERPL-SCNC: 31 MMOL/L (ref 23–29)
CREAT SERPL-MCNC: 0.9 MG/DL (ref 0.5–1.4)
DIFFERENTIAL METHOD: NORMAL
EOSINOPHIL # BLD AUTO: 0.3 K/UL (ref 0–0.5)
EOSINOPHIL NFR BLD: 6 % (ref 0–8)
ERYTHROCYTE [DISTWIDTH] IN BLOOD BY AUTOMATED COUNT: 13.8 % (ref 11.5–14.5)
EST. GFR  (AFRICAN AMERICAN): >60 ML/MIN/1.73 M^2
EST. GFR  (NON AFRICAN AMERICAN): >60 ML/MIN/1.73 M^2
GLUCOSE SERPL-MCNC: 96 MG/DL (ref 70–110)
HCT VFR BLD AUTO: 43.2 % (ref 37–48.5)
HDLC SERPL-MCNC: 65 MG/DL (ref 40–75)
HDLC SERPL: 31.4 % (ref 20–50)
HGB BLD-MCNC: 14.4 G/DL (ref 12–16)
IMM GRANULOCYTES # BLD AUTO: 0.02 K/UL (ref 0–0.04)
IMM GRANULOCYTES NFR BLD AUTO: 0.4 % (ref 0–0.5)
IRON SERPL-MCNC: 108 UG/DL (ref 30–160)
LDLC SERPL CALC-MCNC: 126.8 MG/DL (ref 63–159)
LYMPHOCYTES # BLD AUTO: 1.7 K/UL (ref 1–4.8)
LYMPHOCYTES NFR BLD: 31.5 % (ref 18–48)
MCH RBC QN AUTO: 31 PG (ref 27–31)
MCHC RBC AUTO-ENTMCNC: 33.3 G/DL (ref 32–36)
MCV RBC AUTO: 93 FL (ref 82–98)
MONOCYTES # BLD AUTO: 0.4 K/UL (ref 0.3–1)
MONOCYTES NFR BLD: 7.9 % (ref 4–15)
NEUTROPHILS # BLD AUTO: 2.8 K/UL (ref 1.8–7.7)
NEUTROPHILS NFR BLD: 53.4 % (ref 38–73)
NONHDLC SERPL-MCNC: 142 MG/DL
NRBC BLD-RTO: 0 /100 WBC
PLATELET # BLD AUTO: 252 K/UL (ref 150–350)
PMV BLD AUTO: 9.5 FL (ref 9.2–12.9)
POTASSIUM SERPL-SCNC: 4.3 MMOL/L (ref 3.5–5.1)
PROT SERPL-MCNC: 7 G/DL (ref 6–8.4)
RBC # BLD AUTO: 4.64 M/UL (ref 4–5.4)
SATURATED IRON: 25 % (ref 20–50)
SODIUM SERPL-SCNC: 145 MMOL/L (ref 136–145)
TOTAL IRON BINDING CAPACITY: 425 UG/DL (ref 250–450)
TRANSFERRIN SERPL-MCNC: 287 MG/DL (ref 200–375)
TRIGL SERPL-MCNC: 76 MG/DL (ref 30–150)
VIT B12 SERPL-MCNC: 527 PG/ML (ref 210–950)
WBC # BLD AUTO: 5.3 K/UL (ref 3.9–12.7)

## 2020-01-31 PROCEDURE — 82607 VITAMIN B-12: CPT

## 2020-01-31 PROCEDURE — 3079F PR MOST RECENT DIASTOLIC BLOOD PRESSURE 80-89 MM HG: ICD-10-PCS | Mod: CPTII,S$GLB,, | Performed by: NURSE PRACTITIONER

## 2020-01-31 PROCEDURE — 99999 PR PBB SHADOW E&M-EST. PATIENT-LVL I: ICD-10-PCS | Mod: PBBFAC,,, | Performed by: DIETITIAN, REGISTERED

## 2020-01-31 PROCEDURE — 99213 PR OFFICE/OUTPT VISIT, EST, LEVL III, 20-29 MIN: ICD-10-PCS | Mod: S$GLB,,, | Performed by: NURSE PRACTITIONER

## 2020-01-31 PROCEDURE — 3008F PR BODY MASS INDEX (BMI) DOCUMENTED: ICD-10-PCS | Mod: CPTII,S$GLB,, | Performed by: NURSE PRACTITIONER

## 2020-01-31 PROCEDURE — 99213 OFFICE O/P EST LOW 20 MIN: CPT | Mod: S$GLB,,, | Performed by: NURSE PRACTITIONER

## 2020-01-31 PROCEDURE — 99999 PR PBB SHADOW E&M-EST. PATIENT-LVL III: CPT | Mod: PBBFAC,,, | Performed by: NURSE PRACTITIONER

## 2020-01-31 PROCEDURE — 3079F DIAST BP 80-89 MM HG: CPT | Mod: CPTII,S$GLB,, | Performed by: NURSE PRACTITIONER

## 2020-01-31 PROCEDURE — 84425 ASSAY OF VITAMIN B-1: CPT

## 2020-01-31 PROCEDURE — 99999 PR PBB SHADOW E&M-EST. PATIENT-LVL I: CPT | Mod: PBBFAC,,, | Performed by: DIETITIAN, REGISTERED

## 2020-01-31 PROCEDURE — 99499 NO LOS: ICD-10-PCS | Mod: S$GLB,,, | Performed by: DIETITIAN, REGISTERED

## 2020-01-31 PROCEDURE — 85025 COMPLETE CBC W/AUTO DIFF WBC: CPT

## 2020-01-31 PROCEDURE — 80053 COMPREHEN METABOLIC PANEL: CPT

## 2020-01-31 PROCEDURE — 3008F BODY MASS INDEX DOCD: CPT | Mod: CPTII,S$GLB,, | Performed by: NURSE PRACTITIONER

## 2020-01-31 PROCEDURE — 99999 PR PBB SHADOW E&M-EST. PATIENT-LVL III: ICD-10-PCS | Mod: PBBFAC,,, | Performed by: NURSE PRACTITIONER

## 2020-01-31 PROCEDURE — 83540 ASSAY OF IRON: CPT

## 2020-01-31 PROCEDURE — 82306 VITAMIN D 25 HYDROXY: CPT

## 2020-01-31 PROCEDURE — 3074F PR MOST RECENT SYSTOLIC BLOOD PRESSURE < 130 MM HG: ICD-10-PCS | Mod: CPTII,S$GLB,, | Performed by: NURSE PRACTITIONER

## 2020-01-31 PROCEDURE — 80061 LIPID PANEL: CPT

## 2020-01-31 PROCEDURE — 3074F SYST BP LT 130 MM HG: CPT | Mod: CPTII,S$GLB,, | Performed by: NURSE PRACTITIONER

## 2020-01-31 PROCEDURE — 99499 UNLISTED E&M SERVICE: CPT | Mod: S$GLB,,, | Performed by: DIETITIAN, REGISTERED

## 2020-01-31 PROCEDURE — 36415 COLL VENOUS BLD VENIPUNCTURE: CPT

## 2020-01-31 NOTE — PROGRESS NOTES
NUTRITION NOTE    Referring Physician: Duc Ratliff M.D.  Reason for MNT Referral: Follow-up 6 months s/p Gastric Sleeve    PAST MEDICAL HISTORY:    Denies nausea, vomiting, constipation and diarrhea.  Reports doing well.    Past Medical History:   Diagnosis Date    Abscess of paraspinous muscles     Allergy 4/2018    DVT (deep venous thrombosis)     left leg    Epidural abscess 4/10/2018    Fatty liver     GERD (gastroesophageal reflux disease) 2015    Hypertension     Lumbar radiculopathy     Menopause     Obesity     Obstructive sleep apnea on CPAP     no longer uses CPAP after weight loss from gastric sleeve    Thyroid disease     Thyroid nodules.       CLINICAL DATA:  59 y.o. female.      Current Weight: 187 lbs  BMI: 31.18  Total Weight Loss: 44 lbs  Excess Weight Loss: 49 %    LABS:  Drawn today    CURRENT DIET:  Bariatric Diet.    BF:  Greek yogurt (15 grm protein), sometimes banana  Snack: hard boiled egg ( 6 grm protein)   Lunch:  Tuna with crackers (15 grms protein)  Snack: 1 egg, string cheese ( 15 grm protein)   DINNER: 3 oz protein with veggies ( 21 grm protein)     Fair life milk - 2 cups       Not much carbs - Has tried  Pasta and rice and bread and  Sits heavy in stomach.     Diet Recall: ~ grams of protein/day; 64 oz of fluids/day ( DRINKS: fairlife  Low sugar aspen milk, water)     Meal Pattern: 3 meal(s) + 2 snack(s) +  0 protein supplement(s)  Adequate protein supplement intake.  Adequate dairy intake.  Adequate vegetable intake. Tolerates raw vegetables and lettuce.  Adequate fruit intake.  Starchy CHO: sometimes crackers      EXERCISE:  None.    Restrictions to Exercise: None.    VITAMINS / MINERALS:  Taking vitamin patches for 3 months - can not tolerate vitamins  Patches :  MVI with thiamin and iron, B12, calcium with  Vitamin D      ASSESSMENT:  Doing well overall.  Weight loss.  Adequate calorie intake.  Adequate protein intake.  Adequate fluid intake.  Following diet  appropriately.  No excercise  Adequate vitamins & minerals.    BARIATRIC DIET DISCUSSION:  Instructed and provided written materials on bariatric diet plan.  Reinforced post-op nutrition guidelines.    PLAN / RECOMMENDATIONS:  Continue excellent diet plan.   Maintain protein intake.  Maintain fluid intake.  Begin exercise.      Return to clinic in 6 months.    SESSION TIME: 15 minutes

## 2020-01-31 NOTE — PROGRESS NOTES
"BARIATRIC POST-OPERATIVE VISIT:    HPI:  Renae Hou is a 59 y.o. year old female presents for follow-up of gastric sleeve.  she is doing well and tolerating the diet without difficulty.  she has no GI/surgical complaints. Has URI; appointment with ENT next week. She reports related dizziness that has been evaluated by PCP, stable symptoms.     She is using MVI patches: MVI Plus, B12 (includes 100mcg B1, B12 1000mcg), Vit D + Noman (50/1000mg).    Bariatric Diet.   5-6 small meals eg  BF greek yogurt  Sn boiled egg  HORACE tuna  Sn boiled egg  Sn cheese  2 glasses Fairlife milk daily  80+ gm protein    64+ fl oz SF clear beverage.    See Dietician note from today for a more detailed assessment.    Exercise- none    Review of Systems   Constitutional: Negative for activity change and appetite change.   HENT: Negative for trouble swallowing.    Eyes: Negative for visual disturbance.   Respiratory: Negative for chest tightness and shortness of breath.    Cardiovascular: Negative for chest pain.   Gastrointestinal: Negative for abdominal distention, abdominal pain, blood in stool, constipation, diarrhea, nausea and vomiting.        Denies dysphagia   Skin: Negative for rash.   Neurological: Negative for light-headedness and headaches.   Psychiatric/Behavioral: Negative for decreased concentration, self-injury and suicidal ideas. The patient is not nervous/anxious.      /80   Pulse 84   Ht 5' 5" (1.651 m)   Wt 85 kg (187 lb 6.3 oz)   LMP 11/25/2012   BMI 31.18 kg/m²     Physical Exam   Constitutional: She is oriented to person, place, and time. She appears well-developed and well-nourished.   HENT:   Head: Normocephalic and atraumatic.   Eyes: Pupils are equal, round, and reactive to light. Conjunctivae and EOM are normal.   Neck: Normal range of motion. Neck supple.   Cardiovascular: Normal rate and regular rhythm.   Pulmonary/Chest: Effort normal. No respiratory distress.   Abdominal: Soft. Bowel sounds are " normal. She exhibits no distension and no mass. There is no tenderness. There is no rebound and no guarding. No hernia.   Musculoskeletal: Normal range of motion. She exhibits no edema or tenderness.   Neurological: She is alert and oriented to person, place, and time. Coordination normal.   Skin: Skin is warm and dry. Capillary refill takes less than 2 seconds. She is not diaphoretic. No erythema.   Psychiatric: She has a normal mood and affect. Her behavior is normal. Judgment and thought content normal.     Vitals:    01/31/20 0800   BP: 117/80   Pulse: 84     ASSESSMENT:  - Morbid obesity s/p sleeve gastrectomy on 07/23/2019.  - Co-morbidities: deep vein thrombosis, dyslipidemia, GERD, hypertension and obstructive sleep apnea  - Good Weight loss, 44#'s and 49% EWL  - No Exercise routine  - Ok Diet  - Will eval Vitamin regimen    PLAN:  - Will track labs for MVI efficacy.  - Anti-Acid medication, try OTC Pepcid.  - Emphasized the importance of regular exercise and adherence to bariatric diet to achieve maximum weight loss.  - RTC at 6 months for yearly appointment  - Call the office for any issues.  - F/u with PCP for dizziness, until ENT

## 2020-02-01 ENCOUNTER — PATIENT MESSAGE (OUTPATIENT)
Dept: ADMINISTRATIVE | Facility: OTHER | Age: 60
End: 2020-02-01

## 2020-02-03 RX ORDER — HYDROCHLOROTHIAZIDE 25 MG/1
TABLET ORAL
Refills: 5 | COMMUNITY
Start: 2019-12-11 | End: 2020-02-03 | Stop reason: SDUPTHER

## 2020-02-03 RX ORDER — HYDROCHLOROTHIAZIDE 25 MG/1
TABLET ORAL
Qty: 30 TABLET | Refills: 5 | Status: SHIPPED | OUTPATIENT
Start: 2020-02-03 | End: 2020-04-01

## 2020-02-05 LAB — VIT B1 BLD-MCNC: 53 UG/L (ref 38–122)

## 2020-02-07 ENCOUNTER — PATIENT OUTREACH (OUTPATIENT)
Dept: OTHER | Facility: OTHER | Age: 60
End: 2020-02-07

## 2020-02-13 ENCOUNTER — PATIENT OUTREACH (OUTPATIENT)
Dept: ADMINISTRATIVE | Facility: OTHER | Age: 60
End: 2020-02-13

## 2020-02-23 ENCOUNTER — PATIENT MESSAGE (OUTPATIENT)
Dept: ORTHOPEDICS | Facility: CLINIC | Age: 60
End: 2020-02-23

## 2020-02-28 ENCOUNTER — HOSPITAL ENCOUNTER (OUTPATIENT)
Dept: RADIOLOGY | Facility: HOSPITAL | Age: 60
Discharge: HOME | End: 2020-02-28
Attending: PHYSICIAN ASSISTANT
Payer: COMMERCIAL

## 2020-02-28 DIAGNOSIS — Z98.1 S/P LUMBAR FUSION: ICD-10-CM

## 2020-02-28 PROCEDURE — 72158 MRI LUMBAR SPINE W/O & W/DYE: CPT | Mod: TC

## 2020-02-28 PROCEDURE — A9585 GADOBUTROL INJECTION: HCPCS | Performed by: PHYSICIAN ASSISTANT

## 2020-02-28 PROCEDURE — 72158 MRI LUMBAR SPINE W WO CONTRAST: ICD-10-PCS | Mod: 26,,, | Performed by: RADIOLOGY

## 2020-02-28 PROCEDURE — 72158 MRI LUMBAR SPINE W/O & W/DYE: CPT | Mod: 26,,, | Performed by: RADIOLOGY

## 2020-02-28 PROCEDURE — 25500020 PHARM REV CODE 255: Performed by: PHYSICIAN ASSISTANT

## 2020-02-28 RX ORDER — GADOBUTROL 604.72 MG/ML
9 INJECTION INTRAVENOUS
Status: COMPLETED | OUTPATIENT
Start: 2020-02-28 | End: 2020-02-28

## 2020-02-28 RX ADMIN — GADOBUTROL 9 ML: 604.72 INJECTION INTRAVENOUS at 03:02

## 2020-03-03 ENCOUNTER — PATIENT MESSAGE (OUTPATIENT)
Dept: ORTHOPEDICS | Facility: CLINIC | Age: 60
End: 2020-03-03

## 2020-03-05 NOTE — PROGRESS NOTES
Digital Medicine: Health  Follow-Up      Follow Up  Follow-up reason(s): reading review and goal follow-up    Pt reports occasional s/s of hypotension lately.    INTERVENTION(S)  encouragement/support    PLAN  continue monitoring    Recommended that pt check BP when she feels sx of hypotension. Encouraged pt to continue with healthy habits.     There are no preventive care reminders to display for this patient.    Last 5 Patient Entered Readings                                      Current 30 Day Average: 103/67     Recent Readings 3/4/2020 3/4/2020 3/3/2020 3/1/2020 2/29/2020    SBP (mmHg) 96 89 95 105 112    DBP (mmHg) 56 51 55 69 74    Pulse 70 68 87 76 63            Diet Screening   No change to diet.      Pt continues to be mindful of sodium intake.    Physical Activity Screening   No change to exercise routine.        Pt continues to aim for 3000 steps per day. She is getting 8674-0089 steps per day.    Intervention(s): goal tracking     SDOH: Deferred.

## 2020-03-09 DIAGNOSIS — Z00.00 ROUTINE GENERAL MEDICAL EXAMINATION AT A HEALTH CARE FACILITY: Primary | ICD-10-CM

## 2020-03-18 ENCOUNTER — PATIENT OUTREACH (OUTPATIENT)
Dept: OTHER | Facility: OTHER | Age: 60
End: 2020-03-18

## 2020-03-18 DIAGNOSIS — I10 ESSENTIAL HYPERTENSION: Primary | ICD-10-CM

## 2020-03-19 ENCOUNTER — PATIENT OUTREACH (OUTPATIENT)
Dept: ADMINISTRATIVE | Facility: OTHER | Age: 60
End: 2020-03-19

## 2020-03-19 NOTE — PROGRESS NOTES
Chart reviewed.   Immunizations: updated  Orders placed: n/a  Upcoming appts to satisfy DARVIN topics: Mammogram 3/27

## 2020-03-20 ENCOUNTER — PATIENT MESSAGE (OUTPATIENT)
Dept: ORTHOPEDICS | Facility: CLINIC | Age: 60
End: 2020-03-20

## 2020-04-01 ENCOUNTER — PATIENT OUTREACH (OUTPATIENT)
Dept: ADMINISTRATIVE | Facility: OTHER | Age: 60
End: 2020-04-01

## 2020-04-01 RX ORDER — HYDROCHLOROTHIAZIDE 25 MG/1
TABLET ORAL
Qty: 30 TABLET | Refills: 5
Start: 2020-04-01 | End: 2020-05-15 | Stop reason: DRUGHIGH

## 2020-04-01 NOTE — PROGRESS NOTES
Digital Medicine: Clinician Follow-Up    Called patient for BP follow up. Patient reports that doesn't take HCTZ every day. She only takes HCTZ prn for edema, which is once a week at most. She denies a correlation between HCTZ and low BP readings. She says at times she feels a little dizzy when changing positions (sitting to standing or vice versa). She says that she discussed this with her physician and had an ENT appointment scheduled but will have to reschedule the appointment.     The history is provided by the patient.     Follow Up  Follow-up reason(s): reading review, medication change and routine education    Patient's 30-day BP average is at goal of <130/<80 mmHg.       INTERVENTION(S)  reviewed appropriate dose schedule and recommended diet modifications    PLAN  patient verbalizes understanding and continue monitoring    Decrease HCTZ to 12.5 mg QD prn edema.     Stay hydrated.     Limit salty foods that may contribute to swelling          There are no preventive care reminders to display for this patient.    Last 5 Patient Entered Readings                                      Current 30 Day Average: 99/62     Recent Readings 3/30/2020 3/29/2020 3/27/2020 3/26/2020 3/24/2020    SBP (mmHg) 98 105 104 106 96    DBP (mmHg) 64 67 64 68 53    Pulse 61 59 87 70 77        Hypertension Medications     hydroCHLOROthiazide (HYDRODIURIL) 25 MG tablet TK 1 T PO ONCE D                 Screenings

## 2020-04-08 ENCOUNTER — PATIENT MESSAGE (OUTPATIENT)
Dept: ORTHOPEDICS | Facility: CLINIC | Age: 60
End: 2020-04-08

## 2020-04-29 ENCOUNTER — PATIENT OUTREACH (OUTPATIENT)
Dept: OTHER | Facility: OTHER | Age: 60
End: 2020-04-29

## 2020-04-29 NOTE — PROGRESS NOTES
Called patient for follow up after directing her to decrease HCTZ to 12.5 mg prn edema. No answer. Left message for call back

## 2020-05-01 ENCOUNTER — PATIENT OUTREACH (OUTPATIENT)
Dept: OTHER | Facility: OTHER | Age: 60
End: 2020-05-01

## 2020-05-01 NOTE — PROGRESS NOTES
Digital Medicine: Health  Follow-Up      Follow Up  Follow-up reason(s): reading review and goal follow-up    Pt states that she is feeling well overall. She is trying to remain active by riding her bike and walking, however she is finding it difficult due to hip pain. She will be having an injection next week that she hopes will help with this pain. She reports no change in dietary habits and plans to continue with healthy habits. She has no new health goals to establish and no questions or concerns.    INTERVENTION(S)  encouragement/support    PLAN  continue monitoring    There are no preventive care reminders to display for this patient.    Last 5 Patient Entered Readings                                      Current 30 Day Average: 105/68     Recent Readings 4/30/2020 4/30/2020 4/29/2020 4/28/2020 4/27/2020    SBP (mmHg) 110 114 97 109 101    DBP (mmHg) 71 68 60 64 64    Pulse 70 73 72 62 81        SDOH: Deferred.

## 2020-05-02 ENCOUNTER — PATIENT MESSAGE (OUTPATIENT)
Dept: ALLERGY | Facility: CLINIC | Age: 60
End: 2020-05-02

## 2020-05-02 ENCOUNTER — PATIENT OUTREACH (OUTPATIENT)
Dept: ADMINISTRATIVE | Facility: OTHER | Age: 60
End: 2020-05-02

## 2020-05-03 ENCOUNTER — PATIENT MESSAGE (OUTPATIENT)
Dept: BARIATRICS | Facility: CLINIC | Age: 60
End: 2020-05-03

## 2020-05-03 NOTE — PROGRESS NOTES
Chart reviewed.   Immunizations: updated  Orders placed: n/a  Upcoming appts to satisfy DARVIN topics: Mammogram 6/01

## 2020-05-04 ENCOUNTER — OFFICE VISIT (OUTPATIENT)
Dept: ORTHOPEDICS | Facility: CLINIC | Age: 60
End: 2020-05-04
Payer: COMMERCIAL

## 2020-05-04 VITALS — BODY MASS INDEX: 31.16 KG/M2 | HEIGHT: 65 IN | WEIGHT: 187 LBS

## 2020-05-04 DIAGNOSIS — M70.62 TROCHANTERIC BURSITIS OF LEFT HIP: Primary | ICD-10-CM

## 2020-05-04 PROCEDURE — 20610 PR DRAIN/INJECT LARGE JOINT/BURSA: ICD-10-PCS | Mod: LT,S$GLB,, | Performed by: PHYSICIAN ASSISTANT

## 2020-05-04 PROCEDURE — 3008F PR BODY MASS INDEX (BMI) DOCUMENTED: ICD-10-PCS | Mod: CPTII,S$GLB,, | Performed by: PHYSICIAN ASSISTANT

## 2020-05-04 PROCEDURE — 99999 PR PBB SHADOW E&M-EST. PATIENT-LVL III: CPT | Mod: PBBFAC,,, | Performed by: PHYSICIAN ASSISTANT

## 2020-05-04 PROCEDURE — 20610 DRAIN/INJ JOINT/BURSA W/O US: CPT | Mod: LT,S$GLB,, | Performed by: PHYSICIAN ASSISTANT

## 2020-05-04 PROCEDURE — 3008F BODY MASS INDEX DOCD: CPT | Mod: CPTII,S$GLB,, | Performed by: PHYSICIAN ASSISTANT

## 2020-05-04 PROCEDURE — 99999 PR PBB SHADOW E&M-EST. PATIENT-LVL III: ICD-10-PCS | Mod: PBBFAC,,, | Performed by: PHYSICIAN ASSISTANT

## 2020-05-04 PROCEDURE — 99213 PR OFFICE/OUTPT VISIT, EST, LEVL III, 20-29 MIN: ICD-10-PCS | Mod: 25,S$GLB,, | Performed by: PHYSICIAN ASSISTANT

## 2020-05-04 PROCEDURE — 99213 OFFICE O/P EST LOW 20 MIN: CPT | Mod: 25,S$GLB,, | Performed by: PHYSICIAN ASSISTANT

## 2020-05-04 RX ORDER — TRIAMCINOLONE ACETONIDE 40 MG/ML
40 INJECTION, SUSPENSION INTRA-ARTICULAR; INTRAMUSCULAR
Status: COMPLETED | OUTPATIENT
Start: 2020-05-04 | End: 2020-05-04

## 2020-05-04 RX ADMIN — TRIAMCINOLONE ACETONIDE 40 MG: 40 INJECTION, SUSPENSION INTRA-ARTICULAR; INTRAMUSCULAR at 03:05

## 2020-05-04 NOTE — PROGRESS NOTES
Date: 05/04/2020    Supervising Physician: Dean Zayas M.D.    Date of Surgery: 4/10/2018    Procedure: L4/5/S1 TLIF for grade I spondylolisthesis with large   left-sided L4-L5 facet cyst, possible epidural abscess as well as paraspinal muscle abscess.      History: Renae Hou is seen today for follow-up following the above listed procedure. Overall the patient is doing well but today notes she continues to have left anterior and lateral hip pain that is keeping her up at night.  She is miserable.  We had discussed a hip bursa injection before COVID-19 shut everything down.  She presents today for the injection.       Exam: Incision is healed.   There is no sign of infection. Neuro exam is stable. No signs of DVT.  There is moderate left greater troch tenderness to palpation.     Radiographs: no new imaging today.       Assessment/Plan:     We will do a hip bursa injection today.  Follow up as needed.     PROCEDURE:  I have explained the risks, benefits, and alternatives of the procedure in detail.  The patient voices understanding and all questions have been answered.  The patient agrees to proceed as planned. So after I performed a sterile pre of the skin in the normal fashion the left greater trochanteric area is injected from the lateral approach using an 2 inch 22 gauge needle with a combination of 4cc 1% lidocaine and 40 mg of kenalog.  The patient is cautioned an immediate relief of pain is secondary to the local anesthetic and will be temporary.  After the anesthetic wears off there may be a increase in pain that may last for a few hours or a few days and they should use ice to help alleviate this flair up of pain.

## 2020-05-05 ENCOUNTER — PATIENT MESSAGE (OUTPATIENT)
Dept: BARIATRICS | Facility: CLINIC | Age: 60
End: 2020-05-05

## 2020-05-05 ENCOUNTER — OFFICE VISIT (OUTPATIENT)
Dept: BARIATRICS | Facility: CLINIC | Age: 60
End: 2020-05-05
Payer: COMMERCIAL

## 2020-05-05 ENCOUNTER — TELEPHONE (OUTPATIENT)
Dept: BARIATRICS | Facility: CLINIC | Age: 60
End: 2020-05-05

## 2020-05-05 VITALS — BODY MASS INDEX: 31.14 KG/M2 | WEIGHT: 186.94 LBS | HEIGHT: 65 IN

## 2020-05-05 DIAGNOSIS — K76.0 FATTY LIVER: ICD-10-CM

## 2020-05-05 DIAGNOSIS — Z98.84 S/P LAPAROSCOPIC SLEEVE GASTRECTOMY: Primary | ICD-10-CM

## 2020-05-05 DIAGNOSIS — R10.11 RUQ PAIN: ICD-10-CM

## 2020-05-05 DIAGNOSIS — I10 ESSENTIAL HYPERTENSION: ICD-10-CM

## 2020-05-05 DIAGNOSIS — E78.5 HYPERLIPIDEMIA, UNSPECIFIED HYPERLIPIDEMIA TYPE: ICD-10-CM

## 2020-05-05 PROCEDURE — 99024 PR POST-OP FOLLOW-UP VISIT: ICD-10-PCS | Mod: 95,,, | Performed by: PHYSICIAN ASSISTANT

## 2020-05-05 PROCEDURE — 99024 POSTOP FOLLOW-UP VISIT: CPT | Mod: 95,,, | Performed by: PHYSICIAN ASSISTANT

## 2020-05-05 NOTE — TELEPHONE ENCOUNTER
Phoned patient and LVM regarding setting up her lab appointment immediately following the U/S appointment tomorrow at the Primary care clinic and to RC if she had any questions.

## 2020-05-05 NOTE — PROGRESS NOTES
BARIATRIC POST-OPERATIVE VISIT:    The patient location is: home in [place]  The chief complaint leading to consultation is: RUQ pain   Visit type: audiovisual  Total time spent with patient: 18 minutes  Each patient to whom he or she provides medical services by telemedicine is:  (1) informed of the relationship between the physician and patient and the respective role of any other health care provider with respect to management of the patient; and (2) notified that he or she may decline to receive medical services by telemedicine and may withdraw from such care at any time.      HPI:  Renae Hou is a 59 y.o. year old female presents for follow-up of gastric sleeve.      Pt states that for the pass three weeks she has had this RUQ pain (under right breat near rib cage) that is worse at night. States that the pain comes and goes and feels like a sharp needling sensation. Movement makes it worse along with laying on the right side. States that she notices that the pain is worse at night and she can no longer sleep on her right side. Has tried to take motrin for pain but with little relief, also takes gas x (sometimes two) which causes some mild relief. Taking Prilosec as needed no symptoms of heartburn. States that she also feels bloated and notices that the right sides seems larger than the left side at times.     Thought that she was constipated has been taking miralax and producing normal stools. Denies any nausea or vomiting (sometimes has a discomfort after eating but is very rare and based on food). Denies heartburn shortness of breath cough fever dysuria hematuria trauma chest pain dysphagia or globus sensation.    Hx of fatty liver and cholecystomy.       Bariatric Diet.   5-6 small meals eg  BF greek yogurt  Sn boiled egg  HORACE tuna  Sn boiled egg  Sn cheese  Franky tea with fairlife milk  80+ gm protein  Ground meat pastor no bread    64+ fl oz SF clear beverage.    See Dietician note from today for a  more detailed assessment.    Exercise- none    Review of Systems   Constitutional: Negative for activity change and appetite change.   HENT: Negative for tinnitus and trouble swallowing.    Eyes: Negative for visual disturbance.   Respiratory: Negative for chest tightness and shortness of breath.    Cardiovascular: Negative for chest pain.   Gastrointestinal: Positive for abdominal pain (RUQ). Negative for abdominal distention, blood in stool, constipation, diarrhea, nausea and vomiting.        Denies dysphagia   Genitourinary: Negative for dysuria, frequency and urgency.   Musculoskeletal: Negative for myalgias.   Skin: Negative for rash.   Neurological: Negative for light-headedness, numbness and headaches.   Psychiatric/Behavioral: Negative for decreased concentration, self-injury and suicidal ideas. The patient is not nervous/anxious. Hyperactive:         Physical Exam   Constitutional: She is oriented to person, place, and time. She appears well-developed and well-nourished.   HENT:   Head: Normocephalic and atraumatic.   Eyes: Conjunctivae and EOM are normal.   Neck: Normal range of motion. Neck supple.   Pulmonary/Chest: Effort normal. No respiratory distress.   Abdominal: Soft. There is no guarding.   Self palpation by patient RUQ   Musculoskeletal: Normal range of motion.   UE visualized during telemedicine visit   Neurological: She is alert and oriented to person, place, and time.   Psychiatric: She has a normal mood and affect. Her behavior is normal. Judgment and thought content normal.     There were no vitals filed for this visit.  ASSESSMENT:  - Morbid obesity s/p sleeve gastrectomy on 07/23/2019.  - Co-morbidities: deep vein thrombosis, dyslipidemia, GERD, hypertension and obstructive sleep apnea  - Good Weight loss, 44#'s and 49% EWL  - No Exercise routine  - Good Diet  - Good vitamin regimen     PLAN:  - US RUQ   - LABS  - Continue gas x as needed  - Avoid motrin, tylenol as needed for pain

## 2020-05-05 NOTE — TELEPHONE ENCOUNTER
----- Message from Paddy Sanches PA-C sent at 5/5/2020  2:11 PM CDT -----  Regarding: Orders  Hi guys,     I have some orders placed for Ms. Hou can someone call her to get those set up.    Thanks,  BM

## 2020-05-06 ENCOUNTER — LAB VISIT (OUTPATIENT)
Dept: LAB | Facility: HOSPITAL | Age: 60
End: 2020-05-06
Attending: PHYSICIAN ASSISTANT
Payer: COMMERCIAL

## 2020-05-06 ENCOUNTER — TELEPHONE (OUTPATIENT)
Dept: BARIATRICS | Facility: CLINIC | Age: 60
End: 2020-05-06

## 2020-05-06 ENCOUNTER — PATIENT MESSAGE (OUTPATIENT)
Dept: ADMINISTRATIVE | Facility: OTHER | Age: 60
End: 2020-05-06

## 2020-05-06 ENCOUNTER — PATIENT MESSAGE (OUTPATIENT)
Dept: BARIATRICS | Facility: CLINIC | Age: 60
End: 2020-05-06

## 2020-05-06 ENCOUNTER — HOSPITAL ENCOUNTER (OUTPATIENT)
Dept: RADIOLOGY | Facility: HOSPITAL | Age: 60
Discharge: HOME | End: 2020-05-06
Attending: PHYSICIAN ASSISTANT
Payer: COMMERCIAL

## 2020-05-06 DIAGNOSIS — I10 ESSENTIAL HYPERTENSION: ICD-10-CM

## 2020-05-06 DIAGNOSIS — R10.11 RUQ PAIN: ICD-10-CM

## 2020-05-06 DIAGNOSIS — K76.0 FATTY LIVER: ICD-10-CM

## 2020-05-06 DIAGNOSIS — K76.0 FATTY LIVER: Primary | ICD-10-CM

## 2020-05-06 DIAGNOSIS — E78.5 HYPERLIPIDEMIA, UNSPECIFIED HYPERLIPIDEMIA TYPE: ICD-10-CM

## 2020-05-06 DIAGNOSIS — Z98.84 S/P LAPAROSCOPIC SLEEVE GASTRECTOMY: ICD-10-CM

## 2020-05-06 LAB
25(OH)D3+25(OH)D2 SERPL-MCNC: 30 NG/ML (ref 30–96)
ALBUMIN SERPL BCP-MCNC: 4.1 G/DL (ref 3.5–5.2)
ALP SERPL-CCNC: 84 U/L (ref 55–135)
ALT SERPL W/O P-5'-P-CCNC: 19 U/L (ref 10–44)
ANION GAP SERPL CALC-SCNC: 11 MMOL/L (ref 8–16)
AST SERPL-CCNC: 17 U/L (ref 10–40)
BASOPHILS # BLD AUTO: 0.02 K/UL (ref 0–0.2)
BASOPHILS NFR BLD: 0.3 % (ref 0–1.9)
BILIRUB SERPL-MCNC: 0.5 MG/DL (ref 0.1–1)
BUN SERPL-MCNC: 21 MG/DL (ref 6–20)
CALCIUM SERPL-MCNC: 10 MG/DL (ref 8.7–10.5)
CHLORIDE SERPL-SCNC: 106 MMOL/L (ref 95–110)
CHOLEST SERPL-MCNC: 225 MG/DL (ref 120–199)
CHOLEST/HDLC SERPL: 3.2 {RATIO} (ref 2–5)
CO2 SERPL-SCNC: 26 MMOL/L (ref 23–29)
CREAT SERPL-MCNC: 0.8 MG/DL (ref 0.5–1.4)
DIFFERENTIAL METHOD: NORMAL
EOSINOPHIL # BLD AUTO: 0.1 K/UL (ref 0–0.5)
EOSINOPHIL NFR BLD: 0.8 % (ref 0–8)
ERYTHROCYTE [DISTWIDTH] IN BLOOD BY AUTOMATED COUNT: 12.7 % (ref 11.5–14.5)
EST. GFR  (AFRICAN AMERICAN): >60 ML/MIN/1.73 M^2
EST. GFR  (NON AFRICAN AMERICAN): >60 ML/MIN/1.73 M^2
GLUCOSE SERPL-MCNC: 105 MG/DL (ref 70–110)
HCT VFR BLD AUTO: 44.1 % (ref 37–48.5)
HDLC SERPL-MCNC: 71 MG/DL (ref 40–75)
HDLC SERPL: 31.6 % (ref 20–50)
HGB BLD-MCNC: 14.2 G/DL (ref 12–16)
IMM GRANULOCYTES # BLD AUTO: 0.01 K/UL (ref 0–0.04)
IMM GRANULOCYTES NFR BLD AUTO: 0.1 % (ref 0–0.5)
IRON SERPL-MCNC: 136 UG/DL (ref 30–160)
LDLC SERPL CALC-MCNC: 136 MG/DL (ref 63–159)
LYMPHOCYTES # BLD AUTO: 1.7 K/UL (ref 1–4.8)
LYMPHOCYTES NFR BLD: 22.6 % (ref 18–48)
MCH RBC QN AUTO: 30.3 PG (ref 27–31)
MCHC RBC AUTO-ENTMCNC: 32.2 G/DL (ref 32–36)
MCV RBC AUTO: 94 FL (ref 82–98)
MONOCYTES # BLD AUTO: 0.4 K/UL (ref 0.3–1)
MONOCYTES NFR BLD: 5.1 % (ref 4–15)
NEUTROPHILS # BLD AUTO: 5.5 K/UL (ref 1.8–7.7)
NEUTROPHILS NFR BLD: 71.1 % (ref 38–73)
NONHDLC SERPL-MCNC: 154 MG/DL
NRBC BLD-RTO: 0 /100 WBC
PLATELET # BLD AUTO: 266 K/UL (ref 150–350)
PMV BLD AUTO: 10.1 FL (ref 9.2–12.9)
POTASSIUM SERPL-SCNC: 4.1 MMOL/L (ref 3.5–5.1)
PROT SERPL-MCNC: 7.6 G/DL (ref 6–8.4)
RBC # BLD AUTO: 4.68 M/UL (ref 4–5.4)
SATURATED IRON: 30 % (ref 20–50)
SODIUM SERPL-SCNC: 143 MMOL/L (ref 136–145)
TOTAL IRON BINDING CAPACITY: 454 UG/DL (ref 250–450)
TRANSFERRIN SERPL-MCNC: 307 MG/DL (ref 200–375)
TRIGL SERPL-MCNC: 90 MG/DL (ref 30–150)
VIT B12 SERPL-MCNC: 448 PG/ML (ref 210–950)
WBC # BLD AUTO: 7.71 K/UL (ref 3.9–12.7)

## 2020-05-06 PROCEDURE — 85025 COMPLETE CBC W/AUTO DIFF WBC: CPT

## 2020-05-06 PROCEDURE — 82607 VITAMIN B-12: CPT

## 2020-05-06 PROCEDURE — 82306 VITAMIN D 25 HYDROXY: CPT

## 2020-05-06 PROCEDURE — 76700 US ABDOMEN COMPLETE: ICD-10-PCS | Mod: 26,,, | Performed by: RADIOLOGY

## 2020-05-06 PROCEDURE — 76700 US EXAM ABDOM COMPLETE: CPT | Mod: TC

## 2020-05-06 PROCEDURE — 84425 ASSAY OF VITAMIN B-1: CPT

## 2020-05-06 PROCEDURE — 80053 COMPREHEN METABOLIC PANEL: CPT

## 2020-05-06 PROCEDURE — 36415 COLL VENOUS BLD VENIPUNCTURE: CPT

## 2020-05-06 PROCEDURE — 83540 ASSAY OF IRON: CPT

## 2020-05-06 PROCEDURE — 76700 US EXAM ABDOM COMPLETE: CPT | Mod: 26,,, | Performed by: RADIOLOGY

## 2020-05-06 PROCEDURE — 80061 LIPID PANEL: CPT

## 2020-05-06 NOTE — TELEPHONE ENCOUNTER
----- Message from Jatin Alcazar sent at 5/6/2020  3:22 PM CDT -----  Contact: Pt  Patient called stating she was returning a missed call from Madelyn Negron RN, requesting callback     Callback: [phone] (home) [phone] (work)

## 2020-05-06 NOTE — TELEPHONE ENCOUNTER
Phoned patient and discussed portal messages. Will schedule hepatology appointment once referral has been placed.

## 2020-05-07 ENCOUNTER — PATIENT MESSAGE (OUTPATIENT)
Dept: BARIATRICS | Facility: CLINIC | Age: 60
End: 2020-05-07

## 2020-05-07 ENCOUNTER — DOCUMENTATION ONLY (OUTPATIENT)
Dept: TRANSPLANT | Facility: CLINIC | Age: 60
End: 2020-05-07

## 2020-05-07 NOTE — NURSING
Pt records reviewed.   Pt will be referred to Hepatology.    Initial referral received  from the workque.   Referring Provider/diagnosis  DAMIEN WILSON [ [id number] ]    Coded Diagnosis: Fatty liver [K76.0]           Referral letter sent to patient.

## 2020-05-07 NOTE — LETTER
May 7, 2020    Renae Hou  [facility] [address]      Dear Renae Hou:    Your doctor has referred you to the [facility]. We are sending this letter to remind you to make an appointment with us to complete the referral process.     Please call us at [phone] to schedule an appointment. We look forward to seeing you soon.     If you received a call and have been scheduled, please disregard this letter.       Sincerely,        [facility]   [address] [place] [id number]  [phone]

## 2020-05-11 ENCOUNTER — PATIENT MESSAGE (OUTPATIENT)
Dept: INTERNAL MEDICINE | Facility: CLINIC | Age: 60
End: 2020-05-11

## 2020-05-11 DIAGNOSIS — B35.1 ONYCHOMYCOSIS: Primary | ICD-10-CM

## 2020-05-11 DIAGNOSIS — M79.674 PAIN OF TOE OF RIGHT FOOT: ICD-10-CM

## 2020-05-11 LAB — VIT B1 BLD-MCNC: 45 UG/L (ref 38–122)

## 2020-05-13 ENCOUNTER — PATIENT MESSAGE (OUTPATIENT)
Dept: ORTHOPEDICS | Facility: CLINIC | Age: 60
End: 2020-05-13

## 2020-05-13 NOTE — PROGRESS NOTES
Digital Medicine: Clinician Follow-Up    Called patient for follow up after directing her to take HCTZ 12.5 mg prn instead of 25 mg. Patient says she is doing well from a BP standpoint and she feels better. She says she feels better when SBP is 100s vs 120s. She has an appointment this week with PCP for an annual check up.     The history is provided by the patient.     Follow Up  Follow-up reason(s): reading review and routine education    Patient's 30-day BP average is at goal of <130/<80 mmHg.       INTERVENTION(S)  reviewed appropriate dose schedule and goal setting    PLAN  patient verbalizes understanding and continue monitoring    Continue HCTZ 12.5 mg prn.     Patient knows to contact me with any non-urgent clinical changes or concerns.        There are no preventive care reminders to display for this patient.    Last 5 Patient Entered Readings                                      Current 30 Day Average: 107/71     Recent Readings 5/12/2020 5/11/2020 5/10/2020 5/7/2020 5/6/2020    SBP (mmHg) 103 110 122 119 109    DBP (mmHg) 65 66 68 88 67    Pulse 74 62 60 75 68             Hypertension Medications     hydroCHLOROthiazide (HYDRODIURIL) 25 MG tablet Take 1/2 tablet (12.5 mg) as needed for swelling                 Screenings

## 2020-05-15 ENCOUNTER — HOSPITAL ENCOUNTER (OUTPATIENT)
Dept: RADIOLOGY | Facility: HOSPITAL | Age: 60
Discharge: HOME | End: 2020-05-15
Attending: FAMILY MEDICINE
Payer: COMMERCIAL

## 2020-05-15 ENCOUNTER — CLINICAL SUPPORT (OUTPATIENT)
Dept: INTERNAL MEDICINE | Facility: CLINIC | Age: 60
End: 2020-05-15
Attending: FAMILY MEDICINE
Payer: COMMERCIAL

## 2020-05-15 ENCOUNTER — HOSPITAL ENCOUNTER (OUTPATIENT)
Dept: RADIOLOGY | Facility: HOSPITAL | Age: 60
Discharge: HOME | End: 2020-05-15
Attending: INTERNAL MEDICINE
Payer: COMMERCIAL

## 2020-05-15 ENCOUNTER — HOSPITAL ENCOUNTER (OUTPATIENT)
Dept: CARDIOLOGY | Facility: CLINIC | Age: 60
Discharge: HOME | End: 2020-05-15
Payer: COMMERCIAL

## 2020-05-15 ENCOUNTER — CLINICAL SUPPORT (OUTPATIENT)
Dept: INTERNAL MEDICINE | Facility: CLINIC | Age: 60
End: 2020-05-15
Payer: COMMERCIAL

## 2020-05-15 ENCOUNTER — OFFICE VISIT (OUTPATIENT)
Dept: INTERNAL MEDICINE | Facility: CLINIC | Age: 60
End: 2020-05-15
Payer: COMMERCIAL

## 2020-05-15 ENCOUNTER — IMMUNIZATION (OUTPATIENT)
Dept: PHARMACY | Facility: CLINIC | Age: 60
End: 2020-05-15
Payer: COMMERCIAL

## 2020-05-15 VITALS
WEIGHT: 183.19 LBS | HEIGHT: 65 IN | HEART RATE: 84 BPM | OXYGEN SATURATION: 98 % | BODY MASS INDEX: 30.52 KG/M2 | TEMPERATURE: 99 F | SYSTOLIC BLOOD PRESSURE: 106 MMHG | DIASTOLIC BLOOD PRESSURE: 70 MMHG

## 2020-05-15 DIAGNOSIS — Z23 NEED FOR SHINGLES VACCINE: ICD-10-CM

## 2020-05-15 DIAGNOSIS — E78.5 HYPERLIPIDEMIA, UNSPECIFIED HYPERLIPIDEMIA TYPE: ICD-10-CM

## 2020-05-15 DIAGNOSIS — Z00.00 ANNUAL PHYSICAL EXAM: ICD-10-CM

## 2020-05-15 DIAGNOSIS — Z00.00 ROUTINE GENERAL MEDICAL EXAMINATION AT A HEALTH CARE FACILITY: Primary | ICD-10-CM

## 2020-05-15 DIAGNOSIS — M70.62 GREATER TROCHANTERIC BURSITIS OF LEFT HIP: ICD-10-CM

## 2020-05-15 DIAGNOSIS — Z00.00 ROUTINE GENERAL MEDICAL EXAMINATION AT A HEALTH CARE FACILITY: ICD-10-CM

## 2020-05-15 DIAGNOSIS — R73.03 PRE-DIABETES: ICD-10-CM

## 2020-05-15 DIAGNOSIS — R10.11 RUQ ABDOMINAL PAIN: ICD-10-CM

## 2020-05-15 DIAGNOSIS — I10 ESSENTIAL HYPERTENSION: ICD-10-CM

## 2020-05-15 LAB
25(OH)D3+25(OH)D2 SERPL-MCNC: 35 NG/ML (ref 30–96)
ALBUMIN SERPL BCP-MCNC: 3.9 G/DL (ref 3.5–5.2)
ALP SERPL-CCNC: 95 U/L (ref 55–135)
ALT SERPL W/O P-5'-P-CCNC: 37 U/L (ref 10–44)
ANION GAP SERPL CALC-SCNC: 10 MMOL/L (ref 8–16)
AST SERPL-CCNC: 31 U/L (ref 10–40)
BILIRUB SERPL-MCNC: 0.6 MG/DL (ref 0.1–1)
BUN SERPL-MCNC: 22 MG/DL (ref 6–20)
CALCIUM SERPL-MCNC: 9.6 MG/DL (ref 8.7–10.5)
CHLORIDE SERPL-SCNC: 106 MMOL/L (ref 95–110)
CHOLEST SERPL-MCNC: 201 MG/DL (ref 120–199)
CHOLEST/HDLC SERPL: 3 {RATIO} (ref 2–5)
CO2 SERPL-SCNC: 30 MMOL/L (ref 23–29)
CREAT SERPL-MCNC: 0.9 MG/DL (ref 0.5–1.4)
ERYTHROCYTE [DISTWIDTH] IN BLOOD BY AUTOMATED COUNT: 13 % (ref 11.5–14.5)
EST. GFR  (AFRICAN AMERICAN): >60 ML/MIN/1.73 M^2
EST. GFR  (NON AFRICAN AMERICAN): >60 ML/MIN/1.73 M^2
ESTIMATED AVG GLUCOSE: 114 MG/DL (ref 68–131)
GLUCOSE SERPL-MCNC: 90 MG/DL (ref 70–110)
HBA1C MFR BLD HPLC: 5.6 % (ref 4–5.6)
HCT VFR BLD AUTO: 44.1 % (ref 37–48.5)
HDLC SERPL-MCNC: 67 MG/DL (ref 40–75)
HDLC SERPL: 33.3 % (ref 20–50)
HGB BLD-MCNC: 14.2 G/DL (ref 12–16)
LDLC SERPL CALC-MCNC: 116.6 MG/DL (ref 63–159)
MCH RBC QN AUTO: 30.8 PG (ref 27–31)
MCHC RBC AUTO-ENTMCNC: 32.2 G/DL (ref 32–36)
MCV RBC AUTO: 96 FL (ref 82–98)
NONHDLC SERPL-MCNC: 134 MG/DL
PLATELET # BLD AUTO: 262 K/UL (ref 150–350)
PMV BLD AUTO: 9.9 FL (ref 9.2–12.9)
POTASSIUM SERPL-SCNC: 4.4 MMOL/L (ref 3.5–5.1)
PROT SERPL-MCNC: 7 G/DL (ref 6–8.4)
RBC # BLD AUTO: 4.61 M/UL (ref 4–5.4)
SODIUM SERPL-SCNC: 146 MMOL/L (ref 136–145)
TRIGL SERPL-MCNC: 87 MG/DL (ref 30–150)
TSH SERPL DL<=0.005 MIU/L-ACNC: 0.95 UIU/ML (ref 0.4–4)
WBC # BLD AUTO: 6.87 K/UL (ref 3.9–12.7)

## 2020-05-15 PROCEDURE — 97750 PHYSICAL PERFORMANCE TEST: CPT | Mod: S$GLB,,, | Performed by: INTERNAL MEDICINE

## 2020-05-15 PROCEDURE — 99999 PR PBB SHADOW E&M-EST. PATIENT-LVL IV: CPT | Mod: PBBFAC,,, | Performed by: FAMILY MEDICINE

## 2020-05-15 PROCEDURE — 71046 XR CHEST PA AND LATERAL: ICD-10-PCS | Mod: 26,,, | Performed by: RADIOLOGY

## 2020-05-15 PROCEDURE — 3074F PR MOST RECENT SYSTOLIC BLOOD PRESSURE < 130 MM HG: ICD-10-PCS | Mod: CPTII,S$GLB,, | Performed by: FAMILY MEDICINE

## 2020-05-15 PROCEDURE — 99386 PR PREVENTIVE VISIT,NEW,40-64: ICD-10-PCS | Mod: S$GLB,,, | Performed by: FAMILY MEDICINE

## 2020-05-15 PROCEDURE — 83036 HEMOGLOBIN GLYCOSYLATED A1C: CPT

## 2020-05-15 PROCEDURE — 93010 ELECTROCARDIOGRAM REPORT: CPT | Mod: S$GLB,,, | Performed by: INTERNAL MEDICINE

## 2020-05-15 PROCEDURE — 84443 ASSAY THYROID STIM HORMONE: CPT

## 2020-05-15 PROCEDURE — 93010 EKG 12-LEAD: ICD-10-PCS | Mod: S$GLB,,, | Performed by: INTERNAL MEDICINE

## 2020-05-15 PROCEDURE — 82306 VITAMIN D 25 HYDROXY: CPT

## 2020-05-15 PROCEDURE — 77067 MAMMO DIGITAL SCREENING BILAT WITH TOMOSYNTHESIS_CAD: ICD-10-PCS | Mod: 26,,, | Performed by: RADIOLOGY

## 2020-05-15 PROCEDURE — 80061 LIPID PANEL: CPT

## 2020-05-15 PROCEDURE — 77067 SCR MAMMO BI INCL CAD: CPT | Mod: TC

## 2020-05-15 PROCEDURE — 71046 X-RAY EXAM CHEST 2 VIEWS: CPT | Mod: TC,FY

## 2020-05-15 PROCEDURE — 77067 SCR MAMMO BI INCL CAD: CPT | Mod: 26,,, | Performed by: RADIOLOGY

## 2020-05-15 PROCEDURE — 93005 ELECTROCARDIOGRAM TRACING: CPT | Mod: S$GLB,,, | Performed by: FAMILY MEDICINE

## 2020-05-15 PROCEDURE — 80053 COMPREHEN METABOLIC PANEL: CPT

## 2020-05-15 PROCEDURE — 97750 PR PHYSICAL PERFORMANCE TEST: ICD-10-PCS | Mod: S$GLB,,, | Performed by: INTERNAL MEDICINE

## 2020-05-15 PROCEDURE — 3078F DIAST BP <80 MM HG: CPT | Mod: CPTII,S$GLB,, | Performed by: FAMILY MEDICINE

## 2020-05-15 PROCEDURE — 3074F SYST BP LT 130 MM HG: CPT | Mod: CPTII,S$GLB,, | Performed by: FAMILY MEDICINE

## 2020-05-15 PROCEDURE — 77063 MAMMO DIGITAL SCREENING BILAT WITH TOMOSYNTHESIS_CAD: ICD-10-PCS | Mod: 26,,, | Performed by: RADIOLOGY

## 2020-05-15 PROCEDURE — 85027 COMPLETE CBC AUTOMATED: CPT

## 2020-05-15 PROCEDURE — 99999 PR PBB SHADOW E&M-EST. PATIENT-LVL IV: ICD-10-PCS | Mod: PBBFAC,,, | Performed by: FAMILY MEDICINE

## 2020-05-15 PROCEDURE — 93005 EKG 12-LEAD: ICD-10-PCS | Mod: S$GLB,,, | Performed by: FAMILY MEDICINE

## 2020-05-15 PROCEDURE — 99386 PREV VISIT NEW AGE 40-64: CPT | Mod: S$GLB,,, | Performed by: FAMILY MEDICINE

## 2020-05-15 PROCEDURE — 71046 X-RAY EXAM CHEST 2 VIEWS: CPT | Mod: 26,,, | Performed by: RADIOLOGY

## 2020-05-15 PROCEDURE — 3078F PR MOST RECENT DIASTOLIC BLOOD PRESSURE < 80 MM HG: ICD-10-PCS | Mod: CPTII,S$GLB,, | Performed by: FAMILY MEDICINE

## 2020-05-15 PROCEDURE — 77063 BREAST TOMOSYNTHESIS BI: CPT | Mod: 26,,, | Performed by: RADIOLOGY

## 2020-05-15 RX ORDER — MOMETASONE FUROATE 1 MG/G
OINTMENT TOPICAL
COMMUNITY
Start: 2020-04-24 | End: 2021-07-27

## 2020-05-15 RX ORDER — EPINEPHRINE 0.3 MG/.3ML
INJECTION SUBCUTANEOUS
COMMUNITY
Start: 2020-03-06 | End: 2021-05-17 | Stop reason: SDUPTHER

## 2020-05-15 RX ORDER — DICLOFENAC SODIUM 10 MG/G
2 GEL TOPICAL 4 TIMES DAILY
Qty: 100 G | Refills: 2 | Status: SHIPPED | OUTPATIENT
Start: 2020-05-15 | End: 2020-08-05

## 2020-05-15 RX ORDER — HYDROCHLOROTHIAZIDE 12.5 MG/1
TABLET ORAL
COMMUNITY
Start: 2020-04-22 | End: 2020-08-10 | Stop reason: SDUPTHER

## 2020-05-15 NOTE — PROGRESS NOTES
Nutrition Assessment  Client name:  Renae Hou  :  1960  Age:  59 y.o.  Gender:  female    Client states she is here for her annual [facility] medical examination. Mrs. Hou is a very pleasant [employer] employee who has been coming to a dietitian through [facility] over the past 6 years. Last July (), she had gastric sleeve bariatric surgery, and has since lost 45 pounds. She has been working closely with the bariatric program dietitian at [facility] and today she is wanting to understand a bit of why her weight loss has hit a plateau.     Anthropometrics  Height:  65 inches     Weight:  180.7 pounds  BMI:  30.13  % Body Fat:  44.60    Clinical Signs/Symptoms  N/V/D:  none  Appetite (Good, Fair, or Poor):  good      Past Medical History:   Diagnosis Date    Abscess of paraspinous muscles     Allergy 2018    DVT (deep venous thrombosis)     left leg    Epidural abscess 4/10/2018    Fatty liver     GERD (gastroesophageal reflux disease)     Hypertension     Lumbar radiculopathy     Menopause     Obesity     Obstructive sleep apnea on CPAP     no longer uses CPAP after weight loss from gastric sleeve    Thyroid disease     Thyroid nodules.       Past Surgical History:   Procedure Laterality Date    ADENOIDECTOMY      BACK SURGERY      L4, L5    BILATERAL SALPINGOOPHORECTOMY      CHOLECYSTECTOMY      CYST REMOVAL      ESOPHAGOGASTRODUODENOSCOPY N/A 5/3/2019    Procedure: ESOPHAGOGASTRODUODENOSCOPY (EGD);  Surgeon: Carlin Sanchez MD;  Location: [address]);  Service: Endoscopy;  Laterality: N/A;    HYSTERECTOMY  2012    [facility]     INSERTION OF INFERIOR VENA CAVAL FILTER Right 2019    Procedure: IVC filter placement;  Surgeon: Rodney Rico MD;  Location: [facility] CATH LAB;  Service: Peripheral Vascular;  Laterality: Right;    IVC FILTER RETRIEVAL N/A 2019    Procedure: REMOVAL-FILTER-IVC;  Surgeon: Rodney Rico MD;   Location: [facility] OR [address] [place] [address];  Service: Peripheral Vascular;  Laterality: N/A;    LAPAROSCOPIC SLEEVE GASTRECTOMY N/A 2019    Procedure: GASTRECTOMY, SLEEVE, LAPAROSCOPIC, with intraop EGD 32377;  Surgeon: Duc Ratliff Jr., MD;  Location: [facility] OR [address] [place] [address];  Service: General;  Laterality: N/A;    RECTOCELE REPAIR      SPINE SURGERY  2018    THYROID NODULE REMOVAL      TONSILLECTOMY         Medications    has a current medication list which includes the following prescription(s): cyanocobalamin, ergocalciferol (vitamin d2), hydrochlorothiazide, NON FORMULARY MEDICATION, NON FORMULARY MEDICATION, NON FORMULARY MEDICATION, omeprazole, and potassium chloride sa.    Vitamins, Minerals, and/or Supplements:  Vit B12, Vit D     Food/Medication Interactions:  Reviewed     Food Allergies or Intolerances:  shellfish     Social History    Marital status:    Employment:  [employer]    Social History     Tobacco Use    Smoking status: Former Smoker     Packs/day: 0.25     Years: 32.00     Pack years: 8.00     Types: Cigarettes     Last attempt to quit:      Years since quittin.3    Smokeless tobacco: Never Used    Tobacco comment: smoked socially decades ago - weekends only   Substance Use Topics    Alcohol use: No     Frequency: Monthly or less     Drinks per session: 1 or 2     Binge frequency: Never     Comment: very rare        Lab Reports   Total Cholesterol:  201    Triglycerides:  87  HDL:  67  LDL:  116.6   Glucose:  90  HbA1c:  5.6  BP:  120/74     Food History  Breakfast:  Boiled egg w 1 1/2 c Tazo Franky tea latte skinny concentrate (w 1 1/2 c Fair Life Fat Free Milk)  Mid-morning Snack:  Sometimes 1 hard boiled egg / cheese stick low-fat  Lunch:  Bumble bee tuna pack + Water w Crystal Light   Mid-afternoon Snack:  Cheese stick / pepperjack cheese w triscuits / tortilla w cheese  Dinner:  Fish / Burleson Bens boiled w onions, celery, peppers, w ham cubes / baked pork chops / baked chicken w  crystal light  H.S. Snack:  none  *Fluid intake:  Crystal Light, Fair Life Milk, Franky Tea    Exercise History:  No exercise routine. Limited walking d/t hip pain    Cultural/Spiritual/Personal Preferences:  none    Support System:  Family at home    State of Change:  maintenance    Barriers to Change:  Lack of motivation for physical activity + hip pain    Diagnosis    Food- and nutrition-related knowledge deficit related to self-stated weight plateau as evidenced by weight 180 pounds over three weeks.    Intervention    RMR (Method: [place] StJeor):  1401 kcal  Activity Factor:  1.2  JOB:  1700    Goals:  1.  Replace Franky Tea concentrate for tea bags and create own non-sugar-added tea concentrate  2.  Replace sugar in the household with either (Splenda/Stevia/Truvia/Swerve/Monk Fruit Extract)  3.  Continue current high protein intake at meals and snacks  4.  Include home-based physical activity 3 days a week into routine    Nutrition Education  Since this patient has been coming through [employer], she has had many nutrition education sessions. Today in particular, we discussed ways to decrease sugar (non-nutritive) calories that may be keeping her from losing more weight. Encouraged her to continue prioritizing protein at all meals and snacks and include sources of vegetables as much as tolerated. Discussed options for non-calorie sweeteners she can have at home and reviewed in detail the nutrition facts for her most commonly consumed snacks and beverages to note sources of added sugar. Recommended to increase physical activity to add some routine to her lifestyle. Patient verbalized understanding of nutrition education and recommendations received.    Handouts Provided  None at this time.    Monitoring/Evaluation    Monitor the following:  Weight  BMI  % Body Fat  Caloric intake  Labs:  TC, Na, BUN    Follow Up Plan:  Communication with referring healthcare provider is unnecessary at this time as patient  presented as part of annual wellness exam.  However, will follow up with patient in 1-2 years.

## 2020-05-15 NOTE — PROGRESS NOTES
Subjective:      Patient ID: Renae Hou is a 59 y.o. female.    Chief Complaint: Employment Physical      HPI:  Renae Hou is a 59 year old female with chronic pain, fatty liver, gastroesophageal reflux disease, hypertension, hyperlipidemia, lumbar radiculopathy, obesity, prediabetes, spondylolisthesis, and obstructive sleep apnea who presents to clinic today for an executive health exam.    Completed labs, CXR, and EKG for appointment today.  CBC normal.  CMP showed mild hypernatremia, mildly elevated BUN.  Total cholesterol improved to 201 otherwise lipids normal.  TSH, A1c, and Vitamin D normal as well.  CXR normal.  EKG normal.    Complains of left hip and left knee pain.  Last seen by orthopedics 5/4/20 and received hip bursa injection at that time for trochanteric bursitis.    Complains of RUQ abd pain around where her incision was for her bariatric surgery.  To follow up with hepatology s/p US which showed worsening fatty liver disease.  Liver enzymes normal.      Health Care Maintenance:  Last tetanus booster:  8/12/16  Shingles vaccination:  Last mammogram:  3/22/19; scheduled for 5/15/20 for this year  Last colonoscopy:  8/1/17; repeat 5 years      Past Medical History:   Diagnosis Date    Abscess of paraspinous muscles     Allergy 4/2018    DVT (deep venous thrombosis)     left leg    Epidural abscess 4/10/2018    Fatty liver     GERD (gastroesophageal reflux disease) 2015    Hypertension     Lumbar radiculopathy     Menopause     Obesity     Obstructive sleep apnea on CPAP     no longer uses CPAP after weight loss from gastric sleeve    Thyroid disease     Thyroid nodules.       Past Surgical History:   Procedure Laterality Date    ADENOIDECTOMY  1995    BACK SURGERY  2002    L4, L5    BILATERAL SALPINGOOPHORECTOMY      CHOLECYSTECTOMY      CYST REMOVAL      ESOPHAGOGASTRODUODENOSCOPY N/A 5/3/2019    Procedure: ESOPHAGOGASTRODUODENOSCOPY (EGD);  Surgeon: Carlin Sanchez MD;   Location: [facility] ENDO (4TH FLR);  Service: Endoscopy;  Laterality: N/A;    HYSTERECTOMY  2012    [facility]     INSERTION OF INFERIOR VENA CAVAL FILTER Right 2019    Procedure: IVC filter placement;  Surgeon: Rodney Rico MD;  Location: [facility] CATH LAB;  Service: Peripheral Vascular;  Laterality: Right;    IVC FILTER RETRIEVAL N/A 2019    Procedure: REMOVAL-FILTER-IVC;  Surgeon: Rodney Rico MD;  Location: [facility] OR 2ND FLR;  Service: Peripheral Vascular;  Laterality: N/A;    LAPAROSCOPIC SLEEVE GASTRECTOMY N/A 2019    Procedure: GASTRECTOMY, SLEEVE, LAPAROSCOPIC, with intraop EGD 44997;  Surgeon: Duc Ratliff Jr., MD;  Location: [facility] OR 2ND FLR;  Service: General;  Laterality: N/A;    RECTOCELE REPAIR      SPINE SURGERY  2018    THYROID NODULE REMOVAL      TONSILLECTOMY         Family History   Problem Relation Age of Onset    Hypertension Mother     Hyperlipidemia Mother     Heart disease Mother 55        cad, valvular heart disease    Alcohol abuse Sister     Stroke Father     No Known Problems Daughter     No Known Problems Daughter     Breast cancer Neg Hx     Colon cancer Neg Hx     Ovarian cancer Neg Hx     Diabetes Neg Hx     Esophageal cancer Neg Hx        Social History     Socioeconomic History    Marital status:      Spouse name: Not on file    Number of children: Not on file    Years of education: Not on file    Highest education level: Not on file   Occupational History    Not on file   Social Needs    Financial resource strain: Not hard at all    Food insecurity:     Worry: Never true     Inability: Never true    Transportation needs:     Medical: No     Non-medical: No   Tobacco Use    Smoking status: Former Smoker     Packs/day: 0.25     Years: 32.00     Pack years: 8.00     Types: Cigarettes     Last attempt to quit:      Years since quittin.3    Smokeless tobacco: Never Used    Tobacco comment: smoked socially  decades ago - weekends only   Substance and Sexual Activity    Alcohol use: No     Frequency: Monthly or less     Drinks per session: 1 or 2     Binge frequency: Never     Comment: very rare    Drug use: No    Sexual activity: Not Currently     Partners: Male     Birth control/protection: See Surgical Hx, None     Comment: [facility] BS&O 12/17/2012,     Lifestyle    Physical activity:     Days per week: Patient refused     Minutes per session: 0 min    Stress: Not at all   Relationships    Social connections:     Talks on phone: More than three times a week     Gets together: Once a week     Attends Muslim service: Not on file     Active member of club or organization: No     Attends meetings of clubs or organizations: Patient refused     Relationship status:    Other Topics Concern    Not on file   Social History Narrative    . 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       Review of Systems   Constitutional: Negative for chills, fatigue and fever.   HENT: Negative for congestion, hearing loss, nosebleeds, rhinorrhea, sore throat and trouble swallowing.    Eyes: Negative for pain and visual disturbance.   Respiratory: Negative for cough, shortness of breath and wheezing.    Cardiovascular: Negative for chest pain and palpitations.   Gastrointestinal: Positive for abdominal pain. Negative for abdominal distention, constipation, diarrhea, nausea and vomiting.   Genitourinary: Negative for decreased urine volume, difficulty urinating, dysuria, hematuria and urgency.   Musculoskeletal: Positive for arthralgias. Negative for back pain and myalgias.   Skin: Negative for color change and rash.   Neurological: Negative for dizziness, tremors, weakness, light-headedness, numbness and headaches.   Psychiatric/Behavioral: Negative for agitation, behavioral problems and confusion. The patient is not nervous/anxious.      Objective:     Vitals:    05/15/20 1015   BP: 106/70   BP Location: Right arm   Patient Position: Sitting  "  BP Method: Medium (Manual)   Pulse: 84   Temp: 98.5 °F (36.9 °C)   TempSrc: Oral   SpO2: 98%   Weight: 83.1 kg (183 lb 3.2 oz)   Height: 5' 5" (1.651 m)       Physical Exam   Constitutional: She appears well-developed and well-nourished.   HENT:   Head: Normocephalic and atraumatic.   Right Ear: Hearing, tympanic membrane, external ear and ear canal normal.   Left Ear: Hearing, tympanic membrane, external ear and ear canal normal.   Nose: Nose normal.   Eyes: Pupils are equal, round, and reactive to light. Conjunctivae are normal.   Neck: Normal range of motion. Neck supple. No tracheal deviation present.   Cardiovascular: Normal rate, regular rhythm and normal heart sounds. Exam reveals no gallop and no friction rub.   No murmur heard.  Pulmonary/Chest: Effort normal and breath sounds normal. No respiratory distress. She has no wheezes. She has no rales.   Abdominal: Soft. Bowel sounds are normal. She exhibits no distension. There is no tenderness. There is no rebound and no guarding.   Musculoskeletal: She exhibits no edema or deformity.   Neurological: She is alert. Coordination normal.   Skin: Skin is warm and dry.   Psychiatric: She has a normal mood and affect. Her behavior is normal.   Nursing note and vitals reviewed.     Assessment:      1. Annual physical exam    2. Greater trochanteric bursitis of left hip    3. Hyperlipidemia, unspecified hyperlipidemia type    4. Essential hypertension    5. Need for shingles vaccine    6. Pre-diabetes    7. RUQ abdominal pain      Plan:   Renae was seen today for employment physical.    Diagnoses and all orders for this visit:    Annual physical exam        -     Reviewed labs/imaging/EKG with patient; letter to be mailed    Greater trochanteric bursitis of left hip  -     Start diclofenac sodium (VOLTAREN) 1 % Gel; Apply 2 g topically 4 (four) times daily.  F/u with orthopedics if no improvement or for any worsening.    Hyperlipidemia, unspecified hyperlipidemia " type        -     Continue dietary/lifestyle modifications.    Essential hypertension        -     Within goal today; continue current regimen.    Need for shingles vaccine        -     Vaccination card for Shingrix provided    Pre-diabetes  -     Hemoglobin A1C; Future in 6 months; continue dietary/lifestyle modifications    RUQ abdominal pain        -     No obvious abnormality noted on exam today, non-tender on exam.  F/u with hepatology as previously scheduled; recommended low fat diet/weight loss for hepatic steatosis.

## 2020-05-15 NOTE — PROGRESS NOTES
Subjective:       Patient ID: Renae Hou is a 59 y.o. female.    Chief Complaint: No chief complaint on file.    HPI   Pt. Has no significant cardiovascular or pulmonary history.  Patient has a history of hypertension for which she takes a diuretic PRN    Physical Limitations:  Patient has a L4,L5,S1 spinal fusion, bulging disc as C4/C5, and left hip pain.  Patient is limited from walking long distances or biking, due to her hip pain.  Patient chose to defer the curl-up test.      Current exercise routine:  Patient does not follow any formal exercise or flexibility routine at the current time.     Goals:  Patient has a long term goal weigh of 160 lbs   Fun Facts:  Patient was very friendly and engaged.  Patient had bariatric sleeve surgery in July of 2019 and has since lost close to 45 lbs.  Patient seemed motivated to begin a regular exercise routine and was receptive to all recommendations made.      Review of Systems    Objective:     The fitness evaluation results are as follows:  D.O.S. 5/15/2020 5/24/2019 9/28/2018 8/12/2016   Height (in): 65 65.25 65.5 65.75   Weight (lbs): 180.7 226 234 183   BMI: 30.813697 37.179029 38.25433 29.15318   Body Fat (%): 44.60 43.75 44.23 34.70   Waist (cm): 100 111 112 93   Hip (cm): 106 124 127 107   WHR: 0.94 0.90 0.88 0.87   RBP (mmHg): 120/74 134/90 118/82 130/80   RHR (bpm): 60 78 70 68    Strength R (lbs)t: 56.085607 61.879678 68.952601 67.32077    Strength Lt (lbs): 65 73.518685 68.939469 69.56642   Push-up Assessment: 10 Deferred Deferred 0   Curl-up Assessment: Deferred Deferred Deferred 0   Flexibility Testing (cm): 20 Deferred Deferred 31   REE (kcals): 1401 2250 2010 1260       Physical Exam    Assessment:     Age/gender stratified assessment:  Resting BP: Within Normal Limits   Body Fat %: Poor   WHR Risk Factor: High Risk    Strength R: Average    Strength L: Above Average   Upper Body Endurance: Good   Abdominal Endurance: Deferred   Lower  body Flexibiltiy: Needs Improvement       1. Routine general medical examination at a health care facility        Plan:       Recommended fitness guidelines:    -150 minutes of moderate intensity aerobic exercise per week or 75 minutes of vigorous intensity aerobic exercise per week.  Try to reach reach a minimum of 150 minutes of moderate aerobic activity per week by walking for 30 minutes, 5 days a week.      -2 to 4 days per week of resistance training for each muscle group.   Practice you upper body resistance training exercises and core strengthening exercises, given to your by the physical therapist, 2 days a week.    -Daily stretching with a hold of at least 30 seconds per muscle group.

## 2020-05-18 DIAGNOSIS — M25.552 PAIN OF LEFT HIP JOINT: ICD-10-CM

## 2020-05-18 DIAGNOSIS — M70.62 TROCHANTERIC BURSITIS OF LEFT HIP: Primary | ICD-10-CM

## 2020-05-25 ENCOUNTER — PATIENT OUTREACH (OUTPATIENT)
Dept: ADMINISTRATIVE | Facility: OTHER | Age: 60
End: 2020-05-25

## 2020-05-27 ENCOUNTER — PATIENT MESSAGE (OUTPATIENT)
Dept: INTERNAL MEDICINE | Facility: CLINIC | Age: 60
End: 2020-05-27

## 2020-05-27 ENCOUNTER — OFFICE VISIT (OUTPATIENT)
Dept: OTOLARYNGOLOGY | Facility: CLINIC | Age: 60
End: 2020-05-27
Payer: COMMERCIAL

## 2020-05-27 ENCOUNTER — CLINICAL SUPPORT (OUTPATIENT)
Dept: AUDIOLOGY | Facility: CLINIC | Age: 60
End: 2020-05-27
Payer: COMMERCIAL

## 2020-05-27 VITALS — BODY MASS INDEX: 30.34 KG/M2 | WEIGHT: 182.31 LBS

## 2020-05-27 DIAGNOSIS — H91.93 HIGH FREQUENCY HEARING LOSS OF BOTH EARS: Primary | ICD-10-CM

## 2020-05-27 DIAGNOSIS — H81.01 LABYRINTHINE VERTIGO WITH INVOLVEMENT OF RIGHT INNER EAR: ICD-10-CM

## 2020-05-27 DIAGNOSIS — G43.109 MIGRAINE EQUIVALENT SYNDROME: Primary | ICD-10-CM

## 2020-05-27 DIAGNOSIS — H81.391 PERIPHERAL VERTIGO INVOLVING RIGHT EAR: Primary | ICD-10-CM

## 2020-05-27 PROCEDURE — 99999 PR PBB SHADOW E&M-EST. PATIENT-LVL III: ICD-10-PCS | Mod: PBBFAC,,, | Performed by: OTOLARYNGOLOGY

## 2020-05-27 PROCEDURE — 99999 PR PBB SHADOW E&M-EST. PATIENT-LVL I: ICD-10-PCS | Mod: PBBFAC,,, | Performed by: AUDIOLOGIST

## 2020-05-27 PROCEDURE — 92540 PR VESTIBULAR EVAL NYSTAG FOVL&PERPH STIM OSCIL TRACKING: ICD-10-PCS | Mod: S$GLB,,, | Performed by: AUDIOLOGIST

## 2020-05-27 PROCEDURE — 99999 PR PBB SHADOW E&M-EST. PATIENT-LVL I: CPT | Mod: PBBFAC,,, | Performed by: AUDIOLOGIST

## 2020-05-27 PROCEDURE — 92557 PR COMPREHENSIVE HEARING TEST: ICD-10-PCS | Mod: S$GLB,,, | Performed by: AUDIOLOGIST

## 2020-05-27 PROCEDURE — 92537 PR CALORIC VSTBLR TEST W/REC BITHERMAL: ICD-10-PCS | Mod: S$GLB,,, | Performed by: AUDIOLOGIST

## 2020-05-27 PROCEDURE — 92537 CALORIC VSTBLR TEST W/REC: CPT | Mod: S$GLB,,, | Performed by: AUDIOLOGIST

## 2020-05-27 PROCEDURE — 92557 COMPREHENSIVE HEARING TEST: CPT | Mod: S$GLB,,, | Performed by: AUDIOLOGIST

## 2020-05-27 PROCEDURE — 92567 TYMPANOMETRY: CPT | Mod: S$GLB,,, | Performed by: AUDIOLOGIST

## 2020-05-27 PROCEDURE — 99205 PR OFFICE/OUTPT VISIT, NEW, LEVL V, 60-74 MIN: ICD-10-PCS | Mod: S$GLB,,, | Performed by: OTOLARYNGOLOGY

## 2020-05-27 PROCEDURE — 3008F PR BODY MASS INDEX (BMI) DOCUMENTED: ICD-10-PCS | Mod: CPTII,S$GLB,, | Performed by: OTOLARYNGOLOGY

## 2020-05-27 PROCEDURE — 99999 PR PBB SHADOW E&M-EST. PATIENT-LVL III: CPT | Mod: PBBFAC,,, | Performed by: OTOLARYNGOLOGY

## 2020-05-27 PROCEDURE — 92540 BASIC VESTIBULAR EVALUATION: CPT | Mod: S$GLB,,, | Performed by: AUDIOLOGIST

## 2020-05-27 PROCEDURE — 3008F BODY MASS INDEX DOCD: CPT | Mod: CPTII,S$GLB,, | Performed by: OTOLARYNGOLOGY

## 2020-05-27 PROCEDURE — 99205 OFFICE O/P NEW HI 60 MIN: CPT | Mod: S$GLB,,, | Performed by: OTOLARYNGOLOGY

## 2020-05-27 PROCEDURE — 92567 PR TYMPA2METRY: ICD-10-PCS | Mod: S$GLB,,, | Performed by: AUDIOLOGIST

## 2020-05-27 RX ORDER — MECLIZINE HYDROCHLORIDE 25 MG/1
25 TABLET ORAL 3 TIMES DAILY PRN
Qty: 45 TABLET | Refills: 1 | Status: SHIPPED | OUTPATIENT
Start: 2020-05-27 | End: 2020-07-27

## 2020-05-27 RX ORDER — EFINACONAZOLE 100 MG/ML
SOLUTION TOPICAL
COMMUNITY
Start: 2020-05-22 | End: 2021-05-14

## 2020-05-27 NOTE — PROGRESS NOTES
VNG Evaluation    Referring physician:  Dr. Avila    59 y.o. female complains of dizziness, lightheadedness, imbalance, nausea, and tinnitus.  Symptoms are provoked by quick head turns, airising from bed, bending over, and looking up and have been recurring over the past 4 months.  Ms. Hou stated that, when the dizziness began, she was dizzy for a few days but now each episode lasts for seconds at a time.  She stated that the dizziness was worse for the first two months but has progressively gotten better.  Ms. Hou denied taking any medication that would affect today's test results.    Gaze nystagmus was absent.    Oculomotor function was normal.    Spontaneous nystagmus was absent.    The head-hanging left Hallpike was negative.    The head-hanging right Hallpike revealed <clinicially insignificant, isolated> nystagmus: 3 d/s right-beating in the supine position.    Static positional nystagmus was absent.    Unilateral weakness: 85% (right)  Directional preponderance 15% (right-beating)    RW: 0 d/s  LW: 9 d/s  RC: 2 d/s  LC: 15 d/s    Fixation suppression was normal.    Impression: VNG results suggest a right peripheral vestibular abnormality    Recommend: A trial with Cawthorne exercises to help reduce subjective symptoms.  A printed copy of these exercises was provided to Ms. Hou at today's appointment.  2.  A trial with formal vestibular/balance rehab.

## 2020-05-27 NOTE — TELEPHONE ENCOUNTER
Please help schedule visit for MsMateus Ameya for history of thyroid nodules, hoarseness, and abnormal throat sensation.

## 2020-05-27 NOTE — PROGRESS NOTES
Renae Hou was seen today for a hearing evaluation.     Pure tone audiometry revealed a mild - moderate high frequency hearing loss, AU  SRT and PTA are in good agreement bilaterally.  Excellent speech discrimination for the right ear: Excellent for the left ear   Tympanometry revealed Type A, bilaterally      Recommendations:  1. Otologic Evaluation  2. Annual Audiogram or repeat audiogram as needed  3. Hearing Protection

## 2020-05-27 NOTE — PROGRESS NOTES
Subjective:       Patient ID: Renae Hou is a 59 y.o. female.    Chief Complaint: Dizziness and Otalgia (Occassional Right ear at night)    HPI: Hx of Dizz 5 mos.    Came on p rem IVC filter.    Lasted days.    No ear sxs.    Grad better.    Now with occ pos sx's.    + occ B Tinn ?? R>L.  Hx of HA. ? Motion triggers.    No Rx.    Past Medical History: Patient has a past medical history of Abscess of paraspinous muscles, Allergy (4/2018), DVT (deep venous thrombosis), Epidural abscess (4/10/2018), Fatty liver, GERD (gastroesophageal reflux disease) (2015), Hypertension, Lumbar radiculopathy, Menopause, Obesity, Obstructive sleep apnea on CPAP, and Thyroid disease.    Past Surgical History: Patient has a past surgical history that includes Cholecystectomy; RECTOCELE REPAIR; THYROID NODULE REMOVAL; Bilateral salpingoophorectomy; Hysterectomy (12/17/2012); Back surgery (2002); Cyst Removal; Adenoidectomy (1995); Spine surgery (2018); Tonsillectomy (1995); Esophagogastroduodenoscopy (N/A, 5/3/2019); Insertion of inferior vena caval filter (Right, 7/19/2019); Laparoscopic sleeve gastrectomy (N/A, 7/23/2019); and IVC filter retrieval (N/A, 12/20/2019).    Social History: Patient reports that she quit smoking about 32 years ago. Her smoking use included cigarettes. She has a 8.00 pack-year smoking history. She has never used smokeless tobacco. She reports that she does not drink alcohol or use drugs.    Family History: family history includes Alcohol abuse in her sister; Heart disease (age of onset: 55) in her mother; Hyperlipidemia in her mother; Hypertension in her mother; No Known Problems in her daughter and daughter; Stroke in her father.    Medications:   Current Outpatient Medications   Medication Sig    cyanocobalamin 500 MCG tablet Take 500 mcg by mouth once daily.    diclofenac sodium (VOLTAREN) 1 % Gel Apply 2 g topically 4 (four) times daily.    EPINEPHrine (EPIPEN) 0.3 mg/0.3 mL AtIn      ergocalciferol, vitamin D2, (VITAMIN D ORAL) Take 1 tablet by mouth once daily.    hydroCHLOROthiazide (HYDRODIURIL) 12.5 MG Tab     JUBLIA 10 % Javan     mometasone (ELOCON) 0.1 % ointment ALTON EXT AA BID PRF RASH    NON FORMULARY MEDICATION 1 patch by Other route once daily. Apply 1 patch to lower abdomen once daily    NON FORMULARY MEDICATION 1 patch.    NON FORMULARY MEDICATION 1 patch.    omeprazole (PRILOSEC) 40 MG capsule Take 1 capsule (40 mg total) by mouth every morning. Open capsule and take with apple sauce (Patient taking differently: Take 40 mg by mouth daily as needed. Open capsule and take with apple sauce)    potassium chloride SA (K-DUR,KLOR-CON) 20 MEQ tablet Take 1 tablet (20 mEq total) by mouth once daily.    varicella-zoster gE-AS01B, PF, (SHINGRIX, PF,) 50 mcg/0.5 mL injection Inject into the muscle.    meclizine (ANTIVERT) 25 mg tablet Take 1 tablet (25 mg total) by mouth 3 (three) times daily as needed.     No current facility-administered medications for this visit.        Allergies: Patient is allergic to cathflo activase [alteplase]; heparin analogues; iodine and iodide containing products; shellfish containing products; and latex.          Review of Systems   Constitutional: Negative for appetite change, chills, fatigue and fever.   HENT: Positive for ear pain, hearing loss, sinus pressure, tinnitus and trouble swallowing. Negative for congestion, ear discharge, facial swelling, postnasal drip, rhinorrhea and sore throat.    Eyes: Negative for photophobia, pain, discharge, redness, itching and visual disturbance.   Respiratory: Negative for apnea, cough, choking, chest tightness, shortness of breath, wheezing and stridor.    Cardiovascular: Negative for chest pain and palpitations.   Gastrointestinal: Negative for abdominal pain, constipation, diarrhea, nausea and vomiting.   Musculoskeletal: Negative for arthralgias, gait problem, joint swelling, myalgias, neck pain and neck  stiffness.   Skin: Negative for color change, pallor, rash and wound.   Neurological: Positive for dizziness. Negative for tremors, seizures, syncope, facial asymmetry, speech difficulty, weakness, light-headedness, numbness and headaches.   Hematological: Negative for adenopathy. Does not bruise/bleed easily.   Psychiatric/Behavioral: Negative for agitation, behavioral problems, confusion, decreased concentration, dysphoric mood, hallucinations, sleep disturbance and suicidal ideas. The patient is not nervous/anxious and is not hyperactive.        Objective:      Physical Exam   Constitutional: She is oriented to person, place, and time. She appears well-developed and well-nourished. She is cooperative.  Non-toxic appearance. She does not have a sickly appearance. She does not appear ill. No distress.   HENT:   Head: Normocephalic and atraumatic. Not macrocephalic and not microcephalic. Head is without raccoon's eyes, without Aggarwal's sign, without abrasion, without contusion, without laceration, without right periorbital erythema and without left periorbital erythema. Hair is normal.   Right Ear: Ear canal normal. No lacerations. No drainage, swelling or tenderness. No foreign bodies. No mastoid tenderness. Tympanic membrane is not injected, not scarred, not perforated, not erythematous, not retracted and not bulging. Tympanic membrane mobility is normal. No middle ear effusion. No hemotympanum. No decreased hearing is noted.   Left Ear: Ear canal normal. No lacerations. No drainage, swelling or tenderness. No foreign bodies. No mastoid tenderness. Tympanic membrane is not injected, not scarred, not perforated, not erythematous, not retracted and not bulging. Tympanic membrane mobility is normal.  No middle ear effusion. No hemotympanum. No decreased hearing is noted.   Nose: No mucosal edema, rhinorrhea, nose lacerations, sinus tenderness, nasal deformity, septal deviation or nasal septal hematoma. No epistaxis.   No foreign bodies. Right sinus exhibits no maxillary sinus tenderness. Left sinus exhibits no maxillary sinus tenderness.       CN II-XII nL.    EOM's Nl.    DHP; Neg.    Romb: neg.    MRI mult white matter lesions CW migraine       Eyes: Pupils are equal, round, and reactive to light. Conjunctivae, EOM and lids are normal.   Neck: Normal range of motion. Neck supple. No JVD present. No tracheal tenderness and no muscular tenderness present. No neck rigidity. No tracheal deviation, no edema, no erythema and normal range of motion present. No thyroid mass and no thyromegaly present.   Cardiovascular: Normal rate and regular rhythm.   Pulmonary/Chest: Effort normal. No accessory muscle usage or stridor. No apnea, no tachypnea and no bradypnea. No respiratory distress.   Abdominal: Soft. Normal appearance.   Musculoskeletal: Normal range of motion.   Lymphadenopathy:        Head (right side): No submental, no submandibular, no tonsillar, no preauricular and no posterior auricular adenopathy present.        Head (left side): No submental, no submandibular, no tonsillar, no preauricular and no posterior auricular adenopathy present.     She has no cervical adenopathy.        Right cervical: No superficial cervical, no deep cervical and no posterior cervical adenopathy present.       Left cervical: No superficial cervical, no deep cervical and no posterior cervical adenopathy present.   Neurological: She is alert and oriented to person, place, and time. She displays no atrophy and no tremor. No cranial nerve deficit or sensory deficit. She exhibits normal muscle tone. She displays no seizure activity.   Skin: Skin is warm, dry and intact. No abrasion, no bruising, no burn, no ecchymosis, no laceration, no lesion and no rash noted. She is not diaphoretic. No erythema. No pallor.   Psychiatric: She has a normal mood and affect. Her behavior is normal. Judgment and thought content normal. Her speech is not rapid and/or  pressured and not slurred. Cognition and memory are normal. She is communicative.   Nursing note and vitals reviewed.           Reading Date Result Priority   Duc Aguirre MD 1/7/2020 Routine      Narrative     EXAMINATION:  MRI BRAIN WITHOUT CONTRAST    CLINICAL HISTORY:  dizziness, decreased hearing, nausea, left sided head pain; Dizziness and giddiness    TECHNIQUE:  Multiplanar multisequence MR imaging of the brain was performed without contrast.    COMPARISON:  None.    FINDINGS:  MRI of the brain demonstrates no structural abnormality.  Carotid and basilar flow voids appear intact.  There is no hydrocephalus.  Brain shows no evidence remote ischemic infarct.  No tumor mass effect is seen.  On the susceptibility weighted images is microhemorrhage in incidentally seen.    Skull base structures are unremarkable.  Paranasal sinuses are clear.      Impression       No acute findings      Electronically signed by: Duc Aguirre MD  Date: 01/07/2020  Time: 08:00          Last Resulted     VNG Evaluation     Referring physician:  Dr. Avila     59 y.o. female complains of dizziness, lightheadedness, imbalance, nausea, and tinnitus.  Symptoms are provoked by quick head turns, airising from bed, bending over, and looking up and have been recurring over the past 4 months.  Ms. Hou stated that, when the dizziness began, she was dizzy for a few days but now each episode lasts for seconds at a time.  She stated that the dizziness was worse for the first two months but has progressively gotten better.  Ms. Hou denied taking any medication that would affect today's test results.     Gaze nystagmus was absent.     Oculomotor function was normal.     Spontaneous nystagmus was absent.     The head-hanging left Hallpike was negative.     The head-hanging right Hallpike revealed <clinicially insignificant, isolated> nystagmus: 3 d/s right-beating in the supine position.     Static positional nystagmus was absent.      Unilateral weakness: 85% (right)  Directional preponderance 15% (right-beating)     RW: 0 d/s  LW: 9 d/s  RC: 2 d/s  LC: 15 d/s     Fixation suppression was normal.     Impression: VNG results suggest a right peripheral vestibular abnormality     Recommend: A trial with Cawthorne exercises to help reduce subjective symptoms.  A printed copy of these exercises was provided to Ms. Hou at today's appointment.  2.  A trial with formal vestibular/balance rehab.              Assessment:       1. Migraine equivalent syndrome    2. Labyrinthine vertigo with involvement of right inner ear        Plan:         Renae was seen today for dizziness and otalgia.    Diagnoses and all orders for this visit:    Migraine equivalent syndrome    Labyrinthine vertigo with involvement of right inner ear    Other orders  -     meclizine (ANTIVERT) 25 mg tablet; Take 1 tablet (25 mg total) by mouth 3 (three) times daily as needed.        Migraine diet.    Mg Citrate.    Vest exc.

## 2020-05-28 ENCOUNTER — PATIENT MESSAGE (OUTPATIENT)
Dept: INTERNAL MEDICINE | Facility: CLINIC | Age: 60
End: 2020-05-28

## 2020-05-29 ENCOUNTER — OFFICE VISIT (OUTPATIENT)
Dept: INTERNAL MEDICINE | Facility: CLINIC | Age: 60
End: 2020-05-29
Payer: COMMERCIAL

## 2020-05-29 VITALS
DIASTOLIC BLOOD PRESSURE: 80 MMHG | SYSTOLIC BLOOD PRESSURE: 110 MMHG | OXYGEN SATURATION: 98 % | TEMPERATURE: 99 F | BODY MASS INDEX: 30.23 KG/M2 | HEIGHT: 65 IN | HEART RATE: 81 BPM | WEIGHT: 181.44 LBS

## 2020-05-29 DIAGNOSIS — Z86.39 HISTORY OF THYROID NODULE: ICD-10-CM

## 2020-05-29 DIAGNOSIS — R09.89 THROAT FULLNESS: Primary | ICD-10-CM

## 2020-05-29 DIAGNOSIS — R49.0 HOARSENESS OF VOICE: ICD-10-CM

## 2020-05-29 PROCEDURE — 3079F DIAST BP 80-89 MM HG: CPT | Mod: CPTII,S$GLB,, | Performed by: FAMILY MEDICINE

## 2020-05-29 PROCEDURE — 3008F PR BODY MASS INDEX (BMI) DOCUMENTED: ICD-10-PCS | Mod: CPTII,S$GLB,, | Performed by: FAMILY MEDICINE

## 2020-05-29 PROCEDURE — 99999 PR PBB SHADOW E&M-EST. PATIENT-LVL III: CPT | Mod: PBBFAC,,, | Performed by: FAMILY MEDICINE

## 2020-05-29 PROCEDURE — 3074F SYST BP LT 130 MM HG: CPT | Mod: CPTII,S$GLB,, | Performed by: FAMILY MEDICINE

## 2020-05-29 PROCEDURE — 99213 OFFICE O/P EST LOW 20 MIN: CPT | Mod: S$GLB,,, | Performed by: FAMILY MEDICINE

## 2020-05-29 PROCEDURE — 3008F BODY MASS INDEX DOCD: CPT | Mod: CPTII,S$GLB,, | Performed by: FAMILY MEDICINE

## 2020-05-29 PROCEDURE — 99213 PR OFFICE/OUTPT VISIT, EST, LEVL III, 20-29 MIN: ICD-10-PCS | Mod: S$GLB,,, | Performed by: FAMILY MEDICINE

## 2020-05-29 PROCEDURE — 3079F PR MOST RECENT DIASTOLIC BLOOD PRESSURE 80-89 MM HG: ICD-10-PCS | Mod: CPTII,S$GLB,, | Performed by: FAMILY MEDICINE

## 2020-05-29 PROCEDURE — 3074F PR MOST RECENT SYSTOLIC BLOOD PRESSURE < 130 MM HG: ICD-10-PCS | Mod: CPTII,S$GLB,, | Performed by: FAMILY MEDICINE

## 2020-05-29 PROCEDURE — 99999 PR PBB SHADOW E&M-EST. PATIENT-LVL III: ICD-10-PCS | Mod: PBBFAC,,, | Performed by: FAMILY MEDICINE

## 2020-05-29 NOTE — PROGRESS NOTES
"Subjective:      Patient ID: Renae Hou is a 59 y.o. female.    Chief Complaint: Thyroid Problem      HPI:  Renae Hou is a 59 year old female with chronic pain, fatty liver, gastroesophageal reflux disease, hypertension, hyperlipidemia, lumbar radiculopathy, obesity, prediabetes, spondylolisthesis, and obstructive sleep apnea who presents to clinic today with complaints of hoarseness and a sensation of something in her throat.    Patient sent portal message 5/27/20 endorsing hoarseness and a sensation of something in her throat ("fullness or popping") but denied associated dysphagia.  Endorses history of thyroid nodule to right lobe but has not had any imaging done for this in about 10 years (last done at [facility]).  Left thyroid lobe removed 2010 due to changing nodule.  Hoarseness worsens throughout the day.  Endorses some puffiness to anterior neck as well.  Denies associated vomiting.  Has some discomfort associated with throat sensations.  States she has been having sensitivity to cold lately which is atypical for her.  TSH 5/15/20 WNL.  States she seems to need to clear her throat more frequently as well.    Patient reported review of systems prior to appt today positive for trouble swallowing.      Past Medical History:   Diagnosis Date    Abscess of paraspinous muscles     Allergy 4/2018    DVT (deep venous thrombosis)     left leg    Epidural abscess 4/10/2018    Fatty liver     GERD (gastroesophageal reflux disease) 2015    Hypertension     Lumbar radiculopathy     Menopause     Obesity     Obstructive sleep apnea on CPAP     no longer uses CPAP after weight loss from gastric sleeve    Thyroid disease     Thyroid nodules.       Past Surgical History:   Procedure Laterality Date    ADENOIDECTOMY  1995    BACK SURGERY  2002    L4, L5    BILATERAL SALPINGOOPHORECTOMY      CHOLECYSTECTOMY      CYST REMOVAL      ESOPHAGOGASTRODUODENOSCOPY N/A 5/3/2019    Procedure: " ESOPHAGOGASTRODUODENOSCOPY (EGD);  Surgeon: Carlin Sanchez MD;  Location: [facility] (4TH FLR);  Service: Endoscopy;  Laterality: N/A;    HYSTERECTOMY  2012    [facility] BS&O     INSERTION OF INFERIOR VENA CAVAL FILTER Right 2019    Procedure: IVC filter placement;  Surgeon: Rodney Rico MD;  Location: [facility] CATH LAB;  Service: Peripheral Vascular;  Laterality: Right;    IVC FILTER RETRIEVAL N/A 2019    Procedure: REMOVAL-FILTER-IVC;  Surgeon: Rodney Rico MD;  Location: [facility] OR 2ND FLR;  Service: Peripheral Vascular;  Laterality: N/A;    LAPAROSCOPIC SLEEVE GASTRECTOMY N/A 2019    Procedure: GASTRECTOMY, SLEEVE, LAPAROSCOPIC, with intraop EGD 80907;  Surgeon: Duc Ratliff Jr., MD;  Location: [facility] OR 2ND FLR;  Service: General;  Laterality: N/A;    RECTOCELE REPAIR      SPINE SURGERY      THYROID NODULE REMOVAL      TONSILLECTOMY         Family History   Problem Relation Age of Onset    Hypertension Mother     Hyperlipidemia Mother     Heart disease Mother 55        cad, valvular heart disease    Alcohol abuse Sister     Stroke Father     No Known Problems Daughter     No Known Problems Daughter     Breast cancer Neg Hx     Colon cancer Neg Hx     Ovarian cancer Neg Hx     Diabetes Neg Hx     Esophageal cancer Neg Hx        Social History     Socioeconomic History    Marital status:      Spouse name: Not on file    Number of children: Not on file    Years of education: Not on file    Highest education level: Not on file   Occupational History    Not on file   Social Needs    Financial resource strain: Not hard at all    Food insecurity:     Worry: Never true     Inability: Never true    Transportation needs:     Medical: No     Non-medical: No   Tobacco Use    Smoking status: Former Smoker     Packs/day: 0.25     Years: 32.00     Pack years: 8.00     Types: Cigarettes     Last attempt to quit:      Years since quittin.4     Smokeless tobacco: Never Used    Tobacco comment: smoked socially decades ago - weekends only   Substance and Sexual Activity    Alcohol use: No     Frequency: Monthly or less     Drinks per session: 1 or 2     Binge frequency: Never     Comment: very rare    Drug use: No    Sexual activity: Not Currently     Partners: Male     Birth control/protection: See Surgical Hx, None     Comment: [facility] BS&O 12/17/2012,     Lifestyle    Physical activity:     Days per week: Patient refused     Minutes per session: 0 min    Stress: Not at all   Relationships    Social connections:     Talks on phone: More than three times a week     Gets together: Once a week     Attends Mosque service: Not on file     Active member of club or organization: No     Attends meetings of clubs or organizations: Patient refused     Relationship status:    Other Topics Concern    Not on file   Social History Narrative    . 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       Review of Systems   Constitutional: Negative for activity change, chills, fatigue, fever and unexpected weight change.   HENT: Positive for trouble swallowing and voice change. Negative for congestion, hearing loss, nosebleeds, rhinorrhea and sore throat.    Eyes: Negative for pain, discharge and visual disturbance.   Respiratory: Negative for cough, chest tightness, shortness of breath and wheezing.    Cardiovascular: Negative for chest pain and palpitations.   Gastrointestinal: Negative for abdominal distention, abdominal pain, blood in stool, constipation, diarrhea, nausea and vomiting.   Endocrine: Negative for polydipsia and polyuria.   Genitourinary: Negative for decreased urine volume, difficulty urinating, dysuria, hematuria, menstrual problem and urgency.   Musculoskeletal: Negative for arthralgias, back pain, joint swelling, myalgias and neck pain.   Skin: Negative for color change and rash.   Neurological: Negative for dizziness, tremors, weakness, light-headedness,  "numbness and headaches.   Psychiatric/Behavioral: Negative for agitation, behavioral problems, confusion and dysphoric mood. The patient is not nervous/anxious.      Objective:     Vitals:    05/29/20 1056   BP: 110/80   BP Location: Right arm   Patient Position: Sitting   BP Method: Medium (Manual)   Pulse: 81   Temp: 98.9 °F (37.2 °C)   TempSrc: Oral   SpO2: 98%   Weight: 82.3 kg (181 lb 7 oz)   Height: 5' 5" (1.651 m)       Physical Exam   Constitutional: She appears well-developed and well-nourished.   HENT:   Head: Normocephalic and atraumatic.   Right Ear: External ear normal.   Left Ear: External ear normal.   Nose: Nose normal.   No significant thyromegaly appreciated on exam.   Eyes: Pupils are equal, round, and reactive to light. Conjunctivae are normal.   Neck: Neck supple. No tracheal deviation present.   Cardiovascular: Normal rate, regular rhythm and normal heart sounds. Exam reveals no gallop and no friction rub.   No murmur heard.  Pulmonary/Chest: Effort normal and breath sounds normal. No respiratory distress. She has no wheezes. She has no rales.   Abdominal: She exhibits no distension.   Musculoskeletal: She exhibits no deformity.   Lymphadenopathy:     She has no cervical adenopathy.        Right cervical: No superficial cervical adenopathy present.       Left cervical: No superficial cervical adenopathy present.   Neurological: She is alert.   Skin: Skin is warm and dry.        Psychiatric: She has a normal mood and affect. Her behavior is normal.   Nursing note and vitals reviewed.     Assessment:      1. Throat fullness    2. History of thyroid nodule    3. Hoarseness of voice      Plan:   Renae was seen today for thyroid problem.    Diagnoses and all orders for this visit:    Throat fullness; History of thyroid nodule; Hoarseness of voice  -     US Soft Tissue Head Neck Thyroid; Future  -     Release of records form to be completed to attempt to obtain previous thyroid US from Dr. Gould, " ANNE  -     Recommended follow up with ENT for possible flexible laryngoscopy if thyroid US unrevealing

## 2020-06-01 ENCOUNTER — HOSPITAL ENCOUNTER (OUTPATIENT)
Dept: RADIOLOGY | Facility: HOSPITAL | Age: 60
Discharge: HOME | End: 2020-06-01
Attending: FAMILY MEDICINE
Payer: COMMERCIAL

## 2020-06-01 DIAGNOSIS — R49.0 HOARSENESS OF VOICE: ICD-10-CM

## 2020-06-01 DIAGNOSIS — Z86.39 HISTORY OF THYROID NODULE: ICD-10-CM

## 2020-06-01 DIAGNOSIS — R09.89 THROAT FULLNESS: ICD-10-CM

## 2020-06-01 PROCEDURE — 76536 US SOFT TISSUE HEAD NECK THYROID: ICD-10-PCS | Mod: 26,,, | Performed by: RADIOLOGY

## 2020-06-01 PROCEDURE — 76536 US EXAM OF HEAD AND NECK: CPT | Mod: TC

## 2020-06-01 PROCEDURE — 76536 US EXAM OF HEAD AND NECK: CPT | Mod: 26,,, | Performed by: RADIOLOGY

## 2020-06-03 ENCOUNTER — PATIENT MESSAGE (OUTPATIENT)
Dept: INTERNAL MEDICINE | Facility: CLINIC | Age: 60
End: 2020-06-03

## 2020-06-03 ENCOUNTER — TELEPHONE (OUTPATIENT)
Dept: INTERNAL MEDICINE | Facility: CLINIC | Age: 60
End: 2020-06-03

## 2020-06-03 DIAGNOSIS — E04.1 THYROID NODULE: Primary | ICD-10-CM

## 2020-06-04 ENCOUNTER — PATIENT MESSAGE (OUTPATIENT)
Dept: INTERNAL MEDICINE | Facility: CLINIC | Age: 60
End: 2020-06-04

## 2020-06-04 ENCOUNTER — HOSPITAL ENCOUNTER (OUTPATIENT)
Dept: RADIOLOGY | Facility: HOSPITAL | Age: 60
Discharge: HOME | End: 2020-06-04
Attending: PHYSICIAN ASSISTANT
Payer: COMMERCIAL

## 2020-06-04 DIAGNOSIS — M25.552 PAIN OF LEFT HIP JOINT: ICD-10-CM

## 2020-06-04 DIAGNOSIS — M70.62 TROCHANTERIC BURSITIS OF LEFT HIP: ICD-10-CM

## 2020-06-04 PROCEDURE — 73721 MRI JNT OF LWR EXTRE W/O DYE: CPT | Mod: TC,LT

## 2020-06-04 PROCEDURE — 73721 MRI JNT OF LWR EXTRE W/O DYE: CPT | Mod: 26,LT,, | Performed by: RADIOLOGY

## 2020-06-04 PROCEDURE — 73721 MRI HIP WITHOUT CONTRAST LEFT: ICD-10-PCS | Mod: 26,LT,, | Performed by: RADIOLOGY

## 2020-06-04 NOTE — TELEPHONE ENCOUNTER
Please contact patient to schedule biopsy, check-out doesn't have credentials to schedule appointment.

## 2020-06-05 ENCOUNTER — PATIENT MESSAGE (OUTPATIENT)
Dept: INTERNAL MEDICINE | Facility: CLINIC | Age: 60
End: 2020-06-05

## 2020-06-05 ENCOUNTER — TELEPHONE (OUTPATIENT)
Dept: INTERNAL MEDICINE | Facility: CLINIC | Age: 60
End: 2020-06-05

## 2020-06-05 ENCOUNTER — PATIENT OUTREACH (OUTPATIENT)
Dept: ADMINISTRATIVE | Facility: OTHER | Age: 60
End: 2020-06-05

## 2020-06-05 ENCOUNTER — TELEPHONE (OUTPATIENT)
Dept: INTERVENTIONAL RADIOLOGY/VASCULAR | Facility: HOSPITAL | Age: 60
End: 2020-06-05

## 2020-06-05 DIAGNOSIS — E04.1 THYROID NODULE: Primary | ICD-10-CM

## 2020-06-05 NOTE — TELEPHONE ENCOUNTER
Placed orders [id number] and [id number] for thyroid FNA biopsy; please process/notify patient once complete.

## 2020-06-09 ENCOUNTER — TELEPHONE (OUTPATIENT)
Dept: INTERVENTIONAL RADIOLOGY/VASCULAR | Facility: HOSPITAL | Age: 60
End: 2020-06-09

## 2020-06-09 DIAGNOSIS — M25.552 PAIN OF LEFT HIP JOINT: Primary | ICD-10-CM

## 2020-06-10 ENCOUNTER — OFFICE VISIT (OUTPATIENT)
Dept: HEPATOLOGY | Facility: CLINIC | Age: 60
End: 2020-06-10
Payer: COMMERCIAL

## 2020-06-10 ENCOUNTER — TELEPHONE (OUTPATIENT)
Dept: HEPATOLOGY | Facility: CLINIC | Age: 60
End: 2020-06-10

## 2020-06-10 DIAGNOSIS — K76.0 FATTY LIVER: ICD-10-CM

## 2020-06-10 PROCEDURE — 99203 OFFICE O/P NEW LOW 30 MIN: CPT | Mod: 95,,, | Performed by: NURSE PRACTITIONER

## 2020-06-10 PROCEDURE — 99203 PR OFFICE/OUTPT VISIT, NEW, LEVL III, 30-44 MIN: ICD-10-PCS | Mod: 95,,, | Performed by: NURSE PRACTITIONER

## 2020-06-10 NOTE — PATIENT INSTRUCTIONS
1. Fibroscan soon to look for fat or scar tissue in the liver  2. Will check immunity markers for Hepatitis A and B and arrange for vaccination if needed  3. Labs to screen for Hep B  4.  Follow up pending results of above    There is no FDA approved therapy for non-alcoholic fatty liver disease. Therefore, these things are important:  1. Continue weight loss progress, goal of BMI <30  2. Low carb/sugar, high fiber and protein diet  3. Exercise, 5 days per week, 30 minutes per day, as tolerated  4. Recommend good cholesterol, blood pressure, blood sugar levels     In some people, fatty liver can progress to steatohepatitis (inflamatory fatty liver) and possibly to cirrhosis, putting one at increased risk for liver cancer, liver failure, and death. Therefore, the lifestyle changes are very important to decrease this risk.     Website with information about fatty liver and inflammation related to fatty liver ([place]) = www.nashtruth.com  AND [email or web address]

## 2020-06-10 NOTE — PROGRESS NOTES
The patient location is: home in [place]  The chief complaint leading to consultation is: fatty liver    Visit type: audiovisual    Face to Face time with patient: 45 minutes of total time spent on the encounter, which includes face to face time and non-face to face time preparing to see the patient (eg, review of tests), Obtaining and/or reviewing separately obtained history, Documenting clinical information in the electronic or other health record, Independently interpreting results (not separately reported) and communicating results to the patient/family/caregiver, or Care coordination (not separately reported).     Each patient to whom he or she provides medical services by telemedicine is:  (1) informed of the relationship between the physician and patient and the respective role of any other health care provider with respect to management of the patient; and (2) notified that he or she may decline to receive medical services by telemedicine and may withdraw from such care at any time.    Notes:     [facility] VISIT NEW PT NOTE    REFERRING PROVIDER:  Paddy Sanches    CHIEF COMPLAINT: fatty liver    HPI: This is a 59 y.o. White female with PMH noted below, presenting for evaluation of fatty liver disease without elevated liver enzymes.    Previous serologic w/u negative for viral hepatitis C    Risk factors for NAFLD include obesity (much improved since weight loss surgery), HTN    Interval HPI: Doing great with weight loss post sleeve gastrectomy, ultimate weight loss goal ~ 160 lbs    Labs show near normal transaminase levels chronically since 2009 (with mild elevation in 4/2018)  Platelets WNL, alk phos WNL  Synthetic liver functioning WNL    Lab Results   Component Value Date    ALT 37 05/15/2020    AST 31 05/15/2020    ALKPHOS 95 05/15/2020    BILITOT 0.6 05/15/2020    ALBUMIN 3.9 05/15/2020    INR 0.9 04/06/2018     05/15/2020       Abd U/S done 5/2020 showed fatty liver (hepatomegaly  improving compared to US in 2019)    Denies family history of liver disease . Denies significant Alcohol consumption, see below  Social History     Substance and Sexual Activity   Alcohol Use No    Frequency: Monthly or less    Drinks per session: 1 or 2    Binge frequency: Never    Comment: very rare       Immunity to Hep A and B - will check with next labs    Denies jaundice, dark urine, abdominal distention, hematemesis, melena, slowed mentation. No abnormal skin rashes. No generalized joint or muscle pain.      Review of patient's allergies indicates:   Allergen Reactions    Cathflo activase [alteplase] Anaphylaxis     Tightness in chest, raspy throat, restless legs, hotness all over    Heparin analogues      Restless legs, tightness in chest, and hot all over    Iodine and iodide containing products Anaphylaxis, Hives, Shortness Of Breath, Itching, Swelling and Rash    Shellfish containing products Anaphylaxis, Hives, Shortness Of Breath, Itching, Swelling and Rash          Latex Swelling             Current Outpatient Medications on File Prior to Visit   Medication Sig Dispense Refill    cyanocobalamin 500 MCG tablet Take 500 mcg by mouth once daily.      diclofenac sodium (VOLTAREN) 1 % Gel Apply 2 g topically 4 (four) times daily. 100 g 2    EPINEPHrine (EPIPEN) 0.3 mg/0.3 mL AtIn       ergocalciferol, vitamin D2, (VITAMIN D ORAL) Take 1 tablet by mouth once daily.      hydroCHLOROthiazide (HYDRODIURIL) 12.5 MG Tab       JUBLIA 10 % Javan       meclizine (ANTIVERT) 25 mg tablet Take 1 tablet (25 mg total) by mouth 3 (three) times daily as needed. 45 tablet 1    mometasone (ELOCON) 0.1 % ointment ALTON EXT AA BID PRF RASH      NON FORMULARY MEDICATION 1 patch by Other route once daily. Apply 1 patch to lower abdomen once daily      NON FORMULARY MEDICATION 1 patch.      NON FORMULARY MEDICATION 1 patch.      omeprazole (PRILOSEC) 40 MG capsule Take 1 capsule (40 mg total) by mouth every  morning. Open capsule and take with apple sauce (Patient taking differently: Take 40 mg by mouth daily as needed. Open capsule and take with apple sauce) 30 capsule 2    potassium chloride SA (K-DUR,KLOR-CON) 20 MEQ tablet Take 1 tablet (20 mEq total) by mouth once daily. (Patient not taking: Reported on 2020) 90 tablet 3    varicella-zoster [id number], PF, (SHINGRIX, PF,) 50 mcg/0.5 mL injection Inject into the muscle. (Patient not taking: Reported on 2020) 0.5 mL 1     No current facility-administered medications on file prior to visit.        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    PSHX:  has a past surgical history that includes Cholecystectomy; RECTOCELE REPAIR; THYROID NODULE REMOVAL; Bilateral salpingoophorectomy; Hysterectomy (2012); Back surgery (); Cyst Removal; Adenoidectomy (); Spine surgery (); Tonsillectomy (); Esophagogastroduodenoscopy (N/A, 5/3/2019); Insertion of inferior vena caval filter (Right, 2019); Laparoscopic sleeve gastrectomy (N/A, 2019); and IVC filter retrieval (N/A, 2019).    FAMILY HISTORY: Negative for liver disease, reviewed in [facility]    SOCIAL HISTORY:   Social History     Tobacco Use   Smoking Status Former Smoker    Packs/day: 0.25    Years: 32.00    Pack years: 8.00    Types: Cigarettes    Last attempt to quit: 1988    Years since quittin.4   Smokeless Tobacco Never Used   Tobacco Comment    smoked socially decades ago - weekends only       Social History     Substance and Sexual Activity   Alcohol Use No    Frequency: Monthly or less    Drinks per session: 1 or 2    Binge frequency: Never    Comment: yearly at most        Social History      Substance and Sexual Activity   Drug Use No       ROS:   GENERAL: Denies fever, chills, +weight loss, denies fatigue  HEENT: Denies headaches, dizziness, vision/hearing changes  CARDIOVASCULAR: Denies chest pain, palpitations, or edema  RESPIRATORY: Denies dyspnea, cough  GI: + intermittent RUQ abdominal pain, denies rectal bleeding, nausea, vomiting. No change in bowel pattern or color  : Denies dysuria, hematuria   SKIN: Denies rash, itching   NEURO: Denies confusion, memory loss, or mood changes  PSYCH: Denies depression or anxiety  HEME/LYMPH: Denies easy bruising or bleeding    PHYSICAL EXAM:   Friendly White female, in no acute distress; alert and oriented to person, place and time  VITALS: LMP 11/25/2012   HENT: Normocephalic, without obvious abnormality. Oral mucosa pink and moist. Dentition good  EYES: Sclerae anicteric. No conjunctival pallor.   NECK: Supple. No masses or cervical adenopathy.  CARDIOVASCULAR: SAMREEN on video visit  RESPIRATORY: Normal respiratory effort.   GI: Soft, non-tender, non-distended. SAMREEN hepatosplenomegaly  EXTREMITIES:  No clubbing, cyanosis or edema.  SKIN: Warm and dry. No jaundice. No rashes noted to exposed skin. No telangectasias noted. No palmar erythema.  NEURO:  Normal gait. No asterixis.  PSYCH:  Memory intact. Thought and speech pattern appropriate. Behavior normal. No depression or anxiety noted.    DIAGNOSTIC STUDIES:    ABD. U/S-    Done 5/2020  FINDINGS:  Pancreas: The visualized portions of pancreas appear normal.    Aorta: No aneurysm.    Liver: 16.1 cm, normal in size. Diffusely increased parenchymal echogenicity.  The hepatorenal renal index is 1.5 (normal is less than 1.4).  No focal lesions.    Gallbladder: The gallbladder is surgically absent.    Biliary system: 2 mm common bile duct.  No intrahepatic ductal dilatation.    Inferior vena cava: Normal in appearance.    Right kidney: 10.8 cm. No hydronephrosis.    Left kidney: 10.6 cm. No  hydronephrosis.    Spleen: 7.8 x 2.3 cm.  Normal in size with homogeneous echotexture.    Miscellaneous: No ascites.      Impression       1. Diffusely increased hepatic echogenicity which can be seen in the setting of steatosis.  2. Status post cholecystectomy.       LIVER BIOPSY-   None     FIBROSCAN - to be arranged       EDUCATION:   We discussed the manifestations of non-alcoholic fatty liver disease. At this time, there are no FDA approved therapy for non-alcoholic fatty liver disease.  The patient and I discussed the importance of diet, exercise, and weight loss for management of non-alcoholic fatty liver disease.    We discussed a low carb/sugar, high fiber and protein diet and a goal of 30 minutes of exercise 5 times per week    Recommend to avoid alcohol consumption. It is know that heavy alcohol use is associated with disease progression among patients with fatty liver disease. Information is unknown at this time of the effects on the liver with alcohol use in moderation.    Patient is aware that fatty liver can progress to steatohepatitis and possibly to cirrhosis, putting one at increased risk for liver cancer, liver failure, and death        ASSESSMENT & PLAN:  59 y.o. White female with:  1.  Fatty liver, likely related to  metabolic risk factors   - US 5/2020 showed fatty liver, hepatomegaly (although liver size much improved compared to US in 2019 before weight loss)  - transaminases near normal chronically   - Synthetic liver function WNL  - risk factors for fatty liver disease include obesity (much improved since weight loss surgery), HTN  -- Immunity to Hep A and B : Will check immunity markers for HBV/HAV  and arrange for vaccination if needed  -- Fibroscan soon  -- Recommendations discussed with patient:  1. Continue weight loss progress, goal of BMI <30  2. Low carb/sugar, high fiber and protein diet  3. Exercise, 5 days per week, 30 minutes per day, as tolerated  4. Recommend good cholesterol,  blood pressure, blood sugar levels       3. Obesity, HTN  -- There is no height or weight on file to calculate BMI.   -- increases risk for fatty liver      Follow up for pending results of workup. will determine need for f/u After fibroscan and labs soon  Orders Placed This Encounter   Procedures    FibroScan (Vibration Controlled Transient Elastography)    Hepatitis B Surface Antigen    Hepatitis A antibody, IgG    Hepatitis B Core Antibody, Total    Hepatitis B Surface Ab, Qualitative     ADDENDUM: Fibroscan results received.   Fibroscan today = kPA = 4.6 = F0-1 fibrosis.  CAP score = 299 = S3 = >67% steatosis    Discussed fibroscan results, including no fibrosis, + significant steatosis. Reiterated continued weight loss to improve fatty liver    Given normal lab values, no fibrosis on fibroscan - no need for hepatology f/u. Recommend f/u yearly with PCP with liver labs and to schedule f/u appt if liver enzymes increase above normal in the future   =     Thank you for allowing me to participate in the care of JORGE A Garcia    CC'ed note to:   Paddy Sanches PA-C Bennett W Hailey, MD

## 2020-06-10 NOTE — TELEPHONE ENCOUNTER
Contacted patient and informed her that she can arrive early for her fibroscan we will get it done. Patient stated okay she will be there.

## 2020-06-10 NOTE — LETTER
Dania 10, 2020      Paddy Sanches PA-C  [address]  [address], [facility] [address]           [facility] - Hepatology  [address]  [facility] [address]  Phone: [phone]  Fax: [phone]          Patient: Renae Hou   MR Number: [id number]   YOB: 1960   Date of Visit: 6/10/2020       Dear Paddy Sanches:    Thank you for referring Renae Hou to me for evaluation. Attached you will find relevant portions of my assessment and plan of care.    If you have questions, please do not hesitate to call me. I look forward to following Renae Hou along with you.    Sincerely,    Kelsey Dobson, NP    Enclosure  CC:  No Recipients    If you would like to receive this communication electronically, please contact [email or web address] or [phone] to request more information on [employer] Link access.    For providers and/or their staff who would like to refer a patient to [facility], please contact us through our one-stop-shop provider referral line, [facility], at [phone].    If you feel you have received this communication in error or would no longer like to receive these types of communications, please e-mail [email or web address]

## 2020-06-10 NOTE — Clinical Note
Given normal lab values, no fibrosis on fibroscan - no need for hepatology f/u. Recommend f/u yearly with PCP with liver labs and to schedule f/u appt if liver enzymes increase above normal in the future

## 2020-06-11 ENCOUNTER — PATIENT OUTREACH (OUTPATIENT)
Dept: ADMINISTRATIVE | Facility: OTHER | Age: 60
End: 2020-06-11

## 2020-06-11 ENCOUNTER — TELEPHONE (OUTPATIENT)
Dept: PAIN MEDICINE | Facility: CLINIC | Age: 60
End: 2020-06-11

## 2020-06-12 ENCOUNTER — OFFICE VISIT (OUTPATIENT)
Dept: SPORTS MEDICINE | Facility: CLINIC | Age: 60
End: 2020-06-12
Payer: COMMERCIAL

## 2020-06-12 ENCOUNTER — TELEPHONE (OUTPATIENT)
Dept: PAIN MEDICINE | Facility: CLINIC | Age: 60
End: 2020-06-12

## 2020-06-12 ENCOUNTER — HOSPITAL ENCOUNTER (OUTPATIENT)
Dept: RADIOLOGY | Facility: HOSPITAL | Age: 60
Discharge: HOME | End: 2020-06-12
Attending: PHYSICIAN ASSISTANT
Payer: COMMERCIAL

## 2020-06-12 VITALS
WEIGHT: 181 LBS | SYSTOLIC BLOOD PRESSURE: 126 MMHG | DIASTOLIC BLOOD PRESSURE: 90 MMHG | HEART RATE: 76 BPM | BODY MASS INDEX: 30.16 KG/M2 | HEIGHT: 65 IN

## 2020-06-12 DIAGNOSIS — M25.552 CHRONIC LEFT HIP PAIN: ICD-10-CM

## 2020-06-12 DIAGNOSIS — M25.552 LEFT HIP PAIN: Primary | ICD-10-CM

## 2020-06-12 DIAGNOSIS — E66.9 CLASS 1 OBESITY WITH BODY MASS INDEX (BMI) OF 30.0 TO 30.9 IN ADULT, UNSPECIFIED OBESITY TYPE, UNSPECIFIED WHETHER SERIOUS COMORBIDITY PRESENT: ICD-10-CM

## 2020-06-12 DIAGNOSIS — M25.552 LEFT HIP PAIN: ICD-10-CM

## 2020-06-12 DIAGNOSIS — G89.29 CHRONIC LEFT HIP PAIN: ICD-10-CM

## 2020-06-12 PROCEDURE — 3008F PR BODY MASS INDEX (BMI) DOCUMENTED: ICD-10-PCS | Mod: CPTII,S$GLB,, | Performed by: PHYSICIAN ASSISTANT

## 2020-06-12 PROCEDURE — 99999 PR PBB SHADOW E&M-EST. PATIENT-LVL V: ICD-10-PCS | Mod: PBBFAC,,, | Performed by: PHYSICIAN ASSISTANT

## 2020-06-12 PROCEDURE — 3074F PR MOST RECENT SYSTOLIC BLOOD PRESSURE < 130 MM HG: ICD-10-PCS | Mod: CPTII,S$GLB,, | Performed by: PHYSICIAN ASSISTANT

## 2020-06-12 PROCEDURE — 73502 X-RAY EXAM HIP UNI 2-3 VIEWS: CPT | Mod: TC,LT

## 2020-06-12 PROCEDURE — 73502 X-RAY EXAM HIP UNI 2-3 VIEWS: CPT | Mod: 26,LT,, | Performed by: RADIOLOGY

## 2020-06-12 PROCEDURE — 99214 OFFICE O/P EST MOD 30 MIN: CPT | Mod: S$GLB,,, | Performed by: PHYSICIAN ASSISTANT

## 2020-06-12 PROCEDURE — 3080F DIAST BP >= 90 MM HG: CPT | Mod: CPTII,S$GLB,, | Performed by: PHYSICIAN ASSISTANT

## 2020-06-12 PROCEDURE — 3074F SYST BP LT 130 MM HG: CPT | Mod: CPTII,S$GLB,, | Performed by: PHYSICIAN ASSISTANT

## 2020-06-12 PROCEDURE — 73502 XR HIP 2 VIEW LEFT: ICD-10-PCS | Mod: 26,LT,, | Performed by: RADIOLOGY

## 2020-06-12 PROCEDURE — 99214 PR OFFICE/OUTPT VISIT, EST, LEVL IV, 30-39 MIN: ICD-10-PCS | Mod: S$GLB,,, | Performed by: PHYSICIAN ASSISTANT

## 2020-06-12 PROCEDURE — 3008F BODY MASS INDEX DOCD: CPT | Mod: CPTII,S$GLB,, | Performed by: PHYSICIAN ASSISTANT

## 2020-06-12 PROCEDURE — 99999 PR PBB SHADOW E&M-EST. PATIENT-LVL V: CPT | Mod: PBBFAC,,, | Performed by: PHYSICIAN ASSISTANT

## 2020-06-12 PROCEDURE — 3080F PR MOST RECENT DIASTOLIC BLOOD PRESSURE >= 90 MM HG: ICD-10-PCS | Mod: CPTII,S$GLB,, | Performed by: PHYSICIAN ASSISTANT

## 2020-06-12 NOTE — LETTER
June 14, 2020      Mila Jones PA-C  [address]  [facility] [address]           [facility]  [address]  [facility] [address]  Phone: [phone]          Patient: Renae Hou   MR Number: [id number]   YOB: 1960   Date of Visit: 6/12/2020       Dear Mila Jones:    Thank you for referring Renae Hou to me for evaluation. Attached you will find relevant portions of my assessment and plan of care.    If you have questions, please do not hesitate to call me. I look forward to following Renae Hou along with you.    Sincerely,    Santi Larson III, PA-C    Enclosure  CC:  No Recipients    If you would like to receive this communication electronically, please contact [email or web address] or [phone] to request more information on [employer] Link access.    For providers and/or their staff who would like to refer a patient to [facility], please contact us through our one-stop-shop provider referral line, [facility], at [phone].    If you feel you have received this communication in error or would no longer like to receive these types of communications, please e-mail [email or web address]

## 2020-06-15 ENCOUNTER — CLINICAL SUPPORT (OUTPATIENT)
Dept: REHABILITATION | Facility: HOSPITAL | Age: 60
End: 2020-06-15
Payer: COMMERCIAL

## 2020-06-15 DIAGNOSIS — G89.29 CHRONIC LEFT HIP PAIN: ICD-10-CM

## 2020-06-15 DIAGNOSIS — R53.1 WEAKNESS: ICD-10-CM

## 2020-06-15 DIAGNOSIS — M25.552 CHRONIC LEFT HIP PAIN: ICD-10-CM

## 2020-06-15 DIAGNOSIS — M25.552 LEFT HIP PAIN: ICD-10-CM

## 2020-06-15 PROCEDURE — 97110 THERAPEUTIC EXERCISES: CPT | Performed by: PHYSICAL THERAPIST

## 2020-06-15 PROCEDURE — 97161 PT EVAL LOW COMPLEX 20 MIN: CPT | Performed by: PHYSICAL THERAPIST

## 2020-06-15 NOTE — PLAN OF CARE
[facility]  Physical Therapy Initial Evaluation    Name: Renae Hou  Clinic Number: [id number]    Therapy Diagnosis:   Encounter Diagnoses   Name Primary?    Chronic left hip pain     Left hip pain     Weakness      Physician: Santi Larson III, *    Physician Orders: PT Eval and Treat   Medical Diagnosis from Referral: M25.552,G89.29 (ICD-10-CM) - Chronic left hip pain  Evaluation Date: 6/15/2020  Authorization Period Expiration: 12-31-20  Plan of Care Expiration: 9-30-20  Visit # / Visits authorized: 1/ 20    Time In: 0945  Time Out: 1030  Total Billable Time: 45 minutes    Precautions: Standard    Subjective   Date of onset: 2018  History of current condition - Renae reports: recent exacerbation of L hip pain since Feb of this year. States that pain fluctuates throughout the day. Has pain at night and difficulty sleeping. Reports she has pain with riding her bike and going up and down stairs. Pain is less in the morning and by the end of the day it has increased. States it is a deep pain located anterior lateral L hip and thigh. Denies and crepitus in L hip. Pt lumbar fusion had radicular symptoms in LLE. She states that she gets cramps in her L calf, thigh. She states her job requires her to sit at a desk, but at work she has a sit/stand desk. But at this time she is working at home.      Medical History:   Past Medical History:   Diagnosis Date    Abscess of paraspinous muscles     Allergy 4/2018    DVT (deep venous thrombosis)     left leg    Epidural abscess 4/10/2018    Fatty liver     GERD (gastroesophageal reflux disease) 2015    Hypertension     Lumbar radiculopathy     Menopause     Obesity     Obstructive sleep apnea on CPAP     no longer uses CPAP after weight loss from gastric sleeve    Thyroid disease     Thyroid nodules.       Surgical History:   Renae Hou  has a past surgical history that includes Cholecystectomy; RECTOCELE REPAIR;  THYROID NODULE REMOVAL; Bilateral salpingoophorectomy; Hysterectomy (12/17/2012); Back surgery (2002); Cyst Removal; Adenoidectomy (1995); Spine surgery (2018); Tonsillectomy (1995); Esophagogastroduodenoscopy (N/A, 5/3/2019); Insertion of inferior vena caval filter (Right, 7/19/2019); Laparoscopic sleeve gastrectomy (N/A, 7/23/2019); and IVC filter retrieval (N/A, 12/20/2019).    Medications:   Renae has a current medication list which includes the following prescription(s): cyanocobalamin, diclofenac sodium, epinephrine, ergocalciferol (vitamin d2), hydrochlorothiazide, jublia, meclizine, mometasone, NON FORMULARY MEDICATION, NON FORMULARY MEDICATION, NON FORMULARY MEDICATION, omeprazole, potassium chloride sa, and varicella-zoster [id number] (pf).    Allergies:   Review of patient's allergies indicates:   Allergen Reactions    Cathflo activase [alteplase] Anaphylaxis     Tightness in chest, raspy throat, restless legs, hotness all over    Heparin analogues      Restless legs, tightness in chest, and hot all over    Iodine and iodide containing products Anaphylaxis, Hives, Shortness Of Breath, Itching, Swelling and Rash    Shellfish containing products Anaphylaxis, Hives, Shortness Of Breath, Itching, Swelling and Rash          Latex Swelling              Imaging, X RAY2  : FINDINGS:  The alignment is within normal limits.  No displaced fractures identified.  No evidence of lytic or blastic lesions.Joint spaces are unremarkable.Soft tissues are unremarkable.Postoperative changes lower lumbar region with pedicle screws in rods as well as disc spacers.     Impression:     No evidence of fracture.No significant degenerative changes.       Prior Therapy: none  Social History:  lives with their spouse  Occupation: .  Prior Level of Function: travels, works, fluctuating times of L hip pain  Current Level of Function: see above, pain wakes her up at night.    Pain:  Current 3/10, worst  10/10, best 0/10   Location: left hip   Description: Aching, Dull, Deep and Hot  Aggravating Factors: standing, twisting with IR of L hip,   Easing Factors: pain medication, heating pad and rest    Pts goals: pain free L hip    Objective   Observation: no bruising or deformity noted LLE    Posture: FHRS posture, no lateral shifts noted    Gait: independent, no significant deviations noted    Hip Range of Motion(*=pain):   Right active Right Passive Left active  Left Passive   Flexion 110 125 105 125   Abduction 35 40 20 40   Extension 10 15 5 15   Ext. Rotation 40 55 25 55   Int. Rotation 30 35 30 35       Lower Extremity Strength  Right LE  Left LE    Quadriceps: 5/5 Quadriceps: 4+/5   Hamstrings: 5/5 Hamstrings: 4/5   Iliopsoas: 5/5 Iliopsoas: 4-/5   Hip extension:  4+/5 Hip extension: 4-/5   Hip abduction: 4/5 Hip abduction: 3+/5   Hip ER: 4-/5 Hip ER: 3+/5   Hip IR: 4/5 Hip IR: 4/5       First toe Extension RLE: 5/5, LLE 5/5      Functional Tests:  Bridge Test: Single leg Bridge: RLE: pt has feels effort/discomfort in her lower back, unable to reach neutral. LLE: pt feels effort in quad, unable to reach neutral  Squat: quad bias, limited hip flexion, no significant dynamic valgus noted  SL Squat Test: R: dynamic valgus, LOB, poor hip flexion ; L dynamic valgus, LOB, poor hip flexion.  SLS EO: LLE: unable to hold for more than 3 seconds without LOB, RLE has sway but no LOB      Special Tests:   JUAN JOSÉ: negative  FABERs:  negative   Hip Scour: negative    Flexibility:    Ely's test: R = normal ; L = normal    Hamstrings: R = normal ; L = normal    Carlos's test: R = normal ; L = normal   Jayce test: R = normal ; L = normal    Proximal/distal screen:   DOM ankle ROM is normal and full grossly    Joint Mobility:   Dom hip mobility is normal    Palpation: no tenderness to palpation noted today.    Sensation: pt reports diminish sensation of soft touch along L3-5 dermatome      CMS Impairment/Limitation/Restriction for  FOTO  Survey    Therapist reviewed FOTO scores for Renae Hou on 6/15/2020.   FOTO documents entered into [facility] [employer] - see Media section.           TREATMENT   Treatment Time In: 0945  Treatment Time Out: 1030  Total Treatment time separate from Evaluation: 25 minutes    Renae received therapeutic exercises to develop strength and endurance for 25 minutes including:  Issued hep of   Ab bracing  Clamshells  Rudi  Reviewed and performed    Home Exercises and Patient Education Provided    Education provided:   - issued hep    Written Home Exercises Provided: yes.  Exercises were reviewed and Renae was able to demonstrate them prior to the end of the session.  Renae demonstrated good  understanding of the education provided.     See EMR under Media for exercises provided 6/15/2020.    Assessment   Renae is a 59 y.o. female referred to outpatient Physical Therapy with a medical diagnosis of L hip pain. Pt presents with weakness throughout core and hip musculature, functional weakness, poor functional movement patterns. Pt would benefit from skilled PT in order to improve strength and maximize function.    Pt prognosis is Good.   Pt will benefit from skilled outpatient Physical Therapy to address the deficits stated above and in the chart below, provide pt/family education, and to maximize pt's level of independence.     Plan of care discussed with patient: Yes  Pt's spiritual, cultural and educational needs considered and patient is agreeable to the plan of care and goals as stated below:     Anticipated Barriers for therapy: age, prior lumbar sx.    Medical Necessity is demonstrated by the following  History  Co-morbidities and personal factors that may impact the plan of care Co-morbidities:   advanced age and high BMI    Personal Factors:   no deficits     low   Examination  Body Structures and Functions, activity limitations and participation restrictions that may impact the plan of care Body Regions:    lower extremities    Body Systems:    strength  transfers  motor control    Participation Restrictions:   none    Activity limitations:   Learning and applying knowledge  no deficits    General Tasks and Commands  no deficits    Communication  no deficits    Mobility  lifting and carrying objects    Self care  no deficits    Domestic Life  no deficits    Interactions/Relationships  no deficits    Life Areas  no deficits    Community and Social Life  no deficits         low   Clinical Presentation stable and uncomplicated low   Decision Making/ Complexity Score: low     Goals:  Short Term Goals: 4 weeks   1. Pt independent in initial hep  2. Decreased pain 1-4/10  3. Strength improved by 1/2 muscle grade or better  4. Pt reports improved symptoms by 35% or better    Long Term Goals: 12 weeks   1. Pt independent in d/c hep  2. Decreased pain 0-2/10 with activities  3. Strength improved in 4/5 or better grossly  4. Pt reports improved symptoms by 75% or better.     Plan   Plan of care Certification: 6/15/2020 to 9-30-20.    Outpatient Physical Therapy 1-2 times weekly for 12weeks to include the following interventions: Manual Therapy, Neuromuscular Re-ed, Patient Education, Therapeutic Activites and Therapeutic Exercise.     César Sharma, PT

## 2020-06-15 NOTE — PROGRESS NOTES
CC: left hip pain    HPI:   Renae Hou is a pleasant 59 y.o. patient with PMH of lumbar radiculopathy s/p L4/L5 fusion, who reports to clinic with left hip pain. No trauma, no mech sxs/instabilty.    She reports anterior hip and lateral thigh and hip pain that began 2 years ago shortly after her back surgery and that her pain greatly worsened in February of 2020 when it was difficult to walk due to her pain. She also reports some cramping in her left leg with stretching it.     Today the patient rates pain at a 7/10 on visual analog scale.    Affecting ADLs and exercising    Review of Systems   Constitution: Negative. Negative for chills, fever and night sweats.   HENT: Negative for congestion and headaches.    Eyes: Negative for blurred vision, left vision loss and right vision loss.   Cardiovascular: Negative for chest pain and syncope.   Respiratory: Negative for cough and shortness of breath.    Endocrine: Negative for polydipsia, polyphagia and polyuria.   Hematologic/Lymphatic: Negative for bleeding problem. Does not bruise/bleed easily.   Skin: Negative for dry skin, itching and rash.   Musculoskeletal: Negative for falls and muscle weakness.   Gastrointestinal: Negative for abdominal pain and bowel incontinence.   Genitourinary: Negative for bladder incontinence and nocturia.   Neurological: Negative for disturbances in coordination, loss of balance and seizures.   Psychiatric/Behavioral: Negative for depression. The patient does not have insomnia.    Allergic/Immunologic: Negative for hives and persistent infections.   All other systems negative.    PAST MEDICAL HISTORY:   Past Medical History:   Diagnosis Date    Abscess of paraspinous muscles     Allergy 4/2018    DVT (deep venous thrombosis)     left leg    Epidural abscess 4/10/2018    Fatty liver     GERD (gastroesophageal reflux disease) 2015    Hypertension     Lumbar radiculopathy     Menopause     Obesity     Obstructive sleep  apnea on CPAP     no longer uses CPAP after weight loss from gastric sleeve    Thyroid disease     Thyroid nodules.     PAST SURGICAL HISTORY:   Past Surgical History:   Procedure Laterality Date    ADENOIDECTOMY  1995    BACK SURGERY  2002    L4, L5    BILATERAL SALPINGOOPHORECTOMY      CHOLECYSTECTOMY      CYST REMOVAL      ESOPHAGOGASTRODUODENOSCOPY N/A 5/3/2019    Procedure: ESOPHAGOGASTRODUODENOSCOPY (EGD);  Surgeon: Carlin Sanchez MD;  [facility]: [facility] ([facility]);  Service: Endoscopy;  Laterality: N/A;    HYSTERECTOMY  12/17/2012    [facility] BS&O     INSERTION OF INFERIOR VENA CAVAL FILTER Right 7/19/2019    Procedure: IVC filter placement;  Surgeon: Rodney Rico MD;  Location: [facility] CATH LAB;  Service: Peripheral Vascular;  Laterality: Right;    IVC FILTER RETRIEVAL N/A 12/20/2019    Procedure: REMOVAL-FILTER-IVC;  Surgeon: Rodney Rico MD;  Location: [facility] OR [address] [place] [address];  Service: Peripheral Vascular;  Laterality: N/A;    LAPAROSCOPIC SLEEVE GASTRECTOMY N/A 7/23/2019    Procedure: GASTRECTOMY, SLEEVE, LAPAROSCOPIC, with intraop EGD [id number];  Surgeon: Duc Ratliff Jr., MD;  Location: [facility] OR [address] [place] [address];  Service: General;  Laterality: N/A;    RECTOCELE REPAIR      SPINE SURGERY  2018    THYROID NODULE REMOVAL      TONSILLECTOMY  1995     FAMILY HISTORY:   Family History   Problem Relation Age of Onset    Hypertension Mother     Hyperlipidemia Mother     Heart disease Mother 55        cad, valvular heart disease    Alcohol abuse Sister     Cirrhosis Sister         alcoholic cirrhosis    Stroke Father     No Known Problems Daughter     No Known Problems Daughter     Breast cancer Neg Hx     Colon cancer Neg Hx     Ovarian cancer Neg Hx     Diabetes Neg Hx     Esophageal cancer Neg Hx      SOCIAL HISTORY:   Social History     Socioeconomic History    Marital status:      Spouse name: Not on file    Number of children: Not on file    Years of education: Not on  file    Highest education level: Not on file   Occupational History    Not on file   Social Needs    Financial resource strain: Not hard at all    Food insecurity     Worry: Never true     Inability: Never true    Transportation needs     Medical: No     Non-medical: No   Tobacco Use    Smoking status: Former Smoker     Packs/day: 0.25     Years: 32.00     Pack years: 8.00     Types: Cigarettes     Quit date:      Years since quittin.4    Smokeless tobacco: Never Used    Tobacco comment: smoked socially decades ago - weekends only   Substance and Sexual Activity    Alcohol use: No     Frequency: Monthly or less     Drinks per session: 1 or 2     Binge frequency: Never     Comment: yearly at most     Drug use: No    Sexual activity: Not Currently     Partners: Male     Birth control/protection: See Surgical Hx, None     Comment: DL BS&O 2012,     Lifestyle    Physical activity     Days per week: Patient refused     Minutes per session: 0 min    Stress: Not at all   Relationships    Social connections     Talks on phone: More than three times a week     Gets together: Once a week     Attends Yazidi service: Not on file     Active member of club or organization: No     Attends meetings of clubs or organizations: Patient refused     Relationship status:    Other Topics Concern    Not on file   Social History Narrative    . 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       MEDICATIONS:   Current Outpatient Medications:     cyanocobalamin 500 MCG tablet, Take 500 mcg by mouth once daily., Disp: , Rfl:     diclofenac sodium (VOLTAREN) 1 % Gel, Apply 2 g topically 4 (four) times daily., Disp: 100 g, Rfl: 2    EPINEPHrine (EPIPEN) 0.3 mg/0.3 mL AtIn, , Disp: , Rfl:     ergocalciferol, vitamin D2, (VITAMIN D ORAL), Take 1 tablet by mouth once daily., Disp: , Rfl:     hydroCHLOROthiazide (HYDRODIURIL) 12.5 MG Tab, , Disp: , Rfl:     JUBLIA 10 % Javan, , Disp: , Rfl:     mometasone (ELOCON) 0.1  "% ointment, ALTON EXT AA BID PRF RASH, Disp: , Rfl:     NON FORMULARY MEDICATION, 1 patch by Other route once daily. Apply 1 patch to lower abdomen once daily, Disp: , Rfl:     NON FORMULARY MEDICATION, 1 patch., Disp: , Rfl:     NON FORMULARY MEDICATION, 1 patch., Disp: , Rfl:     omeprazole (PRILOSEC) 40 MG capsule, Take 1 capsule (40 mg total) by mouth every morning. Open capsule and take with apple sauce (Patient taking differently: Take 40 mg by mouth daily as needed. Open capsule and take with apple sauce), Disp: 30 capsule, Rfl: 2    meclizine (ANTIVERT) 25 mg tablet, Take 1 tablet (25 mg total) by mouth 3 (three) times daily as needed., Disp: 45 tablet, Rfl: 1    potassium chloride SA (K-DUR,KLOR-CON) 20 MEQ tablet, Take 1 tablet (20 mEq total) by mouth once daily. (Patient not taking: Reported on 5/29/2020), Disp: 90 tablet, Rfl: 3    varicella-zoster [id number]-[id number], PF, (SHINGRIX, PF,) 50 mcg/0.5 mL injection, Inject into the muscle. (Patient not taking: Reported on 5/29/2020), Disp: 0.5 mL, Rfl: 1  ALLERGIES:   Review of patient's allergies indicates:   Allergen Reactions    Cathflo activase [alteplase] Anaphylaxis     Tightness in chest, raspy throat, restless legs, hotness all over    Heparin analogues      Restless legs, tightness in chest, and hot all over    Iodine and iodide containing products Anaphylaxis, Hives, Shortness Of Breath, Itching, Swelling and Rash    Shellfish containing products Anaphylaxis, Hives, Shortness Of Breath, Itching, Swelling and Rash          Latex Swelling             VITAL SIGNS: BP (!) 126/90   Pulse 76   Ht 5' 5" (1.651 m)   Wt 82.1 kg (181 lb)   LMP 11/25/2012   BMI 30.12 kg/m²        PHYSICAL EXAM /  HIP  PHYSICAL EXAMINATION  General:  The patient is alert and oriented x 3.  Mood is pleasant.  Observation of ears, eyes and nose reveal no gross abnormalities.  HEENT: NCAT, sclera nonicteric  Lungs: Respirations are equal and unlabored..    left HIP " EXAMINATION     OBSERVATION / INSPECTION  Gait:   Nonantalgic   Alignment:  Neutral   Scars:   None   Muscle atrophy: None   Effusion:  None   Warmth:  None   Discoloration:   None   Leg lengths:   Equal   Pelvis:   Level     TENDERNESS / CREPITUS (T/C):      T / C  Trochanteric bursa   - / -  Piriformis    - / -  SI joint    - / -  Psoas tendon   - / -  Rectus insertion  - / -  Adductor insertion  - / -  Pubic symphysis  - / -  IT band                                   + / -  Gluteus tendons                     - / -    + TTP of vastus lateralis muscle    ROM: (* = pain)    Flexion:    120 degrees  External rotation: 40 degrees  Internal rotation with axial load: 30 degrees  Internal rotation without axial load: 40 degrees  Abduction:  40 degrees  Adduction:   10 degrees*    SPECIAL TESTS:  Pain w/ forced internal rotation (FADIR): -   Pain w/ forced external rotation (JADEN): Absent   Circumduction test:    -  Stinchfield test:    Negative   Log roll:      Negative   Snapping hip (internal):   Negative   Sit-up pain:     Negative   Resisted sit-up pain:    Negative   Resisted sit-up with adductor contraction pain:  Negative   Step-down test:    +  Trendelenburg test:    Negative  Bridge test     +     + hernan test on left      EXTREMITY NEURO-VASCULAR EXAMINATION:   Sensation:  Grossly intact to light touch all dermatomal regions.   Motor Function:  Fully intact motor function at hip, knee, foot and ankle    DTRs;  quadriceps and  achilles 2+.  No clonus and downgoing Babinski.    Vascular status:  DP and PT pulses 2+, brisk capillary refill, symmetric.    Skin:  intact, compartments soft.    OTHER FINDINGS:      XRAYS:  Xrays:  Xrays of the 2 view left hip were ordered and reviewed by me today. No fracture, subluxation, or other significant bony or joint abnormality is identified. Sclerosis of the left hip joint seen.     ASSESSMENT:    1. left hip pain, chronic  2. obesity  hip abd/core weakness    PLAN:  1.  Pt for left hip pain likely from extra-articular hip pain from lateral quad and IT band. PT for strengthening of hip, core, glutes etc with HEP and dry needling if needed.  2. OTC  Pain control   3.  Weight loss and the importance of this for overall joint and hip health was discussed with the patient today.   4. RTC in 8 weeks     All questions were answered, pt will contact us for questions or concerns in the interim.

## 2020-06-15 NOTE — PROGRESS NOTES
[facility]  Physical Therapy Initial Evaluation    Name: Renae Hou  Clinic Number: [id number]    Therapy Diagnosis:   Encounter Diagnoses   Name Primary?    Chronic left hip pain     Left hip pain     Weakness      Physician: Santi Larson III, *    Physician Orders: PT Eval and Treat   Medical Diagnosis from Referral: M25.552,G89.29 (ICD-10-CM) - Chronic left hip pain  Evaluation Date: 6/15/2020  Authorization Period Expiration: 12-31-20  Plan of Care Expiration: 9-30-20  Visit # / Visits authorized: 1/ 20    Time In: 0945  Time Out: 1030  Total Billable Time: 45 minutes    Precautions: Standard    Subjective   Date of onset: 2018  History of current condition - Renae reports: recent exacerbation of L hip pain since Feb of this year. States that pain fluctuates throughout the day. Has pain at night and difficulty sleeping. Reports she has pain with riding her bike and going up and down stairs. Pain is less in the morning and by the end of the day it has increased. States it is a deep pain located anterior lateral L hip and thigh. Denies and crepitus in L hip. Pt lumbar fusion had radicular symptoms in LLE. She states that she gets cramps in her L calf, thigh. She states her job requires her to sit at a desk, but at work she has a sit/stand desk. But at this time she is working at home.      Medical History:   Past Medical History:   Diagnosis Date    Abscess of paraspinous muscles     Allergy 4/2018    DVT (deep venous thrombosis)     left leg    Epidural abscess 4/10/2018    Fatty liver     GERD (gastroesophageal reflux disease) 2015    Hypertension     Lumbar radiculopathy     Menopause     Obesity     Obstructive sleep apnea on CPAP     no longer uses CPAP after weight loss from gastric sleeve    Thyroid disease     Thyroid nodules.       Surgical History:   Renae Hou  has a past surgical history that includes Cholecystectomy; RECTOCELE REPAIR;  THYROID NODULE REMOVAL; Bilateral salpingoophorectomy; Hysterectomy (12/17/2012); Back surgery (2002); Cyst Removal; Adenoidectomy (1995); Spine surgery (2018); Tonsillectomy (1995); Esophagogastroduodenoscopy (N/A, 5/3/2019); Insertion of inferior vena caval filter (Right, 7/19/2019); Laparoscopic sleeve gastrectomy (N/A, 7/23/2019); and IVC filter retrieval (N/A, 12/20/2019).    Medications:   Renae has a current medication list which includes the following prescription(s): cyanocobalamin, diclofenac sodium, epinephrine, ergocalciferol (vitamin d2), hydrochlorothiazide, jublia, meclizine, mometasone, NON FORMULARY MEDICATION, NON FORMULARY MEDICATION, NON FORMULARY MEDICATION, omeprazole, potassium chloride sa, and varicella-zoster [id number]-as01b (pf).    Allergies:   Review of patient's allergies indicates:   Allergen Reactions    Cathflo activase [alteplase] Anaphylaxis     Tightness in chest, raspy throat, restless legs, hotness all over    Heparin analogues      Restless legs, tightness in chest, and hot all over    Iodine and iodide containing products Anaphylaxis, Hives, Shortness Of Breath, Itching, Swelling and Rash    Shellfish containing products Anaphylaxis, Hives, Shortness Of Breath, Itching, Swelling and Rash          Latex Swelling              Imaging, X RAY2  : FINDINGS:  The alignment is within normal limits.  No displaced fractures identified.  No evidence of lytic or blastic lesions.Joint spaces are unremarkable.Soft tissues are unremarkable.Postoperative changes lower lumbar region with pedicle screws in rods as well as disc spacers.     Impression:     No evidence of fracture.No significant degenerative changes.       Prior Therapy: none  Social History:  lives with their spouse  Occupation: .  Prior Level of Function: travels, works, fluctuating times of L hip pain  Current Level of Function: see above, pain wakes her up at night.    Pain:  Current 3/10, worst  10/10, best 0/10   Location: left hip   Description: Aching, Dull, Deep and Hot  Aggravating Factors: standing, twisting with IR of L hip,   Easing Factors: pain medication, heating pad and rest    Pts goals: pain free L hip    Objective   Observation: no bruising or deformity noted LLE    Posture: FHRS posture, no lateral shifts noted    Gait: independent, no significant deviations noted    Hip Range of Motion(*=pain):   Right active Right Passive Left active  Left Passive   Flexion 110 125 105 125   Abduction 35 40 20 40   Extension 10 15 5 15   Ext. Rotation 40 55 25 55   Int. Rotation 30 35 30 35       Lower Extremity Strength  Right LE  Left LE    Quadriceps: 5/5 Quadriceps: 4+/5   Hamstrings: 5/5 Hamstrings: 4/5   Iliopsoas: 5/5 Iliopsoas: 4-/5   Hip extension:  4+/5 Hip extension: 4-/5   Hip abduction: 4/5 Hip abduction: 3+/5   Hip ER: 4-/5 Hip ER: 3+/5   Hip IR: 4/5 Hip IR: 4/5       First toe Extension RLE: 5/5, LLE 5/5      Functional Tests:  Bridge Test: Single leg Bridge: RLE: pt has feels effort/discomfort in her lower back, unable to reach neutral. LLE: pt feels effort in quad, unable to reach neutral  Squat: quad bias, limited hip flexion, no significant dynamic valgus noted  SL Squat Test: R: dynamic valgus, LOB, poor hip flexion ; L dynamic valgus, LOB, poor hip flexion.  SLS EO: LLE: unable to hold for more than 3 seconds without LOB, RLE has sway but no LOB      Special Tests:   JUAN JOSÉ: negative  FABERs:  negative   Hip Scour: negative    Flexibility:    Ely's test: R = normal ; L = normal    Hamstrings: R = normal ; L = normal    Carlos's test: R = normal ; L = normal   Jayce test: R = normal ; L = normal    Proximal/distal screen:   DOM ankle ROM is normal and full grossly    Joint Mobility:   Dom hip mobility is normal    Palpation: no tenderness to palpation noted today.    Sensation: pt reports diminish sensation of soft touch along L3-5 dermatome      CMS Impairment/Limitation/Restriction for  FOTO  Survey    Therapist reviewed FOTO scores for Renae Hou on 6/15/2020.   FOTO documents entered into [facility] - see Media section.           TREATMENT   Treatment Time In: 0945  Treatment Time Out: 1030  Total Treatment time separate from Evaluation: 25 minutes    Renae received therapeutic exercises to develop strength and endurance for 25 minutes including:  Issued hep of   Ab bracing  Clamshells  Rudi  Reviewed and performed    Home Exercises and Patient Education Provided    Education provided:   - issued hep    Written Home Exercises Provided: yes.  Exercises were reviewed and Renae was able to demonstrate them prior to the end of the session.  Renae demonstrated good  understanding of the education provided.     See EMR under Media for exercises provided 6/15/2020.    Assessment   Renae is a 59 y.o. female referred to outpatient Physical Therapy with a medical diagnosis of L hip pain. Pt presents with weakness throughout core and hip musculature, functional weakness, poor functional movement patterns. Pt would benefit from skilled PT in order to improve strength and maximize function.    Pt prognosis is Good.   Pt will benefit from skilled outpatient Physical Therapy to address the deficits stated above and in the chart below, provide pt/family education, and to maximize pt's level of independence.     Plan of care discussed with patient: Yes  Pt's spiritual, cultural and educational needs considered and patient is agreeable to the plan of care and goals as stated below:     Anticipated Barriers for therapy: age, prior lumbar sx.    Medical Necessity is demonstrated by the following  History  Co-morbidities and personal factors that may impact the plan of care Co-morbidities:   advanced age and high BMI    Personal Factors:   no deficits     low   Examination  Body Structures and Functions, activity limitations and participation restrictions that may impact the plan of care Body Regions:    lower extremities    Body Systems:    strength  transfers  motor control    Participation Restrictions:   none    Activity limitations:   Learning and applying knowledge  no deficits    General Tasks and Commands  no deficits    Communication  no deficits    Mobility  lifting and carrying objects    Self care  no deficits    Domestic Life  no deficits    Interactions/Relationships  no deficits    Life Areas  no deficits    Community and Social Life  no deficits         low   Clinical Presentation stable and uncomplicated low   Decision Making/ Complexity Score: low     Goals:  Short Term Goals: 4 weeks   1. Pt independent in initial hep  2. Decreased pain 1-4/10  3. Strength improved by 1/2 muscle grade or better  4. Pt reports improved symptoms by 35% or better    Long Term Goals: 12 weeks   1. Pt independent in d/c hep  2. Decreased pain 0-2/10 with activities  3. Strength improved in 4/5 or better grossly  4. Pt reports improved symptoms by 75% or better.     Plan   Plan of care Certification: 6/15/2020 to 9-30-20.    Outpatient Physical Therapy 1-2 times weekly for 12weeks to include the following interventions: Manual Therapy, Neuromuscular Re-ed, Patient Education, Therapeutic Activites and Therapeutic Exercise.     César Sharma, PT

## 2020-06-16 ENCOUNTER — PROCEDURE VISIT (OUTPATIENT)
Dept: HEPATOLOGY | Facility: CLINIC | Age: 60
End: 2020-06-16
Payer: COMMERCIAL

## 2020-06-16 ENCOUNTER — HOSPITAL ENCOUNTER (OUTPATIENT)
Dept: INTERVENTIONAL RADIOLOGY/VASCULAR | Facility: HOSPITAL | Age: 60
Discharge: HOME | End: 2020-06-16
Attending: FAMILY MEDICINE
Payer: COMMERCIAL

## 2020-06-16 ENCOUNTER — LAB VISIT (OUTPATIENT)
Dept: LAB | Facility: HOSPITAL | Age: 60
End: 2020-06-16
Payer: COMMERCIAL

## 2020-06-16 VITALS
RESPIRATION RATE: 16 BRPM | DIASTOLIC BLOOD PRESSURE: 88 MMHG | SYSTOLIC BLOOD PRESSURE: 134 MMHG | OXYGEN SATURATION: 100 % | HEART RATE: 70 BPM

## 2020-06-16 DIAGNOSIS — K76.0 FATTY LIVER: ICD-10-CM

## 2020-06-16 DIAGNOSIS — E04.1 THYROID NODULE: ICD-10-CM

## 2020-06-16 LAB
HBV CORE AB SERPL QL IA: NEGATIVE
HBV SURFACE AB SER-ACNC: ABNORMAL M[IU]/ML
HBV SURFACE AG SERPL QL IA: NEGATIVE
HEPATITIS A ANTIBODY, IGG: POSITIVE

## 2020-06-16 PROCEDURE — 88173 PR  INTERPRETATION OF FNA SMEAR: ICD-10-PCS | Mod: 26,,, | Performed by: PATHOLOGY

## 2020-06-16 PROCEDURE — 86790 VIRUS ANTIBODY NOS: CPT

## 2020-06-16 PROCEDURE — 86706 HEP B SURFACE ANTIBODY: CPT

## 2020-06-16 PROCEDURE — 91200 LIVER ELASTOGRAPHY: CPT | Mod: S$GLB,,, | Performed by: NURSE PRACTITIONER

## 2020-06-16 PROCEDURE — 36415 COLL VENOUS BLD VENIPUNCTURE: CPT

## 2020-06-16 PROCEDURE — 87340 HEPATITIS B SURFACE AG IA: CPT

## 2020-06-16 PROCEDURE — 86704 HEP B CORE ANTIBODY TOTAL: CPT

## 2020-06-16 PROCEDURE — 10005 IR US FINE NEEDLE ASPIRATION BIOPSY, FIRST LESION: ICD-10-PCS | Mod: ,,, | Performed by: RADIOLOGY

## 2020-06-16 PROCEDURE — 10005 FNA BX W/US GDN 1ST LES: CPT | Mod: ,,, | Performed by: RADIOLOGY

## 2020-06-16 PROCEDURE — 91200 FIBROSCAN (VIBRATION CONTROLLED TRANSIENT ELASTOGRAPHY): ICD-10-PCS | Mod: S$GLB,,, | Performed by: NURSE PRACTITIONER

## 2020-06-16 PROCEDURE — 88173 CYTOPATH EVAL FNA REPORT: CPT | Mod: 26,,, | Performed by: PATHOLOGY

## 2020-06-16 PROCEDURE — 10005 FNA BX W/US GDN 1ST LES: CPT

## 2020-06-16 PROCEDURE — 88173 CYTOPATH EVAL FNA REPORT: CPT | Performed by: PATHOLOGY

## 2020-06-16 NOTE — H&P
Vascular and Interventional Radiology History & Physical    Date:  6/16/2020    Chief Complaint:   Thyroid nodule    History of Present Illness:  Renae Hou is a 59 y.o. female who presents for FNA of right thyroid nodule measuring up to 1.0 cm. Pt has hx of left thyroidectomy.    Past Medical History:  Past Medical History:   Diagnosis Date    Abscess of paraspinous muscles     Allergy 4/2018    DVT (deep venous thrombosis)     left leg    Epidural abscess 4/10/2018    Fatty liver     GERD (gastroesophageal reflux disease) 2015    Hypertension     Lumbar radiculopathy     Menopause     Obesity     Obstructive sleep apnea on CPAP     no longer uses CPAP after weight loss from gastric sleeve    Thyroid disease     Thyroid nodules.       Past Surgical History:  Past Surgical History:   Procedure Laterality Date    ADENOIDECTOMY  1995    BACK SURGERY  2002    L4, L5    BILATERAL SALPINGOOPHORECTOMY      CHOLECYSTECTOMY      CYST REMOVAL      ESOPHAGOGASTRODUODENOSCOPY N/A 5/3/2019    Procedure: ESOPHAGOGASTRODUODENOSCOPY (EGD);  Surgeon: Carlin Sanchez MD;  Location: [address]);  Service: Endoscopy;  Laterality: N/A;    HYSTERECTOMY  12/17/2012    [facility] BS&O     INSERTION OF INFERIOR VENA CAVAL FILTER Right 7/19/2019    Procedure: IVC filter placement;  Surgeon: Rodney Rico MD;  Location: [facility] CATH LAB;  Service: Peripheral Vascular;  Laterality: Right;    IVC FILTER RETRIEVAL N/A 12/20/2019    Procedure: REMOVAL-FILTER-IVC;  Surgeon: Rodney Rico MD;  Location: [address];  Service: Peripheral Vascular;  Laterality: N/A;    LAPAROSCOPIC SLEEVE GASTRECTOMY N/A 7/23/2019    Procedure: GASTRECTOMY, SLEEVE, LAPAROSCOPIC, with intraop EGD [id number];  Surgeon: Duc Ratliff Jr., MD;  Location: [address];  Service: General;  Laterality: N/A;    RECTOCELE REPAIR      SPINE SURGERY  2018    THYROID NODULE REMOVAL      TONSILLECTOMY  1995        Sedation  History:    Denies any adverse reactions.  Denies problems laying flat.    Social History:  Social History     Tobacco Use    Smoking status: Former Smoker     Packs/day: 0.25     Years: 32.00     Pack years: 8.00     Types: Cigarettes     Quit date:      Years since quittin.4    Smokeless tobacco: Never Used    Tobacco comment: smoked socially decades ago - weekends only   Substance Use Topics    Alcohol use: No     Frequency: Monthly or less     Drinks per session: 1 or 2     Binge frequency: Never     Comment: yearly at most     Drug use: No        Home Medications:   Prior to Admission medications    Medication Sig Start Date End Date Taking? Authorizing Provider   cyanocobalamin 500 MCG tablet Take 500 mcg by mouth once daily.    Historical Provider, MD   diclofenac sodium (VOLTAREN) 1 % Gel Apply 2 g topically 4 (four) times daily. 5/15/20   Franko Garza MD   EPINEPHrine (EPIPEN) 0.3 mg/0.3 mL AtIn  3/6/20   Historical Provider, MD   ergocalciferol, vitamin D2, (VITAMIN D ORAL) Take 1 tablet by mouth once daily.    Historical Provider, MD   hydroCHLOROthiazide (HYDRODIURIL) 12.5 MG Tab  20   Historical Provider, MD   JUBLIA 10 % Javan  20   Historical Provider, MD   meclizine (ANTIVERT) 25 mg tablet Take 1 tablet (25 mg total) by mouth 3 (three) times daily as needed. 20  Lenny Avila MD   mometasone (ELOCON) 0.1 % ointment ALTON EXT AA BID PRF RASH 20   Historical Provider, MD   NON FORMULARY MEDICATION 1 patch by Other route once daily. Apply 1 patch to lower abdomen once daily    Historical Provider, MD   NON FORMULARY MEDICATION 1 patch.    Historical Provider, MD   NON FORMULARY MEDICATION 1 patch.    Historical Provider, MD   omeprazole (PRILOSEC) 40 MG capsule Take 1 capsule (40 mg total) by mouth every morning. Open capsule and take with apple sauce  Patient taking differently: Take 40 mg by mouth daily as needed. Open capsule and take with apple sauce  7/10/19   Duc Ratliff Jr., MD   potassium chloride SA (K-DUR,KLOR-CON) 20 MEQ tablet Take 1 tablet (20 mEq total) by mouth once daily.  Patient not taking: Reported on 5/29/2020 1/2/20   Franko Garza MD   varicella-zoster gE-AS01B, PF, (SHINGRIX, PF,) 50 mcg/0.5 mL injection Inject into the muscle.  Patient not taking: Reported on 5/29/2020 5/15/20   Franko Garza MD       Inpatient Medications:    Current Outpatient Medications:     cyanocobalamin 500 MCG tablet, Take 500 mcg by mouth once daily., Disp: , Rfl:     diclofenac sodium (VOLTAREN) 1 % Gel, Apply 2 g topically 4 (four) times daily., Disp: 100 g, Rfl: 2    EPINEPHrine (EPIPEN) 0.3 mg/0.3 mL AtIn, , Disp: , Rfl:     ergocalciferol, vitamin D2, (VITAMIN D ORAL), Take 1 tablet by mouth once daily., Disp: , Rfl:     hydroCHLOROthiazide (HYDRODIURIL) 12.5 MG Tab, , Disp: , Rfl:     JUBLIA 10 % Javan, , Disp: , Rfl:     meclizine (ANTIVERT) 25 mg tablet, Take 1 tablet (25 mg total) by mouth 3 (three) times daily as needed., Disp: 45 tablet, Rfl: 1    mometasone (ELOCON) 0.1 % ointment, ALTON EXT AA BID PRF RASH, Disp: , Rfl:     NON FORMULARY MEDICATION, 1 patch by Other route once daily. Apply 1 patch to lower abdomen once daily, Disp: , Rfl:     NON FORMULARY MEDICATION, 1 patch., Disp: , Rfl:     NON FORMULARY MEDICATION, 1 patch., Disp: , Rfl:     omeprazole (PRILOSEC) 40 MG capsule, Take 1 capsule (40 mg total) by mouth every morning. Open capsule and take with apple sauce (Patient taking differently: Take 40 mg by mouth daily as needed. Open capsule and take with apple sauce), Disp: 30 capsule, Rfl: 2    potassium chloride SA (K-DUR,KLOR-CON) 20 MEQ tablet, Take 1 tablet (20 mEq total) by mouth once daily. (Patient not taking: Reported on 5/29/2020), Disp: 90 tablet, Rfl: 3    varicella-zoster gE-AS01B, PF, (SHINGRIX, PF,) 50 mcg/0.5 mL injection, Inject into the muscle. (Patient not taking: Reported on 5/29/2020), Disp: 0.5  mL, Rfl: 1     Anticoagulants/Antiplatelets:   no anticoagulation    Allergies:   Review of patient's allergies indicates:   Allergen Reactions    Cathflo activase [alteplase] Anaphylaxis     Tightness in chest, raspy throat, restless legs, hotness all over    Heparin analogues      Restless legs, tightness in chest, and hot all over    Iodine and iodide containing products Anaphylaxis, Hives, Shortness Of Breath, Itching, Swelling and Rash    Shellfish containing products Anaphylaxis, Hives, Shortness Of Breath, Itching, Swelling and Rash          Latex Swelling             Review of Systems:   As documented in primary provider H&P.    Vital Signs (Most Recent):       Physical Exam:  No acute distress, laying comfortably in bed, pleasant and cooperative  Regular rate and rhythm  Breathing unlabored  Abdomen benign  Extremities warm and well perfused    Sedation Exam:  ASA: II - Patient appears to have mild systemic disease, adequately controlled   Mallampati: II (hard and soft palate, upper portion of tonsils anduvula visible)     Laboratory:  Lab Results   Component Value Date    INR 0.9 04/06/2018       Lab Results   Component Value Date    WBC 6.87 05/15/2020    HGB 14.2 05/15/2020    HCT 44.1 05/15/2020    MCV 96 05/15/2020     05/15/2020      Lab Results   Component Value Date    GLU 90 05/15/2020     (H) 05/15/2020    K 4.4 05/15/2020     05/15/2020    CO2 30 (H) 05/15/2020    BUN 22 (H) 05/15/2020    CREATININE 0.9 05/15/2020    CALCIUM 9.6 05/15/2020    MG 2.2 07/24/2019    ALT 37 05/15/2020    AST 31 05/15/2020    ALBUMIN 3.9 05/15/2020    BILITOT 0.6 05/15/2020    BILIDIR 0.2 10/05/2018       Imaging:  Reviewed.      ASSESSMENT/PLAN:                     Sedation Plan: Local  Patient will undergo: FNA of right thyroid nodule.    Julius Wilkinson MD  Radiology PGY-2

## 2020-06-16 NOTE — PROCEDURES
Radiology Post-Procedure Note    Pre Op Diagnosis: thyroid nodule  Post Op Diagnosis: Same    Procedure: R thyroid FNA    Procedure performed by: Eusebio Zepeda MD    Written Informed Consent Obtained: Yes  Specimen Removed: YES 3 FNA specimens  Estimated Blood Loss: Minimal    Findings:   Successful FNA of R upper pole thyroid nodule.    Patient tolerated procedure well.    @SIG@

## 2020-06-16 NOTE — PROGRESS NOTES
Procedure complete, pt tolerated well. Site clean, dry, intact, no bleeding, no hematoma. Pt given discharge instructions and handouts. VSS, pt denies discomfort

## 2020-06-16 NOTE — DISCHARGE INSTRUCTIONS
For scheduling: Call Lenka at [phone]    For questions or concerns call: KEISHA MON-FRI 8 AM- 5PM [phone]. Radiology resident on call [phone].    For immediate concerns that are not emergent, you may call our radiology clinic at: [phone]

## 2020-06-16 NOTE — PROCEDURES
FibroScan (Vibration Controlled Transient Elastography)    Date/Time: 6/16/2020 8:30 AM  Performed by: Kelsey Dobson NP  Authorized by: Kelsey Dobson NP     Diagnosis:  NAFLD    Probe:     Universal Protocol: Patient's identity, procedure and site were verified, confirmatory pause was performed. Discussed procedure including risks and potential complications.  Questions answered.  Patient verbalizes understanding and wishes to proceed with VCTE.     Procedure: After providing explanations of the procedure, patient was placed in the supine position with right arm in maximum abduction to allow optimal exposure of right lateral abdomen.  Patient was briefly assessed, Testing was performed in the mid-axillary location, 50Hz Shear Wave pulses were applied and the resulting Shear Wave and Propagation Speed detected with a 3.5 MHz ultrasonic signal, using the FibroScan probe, Skin to liver capsule distance and liver parenchyma were accessed during the entire examination with the FibroScan probe, Patient was instructed to breathe normally and to abstain from sudden movements during the procedure, allowing for random measurements of liver stiffness. At least 10 Shear Waves were produced, Individual measurements of each Shear Wave were calculated.  Patient tolerated the procedure well with no complications.  Meets discharge criteria as was dismissed.  Rates pain 0 out of 10.  Patient will follow up with ordering provider to review results.      Findings  Median liver stiffness score:  4.6  CAP Reading: dB/m:  299    IQR/med %:  13  Interpretation  Fibrosis interpretation is based on medial liver stiffness - Kilopascal (kPa).    Fibrosis Stage:  F 0-1  Steatosis interpretation is based on controlled attenuation parameter - (dB/m).    Steatosis Grade:  S3

## 2020-06-17 ENCOUNTER — PATIENT MESSAGE (OUTPATIENT)
Dept: HEPATOLOGY | Facility: CLINIC | Age: 60
End: 2020-06-17

## 2020-06-17 LAB — FINAL PATHOLOGIC DIAGNOSIS: NORMAL

## 2020-06-23 ENCOUNTER — CLINICAL SUPPORT (OUTPATIENT)
Dept: REHABILITATION | Facility: HOSPITAL | Age: 60
End: 2020-06-23
Payer: COMMERCIAL

## 2020-06-23 DIAGNOSIS — R53.1 WEAKNESS: ICD-10-CM

## 2020-06-23 DIAGNOSIS — M25.552 LEFT HIP PAIN: ICD-10-CM

## 2020-06-23 PROCEDURE — 97110 THERAPEUTIC EXERCISES: CPT | Performed by: PHYSICAL THERAPIST

## 2020-06-23 NOTE — PROGRESS NOTES
Physical Therapy Daily Treatment Note     Name: Renae Hou  Clinic Number: [id number]    Therapy Diagnosis:   Encounter Diagnoses   Name Primary?    Left hip pain     Weakness      Physician: Santi Larson III, *    Visit Date: 6/23/2020    Physician Orders: PT Eval and Treat   Medical Diagnosis from Referral: M25.552,G89.29 (ICD-10-CM) - Chronic left hip pain  Evaluation Date: 6/15/2020  Authorization Period Expiration: 12-31-20  Plan of Care Expiration: 9-30-20  Visit # / Visits authorized: 1/ 20    Time In: 1000  Time Out: 1045  Total Billable Time: 45 minutes    Precautions: Standard    Subjective     Pt reports: states that she feels maybe she is doing hep wrong. Having some tingling pain in lower back. No radicular pain. Has same general lateral hip pain.  She was compliant with home exercise program.  Response to previous treatment: mild increase in LBP  Functional change: n/a    Pain: 3/10  Location: left hip       Objective     Renae received therapeutic exercises to develop strength, ROM and core stabilization for 45 minutes including:  Reviewed hep  Ab bracing  Ab bracing with iso hip abd in hooklying  Ab bracing with sidelying clamshell    Will add next visit  Belt SLR nerve glide 10 reps no hold each leg  Hip flexor stretch    Renae received hot pack for 10 minutes to low back/ hip.  Home Exercises Provided and Patient Education Provided     Education provided:   - reviewed hep  D/c bridge    Written Home Exercises Provided: yes.  Exercises were reviewed and Renae was able to demonstrate them prior to the end of the session.  Renae demonstrated good  understanding of the education provided.     See EMR under Media for exercises provided 6/23/2020.    Assessment     No significant increase in pain with exercises today. She reports muscle effort and fatigue, a little tingling in L side low back that she reports as mild and opted to continues with exericises. Finished with [facility] to low  back that helped with pain. Will reassess next visit for progressing.  Renae is progressing well towards her goals.   Pt prognosis is Good.     Pt will continue to benefit from skilled outpatient physical therapy to address the deficits listed in the problem list box on initial evaluation, provide pt/family education and to maximize pt's level of independence in the home and community environment.     Pt's spiritual, cultural and educational needs considered and pt agreeable to plan of care and goals.     Anticipated barriers to physical therapy: none    Goals:  Short Term Goals: 4 weeks   1. Pt independent in initial hep  2. Decreased pain 1-4/10  3. Strength improved by 1/2 muscle grade or better  4. Pt reports improved symptoms by 35% or better     Long Term Goals: 12 weeks   1. Pt independent in d/c hep  2. Decreased pain 0-2/10 with activities  3. Strength improved in 4/5 or better grossly  4. Pt reports improved symptoms by 75% or better.     Plan     Cont per POC    César Sharma, PT

## 2020-06-24 ENCOUNTER — PATIENT OUTREACH (OUTPATIENT)
Dept: ADMINISTRATIVE | Facility: OTHER | Age: 60
End: 2020-06-24

## 2020-06-29 ENCOUNTER — TELEPHONE (OUTPATIENT)
Dept: OTOLARYNGOLOGY | Facility: CLINIC | Age: 60
End: 2020-06-29

## 2020-06-29 ENCOUNTER — LAB VISIT (OUTPATIENT)
Dept: SURGERY | Facility: CLINIC | Age: 60
End: 2020-06-29
Payer: COMMERCIAL

## 2020-06-29 LAB — SARS-COV-2 RNA RESP QL NAA+PROBE: NOT DETECTED

## 2020-06-29 PROCEDURE — U0003 INFECTIOUS AGENT DETECTION BY NUCLEIC ACID (DNA OR RNA); SEVERE ACUTE RESPIRATORY SYNDROME CORONAVIRUS 2 (SARS-COV-2) (CORONAVIRUS DISEASE [COVID-19]), AMPLIFIED PROBE TECHNIQUE, MAKING USE OF HIGH THROUGHPUT TECHNOLOGIES AS DESCRIBED BY CMS-2020-01-R: HCPCS

## 2020-06-29 NOTE — TELEPHONE ENCOUNTER
----- Message from Jatin Alcazar sent at 6/29/2020 10:10 AM CDT -----  Patient called to speak w/ someone regarding scheduling for her covid testing, requesting callback    Callback: [phone]

## 2020-06-30 ENCOUNTER — CLINICAL SUPPORT (OUTPATIENT)
Dept: REHABILITATION | Facility: HOSPITAL | Age: 60
End: 2020-06-30
Payer: COMMERCIAL

## 2020-06-30 DIAGNOSIS — R53.1 WEAKNESS: ICD-10-CM

## 2020-06-30 DIAGNOSIS — M25.552 LEFT HIP PAIN: ICD-10-CM

## 2020-06-30 PROCEDURE — 97110 THERAPEUTIC EXERCISES: CPT | Performed by: PHYSICAL THERAPIST

## 2020-06-30 NOTE — PROGRESS NOTES
Physical Therapy Daily Treatment Note     Name: Renae Hou  Clinic Number: [id number]    Therapy Diagnosis:   Encounter Diagnoses   Name Primary?    Left hip pain     Weakness      Physician: Santi Larson III, *    Visit Date: 6/30/2020    Physician Orders: PT Eval and Treat   Medical Diagnosis from Referral: M25.552,G89.29 (ICD-10-CM) - Chronic left hip pain  Evaluation Date: 6/15/2020  Authorization Period Expiration: 12-31-20  Plan of Care Expiration: 9-30-20  Visit # / Visits authorized: 1/ 20    Time In: 1045  Time Out: 1130  Total Billable Time: 45 minutes    Precautions: Standard    Subjective     Pt reports: pt states that hip is doing much better. No pain today, has not taken pain meds. States back is not hurting.  She was compliant with home exercise program.  Response to previous treatment: decreased pain  Functional change: less/no pain with walking    Pain: 0/10  Location: left hip       Objective     Renae received therapeutic exercises to develop strength, ROM and core stabilization for 45 minutes including:  Reviewed hep  Ab bracing  Ab bracing with iso hip abd in hooklying  Ab bracing with sidelying clamshell 3 x 10 2 lbs  Belt SLR nerve glide 20 reps no hold each leg  Hip flexor stretch    Renae received hot pack for 0 minutes to low back/ hip.  Home Exercises Provided and Patient Education Provided     Education provided:   - reviewed hep      Written Home Exercises Provided: yes.  Exercises were reviewed and Renae was able to demonstrate them prior to the end of the session.  Renae demonstrated good  understanding of the education provided.     See EMR under Media for exercises provided 6/23/2020.    Assessment     Pt reports no pain end of tx. Does have LLE muscle fatigue. Issued yellow tband for resistance. So far progressing well.  Renae is progressing well towards her goals.   Pt prognosis is Good.     Pt will continue to benefit from skilled outpatient physical  therapy to address the deficits listed in the problem list box on initial evaluation, provide pt/family education and to maximize pt's level of independence in the home and community environment.     Pt's spiritual, cultural and educational needs considered and pt agreeable to plan of care and goals.     Anticipated barriers to physical therapy: none    Goals:  Short Term Goals: 4 weeks   1. Pt independent in initial hep  2. Decreased pain 1-4/10  3. Strength improved by 1/2 muscle grade or better  4. Pt reports improved symptoms by 35% or better     Long Term Goals: 12 weeks   1. Pt independent in d/c hep  2. Decreased pain 0-2/10 with activities  3. Strength improved in 4/5 or better grossly  4. Pt reports improved symptoms by 75% or better.     Plan     Cont per POC    César Sharma, PT

## 2020-07-02 ENCOUNTER — OFFICE VISIT (OUTPATIENT)
Dept: OTOLARYNGOLOGY | Facility: CLINIC | Age: 60
End: 2020-07-02
Payer: COMMERCIAL

## 2020-07-02 VITALS
SYSTOLIC BLOOD PRESSURE: 120 MMHG | DIASTOLIC BLOOD PRESSURE: 82 MMHG | HEART RATE: 65 BPM | WEIGHT: 184.31 LBS | BODY MASS INDEX: 30.67 KG/M2 | TEMPERATURE: 98 F

## 2020-07-02 DIAGNOSIS — E04.2 MULTIPLE THYROID NODULES: ICD-10-CM

## 2020-07-02 DIAGNOSIS — R49.0 DYSPHONIA: Primary | ICD-10-CM

## 2020-07-02 PROCEDURE — 99999 PR PBB SHADOW E&M-EST. PATIENT-LVL III: CPT | Mod: PBBFAC,,, | Performed by: NURSE PRACTITIONER

## 2020-07-02 PROCEDURE — 99213 OFFICE O/P EST LOW 20 MIN: CPT | Mod: 25,S$GLB,, | Performed by: NURSE PRACTITIONER

## 2020-07-02 PROCEDURE — 3079F PR MOST RECENT DIASTOLIC BLOOD PRESSURE 80-89 MM HG: ICD-10-PCS | Mod: CPTII,S$GLB,, | Performed by: NURSE PRACTITIONER

## 2020-07-02 PROCEDURE — 3008F PR BODY MASS INDEX (BMI) DOCUMENTED: ICD-10-PCS | Mod: CPTII,S$GLB,, | Performed by: NURSE PRACTITIONER

## 2020-07-02 PROCEDURE — 3074F SYST BP LT 130 MM HG: CPT | Mod: CPTII,S$GLB,, | Performed by: NURSE PRACTITIONER

## 2020-07-02 PROCEDURE — 99213 PR OFFICE/OUTPT VISIT, EST, LEVL III, 20-29 MIN: ICD-10-PCS | Mod: 25,S$GLB,, | Performed by: NURSE PRACTITIONER

## 2020-07-02 PROCEDURE — 3079F DIAST BP 80-89 MM HG: CPT | Mod: CPTII,S$GLB,, | Performed by: NURSE PRACTITIONER

## 2020-07-02 PROCEDURE — 99999 PR PBB SHADOW E&M-EST. PATIENT-LVL III: ICD-10-PCS | Mod: PBBFAC,,, | Performed by: NURSE PRACTITIONER

## 2020-07-02 PROCEDURE — 3074F PR MOST RECENT SYSTOLIC BLOOD PRESSURE < 130 MM HG: ICD-10-PCS | Mod: CPTII,S$GLB,, | Performed by: NURSE PRACTITIONER

## 2020-07-02 PROCEDURE — 31575 PR LARYNGOSCOPY, FLEXIBLE; DIAGNOSTIC: ICD-10-PCS | Mod: S$GLB,,, | Performed by: NURSE PRACTITIONER

## 2020-07-02 PROCEDURE — 3008F BODY MASS INDEX DOCD: CPT | Mod: CPTII,S$GLB,, | Performed by: NURSE PRACTITIONER

## 2020-07-02 PROCEDURE — 31575 DIAGNOSTIC LARYNGOSCOPY: CPT | Mod: S$GLB,,, | Performed by: NURSE PRACTITIONER

## 2020-07-02 NOTE — ASSESSMENT & PLAN NOTE
Flexible laryngoscopy is normal. We discussed that her dysphonia is likely functional in nature. I will refer her to voice therapy for evaluation. She should see Dr. Montilla in the future as needed. Questions answered.

## 2020-07-02 NOTE — ASSESSMENT & PLAN NOTE
We discussed that her thyroid is not enlarged and nodules are small. It is unlikely they are causing her hoarse voice.

## 2020-07-02 NOTE — PROGRESS NOTES
Subjective:       Patient ID: Renae Hou is a 59 y.o. female.    Chief Complaint: pain on right side of throat when swallowing, feels swollen     HPI     Renae Hou is a 59 year old female who [facility] for hoarse voice and throat discomfort. She states that the problem began several months ago. Every day around noon, her voice becomes weak and raspy. She has difficulty raising her voice later in the day and often times will just stop talking because it requires too much effort to get sound out. She does not have pain, but feels discomfort on the right side of throat, mainly when swallowing. She does not choke, but feels that food moves slowly down the right side of her throat. She denies cough or throat clearing. She does not have reflux or PND. There is no adenopathy. She does have a past history of left hemithyroidectomy in 2010 for benign nodules.  She does have right sided nodules. The largest nodule was biopsied as benign. She does have a past history of vocal cord polyps.    Past Medical History:   Diagnosis Date    Abscess of paraspinous muscles     Allergy 4/2018    DVT (deep venous thrombosis)     left leg    Epidural abscess 4/10/2018    Fatty liver     GERD (gastroesophageal reflux disease) 2015    Hypertension     Lumbar radiculopathy     Menopause     Obesity     Obstructive sleep apnea on CPAP     no longer uses CPAP after weight loss from gastric sleeve    Thyroid disease     Thyroid nodules.       Past Surgical History:   Procedure Laterality Date    ADENOIDECTOMY  1995    BACK SURGERY  2002    L4, L5    BILATERAL SALPINGOOPHORECTOMY      CHOLECYSTECTOMY      CYST REMOVAL      ESOPHAGOGASTRODUODENOSCOPY N/A 5/3/2019    Procedure: ESOPHAGOGASTRODUODENOSCOPY (EGD);  Surgeon: Carlin Sanchez MD;  Location: [address]);  Service: Endoscopy;  Laterality: N/A;    HYSTERECTOMY  12/17/2012    [facility] BS&O     INSERTION OF INFERIOR VENA CAVAL  FILTER Right 7/19/2019    Procedure: IVC filter placement;  Surgeon: Rodney Rico MD;  Location: [facility] CATH LAB;  Service: Peripheral Vascular;  Laterality: Right;    IVC FILTER RETRIEVAL N/A 12/20/2019    Procedure: REMOVAL-FILTER-IVC;  Surgeon: Rodney Rico MD;  [facility];  Service: Peripheral Vascular;  Laterality: N/A;    LAPAROSCOPIC SLEEVE GASTRECTOMY N/A 7/23/2019    Procedure: GASTRECTOMY, SLEEVE, LAPAROSCOPIC, with intraop EGD [id number];  Surgeon: Duc Ratliff Jr., MD;  [facility];  Service: General;  Laterality: N/A;    RECTOCELE REPAIR      SPINE SURGERY  2018    THYROID NODULE REMOVAL      TONSILLECTOMY  1995         Current Outpatient Medications:     cyanocobalamin 500 MCG tablet, Take 500 mcg by mouth once daily., Disp: , Rfl:     diclofenac sodium (VOLTAREN) 1 % Gel, Apply 2 g topically 4 (four) times daily., Disp: 100 g, Rfl: 2    EPINEPHrine (EPIPEN) 0.3 mg/0.3 mL AtIn, , Disp: , Rfl:     ergocalciferol, vitamin D2, (VITAMIN D ORAL), Take 1 tablet by mouth once daily., Disp: , Rfl:     hydroCHLOROthiazide (HYDRODIURIL) 12.5 MG Tab, , Disp: , Rfl:     JUBLIA 10 % Javan, , Disp: , Rfl:     mometasone (ELOCON) 0.1 % ointment, ALTON EXT AA BID PRF RASH, Disp: , Rfl:     NON FORMULARY MEDICATION, 1 patch by Other route once daily. Apply 1 patch to lower abdomen once daily, Disp: , Rfl:     NON FORMULARY MEDICATION, 1 patch., Disp: , Rfl:     NON FORMULARY MEDICATION, 1 patch., Disp: , Rfl:     omeprazole (PRILOSEC) 40 MG capsule, Take 1 capsule (40 mg total) by mouth every morning. Open capsule and take with apple sauce (Patient taking differently: Take 40 mg by mouth daily as needed. Open capsule and take with apple sauce), Disp: 30 capsule, Rfl: 2    potassium chloride SA (K-DUR,KLOR-CON) 20 MEQ tablet, Take 1 tablet (20 mEq total) by mouth once daily., Disp: 90 tablet, Rfl: 3    varicella-zoster gE-AS01B, PF, (SHINGRIX, PF,) 50 mcg/0.5  mL injection, Inject into the muscle., Disp: 0.5 mL, Rfl: 1    meclizine (ANTIVERT) 25 mg tablet, Take 1 tablet (25 mg total) by mouth 3 (three) times daily as needed., Disp: 45 tablet, Rfl: 1    Review of patient's allergies indicates:   Allergen Reactions    Cathflo activase [alteplase] Anaphylaxis     Tightness in chest, raspy throat, restless legs, hotness all over    Heparin analogues      Restless legs, tightness in chest, and hot all over    Iodine and iodide containing products Anaphylaxis, Hives, Shortness Of Breath, Itching, Swelling and Rash    Shellfish containing products Anaphylaxis, Hives, Shortness Of Breath, Itching, Swelling and Rash          Latex Swelling             Social History     Socioeconomic History    Marital status:      Spouse name: Not on file    Number of children: Not on file    Years of education: Not on file    Highest education level: Not on file   Occupational History    Not on file   Social Needs    Financial resource strain: Not hard at all    Food insecurity     Worry: Never true     Inability: Never true    Transportation needs     Medical: No     Non-medical: No   Tobacco Use    Smoking status: Former Smoker     Packs/day: 0.25     Years: 32.00     Pack years: 8.00     Types: Cigarettes     Quit date:      Years since quittin.5    Smokeless tobacco: Never Used    Tobacco comment: smoked socially decades ago - weekends only   Substance and Sexual Activity    Alcohol use: No     Frequency: Monthly or less     Drinks per session: 1 or 2     Binge frequency: Never     Comment: yearly at most     Drug use: No    Sexual activity: Not Currently     Partners: Male     Birth control/protection: See Surgical Hx, None     Comment: [facility] BS&O 2012,     Lifestyle    Physical activity     Days per week: Patient refused     Minutes per session: 0 min    Stress: Not at all   Relationships    Social connections     Talks on phone: More than  three times a week     Gets together: Once a week     Attends Yazidism service: Not on file     Active member of club or organization: No     Attends meetings of clubs or organizations: Patient refused     Relationship status:    Other Topics Concern    Not on file   Social History Narrative    . 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       Family History   Problem Relation Age of Onset    Hypertension Mother     Hyperlipidemia Mother     Heart disease Mother 55        cad, valvular heart disease    Alcohol abuse Sister     Cirrhosis Sister         alcoholic cirrhosis    Stroke Father     No Known Problems Daughter     No Known Problems Daughter     Breast cancer Neg Hx     Colon cancer Neg Hx     Ovarian cancer Neg Hx     Diabetes Neg Hx     Esophageal cancer Neg Hx            Review of Systems   Constitutional: Negative for appetite change, chills, diaphoresis, fatigue, fever and unexpected weight change.   HENT: Positive for ear pain, tinnitus, trouble swallowing and voice change. Negative for nasal congestion, dental problem, drooling, ear discharge, facial swelling, hearing loss, mouth sores, nosebleeds, postnasal drip, rhinorrhea, sinus pressure/congestion, sneezing and sore throat.    Eyes: Negative for pain, discharge, redness and itching.   Respiratory: Negative for cough and shortness of breath.    Cardiovascular: Negative for chest pain.   Gastrointestinal: Negative for abdominal distention, abdominal pain, diarrhea, nausea and vomiting.   Endocrine: Negative for cold intolerance and heat intolerance.   Genitourinary: Negative for difficulty urinating.   Musculoskeletal: Positive for neck pain. Negative for neck stiffness.   Integumentary:  Negative for rash.   Neurological: Positive for dizziness. Negative for weakness and headaches.   Hematological: Negative for adenopathy.         Objective:      Physical Exam  Vitals signs reviewed.   Constitutional:       General: She is not in acute  distress.     Appearance: She is well-developed. She is not ill-appearing or diaphoretic.   HENT:      Head: Normocephalic and atraumatic.      Jaw: No trismus.      Right Ear: Hearing, tympanic membrane, ear canal and external ear normal.      Left Ear: Hearing, tympanic membrane, ear canal and external ear normal.      Nose: Septal deviation and mucosal edema present. No nasal deformity or rhinorrhea.      Right Sinus: No maxillary sinus tenderness or frontal sinus tenderness.      Left Sinus: No maxillary sinus tenderness or frontal sinus tenderness.      Mouth/Throat:      Mouth: Mucous membranes are not pale, not dry and not cyanotic. No oral lesions.      Dentition: Normal dentition. Does not have dentures. No dental caries.      Pharynx: Uvula midline. No oropharyngeal exudate, posterior oropharyngeal erythema or uvula swelling.      Tonsils: No tonsillar abscesses.      Comments: Procedure: Flexible laryngoscopy  In order to fully examine the upper aerodigestive tract, including the larynx, in a patient with a hyperactive gag reflex, flexible endoscopy is required.  After explaining the procedure and obtaining verbal consent, a timeout was performed with the patient's participation according to the universal protocol. Both nasal cavities were anesthetized with 4% Xylocaine spray mixed with Jone-Synephrine. The flexible laryngoscope (#[id number]) was inserted into the nasal cavity and advanced to visualize the nasal cavity, nasopharynx, the posterior oropharynx, hypopharynx, and the endolarynx with the above findings noted. The scope was removed and the procedure terminated. The patient tolerated this procedure well without apparent complication.      FINDINGS  Nasopharynx - the torus is clear. There are no lesions of the posterior wall.   Oropharynx - no lesions of the tongue base. There is no obvious fullness or asymmetry.  Hypopharynx - there are no lesions of the pyriform sinuses or postcricoid region     Larynx - there are no lesions of the supraglottic or glottic larynx. Vocal fold mobility is normal with complete closure.     Eyes:      General: No scleral icterus.        Right eye: No discharge.         Left eye: No discharge.      Conjunctiva/sclera: Conjunctivae normal.   Neck:      Musculoskeletal: Normal range of motion and neck supple.      Thyroid: No thyroid mass or thyromegaly.      Vascular: No JVD.      Trachea: Trachea and phonation normal. No tracheal tenderness or tracheal deviation.      Comments: Salivary glands - there are no lesions or asymmetric findings in the submandibular or parotid glands    Cardiovascular:      Rate and Rhythm: Normal rate.   Pulmonary:      Effort: Pulmonary effort is normal. No respiratory distress.      Breath sounds: No stridor.   Lymphadenopathy:      Head:      Right side of head: No submental, submandibular, tonsillar or preauricular adenopathy.      Left side of head: No submental, submandibular, tonsillar or preauricular adenopathy.      Cervical: No cervical adenopathy.   Skin:     General: Skin is warm and dry.      Coloration: Skin is not pale.      Findings: No erythema or rash.   Neurological:      Mental Status: She is alert and oriented to person, place, and time.      Cranial Nerves: No cranial nerve deficit.   Psychiatric:         Behavior: Behavior normal. Behavior is cooperative.         Thought Content: Thought content normal.         Assessment:       1. Dysphonia    2. Multiple thyroid nodules        Plan:       Problem List Items Addressed This Visit        ENT    Dysphonia - Primary     Flexible laryngoscopy is normal. We discussed that her dysphonia is likely functional in nature. I will refer her to voice therapy for evaluation. She should see Dr. Montilla in the future as needed. Questions answered.          Relevant Orders    SLP voice evaluation       Endocrine    Multiple thyroid nodules     We discussed that her thyroid is not enlarged and  nodules are small. It is unlikely they are causing her hoarse voice.

## 2020-07-14 ENCOUNTER — CLINICAL SUPPORT (OUTPATIENT)
Dept: REHABILITATION | Facility: HOSPITAL | Age: 60
End: 2020-07-14
Payer: COMMERCIAL

## 2020-07-14 DIAGNOSIS — R53.1 WEAKNESS: ICD-10-CM

## 2020-07-14 DIAGNOSIS — M25.552 LEFT HIP PAIN: ICD-10-CM

## 2020-07-14 PROCEDURE — 97110 THERAPEUTIC EXERCISES: CPT | Performed by: PHYSICAL THERAPIST

## 2020-07-14 NOTE — PROGRESS NOTES
Physical Therapy Daily Treatment Note     Name: Renae Hou  Clinic Number: [id number]    Therapy Diagnosis:   Encounter Diagnoses   Name Primary?    Left hip pain     Weakness      Physician: Santi Larson III, *    Visit Date: 7/14/2020    Physician Orders: PT Eval and Treat   Medical Diagnosis from Referral: M25.552,G89.29 (ICD-10-CM) - Chronic left hip pain  Evaluation Date: 6/15/2020  Authorization Period Expiration: 12-31-20  Plan of Care Expiration: 9-30-20  Visit # / Visits authorized: 4/ 20    Time In: 1000  Time Out: 1045  Total Billable Time: 45 minutes    Precautions: Standard    Subjective     Pt reports: pt states that overall she has been better but past few days hip pain has increase, has impacted her sleep.  She was compliant with home exercise program.  Response to previous treatment: decreased pain  Functional change: less/no pain with walking    Pain: 1-5/10  Location: left hip       Objective     Renae received therapeutic exercises to develop strength, ROM and core stabilization for 45 minutes including:  Reviewed hep  Ab bracing  Ab bracing with iso hip abd in hooklying  Monster walk at knee yellow 3 laps  Belt SLR nerve glide 20 reps no hold each leg  Seated isometric hip ER/ IR 10 x 10 seconds 2 sets      Renae received hot pack for 0 minutes to low back/ hip.  Home Exercises Provided and Patient Education Provided     Education provided:   - reviewed hep      Written Home Exercises Provided: yes.  Exercises were reviewed and Renae was able to demonstrate them prior to the end of the session.  Renae demonstrated good  understanding of the education provided.     See EMR under Media for exercises provided 6/23/2020.    Assessment     Pt has no increase in pain but feels very fatigued. Very weak L hip rotators    Renae is progressing well towards her goals.   Pt prognosis is Good.     Pt will continue to benefit from skilled outpatient physical therapy to address the  deficits listed in the problem list box on initial evaluation, provide pt/family education and to maximize pt's level of independence in the home and community environment.     Pt's spiritual, cultural and educational needs considered and pt agreeable to plan of care and goals.     Anticipated barriers to physical therapy: none    Goals:  Short Term Goals: 4 weeks   1. Pt independent in initial hep  2. Decreased pain 1-4/10  3. Strength improved by 1/2 muscle grade or better  4. Pt reports improved symptoms by 35% or better     Long Term Goals: 12 weeks   1. Pt independent in d/c hep  2. Decreased pain 0-2/10 with activities  3. Strength improved in 4/5 or better grossly  4. Pt reports improved symptoms by 75% or better.     Plan     Cont per POC    César Sharma, PT

## 2020-07-21 ENCOUNTER — CLINICAL SUPPORT (OUTPATIENT)
Dept: SPEECH THERAPY | Facility: HOSPITAL | Age: 60
End: 2020-07-21
Payer: COMMERCIAL

## 2020-07-21 DIAGNOSIS — R49.0 DYSPHONIA: Primary | ICD-10-CM

## 2020-07-21 DIAGNOSIS — R49.0 DYSPHONIA: ICD-10-CM

## 2020-07-21 PROCEDURE — 92524 BEHAVRAL QUALIT ANALYS VOICE: CPT | Mod: GN

## 2020-07-21 NOTE — PROGRESS NOTES
"Referring provider: Martha Alvarado  Reason for visit: Behavioral and qualitative analysis of voice and resonance (CPT 86329)    Subjective / History    Renae Hou is a 59 y.o. female referred for voice evaluation (CPT 34334) by INÉS Alvarado. Per INÉS Alvarado's note, pt has history of a left hemithyroidectomy in  for benign nodules. Ms. Hou reports that she was dx with "vocal fold polyps" shortly after this. She was diagnosed at an outside facility and does not have access to her records. She did not undergo voice therapy at this time, but reports that her voice difficulty subsided until a few months ago. She reports that her voice is normal in the morning, but around the afternoon begins to crack and becomes raspy. She reports that if she continues to talk at this point, she will completely lose her voice. She reports that she frequently feels she has "laryngitis." She denies that she uses her voice much at work or at home/ in social settings. She works for [employer] and is currently working from home 2 days per week; reports that she doesn't talk much in meetings or on the phone.     Swallowing:Reports food/ liquid feel "thick" going down and sometimes feels like "something is popping" on the right side of her throat while swallowing.  Breathing: no c/o    Smokin packs/day; quit 33 years ago   Caffeine: 0 cups/day   Reflux: no; prior to stomach surgery had a lot. hasnt had lately.   Water: 48 oz/day     Laryngoscopy findings (per INÉS Alvarado on 20)  -Larynx: there are no lesions of the supraglottic or glottic larynx. Vocal fold mobility is normal with complete closure.      Past Medical History:   Diagnosis Date    Abscess of paraspinous muscles     Allergy 2018    DVT (deep venous thrombosis)     left leg    Epidural abscess 4/10/2018    Fatty liver     GERD (gastroesophageal reflux disease) 2015    Hypertension     Lumbar radiculopathy     Menopause     Obesity     Obstructive sleep apnea " on CPAP     no longer uses CPAP after weight loss from gastric sleeve    Thyroid disease     Thyroid nodules.     Current Outpatient Medications on File Prior to Visit   Medication Sig Dispense Refill    cyanocobalamin 500 MCG tablet Take 500 mcg by mouth once daily.      diclofenac sodium (VOLTAREN) 1 % Gel Apply 2 g topically 4 (four) times daily. 100 g 2    EPINEPHrine (EPIPEN) 0.3 mg/0.3 mL AtIn       ergocalciferol, vitamin D2, (VITAMIN D ORAL) Take 1 tablet by mouth once daily.      hydroCHLOROthiazide (HYDRODIURIL) 12.5 MG Tab       JUBLIA 10 % Javan       meclizine (ANTIVERT) 25 mg tablet Take 1 tablet (25 mg total) by mouth 3 (three) times daily as needed. 45 tablet 1    mometasone (ELOCON) 0.1 % ointment ALTON EXT AA BID PRF RASH      NON FORMULARY MEDICATION 1 patch by Other route once daily. Apply 1 patch to lower abdomen once daily      NON FORMULARY MEDICATION 1 patch.      NON FORMULARY MEDICATION 1 patch.      omeprazole (PRILOSEC) 40 MG capsule Take 1 capsule (40 mg total) by mouth every morning. Open capsule and take with apple sauce (Patient taking differently: Take 40 mg by mouth daily as needed. Open capsule and take with apple sauce) 30 capsule 2    potassium chloride SA (K-DUR,KLOR-CON) 20 MEQ tablet Take 1 tablet (20 mEq total) by mouth once daily. 90 tablet 3    varicella-zoster gE-AS01B, PF, (SHINGRIX, PF,) 50 mcg/0.5 mL injection Inject into the muscle. 0.5 mL 1     No current facility-administered medications on file prior to visit.      Objective    Perceptual/behavioral assessment  -CAPE-V Overall Score: 35  -Quality: mild strain and rough   -Volume: appropriate for age and gender  -Pitch: low F0  -Flexibility: diminished  -Habitual respiratory pattern: chest/clavicular     Education / Stimulability Trials   Discussed importance of vocal hygiene including: hydration and conservation. Counseled pt to be aware of speaking at an appropriate volume on zoom calls/ while on the  "telephone for work. Pt reported that she is already aware of volume and does not feel that she is overusing her voice in these situations. Patient was mildly stimulable for decreased roughness and improved forward- focus using cup bubble with straw and straw phonation exercises. Patient was modestly stimulable for decreased roughness and improved forward-focus using cup bubble SOVT exercises. Good carryover in to modal pitch and phrases. Pt required mod cuing from clinician to focus on buzz/ vibration (think of vibrating your lips like when you play a kazoo) around lips when completing exercises. During pitch glides, pt also required cuing from clinician to maintain steady airflow and not "push" the sound out when accessing upper register.Following cuing, patient reported feeling an improvement with forward-focus in modal pitch and phrases, but reported difficulty maintaining this vocal quality in conversation. Clinician instructed pt to practice 3-4 x per day for 15-30 s at a time to improve awareness and consistency of improved vocal quality. She was amenable to all suggestions.     Functional goals  Length Status Goal   Long term Initiated Patient and clinician will facilitate changes in vocal function in order to restore functional use of voice for daily occupational, social, and emotional demands.    Long term Initiated Patient will re-establish phonation with adequate balance of airflow and resonance with decreased muscle tension.    Long term Initiated Patient will improve coordination of respiration and phonation for efficient vocal production at a conversational level.    Short term Initiated Patient will reduce vocal effort and fatigue by decreasing upper body tension as evidenced by a decrease in symptoms and lack of observable/palpable signs of hyperkinetic muscular behaviors.   Short term Initiated Patient will complete SOVT exercises and/or resonant-focused exercises 3-5x daily to strengthen and " balance the intrinsic laryngeal musculature and maximize glottic closure without medial hyperfunction.   Short term Initiated Patient will discriminate between easy and strained phonation with 80% accuracy.    Short term Initiated Patient will demonstrate the ability to increase awareness of voicing behavior through self-monitoring to facilitate generalization in functional speaking situations with 80% accuracy.      Assessment     Renae Hou presents with mild to mod dysphonia. Diagnoses pertinent to this visit include dysphonia. Prognosis for continued improvement is good.     Recommendations / POC    Recommend 2-4 sessions of voice/speech therapy over 4-6 weeks with a speech-language pathologist to optimize glottal postures for improved vocal function, vocal efficiency, and ease of phonation. Depending on her progress, she may benefit from stroboscopic evaluation. She should continue the exercises as discussed in session and should contact me with any further questions.

## 2020-07-24 ENCOUNTER — PATIENT OUTREACH (OUTPATIENT)
Dept: OTHER | Facility: OTHER | Age: 60
End: 2020-07-24

## 2020-07-28 ENCOUNTER — CLINICAL SUPPORT (OUTPATIENT)
Dept: REHABILITATION | Facility: HOSPITAL | Age: 60
End: 2020-07-28
Payer: COMMERCIAL

## 2020-07-28 DIAGNOSIS — R53.1 WEAKNESS: ICD-10-CM

## 2020-07-28 DIAGNOSIS — M25.552 LEFT HIP PAIN: ICD-10-CM

## 2020-07-28 PROCEDURE — 97110 THERAPEUTIC EXERCISES: CPT | Performed by: PHYSICAL THERAPIST

## 2020-07-28 NOTE — PROGRESS NOTES
Physical Therapy Daily Treatment Note     Name: Renae Hou  Clinic Number: [id number]    Therapy Diagnosis:   Encounter Diagnoses   Name Primary?    Left hip pain     Weakness      Physician: Santi Larson III, *    Visit Date: 7/28/2020    Physician Orders: PT Eval and Treat   Medical Diagnosis from Referral: M25.552,G89.29 (ICD-10-CM) - Chronic left hip pain  Evaluation Date: 6/15/2020  Authorization Period Expiration: 12-31-20  Plan of Care Expiration: 9-30-20  Visit # / Visits authorized: 5/ 20    Time In: 1215  Time Out: 1310  Total Billable Time: 50 minutes    Precautions: Standard    Subjective     Pt reports: states that over the past few days her hip pain has increase. She has been performing hep regularly. Feels she has gotten stronger in her hip. Reports that walking longer distance around 45-60 mins will increase hip pain symptom. Has pain in posterior L buttock down lateral hip to thigh.     She was compliant with home exercise program.  Response to previous treatment: decreased pain  Functional change: less/no pain with walking    Pain: 5/10  Location: left hip       Objective     Renae received therapeutic exercises to develop strength, ROM and core stabilization for 45 minutes including:  Reviewed hep  Ab bracing  Ab bracing with iso hip abd in hooklying  Monster walk at knee yellow 3 laps  Belt SLR nerve glide 20 reps no hold each leg  Seated isometric hip ER/ IR 10 x 10 seconds 2 sets  Added   Prone press up  Belly to wall      Renae received hot pack for 0 minutes to low back/ hip.  Home Exercises Provided and Patient Education Provided     Education provided:   - reviewed hep      Written Home Exercises Provided: yes.  Exercises were reviewed and Renae was able to demonstrate them prior to the end of the session.  Renae demonstrated good  understanding of the education provided.     See EMR under Media for exercises provided 7-28-20.    Assessment     Pt is discouraged since  last visit. Seems we were making good progress. Unsure exactly what has caused a flare up. First thought is a load management issue. She is either walking more, or walking at work more beyond the tissue capacity. Added prone press ups to hep. She has a decrease in radicular pain and more centralized symptoms. Unsure if this was added by pressups or just the decrease in axial load. Will continue to assess.     Renae is progressing well towards her goals.   Pt prognosis is Good.     Pt will continue to benefit from skilled outpatient physical therapy to address the deficits listed in the problem list box on initial evaluation, provide pt/family education and to maximize pt's level of independence in the home and community environment.     Pt's spiritual, cultural and educational needs considered and pt agreeable to plan of care and goals.     Anticipated barriers to physical therapy: none    Goals:  Short Term Goals: 4 weeks   1. Pt independent in initial hep  2. Decreased pain 1-4/10  3. Strength improved by 1/2 muscle grade or better  4. Pt reports improved symptoms by 35% or better     Long Term Goals: 12 weeks   1. Pt independent in d/c hep  2. Decreased pain 0-2/10 with activities  3. Strength improved in 4/5 or better grossly  4. Pt reports improved symptoms by 75% or better.     Plan     Cont per POC    César Sharma, PT

## 2020-07-29 ENCOUNTER — TELEPHONE (OUTPATIENT)
Dept: SPORTS MEDICINE | Facility: CLINIC | Age: 60
End: 2020-07-29

## 2020-07-31 ENCOUNTER — TELEPHONE (OUTPATIENT)
Dept: ORTHOPEDICS | Facility: CLINIC | Age: 60
End: 2020-07-31

## 2020-07-31 DIAGNOSIS — M50.30 DDD (DEGENERATIVE DISC DISEASE), CERVICAL: Primary | ICD-10-CM

## 2020-08-04 ENCOUNTER — PATIENT OUTREACH (OUTPATIENT)
Dept: ADMINISTRATIVE | Facility: OTHER | Age: 60
End: 2020-08-04

## 2020-08-04 NOTE — PROGRESS NOTES
Requested updates within Care Everywhere.  Patient's chart was reviewed for overdue DARVIN topics.  Immunizations reconciled.    Orders placed:n/a  Tasked appts:n/a  Labs Linked:n/a

## 2020-08-05 ENCOUNTER — HOSPITAL ENCOUNTER (OUTPATIENT)
Dept: RADIOLOGY | Facility: HOSPITAL | Age: 60
Discharge: HOME | End: 2020-08-05
Attending: PHYSICIAN ASSISTANT
Payer: COMMERCIAL

## 2020-08-05 ENCOUNTER — OFFICE VISIT (OUTPATIENT)
Dept: ORTHOPEDICS | Facility: CLINIC | Age: 60
End: 2020-08-05
Payer: COMMERCIAL

## 2020-08-05 ENCOUNTER — PATIENT OUTREACH (OUTPATIENT)
Dept: OTHER | Facility: OTHER | Age: 60
End: 2020-08-05

## 2020-08-05 VITALS — WEIGHT: 183.75 LBS | HEIGHT: 65 IN | BODY MASS INDEX: 30.62 KG/M2

## 2020-08-05 DIAGNOSIS — M54.12 CERVICAL RADICULOPATHY: Primary | ICD-10-CM

## 2020-08-05 DIAGNOSIS — M50.30 DDD (DEGENERATIVE DISC DISEASE), CERVICAL: ICD-10-CM

## 2020-08-05 PROCEDURE — 99999 PR PBB SHADOW E&M-EST. PATIENT-LVL IV: ICD-10-PCS | Mod: PBBFAC,,, | Performed by: PHYSICIAN ASSISTANT

## 2020-08-05 PROCEDURE — 3008F BODY MASS INDEX DOCD: CPT | Mod: CPTII,S$GLB,, | Performed by: PHYSICIAN ASSISTANT

## 2020-08-05 PROCEDURE — 99213 PR OFFICE/OUTPT VISIT, EST, LEVL III, 20-29 MIN: ICD-10-PCS | Mod: S$GLB,,, | Performed by: PHYSICIAN ASSISTANT

## 2020-08-05 PROCEDURE — 3008F PR BODY MASS INDEX (BMI) DOCUMENTED: ICD-10-PCS | Mod: CPTII,S$GLB,, | Performed by: PHYSICIAN ASSISTANT

## 2020-08-05 PROCEDURE — 72050 XR CERVICAL SPINE AP LAT WITH FLEX EXTEN: ICD-10-PCS | Mod: 26,,, | Performed by: RADIOLOGY

## 2020-08-05 PROCEDURE — 99999 PR PBB SHADOW E&M-EST. PATIENT-LVL IV: CPT | Mod: PBBFAC,,, | Performed by: PHYSICIAN ASSISTANT

## 2020-08-05 PROCEDURE — 72050 X-RAY EXAM NECK SPINE 4/5VWS: CPT | Mod: 26,,, | Performed by: RADIOLOGY

## 2020-08-05 PROCEDURE — 72050 X-RAY EXAM NECK SPINE 4/5VWS: CPT | Mod: TC

## 2020-08-05 PROCEDURE — 99213 OFFICE O/P EST LOW 20 MIN: CPT | Mod: S$GLB,,, | Performed by: PHYSICIAN ASSISTANT

## 2020-08-05 RX ORDER — DICLOFENAC SODIUM 10 MG/G
GEL TOPICAL
Qty: 1 TUBE | Refills: 3 | Status: SHIPPED | OUTPATIENT
Start: 2020-08-05 | End: 2021-10-20

## 2020-08-05 RX ORDER — MELOXICAM 15 MG/1
15 TABLET ORAL DAILY
Qty: 30 TABLET | Refills: 0 | Status: SHIPPED | OUTPATIENT
Start: 2020-08-05 | End: 2020-09-04

## 2020-08-05 RX ORDER — OFLOXACIN 3 MG/ML
SOLUTION/ DROPS OPHTHALMIC
COMMUNITY
Start: 2020-07-15 | End: 2020-11-20

## 2020-08-05 RX ORDER — ERYTHROMYCIN 5 MG/G
OINTMENT OPHTHALMIC
COMMUNITY
Start: 2020-07-15 | End: 2020-11-20

## 2020-08-05 NOTE — PROGRESS NOTES
DATE: 8/5/2020  PATIENT: Renae Hou    Supervising Physician: Dean Zayas M.D.    CHIEF COMPLAINT: neck and bilateral arm pain    HISTORY:  Renae Hou is a 59 y.o. female who is well known to me, she is s/p L4/5/S1 TLIF in 2018 here for evaluation of neck and bilateral arm pain (Neck - 6, Arm - 6). The pain has been present for a few weeks. The patient describes the pain as aching. The pain is worse with nothing in particular and improved by nothing in particular. There is associated numbness and tingling. There is subjective weakness. Prior treatments have included NSAIDs and physical therapy and she had an NEERAJ last year, but no recent ESIs or surgery.     The patient denies myelopathic symptoms such as handwriting changes or difficulty with buttons/coins/keys. Denies perineal paresthesias, bowel/bladder dysfunction.    PAST MEDICAL/SURGICAL HISTORY:  Past Medical History:   Diagnosis Date    Abscess of paraspinous muscles     Allergy 4/2018    DVT (deep venous thrombosis)     left leg    Epidural abscess 4/10/2018    Fatty liver     GERD (gastroesophageal reflux disease) 2015    Hypertension     Lumbar radiculopathy     Menopause     Obesity     Obstructive sleep apnea on CPAP     no longer uses CPAP after weight loss from gastric sleeve    Thyroid disease     Thyroid nodules.     Past Surgical History:   Procedure Laterality Date    ADENOIDECTOMY  1995    BACK SURGERY  2002    L4, L5    BILATERAL SALPINGOOPHORECTOMY      CHOLECYSTECTOMY      CYST REMOVAL      ESOPHAGOGASTRODUODENOSCOPY N/A 5/3/2019    Procedure: ESOPHAGOGASTRODUODENOSCOPY (EGD);  Surgeon: Carlin Sanchez MD;  Location: [facility] ([address] [place] [address]);  Service: Endoscopy;  Laterality: N/A;    HYSTERECTOMY  12/17/2012    [facility] BS&O     INSERTION OF INFERIOR VENA CAVAL FILTER Right 7/19/2019    Procedure: IVC filter placement;  Surgeon: Rodney Rico MD;  Location: [facility] CATH LAB;  Service: Peripheral Vascular;   Laterality: Right;    IVC FILTER RETRIEVAL N/A 12/20/2019    Procedure: REMOVAL-FILTER-IVC;  Surgeon: Rodney Rico MD;  Location: [facility] OR 2ND FLR;  Service: Peripheral Vascular;  Laterality: N/A;    LAPAROSCOPIC SLEEVE GASTRECTOMY N/A 7/23/2019    Procedure: GASTRECTOMY, SLEEVE, LAPAROSCOPIC, with intraop EGD [id number];  Surgeon: Duc Ratliff Jr., MD;  Location: [facility] OR 2ND FLR;  Service: General;  Laterality: N/A;    RECTOCELE REPAIR      SPINE SURGERY  2018    THYROID NODULE REMOVAL      TONSILLECTOMY  1995       Medications:  Current Outpatient Medications on File Prior to Visit   Medication Sig Dispense Refill    cyanocobalamin 500 MCG tablet Take 500 mcg by mouth once daily.      EPINEPHrine (EPIPEN) 0.3 mg/0.3 mL AtIn       ergocalciferol, vitamin D2, (VITAMIN D ORAL) Take 1 tablet by mouth once daily.      hydroCHLOROthiazide (HYDRODIURIL) 12.5 MG Tab       JUBLIA 10 % Javan       meclizine (ANTIVERT) 25 mg tablet TAKE 1 TABLET(25 MG) BY MOUTH THREE TIMES DAILY AS NEEDED 45 tablet 1    mometasone (ELOCON) 0.1 % ointment ALTON EXT AA BID PRF RASH      NON FORMULARY MEDICATION 1 patch by Other route once daily. Apply 1 patch to lower abdomen once daily      NON FORMULARY MEDICATION 1 patch.      NON FORMULARY MEDICATION 1 patch.      omeprazole (PRILOSEC) 40 MG capsule Take 1 capsule (40 mg total) by mouth every morning. Open capsule and take with apple sauce (Patient taking differently: Take 40 mg by mouth daily as needed. Open capsule and take with apple sauce) 30 capsule 2    potassium chloride SA (K-DUR,KLOR-CON) 20 MEQ tablet Take 1 tablet (20 mEq total) by mouth once daily. 90 tablet 3    varicella-zoster gE-AS01B, PF, (SHINGRIX, PF,) 50 mcg/0.5 mL injection Inject into the muscle. 0.5 mL 1    erythromycin (ROMYCIN) ophthalmic ointment ALTON TO AFFECTED EYELID HS      ofloxacin (OCUFLOX) 0.3 % ophthalmic solution       [DISCONTINUED] diclofenac sodium (VOLTAREN) 1 % Gel Apply 2  g topically 4 (four) times daily. (Patient not taking: Reported on 2020) 100 g 2     No current facility-administered medications on file prior to visit.        Social History:   Social History     Socioeconomic History    Marital status:      Spouse name: Not on file    Number of children: Not on file    Years of education: Not on file    Highest education level: Not on file   Occupational History    Not on file   Social Needs    Financial resource strain: Not hard at all    Food insecurity     Worry: Never true     Inability: Never true    Transportation needs     Medical: No     Non-medical: No   Tobacco Use    Smoking status: Former Smoker     Packs/day: 0.25     Years: 32.00     Pack years: 8.00     Types: Cigarettes     Quit date:      Years since quittin.6    Smokeless tobacco: Never Used    Tobacco comment: smoked socially decades ago - weekends only   Substance and Sexual Activity    Alcohol use: No     Frequency: Monthly or less     Drinks per session: 1 or 2     Binge frequency: Never     Comment: yearly at most     Drug use: No    Sexual activity: Not Currently     Partners: Male     Birth control/protection: See Surgical Hx, None     Comment: [facility] BS&O 2012,     Lifestyle    Physical activity     Days per week: Patient refused     Minutes per session: 0 min    Stress: Not at all   Relationships    Social connections     Talks on phone: More than three times a week     Gets together: Once a week     Attends Oriental orthodox service: Not on file     Active member of club or organization: No     Attends meetings of clubs or organizations: Patient refused     Relationship status:    Other Topics Concern    Not on file   Social History Narrative    . 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       REVIEW OF SYSTEMS:  Constitution: Negative. Negative for chills, fever and night sweats.   Cardiovascular: Negative for chest pain and syncope.   Respiratory: Negative for cough and  "shortness of breath.   Gastrointestinal: See HPI. Negative for nausea/vomiting. Negative for abdominal pain.  Genitourinary: See HPI. Negative for discoloration or dysuria.  Skin: Negative for dry skin, itching and rash.   Hematologic/Lymphatic: Negative for bleeding problem. Does not bruise/bleed easily.   Musculoskeletal: Negative for falls and muscle weakness.   Neurological: See HPI. No seizures.   Endocrine: Negative for polydipsia, polyphagia and polyuria.   Allergic/Immunologic: Negative for hives and persistent infections.  Psychiatric/Behavioral: Negative for depression and insomnia.         EXAM:  Ht 5' 5" (1.651 m)   Wt 83.3 kg (183 lb 12.1 oz)   LMP 11/25/2012   BMI 30.58 kg/m²     Physical exam stable. Neuro exam stable.     IMAGING:   Today I personally reviewed AP, Lat and Flex/Ex  upright C-spine films that demonstrate C6/7 disc degeneration.     MRI cervical spine from May 2019 shows moderate right C4/5 foraminal narrowing.    Body mass index is 30.58 kg/m².    Hemoglobin A1C   Date Value Ref Range Status   05/15/2020 5.6 4.0 - 5.6 % Final     Comment:     ADA Screening Guidelines:  5.7-6.4%  Consistent with prediabetes  >or=6.5%  Consistent with diabetes  High levels of fetal hemoglobin interfere with the HbA1C  assay. Heterozygous hemoglobin variants (HbS, HgC, etc)do  not significantly interfere with this assay.   However, presence of multiple variants may affect accuracy.     05/24/2019 6.0 (H) 4.0 - 5.6 % Final     Comment:     ADA Screening Guidelines:  5.7-6.4%  Consistent with prediabetes  >or=6.5%  Consistent with diabetes  High levels of fetal hemoglobin interfere with the HbA1C  assay. Heterozygous hemoglobin variants (HbS, HgC, etc)do  not significantly interfere with this assay.   However, presence of multiple variants may affect accuracy.     10/05/2018 6.1 (H) 4.0 - 5.6 % Final     Comment:     ADA Screening Guidelines:  5.7-6.4%  Consistent with prediabetes  >or=6.5%  Consistent " with diabetes  High levels of fetal hemoglobin interfere with the HbA1C  assay. Heterozygous hemoglobin variants (HbS, HgC, etc)do  not significantly interfere with this assay.   However, presence of multiple variants may affect accuracy.             ASSESSMENT/PLAN:    Renae was seen today for pain and pain.    Diagnoses and all orders for this visit:    Cervical radiculopathy  -     Ambulatory referral/consult  to [facility] Interventional RAD; Future  -     IR NEERAJ Cervical/THoracic w/ Img; Future    Other orders  -     meloxicam (MOBIC) 15 MG tablet; Take 1 tablet (15 mg total) by mouth once daily.  -     diclofenac sodium (VOLTAREN) 1 % Gel; Apply 3-4 times a day as needed.        Today we discussed at length all of the different treatment options including anti-inflammatories, acetaminophen, rest, ice, heat, physical therapy including strengthening and stretching exercises, home exercises, ROM, aerobic conditioning, aqua therapy, other modalities including ultrasound, massage, and dry needling, epidural steroid injections and finally surgical intervention.      We will get her scheduled for a cervical NEERAJ with IR.  Follow up as needed.

## 2020-08-07 ENCOUNTER — TELEPHONE (OUTPATIENT)
Dept: INTERVENTIONAL RADIOLOGY/VASCULAR | Facility: CLINIC | Age: 60
End: 2020-08-07

## 2020-08-07 NOTE — TELEPHONE ENCOUNTER
I have attempted to contact this patient by phone to schedule his IR cervical injection. No answer/Left message to return call to [phone]. Please forward call.

## 2020-08-10 DIAGNOSIS — M54.12 CERVICAL RADICULOPATHY: Primary | ICD-10-CM

## 2020-08-10 RX ORDER — HYDROCHLOROTHIAZIDE 12.5 MG/1
12.5 TABLET ORAL DAILY
Qty: 30 TABLET | Refills: 11 | Status: SHIPPED | OUTPATIENT
Start: 2020-08-10 | End: 2021-01-19

## 2020-08-10 RX ORDER — PREDNISONE 50 MG/1
TABLET ORAL
Qty: 3 TABLET | Refills: 0 | Status: SHIPPED | OUTPATIENT
Start: 2020-08-10 | End: 2020-11-20

## 2020-08-10 RX ORDER — DIPHENHYDRAMINE HCL 50 MG
CAPSULE ORAL
Qty: 1 CAPSULE | Refills: 0 | Status: SHIPPED | OUTPATIENT
Start: 2020-08-10 | End: 2020-11-20

## 2020-08-10 RX ORDER — FENTANYL CITRATE 50 UG/ML
50 INJECTION, SOLUTION INTRAMUSCULAR; INTRAVENOUS
Status: CANCELLED | OUTPATIENT
Start: 2020-08-10

## 2020-08-10 RX ORDER — MIDAZOLAM HYDROCHLORIDE 1 MG/ML
1 INJECTION INTRAMUSCULAR; INTRAVENOUS
Status: CANCELLED | OUTPATIENT
Start: 2020-08-10

## 2020-08-10 NOTE — NURSING
Pre-op call complete.     Pre procedure instructions given for NEERAJ. Pt instructed not to eat or drink after midnight. Allergies reviewed. Lori to provide transport and monitor pt 8 hrs. Pt denied anticoagulation medications. Home medications reviewed with patient. Pt instructed to check in to second floor radiology/lab desk to have blood work drawn at 730 am then to proceed to [facility] waiting area.  Expected length of stay reviewed.  Covid screening complete.  Pt verbalizes understanding. Questions answered.

## 2020-08-11 ENCOUNTER — HOSPITAL ENCOUNTER (OUTPATIENT)
Facility: HOSPITAL | Age: 60
Discharge: HOME | End: 2020-08-11
Attending: RADIOLOGY | Admitting: RADIOLOGY
Payer: COMMERCIAL

## 2020-08-11 VITALS
HEIGHT: 65 IN | DIASTOLIC BLOOD PRESSURE: 74 MMHG | OXYGEN SATURATION: 98 % | TEMPERATURE: 98 F | RESPIRATION RATE: 18 BRPM | SYSTOLIC BLOOD PRESSURE: 122 MMHG | BODY MASS INDEX: 30.49 KG/M2 | HEART RATE: 82 BPM | WEIGHT: 183 LBS

## 2020-08-11 DIAGNOSIS — M54.12 CERVICAL RADICULOPATHY: ICD-10-CM

## 2020-08-11 PROCEDURE — 25000003 PHARM REV CODE 250: Performed by: FAMILY MEDICINE

## 2020-08-11 PROCEDURE — 63600175 PHARM REV CODE 636 W HCPCS: Performed by: RADIOLOGY

## 2020-08-11 PROCEDURE — 25000003 PHARM REV CODE 250: Performed by: RADIOLOGY

## 2020-08-11 PROCEDURE — 25000003 PHARM REV CODE 250: Performed by: STUDENT IN AN ORGANIZED HEALTH CARE EDUCATION/TRAINING PROGRAM

## 2020-08-11 PROCEDURE — 25500020 PHARM REV CODE 255: Performed by: RADIOLOGY

## 2020-08-11 PROCEDURE — 63600175 PHARM REV CODE 636 W HCPCS: Performed by: STUDENT IN AN ORGANIZED HEALTH CARE EDUCATION/TRAINING PROGRAM

## 2020-08-11 RX ORDER — DEXAMETHASONE SODIUM PHOSPHATE 100 MG/10ML
INJECTION INTRAMUSCULAR; INTRAVENOUS CODE/TRAUMA/SEDATION MEDICATION
Status: COMPLETED | OUTPATIENT
Start: 2020-08-11 | End: 2020-08-11

## 2020-08-11 RX ORDER — FENTANYL CITRATE 50 UG/ML
INJECTION, SOLUTION INTRAMUSCULAR; INTRAVENOUS CODE/TRAUMA/SEDATION MEDICATION
Status: COMPLETED | OUTPATIENT
Start: 2020-08-11 | End: 2020-08-11

## 2020-08-11 RX ORDER — LIDOCAINE HYDROCHLORIDE 10 MG/ML
INJECTION INFILTRATION; PERINEURAL CODE/TRAUMA/SEDATION MEDICATION
Status: COMPLETED | OUTPATIENT
Start: 2020-08-11 | End: 2020-08-11

## 2020-08-11 RX ORDER — MIDAZOLAM HYDROCHLORIDE 1 MG/ML
INJECTION INTRAMUSCULAR; INTRAVENOUS CODE/TRAUMA/SEDATION MEDICATION
Status: COMPLETED | OUTPATIENT
Start: 2020-08-11 | End: 2020-08-11

## 2020-08-11 RX ORDER — SODIUM CHLORIDE 9 MG/ML
500 INJECTION, SOLUTION INTRAVENOUS ONCE
Status: COMPLETED | OUTPATIENT
Start: 2020-08-11 | End: 2020-08-11

## 2020-08-11 RX ORDER — LIDOCAINE HYDROCHLORIDE 10 MG/ML
1 INJECTION, SOLUTION EPIDURAL; INFILTRATION; INTRACAUDAL; PERINEURAL ONCE
Status: COMPLETED | OUTPATIENT
Start: 2020-08-11 | End: 2020-08-11

## 2020-08-11 RX ADMIN — FENTANYL CITRATE 50 MCG: 50 INJECTION, SOLUTION INTRAMUSCULAR; INTRAVENOUS at 10:08

## 2020-08-11 RX ADMIN — DEXAMETHASONE SODIUM PHOSPHATE 20 MG: 10 INJECTION INTRAMUSCULAR; INTRAVENOUS at 10:08

## 2020-08-11 RX ADMIN — SODIUM CHLORIDE 500 ML: 0.9 INJECTION, SOLUTION INTRAVENOUS at 09:08

## 2020-08-11 RX ADMIN — MIDAZOLAM HYDROCHLORIDE 1 MG: 1 INJECTION, SOLUTION INTRAMUSCULAR; INTRAVENOUS at 10:08

## 2020-08-11 RX ADMIN — IOHEXOL 10 ML: 180 INJECTION INTRAVENOUS at 11:08

## 2020-08-11 RX ADMIN — LIDOCAINE HYDROCHLORIDE 0.3 MG: 10 INJECTION, SOLUTION EPIDURAL; INFILTRATION; INTRACAUDAL; PERINEURAL at 09:08

## 2020-08-11 RX ADMIN — LIDOCAINE HYDROCHLORIDE 8 ML: 10 INJECTION, SOLUTION INFILTRATION; PERINEURAL at 10:08

## 2020-08-11 NOTE — DISCHARGE INSTRUCTIONS
"For scheduling: Call Lenka at [phone]    For questions or concerns call: KEISHA MON-FRI 8 AM- 5PM: [phone].   **After hours and weekends: Call [phone] and ask for "Radiology Resident on call".    For immediate concerns that are not emergent, you may call our radiology clinic at: [phone]      "

## 2020-08-11 NOTE — PROCEDURES
Radiology Post-Procedure Note    Pre Op Diagnosis: LBP    Post Op Diagnosis: Same    Procedure: Cervical NEERAJ    Procedure performed by: Duc Aguirre MD    Written Informed Consent Obtained: Yes    Specimen Removed: NO    Estimated Blood Loss: Minimal    Findings: right C4 -5 osteophyte at the neural foramen    Level injected: b/l C4 -5  TF NEERAJ  Needle used: 22 gauge  Dose:  20 mg Dexamethasone   3 mL Lidocaine 1% MPF    Patient tolerated procedure well.    Venu Dhillon MD  NeuroIR fellow.

## 2020-08-11 NOTE — H&P
Radiology History & Physical      SUBJECTIVE:     Chief Complaint: neck pain with bilateral cervical radiculopathy    History of Present Illness:  Renae Hou is a 59 y.o. female who presents for bilateral C4 - 5 TF NEERAJ in patient with cervical radiculopathy.      Past Medical History:   Diagnosis Date    Abscess of paraspinous muscles     Allergy 4/2018    DVT (deep venous thrombosis)     left leg    Epidural abscess 4/10/2018    Fatty liver     GERD (gastroesophageal reflux disease) 2015    Hypertension     Lumbar radiculopathy     Menopause     Obesity     Obstructive sleep apnea on CPAP     no longer uses CPAP after weight loss from gastric sleeve    Thyroid disease     Thyroid nodules.     Past Surgical History:   Procedure Laterality Date    ADENOIDECTOMY  1995    BACK SURGERY  2002    L4, L5    BILATERAL SALPINGOOPHORECTOMY      CHOLECYSTECTOMY      CYST REMOVAL      ESOPHAGOGASTRODUODENOSCOPY N/A 5/3/2019    Procedure: ESOPHAGOGASTRODUODENOSCOPY (EGD);  Surgeon: Carlin Sanchez MD;  [facility]  Service: Endoscopy;  Laterality: N/A;    HYSTERECTOMY  12/17/2012    [facility] BS[facility]O     INSERTION OF INFERIOR VENA CAVAL FILTER Right 7/19/2019    Procedure: IVC filter placement;  Surgeon: Rodney Rico MD;  Location: [facility] CATH LAB;  Service: Peripheral Vascular;  Laterality: Right;    IVC FILTER RETRIEVAL N/A 12/20/2019    Procedure: REMOVAL-FILTER-IVC;  Surgeon: Rodney Rico MD;  [address];  Service: Peripheral Vascular;  Laterality: N/A;    LAPAROSCOPIC SLEEVE GASTRECTOMY N/A 7/23/2019    Procedure: GASTRECTOMY, SLEEVE, LAPAROSCOPIC, with intraop EGD [id number];  Surgeon: Duc Ratliff Jr., MD;  Location: [address];  Service: General;  Laterality: N/A;    RECTOCELE REPAIR      SPINE SURGERY  2018    THYROID NODULE REMOVAL      TONSILLECTOMY  1995       Home Meds:   Prior to Admission medications    Medication Sig Start Date End  Date Taking? Authorizing Provider   diphenhydrAMINE (BENADRYL) 50 MG capsule Take 1 hour prior to procedure 8/10/20  Yes Yi Peters PA-C   hydroCHLOROthiazide (HYDRODIURIL) 12.5 MG Tab Take 1 tablet (12.5 mg total) by mouth once daily. 8/10/20  Yes Franko Garza MD   meclizine (ANTIVERT) 25 mg tablet TAKE 1 TABLET(25 MG) BY MOUTH THREE TIMES DAILY AS NEEDED 7/27/20  Yes Lenny Avila MD   meloxicam (MOBIC) 15 MG tablet Take 1 tablet (15 mg total) by mouth once daily. 8/5/20 9/4/20 Yes Mila Jones PA-C   NON FORMULARY MEDICATION 1 patch by Other route once daily. Apply 1 patch to lower abdomen once daily   Yes Historical Provider, MD   NON FORMULARY MEDICATION 1 patch.   Yes Historical Provider, MD   NON FORMULARY MEDICATION 1 patch.   Yes Historical Provider, MD   ofloxacin (OCUFLOX) 0.3 % ophthalmic solution  7/15/20  Yes Historical Provider, MD   predniSONE (DELTASONE) 50 MG Tab Take 1 tablet at 13 hours prior to procedure, 6 hours prior to procedure, and 1 hour prior to procedure 8/10/20  Yes Yi Peters PA-C   cyanocobalamin 500 MCG tablet Take 500 mcg by mouth once daily.    Historical Provider, MD   diclofenac sodium (VOLTAREN) 1 % Gel Apply 3-4 times a day as needed. 8/5/20   Mila Jones PA-C   EPINEPHrine (EPIPEN) 0.3 mg/0.3 mL AtIn  3/6/20   Historical Provider, MD   ergocalciferol, vitamin D2, (VITAMIN D ORAL) Take 1 tablet by mouth once daily.    Historical Provider, MD   erythromycin (ROMYCIN) ophthalmic ointment ALTON TO AFFECTED EYELID HS 7/15/20   Historical Provider, MD   JUBLIA 10 % Javan  5/22/20   Historical Provider, MD   mometasone (ELOCON) 0.1 % ointment ALTON EXT AA BID PRF RASH 4/24/20   Historical Provider, MD   omeprazole (PRILOSEC) 40 MG capsule Take 1 capsule (40 mg total) by mouth every morning. Open capsule and take with apple sauce  Patient taking differently: Take 40 mg by mouth daily as needed. Open capsule and take with apple sauce 7/10/19   Duc  TIMOTEO Ratliff Jr., MD   potassium chloride SA (K-DUR,KLOR-CON) 20 MEQ tablet Take 1 tablet (20 mEq total) by mouth once daily. 1/2/20   Franko Garza MD   varicella-zoster gE-AS01B, PF, (SHINGRIX, PF,) 50 mcg/0.5 mL injection Inject into the muscle. 5/15/20   Franko Garza MD     Anticoagulants/Antiplatelets: no anticoagulation    Allergies:   Review of patient's allergies indicates:   Allergen Reactions    Cathflo activase [alteplase] Anaphylaxis     Tightness in chest, raspy throat, restless legs, hotness all over    Heparin analogues      Restless legs, tightness in chest, and hot all over    Iodine and iodide containing products Anaphylaxis, Hives, Shortness Of Breath, Itching, Swelling and Rash    Shellfish containing products Anaphylaxis, Hives, Shortness Of Breath, Itching, Swelling and Rash          Latex Swelling           Sedation History:  no adverse reactions    Review of Systems:   Hematological: no known coagulopathies  Respiratory: no shortness of breath  Cardiovascular: no chest pain  Gastrointestinal: no abdominal pain  Genito-Urinary: no dysuria  Musculoskeletal: negative  Neurological: no TIA or stroke symptoms         OBJECTIVE:     Vital Signs (Most Recent)  Temp: 97.7 °F (36.5 °C) (08/11/20 0831)  Pulse: 76 (08/11/20 0831)  Resp: 20 (08/11/20 0831)  BP: 134/87 (08/11/20 0831)  SpO2: 100 % (08/11/20 0831)    Physical Exam:  ASA: 2  Mallampati: 3    General: no acute distress  Mental Status: alert and oriented to person, place and time  HEENT: normocephalic, atraumatic  Chest: unlabored breathing  Heart: regular heart rate  Abdomen: nondistended  Extremity: moves all extremities    Laboratory  Lab Results   Component Value Date    INR 0.9 08/11/2020       Lab Results   Component Value Date    WBC 6.06 08/11/2020    HGB 14.9 08/11/2020    HCT 45.3 08/11/2020    MCV 92 08/11/2020     08/11/2020      Lab Results   Component Value Date     (H) 08/11/2020      08/11/2020    K 4.0 08/11/2020     08/11/2020    CO2 23 08/11/2020    BUN 22 (H) 08/11/2020    CREATININE 0.8 08/11/2020    CALCIUM 9.4 08/11/2020    MG 2.2 07/24/2019    ALT 27 08/11/2020    AST 26 08/11/2020    ALBUMIN 4.0 08/11/2020    BILITOT 0.5 08/11/2020    BILIDIR 0.2 10/05/2018       ASSESSMENT/PLAN:     Sedation Plan: moderate sedation  Patient will undergo - bilateral C4- 5 tf lisy.    Venu Dhillon MD  NeuroIR fellow.

## 2020-08-11 NOTE — PROGRESS NOTES
Ready for D/C pt reports feeling fine / called Kathy and will arrive at [place] entrance for pt ride home / review AVS and S&S infection and need for medical

## 2020-08-11 NOTE — DISCHARGE SUMMARY
Radiology Discharge Summary      Hospital Course: No complications    Admit Date: 8/11/2020  Discharge Date: 08/11/2020     Instructions Given to Patient: Yes  Diet: Resume prior diet  Activity: activity as tolerated    Description of Condition on Discharge: Stable  Vital Signs (Most Recent): Temp: 97.7 °F (36.5 °C) (08/11/20 0831)  Pulse: 66 (08/11/20 1055)  Resp: 18 (08/11/20 1055)  BP: 128/82 (08/11/20 1055)  SpO2: 98 % (08/11/20 1055)    Discharge Disposition: Home    Discharge Diagnosis: cervical radiculopathy     Follow-up: with Mila Dhillon MD  NeuroIR fellow.

## 2020-08-11 NOTE — SEDATION DOCUMENTATION
*bilateral C4- 5 tf lisy complete. Pt tolerated well. VSS. No signs or symptoms of distress noted. Pt will be transferred to ROCU bed and verbal report will be given to RN on arrival.

## 2020-08-11 NOTE — PROGRESS NOTES
Pt arrived to ROCU AAO w/ NAD and no C/O pain placed to monitor and will recovery for allowed time

## 2020-08-11 NOTE — SEDATION DOCUMENTATION
Pt arrived to room 4 for bilateral C4- 5 tf lisy . Pt awake, alert, and oriented. Allergies reviewed, confirmed has taken dye prep, patient ID'd using 2 identifiers.

## 2020-08-12 ENCOUNTER — TELEPHONE (OUTPATIENT)
Dept: SPORTS MEDICINE | Facility: CLINIC | Age: 60
End: 2020-08-12

## 2020-08-14 ENCOUNTER — OFFICE VISIT (OUTPATIENT)
Dept: ORTHOPEDICS | Facility: CLINIC | Age: 60
End: 2020-08-14
Payer: COMMERCIAL

## 2020-08-14 ENCOUNTER — IMMUNIZATION (OUTPATIENT)
Dept: PHARMACY | Facility: CLINIC | Age: 60
End: 2020-08-14
Payer: COMMERCIAL

## 2020-08-14 VITALS
HEART RATE: 84 BPM | SYSTOLIC BLOOD PRESSURE: 110 MMHG | DIASTOLIC BLOOD PRESSURE: 77 MMHG | BODY MASS INDEX: 30.83 KG/M2 | WEIGHT: 185.06 LBS | HEIGHT: 65 IN

## 2020-08-14 DIAGNOSIS — M76.02 GLUTEAL TENDINITIS OF LEFT BUTTOCK: Primary | ICD-10-CM

## 2020-08-14 PROCEDURE — 99213 PR OFFICE/OUTPT VISIT, EST, LEVL III, 20-29 MIN: ICD-10-PCS | Mod: S$GLB,,, | Performed by: ORTHOPAEDIC SURGERY

## 2020-08-14 PROCEDURE — 3074F SYST BP LT 130 MM HG: CPT | Mod: CPTII,S$GLB,, | Performed by: ORTHOPAEDIC SURGERY

## 2020-08-14 PROCEDURE — 99213 OFFICE O/P EST LOW 20 MIN: CPT | Mod: S$GLB,,, | Performed by: ORTHOPAEDIC SURGERY

## 2020-08-14 PROCEDURE — 3078F PR MOST RECENT DIASTOLIC BLOOD PRESSURE < 80 MM HG: ICD-10-PCS | Mod: CPTII,S$GLB,, | Performed by: ORTHOPAEDIC SURGERY

## 2020-08-14 PROCEDURE — 3074F PR MOST RECENT SYSTOLIC BLOOD PRESSURE < 130 MM HG: ICD-10-PCS | Mod: CPTII,S$GLB,, | Performed by: ORTHOPAEDIC SURGERY

## 2020-08-14 PROCEDURE — 99999 PR PBB SHADOW E&M-EST. PATIENT-LVL III: CPT | Mod: PBBFAC,,, | Performed by: ORTHOPAEDIC SURGERY

## 2020-08-14 PROCEDURE — 3008F BODY MASS INDEX DOCD: CPT | Mod: CPTII,S$GLB,, | Performed by: ORTHOPAEDIC SURGERY

## 2020-08-14 PROCEDURE — 3008F PR BODY MASS INDEX (BMI) DOCUMENTED: ICD-10-PCS | Mod: CPTII,S$GLB,, | Performed by: ORTHOPAEDIC SURGERY

## 2020-08-14 PROCEDURE — 99999 PR PBB SHADOW E&M-EST. PATIENT-LVL III: ICD-10-PCS | Mod: PBBFAC,,, | Performed by: ORTHOPAEDIC SURGERY

## 2020-08-14 PROCEDURE — 3078F DIAST BP <80 MM HG: CPT | Mod: CPTII,S$GLB,, | Performed by: ORTHOPAEDIC SURGERY

## 2020-08-17 ENCOUNTER — IMMUNIZATION (OUTPATIENT)
Dept: PHARMACY | Facility: CLINIC | Age: 60
End: 2020-08-17
Payer: COMMERCIAL

## 2020-08-17 PROBLEM — M76.02 GLUTEAL TENDINITIS OF LEFT BUTTOCK: Status: ACTIVE | Noted: 2020-08-17

## 2020-08-17 NOTE — PROGRESS NOTES
Subjective:      Patient ID: Renae Hou is a 59 y.o. female.    Chief Complaint:   Pain of the Left Knee and Pain of the Left Hip    HPI  She came in today for pain in the left hip area which goes down towards her knee.  She does not have pain when she moves her knee.  The pain is been there for about 6 months, and she previously had a blind cortisone injection which helped for 3 days.  It hurts when she goes on longer walks.  It wakes her up if she lies on the left side.  She tried to do physical therapy, but this irritated her back problems.  She has quite complicated spine problems as reviewed..  No specific fall, accident, injury.  No history of pertinent surgery.      Objective:        Ortho/SPM Exam    On exam she is tender to the right greater trochanter.  No pain with active flexion of the hip against gravity or with internal rotation at 90°.  No tenderness of the SI joint, negative JADEN.  Negative flip test.  Skin intact.  Knee benign without tenderness, synovitis or effusion.  No restriction of active range of motion of the knee.      IMAGING:  X-rays of the hip done a couple of months ago were unremarkable with the exception of the lumbar spine hardware visualized above.  Assessment:     Gluteal tendinopathy, left hip    Plan:   I explained to her that there was not much of a surgical option to offer for this condition, and I referred her to Dr. Ann Marie Nguyen for possible image guided injection, OMT, other interventions as she deems appropriate.    The patient's pathophysiology was explained in detail with reference to x-rays, models, other visual aids as appropriate.  Treatment options were discussed in detail.  Questions were invited and answered to the patient's satisfaction.    Greater than 50% of this 15 minute visit was devoted to counseling and coordination of care      Husam Pratt MD  Orthopedic Surgery

## 2020-08-24 NOTE — PROGRESS NOTES
Subjective:     Renae Hou     Chief Complaint   Patient presents with    Left Hip - Pain       HPI    Renae is a 59 y.o. female coming in today for left hip pain that began several year(s) ago, referred by Dr. Pratt. Pt. describes the pain as a 3/10 achy pain that does radiate to her lateral leg. There was not a fall/injury/ or trauma associated with the onset of symptoms. The pain is better with rest, motrin, meloxicam, heat and worse with activity, walking, standing. Pt. has tried PT without relief. Pt. Denies any other musculoskeletal complaints at this time. Hx of lumbar discectomy in 2017 with Dr. Zayas.     Joint instability? no  Mechanical locking/clicking? no  Affecting ADL's? yes  Affecting sleep? Yes, wakes her up at night, pain with laying on L side    Occupation: [employer], desk job (sit/stand desk)    Review of Systems   Constitutional: Negative for chills and fever.   HENT: Negative for hearing loss and tinnitus.    Eyes: Negative for blurred vision and photophobia.   Respiratory: Negative for cough and shortness of breath.    Cardiovascular: Negative for chest pain and leg swelling.   Gastrointestinal: Negative for abdominal pain, heartburn, nausea and vomiting.   Genitourinary: Negative for dysuria and hematuria.   Musculoskeletal: Positive for back pain, joint pain and myalgias. Negative for falls and neck pain.   Skin: Negative for rash.   Neurological: Positive for tingling (residual from back surgery). Negative for dizziness, focal weakness, weakness and headaches.   Endo/Heme/Allergies: Negative for environmental allergies. Does not bruise/bleed easily.   Psychiatric/Behavioral: Negative for depression. The patient is not nervous/anxious.        PAST MEDICAL HISTORY:   Past Medical History:   Diagnosis Date    Abscess of paraspinous muscles     Allergy 4/2018    DVT (deep venous thrombosis)     left leg    Epidural abscess 4/10/2018    Fatty liver     GERD (gastroesophageal reflux  disease) 2015    Hypertension     Lumbar radiculopathy     Menopause     Obesity     Obstructive sleep apnea on CPAP     no longer uses CPAP after weight loss from gastric sleeve    Thyroid disease     Thyroid nodules.     PAST SURGICAL HISTORY:   Past Surgical History:   Procedure Laterality Date    ADENOIDECTOMY  1995    BACK SURGERY  2002    L4, L5    BILATERAL SALPINGOOPHORECTOMY      CHOLECYSTECTOMY      CYST REMOVAL      EPIDURAL STEROID INJECTION INTO CERVICAL SPINE N/A 8/11/2020    Procedure: Injection-steroid-epidural-cervical;  Surgeon: [facility];  Location: [facility] OR [address] [place] [address];  Service: Radiology;  Laterality: N/A;  /[place]    ESOPHAGOGASTRODUODENOSCOPY N/A 5/3/2019    Procedure: ESOPHAGOGASTRODUODENOSCOPY (EGD);  Surgeon: Carlin Sanchez MD;  Location: [facility] (4TH FLR);  Service: Endoscopy;  Laterality: N/A;    HYSTERECTOMY  12/17/2012    [facility] BS&O     INSERTION OF INFERIOR VENA CAVAL FILTER Right 7/19/2019    Procedure: IVC filter placement;  Surgeon: Rodney Rico MD;  Location: [facility] CATH LAB;  Service: Peripheral Vascular;  Laterality: Right;    IVC FILTER RETRIEVAL N/A 12/20/2019    Procedure: REMOVAL-FILTER-IVC;  Surgeon: Rodney Rico MD;  [facility];  Service: Peripheral Vascular;  Laterality: N/A;    LAPAROSCOPIC SLEEVE GASTRECTOMY N/A 7/23/2019    Procedure: GASTRECTOMY, SLEEVE, LAPAROSCOPIC, with intraop EGD 24338;  Surgeon: Duc Ratliff Jr., MD;  Location: [facility] OR [address] [place] [address];  Service: General;  Laterality: N/A;    RECTOCELE REPAIR      SPINE SURGERY  2018    THYROID NODULE REMOVAL      TONSILLECTOMY  1995     FAMILY HISTORY:   Family History   Problem Relation Age of Onset    Hypertension Mother     Hyperlipidemia Mother     Heart disease Mother 55        cad, valvular heart disease    Alcohol abuse Sister     Cirrhosis Sister         alcoholic cirrhosis    Stroke Father     No Known Problems Daughter     No  Known Problems Daughter     Breast cancer Neg Hx     Colon cancer Neg Hx     Ovarian cancer Neg Hx     Diabetes Neg Hx     Esophageal cancer Neg Hx      SOCIAL HISTORY:   Social History     Socioeconomic History    Marital status:      Spouse name: Not on file    Number of children: Not on file    Years of education: Not on file    Highest education level: Not on file   Occupational History    Not on file   Social Needs    Financial resource strain: Not hard at all    Food insecurity     Worry: Never true     Inability: Never true    Transportation needs     Medical: No     Non-medical: No   Tobacco Use    Smoking status: Former Smoker     Packs/day: 0.25     Years: 32.00     Pack years: 8.00     Types: Cigarettes     Quit date:      Years since quittin.6    Smokeless tobacco: Never Used    Tobacco comment: smoked socially decades ago - weekends only   Substance and Sexual Activity    Alcohol use: No     Frequency: Monthly or less     Drinks per session: 1 or 2     Binge frequency: Never     Comment: yearly at most     Drug use: No    Sexual activity: Not Currently     Partners: Male     Birth control/protection: See Surgical Hx, None     Comment: [facility] BS&O 2012,     Lifestyle    Physical activity     Days per week: Patient refused     Minutes per session: 0 min    Stress: Not at all   Relationships    Social connections     Talks on phone: More than three times a week     Gets together: Once a week     Attends Hinduism service: Not on file     Active member of club or organization: No     Attends meetings of clubs or organizations: Patient refused     Relationship status:    Other Topics Concern    Not on file   Social History Narrative    . 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       MEDICATIONS:   Current Outpatient Medications:     cyanocobalamin 500 MCG tablet, Take 500 mcg by mouth once daily., Disp: , Rfl:     diclofenac sodium (VOLTAREN) 1 % Gel, Apply 3-4 times  a day as needed., Disp: 1 Tube, Rfl: 3    diphenhydrAMINE (BENADRYL) 50 MG capsule, Take 1 hour prior to procedure, Disp: 1 capsule, Rfl: 0    EPINEPHrine (EPIPEN) 0.3 mg/0.3 mL AtIn, , Disp: , Rfl:     ergocalciferol, vitamin D2, (VITAMIN D ORAL), Take 1 tablet by mouth once daily., Disp: , Rfl:     erythromycin (ROMYCIN) ophthalmic ointment, ALTON TO AFFECTED EYELID HS, Disp: , Rfl:     hydroCHLOROthiazide (HYDRODIURIL) 12.5 MG Tab, Take 1 tablet (12.5 mg total) by mouth once daily., Disp: 30 tablet, Rfl: 11    JUBLIA 10 % Javan, , Disp: , Rfl:     meclizine (ANTIVERT) 25 mg tablet, TAKE 1 TABLET(25 MG) BY MOUTH THREE TIMES DAILY AS NEEDED, Disp: 45 tablet, Rfl: 1    meloxicam (MOBIC) 15 MG tablet, Take 1 tablet (15 mg total) by mouth once daily., Disp: 30 tablet, Rfl: 0    mometasone (ELOCON) 0.1 % ointment, ALTON EXT AA BID PRF RASH, Disp: , Rfl:     NON FORMULARY MEDICATION, 1 patch by Other route once daily. Apply 1 patch to lower abdomen once daily, Disp: , Rfl:     NON FORMULARY MEDICATION, 1 patch., Disp: , Rfl:     NON FORMULARY MEDICATION, 1 patch., Disp: , Rfl:     ofloxacin (OCUFLOX) 0.3 % ophthalmic solution, , Disp: , Rfl:     omeprazole (PRILOSEC) 40 MG capsule, Take 1 capsule (40 mg total) by mouth every morning. Open capsule and take with apple sauce (Patient taking differently: Take 40 mg by mouth daily as needed. Open capsule and take with apple sauce), Disp: 30 capsule, Rfl: 2    potassium chloride SA (K-DUR,KLOR-CON) 20 MEQ tablet, Take 1 tablet (20 mEq total) by mouth once daily., Disp: 90 tablet, Rfl: 3    predniSONE (DELTASONE) 50 MG Tab, Take 1 tablet at 13 hours prior to procedure, 6 hours prior to procedure, and 1 hour prior to procedure, Disp: 3 tablet, Rfl: 0    varicella-zoster gE-AS01B, PF, (SHINGRIX, PF,) 50 mcg/0.5 mL injection, Inject into the muscle., Disp: 0.5 mL, Rfl: 1  ALLERGIES:   Review of patient's allergies indicates:   Allergen Reactions    Cathflo activase  "[alteplase] Anaphylaxis     Tightness in chest, raspy throat, restless legs, hotness all over    Heparin analogues      Restless legs, tightness in chest, and hot all over    Iodine and iodide containing products Anaphylaxis, Hives, Shortness Of Breath, Itching, Swelling and Rash    Shellfish containing products Anaphylaxis, Hives, Shortness Of Breath, Itching, Swelling and Rash          Latex Swelling           Reviewed office visit on 8/14/20 with Dr. Pratt: KIMBERLI gluteal tendinopathy. referred her to Dr. Ann Marie Nguyen for possible image guided injection, OMT, other interventions as she deems appropriate.    Reviewed office visit on 6/12/20 with Eric Larson PA-C: L hip pain, Referral to PT      Objective:     VITAL SIGNS: /76   Pulse 67   Ht 5' 5" (1.651 m)   Wt 83.9 kg (185 lb)   LMP 11/25/2012   BMI 30.79 kg/m²    General    Nursing note and vitals reviewed.  Constitutional: She is oriented to person, place, and time. She appears well-developed and well-nourished.   HENT:   Head: Normocephalic and atraumatic.   no nasal discharge, no external ear redness or discharge   Eyes:   EOM is full and smooth  No eye redness or discharge   Neck: Neck supple. No tracheal deviation present.   Cardiovascular: Normal rate.    2+ Radial pulse bilaterally  2+ Dorsalis Pedis pulse bilaterally  No LE edema appreciated   Pulmonary/Chest: Effort normal. No respiratory distress.   Abdominal: She exhibits no distension.   No rigidity   Neurological: She is alert and oriented to person, place, and time. She exhibits normal muscle tone. Coordination normal.   See details below   Psychiatric: She has a normal mood and affect. Her behavior is normal.               MUSCULOSKELETAL EXAM  HIP: left HIP  The affected hip is compared to the contralateral hip.    Observation:    There is no edema, erythema, or ecchymosis in the lumbosacral region.   There is no Trendelenburg sign on either side  No obvious pelvic obliquity while " standing.    No thoracolumbar scoliosis observed.    No midline skin abnormalities.  No atrophy noted in the lower limbs.  Gait: Non-antalgic with Neutral ankle mechanics and Neutral medial arch    ROM (* = with pain):  Passive hip flexion to 120° on left and 120° on right  Passive hip internal rotation to 45° on left and 45° on right  Passive hip external rotation to 45° on left and 45° on right   Passive hip abduction to 45° on left and 45° on right  All motions above are without pain.    Tenderness To Palpation:  No tenderness at the ASIS, AIIS, PSIS, PIIS, iliac crest, pubic bones, ischial tuberosity.  + tenderness throughout the left lumbar spine, iliolumbar region, or posterior pelvis.  No tenderness throughout the sacrum or piriformis  + tenderness over the greater trochanteric bursa  + tenderness at the glut attachments on the greater trochanter  No tenderness over proximal IT band or hip flexor musculature.    Strength Testing (* = with pain):  Hip flexion - 5/5 on left and 5/5 on right  Hip extension - 5/5 on left and 5/5 on right  Hip adduction - 5/5 on left and 5/5 on right  Hip abduction - 5/5 on left and 5/5 on right  Knee flexion - 5/5 on left and 5/5 on right  Knee extension - 5/5 on left and 5/5 on right  Glutaeus medius - 4+/5 on left and 4+/5 on right    Special Tests:  Standing Trendelenburg test - negative    Seated straight leg raise - negative  Supine straight leg raise - negative  Hamstring flexibility symmetric    JADEN - negative  FADIR - negative  Scour test - negative  No pain with posterior hip capsule compression    ASIS compression test - negative  SI drawer test - negative   Thigh thrust test - negative     Piriformis test (Bonnet's) - negative  Quadriceps flexibility symmetric.  Carlos test - positive bilaterally  Kyler compression test - negative    Leg lengths symmetric following OMT to the pelvis.     Neurovascular Exam:  Normal gait without Trendelenburg or antalgia.  2+ femoral,  DP, and PT pulses BL.  No skin changes, no abnormal hair distribution.  Sensation intact to light touch throughout the obturator and medial and posterior femoral cutaneous nerves.  Decreased sensation to light touch in the L5 dermatome pattern on the left  2+/4 reflexes at L4 and S1 dermatomes  Capillary refill intact to <2 seconds in all lower limb digits.    TART (Tissue texture abnormality, Asymmetry,  Restriction of motion and/or Tenderness) changes:     Thoracic Spine   T1 Neutral   T2 Neutral   T3 Neutral   T4 Neutral   T5 Neutral   T6 Neutral   T7 Neutral   T8 Neutral   T9 Neutral   T10 Neutral   T11 Neutral   T12 FRS LEFT     Rib cage: Neutral     Lumbar Spine   L1 FRS LEFT   L2 NS-right,R-left   L3 NS-right,R-left   L4 NS-right,R-left   L5 FRS LEFT       Pelvis:  · Innominate:Left superior shear  · Pubic bone:Left superior pubic shear    Sacrum:Right on Left sacral torsion     Lower extremity: left greater trochanter Herniated trigger point (HTP) fascial distortion       Key   F= Flexed   E = Extended   R = Rotated   S = Sidebent   TTA = tissue texture abnormality       Assessment:      Encounter Diagnoses   Name Primary?    Lateral pain of right hip Yes    Greater trochanteric bursitis of left hip     Gluteal tendinitis of left buttock     Myalgia     History of lumbar fusion     Somatic dysfunction of thoracic region     Somatic dysfunction of lumbar region     Sacral region somatic dysfunction     Somatic dysfunction of pelvic region     Somatic dysfunction of lower extremity           Plan:   1. left lateral hip pain secondary to weak gluteal muscles and poor pelvic/core stability with compensatory muscle firing patterns with associated biomechancial restrictions of the lower kinetic chain and history of a L4-S1 lumbar fusion.   - Continue Meloxicam 15 mg p.o. qday prn for pain control as well as ice up to 20 minutes at a time prn for pain control  - OMT performed today to address  biomechanical and fascial contributions to pain  - HEP started  -  X-ray images of left hip taken 6/12/20 (AP pelvis and frogleg lateral  left views) showed no abnormalities at the hip, postoperative changes lower lumbar region with pedicle screws in rods as well as disc spacers L4-S1 visualized. Images were personally reviewed with patient.    2. OMT 5-6 regions. Oral consent obtained.  Reviewed benefits and potential side effects, including bruising at site of treatment and increased soreness for the next 24-48 hours. Pt. Instructed to increased water intake by 1 L today and tomorrow to help with any residual soreness.   - OMT indicated today due to signs and symptoms as well as local and remote somatic dysfunction findings and their related neurokinetic, lymphatic, fascial and/or arteriovenous body connections.   - OMT techniques used: Myofascial Release, Muscle Energy, Fascial Distortion Model and Articulatory   - Treatment was tolerated well. Improvement noted in segmental mobility post-treatment in dysfunctional regions. There were no adverse events and no complications immediately following treatment.     3. Pt. Given the following HEP:  A)  Pelvic clock exercises given to do from the 6-12 o'clock positions:10-15 reps, twice daily. Hand out of exercise also given.   B) Clam shell exercises bilaterally: hold leg in abducted and externally rotated position for 5-10 seconds, repeat 5-10 times  C) IT band rolling: recommend using rolling stick or foam roller to roll out IT band tension bilaterally, 1-2 times a day.     [address] HOME EXERCISE PROGRAM (HEP):  The patient was taught a homegoing physical therapy regimen as described above. The patient demonstrated understanding of the exercises and proper technique of their execution. This interaction took 15 minutes.     4. Follow-up in 3 weeks for reevaluation    5. Patient agreeable to today's plan and all questions were answered    This note is dictated using the  M*Modal Fluency Direct word recognition program. There are word recognition mistakes that are occasionally missed on review.

## 2020-08-25 ENCOUNTER — OFFICE VISIT (OUTPATIENT)
Dept: SPORTS MEDICINE | Facility: CLINIC | Age: 60
End: 2020-08-25
Payer: COMMERCIAL

## 2020-08-25 VITALS
BODY MASS INDEX: 30.82 KG/M2 | SYSTOLIC BLOOD PRESSURE: 106 MMHG | HEART RATE: 67 BPM | WEIGHT: 185 LBS | DIASTOLIC BLOOD PRESSURE: 76 MMHG | HEIGHT: 65 IN

## 2020-08-25 DIAGNOSIS — Z98.1 HISTORY OF LUMBAR FUSION: ICD-10-CM

## 2020-08-25 DIAGNOSIS — M99.05 SOMATIC DYSFUNCTION OF PELVIC REGION: ICD-10-CM

## 2020-08-25 DIAGNOSIS — M99.04 SACRAL REGION SOMATIC DYSFUNCTION: ICD-10-CM

## 2020-08-25 DIAGNOSIS — M79.10 MYALGIA: ICD-10-CM

## 2020-08-25 DIAGNOSIS — M70.62 GREATER TROCHANTERIC BURSITIS OF LEFT HIP: ICD-10-CM

## 2020-08-25 DIAGNOSIS — M99.06 SOMATIC DYSFUNCTION OF LOWER EXTREMITY: ICD-10-CM

## 2020-08-25 DIAGNOSIS — M25.551 LATERAL PAIN OF RIGHT HIP: Primary | ICD-10-CM

## 2020-08-25 DIAGNOSIS — M99.03 SOMATIC DYSFUNCTION OF LUMBAR REGION: ICD-10-CM

## 2020-08-25 DIAGNOSIS — M76.02 GLUTEAL TENDINITIS OF LEFT BUTTOCK: ICD-10-CM

## 2020-08-25 DIAGNOSIS — M99.02 SOMATIC DYSFUNCTION OF THORACIC REGION: ICD-10-CM

## 2020-08-25 PROCEDURE — 98927 OSTEOPATH MANJ 5-6 REGIONS: CPT | Mod: S$GLB,,, | Performed by: NEUROMUSCULOSKELETAL MEDICINE & OMM

## 2020-08-25 PROCEDURE — 3008F PR BODY MASS INDEX (BMI) DOCUMENTED: ICD-10-PCS | Mod: CPTII,S$GLB,, | Performed by: NEUROMUSCULOSKELETAL MEDICINE & OMM

## 2020-08-25 PROCEDURE — 3078F DIAST BP <80 MM HG: CPT | Mod: CPTII,S$GLB,, | Performed by: NEUROMUSCULOSKELETAL MEDICINE & OMM

## 2020-08-25 PROCEDURE — 3074F SYST BP LT 130 MM HG: CPT | Mod: CPTII,S$GLB,, | Performed by: NEUROMUSCULOSKELETAL MEDICINE & OMM

## 2020-08-25 PROCEDURE — 97110 THERAPEUTIC EXERCISES: CPT | Mod: GP,S$GLB,, | Performed by: NEUROMUSCULOSKELETAL MEDICINE & OMM

## 2020-08-25 PROCEDURE — 3008F BODY MASS INDEX DOCD: CPT | Mod: CPTII,S$GLB,, | Performed by: NEUROMUSCULOSKELETAL MEDICINE & OMM

## 2020-08-25 PROCEDURE — 99999 PR PBB SHADOW E&M-EST. PATIENT-LVL V: CPT | Mod: PBBFAC,,, | Performed by: NEUROMUSCULOSKELETAL MEDICINE & OMM

## 2020-08-25 PROCEDURE — 3074F PR MOST RECENT SYSTOLIC BLOOD PRESSURE < 130 MM HG: ICD-10-PCS | Mod: CPTII,S$GLB,, | Performed by: NEUROMUSCULOSKELETAL MEDICINE & OMM

## 2020-08-25 PROCEDURE — 3078F PR MOST RECENT DIASTOLIC BLOOD PRESSURE < 80 MM HG: ICD-10-PCS | Mod: CPTII,S$GLB,, | Performed by: NEUROMUSCULOSKELETAL MEDICINE & OMM

## 2020-08-25 PROCEDURE — 98927 PR OSTEOPATHIC MANIP,5-6 BODY REGN: ICD-10-PCS | Mod: S$GLB,,, | Performed by: NEUROMUSCULOSKELETAL MEDICINE & OMM

## 2020-08-25 PROCEDURE — 99213 OFFICE O/P EST LOW 20 MIN: CPT | Mod: 25,S$GLB,, | Performed by: NEUROMUSCULOSKELETAL MEDICINE & OMM

## 2020-08-25 PROCEDURE — 99999 PR PBB SHADOW E&M-EST. PATIENT-LVL V: ICD-10-PCS | Mod: PBBFAC,,, | Performed by: NEUROMUSCULOSKELETAL MEDICINE & OMM

## 2020-08-25 PROCEDURE — 97110 PR THERAPEUTIC EXERCISES: ICD-10-PCS | Mod: GP,S$GLB,, | Performed by: NEUROMUSCULOSKELETAL MEDICINE & OMM

## 2020-08-25 PROCEDURE — 99213 PR OFFICE/OUTPT VISIT, EST, LEVL III, 20-29 MIN: ICD-10-PCS | Mod: 25,S$GLB,, | Performed by: NEUROMUSCULOSKELETAL MEDICINE & OMM

## 2020-08-25 NOTE — LETTER
August 25, 2020      Husam Pratt MD  [address]  [facility] [address]           [facility]  [address]  [facility] [address]  Phone: [phone]          Patient: Renae Hou   MR Number: [id number]   YOB: 1960   Date of Visit: 8/25/2020       Dear Dr. Husam Pratt:    Thank you for referring Renae Hou to me for evaluation. Attached you will find relevant portions of my assessment and plan of care.    If you have questions, please do not hesitate to call me. I look forward to following Renae Hou along with you.    Sincerely,    Ann Marie Nguyen, DO    Enclosure  CC:  No Recipients    If you would like to receive this communication electronically, please contact [email or web address] or [phone] to request more information on [employer] Link access.    For providers and/or their staff who would like to refer a patient to [facility], please contact us through our one-stop-shop provider referral line, [facility], at [phone].    If you feel you have received this communication in error or would no longer like to receive these types of communications, please e-mail [email or web address]

## 2020-08-25 NOTE — PROGRESS NOTES
"Digital Medicine: Clinician Follow-Up    Called patient to address low BP alert on 8/23/2020. Patient says she isn't sure why her BP was low. She reports taking HCTZ prn for edema and says that she took a dose on 8/21/2020. She says she was at the beach over the weekend and "there was drama". She says that she drinks 2-3 bottles of crystal lite per day.     The history is provided by the patient.      Review of patient's allergies indicates:   -- Cathflo activase (alteplase) -- Anaphylaxis    --  Tightness in chest, raspy throat, restless legs,             hotness all over   -- Heparin analogues     --  Restless legs, tightness in chest, and hot all             over   -- Iodine and iodide containing products -- Anaphylaxis, Hives, Shortness Of                            Breath, Itching, Swelling and Rash   -- Shellfish containing products -- Anaphylaxis, Hives, Shortness Of                            Breath, Itching, Swelling and Rash    --     -- Latex -- Swelling    --      Hypertension    Readings are trending down   Patient is not experiencing signs/symptoms of hypotension.          Last 5 Patient Entered Readings                                      Current 30 Day Average: 104/65     Recent Readings 8/24/2020 8/23/2020 8/23/2020 8/23/2020 8/23/2020    SBP (mmHg) 101 91 100 85 81    DBP (mmHg) 65 58 61 54 54    Pulse 53 62 66 58 59               Depression Screening  Did not address depression screening.    Sleep Apnea Screening    Did not address sleep apnea screening.     Medication Affordability Screening  Did not address medication affordability screening.     Medication Adherence-Medication Adherence not addressed.          ASSESSMENT(S)  Patients BP average is 104/65 mmHg, which is at goal. Patient's BP goal is less than or equal to 130/80 per 2017 ACC/AHA Hypertension Guidelines.  BUN slghtly elevated on 8/11/2020    Hypertension Plan  Continue current diet/physical activity routine.  Provided patient " education.  Increase hydration     Addressed any questions or concerns and patient has my contact information if needed prior to next outreach. Patient verbalizes understanding.            There are no preventive care reminders to display for this patient.      Hypertension Medications     hydroCHLOROthiazide (HYDRODIURIL) 12.5 MG Tab Take 1 tablet (12.5 mg total) by mouth once daily.

## 2020-09-10 ENCOUNTER — PATIENT OUTREACH (OUTPATIENT)
Dept: ADMINISTRATIVE | Facility: OTHER | Age: 60
End: 2020-09-10

## 2020-09-17 ENCOUNTER — PATIENT MESSAGE (OUTPATIENT)
Dept: BARIATRICS | Facility: CLINIC | Age: 60
End: 2020-09-17

## 2020-09-18 ENCOUNTER — TELEPHONE (OUTPATIENT)
Dept: ORTHOPEDICS | Facility: CLINIC | Age: 60
End: 2020-09-18

## 2020-09-18 ENCOUNTER — OFFICE VISIT (OUTPATIENT)
Dept: SPORTS MEDICINE | Facility: CLINIC | Age: 60
End: 2020-09-18
Payer: COMMERCIAL

## 2020-09-18 ENCOUNTER — PATIENT MESSAGE (OUTPATIENT)
Dept: ORTHOPEDICS | Facility: CLINIC | Age: 60
End: 2020-09-18

## 2020-09-18 VITALS
BODY MASS INDEX: 30.32 KG/M2 | DIASTOLIC BLOOD PRESSURE: 90 MMHG | SYSTOLIC BLOOD PRESSURE: 129 MMHG | HEART RATE: 81 BPM | HEIGHT: 65 IN | WEIGHT: 182 LBS

## 2020-09-18 DIAGNOSIS — M99.04 SACRAL REGION SOMATIC DYSFUNCTION: ICD-10-CM

## 2020-09-18 DIAGNOSIS — M25.552 LATERAL PAIN OF LEFT HIP: ICD-10-CM

## 2020-09-18 DIAGNOSIS — M99.05 SOMATIC DYSFUNCTION OF PELVIC REGION: ICD-10-CM

## 2020-09-18 DIAGNOSIS — M99.03 SOMATIC DYSFUNCTION OF LUMBAR REGION: ICD-10-CM

## 2020-09-18 DIAGNOSIS — M70.62 GREATER TROCHANTERIC BURSITIS OF LEFT HIP: ICD-10-CM

## 2020-09-18 DIAGNOSIS — M54.16 CHRONIC LEFT LUMBAR RADICULOPATHY: Primary | ICD-10-CM

## 2020-09-18 DIAGNOSIS — M51.36 DDD (DEGENERATIVE DISC DISEASE), LUMBAR: Primary | ICD-10-CM

## 2020-09-18 DIAGNOSIS — M79.10 MYALGIA: ICD-10-CM

## 2020-09-18 PROCEDURE — 3008F BODY MASS INDEX DOCD: CPT | Mod: CPTII,S$GLB,, | Performed by: NEUROMUSCULOSKELETAL MEDICINE & OMM

## 2020-09-18 PROCEDURE — 3008F PR BODY MASS INDEX (BMI) DOCUMENTED: ICD-10-PCS | Mod: CPTII,S$GLB,, | Performed by: NEUROMUSCULOSKELETAL MEDICINE & OMM

## 2020-09-18 PROCEDURE — 99214 OFFICE O/P EST MOD 30 MIN: CPT | Mod: 25,S$GLB,, | Performed by: NEUROMUSCULOSKELETAL MEDICINE & OMM

## 2020-09-18 PROCEDURE — 3074F SYST BP LT 130 MM HG: CPT | Mod: CPTII,S$GLB,, | Performed by: NEUROMUSCULOSKELETAL MEDICINE & OMM

## 2020-09-18 PROCEDURE — 98926 OSTEOPATH MANJ 3-4 REGIONS: CPT | Mod: S$GLB,,, | Performed by: NEUROMUSCULOSKELETAL MEDICINE & OMM

## 2020-09-18 PROCEDURE — 99999 PR PBB SHADOW E&M-EST. PATIENT-LVL IV: CPT | Mod: PBBFAC,,, | Performed by: NEUROMUSCULOSKELETAL MEDICINE & OMM

## 2020-09-18 PROCEDURE — 98926 PR OSTEOPATHIC MANIP,3-4 BODY REGN: ICD-10-PCS | Mod: S$GLB,,, | Performed by: NEUROMUSCULOSKELETAL MEDICINE & OMM

## 2020-09-18 PROCEDURE — 99214 PR OFFICE/OUTPT VISIT, EST, LEVL IV, 30-39 MIN: ICD-10-PCS | Mod: 25,S$GLB,, | Performed by: NEUROMUSCULOSKELETAL MEDICINE & OMM

## 2020-09-18 PROCEDURE — 3074F PR MOST RECENT SYSTOLIC BLOOD PRESSURE < 130 MM HG: ICD-10-PCS | Mod: CPTII,S$GLB,, | Performed by: NEUROMUSCULOSKELETAL MEDICINE & OMM

## 2020-09-18 PROCEDURE — 3080F DIAST BP >= 90 MM HG: CPT | Mod: CPTII,S$GLB,, | Performed by: NEUROMUSCULOSKELETAL MEDICINE & OMM

## 2020-09-18 PROCEDURE — 99999 PR PBB SHADOW E&M-EST. PATIENT-LVL IV: ICD-10-PCS | Mod: PBBFAC,,, | Performed by: NEUROMUSCULOSKELETAL MEDICINE & OMM

## 2020-09-18 PROCEDURE — 3080F PR MOST RECENT DIASTOLIC BLOOD PRESSURE >= 90 MM HG: ICD-10-PCS | Mod: CPTII,S$GLB,, | Performed by: NEUROMUSCULOSKELETAL MEDICINE & OMM

## 2020-09-18 RX ORDER — GABAPENTIN 300 MG/1
300 CAPSULE ORAL 3 TIMES DAILY
Qty: 270 CAPSULE | Refills: 0 | Status: SHIPPED | OUTPATIENT
Start: 2020-09-18 | End: 2022-04-06

## 2020-09-18 RX ORDER — TRIAMCINOLONE ACETONIDE 40 MG/ML
40 INJECTION, SUSPENSION INTRA-ARTICULAR; INTRAMUSCULAR
Status: CANCELLED | OUTPATIENT
Start: 2020-09-18 | End: 2020-09-18

## 2020-09-18 NOTE — PROGRESS NOTES
Digital Medicine: Health  Follow-Up    The history is provided by the patient.             Reason for review: Blood pressure at goal        Topics Covered on Call: physical activity and Diet          Diet-no change to diet    No change to diet.  Patient reports eating or drinking the following: Patient states that she continues to limit sodium intake and feels she is doing well with this.      Physical Activity-Change      Additional physical activity details: Patient states that she is not currently walking and riding her bike because she is going to PT for hip pain. She plans to resume normal activity once PT sessions are done.      Medication Adherence-Medication Adherence not addressed.      Substance, Sleep, Stress-Not assessed      Continue current diet/physical activity routine.       Addressed patient questions and patient has my contact information if needed prior to next outreach. Patient verbalizes understanding.      Explained the importance of self-monitoring and medication adherence. Encouraged the patient to communicate with their health  for lifestyle modifications to help improve or maintain a healthy lifestyle.            There are no preventive care reminders to display for this patient.    Last 5 Patient Entered Readings                                      Current 30 Day Average: 102/65     Recent Readings 9/17/2020 9/17/2020 9/16/2020 9/15/2020 9/11/2020    SBP (mmHg) 111 113 117 96 110    DBP (mmHg) 73 77 74 63 69    Pulse 80 92 65 75 62

## 2020-09-18 NOTE — PROGRESS NOTES
"Subjective:     Renae Hou     Chief Complaint   Patient presents with    Left Hip - Pain       HPI      Renae is a 59 y.o. female coming in today for left hip pain. Since last visit the pain has Improved. Pt reports she felt better up until the past few nights, when the pain has been waking her up. She reports "linda horses" at night and has to get up and walk around.  Patient notes that her point tenderness lateral hip pain has overall improved, however she has persistent diffuse lateral hip that radiates to her anterior thigh with associated numbness. The pain is better with OMT, rest, motrin, meloxicam, heat and worse with activity, walking, standing Pt. describes the pain as a 5/10 achy pain that does radiate. There has not been any new a fall/injury/ or traumas since last visit.  Pt. denies any new musculoskeletal complaints at this time. Hx of lumbar discectomy in 2017 with Dr. Zayas.     Office note from 8/25/20 reviewed    Joint instability? no  Mechanical locking/clicking? no  Affecting ADL's? yes  Affecting sleep? Yes, wakes her up at night, pain with laying on L side    Occupation: [employer], desk job (sit/stand desk)    Review of Systems   Constitutional: Negative for chills and fever.   Musculoskeletal: Positive for back pain, joint pain and myalgias. Negative for falls and neck pain.   Neurological: Negative for dizziness, tingling, focal weakness, weakness and headaches.       PAST MEDICAL HISTORY:   Past Medical History:   Diagnosis Date    Abscess of paraspinous muscles     Allergy 4/2018    DVT (deep venous thrombosis)     left leg    Epidural abscess 4/10/2018    Fatty liver     GERD (gastroesophageal reflux disease) 2015    Hypertension     Lumbar radiculopathy     Menopause     Obesity     Obstructive sleep apnea on CPAP     no longer uses CPAP after weight loss from gastric sleeve    Thyroid disease     Thyroid nodules.     PAST SURGICAL HISTORY:   Past Surgical History: "   Procedure Laterality Date    ADENOIDECTOMY  1995    BACK SURGERY  2002    L4, L5    BILATERAL SALPINGOOPHORECTOMY      CHOLECYSTECTOMY      CYST REMOVAL      EPIDURAL STEROID INJECTION INTO CERVICAL SPINE N/A 8/11/2020    Procedure: Injection-steroid-epidural-cervical;  Surgeon: April Diagnostic Provider;  Location: [address];  Service: Radiology;  Laterality: N/A;  /Carla    ESOPHAGOGASTRODUODENOSCOPY N/A 5/3/2019    Procedure: ESOPHAGOGASTRODUODENOSCOPY (EGD);  Surgeon: Carlin Sanchez MD;  Location: [facility] ENDO (4TH FLR);  Service: Endoscopy;  Laterality: N/A;    HYSTERECTOMY  12/17/2012    [facility] BS&O     INSERTION OF INFERIOR VENA CAVAL FILTER Right 7/19/2019    Procedure: IVC filter placement;  Surgeon: Rodney Rico MD;  Location: [facility] CATH LAB;  Service: Peripheral Vascular;  Laterality: Right;    IVC FILTER RETRIEVAL N/A 12/20/2019    Procedure: REMOVAL-FILTER-IVC;  Surgeon: Rodney Rico MD;  Location: [address];  Service: Peripheral Vascular;  Laterality: N/A;    LAPAROSCOPIC SLEEVE GASTRECTOMY N/A 7/23/2019    Procedure: GASTRECTOMY, SLEEVE, LAPAROSCOPIC, with intraop EGD [id number];  Surgeon: Duc Ratliff Jr., MD;  Location: [address];  Service: General;  Laterality: N/A;    RECTOCELE REPAIR      SPINE SURGERY  2018    THYROID NODULE REMOVAL      TONSILLECTOMY  1995     FAMILY HISTORY:   Family History   Problem Relation Age of Onset    Hypertension Mother     Hyperlipidemia Mother     Heart disease Mother 55        cad, valvular heart disease    Alcohol abuse Sister     Cirrhosis Sister         alcoholic cirrhosis    Stroke Father     No Known Problems Daughter     No Known Problems Daughter     Breast cancer Neg Hx     Colon cancer Neg Hx     Ovarian cancer Neg Hx     Diabetes Neg Hx     Esophageal cancer Neg Hx      SOCIAL HISTORY:   Social History     Socioeconomic History    Marital status:      Spouse name: Not on file     Number of children: Not on file    Years of education: Not on file    Highest education level: Not on file   Occupational History    Not on file   Social Needs    Financial resource strain: Not hard at all    Food insecurity     Worry: Never true     Inability: Never true    Transportation needs     Medical: No     Non-medical: No   Tobacco Use    Smoking status: Former Smoker     Packs/day: 0.25     Years: 32.00     Pack years: 8.00     Types: Cigarettes     Quit date:      Years since quittin.7    Smokeless tobacco: Never Used    Tobacco comment: smoked socially decades ago - weekends only   Substance and Sexual Activity    Alcohol use: No     Frequency: Monthly or less     Drinks per session: 1 or 2     Binge frequency: Never     Comment: yearly at most     Drug use: No    Sexual activity: Not Currently     Partners: Male     Birth control/protection: See Surgical Hx, None     Comment: [facility]ALEIDA BS&O 2012,     Lifestyle    Physical activity     Days per week: Patient refused     Minutes per session: 0 min    Stress: Not at all   Relationships    Social connections     Talks on phone: More than three times a week     Gets together: Once a week     Attends Mandaen service: Not on file     Active member of club or organization: No     Attends meetings of clubs or organizations: Patient refused     Relationship status:    Other Topics Concern    Not on file   Social History Narrative    . 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       MEDICATIONS:   Current Outpatient Medications:     cyanocobalamin 500 MCG tablet, Take 500 mcg by mouth once daily., Disp: , Rfl:     diclofenac sodium (VOLTAREN) 1 % Gel, Apply 3-4 times a day as needed., Disp: 1 Tube, Rfl: 3    EPINEPHrine (EPIPEN) 0.3 mg/0.3 mL AtIn, , Disp: , Rfl:     ergocalciferol, vitamin D2, (VITAMIN D ORAL), Take 1 tablet by mouth once daily., Disp: , Rfl:     hydroCHLOROthiazide (HYDRODIURIL) 12.5 MG Tab, Take 1 tablet (12.5 mg  total) by mouth once daily., Disp: 30 tablet, Rfl: 11    JUBLIA 10 % Javan, , Disp: , Rfl:     meclizine (ANTIVERT) 25 mg tablet, TAKE 1 TABLET(25 MG) BY MOUTH THREE TIMES DAILY AS NEEDED, Disp: 45 tablet, Rfl: 1    mometasone (ELOCON) 0.1 % ointment, ALTON EXT AA BID PRF RASH, Disp: , Rfl:     NON FORMULARY MEDICATION, 1 patch by Other route once daily. Apply 1 patch to lower abdomen once daily, Disp: , Rfl:     NON FORMULARY MEDICATION, 1 patch., Disp: , Rfl:     NON FORMULARY MEDICATION, 1 patch., Disp: , Rfl:     ofloxacin (OCUFLOX) 0.3 % ophthalmic solution, , Disp: , Rfl:     omeprazole (PRILOSEC) 40 MG capsule, Take 1 capsule (40 mg total) by mouth every morning. Open capsule and take with apple sauce (Patient taking differently: Take 40 mg by mouth daily as needed. Open capsule and take with apple sauce), Disp: 30 capsule, Rfl: 2    potassium chloride SA (K-DUR,KLOR-CON) 20 MEQ tablet, Take 1 tablet (20 mEq total) by mouth once daily., Disp: 90 tablet, Rfl: 3    varicella-zoster gE-AS01B, PF, (SHINGRIX, PF,) 50 mcg/0.5 mL injection, Inject into the muscle., Disp: 0.5 mL, Rfl: 1    diphenhydrAMINE (BENADRYL) 50 MG capsule, Take 1 hour prior to procedure, Disp: 1 capsule, Rfl: 0    erythromycin (ROMYCIN) ophthalmic ointment, ALTON TO AFFECTED EYELID HS, Disp: , Rfl:     gabapentin (NEURONTIN) 300 MG capsule, Take 1 capsule (300 mg total) by mouth 3 (three) times daily., Disp: 270 capsule, Rfl: 0    predniSONE (DELTASONE) 50 MG Tab, Take 1 tablet at 13 hours prior to procedure, 6 hours prior to procedure, and 1 hour prior to procedure, Disp: 3 tablet, Rfl: 0  ALLERGIES:   Review of patient's allergies indicates:   Allergen Reactions    Cathflo activase [alteplase] Anaphylaxis     Tightness in chest, raspy throat, restless legs, hotness all over    Heparin analogues      Restless legs, tightness in chest, and hot all over    Iodine and iodide containing products Anaphylaxis, Hives, Shortness Of  "Breath, Itching, Swelling and Rash    Shellfish containing products Anaphylaxis, Hives, Shortness Of Breath, Itching, Swelling and Rash          Latex Swelling           Reviewed office visit on 8/14/20 with Dr. Pratt: KIMBERLI gluteal tendinopathy. referred her to Dr. Ann Marie Nguyen for possible image guided injection, OMT, other interventions as she deems appropriate.    Reviewed office visit on 6/12/20 with Eric Larson PA-C: L hip pain, Referral to PT      Objective:     VITAL SIGNS: BP (!) 129/90   Pulse 81   Ht 5' 5" (1.651 m)   Wt 82.6 kg (182 lb)   LMP 11/25/2012   BMI 30.29 kg/m²    General    Nursing note and vitals reviewed.  Constitutional: She is oriented to person, place, and time. She appears well-developed and well-nourished.   HENT:   Head: Normocephalic and atraumatic.   no nasal discharge, no external ear redness or discharge   Eyes:   EOM is full and smooth  No eye redness or discharge   Neck: Neck supple. No tracheal deviation present.   Cardiovascular: Normal rate.    2+ Radial pulse bilaterally  2+ Dorsalis Pedis pulse bilaterally  No LE edema appreciated   Pulmonary/Chest: Effort normal. No respiratory distress.   Abdominal: She exhibits no distension.   No rigidity   Neurological: She is alert and oriented to person, place, and time. She exhibits normal muscle tone. Coordination normal.   See details below   Psychiatric: She has a normal mood and affect. Her behavior is normal.               MUSCULOSKELETAL EXAM  HIP: left HIP  The affected hip is compared to the contralateral hip.    Observation:    There is no edema, erythema, or ecchymosis in the lumbosacral region.   There is no Trendelenburg sign on either side  No obvious pelvic obliquity while standing.    No thoracolumbar scoliosis observed.    No midline skin abnormalities.  No atrophy noted in the lower limbs.  Gait: Non-antalgic with Neutral ankle mechanics and Neutral medial arch     ROM (* = with pain):  Passive hip flexion to " 120° on left and 120° on right  Passive hip internal rotation to 45° on left and 45° on right  Passive hip external rotation to 45° on left and 45° on right   Passive hip abduction to 45° on left and 45° on right  All motions above are without pain.    Tenderness To Palpation:  No tenderness at the ASIS, AIIS, PSIS, PIIS, iliac crest, pubic bones, ischial tuberosity.  No tenderness throughout the left lumbar spine, iliolumbar region, or posterior pelvis.  No tenderness throughout the sacrum or piriformis  No tenderness over the greater trochanteric bursa  + tenderness at the glut attachments on the greater trochanter  No tenderness over proximal IT band or hip flexor musculature.    Strength Testing (* = with pain):  Hip flexion - 5/5 on left and 5/5 on right  Hip extension - 5/5 on left and 5/5 on right  Hip adduction - 5/5 on left and 5/5 on right  Hip abduction - 5/5 on left and 5/5 on right  Knee flexion - 5/5 on left and 5/5 on right  Knee extension - 5/5 on left and 5/5 on right  Glutaeus medius - 5-/5 on left and 5-/5 on right    Special Tests:  Standing Trendelenburg test - negative    Seated straight leg raise - negative  Supine straight leg raise - negative  Hamstring flexibility symmetric    JADEN - negative  FADIR - negative  Scour test - negative  No pain with posterior hip capsule compression    ASIS compression test - negative  SI drawer test - negative   Thigh thrust test - negative     Piriformis test (Bonnet's) - negative  Quadriceps flexibility symmetric.  Carlos test - positive bilaterally  Kyler compression test - negative    Leg lengths symmetric following OMT to the pelvis.     Neurovascular Exam:  Normal gait without Trendelenburg or antalgia.  2+ femoral, DP, and PT pulses BL.  No skin changes, no abnormal hair distribution.  Decreased sensation to light touch in the L3-5 dermatome patterns on the left  2+/4 reflexes at L4 and S1 dermatomes  Capillary refill intact to <2 seconds in all lower  limb digits.    TART (Tissue texture abnormality, Asymmetry,  Restriction of motion and/or Tenderness) changes:     Thoracic Spine   T1 Neutral   T2 Neutral   T3 Neutral   T4 Neutral   T5 Neutral   T6 Neutral   T7 Neutral   T8 Neutral   T9 Neutral   T10 Neutral   T11 Neutral   T12 Neutral     Rib cage: Neutral     Lumbar Spine   L1 Neutral   L2 Neutral   L3 Neutral   L4 FRS RIGHT   L5 FRS RIGHT       Pelvis:  · Innominate:Left superior shear  · Pubic bone:Left superior pubic shear    Sacrum:Right unilateral     Lower extremity: left greater trochanter Herniated trigger point (HTP) fascial distortion       Key   F= Flexed   E = Extended   R = Rotated   S = Sidebent   TTA = tissue texture abnormality       Assessment:      Encounter Diagnoses   Name Primary?    Chronic left lumbar radiculopathy Yes    Lateral pain of left hip     Greater trochanteric bursitis of left hip     Myalgia     Somatic dysfunction of lumbar region     Sacral region somatic dysfunction     Somatic dysfunction of pelvic region           Plan:   1. Left lateral hip pain secondary to weak gluteal muscles and poor pelvic/core stability with compensatory muscle firing patterns with associated biomechancial restrictions of the lower kinetic chain- improved, however persistent left lower extremity paresthesias and pain pattern consistent with L3 nerve root irritation.  Recommend follow-up with Dr. Zayas  for further evaluation given history of L4-S1 lumbar fusion.   - Continue Meloxicam 15 mg p.o. qday prn for pain control as well as ice up to 20 minutes at a time prn for pain control  - prescription given for Neurontin 300 mg p.o.  t.i.d.   - OMT performed again today to address biomechanical and fascial contributions to pain  - HEP reviewed  -  Discussed option of ultrasound-guided left greater trochanteric bursa injection if left lateral hip point tenderness returns.   -  X-ray images of left hip taken 6/12/20 (AP pelvis and frogleg  lateral  left views) showed no abnormalities at the hip, postoperative changes lower lumbar region with pedicle screws in rods as well as disc spacers L4-S1 visualized. Images were personally reviewed with patient.    2. OMT 3-4 regions. Oral consent obtained.  Reviewed benefits and potential side effects, including bruising at site of treatment and increased soreness for the next 24-48 hours. Pt. Instructed to increased water intake by 1 L today and tomorrow to help with any residual soreness.   - OMT indicated today due to signs and symptoms as well as local and remote somatic dysfunction findings and their related neurokinetic, lymphatic, fascial and/or arteriovenous body connections.   - OMT techniques used: Myofascial Release, Muscle Energy, Fascial Distortion Model and Articulatory   - Treatment was tolerated well. Improvement noted in segmental mobility post-treatment in dysfunctional regions. There were no adverse events and no complications immediately following treatment.     3.  Reviewed with patient the following HEP:  Continue:  A)  Pelvic clock exercises given to do from the 6-12 o'clock positions:10-15 reps, twice daily. Hand out of exercise also given.   B) Clam shell exercises bilaterally: hold leg in abducted and externally rotated position for 5-10 seconds, repeat 5-10 times  C) IT band rolling: recommend using rolling stick or foam roller to roll out IT band tension bilaterally, 1-2 times a day.      The patient was taught a homegoing physical therapy regimen as described above. The patient demonstrated understanding of the exercises and proper technique of their execution.     4. Follow-up as needed if pain deteriorates or new issue arises.  Recommend follow-up with Dr. Moreno for further evaluation of lumbar spine.     5. Patient agreeable to today's plan and all questions were answered    This note is dictated using the [employer] Fluency Direct word recognition program. There are word  recognition mistakes that are occasionally missed on review.

## 2020-10-04 ENCOUNTER — PATIENT MESSAGE (OUTPATIENT)
Dept: ORTHOPEDICS | Facility: CLINIC | Age: 60
End: 2020-10-04

## 2020-10-13 ENCOUNTER — PATIENT OUTREACH (OUTPATIENT)
Dept: ADMINISTRATIVE | Facility: OTHER | Age: 60
End: 2020-10-13

## 2020-10-14 ENCOUNTER — HOSPITAL ENCOUNTER (OUTPATIENT)
Dept: RADIOLOGY | Facility: HOSPITAL | Age: 60
Discharge: HOME | End: 2020-10-14
Attending: ORTHOPAEDIC SURGERY
Payer: COMMERCIAL

## 2020-10-14 ENCOUNTER — OFFICE VISIT (OUTPATIENT)
Dept: ORTHOPEDICS | Facility: CLINIC | Age: 60
End: 2020-10-14
Payer: COMMERCIAL

## 2020-10-14 DIAGNOSIS — M51.36 DDD (DEGENERATIVE DISC DISEASE), LUMBAR: ICD-10-CM

## 2020-10-14 DIAGNOSIS — M50.30 DDD (DEGENERATIVE DISC DISEASE), CERVICAL: ICD-10-CM

## 2020-10-14 DIAGNOSIS — M54.12 CERVICAL RADICULOPATHY: Primary | ICD-10-CM

## 2020-10-14 DIAGNOSIS — M54.12 RADICULOPATHY, CERVICAL REGION: ICD-10-CM

## 2020-10-14 PROCEDURE — 99214 PR OFFICE/OUTPT VISIT, EST, LEVL IV, 30-39 MIN: ICD-10-PCS | Mod: S$GLB,,, | Performed by: ORTHOPAEDIC SURGERY

## 2020-10-14 PROCEDURE — 99999 PR PBB SHADOW E&M-EST. PATIENT-LVL IV: ICD-10-PCS | Mod: PBBFAC,,, | Performed by: ORTHOPAEDIC SURGERY

## 2020-10-14 PROCEDURE — 72120 X-RAY BEND ONLY L-S SPINE: CPT | Mod: 26,,, | Performed by: RADIOLOGY

## 2020-10-14 PROCEDURE — 72120 XR LUMBAR SPINE AP AND LAT WITH FLEX/EXT: ICD-10-PCS | Mod: 26,,, | Performed by: RADIOLOGY

## 2020-10-14 PROCEDURE — 72100 X-RAY EXAM L-S SPINE 2/3 VWS: CPT | Mod: 26,,, | Performed by: RADIOLOGY

## 2020-10-14 PROCEDURE — 72100 XR LUMBAR SPINE AP AND LAT WITH FLEX/EXT: ICD-10-PCS | Mod: 26,,, | Performed by: RADIOLOGY

## 2020-10-14 PROCEDURE — 99214 OFFICE O/P EST MOD 30 MIN: CPT | Mod: S$GLB,,, | Performed by: ORTHOPAEDIC SURGERY

## 2020-10-14 PROCEDURE — 99999 PR PBB SHADOW E&M-EST. PATIENT-LVL IV: CPT | Mod: PBBFAC,,, | Performed by: ORTHOPAEDIC SURGERY

## 2020-10-14 PROCEDURE — 72050 X-RAY EXAM NECK SPINE 4/5VWS: CPT | Mod: 26,,, | Performed by: RADIOLOGY

## 2020-10-14 PROCEDURE — 72050 XR CERVICAL SPINE AP LAT WITH FLEX EXTEN: ICD-10-PCS | Mod: 26,,, | Performed by: RADIOLOGY

## 2020-10-14 PROCEDURE — 72120 X-RAY BEND ONLY L-S SPINE: CPT | Mod: TC

## 2020-10-14 PROCEDURE — 72050 X-RAY EXAM NECK SPINE 4/5VWS: CPT | Mod: TC

## 2020-10-14 NOTE — PROGRESS NOTES
DATE: 10/14/2020  PATIENT: Renae Hou    Supervising Physician: Dean Zayas M.D.    CHIEF COMPLAINT: neck and bilateral arm pain    HISTORY:  Renae Hou is a 59 y.o. female who is well known to me, she is s/p L4/5/S1 TLIF in 2018 here for evaluation of neck and bilateral arm pain (Neck - 6, Arm - 6). The pain has been present for a few weeks. The patient describes the pain as aching. The pain is worse with nothing in particular and improved by nothing in particular. There is associated numbness and tingling. There is subjective weakness. Prior treatments have included NSAIDs and physical therapy and she had an NEERAJ last year, but no recent ESIs or surgery.     The patient denies myelopathic symptoms such as handwriting changes or difficulty with buttons/coins/keys. Denies perineal paresthesias, bowel/bladder dysfunction.    Interval history 10/14/2020:  Patient returns after obtaining a steroid injection in her cervical spine 2 months ago.  She states that it did help some but is still having pain and numbness with certain movements of the neck.  Her low back is still having some mild pain as well that radiates to the left lower extremity.  She has some new numbness in the buttocks.    PAST MEDICAL/SURGICAL HISTORY:  Past Medical History:   Diagnosis Date    Abscess of paraspinous muscles     Allergy 4/2018    DVT (deep venous thrombosis)     left leg    Epidural abscess 4/10/2018    Fatty liver     GERD (gastroesophageal reflux disease) 2015    Hypertension     Lumbar radiculopathy     Menopause     Obesity     Obstructive sleep apnea on CPAP     no longer uses CPAP after weight loss from gastric sleeve    Thyroid disease     Thyroid nodules.     Past Surgical History:   Procedure Laterality Date    ADENOIDECTOMY  1995    BACK SURGERY  2002    L4, L5    BILATERAL SALPINGOOPHORECTOMY      CHOLECYSTECTOMY      CYST REMOVAL      EPIDURAL STEROID INJECTION INTO CERVICAL SPINE N/A  8/11/2020    Procedure: Injection-steroid-epidural-cervical;  Surgeon: April Diagnostic Provider;  Location: [facility] OR 2ND FLR;  Service: Radiology;  Laterality: N/A;  /Carla    ESOPHAGOGASTRODUODENOSCOPY N/A 5/3/2019    Procedure: ESOPHAGOGASTRODUODENOSCOPY (EGD);  Surgeon: Carlin Sanchez MD;  Location: [facility] ENDO (4TH FLR);  Service: Endoscopy;  Laterality: N/A;    HYSTERECTOMY  12/17/2012    [facility] BS&O     INSERTION OF INFERIOR VENA CAVAL FILTER Right 7/19/2019    Procedure: IVC filter placement;  Surgeon: Rodney Rico MD;  Location: [facility] CATH LAB;  Service: Peripheral Vascular;  Laterality: Right;    IVC FILTER RETRIEVAL N/A 12/20/2019    Procedure: REMOVAL-FILTER-IVC;  Surgeon: Rodney Rico MD;  Location: [facility] OR 2ND FLR;  Service: Peripheral Vascular;  Laterality: N/A;    LAPAROSCOPIC SLEEVE GASTRECTOMY N/A 7/23/2019    Procedure: GASTRECTOMY, SLEEVE, LAPAROSCOPIC, with intraop EGD 27203;  Surgeon: Duc Ratliff Jr., MD;  Location: [facility] OR 2ND FLR;  Service: General;  Laterality: N/A;    RECTOCELE REPAIR      SPINE SURGERY  2018    THYROID NODULE REMOVAL      TONSILLECTOMY  1995       Medications:  Current Outpatient Medications on File Prior to Visit   Medication Sig Dispense Refill    cyanocobalamin 500 MCG tablet Take 500 mcg by mouth once daily.      diclofenac sodium (VOLTAREN) 1 % Gel Apply 3-4 times a day as needed. 1 Tube 3    EPINEPHrine (EPIPEN) 0.3 mg/0.3 mL AtIn       ergocalciferol, vitamin D2, (VITAMIN D ORAL) Take 1 tablet by mouth once daily.      gabapentin (NEURONTIN) 300 MG capsule Take 1 capsule (300 mg total) by mouth 3 (three) times daily. 270 capsule 0    hydroCHLOROthiazide (HYDRODIURIL) 12.5 MG Tab Take 1 tablet (12.5 mg total) by mouth once daily. 30 tablet 11    JUBLIA 10 % Javan       meclizine (ANTIVERT) 25 mg tablet TAKE 1 TABLET(25 MG) BY MOUTH THREE TIMES DAILY AS NEEDED 45 tablet 1    mometasone (ELOCON) 0.1 % ointment ALTON EXT AA BID  PRF RASH      NON FORMULARY MEDICATION 1 patch by Other route once daily. Apply 1 patch to lower abdomen once daily      NON FORMULARY MEDICATION 1 patch.      NON FORMULARY MEDICATION 1 patch.      ofloxacin (OCUFLOX) 0.3 % ophthalmic solution       omeprazole (PRILOSEC) 40 MG capsule Take 1 capsule (40 mg total) by mouth every morning. Open capsule and take with apple sauce (Patient taking differently: Take 40 mg by mouth daily as needed. Open capsule and take with apple sauce) 30 capsule 2    potassium chloride SA (K-DUR,KLOR-CON) 20 MEQ tablet Take 1 tablet (20 mEq total) by mouth once daily. 90 tablet 3    varicella-zoster gE-AS01B, PF, (SHINGRIX, PF,) 50 mcg/0.5 mL injection Inject into the muscle. 0.5 mL 1    diphenhydrAMINE (BENADRYL) 50 MG capsule Take 1 hour prior to procedure 1 capsule 0    erythromycin (ROMYCIN) ophthalmic ointment ALTON TO AFFECTED EYELID HS      predniSONE (DELTASONE) 50 MG Tab Take 1 tablet at 13 hours prior to procedure, 6 hours prior to procedure, and 1 hour prior to procedure 3 tablet 0     No current facility-administered medications on file prior to visit.        Social History:   Social History     Socioeconomic History    Marital status:      Spouse name: Not on file    Number of children: Not on file    Years of education: Not on file    Highest education level: Not on file   Occupational History    Not on file   Social Needs    Financial resource strain: Not hard at all    Food insecurity     Worry: Never true     Inability: Never true    Transportation needs     Medical: No     Non-medical: No   Tobacco Use    Smoking status: Former Smoker     Packs/day: 0.25     Years: 32.00     Pack years: 8.00     Types: Cigarettes     Quit date:      Years since quittin.8    Smokeless tobacco: Never Used    Tobacco comment: smoked socially decades ago - weekends only   Substance and Sexual Activity    Alcohol use: No     Frequency: Monthly or less      Drinks per session: 1 or 2     Binge frequency: Never     Comment: yearly at most     Drug use: No    Sexual activity: Not Currently     Partners: Male     Birth control/protection: See Surgical Hx, None     Comment: [facility] BS&O 12/17/2012,     Lifestyle    Physical activity     Days per week: Patient refused     Minutes per session: 0 min    Stress: Not at all   Relationships    Social connections     Talks on phone: More than three times a week     Gets together: Once a week     Attends [employer] service: Not on file     Active member of club or organization: No     Attends meetings of clubs or organizations: Patient refused     Relationship status:    Other Topics Concern    Not on file   Social History Narrative    . 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       REVIEW OF SYSTEMS:  Constitution: Negative. Negative for chills, fever and night sweats.   Cardiovascular: Negative for chest pain and syncope.   Respiratory: Negative for cough and shortness of breath.   Gastrointestinal: See HPI. Negative for nausea/vomiting. Negative for abdominal pain.  Genitourinary: See HPI. Negative for discoloration or dysuria.  Skin: Negative for dry skin, itching and rash.   Hematologic/Lymphatic: Negative for bleeding problem. Does not bruise/bleed easily.   Musculoskeletal: Negative for falls and muscle weakness.   Neurological: See HPI. No seizures.   Endocrine: Negative for polydipsia, polyphagia and polyuria.   Allergic/Immunologic: Negative for hives and persistent infections.  Psychiatric/Behavioral: Negative for depression and insomnia.         EXAM:  LMP 11/25/2012     Physical exam stable. Neuro exam stable.     IMAGING:   Today I personally reviewed AP, Lat and Flex/Ex  upright C-spine films that demonstrate C6/7 disc degeneration.     MRI cervical spine from May 2019 shows moderate right C4/5 foraminal narrowing.    There is no height or weight on file to calculate BMI.    Hemoglobin A1C   Date Value Ref Range  Status   05/15/2020 5.6 4.0 - 5.6 % Final     Comment:     ADA Screening Guidelines:  5.7-6.4%  Consistent with prediabetes  >or=6.5%  Consistent with diabetes  High levels of fetal hemoglobin interfere with the HbA1C  assay. Heterozygous hemoglobin variants (HbS, HgC, etc)do  not significantly interfere with this assay.   However, presence of multiple variants may affect accuracy.     05/24/2019 6.0 (H) 4.0 - 5.6 % Final     Comment:     ADA Screening Guidelines:  5.7-6.4%  Consistent with prediabetes  >or=6.5%  Consistent with diabetes  High levels of fetal hemoglobin interfere with the HbA1C  assay. Heterozygous hemoglobin variants (HbS, HgC, etc)do  not significantly interfere with this assay.   However, presence of multiple variants may affect accuracy.     10/05/2018 6.1 (H) 4.0 - 5.6 % Final     Comment:     ADA Screening Guidelines:  5.7-6.4%  Consistent with prediabetes  >or=6.5%  Consistent with diabetes  High levels of fetal hemoglobin interfere with the HbA1C  assay. Heterozygous hemoglobin variants (HbS, HgC, etc)do  not significantly interfere with this assay.   However, presence of multiple variants may affect accuracy.             ASSESSMENT/PLAN:    Renae was seen today for follow-up and follow-up.    Diagnoses and all orders for this visit:    Cervical radiculopathy    DDD (degenerative disc disease), cervical    Radiculopathy, cervical region  -     MRI Cervical Spine Without Contrast; Future        Moderate improvement in pain with NEERAJ of the cervical spine.  She is still having pain, numbness and to her arms.  Will obtain a cervical spine MRI and see patient back to review results.

## 2020-10-15 ENCOUNTER — IMMUNIZATION (OUTPATIENT)
Dept: PHARMACY | Facility: CLINIC | Age: 60
End: 2020-10-15
Payer: COMMERCIAL

## 2020-10-16 ENCOUNTER — OFFICE VISIT (OUTPATIENT)
Dept: BARIATRICS | Facility: CLINIC | Age: 60
End: 2020-10-16
Payer: COMMERCIAL

## 2020-10-16 VITALS
HEART RATE: 74 BPM | WEIGHT: 184.06 LBS | BODY MASS INDEX: 30.67 KG/M2 | SYSTOLIC BLOOD PRESSURE: 125 MMHG | DIASTOLIC BLOOD PRESSURE: 84 MMHG | HEIGHT: 65 IN

## 2020-10-16 DIAGNOSIS — Z98.84 S/P LAPAROSCOPIC SLEEVE GASTRECTOMY: Primary | ICD-10-CM

## 2020-10-16 PROCEDURE — 3074F PR MOST RECENT SYSTOLIC BLOOD PRESSURE < 130 MM HG: ICD-10-PCS | Mod: CPTII,S$GLB,, | Performed by: PHYSICIAN ASSISTANT

## 2020-10-16 PROCEDURE — 3008F PR BODY MASS INDEX (BMI) DOCUMENTED: ICD-10-PCS | Mod: CPTII,S$GLB,, | Performed by: PHYSICIAN ASSISTANT

## 2020-10-16 PROCEDURE — 99213 PR OFFICE/OUTPT VISIT, EST, LEVL III, 20-29 MIN: ICD-10-PCS | Mod: S$GLB,,, | Performed by: PHYSICIAN ASSISTANT

## 2020-10-16 PROCEDURE — 99213 OFFICE O/P EST LOW 20 MIN: CPT | Mod: S$GLB,,, | Performed by: PHYSICIAN ASSISTANT

## 2020-10-16 PROCEDURE — 99999 PR PBB SHADOW E&M-EST. PATIENT-LVL III: ICD-10-PCS | Mod: PBBFAC,,, | Performed by: PHYSICIAN ASSISTANT

## 2020-10-16 PROCEDURE — 3079F PR MOST RECENT DIASTOLIC BLOOD PRESSURE 80-89 MM HG: ICD-10-PCS | Mod: CPTII,S$GLB,, | Performed by: PHYSICIAN ASSISTANT

## 2020-10-16 PROCEDURE — 3074F SYST BP LT 130 MM HG: CPT | Mod: CPTII,S$GLB,, | Performed by: PHYSICIAN ASSISTANT

## 2020-10-16 PROCEDURE — 3008F BODY MASS INDEX DOCD: CPT | Mod: CPTII,S$GLB,, | Performed by: PHYSICIAN ASSISTANT

## 2020-10-16 PROCEDURE — 99999 PR PBB SHADOW E&M-EST. PATIENT-LVL III: CPT | Mod: PBBFAC,,, | Performed by: PHYSICIAN ASSISTANT

## 2020-10-16 PROCEDURE — 3079F DIAST BP 80-89 MM HG: CPT | Mod: CPTII,S$GLB,, | Performed by: PHYSICIAN ASSISTANT

## 2020-10-16 NOTE — PATIENT INSTRUCTIONS
For plastic surgery, here is a list of 3 plastic surgeons we recommend:    1) Dr. Jatin Bear, [facility], [phone].  ( [facility])   2) Dr. Morales & Payal Cullen, [phone].  3) Dr. Lefty Davenport, [phone].    4) Dr. Otis Whitten, [phone].  5) Dr. Deep Rivers, [phone]. ( [facility]  Snacks: (100-200 calories; >5g protein)    - 1 low-fat cheese stick with 8 cherry tomatoes or 1 serving fresh fruit  - 4 thin slices fat-free turkey breast and 1 slice low-fat cheese  - 4 thin slices fat-free honey ham with wedge of melon  - 2 slices of turkey grubbs  - Boiled eggs (can buy at [employer] already boiled w/ shell removed)  - for convenience,  [employer] read, snack, go (deli meat and cheese rolls)  - P3 packets (Protein packs w/ cheese, nuts, lean deli meat)  - MHP Fit and Lean Protein Pudding (find at [employer] - per 1 cup serving = 100 calories, 15 g protein, 0 g sugar)  - 6-8 edamame pods (equivalent to about 1/4 cup edamame without pods).   - 1/4 cup unsalted nuts with ½ cup fruit  - 6-oz container Dannon Light n Fit vanilla yogurt, topped with 1oz unsalted nuts         - apple, celery or baby carrots spread with 2 Tbsp PB2  - apple slices with 1 oz slice low-fat cheese  - Apple slices dipped in 2 Tbsp of PB2  - 2 Tbsp PB2 mixed in light or greek yogurt or sugar-free pudding  - celery, cucumber, bell pepper or baby carrots dipped in ¼ cup hummus bean spread   - celery, cucumber, baby carrots dipped in high protein greek yogurt (Mix 16 oz plain greek yogurt + 1 packet of hidden valley ranch dip mix)  - Jero Links Beef Steak - 14g protein! (similar to beef jerky but very lean)  - 2 wedges Laughing Cow - Light Herb & Garlic Cheese with sliced cucumber or green bell pepper  - 1/2 cup low-fat cottage cheese with ¼ cup fruit or ¼ cup salsa  - 1/2 cup low fat cottage cheese with 10-15 cherry tomatoes  - 8 oz glass of FAIRLIFE fat free milk (13 g protein)  - 8 oz glass of FAIRLIFE fat free milk +  "1 packet of sugar-free hot cocoa  - Add Atkins advantage Cafe Caramel shake to decaf coffee. Serve hot or blend with ice for "frappaccino" like drink  - RTD Protein drinks: Atkins, Low Carb Slim Fast, EAS light, Muscle Milk Light, etc.  - Homemade Protein drinks: GNC Soy95, Isopure, Nectar, UNJURY, Whey Gourmet, etc. Mix 1 scoop powder with 8oz skim/1% milk or light soymilk.  - Protein bars: Atkins, EAS, Pure Protein,  Quest, Think Thin, Detour, etc. Must have 0-4 grams sugar - Read the label.    ** Be CREATIVE. You can always snack on bites of grilled chicken or tuna salad made with low fat gonzalez, if needed!     "

## 2020-10-16 NOTE — PROGRESS NOTES
BARIATRIC POST-OPERATIVE VISIT:    HPI:  Renae Hou is a 60 y.o. year old female presents for follow-up of gastric sleeve.      Pt states that she is doing well here for plastics referral, states that she has this stomach role which is causing some issues like back pain.      Bariatric Diet.   5-6 small meals eg  BF greek yogurt  Sn boiled egg  HORACE tuna  Sn boiled egg  Sn cheese  Franky tea with fairlife milk  80+ gm protein  Ground meat pastor no bread    64+ fl oz SF clear beverage.    See Dietician note from today for a more detailed assessment.    Exercise- none    Review of Systems   Constitutional: Negative for activity change and appetite change.   HENT: Negative for tinnitus and trouble swallowing.    Eyes: Negative for visual disturbance.   Respiratory: Negative for chest tightness and shortness of breath.    Cardiovascular: Negative for chest pain.   Gastrointestinal: Positive for abdominal pain (RUQ). Negative for abdominal distention, blood in stool, constipation, diarrhea, nausea and vomiting.        Denies dysphagia   Genitourinary: Negative for dysuria, frequency and urgency.   Musculoskeletal: Negative for myalgias.   Skin: Negative for rash.   Neurological: Negative for light-headedness, numbness and headaches.   Psychiatric/Behavioral: Negative for decreased concentration, self-injury and suicidal ideas. The patient is not nervous/anxious. Hyperactive:         Physical Exam  Constitutional:       Appearance: Normal appearance. She is well-developed.   HENT:      Head: Normocephalic and atraumatic.   Eyes:      Conjunctiva/sclera: Conjunctivae normal.   Neck:      Musculoskeletal: Normal range of motion and neck supple.   Cardiovascular:      Rate and Rhythm: Normal rate.      Pulses: Normal pulses.      Heart sounds: Normal heart sounds. No murmur.   Pulmonary:      Effort: Pulmonary effort is normal. No respiratory distress.   Abdominal:      General: Bowel sounds are normal.      Palpations:  Abdomen is soft.      Tenderness: There is no abdominal tenderness. There is no guarding.   Musculoskeletal: Normal range of motion.         General: No swelling.   Neurological:      Mental Status: She is alert and oriented to person, place, and time.   Psychiatric:         Mood and Affect: Mood normal.         Behavior: Behavior normal.         Thought Content: Thought content normal.         Judgment: Judgment normal.       Vitals:    10/16/20 0902   BP: 125/84   Pulse: 74     ASSESSMENT:  - Morbid obesity s/p sleeve gastrectomy on 07/23/2019.  - Co-morbidities: deep vein thrombosis, dyslipidemia, GERD, hypertension and obstructive sleep apnea  - Good Weight loss, 46.8#'s and 52% EWL  - No Exercise routine  - Good Diet  - Good vitamin regimen     PLAN:  - referral to plastics  - continue lifestyle

## 2020-10-19 ENCOUNTER — PATIENT MESSAGE (OUTPATIENT)
Dept: INTERNAL MEDICINE | Facility: CLINIC | Age: 60
End: 2020-10-19

## 2020-10-20 NOTE — PROGRESS NOTES
Reviewed chart.   Patient's 30-day BP average is 104/68 mmHg, which is at goal of <130/<80 mHg. BP readings are stable.   Will push back outreach date for follow up

## 2020-10-21 ENCOUNTER — PATIENT MESSAGE (OUTPATIENT)
Dept: ORTHOPEDICS | Facility: CLINIC | Age: 60
End: 2020-10-21

## 2020-10-21 DIAGNOSIS — M54.12 CERVICAL RADICULOPATHY: Primary | ICD-10-CM

## 2020-10-21 DIAGNOSIS — M54.6 THORACIC BACK PAIN, UNSPECIFIED BACK PAIN LATERALITY, UNSPECIFIED CHRONICITY: ICD-10-CM

## 2020-10-22 ENCOUNTER — PATIENT MESSAGE (OUTPATIENT)
Dept: ORTHOPEDICS | Facility: CLINIC | Age: 60
End: 2020-10-22

## 2020-10-22 ENCOUNTER — OFFICE VISIT (OUTPATIENT)
Dept: URGENT CARE | Facility: CLINIC | Age: 60
End: 2020-10-22
Payer: COMMERCIAL

## 2020-10-22 VITALS
HEART RATE: 89 BPM | BODY MASS INDEX: 30.66 KG/M2 | TEMPERATURE: 98 F | WEIGHT: 184 LBS | HEIGHT: 65 IN | RESPIRATION RATE: 14 BRPM | OXYGEN SATURATION: 97 % | DIASTOLIC BLOOD PRESSURE: 86 MMHG | SYSTOLIC BLOOD PRESSURE: 122 MMHG

## 2020-10-22 DIAGNOSIS — R51.9 ACUTE NONINTRACTABLE HEADACHE, UNSPECIFIED HEADACHE TYPE: ICD-10-CM

## 2020-10-22 DIAGNOSIS — J01.40 ACUTE PANSINUSITIS, RECURRENCE NOT SPECIFIED: ICD-10-CM

## 2020-10-22 DIAGNOSIS — Z20.822 EXPOSURE TO COVID-19 VIRUS: Primary | ICD-10-CM

## 2020-10-22 LAB
CTP QC/QA: YES
SARS-COV-2 RDRP RESP QL NAA+PROBE: NEGATIVE

## 2020-10-22 PROCEDURE — U0002 COVID-19 LAB TEST NON-CDC: HCPCS | Mod: QW,S$GLB,, | Performed by: INTERNAL MEDICINE

## 2020-10-22 PROCEDURE — 99214 OFFICE O/P EST MOD 30 MIN: CPT | Mod: S$GLB,,, | Performed by: INTERNAL MEDICINE

## 2020-10-22 PROCEDURE — U0002: ICD-10-PCS | Mod: QW,S$GLB,, | Performed by: INTERNAL MEDICINE

## 2020-10-22 PROCEDURE — 99214 PR OFFICE/OUTPT VISIT, EST, LEVL IV, 30-39 MIN: ICD-10-PCS | Mod: S$GLB,,, | Performed by: INTERNAL MEDICINE

## 2020-10-22 NOTE — PROGRESS NOTES
"Subjective:       Patient ID: Renae Hou is a 60 y.o. female.    Vitals:  height is 5' 5" (1.651 m) and weight is 83.5 kg (184 lb). Her temperature is 98.4 °F (36.9 °C). Her blood pressure is 122/86 and her pulse is 89. Her respiration is 14 and oxygen saturation is 97%.     Chief Complaint: COVID-19 Concerns    Pt requesting Covid 19 test, no symptoms. Exposed to someone with Covid 19.     Other  This is a new problem. The current episode started today. The problem occurs constantly. Pertinent negatives include no arthralgias, chest pain, chills, congestion, coughing, fatigue, fever, headaches, joint swelling, myalgias, nausea, rash, sore throat, vertigo, vomiting or weakness.       Constitution: Negative for chills, fatigue and fever.   HENT: Negative for congestion and sore throat.    Neck: Negative for painful lymph nodes.   Cardiovascular: Negative for chest pain and leg swelling.   Eyes: Negative for double vision and blurred vision.   Respiratory: Negative for cough and shortness of breath.    Gastrointestinal: Negative for nausea, vomiting and diarrhea.   Genitourinary: Negative for dysuria, frequency, urgency and history of kidney stones.   Musculoskeletal: Negative for joint pain, joint swelling, muscle cramps and muscle ache.   Skin: Negative for color change, pale, rash and bruising.   Allergic/Immunologic: Negative for seasonal allergies.   Neurological: Negative for dizziness, history of vertigo, light-headedness, passing out and headaches.   Hematologic/Lymphatic: Negative for swollen lymph nodes.   Psychiatric/Behavioral: Negative for nervous/anxious, sleep disturbance and depression. The patient is not nervous/anxious.        Objective:      Physical Exam   HENT:   Head: Normocephalic and atraumatic.   Nose: Mucosal edema and rhinorrhea present.   Mouth/Throat: Mucous membranes are moist. Oropharynx is clear.   Eyes: Pupils are equal, round, and reactive to light. Conjunctivae are normal. " extraocular movement intact  Neck: Normal range of motion. Neck supple.   Cardiovascular: Normal rate, regular rhythm, normal heart sounds and normal pulses.   Pulmonary/Chest: Effort normal and breath sounds normal.   Abdominal: Normal appearance.   Neurological: She is alert.   Nursing note and vitals reviewed.        Assessment:       1. Exposure to COVID-19 virus    2. Acute pansinusitis, recurrence not specified    3. Acute nonintractable headache, unspecified headache type        Plan:         Exposure to COVID-19 virus  -     POCT COVID-19 Rapid Screening    Acute pansinusitis, recurrence not specified    Acute nonintractable headache, unspecified headache type

## 2020-10-22 NOTE — LETTER
[address] [facility] ? [place], [address] ? Phone [phone] ? Fax [phone]           Return to Work/School    Patient: Renae Hou  YOB: 1960   Date: 10/22/2020      To Whom It May Concern:     Renae Hou was in contact with/seen in my office on 10/22/2020. COVID-19 is present in our communities across the state. Not all patients are eligible or appropriate to be tested. In this situation, your employee meets the following criteria:     Renae Hou has met the criteria for COVID-19 testing and has a NEGATIVE result. The employee can return to work once they are asymptomatic for 24 hours without the use of fever reducing medications (Tylenol, Motrin, etc).     If you have any questions or concerns, or if I can be of further assistance, please do not hesitate to contact me.     Sincerely,    Jorden Miguel MD

## 2020-10-26 ENCOUNTER — PATIENT MESSAGE (OUTPATIENT)
Dept: ORTHOPEDICS | Facility: CLINIC | Age: 60
End: 2020-10-26

## 2020-10-31 ENCOUNTER — PATIENT MESSAGE (OUTPATIENT)
Dept: BARIATRICS | Facility: CLINIC | Age: 60
End: 2020-10-31

## 2020-11-04 ENCOUNTER — OFFICE VISIT (OUTPATIENT)
Dept: ORTHOPEDICS | Facility: CLINIC | Age: 60
End: 2020-11-04
Payer: COMMERCIAL

## 2020-11-04 ENCOUNTER — HOSPITAL ENCOUNTER (OUTPATIENT)
Dept: RADIOLOGY | Facility: HOSPITAL | Age: 60
Discharge: HOME | End: 2020-11-04
Attending: PHYSICIAN ASSISTANT
Payer: COMMERCIAL

## 2020-11-04 ENCOUNTER — HOSPITAL ENCOUNTER (OUTPATIENT)
Dept: RADIOLOGY | Facility: HOSPITAL | Age: 60
Discharge: HOME | End: 2020-11-04
Attending: ORTHOPAEDIC SURGERY
Payer: COMMERCIAL

## 2020-11-04 DIAGNOSIS — M54.12 RADICULOPATHY, CERVICAL REGION: ICD-10-CM

## 2020-11-04 DIAGNOSIS — M54.6 THORACIC BACK PAIN, UNSPECIFIED BACK PAIN LATERALITY, UNSPECIFIED CHRONICITY: ICD-10-CM

## 2020-11-04 DIAGNOSIS — M54.12 CERVICAL RADICULOPATHY: ICD-10-CM

## 2020-11-04 DIAGNOSIS — M54.12 CERVICAL RADICULOPATHY: Primary | ICD-10-CM

## 2020-11-04 PROCEDURE — 99999 PR PBB SHADOW E&M-EST. PATIENT-LVL III: ICD-10-PCS | Mod: PBBFAC,,, | Performed by: ORTHOPAEDIC SURGERY

## 2020-11-04 PROCEDURE — 99999 PR PBB SHADOW E&M-EST. PATIENT-LVL III: CPT | Mod: PBBFAC,,, | Performed by: ORTHOPAEDIC SURGERY

## 2020-11-04 PROCEDURE — 72141 MRI NECK SPINE W/O DYE: CPT | Mod: 26,,, | Performed by: RADIOLOGY

## 2020-11-04 PROCEDURE — 99214 PR OFFICE/OUTPT VISIT, EST, LEVL IV, 30-39 MIN: ICD-10-PCS | Mod: S$GLB,,, | Performed by: ORTHOPAEDIC SURGERY

## 2020-11-04 PROCEDURE — 72141 MRI NECK SPINE W/O DYE: CPT | Mod: TC

## 2020-11-04 PROCEDURE — 72141 MRI CERVICAL SPINE WITHOUT CONTRAST: ICD-10-PCS | Mod: 26,,, | Performed by: RADIOLOGY

## 2020-11-04 PROCEDURE — 72146 MRI CHEST SPINE W/O DYE: CPT | Mod: 26,,, | Performed by: RADIOLOGY

## 2020-11-04 PROCEDURE — 72146 MRI CHEST SPINE W/O DYE: CPT | Mod: TC

## 2020-11-04 PROCEDURE — 99214 OFFICE O/P EST MOD 30 MIN: CPT | Mod: S$GLB,,, | Performed by: ORTHOPAEDIC SURGERY

## 2020-11-04 PROCEDURE — 72146 MRI THORACIC SPINE WITHOUT CONTRAST: ICD-10-PCS | Mod: 26,,, | Performed by: RADIOLOGY

## 2020-11-04 NOTE — PROGRESS NOTES
DATE: 11/4/2020  PATIENT: Renae Hou    Supervising Physician: Dean Zayas M.D.    CHIEF COMPLAINT: neck and bilateral arm pain    HISTORY:  Renae Hou is a 60 y.o. female who is well known to me, she is s/p L4/5/S1 TLIF in 2018 here for evaluation of neck and bilateral arm pain (Neck - 6, Arm - 6). The pain has been present for a few weeks. The patient describes the pain as aching. The pain is worse with nothing in particular and improved by nothing in particular. There is associated numbness and tingling. There is subjective weakness. Prior treatments have included NSAIDs and physical therapy and she had an NEERAJ last year, but no recent ESIs or surgery.     The patient denies myelopathic symptoms such as handwriting changes or difficulty with buttons/coins/keys. Denies perineal paresthesias, bowel/bladder dysfunction.    Interval history 10/14/2020:  Patient returns after obtaining a steroid injection in her cervical spine 2 months ago.  She states that it did help some but is still having pain and numbness with certain movements of the neck.  Her low back is still having some mild pain as well that radiates to the left lower extremity.  She has some new numbness in the buttocks.    Interval history 11/4/20: Patient here for MRI Fu. She reports that her symptoms are actually becoming more severe. She reports bilateral upper extremity numbness frequently. She also reports thoracic spine pain as well. She reports occasional lumbar radiculopathy. She does endorse some weakness in her hands opening jars.     PAST MEDICAL/SURGICAL HISTORY:  Past Medical History:   Diagnosis Date    Abscess of paraspinous muscles     Allergy 4/2018    DVT (deep venous thrombosis)     left leg    Epidural abscess 4/10/2018    Fatty liver     GERD (gastroesophageal reflux disease) 2015    Hypertension     Lumbar radiculopathy     Menopause     Obesity     Obstructive sleep apnea on CPAP     no longer uses  CPAP after weight loss from gastric sleeve    Thyroid disease     Thyroid nodules.     Past Surgical History:   Procedure Laterality Date    ADENOIDECTOMY  1995    BACK SURGERY  2002    L4, L5    BILATERAL SALPINGOOPHORECTOMY      CHOLECYSTECTOMY      CYST REMOVAL      EPIDURAL STEROID INJECTION INTO CERVICAL SPINE N/A 8/11/2020    Procedure: Injection-steroid-epidural-cervical;  Surgeon: April Diagnostic Provider;  Location: [facility] OR [address] [place] [address];  Service: Radiology;  Laterality: N/A;  /Carla    ESOPHAGOGASTRODUODENOSCOPY N/A 5/3/2019    Procedure: ESOPHAGOGASTRODUODENOSCOPY (EGD);  Surgeon: Carlin Sanchez MD;  Location: [facility] ENDO (4TH FLR);  Service: Endoscopy;  Laterality: N/A;    HYSTERECTOMY  12/17/2012    [facility] BS&O     INSERTION OF INFERIOR VENA CAVAL FILTER Right 7/19/2019    Procedure: IVC filter placement;  Surgeon: Rodney Rico MD;  Location: [facility] CATH LAB;  Service: Peripheral Vascular;  Laterality: Right;    IVC FILTER RETRIEVAL N/A 12/20/2019    Procedure: REMOVAL-FILTER-IVC;  Surgeon: Rodney Rico MD;  Location: [address];  Service: Peripheral Vascular;  Laterality: N/A;    LAPAROSCOPIC SLEEVE GASTRECTOMY N/A 7/23/2019    Procedure: GASTRECTOMY, SLEEVE, LAPAROSCOPIC, with intraop EGD 99881;  Surgeon: Duc Ratliff Jr., MD;  Location: [address];  Service: General;  Laterality: N/A;    RECTOCELE REPAIR      SPINE SURGERY  2018    THYROID NODULE REMOVAL      TONSILLECTOMY  1995       Medications:  Current Outpatient Medications on File Prior to Visit   Medication Sig Dispense Refill    cyanocobalamin 500 MCG tablet Take 500 mcg by mouth once daily.      diclofenac sodium (VOLTAREN) 1 % Gel Apply 3-4 times a day as needed. 1 Tube 3    EPINEPHrine (EPIPEN) 0.3 mg/0.3 mL AtIn       ergocalciferol, vitamin D2, (VITAMIN D ORAL) Take 1 tablet by mouth once daily.      gabapentin (NEURONTIN) 300 MG capsule Take 1 capsule (300 mg total) by mouth 3 (three)  times daily. 270 capsule 0    hydroCHLOROthiazide (HYDRODIURIL) 12.5 MG Tab Take 1 tablet (12.5 mg total) by mouth once daily. 30 tablet 11    JUBLIA 10 % Javan       meclizine (ANTIVERT) 25 mg tablet TAKE 1 TABLET(25 MG) BY MOUTH THREE TIMES DAILY AS NEEDED 45 tablet 1    mometasone (ELOCON) 0.1 % ointment ALTON EXT AA BID PRF RASH      NON FORMULARY MEDICATION 1 patch by Other route once daily. Apply 1 patch to lower abdomen once daily      NON FORMULARY MEDICATION 1 patch.      NON FORMULARY MEDICATION 1 patch.      ofloxacin (OCUFLOX) 0.3 % ophthalmic solution       omeprazole (PRILOSEC) 40 MG capsule Take 1 capsule (40 mg total) by mouth every morning. Open capsule and take with apple sauce (Patient taking differently: Take 40 mg by mouth daily as needed. Open capsule and take with apple sauce) 30 capsule 2    potassium chloride SA (K-DUR,KLOR-CON) 20 MEQ tablet Take 1 tablet (20 mEq total) by mouth once daily. 90 tablet 3    varicella-zoster gE-AS01B, PF, (SHINGRIX, PF,) 50 mcg/0.5 mL injection Inject into the muscle. 0.5 mL 1    diphenhydrAMINE (BENADRYL) 50 MG capsule Take 1 hour prior to procedure 1 capsule 0    erythromycin (ROMYCIN) ophthalmic ointment ALTON TO AFFECTED EYELID HS      predniSONE (DELTASONE) 50 MG Tab Take 1 tablet at 13 hours prior to procedure, 6 hours prior to procedure, and 1 hour prior to procedure 3 tablet 0     No current facility-administered medications on file prior to visit.        Social History:   Social History     Socioeconomic History    Marital status:      Spouse name: Not on file    Number of children: Not on file    Years of education: Not on file    Highest education level: Not on file   Occupational History    Not on file   Social Needs    Financial resource strain: Not hard at all    Food insecurity     Worry: Never true     Inability: Never true    Transportation needs     Medical: No     Non-medical: No   Tobacco Use    Smoking status:  Former Smoker     Packs/day: 0.25     Years: 32.00     Pack years: 8.00     Types: Cigarettes     Quit date:      Years since quittin.8    Smokeless tobacco: Never Used    Tobacco comment: smoked socially decades ago - weekends only   Substance and Sexual Activity    Alcohol use: No     Frequency: Monthly or less     Drinks per session: 1 or 2     Binge frequency: Never     Comment: yearly at most     Drug use: No    Sexual activity: Not Currently     Partners: Male     Birth control/protection: See Surgical Hx, None     Comment: VINI BS&O 2012,     Lifestyle    Physical activity     Days per week: Patient refused     Minutes per session: 0 min    Stress: Not at all   Relationships    Social connections     Talks on phone: More than three times a week     Gets together: Once a week     Attends Faith service: Not on file     Active member of club or organization: No     Attends meetings of clubs or organizations: Patient refused     Relationship status:    Other Topics Concern    Not on file   Social History Narrative    . 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       REVIEW OF SYSTEMS:  Constitution: Negative. Negative for chills, fever and night sweats.   Cardiovascular: Negative for chest pain and syncope.   Respiratory: Negative for cough and shortness of breath.   Gastrointestinal: See HPI. Negative for nausea/vomiting. Negative for abdominal pain.  Genitourinary: See HPI. Negative for discoloration or dysuria.  Skin: Negative for dry skin, itching and rash.   Hematologic/Lymphatic: Negative for bleeding problem. Does not bruise/bleed easily.   Musculoskeletal: Negative for falls and muscle weakness.   Neurological: See HPI. No seizures.   Endocrine: Negative for polydipsia, polyphagia and polyuria.   Allergic/Immunologic: Negative for hives and persistent infections.  Psychiatric/Behavioral: Negative for depression and insomnia.         EXAM:  LMP 2012     Physical exam stable.  Neuro exam stable.     IMAGING:   Today I personally reviewed AP, Lat and Flex/Ex  upright C-spine films that demonstrate C6/7 disc degeneration.     MRI cervical spine from May 2019 shows moderate right C4/5 foraminal narrowing.    There is no height or weight on file to calculate BMI.    Hemoglobin A1C   Date Value Ref Range Status   05/15/2020 5.6 4.0 - 5.6 % Final     Comment:     ADA Screening Guidelines:  5.7-6.4%  Consistent with prediabetes  >or=6.5%  Consistent with diabetes  High levels of fetal hemoglobin interfere with the HbA1C  assay. Heterozygous hemoglobin variants (HbS, HgC, etc)do  not significantly interfere with this assay.   However, presence of multiple variants may affect accuracy.     05/24/2019 6.0 (H) 4.0 - 5.6 % Final     Comment:     ADA Screening Guidelines:  5.7-6.4%  Consistent with prediabetes  >or=6.5%  Consistent with diabetes  High levels of fetal hemoglobin interfere with the HbA1C  assay. Heterozygous hemoglobin variants (HbS, HgC, etc)do  not significantly interfere with this assay.   However, presence of multiple variants may affect accuracy.     10/05/2018 6.1 (H) 4.0 - 5.6 % Final     Comment:     ADA Screening Guidelines:  5.7-6.4%  Consistent with prediabetes  >or=6.5%  Consistent with diabetes  High levels of fetal hemoglobin interfere with the HbA1C  assay. Heterozygous hemoglobin variants (HbS, HgC, etc)do  not significantly interfere with this assay.   However, presence of multiple variants may affect accuracy.             ASSESSMENT/PLAN:    Renae was seen today for pain and pain.    Diagnoses and all orders for this visit:    Cervical radiculopathy  -     IR NEERAJ Cervical/Thoracic; Future        Moderate improvement in pain with NEERAJ of the cervical spine.  She is still having pain, numbness and to her arms.    -Repeat NEERAJ  -FU after NEERAJ

## 2020-11-15 ENCOUNTER — PATIENT MESSAGE (OUTPATIENT)
Dept: ORTHOPEDICS | Facility: CLINIC | Age: 60
End: 2020-11-15

## 2020-11-17 ENCOUNTER — TELEPHONE (OUTPATIENT)
Dept: INTERVENTIONAL RADIOLOGY/VASCULAR | Facility: CLINIC | Age: 60
End: 2020-11-17

## 2020-11-17 ENCOUNTER — PATIENT OUTREACH (OUTPATIENT)
Dept: OTHER | Facility: OTHER | Age: 60
End: 2020-11-17

## 2020-11-17 NOTE — TELEPHONE ENCOUNTER
I have attempted to contact this patient by phone to schedule her IR cervical injection. No answer/Left message to return call to [phone]. Please forward call.

## 2020-11-17 NOTE — PROGRESS NOTES
Digital [facility]: Health  Follow-Up    The history is provided by the patient.             Reason for review: Blood pressure at goal        Topics Covered on Call: physical activity and Diet    Additional Follow-up details: Patient was at work, therefore encounter was brief. She states that she is feeling well and is happy with her BP readings and she has no new health goals to establish at this time and no questions or concerns.             Diet-no change to diet    No change to diet.        Physical Activity-no change to routine  No change to exercise routine.     Medication Adherence-Medication Adherence not addressed.      Substance, Sleep, Stress-Not assessed      Continue current diet/physical activity routine. Praised patient for maintaining healthy habits and encouraged to continue.        Addressed patient questions and patient has my contact information if needed prior to next outreach. Patient verbalizes understanding.      Explained the importance of self-monitoring and medication adherence. Encouraged the patient to communicate with their health  for lifestyle modifications to help improve or maintain a healthy lifestyle.               There are no preventive care reminders to display for this patient.      Last 5 Patient Entered Readings                                      Current 30 Day Average: 106/69     Recent Readings 11/15/2020 11/14/2020 11/12/2020 11/11/2020 11/10/2020    SBP (mmHg) 102 105 109 98 104    DBP (mmHg) 63 69 70 61 67    Pulse 68 63 68 77 61

## 2020-11-20 ENCOUNTER — OFFICE VISIT (OUTPATIENT)
Dept: OBSTETRICS AND GYNECOLOGY | Facility: CLINIC | Age: 60
End: 2020-11-20
Attending: OBSTETRICS & GYNECOLOGY
Payer: COMMERCIAL

## 2020-11-20 VITALS
SYSTOLIC BLOOD PRESSURE: 124 MMHG | BODY MASS INDEX: 31.09 KG/M2 | DIASTOLIC BLOOD PRESSURE: 78 MMHG | WEIGHT: 186.63 LBS | HEIGHT: 65 IN

## 2020-11-20 DIAGNOSIS — Z01.419 ENCOUNTER FOR GYNECOLOGICAL EXAMINATION: Primary | ICD-10-CM

## 2020-11-20 PROCEDURE — 3074F PR MOST RECENT SYSTOLIC BLOOD PRESSURE < 130 MM HG: ICD-10-PCS | Mod: CPTII,S$GLB,, | Performed by: OBSTETRICS & GYNECOLOGY

## 2020-11-20 PROCEDURE — 1126F PR PAIN SEVERITY QUANTIFIED, NO PAIN PRESENT: ICD-10-PCS | Mod: S$GLB,,, | Performed by: OBSTETRICS & GYNECOLOGY

## 2020-11-20 PROCEDURE — 1126F AMNT PAIN NOTED NONE PRSNT: CPT | Mod: S$GLB,,, | Performed by: OBSTETRICS & GYNECOLOGY

## 2020-11-20 PROCEDURE — 3078F DIAST BP <80 MM HG: CPT | Mod: CPTII,S$GLB,, | Performed by: OBSTETRICS & GYNECOLOGY

## 2020-11-20 PROCEDURE — 99396 PREV VISIT EST AGE 40-64: CPT | Mod: S$GLB,,, | Performed by: OBSTETRICS & GYNECOLOGY

## 2020-11-20 PROCEDURE — 99396 PR PREVENTIVE VISIT,EST,40-64: ICD-10-PCS | Mod: S$GLB,,, | Performed by: OBSTETRICS & GYNECOLOGY

## 2020-11-20 PROCEDURE — 3008F PR BODY MASS INDEX (BMI) DOCUMENTED: ICD-10-PCS | Mod: CPTII,S$GLB,, | Performed by: OBSTETRICS & GYNECOLOGY

## 2020-11-20 PROCEDURE — 3078F PR MOST RECENT DIASTOLIC BLOOD PRESSURE < 80 MM HG: ICD-10-PCS | Mod: CPTII,S$GLB,, | Performed by: OBSTETRICS & GYNECOLOGY

## 2020-11-20 PROCEDURE — 3074F SYST BP LT 130 MM HG: CPT | Mod: CPTII,S$GLB,, | Performed by: OBSTETRICS & GYNECOLOGY

## 2020-11-20 PROCEDURE — 3008F BODY MASS INDEX DOCD: CPT | Mod: CPTII,S$GLB,, | Performed by: OBSTETRICS & GYNECOLOGY

## 2020-11-20 PROCEDURE — 99999 PR PBB SHADOW E&M-EST. PATIENT-LVL IV: ICD-10-PCS | Mod: PBBFAC,,, | Performed by: OBSTETRICS & GYNECOLOGY

## 2020-11-20 PROCEDURE — 99999 PR PBB SHADOW E&M-EST. PATIENT-LVL IV: CPT | Mod: PBBFAC,,, | Performed by: OBSTETRICS & GYNECOLOGY

## 2020-11-20 NOTE — PROGRESS NOTES
Subjective:       Patient ID: Renae Hou is a 60 y.o. female.    Chief Complaint:  Annual Exam (hysterectomy)      History of Present Illness  - here for annual. No Gyn complaints.  - planning to have a panniculectomy.    Past Medical History:   Diagnosis Date    Abscess of paraspinous muscles     Allergy 4/2018    DVT (deep venous thrombosis)     left leg    Epidural abscess 4/10/2018    Fatty liver     GERD (gastroesophageal reflux disease) 2015    Hypertension     Lumbar radiculopathy     Menopause     Obesity     Obstructive sleep apnea on CPAP     no longer uses CPAP after weight loss from gastric sleeve    Thyroid disease     Thyroid nodules.       Past Surgical History:   Procedure Laterality Date    ADENOIDECTOMY  1995    BACK SURGERY  2002    L4, L5    BILATERAL SALPINGOOPHORECTOMY      CHOLECYSTECTOMY      CYST REMOVAL      EPIDURAL STEROID INJECTION INTO CERVICAL SPINE N/A 8/11/2020    Procedure: Injection-steroid-epidural-cervical;  Surgeon: [facility] Diagnostic Provider;  Location: [facility] OR [address] [place] [address];  Service: Radiology;  Laterality: N/A;  /Carla    ESOPHAGOGASTRODUODENOSCOPY N/A 5/3/2019    Procedure: ESOPHAGOGASTRODUODENOSCOPY (EGD);  Surgeon: Carlin Sanchez MD;  Location: [facility] ENDO (4TH [facility]);  Service: Endoscopy;  Laterality: N/A;    HYSTERECTOMY  12/17/2012    [facility]     INSERTION OF INFERIOR VENA CAVAL FILTER Right 7/19/2019    Procedure: IVC filter placement;  Surgeon: Rodney Rico MD;  Location: [facility] CATH LAB;  Service: Peripheral Vascular;  Laterality: Right;    IVC FILTER RETRIEVAL N/A 12/20/2019    Procedure: REMOVAL-FILTER-IVC;  Surgeon: Rodney Rico MD;  Location: [facility] OR [address] [place] [address];  Service: Peripheral Vascular;  Laterality: N/A;    LAPAROSCOPIC SLEEVE GASTRECTOMY N/A 7/23/2019    Procedure: GASTRECTOMY, SLEEVE, LAPAROSCOPIC, with intraop EGD 93030;  Surgeon: Duc Ratliff Jr., MD;  Location: [facility] OR [address] [place] [address];  Service: General;   Laterality: N/A;    RECTOCELE REPAIR      SPINE SURGERY  2018    THYROID NODULE REMOVAL      TONSILLECTOMY  1995         Current Outpatient Medications:     diclofenac sodium (VOLTAREN) 1 % Gel, Apply 3-4 times a day as needed., Disp: 1 Tube, Rfl: 3    EPINEPHrine (EPIPEN) 0.3 mg/0.3 mL AtIn, , Disp: , Rfl:     ergocalciferol, vitamin D2, (VITAMIN D ORAL), Take 1 tablet by mouth once daily., Disp: , Rfl:     gabapentin (NEURONTIN) 300 MG capsule, Take 1 capsule (300 mg total) by mouth 3 (three) times daily., Disp: 270 capsule, Rfl: 0    hydroCHLOROthiazide (HYDRODIURIL) 12.5 MG Tab, Take 1 tablet (12.5 mg total) by mouth once daily., Disp: 30 tablet, Rfl: 11    JUBLIA 10 % Javan, , Disp: , Rfl:     meclizine (ANTIVERT) 25 mg tablet, TAKE 1 TABLET(25 MG) BY MOUTH THREE TIMES DAILY AS NEEDED, Disp: 45 tablet, Rfl: 1    NON FORMULARY MEDICATION, 1 patch., Disp: , Rfl:     NON FORMULARY MEDICATION, 1 patch., Disp: , Rfl:     omeprazole (PRILOSEC) 40 MG capsule, Take 1 capsule (40 mg total) by mouth every morning. Open capsule and take with apple sauce (Patient taking differently: Take 40 mg by mouth daily as needed. Open capsule and take with apple sauce), Disp: 30 capsule, Rfl: 2    potassium chloride SA (K-DUR,KLOR-CON) 20 MEQ tablet, Take 1 tablet (20 mEq total) by mouth once daily., Disp: 90 tablet, Rfl: 3    varicella-zoster gE-AS01B, PF, (SHINGRIX, PF,) 50 mcg/0.5 mL injection, Inject into the muscle., Disp: 0.5 mL, Rfl: 1    cyanocobalamin 500 MCG tablet, Take 500 mcg by mouth once daily., Disp: , Rfl:     mometasone (ELOCON) 0.1 % ointment, ALTON EXT AA BID PRF RASH, Disp: , Rfl:     NON FORMULARY MEDICATION, 1 patch by Other route once daily. Apply 1 patch to lower abdomen once daily, Disp: , Rfl:     Review of patient's allergies indicates:   Allergen Reactions    Cathflo activase [alteplase] Anaphylaxis     Tightness in chest, raspy throat, restless legs, hotness all over    Heparin  analogues      Restless legs, tightness in chest, and hot all over    Iodine and iodide containing products Anaphylaxis, Hives, Shortness Of Breath, Itching, Swelling and Rash    Shellfish containing products Anaphylaxis, Hives, Shortness Of Breath, Itching, Swelling and Rash          Latex Swelling             GYN & OB History  Patient's last menstrual period was 2012.   Date of Last Pap: No result found    OB History    Para Term  AB Living   2 2 2     2   SAB TAB Ectopic Multiple Live Births           2      # Outcome Date GA Lbr Dieudonne/2nd Weight Sex Delivery Anes PTL Lv   2 Term      Vag-Spont  N LEXI   1 Term      Vag-Spont  N LEXI       Social History     Socioeconomic History    Marital status:      Spouse name: Not on file    Number of children: Not on file    Years of education: Not on file    Highest education level: Not on file   Occupational History    Not on file   Social Needs    Financial resource strain: Not hard at all    Food insecurity     Worry: Never true     Inability: Never true    Transportation needs     Medical: No     Non-medical: No   Tobacco Use    Smoking status: Former Smoker     Packs/day: 0.25     Years: 32.00     Pack years: 8.00     Types: Cigarettes     Quit date:      Years since quittin.9    Smokeless tobacco: Never Used    Tobacco comment: smoked socially decades ago - weekends only   Substance and Sexual Activity    Alcohol use: No     Frequency: Monthly or less     Drinks per session: 1 or 2     Binge frequency: Never     Comment: yearly at most     Drug use: No    Sexual activity: Not Currently     Partners: Male     Birth control/protection: See Surgical Hx, None     Comment: VINI BS&O 2012,     Lifestyle    Physical activity     Days per week: Patient refused     Minutes per session: 0 min    Stress: Not at all   Relationships    Social connections     Talks on phone: More than three times a week     Gets  "together: Once a week     Attends Mandaen service: Not on file     Active member of club or organization: No     Attends meetings of clubs or organizations: Patient refused     Relationship status:    Other Topics Concern    Not on file   Social History Narrative    . 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       Family History   Problem Relation Age of Onset    Hypertension Mother     Hyperlipidemia Mother     Heart disease Mother 55        cad, valvular heart disease    Alcohol abuse Sister     Cirrhosis Sister         alcoholic cirrhosis    Stroke Father     No Known Problems Daughter     No Known Problems Daughter     Breast cancer Neg Hx     Colon cancer Neg Hx     Ovarian cancer Neg Hx     Diabetes Neg Hx     Esophageal cancer Neg Hx        Review of Systems  Review of Systems   Respiratory: Negative for shortness of breath.    Cardiovascular: Negative for chest pain and palpitations.   Gastrointestinal: Negative for blood in stool, nausea and vomiting.   Genitourinary:        - see HPI   Skin: Negative for rash and wound.   Allergic/Immunologic: Negative for immunocompromised state.   Neurological: Negative for dizziness and syncope.   Hematological: Negative for adenopathy.   Psychiatric/Behavioral: Negative for behavioral problems.        Objective:     Vitals:    11/20/20 1019   BP: 124/78   Weight: 84.6 kg (186 lb 9.9 oz)   Height: 5' 5" (1.651 m)       Physical Exam:   Constitutional: She is oriented to person, place, and time. She appears well-developed and well-nourished.        Pulmonary/Chest: Right breast exhibits no mass, no nipple discharge, no skin change, no tenderness and no swelling. Left breast exhibits no mass, no nipple discharge, no skin change, no tenderness and no swelling. Breasts are symmetrical.        Abdominal: Soft. She exhibits no distension. There is no abdominal tenderness.     Genitourinary:    Vagina normal.   There is no tenderness or lesion on the right labia. There " is no tenderness or lesion on the left labia. Uterus is absent. Right adnexum displays no mass, no tenderness and no fullness. Left adnexum displays no mass, no tenderness and no fullness. Vaginal cuff normal.Cervix exhibits absence. negative for vaginal discharge          Musculoskeletal: Moves all extremeties.       Neurological: She is alert and oriented to person, place, and time.     Psychiatric: She has a normal mood and affect.        Assessment/ Plan:          Renae was seen today for annual exam.    Diagnoses and all orders for this visit:    Encounter for gynecological examination        Follow up in about 1 year (around 11/20/2021) for annual exam.

## 2020-12-07 DIAGNOSIS — M54.12 CERVICAL RADICULOPATHY: Primary | ICD-10-CM

## 2020-12-07 RX ORDER — PREDNISONE 50 MG/1
TABLET ORAL
Qty: 3 TABLET | Refills: 0 | Status: SHIPPED | OUTPATIENT
Start: 2020-12-07 | End: 2021-01-19

## 2020-12-07 RX ORDER — DIPHENHYDRAMINE HCL 50 MG
CAPSULE ORAL
Qty: 1 CAPSULE | Refills: 0 | Status: SHIPPED | OUTPATIENT
Start: 2020-12-07 | End: 2021-01-19

## 2020-12-08 ENCOUNTER — HOSPITAL ENCOUNTER (OUTPATIENT)
Dept: INTERVENTIONAL RADIOLOGY/VASCULAR | Facility: HOSPITAL | Age: 60
Discharge: HOME | End: 2020-12-08
Attending: ORTHOPAEDIC SURGERY
Payer: COMMERCIAL

## 2020-12-08 VITALS
SYSTOLIC BLOOD PRESSURE: 111 MMHG | TEMPERATURE: 98 F | RESPIRATION RATE: 16 BRPM | HEART RATE: 72 BPM | OXYGEN SATURATION: 95 % | HEIGHT: 65 IN | WEIGHT: 185 LBS | BODY MASS INDEX: 30.82 KG/M2 | DIASTOLIC BLOOD PRESSURE: 64 MMHG

## 2020-12-08 DIAGNOSIS — M54.12 CERVICAL RADICULOPATHY: ICD-10-CM

## 2020-12-08 PROCEDURE — 99152 MOD SED SAME PHYS/QHP 5/>YRS: CPT

## 2020-12-08 PROCEDURE — 64479 NJX AA&/STRD TFRM EPI C/T 1: CPT | Mod: 59,LT,, | Performed by: RADIOLOGY

## 2020-12-08 PROCEDURE — 62320 NJX INTERLAMINAR CRV/THRC: CPT

## 2020-12-08 PROCEDURE — 63600175 PHARM REV CODE 636 W HCPCS: Performed by: STUDENT IN AN ORGANIZED HEALTH CARE EDUCATION/TRAINING PROGRAM

## 2020-12-08 PROCEDURE — 64479 NJX AA&/STRD TFRM EPI C/T 1: CPT | Mod: RT,,, | Performed by: RADIOLOGY

## 2020-12-08 PROCEDURE — 99153 MOD SED SAME PHYS/QHP EA: CPT

## 2020-12-08 PROCEDURE — 25500020 PHARM REV CODE 255: Performed by: RADIOLOGY

## 2020-12-08 PROCEDURE — 64479 NJX AA&/STRD TFRM EPI C/T 1: CPT | Mod: 50

## 2020-12-08 PROCEDURE — 63600175 PHARM REV CODE 636 W HCPCS: Performed by: RADIOLOGY

## 2020-12-08 PROCEDURE — 64479 PR INJECT ANES/STEROID FORAMEN CERV/THORACIC W IMG GUIDE ,1 LEVEL: ICD-10-PCS | Mod: 59,LT,, | Performed by: RADIOLOGY

## 2020-12-08 PROCEDURE — 25000003 PHARM REV CODE 250: Performed by: RADIOLOGY

## 2020-12-08 PROCEDURE — 25000003 PHARM REV CODE 250: Performed by: STUDENT IN AN ORGANIZED HEALTH CARE EDUCATION/TRAINING PROGRAM

## 2020-12-08 RX ORDER — MIDAZOLAM HYDROCHLORIDE 1 MG/ML
1 INJECTION INTRAMUSCULAR; INTRAVENOUS
Status: DISCONTINUED | OUTPATIENT
Start: 2020-12-08 | End: 2020-12-09 | Stop reason: HOSPADM

## 2020-12-08 RX ORDER — SODIUM CHLORIDE 0.9 % (FLUSH) 0.9 %
10 SYRINGE (ML) INJECTION
Status: DISCONTINUED | OUTPATIENT
Start: 2020-12-08 | End: 2020-12-09 | Stop reason: HOSPADM

## 2020-12-08 RX ORDER — FENTANYL CITRATE 50 UG/ML
50 INJECTION, SOLUTION INTRAMUSCULAR; INTRAVENOUS
Status: DISCONTINUED | OUTPATIENT
Start: 2020-12-08 | End: 2020-12-09 | Stop reason: HOSPADM

## 2020-12-08 RX ORDER — FENTANYL CITRATE 50 UG/ML
INJECTION, SOLUTION INTRAMUSCULAR; INTRAVENOUS CODE/TRAUMA/SEDATION MEDICATION
Status: COMPLETED | OUTPATIENT
Start: 2020-12-08 | End: 2020-12-08

## 2020-12-08 RX ORDER — LIDOCAINE HYDROCHLORIDE 10 MG/ML
INJECTION INFILTRATION; PERINEURAL CODE/TRAUMA/SEDATION MEDICATION
Status: COMPLETED | OUTPATIENT
Start: 2020-12-08 | End: 2020-12-08

## 2020-12-08 RX ORDER — MIDAZOLAM HYDROCHLORIDE 1 MG/ML
INJECTION INTRAMUSCULAR; INTRAVENOUS CODE/TRAUMA/SEDATION MEDICATION
Status: COMPLETED | OUTPATIENT
Start: 2020-12-08 | End: 2020-12-08

## 2020-12-08 RX ORDER — SODIUM CHLORIDE 9 MG/ML
INJECTION, SOLUTION INTRAVENOUS ONCE
Status: DISCONTINUED | OUTPATIENT
Start: 2020-12-08 | End: 2020-12-09 | Stop reason: HOSPADM

## 2020-12-08 RX ORDER — DEXAMETHASONE SODIUM PHOSPHATE 100 MG/10ML
INJECTION INTRAMUSCULAR; INTRAVENOUS CODE/TRAUMA/SEDATION MEDICATION
Status: COMPLETED | OUTPATIENT
Start: 2020-12-08 | End: 2020-12-08

## 2020-12-08 RX ADMIN — LIDOCAINE HYDROCHLORIDE 4 ML: 10 INJECTION, SOLUTION INFILTRATION; PERINEURAL at 11:12

## 2020-12-08 RX ADMIN — MIDAZOLAM HYDROCHLORIDE 1 MG: 1 INJECTION, SOLUTION INTRAMUSCULAR; INTRAVENOUS at 11:12

## 2020-12-08 RX ADMIN — DEXAMETHASONE SODIUM PHOSPHATE 10 MG: 10 INJECTION INTRAMUSCULAR; INTRAVENOUS at 11:12

## 2020-12-08 RX ADMIN — FENTANYL CITRATE 50 MCG: 50 INJECTION, SOLUTION INTRAMUSCULAR; INTRAVENOUS at 11:12

## 2020-12-08 RX ADMIN — IOHEXOL 4 ML: 180 INJECTION INTRAVENOUS at 11:12

## 2020-12-08 NOTE — DISCHARGE SUMMARY
Radiology Discharge Summary      Hospital Course: No complications    Admit Date: 12/8/2020  Discharge Date: 12/08/2020     Instructions Given to Patient: Yes  Diet: Resume prior diet  Activity: activity as tolerated    Description of Condition on Discharge: Stable  Vital Signs (Most Recent): Temp: 97.6 °F (36.4 °C) (12/08/20 1016)  Pulse: 68 (12/08/20 1140)  Resp: 15 (12/08/20 1140)  BP: 118/79 (12/08/20 1140)  SpO2: 97 % (12/08/20 1140)    Discharge Disposition: Home    Discharge Diagnosis: Cervical radiculopathy     Follow-up: with Dr. Marquez Dhillon MD  NeuroIR fellow.

## 2020-12-08 NOTE — DISCHARGE INSTRUCTIONS
"For scheduling: Call Lenka at [phone]    After hours and weekends: Call [phone] and ask for "Radiology Resident on Call'    For immediate concerns that are not emergent, you may call our radiology clinic at [phone]  "

## 2020-12-08 NOTE — PLAN OF CARE
Pt arrived to IR4 for C6-7 NEERAJ. Pt oriented to unit and staff. Plan of care reviewed with patient, patient verbalizes understanding. Comfort measures utilized. Pt safely transferred from stretcher to procedural table. Fall risk reviewed with patient, fall risk interventions maintained. Safety strap applied, positioner pillows utilized to minimize pressure points. Blankets applied. Pt prepped and draped utilizing standard sterile technique. Patient placed on continuous monitoring, as required by sedation policy. Timeouts completed utilizing standard universal time-out, per department and facility policy. RN to remain at bedside, continuous monitoring maintained. Pt resting comfortably. Denies pain/discomfort. Will continue to monitor. See flow sheets for monitoring, medication administration, and updates.

## 2020-12-08 NOTE — PLAN OF CARE
Procedure completed, pt tolerated well. Pt to recover in ROCU. Report given at the bedside. Sie CDI.

## 2020-12-08 NOTE — PROGRESS NOTES
Pt arrived to [facility] B[facility] 6 via stretcher on RA, pt alert, report received from Roger RN, bilateral neck dressings CDI, no bleeding, no hematoma noted, pt denies any pain or SOB, DC in 1hr @ 1245

## 2020-12-08 NOTE — PROCEDURES
Radiology Post-Procedure Note    Pre Op Diagnosis: LBP    Post Op Diagnosis: Same    Procedure: Cervical NEERAJ    Procedure performed by: Duc Aguirre MD    Written Informed Consent Obtained: Yes    Specimen Removed: NO    Estimated Blood Loss: Minimal    Findings: Osteophyte at R C4 - 5 NEURAL foramen    Level injected: bilateral C4 - 5 TF NEERAJ  Needle used: 22 gauge  Dose:  20 mg Dexamethasone   3 mL Lidocaine 1% MPF    Patient tolerated procedure well.    Venu Dhiloln MD  NeuroIR fellow.

## 2020-12-08 NOTE — NURSING
Recovery complete. Pt tolerated well. Dressings clean, dry, intact, no bleeding, no hematoma. No c/o pain. Pt given site care and discharge instructions. Pt verbalized understanding. Pt dressed and ambulated independently. Pt to DC home in care of . Pt will be escorted to main entrance in wheelchair in care of transport.

## 2020-12-14 ENCOUNTER — TELEPHONE (OUTPATIENT)
Dept: PLASTIC SURGERY | Facility: CLINIC | Age: 60
End: 2020-12-14

## 2020-12-16 ENCOUNTER — OFFICE VISIT (OUTPATIENT)
Dept: PLASTIC SURGERY | Facility: CLINIC | Age: 60
End: 2020-12-16
Payer: COMMERCIAL

## 2020-12-16 VITALS
DIASTOLIC BLOOD PRESSURE: 78 MMHG | BODY MASS INDEX: 30.81 KG/M2 | WEIGHT: 184.94 LBS | HEART RATE: 78 BPM | SYSTOLIC BLOOD PRESSURE: 133 MMHG | HEIGHT: 65 IN

## 2020-12-16 DIAGNOSIS — R21 EXCORIATED RASH: ICD-10-CM

## 2020-12-16 DIAGNOSIS — E65 PANNUS, ABDOMINAL: Primary | ICD-10-CM

## 2020-12-16 PROCEDURE — 99999 PR PBB SHADOW E&M-EST. PATIENT-LVL IV: ICD-10-PCS | Mod: PBBFAC,,, | Performed by: SURGERY

## 2020-12-16 PROCEDURE — 3008F BODY MASS INDEX DOCD: CPT | Mod: CPTII,S$GLB,, | Performed by: SURGERY

## 2020-12-16 PROCEDURE — 3008F PR BODY MASS INDEX (BMI) DOCUMENTED: ICD-10-PCS | Mod: CPTII,S$GLB,, | Performed by: SURGERY

## 2020-12-16 PROCEDURE — 99203 OFFICE O/P NEW LOW 30 MIN: CPT | Mod: S$GLB,,, | Performed by: SURGERY

## 2020-12-16 PROCEDURE — 1126F PR PAIN SEVERITY QUANTIFIED, NO PAIN PRESENT: ICD-10-PCS | Mod: S$GLB,,, | Performed by: SURGERY

## 2020-12-16 PROCEDURE — 3078F PR MOST RECENT DIASTOLIC BLOOD PRESSURE < 80 MM HG: ICD-10-PCS | Mod: CPTII,S$GLB,, | Performed by: SURGERY

## 2020-12-16 PROCEDURE — 99999 PR PBB SHADOW E&M-EST. PATIENT-LVL IV: CPT | Mod: PBBFAC,,, | Performed by: SURGERY

## 2020-12-16 PROCEDURE — 99203 PR OFFICE/OUTPT VISIT, NEW, LEVL III, 30-44 MIN: ICD-10-PCS | Mod: S$GLB,,, | Performed by: SURGERY

## 2020-12-16 PROCEDURE — 1126F AMNT PAIN NOTED NONE PRSNT: CPT | Mod: S$GLB,,, | Performed by: SURGERY

## 2020-12-16 PROCEDURE — 3078F DIAST BP <80 MM HG: CPT | Mod: CPTII,S$GLB,, | Performed by: SURGERY

## 2020-12-16 PROCEDURE — 3075F SYST BP GE 130 - 139MM HG: CPT | Mod: CPTII,S$GLB,, | Performed by: SURGERY

## 2020-12-16 PROCEDURE — 3075F PR MOST RECENT SYSTOLIC BLOOD PRESS GE 130-139MM HG: ICD-10-PCS | Mod: CPTII,S$GLB,, | Performed by: SURGERY

## 2020-12-22 ENCOUNTER — PATIENT OUTREACH (OUTPATIENT)
Dept: OTHER | Facility: OTHER | Age: 60
End: 2020-12-22

## 2020-12-23 NOTE — PROGRESS NOTES
Digital Medicine: Clinician Follow-Up    Called patient to address low BP alert. She says that when BP was low she felt really tired. She says that she had an episode of slight chest pain that went straight through to her back. She says the pain didn't last long and felt similar to the pain when she had a gallbladder attack        The history is provided by the patient.      Review of patient's allergies indicates:   -- Cathflo activase (alteplase) -- Anaphylaxis    --  Tightness in chest, raspy throat, restless legs,             hotness all over   -- Heparin analogues     --  Restless legs, tightness in chest, and hot all             over   -- Iodine and iodide containing products -- Anaphylaxis, Hives, Shortness Of                            Breath, Itching, Swelling and Rash   -- Shellfish containing products -- Anaphylaxis, Hives, Shortness Of                            Breath, Itching, Swelling and Rash    --     -- Latex -- Swelling    --    Follow-up reason(s): Alert received.   Care Team received low BP alert.      Hypertension    Readings are trending down         Last 5 Patient Entered Readings                                      Current 30 Day Average: 100/63     Recent Readings 12/22/2020 12/21/2020 12/20/2020 12/19/2020 12/19/2020    SBP (mmHg) 108 96 96 84 88    DBP (mmHg) 71 62 62 51 50    Pulse 62 58 64 64 67                 Depression Screening  Did not address depression screening.    Sleep Apnea Screening    Did not address sleep apnea screening.     Medication Affordability Screening  Did not address medication affordability screening.     Medication Adherence-Medication adherence was asssessed.  Patient continue taking medication as prescribed.            ASSESSMENT(S)  Patients BP average is 100/63 mmHg, which is at goal. Patient's BP goal is less than or equal to 130/80.    Hypertension Plan  Hypertension Medication Change. Stop HCTZ daily for BP. Discuss taking prn for edema with  PCP  Discuss episode of chest pain/ discomfort with PCP     Addressed patient questions and patient has my contact information if needed prior to next outreach. Patient verbalizes understanding.             There are no preventive care reminders to display for this patient.  There are no preventive care reminders to display for this patient.      Hypertension Medications     hydroCHLOROthiazide (HYDRODIURIL) 12.5 MG Tab Take 1 tablet (12.5 mg total) by mouth once daily.

## 2021-01-08 ENCOUNTER — PATIENT MESSAGE (OUTPATIENT)
Dept: INTERNAL MEDICINE | Facility: CLINIC | Age: 61
End: 2021-01-08

## 2021-01-19 ENCOUNTER — PATIENT MESSAGE (OUTPATIENT)
Dept: BARIATRICS | Facility: CLINIC | Age: 61
End: 2021-01-19

## 2021-01-19 ENCOUNTER — OFFICE VISIT (OUTPATIENT)
Dept: INTERNAL MEDICINE | Facility: CLINIC | Age: 61
End: 2021-01-19
Payer: COMMERCIAL

## 2021-01-19 ENCOUNTER — LAB VISIT (OUTPATIENT)
Dept: LAB | Facility: HOSPITAL | Age: 61
End: 2021-01-19
Payer: COMMERCIAL

## 2021-01-19 VITALS
HEART RATE: 67 BPM | OXYGEN SATURATION: 98 % | WEIGHT: 186.75 LBS | TEMPERATURE: 98 F | HEIGHT: 65 IN | SYSTOLIC BLOOD PRESSURE: 132 MMHG | DIASTOLIC BLOOD PRESSURE: 88 MMHG | BODY MASS INDEX: 31.11 KG/M2

## 2021-01-19 DIAGNOSIS — I95.9 HYPOTENSION, UNSPECIFIED HYPOTENSION TYPE: ICD-10-CM

## 2021-01-19 DIAGNOSIS — K64.9 HEMORRHOIDS, UNSPECIFIED HEMORRHOID TYPE: ICD-10-CM

## 2021-01-19 DIAGNOSIS — K59.09 CHRONIC CONSTIPATION: ICD-10-CM

## 2021-01-19 DIAGNOSIS — R53.83 FATIGUE, UNSPECIFIED TYPE: ICD-10-CM

## 2021-01-19 DIAGNOSIS — R39.198 DIFFICULTY IN URINATION: ICD-10-CM

## 2021-01-19 DIAGNOSIS — Z87.898 HISTORY OF PERIPHERAL EDEMA: ICD-10-CM

## 2021-01-19 LAB
BASOPHILS # BLD AUTO: 0.06 K/UL (ref 0–0.2)
BASOPHILS NFR BLD: 0.8 % (ref 0–1.9)
BILIRUB UR QL STRIP: NEGATIVE
CLARITY UR REFRACT.AUTO: CLEAR
COLOR UR AUTO: YELLOW
DIFFERENTIAL METHOD: NORMAL
EOSINOPHIL # BLD AUTO: 0.4 K/UL (ref 0–0.5)
EOSINOPHIL NFR BLD: 4.7 % (ref 0–8)
ERYTHROCYTE [DISTWIDTH] IN BLOOD BY AUTOMATED COUNT: 13.2 % (ref 11.5–14.5)
GLUCOSE UR QL STRIP: NEGATIVE
HCT VFR BLD AUTO: 42.9 % (ref 37–48.5)
HGB BLD-MCNC: 13.9 G/DL (ref 12–16)
HGB UR QL STRIP: NEGATIVE
IMM GRANULOCYTES # BLD AUTO: 0.01 K/UL (ref 0–0.04)
IMM GRANULOCYTES NFR BLD AUTO: 0.1 % (ref 0–0.5)
KETONES UR QL STRIP: NEGATIVE
LEUKOCYTE ESTERASE UR QL STRIP: NEGATIVE
LYMPHOCYTES # BLD AUTO: 3.4 K/UL (ref 1–4.8)
LYMPHOCYTES NFR BLD: 45.4 % (ref 18–48)
MCH RBC QN AUTO: 30.1 PG (ref 27–31)
MCHC RBC AUTO-ENTMCNC: 32.4 G/DL (ref 32–36)
MCV RBC AUTO: 93 FL (ref 82–98)
MONOCYTES # BLD AUTO: 0.5 K/UL (ref 0.3–1)
MONOCYTES NFR BLD: 6.3 % (ref 4–15)
NEUTROPHILS # BLD AUTO: 3.2 K/UL (ref 1.8–7.7)
NEUTROPHILS NFR BLD: 42.7 % (ref 38–73)
NITRITE UR QL STRIP: NEGATIVE
NRBC BLD-RTO: 0 /100 WBC
PH UR STRIP: 5 [PH] (ref 5–8)
PLATELET # BLD AUTO: 261 K/UL (ref 150–350)
PMV BLD AUTO: 10.2 FL (ref 9.2–12.9)
PROT UR QL STRIP: NEGATIVE
RBC # BLD AUTO: 4.62 M/UL (ref 4–5.4)
SP GR UR STRIP: 1.01 (ref 1–1.03)
URN SPEC COLLECT METH UR: NORMAL
WBC # BLD AUTO: 7.49 K/UL (ref 3.9–12.7)

## 2021-01-19 PROCEDURE — 99999 PR PBB SHADOW E&M-EST. PATIENT-LVL V: ICD-10-PCS | Mod: PBBFAC,,, | Performed by: FAMILY MEDICINE

## 2021-01-19 PROCEDURE — 3075F SYST BP GE 130 - 139MM HG: CPT | Mod: CPTII,S$GLB,, | Performed by: FAMILY MEDICINE

## 2021-01-19 PROCEDURE — 81003 URINALYSIS AUTO W/O SCOPE: CPT

## 2021-01-19 PROCEDURE — 99214 OFFICE O/P EST MOD 30 MIN: CPT | Mod: S$GLB,,, | Performed by: FAMILY MEDICINE

## 2021-01-19 PROCEDURE — 3079F PR MOST RECENT DIASTOLIC BLOOD PRESSURE 80-89 MM HG: ICD-10-PCS | Mod: CPTII,S$GLB,, | Performed by: FAMILY MEDICINE

## 2021-01-19 PROCEDURE — 3008F BODY MASS INDEX DOCD: CPT | Mod: CPTII,S$GLB,, | Performed by: FAMILY MEDICINE

## 2021-01-19 PROCEDURE — 3008F PR BODY MASS INDEX (BMI) DOCUMENTED: ICD-10-PCS | Mod: CPTII,S$GLB,, | Performed by: FAMILY MEDICINE

## 2021-01-19 PROCEDURE — 87086 URINE CULTURE/COLONY COUNT: CPT

## 2021-01-19 PROCEDURE — 3075F PR MOST RECENT SYSTOLIC BLOOD PRESS GE 130-139MM HG: ICD-10-PCS | Mod: CPTII,S$GLB,, | Performed by: FAMILY MEDICINE

## 2021-01-19 PROCEDURE — 36415 COLL VENOUS BLD VENIPUNCTURE: CPT

## 2021-01-19 PROCEDURE — 99999 PR PBB SHADOW E&M-EST. PATIENT-LVL V: CPT | Mod: PBBFAC,,, | Performed by: FAMILY MEDICINE

## 2021-01-19 PROCEDURE — 99214 PR OFFICE/OUTPT VISIT, EST, LEVL IV, 30-39 MIN: ICD-10-PCS | Mod: S$GLB,,, | Performed by: FAMILY MEDICINE

## 2021-01-19 PROCEDURE — 84443 ASSAY THYROID STIM HORMONE: CPT

## 2021-01-19 PROCEDURE — 85025 COMPLETE CBC W/AUTO DIFF WBC: CPT

## 2021-01-19 PROCEDURE — 1126F PR PAIN SEVERITY QUANTIFIED, NO PAIN PRESENT: ICD-10-PCS | Mod: S$GLB,,, | Performed by: FAMILY MEDICINE

## 2021-01-19 PROCEDURE — 3079F DIAST BP 80-89 MM HG: CPT | Mod: CPTII,S$GLB,, | Performed by: FAMILY MEDICINE

## 2021-01-19 PROCEDURE — 1126F AMNT PAIN NOTED NONE PRSNT: CPT | Mod: S$GLB,,, | Performed by: FAMILY MEDICINE

## 2021-01-19 PROCEDURE — 80053 COMPREHEN METABOLIC PANEL: CPT

## 2021-01-19 RX ORDER — HYDROCHLOROTHIAZIDE 12.5 MG/1
TABLET ORAL
Qty: 30 TABLET | Refills: 11
Start: 2021-01-19 | End: 2021-08-11

## 2021-01-20 LAB
ALBUMIN SERPL BCP-MCNC: 4.1 G/DL (ref 3.5–5.2)
ALP SERPL-CCNC: 83 U/L (ref 55–135)
ALT SERPL W/O P-5'-P-CCNC: 19 U/L (ref 10–44)
ANION GAP SERPL CALC-SCNC: 9 MMOL/L (ref 8–16)
AST SERPL-CCNC: 23 U/L (ref 10–40)
BILIRUB SERPL-MCNC: 0.3 MG/DL (ref 0.1–1)
BUN SERPL-MCNC: 28 MG/DL (ref 6–20)
CALCIUM SERPL-MCNC: 9.4 MG/DL (ref 8.7–10.5)
CHLORIDE SERPL-SCNC: 106 MMOL/L (ref 95–110)
CO2 SERPL-SCNC: 26 MMOL/L (ref 23–29)
CREAT SERPL-MCNC: 0.8 MG/DL (ref 0.5–1.4)
EST. GFR  (AFRICAN AMERICAN): >60 ML/MIN/1.73 M^2
EST. GFR  (NON AFRICAN AMERICAN): >60 ML/MIN/1.73 M^2
GLUCOSE SERPL-MCNC: 84 MG/DL (ref 70–110)
POTASSIUM SERPL-SCNC: 4.2 MMOL/L (ref 3.5–5.1)
PROT SERPL-MCNC: 7.1 G/DL (ref 6–8.4)
SODIUM SERPL-SCNC: 141 MMOL/L (ref 136–145)
TSH SERPL DL<=0.005 MIU/L-ACNC: 1.33 UIU/ML (ref 0.4–4)

## 2021-01-21 LAB — BACTERIA UR CULT: NO GROWTH

## 2021-01-21 NOTE — PROGRESS NOTES
Patient saw PCP on 1/19/21 and discussed hypotension and fatigue. He directed her to take HCTZ 12.5 mg prn for sever swelling and check BP prior to taking HCTZ. Avoid HCTZ dose if BP <90/60.     Reviewed CMP on 1/19/21    Will push back outreach date for follow up

## 2021-01-22 ENCOUNTER — PATIENT MESSAGE (OUTPATIENT)
Dept: ADMINISTRATIVE | Facility: OTHER | Age: 61
End: 2021-01-22

## 2021-01-22 ENCOUNTER — OFFICE VISIT (OUTPATIENT)
Dept: SURGERY | Facility: CLINIC | Age: 61
End: 2021-01-22
Payer: COMMERCIAL

## 2021-01-22 VITALS
WEIGHT: 186.5 LBS | HEIGHT: 67 IN | DIASTOLIC BLOOD PRESSURE: 83 MMHG | BODY MASS INDEX: 29.27 KG/M2 | HEART RATE: 78 BPM | SYSTOLIC BLOOD PRESSURE: 119 MMHG

## 2021-01-22 DIAGNOSIS — K64.9 HEMORRHOIDS, UNSPECIFIED HEMORRHOID TYPE: ICD-10-CM

## 2021-01-22 DIAGNOSIS — K59.09 CHRONIC CONSTIPATION: ICD-10-CM

## 2021-01-22 DIAGNOSIS — Z98.890 HISTORY OF REPAIR OF RECTOCELE: Primary | ICD-10-CM

## 2021-01-22 PROCEDURE — 3008F PR BODY MASS INDEX (BMI) DOCUMENTED: ICD-10-PCS | Mod: CPTII,S$GLB,, | Performed by: NURSE PRACTITIONER

## 2021-01-22 PROCEDURE — 46600 PR DIAG2STIC A2SCOPY: ICD-10-PCS | Mod: S$GLB,,, | Performed by: NURSE PRACTITIONER

## 2021-01-22 PROCEDURE — 99999 PR PBB SHADOW E&M-EST. PATIENT-LVL V: CPT | Mod: PBBFAC,,, | Performed by: NURSE PRACTITIONER

## 2021-01-22 PROCEDURE — 3074F PR MOST RECENT SYSTOLIC BLOOD PRESSURE < 130 MM HG: ICD-10-PCS | Mod: CPTII,S$GLB,, | Performed by: NURSE PRACTITIONER

## 2021-01-22 PROCEDURE — 3008F BODY MASS INDEX DOCD: CPT | Mod: CPTII,S$GLB,, | Performed by: NURSE PRACTITIONER

## 2021-01-22 PROCEDURE — 3074F SYST BP LT 130 MM HG: CPT | Mod: CPTII,S$GLB,, | Performed by: NURSE PRACTITIONER

## 2021-01-22 PROCEDURE — 46600 DIAGNOSTIC ANOSCOPY SPX: CPT | Mod: S$GLB,,, | Performed by: NURSE PRACTITIONER

## 2021-01-22 PROCEDURE — 3079F PR MOST RECENT DIASTOLIC BLOOD PRESSURE 80-89 MM HG: ICD-10-PCS | Mod: CPTII,S$GLB,, | Performed by: NURSE PRACTITIONER

## 2021-01-22 PROCEDURE — 99999 PR PBB SHADOW E&M-EST. PATIENT-LVL V: ICD-10-PCS | Mod: PBBFAC,,, | Performed by: NURSE PRACTITIONER

## 2021-01-22 PROCEDURE — 99204 OFFICE O/P NEW MOD 45 MIN: CPT | Mod: 25,S$GLB,, | Performed by: NURSE PRACTITIONER

## 2021-01-22 PROCEDURE — 1126F AMNT PAIN NOTED NONE PRSNT: CPT | Mod: S$GLB,,, | Performed by: NURSE PRACTITIONER

## 2021-01-22 PROCEDURE — 1126F PR PAIN SEVERITY QUANTIFIED, NO PAIN PRESENT: ICD-10-PCS | Mod: S$GLB,,, | Performed by: NURSE PRACTITIONER

## 2021-01-22 PROCEDURE — 3079F DIAST BP 80-89 MM HG: CPT | Mod: CPTII,S$GLB,, | Performed by: NURSE PRACTITIONER

## 2021-01-22 PROCEDURE — 99204 PR OFFICE/OUTPT VISIT, NEW, LEVL IV, 45-59 MIN: ICD-10-PCS | Mod: 25,S$GLB,, | Performed by: NURSE PRACTITIONER

## 2021-01-22 RX ORDER — HYDROCORTISONE 25 MG/G
CREAM TOPICAL 2 TIMES DAILY
Qty: 28 G | Refills: 2 | Status: SHIPPED | OUTPATIENT
Start: 2021-01-22 | End: 2021-05-14

## 2021-02-02 DIAGNOSIS — Z00.00 ROUTINE GENERAL MEDICAL EXAMINATION AT A HEALTH CARE FACILITY: Primary | ICD-10-CM

## 2021-02-04 ENCOUNTER — PATIENT MESSAGE (OUTPATIENT)
Dept: ORTHOPEDICS | Facility: CLINIC | Age: 61
End: 2021-02-04

## 2021-02-08 ENCOUNTER — TELEPHONE (OUTPATIENT)
Dept: SPORTS MEDICINE | Facility: CLINIC | Age: 61
End: 2021-02-08

## 2021-02-15 ENCOUNTER — PATIENT MESSAGE (OUTPATIENT)
Dept: ORTHOPEDICS | Facility: CLINIC | Age: 61
End: 2021-02-15

## 2021-02-15 RX ORDER — MELOXICAM 7.5 MG/1
15 TABLET ORAL DAILY
Qty: 30 TABLET | Refills: 0 | Status: SHIPPED | OUTPATIENT
Start: 2021-02-15 | End: 2022-04-06

## 2021-02-17 ENCOUNTER — OFFICE VISIT (OUTPATIENT)
Dept: SPORTS MEDICINE | Facility: CLINIC | Age: 61
End: 2021-02-17
Payer: COMMERCIAL

## 2021-02-17 VITALS
HEIGHT: 67 IN | BODY MASS INDEX: 29.19 KG/M2 | DIASTOLIC BLOOD PRESSURE: 80 MMHG | SYSTOLIC BLOOD PRESSURE: 126 MMHG | WEIGHT: 186 LBS | HEART RATE: 79 BPM

## 2021-02-17 DIAGNOSIS — M25.552 LATERAL PAIN OF LEFT HIP: Primary | ICD-10-CM

## 2021-02-17 PROCEDURE — 1125F PR PAIN SEVERITY QUANTIFIED, PAIN PRESENT: ICD-10-PCS | Mod: S$GLB,,, | Performed by: ORTHOPAEDIC SURGERY

## 2021-02-17 PROCEDURE — 99213 PR OFFICE/OUTPT VISIT, EST, LEVL III, 20-29 MIN: ICD-10-PCS | Mod: S$GLB,,, | Performed by: ORTHOPAEDIC SURGERY

## 2021-02-17 PROCEDURE — 99999 PR PBB SHADOW E&M-EST. PATIENT-LVL V: CPT | Mod: PBBFAC,,, | Performed by: ORTHOPAEDIC SURGERY

## 2021-02-17 PROCEDURE — 3008F BODY MASS INDEX DOCD: CPT | Mod: CPTII,S$GLB,, | Performed by: ORTHOPAEDIC SURGERY

## 2021-02-17 PROCEDURE — 3008F PR BODY MASS INDEX (BMI) DOCUMENTED: ICD-10-PCS | Mod: CPTII,S$GLB,, | Performed by: ORTHOPAEDIC SURGERY

## 2021-02-17 PROCEDURE — 3079F PR MOST RECENT DIASTOLIC BLOOD PRESSURE 80-89 MM HG: ICD-10-PCS | Mod: CPTII,S$GLB,, | Performed by: ORTHOPAEDIC SURGERY

## 2021-02-17 PROCEDURE — 99999 PR PBB SHADOW E&M-EST. PATIENT-LVL V: ICD-10-PCS | Mod: PBBFAC,,, | Performed by: ORTHOPAEDIC SURGERY

## 2021-02-17 PROCEDURE — 3074F PR MOST RECENT SYSTOLIC BLOOD PRESSURE < 130 MM HG: ICD-10-PCS | Mod: CPTII,S$GLB,, | Performed by: ORTHOPAEDIC SURGERY

## 2021-02-17 PROCEDURE — 3079F DIAST BP 80-89 MM HG: CPT | Mod: CPTII,S$GLB,, | Performed by: ORTHOPAEDIC SURGERY

## 2021-02-17 PROCEDURE — 1125F AMNT PAIN NOTED PAIN PRSNT: CPT | Mod: S$GLB,,, | Performed by: ORTHOPAEDIC SURGERY

## 2021-02-17 PROCEDURE — 3074F SYST BP LT 130 MM HG: CPT | Mod: CPTII,S$GLB,, | Performed by: ORTHOPAEDIC SURGERY

## 2021-02-17 PROCEDURE — 99213 OFFICE O/P EST LOW 20 MIN: CPT | Mod: S$GLB,,, | Performed by: ORTHOPAEDIC SURGERY

## 2021-02-18 ENCOUNTER — TELEPHONE (OUTPATIENT)
Dept: SPORTS MEDICINE | Facility: CLINIC | Age: 61
End: 2021-02-18

## 2021-02-18 ENCOUNTER — PATIENT MESSAGE (OUTPATIENT)
Dept: SPORTS MEDICINE | Facility: CLINIC | Age: 61
End: 2021-02-18

## 2021-02-18 ENCOUNTER — TELEPHONE (OUTPATIENT)
Dept: PAIN MEDICINE | Facility: OTHER | Age: 61
End: 2021-02-18

## 2021-02-18 DIAGNOSIS — M25.552 LEFT HIP PAIN: Primary | ICD-10-CM

## 2021-02-19 ENCOUNTER — TELEPHONE (OUTPATIENT)
Dept: PAIN MEDICINE | Facility: OTHER | Age: 61
End: 2021-02-19

## 2021-02-19 ENCOUNTER — PATIENT MESSAGE (OUTPATIENT)
Dept: PAIN MEDICINE | Facility: OTHER | Age: 61
End: 2021-02-19

## 2021-02-19 DIAGNOSIS — M25.552 LATERAL PAIN OF LEFT HIP: Primary | ICD-10-CM

## 2021-02-23 ENCOUNTER — CLINICAL SUPPORT (OUTPATIENT)
Dept: REHABILITATION | Facility: HOSPITAL | Age: 61
End: 2021-02-23
Payer: COMMERCIAL

## 2021-02-23 DIAGNOSIS — M25.552 LATERAL PAIN OF LEFT HIP: ICD-10-CM

## 2021-02-23 PROCEDURE — 97161 PT EVAL LOW COMPLEX 20 MIN: CPT

## 2021-02-23 PROCEDURE — 97110 THERAPEUTIC EXERCISES: CPT

## 2021-02-26 ENCOUNTER — CLINICAL SUPPORT (OUTPATIENT)
Dept: REHABILITATION | Facility: HOSPITAL | Age: 61
End: 2021-02-26
Payer: COMMERCIAL

## 2021-02-26 ENCOUNTER — CLINICAL SUPPORT (OUTPATIENT)
Dept: BARIATRICS | Facility: CLINIC | Age: 61
End: 2021-02-26
Payer: COMMERCIAL

## 2021-02-26 VITALS — WEIGHT: 183.44 LBS | BODY MASS INDEX: 28.73 KG/M2

## 2021-02-26 DIAGNOSIS — R53.81 PHYSICAL DECONDITIONING: ICD-10-CM

## 2021-02-26 DIAGNOSIS — I10 ESSENTIAL HYPERTENSION: ICD-10-CM

## 2021-02-26 DIAGNOSIS — Z98.84 S/P LAPAROSCOPIC SLEEVE GASTRECTOMY: ICD-10-CM

## 2021-02-26 DIAGNOSIS — E66.3 OVERWEIGHT (BMI 25.0-29.9): ICD-10-CM

## 2021-02-26 DIAGNOSIS — M62.81 MUSCLE WEAKNESS: ICD-10-CM

## 2021-02-26 DIAGNOSIS — G47.33 OBSTRUCTIVE SLEEP APNEA ON CPAP: ICD-10-CM

## 2021-02-26 DIAGNOSIS — Z71.3 DIETARY COUNSELING AND SURVEILLANCE: ICD-10-CM

## 2021-02-26 PROBLEM — G89.29 HIP PAIN, CHRONIC, LEFT: Status: ACTIVE | Noted: 2020-06-15

## 2021-02-26 PROCEDURE — 97803 PR MED NUTR THER, SUBSQ, INDIV, EA 15 MIN: ICD-10-PCS | Mod: S$GLB,,, | Performed by: DIETITIAN, REGISTERED

## 2021-02-26 PROCEDURE — 97110 THERAPEUTIC EXERCISES: CPT

## 2021-02-26 PROCEDURE — 99499 NO LOS: ICD-10-PCS | Mod: S$GLB,,, | Performed by: DIETITIAN, REGISTERED

## 2021-02-26 PROCEDURE — 99999 PR PBB SHADOW E&M-EST. PATIENT-LVL II: CPT | Mod: PBBFAC,,, | Performed by: DIETITIAN, REGISTERED

## 2021-02-26 PROCEDURE — 99999 PR PBB SHADOW E&M-EST. PATIENT-LVL II: ICD-10-PCS | Mod: PBBFAC,,, | Performed by: DIETITIAN, REGISTERED

## 2021-02-26 PROCEDURE — 97803 MED NUTRITION INDIV SUBSEQ: CPT | Mod: S$GLB,,, | Performed by: DIETITIAN, REGISTERED

## 2021-02-26 PROCEDURE — 99499 UNLISTED E&M SERVICE: CPT | Mod: S$GLB,,, | Performed by: DIETITIAN, REGISTERED

## 2021-02-26 PROCEDURE — 97112 NEUROMUSCULAR REEDUCATION: CPT

## 2021-03-01 ENCOUNTER — HOSPITAL ENCOUNTER (OUTPATIENT)
Facility: OTHER | Age: 61
Discharge: HOME | End: 2021-03-01
Attending: ANESTHESIOLOGY | Admitting: ANESTHESIOLOGY
Payer: COMMERCIAL

## 2021-03-01 VITALS
OXYGEN SATURATION: 98 % | TEMPERATURE: 98 F | RESPIRATION RATE: 16 BRPM | HEART RATE: 60 BPM | SYSTOLIC BLOOD PRESSURE: 130 MMHG | DIASTOLIC BLOOD PRESSURE: 83 MMHG

## 2021-03-01 DIAGNOSIS — G89.29 CHRONIC PAIN: ICD-10-CM

## 2021-03-01 DIAGNOSIS — G89.29 CHRONIC HIP PAIN, UNSPECIFIED LATERALITY: ICD-10-CM

## 2021-03-01 DIAGNOSIS — M16.10 PRIMARY OSTEOARTHRITIS OF HIP, UNSPECIFIED LATERALITY: Primary | ICD-10-CM

## 2021-03-01 DIAGNOSIS — M25.559 CHRONIC HIP PAIN, UNSPECIFIED LATERALITY: ICD-10-CM

## 2021-03-01 PROCEDURE — 77002 PR FLUOROSCOPIC GUIDANCE NEEDLE PLACEMENT: ICD-10-PCS | Mod: 26,,, | Performed by: ANESTHESIOLOGY

## 2021-03-01 PROCEDURE — 20610 DRAIN/INJ JOINT/BURSA W/O US: CPT | Mod: LT | Performed by: ANESTHESIOLOGY

## 2021-03-01 PROCEDURE — 77002 NEEDLE LOCALIZATION BY XRAY: CPT | Mod: 26,,, | Performed by: ANESTHESIOLOGY

## 2021-03-01 PROCEDURE — 25000003 PHARM REV CODE 250: Performed by: ANESTHESIOLOGY

## 2021-03-01 PROCEDURE — 77002 NEEDLE LOCALIZATION BY XRAY: CPT | Mod: 26 | Performed by: ANESTHESIOLOGY

## 2021-03-01 PROCEDURE — 25500020 PHARM REV CODE 255: Performed by: ANESTHESIOLOGY

## 2021-03-01 PROCEDURE — 20610 DRAIN/INJ JOINT/BURSA W/O US: CPT | Mod: LT,,, | Performed by: ANESTHESIOLOGY

## 2021-03-01 PROCEDURE — 20610 PR DRAIN/INJECT LARGE JOINT/BURSA: ICD-10-PCS | Mod: LT,,, | Performed by: ANESTHESIOLOGY

## 2021-03-01 PROCEDURE — 63600175 PHARM REV CODE 636 W HCPCS: Performed by: ANESTHESIOLOGY

## 2021-03-01 RX ORDER — BUPIVACAINE HYDROCHLORIDE 2.5 MG/ML
INJECTION, SOLUTION EPIDURAL; INFILTRATION; INTRACAUDAL
Status: DISCONTINUED | OUTPATIENT
Start: 2021-03-01 | End: 2021-03-01 | Stop reason: HOSPADM

## 2021-03-01 RX ORDER — ALPRAZOLAM 0.5 MG/1
1 TABLET ORAL
Status: DISCONTINUED | OUTPATIENT
Start: 2021-03-01 | End: 2021-03-01 | Stop reason: HOSPADM

## 2021-03-01 RX ORDER — DIPHENHYDRAMINE HYDROCHLORIDE 50 MG/ML
25 INJECTION INTRAMUSCULAR; INTRAVENOUS ONCE
Status: DISCONTINUED | OUTPATIENT
Start: 2021-03-01 | End: 2021-03-01

## 2021-03-01 RX ORDER — SODIUM CHLORIDE 9 MG/ML
INJECTION, SOLUTION INTRAVENOUS CONTINUOUS
Status: DISCONTINUED | OUTPATIENT
Start: 2021-03-01 | End: 2021-03-01 | Stop reason: HOSPADM

## 2021-03-01 RX ORDER — LIDOCAINE HYDROCHLORIDE 10 MG/ML
INJECTION INFILTRATION; PERINEURAL
Status: DISCONTINUED | OUTPATIENT
Start: 2021-03-01 | End: 2021-03-01 | Stop reason: HOSPADM

## 2021-03-01 RX ORDER — DIPHENHYDRAMINE HYDROCHLORIDE 50 MG/ML
25 INJECTION INTRAMUSCULAR; INTRAVENOUS ONCE
Status: COMPLETED | OUTPATIENT
Start: 2021-03-01 | End: 2021-03-01

## 2021-03-01 RX ORDER — TRIAMCINOLONE ACETONIDE 40 MG/ML
INJECTION, SUSPENSION INTRA-ARTICULAR; INTRAMUSCULAR
Status: DISCONTINUED | OUTPATIENT
Start: 2021-03-01 | End: 2021-03-01 | Stop reason: HOSPADM

## 2021-03-01 RX ADMIN — ALPRAZOLAM 1 MG: 0.5 TABLET ORAL at 02:03

## 2021-03-01 RX ADMIN — DIPHENHYDRAMINE HYDROCHLORIDE 25 MG: 50 INJECTION INTRAMUSCULAR; INTRAVENOUS at 02:03

## 2021-03-04 ENCOUNTER — PATIENT MESSAGE (OUTPATIENT)
Dept: INTERNAL MEDICINE | Facility: CLINIC | Age: 61
End: 2021-03-04

## 2021-03-04 ENCOUNTER — OFFICE VISIT (OUTPATIENT)
Dept: URGENT CARE | Facility: CLINIC | Age: 61
End: 2021-03-04
Payer: COMMERCIAL

## 2021-03-04 VITALS
DIASTOLIC BLOOD PRESSURE: 89 MMHG | HEIGHT: 67 IN | TEMPERATURE: 99 F | HEART RATE: 68 BPM | WEIGHT: 183 LBS | BODY MASS INDEX: 28.72 KG/M2 | OXYGEN SATURATION: 98 % | SYSTOLIC BLOOD PRESSURE: 125 MMHG | RESPIRATION RATE: 17 BRPM

## 2021-03-04 DIAGNOSIS — R09.81 NASAL CONGESTION: Primary | ICD-10-CM

## 2021-03-04 DIAGNOSIS — R52 BODY ACHES: ICD-10-CM

## 2021-03-04 LAB
CTP QC/QA: YES
SARS-COV-2 RDRP RESP QL NAA+PROBE: NEGATIVE

## 2021-03-04 PROCEDURE — 99214 PR OFFICE/OUTPT VISIT, EST, LEVL IV, 30-39 MIN: ICD-10-PCS | Mod: S$GLB,,, | Performed by: INTERNAL MEDICINE

## 2021-03-04 PROCEDURE — 3008F PR BODY MASS INDEX (BMI) DOCUMENTED: ICD-10-PCS | Mod: CPTII,S$GLB,, | Performed by: INTERNAL MEDICINE

## 2021-03-04 PROCEDURE — 3008F BODY MASS INDEX DOCD: CPT | Mod: CPTII,S$GLB,, | Performed by: INTERNAL MEDICINE

## 2021-03-04 PROCEDURE — 99214 OFFICE O/P EST MOD 30 MIN: CPT | Mod: S$GLB,,, | Performed by: INTERNAL MEDICINE

## 2021-03-04 PROCEDURE — U0002: ICD-10-PCS | Mod: QW,S$GLB,, | Performed by: INTERNAL MEDICINE

## 2021-03-04 PROCEDURE — U0002 COVID-19 LAB TEST NON-CDC: HCPCS | Mod: QW,S$GLB,, | Performed by: INTERNAL MEDICINE

## 2021-03-11 ENCOUNTER — CLINICAL SUPPORT (OUTPATIENT)
Dept: REHABILITATION | Facility: HOSPITAL | Age: 61
End: 2021-03-11
Payer: COMMERCIAL

## 2021-03-11 DIAGNOSIS — R53.81 PHYSICAL DECONDITIONING: ICD-10-CM

## 2021-03-11 DIAGNOSIS — M62.81 MUSCLE WEAKNESS: ICD-10-CM

## 2021-03-11 PROCEDURE — 97110 THERAPEUTIC EXERCISES: CPT

## 2021-03-11 PROCEDURE — 97112 NEUROMUSCULAR REEDUCATION: CPT

## 2021-03-19 ENCOUNTER — CLINICAL SUPPORT (OUTPATIENT)
Dept: REHABILITATION | Facility: HOSPITAL | Age: 61
End: 2021-03-19
Payer: COMMERCIAL

## 2021-03-19 DIAGNOSIS — R53.81 PHYSICAL DECONDITIONING: ICD-10-CM

## 2021-03-19 DIAGNOSIS — M62.81 MUSCLE WEAKNESS: ICD-10-CM

## 2021-03-19 PROCEDURE — 97110 THERAPEUTIC EXERCISES: CPT

## 2021-03-19 PROCEDURE — 97112 NEUROMUSCULAR REEDUCATION: CPT

## 2021-03-26 ENCOUNTER — CLINICAL SUPPORT (OUTPATIENT)
Dept: REHABILITATION | Facility: HOSPITAL | Age: 61
End: 2021-03-26
Payer: COMMERCIAL

## 2021-03-26 DIAGNOSIS — M62.81 MUSCLE WEAKNESS: ICD-10-CM

## 2021-03-26 DIAGNOSIS — R53.81 PHYSICAL DECONDITIONING: ICD-10-CM

## 2021-03-26 PROCEDURE — 97110 THERAPEUTIC EXERCISES: CPT

## 2021-03-26 PROCEDURE — 97112 NEUROMUSCULAR REEDUCATION: CPT

## 2021-03-29 ENCOUNTER — PATIENT MESSAGE (OUTPATIENT)
Dept: INTERNAL MEDICINE | Facility: CLINIC | Age: 61
End: 2021-03-29

## 2021-05-14 ENCOUNTER — CLINICAL SUPPORT (OUTPATIENT)
Dept: INTERNAL MEDICINE | Facility: CLINIC | Age: 61
End: 2021-05-14
Payer: COMMERCIAL

## 2021-05-14 ENCOUNTER — HOSPITAL ENCOUNTER (OUTPATIENT)
Dept: RADIOLOGY | Facility: HOSPITAL | Age: 61
Discharge: HOME | End: 2021-05-14
Attending: FAMILY MEDICINE
Payer: COMMERCIAL

## 2021-05-14 ENCOUNTER — OFFICE VISIT (OUTPATIENT)
Dept: INTERNAL MEDICINE | Facility: CLINIC | Age: 61
End: 2021-05-14
Payer: COMMERCIAL

## 2021-05-14 VITALS
BODY MASS INDEX: 29.72 KG/M2 | HEIGHT: 67 IN | DIASTOLIC BLOOD PRESSURE: 80 MMHG | OXYGEN SATURATION: 97 % | WEIGHT: 189.38 LBS | TEMPERATURE: 98 F | HEART RATE: 81 BPM | SYSTOLIC BLOOD PRESSURE: 100 MMHG

## 2021-05-14 VITALS — WEIGHT: 185 LBS | BODY MASS INDEX: 30.82 KG/M2 | HEIGHT: 65 IN

## 2021-05-14 DIAGNOSIS — Z00.00 ROUTINE GENERAL MEDICAL EXAMINATION AT A HEALTH CARE FACILITY: ICD-10-CM

## 2021-05-14 DIAGNOSIS — Z00.00 ROUTINE GENERAL MEDICAL EXAMINATION AT A HEALTH CARE FACILITY: Primary | ICD-10-CM

## 2021-05-14 DIAGNOSIS — I10 ESSENTIAL HYPERTENSION: ICD-10-CM

## 2021-05-14 DIAGNOSIS — G47.33 OSA ON CPAP: ICD-10-CM

## 2021-05-14 DIAGNOSIS — R51.9 PERSISTENT HEADACHES: ICD-10-CM

## 2021-05-14 DIAGNOSIS — K21.9 GASTROESOPHAGEAL REFLUX DISEASE, UNSPECIFIED WHETHER ESOPHAGITIS PRESENT: ICD-10-CM

## 2021-05-14 DIAGNOSIS — J30.9 ALLERGIC SINUSITIS: ICD-10-CM

## 2021-05-14 DIAGNOSIS — Z00.00 ANNUAL PHYSICAL EXAM: ICD-10-CM

## 2021-05-14 DIAGNOSIS — R73.03 PRE-DIABETES: ICD-10-CM

## 2021-05-14 DIAGNOSIS — E78.5 HYPERLIPIDEMIA, UNSPECIFIED HYPERLIPIDEMIA TYPE: ICD-10-CM

## 2021-05-14 PROBLEM — M60.08 ABSCESS OF PARASPINOUS MUSCLES: Status: RESOLVED | Noted: 2018-04-10 | Resolved: 2021-05-14

## 2021-05-14 PROBLEM — M43.16 SPONDYLOLISTHESIS AT L4-L5 LEVEL: Status: RESOLVED | Noted: 2018-04-08 | Resolved: 2021-05-14

## 2021-05-14 PROBLEM — R07.9 EXERTIONAL CHEST PAIN: Status: RESOLVED | Noted: 2018-11-23 | Resolved: 2021-05-14

## 2021-05-14 PROBLEM — M43.10 SPONDYLOLISTHESIS, ACQUIRED: Status: RESOLVED | Noted: 2018-04-10 | Resolved: 2021-05-14

## 2021-05-14 PROBLEM — M62.81 MUSCLE WEAKNESS: Status: RESOLVED | Noted: 2021-02-26 | Resolved: 2021-05-14

## 2021-05-14 PROBLEM — R53.1 WEAKNESS: Status: RESOLVED | Noted: 2020-06-15 | Resolved: 2021-05-14

## 2021-05-14 PROBLEM — E66.09 CLASS 1 OBESITY DUE TO EXCESS CALORIES IN ADULT: Status: RESOLVED | Noted: 2019-07-23 | Resolved: 2021-05-14

## 2021-05-14 PROBLEM — M76.02 GLUTEAL TENDINITIS OF LEFT BUTTOCK: Status: RESOLVED | Noted: 2020-08-17 | Resolved: 2021-05-14

## 2021-05-14 PROBLEM — E66.811 CLASS 1 OBESITY DUE TO EXCESS CALORIES IN ADULT: Status: RESOLVED | Noted: 2019-07-23 | Resolved: 2021-05-14

## 2021-05-14 LAB
25(OH)D3+25(OH)D2 SERPL-MCNC: 30 NG/ML (ref 30–96)
ALBUMIN SERPL BCP-MCNC: 3.8 G/DL (ref 3.5–5.2)
ALP SERPL-CCNC: 101 U/L (ref 55–135)
ALT SERPL W/O P-5'-P-CCNC: 34 U/L (ref 10–44)
ANION GAP SERPL CALC-SCNC: 9 MMOL/L (ref 8–16)
AST SERPL-CCNC: 31 U/L (ref 10–40)
BACTERIA #/AREA URNS AUTO: ABNORMAL /HPF
BILIRUB SERPL-MCNC: 0.6 MG/DL (ref 0.1–1)
BILIRUB UR QL STRIP: NEGATIVE
BUN SERPL-MCNC: 24 MG/DL (ref 6–20)
CALCIUM SERPL-MCNC: 9.7 MG/DL (ref 8.7–10.5)
CHLORIDE SERPL-SCNC: 107 MMOL/L (ref 95–110)
CHOLEST SERPL-MCNC: 225 MG/DL (ref 120–199)
CHOLEST/HDLC SERPL: 3.3 {RATIO} (ref 2–5)
CLARITY UR REFRACT.AUTO: CLEAR
CO2 SERPL-SCNC: 29 MMOL/L (ref 23–29)
COLOR UR AUTO: YELLOW
CREAT SERPL-MCNC: 0.8 MG/DL (ref 0.5–1.4)
ERYTHROCYTE [DISTWIDTH] IN BLOOD BY AUTOMATED COUNT: 13 % (ref 11.5–14.5)
EST. GFR  (AFRICAN AMERICAN): >60 ML/MIN/1.73 M^2
EST. GFR  (NON AFRICAN AMERICAN): >60 ML/MIN/1.73 M^2
ESTIMATED AVG GLUCOSE: 117 MG/DL (ref 68–131)
GLUCOSE SERPL-MCNC: 95 MG/DL (ref 70–110)
GLUCOSE UR QL STRIP: NEGATIVE
HBA1C MFR BLD: 5.7 % (ref 4–5.6)
HCT VFR BLD AUTO: 41.7 % (ref 37–48.5)
HDLC SERPL-MCNC: 68 MG/DL (ref 40–75)
HDLC SERPL: 30.2 % (ref 20–50)
HGB BLD-MCNC: 14.3 G/DL (ref 12–16)
HGB UR QL STRIP: NEGATIVE
KETONES UR QL STRIP: NEGATIVE
LDLC SERPL CALC-MCNC: 130.8 MG/DL (ref 63–159)
LEUKOCYTE ESTERASE UR QL STRIP: ABNORMAL
MCH RBC QN AUTO: 31.2 PG (ref 27–31)
MCHC RBC AUTO-ENTMCNC: 34.3 G/DL (ref 32–36)
MCV RBC AUTO: 91 FL (ref 82–98)
MICROSCOPIC COMMENT: ABNORMAL
NITRITE UR QL STRIP: NEGATIVE
NON-SQ EPI CELLS #/AREA URNS AUTO: 1 /HPF
NONHDLC SERPL-MCNC: 157 MG/DL
PH UR STRIP: 5 [PH] (ref 5–8)
PLATELET # BLD AUTO: 259 K/UL (ref 150–450)
PMV BLD AUTO: 9.4 FL (ref 9.2–12.9)
POTASSIUM SERPL-SCNC: 4.1 MMOL/L (ref 3.5–5.1)
PROT SERPL-MCNC: 7.2 G/DL (ref 6–8.4)
PROT UR QL STRIP: NEGATIVE
RBC # BLD AUTO: 4.58 M/UL (ref 4–5.4)
RBC #/AREA URNS AUTO: 1 /HPF (ref 0–4)
SODIUM SERPL-SCNC: 145 MMOL/L (ref 136–145)
SP GR UR STRIP: 1.02 (ref 1–1.03)
SQUAMOUS #/AREA URNS AUTO: 1 /HPF
TRIGL SERPL-MCNC: 131 MG/DL (ref 30–150)
TSH SERPL DL<=0.005 MIU/L-ACNC: 1.44 UIU/ML (ref 0.4–4)
URN SPEC COLLECT METH UR: ABNORMAL
WBC # BLD AUTO: 6.97 K/UL (ref 3.9–12.7)
WBC #/AREA URNS AUTO: 3 /HPF (ref 0–5)

## 2021-05-14 PROCEDURE — 75571 CT CALCIUM SCORING CARDIAC: ICD-10-PCS | Mod: 26,,, | Performed by: RADIOLOGY

## 2021-05-14 PROCEDURE — 1125F AMNT PAIN NOTED PAIN PRSNT: CPT | Mod: S$GLB,,, | Performed by: FAMILY MEDICINE

## 2021-05-14 PROCEDURE — 82306 VITAMIN D 25 HYDROXY: CPT | Performed by: FAMILY MEDICINE

## 2021-05-14 PROCEDURE — 3008F PR BODY MASS INDEX (BMI) DOCUMENTED: ICD-10-PCS | Mod: CPTII,S$GLB,, | Performed by: FAMILY MEDICINE

## 2021-05-14 PROCEDURE — 77067 SCR MAMMO BI INCL CAD: CPT | Mod: 26,,, | Performed by: RADIOLOGY

## 2021-05-14 PROCEDURE — 75571 CT HRT W/O DYE W/CA TEST: CPT | Mod: TC

## 2021-05-14 PROCEDURE — 3008F BODY MASS INDEX DOCD: CPT | Mod: CPTII,S$GLB,, | Performed by: FAMILY MEDICINE

## 2021-05-14 PROCEDURE — 97802 MEDICAL NUTRITION INDIV IN: CPT | Mod: S$GLB,,, | Performed by: INTERNAL MEDICINE

## 2021-05-14 PROCEDURE — 1125F PR PAIN SEVERITY QUANTIFIED, PAIN PRESENT: ICD-10-PCS | Mod: S$GLB,,, | Performed by: FAMILY MEDICINE

## 2021-05-14 PROCEDURE — 77063 BREAST TOMOSYNTHESIS BI: CPT | Mod: 26,,, | Performed by: RADIOLOGY

## 2021-05-14 PROCEDURE — 99999 PR PBB SHADOW E&M-EST. PATIENT-LVL V: ICD-10-PCS | Mod: PBBFAC,,, | Performed by: FAMILY MEDICINE

## 2021-05-14 PROCEDURE — 77067 MAMMO DIGITAL SCREENING BILAT WITH TOMO: ICD-10-PCS | Mod: 26,,, | Performed by: RADIOLOGY

## 2021-05-14 PROCEDURE — 81001 URINALYSIS AUTO W/SCOPE: CPT | Performed by: FAMILY MEDICINE

## 2021-05-14 PROCEDURE — 99999 PR PBB SHADOW E&M-EST. PATIENT-LVL I: CPT | Mod: PBBFAC,,,

## 2021-05-14 PROCEDURE — 80053 COMPREHEN METABOLIC PANEL: CPT | Performed by: FAMILY MEDICINE

## 2021-05-14 PROCEDURE — 99999 PR PBB SHADOW E&M-EST. PATIENT-LVL I: ICD-10-PCS | Mod: PBBFAC,,,

## 2021-05-14 PROCEDURE — 97750 PHYSICAL PERFORMANCE TEST: CPT | Mod: S$GLB,,, | Performed by: INTERNAL MEDICINE

## 2021-05-14 PROCEDURE — 85027 COMPLETE CBC AUTOMATED: CPT | Performed by: FAMILY MEDICINE

## 2021-05-14 PROCEDURE — 99396 PR PREVENTIVE VISIT,EST,40-64: ICD-10-PCS | Mod: S$GLB,,, | Performed by: FAMILY MEDICINE

## 2021-05-14 PROCEDURE — 97802 PR MED NUTR THER, 1ST, INDIV, EA 15 MIN: ICD-10-PCS | Mod: S$GLB,,, | Performed by: INTERNAL MEDICINE

## 2021-05-14 PROCEDURE — 99396 PREV VISIT EST AGE 40-64: CPT | Mod: S$GLB,,, | Performed by: FAMILY MEDICINE

## 2021-05-14 PROCEDURE — 83036 HEMOGLOBIN GLYCOSYLATED A1C: CPT | Performed by: FAMILY MEDICINE

## 2021-05-14 PROCEDURE — 84443 ASSAY THYROID STIM HORMONE: CPT | Performed by: FAMILY MEDICINE

## 2021-05-14 PROCEDURE — 80061 LIPID PANEL: CPT | Performed by: FAMILY MEDICINE

## 2021-05-14 PROCEDURE — 75571 CT HRT W/O DYE W/CA TEST: CPT | Mod: 26,,, | Performed by: RADIOLOGY

## 2021-05-14 PROCEDURE — 99999 PR PBB SHADOW E&M-EST. PATIENT-LVL V: CPT | Mod: PBBFAC,,, | Performed by: FAMILY MEDICINE

## 2021-05-14 PROCEDURE — 77067 SCR MAMMO BI INCL CAD: CPT | Mod: TC

## 2021-05-14 PROCEDURE — 77063 MAMMO DIGITAL SCREENING BILAT WITH TOMO: ICD-10-PCS | Mod: 26,,, | Performed by: RADIOLOGY

## 2021-05-14 PROCEDURE — 97750 PR PHYSICAL PERFORMANCE TEST: ICD-10-PCS | Mod: S$GLB,,, | Performed by: INTERNAL MEDICINE

## 2021-05-14 RX ORDER — OMEPRAZOLE 40 MG/1
40 CAPSULE, DELAYED RELEASE ORAL EVERY MORNING
Qty: 30 CAPSULE | Refills: 2
Start: 2021-05-14 | End: 2021-06-15

## 2021-05-17 ENCOUNTER — PATIENT MESSAGE (OUTPATIENT)
Dept: INTERNAL MEDICINE | Facility: CLINIC | Age: 61
End: 2021-05-17

## 2021-05-17 RX ORDER — EPINEPHRINE 0.3 MG/.3ML
1 INJECTION SUBCUTANEOUS ONCE
Qty: 1 DEVICE | Refills: 3 | Status: SHIPPED | OUTPATIENT
Start: 2021-05-17 | End: 2022-04-06

## 2021-05-18 ENCOUNTER — PATIENT MESSAGE (OUTPATIENT)
Dept: INTERNAL MEDICINE | Facility: CLINIC | Age: 61
End: 2021-05-18

## 2021-05-18 DIAGNOSIS — J01.90 ACUTE SINUSITIS, RECURRENCE NOT SPECIFIED, UNSPECIFIED LOCATION: Primary | ICD-10-CM

## 2021-05-18 RX ORDER — AMOXICILLIN AND CLAVULANATE POTASSIUM 875; 125 MG/1; MG/1
1 TABLET, FILM COATED ORAL EVERY 12 HOURS
Qty: 14 TABLET | Refills: 0 | Status: SHIPPED | OUTPATIENT
Start: 2021-05-18 | End: 2021-05-25

## 2021-05-19 ENCOUNTER — PATIENT MESSAGE (OUTPATIENT)
Dept: INTERNAL MEDICINE | Facility: CLINIC | Age: 61
End: 2021-05-19

## 2021-05-25 ENCOUNTER — PATIENT MESSAGE (OUTPATIENT)
Dept: INTERNAL MEDICINE | Facility: CLINIC | Age: 61
End: 2021-05-25

## 2021-05-27 ENCOUNTER — PATIENT MESSAGE (OUTPATIENT)
Dept: OBSTETRICS AND GYNECOLOGY | Facility: CLINIC | Age: 61
End: 2021-05-27

## 2021-05-28 ENCOUNTER — OFFICE VISIT (OUTPATIENT)
Dept: OBSTETRICS AND GYNECOLOGY | Facility: CLINIC | Age: 61
End: 2021-05-28
Payer: COMMERCIAL

## 2021-05-28 VITALS
HEIGHT: 65 IN | DIASTOLIC BLOOD PRESSURE: 72 MMHG | WEIGHT: 190.69 LBS | SYSTOLIC BLOOD PRESSURE: 122 MMHG | BODY MASS INDEX: 31.77 KG/M2

## 2021-05-28 DIAGNOSIS — N90.89 MASS OF VULVA: Primary | ICD-10-CM

## 2021-05-28 PROCEDURE — 3008F BODY MASS INDEX DOCD: CPT | Mod: CPTII,S$GLB,, | Performed by: NURSE PRACTITIONER

## 2021-05-28 PROCEDURE — 99213 OFFICE O/P EST LOW 20 MIN: CPT | Mod: S$GLB,,, | Performed by: NURSE PRACTITIONER

## 2021-05-28 PROCEDURE — 99213 PR OFFICE/OUTPT VISIT, EST, LEVL III, 20-29 MIN: ICD-10-PCS | Mod: S$GLB,,, | Performed by: NURSE PRACTITIONER

## 2021-05-28 PROCEDURE — 1126F AMNT PAIN NOTED NONE PRSNT: CPT | Mod: S$GLB,,, | Performed by: NURSE PRACTITIONER

## 2021-05-28 PROCEDURE — 99999 PR PBB SHADOW E&M-EST. PATIENT-LVL III: CPT | Mod: PBBFAC,,, | Performed by: NURSE PRACTITIONER

## 2021-05-28 PROCEDURE — 99999 PR PBB SHADOW E&M-EST. PATIENT-LVL III: ICD-10-PCS | Mod: PBBFAC,,, | Performed by: NURSE PRACTITIONER

## 2021-05-28 PROCEDURE — 3008F PR BODY MASS INDEX (BMI) DOCUMENTED: ICD-10-PCS | Mod: CPTII,S$GLB,, | Performed by: NURSE PRACTITIONER

## 2021-05-28 PROCEDURE — 1126F PR PAIN SEVERITY QUANTIFIED, NO PAIN PRESENT: ICD-10-PCS | Mod: S$GLB,,, | Performed by: NURSE PRACTITIONER

## 2021-05-31 ENCOUNTER — HOSPITAL ENCOUNTER (OUTPATIENT)
Dept: RADIOLOGY | Facility: OTHER | Age: 61
Discharge: HOME | End: 2021-05-31
Attending: NURSE PRACTITIONER
Payer: COMMERCIAL

## 2021-05-31 DIAGNOSIS — N90.89 MASS OF VULVA: ICD-10-CM

## 2021-05-31 PROCEDURE — 76882 US LMTD JT/FCL EVL NVASC XTR: CPT | Mod: TC,RT

## 2021-05-31 PROCEDURE — 76882 US LMTD JT/FCL EVL NVASC XTR: CPT | Mod: 26,RT,, | Performed by: RADIOLOGY

## 2021-05-31 PROCEDURE — 76882 US SOFT TISSUE, GROIN RIGHT: ICD-10-PCS | Mod: 26,RT,, | Performed by: RADIOLOGY

## 2021-06-01 ENCOUNTER — TELEPHONE (OUTPATIENT)
Dept: BARIATRICS | Facility: CLINIC | Age: 61
End: 2021-06-01

## 2021-06-01 ENCOUNTER — OFFICE VISIT (OUTPATIENT)
Dept: INTERNAL MEDICINE | Facility: CLINIC | Age: 61
End: 2021-06-01
Payer: COMMERCIAL

## 2021-06-01 ENCOUNTER — PATIENT MESSAGE (OUTPATIENT)
Dept: BARIATRICS | Facility: CLINIC | Age: 61
End: 2021-06-01

## 2021-06-01 ENCOUNTER — PATIENT MESSAGE (OUTPATIENT)
Dept: INTERNAL MEDICINE | Facility: CLINIC | Age: 61
End: 2021-06-01

## 2021-06-01 ENCOUNTER — HOSPITAL ENCOUNTER (OUTPATIENT)
Dept: CARDIOLOGY | Facility: HOSPITAL | Age: 61
Discharge: HOME | End: 2021-06-01
Attending: FAMILY MEDICINE
Payer: COMMERCIAL

## 2021-06-01 ENCOUNTER — HOSPITAL ENCOUNTER (OUTPATIENT)
Dept: RADIOLOGY | Facility: HOSPITAL | Age: 61
Discharge: HOME | End: 2021-06-01
Attending: FAMILY MEDICINE
Payer: COMMERCIAL

## 2021-06-01 VITALS
OXYGEN SATURATION: 98 % | WEIGHT: 192 LBS | BODY MASS INDEX: 31.99 KG/M2 | HEART RATE: 69 BPM | DIASTOLIC BLOOD PRESSURE: 82 MMHG | TEMPERATURE: 99 F | HEIGHT: 65 IN | SYSTOLIC BLOOD PRESSURE: 118 MMHG

## 2021-06-01 DIAGNOSIS — M25.562 ACUTE PAIN OF BOTH KNEES: ICD-10-CM

## 2021-06-01 DIAGNOSIS — E66.09 CLASS 1 OBESITY DUE TO EXCESS CALORIES WITH SERIOUS COMORBIDITY AND BODY MASS INDEX (BMI) OF 31.0 TO 31.9 IN ADULT: ICD-10-CM

## 2021-06-01 DIAGNOSIS — M79.604 PAIN IN BOTH LOWER EXTREMITIES: ICD-10-CM

## 2021-06-01 DIAGNOSIS — M79.605 PAIN IN BOTH LOWER EXTREMITIES: ICD-10-CM

## 2021-06-01 DIAGNOSIS — M25.561 ACUTE PAIN OF BOTH KNEES: ICD-10-CM

## 2021-06-01 DIAGNOSIS — R20.0 BILATERAL LEG NUMBNESS: ICD-10-CM

## 2021-06-01 PROCEDURE — 93970 EXTREMITY STUDY: CPT | Mod: 26,,, | Performed by: INTERNAL MEDICINE

## 2021-06-01 PROCEDURE — 3008F PR BODY MASS INDEX (BMI) DOCUMENTED: ICD-10-PCS | Mod: CPTII,S$GLB,, | Performed by: FAMILY MEDICINE

## 2021-06-01 PROCEDURE — 1125F AMNT PAIN NOTED PAIN PRSNT: CPT | Mod: S$GLB,,, | Performed by: FAMILY MEDICINE

## 2021-06-01 PROCEDURE — 99999 PR PBB SHADOW E&M-EST. PATIENT-LVL V: ICD-10-PCS | Mod: PBBFAC,,, | Performed by: FAMILY MEDICINE

## 2021-06-01 PROCEDURE — 3008F BODY MASS INDEX DOCD: CPT | Mod: CPTII,S$GLB,, | Performed by: FAMILY MEDICINE

## 2021-06-01 PROCEDURE — 93970 CV US LOWER VENOUS INSUFFICIENCY BILATERAL (CUPID ONLY): ICD-10-PCS | Mod: 26,,, | Performed by: INTERNAL MEDICINE

## 2021-06-01 PROCEDURE — 99214 PR OFFICE/OUTPT VISIT, EST, LEVL IV, 30-39 MIN: ICD-10-PCS | Mod: S$GLB,,, | Performed by: FAMILY MEDICINE

## 2021-06-01 PROCEDURE — 99999 PR PBB SHADOW E&M-EST. PATIENT-LVL V: CPT | Mod: PBBFAC,,, | Performed by: FAMILY MEDICINE

## 2021-06-01 PROCEDURE — 93970 EXTREMITY STUDY: CPT | Mod: TC

## 2021-06-01 PROCEDURE — 73562 XR KNEE 3 VIEW BILATERAL: ICD-10-PCS | Mod: 26,50,, | Performed by: RADIOLOGY

## 2021-06-01 PROCEDURE — 73562 X-RAY EXAM OF KNEE 3: CPT | Mod: 26,50,, | Performed by: RADIOLOGY

## 2021-06-01 PROCEDURE — 73562 X-RAY EXAM OF KNEE 3: CPT | Mod: TC,50

## 2021-06-01 PROCEDURE — 1125F PR PAIN SEVERITY QUANTIFIED, PAIN PRESENT: ICD-10-PCS | Mod: S$GLB,,, | Performed by: FAMILY MEDICINE

## 2021-06-01 PROCEDURE — 99214 OFFICE O/P EST MOD 30 MIN: CPT | Mod: S$GLB,,, | Performed by: FAMILY MEDICINE

## 2021-06-02 ENCOUNTER — PATIENT MESSAGE (OUTPATIENT)
Dept: INTERNAL MEDICINE | Facility: CLINIC | Age: 61
End: 2021-06-02

## 2021-06-02 ENCOUNTER — TELEPHONE (OUTPATIENT)
Dept: BARIATRICS | Facility: CLINIC | Age: 61
End: 2021-06-02

## 2021-06-03 ENCOUNTER — PATIENT MESSAGE (OUTPATIENT)
Dept: INTERNAL MEDICINE | Facility: CLINIC | Age: 61
End: 2021-06-03

## 2021-06-14 ENCOUNTER — OFFICE VISIT (OUTPATIENT)
Dept: BARIATRICS | Facility: CLINIC | Age: 61
End: 2021-06-14
Payer: COMMERCIAL

## 2021-06-14 VITALS
BODY MASS INDEX: 31.77 KG/M2 | WEIGHT: 190.69 LBS | DIASTOLIC BLOOD PRESSURE: 72 MMHG | HEART RATE: 63 BPM | OXYGEN SATURATION: 97 % | HEIGHT: 65 IN | SYSTOLIC BLOOD PRESSURE: 110 MMHG

## 2021-06-14 DIAGNOSIS — E66.9 OBESITY, CLASS I, BMI 30.0-34.9 (SEE ACTUAL BMI): Primary | ICD-10-CM

## 2021-06-14 PROCEDURE — 1126F PR PAIN SEVERITY QUANTIFIED, NO PAIN PRESENT: ICD-10-PCS | Mod: S$GLB,,, | Performed by: INTERNAL MEDICINE

## 2021-06-14 PROCEDURE — 3008F BODY MASS INDEX DOCD: CPT | Mod: CPTII,S$GLB,, | Performed by: INTERNAL MEDICINE

## 2021-06-14 PROCEDURE — 99213 OFFICE O/P EST LOW 20 MIN: CPT | Mod: S$GLB,,, | Performed by: INTERNAL MEDICINE

## 2021-06-14 PROCEDURE — 3008F PR BODY MASS INDEX (BMI) DOCUMENTED: ICD-10-PCS | Mod: CPTII,S$GLB,, | Performed by: INTERNAL MEDICINE

## 2021-06-14 PROCEDURE — 99999 PR PBB SHADOW E&M-EST. PATIENT-LVL IV: CPT | Mod: PBBFAC,,, | Performed by: INTERNAL MEDICINE

## 2021-06-14 PROCEDURE — 99999 PR PBB SHADOW E&M-EST. PATIENT-LVL IV: ICD-10-PCS | Mod: PBBFAC,,, | Performed by: INTERNAL MEDICINE

## 2021-06-14 PROCEDURE — 1126F AMNT PAIN NOTED NONE PRSNT: CPT | Mod: S$GLB,,, | Performed by: INTERNAL MEDICINE

## 2021-06-14 PROCEDURE — 99213 PR OFFICE/OUTPT VISIT, EST, LEVL III, 20-29 MIN: ICD-10-PCS | Mod: S$GLB,,, | Performed by: INTERNAL MEDICINE

## 2021-06-14 RX ORDER — PHENTERMINE HYDROCHLORIDE 37.5 MG/1
TABLET ORAL
Qty: 30 TABLET | Refills: 1 | Status: SHIPPED | OUTPATIENT
Start: 2021-06-14 | End: 2021-07-27

## 2021-07-03 ENCOUNTER — PATIENT MESSAGE (OUTPATIENT)
Dept: ORTHOPEDICS | Facility: CLINIC | Age: 61
End: 2021-07-03

## 2021-07-09 ENCOUNTER — OFFICE VISIT (OUTPATIENT)
Dept: ORTHOPEDICS | Facility: CLINIC | Age: 61
End: 2021-07-09
Payer: COMMERCIAL

## 2021-07-09 ENCOUNTER — PATIENT MESSAGE (OUTPATIENT)
Dept: ADMINISTRATIVE | Facility: OTHER | Age: 61
End: 2021-07-09

## 2021-07-09 ENCOUNTER — HOSPITAL ENCOUNTER (OUTPATIENT)
Dept: RADIOLOGY | Facility: HOSPITAL | Age: 61
Discharge: HOME | End: 2021-07-09
Attending: ORTHOPAEDIC SURGERY
Payer: COMMERCIAL

## 2021-07-09 VITALS — WEIGHT: 186.38 LBS | HEIGHT: 65 IN | BODY MASS INDEX: 31.05 KG/M2

## 2021-07-09 DIAGNOSIS — M79.672 PAIN OF LEFT FOOT: ICD-10-CM

## 2021-07-09 DIAGNOSIS — M20.5X2 MALLET TOE OF LEFT FOOT: Primary | ICD-10-CM

## 2021-07-09 DIAGNOSIS — M79.672 PAIN OF LEFT FOOT: Primary | ICD-10-CM

## 2021-07-09 PROCEDURE — 99213 OFFICE O/P EST LOW 20 MIN: CPT | Mod: S$GLB,,, | Performed by: ORTHOPAEDIC SURGERY

## 2021-07-09 PROCEDURE — 99999 PR PBB SHADOW E&M-EST. PATIENT-LVL III: CPT | Mod: PBBFAC,,, | Performed by: ORTHOPAEDIC SURGERY

## 2021-07-09 PROCEDURE — 99999 PR PBB SHADOW E&M-EST. PATIENT-LVL III: ICD-10-PCS | Mod: PBBFAC,,, | Performed by: ORTHOPAEDIC SURGERY

## 2021-07-09 PROCEDURE — 73630 X-RAY EXAM OF FOOT: CPT | Mod: TC,LT

## 2021-07-09 PROCEDURE — 73630 X-RAY EXAM OF FOOT: CPT | Mod: 26,LT,, | Performed by: RADIOLOGY

## 2021-07-09 PROCEDURE — 3008F BODY MASS INDEX DOCD: CPT | Mod: CPTII,S$GLB,, | Performed by: ORTHOPAEDIC SURGERY

## 2021-07-09 PROCEDURE — 73630 XR FOOT COMPLETE 3 VIEW LEFT: ICD-10-PCS | Mod: 26,LT,, | Performed by: RADIOLOGY

## 2021-07-09 PROCEDURE — 1126F AMNT PAIN NOTED NONE PRSNT: CPT | Mod: S$GLB,,, | Performed by: ORTHOPAEDIC SURGERY

## 2021-07-09 PROCEDURE — 3008F PR BODY MASS INDEX (BMI) DOCUMENTED: ICD-10-PCS | Mod: CPTII,S$GLB,, | Performed by: ORTHOPAEDIC SURGERY

## 2021-07-09 PROCEDURE — 99213 PR OFFICE/OUTPT VISIT, EST, LEVL III, 20-29 MIN: ICD-10-PCS | Mod: S$GLB,,, | Performed by: ORTHOPAEDIC SURGERY

## 2021-07-09 PROCEDURE — 1126F PR PAIN SEVERITY QUANTIFIED, NO PAIN PRESENT: ICD-10-PCS | Mod: S$GLB,,, | Performed by: ORTHOPAEDIC SURGERY

## 2021-07-12 ENCOUNTER — TELEPHONE (OUTPATIENT)
Dept: ORTHOPEDICS | Facility: CLINIC | Age: 61
End: 2021-07-12

## 2021-07-12 DIAGNOSIS — Z01.818 PRE-OP TESTING: ICD-10-CM

## 2021-07-16 ENCOUNTER — PATIENT MESSAGE (OUTPATIENT)
Dept: INTERNAL MEDICINE | Facility: CLINIC | Age: 61
End: 2021-07-16

## 2021-07-16 DIAGNOSIS — R53.83 FATIGUE, UNSPECIFIED TYPE: Primary | ICD-10-CM

## 2021-07-16 DIAGNOSIS — R63.5 WEIGHT GAIN: ICD-10-CM

## 2021-07-18 NOTE — Clinical Note
Catheter is inserted into theinferior vena cava.  The patient is a 21y Male complaining of pain, head.

## 2021-07-19 ENCOUNTER — PATIENT MESSAGE (OUTPATIENT)
Dept: ADMINISTRATIVE | Facility: OTHER | Age: 61
End: 2021-07-19

## 2021-07-19 DIAGNOSIS — M20.5X2 MALLET TOE OF LEFT FOOT: Primary | ICD-10-CM

## 2021-07-19 RX ORDER — CEFAZOLIN SODIUM 2 G/50ML
2 SOLUTION INTRAVENOUS
Status: CANCELLED | OUTPATIENT
Start: 2021-07-19

## 2021-07-23 ENCOUNTER — PATIENT MESSAGE (OUTPATIENT)
Dept: ALLERGY | Facility: CLINIC | Age: 61
End: 2021-07-23

## 2021-07-24 ENCOUNTER — PATIENT MESSAGE (OUTPATIENT)
Dept: ADMINISTRATIVE | Facility: OTHER | Age: 61
End: 2021-07-24

## 2021-07-26 ENCOUNTER — ANESTHESIA EVENT (OUTPATIENT)
Dept: SURGERY | Facility: HOSPITAL | Age: 61
End: 2021-07-26
Payer: COMMERCIAL

## 2021-07-27 ENCOUNTER — PATIENT MESSAGE (OUTPATIENT)
Dept: INTERNAL MEDICINE | Facility: CLINIC | Age: 61
End: 2021-07-27

## 2021-07-27 ENCOUNTER — OFFICE VISIT (OUTPATIENT)
Dept: ALLERGY | Facility: CLINIC | Age: 61
End: 2021-07-27
Payer: COMMERCIAL

## 2021-07-27 VITALS — HEIGHT: 65 IN | WEIGHT: 188.06 LBS | BODY MASS INDEX: 31.33 KG/M2

## 2021-07-27 DIAGNOSIS — Z71.85 VACCINE COUNSELING: ICD-10-CM

## 2021-07-27 DIAGNOSIS — T50.905D ADVERSE EFFECT OF DRUG, SUBSEQUENT ENCOUNTER: Primary | ICD-10-CM

## 2021-07-27 PROCEDURE — 99999 PR PBB SHADOW E&M-EST. PATIENT-LVL III: ICD-10-PCS | Mod: PBBFAC,,, | Performed by: STUDENT IN AN ORGANIZED HEALTH CARE EDUCATION/TRAINING PROGRAM

## 2021-07-27 PROCEDURE — 99214 OFFICE O/P EST MOD 30 MIN: CPT | Mod: S$GLB,,, | Performed by: STUDENT IN AN ORGANIZED HEALTH CARE EDUCATION/TRAINING PROGRAM

## 2021-07-27 PROCEDURE — 99999 PR PBB SHADOW E&M-EST. PATIENT-LVL III: CPT | Mod: PBBFAC,,, | Performed by: STUDENT IN AN ORGANIZED HEALTH CARE EDUCATION/TRAINING PROGRAM

## 2021-07-27 PROCEDURE — 99214 PR OFFICE/OUTPT VISIT, EST, LEVL IV, 30-39 MIN: ICD-10-PCS | Mod: S$GLB,,, | Performed by: STUDENT IN AN ORGANIZED HEALTH CARE EDUCATION/TRAINING PROGRAM

## 2021-08-02 ENCOUNTER — PATIENT MESSAGE (OUTPATIENT)
Dept: ALLERGY | Facility: CLINIC | Age: 61
End: 2021-08-02

## 2021-08-03 ENCOUNTER — OFFICE VISIT (OUTPATIENT)
Dept: ORTHOPEDICS | Facility: CLINIC | Age: 61
End: 2021-08-03
Payer: COMMERCIAL

## 2021-08-03 ENCOUNTER — LAB VISIT (OUTPATIENT)
Dept: SPORTS MEDICINE | Facility: CLINIC | Age: 61
End: 2021-08-03
Payer: COMMERCIAL

## 2021-08-03 DIAGNOSIS — R63.5 WEIGHT GAIN: ICD-10-CM

## 2021-08-03 DIAGNOSIS — Z01.818 PRE-OP TESTING: ICD-10-CM

## 2021-08-03 DIAGNOSIS — G89.18 POST-OP PAIN: ICD-10-CM

## 2021-08-03 DIAGNOSIS — R53.83 FATIGUE, UNSPECIFIED TYPE: ICD-10-CM

## 2021-08-03 DIAGNOSIS — M20.5X2 MALLET TOE OF LEFT FOOT: ICD-10-CM

## 2021-08-03 DIAGNOSIS — Z01.818 PRE-OP EVALUATION: Primary | ICD-10-CM

## 2021-08-03 PROCEDURE — 99499 UNLISTED E&M SERVICE: CPT | Mod: S$GLB,,, | Performed by: PHYSICIAN ASSISTANT

## 2021-08-03 PROCEDURE — 99999 PR PBB SHADOW E&M-EST. PATIENT-LVL II: ICD-10-PCS | Mod: PBBFAC,,, | Performed by: PHYSICIAN ASSISTANT

## 2021-08-03 PROCEDURE — U0005 INFEC AGEN DETEC AMPLI PROBE: HCPCS | Performed by: ORTHOPAEDIC SURGERY

## 2021-08-03 PROCEDURE — 1160F RVW MEDS BY RX/DR IN RCRD: CPT | Mod: CPTII,S$GLB,, | Performed by: PHYSICIAN ASSISTANT

## 2021-08-03 PROCEDURE — 1159F PR MEDICATION LIST DOCUMENTED IN MEDICAL RECORD: ICD-10-PCS | Mod: CPTII,S$GLB,, | Performed by: PHYSICIAN ASSISTANT

## 2021-08-03 PROCEDURE — U0003 INFECTIOUS AGENT DETECTION BY NUCLEIC ACID (DNA OR RNA); SEVERE ACUTE RESPIRATORY SYNDROME CORONAVIRUS 2 (SARS-COV-2) (CORONAVIRUS DISEASE [COVID-19]), AMPLIFIED PROBE TECHNIQUE, MAKING USE OF HIGH THROUGHPUT TECHNOLOGIES AS DESCRIBED BY CMS-2020-01-R: HCPCS | Performed by: ORTHOPAEDIC SURGERY

## 2021-08-03 PROCEDURE — 1160F PR REVIEW ALL MEDS BY PRESCRIBER/CLIN PHARMACIST DOCUMENTED: ICD-10-PCS | Mod: CPTII,S$GLB,, | Performed by: PHYSICIAN ASSISTANT

## 2021-08-03 PROCEDURE — 99999 PR PBB SHADOW E&M-EST. PATIENT-LVL II: CPT | Mod: PBBFAC,,, | Performed by: PHYSICIAN ASSISTANT

## 2021-08-03 PROCEDURE — 3044F HG A1C LEVEL LT 7.0%: CPT | Mod: CPTII,S$GLB,, | Performed by: PHYSICIAN ASSISTANT

## 2021-08-03 PROCEDURE — 1159F MED LIST DOCD IN RCRD: CPT | Mod: CPTII,S$GLB,, | Performed by: PHYSICIAN ASSISTANT

## 2021-08-03 PROCEDURE — 99499 NO LOS: ICD-10-PCS | Mod: S$GLB,,, | Performed by: PHYSICIAN ASSISTANT

## 2021-08-03 PROCEDURE — 3044F PR MOST RECENT HEMOGLOBIN A1C LEVEL <7.0%: ICD-10-PCS | Mod: CPTII,S$GLB,, | Performed by: PHYSICIAN ASSISTANT

## 2021-08-04 ENCOUNTER — TELEPHONE (OUTPATIENT)
Dept: ORTHOPEDICS | Facility: CLINIC | Age: 61
End: 2021-08-04

## 2021-08-04 ENCOUNTER — PATIENT MESSAGE (OUTPATIENT)
Dept: ORTHOPEDICS | Facility: CLINIC | Age: 61
End: 2021-08-04

## 2021-08-04 LAB
SARS-COV-2 RNA RESP QL NAA+PROBE: NOT DETECTED
SARS-COV-2- CYCLE NUMBER: -1

## 2021-08-04 RX ORDER — CELECOXIB 200 MG/1
200 CAPSULE ORAL 2 TIMES DAILY
Qty: 60 CAPSULE | Refills: 0 | Status: SHIPPED | OUTPATIENT
Start: 2021-08-04 | End: 2021-09-04

## 2021-08-04 RX ORDER — ACETAMINOPHEN 500 MG
1000 TABLET ORAL EVERY 8 HOURS PRN
Qty: 90 TABLET | Refills: 0 | Status: SHIPPED | OUTPATIENT
Start: 2021-08-04 | End: 2022-04-06

## 2021-08-04 RX ORDER — OXYCODONE HYDROCHLORIDE 5 MG/1
5 TABLET ORAL EVERY 4 HOURS PRN
Qty: 24 TABLET | Refills: 0 | Status: SHIPPED | OUTPATIENT
Start: 2021-08-04 | End: 2021-10-20

## 2021-08-05 ENCOUNTER — TELEPHONE (OUTPATIENT)
Dept: NEUROLOGY | Facility: CLINIC | Age: 61
End: 2021-08-05

## 2021-08-05 ENCOUNTER — TELEPHONE (OUTPATIENT)
Dept: INTERNAL MEDICINE | Facility: CLINIC | Age: 61
End: 2021-08-05

## 2021-08-05 ENCOUNTER — HOSPITAL ENCOUNTER (OUTPATIENT)
Facility: HOSPITAL | Age: 61
Discharge: HOME | End: 2021-08-05
Attending: ORTHOPAEDIC SURGERY | Admitting: ORTHOPAEDIC SURGERY
Payer: COMMERCIAL

## 2021-08-05 ENCOUNTER — ANESTHESIA (OUTPATIENT)
Dept: SURGERY | Facility: HOSPITAL | Age: 61
End: 2021-08-05
Payer: COMMERCIAL

## 2021-08-05 DIAGNOSIS — R53.83 FATIGUE, UNSPECIFIED TYPE: Primary | ICD-10-CM

## 2021-08-05 DIAGNOSIS — R63.5 WEIGHT GAIN: ICD-10-CM

## 2021-08-05 DIAGNOSIS — M20.5X2 MALLET TOE OF LEFT FOOT: ICD-10-CM

## 2021-08-05 PROCEDURE — D9220A PRA ANESTHESIA: Mod: ANES,,, | Performed by: ANESTHESIOLOGY

## 2021-08-05 PROCEDURE — 36000706: Performed by: ORTHOPAEDIC SURGERY

## 2021-08-05 PROCEDURE — D9220A PRA ANESTHESIA: ICD-10-PCS | Mod: CRNA,,, | Performed by: NURSE ANESTHETIST, CERTIFIED REGISTERED

## 2021-08-05 PROCEDURE — 71000033 HC RECOVERY, INTIAL HOUR: Performed by: ORTHOPAEDIC SURGERY

## 2021-08-05 PROCEDURE — D9220A PRA ANESTHESIA: Mod: CRNA,,, | Performed by: NURSE ANESTHETIST, CERTIFIED REGISTERED

## 2021-08-05 PROCEDURE — 25000003 PHARM REV CODE 250: Performed by: NURSE ANESTHETIST, CERTIFIED REGISTERED

## 2021-08-05 PROCEDURE — 63600175 PHARM REV CODE 636 W HCPCS: Performed by: NURSE ANESTHETIST, CERTIFIED REGISTERED

## 2021-08-05 PROCEDURE — 28285 PR REPAIR OF HAMMERTOE,ONE: ICD-10-PCS | Mod: T3,,, | Performed by: ORTHOPAEDIC SURGERY

## 2021-08-05 PROCEDURE — 71000015 HC POSTOP RECOV 1ST HR: Performed by: ORTHOPAEDIC SURGERY

## 2021-08-05 PROCEDURE — C1769 GUIDE WIRE: HCPCS | Performed by: ORTHOPAEDIC SURGERY

## 2021-08-05 PROCEDURE — D9220A PRA ANESTHESIA: ICD-10-PCS | Mod: ANES,,, | Performed by: ANESTHESIOLOGY

## 2021-08-05 PROCEDURE — 36000707: Performed by: ORTHOPAEDIC SURGERY

## 2021-08-05 PROCEDURE — 28285 REPAIR OF HAMMERTOE: CPT | Mod: T3,,, | Performed by: ORTHOPAEDIC SURGERY

## 2021-08-05 PROCEDURE — 37000008 HC ANESTHESIA 1ST 15 MINUTES: Performed by: ORTHOPAEDIC SURGERY

## 2021-08-05 PROCEDURE — 99900035 HC TECH TIME PER 15 MIN (STAT)

## 2021-08-05 PROCEDURE — 27201423 OPTIME MED/SURG SUP & DEVICES STERILE SUPPLY: Performed by: ORTHOPAEDIC SURGERY

## 2021-08-05 PROCEDURE — 94761 N-INVAS EAR/PLS OXIMETRY MLT: CPT

## 2021-08-05 PROCEDURE — 25000003 PHARM REV CODE 250: Performed by: STUDENT IN AN ORGANIZED HEALTH CARE EDUCATION/TRAINING PROGRAM

## 2021-08-05 PROCEDURE — 25000003 PHARM REV CODE 250: Performed by: ORTHOPAEDIC SURGERY

## 2021-08-05 PROCEDURE — 37000009 HC ANESTHESIA EA ADD 15 MINS: Performed by: ORTHOPAEDIC SURGERY

## 2021-08-05 DEVICE — KWIRE PLAIN STYLE 1 1.6X229MM: Type: IMPLANTABLE DEVICE | Site: FOOT | Status: FUNCTIONAL

## 2021-08-05 RX ORDER — CEFAZOLIN SODIUM 1 G/3ML
2 INJECTION, POWDER, FOR SOLUTION INTRAMUSCULAR; INTRAVENOUS
Status: DISCONTINUED | OUTPATIENT
Start: 2021-08-05 | End: 2021-08-05 | Stop reason: HOSPADM

## 2021-08-05 RX ORDER — MIDAZOLAM HYDROCHLORIDE 1 MG/ML
2 INJECTION INTRAMUSCULAR; INTRAVENOUS
Status: DISCONTINUED | OUTPATIENT
Start: 2021-08-05 | End: 2021-08-05 | Stop reason: HOSPADM

## 2021-08-05 RX ORDER — MIDAZOLAM HYDROCHLORIDE 1 MG/ML
INJECTION, SOLUTION INTRAMUSCULAR; INTRAVENOUS
Status: DISCONTINUED | OUTPATIENT
Start: 2021-08-05 | End: 2021-08-05

## 2021-08-05 RX ORDER — ROPIVACAINE HYDROCHLORIDE 7.5 MG/ML
INJECTION, SOLUTION EPIDURAL; PERINEURAL
Status: DISCONTINUED
Start: 2021-08-05 | End: 2021-08-05 | Stop reason: HOSPADM

## 2021-08-05 RX ORDER — CEFAZOLIN SODIUM 1 G/3ML
INJECTION, POWDER, FOR SOLUTION INTRAMUSCULAR; INTRAVENOUS
Status: DISCONTINUED | OUTPATIENT
Start: 2021-08-05 | End: 2021-08-05

## 2021-08-05 RX ORDER — FENTANYL CITRATE 50 UG/ML
INJECTION, SOLUTION INTRAMUSCULAR; INTRAVENOUS
Status: DISCONTINUED | OUTPATIENT
Start: 2021-08-05 | End: 2021-08-05

## 2021-08-05 RX ORDER — FENTANYL CITRATE 50 UG/ML
25 INJECTION, SOLUTION INTRAMUSCULAR; INTRAVENOUS EVERY 5 MIN PRN
Status: DISCONTINUED | OUTPATIENT
Start: 2021-08-05 | End: 2021-08-05 | Stop reason: HOSPADM

## 2021-08-05 RX ORDER — KETAMINE HCL IN 0.9 % NACL 50 MG/5 ML
SYRINGE (ML) INTRAVENOUS
Status: DISCONTINUED | OUTPATIENT
Start: 2021-08-05 | End: 2021-08-05

## 2021-08-05 RX ORDER — HALOPERIDOL 5 MG/ML
0.5 INJECTION INTRAMUSCULAR EVERY 10 MIN PRN
Status: DISCONTINUED | OUTPATIENT
Start: 2021-08-05 | End: 2021-08-05 | Stop reason: HOSPADM

## 2021-08-05 RX ORDER — ACETAMINOPHEN 500 MG
1000 TABLET ORAL
Status: COMPLETED | OUTPATIENT
Start: 2021-08-05 | End: 2021-08-05

## 2021-08-05 RX ORDER — LIDOCAINE HYDROCHLORIDE 20 MG/ML
INJECTION, SOLUTION EPIDURAL; INFILTRATION; INTRACAUDAL; PERINEURAL
Status: DISCONTINUED | OUTPATIENT
Start: 2021-08-05 | End: 2021-08-05

## 2021-08-05 RX ORDER — ONDANSETRON 4 MG/1
8 TABLET, ORALLY DISINTEGRATING ORAL EVERY 8 HOURS PRN
Status: DISCONTINUED | OUTPATIENT
Start: 2021-08-05 | End: 2021-08-05 | Stop reason: HOSPADM

## 2021-08-05 RX ORDER — LIDOCAINE HYDROCHLORIDE 10 MG/ML
INJECTION INFILTRATION; PERINEURAL
Status: DISCONTINUED | OUTPATIENT
Start: 2021-08-05 | End: 2021-08-05 | Stop reason: HOSPADM

## 2021-08-05 RX ORDER — ONDANSETRON 2 MG/ML
INJECTION INTRAMUSCULAR; INTRAVENOUS
Status: DISCONTINUED | OUTPATIENT
Start: 2021-08-05 | End: 2021-08-05

## 2021-08-05 RX ORDER — DEXAMETHASONE SODIUM PHOSPHATE 4 MG/ML
INJECTION, SOLUTION INTRA-ARTICULAR; INTRALESIONAL; INTRAMUSCULAR; INTRAVENOUS; SOFT TISSUE
Status: DISCONTINUED | OUTPATIENT
Start: 2021-08-05 | End: 2021-08-05

## 2021-08-05 RX ORDER — CELECOXIB 200 MG/1
400 CAPSULE ORAL ONCE
Status: DISCONTINUED | OUTPATIENT
Start: 2021-08-05 | End: 2021-08-05 | Stop reason: HOSPADM

## 2021-08-05 RX ORDER — OXYCODONE HYDROCHLORIDE 5 MG/1
5 TABLET ORAL
Status: DISCONTINUED | OUTPATIENT
Start: 2021-08-05 | End: 2021-08-05 | Stop reason: HOSPADM

## 2021-08-05 RX ORDER — PROPOFOL 10 MG/ML
VIAL (ML) INTRAVENOUS CONTINUOUS PRN
Status: DISCONTINUED | OUTPATIENT
Start: 2021-08-05 | End: 2021-08-05

## 2021-08-05 RX ORDER — FENTANYL CITRATE 50 UG/ML
100 INJECTION, SOLUTION INTRAMUSCULAR; INTRAVENOUS EVERY 5 MIN PRN
Status: DISCONTINUED | OUTPATIENT
Start: 2021-08-05 | End: 2021-08-05 | Stop reason: HOSPADM

## 2021-08-05 RX ORDER — HYDROCODONE BITARTRATE AND ACETAMINOPHEN 5; 325 MG/1; MG/1
1 TABLET ORAL EVERY 4 HOURS PRN
Status: DISCONTINUED | OUTPATIENT
Start: 2021-08-05 | End: 2021-08-05 | Stop reason: HOSPADM

## 2021-08-05 RX ORDER — DEXMEDETOMIDINE HYDROCHLORIDE 100 UG/ML
INJECTION, SOLUTION INTRAVENOUS
Status: DISCONTINUED | OUTPATIENT
Start: 2021-08-05 | End: 2021-08-05

## 2021-08-05 RX ADMIN — FENTANYL CITRATE 25 MCG: 50 INJECTION INTRAMUSCULAR; INTRAVENOUS at 11:08

## 2021-08-05 RX ADMIN — DEXAMETHASONE SODIUM PHOSPHATE 8 MG: 4 INJECTION INTRA-ARTICULAR; INTRALESIONAL; INTRAMUSCULAR; INTRAVENOUS; SOFT TISSUE at 11:08

## 2021-08-05 RX ADMIN — DEXMEDETOMIDINE HYDROCHLORIDE 8 MCG: 100 INJECTION, SOLUTION INTRAVENOUS at 11:08

## 2021-08-05 RX ADMIN — Medication 10 MG: at 11:08

## 2021-08-05 RX ADMIN — SODIUM CHLORIDE: 9 INJECTION, SOLUTION INTRAVENOUS at 09:08

## 2021-08-05 RX ADMIN — ONDANSETRON 4 MG: 2 INJECTION INTRAMUSCULAR; INTRAVENOUS at 11:08

## 2021-08-05 RX ADMIN — Medication 20 MG: at 11:08

## 2021-08-05 RX ADMIN — ACETAMINOPHEN 1000 MG: 500 TABLET ORAL at 09:08

## 2021-08-05 RX ADMIN — PROPOFOL 200 MCG/KG/MIN: 10 INJECTION, EMULSION INTRAVENOUS at 11:08

## 2021-08-05 RX ADMIN — CEFAZOLIN 2 G: 330 INJECTION, POWDER, FOR SOLUTION INTRAMUSCULAR; INTRAVENOUS at 11:08

## 2021-08-05 RX ADMIN — LIDOCAINE HYDROCHLORIDE 80 MG: 20 INJECTION, SOLUTION EPIDURAL; INFILTRATION; INTRACAUDAL at 11:08

## 2021-08-05 RX ADMIN — MIDAZOLAM HYDROCHLORIDE 2 MG: 1 INJECTION, SOLUTION INTRAMUSCULAR; INTRAVENOUS at 11:08

## 2021-08-06 VITALS
BODY MASS INDEX: 30.82 KG/M2 | HEART RATE: 55 BPM | SYSTOLIC BLOOD PRESSURE: 120 MMHG | OXYGEN SATURATION: 97 % | DIASTOLIC BLOOD PRESSURE: 73 MMHG | RESPIRATION RATE: 16 BRPM | HEIGHT: 65 IN | WEIGHT: 185 LBS | TEMPERATURE: 98 F

## 2021-08-06 NOTE — PROCEDURES
"Renae Hou is a 57 y.o. female patient.    Temp: 98.9 °F (37.2 °C) (04/14/18 0411)  Pulse: 94 (04/14/18 0411)  Resp: 16 (04/14/18 0411)  BP: 108/67 (04/14/18 0411)  SpO2: 97 % (04/14/18 0411)  Weight: 90.7 kg (200 lb) (04/10/18 1501)  Height: 5' 5" (165.1 cm) (04/10/18 1501)    PICC  Date/Time: 4/14/2018 10:11 AM  Performed by: GUERA LEVINE  Consent Done: Yes  Time out: Immediately prior to procedure a time out was called to verify the correct patient, procedure, equipment, support staff and site/side marked as required  Indications: med administration and vascular access  Anesthesia: local infiltration  Local anesthetic: lidocaine 1% without epinephrine  Anesthetic Total (mL): 2  Preparation: skin prepped with ChloraPrep  Skin prep agent dried: skin prep agent completely dried prior to procedure  Sterile barriers: all five maximum sterile barriers used - cap, mask, sterile gown, sterile gloves, and large sterile sheet  Hand hygiene: hand hygiene performed prior to central venous catheter insertion  Location details: right brachial  Catheter type: double lumen  Catheter size: 5 Fr  Catheter Length: 36cm    Ultrasound guidance: yes  Vessel Caliber: medium and patent, compressibility normal  Vascular Doppler: not done  Needle advanced into vessel with real time Ultrasound guidance.  Guidewire confirmed in vessel.  Image recorded and saved.  Sterile sheath used.  no esophageal manometryNumber of attempts: 1  Post-procedure: blood return through all ports, chlorhexidine patch and sterile dressing applied  Specimens: No  Implants: No  Assessment: placement verified by x-ray  Complications: none        Leena Miguel  4/14/2018    " EXAMINATION TYPE: XR chest 2V

 

DATE OF EXAM: 8/6/2021

 

COMPARISON: 7/26/2021

 

HISTORY: Elevated heart rate

 

TECHNIQUE: 2 views

 

FINDINGS: There is some pulmonary interstitial edema. There is slight blunting of the costophrenic an
gles. There is fluid in the fissures. There are chest leads.

 

IMPRESSION: Small pleural effusions and pulmonary interstitial edema could be acute congestive heart 
failure. This appears new compared to old exam.

## 2021-08-09 ENCOUNTER — IMMUNIZATION (OUTPATIENT)
Dept: INTERNAL MEDICINE | Facility: CLINIC | Age: 61
End: 2021-08-09
Payer: COMMERCIAL

## 2021-08-09 ENCOUNTER — LAB VISIT (OUTPATIENT)
Dept: LAB | Facility: HOSPITAL | Age: 61
End: 2021-08-09
Payer: COMMERCIAL

## 2021-08-09 ENCOUNTER — PATIENT MESSAGE (OUTPATIENT)
Dept: INTERNAL MEDICINE | Facility: CLINIC | Age: 61
End: 2021-08-09

## 2021-08-09 DIAGNOSIS — R53.83 FATIGUE, UNSPECIFIED TYPE: ICD-10-CM

## 2021-08-09 DIAGNOSIS — R63.5 WEIGHT GAIN: ICD-10-CM

## 2021-08-09 DIAGNOSIS — Z23 NEED FOR VACCINATION: Primary | ICD-10-CM

## 2021-08-09 LAB — TSH SERPL DL<=0.005 MIU/L-ACNC: 1.19 UIU/ML (ref 0.4–4)

## 2021-08-09 PROCEDURE — 0001A COVID-19, MRNA, LNP-S, PF, 30 MCG/0.3 ML DOSE VACCINE: ICD-10-PCS | Mod: CV19,,, | Performed by: INTERNAL MEDICINE

## 2021-08-09 PROCEDURE — 0001A COVID-19, MRNA, LNP-S, PF, 30 MCG/0.3 ML DOSE VACCINE: CPT | Mod: CV19,,, | Performed by: INTERNAL MEDICINE

## 2021-08-09 PROCEDURE — 84443 ASSAY THYROID STIM HORMONE: CPT | Performed by: FAMILY MEDICINE

## 2021-08-09 PROCEDURE — 36415 COLL VENOUS BLD VENIPUNCTURE: CPT | Performed by: FAMILY MEDICINE

## 2021-08-09 PROCEDURE — 91300 COVID-19, MRNA, LNP-S, PF, 30 MCG/0.3 ML DOSE VACCINE: CPT | Mod: ,,, | Performed by: INTERNAL MEDICINE

## 2021-08-09 PROCEDURE — 91300 COVID-19, MRNA, LNP-S, PF, 30 MCG/0.3 ML DOSE VACCINE: ICD-10-PCS | Mod: ,,, | Performed by: INTERNAL MEDICINE

## 2021-08-10 ENCOUNTER — TELEPHONE (OUTPATIENT)
Dept: ORTHOPEDICS | Facility: CLINIC | Age: 61
End: 2021-08-10

## 2021-08-20 ENCOUNTER — PATIENT MESSAGE (OUTPATIENT)
Dept: ADMINISTRATIVE | Facility: OTHER | Age: 61
End: 2021-08-20

## 2021-08-23 ENCOUNTER — OFFICE VISIT (OUTPATIENT)
Dept: NEUROLOGY | Facility: CLINIC | Age: 61
End: 2021-08-23
Payer: COMMERCIAL

## 2021-08-23 ENCOUNTER — PATIENT MESSAGE (OUTPATIENT)
Dept: OTHER | Facility: OTHER | Age: 61
End: 2021-08-23

## 2021-08-23 ENCOUNTER — OFFICE VISIT (OUTPATIENT)
Dept: ORTHOPEDICS | Facility: CLINIC | Age: 61
End: 2021-08-23
Payer: COMMERCIAL

## 2021-08-23 ENCOUNTER — PATIENT MESSAGE (OUTPATIENT)
Dept: ORTHOPEDICS | Facility: CLINIC | Age: 61
End: 2021-08-23

## 2021-08-23 VITALS
HEART RATE: 95 BPM | HEIGHT: 65 IN | SYSTOLIC BLOOD PRESSURE: 117 MMHG | DIASTOLIC BLOOD PRESSURE: 83 MMHG | WEIGHT: 188.25 LBS | BODY MASS INDEX: 31.36 KG/M2

## 2021-08-23 VITALS
DIASTOLIC BLOOD PRESSURE: 74 MMHG | RESPIRATION RATE: 20 BRPM | BODY MASS INDEX: 31.32 KG/M2 | WEIGHT: 188 LBS | HEIGHT: 65 IN | SYSTOLIC BLOOD PRESSURE: 112 MMHG | HEART RATE: 84 BPM

## 2021-08-23 DIAGNOSIS — R20.2 PARESTHESIA AND PAIN OF BOTH UPPER EXTREMITIES: Primary | ICD-10-CM

## 2021-08-23 DIAGNOSIS — M79.601 PARESTHESIA AND PAIN OF BOTH UPPER EXTREMITIES: Primary | ICD-10-CM

## 2021-08-23 DIAGNOSIS — M79.602 PARESTHESIA AND PAIN OF BOTH UPPER EXTREMITIES: Primary | ICD-10-CM

## 2021-08-23 DIAGNOSIS — Z09 FOLLOW-UP EXAMINATION AFTER ORTHOPEDIC SURGERY: Primary | ICD-10-CM

## 2021-08-23 PROCEDURE — 3078F PR MOST RECENT DIASTOLIC BLOOD PRESSURE < 80 MM HG: ICD-10-PCS | Mod: CPTII,S$GLB,, | Performed by: PHYSICIAN ASSISTANT

## 2021-08-23 PROCEDURE — 1160F RVW MEDS BY RX/DR IN RCRD: CPT | Mod: CPTII,S$GLB,, | Performed by: PSYCHIATRY & NEUROLOGY

## 2021-08-23 PROCEDURE — 1126F PR PAIN SEVERITY QUANTIFIED, NO PAIN PRESENT: ICD-10-PCS | Mod: CPTII,S$GLB,, | Performed by: PSYCHIATRY & NEUROLOGY

## 2021-08-23 PROCEDURE — 99999 PR PBB SHADOW E&M-EST. PATIENT-LVL III: ICD-10-PCS | Mod: PBBFAC,,, | Performed by: PSYCHIATRY & NEUROLOGY

## 2021-08-23 PROCEDURE — 3074F SYST BP LT 130 MM HG: CPT | Mod: CPTII,S$GLB,, | Performed by: PHYSICIAN ASSISTANT

## 2021-08-23 PROCEDURE — 1126F PR PAIN SEVERITY QUANTIFIED, NO PAIN PRESENT: ICD-10-PCS | Mod: CPTII,S$GLB,, | Performed by: PHYSICIAN ASSISTANT

## 2021-08-23 PROCEDURE — 3074F SYST BP LT 130 MM HG: CPT | Mod: CPTII,S$GLB,, | Performed by: PSYCHIATRY & NEUROLOGY

## 2021-08-23 PROCEDURE — 1160F RVW MEDS BY RX/DR IN RCRD: CPT | Mod: CPTII,S$GLB,, | Performed by: PHYSICIAN ASSISTANT

## 2021-08-23 PROCEDURE — 99999 PR PBB SHADOW E&M-EST. PATIENT-LVL III: CPT | Mod: PBBFAC,,, | Performed by: PSYCHIATRY & NEUROLOGY

## 2021-08-23 PROCEDURE — 3044F PR MOST RECENT HEMOGLOBIN A1C LEVEL <7.0%: ICD-10-PCS | Mod: CPTII,S$GLB,, | Performed by: PSYCHIATRY & NEUROLOGY

## 2021-08-23 PROCEDURE — 1160F PR REVIEW ALL MEDS BY PRESCRIBER/CLIN PHARMACIST DOCUMENTED: ICD-10-PCS | Mod: CPTII,S$GLB,, | Performed by: PHYSICIAN ASSISTANT

## 2021-08-23 PROCEDURE — 3079F PR MOST RECENT DIASTOLIC BLOOD PRESSURE 80-89 MM HG: ICD-10-PCS | Mod: CPTII,S$GLB,, | Performed by: PSYCHIATRY & NEUROLOGY

## 2021-08-23 PROCEDURE — 99024 PR POST-OP FOLLOW-UP VISIT: ICD-10-PCS | Mod: S$GLB,,, | Performed by: PHYSICIAN ASSISTANT

## 2021-08-23 PROCEDURE — 3008F PR BODY MASS INDEX (BMI) DOCUMENTED: ICD-10-PCS | Mod: CPTII,S$GLB,, | Performed by: PHYSICIAN ASSISTANT

## 2021-08-23 PROCEDURE — 3074F PR MOST RECENT SYSTOLIC BLOOD PRESSURE < 130 MM HG: ICD-10-PCS | Mod: CPTII,S$GLB,, | Performed by: PSYCHIATRY & NEUROLOGY

## 2021-08-23 PROCEDURE — 99205 OFFICE O/P NEW HI 60 MIN: CPT | Mod: S$GLB,,, | Performed by: PSYCHIATRY & NEUROLOGY

## 2021-08-23 PROCEDURE — 3079F DIAST BP 80-89 MM HG: CPT | Mod: CPTII,S$GLB,, | Performed by: PSYCHIATRY & NEUROLOGY

## 2021-08-23 PROCEDURE — 99024 POSTOP FOLLOW-UP VISIT: CPT | Mod: S$GLB,,, | Performed by: PHYSICIAN ASSISTANT

## 2021-08-23 PROCEDURE — 1126F AMNT PAIN NOTED NONE PRSNT: CPT | Mod: CPTII,S$GLB,, | Performed by: PHYSICIAN ASSISTANT

## 2021-08-23 PROCEDURE — 1160F PR REVIEW ALL MEDS BY PRESCRIBER/CLIN PHARMACIST DOCUMENTED: ICD-10-PCS | Mod: CPTII,S$GLB,, | Performed by: PSYCHIATRY & NEUROLOGY

## 2021-08-23 PROCEDURE — 99205 PR OFFICE/OUTPT VISIT, NEW, LEVL V, 60-74 MIN: ICD-10-PCS | Mod: S$GLB,,, | Performed by: PSYCHIATRY & NEUROLOGY

## 2021-08-23 PROCEDURE — 3008F PR BODY MASS INDEX (BMI) DOCUMENTED: ICD-10-PCS | Mod: CPTII,S$GLB,, | Performed by: PSYCHIATRY & NEUROLOGY

## 2021-08-23 PROCEDURE — 99999 PR PBB SHADOW E&M-EST. PATIENT-LVL III: CPT | Mod: PBBFAC,,, | Performed by: PHYSICIAN ASSISTANT

## 2021-08-23 PROCEDURE — 3044F PR MOST RECENT HEMOGLOBIN A1C LEVEL <7.0%: ICD-10-PCS | Mod: CPTII,S$GLB,, | Performed by: PHYSICIAN ASSISTANT

## 2021-08-23 PROCEDURE — 3044F HG A1C LEVEL LT 7.0%: CPT | Mod: CPTII,S$GLB,, | Performed by: PSYCHIATRY & NEUROLOGY

## 2021-08-23 PROCEDURE — 1126F AMNT PAIN NOTED NONE PRSNT: CPT | Mod: CPTII,S$GLB,, | Performed by: PSYCHIATRY & NEUROLOGY

## 2021-08-23 PROCEDURE — 1159F PR MEDICATION LIST DOCUMENTED IN MEDICAL RECORD: ICD-10-PCS | Mod: CPTII,S$GLB,, | Performed by: PHYSICIAN ASSISTANT

## 2021-08-23 PROCEDURE — 1159F MED LIST DOCD IN RCRD: CPT | Mod: CPTII,S$GLB,, | Performed by: PSYCHIATRY & NEUROLOGY

## 2021-08-23 PROCEDURE — 1159F MED LIST DOCD IN RCRD: CPT | Mod: CPTII,S$GLB,, | Performed by: PHYSICIAN ASSISTANT

## 2021-08-23 PROCEDURE — 3008F BODY MASS INDEX DOCD: CPT | Mod: CPTII,S$GLB,, | Performed by: PHYSICIAN ASSISTANT

## 2021-08-23 PROCEDURE — 1159F PR MEDICATION LIST DOCUMENTED IN MEDICAL RECORD: ICD-10-PCS | Mod: CPTII,S$GLB,, | Performed by: PSYCHIATRY & NEUROLOGY

## 2021-08-23 PROCEDURE — 3008F BODY MASS INDEX DOCD: CPT | Mod: CPTII,S$GLB,, | Performed by: PSYCHIATRY & NEUROLOGY

## 2021-08-23 PROCEDURE — 3044F HG A1C LEVEL LT 7.0%: CPT | Mod: CPTII,S$GLB,, | Performed by: PHYSICIAN ASSISTANT

## 2021-08-23 PROCEDURE — 99999 PR PBB SHADOW E&M-EST. PATIENT-LVL III: ICD-10-PCS | Mod: PBBFAC,,, | Performed by: PHYSICIAN ASSISTANT

## 2021-08-23 PROCEDURE — 3074F PR MOST RECENT SYSTOLIC BLOOD PRESSURE < 130 MM HG: ICD-10-PCS | Mod: CPTII,S$GLB,, | Performed by: PHYSICIAN ASSISTANT

## 2021-08-23 PROCEDURE — 3078F DIAST BP <80 MM HG: CPT | Mod: CPTII,S$GLB,, | Performed by: PHYSICIAN ASSISTANT

## 2021-09-02 ENCOUNTER — PATIENT MESSAGE (OUTPATIENT)
Dept: ORTHOPEDICS | Facility: CLINIC | Age: 61
End: 2021-09-02

## 2021-09-07 ENCOUNTER — PATIENT MESSAGE (OUTPATIENT)
Dept: ALLERGY | Facility: CLINIC | Age: 61
End: 2021-09-07

## 2021-09-08 ENCOUNTER — IMMUNIZATION (OUTPATIENT)
Dept: INTERNAL MEDICINE | Facility: CLINIC | Age: 61
End: 2021-09-08
Payer: COMMERCIAL

## 2021-09-08 DIAGNOSIS — Z23 NEED FOR VACCINATION: Primary | ICD-10-CM

## 2021-09-08 PROCEDURE — 0002A COVID-19, MRNA, LNP-S, PF, 30 MCG/0.3 ML DOSE VACCINE: CPT | Mod: CV19,,, | Performed by: INTERNAL MEDICINE

## 2021-09-08 PROCEDURE — 0002A COVID-19, MRNA, LNP-S, PF, 30 MCG/0.3 ML DOSE VACCINE: ICD-10-PCS | Mod: CV19,,, | Performed by: INTERNAL MEDICINE

## 2021-09-08 PROCEDURE — 91300 COVID-19, MRNA, LNP-S, PF, 30 MCG/0.3 ML DOSE VACCINE: ICD-10-PCS | Mod: ,,, | Performed by: INTERNAL MEDICINE

## 2021-09-08 PROCEDURE — 91300 COVID-19, MRNA, LNP-S, PF, 30 MCG/0.3 ML DOSE VACCINE: CPT | Mod: ,,, | Performed by: INTERNAL MEDICINE

## 2021-09-09 ENCOUNTER — PATIENT OUTREACH (OUTPATIENT)
Dept: ADMINISTRATIVE | Facility: OTHER | Age: 61
End: 2021-09-09

## 2021-09-10 ENCOUNTER — OFFICE VISIT (OUTPATIENT)
Dept: ORTHOPEDICS | Facility: CLINIC | Age: 61
End: 2021-09-10
Payer: COMMERCIAL

## 2021-09-10 VITALS — HEIGHT: 65 IN | BODY MASS INDEX: 31.32 KG/M2 | WEIGHT: 188 LBS

## 2021-09-10 DIAGNOSIS — M20.5X2 MALLET TOE OF LEFT FOOT: ICD-10-CM

## 2021-09-10 DIAGNOSIS — Z09 FOLLOW-UP EXAMINATION AFTER ORTHOPEDIC SURGERY: Primary | ICD-10-CM

## 2021-09-10 PROCEDURE — 1160F PR REVIEW ALL MEDS BY PRESCRIBER/CLIN PHARMACIST DOCUMENTED: ICD-10-PCS | Mod: CPTII,S$GLB,, | Performed by: ORTHOPAEDIC SURGERY

## 2021-09-10 PROCEDURE — 1159F MED LIST DOCD IN RCRD: CPT | Mod: CPTII,S$GLB,, | Performed by: ORTHOPAEDIC SURGERY

## 2021-09-10 PROCEDURE — 3008F PR BODY MASS INDEX (BMI) DOCUMENTED: ICD-10-PCS | Mod: CPTII,S$GLB,, | Performed by: ORTHOPAEDIC SURGERY

## 2021-09-10 PROCEDURE — 1159F PR MEDICATION LIST DOCUMENTED IN MEDICAL RECORD: ICD-10-PCS | Mod: CPTII,S$GLB,, | Performed by: ORTHOPAEDIC SURGERY

## 2021-09-10 PROCEDURE — 3044F HG A1C LEVEL LT 7.0%: CPT | Mod: CPTII,S$GLB,, | Performed by: ORTHOPAEDIC SURGERY

## 2021-09-10 PROCEDURE — 99999 PR PBB SHADOW E&M-EST. PATIENT-LVL III: ICD-10-PCS | Mod: PBBFAC,,, | Performed by: ORTHOPAEDIC SURGERY

## 2021-09-10 PROCEDURE — 99024 PR POST-OP FOLLOW-UP VISIT: ICD-10-PCS | Mod: S$GLB,,, | Performed by: ORTHOPAEDIC SURGERY

## 2021-09-10 PROCEDURE — 1160F RVW MEDS BY RX/DR IN RCRD: CPT | Mod: CPTII,S$GLB,, | Performed by: ORTHOPAEDIC SURGERY

## 2021-09-10 PROCEDURE — 99999 PR PBB SHADOW E&M-EST. PATIENT-LVL III: CPT | Mod: PBBFAC,,, | Performed by: ORTHOPAEDIC SURGERY

## 2021-09-10 PROCEDURE — 3044F PR MOST RECENT HEMOGLOBIN A1C LEVEL <7.0%: ICD-10-PCS | Mod: CPTII,S$GLB,, | Performed by: ORTHOPAEDIC SURGERY

## 2021-09-10 PROCEDURE — 3008F BODY MASS INDEX DOCD: CPT | Mod: CPTII,S$GLB,, | Performed by: ORTHOPAEDIC SURGERY

## 2021-09-10 PROCEDURE — 99024 POSTOP FOLLOW-UP VISIT: CPT | Mod: S$GLB,,, | Performed by: ORTHOPAEDIC SURGERY

## 2021-09-16 ENCOUNTER — CLINICAL SUPPORT (OUTPATIENT)
Dept: URGENT CARE | Facility: CLINIC | Age: 61
End: 2021-09-16
Payer: COMMERCIAL

## 2021-09-16 ENCOUNTER — PATIENT MESSAGE (OUTPATIENT)
Dept: ADMINISTRATIVE | Facility: OTHER | Age: 61
End: 2021-09-16

## 2021-09-16 DIAGNOSIS — Z20.822 CONTACT WITH AND (SUSPECTED) EXPOSURE TO COVID-19: Primary | ICD-10-CM

## 2021-09-16 LAB
CTP QC/QA: YES
SARS-COV-2 RDRP RESP QL NAA+PROBE: NEGATIVE

## 2021-09-16 PROCEDURE — 99211 OFF/OP EST MAY X REQ PHY/QHP: CPT | Mod: S$GLB,,, | Performed by: STUDENT IN AN ORGANIZED HEALTH CARE EDUCATION/TRAINING PROGRAM

## 2021-09-16 PROCEDURE — 99211 PR OFFICE/OUTPT VISIT, EST, LEVL I: ICD-10-PCS | Mod: S$GLB,,, | Performed by: STUDENT IN AN ORGANIZED HEALTH CARE EDUCATION/TRAINING PROGRAM

## 2021-09-16 PROCEDURE — U0002 COVID-19 LAB TEST NON-CDC: HCPCS | Mod: QW,S$GLB,, | Performed by: STUDENT IN AN ORGANIZED HEALTH CARE EDUCATION/TRAINING PROGRAM

## 2021-09-16 PROCEDURE — U0002: ICD-10-PCS | Mod: QW,S$GLB,, | Performed by: STUDENT IN AN ORGANIZED HEALTH CARE EDUCATION/TRAINING PROGRAM

## 2021-09-25 ENCOUNTER — OFFICE VISIT (OUTPATIENT)
Dept: URGENT CARE | Facility: CLINIC | Age: 61
End: 2021-09-25
Payer: COMMERCIAL

## 2021-09-25 VITALS
WEIGHT: 180 LBS | OXYGEN SATURATION: 96 % | DIASTOLIC BLOOD PRESSURE: 80 MMHG | HEART RATE: 93 BPM | RESPIRATION RATE: 20 BRPM | HEIGHT: 65 IN | BODY MASS INDEX: 29.99 KG/M2 | SYSTOLIC BLOOD PRESSURE: 111 MMHG | TEMPERATURE: 99 F

## 2021-09-25 DIAGNOSIS — R50.9 FEVER, UNSPECIFIED FEVER CAUSE: ICD-10-CM

## 2021-09-25 DIAGNOSIS — J32.9 BACTERIAL SINUSITIS: Primary | ICD-10-CM

## 2021-09-25 DIAGNOSIS — B96.89 BACTERIAL SINUSITIS: Primary | ICD-10-CM

## 2021-09-25 LAB
CTP QC/QA: YES
SARS-COV-2 RDRP RESP QL NAA+PROBE: NEGATIVE

## 2021-09-25 PROCEDURE — 3008F BODY MASS INDEX DOCD: CPT | Mod: CPTII,S$GLB,, | Performed by: PHYSICIAN ASSISTANT

## 2021-09-25 PROCEDURE — 3044F PR MOST RECENT HEMOGLOBIN A1C LEVEL <7.0%: ICD-10-PCS | Mod: CPTII,S$GLB,, | Performed by: PHYSICIAN ASSISTANT

## 2021-09-25 PROCEDURE — 1160F PR REVIEW ALL MEDS BY PRESCRIBER/CLIN PHARMACIST DOCUMENTED: ICD-10-PCS | Mod: CPTII,S$GLB,, | Performed by: PHYSICIAN ASSISTANT

## 2021-09-25 PROCEDURE — 3079F DIAST BP 80-89 MM HG: CPT | Mod: CPTII,S$GLB,, | Performed by: PHYSICIAN ASSISTANT

## 2021-09-25 PROCEDURE — 3008F PR BODY MASS INDEX (BMI) DOCUMENTED: ICD-10-PCS | Mod: CPTII,S$GLB,, | Performed by: PHYSICIAN ASSISTANT

## 2021-09-25 PROCEDURE — 99214 OFFICE O/P EST MOD 30 MIN: CPT | Mod: S$GLB,,, | Performed by: PHYSICIAN ASSISTANT

## 2021-09-25 PROCEDURE — 1160F RVW MEDS BY RX/DR IN RCRD: CPT | Mod: CPTII,S$GLB,, | Performed by: PHYSICIAN ASSISTANT

## 2021-09-25 PROCEDURE — 3074F SYST BP LT 130 MM HG: CPT | Mod: CPTII,S$GLB,, | Performed by: PHYSICIAN ASSISTANT

## 2021-09-25 PROCEDURE — U0002: ICD-10-PCS | Mod: QW,S$GLB,, | Performed by: PHYSICIAN ASSISTANT

## 2021-09-25 PROCEDURE — 3079F PR MOST RECENT DIASTOLIC BLOOD PRESSURE 80-89 MM HG: ICD-10-PCS | Mod: CPTII,S$GLB,, | Performed by: PHYSICIAN ASSISTANT

## 2021-09-25 PROCEDURE — 1159F PR MEDICATION LIST DOCUMENTED IN MEDICAL RECORD: ICD-10-PCS | Mod: CPTII,S$GLB,, | Performed by: PHYSICIAN ASSISTANT

## 2021-09-25 PROCEDURE — 3044F HG A1C LEVEL LT 7.0%: CPT | Mod: CPTII,S$GLB,, | Performed by: PHYSICIAN ASSISTANT

## 2021-09-25 PROCEDURE — 1159F MED LIST DOCD IN RCRD: CPT | Mod: CPTII,S$GLB,, | Performed by: PHYSICIAN ASSISTANT

## 2021-09-25 PROCEDURE — U0002 COVID-19 LAB TEST NON-CDC: HCPCS | Mod: QW,S$GLB,, | Performed by: PHYSICIAN ASSISTANT

## 2021-09-25 PROCEDURE — 99214 PR OFFICE/OUTPT VISIT, EST, LEVL IV, 30-39 MIN: ICD-10-PCS | Mod: S$GLB,,, | Performed by: PHYSICIAN ASSISTANT

## 2021-09-25 PROCEDURE — 3074F PR MOST RECENT SYSTOLIC BLOOD PRESSURE < 130 MM HG: ICD-10-PCS | Mod: CPTII,S$GLB,, | Performed by: PHYSICIAN ASSISTANT

## 2021-09-25 RX ORDER — AMOXICILLIN AND CLAVULANATE POTASSIUM 875; 125 MG/1; MG/1
1 TABLET, FILM COATED ORAL EVERY 12 HOURS
Qty: 20 TABLET | Refills: 0 | Status: SHIPPED | OUTPATIENT
Start: 2021-09-25 | End: 2021-10-05

## 2021-09-25 RX ORDER — PSEUDOEPHEDRINE HCL 30 MG
30 TABLET ORAL EVERY 4 HOURS PRN
Qty: 48 TABLET | Refills: 0 | Status: SHIPPED | OUTPATIENT
Start: 2021-09-25 | End: 2021-10-05

## 2021-09-25 RX ORDER — PROMETHAZINE HYDROCHLORIDE AND DEXTROMETHORPHAN HYDROBROMIDE 6.25; 15 MG/5ML; MG/5ML
5 SYRUP ORAL NIGHTLY PRN
Qty: 118 ML | Refills: 0 | Status: SHIPPED | OUTPATIENT
Start: 2021-09-25 | End: 2021-10-05

## 2021-10-08 ENCOUNTER — IMMUNIZATION (OUTPATIENT)
Dept: INTERNAL MEDICINE | Facility: CLINIC | Age: 61
End: 2021-10-08
Payer: COMMERCIAL

## 2021-10-08 ENCOUNTER — PROCEDURE VISIT (OUTPATIENT)
Dept: NEUROLOGY | Facility: CLINIC | Age: 61
End: 2021-10-08
Payer: COMMERCIAL

## 2021-10-08 DIAGNOSIS — R20.2 PARESTHESIA AND PAIN OF BOTH UPPER EXTREMITIES: ICD-10-CM

## 2021-10-08 DIAGNOSIS — M79.601 PARESTHESIA AND PAIN OF BOTH UPPER EXTREMITIES: ICD-10-CM

## 2021-10-08 DIAGNOSIS — M79.602 PARESTHESIA AND PAIN OF BOTH UPPER EXTREMITIES: ICD-10-CM

## 2021-10-08 PROCEDURE — 90471 IMMUNIZATION ADMIN: CPT | Mod: S$GLB,,, | Performed by: FAMILY MEDICINE

## 2021-10-08 PROCEDURE — 95912 NRV CNDJ TEST 11-12 STUDIES: CPT | Mod: S$GLB,,, | Performed by: PSYCHIATRY & NEUROLOGY

## 2021-10-08 PROCEDURE — 95912 PR NERVE CONDUCTION STUDY; 11 -12 STUDIES: ICD-10-PCS | Mod: S$GLB,,, | Performed by: PSYCHIATRY & NEUROLOGY

## 2021-10-08 PROCEDURE — 90686 IIV4 VACC NO PRSV 0.5 ML IM: CPT | Mod: S$GLB,,, | Performed by: FAMILY MEDICINE

## 2021-10-08 PROCEDURE — 95886 PR EMG COMPLETE, W/ NERVE CONDUCTION STUDIES, 5+ MUSCLES: ICD-10-PCS | Mod: S$GLB,,, | Performed by: PSYCHIATRY & NEUROLOGY

## 2021-10-08 PROCEDURE — 90686 FLU VACCINE (QUAD) GREATER THAN OR EQUAL TO 3YO PRESERVATIVE FREE IM: ICD-10-PCS | Mod: S$GLB,,, | Performed by: FAMILY MEDICINE

## 2021-10-08 PROCEDURE — 95886 MUSC TEST DONE W/N TEST COMP: CPT | Mod: S$GLB,,, | Performed by: PSYCHIATRY & NEUROLOGY

## 2021-10-08 PROCEDURE — 90471 FLU VACCINE (QUAD) GREATER THAN OR EQUAL TO 3YO PRESERVATIVE FREE IM: ICD-10-PCS | Mod: S$GLB,,, | Performed by: FAMILY MEDICINE

## 2021-10-11 ENCOUNTER — PATIENT MESSAGE (OUTPATIENT)
Dept: ORTHOPEDICS | Facility: CLINIC | Age: 61
End: 2021-10-11

## 2021-10-11 DIAGNOSIS — M25.511 RIGHT SHOULDER PAIN, UNSPECIFIED CHRONICITY: Primary | ICD-10-CM

## 2021-10-13 ENCOUNTER — PATIENT MESSAGE (OUTPATIENT)
Dept: ORTHOPEDICS | Facility: CLINIC | Age: 61
End: 2021-10-13
Payer: COMMERCIAL

## 2021-10-13 ENCOUNTER — TELEPHONE (OUTPATIENT)
Dept: ORTHOPEDICS | Facility: CLINIC | Age: 61
End: 2021-10-13

## 2021-10-13 DIAGNOSIS — M50.30 DDD (DEGENERATIVE DISC DISEASE), CERVICAL: Primary | ICD-10-CM

## 2021-10-18 ENCOUNTER — PATIENT MESSAGE (OUTPATIENT)
Dept: ORTHOPEDICS | Facility: CLINIC | Age: 61
End: 2021-10-18
Payer: COMMERCIAL

## 2021-10-20 ENCOUNTER — HOSPITAL ENCOUNTER (OUTPATIENT)
Dept: RADIOLOGY | Facility: HOSPITAL | Age: 61
Discharge: HOME | End: 2021-10-20
Attending: PHYSICIAN ASSISTANT
Payer: COMMERCIAL

## 2021-10-20 ENCOUNTER — OFFICE VISIT (OUTPATIENT)
Dept: ORTHOPEDICS | Facility: CLINIC | Age: 61
End: 2021-10-20
Payer: COMMERCIAL

## 2021-10-20 VITALS — HEIGHT: 65 IN | BODY MASS INDEX: 29.99 KG/M2 | WEIGHT: 180 LBS

## 2021-10-20 DIAGNOSIS — M25.511 RIGHT SHOULDER PAIN, UNSPECIFIED CHRONICITY: ICD-10-CM

## 2021-10-20 DIAGNOSIS — M54.12 CERVICAL RADICULOPATHY: Primary | ICD-10-CM

## 2021-10-20 DIAGNOSIS — M50.30 DDD (DEGENERATIVE DISC DISEASE), CERVICAL: ICD-10-CM

## 2021-10-20 PROCEDURE — 1159F PR MEDICATION LIST DOCUMENTED IN MEDICAL RECORD: ICD-10-PCS | Mod: CPTII,S$GLB,, | Performed by: PHYSICIAN ASSISTANT

## 2021-10-20 PROCEDURE — 99999 PR PBB SHADOW E&M-EST. PATIENT-LVL III: CPT | Mod: PBBFAC,,, | Performed by: PHYSICIAN ASSISTANT

## 2021-10-20 PROCEDURE — 73030 X-RAY EXAM OF SHOULDER: CPT | Mod: 26,RT,, | Performed by: RADIOLOGY

## 2021-10-20 PROCEDURE — 99213 OFFICE O/P EST LOW 20 MIN: CPT | Mod: 24,S$GLB,, | Performed by: PHYSICIAN ASSISTANT

## 2021-10-20 PROCEDURE — 72050 X-RAY EXAM NECK SPINE 4/5VWS: CPT | Mod: 26,,, | Performed by: RADIOLOGY

## 2021-10-20 PROCEDURE — 1160F PR REVIEW ALL MEDS BY PRESCRIBER/CLIN PHARMACIST DOCUMENTED: ICD-10-PCS | Mod: CPTII,S$GLB,, | Performed by: PHYSICIAN ASSISTANT

## 2021-10-20 PROCEDURE — 1159F MED LIST DOCD IN RCRD: CPT | Mod: CPTII,S$GLB,, | Performed by: PHYSICIAN ASSISTANT

## 2021-10-20 PROCEDURE — 3008F PR BODY MASS INDEX (BMI) DOCUMENTED: ICD-10-PCS | Mod: CPTII,S$GLB,, | Performed by: PHYSICIAN ASSISTANT

## 2021-10-20 PROCEDURE — 99213 PR OFFICE/OUTPT VISIT, EST, LEVL III, 20-29 MIN: ICD-10-PCS | Mod: 24,S$GLB,, | Performed by: PHYSICIAN ASSISTANT

## 2021-10-20 PROCEDURE — 73030 XR SHOULDER TRAUMA 3 VIEW RIGHT: ICD-10-PCS | Mod: 26,RT,, | Performed by: RADIOLOGY

## 2021-10-20 PROCEDURE — 1160F RVW MEDS BY RX/DR IN RCRD: CPT | Mod: CPTII,S$GLB,, | Performed by: PHYSICIAN ASSISTANT

## 2021-10-20 PROCEDURE — 72050 X-RAY EXAM NECK SPINE 4/5VWS: CPT | Mod: TC

## 2021-10-20 PROCEDURE — 3008F BODY MASS INDEX DOCD: CPT | Mod: CPTII,S$GLB,, | Performed by: PHYSICIAN ASSISTANT

## 2021-10-20 PROCEDURE — 3044F PR MOST RECENT HEMOGLOBIN A1C LEVEL <7.0%: ICD-10-PCS | Mod: CPTII,S$GLB,, | Performed by: PHYSICIAN ASSISTANT

## 2021-10-20 PROCEDURE — 99999 PR PBB SHADOW E&M-EST. PATIENT-LVL III: ICD-10-PCS | Mod: PBBFAC,,, | Performed by: PHYSICIAN ASSISTANT

## 2021-10-20 PROCEDURE — 72050 XR CERVICAL SPINE AP LAT WITH FLEX EXTEN: ICD-10-PCS | Mod: 26,,, | Performed by: RADIOLOGY

## 2021-10-20 PROCEDURE — 3044F HG A1C LEVEL LT 7.0%: CPT | Mod: CPTII,S$GLB,, | Performed by: PHYSICIAN ASSISTANT

## 2021-10-20 PROCEDURE — 73030 X-RAY EXAM OF SHOULDER: CPT | Mod: TC,RT

## 2021-10-25 DIAGNOSIS — M54.12 CERVICAL RADICULOPATHY: Primary | ICD-10-CM

## 2021-11-04 ENCOUNTER — PATIENT MESSAGE (OUTPATIENT)
Dept: ADMINISTRATIVE | Facility: OTHER | Age: 61
End: 2021-11-04
Payer: COMMERCIAL

## 2021-11-07 ENCOUNTER — PATIENT MESSAGE (OUTPATIENT)
Dept: BARIATRICS | Facility: CLINIC | Age: 61
End: 2021-11-07
Payer: COMMERCIAL

## 2021-11-08 ENCOUNTER — PATIENT MESSAGE (OUTPATIENT)
Dept: ORTHOPEDICS | Facility: CLINIC | Age: 61
End: 2021-11-08
Payer: COMMERCIAL

## 2021-11-11 ENCOUNTER — OFFICE VISIT (OUTPATIENT)
Dept: PAIN MEDICINE | Facility: CLINIC | Age: 61
End: 2021-11-11
Payer: COMMERCIAL

## 2021-11-11 VITALS — SYSTOLIC BLOOD PRESSURE: 122 MMHG | DIASTOLIC BLOOD PRESSURE: 89 MMHG | HEART RATE: 80 BPM

## 2021-11-11 DIAGNOSIS — M54.42 CHRONIC BILATERAL LOW BACK PAIN WITH LEFT-SIDED SCIATICA: Primary | ICD-10-CM

## 2021-11-11 DIAGNOSIS — M54.16 LUMBAR RADICULOPATHY: ICD-10-CM

## 2021-11-11 DIAGNOSIS — M54.12 RIGHT CERVICAL RADICULOPATHY: ICD-10-CM

## 2021-11-11 DIAGNOSIS — R29.2: ICD-10-CM

## 2021-11-11 DIAGNOSIS — M53.3 SACROILIAC JOINT DYSFUNCTION: ICD-10-CM

## 2021-11-11 DIAGNOSIS — G89.29 CHRONIC BILATERAL LOW BACK PAIN WITH LEFT-SIDED SCIATICA: Primary | ICD-10-CM

## 2021-11-11 DIAGNOSIS — M70.62 GREATER TROCHANTERIC BURSITIS OF LEFT HIP: ICD-10-CM

## 2021-11-11 PROCEDURE — 3044F PR MOST RECENT HEMOGLOBIN A1C LEVEL <7.0%: ICD-10-PCS | Mod: CPTII,S$GLB,, | Performed by: PHYSICAL MEDICINE & REHABILITATION

## 2021-11-11 PROCEDURE — 3079F DIAST BP 80-89 MM HG: CPT | Mod: CPTII,S$GLB,, | Performed by: PHYSICAL MEDICINE & REHABILITATION

## 2021-11-11 PROCEDURE — 99999 PR PBB SHADOW E&M-EST. PATIENT-LVL IV: ICD-10-PCS | Mod: PBBFAC,,, | Performed by: PHYSICAL MEDICINE & REHABILITATION

## 2021-11-11 PROCEDURE — 3079F PR MOST RECENT DIASTOLIC BLOOD PRESSURE 80-89 MM HG: ICD-10-PCS | Mod: CPTII,S$GLB,, | Performed by: PHYSICAL MEDICINE & REHABILITATION

## 2021-11-11 PROCEDURE — 1159F PR MEDICATION LIST DOCUMENTED IN MEDICAL RECORD: ICD-10-PCS | Mod: CPTII,S$GLB,, | Performed by: PHYSICAL MEDICINE & REHABILITATION

## 2021-11-11 PROCEDURE — 99215 PR OFFICE/OUTPT VISIT, EST, LEVL V, 40-54 MIN: ICD-10-PCS | Mod: S$GLB,,, | Performed by: PHYSICAL MEDICINE & REHABILITATION

## 2021-11-11 PROCEDURE — 3074F SYST BP LT 130 MM HG: CPT | Mod: CPTII,S$GLB,, | Performed by: PHYSICAL MEDICINE & REHABILITATION

## 2021-11-11 PROCEDURE — 3044F HG A1C LEVEL LT 7.0%: CPT | Mod: CPTII,S$GLB,, | Performed by: PHYSICAL MEDICINE & REHABILITATION

## 2021-11-11 PROCEDURE — 99215 OFFICE O/P EST HI 40 MIN: CPT | Mod: S$GLB,,, | Performed by: PHYSICAL MEDICINE & REHABILITATION

## 2021-11-11 PROCEDURE — 3074F PR MOST RECENT SYSTOLIC BLOOD PRESSURE < 130 MM HG: ICD-10-PCS | Mod: CPTII,S$GLB,, | Performed by: PHYSICAL MEDICINE & REHABILITATION

## 2021-11-11 PROCEDURE — 1160F PR REVIEW ALL MEDS BY PRESCRIBER/CLIN PHARMACIST DOCUMENTED: ICD-10-PCS | Mod: CPTII,S$GLB,, | Performed by: PHYSICAL MEDICINE & REHABILITATION

## 2021-11-11 PROCEDURE — 99999 PR PBB SHADOW E&M-EST. PATIENT-LVL IV: CPT | Mod: PBBFAC,,, | Performed by: PHYSICAL MEDICINE & REHABILITATION

## 2021-11-11 PROCEDURE — 1160F RVW MEDS BY RX/DR IN RCRD: CPT | Mod: CPTII,S$GLB,, | Performed by: PHYSICAL MEDICINE & REHABILITATION

## 2021-11-11 PROCEDURE — 1159F MED LIST DOCD IN RCRD: CPT | Mod: CPTII,S$GLB,, | Performed by: PHYSICAL MEDICINE & REHABILITATION

## 2021-11-12 ENCOUNTER — LAB VISIT (OUTPATIENT)
Dept: LAB | Facility: HOSPITAL | Age: 61
End: 2021-11-12
Payer: COMMERCIAL

## 2021-11-12 DIAGNOSIS — R73.03 PRE-DIABETES: ICD-10-CM

## 2021-11-12 LAB
ESTIMATED AVG GLUCOSE: 114 MG/DL (ref 68–131)
HBA1C MFR BLD: 5.6 % (ref 4–5.6)

## 2021-11-12 PROCEDURE — 36415 COLL VENOUS BLD VENIPUNCTURE: CPT | Performed by: FAMILY MEDICINE

## 2021-11-12 PROCEDURE — 83036 HEMOGLOBIN GLYCOSYLATED A1C: CPT | Performed by: FAMILY MEDICINE

## 2021-11-15 ENCOUNTER — TELEPHONE (OUTPATIENT)
Dept: PAIN MEDICINE | Facility: CLINIC | Age: 61
End: 2021-11-15
Payer: COMMERCIAL

## 2021-11-16 ENCOUNTER — PATIENT MESSAGE (OUTPATIENT)
Dept: INTERNAL MEDICINE | Facility: CLINIC | Age: 61
End: 2021-11-16
Payer: COMMERCIAL

## 2021-11-17 ENCOUNTER — HOSPITAL ENCOUNTER (OUTPATIENT)
Dept: RADIOLOGY | Facility: HOSPITAL | Age: 61
Discharge: HOME | End: 2021-11-17
Attending: FAMILY MEDICINE
Payer: COMMERCIAL

## 2021-11-17 ENCOUNTER — OFFICE VISIT (OUTPATIENT)
Dept: INTERNAL MEDICINE | Facility: CLINIC | Age: 61
End: 2021-11-17
Payer: COMMERCIAL

## 2021-11-17 VITALS
OXYGEN SATURATION: 99 % | BODY MASS INDEX: 31.81 KG/M2 | DIASTOLIC BLOOD PRESSURE: 90 MMHG | HEIGHT: 65 IN | SYSTOLIC BLOOD PRESSURE: 128 MMHG | WEIGHT: 190.94 LBS | TEMPERATURE: 99 F | HEART RATE: 73 BPM

## 2021-11-17 DIAGNOSIS — L98.9 HAND LESION: ICD-10-CM

## 2021-11-17 DIAGNOSIS — L98.9 HAND LESION: Primary | ICD-10-CM

## 2021-11-17 PROCEDURE — 1160F PR REVIEW ALL MEDS BY PRESCRIBER/CLIN PHARMACIST DOCUMENTED: ICD-10-PCS | Mod: CPTII,S$GLB,, | Performed by: FAMILY MEDICINE

## 2021-11-17 PROCEDURE — 3074F PR MOST RECENT SYSTOLIC BLOOD PRESSURE < 130 MM HG: ICD-10-PCS | Mod: CPTII,S$GLB,, | Performed by: FAMILY MEDICINE

## 2021-11-17 PROCEDURE — 73130 X-RAY EXAM OF HAND: CPT | Mod: 26,RT,, | Performed by: RADIOLOGY

## 2021-11-17 PROCEDURE — 73130 XR HAND COMPLETE 3 VIEW RIGHT: ICD-10-PCS | Mod: 26,RT,, | Performed by: RADIOLOGY

## 2021-11-17 PROCEDURE — 3008F BODY MASS INDEX DOCD: CPT | Mod: CPTII,S$GLB,, | Performed by: FAMILY MEDICINE

## 2021-11-17 PROCEDURE — 99999 PR PBB SHADOW E&M-EST. PATIENT-LVL V: CPT | Mod: PBBFAC,,, | Performed by: FAMILY MEDICINE

## 2021-11-17 PROCEDURE — 3044F PR MOST RECENT HEMOGLOBIN A1C LEVEL <7.0%: ICD-10-PCS | Mod: CPTII,S$GLB,, | Performed by: FAMILY MEDICINE

## 2021-11-17 PROCEDURE — 1159F MED LIST DOCD IN RCRD: CPT | Mod: CPTII,S$GLB,, | Performed by: FAMILY MEDICINE

## 2021-11-17 PROCEDURE — 99213 PR OFFICE/OUTPT VISIT, EST, LEVL III, 20-29 MIN: ICD-10-PCS | Mod: S$GLB,,, | Performed by: FAMILY MEDICINE

## 2021-11-17 PROCEDURE — 73130 X-RAY EXAM OF HAND: CPT | Mod: TC,RT

## 2021-11-17 PROCEDURE — 3080F DIAST BP >= 90 MM HG: CPT | Mod: CPTII,S$GLB,, | Performed by: FAMILY MEDICINE

## 2021-11-17 PROCEDURE — 3074F SYST BP LT 130 MM HG: CPT | Mod: CPTII,S$GLB,, | Performed by: FAMILY MEDICINE

## 2021-11-17 PROCEDURE — 1160F RVW MEDS BY RX/DR IN RCRD: CPT | Mod: CPTII,S$GLB,, | Performed by: FAMILY MEDICINE

## 2021-11-17 PROCEDURE — 1159F PR MEDICATION LIST DOCUMENTED IN MEDICAL RECORD: ICD-10-PCS | Mod: CPTII,S$GLB,, | Performed by: FAMILY MEDICINE

## 2021-11-17 PROCEDURE — 99213 OFFICE O/P EST LOW 20 MIN: CPT | Mod: S$GLB,,, | Performed by: FAMILY MEDICINE

## 2021-11-17 PROCEDURE — 3080F PR MOST RECENT DIASTOLIC BLOOD PRESSURE >= 90 MM HG: ICD-10-PCS | Mod: CPTII,S$GLB,, | Performed by: FAMILY MEDICINE

## 2021-11-17 PROCEDURE — 99999 PR PBB SHADOW E&M-EST. PATIENT-LVL V: ICD-10-PCS | Mod: PBBFAC,,, | Performed by: FAMILY MEDICINE

## 2021-11-17 PROCEDURE — 3044F HG A1C LEVEL LT 7.0%: CPT | Mod: CPTII,S$GLB,, | Performed by: FAMILY MEDICINE

## 2021-11-17 PROCEDURE — 3008F PR BODY MASS INDEX (BMI) DOCUMENTED: ICD-10-PCS | Mod: CPTII,S$GLB,, | Performed by: FAMILY MEDICINE

## 2021-11-22 ENCOUNTER — PATIENT MESSAGE (OUTPATIENT)
Dept: PAIN MEDICINE | Facility: CLINIC | Age: 61
End: 2021-11-22
Payer: COMMERCIAL

## 2021-11-23 ENCOUNTER — OFFICE VISIT (OUTPATIENT)
Dept: OBSTETRICS AND GYNECOLOGY | Facility: CLINIC | Age: 61
End: 2021-11-23
Attending: OBSTETRICS & GYNECOLOGY
Payer: COMMERCIAL

## 2021-11-23 ENCOUNTER — LAB VISIT (OUTPATIENT)
Dept: LAB | Facility: HOSPITAL | Age: 61
End: 2021-11-23
Attending: PHYSICIAN ASSISTANT
Payer: COMMERCIAL

## 2021-11-23 ENCOUNTER — PATIENT MESSAGE (OUTPATIENT)
Dept: INTERNAL MEDICINE | Facility: CLINIC | Age: 61
End: 2021-11-23
Payer: COMMERCIAL

## 2021-11-23 VITALS
DIASTOLIC BLOOD PRESSURE: 80 MMHG | HEIGHT: 65 IN | WEIGHT: 189.69 LBS | SYSTOLIC BLOOD PRESSURE: 122 MMHG | BODY MASS INDEX: 31.6 KG/M2

## 2021-11-23 DIAGNOSIS — G89.29 CHRONIC BILATERAL LOW BACK PAIN WITH LEFT-SIDED SCIATICA: Primary | ICD-10-CM

## 2021-11-23 DIAGNOSIS — M54.42 CHRONIC BILATERAL LOW BACK PAIN WITH LEFT-SIDED SCIATICA: Primary | ICD-10-CM

## 2021-11-23 DIAGNOSIS — L98.9 HAND LESION: Primary | ICD-10-CM

## 2021-11-23 DIAGNOSIS — Z01.419 ENCOUNTER FOR GYNECOLOGICAL EXAMINATION: Primary | ICD-10-CM

## 2021-11-23 DIAGNOSIS — M54.12 CERVICAL RADICULOPATHY: ICD-10-CM

## 2021-11-23 DIAGNOSIS — M53.3 SACROILIAC JOINT DYSFUNCTION: ICD-10-CM

## 2021-11-23 DIAGNOSIS — M54.12 CERVICAL RADICULOPATHY: Primary | ICD-10-CM

## 2021-11-23 LAB
BASOPHILS # BLD AUTO: 0.07 K/UL (ref 0–0.2)
BASOPHILS NFR BLD: 0.9 % (ref 0–1.9)
DIFFERENTIAL METHOD: NORMAL
EOSINOPHIL # BLD AUTO: 0.4 K/UL (ref 0–0.5)
EOSINOPHIL NFR BLD: 4.7 % (ref 0–8)
ERYTHROCYTE [DISTWIDTH] IN BLOOD BY AUTOMATED COUNT: 12.9 % (ref 11.5–14.5)
HCT VFR BLD AUTO: 41.3 % (ref 37–48.5)
HGB BLD-MCNC: 13.5 G/DL (ref 12–16)
IMM GRANULOCYTES # BLD AUTO: 0.02 K/UL (ref 0–0.04)
IMM GRANULOCYTES NFR BLD AUTO: 0.3 % (ref 0–0.5)
INR PPP: 0.9 (ref 0.8–1.2)
LYMPHOCYTES # BLD AUTO: 3 K/UL (ref 1–4.8)
LYMPHOCYTES NFR BLD: 37.3 % (ref 18–48)
MCH RBC QN AUTO: 30.3 PG (ref 27–31)
MCHC RBC AUTO-ENTMCNC: 32.7 G/DL (ref 32–36)
MCV RBC AUTO: 93 FL (ref 82–98)
MONOCYTES # BLD AUTO: 0.5 K/UL (ref 0.3–1)
MONOCYTES NFR BLD: 5.7 % (ref 4–15)
NEUTROPHILS # BLD AUTO: 4 K/UL (ref 1.8–7.7)
NEUTROPHILS NFR BLD: 51.1 % (ref 38–73)
NRBC BLD-RTO: 0 /100 WBC
PLATELET # BLD AUTO: 277 K/UL (ref 150–450)
PMV BLD AUTO: 10 FL (ref 9.2–12.9)
PROTHROMBIN TIME: 10.3 SEC (ref 9–12.5)
RBC # BLD AUTO: 4.45 M/UL (ref 4–5.4)
WBC # BLD AUTO: 7.9 K/UL (ref 3.9–12.7)

## 2021-11-23 PROCEDURE — 36415 COLL VENOUS BLD VENIPUNCTURE: CPT | Mod: PO | Performed by: PHYSICIAN ASSISTANT

## 2021-11-23 PROCEDURE — 99396 PREV VISIT EST AGE 40-64: CPT | Mod: S$GLB,,, | Performed by: OBSTETRICS & GYNECOLOGY

## 2021-11-23 PROCEDURE — 99999 PR PBB SHADOW E&M-EST. PATIENT-LVL III: CPT | Mod: PBBFAC,,, | Performed by: OBSTETRICS & GYNECOLOGY

## 2021-11-23 PROCEDURE — 99396 PR PREVENTIVE VISIT,EST,40-64: ICD-10-PCS | Mod: S$GLB,,, | Performed by: OBSTETRICS & GYNECOLOGY

## 2021-11-23 PROCEDURE — 85610 PROTHROMBIN TIME: CPT | Performed by: PHYSICIAN ASSISTANT

## 2021-11-23 PROCEDURE — 99999 PR PBB SHADOW E&M-EST. PATIENT-LVL III: ICD-10-PCS | Mod: PBBFAC,,, | Performed by: OBSTETRICS & GYNECOLOGY

## 2021-11-23 PROCEDURE — 85025 COMPLETE CBC W/AUTO DIFF WBC: CPT | Performed by: PHYSICIAN ASSISTANT

## 2021-11-23 RX ORDER — BACLOFEN 10 MG/1
10 TABLET ORAL 3 TIMES DAILY PRN
Qty: 90 TABLET | Refills: 2 | Status: SHIPPED | OUTPATIENT
Start: 2021-11-23 | End: 2022-04-06

## 2021-11-23 RX ORDER — FENTANYL CITRATE 50 UG/ML
50 INJECTION, SOLUTION INTRAMUSCULAR; INTRAVENOUS
Status: CANCELLED | OUTPATIENT
Start: 2021-11-23

## 2021-11-23 RX ORDER — GADOBUTROL 604.72 MG/ML
10 INJECTION INTRAVENOUS
Status: CANCELLED | OUTPATIENT
Start: 2021-11-23

## 2021-11-23 RX ORDER — MIDAZOLAM HYDROCHLORIDE 1 MG/ML
1 INJECTION INTRAMUSCULAR; INTRAVENOUS
Status: CANCELLED | OUTPATIENT
Start: 2021-11-23

## 2021-11-26 ENCOUNTER — HOSPITAL ENCOUNTER (OUTPATIENT)
Dept: RADIOLOGY | Facility: HOSPITAL | Age: 61
Discharge: HOME | End: 2021-11-26
Attending: PHYSICAL MEDICINE & REHABILITATION
Payer: COMMERCIAL

## 2021-11-26 DIAGNOSIS — R29.2: ICD-10-CM

## 2021-11-26 DIAGNOSIS — M54.12 RIGHT CERVICAL RADICULOPATHY: ICD-10-CM

## 2021-11-26 PROCEDURE — 72141 MRI CERVICAL SPINE WITHOUT CONTRAST: ICD-10-PCS | Mod: 26,,, | Performed by: RADIOLOGY

## 2021-11-26 PROCEDURE — 72141 MRI NECK SPINE W/O DYE: CPT | Mod: 26,,, | Performed by: RADIOLOGY

## 2021-11-26 PROCEDURE — 72141 MRI NECK SPINE W/O DYE: CPT | Mod: TC

## 2021-11-29 ENCOUNTER — TELEPHONE (OUTPATIENT)
Dept: INTERVENTIONAL RADIOLOGY/VASCULAR | Facility: HOSPITAL | Age: 61
End: 2021-11-29
Payer: COMMERCIAL

## 2021-11-30 ENCOUNTER — HOSPITAL ENCOUNTER (OUTPATIENT)
Dept: INTERVENTIONAL RADIOLOGY/VASCULAR | Facility: HOSPITAL | Age: 61
Discharge: HOME | End: 2021-11-30
Attending: PHYSICIAN ASSISTANT
Payer: COMMERCIAL

## 2021-11-30 VITALS
SYSTOLIC BLOOD PRESSURE: 114 MMHG | WEIGHT: 185 LBS | BODY MASS INDEX: 30.82 KG/M2 | HEART RATE: 80 BPM | TEMPERATURE: 97 F | HEIGHT: 65 IN | RESPIRATION RATE: 17 BRPM | OXYGEN SATURATION: 94 % | DIASTOLIC BLOOD PRESSURE: 65 MMHG

## 2021-11-30 DIAGNOSIS — M54.12 CERVICAL RADICULOPATHY: ICD-10-CM

## 2021-11-30 PROCEDURE — 64479 IR EPIDURAL TRANSFORAMINAL INJ 1ST VERT CERV THOR BILAT: ICD-10-PCS | Mod: 50,,, | Performed by: RADIOLOGY

## 2021-11-30 PROCEDURE — A4215 STERILE NEEDLE: HCPCS

## 2021-11-30 PROCEDURE — 63600175 PHARM REV CODE 636 W HCPCS: Performed by: RADIOLOGY

## 2021-11-30 PROCEDURE — 64479 NJX AA&/STRD TFRM EPI C/T 1: CPT | Mod: 50 | Performed by: RADIOLOGY

## 2021-11-30 PROCEDURE — 25000003 PHARM REV CODE 250: Performed by: RADIOLOGY

## 2021-11-30 RX ORDER — MIDAZOLAM HYDROCHLORIDE 1 MG/ML
INJECTION INTRAMUSCULAR; INTRAVENOUS CODE/TRAUMA/SEDATION MEDICATION
Status: COMPLETED | OUTPATIENT
Start: 2021-11-30 | End: 2021-11-30

## 2021-11-30 RX ORDER — GADOBUTROL 604.72 MG/ML
10 INJECTION INTRAVENOUS ONCE
Status: DISCONTINUED | OUTPATIENT
Start: 2021-11-30 | End: 2021-12-01 | Stop reason: HOSPADM

## 2021-11-30 RX ORDER — LIDOCAINE HYDROCHLORIDE 10 MG/ML
INJECTION INFILTRATION; PERINEURAL CODE/TRAUMA/SEDATION MEDICATION
Status: COMPLETED | OUTPATIENT
Start: 2021-11-30 | End: 2021-11-30

## 2021-11-30 RX ORDER — SODIUM CHLORIDE 9 MG/ML
INJECTION, SOLUTION INTRAVENOUS CONTINUOUS
Status: DISCONTINUED | OUTPATIENT
Start: 2021-11-30 | End: 2021-12-01 | Stop reason: HOSPADM

## 2021-11-30 RX ORDER — FENTANYL CITRATE 50 UG/ML
INJECTION, SOLUTION INTRAMUSCULAR; INTRAVENOUS CODE/TRAUMA/SEDATION MEDICATION
Status: COMPLETED | OUTPATIENT
Start: 2021-11-30 | End: 2021-11-30

## 2021-11-30 RX ADMIN — FENTANYL CITRATE 25 MCG: 50 INJECTION, SOLUTION INTRAMUSCULAR; INTRAVENOUS at 12:11

## 2021-11-30 RX ADMIN — MIDAZOLAM HYDROCHLORIDE 0.5 MG: 1 INJECTION INTRAMUSCULAR; INTRAVENOUS at 12:11

## 2021-11-30 RX ADMIN — MIDAZOLAM HYDROCHLORIDE 1 MG: 1 INJECTION INTRAMUSCULAR; INTRAVENOUS at 12:11

## 2021-11-30 RX ADMIN — FENTANYL CITRATE 50 MCG: 50 INJECTION, SOLUTION INTRAMUSCULAR; INTRAVENOUS at 12:11

## 2021-11-30 RX ADMIN — LIDOCAINE HYDROCHLORIDE 2 ML: 10 INJECTION, SOLUTION INFILTRATION; PERINEURAL at 12:11

## 2021-12-14 ENCOUNTER — HOSPITAL ENCOUNTER (OUTPATIENT)
Dept: RADIOLOGY | Facility: HOSPITAL | Age: 61
Discharge: HOME | End: 2021-12-14
Attending: ORTHOPAEDIC SURGERY
Payer: COMMERCIAL

## 2021-12-14 ENCOUNTER — CLINICAL SUPPORT (OUTPATIENT)
Dept: REHABILITATION | Facility: HOSPITAL | Age: 61
End: 2021-12-14
Payer: COMMERCIAL

## 2021-12-14 ENCOUNTER — OFFICE VISIT (OUTPATIENT)
Dept: ORTHOPEDICS | Facility: CLINIC | Age: 61
End: 2021-12-14
Payer: COMMERCIAL

## 2021-12-14 DIAGNOSIS — G56.03 CARPAL TUNNEL SYNDROME, BILATERAL: ICD-10-CM

## 2021-12-14 DIAGNOSIS — M54.12 CERVICAL RADICULOPATHY: ICD-10-CM

## 2021-12-14 DIAGNOSIS — M54.42 CHRONIC LEFT-SIDED LOW BACK PAIN WITH LEFT-SIDED SCIATICA: ICD-10-CM

## 2021-12-14 DIAGNOSIS — M18.12 PRIMARY OSTEOARTHRITIS OF FIRST CARPOMETACARPAL JOINT OF LEFT HAND: Primary | ICD-10-CM

## 2021-12-14 DIAGNOSIS — R22.31 MASS OF HAND, RIGHT: ICD-10-CM

## 2021-12-14 DIAGNOSIS — M18.12 PRIMARY OSTEOARTHRITIS OF FIRST CARPOMETACARPAL JOINT OF LEFT HAND: ICD-10-CM

## 2021-12-14 DIAGNOSIS — M18.0 PRIMARY OSTEOARTHRITIS OF BOTH FIRST CARPOMETACARPAL JOINTS: ICD-10-CM

## 2021-12-14 DIAGNOSIS — L98.9 HAND LESION: ICD-10-CM

## 2021-12-14 DIAGNOSIS — G89.29 CHRONIC LEFT-SIDED LOW BACK PAIN WITH LEFT-SIDED SCIATICA: ICD-10-CM

## 2021-12-14 PROCEDURE — 73130 XR HAND COMPLETE 3 VIEW LEFT: ICD-10-PCS | Mod: 26,LT,, | Performed by: RADIOLOGY

## 2021-12-14 PROCEDURE — 99999 PR PBB SHADOW E&M-EST. PATIENT-LVL III: ICD-10-PCS | Mod: PBBFAC,,, | Performed by: ORTHOPAEDIC SURGERY

## 2021-12-14 PROCEDURE — 99204 PR OFFICE/OUTPT VISIT, NEW, LEVL IV, 45-59 MIN: ICD-10-PCS | Mod: S$GLB,,, | Performed by: ORTHOPAEDIC SURGERY

## 2021-12-14 PROCEDURE — 99204 OFFICE O/P NEW MOD 45 MIN: CPT | Mod: S$GLB,,, | Performed by: ORTHOPAEDIC SURGERY

## 2021-12-14 PROCEDURE — 73130 X-RAY EXAM OF HAND: CPT | Mod: 26,LT,, | Performed by: RADIOLOGY

## 2021-12-14 PROCEDURE — 99999 PR PBB SHADOW E&M-EST. PATIENT-LVL III: CPT | Mod: PBBFAC,,, | Performed by: ORTHOPAEDIC SURGERY

## 2021-12-14 PROCEDURE — 73130 X-RAY EXAM OF HAND: CPT | Mod: TC,LT

## 2021-12-14 PROCEDURE — 97530 THERAPEUTIC ACTIVITIES: CPT | Mod: PO

## 2021-12-14 PROCEDURE — 97161 PT EVAL LOW COMPLEX 20 MIN: CPT | Mod: PO

## 2021-12-14 PROCEDURE — 97110 THERAPEUTIC EXERCISES: CPT | Mod: PO

## 2021-12-21 ENCOUNTER — TELEPHONE (OUTPATIENT)
Dept: BARIATRICS | Facility: CLINIC | Age: 61
End: 2021-12-21
Payer: COMMERCIAL

## 2021-12-30 ENCOUNTER — PATIENT MESSAGE (OUTPATIENT)
Dept: ADMINISTRATIVE | Facility: OTHER | Age: 61
End: 2021-12-30
Payer: COMMERCIAL

## 2021-12-30 ENCOUNTER — LAB VISIT (OUTPATIENT)
Dept: PRIMARY CARE CLINIC | Facility: OTHER | Age: 61
End: 2021-12-30
Attending: INTERNAL MEDICINE
Payer: COMMERCIAL

## 2021-12-30 DIAGNOSIS — Z20.822 ENCOUNTER FOR LABORATORY TESTING FOR COVID-19 VIRUS: ICD-10-CM

## 2021-12-30 PROCEDURE — U0003 INFECTIOUS AGENT DETECTION BY NUCLEIC ACID (DNA OR RNA); SEVERE ACUTE RESPIRATORY SYNDROME CORONAVIRUS 2 (SARS-COV-2) (CORONAVIRUS DISEASE [COVID-19]), AMPLIFIED PROBE TECHNIQUE, MAKING USE OF HIGH THROUGHPUT TECHNOLOGIES AS DESCRIBED BY CMS-2020-01-R: HCPCS | Performed by: INTERNAL MEDICINE

## 2022-01-01 NOTE — H&P
Radiology History & Physical      SUBJECTIVE:     Chief Complaint: NECK PAIN    History of Present Illness:  Renae Hou is a 60 y.o. female who presents for bilateral cervical NEERAJ in patient with cervical radiculopathy.     Past Medical History:   Diagnosis Date    Abscess of paraspinous muscles     Allergy 4/2018    DVT (deep venous thrombosis)     left leg    Epidural abscess 4/10/2018    Fatty liver     GERD (gastroesophageal reflux disease) 2015    Hypertension     Lumbar radiculopathy     Menopause     Obesity     Obstructive sleep apnea on CPAP     no longer uses CPAP after weight loss from gastric sleeve    Thyroid disease     Thyroid nodules.     Past Surgical History:   Procedure Laterality Date    ADENOIDECTOMY  1995    BACK SURGERY  2002    L4, L5    BILATERAL SALPINGOOPHORECTOMY      CHOLECYSTECTOMY      CYST REMOVAL      EPIDURAL STEROID INJECTION INTO CERVICAL SPINE N/A 8/11/2020    Procedure: Injection-steroid-epidural-cervical;  Surgeon: [facility] Diagnostic Provider;  Location: [facility] OR [address] [place] [address];  Service: Radiology;  Laterality: N/A;  /Carla    ESOPHAGOGASTRODUODENOSCOPY N/A 5/3/2019    Procedure: ESOPHAGOGASTRODUODENOSCOPY (EGD);  Surgeon: Carlin Sanchez MD;  Location: [facility] ([facility]);  Service: Endoscopy;  Laterality: N/A;    HYSTERECTOMY  12/17/2012    [facility]     INSERTION OF INFERIOR VENA CAVAL FILTER Right 7/19/2019    Procedure: IVC filter placement;  Surgeon: Rodney Rico MD;  Location: [facility] CATH LAB;  Service: Peripheral Vascular;  Laterality: Right;    IVC FILTER RETRIEVAL N/A 12/20/2019    Procedure: REMOVAL-FILTER-IVC;  Surgeon: Rodney Rico MD;  Location: [facility] OR [address] [place] [address];  Service: Peripheral Vascular;  Laterality: N/A;    LAPAROSCOPIC SLEEVE GASTRECTOMY N/A 7/23/2019    Procedure: GASTRECTOMY, SLEEVE, LAPAROSCOPIC, with intraop EGD 45716;  Surgeon: Duc Ratliff Jr., MD;  Location: [facility] OR [address] [place] [address];  Service: General;   Laterality: N/A;    RECTOCELE REPAIR      SPINE SURGERY  2018    THYROID NODULE REMOVAL      TONSILLECTOMY  1995       Home Meds:   Prior to Admission medications    Medication Sig Start Date End Date Taking? Authorizing Provider   cyanocobalamin 500 MCG tablet Take 500 mcg by mouth once daily.   Yes Historical Provider   diphenhydrAMINE (BENADRYL) 50 MG capsule Take 1 hour prior to procedure 12/7/20  Yes Yi Peters PA-C   ergocalciferol, vitamin D2, (VITAMIN D ORAL) Take 1 tablet by mouth once daily.   Yes Historical Provider   gabapentin (NEURONTIN) 300 MG capsule Take 1 capsule (300 mg total) by mouth 3 (three) times daily. 9/18/20 12/17/20 Yes Ann Marie Nguyen DO   hydroCHLOROthiazide (HYDRODIURIL) 12.5 MG Tab Take 1 tablet (12.5 mg total) by mouth once daily. 8/10/20  Yes Franko Garza MD   meclizine (ANTIVERT) 25 mg tablet TAKE 1 TABLET(25 MG) BY MOUTH THREE TIMES DAILY AS NEEDED 8/26/20  Yes Lenny Avila MD   omeprazole (PRILOSEC) 40 MG capsule Take 1 capsule (40 mg total) by mouth every morning. Open capsule and take with apple sauce  Patient taking differently: Take 40 mg by mouth daily as needed. Open capsule and take with apple sauce 7/10/19  Yes Duc Ratliff Jr., MD   predniSONE (DELTASONE) 50 MG Tab Take 1 tablet at 13 hours prior to procedure, 6 hours prior to procedure, and 1 hour prior to procedure 12/7/20  Yes Yi Peters PA-C   diclofenac sodium (VOLTAREN) 1 % Gel Apply 3-4 times a day as needed. 8/5/20   Mila Jones PA-C   EPINEPHrine (EPIPEN) 0.3 mg/0.3 mL AtIn  3/6/20   Historical Provider   JUBLIA 10 % Javan  5/22/20   Historical Provider   mometasone (ELOCON) 0.1 % ointment ALTON EXT AA BID PRF RASH 4/24/20   Historical Provider   NON FORMULARY MEDICATION 1 patch by Other route once daily. Apply 1 patch to lower abdomen once daily    Historical Provider   NON FORMULARY MEDICATION 1 patch.    Historical Provider   NON FORMULARY MEDICATION 1 patch.     Historical Provider   potassium chloride SA (K-DUR,KLOR-CON) 20 MEQ tablet Take 1 tablet (20 mEq total) by mouth once daily. 1/2/20   Franko Garza MD   varicella-zoster gE-AS01B, PF, (SHINGRIX, PF,) 50 mcg/0.5 mL injection Inject into the muscle. 5/15/20   Franko Garza MD     Anticoagulants/Antiplatelets: no anticoagulation    Allergies:   Review of patient's allergies indicates:   Allergen Reactions    Cathflo activase [alteplase] Anaphylaxis     Tightness in chest, raspy throat, restless legs, hotness all over    Heparin analogues      Restless legs, tightness in chest, and hot all over    Iodine and iodide containing products Anaphylaxis, Hives, Shortness Of Breath, Itching, Swelling and Rash    Shellfish containing products Anaphylaxis, Hives, Shortness Of Breath, Itching, Swelling and Rash          Latex Swelling           Sedation History:  no adverse reactions    Review of Systems:   Hematological: no known coagulopathies  Respiratory: no shortness of breath  Cardiovascular: no chest pain  Gastrointestinal: no abdominal pain  Genito-Urinary: no dysuria  Musculoskeletal: negative  Neurological: no TIA or stroke symptoms         OBJECTIVE:     Vital Signs (Most Recent)  Temp: 97.6 °F (36.4 °C) (12/08/20 1016)  Pulse: 71 (12/08/20 1016)  Resp: 16 (12/08/20 1016)  BP: 115/75 (12/08/20 1016)  SpO2: 99 % (12/08/20 1016)    Physical Exam:  ASA: 2  Mallampati: 2    General: no acute distress  Mental Status: alert and oriented to person, place and time  HEENT: normocephalic, atraumatic  Chest: unlabored breathing  Heart: regular heart rate  Abdomen: nondistended  Extremity: moves all extremities    Laboratory  Lab Results   Component Value Date    INR 0.9 12/08/2020       Lab Results   Component Value Date    WBC 7.92 12/08/2020    HGB 14.7 12/08/2020    HCT 44.3 12/08/2020    MCV 90 12/08/2020     12/08/2020      Lab Results   Component Value Date     (H) 08/11/2020     08/11/2020     K 4.0 08/11/2020     08/11/2020    CO2 23 08/11/2020    BUN 22 (H) 08/11/2020    CREATININE 0.8 08/11/2020    CALCIUM 9.4 08/11/2020    MG 2.2 07/24/2019    ALT 27 08/11/2020    AST 26 08/11/2020    ALBUMIN 4.0 08/11/2020    BILITOT 0.5 08/11/2020    BILIDIR 0.2 10/05/2018       ASSESSMENT/PLAN:     Sedation Plan: moderate sedation  Patient will undergo : bilateral cervical TF NEERAJ    Venu Dhillon MD  NeuroIR fellow.      not applicable

## 2022-01-02 LAB
SARS-COV-2 RNA RESP QL NAA+PROBE: NOT DETECTED
SARS-COV-2- CYCLE NUMBER: NORMAL

## 2022-01-04 ENCOUNTER — PATIENT MESSAGE (OUTPATIENT)
Dept: PAIN MEDICINE | Facility: CLINIC | Age: 62
End: 2022-01-04
Payer: COMMERCIAL

## 2022-01-04 DIAGNOSIS — Z00.00 ROUTINE GENERAL MEDICAL EXAMINATION AT A HEALTH CARE FACILITY: Primary | ICD-10-CM

## 2022-01-10 ENCOUNTER — TELEPHONE (OUTPATIENT)
Dept: INTERNAL MEDICINE | Facility: CLINIC | Age: 62
End: 2022-01-10
Payer: COMMERCIAL

## 2022-01-10 ENCOUNTER — PATIENT MESSAGE (OUTPATIENT)
Dept: INTERNAL MEDICINE | Facility: CLINIC | Age: 62
End: 2022-01-10
Payer: COMMERCIAL

## 2022-01-10 ENCOUNTER — PATIENT MESSAGE (OUTPATIENT)
Dept: PAIN MEDICINE | Facility: CLINIC | Age: 62
End: 2022-01-10
Payer: COMMERCIAL

## 2022-01-10 ENCOUNTER — LAB VISIT (OUTPATIENT)
Dept: PRIMARY CARE CLINIC | Facility: CLINIC | Age: 62
End: 2022-01-10
Payer: COMMERCIAL

## 2022-01-10 DIAGNOSIS — Z20.822 CONTACT WITH AND (SUSPECTED) EXPOSURE TO COVID-19: ICD-10-CM

## 2022-01-10 DIAGNOSIS — U07.1 COVID-19 VIRUS INFECTION: Primary | ICD-10-CM

## 2022-01-10 LAB
CTP QC/QA: YES
SARS-COV-2 AG RESP QL IA.RAPID: POSITIVE

## 2022-01-10 PROCEDURE — 87811 SARS-COV-2 COVID19 W/OPTIC: CPT

## 2022-01-10 NOTE — TELEPHONE ENCOUNTER
Spoke to patient and advised of new orders for antibody infusion and pcp recommendation. Patient verbalized understanding.

## 2022-01-10 NOTE — TELEPHONE ENCOUNTER
I spoke to the patient she has 102 fever and feels like someone is punching her in the chest, she wants to know if she can get the drip. I explained how long the line is for the drip. She took the stairs and was winded, it's been going on for a few days. Advised her  clean the rest of the house as he is not sick yet. Also advised Tylenol for body aches and fever every 4 hours and to the ER if her symptoms become unbearable or for increasing SOB. Is she elgible for the drip?

## 2022-01-10 NOTE — TELEPHONE ENCOUNTER
She does qualify for antibody infusion; orders placed though there are very limited supplies of this and a large back log of patients waiting to be scheduled so I cannot guarantee she will get an infusion even though I order one.  Ordered text-based home symptom monitoring program as well.  Agree with Darlene's previous recommendations as well.  Please notify patient of above.

## 2022-01-11 ENCOUNTER — INFUSION (OUTPATIENT)
Dept: INFECTIOUS DISEASES | Facility: HOSPITAL | Age: 62
End: 2022-01-11
Payer: COMMERCIAL

## 2022-01-11 VITALS
SYSTOLIC BLOOD PRESSURE: 130 MMHG | HEART RATE: 78 BPM | OXYGEN SATURATION: 98 % | TEMPERATURE: 99 F | RESPIRATION RATE: 16 BRPM | DIASTOLIC BLOOD PRESSURE: 85 MMHG

## 2022-01-11 PROCEDURE — M0243 CASIRIVI AND IMDEVI INFUSION: HCPCS | Performed by: INTERNAL MEDICINE

## 2022-01-11 PROCEDURE — 63600175 PHARM REV CODE 636 W HCPCS: Performed by: INTERNAL MEDICINE

## 2022-01-11 PROCEDURE — 25000003 PHARM REV CODE 250: Performed by: INTERNAL MEDICINE

## 2022-01-11 RX ADMIN — DIPHENHYDRAMINE HYDROCHLORIDE 25 MG: 50 INJECTION, SOLUTION INTRAMUSCULAR; INTRAVENOUS at 01:01

## 2022-01-11 RX ADMIN — CASIRIVIMAB AND IMDEVIMAB 600 MG: 600; 600 INJECTION, SOLUTION, CONCENTRATE INTRAVENOUS at 01:01

## 2022-01-11 NOTE — PROGRESS NOTES
Infusion complete. Pt tolerated infusion well. No S/S of infusion reaction noted at present time. One hour observation started.    No reaction noted

## 2022-01-11 NOTE — PROGRESS NOTES
Patient has anaphylactic reaction to inactive ingredients of REGENERON. Polysorbate and PEG. Stated that doctor recommends giving Benadryl before-hand.    Paramedic gave patient Benadryl 25mg/0.5mL @ 1321 for prophylaxis.   Will start REGENERON infusion @157mL/hr after 15mins of given Benadryl

## 2022-01-11 NOTE — PROGRESS NOTES
Patient arrives for casirivimab/ imdevimab infusion. Ambulatory. Pt AAox3. No distress noted. RR even and unlabored.     Symptoms and onset date:  HA, fever, chest tightness, SOB, achy     Tested COVID + on 01/10

## 2022-01-13 ENCOUNTER — OFFICE VISIT (OUTPATIENT)
Dept: INTERNAL MEDICINE | Facility: CLINIC | Age: 62
End: 2022-01-13
Payer: COMMERCIAL

## 2022-01-13 DIAGNOSIS — U07.1 COVID-19 VIRUS INFECTION: Primary | ICD-10-CM

## 2022-01-13 PROCEDURE — 99213 OFFICE O/P EST LOW 20 MIN: CPT | Mod: 95,,, | Performed by: FAMILY MEDICINE

## 2022-01-13 PROCEDURE — 1159F PR MEDICATION LIST DOCUMENTED IN MEDICAL RECORD: ICD-10-PCS | Mod: CPTII,95,, | Performed by: FAMILY MEDICINE

## 2022-01-13 PROCEDURE — 99213 PR OFFICE/OUTPT VISIT, EST, LEVL III, 20-29 MIN: ICD-10-PCS | Mod: 95,,, | Performed by: FAMILY MEDICINE

## 2022-01-13 PROCEDURE — 1160F RVW MEDS BY RX/DR IN RCRD: CPT | Mod: CPTII,95,, | Performed by: FAMILY MEDICINE

## 2022-01-13 PROCEDURE — 1159F MED LIST DOCD IN RCRD: CPT | Mod: CPTII,95,, | Performed by: FAMILY MEDICINE

## 2022-01-13 PROCEDURE — 1160F PR REVIEW ALL MEDS BY PRESCRIBER/CLIN PHARMACIST DOCUMENTED: ICD-10-PCS | Mod: CPTII,95,, | Performed by: FAMILY MEDICINE

## 2022-01-13 RX ORDER — ALBUTEROL SULFATE 90 UG/1
2 AEROSOL, METERED RESPIRATORY (INHALATION) EVERY 6 HOURS PRN
Qty: 18 G | Refills: 2 | Status: SHIPPED | OUTPATIENT
Start: 2022-01-13 | End: 2022-04-06

## 2022-01-13 NOTE — PROGRESS NOTES
Subjective:      Patient ID: Renae Hou is a 61 y.o. female.    Chief Complaint: No chief complaint on file.      HPI:  Renae Hou is a 61 year old female with chronic pain, fatty liver, gastroesophageal reflux disease, hypertension, hyperlipidemia, lumbar radiculopathy, obesity, prediabetes, spondylolisthesis, and obstructive sleep apnea who presents for a virtual visit today for COVID-19 concerns.    The patient location is: [place]  The chief complaint leading to consultation is: COVID-19 infection    Visit type: audio only    Face to Face time with patient: 10 mins  15 minutes of total time spent on the encounter, which includes face to face time and non-face to face time preparing to see the patient (eg, review of tests), Obtaining and/or reviewing separately obtained history, Documenting clinical information in the electronic or other health record, Independently interpreting results (not separately reported) and communicating results to the patient/family/caregiver, or Care coordination (not separately reported).     Each patient to whom he or she provides medical services by telemedicine is:  (1) informed of the relationship between the physician and patient and the respective role of any other health care provider with respect to management of the patient; and (2) notified that he or she may decline to receive medical services by telemedicine and may withdraw from such care at any time.    Reports testing positive for COVID 1/10/22 through the drive through at the [employer].  Reports she is having chest tightness.  Reports her back was bothering her yesterday, still there today but not as bad.  Endorses head congestion.  Taking Sudafed and Benadryl.  Temp now around 99 or 99.5, was 102.5 at maximum.  Reports she feels more chest tightness rather than shortness of breath.  Taking Motrin vs. Tylenol for fevers.  Had antibody infusion on Tuesday.        Past Medical History:   Diagnosis  Date    Abscess of paraspinous muscles     Allergy 4/2018    Class 1 obesity due to excess calories in adult 7/23/2019    DVT (deep venous thrombosis)     left leg    Epidural abscess 4/10/2018    Fatty liver     GERD (gastroesophageal reflux disease) 2015    History of major abdominal surgery 9/11/2015    Had Hysterectomy , cholecystectomy , rectocele repair    Hypertension     Lumbar radiculopathy     Menopause     Obesity     Obstructive sleep apnea on CPAP     no longer uses CPAP after weight loss from gastric sleeve    Thyroid disease     Thyroid nodules.       Past Surgical History:   Procedure Laterality Date    ADENOIDECTOMY  1995    BACK SURGERY  2002    L4, L5    BILATERAL SALPINGOOPHORECTOMY      CHOLECYSTECTOMY      CORRECTION OF HAMMER TOE Left 8/5/2021    Procedure: CORRECTION, HAMMER TOE Left 4th mallet toe, DIP joitn fusion;  Surgeon: Guero Alvarez MD;  Location: [facility] OR;  Service: Orthopedics;  Laterality: Left;  [employer]ires    CYST REMOVAL      EPIDURAL STEROID INJECTION INTO CERVICAL SPINE N/A 8/11/2020    Procedure: Injection-steroid-epidural-cervical;  Surgeon: [facility] Diagnostic Provider;  Location: [facility] OR 2ND FLR;  Service: Radiology;  Laterality: N/A;  /Carla    ESOPHAGOGASTRODUODENOSCOPY N/A 5/3/2019    Procedure: ESOPHAGOGASTRODUODENOSCOPY (EGD);  Surgeon: Carlin Sanchez MD;  Location: [facility] ENDO (4TH FLR);  Service: Endoscopy;  Laterality: N/A;    HYSTERECTOMY  12/17/2012    [facility] BS[facility]     INJECTION OF JOINT Left 3/1/2021    Procedure: INJECTION, JOINT LEFT HIP INTRA ARTICULAR CORTISONE INJECTION DIRECT REFERRAL;  Surgeon: Chuy Gregory MD;  Location: [facility] PAIN MGT;  Service: Pain Management;  Laterality: Left;  NEEDS CONSENT    INSERTION OF INFERIOR VENA CAVAL FILTER Right 7/19/2019    Procedure: IVC filter placement;  Surgeon: Rodney Rico MD;  Location: [facility] CATH LAB;  Service: Peripheral Vascular;  Laterality: Right;    IVC FILTER RETRIEVAL N/A  2019    Procedure: REMOVAL-FILTER-IVC;  Surgeon: Rodney Rico MD;  [facility];  Service: Peripheral Vascular;  Laterality: N/A;    LAPAROSCOPIC SLEEVE GASTRECTOMY N/A 2019    Procedure: GASTRECTOMY, SLEEVE, LAPAROSCOPIC, with intraop EGD 13489;  Surgeon: Duc Ratliff Jr., MD;  [facility];  Service: General;  Laterality: N/A;    RECTOCELE REPAIR      SPINE SURGERY  2018    THYROID NODULE REMOVAL      TONSILLECTOMY         Family History   Problem Relation Age of Onset    Hypertension Mother     Hyperlipidemia Mother     Heart disease Mother 55        cad, valvular heart disease    Alcohol abuse Sister     Cirrhosis Sister         alcoholic cirrhosis    Stroke Father     No Known Problems Daughter     No Known Problems Daughter     Breast cancer Neg Hx     Colon cancer Neg Hx     Ovarian cancer Neg Hx     Diabetes Neg Hx     Esophageal cancer Neg Hx        Social History     Socioeconomic History    Marital status:    Tobacco Use    Smoking status: Former Smoker     Packs/day: 0.25     Years: 32.00     Pack years: 8.00     Types: Cigarettes     Quit date:      Years since quittin.0    Smokeless tobacco: Never Used    Tobacco comment: smoked socially decades ago - weekends only   Substance and Sexual Activity    Alcohol use: No     Comment: yearly at most     Drug use: No    Sexual activity: Not Currently     Partners: Male     Birth control/protection: See Surgical Hx, None     Comment: [facility] BS&O 2012,     Social History Narrative    . 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     Social Determinants of Health     Physical Activity: Unknown    Days of Exercise per Week: 0 days   Social Connections: Unknown    Attends Club or Organization Meetings: Never       Review of Systems   Constitutional: Positive for fever.   HENT: Positive for rhinorrhea and sore throat. Negative for ear pain.    Respiratory: Positive for shortness  of breath. Negative for wheezing.    Cardiovascular: Positive for chest pain. Negative for leg swelling.   Gastrointestinal: Negative for abdominal pain and vomiting.   Musculoskeletal: Positive for neck pain.   Skin: Negative for rash.   Neurological: Positive for headaches.     Objective:   There were no vitals filed for this visit.    Physical Exam  Constitutional:       General: She is not in acute distress.  Neurological:      Mental Status: She is alert.   Psychiatric:         Mood and Affect: Mood normal.         Thought Content: Thought content normal.        Assessment:      1. COVID-19 virus infection      Plan:   Diagnoses and all orders for this visit:    COVID-19 virus infection  -     albuterol (VENTOLIN HFA) 90 mcg/actuation inhaler; Inhale 2 puffs into the lungs every 6 (six) hours as needed for Wheezing or Shortness of Breath. Rescue  -      Ok to continue OTC decongestants and anti-inflammatories PRN, recommended adequate rest/hydration, can start albuterol inhaler PRN SOB/chest tightness though cautioned patient about possible adverse effects of palpitations.  Emergency department precautions discussed.  Has been enrolled in the home text-based symptom monitoring program.         Answers for HPI/ROS submitted by the patient on 1/11/2022  Chronicity: new  Onset: in the past 7 days  Frequency: daily  Progression since onset: unchanged  claudication: No  coryza: Yes  hemoptysis: No  leg pain: No  orthopnea: Yes  PND: No  sputum production: Yes  swollen glands: No  syncope: No  Aggravating factors: URIs, any activity  Improvement on treatment: no relief  Risk factors for DVT/PE: no known risk factors  Treatments tried: OTC cough suppressants, cool air  asthma: No  allergies: Yes  COPD: No  pneumonia: No  aspirin allergies: No  CAD: No  DVT: Yes  heart failure: No  PE: No  recent surgery: No  bronchiolitis: Yes  chronic lung disease: No       3

## 2022-01-17 ENCOUNTER — PATIENT MESSAGE (OUTPATIENT)
Dept: ADMINISTRATIVE | Facility: OTHER | Age: 62
End: 2022-01-17
Payer: COMMERCIAL

## 2022-01-27 ENCOUNTER — PATIENT MESSAGE (OUTPATIENT)
Dept: PAIN MEDICINE | Facility: CLINIC | Age: 62
End: 2022-01-27

## 2022-01-27 ENCOUNTER — OFFICE VISIT (OUTPATIENT)
Dept: PAIN MEDICINE | Facility: CLINIC | Age: 62
End: 2022-01-27
Payer: COMMERCIAL

## 2022-01-27 VITALS — SYSTOLIC BLOOD PRESSURE: 130 MMHG | HEART RATE: 86 BPM | DIASTOLIC BLOOD PRESSURE: 89 MMHG

## 2022-01-27 DIAGNOSIS — M54.2 CHRONIC NECK PAIN: Primary | ICD-10-CM

## 2022-01-27 DIAGNOSIS — G89.29 CHRONIC BILATERAL LOW BACK PAIN WITH LEFT-SIDED SCIATICA: ICD-10-CM

## 2022-01-27 DIAGNOSIS — G89.29 CHRONIC NECK PAIN: Primary | ICD-10-CM

## 2022-01-27 DIAGNOSIS — M53.3 SACROILIAC JOINT DYSFUNCTION: ICD-10-CM

## 2022-01-27 DIAGNOSIS — M47.812 CERVICAL SPONDYLOSIS: ICD-10-CM

## 2022-01-27 DIAGNOSIS — M70.62 GREATER TROCHANTERIC BURSITIS OF LEFT HIP: ICD-10-CM

## 2022-01-27 DIAGNOSIS — R29.2: ICD-10-CM

## 2022-01-27 DIAGNOSIS — M54.12 CERVICAL RADICULOPATHY: ICD-10-CM

## 2022-01-27 DIAGNOSIS — M54.42 CHRONIC BILATERAL LOW BACK PAIN WITH LEFT-SIDED SCIATICA: ICD-10-CM

## 2022-01-27 PROCEDURE — 99999 PR PBB SHADOW E&M-EST. PATIENT-LVL III: CPT | Mod: PBBFAC,,, | Performed by: PHYSICAL MEDICINE & REHABILITATION

## 2022-01-27 PROCEDURE — 3079F PR MOST RECENT DIASTOLIC BLOOD PRESSURE 80-89 MM HG: ICD-10-PCS | Mod: CPTII,S$GLB,, | Performed by: PHYSICAL MEDICINE & REHABILITATION

## 2022-01-27 PROCEDURE — 99214 OFFICE O/P EST MOD 30 MIN: CPT | Mod: S$GLB,,, | Performed by: PHYSICAL MEDICINE & REHABILITATION

## 2022-01-27 PROCEDURE — 3079F DIAST BP 80-89 MM HG: CPT | Mod: CPTII,S$GLB,, | Performed by: PHYSICAL MEDICINE & REHABILITATION

## 2022-01-27 PROCEDURE — 3075F SYST BP GE 130 - 139MM HG: CPT | Mod: CPTII,S$GLB,, | Performed by: PHYSICAL MEDICINE & REHABILITATION

## 2022-01-27 PROCEDURE — 1160F PR REVIEW ALL MEDS BY PRESCRIBER/CLIN PHARMACIST DOCUMENTED: ICD-10-PCS | Mod: CPTII,S$GLB,, | Performed by: PHYSICAL MEDICINE & REHABILITATION

## 2022-01-27 PROCEDURE — 1159F PR MEDICATION LIST DOCUMENTED IN MEDICAL RECORD: ICD-10-PCS | Mod: CPTII,S$GLB,, | Performed by: PHYSICAL MEDICINE & REHABILITATION

## 2022-01-27 PROCEDURE — 1159F MED LIST DOCD IN RCRD: CPT | Mod: CPTII,S$GLB,, | Performed by: PHYSICAL MEDICINE & REHABILITATION

## 2022-01-27 PROCEDURE — 99999 PR PBB SHADOW E&M-EST. PATIENT-LVL III: ICD-10-PCS | Mod: PBBFAC,,, | Performed by: PHYSICAL MEDICINE & REHABILITATION

## 2022-01-27 PROCEDURE — 1160F RVW MEDS BY RX/DR IN RCRD: CPT | Mod: CPTII,S$GLB,, | Performed by: PHYSICAL MEDICINE & REHABILITATION

## 2022-01-27 PROCEDURE — 99214 PR OFFICE/OUTPT VISIT, EST, LEVL IV, 30-39 MIN: ICD-10-PCS | Mod: S$GLB,,, | Performed by: PHYSICAL MEDICINE & REHABILITATION

## 2022-01-27 PROCEDURE — 3075F PR MOST RECENT SYSTOLIC BLOOD PRESS GE 130-139MM HG: ICD-10-PCS | Mod: CPTII,S$GLB,, | Performed by: PHYSICAL MEDICINE & REHABILITATION

## 2022-01-27 NOTE — PROGRESS NOTES
[facility]      Chief Complaint:   Chief Complaint   Patient presents with    Back Pain       History of Present Illness: Renae Hou is a 61 y.o. female referred by Mila Jones PA-C for low back.    Back pain has been present for over 30 years. SHe had a back surgery in the early 90's which helped for a long time. Back pain recurred in 2016, insidiously.  She had 2 injections in 2018 and unfortunately, one resulted in a soft tissue abscess near her spine. She then underwent a L4-S1 fusion with Dr. Zayas in April 2018. Her back pain improved again after her fusion in 2018, and then recurred 2019 insidiously.     Currently, the back pain is localized to the bilateral lumbosacral junction with radiation into the bilateral groin and left leg into the lateral calf with abnormal sensations/numbness in the left leg. Pain is described as aching, throbbing, grabbing, tight and numb. The pain is aggravated by laying down on her back. The pain is alleviated by repositioning and getting up and moving. She does feel weakness in the left leg and it feels like the knee gives out on her at times. She does get the numbness in the left leg. Denies bowel or bladder incontinence, but feels constipated. Denies saddle anesthesia. Denies recent fevers or infections. Denies significant weight loss    Neck pain has been present for over a year, not able to pinpoint when it started. She underwent a cervical epidural which resolved the pain for a long time. Pain is localized to the right paraspinals with radiation into the right shoulder down the right arm into the thumb. Pain is described as burning, tingling. The pain is aggravated by using her mouse at work, looking up. The pain is alleviated by rest, injections. She denies weakness in the arms. She does get numbness in the right arm.     Severity: Currently: 7/10   Typical Range: 5-10/10     Exacerbation: 10/10     Interval History (01/27/2022):  Renae SCHWARTZ  Ameya returns today for follow up.  At the last clinic visit, referred to physical therapy but got covid and after recovering decided she wants to avoid PT while covid is still prevalent.    Currently, the upper back pain is worse.      No change in the location or quality of the pain since the most recent visit is reported.  No significant interval events or traumas. No change in bowel or bladder function, & no new weakness or numbness is reported. No new musculoskeletal pain complaints.    Current Pain Scales:  Current: 5/10              Typical Range: 5-8/10    Patient reports that her low back issues have actually subsided for the most part over the last few months, but her neck and arm symptoms are worsened. She says she has diffuse neck pain which radiates into her right arm across multiple dermatomal distributions. The pain also radiates into her left arm in what appears to be a C5, maybe C6 distribution. She does report that she has had EMG in the past and was diagnosed with bilateral carpal tunnel syndrome but did not like wearing the wrist splints at night. Her neck pain is worse when she sits at her computer for long hours. She says neck extension exacerbates that radiating arm pains. She had C5 TFESI bilaterally at [facility] in November with IR, which gave her good relief but only for a week. She says she has had these injections multiple times in the past and up until this last one they have always given her about a year of near complete relief. She has never had cervical ILESI.    Pain Disability Index  Family/Home Responsibilities:: 5  Recreation:: 4  Social Activity:: 6  Occupation:: 7  Sexual Behavior:: 0  Self Care:: 0  Life-Support Activities:: 0  Pain Disability Index (PDI): 22    Previous Interventions:  - 11/30/21: BL C5 TFESI, >80% relief but only for a week  - 3/1/21: Right hip CSI (Eissa) did not help  - 8/11/20: Right C4-5 TF NEERAJ (IR) helped  - 3/16/18: Left L4-5 facet joint  injection/aspiration (Dr. Cadet) did not help  - 3/2/18: Left L4-5 TF NEERAJ (Dr. Cadet) did not help    Previous Therapies:  PT/OT: yes, she did at the beginning of COVID and [facility]  Relevant Surgery: yes    - 4/10/18: L4-S1 fusion with Dr. Zayas helped   - 1990s minor surgery for a disc that helped  Previous Medications:   - NSAIDS: Tylenol. Meloxicam. Should avoid with gastric sleeve  - Muscle Relaxants:    - TCAs:   - SNRIs:   - Topicals:   - Anticonvulsants: Gabapentin helped a little   - Opioids:     Current Pain Medications:  1. Tylenol     Blood Thinners: None    Full Medication List:    Current Outpatient Medications:     acetaminophen (TYLENOL) 500 MG tablet, Take 2 tablets (1,000 mg total) by mouth every 8 (eight) hours as needed for Pain., Disp: 90 tablet, Rfl: 0    albuterol (VENTOLIN HFA) 90 mcg/actuation inhaler, Inhale 2 puffs into the lungs every 6 (six) hours as needed for Wheezing or Shortness of Breath. Rescue, Disp: 18 g, Rfl: 2    baclofen (LIORESAL) 10 MG tablet, Take 1 tablet (10 mg total) by mouth 3 (three) times daily as needed., Disp: 90 tablet, Rfl: 2    hydroCHLOROthiazide (HYDRODIURIL) 12.5 MG Tab, TAKE 1 TABLET(12.5 MG) BY MOUTH EVERY DAY, Disp: 90 tablet, Rfl: 1    meclizine (ANTIVERT) 25 mg tablet, TAKE 1 TABLET(25 MG) BY MOUTH THREE TIMES DAILY AS NEEDED, Disp: 45 tablet, Rfl: 1    meloxicam (MOBIC) 7.5 MG tablet, Take 2 tablets (15 mg total) by mouth once daily., Disp: 30 tablet, Rfl: 0    EPINEPHrine (EPIPEN) 0.3 mg/0.3 mL AtIn, Inject 0.3 mLs (0.3 mg total) into the muscle once. for 1 dose, Disp: 1 Device, Rfl: 3    gabapentin (NEURONTIN) 300 MG capsule, Take 1 capsule (300 mg total) by mouth 3 (three) times daily., Disp: 270 capsule, Rfl: 0    Current Facility-Administered Medications:     acetaminophen tablet 650 mg, 650 mg, Oral, Once PRN, Kevin Butler MD    albuterol inhaler 2 puff, 2 puff, Inhalation, Q20 Min PRN, Kevin Butler MD    EPINEPHrine  (EPIPEN) 0.3 mg/0.3 mL pen injection 0.3 mg, 0.3 mg, Intramuscular, PRN, Kevin Butler MD    methylPREDNISolone sodium succinate injection 40 mg, 40 mg, Intravenous, Once PRN, Kevin Butler MD    ondansetron disintegrating tablet 4 mg, 4 mg, Oral, Once PRN, Kevin Butler MD    sodium chloride 0.9% 500 mL flush bag, , Intravenous, PRN, Kevin Butler MD    sodium chloride 0.9% flush 10 mL, 10 mL, Intravenous, PRN, Kevin Butler MD     Review of Systems:  Review of Systems   All other systems reviewed and are negative.      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    Surgical History:   has a past surgical history that includes Cholecystectomy; RECTOCELE REPAIR; THYROID NODULE REMOVAL; Bilateral salpingoophorectomy; Hysterectomy (12/17/2012); Back surgery (2002); Cyst Removal; Adenoidectomy (1995); Spine surgery (2018); Tonsillectomy (1995); Esophagogastroduodenoscopy (N/A, 5/3/2019); Insertion of inferior vena caval filter (Right, 7/19/2019); Laparoscopic sleeve gastrectomy (N/A, 7/23/2019); IVC filter retrieval (N/A, 12/20/2019); Epidural steroid injection into cervical spine (N/A, 8/11/2020); Injection of joint (Left, 3/1/2021); and Correction of hammer toe (Left, 8/5/2021).    Family History:  family history includes Alcohol abuse in her sister; Cirrhosis in her sister; Heart disease (age of onset: 55) in her  mother; Hyperlipidemia in her mother; Hypertension in her mother; No Known Problems in her daughter and daughter; Stroke in her father.    Social History:   reports that she quit smoking about 34 years ago. Her smoking use included cigarettes. She has a 8.00 pack-year smoking history. She has never used smokeless tobacco. She reports that she does not drink alcohol and does not use drugs.    Physical Exam:  /89   Pulse 86   LMP 11/25/2012   GEN: No acute distress. Calm, comfortable  HENT: Normocephalic, atraumatic, moist mucous membranes  EYE: Anicteric sclera, non-injected.   CV: Non-diaphoretic. Regular Rate. Radial Pulses 2+.  RESP: Breathing comfortably. Chest expansion symmetric.  EXT: No clubbing, cyanosis.   SKIN: Warm, & dry to palpation. No visible rashes or lesions of exposed skin.   PSYCH: Pleasant mood and appropriate affect. Recent and remote memory intact.   GAIT: Independent, normal ambulation  Neck Exam:       Inspection: No erythema, bruising. Anterior horizontal healed incision      Palpation: (+) TTP of bilateral paraspinals and right trapezius      ROM: No significant Limitation in flexion, extension, lateral bending or rotation. Pain with extension and rotation      Provocative Maneuvers:  (+) Spurling's bilaterally   (-) Lhermitte  Lumbar Spine Exam:       Inspection: No erythema, bruising. Healed longitudinal incision      Palpation: (-) TTP of lumbar paraspinals and SIJ bilaterally       ROM:  Limited in flexion, extension, lateral bending.   Neurologic Exam:     Alert. Speech is fluent and appropriate.     Strength:  5/5 throughout bilateral upper & lower extremities     Sensation: Abnormal to LT in non-dermatomal distribution in the left leg, right arm, otherwise Grossly intact to light touch in LUE, RLE     Reflexes: 1+ in b/l patella, right achilles, b/l biceps, brachioradialis, triceps. Absent in left achilles     Tone: No abnormality appreciated in bilateral upper or lower  extremities     (+) Zavala on right, negative on left     No Clonus      Imaging:  - MRI Cervical spine 11/26/21:  Cervical vertebral body heights appear intact with straightening of the normal cervical lordosis.  No infiltrative T1 process.  Similar appearing vertebral body hemangioma at T2.  Normal signal of the cervical cord.  Visualized prevertebral and paraspinal soft tissues demonstrate no acute abnormality.  Prior left thyroidectomy.  C2-C3: Mild facet hypertrophy without significant central canal stenosis or neural foraminal impingement.  C3-C4: Posterior disc osteophyte with uncovertebral spur.  There is narrowing of the ventral and posterior subarachnoid space without complete effacement.  Mild left neural foraminal narrowing.  C4-C5: Posterior disc osteophyte with uncovertebral spurring facet hypertrophy.  Effacement of the ventral and posterior subarachnoid space with abutment of the cord.  Mild neural foraminal narrowing.  C5-C6: Posterior disc osteophyte with uncovertebral spur.  Indentation with narrowing of the ventral thecal sac.  Mild neural foraminal narrowing.  C6-C7: Posterior disc osteophyte with near effacement of the ventral thecal sac.  Narrowing of the posterior subarachnoid space.  Left uncovertebral spur with mild left neural foraminal narrowing.  C7-T1: Unremarkable.      - X-ray cervical spine 10/20/21:  There is straightening of the normal cervical lordosis.  Extension views demonstrate 3 mm of retrolisthesis of C3 on C4 suggesting a component of dynamic instability.  There are 3 mm of retrolisthesis of C4 on C5 which remains stable between flexion and extension views.  Odontoid process and C1 lateral masses are intact.  There is multilevel degenerative disc space narrowing most pronounced at C4-C5, C5-C6 and C6-C7.  There is multilevel uncovertebral joint spurring most pronounced at C4-C5.  Facet joints and spinous processes are well aligned.  Prevertebral soft tissues are normal.   Visualized lung apices are clear.    - MRI Cervical & Thoracic spine 11/4/20:  Cervical spine:  Straightening of the cervical lordosis.  The vertebral body heights are well maintained  There is mild disc space narrowing at C4-5, C5-6 and C6-C7.  No evidence of malignant bone marrow replacement process or infection.  There is a benign hemangioma within the T2 vertebrae.  Normal appearance of the craniocervical junction, cervical cord and upper thoracic cord.  C2-C3: Unremarkable  C3-C4: Minimal posterior disc osteophyte complex and left uncovertebral spur results in mild left foraminal narrowing.  The central canal is patent.  C4-C5: Posterior disc osteophyte complex and bilateral uncovertebral spur, there is no canal stenosis.  There is mild bilateral foraminal narrowing.  C5-C6: Posterior disc osteophyte complex and bilateral uncovertebral spur, there is no canal stenosis.  There is mild bilateral foraminal narrowing.  C6-C7: More prominent posterior disc osteophyte complex and uncovertebral spur, no greater than mild central canal narrowing.  There is mild left foraminal narrowing.  C7-T1: Unremarkable  The paraspinal soft tissues appear normal.  Prior partial thyroidectomy.  Thoracic spine:  The alignment is normal.  The vertebral body heights are well maintained.  The disc spaces are relatively well maintained.  There is abnormal edema involving the bilateral T2-T3 facet joints, new from 05/10/2019 exam.  This is likely degenerative.  Minimal posterior disc osteophyte complex at T5-6.  No canal stenosis at this level.  T7-T8 demonstrates mild facet joint osseous hypertrophy, no canal stenosis.  T10-T11 demonstrates moderate facet joint osseous hypertrophy and creates mild central canal stenosis.  The remainder of the thoracic spine appears within normal limits.  The paravertebral and paraspinal soft tissues appear normal.    - X-ray lumbar spine AP/Lat w/ flex/ext 10/14/20:  Vertebral bodies are intact.  Patient  has had a previous fusion with disc spacers and pedicle screws at the L4-L5 and L5-S1 level.  No instability in flexion extension.  No collapse or destruction is seen    - MRI Lumbar spine w/ & w/o 2/28/20:  Postsurgical change: L4-S1 posterior spinal fusion with L4-L5 laminectomies and intervertebral disc spacer placement at L4-L5 and L5-S1.  Alignment: Minimal anterolisthesis of L4 on L5 of 2-3 mm.  Vertebrae: Vertebral body heights are well maintained.  No marrow signal abnormality to suggest acute fracture or infiltrative marrow placement process.  Focal area of marrow heterogeneity noted within the posterior right iliac bone, possibly related to previous bone harvesting site.  Discs: Nonsurgical disc heights are relatively well maintained.  No significant disc space height loss or disc desiccation.  Cord: Normal caliber and signal.  Conus terminates at L1 vertebral level.  Enhancement: No abnormal contrast enhancement.  Degenerative findings:  T12-L1: Mild bilateral facet hypertrophy without significant spinal canal stenosis or neural foraminal narrowing.  L1-L2: Mild bilateral facet hypertrophy without significant spinal canal stenosis or neural foraminal narrowing.  L2-L3: Mild bilateral facet hypertrophy without significant spinal canal stenosis or neural foraminal narrowing.  L3-L4: Moderate bilateral facet hypertrophy without significant spinal canal stenosis or neural foraminal narrowing.  L4-L5: Mild facet hypertrophy without significant neural foraminal narrowing.  Spinal canal is decompressed posteriorly via laminectomy defect.  L5-S1: Mild facet hypertrophy without definite neural foraminal narrowing noting limited evaluation secondary to metallic artifact.  Spinal canal is decompressed posteriorly via laminectomy defect.  Paraspinal muscles & soft tissues: No significant abnormality.    Labs:  BMP  Lab Results   Component Value Date     05/14/2021    K 4.1 05/14/2021     05/14/2021    CO2  29 05/14/2021    BUN 24 (H) 05/14/2021    CREATININE 0.8 05/14/2021    CALCIUM 9.7 05/14/2021    ANIONGAP 9 05/14/2021    ESTGFRAFRICA >60.0 05/14/2021    EGFRNONAA >60.0 05/14/2021     Lab Results   Component Value Date    ALT 34 05/14/2021    AST 31 05/14/2021    ALKPHOS 101 05/14/2021    BILITOT 0.6 05/14/2021     Lab Results   Component Value Date     11/23/2021       Assessment:  Renae Hou is a 61 y.o. female with the following diagnoses based on history, exam, and imaging:    Problem List Items Addressed This Visit        Neuro    Cervical radiculopathy      Other Visit Diagnoses     Chronic neck pain    -  Primary    Cervical spondylosis        Sacroiliac joint dysfunction        Chronic bilateral low back pain with left-sided sciatica        Zavala's reflex positive        Greater trochanteric bursitis of left hip              This is a pleasant 61 y.o. lady presenting with:     - Chronic neck pain: Right cervical radiculopathy, possibly left cervical radiculopathy as well.  MRI with multilevel bilateral spondylosis and foraminal narrowing.    - Chronic bilateral low back pain: Prior fusion L4-S1. She does appear to have left S1 radiculopathy with decreased achilles reflex on that side, but not prominent leg pain and suspect this is residual from her prior surgery. These symptoms are currently still present but manageable to the patient.   - Left GTBursitis, currently mostly improved per patient. She says it comes and goes, but currently is not bothering her.   - Comorbidities: Thyroid disease. H/o DVT. GERD. H/o epidural abscess. RENAE. Fatty liver.     Treatment Plan:   - PT/OT/HEP: PT offered to patient but she would like to defer for now due to concern for being around groups of people due to covid  - Procedures: Schedule C7/T1 ILESI. The procedure risks, benefits, and possible complications were discussed with the patient including nerve damage, infection, bleeding, spinal headache, and  paresis.   - Medications: No changes recommended at this time.  - Imaging: Reviewed  - Labs: Reviewed.      Follow Up: RTC 2-3 weeks after C7/T1 ILESI    Zachariah Weilenman MD  [employer] PM[facility]R PGY-IV     The patient was seen and examined with the resident. I agree with the above documentation, and have edited as necessary.       Latoya Quintanilla M.D.  Interventional Pain Medicine / Physical Medicine & Rehabilitation    Disclaimer: This note was partly generated using dictation software which may occasionally result in transcription errors.

## 2022-01-28 ENCOUNTER — TELEPHONE (OUTPATIENT)
Dept: PAIN MEDICINE | Facility: CLINIC | Age: 62
End: 2022-01-28
Payer: COMMERCIAL

## 2022-01-28 DIAGNOSIS — M54.12 CERVICAL RADICULOPATHY: Primary | ICD-10-CM

## 2022-02-01 ENCOUNTER — PATIENT MESSAGE (OUTPATIENT)
Dept: ADMINISTRATIVE | Facility: OTHER | Age: 62
End: 2022-02-01
Payer: COMMERCIAL

## 2022-02-04 ENCOUNTER — OFFICE VISIT (OUTPATIENT)
Dept: URGENT CARE | Facility: CLINIC | Age: 62
End: 2022-02-04
Payer: COMMERCIAL

## 2022-02-04 ENCOUNTER — PATIENT MESSAGE (OUTPATIENT)
Dept: PAIN MEDICINE | Facility: HOSPITAL | Age: 62
End: 2022-02-04
Payer: COMMERCIAL

## 2022-02-04 VITALS
BODY MASS INDEX: 30.82 KG/M2 | SYSTOLIC BLOOD PRESSURE: 137 MMHG | HEIGHT: 65 IN | DIASTOLIC BLOOD PRESSURE: 85 MMHG | RESPIRATION RATE: 16 BRPM | OXYGEN SATURATION: 98 % | HEART RATE: 90 BPM | WEIGHT: 185 LBS | TEMPERATURE: 99 F

## 2022-02-04 DIAGNOSIS — R05.9 COUGH: Primary | ICD-10-CM

## 2022-02-04 DIAGNOSIS — J01.00 ACUTE NON-RECURRENT MAXILLARY SINUSITIS: ICD-10-CM

## 2022-02-04 PROCEDURE — 3075F PR MOST RECENT SYSTOLIC BLOOD PRESS GE 130-139MM HG: ICD-10-PCS | Mod: CPTII,S$GLB,, | Performed by: SURGERY

## 2022-02-04 PROCEDURE — 1160F RVW MEDS BY RX/DR IN RCRD: CPT | Mod: CPTII,S$GLB,, | Performed by: SURGERY

## 2022-02-04 PROCEDURE — 1159F MED LIST DOCD IN RCRD: CPT | Mod: CPTII,S$GLB,, | Performed by: SURGERY

## 2022-02-04 PROCEDURE — 3075F SYST BP GE 130 - 139MM HG: CPT | Mod: CPTII,S$GLB,, | Performed by: SURGERY

## 2022-02-04 PROCEDURE — 1160F PR REVIEW ALL MEDS BY PRESCRIBER/CLIN PHARMACIST DOCUMENTED: ICD-10-PCS | Mod: CPTII,S$GLB,, | Performed by: SURGERY

## 2022-02-04 PROCEDURE — 3008F PR BODY MASS INDEX (BMI) DOCUMENTED: ICD-10-PCS | Mod: CPTII,S$GLB,, | Performed by: SURGERY

## 2022-02-04 PROCEDURE — 99214 PR OFFICE/OUTPT VISIT, EST, LEVL IV, 30-39 MIN: ICD-10-PCS | Mod: S$GLB,,, | Performed by: SURGERY

## 2022-02-04 PROCEDURE — 1159F PR MEDICATION LIST DOCUMENTED IN MEDICAL RECORD: ICD-10-PCS | Mod: CPTII,S$GLB,, | Performed by: SURGERY

## 2022-02-04 PROCEDURE — 99214 OFFICE O/P EST MOD 30 MIN: CPT | Mod: S$GLB,,, | Performed by: SURGERY

## 2022-02-04 PROCEDURE — 71046 X-RAY EXAM CHEST 2 VIEWS: CPT | Mod: FY,S$GLB,, | Performed by: RADIOLOGY

## 2022-02-04 PROCEDURE — 3079F PR MOST RECENT DIASTOLIC BLOOD PRESSURE 80-89 MM HG: ICD-10-PCS | Mod: CPTII,S$GLB,, | Performed by: SURGERY

## 2022-02-04 PROCEDURE — 3079F DIAST BP 80-89 MM HG: CPT | Mod: CPTII,S$GLB,, | Performed by: SURGERY

## 2022-02-04 PROCEDURE — 3008F BODY MASS INDEX DOCD: CPT | Mod: CPTII,S$GLB,, | Performed by: SURGERY

## 2022-02-04 PROCEDURE — 71046 XR CHEST PA AND LATERAL: ICD-10-PCS | Mod: FY,S$GLB,, | Performed by: RADIOLOGY

## 2022-02-04 RX ORDER — CEFUROXIME AXETIL 500 MG/1
500 TABLET ORAL 2 TIMES DAILY
Qty: 20 TABLET | Refills: 0 | Status: SHIPPED | OUTPATIENT
Start: 2022-02-04 | End: 2022-02-14

## 2022-02-04 RX ORDER — AZELASTINE 1 MG/ML
2 SPRAY, METERED NASAL 2 TIMES DAILY
Qty: 30 ML | Refills: 0 | Status: SHIPPED | OUTPATIENT
Start: 2022-02-04 | End: 2022-04-06

## 2022-02-04 NOTE — PATIENT INSTRUCTIONS
Patient Education       Sinusitis, Adult ED   General Information   You came to the Emergency Department (ED) for sinusitis. Your sinuses are hollow areas in the bones of your face. They have a thin lining that normally makes a small amount of mucus. When you have sinusitis, the lining gets swollen and makes extra mucus. You may have sinusitis with or after a cold. Most of the time sinusitis will get better in 1 to 2 weeks.  Sinusitis is most often caused by a virus, so antibiotics wont help. But some people do need antibiotics. If the doctor ordered antibiotics for you, be sure to follow the instructions. It is important to take all of your antibiotics even if you start to feel better.  What care is needed at home?   · Call your regular doctor to let them know you were in the ED. Make a follow-up appointment if you were told to.  · Try to thin the mucus.  ? Drink lots of liquids to stay hydrated.  ? Use a cool mist humidifier to avoid dry air.  ? Use saline nose drops or a saline nose rinse to relieve stuffiness.  · Wash your hands often. This will help keep others healthy.  · Do not smoke or be in smoke-filled places. Avoid things that may cause breathing problems like fumes, pollution, dust, and other common allergens and irritants.  · You may want to take medicine like ibuprofen, naproxen, or acetaminophen to help with pain.  When do I need to get emergency help?   · Return to the ED if:   ? You have a stiff neck, especially if you also have fever, chills, vomiting, or severe headache  ? You have trouble thinking clearly.  ? You have trouble seeing or have double vision.  ? You have swelling or redness or pain around one or both eyes.  ? You have a fever of 102°F (38.9°C) or higher, or have shaking chills or sweats.  When do I need to call the doctor?   · You have an upset stomach and throwing up.  · You have more pain in your face and head.  · You are not getting better within 1 to 2 weeks.  · You have new or  worsening symptoms.  Last Reviewed Date   2020-08-03  Consumer Information Use and Disclaimer   This information is not specific medical advice and does not replace information you receive from your health care provider. This is only a brief summary of general information. It does NOT include all information about conditions, illnesses, injuries, tests, procedures, treatments, therapies, discharge instructions or life-style choices that may apply to you. You must talk with your health care provider for complete information about your health and treatment options. This information should not be used to decide whether or not to accept your health care providers advice, instructions or recommendations. Only your health care provider has the knowledge and training to provide advice that is right for you.  Copyright   Copyright © 2021 [employer] and its affiliates and/or licensors. All rights reserved.

## 2022-02-04 NOTE — PROGRESS NOTES
"Subjective:       Patient ID: Renae Hou is a 61 y.o. female.    Vitals:  height is 5' 5" (1.651 m) and weight is 83.9 kg (185 lb). Her temperature is 98.7 °F (37.1 °C). Her blood pressure is 137/85 and her pulse is 90. Her respiration is 16 and oxygen saturation is 98%.     Chief Complaint: URI    Pt had covid about 3 weeks ago, on Wednesday pt began with her symptoms    URI   This is a new problem. The current episode started in the past 7 days. The problem has been gradually worsening. Associated symptoms include congestion and coughing. Treatments tried: otc cold/sinus meds.       Constitution: Positive for fever.   HENT: Positive for congestion.    Respiratory: Positive for chest tightness, cough and sputum production.    Musculoskeletal: Positive for back pain.       Objective:      Physical Exam   Constitutional: She is oriented to person, place, and time. She appears well-developed and well-nourished. She is cooperative.  Non-toxic appearance. She does not have a sickly appearance. She does not appear ill. No distress.   HENT:   Head: Normocephalic and atraumatic.   Ears:   Right Ear: Hearing, tympanic membrane, external ear and ear canal normal.   Left Ear: Hearing, tympanic membrane, external ear and ear canal normal.   Nose: Nose normal. No mucosal edema, rhinorrhea or nasal deformity. No epistaxis. Right sinus exhibits no maxillary sinus tenderness and no frontal sinus tenderness. Left sinus exhibits no maxillary sinus tenderness and no frontal sinus tenderness.   Mouth/Throat: Uvula is midline, oropharynx is clear and moist and mucous membranes are normal. No trismus in the jaw. Normal dentition. No uvula swelling. No oropharyngeal exudate, posterior oropharyngeal edema or posterior oropharyngeal erythema.   Eyes: Conjunctivae and lids are normal. No scleral icterus.   Neck: Trachea normal and phonation normal. Neck supple. No edema present. No erythema present. No neck rigidity present. "   Cardiovascular: Normal rate, regular rhythm, normal heart sounds, intact distal pulses and normal pulses.   Pulmonary/Chest: Effort normal. No stridor. No respiratory distress. She has decreased breath sounds in the right lower field. She has no wheezes. She has no rhonchi. She has no rales.   Abdominal: Normal appearance.   Musculoskeletal: Normal range of motion.         General: No deformity or edema. Normal range of motion.   Neurological: She is alert and oriented to person, place, and time. She exhibits normal muscle tone. Coordination normal.   Skin: Skin is warm, dry, intact, not diaphoretic and not pale.   Psychiatric: She has a normal mood and affect. Her speech is normal and behavior is normal. Judgment and thought content normal. Cognition and memory  Nursing note and vitals reviewed.        Assessment:       1. Cough    2. Acute non-recurrent maxillary sinusitis          Plan:         Cough  -     X-Ray Chest PA And Lateral; Future; Expected date: 02/04/2022    Acute non-recurrent maxillary sinusitis  -     cefUROXime (CEFTIN) 500 MG tablet; Take 1 tablet (500 mg total) by mouth 2 (two) times daily. for 10 days  Dispense: 20 tablet; Refill: 0  -     azelastine (ASTELIN) 137 mcg (0.1 %) nasal spray; 2 sprays (274 mcg total) by Nasal route 2 (two) times daily.  Dispense: 30 mL; Refill: 0    X-Ray Chest PA And Lateral    Result Date: 2/4/2022  EXAMINATION: XR CHEST PA AND LATERAL TECHNIQUE: Two views of the chest were obtained, with PA and lateral projections submitted. COMPARISON: Comparison is made to 05/15/2020.  Clinical information of fever and cough, with additional information obtained from the electronic medical record that the patient tested COVID-19 positive on 01/10/2022. FINDINGS: Heart size is normal, as is the appearance of the pulmonary vascularity.  Lung zones are clear, and are free of significant airspace consolidation or volume loss.  No pleural fluid.  No hilar or mediastinal mass  lesion.  No pneumothorax.  Right upper quadrant surgical clips are again incidentally observed.     No significant intrathoracic abnormality.  No significant detrimental interval change in the appearance of the chest since 05/15/2020 is appreciated. Electronically signed by: Otis Pascual MD Date:    02/04/2022 Time:    08:41    Patient Instructions   Patient Education       Sinusitis, Adult ED   General Information   You came to the Emergency Department (ED) for sinusitis. Your sinuses are hollow areas in the bones of your face. They have a thin lining that normally makes a small amount of mucus. When you have sinusitis, the lining gets swollen and makes extra mucus. You may have sinusitis with or after a cold. Most of the time sinusitis will get better in 1 to 2 weeks.  Sinusitis is most often caused by a virus, so antibiotics wont help. But some people do need antibiotics. If the doctor ordered antibiotics for you, be sure to follow the instructions. It is important to take all of your antibiotics even if you start to feel better.  What care is needed at home?   · Call your regular doctor to let them know you were in the ED. Make a follow-up appointment if you were told to.  · Try to thin the mucus.  ? Drink lots of liquids to stay hydrated.  ? Use a cool mist humidifier to avoid dry air.  ? Use saline nose drops or a saline nose rinse to relieve stuffiness.  · Wash your hands often. This will help keep others healthy.  · Do not smoke or be in smoke-filled places. Avoid things that may cause breathing problems like fumes, pollution, dust, and other common allergens and irritants.  · You may want to take medicine like ibuprofen, naproxen, or acetaminophen to help with pain.  When do I need to get emergency help?   · Return to the ED if:   ? You have a stiff neck, especially if you also have fever, chills, vomiting, or severe headache  ? You have trouble thinking clearly.  ? You have trouble seeing or have double  vision.  ? You have swelling or redness or pain around one or both eyes.  ? You have a fever of 102°F (38.9°C) or higher, or have shaking chills or sweats.  When do I need to call the doctor?   · You have an upset stomach and throwing up.  · You have more pain in your face and head.  · You are not getting better within 1 to 2 weeks.  · You have new or worsening symptoms.  Last Reviewed Date   2020-08-03  Consumer Information Use and Disclaimer   This information is not specific medical advice and does not replace information you receive from your health care provider. This is only a brief summary of general information. It does NOT include all information about conditions, illnesses, injuries, tests, procedures, treatments, therapies, discharge instructions or life-style choices that may apply to you. You must talk with your health care provider for complete information about your health and treatment options. This information should not be used to decide whether or not to accept your health care providers advice, instructions or recommendations. Only your health care provider has the knowledge and training to provide advice that is right for you.  Copyright   Copyright © 2021 [employer] and its affiliates and/or licensors. All rights reserved.

## 2022-02-24 NOTE — DISCHARGE INSTRUCTIONS
Home Care Instructions Pain Management:    1.  DIET:    You may resume your normal diet today.    2.  BATHING:    You may shower with luke warm water.    3.  DRESSING:    You may remove your bandage today.    4.  ACTIVITY LEVEL:      You may resume your normal activities 24 hours after your procedure.    5.  MEDICATIONS:    You may resume your normal medications today.    6.  SPECIAL INSTRUCTIONS:    No heat to the injection site for 24 hours including bath or shower, heating pad, moist heat or hot tubs.    Use an ice pack to the injection site for any pain or discomfort.  Apply ice packs for 20 minute intervals as needed.    If you have received any sedatives by mouth today, you can not drive for 12 hours.    If you have received sedation through an IV, you can not drive for 24 hours.    PLEASE CALL YOUR DOCTOR FOR THE FOLLOWIN.  Redness or swelling around the injection site.  2.  Fever of 101 degrees.  3.  Drainage (pus) from the injection site.  4.  For any continuous bleeding (some dried blood over the incision is normal.)    FOR EMERGENCIES:    If any unusual problems or difficulties occur during clinic hours, call [phone] or dial [phone].    Follow up with with your physician in 2-3 weeks.

## 2022-02-28 ENCOUNTER — PATIENT MESSAGE (OUTPATIENT)
Dept: PAIN MEDICINE | Facility: HOSPITAL | Age: 62
End: 2022-02-28
Payer: COMMERCIAL

## 2022-03-02 ENCOUNTER — HOSPITAL ENCOUNTER (OUTPATIENT)
Facility: HOSPITAL | Age: 62
Discharge: HOME | End: 2022-03-02
Attending: PHYSICAL MEDICINE & REHABILITATION | Admitting: PHYSICAL MEDICINE & REHABILITATION
Payer: COMMERCIAL

## 2022-03-02 VITALS
RESPIRATION RATE: 16 BRPM | OXYGEN SATURATION: 95 % | DIASTOLIC BLOOD PRESSURE: 90 MMHG | SYSTOLIC BLOOD PRESSURE: 140 MMHG | BODY MASS INDEX: 31.16 KG/M2 | WEIGHT: 187 LBS | HEART RATE: 66 BPM | HEIGHT: 65 IN | TEMPERATURE: 97 F

## 2022-03-02 DIAGNOSIS — M54.12 CERVICAL RADICULOPATHY: Primary | ICD-10-CM

## 2022-03-02 DIAGNOSIS — R52 PAIN: ICD-10-CM

## 2022-03-02 LAB
CTP QC/QA: YES
SARS-COV-2 AG RESP QL IA.RAPID: NEGATIVE

## 2022-03-02 PROCEDURE — 63600175 PHARM REV CODE 636 W HCPCS: Performed by: PHYSICAL MEDICINE & REHABILITATION

## 2022-03-02 PROCEDURE — 62321 NJX INTERLAMINAR CRV/THRC: CPT | Performed by: PHYSICAL MEDICINE & REHABILITATION

## 2022-03-02 PROCEDURE — 62321 NJX INTERLAMINAR CRV/THRC: CPT | Mod: ,,, | Performed by: PHYSICAL MEDICINE & REHABILITATION

## 2022-03-02 PROCEDURE — 25000003 PHARM REV CODE 250: Performed by: PHYSICAL MEDICINE & REHABILITATION

## 2022-03-02 PROCEDURE — 25500020 PHARM REV CODE 255: Performed by: PHYSICAL MEDICINE & REHABILITATION

## 2022-03-02 PROCEDURE — 62321 PR INJ CERV/THORAC, W/GUIDANCE: ICD-10-PCS | Mod: ,,, | Performed by: PHYSICAL MEDICINE & REHABILITATION

## 2022-03-02 RX ORDER — SODIUM CHLORIDE 9 MG/ML
INJECTION, SOLUTION INTRAMUSCULAR; INTRAVENOUS; SUBCUTANEOUS
Status: DISCONTINUED | OUTPATIENT
Start: 2022-03-02 | End: 2022-03-02 | Stop reason: HOSPADM

## 2022-03-02 RX ORDER — DEXAMETHASONE SODIUM PHOSPHATE 10 MG/ML
INJECTION INTRAMUSCULAR; INTRAVENOUS
Status: DISCONTINUED | OUTPATIENT
Start: 2022-03-02 | End: 2022-03-02 | Stop reason: HOSPADM

## 2022-03-02 RX ORDER — LIDOCAINE HYDROCHLORIDE 10 MG/ML
INJECTION INFILTRATION; PERINEURAL
Status: DISCONTINUED | OUTPATIENT
Start: 2022-03-02 | End: 2022-03-02 | Stop reason: HOSPADM

## 2022-03-02 RX ORDER — DIPHENHYDRAMINE HYDROCHLORIDE 50 MG/ML
INJECTION INTRAMUSCULAR; INTRAVENOUS
Status: DISCONTINUED | OUTPATIENT
Start: 2022-03-02 | End: 2022-03-02 | Stop reason: HOSPADM

## 2022-03-02 RX ORDER — SODIUM CHLORIDE 9 MG/ML
500 INJECTION, SOLUTION INTRAVENOUS CONTINUOUS
Status: DISCONTINUED | OUTPATIENT
Start: 2022-03-02 | End: 2022-03-02 | Stop reason: HOSPADM

## 2022-03-02 RX ORDER — LIDOCAINE HYDROCHLORIDE 10 MG/ML
INJECTION, SOLUTION EPIDURAL; INFILTRATION; INTRACAUDAL; PERINEURAL
Status: DISCONTINUED | OUTPATIENT
Start: 2022-03-02 | End: 2022-03-02 | Stop reason: HOSPADM

## 2022-03-02 RX ORDER — INDOMETHACIN 25 MG/1
CAPSULE ORAL
Status: DISCONTINUED | OUTPATIENT
Start: 2022-03-02 | End: 2022-03-02 | Stop reason: HOSPADM

## 2022-03-02 NOTE — PLAN OF CARE
Discharge instructions reviewed with patient, with time allotted for questions. Pt verbalizes understanding of instructions and states they have no further questions @ this time. Procedure site is clean, dry and intact. Band-aids in place. Vital signs are stable. No acute distress noted. Staff is at bedside to assist patient. Wheeled to discharge area. Home with family in private vehicle.

## 2022-03-02 NOTE — DISCHARGE SUMMARY
[facility]  Discharge Note  Short Stay     Admit Date: 3/2/2022    Discharge Date: 3/2/2022     Attending Physician: Latoya Quintanilla M.D.    Diagnoses:  Active Hospital Problems    Diagnosis  POA    *Cervical radiculopathy [M54.12]  Yes      Resolved Hospital Problems   No resolved problems to display.     Discharged Condition: Good     Hospital Course: Patient was admitted for an outpatient interventional pain management procedure and tolerated the procedure well with no complications.     Final Diagnoses: Same as principal problem.     Disposition: Home or Self Care     Follow up/Patient Instructions:   Follow-up in 1-2 weeks unless otherwise instructed. May return sooner as needed.       Reconciled Medications:     Medication List      CONTINUE taking these medications    albuterol 90 mcg/actuation inhaler  Commonly known as: VENTOLIN HFA  Inhale 2 puffs into the lungs every 6 (six) hours as needed for Wheezing or Shortness of Breath. Rescue     EPINEPHrine 0.3 mg/0.3 mL Atin  Commonly known as: EPIPEN  Inject 0.3 mLs (0.3 mg total) into the muscle once. for 1 dose     gabapentin 300 MG capsule  Commonly known as: NEURONTIN  Take 1 capsule (300 mg total) by mouth 3 (three) times daily.     hydroCHLOROthiazide 12.5 MG Tab  Commonly known as: HYDRODIURIL  TAKE 1 TABLET(12.5 MG) BY MOUTH EVERY DAY        ASK your doctor about these medications    acetaminophen 500 MG tablet  Commonly known as: TYLENOL  Take 2 tablets (1,000 mg total) by mouth every 8 (eight) hours as needed for Pain.     azelastine 137 mcg (0.1 %) nasal spray  Commonly known as: ASTELIN  2 sprays (274 mcg total) by Nasal route 2 (two) times daily.     baclofen 10 MG tablet  Commonly known as: LIORESAL  Take 1 tablet (10 mg total) by mouth 3 (three) times daily as needed.     meclizine 25 mg tablet  Commonly known as: ANTIVERT  TAKE 1 TABLET(25 MG) BY MOUTH THREE TIMES DAILY AS NEEDED     meloxicam 7.5 MG tablet  Commonly known as:  MOBIC  Take 2 tablets (15 mg total) by mouth once daily.           Discharge Procedure Orders (must include Diet, Follow-up, Activity)   Ice to affected area   Order Comments: 20 minutes of ice or until area numb to the touch if area is sore 2-3 times per day as needed     No driving until:   Order Comments: Until following day     No dressing needed     Notify your health care provider if you experience any of the following:  temperature >100.4     Notify your health care provider if you experience any of the following:  persistent nausea and vomiting or diarrhea     Notify your health care provider if you experience any of the following:  severe uncontrolled pain     Notify your health care provider if you experience any of the following:  redness, tenderness, or signs of infection (pain, swelling, redness, odor or green/yellow discharge around incision site)     Notify your health care provider if you experience any of the following:  difficulty breathing or increased cough     Notify your health care provider if you experience any of the following:  severe persistent headache     Notify your health care provider if you experience any of the following:  worsening rash     Notify your health care provider if you experience any of the following:  persistent dizziness, light-headedness, or visual disturbances     Notify your health care provider if you experience any of the following:  increased confusion or weakness     Shower on day dressing removed (No bath)       Latoya Quintanilla M.D.  Interventional Pain Medicine / Physical Medicine & Rehabilitation

## 2022-03-02 NOTE — H&P
HPI  Patient presenting for Procedure(s) (LRB):  Injection-steroid-epidural-cervical C7-T1 (N/A)     Patient on Anti-coagulation No    No health changes since previous encounter. No changes in pain since procedure was scheduled at previous visit. Denies any fevers or infections.     Patient has a listed anaphylactic reaction to iodine and iodine containing products. She states this is not from a procedure, but rather a shellfish allergy. She does not remember ever having a reaction to iodine contrast.     No current facility-administered medications on file prior to encounter.     Current Outpatient Medications on File Prior to Encounter   Medication Sig Dispense Refill    hydroCHLOROthiazide (HYDRODIURIL) 12.5 MG Tab TAKE 1 TABLET(12.5 MG) BY MOUTH EVERY DAY 90 tablet 1    acetaminophen (TYLENOL) 500 MG tablet Take 2 tablets (1,000 mg total) by mouth every 8 (eight) hours as needed for Pain. 90 tablet 0    albuterol (VENTOLIN HFA) 90 mcg/actuation inhaler Inhale 2 puffs into the lungs every 6 (six) hours as needed for Wheezing or Shortness of Breath. Rescue 18 g 2    baclofen (LIORESAL) 10 MG tablet Take 1 tablet (10 mg total) by mouth 3 (three) times daily as needed. 90 tablet 2    EPINEPHrine (EPIPEN) 0.3 mg/0.3 mL AtIn Inject 0.3 mLs (0.3 mg total) into the muscle once. for 1 dose 1 Device 3    gabapentin (NEURONTIN) 300 MG capsule Take 1 capsule (300 mg total) by mouth 3 (three) times daily. 270 capsule 0    meclizine (ANTIVERT) 25 mg tablet TAKE 1 TABLET(25 MG) BY MOUTH THREE TIMES DAILY AS NEEDED 45 tablet 1    meloxicam (MOBIC) 7.5 MG tablet Take 2 tablets (15 mg total) by mouth once daily. 30 tablet 0     Past Medical History:   Diagnosis Date    Abscess of paraspinous muscles     Allergy 4/2018    Class 1 obesity due to excess calories in adult 7/23/2019    DVT (deep venous thrombosis)     left leg    Epidural abscess 4/10/2018    Fatty liver     GERD (gastroesophageal reflux disease) 2015     History of major abdominal surgery 9/11/2015    Had Hysterectomy , cholecystectomy , rectocele repair    Hypertension     Lumbar radiculopathy     Menopause     Obesity     Obstructive sleep apnea on CPAP     no longer uses CPAP after weight loss from gastric sleeve    Thyroid disease     Thyroid nodules.     Past Surgical History:   Procedure Laterality Date    ADENOIDECTOMY  1995    BACK SURGERY  2002    L4, L5    BILATERAL SALPINGOOPHORECTOMY      CHOLECYSTECTOMY      CORRECTION OF HAMMER TOE Left 8/5/2021    Procedure: CORRECTION, HAMMER TOE Left 4th mallet toe, DIP joitn fusion;  Surgeon: Guero Alvarez MD;  Location: [facility] OR;  Service: Orthopedics;  Laterality: Left;  K-wires    CYST REMOVAL      EPIDURAL STEROID INJECTION INTO CERVICAL SPINE N/A 8/11/2020    Procedure: Injection-steroid-epidural-cervical;  Surgeon: [facility] Provider;  Location: [facility] OR 2ND FLR;  Service: Radiology;  Laterality: N/A;  /[place]    ESOPHAGOGASTRODUODENOSCOPY N/A 5/3/2019    Procedure: ESOPHAGOGASTRODUODENOSCOPY (EGD);  Surgeon: Carlin Sanchez MD;  Location: [facility] ENDO (4TH FLR);  Service: Endoscopy;  Laterality: N/A;    HYSTERECTOMY  12/17/2012    [facility]     INJECTION OF JOINT Left 3/1/2021    Procedure: INJECTION, JOINT LEFT HIP INTRA ARTICULAR CORTISONE INJECTION DIRECT REFERRAL;  Surgeon: Chuy Gregory MD;  Location: [facility] PAIN MGT;  Service: Pain Management;  Laterality: Left;  NEEDS CONSENT    INSERTION OF INFERIOR VENA CAVAL FILTER Right 7/19/2019    Procedure: IVC filter placement;  Surgeon: Rodney Rico MD;  Location: [facility] CATH LAB;  Service: Peripheral Vascular;  Laterality: Right;    IVC FILTER RETRIEVAL N/A 12/20/2019    Procedure: REMOVAL-FILTER-IVC;  Surgeon: Rodney Rico MD;  Location: [facility] OR 2ND FLR;  Service: Peripheral Vascular;  Laterality: N/A;    LAPAROSCOPIC SLEEVE GASTRECTOMY N/A 7/23/2019    Procedure: GASTRECTOMY, SLEEVE, LAPAROSCOPIC, with  intraop EGD [id number];  Surgeon: Duc Ratliff Jr., MD;  Location: [facility] OR [address] [place] [address];  Service: General;  Laterality: N/A;    RECTOCELE REPAIR      SPINE SURGERY  2018    THYROID NODULE REMOVAL      TONSILLECTOMY  1995     Review of patient's allergies indicates:   Allergen Reactions    Cathflo activase [alteplase] Anaphylaxis     Tightness in chest, raspy throat, restless legs, hotness all over    Heparin analogues      Restless legs, tightness in chest, and hot all over    Iodine and iodide containing products Anaphylaxis, Hives, Shortness Of Breath, Itching, Swelling and Rash    Polyethylene glycol analogues Swelling    Polysorbate 80 Anaphylaxis, Itching and Shortness Of Breath    Xyzal [levocetirizine] Swelling    Latex Swelling            Current Facility-Administered Medications   Medication    dexAMETHasone sodium phos (PF) injection    gadodiamide solution    LIDOcaine (PF) 10 mg/ml (1%) injection    LIDOcaine HCL 10 mg/ml (1%) injection    sodium bicarbonate solution    sodium chloride 0.9% injection       PMHx, PSHx, Allergies, Medications reviewed in epic    ROS negative except pain complaints in HPI    OBJECTIVE:    LMP 11/25/2012   Breastfeeding No     PHYSICAL EXAMINATION:    GENERAL: Well appearing, in no acute distress, alert and oriented.  PSYCH:  Mood and affect appropriate.  SKIN: Skin color, texture, turgor normal in procedure area, no rashes or lesions which will impact the procedure.  CV: RRR with palpation of the radial artery.  PULM: No evidence of respiratory difficulty, symmetric chest rise.   NEURO: Alert. Oriented. Speech fluent and appropriate. Moving all extremities.    Plan:    Proceed with procedure as planned Procedure(s) (LRB):  Injection-steroid-epidural-cervical C7-T1 (N/A)    Will pre-treat with 50mg of benadryl out of utmost caution.    Also discussed risks and benefits of iodinated contrast vs gadolinium and decided on using iodinated contrast.    Ann Marie  Lori More  03/02/2022

## 2022-03-02 NOTE — OP NOTE
Cervical Interlaminar Epidural Steroid Injection Under Fluoroscopic Guidance:  I have reviewed the patient's medications, allergies and relevant histories prior to the procedure and no contraindications have been identified. The risks, benefits and alternatives to the procedure were discussed with the patient, and all questions regarding the procedure were answered to the patient's satisfaction. I personally obtained Renae's consent prior to the start of the procedure and the signed consent can be found in the patient's chart.                                                         Time-out was taken to identify patient, procedure, laterality, and allergies prior to starting the procedure.       Date of Service: 03/02/2022  Procedure: C7-T1 cervical interlaminar epidural steroid injection under fluoroscopy.  Pre-Operative Diagnosis: Cervical Radiculopathy  Post-Operative Diagnosis: Cervical Radiculopathy    Physician: Latoya Quintanilla M.D.  Assistants: Ann Marie More M.D.      Medications Injected: Preservative-free dexamethasone 10 mg/mL and 4 mL of sterile normal saline  Local Anesthetic: Xylocaine 1% 10 mL with Sodium Bicarbonate 1ml.   Sedation Medications: Benadryl 50 mg IV prior to procedure due to listed allergy.     Procedural Technique:  With the patient laying in a prone position with the neck in a slightly forward flexed position, the area was prepped and draped in the usual sterile fashion using ChloraPrep and a fenestrated drape.  The area was determined under fluoroscopic guidance.  Local anesthetic was utilized to anesthetize the skin and subcutaneous tissues in the area using a 25-gauge 1.5 inch needle.  A 3.5 inch 18-gauge Touhy needle was introduced under fluoroscopic guidance to meet the lamina of T1.  The needle was then directed superior-medially and advanced using loss of resistance technique.  Once the tip of the needle was in the desired position, the contrast dye, Omnipaque, was injected to  determine epidural placement and no vascular runoff.  The medications were then injected slowly. The needle was removed and bandage applied.     Estimated Blood Loss:  None.  Complications:  None.     Disposition: The patient tolerated the procedure well, and there were no apparent complications. Vital signs remained stable throughout the procedure. The patient was taken to the recovery area and monitored after the procedure. The patient was supplied with written discharge instructions for the procedure. If helpful, we can repeat as needed. The patient was discharged in a stable condition.    Follow-Up: We will see the patient back in 1-2 weeks or the patient may call to inform of status.

## 2022-03-04 ENCOUNTER — OFFICE VISIT (OUTPATIENT)
Dept: BARIATRICS | Facility: CLINIC | Age: 62
End: 2022-03-04
Payer: COMMERCIAL

## 2022-03-04 VITALS
HEIGHT: 65 IN | SYSTOLIC BLOOD PRESSURE: 119 MMHG | OXYGEN SATURATION: 98 % | DIASTOLIC BLOOD PRESSURE: 78 MMHG | RESPIRATION RATE: 16 BRPM | WEIGHT: 190.94 LBS | BODY MASS INDEX: 31.81 KG/M2 | HEART RATE: 80 BPM

## 2022-03-04 DIAGNOSIS — E78.5 HYPERLIPIDEMIA, UNSPECIFIED HYPERLIPIDEMIA TYPE: ICD-10-CM

## 2022-03-04 DIAGNOSIS — K21.9 GASTROESOPHAGEAL REFLUX DISEASE, UNSPECIFIED WHETHER ESOPHAGITIS PRESENT: ICD-10-CM

## 2022-03-04 DIAGNOSIS — I10 ESSENTIAL HYPERTENSION: ICD-10-CM

## 2022-03-04 DIAGNOSIS — Z98.84 S/P LAPAROSCOPIC SLEEVE GASTRECTOMY: Primary | ICD-10-CM

## 2022-03-04 PROCEDURE — 99999 PR PBB SHADOW E&M-EST. PATIENT-LVL V: ICD-10-PCS | Mod: PBBFAC,,, | Performed by: PHYSICIAN ASSISTANT

## 2022-03-04 PROCEDURE — 1160F RVW MEDS BY RX/DR IN RCRD: CPT | Mod: CPTII,S$GLB,, | Performed by: PHYSICIAN ASSISTANT

## 2022-03-04 PROCEDURE — 99999 PR PBB SHADOW E&M-EST. PATIENT-LVL V: CPT | Mod: PBBFAC,,, | Performed by: PHYSICIAN ASSISTANT

## 2022-03-04 PROCEDURE — 1159F PR MEDICATION LIST DOCUMENTED IN MEDICAL RECORD: ICD-10-PCS | Mod: CPTII,S$GLB,, | Performed by: PHYSICIAN ASSISTANT

## 2022-03-04 PROCEDURE — 3008F PR BODY MASS INDEX (BMI) DOCUMENTED: ICD-10-PCS | Mod: CPTII,S$GLB,, | Performed by: PHYSICIAN ASSISTANT

## 2022-03-04 PROCEDURE — 3078F DIAST BP <80 MM HG: CPT | Mod: CPTII,S$GLB,, | Performed by: PHYSICIAN ASSISTANT

## 2022-03-04 PROCEDURE — 3078F PR MOST RECENT DIASTOLIC BLOOD PRESSURE < 80 MM HG: ICD-10-PCS | Mod: CPTII,S$GLB,, | Performed by: PHYSICIAN ASSISTANT

## 2022-03-04 PROCEDURE — 99213 OFFICE O/P EST LOW 20 MIN: CPT | Mod: S$GLB,,, | Performed by: PHYSICIAN ASSISTANT

## 2022-03-04 PROCEDURE — 3008F BODY MASS INDEX DOCD: CPT | Mod: CPTII,S$GLB,, | Performed by: PHYSICIAN ASSISTANT

## 2022-03-04 PROCEDURE — 3074F PR MOST RECENT SYSTOLIC BLOOD PRESSURE < 130 MM HG: ICD-10-PCS | Mod: CPTII,S$GLB,, | Performed by: PHYSICIAN ASSISTANT

## 2022-03-04 PROCEDURE — 1159F MED LIST DOCD IN RCRD: CPT | Mod: CPTII,S$GLB,, | Performed by: PHYSICIAN ASSISTANT

## 2022-03-04 PROCEDURE — 1160F PR REVIEW ALL MEDS BY PRESCRIBER/CLIN PHARMACIST DOCUMENTED: ICD-10-PCS | Mod: CPTII,S$GLB,, | Performed by: PHYSICIAN ASSISTANT

## 2022-03-04 PROCEDURE — 99213 PR OFFICE/OUTPT VISIT, EST, LEVL III, 20-29 MIN: ICD-10-PCS | Mod: S$GLB,,, | Performed by: PHYSICIAN ASSISTANT

## 2022-03-04 PROCEDURE — 3074F SYST BP LT 130 MM HG: CPT | Mod: CPTII,S$GLB,, | Performed by: PHYSICIAN ASSISTANT

## 2022-03-04 NOTE — PATIENT INSTRUCTIONS
"Snacks: (100-200 calories; >5g protein)    - 1 low-fat cheese stick with 8 cherry tomatoes or 1 serving fresh fruit  - 4 thin slices fat-free turkey breast and 1 slice low-fat cheese  - 4 thin slices fat-free honey ham with wedge of melon  - 2 slices of turkey grubbs  - Boiled eggs (can buy at [employer] already boiled w/ shell removed)  - for convenience,  [place] read, snack, go (deli meat and cheese rolls)  - P3 packets (Protein packs w/ cheese, nuts, lean deli meat)  - MHP Fit and Lean Protein Pudding (find at [employer] - per 1 cup serving = 100 calories, 15 g protein, 0 g sugar)  - 6-8 edamame pods (equivalent to about 1/4 cup edamame without pods).   - 1/4 cup unsalted nuts with ½ cup fruit  - 6-oz container Dannon Light n Fit vanilla yogurt, topped with 1oz unsalted nuts         - apple, celery or baby carrots spread with 2 Tbsp PB2  - apple slices with 1 oz slice low-fat cheese  - Apple slices dipped in 2 Tbsp of PB2  - 2 Tbsp PB2 mixed in light or greek yogurt or sugar-free pudding  - celery, cucumber, bell pepper or baby carrots dipped in ¼ cup hummus bean spread   - celery, cucumber, baby carrots dipped in high protein greek yogurt (Mix 16 oz plain greek yogurt + 1 packet of hidden [place] ranch dip mix)  - Jero Links Beef Steak - 14g protein! (similar to beef jerky but very lean)  - 2 wedges [place] - Light Herb & Garlic Cheese with sliced cucumber or green bell pepper  - 1/2 cup low-fat cottage cheese with ¼ cup fruit or ¼ cup salsa  - 1/2 cup low fat cottage cheese with 10-15 cherry tomatoes  - 8 oz glass of FAIRLIFE fat free milk (13 g protein)  - 8 oz glass of FAIRLIFE fat free milk + 1 packet of sugar-free hot cocoa  - Add Atkins advantage Cafe Caramel shake to decaf coffee. Serve hot or blend with ice for "frappaccino" like drink  - RTD Protein drinks: Atkins, Low Carb Slim Fast, EAS light, Muscle Milk Light, etc.  - Homemade Protein drinks: [employer]NC Soy95, Isopure, Nectar, UNJURY, Whey " Gourmet, etc. Mix 1 scoop powder with 8oz skim/1% milk or light soymilk.  - Protein bars: Atkins, EAS, Pure Protein,  Quest, Think Thin, Detour, etc. Must have 0-4 grams sugar - Read the label.    ** Be CREATIVE. You can always snack on bites of grilled chicken or tuna salad made with low fat gonzalez, if needed!

## 2022-03-04 NOTE — PROGRESS NOTES
BARIATRIC POST-OPERATIVE VISIT:    HPI:  Renae Hou is a 61 y.o. year old female presents for follow-up of gastric sleeve.      Pt states that she is doing well states that she thinks that she may have gained at least 4 pounds.     Would still like to get down some pounds.     States intermittently she will get bruising on her LLQ  Saw plastics but decided against it because he wanted to remove her belly button       Bariatric Diet.   5-6 small meals eg  BF greek yogurt  Sn boiled egg  HORACE tuna  Sn boiled egg  Sn cheese  Franky tea with fairlife milk  80+ gm protein  Ground meat pastor no bread    64+ fl oz SF clear beverage.    See Dietician note from today for a more detailed assessment.    Exercise- none    Review of Systems   Constitutional: Negative for activity change and appetite change.   HENT: Negative for tinnitus and trouble swallowing.    Eyes: Negative for visual disturbance.   Respiratory: Negative for chest tightness and shortness of breath.    Cardiovascular: Negative for chest pain.   Gastrointestinal: Negative for abdominal distention, abdominal pain, blood in stool, constipation, diarrhea, nausea and vomiting.        Denies dysphagia   Genitourinary: Negative for dysuria, frequency and urgency.   Musculoskeletal: Negative for myalgias.   Skin: Negative for rash.   Neurological: Negative for light-headedness, numbness and headaches.   Psychiatric/Behavioral: Negative for decreased concentration, self-injury and suicidal ideas. The patient is not nervous/anxious. Hyperactive:         Physical Exam  Constitutional:       Appearance: Normal appearance. She is well-developed.   HENT:      Head: Normocephalic and atraumatic.   Eyes:      Conjunctiva/sclera: Conjunctivae normal.   Cardiovascular:      Rate and Rhythm: Normal rate.      Pulses: Normal pulses.      Heart sounds: Normal heart sounds. No murmur heard.  Pulmonary:      Effort: Pulmonary effort is normal. No respiratory distress.    Abdominal:      General: Bowel sounds are normal.      Palpations: Abdomen is soft.      Tenderness: There is no abdominal tenderness. There is no guarding.   Musculoskeletal:         General: No swelling. Normal range of motion.      Cervical back: Normal range of motion and neck supple.   Neurological:      Mental Status: She is alert and oriented to person, place, and time.   Psychiatric:         Mood and Affect: Mood normal.         Behavior: Behavior normal.         Thought Content: Thought content normal.         Judgment: Judgment normal.       Vitals:    03/04/22 1126   BP: 119/78   Pulse: 80   Resp: 16     ASSESSMENT:  - Morbid obesity s/p sleeve gastrectomy on 07/23/2019.  - Co-morbidities: deep vein thrombosis, dyslipidemia, GERD, hypertension and obstructive sleep apnea  - Good Weight loss, 41#'s and 46 % EWL  - No Exercise routine  - Good Diet  - Good vitamin regimen     PLAN:  - continue with bariatric diet   - continue with vitamin regimen

## 2022-03-07 ENCOUNTER — PATIENT MESSAGE (OUTPATIENT)
Dept: BARIATRICS | Facility: CLINIC | Age: 62
End: 2022-03-07
Payer: COMMERCIAL

## 2022-03-16 ENCOUNTER — OFFICE VISIT (OUTPATIENT)
Dept: PAIN MEDICINE | Facility: CLINIC | Age: 62
End: 2022-03-16
Payer: COMMERCIAL

## 2022-03-16 DIAGNOSIS — M54.12 RIGHT CERVICAL RADICULOPATHY: ICD-10-CM

## 2022-03-16 DIAGNOSIS — R29.2: ICD-10-CM

## 2022-03-16 DIAGNOSIS — M54.12 CERVICAL RADICULOPATHY: Primary | ICD-10-CM

## 2022-03-16 DIAGNOSIS — M54.2 CHRONIC NECK PAIN: ICD-10-CM

## 2022-03-16 DIAGNOSIS — G89.29 CHRONIC NECK PAIN: ICD-10-CM

## 2022-03-16 DIAGNOSIS — M47.812 CERVICAL SPONDYLOSIS: ICD-10-CM

## 2022-03-16 DIAGNOSIS — R52 PAIN: ICD-10-CM

## 2022-03-16 PROCEDURE — 99213 PR OFFICE/OUTPT VISIT, EST, LEVL III, 20-29 MIN: ICD-10-PCS | Mod: 95,,, | Performed by: NURSE PRACTITIONER

## 2022-03-16 PROCEDURE — 99213 OFFICE O/P EST LOW 20 MIN: CPT | Mod: 95,,, | Performed by: NURSE PRACTITIONER

## 2022-03-16 PROCEDURE — 1159F MED LIST DOCD IN RCRD: CPT | Mod: CPTII,95,, | Performed by: NURSE PRACTITIONER

## 2022-03-16 PROCEDURE — 1159F PR MEDICATION LIST DOCUMENTED IN MEDICAL RECORD: ICD-10-PCS | Mod: CPTII,95,, | Performed by: NURSE PRACTITIONER

## 2022-03-16 PROCEDURE — 1160F RVW MEDS BY RX/DR IN RCRD: CPT | Mod: CPTII,95,, | Performed by: NURSE PRACTITIONER

## 2022-03-16 PROCEDURE — 1160F PR REVIEW ALL MEDS BY PRESCRIBER/CLIN PHARMACIST DOCUMENTED: ICD-10-PCS | Mod: CPTII,95,, | Performed by: NURSE PRACTITIONER

## 2022-03-16 NOTE — PROGRESS NOTES
[facility]  telemedicine Encounter    Telemedicine Bundle:  This visit was completed during the Coronavirus Crisis to enhance patient safety.  The patient location is: patient's home  The chief complaint leading to consultation is: Headache and Arm Pain  Visit type: Virtual visit with synchronous audio and video  Total time spent with patient:   Each patient to whom he or she provides medical services by telemedicine is:    (1) informed of the relationship between the physician and patient and the respective role of any other health care provider with respect to management of the patient  (2) notified that he or she may decline to receive medical services by telemedicine and may withdraw from such care at any time.        Chief Complaint:   Chief Complaint   Patient presents with    Headache    Arm Pain       History of Present Illness: Renae Hou is a 61 y.o. female referred by Mila Jones PA-C for low back.    Back pain has been present for over 30 years. SHe had a back surgery in the early 90's which helped for a long time. Back pain recurred in 2016, insidiously.  She had 2 injections in 2018 and unfortunately, one resulted in a soft tissue abscess near her spine. She then underwent a L4-S1 fusion with Dr. Zayas in April 2018. Her back pain improved again after her fusion in 2018, and then recurred 2019 insidiously.     Currently, the back pain is localized to the bilateral lumbosacral junction with radiation into the bilateral groin and left leg into the lateral calf with abnormal sensations/numbness in the left leg. Pain is described as aching, throbbing, grabbing, tight and numb. The pain is aggravated by laying down on her back. The pain is alleviated by repositioning and getting up and moving. She does feel weakness in the left leg and it feels like the knee gives out on her at times. She does get the numbness in the left leg. Denies bowel or bladder incontinence, but feels  constipated. Denies saddle anesthesia. Denies recent fevers or infections. Denies significant weight loss    Neck pain has been present for over a year, not able to pinpoint when it started. She underwent a cervical epidural which resolved the pain for a long time. Pain is localized to the right paraspinals with radiation into the right shoulder down the right arm into the thumb. Pain is described as burning, tingling. The pain is aggravated by using her mouse at work, looking up. The pain is alleviated by rest, injections. She denies weakness in the arms. She does get numbness in the right arm.     Severity: Currently: 7/10   Typical Range: 5-10/10     Exacerbation: 10/10     Interval History (01/27/2022):  Renae Hou returns today for follow up.  At the last clinic visit, referred to physical therapy but got covid and after recovering decided she wants to avoid PT while covid is still prevalent.    Currently, the upper back pain is worse.      No change in the location or quality of the pain since the most recent visit is reported.  No significant interval events or traumas. No change in bowel or bladder function, & no new weakness or numbness is reported. No new musculoskeletal pain complaints.    Current Pain Scales:  Current: 5/10              Typical Range: 5-8/10    Patient reports that her low back issues have actually subsided for the most part over the last few months, but her neck and arm symptoms are worsened. She says she has diffuse neck pain which radiates into her right arm across multiple dermatomal distributions. The pain also radiates into her left arm in what appears to be a C5, maybe C6 distribution. She does report that she has had EMG in the past and was diagnosed with bilateral carpal tunnel syndrome but did not like wearing the wrist splints at night. Her neck pain is worse when she sits at her computer for long hours. She says neck extension exacerbates that radiating arm pains. She  had C5 TFESI bilaterally at [facility] in November with IR, which gave her good relief but only for a week. She says she has had these injections multiple times in the past and up until this last one they have always given her about a year of near complete relief. She has never had cervical ILESI.     Interval updates:   03/16/20222325-31-dtoa-old female that presents virtually, she is status post a cervical NEERAJ targeting C7-T1 reporting 85-90% relief.  She reports complete resolution of her headaches, is still having pain/numbness in the last 2 digits of her right hand but overall she is much better following her cervical NEERAJ.      Previous Interventions:  - 03/02/2022 C7-T1 cervical interlaminar epidural steroid   - 11/30/21: BL C5 TFESI, >80% relief but only for a week  - 3/1/21: Right hip CSI (Eissa) did not help  - 8/11/20: Right C4-5 TF NEERAJ (IR) helped  - 3/16/18: Left L4-5 facet joint injection/aspiration (Dr. Cadet) did not help  - 3/2/18: Left L4-5 TF NEERAJ (Dr. Cadet) did not help    Previous Therapies:  PT/OT: yes, she did at the beginning of COVID and [facility]  Relevant Surgery: yes    - 4/10/18: L4-S1 fusion with Dr. Zayas helped   - 1990s minor surgery for a disc that helped  Previous Medications:   - NSAIDS: Tylenol. Meloxicam. Should avoid with gastric sleeve  - Muscle Relaxants:    - TCAs:   - SNRIs:   - Topicals:   - Anticonvulsants: Gabapentin helped a little   - Opioids:     Current Pain Medications:  1. Tylenol     Blood Thinners: None    Full Medication List:    Current Outpatient Medications:     acetaminophen (TYLENOL) 500 MG tablet, Take 2 tablets (1,000 mg total) by mouth every 8 (eight) hours as needed for Pain., Disp: 90 tablet, Rfl: 0    albuterol (VENTOLIN HFA) 90 mcg/actuation inhaler, Inhale 2 puffs into the lungs every 6 (six) hours as needed for Wheezing or Shortness of Breath. Rescue, Disp: 18 g, Rfl: 2    azelastine (ASTELIN) 137 mcg (0.1 %) nasal spray, 2 sprays (274 mcg  total) by Nasal route 2 (two) times daily., Disp: 30 mL, Rfl: 0    baclofen (LIORESAL) 10 MG tablet, Take 1 tablet (10 mg total) by mouth 3 (three) times daily as needed., Disp: 90 tablet, Rfl: 2    EPINEPHrine (EPIPEN) 0.3 mg/0.3 mL AtIn, Inject 0.3 mLs (0.3 mg total) into the muscle once. for 1 dose, Disp: 1 Device, Rfl: 3    gabapentin (NEURONTIN) 300 MG capsule, Take 1 capsule (300 mg total) by mouth 3 (three) times daily., Disp: 270 capsule, Rfl: 0    hydroCHLOROthiazide (HYDRODIURIL) 12.5 MG Tab, TAKE 1 TABLET(12.5 MG) BY MOUTH EVERY DAY, Disp: 90 tablet, Rfl: 1    meclizine (ANTIVERT) 25 mg tablet, TAKE 1 TABLET(25 MG) BY MOUTH THREE TIMES DAILY AS NEEDED, Disp: 45 tablet, Rfl: 1    meloxicam (MOBIC) 7.5 MG tablet, Take 2 tablets (15 mg total) by mouth once daily., Disp: 30 tablet, Rfl: 0    Current Facility-Administered Medications:     acetaminophen tablet 650 mg, 650 mg, Oral, Once PRN, Kevin Butler MD    albuterol inhaler 2 puff, 2 puff, Inhalation, Q20 Min PRN, Kevin Butelr MD    EPINEPHrine (EPIPEN) 0.3 mg/0.3 mL pen injection 0.3 mg, 0.3 mg, Intramuscular, PRN, Kevin Butler MD    methylPREDNISolone sodium succinate injection 40 mg, 40 mg, Intravenous, Once PRN, Kevin Butler MD    ondansetron disintegrating tablet 4 mg, 4 mg, Oral, Once PRN, Kevin Butler MD    sodium chloride 0.9% 500 mL flush bag, , Intravenous, PRN, Kevin Butler MD    sodium chloride 0.9% flush 10 mL, 10 mL, Intravenous, PRN, Kevin Butler MD     Review of Systems:  Review of Systems   All other systems reviewed and are negative.      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    Surgical History:   has a past surgical history that includes Cholecystectomy; RECTOCELE REPAIR; THYROID NODULE REMOVAL; Bilateral salpingoophorectomy; Hysterectomy (12/17/2012); Back surgery (2002); Cyst Removal; Adenoidectomy (1995); Spine surgery (2018); Tonsillectomy (1995); Esophagogastroduodenoscopy (N/A, 5/3/2019); Insertion of inferior vena caval filter (Right, 7/19/2019); Laparoscopic sleeve gastrectomy (N/A, 7/23/2019); IVC filter retrieval (N/A, 12/20/2019); Epidural steroid injection into cervical spine (N/A, 8/11/2020); Injection of joint (Left, 3/1/2021); Correction of hammer toe (Left, 8/5/2021); and Epidural steroid injection into cervical spine (N/A, 3/2/2022).    Family History:  family history includes Alcohol abuse in her sister; Cirrhosis in her sister; Heart disease (age of onset: 55) in her mother; Hyperlipidemia in her mother; Hypertension in her mother; No Known Problems in her daughter and daughter; Stroke in her father.    Social History:   reports that she quit smoking about 34 years ago. Her smoking use included cigarettes. She has a 8.00 pack-year smoking history. She has never used smokeless tobacco. She reports that she does not drink alcohol and does not use drugs.    Physical Exam:  LMP 11/25/2012   There was no PE done for this virtual visit    - X-ray cervical spine 10/20/21:  There is straightening of the normal cervical lordosis.  Extension views demonstrate 3 mm of retrolisthesis of C3 on C4 suggesting a component of dynamic instability.  There are 3 mm of retrolisthesis of C4 on C5 which remains stable between flexion and extension views.  Odontoid process and C1 lateral masses are intact.  There is multilevel  degenerative disc space narrowing most pronounced at C4-C5, C5-C6 and C6-C7.  There is multilevel uncovertebral joint spurring most pronounced at C4-C5.  Facet joints and spinous processes are well aligned.  Prevertebral soft tissues are normal.  Visualized lung apices are clear.    - MRI Cervical & Thoracic spine 11/4/20:  Cervical spine:  Straightening of the cervical lordosis.  The vertebral body heights are well maintained  There is mild disc space narrowing at C4-5, C5-6 and C6-C7.  No evidence of malignant bone marrow replacement process or infection.  There is a benign hemangioma within the T2 vertebrae.  Normal appearance of the craniocervical junction, cervical cord and upper thoracic cord.  C2-C3: Unremarkable  C3-C4: Minimal posterior disc osteophyte complex and left uncovertebral spur results in mild left foraminal narrowing.  The central canal is patent.  C4-C5: Posterior disc osteophyte complex and bilateral uncovertebral spur, there is no canal stenosis.  There is mild bilateral foraminal narrowing.  C5-C6: Posterior disc osteophyte complex and bilateral uncovertebral spur, there is no canal stenosis.  There is mild bilateral foraminal narrowing.  C6-C7: More prominent posterior disc osteophyte complex and uncovertebral spur, no greater than mild central canal narrowing.  There is mild left foraminal narrowing.  C7-T1: Unremarkable  The paraspinal soft tissues appear normal.  Prior partial thyroidectomy.  Thoracic spine:  The alignment is normal.  The vertebral body heights are well maintained.  The disc spaces are relatively well maintained.  There is abnormal edema involving the bilateral T2-T3 facet joints, new from 05/10/2019 exam.  This is likely degenerative.  Minimal posterior disc osteophyte complex at T5-6.  No canal stenosis at this level.  T7-T8 demonstrates mild facet joint osseous hypertrophy, no canal stenosis.  T10-T11 demonstrates moderate facet joint osseous hypertrophy and creates  mild central canal stenosis.  The remainder of the thoracic spine appears within normal limits.  The paravertebral and paraspinal soft tissues appear normal.    - X-ray lumbar spine AP/Lat w/ flex/ext 10/14/20:  Vertebral bodies are intact.  Patient has had a previous fusion with disc spacers and pedicle screws at the L4-L5 and L5-S1 level.  No instability in flexion extension.  No collapse or destruction is seen    - MRI Lumbar spine w/ & w/o 2/28/20:  Postsurgical change: L4-S1 posterior spinal fusion with L4-L5 laminectomies and intervertebral disc spacer placement at L4-L5 and L5-S1.  Alignment: Minimal anterolisthesis of L4 on L5 of 2-3 mm.  Vertebrae: Vertebral body heights are well maintained.  No marrow signal abnormality to suggest acute fracture or infiltrative marrow placement process.  Focal area of marrow heterogeneity noted within the posterior right iliac bone, possibly related to previous bone harvesting site.  Discs: Nonsurgical disc heights are relatively well maintained.  No significant disc space height loss or disc desiccation.  Cord: Normal caliber and signal.  Conus terminates at L1 vertebral level.  Enhancement: No abnormal contrast enhancement.  Degenerative findings:  T12-L1: Mild bilateral facet hypertrophy without significant spinal canal stenosis or neural foraminal narrowing.  L1-L2: Mild bilateral facet hypertrophy without significant spinal canal stenosis or neural foraminal narrowing.  L2-L3: Mild bilateral facet hypertrophy without significant spinal canal stenosis or neural foraminal narrowing.  L3-L4: Moderate bilateral facet hypertrophy without significant spinal canal stenosis or neural foraminal narrowing.  L4-L5: Mild facet hypertrophy without significant neural foraminal narrowing.  Spinal canal is decompressed posteriorly via laminectomy defect.  L5-S1: Mild facet hypertrophy without definite neural foraminal narrowing noting limited evaluation secondary to metallic  artifact.  Spinal canal is decompressed posteriorly via laminectomy defect.  Paraspinal muscles & soft tissues: No significant abnormality.    Labs:  BMP  Lab Results   Component Value Date     05/14/2021    K 4.1 05/14/2021     05/14/2021    CO2 29 05/14/2021    BUN 24 (H) 05/14/2021    CREATININE 0.8 05/14/2021    CALCIUM 9.7 05/14/2021    ANIONGAP 9 05/14/2021    ESTGFRAFRICA >60.0 05/14/2021    EGFRNONAA >60.0 05/14/2021     Lab Results   Component Value Date    ALT 34 05/14/2021    AST 31 05/14/2021    ALKPHOS 101 05/14/2021    BILITOT 0.6 05/14/2021     Lab Results   Component Value Date     11/23/2021       Assessment:  Renae Hou is a 61 y.o. female with the following diagnoses based on history, exam, and imaging:    Problem List Items Addressed This Visit        Neuro    Cervical radiculopathy - Primary      Other Visit Diagnoses     Chronic neck pain        Cervical spondylosis        Pain        Right cervical radiculopathy        Zavala's reflex positive              This is a pleasant 61 y.o. lady presenting with:     - Chronic neck pain: Right cervical radiculopathy, possibly left cervical radiculopathy as well.  MRI with multilevel bilateral spondylosis and foraminal narrowing.    - Chronic bilateral low back pain: Prior fusion L4-S1. She does appear to have left S1 radiculopathy with decreased achilles reflex on that side, but not prominent leg pain and suspect this is residual from her prior surgery. These symptoms are currently still present but manageable to the patient.   - Left GTBursitis, currently mostly improved per patient. She says it comes and goes, but currently is not bothering her.   - Comorbidities: Thyroid disease. H/o DVT. GERD. H/o epidural abscess. RENAE. Fatty liver.     3/16/2022- 60 y/o female history of right cervical radiculopathy she is status post a cervical NEERAJ targeting C7-T1 reporting a 5-9% relief of her pain.  She states that her headaches have  resolved completely.  She still has numbness and tingling in 2 of her fingers on the right but overall she is much better following the injection.  She states she will begin a home exercise plan that incorporates light stretching and muscular strength    Treatment Plan:   - PT/OT/HEP: PT re- offered to patient but she would like to defer for now due to concern for being around groups of people due to covid  - Procedures: none at this time.  Can  Repeat SHALINI if pain returns educated patient we discussed x3 per year.   - Medications: No changes recommended at this time.  - Imaging: Reviewed  - Labs: Reviewed.      Follow Up: RTC PRN per patient    HERVE BenitezC  Interventional Pain Management      Disclaimer: This note was partly generated using dictation software which may occasionally result in transcription errors.

## 2022-03-25 ENCOUNTER — PATIENT MESSAGE (OUTPATIENT)
Dept: DERMATOLOGY | Facility: CLINIC | Age: 62
End: 2022-03-25

## 2022-04-06 ENCOUNTER — OFFICE VISIT (OUTPATIENT)
Dept: INTERNAL MEDICINE | Facility: CLINIC | Age: 62
End: 2022-04-06
Payer: COMMERCIAL

## 2022-04-06 ENCOUNTER — HOSPITAL ENCOUNTER (OUTPATIENT)
Dept: RADIOLOGY | Facility: HOSPITAL | Age: 62
Discharge: HOME | End: 2022-04-06
Attending: INTERNAL MEDICINE
Payer: COMMERCIAL

## 2022-04-06 DIAGNOSIS — R06.00 DYSPNEA, UNSPECIFIED TYPE: ICD-10-CM

## 2022-04-06 DIAGNOSIS — R06.00 DYSPNEA, UNSPECIFIED TYPE: Primary | ICD-10-CM

## 2022-04-06 DIAGNOSIS — G56.01 CARPAL TUNNEL SYNDROME OF RIGHT WRIST: ICD-10-CM

## 2022-04-06 DIAGNOSIS — Z86.718 HISTORY OF DVT (DEEP VEIN THROMBOSIS): ICD-10-CM

## 2022-04-06 DIAGNOSIS — R00.2 PALPITATIONS: ICD-10-CM

## 2022-04-06 DIAGNOSIS — E66.09 CLASS 1 OBESITY DUE TO EXCESS CALORIES WITH SERIOUS COMORBIDITY AND BODY MASS INDEX (BMI) OF 32.0 TO 32.9 IN ADULT: ICD-10-CM

## 2022-04-06 DIAGNOSIS — I10 ESSENTIAL HYPERTENSION: ICD-10-CM

## 2022-04-06 DIAGNOSIS — E78.2 MIXED HYPERLIPIDEMIA: ICD-10-CM

## 2022-04-06 DIAGNOSIS — E04.2 MULTIPLE THYROID NODULES: ICD-10-CM

## 2022-04-06 DIAGNOSIS — R60.0 EDEMA, LOWER EXTREMITY: ICD-10-CM

## 2022-04-06 DIAGNOSIS — R73.03 PRE-DIABETES: ICD-10-CM

## 2022-04-06 DIAGNOSIS — M25.50 ARTHRALGIA, UNSPECIFIED JOINT: ICD-10-CM

## 2022-04-06 DIAGNOSIS — I82.5Z2 CHRONIC DEEP VEIN THROMBOSIS (DVT) OF DISTAL VEIN OF LEFT LOWER EXTREMITY: ICD-10-CM

## 2022-04-06 DIAGNOSIS — G47.33 OSA ON CPAP: ICD-10-CM

## 2022-04-06 PROBLEM — M79.601 PARESTHESIA AND PAIN OF BOTH UPPER EXTREMITIES: Status: RESOLVED | Noted: 2021-08-23 | Resolved: 2022-04-06

## 2022-04-06 PROBLEM — R20.2 PARESTHESIA AND PAIN OF BOTH UPPER EXTREMITIES: Status: RESOLVED | Noted: 2021-08-23 | Resolved: 2022-04-06

## 2022-04-06 PROBLEM — K21.9 ACID REFLUX: Status: RESOLVED | Noted: 2018-04-06 | Resolved: 2022-04-06

## 2022-04-06 PROBLEM — M79.602 PARESTHESIA AND PAIN OF BOTH UPPER EXTREMITIES: Status: RESOLVED | Noted: 2021-08-23 | Resolved: 2022-04-06

## 2022-04-06 PROCEDURE — 93970 US LOWER EXTREMITY VEINS BILATERAL: ICD-10-PCS | Mod: 26,,, | Performed by: RADIOLOGY

## 2022-04-06 PROCEDURE — 71046 XR CHEST PA AND LATERAL: ICD-10-PCS | Mod: 26,,, | Performed by: RADIOLOGY

## 2022-04-06 PROCEDURE — 93970 EXTREMITY STUDY: CPT | Mod: TC

## 2022-04-06 PROCEDURE — 93005 ELECTROCARDIOGRAM TRACING: CPT | Mod: S$GLB,,, | Performed by: INTERNAL MEDICINE

## 2022-04-06 PROCEDURE — 93970 EXTREMITY STUDY: CPT | Mod: 26,,, | Performed by: RADIOLOGY

## 2022-04-06 PROCEDURE — 1159F MED LIST DOCD IN RCRD: CPT | Mod: CPTII,S$GLB,, | Performed by: INTERNAL MEDICINE

## 2022-04-06 PROCEDURE — 93005 EKG 12-LEAD: ICD-10-PCS | Mod: S$GLB,,, | Performed by: INTERNAL MEDICINE

## 2022-04-06 PROCEDURE — 99999 PR PBB SHADOW E&M-EST. PATIENT-LVL IV: ICD-10-PCS | Mod: PBBFAC,,, | Performed by: INTERNAL MEDICINE

## 2022-04-06 PROCEDURE — 99999 PR PBB SHADOW E&M-EST. PATIENT-LVL IV: CPT | Mod: PBBFAC,,, | Performed by: INTERNAL MEDICINE

## 2022-04-06 PROCEDURE — 1160F RVW MEDS BY RX/DR IN RCRD: CPT | Mod: CPTII,S$GLB,, | Performed by: INTERNAL MEDICINE

## 2022-04-06 PROCEDURE — 93010 ELECTROCARDIOGRAM REPORT: CPT | Mod: S$GLB,,, | Performed by: INTERNAL MEDICINE

## 2022-04-06 PROCEDURE — 3074F PR MOST RECENT SYSTOLIC BLOOD PRESSURE < 130 MM HG: ICD-10-PCS | Mod: CPTII,S$GLB,, | Performed by: INTERNAL MEDICINE

## 2022-04-06 PROCEDURE — 71046 X-RAY EXAM CHEST 2 VIEWS: CPT | Mod: 26,,, | Performed by: RADIOLOGY

## 2022-04-06 PROCEDURE — 93010 EKG 12-LEAD: ICD-10-PCS | Mod: S$GLB,,, | Performed by: INTERNAL MEDICINE

## 2022-04-06 PROCEDURE — 3044F PR MOST RECENT HEMOGLOBIN A1C LEVEL <7.0%: ICD-10-PCS | Mod: CPTII,S$GLB,, | Performed by: INTERNAL MEDICINE

## 2022-04-06 PROCEDURE — 99215 OFFICE O/P EST HI 40 MIN: CPT | Mod: S$GLB,,, | Performed by: INTERNAL MEDICINE

## 2022-04-06 PROCEDURE — 99215 PR OFFICE/OUTPT VISIT, EST, LEVL V, 40-54 MIN: ICD-10-PCS | Mod: S$GLB,,, | Performed by: INTERNAL MEDICINE

## 2022-04-06 PROCEDURE — 3078F DIAST BP <80 MM HG: CPT | Mod: CPTII,S$GLB,, | Performed by: INTERNAL MEDICINE

## 2022-04-06 PROCEDURE — 71046 X-RAY EXAM CHEST 2 VIEWS: CPT | Mod: TC

## 2022-04-06 PROCEDURE — 3044F HG A1C LEVEL LT 7.0%: CPT | Mod: CPTII,S$GLB,, | Performed by: INTERNAL MEDICINE

## 2022-04-06 PROCEDURE — 3074F SYST BP LT 130 MM HG: CPT | Mod: CPTII,S$GLB,, | Performed by: INTERNAL MEDICINE

## 2022-04-06 PROCEDURE — 1159F PR MEDICATION LIST DOCUMENTED IN MEDICAL RECORD: ICD-10-PCS | Mod: CPTII,S$GLB,, | Performed by: INTERNAL MEDICINE

## 2022-04-06 PROCEDURE — 3008F BODY MASS INDEX DOCD: CPT | Mod: CPTII,S$GLB,, | Performed by: INTERNAL MEDICINE

## 2022-04-06 PROCEDURE — 3078F PR MOST RECENT DIASTOLIC BLOOD PRESSURE < 80 MM HG: ICD-10-PCS | Mod: CPTII,S$GLB,, | Performed by: INTERNAL MEDICINE

## 2022-04-06 PROCEDURE — 3008F PR BODY MASS INDEX (BMI) DOCUMENTED: ICD-10-PCS | Mod: CPTII,S$GLB,, | Performed by: INTERNAL MEDICINE

## 2022-04-06 PROCEDURE — 1160F PR REVIEW ALL MEDS BY PRESCRIBER/CLIN PHARMACIST DOCUMENTED: ICD-10-PCS | Mod: CPTII,S$GLB,, | Performed by: INTERNAL MEDICINE

## 2022-04-06 NOTE — PROGRESS NOTES
Subjective:       Patient ID: Renae Hou is a 61 y.o. female.    Chief Complaint:  Multiple issues    Dr. Garza patient, wants to transfer to me.    She had COVID in January got the infusion.  Booster reviewed, can be done at a later point.  Took a long time to recover.     Legs and ankles swelling, she thinks since COVID.  SOB with walking, worse since COVID as well.    Some slight weight gain.    Some discoloration of hands, some bruising.    Anxiety at night.  Woke up in the middle of the night, trouble falling back asleep.  Heart racing at that time.  This is new.    History of DVT after lumbar fusion in 2018.    Mammogram scheduled for next month.    Colonoscopy will be due this year.    Status post gastric sleeve, successful weight loss.  230, then lost to 180, but regaining a little.  Previous diagnosis of sleep apnea but apparently does not require treatment any longer given her weight loss.    Epidural steroid in November and Feb/March again.     Patient Active Problem List   Diagnosis    RENAE dx 2010, improved after weight loss    Chronic constipation    Mixed hyperlipidemia    Pre-diabetes    Chronic pain    Lumbar radiculopathy    Essential hypertension    Fatty liver    Spondylolisthesis    GERD (gastroesophageal reflux disease)    Vaginal atrophy    Dyspareunia, female    Mixed stress and urge urinary incontinence    Rectocele, female    Cystocele, midline    Edema, lower extremity    History of DVT (deep vein thrombosis): 2018     Presence of IVC filter    Hip pain, chronic, left    Multiple thyroid nodules    Dysphonia    Cervical radiculopathy    Mallet toe of left foot    Chronic left-sided low back pain with left-sided sciatica    Carpal tunnel syndrome of right wrist: see EMG 10/21    Chronic deep vein thrombosis (DVT) of distal vein of left lower extremity    Class 1 obesity due to excess calories with serious comorbidity and body mass index (BMI) of 32.0 to 32.9  in adult       The 10-year ASCVD risk score (Kikefely DALLAS Jr., et al., 2013) is: 4.5%    Values used to calculate the score:      Age: 61 years      Sex: Female      Is Non- : No      Diabetic: No      Tobacco smoker: No      Systolic Blood Pressure: 125 mmHg      Is BP treated: Yes      HDL Cholesterol: 68 mg/dL      Total Cholesterol: 225 mg/dL       HPI  Review of Systems   Constitutional: Positive for fatigue and unexpected weight change. Negative for activity change, appetite change, chills and fever.   HENT: Negative for congestion, hearing loss, sinus pressure and sore throat.    Eyes: Negative for visual disturbance.   Respiratory: Positive for shortness of breath. Negative for apnea, cough and wheezing.    Cardiovascular: Positive for leg swelling. Negative for chest pain and palpitations.   Gastrointestinal: Negative for abdominal distention, abdominal pain, constipation, diarrhea, nausea and vomiting.   Genitourinary: Negative for dysuria, frequency, hematuria and vaginal bleeding.   Musculoskeletal: Negative for gait problem, joint swelling and myalgias.   Skin: Negative for rash.   Neurological: Negative for dizziness, weakness, light-headedness and headaches.   Hematological: Negative for adenopathy. Does not bruise/bleed easily.   Psychiatric/Behavioral: Negative for confusion, hallucinations, sleep disturbance and suicidal ideas. The patient is nervous/anxious.        Objective:      Physical Exam  Vitals and nursing note reviewed.   Constitutional:       Appearance: She is well-developed.   HENT:      Head: Normocephalic and atraumatic.      Right Ear: External ear normal.      Left Ear: External ear normal.      Nose: Nose normal.      Mouth/Throat:      Pharynx: No oropharyngeal exudate.   Eyes:      General: No scleral icterus.     Extraocular Movements: Extraocular movements intact.      Conjunctiva/sclera: Conjunctivae normal.   Neck:      Thyroid: Thyromegaly present.       Vascular: No JVD.   Cardiovascular:      Rate and Rhythm: Normal rate and regular rhythm.      Heart sounds: Normal heart sounds. No murmur heard.    No gallop.   Pulmonary:      Effort: Pulmonary effort is normal. No respiratory distress.      Breath sounds: Normal breath sounds. No wheezing.   Abdominal:      General: Bowel sounds are normal. There is no distension.      Palpations: Abdomen is soft. There is no mass.      Tenderness: There is no abdominal tenderness. There is no guarding or rebound.   Musculoskeletal:         General: No tenderness. Normal range of motion.      Cervical back: Normal range of motion and neck supple.   Lymphadenopathy:      Cervical: No cervical adenopathy.   Skin:     General: Skin is warm.      Findings: No erythema or rash.   Neurological:      General: No focal deficit present.      Mental Status: She is alert and oriented to person, place, and time.      Cranial Nerves: No cranial nerve deficit.      Coordination: Coordination normal.   Psychiatric:         Behavior: Behavior normal.         Thought Content: Thought content normal.         Judgment: Judgment normal.      Comments: Anxious but appropriate, no SI or HI         Assessment:       1. Dyspnea, unspecified type    2. Essential hypertension    3. Pre-diabetes    4. Edema, lower extremity    5. Multiple thyroid nodules    6. Arthralgia, unspecified joint    7. Carpal tunnel syndrome of right wrist: see EMG 10/21    8. Mixed hyperlipidemia    9. History of DVT (deep vein thrombosis): 2018     10. Palpitations    11. RENAE dx 2010, improved after weight loss    12. Chronic deep vein thrombosis (DVT) of distal vein of left lower extremity    13. Class 1 obesity due to excess calories with serious comorbidity and body mass index (BMI) of 32.0 to 32.9 in adult        Plan:           Renae was seen today for joint swelling, anxiety, leg pain and shortness of breath.    Diagnoses and all orders for this visit:    Dyspnea,  unspecified type  -     X-Ray Chest PA And Lateral; Future    Essential hypertension  -     CBC Auto Differential; Future  -     Comprehensive Metabolic Panel; Future    Pre-diabetes  -     Hemoglobin A1C; Future    Edema, lower extremity  -     D-Dimer, Quantitative; Future  -     US Lower Extremity Veins Bilateral; Future    Multiple thyroid nodules  -     TSH; Future    Arthralgia, unspecified joint  -     MIGUELINA; Future  -     Cyclic citrul peptide antibody, IgG; Future  -     C-reactive protein; Future  -     Rheumatoid factor; Future  -     Sedimentation rate, manual; Future    Carpal tunnel syndrome of right wrist: see EMG 10/21; keep Ortho follow-up    Mixed hyperlipidemia; labs and review    History of DVT (deep vein thrombosis): 2018   -     US Lower Extremity Veins Bilateral; Future    Palpitations  -     EKG 12-lead; acceptable    RENAE dx 2010, improved after weight loss; reviewed, may need to be readdressed    Chronic deep vein thrombosis (DVT) of distal vein of left lower extremity; has been stable    Class 1 obesity due to excess calories with serious comorbidity and body mass index (BMI) of 32.0 to 32.9 in adult; working on this.  Sleep apnea reviewed.  Labs, exercise as tolerated, lifestyle modification discussed.  Consideration of dietitian or bariatric follow-up    EKG acceptable  I will review all studies and determine further tx depending on findings    Patient evaluated for over 50 minutes with this appoinment, including diagnostic testing and treatment.  All questions answered,  chart reviewed and care coordinated.

## 2022-04-07 ENCOUNTER — PATIENT MESSAGE (OUTPATIENT)
Dept: BARIATRICS | Facility: CLINIC | Age: 62
End: 2022-04-07
Payer: COMMERCIAL

## 2022-04-08 VITALS
SYSTOLIC BLOOD PRESSURE: 125 MMHG | HEART RATE: 76 BPM | WEIGHT: 194 LBS | DIASTOLIC BLOOD PRESSURE: 70 MMHG | HEIGHT: 65 IN | BODY MASS INDEX: 32.32 KG/M2 | OXYGEN SATURATION: 98 %

## 2022-04-08 PROBLEM — I82.5Z2 CHRONIC DEEP VEIN THROMBOSIS (DVT) OF DISTAL VEIN OF LEFT LOWER EXTREMITY: Status: ACTIVE | Noted: 2022-04-08

## 2022-04-08 PROBLEM — E66.811 CLASS 1 OBESITY DUE TO EXCESS CALORIES WITH SERIOUS COMORBIDITY AND BODY MASS INDEX (BMI) OF 32.0 TO 32.9 IN ADULT: Status: ACTIVE | Noted: 2022-04-08

## 2022-04-08 PROBLEM — E66.09 CLASS 1 OBESITY DUE TO EXCESS CALORIES WITH SERIOUS COMORBIDITY AND BODY MASS INDEX (BMI) OF 32.0 TO 32.9 IN ADULT: Status: ACTIVE | Noted: 2022-04-08

## 2022-04-12 ENCOUNTER — PATIENT MESSAGE (OUTPATIENT)
Dept: BARIATRICS | Facility: CLINIC | Age: 62
End: 2022-04-12
Payer: COMMERCIAL

## 2022-04-24 ENCOUNTER — PATIENT MESSAGE (OUTPATIENT)
Dept: PAIN MEDICINE | Facility: CLINIC | Age: 62
End: 2022-04-24
Payer: COMMERCIAL

## 2022-04-26 ENCOUNTER — OFFICE VISIT (OUTPATIENT)
Dept: PAIN MEDICINE | Facility: CLINIC | Age: 62
End: 2022-04-26
Payer: COMMERCIAL

## 2022-04-26 DIAGNOSIS — M47.812 CERVICAL SPONDYLOSIS: ICD-10-CM

## 2022-04-26 DIAGNOSIS — M54.12 CERVICAL RADICULOPATHY: Primary | ICD-10-CM

## 2022-04-26 DIAGNOSIS — K21.9 GASTROESOPHAGEAL REFLUX DISEASE, UNSPECIFIED WHETHER ESOPHAGITIS PRESENT: ICD-10-CM

## 2022-04-26 DIAGNOSIS — G89.29 CHRONIC NECK PAIN: ICD-10-CM

## 2022-04-26 DIAGNOSIS — M54.2 CHRONIC NECK PAIN: ICD-10-CM

## 2022-04-26 PROCEDURE — 99214 PR OFFICE/OUTPT VISIT, EST, LEVL IV, 30-39 MIN: ICD-10-PCS | Mod: 95,,, | Performed by: PHYSICAL MEDICINE & REHABILITATION

## 2022-04-26 PROCEDURE — 3044F PR MOST RECENT HEMOGLOBIN A1C LEVEL <7.0%: ICD-10-PCS | Mod: CPTII,95,, | Performed by: PHYSICAL MEDICINE & REHABILITATION

## 2022-04-26 PROCEDURE — 3044F HG A1C LEVEL LT 7.0%: CPT | Mod: CPTII,95,, | Performed by: PHYSICAL MEDICINE & REHABILITATION

## 2022-04-26 PROCEDURE — 1160F RVW MEDS BY RX/DR IN RCRD: CPT | Mod: CPTII,95,, | Performed by: PHYSICAL MEDICINE & REHABILITATION

## 2022-04-26 PROCEDURE — 1159F MED LIST DOCD IN RCRD: CPT | Mod: CPTII,95,, | Performed by: PHYSICAL MEDICINE & REHABILITATION

## 2022-04-26 PROCEDURE — 1160F PR REVIEW ALL MEDS BY PRESCRIBER/CLIN PHARMACIST DOCUMENTED: ICD-10-PCS | Mod: CPTII,95,, | Performed by: PHYSICAL MEDICINE & REHABILITATION

## 2022-04-26 PROCEDURE — 99214 OFFICE O/P EST MOD 30 MIN: CPT | Mod: 95,,, | Performed by: PHYSICAL MEDICINE & REHABILITATION

## 2022-04-26 PROCEDURE — 1159F PR MEDICATION LIST DOCUMENTED IN MEDICAL RECORD: ICD-10-PCS | Mod: CPTII,95,, | Performed by: PHYSICAL MEDICINE & REHABILITATION

## 2022-04-26 RX ORDER — TIZANIDINE 4 MG/1
4 TABLET ORAL NIGHTLY PRN
Qty: 30 TABLET | Refills: 2 | Status: SHIPPED | OUTPATIENT
Start: 2022-04-26 | End: 2022-05-06

## 2022-04-26 RX ORDER — OMEPRAZOLE 20 MG/1
20 CAPSULE, DELAYED RELEASE ORAL DAILY
Qty: 30 CAPSULE | Refills: 11 | Status: SHIPPED | OUTPATIENT
Start: 2022-04-26 | End: 2022-05-06

## 2022-04-29 ENCOUNTER — OFFICE VISIT (OUTPATIENT)
Dept: DERMATOLOGY | Facility: CLINIC | Age: 62
End: 2022-04-29
Payer: COMMERCIAL

## 2022-04-29 DIAGNOSIS — Z41.1 ENCOUNTER FOR COSMETIC PROCEDURE: Primary | ICD-10-CM

## 2022-04-29 NOTE — PROGRESS NOTES
Patient is here today for cosmetic Botox treatment.   She denies any history of adverse reaction to Botox or history of neuromuscular disease.     Discussed benefits and risks of Botox injections, including headache, weakness/paralysis of muscles, asymmetry, eyebrow/lid drooping, pain, bruising, swelling, infection, and rare risk of systemic botulism. Verbal and written consent was obtained.    Patient agreed to proceed with treatment. 25 units total were injected today:  25 in glabella (3-6-7-6-3)  2 in upper cutaneous lip     Patient tolerated well with no complications. She was instructed not to rub or massage the treated areas and that she should avoid lying down, bending upside down, and strenuous exercise for the rest of the day.  Instructed to wait two weeks to assess response before presenting for a touch up injection, if needed.      Botox dilution: 10u / 0.2mL (10u / 0.4mL for frontalis)  Lot #: [id number]  Exp date: 05/2024

## 2022-04-30 ENCOUNTER — PATIENT MESSAGE (OUTPATIENT)
Dept: DERMATOLOGY | Facility: CLINIC | Age: 62
End: 2022-04-30
Payer: COMMERCIAL

## 2022-04-30 DIAGNOSIS — Z41.1 ENCOUNTER FOR COSMETIC PROCEDURE: Primary | ICD-10-CM

## 2022-05-02 RX ORDER — BIMATOPROST 3 UG/ML
SOLUTION TOPICAL NIGHTLY
COMMUNITY
End: 2022-05-02 | Stop reason: SDUPTHER

## 2022-05-02 RX ORDER — BIMATOPROST 3 UG/ML
SOLUTION TOPICAL
Qty: 3 ML | Refills: 5 | Status: SHIPPED | OUTPATIENT
Start: 2022-05-02 | End: 2022-05-06

## 2022-05-05 ENCOUNTER — PATIENT MESSAGE (OUTPATIENT)
Dept: BARIATRICS | Facility: CLINIC | Age: 62
End: 2022-05-05
Payer: COMMERCIAL

## 2022-05-06 ENCOUNTER — HOSPITAL ENCOUNTER (OUTPATIENT)
Dept: RADIOLOGY | Facility: HOSPITAL | Age: 62
Discharge: HOME | End: 2022-05-06
Attending: FAMILY MEDICINE
Payer: COMMERCIAL

## 2022-05-06 ENCOUNTER — OFFICE VISIT (OUTPATIENT)
Dept: INTERNAL MEDICINE | Facility: CLINIC | Age: 62
End: 2022-05-06
Payer: COMMERCIAL

## 2022-05-06 ENCOUNTER — HOSPITAL ENCOUNTER (OUTPATIENT)
Dept: CARDIOLOGY | Facility: CLINIC | Age: 62
Discharge: HOME | End: 2022-05-06
Payer: COMMERCIAL

## 2022-05-06 ENCOUNTER — CLINICAL SUPPORT (OUTPATIENT)
Dept: INTERNAL MEDICINE | Facility: CLINIC | Age: 62
End: 2022-05-06
Payer: COMMERCIAL

## 2022-05-06 VITALS
HEIGHT: 65 IN | SYSTOLIC BLOOD PRESSURE: 125 MMHG | WEIGHT: 191 LBS | BODY MASS INDEX: 31.82 KG/M2 | DIASTOLIC BLOOD PRESSURE: 80 MMHG

## 2022-05-06 DIAGNOSIS — I10 ESSENTIAL HYPERTENSION: ICD-10-CM

## 2022-05-06 DIAGNOSIS — K63.5 POLYP OF COLON, UNSPECIFIED PART OF COLON, UNSPECIFIED TYPE: ICD-10-CM

## 2022-05-06 DIAGNOSIS — E78.2 MIXED HYPERLIPIDEMIA: ICD-10-CM

## 2022-05-06 DIAGNOSIS — Z00.00 ROUTINE GENERAL MEDICAL EXAMINATION AT A HEALTH CARE FACILITY: Primary | ICD-10-CM

## 2022-05-06 DIAGNOSIS — E04.2 MULTIPLE THYROID NODULES: ICD-10-CM

## 2022-05-06 DIAGNOSIS — E66.09 CLASS 1 OBESITY DUE TO EXCESS CALORIES WITHOUT SERIOUS COMORBIDITY WITH BODY MASS INDEX (BMI) OF 31.0 TO 31.9 IN ADULT: ICD-10-CM

## 2022-05-06 DIAGNOSIS — Z00.00 ANNUAL PHYSICAL EXAM: Primary | ICD-10-CM

## 2022-05-06 DIAGNOSIS — Z00.00 ROUTINE GENERAL MEDICAL EXAMINATION AT A HEALTH CARE FACILITY: ICD-10-CM

## 2022-05-06 DIAGNOSIS — G47.33 OSA ON CPAP: ICD-10-CM

## 2022-05-06 LAB
25(OH)D3+25(OH)D2 SERPL-MCNC: 69 NG/ML (ref 30–96)
ALBUMIN SERPL BCP-MCNC: 3.9 G/DL (ref 3.5–5.2)
ALP SERPL-CCNC: 81 U/L (ref 55–135)
ALT SERPL W/O P-5'-P-CCNC: 15 U/L (ref 10–44)
ANION GAP SERPL CALC-SCNC: 11 MMOL/L (ref 8–16)
AST SERPL-CCNC: 18 U/L (ref 10–40)
BILIRUB SERPL-MCNC: 0.5 MG/DL (ref 0.1–1)
BUN SERPL-MCNC: 19 MG/DL (ref 8–23)
CALCIUM SERPL-MCNC: 9.8 MG/DL (ref 8.7–10.5)
CHLORIDE SERPL-SCNC: 104 MMOL/L (ref 95–110)
CHOLEST SERPL-MCNC: 218 MG/DL (ref 120–199)
CHOLEST/HDLC SERPL: 3.3 {RATIO} (ref 2–5)
CO2 SERPL-SCNC: 28 MMOL/L (ref 23–29)
CREAT SERPL-MCNC: 0.8 MG/DL (ref 0.5–1.4)
ERYTHROCYTE [DISTWIDTH] IN BLOOD BY AUTOMATED COUNT: 12.4 % (ref 11.5–14.5)
EST. GFR  (AFRICAN AMERICAN): >60 ML/MIN/1.73 M^2
EST. GFR  (NON AFRICAN AMERICAN): >60 ML/MIN/1.73 M^2
ESTIMATED AVG GLUCOSE: 111 MG/DL (ref 68–131)
GLUCOSE SERPL-MCNC: 96 MG/DL (ref 70–110)
HBA1C MFR BLD: 5.5 % (ref 4–5.6)
HCT VFR BLD AUTO: 42.4 % (ref 37–48.5)
HDLC SERPL-MCNC: 66 MG/DL (ref 40–75)
HDLC SERPL: 30.3 % (ref 20–50)
HGB BLD-MCNC: 14.2 G/DL (ref 12–16)
LDLC SERPL CALC-MCNC: 125.6 MG/DL (ref 63–159)
MCH RBC QN AUTO: 30.4 PG (ref 27–31)
MCHC RBC AUTO-ENTMCNC: 33.5 G/DL (ref 32–36)
MCV RBC AUTO: 91 FL (ref 82–98)
NONHDLC SERPL-MCNC: 152 MG/DL
PLATELET # BLD AUTO: 296 K/UL (ref 150–450)
PMV BLD AUTO: 9.5 FL (ref 9.2–12.9)
POTASSIUM SERPL-SCNC: 4 MMOL/L (ref 3.5–5.1)
PROT SERPL-MCNC: 7 G/DL (ref 6–8.4)
RBC # BLD AUTO: 4.67 M/UL (ref 4–5.4)
SODIUM SERPL-SCNC: 143 MMOL/L (ref 136–145)
TRIGL SERPL-MCNC: 132 MG/DL (ref 30–150)
TSH SERPL DL<=0.005 MIU/L-ACNC: 2.09 UIU/ML (ref 0.4–4)
WBC # BLD AUTO: 5.66 K/UL (ref 3.9–12.7)

## 2022-05-06 PROCEDURE — 77063 MAMMO DIGITAL SCREENING BILAT WITH TOMO: ICD-10-PCS | Mod: 26,,, | Performed by: RADIOLOGY

## 2022-05-06 PROCEDURE — 77067 MAMMO DIGITAL SCREENING BILAT WITH TOMO: ICD-10-PCS | Mod: 26,,, | Performed by: RADIOLOGY

## 2022-05-06 PROCEDURE — 3074F PR MOST RECENT SYSTOLIC BLOOD PRESSURE < 130 MM HG: ICD-10-PCS | Mod: CPTII,S$GLB,, | Performed by: INTERNAL MEDICINE

## 2022-05-06 PROCEDURE — 97802 MEDICAL NUTRITION INDIV IN: CPT | Mod: S$GLB,,, | Performed by: INTERNAL MEDICINE

## 2022-05-06 PROCEDURE — 82306 VITAMIN D 25 HYDROXY: CPT | Performed by: INTERNAL MEDICINE

## 2022-05-06 PROCEDURE — 85027 COMPLETE CBC AUTOMATED: CPT | Performed by: INTERNAL MEDICINE

## 2022-05-06 PROCEDURE — 3079F DIAST BP 80-89 MM HG: CPT | Mod: CPTII,S$GLB,, | Performed by: INTERNAL MEDICINE

## 2022-05-06 PROCEDURE — 1159F MED LIST DOCD IN RCRD: CPT | Mod: CPTII,S$GLB,, | Performed by: INTERNAL MEDICINE

## 2022-05-06 PROCEDURE — 3008F BODY MASS INDEX DOCD: CPT | Mod: CPTII,S$GLB,, | Performed by: INTERNAL MEDICINE

## 2022-05-06 PROCEDURE — 3079F PR MOST RECENT DIASTOLIC BLOOD PRESSURE 80-89 MM HG: ICD-10-PCS | Mod: CPTII,S$GLB,, | Performed by: INTERNAL MEDICINE

## 2022-05-06 PROCEDURE — 97750 PHYSICAL PERFORMANCE TEST: CPT | Mod: S$GLB,,, | Performed by: INTERNAL MEDICINE

## 2022-05-06 PROCEDURE — 83036 HEMOGLOBIN GLYCOSYLATED A1C: CPT | Performed by: INTERNAL MEDICINE

## 2022-05-06 PROCEDURE — 99396 PREV VISIT EST AGE 40-64: CPT | Mod: S$GLB,,, | Performed by: INTERNAL MEDICINE

## 2022-05-06 PROCEDURE — 3008F PR BODY MASS INDEX (BMI) DOCUMENTED: ICD-10-PCS | Mod: CPTII,S$GLB,, | Performed by: INTERNAL MEDICINE

## 2022-05-06 PROCEDURE — 1160F PR REVIEW ALL MEDS BY PRESCRIBER/CLIN PHARMACIST DOCUMENTED: ICD-10-PCS | Mod: CPTII,S$GLB,, | Performed by: INTERNAL MEDICINE

## 2022-05-06 PROCEDURE — 77067 SCR MAMMO BI INCL CAD: CPT | Mod: 26,,, | Performed by: RADIOLOGY

## 2022-05-06 PROCEDURE — 99999 PR PBB SHADOW E&M-EST. PATIENT-LVL IV: ICD-10-PCS | Mod: PBBFAC,,, | Performed by: INTERNAL MEDICINE

## 2022-05-06 PROCEDURE — 3044F PR MOST RECENT HEMOGLOBIN A1C LEVEL <7.0%: ICD-10-PCS | Mod: CPTII,S$GLB,, | Performed by: INTERNAL MEDICINE

## 2022-05-06 PROCEDURE — 80061 LIPID PANEL: CPT | Performed by: INTERNAL MEDICINE

## 2022-05-06 PROCEDURE — 97750 PR PHYSICAL PERFORMANCE TEST: ICD-10-PCS | Mod: S$GLB,,, | Performed by: INTERNAL MEDICINE

## 2022-05-06 PROCEDURE — 99999 PR PBB SHADOW E&M-EST. PATIENT-LVL I: CPT | Mod: PBBFAC,,,

## 2022-05-06 PROCEDURE — 84443 ASSAY THYROID STIM HORMONE: CPT | Performed by: INTERNAL MEDICINE

## 2022-05-06 PROCEDURE — 93010 EKG 12-LEAD: ICD-10-PCS | Mod: S$GLB,,, | Performed by: INTERNAL MEDICINE

## 2022-05-06 PROCEDURE — 1160F RVW MEDS BY RX/DR IN RCRD: CPT | Mod: CPTII,S$GLB,, | Performed by: INTERNAL MEDICINE

## 2022-05-06 PROCEDURE — 3074F SYST BP LT 130 MM HG: CPT | Mod: CPTII,S$GLB,, | Performed by: INTERNAL MEDICINE

## 2022-05-06 PROCEDURE — 99999 PR PBB SHADOW E&M-EST. PATIENT-LVL II: ICD-10-PCS | Mod: PBBFAC,,,

## 2022-05-06 PROCEDURE — 99999 PR PBB SHADOW E&M-EST. PATIENT-LVL IV: CPT | Mod: PBBFAC,,, | Performed by: INTERNAL MEDICINE

## 2022-05-06 PROCEDURE — 99999 PR PBB SHADOW E&M-EST. PATIENT-LVL I: ICD-10-PCS | Mod: PBBFAC,,,

## 2022-05-06 PROCEDURE — 3044F HG A1C LEVEL LT 7.0%: CPT | Mod: CPTII,S$GLB,, | Performed by: INTERNAL MEDICINE

## 2022-05-06 PROCEDURE — 99396 PR PREVENTIVE VISIT,EST,40-64: ICD-10-PCS | Mod: S$GLB,,, | Performed by: INTERNAL MEDICINE

## 2022-05-06 PROCEDURE — 77067 SCR MAMMO BI INCL CAD: CPT | Mod: TC

## 2022-05-06 PROCEDURE — 93010 ELECTROCARDIOGRAM REPORT: CPT | Mod: S$GLB,,, | Performed by: INTERNAL MEDICINE

## 2022-05-06 PROCEDURE — 99999 PR PBB SHADOW E&M-EST. PATIENT-LVL II: CPT | Mod: PBBFAC,,,

## 2022-05-06 PROCEDURE — 80053 COMPREHEN METABOLIC PANEL: CPT | Performed by: INTERNAL MEDICINE

## 2022-05-06 PROCEDURE — 93005 ELECTROCARDIOGRAM TRACING: CPT | Mod: S$GLB,,, | Performed by: FAMILY MEDICINE

## 2022-05-06 PROCEDURE — 93005 EKG 12-LEAD: ICD-10-PCS | Mod: S$GLB,,, | Performed by: FAMILY MEDICINE

## 2022-05-06 PROCEDURE — 97802 PR MED NUTR THER, 1ST, INDIV, EA 15 MIN: ICD-10-PCS | Mod: S$GLB,,, | Performed by: INTERNAL MEDICINE

## 2022-05-06 PROCEDURE — 77063 BREAST TOMOSYNTHESIS BI: CPT | Mod: 26,,, | Performed by: RADIOLOGY

## 2022-05-06 PROCEDURE — 1159F PR MEDICATION LIST DOCUMENTED IN MEDICAL RECORD: ICD-10-PCS | Mod: CPTII,S$GLB,, | Performed by: INTERNAL MEDICINE

## 2022-05-06 NOTE — PROGRESS NOTES
Subjective:       Patient ID: Renae Hou is a 61 y.o. female.    Chief Complaint: [employer]    EH PE    EKG acceptable.  Mammogram to be done today.  Will be due for colon testing later this summer.    Due for thyroid ultrasound.    Recall that she had COVID in January, see previous notes.  Extensive evaluation in April was acceptable.  Discussed that next step would be a CT scan.    The 10-year ASCVD risk score (Kike DALLAS Jr., et al., 2013) is: 4.5%    Values used to calculate the score:      Age: 61 years      Sex: Female      Is Non- : No      Diabetic: No      Tobacco smoker: No      Systolic Blood Pressure: 125 mmHg      Is BP treated: Yes      HDL Cholesterol: 66 mg/dL      Total Cholesterol: 218 mg/dL    Patient Active Problem List:     RENAE dx 2010, improved after weight loss     Chronic constipation     Mixed hyperlipidemia     Pre-diabetes     Chronic pain     Lumbar radiculopathy     Essential hypertension     Fatty liver     Spondylolisthesis     GERD (gastroesophageal reflux disease)     Vaginal atrophy     Dyspareunia, female     Mixed stress and urge urinary incontinence     Rectocele, female     Cystocele, midline     Edema, lower extremity     History of DVT (deep vein thrombosis): 2018      Presence of IVC filter     Hip pain, chronic, left     Multiple thyroid nodules     Dysphonia     Cervical radiculopathy     Mallet toe of left foot     Chronic left-sided low back pain with left-sided sciatica     Carpal tunnel syndrome of right wrist: see EMG 10/21     Chronic deep vein thrombosis (DVT) of distal vein of left lower extremity     Class 1 obesity due to excess calories without serious comorbidity with body mass index (BMI) of 31.0 to 31.9 in adult      Review of Systems   Constitutional: Negative for activity change, appetite change, chills, fatigue and fever.   HENT: Negative for congestion, hearing loss, sinus pressure and sore throat.    Eyes: Negative for  visual disturbance.   Respiratory: Negative for apnea, cough, shortness of breath and wheezing.    Cardiovascular: Negative for chest pain, palpitations and leg swelling.   Gastrointestinal: Negative for abdominal distention, abdominal pain, constipation, diarrhea, nausea and vomiting.   Genitourinary: Negative for dysuria, frequency, hematuria and vaginal bleeding.   Musculoskeletal: Negative for gait problem, joint swelling and myalgias.   Skin: Negative for rash.   Neurological: Negative for dizziness, weakness, light-headedness and headaches.   Hematological: Negative for adenopathy. Does not bruise/bleed easily.   Psychiatric/Behavioral: Negative for confusion, hallucinations, sleep disturbance and suicidal ideas.       Objective:      Physical Exam  Vitals and nursing note reviewed.   Constitutional:       Appearance: She is well-developed.   HENT:      Head: Normocephalic and atraumatic.      Right Ear: External ear normal.      Left Ear: External ear normal.      Nose: Nose normal.      Mouth/Throat:      Pharynx: No oropharyngeal exudate.   Eyes:      General: No scleral icterus.     Extraocular Movements: Extraocular movements intact.      Conjunctiva/sclera: Conjunctivae normal.   Neck:      Thyroid: Thyromegaly present.      Vascular: No JVD.      Comments: Healed thyroid scar  Cardiovascular:      Rate and Rhythm: Normal rate and regular rhythm.      Heart sounds: Normal heart sounds. No murmur heard.    No gallop.   Pulmonary:      Effort: Pulmonary effort is normal. No respiratory distress.      Breath sounds: Normal breath sounds. No wheezing.   Abdominal:      General: Bowel sounds are normal. There is no distension.      Palpations: Abdomen is soft. There is no mass.      Tenderness: There is no abdominal tenderness. There is no guarding or rebound.   Musculoskeletal:         General: No tenderness. Normal range of motion.      Cervical back: Normal range of motion and neck supple.    Lymphadenopathy:      Cervical: No cervical adenopathy.   Skin:     General: Skin is warm.      Findings: No erythema or rash.   Neurological:      General: No focal deficit present.      Mental Status: She is alert and oriented to person, place, and time.      Cranial Nerves: No cranial nerve deficit.      Coordination: Coordination normal.   Psychiatric:         Behavior: Behavior normal.         Thought Content: Thought content normal.         Judgment: Judgment normal.         Assessment:       1. Annual physical exam    2. Mixed hyperlipidemia    3. Essential hypertension    4. RENAE dx 2010, improved after weight loss    5. Multiple thyroid nodules    6. Class 1 obesity due to excess calories without serious comorbidity with body mass index (BMI) of 31.0 to 31.9 in adult    7. Polyp of colon, unspecified part of colon, unspecified type        Plan:           Renae was seen today for [facility].    Diagnoses and all orders for this visit:    Annual physical exam    Mixed hyperlipidemia    Essential hypertension    RENAE dx 2010, improved after weight loss    Multiple thyroid nodules  -     US Soft Tissue Head Neck Thyroid; Future    Class 1 obesity due to excess calories without serious comorbidity with body mass index (BMI) of 31.0 to 31.9 in adult    Polyp of colon, unspecified part of colon, unspecified type  -     Case Request Endoscopy: COLONOSCOPY    EKG and labs acceptable today  Continue exercise and healthy habits, slow and steady weight loss  Mammogram to be done today  Low salt diet, exercise, 15-20-# weight loss in next 6-12 months' time.  Call if BP > 130/80 on a regular basis.  I will review all studies and determine further tx depending on findings

## 2022-05-06 NOTE — PROGRESS NOTES
Nutrition Assessment  Session Time:  30 minutes      Client name:  Renae Hou  :  1960  Age:  61 y.o.  Gender:  female    Client states she is here for her annual [facility] medical examination. Mrs. Hou states today she is concerned about weight gain in the recent past (since 2021 when she remembers weighing 185 lbs). She states she had Covid in 2022 and has not exercised much this year . She used to belong to [employer], but cancelled her membership after getting frustrated with the lack of evening classes. She knows she needs to be more physically active and shares she would like to lose weight to be around 170 lbs. She has a significant PMH of lumbar and cervical radiculopathy, HTN, HLD, preDM, Fatty Liver, GERD, lower extremity edema, chronic constipation, cholecystectomy, s/p sleeve gastrectomy (2019). She states she switched from drinking water to drinking Crystal Light because she is tired of water. She also shares she does not adhere to any strict dietary guidelines as she knows what she needs to eat, but chooses foods based on mood. She does limit portions.     Anthropometrics  Height:  65 inches     Weight:  191.5 pounds  BMI:  31.9  % Body Fat:  46.6    Clinical Signs/Symptoms  N/V/D:  Constipation (Miralax 1-2x every week)  Appetite:  good       Past Medical History:   Diagnosis Date    Abscess of paraspinous muscles 2018    Allergy 2018    Class 1 obesity due to excess calories in adult 2019    DVT (deep venous thrombosis)     left leg    Epidural abscess 04/10/2018    Fatty liver     GERD (gastroesophageal reflux disease)     History of major abdominal surgery 2015    Had Hysterectomy , cholecystectomy , rectocele repair    Hypertension     Lumbar radiculopathy     Menopause     Obesity     Obstructive sleep apnea on CPAP     no longer uses CPAP after weight loss from gastric sleeve    Thyroid disease     Thyroid nodules.        Past Surgical History:   Procedure Laterality Date    ADENOIDECTOMY  1995    BACK SURGERY  2002    L4, L5    BILATERAL SALPINGOOPHORECTOMY      CHOLECYSTECTOMY      CORRECTION OF HAMMER TOE Left 8/5/2021    Procedure: CORRECTION, HAMMER TOE Left 4th mallet toe, DIP joitn fusion;  Surgeon: Guero Alvarez MD;  Location: [facility] OR;  Service: Orthopedics;  Laterality: Left;  [place]    CYST REMOVAL      EPIDURAL STEROID INJECTION INTO CERVICAL SPINE N/A 8/11/2020    Procedure: Injection-steroid-epidural-cervical;  Surgeon: [facility] Diagnostic Provider;  [address]  Service: Radiology;  Laterality: N/A;  /[place]    EPIDURAL STEROID INJECTION INTO CERVICAL SPINE N/A 3/2/2022    Procedure: Injection-steroid-epidural-cervical C7-T1;  Surgeon: Latoya Quintanilla MD;  [facility]: [facility] PAIN T;  Service: Pain Management;  Laterality: N/A;    ESOPHAGOGASTRODUODENOSCOPY N/A 5/3/2019    Procedure: ESOPHAGOGASTRODUODENOSCOPY (EGD);  Surgeon: Carlin Sanchez MD;  Location: [facility] (4TH FLR);  Service: Endoscopy;  Laterality: N/A;    HYSTERECTOMY  12/17/2012    [facility] BS&O     INJECTION OF JOINT Left 3/1/2021    Procedure: INJECTION, JOINT LEFT HIP INTRA ARTICULAR CORTISONE INJECTION DIRECT REFERRAL;  Surgeon: Cuhy Gregory MD;  [facility];  Service: Pain Management;  Laterality: Left;  NEEDS CONSENT    INSERTION OF INFERIOR VENA CAVAL FILTER Right 7/19/2019    Procedure: IVC filter placement;  Surgeon: Rodney Rico MD;  Location: [facility] CATH LAB;  Service: Peripheral Vascular;  Laterality: Right;    IVC FILTER RETRIEVAL N/A 12/20/2019    Procedure: REMOVAL-FILTER-IVC;  Surgeon: Rodney Rico MD;  [facility];  Service: Peripheral Vascular;  Laterality: N/A;    LAPAROSCOPIC SLEEVE GASTRECTOMY N/A 7/23/2019    Procedure: GASTRECTOMY, SLEEVE, LAPAROSCOPIC, with intraop EGD 18754;  Surgeon: Duc Ratliff Jr., MD;  Location: [facility] OR [facility] FLR;  Service:  General;  Laterality: N/A;    RECTOCELE REPAIR      SPINE SURGERY  2018    THYROID NODULE REMOVAL      TONSILLECTOMY         Medications    has a current medication list which includes the following prescription(s): hydrochlorothiazide.    Vitamins, Minerals, and/or Supplements:  Collagen w Vit D and Zinc, multivitamin gummy, Emergen-C purple,  ANGELLA Prebiotic (2 gummies/day = 3g fiber)    Food/Medication Interactions:  Reviewed     Food Allergies or Intolerances:  Crab/Seafood Boil Spice Mix    Social History    Marital status:    Employment:  [employer]    Social History     Tobacco Use    Smoking status: Former Smoker     Packs/day: 0.25     Years: 32.00     Pack years: 8.00     Types: Cigarettes     Quit date:      Years since quittin.3    Smokeless tobacco: Never Used    Tobacco comment: smoked socially decades ago - weekends only   Substance Use Topics    Alcohol use: No     Comment: yearly at most         Lab Reports   Sodium   Date Value Ref Range Status   2022 143 136 - 145 mmol/L Final     Potassium   Date Value Ref Range Status   2022 4.0 3.5 - 5.1 mmol/L Final     Chloride   Date Value Ref Range Status   2022 104 95 - 110 mmol/L Final     CO2   Date Value Ref Range Status   2022 28 23 - 29 mmol/L Final     Glucose   Date Value Ref Range Status   2022 96 70 - 110 mg/dL Final     BUN   Date Value Ref Range Status   2022 19 8 - 23 mg/dL Final     Creatinine   Date Value Ref Range Status   2022 0.8 0.5 - 1.4 mg/dL Final     Calcium   Date Value Ref Range Status   2022 9.8 8.7 - 10.5 mg/dL Final     Total Protein   Date Value Ref Range Status   2022 7.0 6.0 - 8.4 g/dL Final     Albumin   Date Value Ref Range Status   2022 3.9 3.5 - 5.2 g/dL Final     Total Bilirubin   Date Value Ref Range Status   2022 0.5 0.1 - 1.0 mg/dL Final     Comment:     For infants and newborns, interpretation of results should be based  on  gestational age, weight and in agreement with clinical  observations.    Premature Infant recommended reference ranges:  Up to 24 hours.............<8.0 mg/dL  Up to 48 hours............<12.0 mg/dL  3-5 days..................<15.0 mg/dL  6-29 days.................<15.0 mg/dL       Alkaline Phosphatase   Date Value Ref Range Status   05/06/2022 81 55 - 135 U/L Final     AST   Date Value Ref Range Status   05/06/2022 18 10 - 40 U/L Final     ALT   Date Value Ref Range Status   05/06/2022 15 10 - 44 U/L Final     Anion Gap   Date Value Ref Range Status   05/06/2022 11 8 - 16 mmol/L Final     eGFR if    Date Value Ref Range Status   05/06/2022 >60.0 >60 mL/min/1.73 m^2 Final     eGFR if non    Date Value Ref Range Status   05/06/2022 >60.0 >60 mL/min/1.73 m^2 Final     Comment:     Calculation used to obtain the estimated glomerular filtration  rate (eGFR) is the CKD-EPI equation.         Lab Results   Component Value Date    WBC 5.66 05/06/2022    HGB 14.2 05/06/2022    HCT 42.4 05/06/2022    MCV 91 05/06/2022     05/06/2022        Lab Results   Component Value Date    CHOL 218 (H) 05/06/2022     Lab Results   Component Value Date    HDL 66 05/06/2022     Lab Results   Component Value Date    LDLCALC 125.6 05/06/2022     Lab Results   Component Value Date    TRIG 132 05/06/2022     Lab Results   Component Value Date    CHOLHDL 30.3 05/06/2022     Lab Results   Component Value Date    HGBA1C 5.5 05/06/2022     BP Readings from Last 1 Encounters:   05/06/22 125/80       Food History  Breakfast:  20 oz tumbler- Franky tea (Skinny girl concentrate TAZO) 1 1/2 cups w Fair Life fat-free milk 1/2 cup  Mid-morning Snack:  [employer] [place] Protein Bar / Oikos Triple Zero Yogurt / Cottage Cheese w Pineapple  Lunch:  Think Thin bar/1 boiled egg  Mid-afternoon Snack:  Protein Bar / Skinny Girl Popcorn / Mocha hot drink (Micki)  Dinner:  Chobani Flip Cups / Burger Caro w Vegetables / Meatballs w  sauce   H.S. Snack:  none  *Fluid intake:  Water, Crystal Light, Franky tea, Milk    Exercise History:  No current formal physical activity routine.    Cultural/Spiritual/Personal Preferences:  None identified    Support System:  spouse    State of Change:  Action    Barriers to Change:  Generalized joint and hip pain    Diagnosis    Overweight/Obesity related to excessive calorie intake as evidenced by BMI 31.9, Trig 132.    Intervention    RMR (Method:  InBody):  1372 kcal  Activity Factor:  1.2    JOB:  1650 kcal - 500 (wt loss) = 1150 calories    Protein Needs: 1.2g/kg = 104g / day    Goals:  1.  Reduce total amount of added sugars in diet.   2.  Read labels and make sure to have more protein than sugar at meals and snacks.  3.  Include food sources of fiber in the diet (beans, whole-grains, vegetables)  4.  Continue physical activities as tolerated    Nutrition Education  The following education was provided to the patient:  Complimented patient on physical activity efforts.  Discussed CHO Counting.   Discussed meal planning/[email or web address]'s My Plate design.  Discussed weight management.  Discussed Heart Healthy Eating.  Suggested dietary modifications based on current dietary behaviors and individual food preferences.  Discussed physical activity guidelines and its associated benefits.  Discussed tips for eating healthy when dining out.  *Lab results were pending at time of consult and so, not discussed with patient.  Discussed sugary foods and/or beverages (potential health consequences of excessive sugar intake, ways to reduce sugar intake, healthy beverage alternatives, etc.).  Discussed recommended servings of non-starchy vegetables/day and food sources of such.  Discussed recommended fiber intake and food sources of such.  Discussed vitamin/mineral recommendations (may include but not limited to MVI, Vitamin D, Calcium, and/or Iron) and associated food sources.  Discussed importance of small behavior/habit changes in  improving long-term adherence and sustainability.  Provided ongoing support, encouragement, and guidance toward improved health efforts.  Patient verbalized understanding of nutrition education and recommendations received.    Handouts Provided  Meal Planning Guide    Monitoring/Evaluation    Monitor the following:  Weight  BMI  % Body Fat  Caloric intake  Labs:  Trig, LDL, TC    Follow Up Plan:  Communication with referring healthcare provider is unnecessary at this time as patient presented as part of annual wellness exam.  However, will follow up with patient in 1-2 years.

## 2022-05-06 NOTE — LETTER
May 6, 2022    Renae Hou  [address] [place] [address]             [facility]  [address]  [facility] [address]  Phone: [phone]  Fax: [phone] Dear Mrs. Hou:    Thank you for allowing me to serve you and perform your Executive Health exam on 5/6/2022.  This letter will serve a brief summary of the history, physical findings, and laboratory/studies performed and recommendations at that time.    Reason for Visit: Executive Health Preventive Physical Examination    Past Medical History:   Diagnosis Date    Abscess of paraspinous muscles 2018    Allergy 04/2018    Class 1 obesity due to excess calories in adult 07/23/2019    DVT (deep venous thrombosis)     left leg    Epidural abscess 04/10/2018    Fatty liver     GERD (gastroesophageal reflux disease) 2015    History of major abdominal surgery 09/11/2015    Had Hysterectomy , cholecystectomy , rectocele repair    Hypertension     Lumbar radiculopathy     Menopause     Obesity     Obstructive sleep apnea on CPAP     no longer uses CPAP after weight loss from gastric sleeve    Thyroid disease     Thyroid nodules.       Past Surgical History:   Procedure Laterality Date    ADENOIDECTOMY  1995    BACK SURGERY  2002    L4, L5    BILATERAL SALPINGOOPHORECTOMY      CHOLECYSTECTOMY      CORRECTION OF HAMMER TOE Left 8/5/2021    Procedure: CORRECTION, HAMMER TOE Left 4th mallet toe, DIP joitn fusion;  Surgeon: Guero Alvarez MD;  Location: [facility];  Service: Orthopedics;  Laterality: Left;  [place]    CYST REMOVAL      EPIDURAL STEROID INJECTION INTO CERVICAL SPINE N/A 8/11/2020    Procedure: Injection-steroid-epidural-cervical;  Surgeon: [facility] Diagnostic Provider;  Location: [address];  Service: Radiology;  Laterality: N/A;  /[place]    EPIDURAL STEROID INJECTION INTO CERVICAL SPINE N/A 3/2/2022    Procedure: Injection-steroid-epidural-cervical C7-T1;  Surgeon: Latoya ANDREWS  MD Dwight;  Location: [facility] PAIN MGT;  Service: Pain Management;  Laterality: N/A;    ESOPHAGOGASTRODUODENOSCOPY N/A 5/3/2019    Procedure: ESOPHAGOGASTRODUODENOSCOPY (EGD);  Surgeon: Carlin Sanchez MD;  Location: [facility] (4TH FLR);  Service: Endoscopy;  Laterality: N/A;    HYSTERECTOMY  12/17/2012    [facility] BS&O     INJECTION OF JOINT Left 3/1/2021    Procedure: INJECTION, JOINT LEFT HIP INTRA ARTICULAR CORTISONE INJECTION DIRECT REFERRAL;  Surgeon: Chuy Gregory MD;  Location: [facility] PAIN MGT;  Service: Pain Management;  Laterality: Left;  NEEDS CONSENT    INSERTION OF INFERIOR VENA CAVAL FILTER Right 7/19/2019    Procedure: IVC filter placement;  Surgeon: Rodney Rico MD;  Location: [facility] CATH LAB;  Service: Peripheral Vascular;  Laterality: Right;    IVC FILTER RETRIEVAL N/A 12/20/2019    Procedure: REMOVAL-FILTER-IVC;  Surgeon: Rodney Rico MD;  [address];  Service: Peripheral Vascular;  Laterality: N/A;    LAPAROSCOPIC SLEEVE GASTRECTOMY N/A 7/23/2019    Procedure: GASTRECTOMY, SLEEVE, LAPAROSCOPIC, with intraop EGD [id number];  Surgeon: Duc Ratliff Jr., MD;  [address];  Service: General;  Laterality: N/A;    RECTOCELE REPAIR      SPINE SURGERY  2018    THYROID NODULE REMOVAL      TONSILLECTOMY  1995       Family History   Problem Relation Age of Onset    Thyroid disease Mother     Hypertension Mother     Hyperlipidemia Mother     Heart disease Mother 55        cad, valvular heart disease    Deep vein thrombosis Mother     Heart disease Father         MI    Stroke Father     Diabetes Sister     Alcohol abuse Sister     Cirrhosis Sister         alcoholic cirrhosis    Epilepsy Sister     Thyroid disease Daughter         Hashimoto's    Hashimoto's thyroiditis Daughter     No Known Problems Daughter     Heart disease Maternal Grandmother     Thyroid disease Maternal Grandmother     Kidney disease Maternal Grandmother     Heart disease  Paternal Grandmother         MI    Breast cancer Neg Hx     Colon cancer Neg Hx     Ovarian cancer Neg Hx     Esophageal cancer Neg Hx        Social History     Socioeconomic History    Marital status:    Tobacco Use    Smoking status: Former Smoker     Packs/day: 0.25     Years: 32.00     Pack years: 8.00     Types: Cigarettes     Quit date:      Years since quittin.3    Smokeless tobacco: Never Used    Tobacco comment: smoked socially decades ago - weekends only   Substance and Sexual Activity    Alcohol use: No     Comment: yearly at most     Drug use: No    Sexual activity: Not Currently     Partners: Male     Birth control/protection: See Surgical Hx, None     Comment: [facility] BS&O 2012,     Social History Narrative    . 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     Social Determinants of Health     Financial Resource Strain: Low Risk     Difficulty of Paying Living Expenses: Not hard at all   Food Insecurity: No Food Insecurity    Worried About Running Out of Food in the Last Year: Never true    Ran Out of Food in the Last Year: Never true   Transportation Needs: No Transportation Needs    Lack of Transportation (Medical): No    Lack of Transportation (Non-Medical): No   Physical Activity: Insufficiently Active    Days of Exercise per Week: 2 days    Minutes of Exercise per Session: 30 min   Stress: No Stress Concern Present    Feeling of Stress : Not at all   Social Connections: Unknown    Frequency of Communication with Friends and Family: More than three times a week    Frequency of Social Gatherings with Friends and Family: Once a week    Active Member of Clubs or Organizations: No    Attends Club or Organization Meetings: Patient refused    Marital Status:    Housing Stability: Low Risk     Unable to Pay for Housing in the Last Year: No    Number of Places Lived in the Last Year: 1    Unstable Housing in the Last Year: No        Review of patient's allergies  indicates:   Allergen Reactions    Cathflo activase [alteplase] Anaphylaxis     Tightness in chest, raspy throat, restless legs, hotness all over    Heparin analogues      Restless legs, tightness in chest, and hot all over    Polyethylene glycol analogues Swelling    Polysorbate 80 Anaphylaxis, Itching and Shortness Of Breath    Xyzal [levocetirizine] Swelling    Latex Swelling               Current Outpatient Medications:     hydroCHLOROthiazide (HYDRODIURIL) 12.5 MG Tab, TAKE 1 TABLET(12.5 MG) BY MOUTH EVERY DAY, Disp: 90 tablet, Rfl: 1     Review of Systems  Review of Systems - Negative     Physical Exam:  General: General appearance: alert, well appearing, and in no distress.   Skin: Skin exam - normal coloration and turgor, no rashes, no suspicious skin lesions noted.  HEENT: Ears - bilateral TM's and external ear canals normal. , ENT exam reveals - ENT exam normal, no neck nodes or sinus tenderness.  Well-healed scar.  Small nodule thyroid  Lungs: Chest: clear to auscultation, no wheezes, rales or rhonchi, symmetric air entry.   Heart: CVS exam: normal rate, regular rhythm, normal S1, S2, no murmurs, rubs, clicks or gallops.   Extremities: Exam of extremities: peripheral pulses normal, no pedal edema, no clubbing or cyanosis    Labs:  Results for orders placed or performed in visit on 05/06/22   Comprehensive metabolic panel   Result Value Ref Range    Sodium 143 136 - 145 mmol/L    Potassium 4.0 3.5 - 5.1 mmol/L    Chloride 104 95 - 110 mmol/L    CO2 28 23 - 29 mmol/L    Glucose 96 70 - 110 mg/dL    BUN 19 8 - 23 mg/dL    Creatinine 0.8 0.5 - 1.4 mg/dL    Calcium 9.8 8.7 - 10.5 mg/dL    Total Protein 7.0 6.0 - 8.4 g/dL    Albumin 3.9 3.5 - 5.2 g/dL    Total Bilirubin 0.5 0.1 - 1.0 mg/dL    Alkaline Phosphatase 81 55 - 135 U/L    AST 18 10 - 40 U/L    ALT 15 10 - 44 U/L    Anion Gap 11 8 - 16 mmol/L    eGFR if African American >60.0 >60 mL/min/1.73 m^2    eGFR if non African American >60.0 >60  mL/min/1.73 m^2   CBC Without Differential   Result Value Ref Range    WBC 5.66 3.90 - 12.70 K/uL    RBC 4.67 4.00 - 5.40 M/uL    Hemoglobin 14.2 12.0 - 16.0 g/dL    Hematocrit 42.4 37.0 - 48.5 %    MCV 91 82 - 98 fL    MCH 30.4 27.0 - 31.0 pg    MCHC 33.5 32.0 - 36.0 g/dL    RDW 12.4 11.5 - 14.5 %    Platelets 296 150 - 450 K/uL    MPV 9.5 9.2 - 12.9 fL   Lipid panel   Result Value Ref Range    Cholesterol 218 (H) 120 - 199 mg/dL    Triglycerides 132 30 - 150 mg/dL    HDL 66 40 - 75 mg/dL    LDL Cholesterol 125.6 63.0 - 159.0 mg/dL    HDL/Cholesterol Ratio 30.3 20.0 - 50.0 %    Total Cholesterol/HDL Ratio 3.3 2.0 - 5.0    Non-HDL Cholesterol 152 mg/dL   TSH   Result Value Ref Range    TSH 2.086 0.400 - 4.000 uIU/mL   Hemoglobin A1c   Result Value Ref Range    Hemoglobin A1C 5.5 4.0 - 5.6 %    Estimated Avg Glucose 111 68 - 131 mg/dL   Vitamin D   Result Value Ref Range    Vit D, 25-Hydroxy 69 30 - 96 ng/mL           EKG was acceptable.    Mammogram is still pending at the time this letter and will be reviewed under separate cover.    Assessment/Recommendations:  Routine Health Maintenance:  You will be due for a colonoscopy in this summer; order has been placed for this.  You are also due for thyroid ultrasound which has been ordered.  At some point, I recommend the COVID booster.    At this time, you appear to be in good medical condition.  Please work on a high-fiber, plant based eating plan and regular exercise with a goal of losing 10-15 pounds in the next year.      I look forward to seeing you again next year, but I will review the results of your studies when completed.  Please contact me should you have any questions or concerns regarding physical findings, or my recommendations.        Sincerely,            Wendy Horowitz MD, FACP

## 2022-05-06 NOTE — PROGRESS NOTES
Subjective:       Patient ID: Renae Hou is a 61 y.o. female.    Chief Complaint: No chief complaint on file.    HPI  Review of Systems    Mrs. Hou reports hx of HTN, managed via HCTZ. She reports hx of vertebral fusion (L4-L5, S1) which has led to some tingling/numbess in legs, though infrequent. She reports recent (>1mo.) neck and R arm pain including tingling/numbness for which she is starting physical therapy soon. She reports no pain in lower limbs today, though back feels tighter than usual following poor sleep. She reports no change in activity status since her last session.     Current exercise: No formal exercise at current. Average of 1,200 steps daily.    Notes: Mrs. Hou was receptive to all recommendations. She would like to improve on today's results. Push-up test was attempted, but saw increased symptoms and LBP and full ROM. No increase in symptoms during modified curl-ups (to knees). Mrs. Hou notes decreased activity levels since the start of COVID-19 pandemic and time barriers while watching after grandson in the evenings. Discussed strategies for increasing daily activity, including activity throughout the day, walking around office, stairwells, etc. Discussed beginning resistance exercise 2x/wk using at-home resistance bands from past PT experience. Discussed option of involving grandson in evening activities like walking, biking. Mrs. Hou is interested in joining an exercise facility, but was not pleased with her past experiences at [facility].   Objective:       D.O.S. 5/6/2022 5/14/2021 5/15/2020 5/24/2019 9/28/2018 8/12/2016   Height (in): 65 65 65 65.25 65.5 65.75   Weight (lbs): 191.5 188 180.7 226 234 183   BMI: 31.9 31.4 30.1 37.4 38.4 29.8   Body Fat (%): 46.6 45 44.6 43.75 44.23 34.7   Waist (cm): 96 97 100 111 112 93   Hip (cm): 110 110 106 124 127 107   WHR: 0.87 0.88 0.94 0.90 0.88 0.87   RBP (mmHg): 122/68 106/80 120/74 134/90 118/82 130/80   RHR (bpm): 70 80 60 78  70 68    Strength R (lbs)t: 60 55 57 62 68 67    Strength Lt (lbs): 63 67 65 73 68 69   Push-up Assessment: 0 11 10 Deferred Deferred 0   Curl-up Assessment: 15 25 Deferred Deferred Deferred 0   Flexibility Testing (cm): 25 23 20 Deferred Deferred 31   REE (kcals): 1372 1383 1401 2250 2010 1260       Physical Exam    Assessment:       Resting BP: Within Normal Limits   Body Fat %: Poor   WHR Risk Factor: High Risk    Strength R: Average    Strength L: Above Average   Upper Body Endurance: Symptom Limited   Abdominal Endurance: Average   Lower body Flexibiltiy: Fair       Problem List Items Addressed This Visit    None     Visit Diagnoses     Routine general medical examination at a health care facility    -  Primary          Plan:     Mrs Hou should strive to begin regular exercise including the following guidelines and recommendations:     - Aerobic: Perform 30-60min/day 5x/wk (>150min/wk) of moderate (105-123bpm) intensity exercise. More vigorous aerobics (>123bpm) can maintain or improve cardiovascular health with at least 75min/wk. Recommended to increase daily step count to 3000K or greater with the goal of 7-8K daily step or greater. Aerobic exercise session are recommended to be low-impact and can include examples as recumbent cycling, elliptical training, walking, etc.    - Resistance: Perform 2-4 sets of 8-12 repetitions at moderate intensity 2-4x/wk for each muscle group. Recommended to begin regular resistance exercise using at-home resistance band exercises 2x/wk.     -Flexibility: Perform 2-4 stretches for 30s for each muscle group daily for best results. Recommended to begin regular stretching.

## 2022-05-10 ENCOUNTER — PATIENT MESSAGE (OUTPATIENT)
Dept: BARIATRICS | Facility: CLINIC | Age: 62
End: 2022-05-10
Payer: COMMERCIAL

## 2022-05-10 ENCOUNTER — PATIENT OUTREACH (OUTPATIENT)
Dept: ADMINISTRATIVE | Facility: OTHER | Age: 62
End: 2022-05-10
Payer: COMMERCIAL

## 2022-05-10 ENCOUNTER — CLINICAL SUPPORT (OUTPATIENT)
Dept: REHABILITATION | Facility: HOSPITAL | Age: 62
End: 2022-05-10
Attending: PHYSICAL MEDICINE & REHABILITATION
Payer: COMMERCIAL

## 2022-05-10 DIAGNOSIS — M79.10 MYALGIA: ICD-10-CM

## 2022-05-10 DIAGNOSIS — M47.812 CERVICAL SPONDYLOSIS: ICD-10-CM

## 2022-05-10 DIAGNOSIS — G89.29 CHRONIC NECK PAIN: ICD-10-CM

## 2022-05-10 DIAGNOSIS — M54.12 CERVICAL RADICULOPATHY: ICD-10-CM

## 2022-05-10 DIAGNOSIS — M54.2 CHRONIC NECK PAIN: ICD-10-CM

## 2022-05-10 DIAGNOSIS — M25.60 RANGE OF MOTION DEFICIT: ICD-10-CM

## 2022-05-10 PROCEDURE — 97162 PT EVAL MOD COMPLEX 30 MIN: CPT | Mod: PN | Performed by: PHYSICAL THERAPIST

## 2022-05-10 PROCEDURE — 97110 THERAPEUTIC EXERCISES: CPT | Mod: PN | Performed by: PHYSICAL THERAPIST

## 2022-05-10 NOTE — PLAN OF CARE
"[facility]   Physical Therapy Initial Evaluation     Date: 5/10/2022   Name: eRnae Hou  Clinic Number: [id number]    Therapy Diagnosis:   Encounter Diagnoses   Name Primary?    Range of motion deficit     Myalgia Yes     Referring Provider: Latoya Quintanilla MD    Referring Provider Orders: PT eval and treat  Medical Diagnosis from Referral: Cervical radiculopathy [M54.12], Chronic neck pain [M54.2, G89.29], Cervical spondylosis [M47.812]  Evaluation Date: 5/10/2022  Authorization Period Expiration: 4/26/2022  Plan of Care Expiration: 8/10/2022  Progress Note Due: 7/10/2022  Visit # / Visits authorized: 1/pending   FOTO: 1/3 (5/10/2022)    Precautions: standard    Time In: 10:30  Time Out: 11:10  Total Appointment Time (timed & untimed codes): 40 minutes    SUBJECTIVE     Date of onset: a few years ago, worse in the last 6 months  History of current condition - Renae reports chronic neck pain that has worsened in recent months. She notes intermittent R arm numbness (infrequent L arm numbness) which wakes her up at night. No surgical history in the neck. Turning head to the R causes R arm numbness. Pt is L-handed. Can't tolerate lying supine. Pt reports headaches at the base of the neck, as well as ringing in the ears when her pain is elevated. Pt denies dizziness but reports feeling "woozy" with looking up.    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  Prior Level of Function: moderate neck pain and R arm symptoms  Current Level of Function: moderate to severe neck and R arm symptoms that interfere with sleep, work, and ADLs    Pain:  Current 5/10, worst 10/10, best 2/10   Location: right neck   Description: pulsing, stabbing, " constant  Aggravating Factors: prolonged positioning, activity, turning the head  Easing Factors: stretching, activity modification    Patients goals: reduce pain     Medical History:   Past Medical History:   Diagnosis Date    Abscess of paraspinous muscles 2018    Allergy 04/2018    Class 1 obesity due to excess calories in adult 07/23/2019    DVT (deep venous thrombosis)     left leg    Epidural abscess 04/10/2018    Fatty liver     GERD (gastroesophageal reflux disease) 2015    History of major abdominal surgery 09/11/2015    Had Hysterectomy , cholecystectomy , rectocele repair    Hypertension     Lumbar radiculopathy     Menopause     Obesity     Obstructive sleep apnea on CPAP     no longer uses CPAP after weight loss from gastric sleeve    Thyroid disease     Thyroid nodules.       Surgical History:   Renae Hou  has a past surgical history that includes Cholecystectomy; RECTOCELE REPAIR; THYROID NODULE REMOVAL; Bilateral salpingoophorectomy; Hysterectomy (12/17/2012); Back surgery (2002); Cyst Removal; Adenoidectomy (1995); Spine surgery (2018); Tonsillectomy (1995); Esophagogastroduodenoscopy (N/A, 5/3/2019); Insertion of inferior vena caval filter (Right, 7/19/2019); Laparoscopic sleeve gastrectomy (N/A, 7/23/2019); IVC filter retrieval (N/A, 12/20/2019); Epidural steroid injection into cervical spine (N/A, 8/11/2020); Injection of joint (Left, 3/1/2021); Correction of hammer toe (Left, 8/5/2021); and Epidural steroid injection into cervical spine (N/A, 3/2/2022).    Medications:   Renae has a current medication list which includes the following prescription(s): hydrochlorothiazide.    Allergies:   Review of patient's allergies indicates:   Allergen Reactions    Cathflo activase [alteplase] Anaphylaxis     Tightness in chest, raspy throat, restless legs, hotness all over    Heparin analogues      Restless legs, tightness in chest, and hot all over    Polyethylene glycol analogues  Swelling    Polysorbate 80 Anaphylaxis, Itching and Shortness Of Breath    Xyzal [levocetirizine] Swelling    Latex Swelling                OBJECTIVE     Posture:Mild forward head/rounded shoulders posture that improve with verbal cues  Palpation: Tenderness to palpation of R suboccipitals, sternocleidomastoid, scalenes, upper trapezius, levator scapulae  Cervical Spine: Pain with central PA mobilizations to C6-T1  Gross hypomobility in the thoracic spine  No apparent myotomal weakness in the R upper extremity    Cervical Range of Motion 5/10/22   Flexion 38   Extension 28   R side-bending 23   L side-bending 35   R rotation 56   L rotation 55     Upper Extremity Strength 5/10/22   R shoulder flexion 5/5   R shoulder extension 5/5   R shoulder external rotation 4+/5   R shoulder internal rotation 5/5   R shoulder abduction 4+/5   R latissimus dorsi 4/5   R middle trapezius 4-/5   R lower trapezius 3-/5       Special Testing  Cervical Traction Test positive   Spurling's Test negative (B)   Reverse Spurling's Sign negative (B)   Brachial Plexus Tension Test (ULTT1) negative (B)   Deep Neck Flexor Endurance Test positive       Limitation/Restriction for FOTO Neck Survey    Therapist reviewed FOTO scores for Renae Hou on 5/10/2022.   FOTO documents entered into [employer] - see Media section.    Limitation Score: 59% impairment       TREATMENT     Total Treatment time (time-based codes) separate from Evaluation: 8 minutes   Renae received the treatments listed below:      Therapeutic exercises to develop strength, endurance and core stabilization for 8 minutes -  HEP-building  Open books with yellow band (R&L), x10  Seated bilateral shoulder external rotation with yellow band, x10  Seated cervical retraction isometric with yellow band, x10    PATIENT EDUCATION AND HOME EXERCISES     Education provided: pt prognosis, PT plan of care, relationship between thoracic and cervical spine, scapular strengthening for  management of neck pain, cervical spine anatomy    Written Home Exercises Provided: yes. Exercises were reviewed and Renae was able to demonstrate them prior to the end of the session.  Renae demonstrated good  understanding of the education provided. See EMR under Patient Instructions for exercises provided during therapy sessions.    ASSESSMENT     Renae is a 61 y.o. female referred to outpatient Physical Therapy with a medical diagnosis of cervical radiculopathy, chronic neck pain, and cervical spondylosis. Patient presents with deficits to cervical range of motion and scapular strength, as well as myofascial pain in the associated muscles. Symptoms are consistent with cervical segmental dysfunction, and they inhibit her ability to tolerate prolonged positioning and perform ADLs.    Patient prognosis is Good.   Patient will benefit from skilled outpatient Physical Therapy to address the deficits stated above and in the chart below, provide patient /family education, and to maximize patient's level of independence.     Plan of care discussed with patient: yes  Patient's spiritual, cultural and educational needs considered and patient is agreeable to the plan of care and goals as stated below:     Anticipated Barriers for therapy: none    Medical Necessity is demonstrated by the following  History  Co-morbidities and personal factors that may impact the plan of care Co-morbidities:   Abscess of paraspinous muscles   Allergy   Class 1 obesity due to excess calories in adult   DVT (deep venous thrombosis)   Epidural abscess   Fatty liver   GERD (gastroesophageal reflux disease)   History of major abdominal surgery   Hypertension   Lumbar radiculopathy   Menopause   Obesity   Obstructive sleep apnea on CPAP   Thyroid disease       Personal Factors:   no deficits     moderate   Examination  Body Structures and Functions, activity limitations and participation restrictions that may impact the plan of care Body  Regions:   neck  upper extremities    Body Systems:    ROM  strength  muscle tone    Participation Restrictions:   Sleeping, lying supine, computer work, driving    Activity limitations:   Learning and applying knowledge  no deficits    General Tasks and Commands  no deficits    Communication  no deficits    Mobility  no deficits    Self care  no deficits    Domestic Life  doing house work (cleaning house, washing dishes, laundry)    Interactions/Relationships  no deficits    Life Areas  employment    Community and Social Life  community life  recreation and leisure         moderate   Clinical Presentation stable and uncomplicated low   Decision Making/ Complexity Score: moderate       Short Term Goals (4 weeks)  Pt will report pain of no more than 4/10 in the neck/mid-back/R arm with regular activity  Pt will report 50% improvement R arm numbness to demonstrate improved nerve conduction, dynamic cervical support  Pt will report 50% improvement to tenderness to palpation of R levator scapulae, suboccipitals, scalenes, SCM, and upper trapezius to demonstrate improved myofascial dysfunction  Pt will be independent with introductory HEP    Long Term Goals (8 weeks)  Pt will report pain of no more than 3/10 in the neck/mid-back/R arm with regular activity  Pt will demonstrate bilateral scapular strength of at least 4+/5 for improved support of the spine with ADLs and functional mobility  Pt will report > 75% improvement to tenderness to palpation of R levator scapulae, suboccipitals, scalenes, SCM, and upper trapezius to demonstrate improved myofascial dysfunction  Pt will demonstrate cervical range of motion WNL for improved positional tolerance and functional mobility  Pt will score no more than 35% impaired on the FOTO Neck Survey to demonstrate overall functional improvement  Pt will be independent with comprehensive HEP      PLAN     Plan of Care Certification: 5/10/2022 to 8/10/2022    Outpatient Physical Therapy:  2 times weekly for 8 weeks to include the following interventions - Therapeutic Exercise, Manual Therapy, Therapeutic Activity, Neuromuscular Re-education, Electrical Stimulation (Unattended)    Cindy Qiu, PT, DPT          I CERTIFY THE NEED FOR THESE SERVICES FURNISHED UNDER THIS PLAN OF TREATMENT AND WHILE UNDER MY CARE   Physician's comments:     Physician's Signature: ___________________________________________________

## 2022-05-10 NOTE — PROGRESS NOTES
Health Maintenance Due   Topic Date Due    COVID-19 Vaccine (3 - Booster for Pfizer series) 02/08/2022    Colorectal Cancer Screening  07/14/2022     Updates were requested from care everywhere.  Chart was reviewed for overdue Proactive [facility] Encounters (DARVIN) topics (CRS, Breast Cancer Screening, Eye exam)  Health Maintenance has been updated.  LINKS immunization registry triggered.  Immunizations were reconciled.

## 2022-05-11 ENCOUNTER — OFFICE VISIT (OUTPATIENT)
Dept: PAIN MEDICINE | Facility: CLINIC | Age: 62
End: 2022-05-11
Payer: COMMERCIAL

## 2022-05-11 VITALS — DIASTOLIC BLOOD PRESSURE: 85 MMHG | HEART RATE: 69 BPM | SYSTOLIC BLOOD PRESSURE: 120 MMHG

## 2022-05-11 DIAGNOSIS — M47.812 CERVICAL SPONDYLOSIS: ICD-10-CM

## 2022-05-11 DIAGNOSIS — R29.2: ICD-10-CM

## 2022-05-11 DIAGNOSIS — G89.29 CHRONIC NECK PAIN: ICD-10-CM

## 2022-05-11 DIAGNOSIS — K21.9 GASTROESOPHAGEAL REFLUX DISEASE, UNSPECIFIED WHETHER ESOPHAGITIS PRESENT: ICD-10-CM

## 2022-05-11 DIAGNOSIS — G56.01 CARPAL TUNNEL SYNDROME OF RIGHT WRIST: Primary | ICD-10-CM

## 2022-05-11 DIAGNOSIS — M54.2 CHRONIC NECK PAIN: ICD-10-CM

## 2022-05-11 DIAGNOSIS — M54.12 CERVICAL RADICULOPATHY: ICD-10-CM

## 2022-05-11 PROCEDURE — 1160F PR REVIEW ALL MEDS BY PRESCRIBER/CLIN PHARMACIST DOCUMENTED: ICD-10-PCS | Mod: CPTII,S$GLB,, | Performed by: PHYSICAL MEDICINE & REHABILITATION

## 2022-05-11 PROCEDURE — 1159F PR MEDICATION LIST DOCUMENTED IN MEDICAL RECORD: ICD-10-PCS | Mod: CPTII,S$GLB,, | Performed by: PHYSICAL MEDICINE & REHABILITATION

## 2022-05-11 PROCEDURE — 1159F MED LIST DOCD IN RCRD: CPT | Mod: CPTII,S$GLB,, | Performed by: PHYSICAL MEDICINE & REHABILITATION

## 2022-05-11 PROCEDURE — 76942 ECHO GUIDE FOR BIOPSY: CPT | Mod: S$GLB,,, | Performed by: PHYSICAL MEDICINE & REHABILITATION

## 2022-05-11 PROCEDURE — 3079F DIAST BP 80-89 MM HG: CPT | Mod: CPTII,S$GLB,, | Performed by: PHYSICAL MEDICINE & REHABILITATION

## 2022-05-11 PROCEDURE — 20526 THER INJECTION CARP TUNNEL: CPT | Mod: RT,S$GLB,, | Performed by: PHYSICAL MEDICINE & REHABILITATION

## 2022-05-11 PROCEDURE — 20526 CARPAL TUNNEL: ICD-10-PCS | Mod: RT,S$GLB,, | Performed by: PHYSICAL MEDICINE & REHABILITATION

## 2022-05-11 PROCEDURE — 1160F RVW MEDS BY RX/DR IN RCRD: CPT | Mod: CPTII,S$GLB,, | Performed by: PHYSICAL MEDICINE & REHABILITATION

## 2022-05-11 PROCEDURE — 3074F SYST BP LT 130 MM HG: CPT | Mod: CPTII,S$GLB,, | Performed by: PHYSICAL MEDICINE & REHABILITATION

## 2022-05-11 PROCEDURE — 3079F PR MOST RECENT DIASTOLIC BLOOD PRESSURE 80-89 MM HG: ICD-10-PCS | Mod: CPTII,S$GLB,, | Performed by: PHYSICAL MEDICINE & REHABILITATION

## 2022-05-11 PROCEDURE — 99214 OFFICE O/P EST MOD 30 MIN: CPT | Mod: 25,S$GLB,, | Performed by: PHYSICAL MEDICINE & REHABILITATION

## 2022-05-11 PROCEDURE — 99999 PR PBB SHADOW E&M-EST. PATIENT-LVL III: CPT | Mod: PBBFAC,,, | Performed by: PHYSICAL MEDICINE & REHABILITATION

## 2022-05-11 PROCEDURE — 99999 PR PBB SHADOW E&M-EST. PATIENT-LVL III: ICD-10-PCS | Mod: PBBFAC,,, | Performed by: PHYSICAL MEDICINE & REHABILITATION

## 2022-05-11 PROCEDURE — 76942 CARPAL TUNNEL: ICD-10-PCS | Mod: S$GLB,,, | Performed by: PHYSICAL MEDICINE & REHABILITATION

## 2022-05-11 PROCEDURE — 3074F PR MOST RECENT SYSTOLIC BLOOD PRESSURE < 130 MM HG: ICD-10-PCS | Mod: CPTII,S$GLB,, | Performed by: PHYSICAL MEDICINE & REHABILITATION

## 2022-05-11 PROCEDURE — 3044F HG A1C LEVEL LT 7.0%: CPT | Mod: CPTII,S$GLB,, | Performed by: PHYSICAL MEDICINE & REHABILITATION

## 2022-05-11 PROCEDURE — 3044F PR MOST RECENT HEMOGLOBIN A1C LEVEL <7.0%: ICD-10-PCS | Mod: CPTII,S$GLB,, | Performed by: PHYSICAL MEDICINE & REHABILITATION

## 2022-05-11 PROCEDURE — 99214 PR OFFICE/OUTPT VISIT, EST, LEVL IV, 30-39 MIN: ICD-10-PCS | Mod: 25,S$GLB,, | Performed by: PHYSICAL MEDICINE & REHABILITATION

## 2022-05-11 RX ORDER — METHYLPREDNISOLONE ACETATE 40 MG/ML
40 INJECTION, SUSPENSION INTRA-ARTICULAR; INTRALESIONAL; INTRAMUSCULAR; SOFT TISSUE
Status: DISCONTINUED | OUTPATIENT
Start: 2022-05-11 | End: 2022-05-11 | Stop reason: HOSPADM

## 2022-05-11 RX ADMIN — METHYLPREDNISOLONE ACETATE 40 MG: 40 INJECTION, SUSPENSION INTRA-ARTICULAR; INTRALESIONAL; INTRAMUSCULAR; SOFT TISSUE at 10:05

## 2022-05-11 NOTE — PROGRESS NOTES
Pain Medicine        Chief Complaint:   Chief Complaint   Patient presents with    Neck Pain       History of Present Illness: Renae Hou is a 61 y.o. female referred by Mila Jones PA-C for low back.    Back pain has been present for over 30 years. SHe had a back surgery in the early 90's which helped for a long time. Back pain recurred in 2016, insidiously.  She had 2 injections in 2018 and unfortunately, one resulted in a soft tissue abscess near her spine. She then underwent a L4-S1 fusion with Dr. Zayas in April 2018. Her back pain improved again after her fusion in 2018, and then recurred 2019 insidiously.     Currently, the back pain is localized to the bilateral lumbosacral junction with radiation into the bilateral groin and left leg into the lateral calf with abnormal sensations/numbness in the left leg. Pain is described as aching, throbbing, grabbing, tight and numb. The pain is aggravated by laying down on her back. The pain is alleviated by repositioning and getting up and moving. She does feel weakness in the left leg and it feels like the knee gives out on her at times. She does get the numbness in the left leg. Denies bowel or bladder incontinence, but feels constipated. Denies saddle anesthesia. Denies recent fevers or infections. Denies significant weight loss    Neck pain has been present for over a year, not able to pinpoint when it started. She underwent a cervical epidural which resolved the pain for a long time. Pain is localized to the right paraspinals with radiation into the right shoulder down the right arm into the thumb. Pain is described as burning, tingling. The pain is aggravated by using her mouse at work, looking up. The pain is alleviated by rest, injections. She denies weakness in the arms. She does get numbness in the right arm.     Severity: Currently: 7/10   Typical Range: 5-10/10     Exacerbation: 10/10     Interval History (04/26/2022):  Renae Hou  returns today for follow up.  At the last clinic visit, she was doing well after the NEERAJ.    Currently, the neck pain is worse.  Pain returned int he last 2 weeks. Pain localized to the right paraspinals, trapezius down the arm to the whole hand. She also has pain around the bra line on the right side. Pain seems to be worse at the end of the day. She has been taking ibuprofen, but recently not helping as much.     She is also noting some midline substernal pain, feels like pulling/tugging. She has been taking ibuprofen regularly. Pain seems to be worse when laying down at night. Denies worsening pain on exertion. She discussed this with Dr. Horowitz and had chest x-rays. No SOB, diaphoresis. No changes with eating or drinking.       Current Pain Scales:  Current: 5/10              Typical Range: 4-10/10    Interval History (05/11/2022):  Renae Hou returns today for follow up.  At the last clinic visit, referred to physical therapy     Physical therapy just started. She was able to find this close to her work and it is convenient for her, but she has only started the assessment and has started some HEP they provided.     Currently, the neck and right hand pain is stable.  No change in the location or quality of the pain since the most recent visit is reported.  No significant interval events or traumas. No change in bowel or bladder function, & no new weakness or numbness is reported. No new musculoskeletal pain complaints.    Her substernal/abdominal pain improved with the omeprazole. She did not find the tizanidine to be beneficial.     Current Pain Scales:  Current: 5/10              Typical Range: 5-10/10    Pain Disability Index  Family/Home Responsibilities:: 8  Recreation:: 7  Social Activity:: 0  Occupation:: 0  Sexual Behavior:: 0  Self Care:: 0  Life-Support Activities:: 0  Pain Disability Index (PDI): 15            Previous Interventions:  - 3/2/22: C7-T1 IL NEERAJ w/ 85-90% relief  - 11/30/21: BL C5  TFESI, >80% relief but only for a week  - 3/1/21: Right hip CSI (Eissa) did not help  - 8/11/20: Right C4-5 TF NEERAJ (IR) helped  - 3/16/18: Left L4-5 facet joint injection/aspiration (Dr. Cadet) did not help  - 3/2/18: Left L4-5 TF NEERAJ (Dr. Cadet) did not help    Previous Therapies:  PT/OT: yes  Relevant Surgery: yes    - 4/10/18: L4-S1 fusion with Dr. Zayas helped   - 1990s minor surgery for a disc that helped  Previous Medications:   - NSAIDS: Tylenol. Meloxicam. Should avoid with gastric sleeve. Ibuprofen.   - Muscle Relaxants:    - TCAs:   - SNRIs:   - Topicals:   - Anticonvulsants: Gabapentin helped a little   - Opioids:     Current Pain Medications:  1. Tylenol     Blood Thinners: None    Full Medication List:    Current Outpatient Medications:     hydroCHLOROthiazide (HYDRODIURIL) 12.5 MG Tab, TAKE 1 TABLET(12.5 MG) BY MOUTH EVERY DAY, Disp: 90 tablet, Rfl: 1     Review of Systems:  Review of Systems   All other systems reviewed and are negative.      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    Surgical History:   has a past surgical history that includes Cholecystectomy; RECTOCELE REPAIR; THYROID NODULE REMOVAL; Bilateral salpingoophorectomy; Hysterectomy (12/17/2012); Back surgery (2002); Cyst Removal; Adenoidectomy (1995); Spine surgery (2018); Tonsillectomy (1995); Esophagogastroduodenoscopy (N/A, 5/3/2019); Insertion of inferior vena caval filter  (Right, 7/19/2019); Laparoscopic sleeve gastrectomy (N/A, 7/23/2019); IVC filter retrieval (N/A, 12/20/2019); Epidural steroid injection into cervical spine (N/A, 8/11/2020); Injection of joint (Left, 3/1/2021); Correction of hammer toe (Left, 8/5/2021); and Epidural steroid injection into cervical spine (N/A, 3/2/2022).    Family History:  family history includes Alcohol abuse in her sister; Cirrhosis in her sister; Deep vein thrombosis in her mother; Diabetes in her sister; Epilepsy in her sister; Hashimoto's thyroiditis in her daughter; Heart disease in her father, maternal grandmother, and paternal grandmother; Heart disease (age of onset: 55) in her mother; Hyperlipidemia in her mother; Hypertension in her mother; Kidney disease in her maternal grandmother; No Known Problems in her daughter; Stroke in her father; Thyroid disease in her daughter, maternal grandmother, and mother.    Social History:   reports that she quit smoking about 34 years ago. Her smoking use included cigarettes. She has a 8.00 pack-year smoking history. She has never used smokeless tobacco. She reports that she does not drink alcohol and does not use drugs.    Physical Exam:  /85   Pulse 69   LMP 11/25/2012   GEN: No acute distress. Calm, comfortable  HENT: Normocephalic, atraumatic, moist mucous membranes  EYE: Anicteric sclera, non-injected.   CV: Non-diaphoretic.   RESP: Breathing comfortably. Chest expansion symmetric.  PSYCH: Pleasant mood and appropriate affect. Recent and remote memory intact.       Imaging:  - X-ray cervical spine 10/20/21:  There is straightening of the normal cervical lordosis.  Extension views demonstrate 3 mm of retrolisthesis of C3 on C4 suggesting a component of dynamic instability.  There are 3 mm of retrolisthesis of C4 on C5 which remains stable between flexion and extension views.  Odontoid process and C1 lateral masses are intact.  There is multilevel degenerative disc space narrowing most  pronounced at C4-C5, C5-C6 and C6-C7.  There is multilevel uncovertebral joint spurring most pronounced at C4-C5.  Facet joints and spinous processes are well aligned.  Prevertebral soft tissues are normal.  Visualized lung apices are clear.    - MRI Cervical & Thoracic spine 11/4/20:  Cervical spine:  Straightening of the cervical lordosis.  The vertebral body heights are well maintained  There is mild disc space narrowing at C4-5, C5-6 and C6-C7.  No evidence of malignant bone marrow replacement process or infection.  There is a benign hemangioma within the T2 vertebrae.  Normal appearance of the craniocervical junction, cervical cord and upper thoracic cord.  C2-C3: Unremarkable  C3-C4: Minimal posterior disc osteophyte complex and left uncovertebral spur results in mild left foraminal narrowing.  The central canal is patent.  C4-C5: Posterior disc osteophyte complex and bilateral uncovertebral spur, there is no canal stenosis.  There is mild bilateral foraminal narrowing.  C5-C6: Posterior disc osteophyte complex and bilateral uncovertebral spur, there is no canal stenosis.  There is mild bilateral foraminal narrowing.  C6-C7: More prominent posterior disc osteophyte complex and uncovertebral spur, no greater than mild central canal narrowing.  There is mild left foraminal narrowing.  C7-T1: Unremarkable  The paraspinal soft tissues appear normal.  Prior partial thyroidectomy.  Thoracic spine:  The alignment is normal.  The vertebral body heights are well maintained.  The disc spaces are relatively well maintained.  There is abnormal edema involving the bilateral T2-T3 facet joints, new from 05/10/2019 exam.  This is likely degenerative.  Minimal posterior disc osteophyte complex at T5-6.  No canal stenosis at this level.  T7-T8 demonstrates mild facet joint osseous hypertrophy, no canal stenosis.  T10-T11 demonstrates moderate facet joint osseous hypertrophy and creates mild central canal stenosis.  The  remainder of the thoracic spine appears within normal limits.  The paravertebral and paraspinal soft tissues appear normal.    - X-ray lumbar spine AP/Lat w/ flex/ext 10/14/20:  Vertebral bodies are intact.  Patient has had a previous fusion with disc spacers and pedicle screws at the L4-L5 and L5-S1 level.  No instability in flexion extension.  No collapse or destruction is seen    - MRI Lumbar spine w/ & w/o 2/28/20:  Postsurgical change: L4-S1 posterior spinal fusion with L4-L5 laminectomies and intervertebral disc spacer placement at L4-L5 and L5-S1.  Alignment: Minimal anterolisthesis of L4 on L5 of 2-3 mm.  Vertebrae: Vertebral body heights are well maintained.  No marrow signal abnormality to suggest acute fracture or infiltrative marrow placement process.  Focal area of marrow heterogeneity noted within the posterior right iliac bone, possibly related to previous bone harvesting site.  Discs: Nonsurgical disc heights are relatively well maintained.  No significant disc space height loss or disc desiccation.  Cord: Normal caliber and signal.  Conus terminates at L1 vertebral level.  Enhancement: No abnormal contrast enhancement.  Degenerative findings:  T12-L1: Mild bilateral facet hypertrophy without significant spinal canal stenosis or neural foraminal narrowing.  L1-L2: Mild bilateral facet hypertrophy without significant spinal canal stenosis or neural foraminal narrowing.  L2-L3: Mild bilateral facet hypertrophy without significant spinal canal stenosis or neural foraminal narrowing.  L3-L4: Moderate bilateral facet hypertrophy without significant spinal canal stenosis or neural foraminal narrowing.  L4-L5: Mild facet hypertrophy without significant neural foraminal narrowing.  Spinal canal is decompressed posteriorly via laminectomy defect.  L5-S1: Mild facet hypertrophy without definite neural foraminal narrowing noting limited evaluation secondary to metallic artifact.  Spinal canal is decompressed  posteriorly via laminectomy defect.  Paraspinal muscles & soft tissues: No significant abnormality.    Labs:  BMP  Lab Results   Component Value Date     05/06/2022    K 4.0 05/06/2022     05/06/2022    CO2 28 05/06/2022    BUN 19 05/06/2022    CREATININE 0.8 05/06/2022    CALCIUM 9.8 05/06/2022    ANIONGAP 11 05/06/2022    ESTGFRAFRICA >60.0 05/06/2022    EGFRNONAA >60.0 05/06/2022     Lab Results   Component Value Date    ALT 15 05/06/2022    AST 18 05/06/2022    ALKPHOS 81 05/06/2022    BILITOT 0.5 05/06/2022     Lab Results   Component Value Date     05/06/2022       Assessment:  Renae Hou is a 61 y.o. female with the following diagnoses based on history, exam, and imaging:    Problem List Items Addressed This Visit        Neuro    Cervical radiculopathy    Carpal tunnel syndrome of right wrist: see EMG 10/21 - Primary    Relevant Orders    Carpal Tunnel       GI    GERD (gastroesophageal reflux disease)      Other Visit Diagnoses     Chronic neck pain        Cervical spondylosis        Zavala's reflex positive              This is a pleasant 61 y.o. lady presenting with:     - Chronic neck pain: Right cervical radiculopathy, possibly left cervical radiculopathy as well.  MRI with multilevel bilateral spondylosis and foraminal narrowing.    - Right hand numbness: Mild right CTS on EMG/NCS from 10/8/21.     - Chronic bilateral low back pain: Prior fusion L4-S1. She does appear to have left S1 radiculopathy with decreased achilles reflex on that side, but not prominent leg pain and suspect this is residual from her prior surgery. These symptoms are currently still present but manageable to the patient.   - Left GTBursitis, currently mostly improved per patient. She says it comes and goes, but currently is not bothering her.     - Substernal chest/abd pain: Suspect GERD, and this improved with omeprazole and she may continue this per   PCP if okay with PCP.     - Comorbidities: H/o  bariatric surgery (gastric sleeve). Thyroid disease. H/o DVT. GERD. H/o epidural abscess. RENAE. Fatty liver.     Treatment Plan:   - PT/OT/HEP: Cont PT. Discussed proper posture and how this is effecting her pain.   - Procedures: Performed right carpal tunnel injection with US today in clinic.    - Consider repeat C7-T1 IL NEERAJ in future.   - Medications:    - Discussed occasional NSAIDs and potential risks   - Cont omeprazole 20 mg daily as this helps her stomach/substernal pain   - Trial increasing tizanidine to 8 mg qHS for sleep and pain.  - Imaging: Reviewed  - Labs: Reviewed.      Follow Up: RTC in 2-3 months    Latoya Quintanilla MD  Interventional Pain Medicine      Disclaimer: This note was partly generated using dictation software which may occasionally result in transcription errors.

## 2022-05-11 NOTE — PROCEDURES
Carpal Tunnel    Date/Time: 5/11/2022 10:30 AM  Performed by: Latoya Quintanilla MD  Authorized by: Latoya Quintanilla MD     Consent Done?:  Yes (Written)  Indications:  Pain  Site marked: the procedure site was marked    Timeout: prior to procedure the correct patient, procedure, and site was verified    Prep: patient was prepped and draped in usual sterile fashion      Local anesthesia used?: No    Location:  Wrist  Site:  R carpal tunnel  Ultrasonic Guidance for Needle Placement?: Yes    Needle gauge: 27.  Approach:  Volar  Medications:  40 mg methylPREDNISolone acetate 40 mg/mL  Patient tolerance:  Patient tolerated the procedure well with no immediate complications

## 2022-05-13 ENCOUNTER — HOSPITAL ENCOUNTER (OUTPATIENT)
Dept: RADIOLOGY | Facility: HOSPITAL | Age: 62
Discharge: HOME | End: 2022-05-13
Attending: INTERNAL MEDICINE
Payer: COMMERCIAL

## 2022-05-13 DIAGNOSIS — E04.2 MULTIPLE THYROID NODULES: ICD-10-CM

## 2022-05-13 PROCEDURE — 76536 US EXAM OF HEAD AND NECK: CPT | Mod: TC

## 2022-05-13 PROCEDURE — 76536 US EXAM OF HEAD AND NECK: CPT | Mod: 26,,, | Performed by: STUDENT IN AN ORGANIZED HEALTH CARE EDUCATION/TRAINING PROGRAM

## 2022-05-13 PROCEDURE — 76536 US SOFT TISSUE HEAD NECK THYROID: ICD-10-PCS | Mod: 26,,, | Performed by: STUDENT IN AN ORGANIZED HEALTH CARE EDUCATION/TRAINING PROGRAM

## 2022-05-17 ENCOUNTER — CLINICAL SUPPORT (OUTPATIENT)
Dept: REHABILITATION | Facility: HOSPITAL | Age: 62
End: 2022-05-17
Attending: PHYSICAL MEDICINE & REHABILITATION
Payer: COMMERCIAL

## 2022-05-17 DIAGNOSIS — M25.60 RANGE OF MOTION DEFICIT: ICD-10-CM

## 2022-05-17 DIAGNOSIS — M79.10 MYALGIA: Primary | ICD-10-CM

## 2022-05-17 PROCEDURE — 97140 MANUAL THERAPY 1/> REGIONS: CPT | Mod: PN,CQ

## 2022-05-17 PROCEDURE — 97110 THERAPEUTIC EXERCISES: CPT | Mod: PN,CQ

## 2022-05-17 NOTE — PROGRESS NOTES
[facility]   Physical Therapy Treatment Note     Name: Renae Hou  [facility] Number: [id number]    Therapy Diagnosis:   Encounter Diagnoses   Name Primary?    Myalgia Yes    Range of motion deficit      Physician: Latoya Quintanilla MD    Visit Date: 5/17/2022    Referring Provider: Latoya Quintanilla MD     Referring Provider Orders: PT eval and treat  Medical Diagnosis from Referral: Cervical radiculopathy [M54.12], Chronic neck pain [M54.2, G89.29], Cervical spondylosis [M47.812]  Evaluation Date: 5/10/2022  Authorization Period Expiration: 4/26/2022  Plan of Care Expiration: 8/10/2022  Progress Note Due: 7/10/2022  Visit # / Visits authorized: 1/pending   FOTO: 1/3 (5/10/2022)     Precautions: standard      PTA Visit #: 1/5     Time In: 1015  Time Out: 1100  Total Billable Time: 45 minutes    SUBJECTIVE     Pt reports: that she is doing well today.  She states that she is a 0/10 at rest and has an increase in symptoms when working at the computer for long periods of time.    She was compliant with home exercise program.  Response to previous treatment: no adverse reactions  Functional change: no reported change    Pain: 0/10 at rest  8/10  Location: right shoulder      OBJECTIVE     Objective Measures updated at progress report unless specified.     Treatment     Renae received the treatments listed below:      therapeutic exercises to develop strength, endurance and core stabilization for 35 minutes including:    Seated scap retraction 5 sec 15x  Seated Bruggers with orange Theraband 3x10  Standing rows with red theratube 3x10  Standing shoulder extension with red theratube 3x10  Bilateral Standing shoulder external rotation with red Theratube 3x10  Bilateral Standing shoulder Depression with Red Theratube    manual therapy techniques: Joint mobilizations, Manual traction and Soft tissue Mobilization were applied to the: neck for 10 minutes, including:    STM to the Occipital  "region, Upper Traps and Levator Scapula                Patient Education and Home Exercises     Home Exercises Provided and Patient Education Provided     Education provided:     Complete all exercise and acitivities of daily living in pain free range      Written Home Exercises Provided: Patient instructed to cont prior HEP. Exercises were reviewed and Renae was able to demonstrate them prior to the end of the session.  Renae demonstrated good  understanding of the education provided. See EMR under Patient Instructions for exercises provided during therapy sessions    ASSESSMENT     Good tolerance to exercise.  Patient able to incorporate new exercise into program with no reported pain or adverse reaction.  Patient responded well to MT and states that her neck feels better.  States she has some "Wooziness" when ever she has her neck worked on but states that it is not associated with dizziness.    Renae Is progressing well towards her goals.   Pt prognosis is Good.     Pt will continue to benefit from skilled outpatient physical therapy to address the deficits listed in the problem list box on initial evaluation, provide pt/family education and to maximize pt's level of independence in the home and community environment.     Pt's spiritual, cultural and educational needs considered and pt agreeable to plan of care and goals.     Anticipated barriers to physical therapy: none    Goals:     Short Term Goals (4 weeks)  Pt will report pain of no more than 4/10 in the neck/mid-back/R arm with regular activity  Pt will report 50% improvement R arm numbness to demonstrate improved nerve conduction, dynamic cervical support  Pt will report 50% improvement to tenderness to palpation of R levator scapulae, suboccipitals, scalenes, SCM, and upper trapezius to demonstrate improved myofascial dysfunction  Pt will be independent with introductory HEP     Long Term Goals (8 weeks)  Pt will report pain of no more than 3/10 " in the neck/mid-back/R arm with regular activity  Pt will demonstrate bilateral scapular strength of at least 4+/5 for improved support of the spine with ADLs and functional mobility  Pt will report > 75% improvement to tenderness to palpation of R levator scapulae, suboccipitals, scalenes, SCM, and upper trapezius to demonstrate improved myofascial dysfunction  Pt will demonstrate cervical range of motion WNL for improved positional tolerance and functional mobility  Pt will score no more than 35% impaired on the FOTO Neck Survey to demonstrate overall functional improvement  Pt will be independent with comprehensive HEP      PLAN     Progress as tolerated per Plan of Care      Rashel Frost, PTA

## 2022-05-23 ENCOUNTER — PATIENT MESSAGE (OUTPATIENT)
Dept: INTERNAL MEDICINE | Facility: CLINIC | Age: 62
End: 2022-05-23
Payer: COMMERCIAL

## 2022-05-23 ENCOUNTER — PATIENT MESSAGE (OUTPATIENT)
Dept: BARIATRICS | Facility: CLINIC | Age: 62
End: 2022-05-23
Payer: COMMERCIAL

## 2022-06-05 ENCOUNTER — PATIENT MESSAGE (OUTPATIENT)
Dept: BARIATRICS | Facility: CLINIC | Age: 62
End: 2022-06-05
Payer: COMMERCIAL

## 2022-06-06 ENCOUNTER — PATIENT MESSAGE (OUTPATIENT)
Dept: INTERNAL MEDICINE | Facility: CLINIC | Age: 62
End: 2022-06-06
Payer: COMMERCIAL

## 2022-06-09 ENCOUNTER — PATIENT MESSAGE (OUTPATIENT)
Dept: ENDOSCOPY | Facility: HOSPITAL | Age: 62
End: 2022-06-09
Payer: COMMERCIAL

## 2022-06-10 ENCOUNTER — PATIENT MESSAGE (OUTPATIENT)
Dept: BARIATRICS | Facility: CLINIC | Age: 62
End: 2022-06-10
Payer: COMMERCIAL

## 2022-06-13 ENCOUNTER — ANESTHESIA (OUTPATIENT)
Dept: ENDOSCOPY | Facility: HOSPITAL | Age: 62
End: 2022-06-13
Payer: COMMERCIAL

## 2022-06-13 ENCOUNTER — HOSPITAL ENCOUNTER (OUTPATIENT)
Facility: HOSPITAL | Age: 62
Discharge: HOME | End: 2022-06-13
Attending: INTERNAL MEDICINE | Admitting: INTERNAL MEDICINE
Payer: COMMERCIAL

## 2022-06-13 ENCOUNTER — ANESTHESIA EVENT (OUTPATIENT)
Dept: ENDOSCOPY | Facility: HOSPITAL | Age: 62
End: 2022-06-13
Payer: COMMERCIAL

## 2022-06-13 VITALS
TEMPERATURE: 98 F | HEART RATE: 70 BPM | BODY MASS INDEX: 31.49 KG/M2 | DIASTOLIC BLOOD PRESSURE: 80 MMHG | RESPIRATION RATE: 18 BRPM | HEIGHT: 65 IN | OXYGEN SATURATION: 97 % | SYSTOLIC BLOOD PRESSURE: 116 MMHG | WEIGHT: 189 LBS

## 2022-06-13 DIAGNOSIS — Z12.11 SCREENING FOR COLON CANCER: ICD-10-CM

## 2022-06-13 PROCEDURE — 45380 COLONOSCOPY AND BIOPSY: CPT | Mod: PT,59 | Performed by: INTERNAL MEDICINE

## 2022-06-13 PROCEDURE — 27201028 HC NEEDLE, SCLERO: Performed by: INTERNAL MEDICINE

## 2022-06-13 PROCEDURE — 27202363 HC INJECTION AGENT, SUBMUCOSAL, ANY: Performed by: INTERNAL MEDICINE

## 2022-06-13 PROCEDURE — 63600175 PHARM REV CODE 636 W HCPCS: Performed by: NURSE ANESTHETIST, CERTIFIED REGISTERED

## 2022-06-13 PROCEDURE — 45381 COLONOSCOPY SUBMUCOUS NJX: CPT | Mod: PT,59 | Performed by: INTERNAL MEDICINE

## 2022-06-13 PROCEDURE — 27201012 HC FORCEPS, HOT/COLD, DISP: Performed by: INTERNAL MEDICINE

## 2022-06-13 PROCEDURE — 88305 TISSUE EXAM BY PATHOLOGIST: CPT | Mod: 26,,, | Performed by: PATHOLOGY

## 2022-06-13 PROCEDURE — 45385 PR COLONOSCOPY,REMV LESN,SNARE: ICD-10-PCS | Mod: 33,,, | Performed by: INTERNAL MEDICINE

## 2022-06-13 PROCEDURE — 45381 PR COLONOSCPY,FLEX,W/DIR SUBMUC INJECT: ICD-10-PCS | Mod: 33,51,, | Performed by: INTERNAL MEDICINE

## 2022-06-13 PROCEDURE — 37000008 HC ANESTHESIA 1ST 15 MINUTES: Performed by: INTERNAL MEDICINE

## 2022-06-13 PROCEDURE — 27201089 HC SNARE, DISP (ANY): Performed by: INTERNAL MEDICINE

## 2022-06-13 PROCEDURE — 25000003 PHARM REV CODE 250: Performed by: INTERNAL MEDICINE

## 2022-06-13 PROCEDURE — 37000009 HC ANESTHESIA EA ADD 15 MINS: Performed by: INTERNAL MEDICINE

## 2022-06-13 PROCEDURE — 45385 COLONOSCOPY W/LESION REMOVAL: CPT | Mod: 33,,, | Performed by: INTERNAL MEDICINE

## 2022-06-13 PROCEDURE — 27201042 HC RETRIEVAL NET: Performed by: INTERNAL MEDICINE

## 2022-06-13 PROCEDURE — 45380 COLONOSCOPY AND BIOPSY: CPT | Mod: 33,59,, | Performed by: INTERNAL MEDICINE

## 2022-06-13 PROCEDURE — 88305 TISSUE EXAM BY PATHOLOGIST: CPT | Performed by: PATHOLOGY

## 2022-06-13 PROCEDURE — 88305 TISSUE EXAM BY PATHOLOGIST: ICD-10-PCS | Mod: 26,,, | Performed by: PATHOLOGY

## 2022-06-13 PROCEDURE — 45381 COLONOSCOPY SUBMUCOUS NJX: CPT | Mod: 33,51,, | Performed by: INTERNAL MEDICINE

## 2022-06-13 PROCEDURE — 25000003 PHARM REV CODE 250: Performed by: NURSE ANESTHETIST, CERTIFIED REGISTERED

## 2022-06-13 PROCEDURE — 45385 COLONOSCOPY W/LESION REMOVAL: CPT | Mod: PT | Performed by: INTERNAL MEDICINE

## 2022-06-13 PROCEDURE — 45380 PR COLONOSCOPY,BIOPSY: ICD-10-PCS | Mod: 33,59,, | Performed by: INTERNAL MEDICINE

## 2022-06-13 RX ORDER — PROPOFOL 10 MG/ML
VIAL (ML) INTRAVENOUS CONTINUOUS PRN
Status: DISCONTINUED | OUTPATIENT
Start: 2022-06-13 | End: 2022-06-13

## 2022-06-13 RX ORDER — PROPOFOL 10 MG/ML
VIAL (ML) INTRAVENOUS
Status: DISCONTINUED | OUTPATIENT
Start: 2022-06-13 | End: 2022-06-13

## 2022-06-13 RX ORDER — SODIUM CHLORIDE 9 MG/ML
INJECTION, SOLUTION INTRAVENOUS CONTINUOUS
Status: DISCONTINUED | OUTPATIENT
Start: 2022-06-13 | End: 2022-06-13 | Stop reason: HOSPADM

## 2022-06-13 RX ORDER — LIDOCAINE HYDROCHLORIDE 20 MG/ML
INJECTION INTRAVENOUS
Status: DISCONTINUED | OUTPATIENT
Start: 2022-06-13 | End: 2022-06-13

## 2022-06-13 RX ADMIN — LIDOCAINE HYDROCHLORIDE 50 MG: 20 INJECTION, SOLUTION INTRAVENOUS at 01:06

## 2022-06-13 RX ADMIN — SODIUM CHLORIDE: 0.9 INJECTION, SOLUTION INTRAVENOUS at 12:06

## 2022-06-13 RX ADMIN — PROPOFOL 100 MG: 10 INJECTION, EMULSION INTRAVENOUS at 01:06

## 2022-06-13 RX ADMIN — Medication 160 MCG/KG/MIN: at 01:06

## 2022-06-13 NOTE — ANESTHESIA PREPROCEDURE EVALUATION
06/13/2022  Renae Hou is a 61 y.o., female.  Pre-operative evaluation for Procedure(s) (LRB):  COLONOSCOPY (N/A)    Renae Hou is a 61 y.o. female     Patient Active Problem List   Diagnosis    RENAE dx 2010, improved after weight loss    Chronic constipation    Mixed hyperlipidemia    Pre-diabetes    Chronic pain    Lumbar radiculopathy    Essential hypertension    Fatty liver    Spondylolisthesis    GERD (gastroesophageal reflux disease)    Vaginal atrophy    Dyspareunia, female    Mixed stress and urge urinary incontinence    Rectocele, female    Cystocele, midline    Edema, lower extremity    History of DVT (deep vein thrombosis): 2018     Presence of IVC filter    Hip pain, chronic, left    Multiple thyroid nodules: stable 5/22    Dysphonia    Cervical radiculopathy    Mallet toe of left foot    Chronic left-sided low back pain with left-sided sciatica    Carpal tunnel syndrome of right wrist: see EMG 10/21    Chronic deep vein thrombosis (DVT) of distal vein of left lower extremity    Class 1 obesity due to excess calories without serious comorbidity with body mass index (BMI) of 31.0 to 31.9 in adult    Range of motion deficit    Myalgia       Review of patient's allergies indicates:   Allergen Reactions    Cathflo activase [alteplase] Anaphylaxis     Tightness in chest, raspy throat, restless legs, hotness all over    Heparin analogues      Restless legs, tightness in chest, and hot all over    Polyethylene glycol analogues Swelling    Polysorbate 80 Anaphylaxis, Itching and Shortness Of Breath    Xyzal [levocetirizine] Swelling    Latex Swelling             No current facility-administered medications on file prior to encounter.     Current Outpatient Medications on File Prior to Encounter   Medication Sig Dispense Refill    hydroCHLOROthiazide  (HYDRODIURIL) 12.5 MG Tab TAKE 1 TABLET(12.5 MG) BY MOUTH EVERY DAY 90 tablet 1       Past Surgical History:   Procedure Laterality Date    ADENOIDECTOMY  1995    BACK SURGERY  2002    L4, L5    BILATERAL SALPINGOOPHORECTOMY      CHOLECYSTECTOMY      CORRECTION OF HAMMER TOE Left 8/5/2021    Procedure: CORRECTION, HAMMER TOE Left 4th mallet toe, DIP joitn fusion;  Surgeon: Guero Alvarez MD;  [facility];  Service: Orthopedics;  Laterality: Left;  K-wires    CYST REMOVAL      EPIDURAL STEROID INJECTION INTO CERVICAL SPINE N/A 8/11/2020    Procedure: Injection-steroid-epidural-cervical;  Surgeon: [facility] Diagnostic Provider;  Location: [facility] 2ND FLR;  Service: Radiology;  Laterality: N/A;  /[place]    EPIDURAL STEROID INJECTION INTO CERVICAL SPINE N/A 3/2/2022    Procedure: Injection-steroid-epidural-cervical C7-T1;  Surgeon: Latoya Quintanilla MD;  Location: [facility] PAIN MGT;  Service: Pain Management;  Laterality: N/A;    ESOPHAGOGASTRODUODENOSCOPY N/A 5/3/2019    Procedure: ESOPHAGOGASTRODUODENOSCOPY (EGD);  Surgeon: Carlin Sanchez MD;  Location: [facility] ENDO (4TH FLR);  Service: Endoscopy;  Laterality: N/A;    HYSTERECTOMY  12/17/2012    [facility] BS&O     INJECTION OF JOINT Left 3/1/2021    Procedure: INJECTION, JOINT LEFT HIP INTRA ARTICULAR CORTISONE INJECTION DIRECT REFERRAL;  Surgeon: Chuy Gregory MD;  Location: [facility] PAIN MGT;  Service: Pain Management;  Laterality: Left;  NEEDS CONSENT    INSERTION OF INFERIOR VENA CAVAL FILTER Right 7/19/2019    Procedure: IVC filter placement;  Surgeon: Rodney Rico MD;  Location: [facility] CATH LAB;  Service: Peripheral Vascular;  Laterality: Right;    IVC FILTER RETRIEVAL N/A 12/20/2019    Procedure: REMOVAL-FILTER-IVC;  Surgeon: Rodney Rico MD;  Location: [facility] 2ND FLR;  Service: Peripheral Vascular;  Laterality: N/A;    LAPAROSCOPIC SLEEVE GASTRECTOMY N/A 7/23/2019    Procedure: GASTRECTOMY, SLEEVE, LAPAROSCOPIC, with intraop EGD 26837;   Surgeon: Duc Ratliff Jr., MD;  Location: [address];  Service: General;  Laterality: N/A;    RECTOCELE REPAIR      SPINE SURGERY  2018    THYROID NODULE REMOVAL      TONSILLECTOMY         Social History     Socioeconomic History    Marital status:    Tobacco Use    Smoking status: Former Smoker     Packs/day: 0.25     Years: 32.00     Pack years: 8.00     Types: Cigarettes     Quit date:      Years since quittin.4    Smokeless tobacco: Never Used    Tobacco comment: smoked socially decades ago - weekends only   Substance and Sexual Activity    Alcohol use: No     Comment: yearly at most     Drug use: No    Sexual activity: Not Currently     Partners: Male     Birth control/protection: See Surgical Hx, None     Comment: [facility] BS&O 2012,     Social History Narrative    . 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     Social Determinants of Health     Financial Resource Strain: Low Risk     Difficulty of Paying Living Expenses: Not hard at all   Food Insecurity: No Food Insecurity    Worried About Running Out of Food in the Last Year: Never true    Ran Out of Food in the Last Year: Never true   Transportation Needs: No Transportation Needs    Lack of Transportation (Medical): No    Lack of Transportation (Non-Medical): No   Physical Activity: Insufficiently Active    Days of Exercise per Week: 2 days    Minutes of Exercise per Session: 30 min   Stress: No Stress Concern Present    Feeling of Stress : Not at all   Social Connections: Unknown    Frequency of Communication with Friends and Family: More than three times a week    Frequency of Social Gatherings with Friends and Family: Once a week    Active Member of Clubs or Organizations: No    Attends Club or Organization Meetings: Patient refused    Marital Status:    Housing Stability: Low Risk     Unable to Pay for Housing in the Last Year: No    Number of Places Lived in the Last Year: 1    Unstable Housing  in the Last Year: No         CBC: No results for input(s): WBC, RBC, HGB, HCT, PLT, MCV, MCH, MCHC in the last 72 hours.    CMP: No results for input(s): NA, K, CL, CO2, BUN, CREATININE, GLU, MG, PHOS, CALCIUM, ALBUMIN, PROT, ALKPHOS, ALT, AST, BILITOT in the last 72 hours.    INR  No results for input(s): PT, INR, PROTIME, APTT in the last 72 hours.        Diagnostic Studies:      EKD Echo:  Results for orders placed or performed during the hospital encounter of 10/05/18   2D Echo w/ Color Flow Doppler   Result Value Ref Range    EF + QEF 65 55 - 65    Diastolic Dysfunction No     Est. PA Systolic Pressure 19.32     Tricuspid Valve Regurgitation MILD          Pre-op Assessment    I have reviewed the Patient Summary Reports.    I have reviewed the NPO Status.      Review of Systems  Anesthesia Hx:  No problems with previous Anesthesia  History of prior surgery of interest to airway management or planning: Previous anesthesia: General Denies Family Hx of Anesthesia complications.   Denies Personal Hx of Anesthesia complications.   Cardiovascular:   Exercise tolerance: good Hypertension    Pulmonary:   Sleep Apnea    Hepatic/GI:   GERD Liver Disease,        Physical Exam  General: Well nourished, Cooperative, Alert and Oriented    Airway:  Mallampati: II   Mouth Opening: Normal  TM Distance: Normal  Neck ROM: Normal ROM    Dental:  Intact    Chest/Lungs:  Clear to auscultation, Normal Respiratory Rate    Heart:  Rate: Normal  Rhythm: Regular Rhythm        Anesthesia Plan  Type of Anesthesia, risks & benefits discussed:    Anesthesia Type: Gen Natural Airway  Intra-op Monitoring Plan: Standard ASA Monitors  Post Op Pain Control Plan: multimodal analgesia  Induction:  IV  Informed Consent: Informed consent signed with the Patient and all parties understand the risks and agree with anesthesia plan.  All questions answered.   ASA Score: 2    Ready For Surgery From Anesthesia Perspective.     .

## 2022-06-13 NOTE — H&P
Short Stay Endoscopy History and Physical    PCP - Wendy Horowitz MD    Procedure - Colonoscopy  ASA - 2  Mallampati - per anesthesia  History of Anesthesia problems - no  Family history Anesthesia problems -  no     HPI:  This is a 61 y.o. female here for evaluation of :     Average Risk Screening: No  High risk screening: yes  History of polyps: yes  Anemia: No  Blood in stools: No  Diarrhea: No  Abdominal Pain: No    Review of Systems:  CONSTITUTIONAL: Denies weight change,  fatigue, fevers, chills, night sweats.  CARDIOVASCULAR: Denies chest pain, shortness of breath, orthopnea and edema.  RESPIRATORY: Denies cough, hemoptysis, dyspnea, and wheezing.  GI: See HPI.    Medical History:  Past Medical History:   Diagnosis Date    Abscess of paraspinous muscles 2018    Allergy 04/2018    Class 1 obesity due to excess calories in adult 07/23/2019    DVT (deep venous thrombosis)     left leg    Epidural abscess 04/10/2018    Fatty liver     GERD (gastroesophageal reflux disease) 2015    History of major abdominal surgery 09/11/2015    Had Hysterectomy , cholecystectomy , rectocele repair    Hypertension     Lumbar radiculopathy     Menopause     Obesity     Obstructive sleep apnea on CPAP     no longer uses CPAP after weight loss from gastric sleeve    Thyroid disease     Thyroid nodules.       Surgical History:   Past Surgical History:   Procedure Laterality Date    ADENOIDECTOMY  1995    BACK SURGERY  2002    L4, L5    BILATERAL SALPINGOOPHORECTOMY      CHOLECYSTECTOMY      CORRECTION OF HAMMER TOE Left 8/5/2021    Procedure: CORRECTION, HAMMER TOE Left 4th mallet toe, DIP joitn fusion;  Surgeon: Guero Alvarez MD;  Location: [facility] OR;  Service: Orthopedics;  Laterality: Left;  [place]    CYST REMOVAL      EPIDURAL STEROID INJECTION INTO CERVICAL SPINE N/A 8/11/2020    Procedure: Injection-steroid-epidural-cervical;  Surgeon: [facility] Diagnostic Provider;  [facility];   Service: Radiology;  Laterality: N/A;  /[place]    EPIDURAL STEROID INJECTION INTO CERVICAL SPINE N/A 3/2/2022    Procedure: Injection-steroid-epidural-cervical C7-T1;  Surgeon: Latoya Quintanilla MD;  Location: [facility] PAIN MGT;  Service: Pain Management;  Laterality: N/A;    ESOPHAGOGASTRODUODENOSCOPY N/A 5/3/2019    Procedure: ESOPHAGOGASTRODUODENOSCOPY (EGD);  Surgeon: Carlin Sanchez MD;  Location: [facility] (4TH FLR);  Service: Endoscopy;  Laterality: N/A;    HYSTERECTOMY  12/17/2012    [facility] BS&O     INJECTION OF JOINT Left 3/1/2021    Procedure: INJECTION, JOINT LEFT HIP INTRA ARTICULAR CORTISONE INJECTION DIRECT REFERRAL;  Surgeon: Chuy Gregory MD;  Location: [facility] [place] PAIN MGT;  Service: Pain Management;  Laterality: Left;  NEEDS CONSENT    INSERTION OF INFERIOR VENA CAVAL FILTER Right 7/19/2019    Procedure: IVC filter placement;  Surgeon: Rodney Rico MD;  Location: [facility] CATH LAB;  Service: Peripheral Vascular;  Laterality: Right;    IVC FILTER RETRIEVAL N/A 12/20/2019    Procedure: REMOVAL-FILTER-IVC;  Surgeon: Rodney Rico MD;  Location: [facility] OR 2ND FLR;  Service: Peripheral Vascular;  Laterality: N/A;    LAPAROSCOPIC SLEEVE GASTRECTOMY N/A 7/23/2019    Procedure: GASTRECTOMY, SLEEVE, LAPAROSCOPIC, with intraop EGD [id number];  Surgeon: Duc Ratliff Jr., MD;  [facility];  Service: General;  Laterality: N/A;    RECTOCELE REPAIR      SPINE SURGERY  2018    THYROID NODULE REMOVAL      TONSILLECTOMY  1995       Family History:   Family History   Problem Relation Age of Onset    Thyroid disease Mother     Hypertension Mother     Hyperlipidemia Mother     Heart disease Mother 55        cad, valvular heart disease    Deep vein thrombosis Mother     Heart disease Father         MI    Stroke Father     Diabetes Sister     Alcohol abuse Sister     Cirrhosis Sister         alcoholic cirrhosis    Epilepsy Sister     Thyroid disease Daughter         Hashimoto's     Hashimoto's thyroiditis Daughter     No Known Problems Daughter     Heart disease Maternal Grandmother     Thyroid disease Maternal Grandmother     Kidney disease Maternal Grandmother     Heart disease Paternal Grandmother         MI    Breast cancer Neg Hx     Colon cancer Neg Hx     Ovarian cancer Neg Hx     Esophageal cancer Neg Hx        Social History:   Social History     Tobacco Use    Smoking status: Former Smoker     Packs/day: 0.25     Years: 32.00     Pack years: 8.00     Types: Cigarettes     Quit date:      Years since quittin.4    Smokeless tobacco: Never Used    Tobacco comment: smoked socially decades ago - weekends only   Substance Use Topics    Alcohol use: No     Comment: yearly at most     Drug use: No       Allergies: Reviewed.    Medications:  No current facility-administered medications on file prior to encounter.     Current Outpatient Medications on File Prior to Encounter   Medication Sig Dispense Refill    hydroCHLOROthiazide (HYDRODIURIL) 12.5 MG Tab TAKE 1 TABLET(12.5 MG) BY MOUTH EVERY DAY 90 tablet 1       Physical Exam:  Vital Signs:   Vitals:    22 1259   BP: 125/75   Pulse: 79   Resp: 16   Temp: 98.2 °F (36.8 °C)     General Appearance: Well appearing in no acute distress  ENT: OP clear  Chest: CTA B  CV: RRR, no m/r/g  Abd: s/nt/nd/nabs  Ext: no edema    Labs:  Reviewed    Imp: Hx of polyps    Plan:  I have explained the risks and benefits of colonoscopy to the patient including but not limited to bleeding, perforation, infection, and death. The patient wishes to proceed with colonoscopy.

## 2022-06-13 NOTE — ANESTHESIA POSTPROCEDURE EVALUATION
Anesthesia Post Evaluation    Patient: Renae Hou    Procedure(s) Performed: Procedure(s) (LRB):  COLONOSCOPY (N/A)    Final Anesthesia Type: general      Patient location during evaluation: GI PACU  Patient participation: Yes- Able to Participate  Level of consciousness: awake and alert  Post-procedure vital signs: reviewed and stable  Pain management: adequate  Airway patency: patent    PONV status at discharge: No PONV  Anesthetic complications: no      Cardiovascular status: blood pressure returned to baseline  Respiratory status: unassisted  Hydration status: euvolemic  Follow-up not needed.          Vitals Value Taken Time   /62 06/13/22 1433   Temp 36.5 °C (97.7 °F) 06/13/22 1433   Pulse 69 06/13/22 1433   Resp 18 06/13/22 1433   SpO2 96 % 06/13/22 1433         No case tracking events are documented in the log.      Pain/Ai Score: Ai Score: 10 (6/13/2022  2:34 PM)

## 2022-06-13 NOTE — PROVATION PATIENT INSTRUCTIONS
Discharge Summary/Instructions after an Endoscopic Procedure  Patient Name: Renae Hou  Patient MRN: [id number]  Patient YOB: 1960  Monday, June 13, 2022  Saul Ureña MD  Dear patient,  As a result of recent federal legislation (The Federal Cures Act), you may   receive lab or pathology results from your procedure in your [facility]   account before your physician is able to contact you. Your physician or   their representative will relay the results to you with their   recommendations at their soonest availability.  Thank you,  RESTRICTIONS:  During your procedure today, you received medications for sedation.  These   medications may affect your judgment, balance and coordination.  Therefore,   for 24 hours, you have the following restrictions:   - DO NOT drive a car, operate machinery, make legal/financial decisions,   sign important papers or drink alcohol.    ACTIVITY:  Today: no heavy lifting, straining or running due to procedural   sedation/anesthesia.  The following day: return to full activity including work.  DIET:  Eat and drink normally unless instructed otherwise.     TREATMENT FOR COMMON SIDE EFFECTS:  - Mild abdominal pain, nausea, belching, bloating or excessive gas:  rest,   eat lightly and use a heating pad.  - Sore Throat: treat with throat lozenges and/or gargle with warm salt   water.  - Because air was used during the procedure, expelling large amounts of air   from your rectum or belching is normal.  - If a bowel prep was taken, you may not have a bowel movement for 1-3 days.    This is normal.  SYMPTOMS TO WATCH FOR AND REPORT TO YOUR PHYSICIAN:  1. Abdominal pain or bloating, other than gas cramps.  2. Chest pain.  3. Back pain.  4. Signs of infection such as: chills or fever occurring within 24 hours   after the procedure.  5. Rectal bleeding, which would show as bright red, maroon, or black stools.   (A tablespoon of blood from the rectum is not serious, especially  if   hemorrhoids are present.)  6. Vomiting.  7. Weakness or dizziness.  GO DIRECTLY TO THE NEAREST EMERGENCY ROOM IF YOU HAVE ANY OF THE FOLLOWING:      Difficulty breathing              Chills and/or fever over 101 F   Persistent vomiting and/or vomiting blood   Severe abdominal pain   Severe chest pain   Black, tarry stools   Bleeding- more than one tablespoon   Any other symptom or condition that you feel may need urgent attention  Your doctor recommends these additional instructions:  If any biopsies were taken, your doctors clinic will contact you in 1 to 2   weeks with any results.  - Discharge patient to home.   - High fiber diet indefinitely.   - Continue present medications.   - No aspirin, ibuprofen, naproxen, or other non-steroidal anti-inflammatory   drugs for 7 days after polyp removal.   - Await pathology results.   - Repeat colonoscopy in 3 months for surveillance after piecemeal   polypectomy.   - Return to referring physician as previously scheduled.   - Patient has a contact number available for emergencies.  The signs and   symptoms of potential delayed complications were discussed with the   patient.  Return to normal activities tomorrow.  Written discharge   instructions were provided to the patient.  For questions, problems or results please call your physician - Saul Ureña MD at Work:  [phone].  [facility], EMERGENCY ROOM PHONE NUMBER: [phone]  IF A COMPLICATION OR EMERGENCY SITUATION ARISES AND YOU ARE UNABLE TO REACH   YOUR PHYSICIAN - GO DIRECTLY TO THE EMERGENCY ROOM.  Saul Ureña MD  6/13/2022 2:32:38 PM  This report has been verified and signed electronically.  Dear patient,  As a result of recent federal legislation (The Federal Cures Act), you may   receive lab or pathology results from your procedure in your [facility]   account before your physician is able to contact you. Your physician or   their representative will relay the results to you  with their   recommendations at their soonest availability.  Thank you,  [employer]ATION

## 2022-06-13 NOTE — TRANSFER OF CARE
"Anesthesia Transfer of Care Note    Patient: Renae Hou    Procedure(s) Performed: Procedure(s) (LRB):  COLONOSCOPY (N/A)    Patient location: PACU    Anesthesia Type: general    Transport from OR: Transported from OR on room air with adequate spontaneous ventilation    Post pain: adequate analgesia    Post assessment: no apparent anesthetic complications    Post vital signs: stable    Level of consciousness: awake    Nausea/Vomiting: no nausea/vomiting    Complications: none    Transfer of care protocol was followed      Last vitals:   Visit Vitals  /62 (BP Location: Left arm, Patient Position: Lying)   Pulse 79   Temp 36.8 °C (98.2 °F) (Temporal)   Resp 16   Ht 5' 5" (1.651 m)   Wt 85.7 kg (189 lb)   LMP 11/25/2012   SpO2 96%   Breastfeeding No   BMI 31.45 kg/m²     "

## 2022-06-14 ENCOUNTER — PATIENT MESSAGE (OUTPATIENT)
Dept: BARIATRICS | Facility: CLINIC | Age: 62
End: 2022-06-14
Payer: COMMERCIAL

## 2022-06-18 ENCOUNTER — PATIENT MESSAGE (OUTPATIENT)
Dept: BARIATRICS | Facility: CLINIC | Age: 62
End: 2022-06-18
Payer: COMMERCIAL

## 2022-06-20 LAB
FINAL PATHOLOGIC DIAGNOSIS: NORMAL
Lab: NORMAL

## 2022-06-21 ENCOUNTER — PATIENT MESSAGE (OUTPATIENT)
Dept: GASTROENTEROLOGY | Facility: CLINIC | Age: 62
End: 2022-06-21
Payer: COMMERCIAL

## 2022-06-21 ENCOUNTER — PATIENT MESSAGE (OUTPATIENT)
Dept: INTERNAL MEDICINE | Facility: CLINIC | Age: 62
End: 2022-06-21
Payer: COMMERCIAL

## 2022-06-21 ENCOUNTER — TELEPHONE (OUTPATIENT)
Dept: INTERNAL MEDICINE | Facility: CLINIC | Age: 62
End: 2022-06-21
Payer: COMMERCIAL

## 2022-06-21 ENCOUNTER — OFFICE VISIT (OUTPATIENT)
Dept: BARIATRICS | Facility: CLINIC | Age: 62
End: 2022-06-21
Payer: COMMERCIAL

## 2022-06-21 VITALS
SYSTOLIC BLOOD PRESSURE: 125 MMHG | WEIGHT: 194.88 LBS | TEMPERATURE: 96 F | OXYGEN SATURATION: 97 % | BODY MASS INDEX: 32.47 KG/M2 | HEIGHT: 65 IN | HEART RATE: 81 BPM | DIASTOLIC BLOOD PRESSURE: 86 MMHG

## 2022-06-21 DIAGNOSIS — D12.6 COLON ADENOMAS: Primary | ICD-10-CM

## 2022-06-21 DIAGNOSIS — Z98.84 S/P LAPAROSCOPIC SLEEVE GASTRECTOMY: Primary | ICD-10-CM

## 2022-06-21 DIAGNOSIS — I10 ESSENTIAL HYPERTENSION: ICD-10-CM

## 2022-06-21 DIAGNOSIS — E66.9 OBESITY, CLASS I, BMI 30.0-34.9 (SEE ACTUAL BMI): ICD-10-CM

## 2022-06-21 PROCEDURE — 3074F SYST BP LT 130 MM HG: CPT | Mod: CPTII,S$GLB,, | Performed by: INTERNAL MEDICINE

## 2022-06-21 PROCEDURE — 99999 PR PBB SHADOW E&M-EST. PATIENT-LVL IV: ICD-10-PCS | Mod: PBBFAC,,, | Performed by: INTERNAL MEDICINE

## 2022-06-21 PROCEDURE — 1160F PR REVIEW ALL MEDS BY PRESCRIBER/CLIN PHARMACIST DOCUMENTED: ICD-10-PCS | Mod: CPTII,S$GLB,, | Performed by: INTERNAL MEDICINE

## 2022-06-21 PROCEDURE — 3044F PR MOST RECENT HEMOGLOBIN A1C LEVEL <7.0%: ICD-10-PCS | Mod: CPTII,S$GLB,, | Performed by: INTERNAL MEDICINE

## 2022-06-21 PROCEDURE — 1160F RVW MEDS BY RX/DR IN RCRD: CPT | Mod: CPTII,S$GLB,, | Performed by: INTERNAL MEDICINE

## 2022-06-21 PROCEDURE — 3008F BODY MASS INDEX DOCD: CPT | Mod: CPTII,S$GLB,, | Performed by: INTERNAL MEDICINE

## 2022-06-21 PROCEDURE — 1159F PR MEDICATION LIST DOCUMENTED IN MEDICAL RECORD: ICD-10-PCS | Mod: CPTII,S$GLB,, | Performed by: INTERNAL MEDICINE

## 2022-06-21 PROCEDURE — 1159F MED LIST DOCD IN RCRD: CPT | Mod: CPTII,S$GLB,, | Performed by: INTERNAL MEDICINE

## 2022-06-21 PROCEDURE — 99999 PR PBB SHADOW E&M-EST. PATIENT-LVL IV: CPT | Mod: PBBFAC,,, | Performed by: INTERNAL MEDICINE

## 2022-06-21 PROCEDURE — 3074F PR MOST RECENT SYSTOLIC BLOOD PRESSURE < 130 MM HG: ICD-10-PCS | Mod: CPTII,S$GLB,, | Performed by: INTERNAL MEDICINE

## 2022-06-21 PROCEDURE — 99214 PR OFFICE/OUTPT VISIT, EST, LEVL IV, 30-39 MIN: ICD-10-PCS | Mod: S$GLB,,, | Performed by: INTERNAL MEDICINE

## 2022-06-21 PROCEDURE — 3079F DIAST BP 80-89 MM HG: CPT | Mod: CPTII,S$GLB,, | Performed by: INTERNAL MEDICINE

## 2022-06-21 PROCEDURE — 3044F HG A1C LEVEL LT 7.0%: CPT | Mod: CPTII,S$GLB,, | Performed by: INTERNAL MEDICINE

## 2022-06-21 PROCEDURE — 3008F PR BODY MASS INDEX (BMI) DOCUMENTED: ICD-10-PCS | Mod: CPTII,S$GLB,, | Performed by: INTERNAL MEDICINE

## 2022-06-21 PROCEDURE — 99214 OFFICE O/P EST MOD 30 MIN: CPT | Mod: S$GLB,,, | Performed by: INTERNAL MEDICINE

## 2022-06-21 PROCEDURE — 3079F PR MOST RECENT DIASTOLIC BLOOD PRESSURE 80-89 MM HG: ICD-10-PCS | Mod: CPTII,S$GLB,, | Performed by: INTERNAL MEDICINE

## 2022-06-21 RX ORDER — BIMATOPROST 3 UG/ML
1 SOLUTION TOPICAL NIGHTLY
COMMUNITY
Start: 2022-05-27 | End: 2022-08-22

## 2022-06-21 RX ORDER — PHENTERMINE HYDROCHLORIDE 37.5 MG/1
37.5 TABLET ORAL
Qty: 30 TABLET | Refills: 2 | Status: SHIPPED | OUTPATIENT
Start: 2022-06-21 | End: 2022-07-21

## 2022-06-21 RX ORDER — OMEPRAZOLE 20 MG/1
20 CAPSULE, DELAYED RELEASE ORAL DAILY
COMMUNITY
Start: 2022-06-20 | End: 2022-08-22

## 2022-06-21 NOTE — PATIENT INSTRUCTIONS
"  Patient warned of common side effects of phentermine including anxiety, insomnia, palpitations and increased blood pressure. It was also explained that it is for short-term usage along with diet and exercise, and that stopping the medication without making lifestyle changes will result in regain of weight. Patient states understanding.     Weight loss medications are controlled substances.  They require routine follow up. Prescription or pills that are lost or destroyed will not be replaced.       Start phentermine with 1/2 pill a day for at least 1 week to see if that will control your appetite.  Go up to a full pill when needed.     - To lose weight you want to cut 100% starchy carbohydrates out of your diet (bread, rice, pasta, potatoes, granola, flour, corn, peas, oatmeal, grits, tortillas, crackers, chips) and get  grams of protein.  Aim for 100 grams of protein daily.    - No soda, sweet tea, juices, sports drinks or lemonade     -Exercise daily. .Exercise is one of those "bite the bullet" propositions. Sometimes you just have to get started. What I suggest is setting a timer for 10 minutes. Do whatever activity you plan to start for the 10 minutes. If the timer goes off and you want to stop, fine. You can stop. If you feel like you want to go on a little longer, you can go on. Do this a few times a week. Eventually you will likely decide to keep going. Every 2 weeks, add 5 more minutes before you give yourself permission to stop.         - Premier Protein (Chocolate, Bananas & Cream, Strawberries & Cream, Vanilla) [place] or [employer]    - Syntrax Kenney from [employer], www.bariatricadvantage.com, www.bariatricchoice.com. (LACTOSE FREE)    - BiPro - Amazon.com (LACTOSE FREE)    - Veggetti Pro from "[employer], [employer]    - www.pinterest.com (cauliflower, cloud bread, quest bar cookies, eggplant, zucchini, zucchini noodles, crustless quiche, no carb meals, taco lettuce boats)    - " http://theworldaccordingtoeggface.[employer]/          Exercise should be some cardiovascular and some resistance training    Add some type of resistance training 2-3 days a week. These can be body weight exercises, light weight or elastic bands. [employer] and [employer] are great sources for free work out plans and videos.       Exercise should be some cardiovascular and some resistance training(see below).      STRENGTHENING  An adequate resistance training program could include the following types of exercise, focusing on the major muscle groups. One can use 5 to 10 pound dumbbells for those exercises that require the use of weight. At home, one can use a household item that provides a comfortable weight, such as a milk carton or beverage container. These exercises can also be performed without weights. Add some type of resistance training 2-3 days a week. These can be body weight exercises, light weight or elastic bands. [employer] and [employer] are great sources for free work out plans and videos.       Chest (Bench Press): Hold the weights with your hands. Lying on a flat surface, with knees bent and feet flat, slowly bring the weights to the chest area with palms facing upward. Begin to exhale and press the weights up, fully extending the arms, and keeping them above your eyes. Inhale as you lower them to the starting position and repeat the movement.  Back (Bent-Over Row): Start by placing your feet shoulder-width apart.  a weight with each hand just outside the knees. Keeping the back straight and the knees flexed, slightly bend forward at the waist. Let the arms hang naturally while holding the weights. From this starting position, pull the weights to the lower abdomen, keeping the elbows close to the body. Exhale as you pull. Return to the starting position, inhaling as you go.  Arms (Bicep Curls): Hold a weight in each hand, with elbows at your sides, palms facing forward. The back should be straight,  "the chest flared outward. Begin to bend your right arm up first while exhaling, keeping the elbow totally stationary. Wait until the right arm goes completely down to the fully extended position, and then begin to curl the left arm. Each arm curled completes one full repetition.  Shoulders (Lateral Raises): Place the feet a few inches apart with the knees bent slightly. Keep the back erect as you lean forward slightly. With the weights in front of your thighs and palms facing together, begin to slowly raise them up to the side until parallel with the floor. Lower the weights to your thighs, and repeat.  Legs (Stiff Leg Dead Lift): Start by standing straight, holding the weights close to your sides, nearly touching your thighs. Keep the weights close to the body--this protects the back. The back must stay straight. Bend at the waist as far forward as you comfortably can while keeping your legs straight, and begin to feel the pull in your hamstrings as you lower the weights toward the ground. Slowly return to the starting position, keeping the back straight.  Legs (Dumbbell Lunge): Hold a weight in each hand, arms hanging at your sides. Step out with one leg, keeping the back straight. It is important to step out far enough so that the knee does not extend over the toe. This puts too much stress on the knee. Go down far enough so that the opposite knee nearly touches the ground. Keep this stance and repeat the lunging motion for several repetitions.  Abdominals: Do not perform standard sit-ups as these could hurt the lower back. Rather, focus on the following 2 exercises: (1) Obliques--Lie on your back with the knees flexed in the bent sit-up position. From this position, bring both knees down to the ground. With the back remaining flat, begin to flex your body toward your toes ("crunch"). Bring your shoulders up off the ground, but go slowly, controlling your momentum. Repeat this for 10 to 15 times. (2) Seated bench " kicks/louie knives--Sit on the end of a bench or chair with the hands placed behind the buttocks. Begin to kick the legs outward with the knees bent slightly--at the same time, lean back to extend the torso. Come back to the beginning position and repeat this motion 10 to 15 times.      Tips for preparing for hurricane season:    If you are on weight loss medications, please take your medication with you in case of evacuation. Injectable medications should be transported with an ice pack if unopened or room temperature if you have started to use them.   Hurricane supplies do not necessarily need to be junk food or high in carbs or sugar. Shelf stable and healthy choices to include in your supplies could include:  Canned/packets of tuna or chicken  Apples, oranges, banana, pears  Beef or turkey jerky  Sugar free pudding  Pickle, olives, dill relish (mix with the tuna or chicken!)  Low sodium or no salt added canned vegetables  If you get bread, get lite bread (40 calories a slice)  0 sugar sports drinks  Water  String cheese will be okay for a few days without refrigeration or in an ice chest.   Peanut or other nut butter.   Parmesan crisps  Pork skins  Protein shakes (20-30g protein, less than 5 g sugar)  Protein bars (15 or more g protein, less than 5 g sugar)  Don't forget disposable forks, spoons, plates in your supplies  If evacuated, manage stress by taking walks, reading or meditating.   If eating out make better choices when possible.   Salads with a lean protein and limited dressing, cheese or other toppings  Grilled chicken sandwich or burger without the bun.   Skip the fries!  Order water or unsweetened tea instead of soda  Grocery stores with a deli, salad/food bar can be a good quick and affordable option to replace fast food

## 2022-06-21 NOTE — PROGRESS NOTES
"Subjective:       Patient ID: Renae Hou is a 61 y.o. female.    Chief Complaint: Follow-up    CC:  Pt here today for follow-up. Last seen 1 year ago.  Has gained 4 lbs, net neg 37 lbs lbs since seen for consult in Jan 2019. 41% EWL from 7/23/19 Evy- sleeve   Had started phentermine at that time.  Last filled 6/14/21.  checked today   She was wanting sweets for a while. She has noted an increase in appetite. She had associated it with allergy medication.   BP is good today.  Does not exercise lately.   Has recently been in contact with RD for information on diverticulosis.   She does not exercise.   Eye exam up to date. NO glaucoma.   Review of Systems   Constitutional: Negative for chills and fever.   Respiratory: Negative for shortness of breath.         Uses CPAP,but not regularly. Mask is uncomfortable   Cardiovascular: Positive for leg swelling. Negative for chest pain.   Gastrointestinal: Positive for constipation. Negative for diarrhea.        Denies GERD   Genitourinary: Positive for urgency. Negative for difficulty urinating and dysuria.   Musculoskeletal: Positive for arthralgias. Negative for back pain.   Neurological: Negative for dizziness and light-headedness.   Psychiatric/Behavioral: Negative for dysphoric mood. The patient is not nervous/anxious.        Objective:     /86   Pulse 81   Temp 96.2 °F (35.7 °C) (Oral)   Ht 5' 5" (1.651 m)   Wt 88.4 kg (194 lb 14.2 oz)   LMP 11/25/2012   SpO2 97%   BMI 32.43 kg/m²    Physical Exam   Constitutional: She is oriented to person, place, and time. She appears well-developed. No distress.   Obese   HENT:   Head: Normocephalic and atraumatic.   .   Eyes: EOM are normal.  No scleral icterus.   Neck: Normal range of motion. Neck supple.    Cardiovascular: Normal rate   Pulmonary/Chest: Effort normal and breath sounds normal. No respiratory distress.   Musculoskeletal: Normal range of motion. She exhibits edema.   Neurological: She is " "alert and oriented to person, place, and time. No cranial nerve deficit.   Skin: Skin is warm and dry. No erythema.   Psychiatric: She has a normal mood and affect. Her behavior is normal. Judgment normal.   Vitals reviewed.      Assessment:       1. S/P laparoscopic sleeve gastrectomy    2. Obesity, Class I, BMI 30.0-34.9 (see actual BMI)    3. Essential hypertension        Plan:         Diagnoses and all orders for this visit:    S/P laparoscopic sleeve gastrectomy    Obesity, Class I, BMI 30.0-34.9 (see actual BMI)  -     phentermine (ADIPEX-P) 37.5 mg tablet; Take 1 tablet (37.5 mg total) by mouth before breakfast.    Essential hypertension  The current medical regimen is effective;  continue present plan and medications. Expect improvement with weight loss.        -  Patient warned of common side effects of phentermine including anxiety, insomnia, palpitations and increased blood pressure. It was also explained that it is for short-term usage along with diet and exercise, and that stopping the medication without making lifestyle changes will result in regain of weight. Patient states understanding.     Weight loss medications are controlled substances.  They require routine follow up. Prescription or pills that are lost or destroyed will not be replaced.       Start phentermine with 1/2 pill a day for at least 1 week to see if that will control your appetite.  Go up to a full pill when needed.     - To lose weight you want to cut 100% starchy carbohydrates out of your diet (bread, rice, pasta, potatoes, granola, flour, corn, peas, oatmeal, grits, tortillas, crackers, chips) and get  grams of protein.  Aim for 100 grams of protein daily.    - No soda, sweet tea, juices, sports drinks or lemonade     -Exercise daily. Exercise is one of those "bite the bullet" propositions. Sometimes you just have to get started. What I suggest is setting a timer for 10 minutes. Do whatever activity you plan to start for the " 10 minutes. If the timer goes off and you want to stop, fine. You can stop. If you feel like you want to go on a little longer, you can go on. Do this a few times a week. Eventually you will likely decide to keep going. Every 2 weeks, add 5 more minutes before you give yourself permission to stop.         Exercise should be some cardiovascular and some resistance training    Exercise handout and hurricane tips given.

## 2022-06-30 ENCOUNTER — DOCUMENTATION ONLY (OUTPATIENT)
Dept: REHABILITATION | Facility: HOSPITAL | Age: 62
End: 2022-06-30
Payer: COMMERCIAL

## 2022-06-30 DIAGNOSIS — M79.10 MYALGIA: Primary | ICD-10-CM

## 2022-06-30 DIAGNOSIS — M25.60 RANGE OF MOTION DEFICIT: ICD-10-CM

## 2022-06-30 NOTE — PROGRESS NOTES
[facility]   Physical Therapy Discharge Summary    Date: 6/30/2022   Name: Renae Hou  [facility] Number: [id number]    Therapy Diagnosis:   Encounter Diagnoses   Name Primary?    Myalgia Yes    Range of motion deficit      Referring Provider: Latoya Quintanilla MD    Initial Visit: 5/10/22  Date of Last Visit: 5/17/22  Total Visits Received: 2  Missed Visits: > 2      Assessment      Pt did not return to therapy after her last visit on 5/17/22. No treatment goals met.    Discharge reason: Patient self discharge  Discharge plan: Continue HEP and Pt to follow-up with MD as planned    Plan      This patient is discharged from Physical Therapy Services.       Cindy Qiu, PT, DPT

## 2022-07-05 ENCOUNTER — PATIENT MESSAGE (OUTPATIENT)
Dept: BARIATRICS | Facility: CLINIC | Age: 62
End: 2022-07-05
Payer: COMMERCIAL

## 2022-07-11 ENCOUNTER — OFFICE VISIT (OUTPATIENT)
Dept: URGENT CARE | Facility: CLINIC | Age: 62
End: 2022-07-11
Payer: COMMERCIAL

## 2022-07-11 VITALS
OXYGEN SATURATION: 95 % | HEIGHT: 65 IN | SYSTOLIC BLOOD PRESSURE: 114 MMHG | DIASTOLIC BLOOD PRESSURE: 84 MMHG | BODY MASS INDEX: 31.16 KG/M2 | HEART RATE: 76 BPM | TEMPERATURE: 98 F | WEIGHT: 187 LBS | RESPIRATION RATE: 18 BRPM

## 2022-07-11 DIAGNOSIS — J34.89 SINUS PRESSURE: ICD-10-CM

## 2022-07-11 DIAGNOSIS — J01.90 ACUTE VIRAL SINUSITIS: Primary | ICD-10-CM

## 2022-07-11 DIAGNOSIS — B97.89 ACUTE VIRAL SINUSITIS: Primary | ICD-10-CM

## 2022-07-11 LAB
CTP QC/QA: YES
SARS-COV-2 RDRP RESP QL NAA+PROBE: NEGATIVE

## 2022-07-11 PROCEDURE — 3079F PR MOST RECENT DIASTOLIC BLOOD PRESSURE 80-89 MM HG: ICD-10-PCS | Mod: CPTII,S$GLB,, | Performed by: NURSE PRACTITIONER

## 2022-07-11 PROCEDURE — 3074F PR MOST RECENT SYSTOLIC BLOOD PRESSURE < 130 MM HG: ICD-10-PCS | Mod: CPTII,S$GLB,, | Performed by: NURSE PRACTITIONER

## 2022-07-11 PROCEDURE — 1159F MED LIST DOCD IN RCRD: CPT | Mod: CPTII,S$GLB,, | Performed by: NURSE PRACTITIONER

## 2022-07-11 PROCEDURE — 1159F PR MEDICATION LIST DOCUMENTED IN MEDICAL RECORD: ICD-10-PCS | Mod: CPTII,S$GLB,, | Performed by: NURSE PRACTITIONER

## 2022-07-11 PROCEDURE — 3008F BODY MASS INDEX DOCD: CPT | Mod: CPTII,S$GLB,, | Performed by: NURSE PRACTITIONER

## 2022-07-11 PROCEDURE — 99213 PR OFFICE/OUTPT VISIT, EST, LEVL III, 20-29 MIN: ICD-10-PCS | Mod: S$GLB,,, | Performed by: NURSE PRACTITIONER

## 2022-07-11 PROCEDURE — 3008F PR BODY MASS INDEX (BMI) DOCUMENTED: ICD-10-PCS | Mod: CPTII,S$GLB,, | Performed by: NURSE PRACTITIONER

## 2022-07-11 PROCEDURE — 3074F SYST BP LT 130 MM HG: CPT | Mod: CPTII,S$GLB,, | Performed by: NURSE PRACTITIONER

## 2022-07-11 PROCEDURE — U0002: ICD-10-PCS | Mod: QW,S$GLB,, | Performed by: NURSE PRACTITIONER

## 2022-07-11 PROCEDURE — 3044F HG A1C LEVEL LT 7.0%: CPT | Mod: CPTII,S$GLB,, | Performed by: NURSE PRACTITIONER

## 2022-07-11 PROCEDURE — U0002 COVID-19 LAB TEST NON-CDC: HCPCS | Mod: QW,S$GLB,, | Performed by: NURSE PRACTITIONER

## 2022-07-11 PROCEDURE — 99213 OFFICE O/P EST LOW 20 MIN: CPT | Mod: S$GLB,,, | Performed by: NURSE PRACTITIONER

## 2022-07-11 PROCEDURE — 3079F DIAST BP 80-89 MM HG: CPT | Mod: CPTII,S$GLB,, | Performed by: NURSE PRACTITIONER

## 2022-07-11 PROCEDURE — 3044F PR MOST RECENT HEMOGLOBIN A1C LEVEL <7.0%: ICD-10-PCS | Mod: CPTII,S$GLB,, | Performed by: NURSE PRACTITIONER

## 2022-07-11 NOTE — PROGRESS NOTES
"Subjective:       Patient ID: Renae Hou is a 61 y.o. female.    Vitals:  height is 5' 5" (1.651 m) and weight is 84.8 kg (187 lb). Her temperature is 97.6 °F (36.4 °C). Her blood pressure is 114/84 and her pulse is 76. Her respiration is 18 and oxygen saturation is 95%.     Chief Complaint: Headache    Pt complains x2 days of headache, sinus pressure, nasal congestion, post nasal drip, earache bilateral.     Headache   This is a new problem. The current episode started yesterday. The problem occurs constantly. The problem has been unchanged. The pain does not radiate. The pain is at a severity of 6/10. The pain is mild. Associated symptoms include sinus pressure. Pertinent negatives include no abdominal pain, abnormal behavior, anorexia, back pain, blurred vision, coughing, dizziness, drainage, ear pain, eye pain, eye redness, eye watering, facial sweating, fever, hearing loss, insomnia, loss of balance, muscle aches, nausea, neck pain, numbness, phonophobia, photophobia, rhinorrhea, scalp tenderness, seizures, sore throat, swollen glands, tingling, tinnitus, visual change, vomiting, weakness or weight loss.       Constitution: Negative for fever.   HENT: Positive for sinus pressure. Negative for ear pain, tinnitus, hearing loss and sore throat.    Neck: Negative for neck pain.   Eyes: Negative for eye pain, eye redness, photophobia and blurred vision.   Respiratory: Negative for cough.    Gastrointestinal: Negative for abdominal pain, nausea and vomiting.   Musculoskeletal: Negative for back pain.   Neurological: Positive for headaches. Negative for dizziness, loss of balance, numbness and seizures.   Psychiatric/Behavioral: The patient does not have insomnia.        Objective:      Physical Exam   HENT:   Head: Normocephalic and atraumatic.   Ears:   Right Ear: Tympanic membrane, external ear and ear canal normal.   Left Ear: Tympanic membrane, external ear and ear canal normal.   Nose: Congestion present. " "  Mouth/Throat: Mucous membranes are moist. Oropharynx is clear.   Eyes: Extraocular movement intact   Pulmonary/Chest: Effort normal. No respiratory distress.   Abdominal: Normal appearance.   Neurological: no focal deficit. She is alert.   Skin: Skin is warm.   Nursing note and vitals reviewed.        Results for orders placed or performed in visit on 07/11/22   POCT COVID-19 Rapid Screening   Result Value Ref Range    POC Rapid COVID Negative Negative     Acceptable Yes      *Note: Due to a large number of results and/or encounters for the requested time period, some results have not been displayed. A complete set of results can be found in Results Review.     Assessment:       1. Acute viral sinusitis    2. Sinus pressure          Plan:     Coricidin HBP for symptomatic treatment    Acute viral sinusitis    Sinus pressure  -     POCT COVID-19 Rapid Screening                 Patient Instructions                                                            URI   If your condition worsens or fails to improve we recommend that you receive another evaluation at the ER immediately or contact your PCP to discuss your concerns or return here. You must understand that you've received an urgent care treatment only and that you may be released before all your medical problems are known or treated. You the patient will arrange for follouwp care as instructed.   If we discussed that I think your illness is viral, it will not respond to antibiotics and will last 5-7 days. If we discussed "wait and see" antibiotics and if over the next few days the symptoms worsen start the antibiotics I have given you.   -  If you are female and on BCP and do take the antibiotics, use additional methods to prevent pregnancy while on the antibiotics and for one cycle after.   -  Flonase (fluticasone) is a nasal spray which is available over the counter and may help with your symptoms.   -  Zyrtec D, Claritin D or Allegra D can " "also help with symptoms of congestion and drainage.   -  If you have hypertension avoid using the "D" which is the decongestant.  Instead you can use Coricidin HBP for cold and cough symptoms.    -  If you just have drainage you can take plain Zyrtec, Claritin or Allegra   -  If you just have a congested feeling you can take pseudoephedrine (unless you have high blood pressure) which you have to sign for behind the counter. Do not buy the phenylephrine which is on the shelf as it is not effective   -  Rest and fluids are also important.   -  Tylenol or ibuprofen can also be used as directed for pain unless you have an allergy to them or medical condition such as stomach ulcers, kidney or liver disease or blood thinners etc for which you should not be taking these type of medications.   -  If you are flying in the next few days Afrin nose drops for the airplane flight upon take off and landing may help. Other than at those times refrain from using afrin.   - If you were prescribed a narcotic do not drive or operate heavy machinery while taking these medications.          "

## 2022-08-02 ENCOUNTER — PATIENT MESSAGE (OUTPATIENT)
Dept: BARIATRICS | Facility: CLINIC | Age: 62
End: 2022-08-02
Payer: COMMERCIAL

## 2022-08-04 ENCOUNTER — PATIENT MESSAGE (OUTPATIENT)
Dept: BARIATRICS | Facility: CLINIC | Age: 62
End: 2022-08-04
Payer: COMMERCIAL

## 2022-08-07 ENCOUNTER — PATIENT MESSAGE (OUTPATIENT)
Dept: ADMINISTRATIVE | Facility: OTHER | Age: 62
End: 2022-08-07
Payer: COMMERCIAL

## 2022-08-07 ENCOUNTER — PATIENT MESSAGE (OUTPATIENT)
Dept: INTERNAL MEDICINE | Facility: CLINIC | Age: 62
End: 2022-08-07
Payer: COMMERCIAL

## 2022-08-07 DIAGNOSIS — Z87.898 HISTORY OF PERIPHERAL EDEMA: ICD-10-CM

## 2022-08-07 DIAGNOSIS — I10 ESSENTIAL HYPERTENSION: ICD-10-CM

## 2022-08-08 RX ORDER — HYDROCHLOROTHIAZIDE 12.5 MG/1
12.5 TABLET ORAL DAILY
Qty: 90 TABLET | Refills: 1 | Status: SHIPPED | OUTPATIENT
Start: 2022-08-08 | End: 2023-01-23

## 2022-08-08 NOTE — TELEPHONE ENCOUNTER
Refill Routing Note   Medication(s) are not appropriate for processing by [facility] for the following reason(s):      - Medication not previously prescribed by PCP    [facility] action(s):  Defer          Medication reconciliation completed: No     Appointments  past 12m or future 3m with PCP    Date Provider   Last Visit   5/6/2022 Wendy Horowitz MD   Next Visit   Visit date not found Wendy Horowitz MD   ED visits in past 90 days: 0        Note composed:10:30 AM 08/08/2022

## 2022-08-08 NOTE — TELEPHONE ENCOUNTER
No new care gaps identified.  [facility] Embedded Care Gaps. Reference number: [id number]. 8/08/2022   8:41:21 AM HEAVENT

## 2022-08-09 ENCOUNTER — PATIENT MESSAGE (OUTPATIENT)
Dept: BARIATRICS | Facility: CLINIC | Age: 62
End: 2022-08-09
Payer: COMMERCIAL

## 2022-08-14 ENCOUNTER — PATIENT MESSAGE (OUTPATIENT)
Dept: INTERNAL MEDICINE | Facility: CLINIC | Age: 62
End: 2022-08-14
Payer: COMMERCIAL

## 2022-08-14 DIAGNOSIS — M25.50 ARTHRALGIA, UNSPECIFIED JOINT: Primary | ICD-10-CM

## 2022-08-15 ENCOUNTER — PATIENT MESSAGE (OUTPATIENT)
Dept: GASTROENTEROLOGY | Facility: CLINIC | Age: 62
End: 2022-08-15
Payer: COMMERCIAL

## 2022-08-15 ENCOUNTER — PATIENT MESSAGE (OUTPATIENT)
Dept: INTERNAL MEDICINE | Facility: CLINIC | Age: 62
End: 2022-08-15
Payer: COMMERCIAL

## 2022-08-15 DIAGNOSIS — D12.6 TUBULAR ADENOMA OF COLON: Primary | ICD-10-CM

## 2022-08-20 NOTE — TELEPHONE ENCOUNTER
For joint symptoms such as this I recommend Rheumatology not Endocrinology    Endocrinology is for hormones, thyroid etc    I placed Rheumatology referral. Let me know if she also wants Endocrinology for some reason

## 2022-08-20 NOTE — TELEPHONE ENCOUNTER
Spoke to patient and advised of recommendation. Patient verbalized understanding.    appt scheduled     patient does not need referral for endocrinology

## 2022-08-22 ENCOUNTER — PATIENT MESSAGE (OUTPATIENT)
Dept: ENDOSCOPY | Facility: HOSPITAL | Age: 62
End: 2022-08-22
Payer: COMMERCIAL

## 2022-08-22 ENCOUNTER — OFFICE VISIT (OUTPATIENT)
Dept: RHEUMATOLOGY | Facility: CLINIC | Age: 62
End: 2022-08-22
Payer: COMMERCIAL

## 2022-08-22 VITALS
HEART RATE: 93 BPM | SYSTOLIC BLOOD PRESSURE: 117 MMHG | BODY MASS INDEX: 33.49 KG/M2 | TEMPERATURE: 99 F | DIASTOLIC BLOOD PRESSURE: 80 MMHG | WEIGHT: 201.25 LBS

## 2022-08-22 DIAGNOSIS — E66.09 CLASS 1 OBESITY DUE TO EXCESS CALORIES WITH SERIOUS COMORBIDITY AND BODY MASS INDEX (BMI) OF 33.0 TO 33.9 IN ADULT: ICD-10-CM

## 2022-08-22 DIAGNOSIS — Z98.890 HISTORY OF LUMBAR SURGERY: ICD-10-CM

## 2022-08-22 DIAGNOSIS — M25.50 ARTHRALGIA, UNSPECIFIED JOINT: ICD-10-CM

## 2022-08-22 DIAGNOSIS — M25.50 POLYARTHRALGIA: Primary | ICD-10-CM

## 2022-08-22 DIAGNOSIS — M54.12 CERVICAL RADICULOPATHY: ICD-10-CM

## 2022-08-22 PROBLEM — G89.29 HIP PAIN, CHRONIC, LEFT: Status: RESOLVED | Noted: 2020-06-15 | Resolved: 2022-08-22

## 2022-08-22 PROBLEM — M25.552 HIP PAIN, CHRONIC, LEFT: Status: RESOLVED | Noted: 2020-06-15 | Resolved: 2022-08-22

## 2022-08-22 PROCEDURE — 1160F PR REVIEW ALL MEDS BY PRESCRIBER/CLIN PHARMACIST DOCUMENTED: ICD-10-PCS | Mod: CPTII,S$GLB,, | Performed by: INTERNAL MEDICINE

## 2022-08-22 PROCEDURE — 1159F PR MEDICATION LIST DOCUMENTED IN MEDICAL RECORD: ICD-10-PCS | Mod: CPTII,S$GLB,, | Performed by: INTERNAL MEDICINE

## 2022-08-22 PROCEDURE — 99204 PR OFFICE/OUTPT VISIT, NEW, LEVL IV, 45-59 MIN: ICD-10-PCS | Mod: S$GLB,,, | Performed by: INTERNAL MEDICINE

## 2022-08-22 PROCEDURE — 1159F MED LIST DOCD IN RCRD: CPT | Mod: CPTII,S$GLB,, | Performed by: INTERNAL MEDICINE

## 2022-08-22 PROCEDURE — 99999 PR PBB SHADOW E&M-EST. PATIENT-LVL IV: CPT | Mod: PBBFAC,,, | Performed by: INTERNAL MEDICINE

## 2022-08-22 PROCEDURE — 3074F SYST BP LT 130 MM HG: CPT | Mod: CPTII,S$GLB,, | Performed by: INTERNAL MEDICINE

## 2022-08-22 PROCEDURE — 3079F DIAST BP 80-89 MM HG: CPT | Mod: CPTII,S$GLB,, | Performed by: INTERNAL MEDICINE

## 2022-08-22 PROCEDURE — 1160F RVW MEDS BY RX/DR IN RCRD: CPT | Mod: CPTII,S$GLB,, | Performed by: INTERNAL MEDICINE

## 2022-08-22 PROCEDURE — 3044F PR MOST RECENT HEMOGLOBIN A1C LEVEL <7.0%: ICD-10-PCS | Mod: CPTII,S$GLB,, | Performed by: INTERNAL MEDICINE

## 2022-08-22 PROCEDURE — 3074F PR MOST RECENT SYSTOLIC BLOOD PRESSURE < 130 MM HG: ICD-10-PCS | Mod: CPTII,S$GLB,, | Performed by: INTERNAL MEDICINE

## 2022-08-22 PROCEDURE — 3044F HG A1C LEVEL LT 7.0%: CPT | Mod: CPTII,S$GLB,, | Performed by: INTERNAL MEDICINE

## 2022-08-22 PROCEDURE — 99999 PR PBB SHADOW E&M-EST. PATIENT-LVL IV: ICD-10-PCS | Mod: PBBFAC,,, | Performed by: INTERNAL MEDICINE

## 2022-08-22 PROCEDURE — 99204 OFFICE O/P NEW MOD 45 MIN: CPT | Mod: S$GLB,,, | Performed by: INTERNAL MEDICINE

## 2022-08-22 PROCEDURE — 3008F PR BODY MASS INDEX (BMI) DOCUMENTED: ICD-10-PCS | Mod: CPTII,S$GLB,, | Performed by: INTERNAL MEDICINE

## 2022-08-22 PROCEDURE — 3079F PR MOST RECENT DIASTOLIC BLOOD PRESSURE 80-89 MM HG: ICD-10-PCS | Mod: CPTII,S$GLB,, | Performed by: INTERNAL MEDICINE

## 2022-08-22 PROCEDURE — 3008F BODY MASS INDEX DOCD: CPT | Mod: CPTII,S$GLB,, | Performed by: INTERNAL MEDICINE

## 2022-08-22 RX ORDER — DULOXETIN HYDROCHLORIDE 30 MG/1
30 CAPSULE, DELAYED RELEASE ORAL DAILY
Qty: 30 CAPSULE | Refills: 11 | Status: SHIPPED | OUTPATIENT
Start: 2022-08-22 | End: 2022-12-22

## 2022-08-22 RX ORDER — TIZANIDINE 4 MG/1
4 TABLET ORAL NIGHTLY
COMMUNITY
Start: 2022-08-19 | End: 2022-12-22

## 2022-08-22 ASSESSMENT — ROUTINE ASSESSMENT OF PATIENT INDEX DATA (RAPID3)
PAIN SCORE: 7.5
MDHAQ FUNCTION SCORE: 0.1
FATIGUE SCORE: 8
TOTAL RAPID3 SCORE: 3.28
PATIENT GLOBAL ASSESSMENT SCORE: 2
WHEN YOU AWAKENED IN THE MORNING OVER THE LAST WEEK, PLEASE INDICATE THE AMOUNT OF TIME IT TAKES UNTIL YOU ARE AS LIMBER AS YOU WILL BE FOR THE DAY: 2
PSYCHOLOGICAL DISTRESS SCORE: 2.2
AM STIFFNESS SCORE: 1, YES

## 2022-08-22 NOTE — PROGRESS NOTES
Rapid3 Question Responses and Scores 8/20/2022   MDHAQ Score 0.1   Psychologic Score 2.2   Pain Score 7.5   When you awakened in the morning OVER THE LAST WEEK, did you feel stiff? Yes   If Yes, please indicate the number of hours until you are as limber as you will be for the day 2   Fatigue Score 8   Global Health Score 2   RAPID3 Score 3.28       Answers for HPI/ROS submitted by the patient on 8/20/2022  fever: No  eye redness: No  mouth sores: Yes  headaches: Yes  shortness of breath: No  chest pain: No  trouble swallowing: No  diarrhea: No  constipation: No  unexpected weight change: No  genital sore: No  dysuria: No  During the last 3 days, have you had a skin rash?: No  Bruises or bleeds easily: Yes  cough: No

## 2022-08-22 NOTE — ASSESSMENT & PLAN NOTE
Polyarthralgia without structural changes or lab abnormalities  Borderline scores for fibromyalgia on SSS/WPI  Suspect some central sensitization  Discussed absence of inflammatory changes and very limited degenerative/mechanical causes of pain    She wants to stop tizanidine; feels it makes her more fatigued    Would treat as fibrom with non-med management including stress mgt, sleep hygiene, nutrition, low impact exercise  Try duloxetine 30 mg/d  Cont ibuprofen prn

## 2022-08-22 NOTE — PROGRESS NOTES
Subjective:       Patient ID: Renae Hou is a 61 y.o. female.    Chief Complaint: Disease Management    HPI   [place] [employer] [place]   Administrative work; sit/stand desk    Has diffuse joint pain in all of her joints but feels it is more frequent   Neck and upper back  Knees, elbows every night  Tosses and turns and awake half the night  Awakens very tired  Hx low back pain relieved by 2018 lumbar fusion L4,5,S1; not much pain there now    Uses salonpas upper back  Uses heat upper back  Takes motrin; works better than tylenol; takes at bedtime and when awakens during night  Tizanidine knocks her out but she awakens drowsy  Takes vitamin D, Zn, collagen    Hx of meloxicam but quit when had stomach surgery  Hx gastric sleeve 2019 ; 230+ before surgery, then was down to 180    Told she has CTS  Says hands were swollen and puffy other day  For a while had color change fingers; white ; after COVID in January    Also tinnitus is worse lately  Says hardly eats   Walks at work  Has 2 grandchildren when gets home    April 2022 lab CBC, CMP, negative MIGUELINA, CCP, RF     a recovering alcoholic so she no longer drinks    Review of Systems   Constitutional: Positive for fatigue. Negative for fever and unexpected weight change.   HENT: Positive for mouth sores. Negative for trouble swallowing.    Eyes: Negative for redness.   Respiratory: Negative for cough and shortness of breath.    Cardiovascular: Negative for chest pain.   Gastrointestinal: Negative for constipation and diarrhea.   Genitourinary: Negative for dysuria and genital sores.   Skin: Negative for rash.   Neurological: Positive for headaches.   Hematological: Bruises/bleeds easily.         Objective:   /80   Pulse 93   Temp 99.1 °F (37.3 °C)   Wt 91.3 kg (201 lb 4.5 oz)   LMP 11/25/2012   BMI 33.49 kg/m²      Physical Exam      Assessment:       1. Polyarthralgia    2. Arthralgia, unspecified joint    3. History of lumbar surgery    4. Class 1  obesity due to excess calories with serious comorbidity and body mass index (BMI) of 33.0 to 33.9 in adult    5. Cervical radiculopathy            Plan:       Problem List Items Addressed This Visit        Active Problems    Polyarthralgia - Primary     Polyarthralgia without structural changes or lab abnormalities  Borderline scores for fibromyalgia on SSS/WPI  Suspect some central sensitization  Discussed absence of inflammatory changes and very limited degenerative/mechanical causes of pain    She wants to stop tizanidine; feels it makes her more fatigued    Would treat as fibrom with non-med management including stress mgt, sleep hygiene, nutrition, low impact exercise  Try duloxetine 30 mg/d  Cont ibuprofen prn           Relevant Medications    DULoxetine (CYMBALTA) 30 MG capsule    History of lumbar surgery    Cervical radiculopathy    Obesity due to excess calories with serious comorbidity     Discussed anti-inflammatory diet and gave handout             Other Visit Diagnoses     Arthralgia, unspecified joint                                            Widespread pain index  Note the areas which the patient has had pain over the last week:                   Shoulder-girdle, left x               Shoulder-girdle, right x                         Upper arm left                       Upper arm right                         Lower arm left                       Lower arm right    Hip (buttock, trochanter) left  Hip (buttock, trochanter) right                           Upper leg, left                         Upper leg, right                           Lower leg, left                         Lower leg, right                                     Jaw, left                                   Jaw, right                                        Chest                                  Abdomen                               Upper back x                              Lower back x                                        Neck x  Score  will be from 0-19: 5                                         Symptom severity score  Fatigue 1-2  Waking Unrefreshed 2  Cognitive Symptoms   0 = no problem, 1=slight or mild problem 2= moderate; considerable problems often present and/or at a moderate level, 3 = severe, pervasive, continuous, life disturbing problem  For each of the 3 symptoms, indicate the level of severity over the past week using the Scale.  The symptom severity score is the sum of the severity of the 3 symptoms (fatigue, waking unrefreshed, and cognitive symptoms) plus the number of the following symptoms occurring during the previous 6 months:   Headaches x  Pain or cramps in the lower abdomen 0  Depression 0  The final score is between 0 and 12: 4-5                                      Criteria  Patient has fibromyalgia if the following 3 conditions are met:  1.  Widespread pain index greater than or equal to 7 and symptom severity score greater than or equal to 5 or widespread pain index between 3- 6, and symptom severity score greater than or equal to 9.    2.  Symptoms have been present in a similar level for at least 3 months  3.  The patient does not have a disorder that would otherwise sufficiently explain the pain

## 2022-09-07 ENCOUNTER — PATIENT MESSAGE (OUTPATIENT)
Dept: BARIATRICS | Facility: CLINIC | Age: 62
End: 2022-09-07
Payer: COMMERCIAL

## 2022-09-20 ENCOUNTER — TELEPHONE (OUTPATIENT)
Dept: OTOLARYNGOLOGY | Facility: CLINIC | Age: 62
End: 2022-09-20

## 2022-09-22 ENCOUNTER — PATIENT MESSAGE (OUTPATIENT)
Dept: OTOLARYNGOLOGY | Facility: CLINIC | Age: 62
End: 2022-09-22
Payer: COMMERCIAL

## 2022-09-23 ENCOUNTER — TELEPHONE (OUTPATIENT)
Dept: OTOLARYNGOLOGY | Facility: CLINIC | Age: 62
End: 2022-09-23
Payer: COMMERCIAL

## 2022-09-23 DIAGNOSIS — R49.0 DYSPHONIA: Primary | ICD-10-CM

## 2022-09-23 NOTE — TELEPHONE ENCOUNTER
Called patient to reschedule appointment. Appointment rescheduled, patient ok with new date and time.

## 2022-09-26 ENCOUNTER — IMMUNIZATION (OUTPATIENT)
Dept: PHARMACY | Facility: CLINIC | Age: 62
End: 2022-09-26
Payer: COMMERCIAL

## 2022-10-04 ENCOUNTER — HOSPITAL ENCOUNTER (OUTPATIENT)
Facility: HOSPITAL | Age: 62
Discharge: HOME | End: 2022-10-04
Attending: INTERNAL MEDICINE | Admitting: INTERNAL MEDICINE
Payer: COMMERCIAL

## 2022-10-04 ENCOUNTER — ANESTHESIA EVENT (OUTPATIENT)
Dept: ENDOSCOPY | Facility: HOSPITAL | Age: 62
End: 2022-10-04
Payer: COMMERCIAL

## 2022-10-04 ENCOUNTER — ANESTHESIA (OUTPATIENT)
Dept: ENDOSCOPY | Facility: HOSPITAL | Age: 62
End: 2022-10-04
Payer: COMMERCIAL

## 2022-10-04 VITALS
OXYGEN SATURATION: 99 % | BODY MASS INDEX: 31.49 KG/M2 | HEART RATE: 67 BPM | DIASTOLIC BLOOD PRESSURE: 70 MMHG | HEIGHT: 65 IN | TEMPERATURE: 98 F | WEIGHT: 189 LBS | RESPIRATION RATE: 23 BRPM | SYSTOLIC BLOOD PRESSURE: 117 MMHG

## 2022-10-04 DIAGNOSIS — Z86.010 HISTORY OF COLON POLYPS: ICD-10-CM

## 2022-10-04 PROCEDURE — 45385 PR COLONOSCOPY,REMV LESN,SNARE: ICD-10-PCS | Mod: 33,,, | Performed by: INTERNAL MEDICINE

## 2022-10-04 PROCEDURE — 45385 COLONOSCOPY W/LESION REMOVAL: CPT | Mod: PT | Performed by: INTERNAL MEDICINE

## 2022-10-04 PROCEDURE — 37000008 HC ANESTHESIA 1ST 15 MINUTES: Performed by: INTERNAL MEDICINE

## 2022-10-04 PROCEDURE — E9220 PRA ENDO ANESTHESIA: ICD-10-PCS | Mod: 33,,, | Performed by: NURSE ANESTHETIST, CERTIFIED REGISTERED

## 2022-10-04 PROCEDURE — 37000009 HC ANESTHESIA EA ADD 15 MINS: Performed by: INTERNAL MEDICINE

## 2022-10-04 PROCEDURE — 45385 COLONOSCOPY W/LESION REMOVAL: CPT | Mod: 33,,, | Performed by: INTERNAL MEDICINE

## 2022-10-04 PROCEDURE — 25000003 PHARM REV CODE 250: Performed by: INTERNAL MEDICINE

## 2022-10-04 PROCEDURE — 88305 TISSUE EXAM BY PATHOLOGIST: CPT | Mod: 26,,, | Performed by: PATHOLOGY

## 2022-10-04 PROCEDURE — 88305 TISSUE EXAM BY PATHOLOGIST: ICD-10-PCS | Mod: 26,,, | Performed by: PATHOLOGY

## 2022-10-04 PROCEDURE — 27201089 HC SNARE, DISP (ANY): Performed by: INTERNAL MEDICINE

## 2022-10-04 PROCEDURE — E9220 PRA ENDO ANESTHESIA: HCPCS | Mod: 33,,, | Performed by: NURSE ANESTHETIST, CERTIFIED REGISTERED

## 2022-10-04 PROCEDURE — 88305 TISSUE EXAM BY PATHOLOGIST: CPT | Mod: 59 | Performed by: PATHOLOGY

## 2022-10-04 RX ORDER — SODIUM CHLORIDE 9 MG/ML
INJECTION, SOLUTION INTRAVENOUS CONTINUOUS
Status: DISCONTINUED | OUTPATIENT
Start: 2022-10-04 | End: 2022-10-04 | Stop reason: HOSPADM

## 2022-10-04 RX ORDER — PROPOFOL 10 MG/ML
INJECTION, EMULSION INTRAVENOUS CONTINUOUS PRN
Status: DISCONTINUED | OUTPATIENT
Start: 2022-10-04 | End: 2022-10-04

## 2022-10-04 RX ORDER — PROPOFOL 10 MG/ML
INJECTION, EMULSION INTRAVENOUS
Status: DISCONTINUED | OUTPATIENT
Start: 2022-10-04 | End: 2022-10-04

## 2022-10-04 RX ORDER — LIDOCAINE HCL/PF 100 MG/5ML
SYRINGE (ML) INTRAVENOUS
Status: DISCONTINUED | OUTPATIENT
Start: 2022-10-04 | End: 2022-10-04

## 2022-10-04 RX ADMIN — Medication 60 MG: at 09:10

## 2022-10-04 RX ADMIN — SODIUM CHLORIDE: 0.9 INJECTION, SOLUTION INTRAVENOUS at 08:10

## 2022-10-04 RX ADMIN — PROPOFOL 60 MG: 10 INJECTION, EMULSION INTRAVENOUS at 09:10

## 2022-10-04 RX ADMIN — PROPOFOL 150 MCG/KG/MIN: 10 INJECTION, EMULSION INTRAVENOUS at 09:10

## 2022-10-04 RX ADMIN — PROPOFOL 20 MG: 10 INJECTION, EMULSION INTRAVENOUS at 09:10

## 2022-10-04 RX ADMIN — PROPOFOL 30 MG: 10 INJECTION, EMULSION INTRAVENOUS at 09:10

## 2022-10-04 NOTE — H&P
Short Stay Endoscopy History and Physical    PCP - Wendy Horowitz MD    Procedure - Colonoscopy  ASA - 2  Mallampati - per anesthesia  History of Anesthesia problems - no  Family history Anesthesia problems -  no     HPI:  This is a 61 y.o. female here for evaluation of :     Average Risk Screening: No  High risk screening: yes  History of polyps: yes  Anemia: No  Blood in stools: No  Diarrhea: No  Abdominal Pain: No    Review of Systems:  CONSTITUTIONAL: Denies weight change,  fatigue, fevers, chills, night sweats.  CARDIOVASCULAR: Denies chest pain, shortness of breath, orthopnea and edema.  RESPIRATORY: Denies cough, hemoptysis, dyspnea, and wheezing.  GI: See HPI.    Medical History:  Past Medical History:   Diagnosis Date    Abscess of paraspinous muscles 2018    Allergy 04/2018    Class 1 obesity due to excess calories in adult 07/23/2019    DVT (deep venous thrombosis)     left leg    Epidural abscess 2018 04/10/2018    Fatty liver     GERD (gastroesophageal reflux disease) 2015    History of major abdominal surgery 09/11/2015    Had Hysterectomy , cholecystectomy , rectocele repair    Hypertension     Lumbar radiculopathy     Menopause     Obesity     Obstructive sleep apnea on CPAP     no longer uses CPAP after weight loss from gastric sleeve    Thyroid disease     Thyroid nodules.       Surgical History:   Past Surgical History:   Procedure Laterality Date    ADENOIDECTOMY  1995    BACK SURGERY  2002    L4, L5    BILATERAL SALPINGOOPHORECTOMY      CHOLECYSTECTOMY      COLONOSCOPY N/A 6/13/2022    Procedure: COLONOSCOPY;  Surgeon: Saul Ureña MD;  Location: [address];  Service: Endoscopy;  Laterality: N/A;  constipation protocol-extended Miralax prep - PEG allergy -okay with taking Miralax  fully vaccinated, prep instr portal -ml    CORRECTION OF HAMMER TOE Left 8/5/2021    Procedure: CORRECTION, HAMMER TOE Left 4th mallet toe, DIP joitn fusion;  Surgeon: Guero Alvarez MD;   Location: [facility] OR;  Service: Orthopedics;  Laterality: Left;  K-wires    CYST REMOVAL      EPIDURAL STEROID INJECTION INTO CERVICAL SPINE N/A 8/11/2020    Procedure: Injection-steroid-epidural-cervical;  Surgeon: April Diagnostic Provider;  Location: [facility] 2ND FLR;  Service: Radiology;  Laterality: N/A;  /Carla    EPIDURAL STEROID INJECTION INTO CERVICAL SPINE N/A 3/2/2022    Procedure: Injection-steroid-epidural-cervical C7-T1;  Surgeon: Latoya Quintanilla MD;  Location: [facility] PAIN MGT;  Service: Pain Management;  Laterality: N/A;    ESOPHAGOGASTRODUODENOSCOPY N/A 5/3/2019    Procedure: ESOPHAGOGASTRODUODENOSCOPY (EGD);  Surgeon: Carlin Sanchez MD;  Location: [facility] (4TH FLR);  Service: Endoscopy;  Laterality: N/A;    HYSTERECTOMY  12/17/2012    [facility] BS&O     INJECTION OF JOINT Left 3/1/2021    Procedure: INJECTION, JOINT LEFT HIP INTRA ARTICULAR CORTISONE INJECTION DIRECT REFERRAL;  Surgeon: Chuy Gregory MD;  Location: [facility] PAIN MGT;  Service: Pain Management;  Laterality: Left;  NEEDS CONSENT    INSERTION OF INFERIOR VENA CAVAL FILTER Right 7/19/2019    Procedure: IVC filter placement;  Surgeon: Rodney Rico MD;  Location: [facility] CATH LAB;  Service: Peripheral Vascular;  Laterality: Right;    IVC FILTER RETRIEVAL N/A 12/20/2019    Procedure: REMOVAL-FILTER-IVC;  Surgeon: Rodney Rico MD;  Location: [facility] 2ND FLR;  Service: Peripheral Vascular;  Laterality: N/A;    LAPAROSCOPIC SLEEVE GASTRECTOMY N/A 7/23/2019    Procedure: GASTRECTOMY, SLEEVE, LAPAROSCOPIC, with intraop EGD [id number];  Surgeon: Duc Ratliff Jr., MD;  Location: [facility] 2ND FLR;  Service: General;  Laterality: N/A;    RECTOCELE REPAIR      SPINE SURGERY  2018    THYROID NODULE REMOVAL      TONSILLECTOMY  1995       Family History:   Family History   Problem Relation Age of Onset    Thyroid disease Mother     Hypertension Mother     Hyperlipidemia Mother     Heart disease Mother 55        cad, valvular heart disease     Deep vein thrombosis Mother     Heart disease Father         MI    Stroke Father     Diabetes Sister     Alcohol abuse Sister     Cirrhosis Sister         alcoholic cirrhosis    Epilepsy Sister     Thyroid disease Daughter         Hashimoto's    Hashimoto's thyroiditis Daughter     No Known Problems Daughter     Heart disease Maternal Grandmother     Thyroid disease Maternal Grandmother     Kidney disease Maternal Grandmother     Heart disease Paternal Grandmother         MI    Breast cancer Neg Hx     Colon cancer Neg Hx     Ovarian cancer Neg Hx     Esophageal cancer Neg Hx        Social History:   Social History     Tobacco Use    Smoking status: Former     Packs/day: 0.25     Years: 32.00     Pack years: 8.00     Types: Cigarettes     Quit date:      Years since quittin.7    Smokeless tobacco: Never    Tobacco comments:     smoked socially decades ago - weekends only   Substance Use Topics    Alcohol use: Yes     Comment: yearly at most     Drug use: No       Allergies: Reviewed.    Medications:  No current facility-administered medications on file prior to encounter.     No current outpatient medications on file prior to encounter.       Physical Exam:  Vital Signs:   Vitals:    10/04/22 0857   BP: (!) 119/91   Pulse: 80   Resp: 18   Temp: 98.2 °F (36.8 °C)     General Appearance: Well appearing in no acute distress  ENT: OP clear  Chest: CTA B  CV: RRR, no m/r/g  Abd: s/nt/nd/nabs  Ext: no edema    Labs:  Reviewed    IMPRESSION:    F/U colon polyp    Plan:  I have explained the risks and benefits of colonoscopy to the patient including but not limited to bleeding, perforation, infection, and death. The patient wishes to proceed with colonoscopy.

## 2022-10-04 NOTE — [employer]OVATION PATIENT INSTRUCTIONS
Discharge Summary/Instructions after an Endoscopic Procedure  Patient Name: Renae Hou  Patient MRN: [id number]  Patient YOB: 1960  Tuesday, October 4, 2022  Saul Ureña MD  Dear patient,  As a result of recent federal legislation (The Federal Cures Act), you may   receive lab or pathology results from your procedure in your [facility]   account before your physician is able to contact you. Your physician or   their representative will relay the results to you with their   recommendations at their soonest availability.  Thank you,  RESTRICTIONS:  During your procedure today, you received medications for sedation.  These   medications may affect your judgment, balance and coordination.  Therefore,   for 24 hours, you have the following restrictions:   - DO NOT drive a car, operate machinery, make legal/financial decisions,   sign important papers or drink alcohol.    ACTIVITY:  Today: no heavy lifting, straining or running due to procedural   sedation/anesthesia.  The following day: return to full activity including work.  DIET:  Eat and drink normally unless instructed otherwise.     TREATMENT FOR COMMON SIDE EFFECTS:  - Mild abdominal pain, nausea, belching, bloating or excessive gas:  rest,   eat lightly and use a heating pad.  - Sore Throat: treat with throat lozenges and/or gargle with warm salt   water.  - Because air was used during the procedure, expelling large amounts of air   from your rectum or belching is normal.  - If a bowel prep was taken, you may not have a bowel movement for 1-3 days.    This is normal.  SYMPTOMS TO WATCH FOR AND REPORT TO YOUR PHYSICIAN:  1. Abdominal pain or bloating, other than gas cramps.  2. Chest pain.  3. Back pain.  4. Signs of infection such as: chills or fever occurring within 24 hours   after the procedure.  5. Rectal bleeding, which would show as bright red, maroon, or black stools.   (A tablespoon of blood from the rectum is not serious, especially  if   hemorrhoids are present.)  6. Vomiting.  7. Weakness or dizziness.  GO DIRECTLY TO THE NEAREST EMERGENCY ROOM IF YOU HAVE ANY OF THE FOLLOWING:      Difficulty breathing              Chills and/or fever over 101 F   Persistent vomiting and/or vomiting blood   Severe abdominal pain   Severe chest pain   Black, tarry stools   Bleeding- more than one tablespoon   Any other symptom or condition that you feel may need urgent attention  Your doctor recommends these additional instructions:  If any biopsies were taken, your doctors clinic will contact you in 1 to 2   weeks with any results.  - Discharge patient to home.   - High fiber diet indefinitely.   - Continue present medications.   - Use fiber, for example Citrucel, Fibercon, Konsyl or Metamucil.   - Await pathology results.   - Repeat colonoscopy in 1 year for surveillance.   - Return to referring physician as previously scheduled.   - Patient has a contact number available for emergencies.  The signs and   symptoms of potential delayed complications were discussed with the   patient.  Return to normal activities tomorrow.  Written discharge   instructions were provided to the patient.  For questions, problems or results please call your physician - Saul Ureña MD at Work:  [phone].  [facility], EMERGENCY ROOM PHONE NUMBER: [phone]  IF A COMPLICATION OR EMERGENCY SITUATION ARISES AND YOU ARE UNABLE TO REACH   YOUR PHYSICIAN - GO DIRECTLY TO THE EMERGENCY ROOM.  Saul Ureña MD  10/4/2022 9:42:02 AM  This report has been verified and signed electronically.  Dear patient,  As a result of recent federal legislation (The Federal Cures Act), you may   receive lab or pathology results from your procedure in your [facility]   account before your physician is able to contact you. Your physician or   their representative will relay the results to you with their   recommendations at their soonest availability.  Thank  you,  PROVATION

## 2022-10-04 NOTE — ANESTHESIA PREPROCEDURE EVALUATION
10/04/2022  Renae Hou is a 61 y.o., female.  Past Medical History:   Diagnosis Date    Abscess of paraspinous muscles 2018    Allergy 04/2018    Class 1 obesity due to excess calories in adult 07/23/2019    DVT (deep venous thrombosis)     left leg    Epidural abscess 2018 04/10/2018    Fatty liver     GERD (gastroesophageal reflux disease) 2015    History of major abdominal surgery 09/11/2015    Had Hysterectomy , cholecystectomy , rectocele repair    Hypertension     Lumbar radiculopathy     Menopause     Obesity     Obstructive sleep apnea on CPAP     no longer uses CPAP after weight loss from gastric sleeve    Thyroid disease     Thyroid nodules.     Patient Active Problem List   Diagnosis    RENAE dx 2010, improved after weight loss    Chronic constipation    Mixed hyperlipidemia    Pre-diabetes    Chronic pain    History of lumbar surgery    Essential hypertension    Fatty liver    Spondylolisthesis    GERD (gastroesophageal reflux disease)    Vaginal atrophy    Dyspareunia, female    Mixed stress and urge urinary incontinence    Rectocele, female    Cystocele, midline    History of DVT (deep vein thrombosis): 2018     Presence of IVC filter    Multiple thyroid nodules: stable 5/22    Dysphonia    Cervical radiculopathy    Mallet toe of left foot    Chronic left-sided low back pain with left-sided sciatica    Carpal tunnel syndrome of right wrist: see EMG 10/21    Chronic deep vein thrombosis (DVT) of distal vein of left lower extremity    Obesity due to excess calories with serious comorbidity    Tubular adenoma of colon: see colonoscopy 2022    Polyarthralgia     Pre-op Assessment    I have reviewed the NPO Status.      Review of Systems  Social:  Former Smoker, Social Alcohol Use    Cardiovascular:   Hypertension    Pulmonary:   Sleep Apnea     Hepatic/GI:   GERD, well controlled  Liver Disease, Fatty Liver    Musculoskeletal:  Spine Disorders: cervical and lumbar    Endocrine:  Obesity / BMI > 30      Physical Exam    Airway:  Mallampati: I   Mouth Opening: Normal  TM Distance: Normal  Tongue: Normal  Neck ROM: Normal ROM        Anesthesia Plan  Type of Anesthesia, risks & benefits discussed:    Anesthesia Type: Gen Natural Airway  Intra-op Monitoring Plan: Standard ASA Monitors  Induction:  IV  Informed Consent: Informed consent signed with the Patient and all parties understand the risks and agree with anesthesia plan.  All questions answered.   ASA Score: 2    Ready For Surgery From Anesthesia Perspective.     .

## 2022-10-04 NOTE — ANESTHESIA POSTPROCEDURE EVALUATION
Anesthesia Post Evaluation    Patient: Renae Hou    Procedure(s) Performed: Procedure(s) (LRB):  COLONOSCOPY (N/A)    Final Anesthesia Type: general      Patient location during evaluation: PACU  Patient participation: Yes- Able to Participate  Level of consciousness: awake and alert and oriented  Pain management: adequate  Airway patency: patent    PONV status at discharge: No PONV  Anesthetic complications: no      Cardiovascular status: blood pressure returned to baseline and hemodynamically stable  Respiratory status: unassisted  Hydration status: euvolemic  Follow-up not needed.          Vitals Value Taken Time   /70 10/04/22 1012     10/04/22 1435   Pulse 67 10/04/22 1012   Resp 23 10/04/22 1012   SpO2 99 % 10/04/22 1012         Event Time   Out of Recovery 10:22:51         Pain/Ai Score: Ai Score: 9 (10/4/2022  9:42 AM)

## 2022-10-04 NOTE — ANESTHESIA POSTPROCEDURE EVALUATION
Anesthesia Post Evaluation    Patient: Renae Hou    Procedure(s) Performed: Procedure(s) (LRB):  COLONOSCOPY (N/A)    [facility] Anesthesia Post Op Evaluation      Vitals Value Taken Time   /70 10/04/22 1012     10/04/22 1434   Pulse 67 10/04/22 1012   Resp 23 10/04/22 1012   SpO2 99 % 10/04/22 1012         Event Time   Out of Recovery 10:22:51         Pain/Ai Score: Ai Score: 9 (10/4/2022  9:42 AM)

## 2022-10-04 NOTE — TRANSFER OF CARE
"Anesthesia Transfer of Care Note    Patient: Renae Hou    Procedure(s) Performed: Procedure(s) (LRB):  COLONOSCOPY (N/A)    Patient location: GI    Anesthesia Type: general    Transport from OR: Transported from OR on room air with adequate spontaneous ventilation    Post pain: adequate analgesia    Post assessment: no apparent anesthetic complications    Post vital signs: stable    Level of consciousness: awake    Nausea/Vomiting: no nausea/vomiting    Complications: none    Transfer of care protocol was followed      Last vitals:   Visit Vitals  BP 92/55   Pulse 75   Temp 97.9   Resp 17   Ht 5' 5" (1.651 m)   Wt 85.7 kg (189 lb)   LMP 11/25/2012   SpO2 98%   Breastfeeding No   BMI 31.45 kg/m²     "

## 2022-10-05 ENCOUNTER — OFFICE VISIT (OUTPATIENT)
Dept: OTOLARYNGOLOGY | Facility: CLINIC | Age: 62
End: 2022-10-05
Payer: COMMERCIAL

## 2022-10-05 ENCOUNTER — CLINICAL SUPPORT (OUTPATIENT)
Dept: AUDIOLOGY | Facility: CLINIC | Age: 62
End: 2022-10-05
Attending: INTERNAL MEDICINE
Payer: COMMERCIAL

## 2022-10-05 VITALS — BODY MASS INDEX: 32.61 KG/M2 | WEIGHT: 196 LBS

## 2022-10-05 DIAGNOSIS — H91.13 PRESBYCUSIS OF BOTH EARS: Primary | ICD-10-CM

## 2022-10-05 DIAGNOSIS — H91.93 HIGH FREQUENCY HEARING LOSS OF BOTH EARS: Primary | ICD-10-CM

## 2022-10-05 DIAGNOSIS — M26.609 TEMPOROMANDIBULAR JOINT DYSFUNCTION: ICD-10-CM

## 2022-10-05 DIAGNOSIS — H93.13 TINNITUS, BILATERAL: ICD-10-CM

## 2022-10-05 DIAGNOSIS — H93.13 SUBJECTIVE TINNITUS OF BOTH EARS: ICD-10-CM

## 2022-10-05 DIAGNOSIS — G43.109 MIGRAINE EQUIVALENT SYNDROME: ICD-10-CM

## 2022-10-05 PROCEDURE — 99999 PR PBB SHADOW E&M-EST. PATIENT-LVL II: ICD-10-PCS | Mod: PBBFAC,,,

## 2022-10-05 PROCEDURE — 99999 PR PBB SHADOW E&M-EST. PATIENT-LVL III: ICD-10-PCS | Mod: PBBFAC,,, | Performed by: OTOLARYNGOLOGY

## 2022-10-05 PROCEDURE — 99214 PR OFFICE/OUTPT VISIT, EST, LEVL IV, 30-39 MIN: ICD-10-PCS | Mod: S$GLB,,, | Performed by: OTOLARYNGOLOGY

## 2022-10-05 PROCEDURE — 92557 PR COMPREHENSIVE HEARING TEST: ICD-10-PCS | Mod: S$GLB,,,

## 2022-10-05 PROCEDURE — 1159F PR MEDICATION LIST DOCUMENTED IN MEDICAL RECORD: ICD-10-PCS | Mod: CPTII,S$GLB,, | Performed by: OTOLARYNGOLOGY

## 2022-10-05 PROCEDURE — 99214 OFFICE O/P EST MOD 30 MIN: CPT | Mod: S$GLB,,, | Performed by: OTOLARYNGOLOGY

## 2022-10-05 PROCEDURE — 1159F MED LIST DOCD IN RCRD: CPT | Mod: CPTII,S$GLB,, | Performed by: OTOLARYNGOLOGY

## 2022-10-05 PROCEDURE — 92567 PR TYMPA2METRY: ICD-10-PCS | Mod: S$GLB,,,

## 2022-10-05 PROCEDURE — 99999 PR PBB SHADOW E&M-EST. PATIENT-LVL III: CPT | Mod: PBBFAC,,, | Performed by: OTOLARYNGOLOGY

## 2022-10-05 PROCEDURE — 3044F PR MOST RECENT HEMOGLOBIN A1C LEVEL <7.0%: ICD-10-PCS | Mod: CPTII,S$GLB,, | Performed by: OTOLARYNGOLOGY

## 2022-10-05 PROCEDURE — 99999 PR PBB SHADOW E&M-EST. PATIENT-LVL II: CPT | Mod: PBBFAC,,,

## 2022-10-05 PROCEDURE — 3044F HG A1C LEVEL LT 7.0%: CPT | Mod: CPTII,S$GLB,, | Performed by: OTOLARYNGOLOGY

## 2022-10-05 PROCEDURE — 92567 TYMPANOMETRY: CPT | Mod: S$GLB,,,

## 2022-10-05 PROCEDURE — 3008F BODY MASS INDEX DOCD: CPT | Mod: CPTII,S$GLB,, | Performed by: OTOLARYNGOLOGY

## 2022-10-05 PROCEDURE — 92557 COMPREHENSIVE HEARING TEST: CPT | Mod: S$GLB,,,

## 2022-10-05 PROCEDURE — 3008F PR BODY MASS INDEX (BMI) DOCUMENTED: ICD-10-PCS | Mod: CPTII,S$GLB,, | Performed by: OTOLARYNGOLOGY

## 2022-10-05 NOTE — PROGRESS NOTES
Renae Hou, a 61 y.o. female, was seen today in the clinic for an audiologic evaluation.  The patient's main complaint was constant tinnitus.  Ms. Hou reported she has had tinnitus for a while but it has recently gotten worse.  She also noted that she experienced some otalgia in her right ear last night and her left ear occasionally pops.  The patient also reported occasional aural fullness.  Ms. Hou noted she some times experiences a light headed dizziness when she stands up or moves her head too fast.     Tympanometry revealed Type A in the right ear and Type A in the left ear.  Audiogram results revealed normal hearing sloping to a mild high frequency hearing loss by 8000 Hz in both ears.  Speech reception thresholds were noted at 15 dB in the right ear and 10 dB in the left ear.  Speech discrimination scores were 100% in the right ear and 100% in the left ear.    Recommendations:  Otologic evaluation  Annual audiogram  Hearing protection when in noise

## 2022-10-05 NOTE — PROGRESS NOTES
Subjective:       Patient ID: Renae Hou is a 61 y.o. female.    Chief Complaint: Follow-up    HPI: Here for re check.    Having inc HL, Assoc Edward Tinn.    Has AYON, TMJ dysf, assoc otalgia.    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    Past Surgical History: Patient has a past surgical history that includes Cholecystectomy; RECTOCELE REPAIR; THYROID NODULE REMOVAL; Bilateral salpingoophorectomy; Hysterectomy (12/17/2012); Back surgery (2002); Cyst Removal; Adenoidectomy (1995); Spine surgery (2018); Tonsillectomy (1995); Esophagogastroduodenoscopy (N/A, 5/3/2019); Insertion of inferior vena caval filter (Right, 7/19/2019); Laparoscopic sleeve gastrectomy (N/A, 7/23/2019); IVC filter retrieval (N/A, 12/20/2019); Epidural steroid injection into cervical spine (N/A, 8/11/2020); Injection of joint (Left, 3/1/2021); Correction of hammer toe (Left, 8/5/2021); Epidural steroid injection into cervical spine (N/A, 3/2/2022); Colonoscopy (N/A, 6/13/2022); and Colonoscopy (N/A, 10/4/2022).    Social History: Patient reports that she quit smoking about 34 years ago. Her smoking use included cigarettes. She has a 8.00 pack-year smoking history. She has never used smokeless tobacco. She reports current alcohol use. She reports that she does not use drugs.    Family History: family history includes Alcohol abuse in her sister; Cirrhosis in her sister; Deep vein thrombosis in her mother; Diabetes in her sister; Epilepsy in her sister; Hashimoto's thyroiditis in her daughter; Heart disease in her father,  maternal grandmother, and paternal grandmother; Heart disease (age of onset: 55) in her mother; Hyperlipidemia in her mother; Hypertension in her mother; Kidney disease in her maternal grandmother; No Known Problems in her daughter; Stroke in her father; Thyroid disease in her daughter, maternal grandmother, and mother.    Medications:   Current Outpatient Medications   Medication Sig    DULoxetine (CYMBALTA) 30 MG capsule Take 1 capsule (30 mg total) by mouth once daily.    flu vacc [id number] 6mos up,PF, (FLUARIX QUAD [id number], PF,) 60 mcg (15 mcg x 4)/0.5 mL Syrg Inject to into muscle    hydroCHLOROthiazide (HYDRODIURIL) 12.5 MG Tab Take 1 tablet (12.5 mg total) by mouth once daily.    tiZANidine (ZANAFLEX) 4 MG tablet Take 4 mg by mouth nightly.     No current facility-administered medications for this visit.       Allergies: Patient is allergic to cathflo activase [alteplase], heparin analogues, polyethylene glycol analogues, polysorbate 80, xyzal [levocetirizine], and latex.    Review of Systems   Constitutional: Negative.  Negative for appetite change, chills, fatigue and fever.   HENT:  Positive for ear pain and hearing loss. Negative for nasal congestion, ear discharge, facial swelling, postnasal drip, rhinorrhea, sore throat, tinnitus and trouble swallowing.    Eyes: Negative.  Negative for photophobia, pain, discharge, redness, itching and visual disturbance.   Respiratory: Negative.  Negative for apnea, cough, choking, chest tightness, shortness of breath, wheezing and stridor.    Cardiovascular: Negative.  Negative for chest pain and palpitations.   Gastrointestinal: Negative.  Negative for abdominal pain, constipation, diarrhea, nausea and vomiting.   Endocrine: Negative.    Genitourinary: Negative.    Musculoskeletal: Negative.  Negative for arthralgias, gait problem, joint swelling, myalgias, neck pain and neck stiffness.   Integumentary:  Negative for color change, pallor, rash and wound. Negative.    Neurological:  Positive for dizziness and headaches. Negative for tremors, seizures, syncope, facial asymmetry, speech difficulty, weakness, light-headedness and numbness.   Hematological:  Negative for adenopathy. Bruises/bleeds easily.   Psychiatric/Behavioral: Negative.  Negative for agitation, behavioral problems, confusion, decreased concentration, dysphoric mood, hallucinations, sleep disturbance and suicidal ideas. The patient is not nervous/anxious and is not hyperactive.        Objective:      Physical Exam  Vitals and nursing note reviewed.   Constitutional:       General: She is not in acute distress.     Appearance: Normal appearance. She is well-developed. She is not ill-appearing, toxic-appearing or diaphoretic.   HENT:      Head: Normocephalic and atraumatic. Not macrocephalic and not microcephalic. No raccoon eyes, Aggarwal's sign, abrasion, contusion, right periorbital erythema, left periorbital erythema or laceration. Hair is normal.      Right Ear: Ear canal normal. Decreased hearing noted. No laceration, drainage, swelling or tenderness. No middle ear effusion. No foreign body. No mastoid tenderness. No hemotympanum. Tympanic membrane is not injected, scarred, perforated, erythematous, retracted or bulging. Tympanic membrane has normal mobility.      Left Ear: Ear canal normal. Decreased hearing noted. No laceration, drainage, swelling or tenderness.  No middle ear effusion. No foreign body. No mastoid tenderness. No hemotympanum. Tympanic membrane is not injected, scarred, perforated, erythematous, retracted or bulging. Tympanic membrane has normal mobility.      Ears:      Comments:     Edward TMJ tenderness R>L.         Nose: No nasal deformity, septal deviation, laceration, mucosal edema or rhinorrhea.      Right Sinus: No maxillary sinus tenderness.      Left Sinus: No maxillary sinus tenderness.   Eyes:      General: Lids are normal.      Conjunctiva/sclera: Conjunctivae normal.      Pupils:  Pupils are equal, round, and reactive to light.   Neck:      Thyroid: No thyroid mass or thyromegaly.      Vascular: No JVD.      Trachea: No tracheal tenderness or tracheal deviation.   Cardiovascular:      Rate and Rhythm: Normal rate and regular rhythm.   Pulmonary:      Effort: Pulmonary effort is normal. No tachypnea, bradypnea, accessory muscle usage or respiratory distress.      Breath sounds: No stridor.   Abdominal:      Palpations: Abdomen is soft.   Musculoskeletal:         General: Normal range of motion.      Cervical back: Normal range of motion and neck supple. No edema, erythema or rigidity. No muscular tenderness. Normal range of motion.   Lymphadenopathy:      Head:      Right side of head: No submental, submandibular, tonsillar, preauricular or posterior auricular adenopathy.      Left side of head: No submental, submandibular, tonsillar, preauricular or posterior auricular adenopathy.      Cervical: No cervical adenopathy.      Right cervical: No superficial, deep or posterior cervical adenopathy.     Left cervical: No superficial, deep or posterior cervical adenopathy.   Skin:     General: Skin is warm and dry.      Coloration: Skin is not pale.      Findings: No abrasion, bruising, burn, ecchymosis, erythema, laceration, lesion or rash.   Neurological:      Mental Status: She is alert and oriented to person, place, and time.      Cranial Nerves: No cranial nerve deficit.      Sensory: No sensory deficit.      Motor: No tremor, atrophy, abnormal muscle tone or seizure activity.   Psychiatric:         Speech: She is communicative. Speech is not rapid and pressured or slurred.         Behavior: Behavior normal. Behavior is cooperative.         Thought Content: Thought content normal.         Judgment: Judgment normal.             Assessment:       Problem List Items Addressed This Visit    None  Visit Diagnoses       Presbycusis of both ears    -  Primary    Migraine equivalent syndrome         Subjective tinnitus of both ears        Temporomandibular joint dysfunction                  Plan:         TMJ regimen with soft diet, NSAID's, Heating pad over joint for 20 minutes tid for 7-10 days. If no better consider re evaluation for use of muscle relaxants and or referral to Oral Surgery specialist.    Discussed with patient multiple tinnitus management strategies including the use of maskers, instruments, background sound enrichment and other means. All questions answered and appropriate references given.    Patient to see Audiology for hearing aid evaluation.    F/U prn.

## 2022-10-06 ENCOUNTER — PATIENT MESSAGE (OUTPATIENT)
Dept: BARIATRICS | Facility: CLINIC | Age: 62
End: 2022-10-06
Payer: COMMERCIAL

## 2022-10-10 LAB
FINAL PATHOLOGIC DIAGNOSIS: NORMAL
GROSS: NORMAL
Lab: NORMAL

## 2022-11-04 ENCOUNTER — PATIENT MESSAGE (OUTPATIENT)
Dept: BARIATRICS | Facility: CLINIC | Age: 62
End: 2022-11-04
Payer: COMMERCIAL

## 2022-12-07 ENCOUNTER — PATIENT MESSAGE (OUTPATIENT)
Dept: BARIATRICS | Facility: CLINIC | Age: 62
End: 2022-12-07
Payer: COMMERCIAL

## 2022-12-15 ENCOUNTER — PATIENT MESSAGE (OUTPATIENT)
Dept: RESEARCH | Facility: CLINIC | Age: 62
End: 2022-12-15
Payer: COMMERCIAL

## 2022-12-17 ENCOUNTER — PATIENT MESSAGE (OUTPATIENT)
Dept: ORTHOPEDICS | Facility: CLINIC | Age: 62
End: 2022-12-17
Payer: COMMERCIAL

## 2022-12-22 ENCOUNTER — OFFICE VISIT (OUTPATIENT)
Dept: ORTHOPEDICS | Facility: CLINIC | Age: 62
End: 2022-12-22
Payer: COMMERCIAL

## 2022-12-22 ENCOUNTER — HOSPITAL ENCOUNTER (OUTPATIENT)
Dept: RADIOLOGY | Facility: HOSPITAL | Age: 62
Discharge: HOME | End: 2022-12-22
Attending: ORTHOPAEDIC SURGERY
Payer: COMMERCIAL

## 2022-12-22 VITALS — BODY MASS INDEX: 32.36 KG/M2 | WEIGHT: 194.44 LBS

## 2022-12-22 DIAGNOSIS — M51.36 DDD (DEGENERATIVE DISC DISEASE), LUMBAR: ICD-10-CM

## 2022-12-22 DIAGNOSIS — M54.9 DORSALGIA, UNSPECIFIED: Primary | ICD-10-CM

## 2022-12-22 PROCEDURE — 1159F PR MEDICATION LIST DOCUMENTED IN MEDICAL RECORD: ICD-10-PCS | Mod: CPTII,S$GLB,, | Performed by: ORTHOPAEDIC SURGERY

## 2022-12-22 PROCEDURE — 72110 X-RAY EXAM L-2 SPINE 4/>VWS: CPT | Mod: 26,,, | Performed by: RADIOLOGY

## 2022-12-22 PROCEDURE — 3008F PR BODY MASS INDEX (BMI) DOCUMENTED: ICD-10-PCS | Mod: CPTII,S$GLB,, | Performed by: ORTHOPAEDIC SURGERY

## 2022-12-22 PROCEDURE — 3044F PR MOST RECENT HEMOGLOBIN A1C LEVEL <7.0%: ICD-10-PCS | Mod: CPTII,S$GLB,, | Performed by: ORTHOPAEDIC SURGERY

## 2022-12-22 PROCEDURE — 99214 PR OFFICE/OUTPT VISIT, EST, LEVL IV, 30-39 MIN: ICD-10-PCS | Mod: S$GLB,,, | Performed by: ORTHOPAEDIC SURGERY

## 2022-12-22 PROCEDURE — 1159F MED LIST DOCD IN RCRD: CPT | Mod: CPTII,S$GLB,, | Performed by: ORTHOPAEDIC SURGERY

## 2022-12-22 PROCEDURE — 3008F BODY MASS INDEX DOCD: CPT | Mod: CPTII,S$GLB,, | Performed by: ORTHOPAEDIC SURGERY

## 2022-12-22 PROCEDURE — 99214 OFFICE O/P EST MOD 30 MIN: CPT | Mod: S$GLB,,, | Performed by: ORTHOPAEDIC SURGERY

## 2022-12-22 PROCEDURE — 3044F HG A1C LEVEL LT 7.0%: CPT | Mod: CPTII,S$GLB,, | Performed by: ORTHOPAEDIC SURGERY

## 2022-12-22 PROCEDURE — 72110 XR LUMBAR SPINE AP AND LAT WITH FLEX/EXT: ICD-10-PCS | Mod: 26,,, | Performed by: RADIOLOGY

## 2022-12-22 PROCEDURE — 99999 PR PBB SHADOW E&M-EST. PATIENT-LVL III: CPT | Mod: PBBFAC,,, | Performed by: ORTHOPAEDIC SURGERY

## 2022-12-22 PROCEDURE — 99999 PR PBB SHADOW E&M-EST. PATIENT-LVL III: ICD-10-PCS | Mod: PBBFAC,,, | Performed by: ORTHOPAEDIC SURGERY

## 2022-12-22 PROCEDURE — 72110 X-RAY EXAM L-2 SPINE 4/>VWS: CPT | Mod: TC

## 2022-12-22 RX ORDER — PREGABALIN 75 MG/1
75 CAPSULE ORAL 2 TIMES DAILY
Qty: 60 CAPSULE | Refills: 6 | Status: SHIPPED | OUTPATIENT
Start: 2022-12-22 | End: 2023-03-08

## 2022-12-22 RX ORDER — DICLOFENAC SODIUM 75 MG/1
75 TABLET, DELAYED RELEASE ORAL 2 TIMES DAILY PRN
Qty: 60 TABLET | Refills: 0 | Status: SHIPPED | OUTPATIENT
Start: 2022-12-22 | End: 2023-01-18

## 2022-12-27 ENCOUNTER — OFFICE VISIT (OUTPATIENT)
Dept: URGENT CARE | Facility: CLINIC | Age: 62
End: 2022-12-27
Payer: COMMERCIAL

## 2022-12-27 VITALS
DIASTOLIC BLOOD PRESSURE: 82 MMHG | HEART RATE: 80 BPM | BODY MASS INDEX: 31.65 KG/M2 | TEMPERATURE: 98 F | OXYGEN SATURATION: 97 % | SYSTOLIC BLOOD PRESSURE: 121 MMHG | HEIGHT: 65 IN | RESPIRATION RATE: 16 BRPM | WEIGHT: 190 LBS

## 2022-12-27 DIAGNOSIS — U07.1 COVID: ICD-10-CM

## 2022-12-27 DIAGNOSIS — R09.81 NASAL CONGESTION: ICD-10-CM

## 2022-12-27 DIAGNOSIS — R50.9 FEVER, UNSPECIFIED FEVER CAUSE: Primary | ICD-10-CM

## 2022-12-27 DIAGNOSIS — R05.9 COUGH, UNSPECIFIED TYPE: ICD-10-CM

## 2022-12-27 LAB
CTP QC/QA: YES
SARS-COV-2 AG RESP QL IA.RAPID: POSITIVE

## 2022-12-27 PROCEDURE — 87811 SARS CORONAVIRUS 2 ANTIGEN POCT, MANUAL READ: ICD-10-PCS | Mod: QW,S$GLB,, | Performed by: FAMILY MEDICINE

## 2022-12-27 PROCEDURE — 99214 OFFICE O/P EST MOD 30 MIN: CPT | Mod: S$GLB,,, | Performed by: FAMILY MEDICINE

## 2022-12-27 PROCEDURE — 1159F MED LIST DOCD IN RCRD: CPT | Mod: CPTII,S$GLB,, | Performed by: FAMILY MEDICINE

## 2022-12-27 PROCEDURE — 99214 PR OFFICE/OUTPT VISIT, EST, LEVL IV, 30-39 MIN: ICD-10-PCS | Mod: S$GLB,,, | Performed by: FAMILY MEDICINE

## 2022-12-27 PROCEDURE — 3079F PR MOST RECENT DIASTOLIC BLOOD PRESSURE 80-89 MM HG: ICD-10-PCS | Mod: CPTII,S$GLB,, | Performed by: FAMILY MEDICINE

## 2022-12-27 PROCEDURE — 87811 SARS-COV-2 COVID19 W/OPTIC: CPT | Mod: QW,S$GLB,, | Performed by: FAMILY MEDICINE

## 2022-12-27 PROCEDURE — 3044F PR MOST RECENT HEMOGLOBIN A1C LEVEL <7.0%: ICD-10-PCS | Mod: CPTII,S$GLB,, | Performed by: FAMILY MEDICINE

## 2022-12-27 PROCEDURE — 3074F SYST BP LT 130 MM HG: CPT | Mod: CPTII,S$GLB,, | Performed by: FAMILY MEDICINE

## 2022-12-27 PROCEDURE — 3074F PR MOST RECENT SYSTOLIC BLOOD PRESSURE < 130 MM HG: ICD-10-PCS | Mod: CPTII,S$GLB,, | Performed by: FAMILY MEDICINE

## 2022-12-27 PROCEDURE — 1159F PR MEDICATION LIST DOCUMENTED IN MEDICAL RECORD: ICD-10-PCS | Mod: CPTII,S$GLB,, | Performed by: FAMILY MEDICINE

## 2022-12-27 PROCEDURE — 3008F PR BODY MASS INDEX (BMI) DOCUMENTED: ICD-10-PCS | Mod: CPTII,S$GLB,, | Performed by: FAMILY MEDICINE

## 2022-12-27 PROCEDURE — 3008F BODY MASS INDEX DOCD: CPT | Mod: CPTII,S$GLB,, | Performed by: FAMILY MEDICINE

## 2022-12-27 PROCEDURE — 3044F HG A1C LEVEL LT 7.0%: CPT | Mod: CPTII,S$GLB,, | Performed by: FAMILY MEDICINE

## 2022-12-27 PROCEDURE — 3079F DIAST BP 80-89 MM HG: CPT | Mod: CPTII,S$GLB,, | Performed by: FAMILY MEDICINE

## 2022-12-27 RX ORDER — BENZONATATE 200 MG/1
200 CAPSULE ORAL 3 TIMES DAILY PRN
Qty: 30 CAPSULE | Refills: 0 | Status: SHIPPED | OUTPATIENT
Start: 2022-12-27 | End: 2023-01-06

## 2022-12-27 RX ORDER — ALBUTEROL SULFATE 90 UG/1
2 AEROSOL, METERED RESPIRATORY (INHALATION) EVERY 6 HOURS PRN
Qty: 18 G | Refills: 0 | Status: SHIPPED | OUTPATIENT
Start: 2022-12-27 | End: 2023-03-08

## 2022-12-27 RX ORDER — IPRATROPIUM BROMIDE 21 UG/1
1 SPRAY, METERED NASAL 2 TIMES DAILY PRN
Qty: 30 ML | Refills: 0 | Status: SHIPPED | OUTPATIENT
Start: 2022-12-27 | End: 2023-03-08

## 2022-12-27 NOTE — PROGRESS NOTES
"Subjective:       Patient ID: Renae Hou is a 62 y.o. female.    Vitals:  height is 5' 5" (1.651 m) and weight is 86.2 kg (190 lb). Her temperature is 98 °F (36.7 °C). Her blood pressure is 99/64 and her pulse is 91. Her respiration is 16 and oxygen saturation is 99%.     Chief Complaint: Dizziness    This is a 62 y.o. female who presents today with a chief complaint of  fever of 102.5 last night, body aches, dizziness, hurts to breath, and congestion, feels clammy, and sore throat x 2 days ago. Pt states that her symptoms are getting worse. Treated with Motrin.     Dizziness:   Chronicity:  New  Onset:  In the past 7 days  Progression since onset:  Rapidly improving and gradually improving  Pain Scale:  8/10  Severity:  Severe  Dizziness characteristics:  Lightheaded/impending faint   Associated symptoms: ear pain, a fever, diaphoresis and light-headedness.no nausea.  Aggravated by:  Getting up (standing up)no strokes, no cardiac surgery and no ear infections.    Constitution: Positive for sweating, fever and generalized weakness.   HENT:  Positive for ear pain, congestion and sore throat.    Respiratory:  Positive for shortness of breath.    Gastrointestinal:  Negative for nausea.   Skin:  Negative for erythema.   Neurological:  Positive for dizziness and light-headedness.     Objective:      Physical Exam   Constitutional: She appears ill. She appears distressed.   HENT:   Head: Normocephalic and atraumatic.   Ears:   Right Ear: Tympanic membrane, external ear and ear canal normal.   Left Ear: Tympanic membrane, external ear and ear canal normal.   Nose: Rhinorrhea and congestion present.   Mouth/Throat: Mucous membranes are moist. Oropharynx is clear.   Eyes: Conjunctivae are normal. Pupils are equal, round, and reactive to light. Extraocular movement intact   Neck: Neck supple. No neck rigidity present.   Cardiovascular: Normal rate, regular rhythm, normal heart sounds and normal pulses. "   Pulmonary/Chest: No stridor. She is in respiratory distress. She has wheezes.   Abdominal: Normal appearance and bowel sounds are normal. Soft. flat abdomen   Musculoskeletal: Normal range of motion.         General: Normal range of motion.   Neurological: no focal deficit. She is alert and at baseline. She is disoriented.   Skin: Skin is warm and dry. Capillary refill takes less than 2 seconds. No erythema   Psychiatric: Her behavior is normal. Mood, judgment and thought content normal.       Assessment:Plan:     1. Fever, unspecified fever cause  - SARS Coronavirus 2 Antigen, POCT Manual Read    2. COVID  - albuterol (VENTOLIN HFA) 90 mcg/actuation inhaler; Inhale 2 puffs into the lungs every 6 (six) hours as needed for Wheezing. Rescue  Dispense: 18 g; Refill: 0    3. Cough, unspecified type  - benzonatate (TESSALON) 200 MG capsule; Take 1 capsule (200 mg total) by mouth 3 (three) times daily as needed for Cough.  Dispense: 30 capsule; Refill: 0    4. Nasal congestion  - ipratropium (ATROVENT) 21 mcg (0.03 %) nasal spray; 1 spray by Each Nostril route 2 (two) times daily as needed for Rhinitis.  Dispense: 30 mL; Refill: 0   All results discussed with pt prior to discharge

## 2023-01-06 ENCOUNTER — HOSPITAL ENCOUNTER (OUTPATIENT)
Dept: RADIOLOGY | Facility: HOSPITAL | Age: 63
Discharge: HOME | End: 2023-01-06
Attending: ORTHOPAEDIC SURGERY
Payer: COMMERCIAL

## 2023-01-06 DIAGNOSIS — M54.9 DORSALGIA, UNSPECIFIED: ICD-10-CM

## 2023-01-06 LAB
CREAT SERPL-MCNC: 0.8 MG/DL (ref 0.5–1.4)
SAMPLE: NORMAL

## 2023-01-06 PROCEDURE — 72158 MRI LUMBAR SPINE W/O & W/DYE: CPT | Mod: 26,,, | Performed by: STUDENT IN AN ORGANIZED HEALTH CARE EDUCATION/TRAINING PROGRAM

## 2023-01-06 PROCEDURE — 72158 MRI LUMBAR SPINE W WO CONTRAST: ICD-10-PCS | Mod: 26,,, | Performed by: STUDENT IN AN ORGANIZED HEALTH CARE EDUCATION/TRAINING PROGRAM

## 2023-01-06 PROCEDURE — A9585 GADOBUTROL INJECTION: HCPCS | Performed by: ORTHOPAEDIC SURGERY

## 2023-01-06 PROCEDURE — 25500020 PHARM REV CODE 255: Performed by: ORTHOPAEDIC SURGERY

## 2023-01-06 PROCEDURE — 72158 MRI LUMBAR SPINE W/O & W/DYE: CPT | Mod: TC

## 2023-01-06 RX ORDER — GADOBUTROL 604.72 MG/ML
9 INJECTION INTRAVENOUS
Status: COMPLETED | OUTPATIENT
Start: 2023-01-06 | End: 2023-01-06

## 2023-01-06 RX ADMIN — GADOBUTROL 9 ML: 604.72 INJECTION INTRAVENOUS at 08:01

## 2023-01-09 ENCOUNTER — OFFICE VISIT (OUTPATIENT)
Dept: ORTHOPEDICS | Facility: CLINIC | Age: 63
End: 2023-01-09
Payer: COMMERCIAL

## 2023-01-09 ENCOUNTER — PATIENT MESSAGE (OUTPATIENT)
Dept: BARIATRICS | Facility: CLINIC | Age: 63
End: 2023-01-09
Payer: COMMERCIAL

## 2023-01-09 DIAGNOSIS — M51.36 DDD (DEGENERATIVE DISC DISEASE), LUMBAR: ICD-10-CM

## 2023-01-09 DIAGNOSIS — M54.12 CERVICAL RADICULOPATHY: Primary | ICD-10-CM

## 2023-01-09 PROCEDURE — 99213 OFFICE O/P EST LOW 20 MIN: CPT | Mod: 95,,, | Performed by: ORTHOPAEDIC SURGERY

## 2023-01-09 PROCEDURE — 99213 PR OFFICE/OUTPT VISIT, EST, LEVL III, 20-29 MIN: ICD-10-PCS | Mod: 95,,, | Performed by: ORTHOPAEDIC SURGERY

## 2023-01-09 NOTE — PROGRESS NOTES
Established Patient - Audio Only Telehealth Visit     The patient location is: home  The chief complaint leading to consultation is: MRI results  Visit type: Virtual visit with audio only (telephone)  Total time spent with patient: 10 min       The reason for the audio only service rather than synchronous audio and video virtual visit was related to technical difficulties or patient preference/necessity.     Each patient to whom I provide medical services by telemedicine is:  (1) informed of the relationship between the physician and patient and the respective role of any other health care provider with respect to management of the patient; and (2) notified that they may decline to receive medical services by telemedicine and may withdraw from such care at any time. Patient verbally consented to receive this service via voice-only telephone call.  DATE: 1/9/2023  PATIENT: Renae Hou    Attending Physician: Julius Cassidy MD    HISTORY:  Renae Hou is a 62 y.o. female sp L4-S1 PLDF with Dr. Zayas (4/10/18) who returns to me today for MRI results.  She was last seen by me 12/22/2022.  Today she is doing well but notes she continues to have left leg pain with numbness and tingling. She has tried PT and lyrica with no relief.    She also reports a flare up of her chronic pain with radiation down the arms. Her last cervical NEERAJ was in 2021 with a year of 75% relief. She has continued home exercises.    The Patient denies myelopathic symptoms such as handwriting changes or difficulty with buttons/coins/keys. Denies perineal paresthesias, bowel/bladder dysfunction.    PMH/PSH/FamHx/SocHx:  Unchanged from prior visit    ROS:  REVIEW OF SYSTEMS:  Constitution: Negative. Negative for chills, fever and night sweats.   HENT: Negative for congestion and headaches.    Eyes: Negative for blurred vision, left vision loss and right vision loss.   Cardiovascular: Negative for chest pain and syncope.   Respiratory:  Negative for cough and shortness of breath.    Endocrine: Negative for polydipsia, polyphagia and polyuria.   Hematologic/Lymphatic: Negative for bleeding problem. Does not bruise/bleed easily.   Skin: Negative for dry skin, itching and rash.   Musculoskeletal: Negative for falls and muscle weakness.   Gastrointestinal: Negative for abdominal pain and bowel incontinence.   Allergic/Immunologic: Negative for hives and persistent infections.  Genitourinary: Negative for urinary retention/incontinence and nocturia.   Neurological: negative for disturbances in coordination, no myelopathic symptoms such as handwriting changes or difficulty with buttons, coins, keys or small objects. No loss of balance and seizures.   Psychiatric/Behavioral: Negative for depression. The patient does not have insomnia.   Denies perineal paresthesias, bowel or bladder incontinence    EXAM:  LMP 11/25/2012     My physical examination was notable for the following findings:     Musculoskeletal and neuro exam stable.      IMAGING:    Today I personally re- reviewed AP, Lat and Flex/Ex  upright L-spine that demonstrate hardware in place no failure.    MRI lumbar demonstrates mild adjacent segment pathology at L3-4 without significant central stenosis.    MRI cervical demonstrates multilevel degenerative changes and neural foraminal narrowing without significant central stenosis.    There is no height or weight on file to calculate BMI.    Hemoglobin A1C   Date Value Ref Range Status   05/06/2022 5.5 4.0 - 5.6 % Final     Comment:     ADA Screening Guidelines:  5.7-6.4%  Consistent with prediabetes  >or=6.5%  Consistent with diabetes    High levels of fetal hemoglobin interfere with the HbA1C  assay. Heterozygous hemoglobin variants (HbS, HgC, etc)do  not significantly interfere with this assay.   However, presence of multiple variants may affect accuracy.     04/06/2022 5.7 (H) 4.0 - 5.6 % Final     Comment:     ADA Screening  Guidelines:  5.7-6.4%  Consistent with prediabetes  >or=6.5%  Consistent with diabetes    High levels of fetal hemoglobin interfere with the HbA1C  assay. Heterozygous hemoglobin variants (HbS, HgC, etc)do  not significantly interfere with this assay.   However, presence of multiple variants may affect accuracy.     11/12/2021 5.6 4.0 - 5.6 % Final     Comment:     ADA Screening Guidelines:  5.7-6.4%  Consistent with prediabetes  >or=6.5%  Consistent with diabetes    High levels of fetal hemoglobin interfere with the HbA1C  assay. Heterozygous hemoglobin variants (HbS, HgC, etc)do  not significantly interfere with this assay.   However, presence of multiple variants may affect accuracy.           ASSESSMENT/PLAN:    There are no diagnoses linked to this encounter.    Today we discussed at length all of the different treatment options including anti-inflammatories, acetaminophen, rest, ice, heat, physical therapy including strengthening and stretching exercises, home exercises, ROM, aerobic conditioning, aqua therapy, other modalities including ultrasound, massage, and dry needling, epidural steroid injections and finally surgical intervention.      Pt presents with chronic neck and low back pain with radiculopathy. New lumbar MRI shows some adjacent segment pathology. Failure of conservative rx. Will order cervical NEERAJ and L3-4 IL NEERAJ, pt prefers with IR. She is aware they will be on separate days. Pt will fu if pain persists.                            This service was not originating from a related E/M service provided within the previous 7 days nor will  to an E/M service or procedure within the next 24 hours or my soonest available appointment.  Prevailing standard of care was able to be met in this audio-only visit.

## 2023-01-13 ENCOUNTER — PATIENT MESSAGE (OUTPATIENT)
Dept: ADMINISTRATIVE | Facility: OTHER | Age: 63
End: 2023-01-13
Payer: COMMERCIAL

## 2023-01-13 DIAGNOSIS — Z00.00 ROUTINE GENERAL MEDICAL EXAMINATION AT A HEALTH CARE FACILITY: Primary | ICD-10-CM

## 2023-01-20 ENCOUNTER — TELEPHONE (OUTPATIENT)
Dept: INTERVENTIONAL RADIOLOGY/VASCULAR | Facility: CLINIC | Age: 63
End: 2023-01-20
Payer: COMMERCIAL

## 2023-01-20 NOTE — TELEPHONE ENCOUNTER
Call and left patient voice mail to return call at [phone] to schedule a NeuroIR clinic visit to discuss her neck and lower back pain.

## 2023-02-04 NOTE — SUBJECTIVE & OBJECTIVE
"Principal Problem:Spondylolisthesis at L4-L5 level    Principal Orthopedic Problem: L4/5 spondylolisthesis    Interval History: ROBERT overnight. AF.  Pain very well controlled.  ID continues to follow.   No PT yesterday.    Review of patient's allergies indicates:   Allergen Reactions    Iodine Anaphylaxis, Hives, Shortness Of Breath, Itching, Swelling and Rash     Other reaction(s): Difficulty breathing  Neck swelling     Shellfish containing products Anaphylaxis, Hives, Shortness Of Breath, Itching, Swelling and Rash     Other reaction(s): Difficulty breathing  Neck swelling     Latex Swelling     Patient un sure about it       Current Facility-Administered Medications   Medication    acetaminophen tablet 650 mg    aluminum-magnesium hydroxide-simethicone 200-200-20 mg/5 mL suspension 30 mL    amLODIPine tablet 5 mg    calcium carbonate 200 mg calcium (500 mg) chewable tablet 500 mg    cefTRIAXone (ROCEPHIN) 2 g in dextrose 5 % 50 mL IVPB    diphenhydrAMINE capsule 25 mg    fentaNYL injection 25 mcg    heparin (porcine) injection 5,000 Units    methocarbamol tablet 750 mg    mupirocin 2 % ointment 1 g    ondansetron tablet 8 mg    oxyCODONE immediate release tablet 10 mg    oxyCODONE immediate release tablet 15 mg    oxyCODONE immediate release tablet 5 mg    pantoprazole EC tablet 40 mg    polyethylene glycol packet 17 g    pregabalin capsule 75 mg    promethazine tablet 12.5 mg    ramelteon tablet 8 mg    vancomycin (VANCOCIN) 1,500 mg in sodium chloride 0.9% 250 mL IVPB     Objective:     Vital Signs (Most Recent):  Temp: 98.8 °F (37.1 °C) (04/15/18 0353)  Pulse: 99 (04/15/18 0353)  Resp: 14 (04/15/18 0353)  BP: 121/78 (04/15/18 0353)  SpO2: 95 % (04/15/18 0353) Vital Signs (24h Range):  Temp:  [98.4 °F (36.9 °C)-98.9 °F (37.2 °C)] 98.8 °F (37.1 °C)  Pulse:  [] 99  Resp:  [14-18] 14  SpO2:  [95 %-98 %] 95 %  BP: (121-131)/(67-79) 121/78     Weight: 90.7 kg (200 lb)  Height: 5' 5" " (165.1 cm)  Body mass index is 33.28 kg/m².      Intake/Output Summary (Last 24 hours) at 04/15/18 0711  Last data filed at 04/15/18 0515   Gross per 24 hour   Intake              850 ml   Output                0 ml   Net              850 ml       Ortho/SPM Exam      UE:   D B T WF WE HI  R 5 5 5 5 5 5  L 5 5 5 5 5 5    SILT C5-T1  2+ Radial Pulse  Negative Zavala's  Normoreflexic Biceps, Triceps, Brachiradialis    LE:   IP Q H TA EHL GS  R 5 5 5 5 5 5  L 5 5 5 5 5 5    SILT L2-S1  2+ DP, PT pulses  Negative Clonus  Negative Babinski  Normoreflexic Knee Jerk and Achilles     Significant Labs:   CBC:   No results for input(s): WBC, HGB, HCT, PLT in the last 48 hours.  All pertinent labs within the past 24 hours have been reviewed.    Significant Imaging: None   Linda Parks NP

## 2023-02-09 ENCOUNTER — PATIENT MESSAGE (OUTPATIENT)
Dept: BARIATRICS | Facility: CLINIC | Age: 63
End: 2023-02-09
Payer: COMMERCIAL

## 2023-02-12 ENCOUNTER — PATIENT MESSAGE (OUTPATIENT)
Dept: BARIATRICS | Facility: CLINIC | Age: 63
End: 2023-02-12
Payer: COMMERCIAL

## 2023-02-14 ENCOUNTER — PATIENT MESSAGE (OUTPATIENT)
Dept: BARIATRICS | Facility: CLINIC | Age: 63
End: 2023-02-14
Payer: COMMERCIAL

## 2023-02-16 ENCOUNTER — PATIENT MESSAGE (OUTPATIENT)
Dept: BARIATRICS | Facility: CLINIC | Age: 63
End: 2023-02-16
Payer: COMMERCIAL

## 2023-02-20 ENCOUNTER — TELEPHONE (OUTPATIENT)
Dept: INTERVENTIONAL RADIOLOGY/VASCULAR | Facility: CLINIC | Age: 63
End: 2023-02-20
Payer: COMMERCIAL

## 2023-02-20 ENCOUNTER — OFFICE VISIT (OUTPATIENT)
Dept: NEUROSURGERY | Facility: CLINIC | Age: 63
End: 2023-02-20
Payer: COMMERCIAL

## 2023-02-20 DIAGNOSIS — M54.16 LUMBAR RADICULOPATHY: ICD-10-CM

## 2023-02-20 DIAGNOSIS — Z98.890 HISTORY OF LUMBAR SURGERY: ICD-10-CM

## 2023-02-20 DIAGNOSIS — M54.12 CERVICAL RADICULOPATHY: Primary | ICD-10-CM

## 2023-02-20 PROCEDURE — 1159F MED LIST DOCD IN RCRD: CPT | Mod: CPTII,95,,

## 2023-02-20 PROCEDURE — 99214 OFFICE O/P EST MOD 30 MIN: CPT | Mod: 95,,,

## 2023-02-20 PROCEDURE — 1159F PR MEDICATION LIST DOCUMENTED IN MEDICAL RECORD: ICD-10-PCS | Mod: CPTII,95,,

## 2023-02-20 PROCEDURE — 1160F PR REVIEW ALL MEDS BY PRESCRIBER/CLIN PHARMACIST DOCUMENTED: ICD-10-PCS | Mod: CPTII,95,,

## 2023-02-20 PROCEDURE — 99214 PR OFFICE/OUTPT VISIT, EST, LEVL IV, 30-39 MIN: ICD-10-PCS | Mod: 95,,,

## 2023-02-20 PROCEDURE — 1160F RVW MEDS BY RX/DR IN RCRD: CPT | Mod: CPTII,95,,

## 2023-02-20 NOTE — TELEPHONE ENCOUNTER
Call and left patient a voice message to  return call to [phone] to schedule outpatient  IR lumbar/cervical TFESI.

## 2023-02-20 NOTE — PROGRESS NOTES
Subjective:       Patient ID: Renae Hou is a 62 y.o. female.    Chief Complaint: Back Pain    63 yo F presents virtually to discuss Lumbar and cervical injections  Patient reports that since December 2022 patient has had radiating lumbar pain in a L4-5 distribution (lateral aspect of leg and heel). Patient worsens with sitting for long periods of time. She has tried diclofenac, lyrica, and PT (for her neck and back) written by an outside provider with limited relief. Patient reports PT worsened her pain. She denies any bowel or bladder incontinence. She has had previous injections with good relief. Would like to proceed with lumbar injections. She also reports a flare up of her neck pain with radiation down the arms. Her last cervical NEERAJ was in 2021 with a year of 75% relief. She maintains a home exercise program which has provided limited relief.     No issues laying flat.   Not taking any blood thinners.    Review of Systems   Constitutional:  Negative for chills and fever.   Respiratory:  Negative for cough and shortness of breath.    Cardiovascular:  Negative for chest pain and leg swelling.   Gastrointestinal:  Negative for abdominal distention and abdominal pain.   Genitourinary:  Negative for bladder incontinence.   Musculoskeletal:  Positive for back pain and neck pain. Negative for gait problem.   Neurological:  Positive for weakness (left leg) and numbness (Left leg, and Bilateral arm). Negative for speech difficulty, coordination difficulties and coordination difficulties.   Psychiatric/Behavioral:  Negative for behavioral problems and confusion.        Objective:      Physical Exam  Constitutional:       General: She is not in acute distress.     Appearance: Normal appearance.      Comments: Virtual Visit.    HENT:      Head: Normocephalic and atraumatic.      Nose: Nose normal.   Eyes:      Conjunctiva/sclera: Conjunctivae normal.   Pulmonary:      Effort: Pulmonary effort is normal.    Neurological:      General: No focal deficit present.      Mental Status: She is alert.   Psychiatric:         Mood and Affect: Mood normal.         Behavior: Behavior normal.       IMAGING:     MR L-Spine 1/6/2023  Postoperative change again noted status post posterior instrumented fusion of L4 through S1 noting overlying laminectomies and intervertebral disc spacer device at L4-L5 and L5-S1.   Alignment: Stable minimal anterolisthesis of L4 on L5.      MRI C-Spine 11/26/2021  MRI cervical demonstrates multilevel degenerative changes and neural foraminal narrowing without significant central stenosis.    Assessment and Plan       Cervical radiculopathy  -     IR Epidural Transfor 1st Cerv Thor Bilat; Future; Expected date: 02/20/2023    Lumbar radiculopathy  -     IR Epidural Transforaminal Inj 1st Vert Lumbar Uni; Future; Expected date: 02/20/2023  -     Cancel: IR Epidural Transfor 1st Cerv Thor Bilat; Future; Expected date: 02/20/2023    History of lumbar surgery  -     IR Epidural Transforaminal Inj 1st Vert Lumbar Uni; Future; Expected date: 02/20/2023  -     Cancel: IR Epidural Transfor 1st Cerv Thor Bilat; Future; Expected date: 02/20/2023    - Patient has had previous lumbar and cervical injections that have given her >50% relief for over 1 year.   - She has tried and failed conservative therapies (anti-inflammatories, physical therapy, and creams/heating pads).  - Patient maintains home exercise program.   - Plan for left L4-5 TFESI followed by bilateral C4-5 TFESI     Discussed how the procedure will be performed, risks (including, but not limited to, pain, bleeding, infection, damage to nearby structures, and the need for additional procedures), benefits, possible complications, pre-post procedure expectations, and alternatives. The patient voices understanding and all questions have been answered.  The patient agrees to proceed as planned.     The patient location is: [place]  The chief complaint  leading to consultation is: Back pain and Neck pain    Visit type: audiovisual    30 minutes of total time spent on the encounter, which includes face to face time and non-face to face time preparing to see the patient (eg, review of tests), Obtaining and/or reviewing separately obtained history, Documenting clinical information in the electronic or other health record, Independently interpreting results (not separately reported) and communicating results to the patient/family/caregiver, or Care coordination (not separately reported).     Each patient to whom he or she provides medical services by telemedicine is:  (1) informed of the relationship between the physician and patient and the respective role of any other health care provider with respect to management of the patient; and (2) notified that he or she may decline to receive medical services by telemedicine and may withdraw from such care at any time.    Dara Tucker PA-C  Interventional Radiology  [phone]

## 2023-02-22 ENCOUNTER — PATIENT MESSAGE (OUTPATIENT)
Dept: BARIATRICS | Facility: CLINIC | Age: 63
End: 2023-02-22
Payer: COMMERCIAL

## 2023-03-03 ENCOUNTER — HOSPITAL ENCOUNTER (OUTPATIENT)
Dept: INTERVENTIONAL RADIOLOGY/VASCULAR | Facility: HOSPITAL | Age: 63
Discharge: HOME | End: 2023-03-03
Payer: COMMERCIAL

## 2023-03-03 DIAGNOSIS — Z98.890 HISTORY OF LUMBAR SURGERY: ICD-10-CM

## 2023-03-03 DIAGNOSIS — M54.16 LUMBAR RADICULOPATHY: ICD-10-CM

## 2023-03-03 PROCEDURE — 25500020 PHARM REV CODE 255: Performed by: RADIOLOGY

## 2023-03-03 PROCEDURE — 64483 NJX AA&/STRD TFRM EPI L/S 1: CPT | Performed by: RADIOLOGY

## 2023-03-03 PROCEDURE — 64483 IR EPIDURAL TRANSFORAMINAL INJ 1ST VERT LUMBAR UNI: ICD-10-PCS | Mod: LT,,, | Performed by: RADIOLOGY

## 2023-03-03 PROCEDURE — A4215 STERILE NEEDLE: HCPCS

## 2023-03-03 PROCEDURE — A4550 SURGICAL TRAYS: HCPCS

## 2023-03-03 RX ADMIN — IOHEXOL 3 ML: 180 INJECTION INTRAVENOUS at 12:03

## 2023-03-03 NOTE — H&P
Radiology History & Physical      SUBJECTIVE:     Chief Complaint: Lumbar back  pain    History of Present Illness:  Renae Hou is a 62 y.o. female with lower back pain who presents for LEFT L4-5 TFESI.      Past Medical History:   Diagnosis Date    Abscess of paraspinous muscles 2018    Allergy 04/2018    Class 1 obesity due to excess calories in adult 07/23/2019    DVT (deep venous thrombosis)     left leg    Epidural abscess 2018 04/10/2018    Fatty liver     GERD (gastroesophageal reflux disease) 2015    History of major abdominal surgery 09/11/2015    Had Hysterectomy , cholecystectomy , rectocele repair    Hypertension     Lumbar radiculopathy     Menopause     Obesity     Obstructive sleep apnea on CPAP     no longer uses CPAP after weight loss from gastric sleeve    Thyroid disease     Thyroid nodules.     Past Surgical History:   Procedure Laterality Date    ADENOIDECTOMY  1995    BACK SURGERY  2002    L4, L5    BILATERAL SALPINGOOPHORECTOMY      CHOLECYSTECTOMY      COLONOSCOPY N/A 6/13/2022    Procedure: COLONOSCOPY;  Surgeon: Saul Ureña MD;  Location: [address]);  Service: Endoscopy;  Laterality: N/A;  constipation protocol-extended Miralax prep - PEG allergy -okay with taking Miralax  fully vaccinated, prep instr portal -ml    COLONOSCOPY N/A 10/4/2022    Procedure: COLONOSCOPY;  Surgeon: Saul Ureña MD;  Location: [facility] ([address] [place] [address]);  Service: Endoscopy;  Laterality: N/A;  Constipation protocol-Miralax prep-pt tolerated Miralax for last colonoscopy.  fully vaccinated, prep instr portal -ml    CORRECTION OF HAMMER TOE Left 8/5/2021    Procedure: CORRECTION, HAMMER TOE Left 4th mallet toe, DIP joitn fusion;  Surgeon: Guero Alvarez MD;  Location: [facility];  Service: Orthopedics;  Laterality: Left;  [place]    CYST REMOVAL      EPIDURAL STEROID INJECTION INTO CERVICAL SPINE N/A 8/11/2020    Procedure: Injection-steroid-epidural-cervical;  Surgeon: Dosc Diagnostic  Provider;  [address];  Service: Radiology;  Laterality: N/A;  /[place]    EPIDURAL STEROID INJECTION INTO CERVICAL SPINE N/A 3/2/2022    Procedure: Injection-steroid-epidural-cervical C7-T1;  Surgeon: Latoya Quintanilla MD;  Location: [facility] PAIN MGT;  Service: Pain Management;  Laterality: N/A;    ESOPHAGOGASTRODUODENOSCOPY N/A 5/3/2019    Procedure: ESOPHAGOGASTRODUODENOSCOPY (EGD);  Surgeon: Carlin Sanchez MD;  Location: [facility] ENDO (4TH FLR);  Service: Endoscopy;  Laterality: N/A;    HYSTERECTOMY  12/17/2012    [facility] BS&O     INJECTION OF JOINT Left 3/1/2021    Procedure: INJECTION, JOINT LEFT HIP INTRA ARTICULAR CORTISONE INJECTION DIRECT REFERRAL;  Surgeon: Chuy Gregory MD;  Location: [facility] PAIN MGT;  Service: Pain Management;  Laterality: Left;  NEEDS CONSENT    INSERTION OF INFERIOR VENA CAVAL FILTER Right 7/19/2019    Procedure: IVC filter placement;  Surgeon: Rodney Rico MD;  Location: [facility] CATH LAB;  Service: Peripheral Vascular;  Laterality: Right;    IVC FILTER RETRIEVAL N/A 12/20/2019    Procedure: REMOVAL-FILTER-IVC;  Surgeon: Rodney Rico MD;  [address];  Service: Peripheral Vascular;  Laterality: N/A;    LAPAROSCOPIC SLEEVE GASTRECTOMY N/A 7/23/2019    Procedure: GASTRECTOMY, SLEEVE, LAPAROSCOPIC, with intraop EGD [id number];  Surgeon: Duc Ratliff Jr., MD;  [address];  Service: General;  Laterality: N/A;    RECTOCELE REPAIR      SPINE SURGERY  2018    THYROID NODULE REMOVAL      TONSILLECTOMY  1995       Home Meds:   Prior to Admission medications    Medication Sig Start Date End Date Taking? Authorizing Provider   albuterol (VENTOLIN HFA) 90 mcg/actuation inhaler Inhale 2 puffs into the lungs every 6 (six) hours as needed for Wheezing. Rescue 12/27/22 12/27/23  Nat Moraes MD   diclofenac (VOLTAREN) 75 MG EC tablet TAKE 1 TABLET BY MOUTH 2 TIMES DAILY AS NEEDED. 1/18/23   ZIGGY Osborne NP   flu vacc [id number] 6mos  up,PF, (FLUARIX QUAD [id number], PF,) 60 mcg (15 mcg x 4)/0.5 mL Syrg Inject to into muscle  Patient not taking: Reported on 12/27/2022 9/22/22   Cailin Kyle PharmD   hydroCHLOROthiazide (HYDRODIURIL) 12.5 MG Tab TAKE 1 TABLET BY MOUTH EVERY DAY 1/23/23   Wendy Horowitz MD   ipratropium (ATROVENT) 21 mcg (0.03 %) nasal spray 1 spray by Each Nostril route 2 (two) times daily as needed for Rhinitis. 12/27/22   Nat Moraes MD   pregabalin (LYRICA) 75 MG capsule Take 1 capsule (75 mg total) by mouth 2 (two) times daily.  Patient not taking: Reported on 12/27/2022 12/22/22 6/22/23  Melida Ron PA-C     Anticoagulants/Antiplatelets: no anticoagulation    Allergies:   Review of patient's allergies indicates:   Allergen Reactions    Cathflo activase [alteplase] Anaphylaxis     Tightness in chest, raspy throat, restless legs, hotness all over    Heparin analogues      Restless legs, tightness in chest, and hot all over    Polyethylene glycol analogues Swelling    Polysorbate 80 Anaphylaxis, Itching and Shortness Of Breath    Xyzal [levocetirizine] Swelling    Latex Swelling           Sedation History:  no adverse reactions    Review of Systems:   Hematological: no known coagulopathies  Respiratory: no shortness of breath  Cardiovascular: no chest pain  Gastrointestinal: no abdominal pain  Genito-Urinary: no dysuria  Musculoskeletal: back pain  Neurological: no TIA or stroke symptoms         OBJECTIVE:     Vital Signs (Most Recent)       Physical Exam:  ASA: 2  Mallampati: 2    General: no acute distress  Mental Status: alert and oriented to person, place and time  HEENT: normocephalic, atraumatic  Chest: unlabored breathing  Heart: regular heart rate  Abdomen: nondistended  Extremity: moves all extremities    Laboratory  Lab Results   Component Value Date    INR 0.9 11/23/2021       Lab Results   Component Value Date    WBC 5.66 05/06/2022    HGB 14.2 05/06/2022    HCT 42.4 05/06/2022    MCV 91  05/06/2022     05/06/2022      Lab Results   Component Value Date    GLU 96 05/06/2022     05/06/2022    K 4.0 05/06/2022     05/06/2022    CO2 28 05/06/2022    BUN 19 05/06/2022    CREATININE 0.8 05/06/2022    CALCIUM 9.8 05/06/2022    MG 2.2 07/24/2019    ALT 15 05/06/2022    AST 18 05/06/2022    ALBUMIN 3.9 05/06/2022    BILITOT 0.5 05/06/2022    BILIDIR 0.2 10/05/2018       ASSESSMENT/PLAN:     Sedation Plan: local  Patient will undergo LEFT L4-5 TFESI.    Francesca Houston MD  Department of Radiology, PGY-5  Pager: [phone]

## 2023-03-03 NOTE — DISCHARGE SUMMARY
Radiology Discharge Summary      Hospital Course:  Left L4-5 transforaminal epidural steroid injection was performed. Patient tolerated procedure well. There were no complications.     Admit Date: 3/3/2023  Discharge Date: 03/03/2023     Instructions Given to Patient: Yes  Diet: Resume prior diet  Activity: activity as tolerated    Description of Condition on Discharge: Stable  Vital Signs (Most Recent): BP: (!) 137/95 (03/03/23 1220)    Discharge Disposition: Home    Discharge Diagnosis: Lower back pain s/p L4-5 NEERAJ.      Follow-up: Follow up with interventional radiology for back pain as needed.     Francesca Houston MD  Department of Radiology, PGY-5  Pager: [phone]

## 2023-03-03 NOTE — PLAN OF CARE
Pt arrived to 190 for outpt back inj. Pt in no acute distress, placed on monitor, VSS. Awaiting consent.

## 2023-03-03 NOTE — DISCHARGE INSTRUCTIONS
Please call with any questions or concerns.      Monday thru Friday 8:00 am - 4:30 pm    Interventional Radiology   [phone]    After Hours    Ask for the Radiology Intern on call  [phone]

## 2023-03-03 NOTE — PROCEDURES
Radiology Post-Procedure Note    Pre Op Diagnosis: lower back pain  Post Op Diagnosis: Same    Procedure: Left L4-5 transforaminal epidural steroid injection    Procedure performed by: Francesca Mckeon    Written Informed Consent Obtained: Yes  Specimen Removed: NO  Estimated Blood Loss: Minimal    Findings:   Surgical hardware of L4-S1 posterior instrumented fusion with disk implants at L4-L5 and L5-S1.  The left  L4-L5 foramina was localized with fluoroscopy. Contrast was injected to confirm position. A mixture of 1.5 mL 1% lidocaine and 1 mL 40 mg depomedrol was injected.  Patient tolerated procedure well. There were no complications.     Francesca Houston MD  Department of Radiology, PGY-5  Pager: [phone]

## 2023-03-07 VITALS — DIASTOLIC BLOOD PRESSURE: 95 MMHG | SYSTOLIC BLOOD PRESSURE: 137 MMHG

## 2023-03-08 ENCOUNTER — PATIENT MESSAGE (OUTPATIENT)
Dept: ORTHOPEDICS | Facility: CLINIC | Age: 63
End: 2023-03-08
Payer: COMMERCIAL

## 2023-03-08 ENCOUNTER — TELEPHONE (OUTPATIENT)
Dept: ORTHOPEDICS | Facility: CLINIC | Age: 63
End: 2023-03-08
Payer: COMMERCIAL

## 2023-03-08 ENCOUNTER — PATIENT MESSAGE (OUTPATIENT)
Dept: BARIATRICS | Facility: CLINIC | Age: 63
End: 2023-03-08
Payer: COMMERCIAL

## 2023-03-08 DIAGNOSIS — R60.0 LOWER EXTREMITY EDEMA: Primary | ICD-10-CM

## 2023-03-08 NOTE — TELEPHONE ENCOUNTER
Attempted to reach pt in regards to getting her scheduled in for a ultrasound. Pt's phone went straight to . I left pt a  to give me a call back.                  ----- Message from Mila Jones PA-C sent at 3/8/2023  1:49 PM CST -----  Can you see if this patient can have an ultrasound at [place] or [facility] today?    ----- Message -----  From: Danitza Florentino LPN  Sent: 3/8/2023   1:45 PM CST  To: Mila Jones PA-C    I can make calls after clinic but wont have time until then.    April  ----- Message -----  From: Mila Jones PA-C  Sent: 3/8/2023   1:42 PM CST  To: Danitza Florentino LPN    They just called and said they do not have anything.  Can you try other campuses maybe?    ----- Message -----  From: Danitza Florentino LPN  Sent: 3/8/2023  12:19 PM CST  To: Mila Jones PA-C    I spoke with Ultrasound and they said they are double booked all day. She said she will ask the supervisor when she comes back from lunch if there is any way she can be squeezed in. They will let you know if she can.    April  ----- Message -----  From: Mila Jones PA-C  Sent: 3/8/2023  12:02 PM CST  To: Danitza Florentino LPN    Can you see if she can get an ultrasound of her left leg today?  She sent a message shes having significant swelling and pain in her calf.

## 2023-03-08 NOTE — TELEPHONE ENCOUNTER
Spoke to pt in regards to a sooner appointment for her knee ultrasound. I told pt that the soonest appointment they have available now, is the one she is already scheduled for. I told pt that I would add her to the wait list just in case something sooner came about. Pt stated thank you.               ----- Message from Mila Jones PA-C sent at 3/8/2023  1:49 PM CST -----  Can you see if this patient can have an ultrasound at [place] or [facility] today?    ----- Message -----  From: Danitza Florentino LPN  Sent: 3/8/2023   1:45 PM CST  To: Mila Jones PA-C    I can make calls after clinic but wont have time until then.    April  ----- Message -----  From: Mila Jones PA-C  Sent: 3/8/2023   1:42 PM CST  To: Danitza Florentino LPN    They just called and said they do not have anything.  Can you try other campuses maybe?    ----- Message -----  From: Danitza Florentino LPN  Sent: 3/8/2023  12:19 PM CST  To: Mila Jones PA-C    I spoke with Ultrasound and they said they are double booked all day. She said she will ask the supervisor when she comes back from lunch if there is any way she can be squeezed in. They will let you know if she can.    April  ----- Message -----  From: Mila Jones PA-C  Sent: 3/8/2023  12:02 PM CST  To: Danitza Florentino LPN    Can you see if she can get an ultrasound of her left leg today?  She sent a message shes having significant swelling and pain in her calf.

## 2023-03-09 ENCOUNTER — OFFICE VISIT (OUTPATIENT)
Dept: INTERNAL MEDICINE | Facility: CLINIC | Age: 63
End: 2023-03-09
Payer: COMMERCIAL

## 2023-03-09 ENCOUNTER — HOSPITAL ENCOUNTER (OUTPATIENT)
Dept: RADIOLOGY | Facility: OTHER | Age: 63
Discharge: HOME | End: 2023-03-09
Attending: PHYSICIAN ASSISTANT
Payer: COMMERCIAL

## 2023-03-09 ENCOUNTER — PATIENT MESSAGE (OUTPATIENT)
Dept: INTERNAL MEDICINE | Facility: CLINIC | Age: 63
End: 2023-03-09

## 2023-03-09 VITALS
BODY MASS INDEX: 32.87 KG/M2 | DIASTOLIC BLOOD PRESSURE: 82 MMHG | RESPIRATION RATE: 16 BRPM | OXYGEN SATURATION: 98 % | SYSTOLIC BLOOD PRESSURE: 117 MMHG | WEIGHT: 197.31 LBS | HEART RATE: 90 BPM | HEIGHT: 65 IN

## 2023-03-09 DIAGNOSIS — R60.0 LOWER EXTREMITY EDEMA: ICD-10-CM

## 2023-03-09 DIAGNOSIS — M79.89 SWELLING OF LEFT FOOT: Primary | ICD-10-CM

## 2023-03-09 DIAGNOSIS — E66.9 OBESITY (BMI 30-39.9): ICD-10-CM

## 2023-03-09 PROCEDURE — 3008F PR BODY MASS INDEX (BMI) DOCUMENTED: ICD-10-PCS | Mod: CPTII,S$GLB,, | Performed by: NURSE PRACTITIONER

## 2023-03-09 PROCEDURE — 3074F SYST BP LT 130 MM HG: CPT | Mod: CPTII,S$GLB,, | Performed by: NURSE PRACTITIONER

## 2023-03-09 PROCEDURE — 99999 PR PBB SHADOW E&M-EST. PATIENT-LVL IV: ICD-10-PCS | Mod: PBBFAC,,, | Performed by: NURSE PRACTITIONER

## 2023-03-09 PROCEDURE — 93971 US LOWER EXTREMITY VEINS LEFT: ICD-10-PCS | Mod: 26,LT,, | Performed by: RADIOLOGY

## 2023-03-09 PROCEDURE — 93971 EXTREMITY STUDY: CPT | Mod: 26,LT,, | Performed by: RADIOLOGY

## 2023-03-09 PROCEDURE — 3079F DIAST BP 80-89 MM HG: CPT | Mod: CPTII,S$GLB,, | Performed by: NURSE PRACTITIONER

## 2023-03-09 PROCEDURE — 99999 PR PBB SHADOW E&M-EST. PATIENT-LVL IV: CPT | Mod: PBBFAC,,, | Performed by: NURSE PRACTITIONER

## 2023-03-09 PROCEDURE — 99214 PR OFFICE/OUTPT VISIT, EST, LEVL IV, 30-39 MIN: ICD-10-PCS | Mod: S$GLB,,, | Performed by: NURSE PRACTITIONER

## 2023-03-09 PROCEDURE — 3074F PR MOST RECENT SYSTOLIC BLOOD PRESSURE < 130 MM HG: ICD-10-PCS | Mod: CPTII,S$GLB,, | Performed by: NURSE PRACTITIONER

## 2023-03-09 PROCEDURE — 3008F BODY MASS INDEX DOCD: CPT | Mod: CPTII,S$GLB,, | Performed by: NURSE PRACTITIONER

## 2023-03-09 PROCEDURE — 93971 EXTREMITY STUDY: CPT | Mod: TC,LT

## 2023-03-09 PROCEDURE — 3079F PR MOST RECENT DIASTOLIC BLOOD PRESSURE 80-89 MM HG: ICD-10-PCS | Mod: CPTII,S$GLB,, | Performed by: NURSE PRACTITIONER

## 2023-03-09 PROCEDURE — 1159F PR MEDICATION LIST DOCUMENTED IN MEDICAL RECORD: ICD-10-PCS | Mod: CPTII,S$GLB,, | Performed by: NURSE PRACTITIONER

## 2023-03-09 PROCEDURE — 1159F MED LIST DOCD IN RCRD: CPT | Mod: CPTII,S$GLB,, | Performed by: NURSE PRACTITIONER

## 2023-03-09 PROCEDURE — 99214 OFFICE O/P EST MOD 30 MIN: CPT | Mod: S$GLB,,, | Performed by: NURSE PRACTITIONER

## 2023-03-09 NOTE — PATIENT INSTRUCTIONS
Check labs today, will message with results    If kidney function stable, will consider increasing HCTZ to 25mg daily as needed for swelling    Elevation, avoid sitting prone long periods    Compression stockings

## 2023-03-09 NOTE — PROGRESS NOTES
"Subjective:       Patient ID: Renae Hou is a 62 y.o. female.      Chief Complaint:   Chief Complaint   Patient presents with    Foot Swelling     3 days ago        Pt of Dr. Horowitz being seen today for "feet swelling". Message this AM to Dr. Horowitz states pt's left foot and leg have been swelling with associated neuralgia. Pt also endorses LLE numbness and tingling which has been intermittent since her back surgery in 2018. US of the left foot and leg today shows patent blood flow with no sign of thrombosis.     Edema  This is a new problem. The current episode started in the past 7 days (3 days ago). The problem occurs intermittently. The problem has been unchanged. Associated symptoms include arthralgias and numbness. Pertinent negatives include no chest pain, chills, fever, headaches or weakness. Nothing aggravates the symptoms. Treatments tried: elevation, compression stockings. The treatment provided moderate relief.   Review of Systems   Constitutional:  Negative for chills and fever.   Respiratory:  Negative for chest tightness and shortness of breath.    Cardiovascular:  Positive for leg swelling. Negative for chest pain, palpitations and claudication.   Musculoskeletal:  Positive for arthralgias and leg pain.        As documented in HPI     Neurological:  Positive for numbness. Negative for dizziness, weakness, light-headedness and headaches.   Hematological:  Negative for adenopathy. Does not bruise/bleed easily.   Psychiatric/Behavioral:  Negative for suicidal ideas.        Review of patient's allergies indicates:   Allergen Reactions    Cathflo activase [alteplase] Anaphylaxis     Tightness in chest, raspy throat, restless legs, hotness all over    Heparin analogues      Restless legs, tightness in chest, and hot all over    Polyethylene glycol analogues Swelling    Polysorbate 80 Anaphylaxis, Itching and Shortness Of Breath    Xyzal [levocetirizine] Swelling    Latex Swelling            Current " Outpatient Medications:     hydroCHLOROthiazide (HYDRODIURIL) 12.5 MG Tab, TAKE 1 TABLET BY MOUTH EVERY DAY, Disp: 90 tablet, Rfl: 1     Patient Active Problem List   Diagnosis    RENAE dx 2010, improved after weight loss    Chronic constipation    Mixed hyperlipidemia    Pre-diabetes    Chronic pain    History of lumbar surgery    Fever    Essential hypertension    Fatty liver    Spondylolisthesis    GERD (gastroesophageal reflux disease)    Vaginal atrophy    Dyspareunia, female    Mixed stress and urge urinary incontinence    Rectocele, female    Cystocele, midline    History of DVT (deep vein thrombosis): 2018     Presence of IVC filter    Multiple thyroid nodules: stable 5/22    Dysphonia    Cervical radiculopathy    Mallet toe of left foot    Chronic left-sided low back pain with left-sided sciatica    Carpal tunnel syndrome of right wrist: see EMG 10/21    Chronic deep vein thrombosis (DVT) of distal vein of left lower extremity    Obesity due to excess calories with serious comorbidity    Tubular adenoma of colon: see colonoscopy 2022    Polyarthralgia    COVID    Cough    Nasal congestion      Past Medical History:   Diagnosis Date    Abscess of paraspinous muscles 2018    Allergy 04/2018    Class 1 obesity due to excess calories in adult 07/23/2019    DVT (deep venous thrombosis)     left leg    Epidural abscess 2018 04/10/2018    Fatty liver     GERD (gastroesophageal reflux disease) 2015    History of major abdominal surgery 09/11/2015    Had Hysterectomy , cholecystectomy , rectocele repair    Hypertension     Lumbar radiculopathy     Menopause     Obesity     Obstructive sleep apnea on CPAP     no longer uses CPAP after weight loss from gastric sleeve    Thyroid disease     Thyroid nodules.      Past Surgical History:   Procedure Laterality Date    ADENOIDECTOMY  1995    BACK SURGERY  2002    L4, L5    BILATERAL SALPINGOOPHORECTOMY      CHOLECYSTECTOMY      COLONOSCOPY N/A 6/13/2022    Procedure:  COLONOSCOPY;  Surgeon: Saul Ureña MD;  Location: [facility] (4TH FLR);  Service: Endoscopy;  Laterality: N/A;  constipation protocol-extended Miralax prep - PEG allergy -okay with taking Miralax  fully vaccinated, prep instr portal -ml    COLONOSCOPY N/A 10/4/2022    Procedure: COLONOSCOPY;  Surgeon: Saul Ureña MD;  Location: [facility] (4TH FLR);  Service: Endoscopy;  Laterality: N/A;  Constipation protocol-Miralax prep-pt tolerated Miralax for last colonoscopy.  fully vaccinated, prep instr portal -ml    CORRECTION OF HAMMER TOE Left 8/5/2021    Procedure: CORRECTION, HAMMER TOE Left 4th mallet toe, DIP joitn fusion;  Surgeon: Guero Alvarez MD;  Location: [facility] OR;  Service: Orthopedics;  Laterality: Left;  K-wires    CYST REMOVAL      EPIDURAL STEROID INJECTION INTO CERVICAL SPINE N/A 8/11/2020    Procedure: Injection-steroid-epidural-cervical;  Surgeon: [facility] Diagnostic Provider;  Location: [facility] 2ND FLR;  Service: Radiology;  Laterality: N/A;  /Bud    EPIDURAL STEROID INJECTION INTO CERVICAL SPINE N/A 3/2/2022    Procedure: Injection-steroid-epidural-cervical C7-T1;  Surgeon: Latoya Quintanilla MD;  Location: [facility] PAIN MGT;  Service: Pain Management;  Laterality: N/A;    ESOPHAGOGASTRODUODENOSCOPY N/A 5/3/2019    Procedure: ESOPHAGOGASTRODUODENOSCOPY (EGD);  Surgeon: Carlin Sanchez MD;  Location: [facility] (4TH FLR);  Service: Endoscopy;  Laterality: N/A;    HYSTERECTOMY  12/17/2012    [facility] BS&O     INJECTION OF JOINT Left 3/1/2021    Procedure: INJECTION, JOINT LEFT HIP INTRA ARTICULAR CORTISONE INJECTION DIRECT REFERRAL;  Surgeon: Chuy Gregory MD;  Location: [facility] PAIN MGT;  Service: Pain Management;  Laterality: Left;  NEEDS CONSENT    INSERTION OF INFERIOR VENA CAVAL FILTER Right 7/19/2019    Procedure: IVC filter placement;  Surgeon: Rodney Rico MD;  Location: [facility] CATH LAB;  Service: Peripheral Vascular;  Laterality: Right;    IVC FILTER RETRIEVAL N/A 12/20/2019     Procedure: REMOVAL-FILTER-IVC;  Surgeon: Rodney Rico MD;  Location: [facility] OR [address] [place] [address];  Service: Peripheral Vascular;  Laterality: N/A;    LAPAROSCOPIC SLEEVE GASTRECTOMY N/A 2019    Procedure: GASTRECTOMY, SLEEVE, LAPAROSCOPIC, with intraop EGD 22760;  Surgeon: Duc Ratliff Jr., MD;  Location: [facility] OR [address] [place] [address];  Service: General;  Laterality: N/A;    RECTOCELE REPAIR      SPINE SURGERY  2018    THYROID NODULE REMOVAL      TONSILLECTOMY        Social History     Socioeconomic History    Marital status:    Tobacco Use    Smoking status: Former     Packs/day: 0.25     Years: 34.00     Pack years: 8.50     Types: Cigarettes     Quit date: 1988     Years since quittin.2    Smokeless tobacco: Never    Tobacco comments:     smoked socially decades ago - weekends only   Substance and Sexual Activity    Alcohol use: Not Currently     Comment: yearly at most     Drug use: No    Sexual activity: Yes     Partners: Male     Birth control/protection: See Surgical Hx, None     Comment: [facility] BS&O 2012,     Social History Narrative    . 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     Social Determinants of Health     Financial Resource Strain: Low Risk     Difficulty of Paying Living Expenses: Not hard at all   Food Insecurity: No Food Insecurity    Worried About Running Out of Food in the Last Year: Never true    Ran Out of Food in the Last Year: Never true   Transportation Needs: No Transportation Needs    Lack of Transportation (Medical): No    Lack of Transportation (Non-Medical): No   Physical Activity: Insufficiently Active    Days of Exercise per Week: 2 days    Minutes of Exercise per Session: 30 min   Stress: No Stress Concern Present    Feeling of Stress : Not at all   Social Connections: Unknown    Frequency of Communication with Friends and Family: More than three times a week    Frequency of Social Gatherings with Friends and Family: Once a week    Active Member of Clubs or Organizations:  "No    Attends Club or Organization Meetings: Patient refused    Marital Status:    Housing Stability: Low Risk     Unable to Pay for Housing in the Last Year: No    Number of Places Lived in the Last Year: 1    Unstable Housing in the Last Year: No      Family History   Problem Relation Age of Onset    Thyroid disease Mother     Hypertension Mother     Hyperlipidemia Mother     Heart disease Mother         cad, valvular heart disease    Deep vein thrombosis Mother     Heart disease Father         MI    Stroke Father     Diabetes Sister     Alcohol abuse Sister     Cirrhosis Sister         alcoholic cirrhosis    Epilepsy Sister     Thyroid disease Daughter         Hashimoto's    Hashimoto's thyroiditis Daughter     No Known Problems Daughter     Heart disease Maternal Grandmother     Thyroid disease Maternal Grandmother     Kidney disease Maternal Grandmother     Heart disease Paternal Grandmother         MI    Breast cancer Neg Hx     Colon cancer Neg Hx     Ovarian cancer Neg Hx     Esophageal cancer Neg Hx       Objective:      Vitals:    03/09/23 1343   BP: 117/82   Pulse: 90   Resp: 16   SpO2: 98%   Weight: 89.5 kg (197 lb 5 oz)   Height: 5' 5" (1.651 m)   PainSc:   8   PainLoc: Foot        Body mass index is 32.83 kg/m².     Physical Exam  Constitutional:       Appearance: She is obese.   HENT:      Head: Normocephalic.      Right Ear: External ear normal.      Left Ear: External ear normal.      Nose: Nose normal.   Eyes:      Conjunctiva/sclera: Conjunctivae normal.   Cardiovascular:      Rate and Rhythm: Normal rate and regular rhythm.      Pulses: Normal pulses.           Dorsalis pedis pulses are 2+ on the right side and 2+ on the left side.      Heart sounds: Normal heart sounds.   Pulmonary:      Effort: Pulmonary effort is normal.      Breath sounds: Normal breath sounds.   Musculoskeletal:         General: Normal range of motion.      Cervical back: Normal range of motion.   Feet:      Right " foot:      Skin integrity: Skin integrity normal.      Left foot:      Skin integrity: Skin integrity normal.   Skin:     General: Skin is warm and dry.   Neurological:      General: No focal deficit present.      Mental Status: She is alert and oriented to person, place, and time.   Psychiatric:         Mood and Affect: Mood normal.         Behavior: Behavior normal.         Thought Content: Thought content normal.         Judgment: Judgment normal.       Assessment:       Problem List Items Addressed This Visit    None  Visit Diagnoses       Swelling of left foot    -  Primary    Relevant Orders    Comprehensive Metabolic Panel    C-Reactive Protein    MIGUELINA Screen w/Reflex    Sedimentation rate    Rheumatoid Factor    Uric Acid    BMI 32.0-32.9,adult        Obesity (BMI 30-39.9)                Plan:       Renae was seen today for foot swelling.    Diagnoses and all orders for this visit:    Swelling of left foot  -     Comprehensive Metabolic Panel; Future  -     C-Reactive Protein; Future  -     MIGUELINA Screen w/Reflex; Future  -     Sedimentation rate; Future  -     Rheumatoid Factor; Future  -     Uric Acid; Future        -    Increase HCTZ from 12.5 mg to 25 mg prn daily if kidney function is WNL        -    Elevate leg; wear compression stockings  BMI 32.0-32.9,adult  BMI reviewed    Obesity (BMI 30-39.9)  Diet and exercise for weight loss       Check labs today, will message with results    If kidney function stable, will consider increasing HCTZ to 25mg daily as needed for swelling    Elevation, avoid sitting prone long periods    Compression stockings    Self care instructions provided in AVS    Follow up if symptoms worsen or fail to improve.

## 2023-03-13 DIAGNOSIS — M10.9 GOUTY ARTHRITIS OF LEFT FOOT: Primary | ICD-10-CM

## 2023-03-13 RX ORDER — ALLOPURINOL 300 MG/1
300 TABLET ORAL DAILY
Qty: 90 TABLET | Refills: 3 | Status: SHIPPED | OUTPATIENT
Start: 2023-03-13 | End: 2023-04-04

## 2023-03-14 ENCOUNTER — PATIENT MESSAGE (OUTPATIENT)
Dept: BARIATRICS | Facility: CLINIC | Age: 63
End: 2023-03-14
Payer: COMMERCIAL

## 2023-03-22 ENCOUNTER — PATIENT MESSAGE (OUTPATIENT)
Dept: INTERNAL MEDICINE | Facility: CLINIC | Age: 63
End: 2023-03-22
Payer: COMMERCIAL

## 2023-03-23 ENCOUNTER — HOSPITAL ENCOUNTER (EMERGENCY)
Facility: HOSPITAL | Age: 63
Discharge: HOME | End: 2023-03-23
Attending: STUDENT IN AN ORGANIZED HEALTH CARE EDUCATION/TRAINING PROGRAM
Payer: COMMERCIAL

## 2023-03-23 VITALS
HEART RATE: 70 BPM | DIASTOLIC BLOOD PRESSURE: 78 MMHG | RESPIRATION RATE: 16 BRPM | TEMPERATURE: 98 F | OXYGEN SATURATION: 98 % | SYSTOLIC BLOOD PRESSURE: 123 MMHG

## 2023-03-23 DIAGNOSIS — M25.561 ACUTE PAIN OF RIGHT KNEE: Primary | ICD-10-CM

## 2023-03-23 LAB
ALBUMIN SERPL BCP-MCNC: 3.9 G/DL (ref 3.5–5.2)
ALP SERPL-CCNC: 84 U/L (ref 55–135)
ALT SERPL W/O P-5'-P-CCNC: 15 U/L (ref 10–44)
ANION GAP SERPL CALC-SCNC: 11 MMOL/L (ref 8–16)
AST SERPL-CCNC: 21 U/L (ref 10–40)
BACTERIA #/AREA URNS AUTO: NORMAL /HPF
BASOPHILS # BLD AUTO: 0.06 K/UL (ref 0–0.2)
BASOPHILS NFR BLD: 0.9 % (ref 0–1.9)
BILIRUB SERPL-MCNC: 0.4 MG/DL (ref 0.1–1)
BILIRUB UR QL STRIP: NEGATIVE
BUN SERPL-MCNC: 19 MG/DL (ref 8–23)
CALCIUM SERPL-MCNC: 9.7 MG/DL (ref 8.7–10.5)
CHLORIDE SERPL-SCNC: 103 MMOL/L (ref 95–110)
CLARITY UR REFRACT.AUTO: CLEAR
CO2 SERPL-SCNC: 26 MMOL/L (ref 23–29)
COLOR UR AUTO: YELLOW
CREAT SERPL-MCNC: 0.8 MG/DL (ref 0.5–1.4)
DIFFERENTIAL METHOD: NORMAL
EOSINOPHIL # BLD AUTO: 0.3 K/UL (ref 0–0.5)
EOSINOPHIL NFR BLD: 4.7 % (ref 0–8)
ERYTHROCYTE [DISTWIDTH] IN BLOOD BY AUTOMATED COUNT: 13.1 % (ref 11.5–14.5)
EST. GFR  (NO RACE VARIABLE): >60 ML/MIN/1.73 M^2
GLUCOSE SERPL-MCNC: 104 MG/DL (ref 70–110)
GLUCOSE UR QL STRIP: NEGATIVE
HCT VFR BLD AUTO: 41.1 % (ref 37–48.5)
HCV AB SERPL QL IA: NORMAL
HGB BLD-MCNC: 13.9 G/DL (ref 12–16)
HGB UR QL STRIP: NEGATIVE
HIV 1+2 AB+HIV1 P24 AG SERPL QL IA: NORMAL
IMM GRANULOCYTES # BLD AUTO: 0.01 K/UL (ref 0–0.04)
IMM GRANULOCYTES NFR BLD AUTO: 0.2 % (ref 0–0.5)
KETONES UR QL STRIP: NEGATIVE
LEUKOCYTE ESTERASE UR QL STRIP: ABNORMAL
LYMPHOCYTES # BLD AUTO: 1.7 K/UL (ref 1–4.8)
LYMPHOCYTES NFR BLD: 25.5 % (ref 18–48)
MCH RBC QN AUTO: 30.5 PG (ref 27–31)
MCHC RBC AUTO-ENTMCNC: 33.8 G/DL (ref 32–36)
MCV RBC AUTO: 90 FL (ref 82–98)
MICROSCOPIC COMMENT: NORMAL
MONOCYTES # BLD AUTO: 0.5 K/UL (ref 0.3–1)
MONOCYTES NFR BLD: 6.9 % (ref 4–15)
NEUTROPHILS # BLD AUTO: 4.1 K/UL (ref 1.8–7.7)
NEUTROPHILS NFR BLD: 61.8 % (ref 38–73)
NITRITE UR QL STRIP: NEGATIVE
NRBC BLD-RTO: 0 /100 WBC
PH UR STRIP: 5 [PH] (ref 5–8)
PLATELET # BLD AUTO: 281 K/UL (ref 150–450)
PMV BLD AUTO: 9.6 FL (ref 9.2–12.9)
POTASSIUM SERPL-SCNC: 3.8 MMOL/L (ref 3.5–5.1)
PROT SERPL-MCNC: 7.1 G/DL (ref 6–8.4)
PROT UR QL STRIP: NEGATIVE
RBC # BLD AUTO: 4.56 M/UL (ref 4–5.4)
RBC #/AREA URNS AUTO: 1 /HPF (ref 0–4)
SODIUM SERPL-SCNC: 140 MMOL/L (ref 136–145)
SP GR UR STRIP: 1.01 (ref 1–1.03)
SQUAMOUS #/AREA URNS AUTO: 3 /HPF
URATE SERPL-MCNC: 3.9 MG/DL (ref 2.4–5.7)
URN SPEC COLLECT METH UR: ABNORMAL
WBC # BLD AUTO: 6.55 K/UL (ref 3.9–12.7)
WBC #/AREA URNS AUTO: 1 /HPF (ref 0–5)

## 2023-03-23 PROCEDURE — 99284 EMERGENCY DEPT VISIT MOD MDM: CPT | Mod: 25

## 2023-03-23 PROCEDURE — 99284 EMERGENCY DEPT VISIT MOD MDM: CPT | Mod: 25,,, | Performed by: STUDENT IN AN ORGANIZED HEALTH CARE EDUCATION/TRAINING PROGRAM

## 2023-03-23 PROCEDURE — 80053 COMPREHEN METABOLIC PANEL: CPT

## 2023-03-23 PROCEDURE — 20610 DRAIN/INJ JOINT/BURSA W/O US: CPT | Mod: RT

## 2023-03-23 PROCEDURE — 25000003 PHARM REV CODE 250

## 2023-03-23 PROCEDURE — 86803 HEPATITIS C AB TEST: CPT | Performed by: PHYSICIAN ASSISTANT

## 2023-03-23 PROCEDURE — 87389 HIV-1 AG W/HIV-1&-2 AB AG IA: CPT | Performed by: PHYSICIAN ASSISTANT

## 2023-03-23 PROCEDURE — 85025 COMPLETE CBC W/AUTO DIFF WBC: CPT

## 2023-03-23 PROCEDURE — 99284 PR EMERGENCY DEPT VISIT,LEVEL IV: ICD-10-PCS | Mod: 25,,, | Performed by: STUDENT IN AN ORGANIZED HEALTH CARE EDUCATION/TRAINING PROGRAM

## 2023-03-23 PROCEDURE — 20610 DRAIN/INJ JOINT/BURSA W/O US: CPT | Mod: RT,,, | Performed by: STUDENT IN AN ORGANIZED HEALTH CARE EDUCATION/TRAINING PROGRAM

## 2023-03-23 PROCEDURE — 84550 ASSAY OF BLOOD/URIC ACID: CPT

## 2023-03-23 PROCEDURE — 20610 PR DRAIN/INJECT LARGE JOINT/BURSA: ICD-10-PCS | Mod: RT,,, | Performed by: STUDENT IN AN ORGANIZED HEALTH CARE EDUCATION/TRAINING PROGRAM

## 2023-03-23 PROCEDURE — 81001 URINALYSIS AUTO W/SCOPE: CPT

## 2023-03-23 RX ORDER — NAPROXEN 500 MG/1
500 TABLET ORAL
Status: COMPLETED | OUTPATIENT
Start: 2023-03-23 | End: 2023-03-23

## 2023-03-23 RX ORDER — NAPROXEN 500 MG/1
500 TABLET ORAL 2 TIMES DAILY WITH MEALS
Qty: 60 TABLET | Refills: 0 | Status: SHIPPED | OUTPATIENT
Start: 2023-03-23 | End: 2023-03-29

## 2023-03-23 RX ADMIN — NAPROXEN 500 MG: 500 TABLET ORAL at 07:03

## 2023-03-23 NOTE — ED NOTES
Renae Hou, an 62 y.o. female presents to the ED c/o pain and swelling to R knee, believes it to be gout flare up. Pt denies recent falls/trauma. Slight swelling noted. Pt ambulating with crutches without difficulty.      Review of patient's allergies indicates:   Allergen Reactions    Cathflo activase [alteplase] Anaphylaxis     Tightness in chest, raspy throat, restless legs, hotness all over    Heparin analogues      Restless legs, tightness in chest, and hot all over    Polyethylene glycol analogues Swelling    Polysorbate 80 Anaphylaxis, Itching and Shortness Of Breath    Xyzal [levocetirizine] Swelling    Latex Swelling           Chief Complaint   Patient presents with    Knee Pain     Pt c/o pain and swelling to R knee since yesterday. Pt is concerned she may have a gout flare up. Denies any recent falls or trauma.     Past Medical History:   Diagnosis Date    Abscess of paraspinous muscles 2018    Allergy 04/2018    Class 1 obesity due to excess calories in adult 07/23/2019    DVT (deep venous thrombosis)     left leg    Epidural abscess 2018 04/10/2018    Fatty liver     GERD (gastroesophageal reflux disease) 2015    History of major abdominal surgery 09/11/2015    Had Hysterectomy , cholecystectomy , rectocele repair    Hypertension     Lumbar radiculopathy     Menopause     Obesity     Obstructive sleep apnea on CPAP     no longer uses CPAP after weight loss from gastric sleeve    Thyroid disease     Thyroid nodules.

## 2023-03-23 NOTE — DISCHARGE INSTRUCTIONS
Diagnosis: right knee pain     Tests today showed:   Labs Reviewed   URINALYSIS, REFLEX TO URINE CULTURE - Abnormal; Notable for the following components:       Result Value    Leukocytes, UA Trace (*)     All other components within normal limits    Narrative:     Specimen Source->Urine   GRAM STAIN   CULTURE, BODY FLUID - BACTEC   HIV 1 / 2 ANTIBODY    Narrative:     Release to patient->Immediate   HEPATITIS C ANTIBODY    Narrative:     Release to patient->Immediate   URIC ACID   CBC W/ AUTO DIFFERENTIAL   COMPREHENSIVE METABOLIC PANEL   URINALYSIS MICROSCOPIC    Narrative:     Specimen Source->Urine   BODY FLUID CRYSTAL   WBC & DIFF, BODY FLUID     No orders to display       Treatments you had today:   Medications   naproxen tablet 500 mg (500 mg Oral Given 3/23/23 0708)       Follow-Up Plan:  - Follow-up with primary care doctor regarding results within 3 - 5 days  - Additional testing and/or evaluation as directed by your primary doctor  - If gout is confirmed from arthrocentesis, considered switching from hydrochlorothiazide to another anti-hypertensive medications. Discuss with your PCP     Return to the Emergency Department for symptoms including but not limited to: worsening symptoms, shortness of breath or chest pain, vomiting with inability to hold down fluids, fevers greater than 100.4°F, passing out/fainting/unconsciousness, or other concerning symptoms.

## 2023-03-23 NOTE — ED PROVIDER NOTES
Encounter Date: 3/23/2023       History     Chief Complaint   Patient presents with    Knee Pain     Pt c/o pain and swelling to R knee since yesterday. Pt is concerned she may have a gout flare up. Denies any recent falls or trauma.     Renae Hou is a 62 y.o. female with a PMH of obesity s/p gastric sleeve, hypothyroidism, epidural abscess in 2018, left leg DVT, lumbar radiculopathy, HTN, and GERD presenting to [facility] for evaluation of right knee pain and swelling that began yesterday morning. She had similar pain in her left ankle 10 days ago and her PCP suspected gout due to high serum uric acid (no joint tap done) and prescribed her allopurinol which she says she has been taking. She does not drink alcohol and has not been eating foods rich in purine such as seafood. She takes hydrochlorothiazide for HTN but has been on that for over a year. She never had similar episodes of pain prior to 10 days ago. She denies any fever, chills, N/V/D, abdominal pain, dysuria, vaginal lesions or discharge, vision changes, eye pain or redness, trauma, oral lesions, or rashes.     The history is provided by the patient, the spouse and medical records. No  was used.   Review of patient's allergies indicates:   Allergen Reactions    Cathflo activase [alteplase] Anaphylaxis     Tightness in chest, raspy throat, restless legs, hotness all over    Heparin analogues      Restless legs, tightness in chest, and hot all over    Polyethylene glycol analogues Swelling    Polysorbate 80 Anaphylaxis, Itching and Shortness Of Breath    Xyzal [levocetirizine] Swelling    Latex Swelling           Past Medical History:   Diagnosis Date    Abscess of paraspinous muscles 2018    Allergy 04/2018    Class 1 obesity due to excess calories in adult 07/23/2019    DVT (deep venous thrombosis)     left leg    Epidural abscess 2018 04/10/2018    Fatty liver     GERD (gastroesophageal reflux disease) 2015     History of major abdominal surgery 09/11/2015    Had Hysterectomy , cholecystectomy , rectocele repair    Hypertension     Lumbar radiculopathy     Menopause     Obesity     Obstructive sleep apnea on CPAP     no longer uses CPAP after weight loss from gastric sleeve    Thyroid disease     Thyroid nodules.     Past Surgical History:   Procedure Laterality Date    ADENOIDECTOMY  1995    BACK SURGERY  2002    L4, L5    BILATERAL SALPINGOOPHORECTOMY      CHOLECYSTECTOMY      COLONOSCOPY N/A 6/13/2022    Procedure: COLONOSCOPY;  Surgeon: Saul Ureña MD;  Location: [facility] (4TH FLR);  Service: Endoscopy;  Laterality: N/A;  constipation protocol-extended Miralax prep - PEG allergy -okay with taking Miralax  fully vaccinated, prep instr portal -ml    COLONOSCOPY N/A 10/4/2022    Procedure: COLONOSCOPY;  Surgeon: Saul Ureña MD;  Location: [facility]4TH FLR);  Service: Endoscopy;  Laterality: N/A;  Constipation protocol-Miralax prep-pt tolerated Miralax for last colonoscopy.  fully vaccinated, prep instr portal -ml    CORRECTION OF HAMMER TOE Left 8/5/2021    Procedure: CORRECTION, HAMMER TOE Left 4th mallet toe, DIP joitn fusion;  Surgeon: Guero Alvarez MD;  Location: [facility] OR;  Service: Orthopedics;  Laterality: Left;  K-wires    CYST REMOVAL      EPIDURAL STEROID INJECTION INTO CERVICAL SPINE N/A 8/11/2020    Procedure: Injection-steroid-epidural-cervical;  Surgeon: [facility] Provider;  Location: [facility] 2ND FLR;  Service: Radiology;  Laterality: N/A;  /[place]    EPIDURAL STEROID INJECTION INTO CERVICAL SPINE N/A 3/2/2022    Procedure: Injection-steroid-epidural-cervical C7-T1;  Surgeon: Latoya Quintanilla MD;  Location: [facility] PAIN MGT;  Service: Pain Management;  Laterality: N/A;    ESOPHAGOGASTRODUODENOSCOPY N/A 5/3/2019    Procedure: ESOPHAGOGASTRODUODENOSCOPY (EGD);  Surgeon: Carlin Sanchez MD;  Location: [facility] FLR);  Service: Endoscopy;  Laterality: N/A;    HYSTERECTOMY   12/17/2012    [facility] BS&O     INJECTION OF JOINT Left 3/1/2021    Procedure: INJECTION, JOINT LEFT HIP INTRA ARTICULAR CORTISONE INJECTION DIRECT REFERRAL;  Surgeon: Chuy Gregory MD;  Location: [facility] PAIN MGT;  Service: Pain Management;  Laterality: Left;  NEEDS CONSENT    INSERTION OF INFERIOR VENA CAVAL FILTER Right 7/19/2019    Procedure: IVC filter placement;  Surgeon: Rodney Rico MD;  Location: [facility] CATH LAB;  Service: Peripheral Vascular;  Laterality: Right;    IVC FILTER RETRIEVAL N/A 12/20/2019    Procedure: REMOVAL-FILTER-IVC;  Surgeon: Rodney Rico MD;  Location: [facility] OR [address] [place] [address];  Service: Peripheral Vascular;  Laterality: N/A;    LAPAROSCOPIC SLEEVE GASTRECTOMY N/A 7/23/2019    Procedure: GASTRECTOMY, SLEEVE, LAPAROSCOPIC, with intraop EGD 90507;  Surgeon: Duc Ratliff Jr., MD;  Location: [facility] OR [address] [place] [address];  Service: General;  Laterality: N/A;    RECTOCELE REPAIR      SPINE SURGERY  2018    THYROID NODULE REMOVAL      TONSILLECTOMY  1995     Family History   Problem Relation Age of Onset    Thyroid disease Mother     Hypertension Mother     Hyperlipidemia Mother     Heart disease Mother         cad, valvular heart disease    Deep vein thrombosis Mother     Heart disease Father         MI    Stroke Father     Diabetes Sister     Alcohol abuse Sister     Cirrhosis Sister         alcoholic cirrhosis    Epilepsy Sister     Thyroid disease Daughter         Hashimoto's    Hashimoto's thyroiditis Daughter     No Known Problems Daughter     Heart disease Maternal Grandmother     Thyroid disease Maternal Grandmother     Kidney disease Maternal Grandmother     Heart disease Paternal Grandmother         MI    Breast cancer Neg Hx     Colon cancer Neg Hx     Ovarian cancer Neg Hx     Esophageal cancer Neg Hx      Social History     Tobacco Use    Smoking status: Former     Packs/day: 0.25     Years: 34.00     Pack years: 8.50     Types: Cigarettes     Quit date: 1/1/1988     Years since quitting:  35.2    Smokeless tobacco: Never    Tobacco comments:     smoked socially decades ago - weekends only   Substance Use Topics    Alcohol use: Not Currently     Comment: yearly at most     Drug use: No     Review of Systems    Physical Exam     Initial Vitals [03/23/23 0605]   BP Pulse Resp Temp SpO2   (!) 154/89 92 18 98 °F (36.7 °C) 96 %      MAP       --         Physical Exam    Nursing note and vitals reviewed.  Constitutional: She appears well-developed and well-nourished. She is not diaphoretic. No distress.   HENT:   Head: Normocephalic.   Right Ear: External ear normal.   Left Ear: External ear normal.   Mouth/Throat: Oropharynx is clear and moist.   Eyes: Conjunctivae and EOM are normal. No scleral icterus.   Neck: Neck supple.   Cardiovascular:  Normal rate, regular rhythm, normal heart sounds and intact distal pulses.     Exam reveals no gallop and no friction rub.       No murmur heard.  Pulmonary/Chest: Breath sounds normal. No stridor. No respiratory distress. She has no wheezes. She has no rhonchi. She has no rales.   Abdominal: Abdomen is soft. There is no abdominal tenderness. There is no rebound and no guarding.   Musculoskeletal:      Cervical back: Neck supple.      Comments: Slight swelling and tenderness over right knee, most tender at medial inferior knee. No crepitus, bony deformity, fluctuance, or erythema noted. No tenderness or swelling in popliteal fossa.      Neurological: She is alert and oriented to person, place, and time. GCS score is 15. GCS eye subscore is 4. GCS verbal subscore is 5. GCS motor subscore is 6.   Skin: Skin is warm and dry. Capillary refill takes less than 2 seconds. No rash noted.       ED Course   Arthrocentesis    Date/Time: 3/23/2023 8:00 AM  Location procedure was performed: [facility] EMERGENCY DEPARTMENT  Performed by: Leslye Campa MD  Authorized by: Tu Chavis DO   Consent Done: Yes  Consent: Written consent obtained.  Consent given by: patient  Patient  "understanding: patient states understanding of the procedure being performed  Patient consent: the patient's understanding of the procedure matches consent given  Patient identity confirmed: name, verbally with patient and   Time out: Immediately prior to procedure a "time out" was called to verify the correct patient, procedure, equipment, support staff and site/side marked as required.  Indications: joint swelling, pain and diagnostic evaluation   Body area: knee  Joint: right knee  Local anesthesia used: no    Anesthesia:  Local anesthesia used: no  Preparation: Patient was prepped and draped in the usual sterile fashion.  Needle size: 18 G  Approach: inferior  Patient tolerance: Patient tolerated the procedure well with no immediate complications  Comments: Unable to obtain any joint fluid for analysis       Labs Reviewed   URINALYSIS, REFLEX TO URINE CULTURE - Abnormal; Notable for the following components:       Result Value    Leukocytes, UA Trace (*)     All other components within normal limits    Narrative:     Specimen Source->Urine   HIV 1 / 2 ANTIBODY    Narrative:     Release to patient->Immediate   HEPATITIS C ANTIBODY    Narrative:     Release to patient->Immediate   URIC ACID   CBC W/ AUTO DIFFERENTIAL   COMPREHENSIVE METABOLIC PANEL   URINALYSIS MICROSCOPIC    Narrative:     Specimen Source->Urine          Imaging Results    None          Medications   naproxen tablet 500 mg (500 mg Oral Given 3/23/23 0708)     Medical Decision Making:   History:   Old Medical Records: I decided to obtain old medical records.  Old Records Summarized: records from clinic visits, records from previous admission(s), records from another hospital and other records.  Initial Assessment:   Patient is afebrile, hemodynamically stable, and in no acute distress on arrival.   She has had 1 day of right knee pain and swelling that she is concerned is a gout flare. Has been on allopurinol x 10 days for presumed gout flare " of her left ankle, was presumed due to high serum uric acid but no joint aspiration performed.   Differential Diagnosis:   Differential diagnoses considered include, but not limited to:  Pseudogout  Gout  Infectious - less likely  Autoimmune disorder  Osteoarthritis    Clinical Tests:   Lab Tests: Ordered and Reviewed  ED Management:  Labs were entirely WNL including uric acid.   Arthrocentesis was attempted, see procedure note.  Was unable to get joint fluid for microscopic examination or culture; discussed with patient she could try to get procedure redone if more significant swelling occurs and symptoms persist.   Naproxen given in the ED with relief of her pain. Prescription given.   Discussed with patient that it may be worthy to discuss switching from HCTZ to another antihypertensive with her PCP if she does have confirmed gout.   Discussed plan for discharge, follow up with PCP, and strict return precautions and patient expressed understanding and agreement.                         Clinical Impression:   Final diagnoses:  [M25.561] Acute pain of right knee (Primary)        ED Disposition Condition    Discharge Stable          ED Prescriptions       Medication Sig Dispense Start Date End Date Auth. Provider    naproxen (NAPROSYN) 500 MG tablet Take 1 tablet (500 mg total) by mouth 2 (two) times daily with meals. 60 tablet 3/23/2023 -- Leslye Campa MD          Follow-up Information       Follow up With Specialties Details Why Contact Info    Wendy Horowitz MD Internal Medicine   [address]  [place] [address]  [phone]      [facility] - Emergency Dept Emergency Medicine  If symptoms worsen [address] [facility] [id number] [phone]             Leslye Campa MD  Resident  03/23/23 7570

## 2023-03-24 ENCOUNTER — OFFICE VISIT (OUTPATIENT)
Dept: SPORTS MEDICINE | Facility: CLINIC | Age: 63
End: 2023-03-24
Payer: COMMERCIAL

## 2023-03-24 ENCOUNTER — HOSPITAL ENCOUNTER (OUTPATIENT)
Dept: RADIOLOGY | Facility: HOSPITAL | Age: 63
Discharge: HOME | End: 2023-03-24
Attending: PHYSICIAN ASSISTANT
Payer: COMMERCIAL

## 2023-03-24 VITALS
DIASTOLIC BLOOD PRESSURE: 86 MMHG | HEART RATE: 86 BPM | HEIGHT: 65 IN | SYSTOLIC BLOOD PRESSURE: 134 MMHG | WEIGHT: 197 LBS | BODY MASS INDEX: 32.82 KG/M2

## 2023-03-24 DIAGNOSIS — M25.561 RIGHT KNEE PAIN, UNSPECIFIED CHRONICITY: Primary | ICD-10-CM

## 2023-03-24 DIAGNOSIS — M25.561 RIGHT KNEE PAIN, UNSPECIFIED CHRONICITY: ICD-10-CM

## 2023-03-24 PROCEDURE — 99999 PR PBB SHADOW E&M-EST. PATIENT-LVL III: CPT | Mod: PBBFAC,,, | Performed by: PHYSICIAN ASSISTANT

## 2023-03-24 PROCEDURE — 1159F PR MEDICATION LIST DOCUMENTED IN MEDICAL RECORD: ICD-10-PCS | Mod: CPTII,S$GLB,, | Performed by: PHYSICIAN ASSISTANT

## 2023-03-24 PROCEDURE — 73564 X-RAY EXAM KNEE 4 OR MORE: CPT | Mod: 26,50,, | Performed by: RADIOLOGY

## 2023-03-24 PROCEDURE — 3008F PR BODY MASS INDEX (BMI) DOCUMENTED: ICD-10-PCS | Mod: CPTII,S$GLB,, | Performed by: PHYSICIAN ASSISTANT

## 2023-03-24 PROCEDURE — 99999 PR PBB SHADOW E&M-EST. PATIENT-LVL III: ICD-10-PCS | Mod: PBBFAC,,, | Performed by: PHYSICIAN ASSISTANT

## 2023-03-24 PROCEDURE — 3075F SYST BP GE 130 - 139MM HG: CPT | Mod: CPTII,S$GLB,, | Performed by: PHYSICIAN ASSISTANT

## 2023-03-24 PROCEDURE — 3008F BODY MASS INDEX DOCD: CPT | Mod: CPTII,S$GLB,, | Performed by: PHYSICIAN ASSISTANT

## 2023-03-24 PROCEDURE — 73564 X-RAY EXAM KNEE 4 OR MORE: CPT | Mod: TC,50

## 2023-03-24 PROCEDURE — 20610 DRAIN/INJ JOINT/BURSA W/O US: CPT | Mod: RT,S$GLB,, | Performed by: PHYSICIAN ASSISTANT

## 2023-03-24 PROCEDURE — 99213 OFFICE O/P EST LOW 20 MIN: CPT | Mod: 25,S$GLB,, | Performed by: PHYSICIAN ASSISTANT

## 2023-03-24 PROCEDURE — 3079F DIAST BP 80-89 MM HG: CPT | Mod: CPTII,S$GLB,, | Performed by: PHYSICIAN ASSISTANT

## 2023-03-24 PROCEDURE — 99213 PR OFFICE/OUTPT VISIT, EST, LEVL III, 20-29 MIN: ICD-10-PCS | Mod: 25,S$GLB,, | Performed by: PHYSICIAN ASSISTANT

## 2023-03-24 PROCEDURE — 1160F PR REVIEW ALL MEDS BY PRESCRIBER/CLIN PHARMACIST DOCUMENTED: ICD-10-PCS | Mod: CPTII,S$GLB,, | Performed by: PHYSICIAN ASSISTANT

## 2023-03-24 PROCEDURE — 20610 PR DRAIN/INJECT LARGE JOINT/BURSA: ICD-10-PCS | Mod: RT,S$GLB,, | Performed by: PHYSICIAN ASSISTANT

## 2023-03-24 PROCEDURE — 1159F MED LIST DOCD IN RCRD: CPT | Mod: CPTII,S$GLB,, | Performed by: PHYSICIAN ASSISTANT

## 2023-03-24 PROCEDURE — 73564 XR KNEE ORTHO BILAT WITH FLEXION: ICD-10-PCS | Mod: 26,50,, | Performed by: RADIOLOGY

## 2023-03-24 PROCEDURE — 3075F PR MOST RECENT SYSTOLIC BLOOD PRESS GE 130-139MM HG: ICD-10-PCS | Mod: CPTII,S$GLB,, | Performed by: PHYSICIAN ASSISTANT

## 2023-03-24 PROCEDURE — 1160F RVW MEDS BY RX/DR IN RCRD: CPT | Mod: CPTII,S$GLB,, | Performed by: PHYSICIAN ASSISTANT

## 2023-03-24 PROCEDURE — 3079F PR MOST RECENT DIASTOLIC BLOOD PRESSURE 80-89 MM HG: ICD-10-PCS | Mod: CPTII,S$GLB,, | Performed by: PHYSICIAN ASSISTANT

## 2023-03-24 RX ADMIN — TRIAMCINOLONE ACETONIDE 80 MG: 40 INJECTION, SUSPENSION INTRA-ARTICULAR; INTRAMUSCULAR at 04:03

## 2023-03-24 NOTE — TELEPHONE ENCOUNTER
I do not have a good explanation for why she is in so much pain and I would recommend an office visit, I can see her next week, okay to work her in, perhaps Wednesday afternoon, thank you

## 2023-03-27 ENCOUNTER — PATIENT MESSAGE (OUTPATIENT)
Dept: INTERNAL MEDICINE | Facility: CLINIC | Age: 63
End: 2023-03-27
Payer: COMMERCIAL

## 2023-03-27 ENCOUNTER — PATIENT MESSAGE (OUTPATIENT)
Dept: OTHER | Facility: OTHER | Age: 63
End: 2023-03-27
Payer: COMMERCIAL

## 2023-03-27 ENCOUNTER — PATIENT MESSAGE (OUTPATIENT)
Dept: ADMINISTRATIVE | Facility: OTHER | Age: 63
End: 2023-03-27
Payer: COMMERCIAL

## 2023-03-28 ENCOUNTER — PATIENT MESSAGE (OUTPATIENT)
Dept: BARIATRICS | Facility: CLINIC | Age: 63
End: 2023-03-28
Payer: COMMERCIAL

## 2023-03-28 RX ORDER — TRIAMCINOLONE ACETONIDE 40 MG/ML
80 INJECTION, SUSPENSION INTRA-ARTICULAR; INTRAMUSCULAR
Status: COMPLETED | OUTPATIENT
Start: 2023-03-24 | End: 2023-03-24

## 2023-03-29 ENCOUNTER — OFFICE VISIT (OUTPATIENT)
Dept: SPORTS MEDICINE | Facility: CLINIC | Age: 63
End: 2023-03-29
Payer: COMMERCIAL

## 2023-03-29 ENCOUNTER — OFFICE VISIT (OUTPATIENT)
Dept: INTERNAL MEDICINE | Facility: CLINIC | Age: 63
End: 2023-03-29
Payer: COMMERCIAL

## 2023-03-29 ENCOUNTER — LAB VISIT (OUTPATIENT)
Dept: LAB | Facility: HOSPITAL | Age: 63
End: 2023-03-29
Payer: COMMERCIAL

## 2023-03-29 VITALS
SYSTOLIC BLOOD PRESSURE: 124 MMHG | HEART RATE: 88 BPM | OXYGEN SATURATION: 96 % | WEIGHT: 194.69 LBS | DIASTOLIC BLOOD PRESSURE: 78 MMHG | HEIGHT: 65 IN | BODY MASS INDEX: 32.44 KG/M2

## 2023-03-29 VITALS
WEIGHT: 197 LBS | DIASTOLIC BLOOD PRESSURE: 80 MMHG | BODY MASS INDEX: 32.78 KG/M2 | HEART RATE: 115 BPM | SYSTOLIC BLOOD PRESSURE: 118 MMHG

## 2023-03-29 DIAGNOSIS — M25.50 POLYARTHRALGIA: ICD-10-CM

## 2023-03-29 DIAGNOSIS — M25.561 ACUTE PAIN OF RIGHT KNEE: Primary | ICD-10-CM

## 2023-03-29 DIAGNOSIS — Z90.3 H/O GASTRIC SLEEVE: ICD-10-CM

## 2023-03-29 DIAGNOSIS — M25.569 KNEE PAIN, UNSPECIFIED CHRONICITY, UNSPECIFIED LATERALITY: ICD-10-CM

## 2023-03-29 DIAGNOSIS — E79.0 ELEVATED URIC ACID IN BLOOD: ICD-10-CM

## 2023-03-29 DIAGNOSIS — Z86.718 HISTORY OF DVT (DEEP VEIN THROMBOSIS): ICD-10-CM

## 2023-03-29 DIAGNOSIS — M54.9 DORSALGIA, UNSPECIFIED: ICD-10-CM

## 2023-03-29 DIAGNOSIS — M25.569 KNEE PAIN, UNSPECIFIED CHRONICITY, UNSPECIFIED LATERALITY: Primary | ICD-10-CM

## 2023-03-29 PROBLEM — I82.5Z2 CHRONIC DEEP VEIN THROMBOSIS (DVT) OF DISTAL VEIN OF LEFT LOWER EXTREMITY: Status: RESOLVED | Noted: 2022-04-08 | Resolved: 2023-03-29

## 2023-03-29 PROBLEM — R05.9 COUGH: Status: RESOLVED | Noted: 2022-12-27 | Resolved: 2023-03-29

## 2023-03-29 PROBLEM — Z95.828 PRESENCE OF IVC FILTER: Status: RESOLVED | Noted: 2019-12-20 | Resolved: 2023-03-29

## 2023-03-29 PROBLEM — R50.9 FEVER: Status: RESOLVED | Noted: 2018-04-06 | Resolved: 2023-03-29

## 2023-03-29 PROBLEM — R09.81 NASAL CONGESTION: Status: RESOLVED | Noted: 2022-12-27 | Resolved: 2023-03-29

## 2023-03-29 LAB
ALBUMIN SERPL BCP-MCNC: 4.1 G/DL (ref 3.5–5.2)
ALP SERPL-CCNC: 92 U/L (ref 55–135)
ALT SERPL W/O P-5'-P-CCNC: 19 U/L (ref 10–44)
ANION GAP SERPL CALC-SCNC: 13 MMOL/L (ref 8–16)
AST SERPL-CCNC: 19 U/L (ref 10–40)
BASOPHILS # BLD AUTO: 0.02 K/UL (ref 0–0.2)
BASOPHILS NFR BLD: 0.2 % (ref 0–1.9)
BILIRUB SERPL-MCNC: 0.3 MG/DL (ref 0.1–1)
BUN SERPL-MCNC: 30 MG/DL (ref 8–23)
CALCIUM SERPL-MCNC: 10.1 MG/DL (ref 8.7–10.5)
CHLORIDE SERPL-SCNC: 102 MMOL/L (ref 95–110)
CO2 SERPL-SCNC: 27 MMOL/L (ref 23–29)
CREAT SERPL-MCNC: 0.8 MG/DL (ref 0.5–1.4)
CREAT SERPL-MCNC: 0.8 MG/DL (ref 0.5–1.4)
CRP SERPL-MCNC: 1.2 MG/L (ref 0–8.2)
DIFFERENTIAL METHOD: NORMAL
EOSINOPHIL # BLD AUTO: 0.2 K/UL (ref 0–0.5)
EOSINOPHIL NFR BLD: 1.9 % (ref 0–8)
ERYTHROCYTE [DISTWIDTH] IN BLOOD BY AUTOMATED COUNT: 13.2 % (ref 11.5–14.5)
ERYTHROCYTE [SEDIMENTATION RATE] IN BLOOD BY PHOTOMETRIC METHOD: 5 MM/HR (ref 0–36)
EST. GFR  (NO RACE VARIABLE): >60 ML/MIN/1.73 M^2
EST. GFR  (NO RACE VARIABLE): >60 ML/MIN/1.73 M^2
GLUCOSE SERPL-MCNC: 98 MG/DL (ref 70–110)
HCT VFR BLD AUTO: 46 % (ref 37–48.5)
HGB BLD-MCNC: 15.3 G/DL (ref 12–16)
IMM GRANULOCYTES # BLD AUTO: 0.04 K/UL (ref 0–0.04)
IMM GRANULOCYTES NFR BLD AUTO: 0.4 % (ref 0–0.5)
IRON SERPL-MCNC: 90 UG/DL (ref 30–160)
LYMPHOCYTES # BLD AUTO: 3.2 K/UL (ref 1–4.8)
LYMPHOCYTES NFR BLD: 33.8 % (ref 18–48)
MAGNESIUM SERPL-MCNC: 2.2 MG/DL (ref 1.6–2.6)
MCH RBC QN AUTO: 30.5 PG (ref 27–31)
MCHC RBC AUTO-ENTMCNC: 33.3 G/DL (ref 32–36)
MCV RBC AUTO: 92 FL (ref 82–98)
MONOCYTES # BLD AUTO: 0.7 K/UL (ref 0.3–1)
MONOCYTES NFR BLD: 6.9 % (ref 4–15)
NEUTROPHILS # BLD AUTO: 5.4 K/UL (ref 1.8–7.7)
NEUTROPHILS NFR BLD: 56.8 % (ref 38–73)
NRBC BLD-RTO: 0 /100 WBC
PLATELET # BLD AUTO: 327 K/UL (ref 150–450)
PMV BLD AUTO: 9.7 FL (ref 9.2–12.9)
POTASSIUM SERPL-SCNC: 3.9 MMOL/L (ref 3.5–5.1)
PROT SERPL-MCNC: 7.5 G/DL (ref 6–8.4)
RBC # BLD AUTO: 5.02 M/UL (ref 4–5.4)
SATURATED IRON: 19 % (ref 20–50)
SODIUM SERPL-SCNC: 142 MMOL/L (ref 136–145)
TOTAL IRON BINDING CAPACITY: 472 UG/DL (ref 250–450)
TRANSFERRIN SERPL-MCNC: 319 MG/DL (ref 200–375)
URATE SERPL-MCNC: 3.6 MG/DL (ref 2.4–5.7)
WBC # BLD AUTO: 9.58 K/UL (ref 3.9–12.7)

## 2023-03-29 PROCEDURE — 85025 COMPLETE CBC W/AUTO DIFF WBC: CPT | Performed by: INTERNAL MEDICINE

## 2023-03-29 PROCEDURE — 82728 ASSAY OF FERRITIN: CPT | Performed by: INTERNAL MEDICINE

## 2023-03-29 PROCEDURE — 99999 PR PBB SHADOW E&M-EST. PATIENT-LVL IV: ICD-10-PCS | Mod: PBBFAC,,, | Performed by: PHYSICIAN ASSISTANT

## 2023-03-29 PROCEDURE — 99215 OFFICE O/P EST HI 40 MIN: CPT | Mod: S$GLB,,, | Performed by: INTERNAL MEDICINE

## 2023-03-29 PROCEDURE — 3074F PR MOST RECENT SYSTOLIC BLOOD PRESSURE < 130 MM HG: ICD-10-PCS | Mod: CPTII,S$GLB,, | Performed by: INTERNAL MEDICINE

## 2023-03-29 PROCEDURE — 3074F PR MOST RECENT SYSTOLIC BLOOD PRESSURE < 130 MM HG: ICD-10-PCS | Mod: CPTII,S$GLB,, | Performed by: PHYSICIAN ASSISTANT

## 2023-03-29 PROCEDURE — 99215 PR OFFICE/OUTPT VISIT, EST, LEVL V, 40-54 MIN: ICD-10-PCS | Mod: S$GLB,,, | Performed by: INTERNAL MEDICINE

## 2023-03-29 PROCEDURE — 3078F PR MOST RECENT DIASTOLIC BLOOD PRESSURE < 80 MM HG: ICD-10-PCS | Mod: CPTII,S$GLB,, | Performed by: INTERNAL MEDICINE

## 2023-03-29 PROCEDURE — 84466 ASSAY OF TRANSFERRIN: CPT | Performed by: INTERNAL MEDICINE

## 2023-03-29 PROCEDURE — 86140 C-REACTIVE PROTEIN: CPT | Performed by: INTERNAL MEDICINE

## 2023-03-29 PROCEDURE — 87040 BLOOD CULTURE FOR BACTERIA: CPT | Mod: 59 | Performed by: INTERNAL MEDICINE

## 2023-03-29 PROCEDURE — 82306 VITAMIN D 25 HYDROXY: CPT | Performed by: INTERNAL MEDICINE

## 2023-03-29 PROCEDURE — 3079F PR MOST RECENT DIASTOLIC BLOOD PRESSURE 80-89 MM HG: ICD-10-PCS | Mod: CPTII,S$GLB,, | Performed by: PHYSICIAN ASSISTANT

## 2023-03-29 PROCEDURE — 3008F BODY MASS INDEX DOCD: CPT | Mod: CPTII,S$GLB,, | Performed by: PHYSICIAN ASSISTANT

## 2023-03-29 PROCEDURE — 3008F PR BODY MASS INDEX (BMI) DOCUMENTED: ICD-10-PCS | Mod: CPTII,S$GLB,, | Performed by: PHYSICIAN ASSISTANT

## 2023-03-29 PROCEDURE — 80053 COMPREHEN METABOLIC PANEL: CPT | Performed by: INTERNAL MEDICINE

## 2023-03-29 PROCEDURE — 85652 RBC SED RATE AUTOMATED: CPT | Performed by: INTERNAL MEDICINE

## 2023-03-29 PROCEDURE — 3008F BODY MASS INDEX DOCD: CPT | Mod: CPTII,S$GLB,, | Performed by: INTERNAL MEDICINE

## 2023-03-29 PROCEDURE — 3079F DIAST BP 80-89 MM HG: CPT | Mod: CPTII,S$GLB,, | Performed by: PHYSICIAN ASSISTANT

## 2023-03-29 PROCEDURE — 82607 VITAMIN B-12: CPT | Performed by: INTERNAL MEDICINE

## 2023-03-29 PROCEDURE — 1159F MED LIST DOCD IN RCRD: CPT | Mod: CPTII,S$GLB,, | Performed by: INTERNAL MEDICINE

## 2023-03-29 PROCEDURE — 99214 PR OFFICE/OUTPT VISIT, EST, LEVL IV, 30-39 MIN: ICD-10-PCS | Mod: 24,S$GLB,, | Performed by: PHYSICIAN ASSISTANT

## 2023-03-29 PROCEDURE — 99999 PR PBB SHADOW E&M-EST. PATIENT-LVL IV: CPT | Mod: PBBFAC,,, | Performed by: PHYSICIAN ASSISTANT

## 2023-03-29 PROCEDURE — 99214 OFFICE O/P EST MOD 30 MIN: CPT | Mod: 24,S$GLB,, | Performed by: PHYSICIAN ASSISTANT

## 2023-03-29 PROCEDURE — 3074F SYST BP LT 130 MM HG: CPT | Mod: CPTII,S$GLB,, | Performed by: INTERNAL MEDICINE

## 2023-03-29 PROCEDURE — 84207 ASSAY OF VITAMIN B-6: CPT | Performed by: INTERNAL MEDICINE

## 2023-03-29 PROCEDURE — 3078F DIAST BP <80 MM HG: CPT | Mod: CPTII,S$GLB,, | Performed by: INTERNAL MEDICINE

## 2023-03-29 PROCEDURE — 84425 ASSAY OF VITAMIN B-1: CPT | Performed by: INTERNAL MEDICINE

## 2023-03-29 PROCEDURE — 1159F PR MEDICATION LIST DOCUMENTED IN MEDICAL RECORD: ICD-10-PCS | Mod: CPTII,S$GLB,, | Performed by: INTERNAL MEDICINE

## 2023-03-29 PROCEDURE — 84550 ASSAY OF BLOOD/URIC ACID: CPT | Performed by: INTERNAL MEDICINE

## 2023-03-29 PROCEDURE — 82525 ASSAY OF COPPER: CPT | Performed by: INTERNAL MEDICINE

## 2023-03-29 PROCEDURE — 99999 PR PBB SHADOW E&M-EST. PATIENT-LVL IV: CPT | Mod: PBBFAC,,, | Performed by: INTERNAL MEDICINE

## 2023-03-29 PROCEDURE — 3008F PR BODY MASS INDEX (BMI) DOCUMENTED: ICD-10-PCS | Mod: CPTII,S$GLB,, | Performed by: INTERNAL MEDICINE

## 2023-03-29 PROCEDURE — 99999 PR PBB SHADOW E&M-EST. PATIENT-LVL IV: ICD-10-PCS | Mod: PBBFAC,,, | Performed by: INTERNAL MEDICINE

## 2023-03-29 PROCEDURE — 3074F SYST BP LT 130 MM HG: CPT | Mod: CPTII,S$GLB,, | Performed by: PHYSICIAN ASSISTANT

## 2023-03-29 PROCEDURE — 83735 ASSAY OF MAGNESIUM: CPT | Performed by: INTERNAL MEDICINE

## 2023-03-29 NOTE — PROGRESS NOTES
CC: Right knee pain (mother of Sabrina in [facility] [place])    62 y.o. Female with a history of Right knee pain. She states that the pain is severe and not responding to any conservative care.      She reports diffuse acute knee pain worse in the medial aspect of the knee that began around 3/23/23 with no inciting injury or trauma. She also reports knee swelling at that time. She presented to the ED for her knee pain and had knee aspiration attempted that was unsuccessful. She was started on Naproxen which helped her pain some. She knee pain is still significant and thus she presents here for evaluation. Knee pain is causing her difficulty walking. She states that her knee pain is so bad that covers or light touch to her knee hurts.     She denies fever, chills, nausea. History of spinal abscess previously following injection procedure.     She reports that a few weeks ago her primary care lab work showed hyperuricemia and she started allopurinol about 1 week ago.     Is affecting ADLs.      Review of Systems   Constitution: Negative. Negative for chills, fever and night sweats.   HENT: Negative for congestion and headaches.    Eyes: Negative for blurred vision, left vision loss and right vision loss.   Cardiovascular: Negative for chest pain and syncope.   Respiratory: Negative for cough and shortness of breath.    Endocrine: Negative for polydipsia, polyphagia and polyuria.   Hematologic/Lymphatic: Negative for bleeding problem. Does not bruise/bleed easily.   Skin: Negative for dry skin, itching and rash.   Musculoskeletal: Negative for falls. Positive for knee pain and muscle weakness.   Gastrointestinal: Negative for abdominal pain and bowel incontinence.   Genitourinary: Negative for bladder incontinence and nocturia.   Neurological: Negative for disturbances in coordination, loss of balance and seizures.   Psychiatric/Behavioral: Negative for depression. The patient does not have insomnia.    Allergic/Immunologic:  Negative for hives and persistent infections.   All other systems negative.    PAST MEDICAL HISTORY:   Past Medical History:   Diagnosis Date    Abscess of paraspinous muscles 2018    Allergy 04/2018    Class 1 obesity due to excess calories in adult 07/23/2019    DVT (deep venous thrombosis)     left leg    Epidural abscess 2018 04/10/2018    Fatty liver     GERD (gastroesophageal reflux disease) 2015    History of major abdominal surgery 09/11/2015    Had Hysterectomy , cholecystectomy , rectocele repair    Hypertension     Lumbar radiculopathy     Menopause     Obesity     Obstructive sleep apnea on CPAP     no longer uses CPAP after weight loss from gastric sleeve    Thyroid disease     Thyroid nodules.     PAST SURGICAL HISTORY:   Past Surgical History:   Procedure Laterality Date    ADENOIDECTOMY  1995    BACK SURGERY  2002    L4, L5    BILATERAL SALPINGOOPHORECTOMY      CHOLECYSTECTOMY      COLONOSCOPY N/A 6/13/2022    Procedure: COLONOSCOPY;  Surgeon: Saul Ureña MD;  [facility]  Service: Endoscopy;  Laterality: N/A;  constipation protocol-extended Miralax prep - PEG allergy -okay with taking Miralax  fully vaccinated, prep instr portal -ml    COLONOSCOPY N/A 10/4/2022    Procedure: COLONOSCOPY;  Surgeon: Saul Ureña MD;  Location: [facility] (4TH FLR);  Service: Endoscopy;  Laterality: N/A;  Constipation protocol-Miralax prep-pt tolerated Miralax for last colonoscopy.  fully vaccinated, prep instr portal -ml    CORRECTION OF HAMMER TOE Left 8/5/2021    Procedure: CORRECTION, HAMMER TOE Left 4th mallet toe, DIP joitn fusion;  Surgeon: Guero Alvarez MD;  Location: [facility];  Service: Orthopedics;  Laterality: Left;  K-wires    CYST REMOVAL      EPIDURAL STEROID INJECTION INTO CERVICAL SPINE N/A 8/11/2020    Procedure: Injection-steroid-epidural-cervical;  Surgeon: [facility];  [address]  Service: Radiology;  Laterality: N/A;    190/Carla    EPIDURAL STEROID INJECTION INTO CERVICAL SPINE N/A 3/2/2022    Procedure: Injection-steroid-epidural-cervical C7-T1;  Surgeon: Latoya Quintanilla MD;  Location: [facility] PAIN [facility]GT;  Service: Pain Management;  Laterality: N/A;    ESOPHAGOGASTRODUODENOSCOPY N/A 5/3/2019    Procedure: ESOPHAGOGASTRODUODENOSCOPY (EGD);  Surgeon: Carlin Sanchez MD;  Location: [facility] (4TH FLR);  Service: Endoscopy;  Laterality: N/A;    HYSTERECTOMY  12/17/2012    Count includes the [facility] BS&O     INJECTION OF JOINT Left 3/1/2021    Procedure: INJECTION, JOINT LEFT HIP INTRA ARTICULAR CORTISONE INJECTION DIRECT REFERRAL;  Surgeon: Chuy Gregory MD;  Location: [facility] PAIN MGT;  Service: Pain Management;  Laterality: Left;  NEEDS CONSENT    INSERTION OF INFERIOR VENA CAVAL FILTER Right 7/19/2019    Procedure: IVC filter placement;  Surgeon: Rodney Rico MD;  Location: [facility] CATH LAB;  Service: Peripheral Vascular;  Laterality: Right;    IVC FILTER RETRIEVAL N/A 12/20/2019    Procedure: REMOVAL-FILTER-IVC;  Surgeon: Rodney Rico MD;  Location: [facility] OR [address] [place] [address];  Service: Peripheral Vascular;  Laterality: N/A;    LAPAROSCOPIC SLEEVE GASTRECTOMY N/A 7/23/2019    Procedure: GASTRECTOMY, SLEEVE, LAPAROSCOPIC, with intraop EGD [id number];  Surgeon: Duc Ratliff Jr., MD;  Location: [facility] OR [address] [place] [address];  Service: General;  Laterality: N/A;    RECTOCELE REPAIR      SPINE SURGERY  2018    THYROID NODULE REMOVAL      TONSILLECTOMY  1995     FAMILY HISTORY:   Family History   Problem Relation Age of Onset    Thyroid disease Mother     Hypertension Mother     Hyperlipidemia Mother     Heart disease Mother         cad, valvular heart disease    Deep vein thrombosis Mother     Heart disease Father         MI    Stroke Father     Diabetes Sister     Alcohol abuse Sister     Cirrhosis Sister         alcoholic cirrhosis    Epilepsy Sister     Thyroid disease Daughter         Hashimoto's    Hashimoto's thyroiditis Daughter     No Known Problems Daughter      Heart disease Maternal Grandmother     Thyroid disease Maternal Grandmother     Kidney disease Maternal Grandmother     Heart disease Paternal Grandmother         MI    Breast cancer Neg Hx     Colon cancer Neg Hx     Ovarian cancer Neg Hx     Esophageal cancer Neg Hx      SOCIAL HISTORY:   Social History     Socioeconomic History    Marital status:    Tobacco Use    Smoking status: Former     Packs/day: 0.25     Years: 34.00     Pack years: 8.50     Types: Cigarettes     Quit date: 1988     Years since quittin.2    Smokeless tobacco: Never    Tobacco comments:     smoked socially decades ago - weekends only   Substance and Sexual Activity    Alcohol use: Not Currently     Comment: yearly at most     Drug use: No    Sexual activity: Yes     Partners: Male     Birth control/protection: See Surgical Hx, None     Comment: [facility] BS&O 2012,     Social History Narrative    . 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     Social Determinants of Health     Financial Resource Strain: Low Risk     Difficulty of Paying Living Expenses: Not hard at all   Food Insecurity: No Food Insecurity    Worried About Running Out of Food in the Last Year: Never true    Ran Out of Food in the Last Year: Never true   Transportation Needs: No Transportation Needs    Lack of Transportation (Medical): No    Lack of Transportation (Non-Medical): No   Physical Activity: Insufficiently Active    Days of Exercise per Week: 2 days    Minutes of Exercise per Session: 30 min   Stress: No Stress Concern Present    Feeling of Stress : Not at all   Social Connections: Unknown    Frequency of Communication with Friends and Family: More than three times a week    Frequency of Social Gatherings with Friends and Family: Once a week    Active Member of Clubs or Organizations: No    Attends Club or Organization Meetings: Patient refused    Marital Status:    Housing Stability: Low Risk     Unable to Pay for Housing in the Last Year: No    Number  "of Places Lived in the Last Year: 1    Unstable Housing in the Last Year: No       MEDICATIONS:   Current Outpatient Medications:     hydroCHLOROthiazide (HYDRODIURIL) 12.5 MG Tab, TAKE 1 TABLET BY MOUTH EVERY DAY, Disp: 90 tablet, Rfl: 1    naproxen (NAPROSYN) 500 MG tablet, Take 1 tablet (500 mg total) by mouth 2 (two) times daily with meals., Disp: 60 tablet, Rfl: 0    allopurinoL (ZYLOPRIM) 300 MG tablet, Take 1 tablet (300 mg total) by mouth once daily., Disp: 90 tablet, Rfl: 3    ibuprofen (ADVIL,MOTRIN) 100 MG tablet, Take 100 mg by mouth every 6 (six) hours as needed for Temperature greater than., Disp: , Rfl:   ALLERGIES:   Review of patient's allergies indicates:   Allergen Reactions    Cathflo activase [alteplase] Anaphylaxis     Tightness in chest, raspy throat, restless legs, hotness all over    Heparin analogues      Restless legs, tightness in chest, and hot all over    Polyethylene glycol analogues Swelling    Polysorbate 80 Anaphylaxis, Itching and Shortness Of Breath    Xyzal [levocetirizine] Swelling    Latex Swelling             VITAL SIGNS: /86   Pulse 86   Ht 5' 5" (1.651 m)   Wt 89.4 kg (197 lb)   LMP 11/25/2012   BMI 32.78 kg/m²      PHYSICAL EXAMINATION  VITAL SIGNS: /86   Pulse 86   Ht 5' 5" (1.651 m)   Wt 89.4 kg (197 lb)   LMP 11/25/2012   BMI 32.78 kg/m²    General:  The patient is alert and oriented x 3.  Mood is pleasant.  Observation of ears, eyes and nose reveal no gross abnormalities.  HEENT: NCAT, sclera nonicteric  Lungs: Respirations are equal and unlabored.    Right KNEE EXAMINATION     OBSERVATION / INSPECTION   Gait:   Nonantalgic   Alignment:  Neutral   Scars:   None   Muscle atrophy: Mild  Effusion:  slight  Warmth:  None   Discoloration:   none     TENDERNESS / CREPITUS (T / C):          T / C      T / C   Patella   - / -   Lateral joint line   + / -    Peripatellar medial  -  Medial joint line    + / -    Peripatellar lateral -  Medial plica   - / " -    Patellar tendon -   Popliteal fossa  - / -    Quad tendon   -   Gastrocnemius   -   Prepatellar Bursa - / -   Quadricep   -   Tibial tubercle  -  Thigh/hamstring  -   Pes anserine/HS -  Fibula    -   ITB   - / -  Tibia     -   Tib/fib joint  - / -  LCL    -     MFC   - / -   MCL: Proximal  -    LFC   - / -    Distal   -          ROM: (* = pain)  PASSIVE   ACTIVE    Left :   5 / 0 / 145   5 / 0 / 145     Right :    0/ 0 / 120   0 / 0 / 110    Patellofemoral examination:  See above noted areas of tenderness.   Patella position    Subluxation / dislocation: Centered           Sup. / Inf;   Normal   Crepitus (PF):    Absent   Patellar Mobility:       Medial-lateral:   Normal    Superior-inferior:  Normal    Inferior tilt   Normal    Patellar tendon:  Normal   Lateral tilt:    Normal   J-sign:     None   Patellofemoral grind:   No pain       MENISCAL SIGNS:     Pain on terminal extension:  +  Pain on terminal flexion:  +  Sivans maneuver:  + for pain  Squat     NT    LIGAMENT EXAMINATION:  ACL / Lachman:  normal (-1 to 2mm)    PCL-Post.  drawer: normal 0 to 2mm  MCL- Valgus:  normal 0 to 2mm  LCL- Varus:  normal 0 to 2mm  Pivot shift: normal (Equal)   Dial Test: difference c/w other side   At 30° flexion: normal (< 5°)    At 90° flexion: normal (< 5°)   Reverse Pivot Shift:   normal (Equal)     STRENGTH: (* = with pain) PAINFUL SIDE   Quadricep   5/5   Hamstrin/5    EXTREMITY NEURO-VASCULAR EXAMINATION:   Sensation:  Grossly intact to light touch all dermatomal regions.   Motor Function:  Fully intact motor function at hip, knee, foot and ankle    DTRs;  quadriceps and  achilles 2+.  No clonus and downgoing Babinski.    Vascular status:  DP and PT pulses 2+, brisk capillary refill, symmetric.     Other Findings:       X-rays:  including standing, weight bearing AP and flexion bilateral knees, lateral and merchant views ordered and images reviewed by me show:  No fracture, dislocation. Mild DJD noted.       ASSESSMENT:    Right Knee pain, acute  Concern for acute gout flare following initiation of allopurinol.   I have considered and do not suspect infection or septic joint at this time.       PLAN:     PROCEDURE NOTE: right KNEE INJECTION  After time out was performed, including verification of patient ID, procedure, site and side, availability of information and equipment, review of safety issues, and agreement with consent, the procedure site was marked and the patient was prepped aseptically. A diagnostic and therapeutic injection of 2cc 40 mg kenalog and 2cc of 1% lidocaine and 2cc of 0.25% bupivacaine was given under sterile technique using a 22g x 1.5 needle into the anterior lateral aspect of the right knee in seated position.   The patient had no adverse reactions to the medication. Pain decreased. The patient was instructed to apply ice to the joint for 20 minutes and avoid strenuous activities for 24-36 hours following the injection. Patient was warned of possible blood sugar and/or blood pressure changes during that time. Following that time, patient can resume regular activities.     Patient declined attempt at aspiration today to evaluate for gout. She does not have much fluid in her knee today.   Compression.   Ice compresses.   Crutches as needed.   OTC pain medications as needed.     All questions were answered, pt will contact us for questions or concerns in the interim.    I made the decision to obtain old records of the patient including previous notes and imaging. New imaging was ordered today of the extremity or extremities evaluated. I independently reviewed and interpreted the radiographs and/or MRIs today as well as prior imaging.

## 2023-03-29 NOTE — PROGRESS NOTES
CC: Right knee pain (mother of Sabrina in [facility] OR)    62 y.o. Female with a history of Right knee pain. She states that the pain is severe and not responding to any conservative care.      Presents today following my evaluation 5 days ago. She was given a 80mg CSI to her knee last time with great pain improvement the day following. She then had acute great toe pain the day after that which has now resolved. She awoke this morning with a return of her medial knee pain that hurts to ambulate. Pain is moderate again.    SANE score at ED was 10 then 80 at best after injection, then back to 70 now.     + mechanical symptoms and - instability symptoms.    Previously:  She reports diffuse acute knee pain worse in the medial aspect of the knee that began around 3/23/23 with no inciting injury or trauma. She also reports knee swelling at that time. She presented to the ED for her knee pain and had knee aspiration attempted that was unsuccessful. She was started on Naproxen which helped her pain some. She knee pain is still significant and thus she presents here for evaluation. Knee pain is causing her difficulty walking. She states that her knee pain is so bad that covers or light touch to her knee hurts.     She denies fever, chills, nausea. History of spinal abscess previously following injection procedure.     She reports that a few weeks ago her primary care lab work showed hyperuricemia and she started allopurinol about 1 week ago.     Is affecting ADLs.      Review of Systems   Constitution: Negative. Negative for chills, fever and night sweats.   HENT: Negative for congestion and headaches.    Eyes: Negative for blurred vision, left vision loss and right vision loss.   Cardiovascular: Negative for chest pain and syncope.   Respiratory: Negative for cough and shortness of breath.    Endocrine: Negative for polydipsia, polyphagia and polyuria.   Hematologic/Lymphatic: Negative for bleeding problem. Does not  bruise/bleed easily.   Skin: Negative for dry skin, itching and rash.   Musculoskeletal: Negative for falls. Positive for knee pain and muscle weakness.   Gastrointestinal: Negative for abdominal pain and bowel incontinence.   Genitourinary: Negative for bladder incontinence and nocturia.   Neurological: Negative for disturbances in coordination, loss of balance and seizures.   Psychiatric/Behavioral: Negative for depression. The patient does not have insomnia.    Allergic/Immunologic: Negative for hives and persistent infections.   All other systems negative.    PAST MEDICAL HISTORY:   Past Medical History:   Diagnosis Date    Abscess of paraspinous muscles 2018    Allergy 04/2018    Class 1 obesity due to excess calories in adult 07/23/2019    DVT (deep venous thrombosis)     left leg    Epidural abscess 2018 04/10/2018    Fatty liver     GERD (gastroesophageal reflux disease) 2015    History of major abdominal surgery 09/11/2015    Had Hysterectomy , cholecystectomy , rectocele repair    Hypertension     Lumbar radiculopathy     Menopause     Obesity     Obstructive sleep apnea on CPAP     no longer uses CPAP after weight loss from gastric sleeve    Thyroid disease     Thyroid nodules.     PAST SURGICAL HISTORY:   Past Surgical History:   Procedure Laterality Date    ADENOIDECTOMY  1995    BACK SURGERY  2002    L4, L5    BILATERAL SALPINGOOPHORECTOMY      CHOLECYSTECTOMY      COLONOSCOPY N/A 6/13/2022    Procedure: COLONOSCOPY;  Surgeon: Saul Ureña MD;  Location: [address]);  Service: Endoscopy;  Laterality: N/A;  constipation protocol-extended Miralax prep - PEG allergy -okay with taking Miralax  fully vaccinated, prep instr portal -ml    COLONOSCOPY N/A 10/4/2022    Procedure: COLONOSCOPY;  Surgeon: Saul Ureña MD;  Location: [address]);  Service: Endoscopy;  Laterality: N/A;  Constipation protocol-Miralax prep-pt tolerated Miralax for last colonoscopy.  fully vaccinated, prep  instr portal -ml    CORRECTION OF HAMMER TOE Left 8/5/2021    Procedure: CORRECTION, HAMMER TOE Left 4th mallet toe, DIP joitn fusion;  Surgeon: Guero Alvarez MD;  Location: [facility] OR;  Service: Orthopedics;  Laterality: Left;  K-wires    CYST REMOVAL      EPIDURAL STEROID INJECTION INTO CERVICAL SPINE N/A 8/11/2020    Procedure: Injection-steroid-epidural-cervical;  Surgeon: [facility] Diagnostic Provider;  Location: [facility] 2ND FLR;  Service: Radiology;  Laterality: N/A;  /Carla    EPIDURAL STEROID INJECTION INTO CERVICAL SPINE N/A 3/2/2022    Procedure: Injection-steroid-epidural-cervical C7-T1;  Surgeon: Latoya Quintanilla MD;  Location: [facility] PAIN MGT;  Service: Pain Management;  Laterality: N/A;    ESOPHAGOGASTRODUODENOSCOPY N/A 5/3/2019    Procedure: ESOPHAGOGASTRODUODENOSCOPY (EGD);  Surgeon: Carlin Sanchez MD;  Location: [facility] (4TH FLR);  Service: Endoscopy;  Laterality: N/A;    HYSTERECTOMY  12/17/2012    Former[facility] BS&O     INJECTION OF JOINT Left 3/1/2021    Procedure: INJECTION, JOINT LEFT HIP INTRA ARTICULAR CORTISONE INJECTION DIRECT REFERRAL;  Surgeon: Chuy Gregory MD;  Location: [facility] PAIN MGT;  Service: Pain Management;  Laterality: Left;  NEEDS CONSENT    INSERTION OF INFERIOR VENA CAVAL FILTER Right 7/19/2019    Procedure: IVC filter placement;  Surgeon: Rodney Rico MD;  Location: [facility] CATH LAB;  Service: Peripheral Vascular;  Laterality: Right;    IVC FILTER RETRIEVAL N/A 12/20/2019    Procedure: REMOVAL-FILTER-IVC;  Surgeon: Rodney Rico MD;  [address];  Service: Peripheral Vascular;  Laterality: N/A;    LAPAROSCOPIC SLEEVE GASTRECTOMY N/A 7/23/2019    Procedure: GASTRECTOMY, SLEEVE, LAPAROSCOPIC, with intraop EGD 11476;  Surgeon: Duc Ratliff Jr., MD;  Location: [facility] OR 2ND FLR;  Service: General;  Laterality: N/A;    RECTOCELE REPAIR      SPINE SURGERY  2018    THYROID NODULE REMOVAL      TONSILLECTOMY  1995     FAMILY HISTORY:   Family History    Problem Relation Age of Onset    Thyroid disease Mother     Hypertension Mother     Hyperlipidemia Mother     Heart disease Mother         cad, valvular heart disease    Deep vein thrombosis Mother     Heart disease Father         MI    Stroke Father     Diabetes Sister     Alcohol abuse Sister     Cirrhosis Sister         alcoholic cirrhosis    Epilepsy Sister     Thyroid disease Daughter         Hashimoto's    Hashimoto's thyroiditis Daughter     No Known Problems Daughter     Heart disease Maternal Grandmother     Thyroid disease Maternal Grandmother     Kidney disease Maternal Grandmother     Heart disease Paternal Grandmother         MI    Breast cancer Neg Hx     Colon cancer Neg Hx     Ovarian cancer Neg Hx     Esophageal cancer Neg Hx      SOCIAL HISTORY:   Social History     Socioeconomic History    Marital status:    Tobacco Use    Smoking status: Former     Packs/day: 0.25     Years: 34.00     Pack years: 8.50     Types: Cigarettes     Quit date: 1988     Years since quittin.2    Smokeless tobacco: Never    Tobacco comments:     smoked socially decades ago - weekends only   Substance and Sexual Activity    Alcohol use: Not Currently     Comment: yearly at most     Drug use: No    Sexual activity: Yes     Partners: Male     Birth control/protection: See Surgical Hx, None     Comment: [facility] BS&O 2012,     Social History Narrative    . 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     Social Determinants of Health     Financial Resource Strain: Low Risk     Difficulty of Paying Living Expenses: Not hard at all   Food Insecurity: No Food Insecurity    Worried About Running Out of Food in the Last Year: Never true    Ran Out of Food in the Last Year: Never true   Transportation Needs: No Transportation Needs    Lack of Transportation (Medical): No    Lack of Transportation (Non-Medical): No   Physical Activity: Insufficiently Active    Days of Exercise per Week: 2 days    Minutes of Exercise per  Session: 30 min   Stress: No Stress Concern Present    Feeling of Stress : Not at all   Social Connections: Unknown    Frequency of Communication with Friends and Family: More than three times a week    Frequency of Social Gatherings with Friends and Family: Once a week    Active Member of Clubs or Organizations: No    Attends Club or Organization Meetings: Patient refused    Marital Status:    Housing Stability: Low Risk     Unable to Pay for Housing in the Last Year: No    Number of Places Lived in the Last Year: 1    Unstable Housing in the Last Year: No       MEDICATIONS:   Current Outpatient Medications:     allopurinoL (ZYLOPRIM) 300 MG tablet, Take 1 tablet (300 mg total) by mouth once daily., Disp: 90 tablet, Rfl: 3    hydroCHLOROthiazide (HYDRODIURIL) 12.5 MG Tab, TAKE 1 TABLET BY MOUTH EVERY DAY, Disp: 90 tablet, Rfl: 1    ibuprofen (ADVIL,MOTRIN) 100 MG tablet, Take 100 mg by mouth every 6 (six) hours as needed for Temperature greater than., Disp: , Rfl:     naproxen (NAPROSYN) 500 MG tablet, Take 1 tablet (500 mg total) by mouth 2 (two) times daily with meals., Disp: 60 tablet, Rfl: 0  ALLERGIES:   Review of patient's allergies indicates:   Allergen Reactions    Cathflo activase [alteplase] Anaphylaxis     Tightness in chest, raspy throat, restless legs, hotness all over    Heparin analogues      Restless legs, tightness in chest, and hot all over    Polyethylene glycol analogues Swelling    Polysorbate 80 Anaphylaxis, Itching and Shortness Of Breath    Xyzal [levocetirizine] Swelling    Latex Swelling             VITAL SIGNS: /80   Pulse (!) 115   Wt 89.4 kg (197 lb)   LMP 11/25/2012   BMI 32.78 kg/m²      PHYSICAL EXAMINATION  VITAL SIGNS: /80   Pulse (!) 115   Wt 89.4 kg (197 lb)   LMP 11/25/2012   BMI 32.78 kg/m²    General:  The patient is alert and oriented x 3.  Mood is pleasant.  Observation of ears, eyes and nose reveal no gross abnormalities.  HEENT: NCAT, sclera  nonicteric  Lungs: Respirations are equal and unlabored.    Right KNEE EXAMINATION     OBSERVATION / INSPECTION   Gait:   Nonantalgic   Alignment:  Neutral   Scars:   None   Muscle atrophy: Mild  Effusion:  none  Warmth:  None   Discoloration:   none     TENDERNESS / CREPITUS (T / C):          T / C      T / C   Patella   - / -   Lateral joint line   - / -    Peripatellar medial  -  Medial joint line    + / -    Peripatellar lateral -  Medial plica   - / -    Patellar tendon -   Popliteal fossa  - / -    Quad tendon   -   Gastrocnemius   -   Prepatellar Bursa - / -   Quadricep   -   Tibial tubercle  -  Thigh/hamstring  -   Pes anserine/HS +  Fibula    -   ITB   - / -  Tibia     + MTP near distal MCL   Tib/fib joint  - / -  LCL    -     MFC   - / -   MCL: Proximal  -    LFC   - / -    Distal   -          ROM: (* = pain)  PASSIVE   ACTIVE    Left :   5 / 0 / 145   5 / 0 / 145     Right :    0/ 0 / 140   0 / 0 / 130    Patellofemoral examination:  See above noted areas of tenderness.   Patella position    Subluxation / dislocation: Centered           Sup. / Inf;   Normal   Crepitus (PF):    Absent   Patellar Mobility:       Medial-lateral:   Normal    Superior-inferior:  Normal    Inferior tilt   Normal    Patellar tendon:  Normal   Lateral tilt:    Normal   J-sign:     None   Patellofemoral grind:   No pain       MENISCAL SIGNS:     Pain on terminal extension:  +  Pain on terminal flexion:  +  Sivans maneuver:  + for pain  Squat     NT    LIGAMENT EXAMINATION:  ACL / Lachman:  normal (-1 to 2mm)    PCL-Post.  drawer: normal 0 to 2mm  MCL- Valgus:  normal 0 to 2mm  LCL- Varus:  normal 0 to 2mm  Pivot shift: normal (Equal)   Dial Test: difference c/w other side   At 30° flexion: normal (< 5°)    At 90° flexion: normal (< 5°)   Reverse Pivot Shift:   normal (Equal)     STRENGTH: (* = with pain) PAINFUL SIDE   Quadricep   5/5   Hamstrin/5    EXTREMITY NEURO-VASCULAR EXAMINATION:   Sensation:  Grossly intact  to light touch all dermatomal regions.   Motor Function:  Fully intact motor function at hip, knee, foot and ankle    DTRs;  quadriceps and  achilles 2+.  No clonus and downgoing Babinski.    Vascular status:  DP and PT pulses 2+, brisk capillary refill, symmetric.     Other Findings:       X-rays:  including standing, weight bearing AP and flexion bilateral knees, lateral and merchant views ordered and images reviewed by me show:  No fracture, dislocation. Mild DJD noted.      ASSESSMENT:    Right Knee pain, acute  Concern for medial meniscus tear vs possible acute gout flare following initiation of allopurinol or both.   I have considered and do not suspect infection or septic joint at this time.       PLAN:     MRI right knee for above.     Had previously recommended her continuing allopurinol but now possibly a good idea to stop it. Discuss with PCP today.   Patient declined attempt at aspiration today to evaluate for gout. She does not have much fluid in her knee today.   Compression.   Ice compresses.   Crutches as needed.   OTC pain medications as needed.     All questions were answered, pt will contact us for questions or concerns in the interim.    I made the decision to obtain old records of the patient including previous notes and imaging. New imaging was ordered today of the extremity or extremities evaluated. I independently reviewed and interpreted the radiographs and/or MRIs today as well as prior imaging.

## 2023-03-29 NOTE — PROGRESS NOTES
Patient ID: Renae Hou is a 62 y.o. female.    Chief Complaint: Leg Pain      Assessment:       1. Knee pain, unspecified chronicity, unspecified laterality    2. History of DVT (deep vein thrombosis): 2018     3. Elevated uric acid in blood    4. Polyarthralgia    5. H/O gastric sleeve          Plan:         1. Knee pain, unspecified chronicity, unspecified laterality  -     C-Reactive Protein; Future; Expected date: 03/29/2023  -     Sedimentation rate; Future; Expected date: 03/29/2023  -     Magnesium; Future; Expected date: 03/29/2023  -     CULTURE, BLOOD; Future; Expected date: 03/29/2023  -     CULTURE, BLOOD; Future; Expected date: 03/29/2023    2. History of DVT (deep vein thrombosis): 2018:  Recent ultrasound acceptable.  No calf swelling.  No palpable cords or Homans sign.  Recently dimer was acceptable    3. Elevated uric acid in blood  -     Uric Acid; Future; Expected date: 03/29/2023    4. Polyarthralgia  Overview:  Polyarthralgia without structural changes or lab abnormalities          5. H/O gastric sleeve  -     CBC Auto Differential; Future; Expected date: 03/29/2023  -     Comprehensive Metabolic Panel; Future; Expected date: 03/29/2023  -     Vitamin B12; Future; Expected date: 03/29/2023  -     Vitamin D; Future; Expected date: 03/29/2023  -     Vitamin B1; Future; Expected date: 03/29/2023  -     Iron and TIBC; Future; Expected date: 03/29/2023  -     Ferritin; Future; Expected date: 03/29/2023  -     VITAMIN B6; Future; Expected date: 03/29/2023  -     COPPER, SERUM; Future; Expected date: 03/29/2023       Given history of dental work in previous spinal epidural abscess, will get blood cultures, although my suspicion for knee infection is low  I suspect she may have had gout in her left foot and once given allopurinol, had a gout flare in her right knee  Hygienic measures reviewed, ice, avoid bent knee activities  Continue with anti-inflammatories as tolerated; once labs return, may  use indomethacin  Will need to have indomethacin anti-inflammatories on board prior to prescribing allopurinol  Keep MRI appointment scheduled per ortho  Alarm symptoms and ED cautions reviewed at length  Patient evaluated for over 50 minutes with this appoinment, including diagnostic testing and treatment.  All questions answered,  chart reviewed and care coordinated.     Subjective:   [facility] appt    March 9 seen for L foot pain.  Prior to that no change in diet or drinks.  No more alcohol than usual. Maybe a little more seafood/shellfish as well.    Seen and thought to have gout, given allopurinol after labs returned.    Allopurinol seems to have caused some dizziness, out of sorts.  Hot and cold.  No fever.  No shaking chills.    Then a week later knee pain started, R > L: pain was so severe (it hurt to have sheets touch her leg) so she went to ER.  No xrays done.  Knee aspirated but no crystals removed.  Given naproxen but that made her feel worse- felt raspy in the throat. Made her feel SOB, headache.    Sometimes takes 2 HCTZ if swollen.    Then went to Ortho last week, got a cortisone shot which helped for a few days.  Over the weekend was better. However yesterday R knee pain started again and it woke her up in the middle of the night.    Seen again in Ortho today and MRI ordered.      Recall spinal abscess 2018, then DVT.    Dental work in the last several months, had to have a tooth extracted but no recent infection.  No rash.    Patient Active Problem List:     RENAE dx 2010, improved after weight loss     Chronic constipation     Mixed hyperlipidemia     Pre-diabetes     Chronic pain     History of lumbar surgery     Essential hypertension     Fatty liver     Spondylolisthesis     GERD (gastroesophageal reflux disease)     Vaginal atrophy     Dyspareunia, female     Mixed stress and urge urinary incontinence     Rectocele, female     Cystocele, midline     History of DVT (deep vein thrombosis): 2018       Multiple thyroid nodules: stable 5/22     Dysphonia     Cervical radiculopathy     Mallet toe of left foot     Chronic left-sided low back pain with left-sided sciatica     Carpal tunnel syndrome of right wrist: see EMG 10/21     Obesity due to excess calories with serious comorbidity     Tubular adenoma of colon: see colonoscopy 2022     Polyarthralgia     COVID 1/22; 12/22         Review of Systems   Constitutional:  Positive for fatigue.   Respiratory:  Negative for cough, shortness of breath and wheezing.    Cardiovascular:  Positive for leg swelling. Negative for chest pain.   Gastrointestinal:  Negative for blood in stool and constipation.   Musculoskeletal:  Positive for arthralgias.   Psychiatric/Behavioral:          Mood stable, worried about her leg but otherwise doing all right       Objective:      Physical Exam  Vitals and nursing note reviewed.   Constitutional:       Appearance: She is well-developed.   HENT:      Head: Normocephalic and atraumatic.      Right Ear: External ear normal.      Left Ear: External ear normal.      Nose: Nose normal.      Mouth/Throat:      Pharynx: No oropharyngeal exudate.   Eyes:      General: No scleral icterus.     Extraocular Movements: Extraocular movements intact.      Conjunctiva/sclera: Conjunctivae normal.   Neck:      Thyroid: No thyromegaly.      Vascular: No JVD.   Cardiovascular:      Rate and Rhythm: Normal rate and regular rhythm.      Heart sounds: Normal heart sounds. No murmur heard.    No gallop.   Pulmonary:      Effort: Pulmonary effort is normal. No respiratory distress.      Breath sounds: Normal breath sounds. No wheezing.   Abdominal:      General: Bowel sounds are normal. There is no distension.      Palpations: Abdomen is soft. There is no mass.      Tenderness: There is no abdominal tenderness. There is no guarding or rebound.   Musculoskeletal:         General: No tenderness. Normal range of motion.      Cervical back: Normal range of motion  and neck supple.      Comments: Right knee is stiff, it is hard for her to straighten out her leg.  There is swelling in the medial aspect of the right knee with slight warmth but no erythema.    No palpable cords or Homans sign    Other joints unremarkable   Lymphadenopathy:      Cervical: No cervical adenopathy.   Skin:     General: Skin is warm.      Findings: No erythema or rash.   Neurological:      General: No focal deficit present.      Mental Status: She is alert and oriented to person, place, and time.      Cranial Nerves: No cranial nerve deficit.      Coordination: Coordination normal.   Psychiatric:         Behavior: Behavior normal.         Thought Content: Thought content normal.         Judgment: Judgment normal.           Health Maintenance Due   Topic Date Due    COVID-19 Vaccine (3 - Booster for Pfizer series) 11/03/2021    Mammogram  05/06/2023

## 2023-03-30 ENCOUNTER — HOSPITAL ENCOUNTER (OUTPATIENT)
Dept: RADIOLOGY | Facility: HOSPITAL | Age: 63
Discharge: HOME | End: 2023-03-30
Attending: PHYSICIAN ASSISTANT
Payer: COMMERCIAL

## 2023-03-30 DIAGNOSIS — M25.561 ACUTE PAIN OF RIGHT KNEE: ICD-10-CM

## 2023-03-30 LAB
25(OH)D3+25(OH)D2 SERPL-MCNC: 64 NG/ML (ref 30–96)
FERRITIN SERPL-MCNC: 67 NG/ML (ref 20–300)
VIT B12 SERPL-MCNC: 505 PG/ML (ref 210–950)

## 2023-03-30 PROCEDURE — 73721 MRI JNT OF LWR EXTRE W/O DYE: CPT | Mod: TC,RT

## 2023-03-30 PROCEDURE — 73721 MRI JNT OF LWR EXTRE W/O DYE: CPT | Mod: 26,RT,, | Performed by: INTERNAL MEDICINE

## 2023-03-30 PROCEDURE — 73721 MRI KNEE WITHOUT CONTRAST RIGHT: ICD-10-PCS | Mod: 26,RT,, | Performed by: INTERNAL MEDICINE

## 2023-03-31 ENCOUNTER — TELEPHONE (OUTPATIENT)
Dept: SPORTS MEDICINE | Facility: CLINIC | Age: 63
End: 2023-03-31
Payer: COMMERCIAL

## 2023-03-31 NOTE — TELEPHONE ENCOUNTER
Patient says her right knee pain is still present but still improved from when I first saw her. She is able to walk around on the knee. MRI did not show any concerning findings. I reviewed her ESR and CRP drawn a few days ago that was WNL. WBC was normal. Uric acid level was normal. Blood cultures NGTD.     MRI did not show a meniscus tear.       Impression:     Low-grade distal MCL sprain.     No meniscal tear.     Small joint effusion/synovitis and a Small Bakers cyst, with adjacent soft tissue edema suggesting rupture or inflammation.  No soft tissue deposits.     No specific evidence of gout.    Pain origin likely from either ruptured baker's cyst vs gouty arthritis. Likely rebound pain from continued inflammation and mcl irritation.     Will start PT next week and f/u with me in 1-2. She will let me know if things worsen.     All patients questions were answered. Patient was advised to call us with any concerns or questions.

## 2023-04-03 ENCOUNTER — TELEPHONE (OUTPATIENT)
Dept: INTERVENTIONAL RADIOLOGY/VASCULAR | Facility: HOSPITAL | Age: 63
End: 2023-04-03
Payer: COMMERCIAL

## 2023-04-03 LAB — COPPER SERPL-MCNC: 1207 UG/L (ref 810–1990)

## 2023-04-04 ENCOUNTER — HOSPITAL ENCOUNTER (OUTPATIENT)
Dept: INTERVENTIONAL RADIOLOGY/VASCULAR | Facility: HOSPITAL | Age: 63
Discharge: HOME | End: 2023-04-04
Admitting: RADIOLOGY
Payer: COMMERCIAL

## 2023-04-04 VITALS
SYSTOLIC BLOOD PRESSURE: 116 MMHG | HEART RATE: 68 BPM | WEIGHT: 194 LBS | DIASTOLIC BLOOD PRESSURE: 70 MMHG | TEMPERATURE: 98 F | OXYGEN SATURATION: 97 % | HEIGHT: 65 IN | BODY MASS INDEX: 32.32 KG/M2 | RESPIRATION RATE: 16 BRPM

## 2023-04-04 DIAGNOSIS — M54.12 CERVICAL RADICULOPATHY: ICD-10-CM

## 2023-04-04 LAB
BACTERIA BLD CULT: NORMAL
BACTERIA BLD CULT: NORMAL
PYRIDOXAL SERPL-MCNC: 23 UG/L (ref 5–50)
VIT B1 BLD-MCNC: 90 UG/L (ref 38–122)

## 2023-04-04 PROCEDURE — A4550 SURGICAL TRAYS: HCPCS

## 2023-04-04 PROCEDURE — 99152 MOD SED SAME PHYS/QHP 5/>YRS: CPT | Performed by: RADIOLOGY

## 2023-04-04 PROCEDURE — 25000003 PHARM REV CODE 250: Performed by: STUDENT IN AN ORGANIZED HEALTH CARE EDUCATION/TRAINING PROGRAM

## 2023-04-04 PROCEDURE — 99152 PR MOD CONSCIOUS SEDATION, SAME PHYS, 5+ YRS, FIRST 15 MIN: ICD-10-PCS | Mod: ,,, | Performed by: RADIOLOGY

## 2023-04-04 PROCEDURE — 64479 IR EPIDURAL TRANSFORAMINAL INJ 1ST VERT CERV THOR BILAT: ICD-10-PCS | Mod: 50,,, | Performed by: RADIOLOGY

## 2023-04-04 PROCEDURE — 99152 MOD SED SAME PHYS/QHP 5/>YRS: CPT | Mod: ,,, | Performed by: RADIOLOGY

## 2023-04-04 PROCEDURE — A4215 STERILE NEEDLE: HCPCS

## 2023-04-04 PROCEDURE — 63600175 PHARM REV CODE 636 W HCPCS: Performed by: STUDENT IN AN ORGANIZED HEALTH CARE EDUCATION/TRAINING PROGRAM

## 2023-04-04 PROCEDURE — 64479 NJX AA&/STRD TFRM EPI C/T 1: CPT | Mod: 50 | Performed by: RADIOLOGY

## 2023-04-04 RX ORDER — LIDOCAINE HYDROCHLORIDE 10 MG/ML
1 INJECTION, SOLUTION EPIDURAL; INFILTRATION; INTRACAUDAL; PERINEURAL ONCE
Status: DISCONTINUED | OUTPATIENT
Start: 2023-04-04 | End: 2023-04-05 | Stop reason: HOSPADM

## 2023-04-04 RX ORDER — FENTANYL CITRATE 50 UG/ML
INJECTION, SOLUTION INTRAMUSCULAR; INTRAVENOUS
Status: COMPLETED | OUTPATIENT
Start: 2023-04-04 | End: 2023-04-04

## 2023-04-04 RX ORDER — DEXAMETHASONE SODIUM PHOSPHATE 100 MG/10ML
INJECTION INTRAMUSCULAR; INTRAVENOUS
Status: COMPLETED | OUTPATIENT
Start: 2023-04-04 | End: 2023-04-04

## 2023-04-04 RX ORDER — SODIUM CHLORIDE 9 MG/ML
75 INJECTION, SOLUTION INTRAVENOUS CONTINUOUS
Status: DISCONTINUED | OUTPATIENT
Start: 2023-04-04 | End: 2023-04-05 | Stop reason: HOSPADM

## 2023-04-04 RX ORDER — LIDOCAINE HYDROCHLORIDE 10 MG/ML
INJECTION INFILTRATION; PERINEURAL
Status: COMPLETED | OUTPATIENT
Start: 2023-04-04 | End: 2023-04-04

## 2023-04-04 RX ORDER — MIDAZOLAM HYDROCHLORIDE 1 MG/ML
INJECTION INTRAMUSCULAR; INTRAVENOUS
Status: COMPLETED | OUTPATIENT
Start: 2023-04-04 | End: 2023-04-04

## 2023-04-04 RX ADMIN — DEXAMETHASONE SODIUM PHOSPHATE 10 MG: 10 INJECTION INTRAMUSCULAR; INTRAVENOUS at 03:04

## 2023-04-04 RX ADMIN — MIDAZOLAM HYDROCHLORIDE 0.5 MG: 1 INJECTION INTRAMUSCULAR; INTRAVENOUS at 02:04

## 2023-04-04 RX ADMIN — FENTANYL CITRATE 50 MCG: 50 INJECTION, SOLUTION INTRAMUSCULAR; INTRAVENOUS at 02:04

## 2023-04-04 RX ADMIN — DEXAMETHASONE SODIUM PHOSPHATE 10 MG: 10 INJECTION INTRAMUSCULAR; INTRAVENOUS at 02:04

## 2023-04-04 RX ADMIN — LIDOCAINE HYDROCHLORIDE 5 ML: 10 INJECTION, SOLUTION INFILTRATION; PERINEURAL at 02:04

## 2023-04-04 RX ADMIN — MIDAZOLAM HYDROCHLORIDE 1 MG: 1 INJECTION INTRAMUSCULAR; INTRAVENOUS at 02:04

## 2023-04-04 RX ADMIN — FENTANYL CITRATE 25 MCG: 50 INJECTION, SOLUTION INTRAMUSCULAR; INTRAVENOUS at 02:04

## 2023-04-04 RX ADMIN — MIDAZOLAM HYDROCHLORIDE 0.5 MG: 1 INJECTION INTRAMUSCULAR; INTRAVENOUS at 03:04

## 2023-04-04 RX ADMIN — FENTANYL CITRATE 25 MCG: 50 INJECTION, SOLUTION INTRAMUSCULAR; INTRAVENOUS at 03:04

## 2023-04-04 NOTE — PLAN OF CARE
Pt arrived to IR room 200 for bilateral C4-5 transforaminal epidural steroid injection. Pt oriented to unit and staff. Plan of care reviewed with patient, patient verbalizes understanding. Comfort measures utilized. Pt safely transferred from stretcher to procedural table. Fall risk reviewed with patient, fall risk interventions maintained. Safety strap applied, positioner pillows utilized to minimize pressure points. Blankets applied. Pt prepped and draped utilizing standard sterile technique. Patient placed on continuous monitoring, as required by sedation policy. Timeouts completed utilizing standard universal time-out, per department and facility policy. RN to remain at bedside, continuous monitoring maintained. Pt resting comfortably. Denies pain/discomfort. Will continue to monitor. See flow sheets for monitoring, medication administration, and updates.

## 2023-04-04 NOTE — H&P
Radiology History & Physical      SUBJECTIVE:     Chief Complaint: Cervicalgia     History of Present Illness:  Renae Hou is a 62 y.o. female with medical history of cervical DDD - with multilevel posterior disc osteophyte with uncovertebral spur & neuroforaminal narrowing - with prior bilateral C4-5 transforaminal epidural steroid injection; presents for a repeat bilateral C4-5 epidural steroid management.     Past Medical History:   Diagnosis Date    Abscess of paraspinous muscles 2018    Allergy 04/2018    Class 1 obesity due to excess calories in adult 07/23/2019    DVT (deep venous thrombosis)     left leg    Epidural abscess 2018 04/10/2018    Fatty liver     GERD (gastroesophageal reflux disease) 2015    History of major abdominal surgery 09/11/2015    Had Hysterectomy , cholecystectomy , rectocele repair    Hypertension     Lumbar radiculopathy     Menopause     Obesity     Obstructive sleep apnea on CPAP     no longer uses CPAP after weight loss from gastric sleeve    Thyroid disease     Thyroid nodules.     Past Surgical History:   Procedure Laterality Date    ADENOIDECTOMY  1995    BACK SURGERY  2002    L4, L5    BILATERAL SALPINGOOPHORECTOMY      CHOLECYSTECTOMY      COLONOSCOPY N/A 6/13/2022    Procedure: COLONOSCOPY;  Surgeon: Saul Ureña MD;  Location: [address]);  Service: Endoscopy;  Laterality: N/A;  constipation protocol-extended Miralax prep - PEG allergy -okay with taking Miralax  fully vaccinated, prep instr portal -ml    COLONOSCOPY N/A 10/4/2022    Procedure: COLONOSCOPY;  Surgeon: Saul Ureña MD;  Location: [address]);  Service: Endoscopy;  Laterality: N/A;  Constipation protocol-Miralax prep-pt tolerated Miralax for last colonoscopy.  fully vaccinated, prep instr portal -ml    CORRECTION OF HAMMER TOE Left 8/5/2021    Procedure: CORRECTION, HAMMER TOE Left 4th mallet toe, DIP joitn fusion;  Surgeon: Guero Alvarez MD;  Location: [facility] OR;   Service: Orthopedics;  Laterality: Left;  [place]    CYST REMOVAL      EPIDURAL STEROID INJECTION INTO CERVICAL SPINE N/A 8/11/2020    Procedure: Injection-steroid-epidural-cervical;  Surgeon: [facility];  [address];  Service: Radiology;  Laterality: N/A;  /[place]    EPIDURAL STEROID INJECTION INTO CERVICAL SPINE N/A 3/2/2022    Procedure: Injection-steroid-epidural-cervical C7-T1;  Surgeon: Latoya Quintanilla MD;  Location: [facility] PAIN MGT;  Service: Pain Management;  Laterality: N/A;    ESOPHAGOGASTRODUODENOSCOPY N/A 5/3/2019    Procedure: ESOPHAGOGASTRODUODENOSCOPY (EGD);  Surgeon: Carlin Sanchez MD;  Location: [facility] (4TH FLR);  Service: Endoscopy;  Laterality: N/A;    HYSTERECTOMY  12/17/2012    [facility] BS&[facility]     INJECTION OF JOINT Left 3/1/2021    Procedure: INJECTION, JOINT LEFT HIP INTRA ARTICULAR CORTISONE INJECTION DIRECT REFERRAL;  Surgeon: Chuy Gregory MD;  Location: [facility] PAIN MGT;  Service: Pain Management;  Laterality: Left;  NEEDS CONSENT    INSERTION OF INFERIOR VENA CAVAL FILTER Right 7/19/2019    Procedure: IVC filter placement;  Surgeon: Rodney Rico MD;  Location: [facility] CATH LAB;  Service: Peripheral Vascular;  Laterality: Right;    IVC FILTER RETRIEVAL N/A 12/20/2019    Procedure: REMOVAL-FILTER-IVC;  Surgeon: Rodney Rico MD;  Location: [facility] OR 2ND FLR;  Service: Peripheral Vascular;  Laterality: N/A;    LAPAROSCOPIC SLEEVE GASTRECTOMY N/A 7/23/2019    Procedure: GASTRECTOMY, SLEEVE, LAPAROSCOPIC, with intraop EGD [id number];  Surgeon: Duc Ratliff Jr., MD;  [facility];  Service: General;  Laterality: N/A;    RECTOCELE REPAIR      SPINE SURGERY  2018    THYROID NODULE REMOVAL      TONSILLECTOMY  1995       Home Meds:   Prior to Admission medications    Medication Sig Start Date End Date Taking? Authorizing Provider   allopurinoL (ZYLOPRIM) 300 MG tablet Take 1 tablet (300 mg total) by mouth once daily. 3/13/23 6/11/23  Colleen  ANGELICA Pereira   hydroCHLOROthiazide (HYDRODIURIL) 12.5 MG Tab TAKE 1 TABLET BY MOUTH EVERY DAY 1/23/23   Wendy Horowitz MD   ibuprofen (ADVIL,MOTRIN) 100 MG tablet Take 100 mg by mouth every 6 (six) hours as needed for Temperature greater than.    Historical Provider     Anticoagulants/Antiplatelets: no anticoagulation    Allergies:   Review of patient's allergies indicates:   Allergen Reactions    Cathflo activase [alteplase] Anaphylaxis     Tightness in chest, raspy throat, restless legs, hotness all over    Heparin analogues      Restless legs, tightness in chest, and hot all over    Polyethylene glycol analogues Swelling    Polysorbate 80 Anaphylaxis, Itching and Shortness Of Breath    Xyzal [levocetirizine] Swelling    Latex Swelling           Sedation History:  no adverse reactions    Review of Systems:   Hematological: no known coagulopathies  Respiratory: no shortness of breath  Cardiovascular: no chest pain  Gastrointestinal: no abdominal pain  Genito-Urinary: no dysuria  Musculoskeletal: negative  Neurological: no TIA or stroke symptoms         OBJECTIVE:     Vital Signs (Most Recent)       Physical Exam:  ASA: 2  Mallampati: 2    General: no acute distress  Mental Status: alert and oriented to person, place and time  HEENT: normocephalic, atraumatic  Chest: unlabored breathing  Heart: regular heart rate  Abdomen: nondistended  Extremity: moves all extremities    Laboratory  Lab Results   Component Value Date    INR 0.9 11/23/2021       Lab Results   Component Value Date    WBC 9.58 03/29/2023    HGB 15.3 03/29/2023    HCT 46.0 03/29/2023    MCV 92 03/29/2023     03/29/2023      Lab Results   Component Value Date    GLU 98 03/29/2023     03/29/2023    K 3.9 03/29/2023     03/29/2023    CO2 27 03/29/2023    BUN 30 (H) 03/29/2023    CREATININE 0.8 03/29/2023    CREATININE 0.8 03/29/2023    CALCIUM 10.1 03/29/2023    MG 2.2 03/29/2023    ALT 19 03/29/2023    AST 19 03/29/2023    ALBUMIN  4.1 03/29/2023    BILITOT 0.3 03/29/2023    BILIDIR 0.2 10/05/2018       ASSESSMENT/PLAN:     Sedation Plan: Up to moderate     Patient will undergo: bilateral C4-5 transforaminal epidural steroid / lidocaine injections           Fatou Hu MD     Neuro Endovascular Surgery Fellow   [facility]

## 2023-04-04 NOTE — BRIEF OP NOTE
Radiology Post-Procedure Note    Pre Op Diagnosis: Cervical radiculopathy    Post Op Diagnosis: Same    Procedure: Percutaneous Transforamenal Cervical NEERAJ bilateral C4-C5    Procedure performed by: Duc Aguirre MD    Written Informed Consent Obtained: Yes    Specimen Removed: NO    Estimated Blood Loss: Minimal    Findings: Contrast visualized in epidural space    Level injected: Bilatetal C4-C5  Needle used: 22 gauge  Dose:  20 mg Dexamethasone   4 mL Lidocaine 1% MPF    Patient tolerated procedure well.    @SIG@

## 2023-04-04 NOTE — PROGRESS NOTES
Pt AAO w/ NAD  and ready for D/C home / review S&S of  infection as well  as D/C inst and phone contact for resident  / family member  picking up  at  [facility]

## 2023-04-04 NOTE — PLAN OF CARE
Bilateral C4-5 transforaminal epidural steroid injection completed, pt tolerated well. No apparent distress noted. Dressings applied CDI. Pt to be transfer to MPU for 1 hour recovery per Dr Hu. Report to be given at bedside.

## 2023-04-04 NOTE — DISCHARGE INSTRUCTIONS
For scheduling: Call Latoya at [phone]    For questions or concerns call: KEISHA MON-FRI 8 AM- 5PM [phone]. Radiology resident on call [phone].    For immediate concerns that are not emergent, you may call our radiology clinic at: [phone]

## 2023-04-05 ENCOUNTER — CLINICAL SUPPORT (OUTPATIENT)
Dept: REHABILITATION | Facility: HOSPITAL | Age: 63
End: 2023-04-05
Attending: PHYSICIAN ASSISTANT
Payer: COMMERCIAL

## 2023-04-05 ENCOUNTER — PATIENT MESSAGE (OUTPATIENT)
Dept: BARIATRICS | Facility: CLINIC | Age: 63
End: 2023-04-05
Payer: COMMERCIAL

## 2023-04-05 DIAGNOSIS — R29.898 DECREASED STRENGTH OF LOWER EXTREMITY: ICD-10-CM

## 2023-04-05 DIAGNOSIS — E61.1 LOW SERUM IRON: ICD-10-CM

## 2023-04-05 DIAGNOSIS — M25.561 ACUTE PAIN OF RIGHT KNEE: ICD-10-CM

## 2023-04-05 DIAGNOSIS — M25.562 ACUTE PAIN OF LEFT KNEE: ICD-10-CM

## 2023-04-05 PROCEDURE — 97110 THERAPEUTIC EXERCISES: CPT

## 2023-04-05 PROCEDURE — 97161 PT EVAL LOW COMPLEX 20 MIN: CPT

## 2023-04-05 RX ORDER — FERROUS GLUCONATE 324(38)MG
324 TABLET ORAL
Qty: 30 TABLET | Refills: 12
Start: 2023-04-05 | End: 2023-08-14

## 2023-04-10 DIAGNOSIS — I10 ESSENTIAL HYPERTENSION: ICD-10-CM

## 2023-04-10 DIAGNOSIS — Z87.898 HISTORY OF PERIPHERAL EDEMA: ICD-10-CM

## 2023-04-10 PROBLEM — R29.898 DECREASED STRENGTH OF LOWER EXTREMITY: Status: ACTIVE | Noted: 2023-04-10

## 2023-04-10 PROBLEM — M25.562 ACUTE PAIN OF LEFT KNEE: Status: ACTIVE | Noted: 2023-04-10

## 2023-04-10 NOTE — TELEPHONE ENCOUNTER
No new care gaps identified.  [facility] Embedded Care Gaps. Reference number: [id number]. 4/10/2023   5:06:49 PM CDT

## 2023-04-11 ENCOUNTER — PATIENT MESSAGE (OUTPATIENT)
Dept: BARIATRICS | Facility: CLINIC | Age: 63
End: 2023-04-11
Payer: COMMERCIAL

## 2023-04-11 RX ORDER — HYDROCHLOROTHIAZIDE 12.5 MG/1
12.5 TABLET ORAL DAILY
Qty: 90 TABLET | Refills: 3 | Status: SHIPPED | OUTPATIENT
Start: 2023-04-11 | End: 2024-03-31

## 2023-04-11 NOTE — TELEPHONE ENCOUNTER
Refill Decision Note   Renae Ameya  is requesting a refill authorization.  Brief Assessment and Rationale for Refill:  Approve     Medication Therapy Plan:       Medication Reconciliation Completed: No   Comments:     No Care Gaps recommended.     Note composed:9:16 AM 04/11/2023

## 2023-04-12 ENCOUNTER — OFFICE VISIT (OUTPATIENT)
Dept: SPORTS MEDICINE | Facility: CLINIC | Age: 63
End: 2023-04-12
Payer: COMMERCIAL

## 2023-04-12 VITALS
HEIGHT: 65 IN | WEIGHT: 199 LBS | HEART RATE: 82 BPM | BODY MASS INDEX: 33.15 KG/M2 | DIASTOLIC BLOOD PRESSURE: 88 MMHG | SYSTOLIC BLOOD PRESSURE: 125 MMHG

## 2023-04-12 DIAGNOSIS — M25.561 ACUTE PAIN OF RIGHT KNEE: Primary | ICD-10-CM

## 2023-04-12 PROCEDURE — 3079F DIAST BP 80-89 MM HG: CPT | Mod: CPTII,S$GLB,, | Performed by: PHYSICIAN ASSISTANT

## 2023-04-12 PROCEDURE — 3074F SYST BP LT 130 MM HG: CPT | Mod: CPTII,S$GLB,, | Performed by: PHYSICIAN ASSISTANT

## 2023-04-12 PROCEDURE — 99999 PR PBB SHADOW E&M-EST. PATIENT-LVL III: ICD-10-PCS | Mod: PBBFAC,,, | Performed by: PHYSICIAN ASSISTANT

## 2023-04-12 PROCEDURE — 99214 PR OFFICE/OUTPT VISIT, EST, LEVL IV, 30-39 MIN: ICD-10-PCS | Mod: S$GLB,,, | Performed by: PHYSICIAN ASSISTANT

## 2023-04-12 PROCEDURE — 1159F MED LIST DOCD IN RCRD: CPT | Mod: CPTII,S$GLB,, | Performed by: PHYSICIAN ASSISTANT

## 2023-04-12 PROCEDURE — 3074F PR MOST RECENT SYSTOLIC BLOOD PRESSURE < 130 MM HG: ICD-10-PCS | Mod: CPTII,S$GLB,, | Performed by: PHYSICIAN ASSISTANT

## 2023-04-12 PROCEDURE — 3008F BODY MASS INDEX DOCD: CPT | Mod: CPTII,S$GLB,, | Performed by: PHYSICIAN ASSISTANT

## 2023-04-12 PROCEDURE — 1160F RVW MEDS BY RX/DR IN RCRD: CPT | Mod: CPTII,S$GLB,, | Performed by: PHYSICIAN ASSISTANT

## 2023-04-12 PROCEDURE — 99999 PR PBB SHADOW E&M-EST. PATIENT-LVL III: CPT | Mod: PBBFAC,,, | Performed by: PHYSICIAN ASSISTANT

## 2023-04-12 PROCEDURE — 1159F PR MEDICATION LIST DOCUMENTED IN MEDICAL RECORD: ICD-10-PCS | Mod: CPTII,S$GLB,, | Performed by: PHYSICIAN ASSISTANT

## 2023-04-12 PROCEDURE — 99214 OFFICE O/P EST MOD 30 MIN: CPT | Mod: S$GLB,,, | Performed by: PHYSICIAN ASSISTANT

## 2023-04-12 PROCEDURE — 1160F PR REVIEW ALL MEDS BY PRESCRIBER/CLIN PHARMACIST DOCUMENTED: ICD-10-PCS | Mod: CPTII,S$GLB,, | Performed by: PHYSICIAN ASSISTANT

## 2023-04-12 PROCEDURE — 3008F PR BODY MASS INDEX (BMI) DOCUMENTED: ICD-10-PCS | Mod: CPTII,S$GLB,, | Performed by: PHYSICIAN ASSISTANT

## 2023-04-12 PROCEDURE — 3079F PR MOST RECENT DIASTOLIC BLOOD PRESSURE 80-89 MM HG: ICD-10-PCS | Mod: CPTII,S$GLB,, | Performed by: PHYSICIAN ASSISTANT

## 2023-04-12 NOTE — PLAN OF CARE
[facility]   Physical Therapy Initial Evaluation       Name: Renae Hou  [facility] Number: [id number]    Therapy Diagnosis:   Encounter Diagnoses   Name Primary?    Acute pain of right knee     Acute pain of left knee     Decreased strength of lower extremity         Physician: Santi Larson III, *    Physician Orders: PT Eval and Treat   Medical Diagnosis from Referral: M25.561 (ICD-10-CM) - Acute pain of right knee  Evaluation Date: 4/5/2023  Authorization Period Expiration: 3/28/24  Plan of Care Expiration: 6/30/23  Progress Note Due: 5/5/23  Visit # / Visits authorized: 1/ 1   FOTO: 93% limitation  FOTO 1st Follow Up:  FOTO 2nd Follow Up:      Precautions: Standard and Fall     Time In: 15:00  Time Out: 15:56  Total Appointment Time (timed & untimed codes): 56 minutes      SUBJECTIVE     Date of onset: about 2 weeks ago    History of current condition - Renae reports: having sever pain in her right knee that started about 2 weeks ago. She was unable to walk and had to got to the ER. Pain was located on right anterior medial knee. She also had increased edema that was painful to touch. She received an injection and was being monitored for gout. Today pain is doing much better and less edema. Pain sometimes with ambulation or with moving her knee certain directions.     Falls: none    Imaging: MRI - Impression:  Low-grade distal MCL sprain.  No meniscal tear.  Small joint effusion/synovitis and a small Baker's cyst with signs of rupture or inflammation.  No specific evidence of gout.    Prior Therapy: neck and back pain  Occupation: retired, takes care of grandkids  Prior Level of Function: independent  Current Level of Function: pain with ambulation, ADL's    Pain:  Current 2/10, worst 10/10, best 0/10   Location: right knee  medial and anterior  Description: Aching, Dull, and Sharp  Aggravating Factors: Walking and Flexing  Easing Factors: ice and rest    Patients goals:  return to pain free ambulation and ADL's     Medical History:   Past Medical History:   Diagnosis Date    Abscess of paraspinous muscles 2018    Allergy 04/2018    Class 1 obesity due to excess calories in adult 07/23/2019    DVT (deep venous thrombosis)     left leg    Epidural abscess 2018 04/10/2018    Fatty liver     GERD (gastroesophageal reflux disease) 2015    History of major abdominal surgery 09/11/2015    Had Hysterectomy , cholecystectomy , rectocele repair    Hypertension     Lumbar radiculopathy     Menopause     Obesity     Obstructive sleep apnea on CPAP     no longer uses CPAP after weight loss from gastric sleeve    Thyroid disease     Thyroid nodules.       Surgical History:   Renae Hou  has a past surgical history that includes Cholecystectomy; RECTOCELE REPAIR; THYROID NODULE REMOVAL; Bilateral salpingoophorectomy; Hysterectomy (12/17/2012); Back surgery (2002); Cyst Removal; Adenoidectomy (1995); Spine surgery (2018); Tonsillectomy (1995); Esophagogastroduodenoscopy (N/A, 5/3/2019); Insertion of inferior vena caval filter (Right, 7/19/2019); Laparoscopic sleeve gastrectomy (N/A, 7/23/2019); IVC filter retrieval (N/A, 12/20/2019); Epidural steroid injection into cervical spine (N/A, 8/11/2020); Injection of joint (Left, 3/1/2021); Correction of hammer toe (Left, 8/5/2021); Epidural steroid injection into cervical spine (N/A, 3/2/2022); Colonoscopy (N/A, 6/13/2022); and Colonoscopy (N/A, 10/4/2022).    Medications:   Renae has a current medication list which includes the following prescription(s): ferrous gluconate, hydrochlorothiazide, and ibuprofen.    Allergies:   Review of patient's allergies indicates:   Allergen Reactions    Cathflo activase [alteplase] Anaphylaxis     Tightness in chest, raspy throat, restless legs, hotness all over    Heparin analogues      Restless legs, tightness in chest, and hot all over    Polyethylene glycol analogues Swelling    Polysorbate 80 Anaphylaxis,  "Itching and Shortness Of Breath    Xyzal [levocetirizine] Swelling    Latex Swelling                OBJECTIVE     Observation: ambulation with slight lateral trunk lean, decreased right knee ext during stance.     Range of Motion (Passive):   Knee Right  Left    Flexion 120 125   Extension +3 +3     Range of Motion (Active):   Knee Right  Left    Flexion 115* 120   Extension 0 0   *recreation of pain    Lower Extremity Strength  Right LE  Left LE    Quadriceps: 4/5 Quadriceps: 4+/5   Hamstrings: 3+/5* Hamstrings: 4/5   Hip flexion (seated): 4/5 Hip flexion (seated): 4/5   Hip extension:  3-/5 Hip extension: 3-/5   Hip abd: 3-/5 PGM:  3/5   Hip ER:  3-/5 Hip ER:  3+/5   Hip IR: 4-/5 Hip IR: 4/5   *recreation of pain on medial knee       Special Tests:   Right Left   Valgus Stress Test Positive for pain negative   Varus Stress test negative negative   Lachman's test negative negative   Posterior Lachman NT NT   More's Test negative negative   Thessaly's Test negative negative   Patellar Grind Test negative negative     Joint Mobility: slightly decreased patella mobility     Palpation: TTP medial anterior knee along pes ansurine, medial knee MCL attachement    Sensation: intact to light touch aron LE    Edema: trace edema    Limitation/Restriction for FOTO knee Survey    Therapist reviewed FOTO scores for Renae Hou on 4/5/2023.   FOTO documents entered into [employer] - see Media section.    Limitation Score: 93%         TREATMENT     Total Treatment time (time-based codes) separate from Evaluation: 15 minutes      Renae received the treatments listed below:      therapeutic exercises to develop strength for 15 minutes including:    Quad sets with towel 20x10"  Clams 10x each  Bridges 10x10"  LAQ 10x  Pt education for HEP      PATIENT EDUCATION AND HOME EXERCISES     Education provided:   - HEP  - monitoring knee symptoms     Written Home Exercises Provided: yes. Exercises were reviewed and Renae was able to " demonstrate them prior to the end of the session.  Renae demonstrated good  understanding of the education provided. See EMR under Patient Instructions for exercises provided during therapy sessions.    ASSESSMENT     Renae is a 62 y.o. female referred to outpatient Physical Therapy with a medical diagnosis of Acute pain of right knee. Patient presents with decreased knee range of motion, pain with resisted testing, abnormal gait, and pain affecting everyday ADL's. Has signs potentially with pes anserine involvement as well as MCL involvement.     Patient prognosis is Good.   Patient will benefit from skilled outpatient Physical Therapy to address the deficits stated above and in the chart below, provide patient /family education, and to maximize patientt's level of independence.     Plan of care discussed with patient: Yes  Patient's spiritual, cultural and educational needs considered and patient is agreeable to the plan of care and goals as stated below:     Anticipated Barriers for therapy: scheduling    Medical Necessity is demonstrated by the following  History  Co-morbidities and personal factors that may impact the plan of care Co-morbidities:   Past Medical History:   Diagnosis Date    Abscess of paraspinous muscles 2018    Allergy 04/2018    Class 1 obesity due to excess calories in adult 07/23/2019    DVT (deep venous thrombosis)     left leg    Epidural abscess 2018 04/10/2018    Fatty liver     GERD (gastroesophageal reflux disease) 2015    History of major abdominal surgery 09/11/2015    Had Hysterectomy , cholecystectomy , rectocele repair    Hypertension     Lumbar radiculopathy     Menopause     Obesity     Obstructive sleep apnea on CPAP     no longer uses CPAP after weight loss from gastric sleeve    Thyroid disease     Thyroid nodules.       Personal Factors:   age     high   Examination  Body Structures and Functions, activity limitations and participation restrictions that may impact the  plan of care Body Regions:   lower extremities  trunk    Body Systems:    gross symmetry  ROM  strength  gross coordinated movement  motor control  motor learning    Participation Restrictions:   scheduling    Activity limitations:   Learning and applying knowledge  no deficits    General Tasks and Commands  no deficits    Communication  no deficits    Mobility  walking    Self care  no deficits    Domestic Life  no deficits    Interactions/Relationships  no deficits    Life Areas  no deficits    Community and Social Life  no deficits         high   Clinical Presentation stable and uncomplicated low   Decision Making/ Complexity Score: low     GOALS: Short Term Goals:  2-4 weeks  1.Report decreased knee pain  < / =  0/10  to increase tolerance for ambulation  2. Increase knee ROM to full pain free in order to be able to perform ADLs without difficulty.  3. Increase strength by 1/3 MMT grade in quad  to increase tolerance for ADL and work activities.  4. Pt to tolerate HEP to improve ROM and independence with ADL's    Long Term Goals: 8-12 weeks  1.Report decreased knee pain < / = 0/10  to increase tolerance for ambulation  2.Patient goal: pain free ambulation  3.Increase strength to >/= 4+/5 in quad  to increase tolerance for ADL and work activities.  4. Pt will report at CJ level (20-40% impaired) on FOTO knee to demonstrate increase in LE function with every day tasks.       PLAN   Plan of care Certification: 4/5/2023 to 6/30/23.    Outpatient Physical Therapy 1-2 times weekly for 8-12 weeks to include the following interventions: Gait Training, Manual Therapy, Moist Heat/ Ice, Neuromuscular Re-ed, Patient Education, Self Care, Therapeutic Activities, and Therapeutic Exercise.     Evaristo Sorto, PT      I CERTIFY THE NEED FOR THESE SERVICES FURNISHED UNDER THIS PLAN OF TREATMENT AND WHILE UNDER MY CARE   Physician's comments:     Physician's Signature: ___________________________________________________

## 2023-04-17 NOTE — PROGRESS NOTES
CC: Right knee pain (mother of Sabrina in [facility] OR)    62 y.o. Female with a history of Right knee pain. She states that the pain is greatly improved and is responding to conservative care.      Presents today after last being seen 2 weeks ago. She was previously  given a 80mg CSI to her knee that helped for a few days until her pain returned with activity. MRI was obtained at that time and ruled out serious pathology but is show a ruptured baker's cyst. She has weaned off the crutches.     She is doing PT and noticing good improvement.     + mechanical symptoms and - instability symptoms.    Previously:  She reports diffuse acute knee pain worse in the medial aspect of the knee that began around 3/23/23 with no inciting injury or trauma. She also reports knee swelling at that time. She presented to the ED for her knee pain and had knee aspiration attempted that was unsuccessful. She was started on Naproxen which helped her pain some. She knee pain is still significant and thus she presents here for evaluation. Knee pain is causing her difficulty walking. She states that her knee pain is so bad that covers or light touch to her knee hurts.     She denies fever, chills, nausea. History of spinal abscess previously following injection procedure.     She reports that a few weeks ago her primary care lab work showed hyperuricemia and she started allopurinol about 1 week ago.     Is affecting ADLs.      Review of Systems   Constitution: Negative. Negative for chills, fever and night sweats.   HENT: Negative for congestion and headaches.    Eyes: Negative for blurred vision, left vision loss and right vision loss.   Cardiovascular: Negative for chest pain and syncope.   Respiratory: Negative for cough and shortness of breath.    Endocrine: Negative for polydipsia, polyphagia and polyuria.   Hematologic/Lymphatic: Negative for bleeding problem. Does not bruise/bleed easily.   Skin: Negative for dry skin, itching and  rash.   Musculoskeletal: Negative for falls. Positive for knee pain and muscle weakness.   Gastrointestinal: Negative for abdominal pain and bowel incontinence.   Genitourinary: Negative for bladder incontinence and nocturia.   Neurological: Negative for disturbances in coordination, loss of balance and seizures.   Psychiatric/Behavioral: Negative for depression. The patient does not have insomnia.    Allergic/Immunologic: Negative for hives and persistent infections.   All other systems negative.    PAST MEDICAL HISTORY:   Past Medical History:   Diagnosis Date    Abscess of paraspinous muscles 2018    Allergy 04/2018    Class 1 obesity due to excess calories in adult 07/23/2019    DVT (deep venous thrombosis)     left leg    Epidural abscess 2018 04/10/2018    Fatty liver     GERD (gastroesophageal reflux disease) 2015    History of major abdominal surgery 09/11/2015    Had Hysterectomy , cholecystectomy , rectocele repair    Hypertension     Lumbar radiculopathy     Menopause     Obesity     Obstructive sleep apnea on CPAP     no longer uses CPAP after weight loss from gastric sleeve    Thyroid disease     Thyroid nodules.     PAST SURGICAL HISTORY:   Past Surgical History:   Procedure Laterality Date    ADENOIDECTOMY  1995    BACK SURGERY  2002    L4, L5    BILATERAL SALPINGOOPHORECTOMY      CHOLECYSTECTOMY      COLONOSCOPY N/A 6/13/2022    Procedure: COLONOSCOPY;  Surgeon: Saul Ureña MD;  Location: [address]);  Service: Endoscopy;  Laterality: N/A;  constipation protocol-extended Miralax prep - PEG allergy -okay with taking Miralax  fully vaccinated, prep instr portal -ml    COLONOSCOPY N/A 10/4/2022    Procedure: COLONOSCOPY;  Surgeon: Saul Ureña MD;  Location: [address]);  Service: Endoscopy;  Laterality: N/A;  Constipation protocol-Miralax prep-pt tolerated Miralax for last colonoscopy.  fully vaccinated, prep instr portal -ml    CORRECTION OF HAMMER TOE Left 8/5/2021     Procedure: CORRECTION, HAMMER TOE Left 4th mallet toe, DIP joitn fusion;  Surgeon: Guero Alvarez MD;  Location: [facility];  Service: Orthopedics;  Laterality: Left;  K-wires    CYST REMOVAL      EPIDURAL STEROID INJECTION INTO CERVICAL SPINE N/A 8/11/2020    Procedure: Injection-steroid-epidural-cervical;  Surgeon: [facility];  [address];  Service: Radiology;  Laterality: N/A;  /Carla    EPIDURAL STEROID INJECTION INTO CERVICAL SPINE N/A 3/2/2022    Procedure: Injection-steroid-epidural-cervical C7-T1;  Surgeon: Latoya Quintanilla MD;  Location: [facility] PAIN MGT;  Service: Pain Management;  Laterality: N/A;    ESOPHAGOGASTRODUODENOSCOPY N/A 5/3/2019    Procedure: ESOPHAGOGASTRODUODENOSCOPY (EGD);  Surgeon: Carlin Sanchez MD;  Location: [facility] (4TH FLR);  Service: Endoscopy;  Laterality: N/A;    HYSTERECTOMY  12/17/2012    Formerly [facility] BS&O     INJECTION OF JOINT Left 3/1/2021    Procedure: INJECTION, JOINT LEFT HIP INTRA ARTICULAR CORTISONE INJECTION DIRECT REFERRAL;  Surgeon: Chuy Gregory MD;  Location: [facility] PAIN MGT;  Service: Pain Management;  Laterality: Left;  NEEDS CONSENT    INSERTION OF INFERIOR VENA CAVAL FILTER Right 7/19/2019    Procedure: IVC filter placement;  Surgeon: Rodney Rico MD;  Location: [facility] CATH LAB;  Service: Peripheral Vascular;  Laterality: Right;    IVC FILTER RETRIEVAL N/A 12/20/2019    Procedure: REMOVAL-FILTER-IVC;  Surgeon: Rodney Rico MD;  [address];  Service: Peripheral Vascular;  Laterality: N/A;    LAPAROSCOPIC SLEEVE GASTRECTOMY N/A 7/23/2019    Procedure: GASTRECTOMY, SLEEVE, LAPAROSCOPIC, with intraop EGD [id number];  Surgeon: Duc Ratliff Jr., MD;  [address];  Service: General;  Laterality: N/A;    RECTOCELE REPAIR      SPINE SURGERY  2018    THYROID NODULE REMOVAL      TONSILLECTOMY  1995     FAMILY HISTORY:   Family History   Problem Relation Age of Onset    Thyroid disease Mother      Hypertension Mother     Hyperlipidemia Mother     Heart disease Mother         cad, valvular heart disease    Deep vein thrombosis Mother     Heart disease Father         MI    Stroke Father     Diabetes Sister     Alcohol abuse Sister     Cirrhosis Sister         alcoholic cirrhosis    Epilepsy Sister     Thyroid disease Daughter         Hashimoto's    Hashimoto's thyroiditis Daughter     No Known Problems Daughter     Heart disease Maternal Grandmother     Thyroid disease Maternal Grandmother     Kidney disease Maternal Grandmother     Heart disease Paternal Grandmother         MI    Breast cancer Neg Hx     Colon cancer Neg Hx     Ovarian cancer Neg Hx     Esophageal cancer Neg Hx      SOCIAL HISTORY:   Social History     Socioeconomic History    Marital status:    Tobacco Use    Smoking status: Former     Packs/day: 0.25     Years: 34.00     Pack years: 8.50     Types: Cigarettes     Quit date: 1988     Years since quittin.3    Smokeless tobacco: Never    Tobacco comments:     smoked socially decades ago - weekends only   Substance and Sexual Activity    Alcohol use: Not Currently     Comment: yearly at most     Drug use: No    Sexual activity: Yes     Partners: Male     Birth control/protection: See Surgical Hx, None     Comment: [facility] BS&O 2012,     Social History Narrative    . 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     Social Determinants of Health     Financial Resource Strain: Low Risk     Difficulty of Paying Living Expenses: Not hard at all   Food Insecurity: No Food Insecurity    Worried About Running Out of Food in the Last Year: Never true    Ran Out of Food in the Last Year: Never true   Transportation Needs: No Transportation Needs    Lack of Transportation (Medical): No    Lack of Transportation (Non-Medical): No   Physical Activity: Insufficiently Active    Days of Exercise per Week: 2 days    Minutes of Exercise per Session: 30 min   Stress: No Stress Concern Present    Feeling of  "Stress : Not at all   Social Connections: Unknown    Frequency of Communication with Friends and Family: More than three times a week    Frequency of Social Gatherings with Friends and Family: Once a week    Active Member of Clubs or Organizations: No    Attends Club or Organization Meetings: Patient refused    Marital Status:    Housing Stability: Low Risk     Unable to Pay for Housing in the Last Year: No    Number of Places Lived in the Last Year: 1    Unstable Housing in the Last Year: No       MEDICATIONS:   Current Outpatient Medications:     ferrous gluconate (FERGON) 324 MG tablet, Take 1 tablet (324 mg total) by mouth daily with breakfast., Disp: 30 tablet, Rfl: 12    hydroCHLOROthiazide (HYDRODIURIL) 12.5 MG Tab, Take 1 tablet (12.5 mg total) by mouth once daily., Disp: 90 tablet, Rfl: 3    ibuprofen (ADVIL,MOTRIN) 100 MG tablet, Take 600 mg by mouth every 6 (six) hours as needed for Temperature greater than., Disp: , Rfl:   ALLERGIES:   Review of patient's allergies indicates:   Allergen Reactions    Cathflo activase [alteplase] Anaphylaxis     Tightness in chest, raspy throat, restless legs, hotness all over    Heparin analogues      Restless legs, tightness in chest, and hot all over    Polyethylene glycol analogues Swelling    Polysorbate 80 Anaphylaxis, Itching and Shortness Of Breath    Xyzal [levocetirizine] Swelling    Latex Swelling             VITAL SIGNS: /88   Pulse 82   Ht 5' 5" (1.651 m)   Wt 90.3 kg (199 lb)   LMP 11/25/2012   BMI 33.12 kg/m²      PHYSICAL EXAMINATION  VITAL SIGNS: /88   Pulse 82   Ht 5' 5" (1.651 m)   Wt 90.3 kg (199 lb)   LMP 11/25/2012   BMI 33.12 kg/m²    General:  The patient is alert and oriented x 3.  Mood is pleasant.  Observation of ears, eyes and nose reveal no gross abnormalities.  HEENT: NCAT, sclera nonicteric  Lungs: Respirations are equal and unlabored.    Right KNEE EXAMINATION     OBSERVATION / INSPECTION   Gait:   Nonantalgic "   Alignment:  Neutral   Scars:   None   Muscle atrophy: Mild  Effusion:  none  Warmth:  None   Discoloration:   none     TENDERNESS / CREPITUS (T / C):          T / C      T / C   Patella   - / -   Lateral joint line   - / -    Peripatellar medial  -  Medial joint line    + / -    Peripatellar lateral -  Medial plica   - / -    Patellar tendon -   Popliteal fossa  - / -    Quad tendon   -   Gastrocnemius   -   Prepatellar Bursa - / -   Quadricep   -   Tibial tubercle  -  Thigh/hamstring  -   Pes anserine/HS -  Fibula    -   ITB   - / -  Tibia     + MTP near distal MCL   Tib/fib joint  - / -  LCL    -     MFC   - / -   MCL: Proximal  -    LFC   - / -    Distal   -          ROM: (* = pain)  PASSIVE   ACTIVE    Left :   5 / 0 / 145   5 / 0 / 145     Right :    0/ 0 / 140   0 / 0 / 135    Patellofemoral examination:  See above noted areas of tenderness.   Patella position    Subluxation / dislocation: Centered           Sup. / Inf;   Normal   Crepitus (PF):    Absent   Patellar Mobility:       Medial-lateral:   Normal    Superior-inferior:  Normal    Inferior tilt   Normal    Patellar tendon:  Normal   Lateral tilt:    Normal   J-sign:     None   Patellofemoral grind:   No pain       MENISCAL SIGNS:     Pain on terminal extension:  -  Pain on terminal flexion:  +  Sivans maneuver:  Negative today.   Squat     NT    LIGAMENT EXAMINATION:  ACL / Lachman:  normal (-1 to 2mm)    PCL-Post.  drawer: normal 0 to 2mm  MCL- Valgus:  normal 0 to 2mm with slight pain  LCL- Varus:  normal 0 to 2mm  Pivot shift: normal (Equal)   Dial Test: difference c/w other side   At 30° flexion: normal (< 5°)    At 90° flexion: normal (< 5°)   Reverse Pivot Shift:   normal (Equal)     STRENGTH: (* = with pain) PAINFUL SIDE   Quadricep   5/5   Hamstrin/5    EXTREMITY NEURO-VASCULAR EXAMINATION:   Sensation:  Grossly intact to light touch all dermatomal regions.   Motor Function:  Fully intact motor function at hip, knee, foot and  ankle    DTRs;  quadriceps and  achilles 2+.  No clonus and downgoing Babinski.    Vascular status:  DP and PT pulses 2+, brisk capillary refill, symmetric.     Other Findings:       X-rays:  including standing, weight bearing AP and flexion bilateral knees, lateral and merchant views ordered and images reviewed by me show:  No fracture, dislocation. Mild DJD noted.     MRI right knee:  Low-grade distal MCL sprain.     No meniscal tear.     Small joint effusion/synovitis and a small Baker's cyst with signs of rupture or inflammation.       ASSESSMENT:    Right Knee pain, acute    Gout attack vs ruptured baker's cyst is likely cause.     PLAN:     MRI right knee discussed  Continue PT  Compression.   Ice compresses.    OTC pain medications as needed.     RTC as needed    All questions were answered, pt will contact us for questions or concerns in the interim.    I made the decision to obtain old records of the patient including previous notes and imaging. New imaging was ordered today of the extremity or extremities evaluated. I independently reviewed and interpreted the radiographs and/or MRIs today as well as prior imaging.

## 2023-04-19 ENCOUNTER — CLINICAL SUPPORT (OUTPATIENT)
Dept: REHABILITATION | Facility: HOSPITAL | Age: 63
End: 2023-04-19
Attending: PHYSICIAN ASSISTANT
Payer: COMMERCIAL

## 2023-04-19 DIAGNOSIS — R29.898 DECREASED STRENGTH OF LOWER EXTREMITY: ICD-10-CM

## 2023-04-19 DIAGNOSIS — M25.562 ACUTE PAIN OF LEFT KNEE: Primary | ICD-10-CM

## 2023-04-19 PROCEDURE — 97110 THERAPEUTIC EXERCISES: CPT

## 2023-04-19 PROCEDURE — 97112 NEUROMUSCULAR REEDUCATION: CPT

## 2023-04-19 PROCEDURE — 97140 MANUAL THERAPY 1/> REGIONS: CPT

## 2023-04-19 NOTE — PROGRESS NOTES
"  Physical Therapy Daily Treatment Note     Name: Renae Hou  Clinic Number: [id number]    Therapy Diagnosis:   Encounter Diagnoses   Name Primary?    Acute pain of left knee Yes    Decreased strength of lower extremity      Physician: Santi Larson III, *    Visit Date: 4/19/2023  Physician Orders: PT Eval and Treat   Medical Diagnosis from Referral: M25.561 (ICD-10-CM) - Acute pain of right knee  Evaluation Date: 4/5/2023  Authorization Period Expiration: 3/28/24  Plan of Care Expiration: 6/30/23  Progress Note Due: 5/5/23  Visit # / Visits authorized: 1/20   FOTO: 93% limitation  FOTO 1st Follow Up:  FOTO 2nd Follow Up:        Precautions: Standard and Fall      Time In: 16:00  Time Out: 16:55  Total Appointment Time (timed & untimed codes): 55 minutes    Subjective     Pt reports: still getting some medial knee pain but doing really well.     She was compliant with home exercise program.  Response to previous treatment: improved pain  Functional change: improved strength    Pain: 2/10  Location: right knee      Objective     Renae received therapeutic exercises to develop strength and endurance for 24 minutes including:  Knee assessment  Clams with yellow TB 3x10x3"  Shuttle double leg press 50# 3x15  Shuttle single leg press 50# 3x10 each  Bridges single leg 10x3" each  Lateral band walks yellow TB 3 laps 10 yards  Pt education     Not Performed  Quad sets with towel 20x10"  Clams 10x each  Bridges 10x10"  LAQ 10x  Pt education for HEP    Renae received the following manual therapy techniques: Joint mobilizations were applied to the: knee for 15 minutes, including:  Patella mobilizations  Fat pad mobilizations      Renae participated in neuromuscular re-education activities to improve: Coordination and Proprioception for 13 minutes. The following activities were included:  Quad sets 20x5"  SLR 3x10 each        Renae participated in dynamic functional therapeutic activities to improve functional " performance for 0  minutes, including:      Renae participated in gait training to improve functional mobility and safety for 0  minutes, including:      Renae received cold pack for 0 minutes to to decrease circulation, pain, and swelling.      Home Exercises Provided and Patient Education Provided     Education provided:   - HEP  - monitoring knee symptoms     Written Home Exercises Provided: yes.  Exercises were reviewed and Renae was able to demonstrate them prior to the end of the session.  Renae demonstrated good  understanding of the education provided.     See EMR under Patient Instructions for exercises provided prior visit.    Assessment     Renae still presenting with medial knee pain but improvement with slight decreased TTP. Still pain with resisted hamstring testing and recreation of medial knee pain. Progressed with strengthening exercises and performed shuttle, band walks, single leg bridges. Did well with muscle soreness. Continue to progress with LE strengthening.     Renae Is progressing well towards her goals.   Pt prognosis is Good.     Pt will continue to benefit from skilled outpatient physical therapy to address the deficits listed in the problem list box on initial evaluation, provide pt/family education and to maximize pt's level of independence in the home and community environment.     Pt's spiritual, cultural and educational needs considered and pt agreeable to plan of care and goals.    Anticipated barriers to physical therapy: scheduling    GOALS: Short Term Goals:  2-4 weeks  1.Report decreased knee pain  < / =  0/10  to increase tolerance for ambulation  2. Increase knee ROM to full pain free in order to be able to perform ADLs without difficulty.  3. Increase strength by 1/3 MMT grade in quad  to increase tolerance for ADL and work activities.  4. Pt to tolerate HEP to improve ROM and independence with ADL's     Long Term Goals: 8-12 weeks  1.Report decreased knee pain <  / = 0/10  to increase tolerance for ambulation  2.Patient goal: pain free ambulation  3.Increase strength to >/= 4+/5 in quad  to increase tolerance for ADL and work activities.  4. Pt will report at CJ level (20-40% impaired) on FOTO knee to demonstrate increase in LE function with every day tasks.    Plan   Plan of care Certification: 4/5/2023 to 6/30/23.     Outpatient Physical Therapy 1-2 times weekly for 8-12 weeks to include the following interventions: Gait Training, Manual Therapy, Moist Heat/ Ice, Neuromuscular Re-ed, Patient Education, Self Care, Therapeutic Activities, and Therapeutic Exercise.     Evaristo Sorto, PT

## 2023-04-24 ENCOUNTER — CLINICAL SUPPORT (OUTPATIENT)
Dept: REHABILITATION | Facility: HOSPITAL | Age: 63
End: 2023-04-24
Attending: PHYSICIAN ASSISTANT
Payer: COMMERCIAL

## 2023-04-24 DIAGNOSIS — R29.898 DECREASED STRENGTH OF LOWER EXTREMITY: ICD-10-CM

## 2023-04-24 DIAGNOSIS — M25.562 ACUTE PAIN OF LEFT KNEE: Primary | ICD-10-CM

## 2023-04-24 PROCEDURE — 97112 NEUROMUSCULAR REEDUCATION: CPT

## 2023-04-24 PROCEDURE — 97140 MANUAL THERAPY 1/> REGIONS: CPT

## 2023-04-24 PROCEDURE — 97110 THERAPEUTIC EXERCISES: CPT

## 2023-04-24 NOTE — PROGRESS NOTES
"  Physical Therapy Daily Treatment Note     Name: Renae Hou  Clinic Number: [id number]    Therapy Diagnosis:   Encounter Diagnoses   Name Primary?    Acute pain of left knee Yes    Decreased strength of lower extremity        Physician: Santi Larson III, *    Visit Date: 4/24/2023  Physician Orders: PT Eval and Treat   Medical Diagnosis from Referral: M25.561 (ICD-10-CM) - Acute pain of right knee  Evaluation Date: 4/5/2023  Authorization Period Expiration: 3/28/24  Plan of Care Expiration: 6/30/23  Progress Note Due: 5/5/23  Visit # / Visits authorized: 2/20   FOTO: 93% limitation  FOTO 1st Follow Up:  FOTO 2nd Follow Up:        Precautions: Standard and Fall      Time In: 9:00  Time Out: 9:55  Total Appointment Time (timed & untimed codes): 55 minutes    Subjective     Pt reports: was doing well until Saturday, did a lot of work around the house and knee pain started again. Still feeling heaviness in the leg.      She was compliant with home exercise program.  Response to previous treatment: improved pain  Functional change: improved strength    Pain: 2/10  Location: right knee      Objective     Observation: increased edema on right knee.     Palpation: TTP lateral joint line, fat pad, medial knee    Knee ROM: slight pain with knee hyper ext on posterior medial knee.     Renae received therapeutic exercises to develop strength and endurance for 26 minutes including:  Knee/hip assessment  Clams with yellow TB 3x10x3"  Partial bridges with TA 10x10"  Sidelying hip ER 2x15 each  Stationary bike level 3 for 6' for range of motion, strength, and cardiovascular endurance  Pt education     Not Performed  Shuttle double leg press 50# 3x15  Shuttle single leg press 50# 3x10 each  Bridges single leg 10x3" each  Lateral band walks yellow TB 3 laps 10 yards    Renae received the following manual therapy techniques: Joint mobilizations were applied to the: knee for 12 minutes, including:  Patella " "mobilizations  Fat pad mobilizations      Renae participated in neuromuscular re-education activities to improve: Coordination and Proprioception for 13 minutes. The following activities were included:  Quad sets 20x5"  SAQ 1/2 bolster 30x5"    Not Performed  SLR 3x10 each        Renae participated in dynamic functional therapeutic activities to improve functional performance for 0  minutes, including:      Renae participated in gait training to improve functional mobility and safety for 0  minutes, including:      Reane received cold pack for 0 minutes to to decrease circulation, pain, and swelling.      Home Exercises Provided and Patient Education Provided     Education provided:   - HEP  - monitoring knee symptoms     Written Home Exercises Provided: yes.  Exercises were reviewed and Renae was able to demonstrate them prior to the end of the session.  Renae demonstrated good  understanding of the education provided.     See EMR under Patient Instructions for exercises provided prior visit.    Assessment     Renae had new complaints of lateral knee pain and some heaviness. Decreased quad strength and difficulty performing SLR. No improvement with manual or stationary bike. Perform proximal strengthening exercises due to the pain. Educated on monitoring symptoms and will re-assess next week.     Renae Is progressing well towards her goals.   Pt prognosis is Good.     Pt will continue to benefit from skilled outpatient physical therapy to address the deficits listed in the problem list box on initial evaluation, provide pt/family education and to maximize pt's level of independence in the home and community environment.     Pt's spiritual, cultural and educational needs considered and pt agreeable to plan of care and goals.    Anticipated barriers to physical therapy: scheduling    GOALS: Short Term Goals:  2-4 weeks  1.Report decreased knee pain  < / =  0/10  to increase tolerance for " ambulation  2. Increase knee ROM to full pain free in order to be able to perform ADLs without difficulty.  3. Increase strength by 1/3 MMT grade in quad  to increase tolerance for ADL and work activities.  4. Pt to tolerate HEP to improve ROM and independence with ADL's     Long Term Goals: 8-12 weeks  1.Report decreased knee pain < / = 0/10  to increase tolerance for ambulation  2.Patient goal: pain free ambulation  3.Increase strength to >/= 4+/5 in quad  to increase tolerance for ADL and work activities.  4. Pt will report at CJ level (20-40% impaired) on FOTO knee to demonstrate increase in LE function with every day tasks.    Plan   Plan of care Certification: 4/5/2023 to 6/30/23.     Outpatient Physical Therapy 1-2 times weekly for 8-12 weeks to include the following interventions: Gait Training, Manual Therapy, Moist Heat/ Ice, Neuromuscular Re-ed, Patient Education, Self Care, Therapeutic Activities, and Therapeutic Exercise.     Evaristo Sorto, PT

## 2023-05-03 ENCOUNTER — PATIENT MESSAGE (OUTPATIENT)
Dept: BARIATRICS | Facility: CLINIC | Age: 63
End: 2023-05-03
Payer: COMMERCIAL

## 2023-05-03 ENCOUNTER — CLINICAL SUPPORT (OUTPATIENT)
Dept: REHABILITATION | Facility: HOSPITAL | Age: 63
End: 2023-05-03
Attending: PHYSICIAN ASSISTANT
Payer: COMMERCIAL

## 2023-05-03 DIAGNOSIS — R29.898 DECREASED STRENGTH OF LOWER EXTREMITY: ICD-10-CM

## 2023-05-03 DIAGNOSIS — M25.562 ACUTE PAIN OF LEFT KNEE: Primary | ICD-10-CM

## 2023-05-03 PROCEDURE — 97110 THERAPEUTIC EXERCISES: CPT

## 2023-05-03 PROCEDURE — 97140 MANUAL THERAPY 1/> REGIONS: CPT

## 2023-05-03 PROCEDURE — 97112 NEUROMUSCULAR REEDUCATION: CPT

## 2023-05-03 NOTE — PROGRESS NOTES
"  Physical Therapy Daily Treatment Note     Name: Renae Hou  Clinic Number: [id number]    Therapy Diagnosis:   Encounter Diagnoses   Name Primary?    Acute pain of left knee Yes    Decreased strength of lower extremity        Physician: Santi Larson III, *    Visit Date: 5/3/2023  Physician Orders: PT Eval and Treat   Medical Diagnosis from Referral: M25.561 (ICD-10-CM) - Acute pain of right knee  Evaluation Date: 4/5/2023  Authorization Period Expiration: 3/28/24  Plan of Care Expiration: 6/30/23  Progress Note Due: 5/5/23  Visit # / Visits authorized: 3/20   FOTO: 93% limitation  FOTO 1st Follow Up:  FOTO 2nd Follow Up:        Precautions: Standard and Fall      Time In: 9:55  Time Out: 10:57  Total Appointment Time (timed & untimed codes): 62 minutes    Subjective     Pt reports: having some increased swelling in her right medial knee. Did a lot over the weekend.     She was compliant with home exercise program.  Response to previous treatment: improved pain  Functional change: improved strength    Pain: 2/10  Location: right knee      Objective     Observation: increased edema on right knee.     Palpation: TTP lateral joint line, fat pad, medial knee    Knee ROM: slight pain with knee hyper ext on posterior medial knee, pain with hamstring testing    SLR: positive on right   JADEN: negative, pain on medial knee      Renae received therapeutic exercises to develop strength and endurance for 26 minutes including:  Knee/hip assessment  Clams with yellow TB 3x10x3"  Partial bridges with TA 10x10"  Pt education     Not Performed  Sidelying hip ER 2x15 each  Stationary bike level 3 for 6' for range of motion, strength, and cardiovascular endurance  Shuttle double leg press 50# 3x15  Shuttle single leg press 50# 3x10 each  Bridges single leg 10x3" each  Lateral band walks yellow TB 3 laps 10 yards    Renae received the following manual therapy techniques: Joint mobilizations were applied to the: knee for " "12 minutes, including:  Patella mobilizations  Fat pad mobilizations      Renae participated in neuromuscular re-education activities to improve: Coordination and Proprioception for 16 minutes. The following activities were included:  Quad sets 20x5"  SAQ 1/2 bolster 30x5"  Supine nerve glides 2x15    Not Performed  SLR 3x10 each        Renae participated in dynamic functional therapeutic activities to improve functional performance for 0  minutes, including:      Renae participated in gait training to improve functional mobility and safety for 0  minutes, including:      Renae received cold pack for 0 minutes to to decrease circulation, pain, and swelling.      Home Exercises Provided and Patient Education Provided     Education provided:   - HEP  - monitoring knee symptoms     Written Home Exercises Provided: yes.  Exercises were reviewed and Renae was able to demonstrate them prior to the end of the session.  Renae demonstrated good  understanding of the education provided.     See EMR under Patient Instructions for exercises provided prior visit.    Assessment     Renae presented with increased symptoms and edema along the medial knee. Had positive for neural tension and pain with hamstring resisted testing. Had a positive response to manual and nerve glides but still presenting with increased symptoms. Neural symptoms potentially due to increased edema and over use during the weekend. Will continue to monitor symptoms.     Renae Is progressing well towards her goals.   Pt prognosis is Good.     Pt will continue to benefit from skilled outpatient physical therapy to address the deficits listed in the problem list box on initial evaluation, provide pt/family education and to maximize pt's level of independence in the home and community environment.     Pt's spiritual, cultural and educational needs considered and pt agreeable to plan of care and goals.    Anticipated barriers to physical therapy: " scheduling    GOALS: Short Term Goals:  2-4 weeks  1.Report decreased knee pain  < / =  0/10  to increase tolerance for ambulation  2. Increase knee ROM to full pain free in order to be able to perform ADLs without difficulty.  3. Increase strength by 1/3 MMT grade in quad  to increase tolerance for ADL and work activities.  4. Pt to tolerate HEP to improve ROM and independence with ADL's     Long Term Goals: 8-12 weeks  1.Report decreased knee pain < / = 0/10  to increase tolerance for ambulation  2.Patient goal: pain free ambulation  3.Increase strength to >/= 4+/5 in quad  to increase tolerance for ADL and work activities.  4. Pt will report at CJ level (20-40% impaired) on FOTO knee to demonstrate increase in LE function with every day tasks.    Plan   Plan of care Certification: 4/5/2023 to 6/30/23.     Outpatient Physical Therapy 1-2 times weekly for 8-12 weeks to include the following interventions: Gait Training, Manual Therapy, Moist Heat/ Ice, Neuromuscular Re-ed, Patient Education, Self Care, Therapeutic Activities, and Therapeutic Exercise.     Evaristo Sorto, PT

## 2023-05-09 ENCOUNTER — PATIENT MESSAGE (OUTPATIENT)
Dept: BARIATRICS | Facility: CLINIC | Age: 63
End: 2023-05-09
Payer: COMMERCIAL

## 2023-05-10 ENCOUNTER — CLINICAL SUPPORT (OUTPATIENT)
Dept: REHABILITATION | Facility: HOSPITAL | Age: 63
End: 2023-05-10
Attending: PHYSICIAN ASSISTANT
Payer: COMMERCIAL

## 2023-05-10 DIAGNOSIS — R29.898 DECREASED STRENGTH OF LOWER EXTREMITY: ICD-10-CM

## 2023-05-10 DIAGNOSIS — M25.562 ACUTE PAIN OF LEFT KNEE: Primary | ICD-10-CM

## 2023-05-10 PROCEDURE — 97110 THERAPEUTIC EXERCISES: CPT

## 2023-05-10 PROCEDURE — 97140 MANUAL THERAPY 1/> REGIONS: CPT

## 2023-05-10 PROCEDURE — 97112 NEUROMUSCULAR REEDUCATION: CPT

## 2023-05-10 NOTE — PROGRESS NOTES
"  Physical Therapy Daily Treatment Note     Name: Renae Hou  Clinic Number: [id number]    Therapy Diagnosis:   Encounter Diagnoses   Name Primary?    Acute pain of left knee Yes    Decreased strength of lower extremity        Physician: Santi Larson III, *    Visit Date: 5/10/2023  Physician Orders: PT Eval and Treat   Medical Diagnosis from Referral: M25.561 (ICD-10-CM) - Acute pain of right knee  Evaluation Date: 4/5/2023  Authorization Period Expiration: 3/28/24  Plan of Care Expiration: 6/30/23  Progress Note Due: 5/5/23  Visit # / Visits authorized: 4/20   FOTO: 93% limitation  FOTO 1st Follow Up:  FOTO 2nd Follow Up:        Precautions: Standard and Fall      Time In: 9:50  Time Out: 10:35  Total Appointment Time (timed & untimed codes): 45 minutes    Subjective     Pt reports: doing better with less pain, not feeling the heaviness in her leg.     She was compliant with home exercise program.  Response to previous treatment: improved pain  Functional change: improved strength    Pain: 2/10  Location: right knee      Objective     Observation: increased edema on right knee.     Palpation: TTP lateral joint line, fat pad, medial knee    Knee ROM: slight pain with knee hyper ext on posterior medial knee, pain with hamstring testing    SLR: positive on right   JADEN: negative, pain on medial knee      Renae received therapeutic exercises to develop strength and endurance for 10 minutes including:  Knee/hip assessment  Partial bridges with TA and green TB [id number]"  Pt education     Not Performed  Clams with yellow TB 3x10x3"  Sidelying hip ER 2x15 each  Stationary bike level 3 for 6' for range of motion, strength, and cardiovascular endurance  Shuttle double leg press 50# 3x15  Shuttle single leg press 50# 3x10 each  Bridges single leg 10x3" each  Lateral band walks yellow TB 3 laps 10 yards    Renae received the following manual therapy techniques: Joint mobilizations were applied to the: knee for 9 " "minutes, including:  Patella mobilizations  Fat pad mobilizations      Renae participated in neuromuscular re-education activities to improve: Coordination and Proprioception for 26 minutes. The following activities were included:  Hamstring iso 5x45" - 2 min rest break  - sidelying clams during 2 min rest breaks  Quad sets 20x5"  SLR 3x10 each  Supine nerve glides 2x15    Not Performed  SLR 3x10 each        Renae participated in dynamic functional therapeutic activities to improve functional performance for 0  minutes, including:      Renae participated in gait training to improve functional mobility and safety for 0  minutes, including:      Renae received cold pack for 0 minutes to to decrease circulation, pain, and swelling.      Home Exercises Provided and Patient Education Provided     Education provided:   - HEP  - monitoring knee symptoms     Written Home Exercises Provided: yes.  Exercises were reviewed and Renae was able to demonstrate them prior to the end of the session.  Renae demonstrated good  understanding of the education provided.     See EMR under Patient Instructions for exercises provided prior visit.    Assessment     Renae presented with improved neural symptoms but still pain with hamstring activation. Improved symptoms with isometrics and provided for HEP. Educated on continued exercises at home and will look to progress next week.     Renae Is progressing well towards her goals.   Pt prognosis is Good.     Pt will continue to benefit from skilled outpatient physical therapy to address the deficits listed in the problem list box on initial evaluation, provide pt/family education and to maximize pt's level of independence in the home and community environment.     Pt's spiritual, cultural and educational needs considered and pt agreeable to plan of care and goals.    Anticipated barriers to physical therapy: scheduling    GOALS: Short Term Goals:  2-4 weeks  1.Report decreased " knee pain  < / =  0/10  to increase tolerance for ambulation  2. Increase knee ROM to full pain free in order to be able to perform ADLs without difficulty.  3. Increase strength by 1/3 MMT grade in quad  to increase tolerance for ADL and work activities.  4. Pt to tolerate HEP to improve ROM and independence with ADL's     Long Term Goals: 8-12 weeks  1.Report decreased knee pain < / = 0/10  to increase tolerance for ambulation  2.Patient goal: pain free ambulation  3.Increase strength to >/= 4+/5 in quad  to increase tolerance for ADL and work activities.  4. Pt will report at CJ level (20-40% impaired) on FOTO knee to demonstrate increase in LE function with every day tasks.    Plan   Plan of care Certification: 4/5/2023 to 6/30/23.     Outpatient Physical Therapy 1-2 times weekly for 8-12 weeks to include the following interventions: Gait Training, Manual Therapy, Moist Heat/ Ice, Neuromuscular Re-ed, Patient Education, Self Care, Therapeutic Activities, and Therapeutic Exercise.     Evaristo Sorto, PT

## 2023-05-12 ENCOUNTER — HOSPITAL ENCOUNTER (OUTPATIENT)
Dept: RADIOLOGY | Facility: CLINIC | Age: 63
Discharge: HOME | End: 2023-05-12
Attending: INTERNAL MEDICINE
Payer: COMMERCIAL

## 2023-05-12 ENCOUNTER — OFFICE VISIT (OUTPATIENT)
Dept: INTERNAL MEDICINE | Facility: CLINIC | Age: 63
End: 2023-05-12
Payer: COMMERCIAL

## 2023-05-12 ENCOUNTER — HOSPITAL ENCOUNTER (OUTPATIENT)
Dept: RADIOLOGY | Facility: HOSPITAL | Age: 63
Discharge: HOME | End: 2023-05-12
Attending: INTERNAL MEDICINE
Payer: COMMERCIAL

## 2023-05-12 ENCOUNTER — HOSPITAL ENCOUNTER (OUTPATIENT)
Dept: CARDIOLOGY | Facility: HOSPITAL | Age: 63
Discharge: HOME | End: 2023-05-12
Attending: INTERNAL MEDICINE
Payer: COMMERCIAL

## 2023-05-12 ENCOUNTER — CLINICAL SUPPORT (OUTPATIENT)
Dept: INTERNAL MEDICINE | Facility: CLINIC | Age: 63
End: 2023-05-12
Payer: COMMERCIAL

## 2023-05-12 VITALS — WEIGHT: 199 LBS | BODY MASS INDEX: 33.15 KG/M2 | HEIGHT: 65 IN

## 2023-05-12 VITALS
BODY MASS INDEX: 33.32 KG/M2 | DIASTOLIC BLOOD PRESSURE: 65 MMHG | SYSTOLIC BLOOD PRESSURE: 120 MMHG | HEIGHT: 65 IN | WEIGHT: 200 LBS

## 2023-05-12 DIAGNOSIS — Z00.00 ROUTINE GENERAL MEDICAL EXAMINATION AT A HEALTH CARE FACILITY: ICD-10-CM

## 2023-05-12 DIAGNOSIS — Z00.00 ROUTINE GENERAL MEDICAL EXAMINATION AT A HEALTH CARE FACILITY: Primary | ICD-10-CM

## 2023-05-12 DIAGNOSIS — K21.9 GASTROESOPHAGEAL REFLUX DISEASE WITHOUT ESOPHAGITIS: ICD-10-CM

## 2023-05-12 DIAGNOSIS — E04.2 MULTIPLE THYROID NODULES: ICD-10-CM

## 2023-05-12 DIAGNOSIS — E61.1 LOW SERUM IRON: ICD-10-CM

## 2023-05-12 DIAGNOSIS — I10 ESSENTIAL HYPERTENSION: ICD-10-CM

## 2023-05-12 DIAGNOSIS — Z00.00 ANNUAL PHYSICAL EXAM: Primary | ICD-10-CM

## 2023-05-12 DIAGNOSIS — R73.03 PRE-DIABETES: ICD-10-CM

## 2023-05-12 DIAGNOSIS — E78.2 MIXED HYPERLIPIDEMIA: ICD-10-CM

## 2023-05-12 DIAGNOSIS — E66.09 CLASS 1 OBESITY DUE TO EXCESS CALORIES WITH SERIOUS COMORBIDITY AND BODY MASS INDEX (BMI) OF 33.0 TO 33.9 IN ADULT: ICD-10-CM

## 2023-05-12 DIAGNOSIS — D12.6 TUBULAR ADENOMA OF COLON: ICD-10-CM

## 2023-05-12 DIAGNOSIS — G47.33 OSA ON CPAP: ICD-10-CM

## 2023-05-12 PROBLEM — M25.562 ACUTE PAIN OF LEFT KNEE: Status: RESOLVED | Noted: 2023-04-10 | Resolved: 2023-05-12

## 2023-05-12 LAB
25(OH)D3+25(OH)D2 SERPL-MCNC: 54 NG/ML (ref 30–96)
ALBUMIN SERPL BCP-MCNC: 3.7 G/DL (ref 3.5–5.2)
ALP SERPL-CCNC: 72 U/L (ref 55–135)
ALT SERPL W/O P-5'-P-CCNC: 13 U/L (ref 10–44)
ANION GAP SERPL CALC-SCNC: 8 MMOL/L (ref 8–16)
AST SERPL-CCNC: 19 U/L (ref 10–40)
BILIRUB SERPL-MCNC: 0.4 MG/DL (ref 0.1–1)
BUN SERPL-MCNC: 19 MG/DL (ref 8–23)
CALCIUM SERPL-MCNC: 9.5 MG/DL (ref 8.7–10.5)
CHLORIDE SERPL-SCNC: 107 MMOL/L (ref 95–110)
CHOLEST SERPL-MCNC: 212 MG/DL (ref 120–199)
CHOLEST/HDLC SERPL: 3.4 {RATIO} (ref 2–5)
CO2 SERPL-SCNC: 28 MMOL/L (ref 23–29)
CREAT SERPL-MCNC: 0.9 MG/DL (ref 0.5–1.4)
CV STRESS BASE HR: 72 BPM
DIASTOLIC BLOOD PRESSURE: 75 MMHG
ERYTHROCYTE [DISTWIDTH] IN BLOOD BY AUTOMATED COUNT: 12.9 % (ref 11.5–14.5)
EST. GFR  (NO RACE VARIABLE): >60 ML/MIN/1.73 M^2
ESTIMATED AVG GLUCOSE: 120 MG/DL (ref 68–131)
GLUCOSE SERPL-MCNC: 105 MG/DL (ref 70–110)
HBA1C MFR BLD: 5.8 % (ref 4–5.6)
HCT VFR BLD AUTO: 41.1 % (ref 37–48.5)
HDLC SERPL-MCNC: 63 MG/DL (ref 40–75)
HDLC SERPL: 29.7 % (ref 20–50)
HGB BLD-MCNC: 13.5 G/DL (ref 12–16)
LDLC SERPL CALC-MCNC: 123.6 MG/DL (ref 63–159)
MCH RBC QN AUTO: 30.4 PG (ref 27–31)
MCHC RBC AUTO-ENTMCNC: 32.8 G/DL (ref 32–36)
MCV RBC AUTO: 93 FL (ref 82–98)
NONHDLC SERPL-MCNC: 149 MG/DL
OHS CV CPX 1 MINUTE RECOVERY HEART RATE: 150 BPM
OHS CV CPX 85 PERCENT MAX PREDICTED HEART RATE MALE: 129
OHS CV CPX ESTIMATED METS: 12
OHS CV CPX MAX PREDICTED HEART RATE: 151
OHS CV CPX PATIENT IS FEMALE: 1
OHS CV CPX PATIENT IS MALE: 0
OHS CV CPX PEAK DIASTOLIC BLOOD PRESSURE: 67 MMHG
OHS CV CPX PEAK HEAR RATE: 171 BPM
OHS CV CPX PEAK RATE PRESSURE PRODUCT: NORMAL
OHS CV CPX PEAK SYSTOLIC BLOOD PRESSURE: 169 MMHG
OHS CV CPX PERCENT MAX PREDICTED HEART RATE ACHIEVED: 113
OHS CV CPX RATE PRESSURE PRODUCT PRESENTING: 8928
PLATELET # BLD AUTO: 278 K/UL (ref 150–450)
PMV BLD AUTO: 9.7 FL (ref 9.2–12.9)
POTASSIUM SERPL-SCNC: 4 MMOL/L (ref 3.5–5.1)
PROT SERPL-MCNC: 6.5 G/DL (ref 6–8.4)
RBC # BLD AUTO: 4.44 M/UL (ref 4–5.4)
SODIUM SERPL-SCNC: 143 MMOL/L (ref 136–145)
STRESS ECHO POST EXERCISE DUR MIN: 7 MINUTES
STRESS ECHO POST EXERCISE DUR SEC: 26 SECONDS
SYSTOLIC BLOOD PRESSURE: 124 MMHG
TRIGL SERPL-MCNC: 127 MG/DL (ref 30–150)
TSH SERPL DL<=0.005 MIU/L-ACNC: 1.43 UIU/ML (ref 0.4–4)
WBC # BLD AUTO: 5.94 K/UL (ref 3.9–12.7)

## 2023-05-12 PROCEDURE — 99396 PREV VISIT EST AGE 40-64: CPT | Mod: S$GLB,,, | Performed by: INTERNAL MEDICINE

## 2023-05-12 PROCEDURE — 93018 CV STRESS TEST I&R ONLY: CPT | Mod: ,,, | Performed by: INTERNAL MEDICINE

## 2023-05-12 PROCEDURE — 77080 DXA BONE DENSITY AXIAL: CPT | Mod: 26,,, | Performed by: INTERNAL MEDICINE

## 2023-05-12 PROCEDURE — 93018 EXERCISE STRESS - EKG (CUPID ONLY): ICD-10-PCS | Mod: ,,, | Performed by: INTERNAL MEDICINE

## 2023-05-12 PROCEDURE — 99999 PR PBB SHADOW E&M-EST. PATIENT-LVL IV: ICD-10-PCS | Mod: PBBFAC,,, | Performed by: INTERNAL MEDICINE

## 2023-05-12 PROCEDURE — 93016 CV STRESS TEST SUPVJ ONLY: CPT | Mod: ,,, | Performed by: INTERNAL MEDICINE

## 2023-05-12 PROCEDURE — 93017 CV STRESS TEST TRACING ONLY: CPT

## 2023-05-12 PROCEDURE — 82306 VITAMIN D 25 HYDROXY: CPT | Performed by: INTERNAL MEDICINE

## 2023-05-12 PROCEDURE — 99999 PR PBB SHADOW E&M-EST. PATIENT-LVL I: CPT | Mod: PBBFAC,,,

## 2023-05-12 PROCEDURE — 77067 SCR MAMMO BI INCL CAD: CPT | Mod: 26,,, | Performed by: RADIOLOGY

## 2023-05-12 PROCEDURE — 77080 DEXA BONE DENSITY SPINE HIP: ICD-10-PCS | Mod: 26,,, | Performed by: INTERNAL MEDICINE

## 2023-05-12 PROCEDURE — 3078F DIAST BP <80 MM HG: CPT | Mod: CPTII,S$GLB,, | Performed by: INTERNAL MEDICINE

## 2023-05-12 PROCEDURE — 93016 EXERCISE STRESS - EKG (CUPID ONLY): ICD-10-PCS | Mod: ,,, | Performed by: INTERNAL MEDICINE

## 2023-05-12 PROCEDURE — 3044F PR MOST RECENT HEMOGLOBIN A1C LEVEL <7.0%: ICD-10-PCS | Mod: CPTII,S$GLB,, | Performed by: INTERNAL MEDICINE

## 2023-05-12 PROCEDURE — 3044F HG A1C LEVEL LT 7.0%: CPT | Mod: CPTII,S$GLB,, | Performed by: INTERNAL MEDICINE

## 2023-05-12 PROCEDURE — 83036 HEMOGLOBIN GLYCOSYLATED A1C: CPT | Performed by: INTERNAL MEDICINE

## 2023-05-12 PROCEDURE — 99999 PR PBB SHADOW E&M-EST. PATIENT-LVL IV: CPT | Mod: PBBFAC,,, | Performed by: INTERNAL MEDICINE

## 2023-05-12 PROCEDURE — 3074F PR MOST RECENT SYSTOLIC BLOOD PRESSURE < 130 MM HG: ICD-10-PCS | Mod: CPTII,S$GLB,, | Performed by: INTERNAL MEDICINE

## 2023-05-12 PROCEDURE — 77063 BREAST TOMOSYNTHESIS BI: CPT | Mod: 26,,, | Performed by: RADIOLOGY

## 2023-05-12 PROCEDURE — 3008F PR BODY MASS INDEX (BMI) DOCUMENTED: ICD-10-PCS | Mod: CPTII,S$GLB,, | Performed by: INTERNAL MEDICINE

## 2023-05-12 PROCEDURE — 77080 DXA BONE DENSITY AXIAL: CPT | Mod: TC

## 2023-05-12 PROCEDURE — 77067 MAMMO DIGITAL SCREENING BILAT WITH TOMO: ICD-10-PCS | Mod: 26,,, | Performed by: RADIOLOGY

## 2023-05-12 PROCEDURE — 99396 PR PREVENTIVE VISIT,EST,40-64: ICD-10-PCS | Mod: S$GLB,,, | Performed by: INTERNAL MEDICINE

## 2023-05-12 PROCEDURE — 3074F SYST BP LT 130 MM HG: CPT | Mod: CPTII,S$GLB,, | Performed by: INTERNAL MEDICINE

## 2023-05-12 PROCEDURE — 99999 PR PBB SHADOW E&M-EST. PATIENT-LVL I: ICD-10-PCS | Mod: PBBFAC,,,

## 2023-05-12 PROCEDURE — 84443 ASSAY THYROID STIM HORMONE: CPT | Performed by: INTERNAL MEDICINE

## 2023-05-12 PROCEDURE — 80061 LIPID PANEL: CPT | Performed by: INTERNAL MEDICINE

## 2023-05-12 PROCEDURE — 77063 MAMMO DIGITAL SCREENING BILAT WITH TOMO: ICD-10-PCS | Mod: 26,,, | Performed by: RADIOLOGY

## 2023-05-12 PROCEDURE — 3078F PR MOST RECENT DIASTOLIC BLOOD PRESSURE < 80 MM HG: ICD-10-PCS | Mod: CPTII,S$GLB,, | Performed by: INTERNAL MEDICINE

## 2023-05-12 PROCEDURE — 80053 COMPREHEN METABOLIC PANEL: CPT | Performed by: INTERNAL MEDICINE

## 2023-05-12 PROCEDURE — 77067 SCR MAMMO BI INCL CAD: CPT | Mod: TC

## 2023-05-12 PROCEDURE — 3008F BODY MASS INDEX DOCD: CPT | Mod: CPTII,S$GLB,, | Performed by: INTERNAL MEDICINE

## 2023-05-12 PROCEDURE — 85027 COMPLETE CBC AUTOMATED: CPT | Performed by: INTERNAL MEDICINE

## 2023-05-12 RX ORDER — TIZANIDINE 4 MG/1
4 TABLET ORAL NIGHTLY
COMMUNITY
Start: 2023-04-09 | End: 2023-06-23 | Stop reason: ALTCHOICE

## 2023-05-12 RX ORDER — OMEPRAZOLE 20 MG/1
20 CAPSULE, DELAYED RELEASE ORAL
COMMUNITY
Start: 2023-04-09 | End: 2023-08-25

## 2023-05-12 NOTE — LETTER
May 12, 2023    Renae Hou  [address] [place] [address]             [facility]  [address]  [facility] [address]  Phone: [phone]  Fax: [phone] Dear Mrs. Hou:    Thank you for allowing me to serve you and perform your Executive Health exam on 5/12/2023.  This letter will serve a brief summary of the history, physical findings, and laboratory/studies performed and recommendations at that time.    Reason for Visit: Executive Health Preventive Physical Examination    Past Medical History:   Diagnosis Date    Abscess of paraspinous muscles 2018    Allergy 04/2018    Class 1 obesity due to excess calories in adult 07/23/2019    DVT (deep venous thrombosis)     left leg    Epidural abscess 2018 04/10/2018    Fatty liver     GERD (gastroesophageal reflux disease) 2015    History of major abdominal surgery 09/11/2015    Had Hysterectomy , cholecystectomy , rectocele repair    Hypertension     Lumbar radiculopathy     Menopause     Obesity     Obstructive sleep apnea on CPAP     no longer uses CPAP after weight loss from gastric sleeve    Thyroid disease     Thyroid nodules.       Past Surgical History:   Procedure Laterality Date    ADENOIDECTOMY  1995    BACK SURGERY  2002    L4, L5    BILATERAL SALPINGOOPHORECTOMY      CHOLECYSTECTOMY      COLONOSCOPY N/A 6/13/2022    Procedure: COLONOSCOPY;  Surgeon: Saul Ureña MD;  Location: [address]);  Service: Endoscopy;  Laterality: N/A;  constipation protocol-extended Miralax prep - PEG allergy -okay with taking Miralax  fully vaccinated, prep instr portal -ml    COLONOSCOPY N/A 10/4/2022    Procedure: COLONOSCOPY;  Surgeon: Saul Ureña MD;  Location: [address]);  Service: Endoscopy;  Laterality: N/A;  Constipation protocol-Miralax prep-pt tolerated Miralax for last colonoscopy.  fully vaccinated, prep instr portal -ml    CORRECTION OF HAMMER TOE Left 8/5/2021     Procedure: CORRECTION, HAMMER TOE Left 4th mallet toe, DIP joitn fusion;  Surgeon: Guero Alvarez MD;  Location[facility] OR;  Service: Orthopedics;  Laterality: Left;  [employer]    CYST REMOVAL      EPIDURAL STEROID INJECTION INTO CERVICAL SPINE N/A 8/11/2020    Procedure: Injection-steroid-epidural-cervical;  Surgeon: [facility] Provider;  [address];  Service: Radiology;  Laterality: N/A;  /[place]    EPIDURAL STEROID INJECTION INTO CERVICAL SPINE N/A 3/2/2022    Procedure: Injection-steroid-epidural-cervical C7-T1;  Surgeon: Latoya Quintanilla MD;  Location: [facility] PAIN MGT;  Service: Pain Management;  Laterality: N/A;    ESOPHAGOGASTRODUODENOSCOPY N/A 5/3/2019    Procedure: ESOPHAGOGASTRODUODENOSCOPY (EGD);  Surgeon: Carlin Sanchez MD;  Location: [facility] (4TH FLR);  Service: Endoscopy;  Laterality: N/A;    HYSTERECTOMY  12/17/2012    [facility]     INJECTION OF JOINT Left 3/1/2021    Procedure: INJECTION, JOINT LEFT HIP INTRA ARTICULAR CORTISONE INJECTION DIRECT REFERRAL;  Surgeon: Chuy Gregory MD;  Location: [facility] PAIN MGT;  Service: Pain Management;  Laterality: Left;  NEEDS CONSENT    INSERTION OF INFERIOR VENA CAVAL FILTER Right 7/19/2019    Procedure: IVC filter placement;  Surgeon: Rodney Rico MD;  Location: [facility] CATH LAB;  Service: Peripheral Vascular;  Laterality: Right;    IVC FILTER RETRIEVAL N/A 12/20/2019    Procedure: REMOVAL-FILTER-IVC;  Surgeon: Rodney Rico MD;  [address];  Service: Peripheral Vascular;  Laterality: N/A;    LAPAROSCOPIC SLEEVE GASTRECTOMY N/A 7/23/2019    Procedure: GASTRECTOMY, SLEEVE, LAPAROSCOPIC, with intraop EGD [id number];  Surgeon: Duc Ratliff Jr., MD;  [address];  Service: General;  Laterality: N/A;    RECTOCELE REPAIR      SPINE SURGERY  2018    THYROID NODULE REMOVAL      TONSILLECTOMY  1995       Family History   Problem Relation Age of Onset    Thyroid disease Mother      Hypertension Mother     Hyperlipidemia Mother     Heart disease Mother         cad, valvular heart disease    Deep vein thrombosis Mother     Heart disease Father         MI    Stroke Father     Diabetes Sister     Alcohol abuse Sister     Cirrhosis Sister         alcoholic cirrhosis    Epilepsy Sister     Heart disease Daughter         SVT    Thyroid disease Daughter         Hashimoto's    Hashimoto's thyroiditis Daughter     No Known Problems Daughter     Heart disease Maternal Grandmother     Thyroid disease Maternal Grandmother     Kidney disease Maternal Grandmother     Heart disease Paternal Grandmother         MI    Breast cancer Neg Hx     Colon cancer Neg Hx     Ovarian cancer Neg Hx     Esophageal cancer Neg Hx        Social History     Socioeconomic History    Marital status:     Number of children: 2   Tobacco Use    Smoking status: Former     Packs/day: 0.25     Years: 34.00     Pack years: 8.50     Types: Cigarettes     Quit date: 1988     Years since quittin.3    Smokeless tobacco: Never    Tobacco comments:     smoked socially decades ago - weekends only   Substance and Sexual Activity    Alcohol use: Not Currently     Comment: yearly at most     Drug use: No    Sexual activity: Yes     Partners: Male     Birth control/protection: See Surgical Hx, None     Comment: [facility] BS&O 2012,     Social History Narrative    . 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     Social Determinants of Health     Financial Resource Strain: Low Risk     Difficulty of Paying Living Expenses: Not hard at all   Food Insecurity: No Food Insecurity    Worried About Running Out of Food in the Last Year: Never true    Ran Out of Food in the Last Year: Never true   Transportation Needs: No Transportation Needs    Lack of Transportation (Medical): No    Lack of Transportation (Non-Medical): No   Physical Activity: Insufficiently Active    Days of Exercise per Week: 2 days    Minutes of  Exercise per Session: 30 min   Stress: No Stress Concern Present    Feeling of Stress : Not at all   Social Connections: Unknown    Frequency of Communication with Friends and Family: More than three times a week    Frequency of Social Gatherings with Friends and Family: Once a week    Active Member of Clubs or Organizations: No    Attends Club or Organization Meetings: Patient refused    Marital Status:    Housing Stability: Low Risk     Unable to Pay for Housing in the Last Year: No    Number of Places Lived in the Last Year: 1    Unstable Housing in the Last Year: No        Review of patient's allergies indicates:   Allergen Reactions    Cathflo activase [alteplase] Anaphylaxis     Tightness in chest, raspy throat, restless legs, hotness all over    Heparin analogues      Restless legs, tightness in chest, and hot all over    Polyethylene glycol analogues Swelling    Polysorbate 80 Anaphylaxis, Itching and Shortness Of Breath    Xyzal [levocetirizine] Swelling    Latex Swelling               Current Outpatient Medications:     ferrous gluconate (FERGON) 324 MG tablet, Take 1 tablet (324 mg total) by mouth daily with breakfast., Disp: 30 tablet, Rfl: 12    hydroCHLOROthiazide (HYDRODIURIL) 12.5 MG Tab, Take 1 tablet (12.5 mg total) by mouth once daily., Disp: 90 tablet, Rfl: 3    ibuprofen (ADVIL,MOTRIN) 100 MG tablet, Take 600 mg by mouth every 6 (six) hours as needed for Temperature greater than., Disp: , Rfl:     omeprazole (PRILOSEC) 20 MG capsule, Take 20 mg by mouth., Disp: , Rfl:     tiZANidine (ZANAFLEX) 4 MG tablet, Take 4 mg by mouth every evening., Disp: , Rfl:      Review of Systems  Review of Systems - Negative except for recent weight gain and some orthopedic issues, particularly involving your knee.  You have also had a couple of steroid shots for your back.  Her hoping to work on more physical therapy, exercise and weight loss and are following in the bariatric  clinic.    Physical Exam:  General: General appearance: alert, well appearing, and in no distress.   Skin: Skin exam - normal coloration and turgor, no rashes, no suspicious skin lesions noted.  HEENT: Ears - bilateral TM's and external ear canals normal. , ENT exam reveals - ENT exam normal, no neck nodes or sinus tenderness.  Well healed thyroid scar  Lungs: Chest: clear to auscultation, no wheezes, rales or rhonchi, symmetric air entry.   Heart: CVS exam: normal rate, regular rhythm, normal S1, S2, no murmurs, rubs, clicks or gallops.   Extremities: Exam of extremities: peripheral pulses normal, no pedal edema, no clubbing or cyanosis    Labs:  Results for orders placed or performed in visit on 05/12/23   Comprehensive metabolic panel   Result Value Ref Range    Sodium 143 136 - 145 mmol/L    Potassium 4.0 3.5 - 5.1 mmol/L    Chloride 107 95 - 110 mmol/L    CO2 28 23 - 29 mmol/L    Glucose 105 70 - 110 mg/dL    BUN 19 8 - 23 mg/dL    Creatinine 0.9 0.5 - 1.4 mg/dL    Calcium 9.5 8.7 - 10.5 mg/dL    Total Protein 6.5 6.0 - 8.4 g/dL    Albumin 3.7 3.5 - 5.2 g/dL    Total Bilirubin 0.4 0.1 - 1.0 mg/dL    Alkaline Phosphatase 72 55 - 135 U/L    AST 19 10 - 40 U/L    ALT 13 10 - 44 U/L    Anion Gap 8 8 - 16 mmol/L    eGFR >60.0 >60 mL/min/1.73 m^2   CBC Without Differential   Result Value Ref Range    WBC 5.94 3.90 - 12.70 K/uL    RBC 4.44 4.00 - 5.40 M/uL    Hemoglobin 13.5 12.0 - 16.0 g/dL    Hematocrit 41.1 37.0 - 48.5 %    MCV 93 82 - 98 fL    MCH 30.4 27.0 - 31.0 pg    MCHC 32.8 32.0 - 36.0 g/dL    RDW 12.9 11.5 - 14.5 %    Platelets 278 150 - 450 K/uL    MPV 9.7 9.2 - 12.9 fL   Lipid panel   Result Value Ref Range    Cholesterol 212 (H) 120 - 199 mg/dL    Triglycerides 127 30 - 150 mg/dL    HDL 63 40 - 75 mg/dL    LDL Cholesterol 123.6 63.0 - 159.0 mg/dL    HDL/Cholesterol Ratio 29.7 20.0 - 50.0 %    Total Cholesterol/HDL Ratio 3.4 2.0 - 5.0    Non-HDL Cholesterol 149 mg/dL   TSH   Result Value Ref Range    TSH  1.434 0.400 - 4.000 uIU/mL   Hemoglobin A1c   Result Value Ref Range    Hemoglobin A1C 5.8 (H) 4.0 - 5.6 %    Estimated Avg Glucose 120 68 - 131 mg/dL   Vitamin D   Result Value Ref Range    Vit D, 25-Hydroxy 54 30 - 96 ng/mL     *Note: Due to a large number of results and/or encounters for the requested time period, some results have not been displayed. A complete set of results can be found in Results Review.      Stress test acceptable.    Labs acceptable other than slightly elevated cholesterol and slightly elevated glucose, both of which can be treated with low-carbohydrate, portion controlled eating, exercise and weight loss.  These will be monitored.    Assessment/Recommendations:  Routine Health Maintenance:  Mammogram and bone density exam are still pending at the time of this letter and will be reviewed under separate cover.  You are candidate for COVID booster should you desire this.    I concur with the physical therapy for your knee.  Please keep your follow-up bariatric assessment.  A sleep study will help to determine whether you currently have sleep apnea given your weight gain and tiredness and this has been ordered .  I recommend a thyroid ultrasound and follow-up labs to check on your iron studies .  Please work on portion control, exercise as tolerated, and good sleep hygiene.   A goal of 20 pounds of weight loss in the next year would be reasonable.    Colonoscopy will be due at the end of the year and this has been ordered as well.     Please contact me should you have any questions or concerns regarding physical findings, or my recommendations.  I will review your further studies when they are completed.      Sincerely,          Wendy Horowitz MD, FACP

## 2023-05-12 NOTE — PROGRESS NOTES
Patient ID: Renae Hou is a 62 y.o. female.    Chief Complaint: [employer]      Assessment:       1. Annual physical exam    2. Tubular adenoma of colon: see colonoscopy 2022    3. Low serum iron    4. Pre-diabetes    5. Class 1 obesity due to excess calories with serious comorbidity and body mass index (BMI) of 33.0 to 33.9 in adult    6. Multiple thyroid nodules: stable 5/22    7. Gastroesophageal reflux disease without esophagitis    8. Mixed hyperlipidemia    9. Essential hypertension    10. RENAE dx 2010, improved after weight loss          Plan:         1. Annual physical exam    2. Tubular adenoma of colon: see colonoscopy 2022  -     Ambulatory referral/consult to Endo Procedure ; Future; Expected date: 05/13/2023    3. Low serum iron  -     Ferritin; Future; Expected date: 05/12/2023  -     Iron and TIBC; Future; Expected date: 05/12/2023    4. Pre-diabetes  Overview:  Had a history of Prediabetes   Last A1c from 4/6/2018 - 5.8      5. Class 1 obesity due to excess calories with serious comorbidity and body mass index (BMI) of 33.0 to 33.9 in adult  Overview:  Hx gastric sleeve 2019      6. Multiple thyroid nodules: stable 5/22  -     US Soft Tissue Head Neck Thyroid; Future; Expected date: 05/12/2023    7. Gastroesophageal reflux disease without esophagitis    8. Mixed hyperlipidemia    9. Essential hypertension  Overview:  BP elevation in March   Had head ache a few days ago      10. RENAE dx 2010, improved after weight loss  Overview:  Diagnosed with sleep apnea about 2010   Was given CPAP machine although not using it   Lost weight and not using any more     Orders:  -     Home Sleep Study; Future       Labs today reviewed  Coronary CT score 1 in 2021 which is encouraging, anticipate repeat within the next 5 years  No alarm symptoms  Exercise and sleep hygiene reviewed  Home sleep study; thyroid ultrasound  Labs in 3 months  Stress test today acceptable  Mammogram and bone density  still pending today, will review under separate cover  Sleep hygiene, exercise, portion control, goal of 20 lb weight loss in the next year, she is following up with bariatrics as well.        Subjective:   EH PE    Slight weight gain; 15 pounds since January.  Planning to see Bariatrics and is hoping to get meds.    Sleep is good for the most part.  Some RLS sx.  Recent labs show slightly low iron, she is aware and she is on iron.  Will repeat within the next several months.    Does not know if she snores- does not think she does.  Mostly feels rested when she wakes up.  Energy level is decent at work but tired at home.  Gets 8 hours of sleep most nights.  Minimal alcohol.  Recall that she had sleep apnea which was improved after she had bariatric surgery.  Discussed again.  She is willing to get another sleep study.    Some knee pain- ongoing PT; this limits her exercise.    A couple of steroid shots in her neck/back.    Patient Active Problem List:     RENAE dx 2010, improved after weight loss     Chronic constipation     Mixed hyperlipidemia     Pre-diabetes     Chronic pain     History of lumbar surgery     Essential hypertension     Fatty liver     Spondylolisthesis     Gastroesophageal reflux disease without esophagitis     Vaginal atrophy     Dyspareunia, female     Mixed stress and urge urinary incontinence     Rectocele, female     Cystocele, midline     History of DVT (deep vein thrombosis): 2018      Multiple thyroid nodules: stable 5/22     Dysphonia     Cervical radiculopathy     Mallet toe of left foot     Chronic left-sided low back pain with left-sided sciatica     Carpal tunnel syndrome of right wrist: see EMG 10/21     Obesity due to excess calories with serious comorbidity     Tubular adenoma of colon: see colonoscopy 2022     Polyarthralgia     COVID 1/22; 12/22     Low serum iron     Decreased strength of lower extremity         The 10-year ASCVD risk score ([employer], et al., 2019) is: 4.6%     Values used to calculate the score:      Age: 62 years      Sex: Female      Is Non- : No      Diabetic: No      Tobacco smoker: No      Systolic Blood Pressure: 120 mmHg      Is BP treated: Yes      HDL Cholesterol: 63 mg/dL      Total Cholesterol: 212 mg/dL     Review of Systems   Constitutional:  Negative for fatigue.        Weight gain   HENT:  Negative for hearing loss.    Eyes:  Negative for visual disturbance.   Respiratory:  Negative for shortness of breath.         Does not feel she has RENAE now   Cardiovascular:  Negative for chest pain.   Gastrointestinal:  Negative for abdominal pain, constipation and diarrhea.   Genitourinary:  Negative for dysuria, frequency and vaginal bleeding.   Musculoskeletal:  Positive for arthralgias. Negative for joint swelling.   Skin:  Negative for rash.   Neurological:  Negative for weakness, light-headedness and headaches.   Psychiatric/Behavioral:  Negative for sleep disturbance.        Objective:      Physical Exam  Vitals and nursing note reviewed.   Constitutional:       Appearance: She is well-developed.   HENT:      Head: Normocephalic and atraumatic.      Right Ear: External ear normal.      Left Ear: External ear normal.      Nose: Nose normal.      Mouth/Throat:      Pharynx: No oropharyngeal exudate.   Eyes:      General: No scleral icterus.     Extraocular Movements: Extraocular movements intact.      Conjunctiva/sclera: Conjunctivae normal.   Neck:      Thyroid: No thyromegaly.      Vascular: No JVD.      Comments: Well healed thyroid scar  Cardiovascular:      Rate and Rhythm: Normal rate and regular rhythm.      Heart sounds: Normal heart sounds. No murmur heard.    No gallop.   Pulmonary:      Effort: Pulmonary effort is normal. No respiratory distress.      Breath sounds: Normal breath sounds. No wheezing.   Abdominal:      General: Bowel sounds are normal. There is no distension.      Palpations: Abdomen is soft. There is no mass.       Tenderness: There is no abdominal tenderness. There is no guarding or rebound.   Musculoskeletal:         General: No tenderness. Normal range of motion.      Cervical back: Normal range of motion and neck supple.   Lymphadenopathy:      Cervical: No cervical adenopathy.   Skin:     General: Skin is warm.      Findings: No erythema or rash.   Neurological:      General: No focal deficit present.      Mental Status: She is alert and oriented to person, place, and time.      Cranial Nerves: No cranial nerve deficit.      Coordination: Coordination normal.   Psychiatric:         Behavior: Behavior normal.         Thought Content: Thought content normal.         Judgment: Judgment normal.           Health Maintenance Due   Topic Date Due    COVID-19 Vaccine (3 - Booster for Pfizer series) 11/03/2021    Mammogram  05/06/2023

## 2023-05-15 ENCOUNTER — OFFICE VISIT (OUTPATIENT)
Dept: BARIATRICS | Facility: CLINIC | Age: 63
End: 2023-05-15
Payer: COMMERCIAL

## 2023-05-15 ENCOUNTER — PATIENT MESSAGE (OUTPATIENT)
Dept: REHABILITATION | Facility: HOSPITAL | Age: 63
End: 2023-05-15
Payer: COMMERCIAL

## 2023-05-15 VITALS
HEART RATE: 76 BPM | DIASTOLIC BLOOD PRESSURE: 70 MMHG | OXYGEN SATURATION: 98 % | SYSTOLIC BLOOD PRESSURE: 122 MMHG | WEIGHT: 204.13 LBS | BODY MASS INDEX: 34.01 KG/M2 | HEIGHT: 65 IN

## 2023-05-15 DIAGNOSIS — Z98.84 S/P LAPAROSCOPIC SLEEVE GASTRECTOMY: ICD-10-CM

## 2023-05-15 DIAGNOSIS — E66.9 OBESITY, CLASS I, BMI 30.0-34.9 (SEE ACTUAL BMI): Primary | ICD-10-CM

## 2023-05-15 PROCEDURE — 3074F PR MOST RECENT SYSTOLIC BLOOD PRESSURE < 130 MM HG: ICD-10-PCS | Mod: CPTII,S$GLB,, | Performed by: INTERNAL MEDICINE

## 2023-05-15 PROCEDURE — 1160F RVW MEDS BY RX/DR IN RCRD: CPT | Mod: CPTII,S$GLB,, | Performed by: INTERNAL MEDICINE

## 2023-05-15 PROCEDURE — 1159F PR MEDICATION LIST DOCUMENTED IN MEDICAL RECORD: ICD-10-PCS | Mod: CPTII,S$GLB,, | Performed by: INTERNAL MEDICINE

## 2023-05-15 PROCEDURE — 3044F HG A1C LEVEL LT 7.0%: CPT | Mod: CPTII,S$GLB,, | Performed by: INTERNAL MEDICINE

## 2023-05-15 PROCEDURE — 99999 PR PBB SHADOW E&M-EST. PATIENT-LVL IV: CPT | Mod: PBBFAC,,, | Performed by: INTERNAL MEDICINE

## 2023-05-15 PROCEDURE — 3044F PR MOST RECENT HEMOGLOBIN A1C LEVEL <7.0%: ICD-10-PCS | Mod: CPTII,S$GLB,, | Performed by: INTERNAL MEDICINE

## 2023-05-15 PROCEDURE — 3078F PR MOST RECENT DIASTOLIC BLOOD PRESSURE < 80 MM HG: ICD-10-PCS | Mod: CPTII,S$GLB,, | Performed by: INTERNAL MEDICINE

## 2023-05-15 PROCEDURE — 99999 PR PBB SHADOW E&M-EST. PATIENT-LVL IV: ICD-10-PCS | Mod: PBBFAC,,, | Performed by: INTERNAL MEDICINE

## 2023-05-15 PROCEDURE — 3008F PR BODY MASS INDEX (BMI) DOCUMENTED: ICD-10-PCS | Mod: CPTII,S$GLB,, | Performed by: INTERNAL MEDICINE

## 2023-05-15 PROCEDURE — 3074F SYST BP LT 130 MM HG: CPT | Mod: CPTII,S$GLB,, | Performed by: INTERNAL MEDICINE

## 2023-05-15 PROCEDURE — 99214 PR OFFICE/OUTPT VISIT, EST, LEVL IV, 30-39 MIN: ICD-10-PCS | Mod: S$GLB,,, | Performed by: INTERNAL MEDICINE

## 2023-05-15 PROCEDURE — 1160F PR REVIEW ALL MEDS BY PRESCRIBER/CLIN PHARMACIST DOCUMENTED: ICD-10-PCS | Mod: CPTII,S$GLB,, | Performed by: INTERNAL MEDICINE

## 2023-05-15 PROCEDURE — 99214 OFFICE O/P EST MOD 30 MIN: CPT | Mod: S$GLB,,, | Performed by: INTERNAL MEDICINE

## 2023-05-15 PROCEDURE — 3008F BODY MASS INDEX DOCD: CPT | Mod: CPTII,S$GLB,, | Performed by: INTERNAL MEDICINE

## 2023-05-15 PROCEDURE — 1159F MED LIST DOCD IN RCRD: CPT | Mod: CPTII,S$GLB,, | Performed by: INTERNAL MEDICINE

## 2023-05-15 PROCEDURE — 3078F DIAST BP <80 MM HG: CPT | Mod: CPTII,S$GLB,, | Performed by: INTERNAL MEDICINE

## 2023-05-15 RX ORDER — SEMAGLUTIDE 0.25 MG/.5ML
0.25 INJECTION, SOLUTION SUBCUTANEOUS
Qty: 2 ML | Refills: 0 | Status: SHIPPED | OUTPATIENT
Start: 2023-05-15 | End: 2023-06-13 | Stop reason: SDUPTHER

## 2023-05-15 RX ORDER — SEMAGLUTIDE 1 MG/.5ML
1 INJECTION, SOLUTION SUBCUTANEOUS
Qty: 2 ML | Refills: 0 | Status: SHIPPED | OUTPATIENT
Start: 2023-07-15 | End: 2023-08-25

## 2023-05-15 RX ORDER — SEMAGLUTIDE 0.5 MG/.5ML
0.5 INJECTION, SOLUTION SUBCUTANEOUS
Qty: 2 ML | Refills: 0 | Status: SHIPPED | OUTPATIENT
Start: 2023-06-15 | End: 2023-08-25

## 2023-05-15 RX ORDER — SEMAGLUTIDE 1.7 MG/.75ML
1.7 INJECTION, SOLUTION SUBCUTANEOUS
Qty: 3 ML | Refills: 0 | Status: SHIPPED | OUTPATIENT
Start: 2023-08-13 | End: 2023-08-25

## 2023-05-15 NOTE — PATIENT INSTRUCTIONS
"Start Wegovy 0.25 mg once a week x 1 month. At the end of that month, the dose will continue to increase monthly.       Decrease portions as soon as you start wegovy. Avoid fried, rich or greasy foods.      Some nausea in the first 2 weeks is not unusual.     If you get pain across the upper abdomen and around to your back, please call the office.       [email or web address] to sign up for coupon card.         - To lose weight you want to cut 100% starchy carbohydrates out of your diet (bread, rice, pasta, potatoes, granola, flour, corn, peas, oatmeal, grits, tortillas, crackers, chips) and get  grams of protein.  Aim for 100 grams of protein daily.    - No soda, sweet tea, juices, sports drinks or lemonade     -Exercise daily. Exercise is one of those "bite the bullet" propositions. Sometimes you just have to get started. What I suggest is setting a timer for 10 minutes. Do whatever activity you plan to start for the 10 minutes. If the timer goes off and you want to stop, fine. You can stop. If you feel like you want to go on a little longer, you can go on. Do this a few times a week. Eventually you will likely decide to keep going. Every 2 weeks, add 5 more minutes before you give yourself permission to stop.         Exercise should be some cardiovascular and some resistance training      January 28, 2019  Hot Spinach and Artichoke Dip (Recipe)  BY GABI GARVEY, ERICKSON, CSSD    This creamy spinach dip can double as a protein-rich, gluten-free side dish, game day appetizer or parade route snack.  Calories 85 calories    Fat 3 grams saturated fat    Prep Time 5 minutes  Cook Time10 minutes  Yield Serves 5-10 people  Ingredients  1/2 cup onion, finely chopped  3 (10 ounce) packs of frozen chopped spinach, thawed and squeezed dry  1 (8 ounce) package reduced-fat cream cheese  1 (8 ounce) carton fat-free plain Greek yogurt  1/2 cup Parmesan cheese, grated  1 (14 ounce) can artichoke hearts  1/4 teaspoon salt " (optional)  1/4 teaspoon black pepper  1/8 teaspoon crushed red pepper flakes  Instructions  Lightly coat a skillet with cooking spray.  Cook and stir onion over medium heat until transparent (about 5 minutes).  Add spinach. Cook until thoroughly heated (about 1-2 minutes).  Reduce heat and add cream cheese. Stir until melted and smooth.  Stir in Greek yogurt, Parmesan cheese and artichokes. Remove from heat.  Season with salt (optional) and black and red pepper.  Serve with raw vegetables.                          January 31, 2019  Pizza Cups (Recipe)    Trying to eat better but craving pizza? These protein-packed pizza cups will do the trick.    Calories 65 calories per cup  Fat 2.7 grams per cup  Prep Time5 minutes  Cook Time 25 minutes  Yield 12 pizza cups  Ingredients  1 ½ cups liquid egg whites  2 large eggs  1 cup fat free or low moisture shredded mozzarella cheese  37 turkey pepperonis (25 chopped and 12 sliced)  ¼ cup Parmesan cheese  ¼ tsp oregano  ¼ tsp black pepper  Instructions  Preheat oven to 350 degrees Fahrenheit.  Chop up 25 turkey pepperonis into small pieces. In a bowl, combine all ingredients except the remaining 12 sliced turkey pepperoni.  Spray a muffin pan with nonstick spray.  Place a whole sliced turkey pepperoni in the bottom of each of the 12 muffin molds.  Pour or spoon in the mixture in your bowl into each muffin mold evenly ¾ full.  Bake in the oven for 20-25 minutes. Place a toothpick in the center of the pizza cup to ensure all liquid egg has cooked. For a crispier pizza cup, leave in the oven a little longer.  Let cool for a few minutes before serving. Enjoy!

## 2023-05-15 NOTE — PROGRESS NOTES
"Subjective:       Patient ID: Renae Hou is a 62 y.o. female.    Chief Complaint: Follow-up    CC:  Pt here today for follow-up. Last seen nearly 1 year ago.  Has gained 10 lbs, net neg 27 lbs lbs since seen for consult in Jan 2019. 30% EWL from 7/23/19 Evy- sleeve   Had started phentermine at that time.  Last filled 6/1/22.  checked today   States she has been eating more and has increased appetite. BP is good today.  Has recently been in contact with RD for information on diverticulosis.   She does not exercise.   Eye exam up to date. NO glaucoma.   Review of Systems   Constitutional: Negative for chills and fever.   Respiratory: Negative for shortness of breath.         Uses CPAP,but not regularly. Mask is uncomfortable   Cardiovascular: Positive for leg swelling. Negative for chest pain.   Gastrointestinal: Positive for constipation. Negative for diarrhea.        Denies GERD   Genitourinary: Positive for urgency. Negative for difficulty urinating and dysuria.   Musculoskeletal: Positive for arthralgias. Negative for back pain.   Neurological: Negative for dizziness and light-headedness.   Psychiatric/Behavioral: Negative for dysphoric mood. The patient is not nervous/anxious.        Objective:     /70 (BP Location: Left arm, Patient Position: Sitting, BP Method: Large (Manual))   Pulse 76   Ht 5' 5" (1.651 m)   Wt 92.6 kg (204 lb 2.3 oz)   LMP 11/25/2012   SpO2 98%   BMI 33.97 kg/m²    Physical Exam   Constitutional: She is oriented to person, place, and time. She appears well-developed. No distress.   Obese   HENT:   Head: Normocephalic and atraumatic.   .   Eyes: EOM are normal.  No scleral icterus.   Neck: Normal range of motion. Neck supple.    Cardiovascular: Normal rate   Pulmonary/Chest: Effort normal and breath sounds normal. No respiratory distress.   Musculoskeletal: Normal range of motion. She exhibits edema.   Neurological: She is alert and oriented to person, place, and " "time. No cranial nerve deficit.   Skin: Skin is warm and dry. No erythema.   Psychiatric: She has a normal mood and affect. Her behavior is normal. Judgment normal.   Vitals reviewed.      Assessment:       1. Obesity, Class I, BMI 30.0-34.9 (see actual BMI)    2. S/P laparoscopic sleeve gastrectomy          Plan:         Diagnoses and all orders for this visit:    Renae was seen today for follow-up.    Diagnoses and all orders for this visit:    Obesity, Class I, BMI 30.0-34.9 (see actual BMI)  -     semaglutide, weight loss, (WEGOVY) 0.25 mg/0.5 mL PnIj; Inject 0.25 mg into the skin every 7 days.  -     semaglutide, weight loss, (WEGOVY) 0.5 mg/0.5 mL PnIj; Inject 0.5 mg into the skin every 7 days.  -     semaglutide, weight loss, (WEGOVY) 1 mg/0.5 mL PnIj; Inject 1 mg into the skin every 7 days.  -     semaglutide, weight loss, (WEGOVY) 1.7 mg/0.75 mL PnIj; Inject 1.7 mg into the skin every 7 days.    S/P laparoscopic sleeve gastrectomy          - To lose weight you want to cut 100% starchy carbohydrates out of your diet (bread, rice, pasta, potatoes, granola, flour, corn, peas, oatmeal, grits, tortillas, crackers, chips) and get  grams of protein.  Aim for 100 grams of protein daily.    - No soda, sweet tea, juices, sports drinks or lemonade     -Exercise daily. Exercise is one of those "bite the bullet" propositions. Sometimes you just have to get started. What I suggest is setting a timer for 10 minutes. Do whatever activity you plan to start for the 10 minutes. If the timer goes off and you want to stop, fine. You can stop. If you feel like you want to go on a little longer, you can go on. Do this a few times a week. Eventually you will likely decide to keep going. Every 2 weeks, add 5 more minutes before you give yourself permission to stop.         Exercise should be some cardiovascular and some resistance training    Mellisa izaguirre recipes given.     "

## 2023-05-16 ENCOUNTER — TELEPHONE (OUTPATIENT)
Dept: SLEEP MEDICINE | Facility: OTHER | Age: 63
End: 2023-05-16
Payer: COMMERCIAL

## 2023-05-16 NOTE — TELEPHONE ENCOUNTER
Scheduled home sleep study on May 19th      Patient is allergic to latex,I did let her know the device will have latex.  She wants to continue with the study,I did let her know the minute she has a reaction to take it off.  If severe to call 911.

## 2023-05-17 ENCOUNTER — OFFICE VISIT (OUTPATIENT)
Dept: CARDIOLOGY | Facility: CLINIC | Age: 63
End: 2023-05-17
Payer: COMMERCIAL

## 2023-05-17 VITALS
BODY MASS INDEX: 33.66 KG/M2 | HEART RATE: 77 BPM | HEIGHT: 65 IN | SYSTOLIC BLOOD PRESSURE: 130 MMHG | WEIGHT: 202 LBS | RESPIRATION RATE: 20 BRPM | DIASTOLIC BLOOD PRESSURE: 92 MMHG

## 2023-05-17 DIAGNOSIS — Z71.89 ENCOUNTER FOR CARDIAC RISK COUNSELING: Primary | ICD-10-CM

## 2023-05-17 DIAGNOSIS — I10 ESSENTIAL HYPERTENSION: ICD-10-CM

## 2023-05-17 DIAGNOSIS — E78.2 MIXED HYPERLIPIDEMIA: ICD-10-CM

## 2023-05-17 DIAGNOSIS — E66.09 CLASS 1 OBESITY DUE TO EXCESS CALORIES WITH SERIOUS COMORBIDITY AND BODY MASS INDEX (BMI) OF 33.0 TO 33.9 IN ADULT: ICD-10-CM

## 2023-05-17 PROBLEM — M85.89 OSTEOPENIA OF MULTIPLE SITES: Status: ACTIVE | Noted: 2023-05-17

## 2023-05-17 PROCEDURE — 1159F MED LIST DOCD IN RCRD: CPT | Mod: CPTII,S$GLB,, | Performed by: INTERNAL MEDICINE

## 2023-05-17 PROCEDURE — 1159F PR MEDICATION LIST DOCUMENTED IN MEDICAL RECORD: ICD-10-PCS | Mod: CPTII,S$GLB,, | Performed by: INTERNAL MEDICINE

## 2023-05-17 PROCEDURE — 3080F PR MOST RECENT DIASTOLIC BLOOD PRESSURE >= 90 MM HG: ICD-10-PCS | Mod: CPTII,S$GLB,, | Performed by: INTERNAL MEDICINE

## 2023-05-17 PROCEDURE — 3044F PR MOST RECENT HEMOGLOBIN A1C LEVEL <7.0%: ICD-10-PCS | Mod: CPTII,S$GLB,, | Performed by: INTERNAL MEDICINE

## 2023-05-17 PROCEDURE — 99999 PR PBB SHADOW E&M-EST. PATIENT-LVL IV: CPT | Mod: PBBFAC,,, | Performed by: INTERNAL MEDICINE

## 2023-05-17 PROCEDURE — 99204 PR OFFICE/OUTPT VISIT, NEW, LEVL IV, 45-59 MIN: ICD-10-PCS | Mod: S$GLB,,, | Performed by: INTERNAL MEDICINE

## 2023-05-17 PROCEDURE — 3044F HG A1C LEVEL LT 7.0%: CPT | Mod: CPTII,S$GLB,, | Performed by: INTERNAL MEDICINE

## 2023-05-17 PROCEDURE — 3075F PR MOST RECENT SYSTOLIC BLOOD PRESS GE 130-139MM HG: ICD-10-PCS | Mod: CPTII,S$GLB,, | Performed by: INTERNAL MEDICINE

## 2023-05-17 PROCEDURE — 99999 PR PBB SHADOW E&M-EST. PATIENT-LVL IV: ICD-10-PCS | Mod: PBBFAC,,, | Performed by: INTERNAL MEDICINE

## 2023-05-17 PROCEDURE — 3008F PR BODY MASS INDEX (BMI) DOCUMENTED: ICD-10-PCS | Mod: CPTII,S$GLB,, | Performed by: INTERNAL MEDICINE

## 2023-05-17 PROCEDURE — 3075F SYST BP GE 130 - 139MM HG: CPT | Mod: CPTII,S$GLB,, | Performed by: INTERNAL MEDICINE

## 2023-05-17 PROCEDURE — 3080F DIAST BP >= 90 MM HG: CPT | Mod: CPTII,S$GLB,, | Performed by: INTERNAL MEDICINE

## 2023-05-17 PROCEDURE — 99204 OFFICE O/P NEW MOD 45 MIN: CPT | Mod: S$GLB,,, | Performed by: INTERNAL MEDICINE

## 2023-05-17 PROCEDURE — 3008F BODY MASS INDEX DOCD: CPT | Mod: CPTII,S$GLB,, | Performed by: INTERNAL MEDICINE

## 2023-05-17 PROCEDURE — 1160F PR REVIEW ALL MEDS BY PRESCRIBER/CLIN PHARMACIST DOCUMENTED: ICD-10-PCS | Mod: CPTII,S$GLB,, | Performed by: INTERNAL MEDICINE

## 2023-05-17 PROCEDURE — 1160F RVW MEDS BY RX/DR IN RCRD: CPT | Mod: CPTII,S$GLB,, | Performed by: INTERNAL MEDICINE

## 2023-05-17 NOTE — PATIENT INSTRUCTIONS
"I recommend the book, "The Obesity Code" for weight loss; it recommends intermittent fasting and avoidance of sugar, artificial sweeteners and refined carbohydrates.    Also, here is information on a Mediterranean type diet including fish, the pesco-mediterranean diet from the American College of Cardiology:    1.  Humans are evolutionarily adapted to obtain calories and nutrients from both plant and animal food sources. Many people overconsume animal products, often-processed meats high in saturated fats and chemical additives. In contrast, while strict veganism has gained popularity for many reasons and has value in certain groups, it can cause nutritional deficiencies (vitamin B12, high-quality proteins, iron, zinc, omega-3 fatty acid, vitamin D, and calcium), and predispose to osteopenia, loss of muscle mass, and anemia. This is not true of a lacto-ovo vegetarian diet, which allows no animal-based food except for eggs and dairy. A 6-year study of [id number] [place] [employer] reported a decreased incidence of all-cause mortality when comparing vegetarians with nonvegetarians. However, when the vegetarians were stratified into vegans, lacto-ovo vegetarians, pesco-vegetarians, and semi-vegetarians, the pesco-vegetarians had lowest risks for all-cause mortality, cardiovascular disease (CVD) mortality, and mortality from other causes.     2.  The authors propose a plant-rich diet rich in nuts with fish and seafood as the principle source of animal food. Known as the Pesco-Mediterranean diet, it is supplemented with extra-virgin olive oil (EVOO), which is the principle fat source, along with moderate amounts of dairy (particularly yogurt and cheese) and eggs, as well as modest amounts of alcohol consumption (ideally red wine with the evening meal), but few red and processed meats.     3.  Both epidemiological studies and randomized clinical trials indicate that the traditional Mediterranean diet is associated with " lower risks for all-cause and CVD mortality, coronary heart disease, metabolic syndrome, diabetes, cognitive decline, neurodegenerative diseases (including Alzheimers), depression, overall cancer mortality, and breast and colorectal cancers.     4.  The traditional Mediterranean diet has been endorsed in the most recent Dietary Guidelines for Americans and the American College of Cardiology/American Heart Association guidelines. The 2020 U[employer]. News & World Report ranked it #1 for overall health based upon it being nutritious, safe, relatively easy to follow, protective against CVD and diabetes, and effective for weight loss.     5.  Fish and seafood are important sources of vitamins protein and omega-3 fatty acids, of which the higher blood and adipose tissue are associated with reduced fatal and nonfatal myocardial infarction. When not fried, fish consumption has been associated with reduced risk of heart failure, and the incidence of the metabolic syndrome, coronary heart disease, ischemic stroke, and sudden cardiac death, particularly when seafood replaces less healthy foods.     6.  Unrestricted use of olive oil in the kitchen, on salads (with vinegar), cooking vegetables, and at the table is the foundation of the traditional Mediterranean diet, although olive oil quality is crucial, which makes it expensive. EVOO retains hydrophilic components of olives including highly bioactive polyphenols, which are believed to underlie many of EVOOs cardiometabolic benefits, such as reduced low-density lipoprotein cholesterol (LDL-C) and increased high-density lipoprotein cholesterol (HDL-C), improved vascular reactivity, enhanced HDL particle functionality, and a lower diabetes risk.     7.  Tree nuts, an integral component of the traditional Mediterranean diet, are nutrient dense rich in unsaturated fats, fiber, protein, polyphenols, phytosterols, and tocopherols. Nut consumption is associated with decreased incidence  and mortality rates from both CVD and coronary artery disease (CAD), as well as atrial fibrillation and diabetes. Randomized controlled trials have shown that diets enriched with nuts produce cardiometabolic benefits including improvements in insulin sensitivity, LDL-C, inflammation, and vascular reactivity. A 1-daily serving of mixed nuts resulted in a 28% reduction in CVD risk. Generous intake of nuts does not promote weight gain because of increased satiety and incomplete digestion.     8.  Legumes are an excellent source of vegetable protein, folate, and magnesium and fiber, and like other seeds including peanuts, are rich in polyphenols. Consumption of legumes has been linked to a reduced risk of incident and fatal CVD and CAD, as well as improvements in blood glucose, cholesterol, blood pressure, and body weight. Legumes, like fish, are a satiating and healthy substitute for red meat and processed meats.     9.  Dairy products and eggs are important sources of protein, nonsodium minerals, probiotics, and vitamin D. Although there is no clear consensus among nutrition experts on the role of dairy products in CVD risk, they are allowed in this Pesco-Mediterranean diet. Fermented low-fat versions, such as yogurt and soft cheeses, are preferred; butter and hard cheese are high in saturated fats and salt.     10.  Eggs are composed of beneficial nutrients including all essential amino acids, in addition to minerals (selenium, phosphorus, iodine, zinc), vitamins (A, D, B2, B12, niacin), and carotenoids (lutein, zeaxanthin). Although each yolk contains about 184 mg of dietary cholesterol, large prospective cohorts suggest that egg consumption is unrelated to serum cholesterol and does not increase CVD risk. Eggs are allowed in the Pesco-Mediterranean diet; egg whites are unlimited and preferably no more than 5 yolks/week.     11.  Whole grains, such as barley, whole oats, rye, corn, buckwheat, brown rice, and quinoa,  are an integral part of the traditional Mediterranean diet. Pasta is an example of a starchy food that has a low glycemic index despite being a refined carbohydrate. In the context of a low glycemic index dietary pattern such as the Mediterranean diet, pasta does not adversely affect adiposity and may even help reduce body weight and there is no evidence that pasta promotes cardiometabolic risk factors. White rice is associated with type 2 diabetes mellitus in Asians but not in Western cohorts, possibly because it is cooked and served plain in [place] and in Western cultures cooked in mixed dishes with vegetables and vegetable oil including EVOO.     12.  The staple beverage of the Pesco-Mediterranean diet is water--which can be flavored but not sweetened. Unsweetened tea and coffee are rich in antioxidants and are associated with improved CVD outcomes. If alcohol is consumed at all, dry red wine is recommended, with the ideal amount being a single glass (6 oz) for women and 1 or 2 glasses/day for men (6-12 oz) consumed with meals.     13.  Time-restricted eating, a type of intermittent fasting, is the practice of limiting the daily intake of calories to a window of time usually between 6-12 hours each day. Intermittent fasting when done on a regular basis has been shown to decrease intra-abdominal adipose tissue and reduce free-radical production. This elicits powerful cellular responses that improve glucose metabolism and reduce systemic inflammation, and may also reduce risks of diabetes, CVD, cancer, and neurodegenerative diseases. After a 12-hour overnight fast, insulin levels are typically low, and glycogen stores have been depleted. In this fasted state, the body starts mobilizing fatty acids from adipose cells to burn as metabolic fuel instead of glucose. This improves insulin sensitivity. Time-restricted eating is not more effective for weight loss than standard calorie-restriction, but appears to enhance CV  health even in nonobese people. Fasting may also lower blood pressure and resting heart rate and improve autonomic balance with augmented heart rate variability.     14.  The evidence regarding time-restricted eating is mostly based on animal models and observational human studies. The most popular form of time-restricted eating involves eating two rather than three meals and compressing the calorie-consumption window. No head-to-head studies have been performed to assess the optimal time window.

## 2023-05-17 NOTE — PROGRESS NOTES
Subjective:   05/17/2023     Patient ID:  Renae Hou is a 62 y.o. female who presents for evaulation of Hypertension      Patient here for cardiac risk assessment.  She does not have coronary artery disease except associated with a CT coronary calcium score in 2021 of 1.  She is never had a cardiac event.      Does have hypertension, in general her blood pressures at home have been well controlled.  She has gained weight recently, recent steroid injections, has been today though begun on semaglutide.  She will hopefully lose weight.  Her blood pressure medications will not be adjusted.      Her family history is strongly positive coronary artery disease, mom with coronary artery bypass grafting and stents.  Father had a heart attack in his 30s.    Her most recent cholesterol is 212, HDL 63, , triglycerides 127        The 10-year ASCVD risk score ([employer], et al., 2019) is: 5.4%    Values used to calculate the score:      Age: 62 years      Sex: Female      Is Non- : No      Diabetic: No      Tobacco smoker: No      Systolic Blood Pressure: 130 mmHg      Is BP treated: Yes      HDL Cholesterol: 63 mg/dL      Total Cholesterol: 212 mg/dL    Yancey 10 year risk including calcium score of 1 is 2.7%, without calcium score 4.8%.    Past Medical History:   Diagnosis Date    Abscess of paraspinous muscles 2018    Allergy 04/2018    Class 1 obesity due to excess calories in adult 07/23/2019    DVT (deep venous thrombosis)     left leg    Epidural abscess 2018 04/10/2018    Fatty liver     GERD (gastroesophageal reflux disease) 2015    History of major abdominal surgery 09/11/2015    Had Hysterectomy , cholecystectomy , rectocele repair    Hypertension     Lumbar radiculopathy     Menopause     Obesity     Obstructive sleep apnea on CPAP     no longer uses CPAP after weight loss from gastric sleeve    Thyroid disease     Thyroid nodules.       Review of patient's allergies indicates:    Allergen Reactions    Cathflo activase [alteplase] Anaphylaxis     Tightness in chest, raspy throat, restless legs, hotness all over    Heparin analogues      Restless legs, tightness in chest, and hot all over    Polyethylene glycol analogues Swelling    Polysorbate 80 Anaphylaxis, Itching and Shortness Of Breath    Xyzal [levocetirizine] Swelling    Latex Swelling               Current Outpatient Medications:     ferrous gluconate (FERGON) 324 MG tablet, Take 1 tablet (324 mg total) by mouth daily with breakfast., Disp: 30 tablet, Rfl: 12    hydroCHLOROthiazide (HYDRODIURIL) 12.5 MG Tab, Take 1 tablet (12.5 mg total) by mouth once daily., Disp: 90 tablet, Rfl: 3    ibuprofen (ADVIL,MOTRIN) 100 MG tablet, Take 600 mg by mouth every 6 (six) hours as needed for Temperature greater than., Disp: , Rfl:     omeprazole (PRILOSEC) 20 MG capsule, Take 20 mg by mouth., Disp: , Rfl:     semaglutide, weight loss, (WEGOVY) 0.25 mg/0.5 mL PnIj, Inject 0.25 mg into the skin every 7 days., Disp: 2 mL, Rfl: 0    [START ON 6/15/2023] semaglutide, weight loss, (WEGOVY) 0.5 mg/0.5 mL PnIj, Inject 0.5 mg into the skin every 7 days., Disp: 2 mL, Rfl: 0    [START ON 7/15/2023] semaglutide, weight loss, (WEGOVY) 1 mg/0.5 mL PnIj, Inject 1 mg into the skin every 7 days., Disp: 2 mL, Rfl: 0    [START ON 8/13/2023] semaglutide, weight loss, (WEGOVY) 1.7 mg/0.75 mL PnIj, Inject 1.7 mg into the skin every 7 days., Disp: 3 mL, Rfl: 0    tiZANidine (ZANAFLEX) 4 MG tablet, Take 4 mg by mouth every evening., Disp: , Rfl:      Objective:   Review of Systems   Cardiovascular:  Negative for chest pain, claudication, cyanosis, dyspnea on exertion, irregular heartbeat, leg swelling, near-syncope, orthopnea, palpitations, paroxysmal nocturnal dyspnea and syncope.       Vitals:    05/17/23 0757   BP: (!) 130/92   Pulse: 77   Resp: 20     Wt Readings from Last 3 Encounters:   05/17/23 91.6 kg (202 lb)   05/15/23 92.6 kg (204 lb 2.3 oz)   05/12/23 90.7  kg (200 lb)     Temp Readings from Last 3 Encounters:   04/04/23 97.5 °F (36.4 °C) (Skin)   03/23/23 98.4 °F (36.9 °C) (Oral)   12/27/22 98 °F (36.7 °C)     BP Readings from Last 3 Encounters:   05/17/23 (!) 130/92   05/15/23 122/70   05/12/23 120/65     Pulse Readings from Last 3 Encounters:   05/17/23 77   05/15/23 76   04/12/23 82             Physical Exam  Vitals reviewed.   Constitutional:       General: She is not in acute distress.     Appearance: She is well-developed.   HENT:      Head: Normocephalic and atraumatic.      Nose: Nose normal.   Eyes:      Conjunctiva/sclera: Conjunctivae normal.      Pupils: Pupils are equal, round, and reactive to light.   Neck:      Vascular: No carotid bruit or JVD.   Cardiovascular:      Rate and Rhythm: Normal rate and regular rhythm.      Pulses: Normal pulses and intact distal pulses.      Heart sounds: Normal heart sounds. No murmur heard.    No friction rub. No gallop.   Pulmonary:      Effort: Pulmonary effort is normal. No respiratory distress.      Breath sounds: Normal breath sounds. No wheezing or rales.   Chest:      Chest wall: No tenderness.   Abdominal:      General: Bowel sounds are normal. There is no distension.      Palpations: Abdomen is soft.      Tenderness: There is no abdominal tenderness.   Musculoskeletal:         General: No tenderness or deformity. Normal range of motion.      Cervical back: Normal range of motion and neck supple.      Right lower leg: No edema.      Left lower leg: No edema.   Skin:     General: Skin is warm and dry.      Findings: No erythema or rash.   Neurological:      Mental Status: She is alert and oriented to person, place, and time.      Cranial Nerves: No cranial nerve deficit.      Motor: No abnormal muscle tone.      Coordination: Coordination normal.   Psychiatric:         Behavior: Behavior normal.         Thought Content: Thought content normal.         Judgment: Judgment normal.         Lab Results   Component  Value Date    CHOL 212 (H) 05/12/2023    CHOL 218 (H) 05/06/2022    CHOL 225 (H) 05/14/2021     Lab Results   Component Value Date    HDL 63 05/12/2023    HDL 66 05/06/2022    HDL 68 05/14/2021     Lab Results   Component Value Date    LDLCALC 123.6 05/12/2023    LDLCALC 125.6 05/06/2022    LDLCALC 130.8 05/14/2021     Lab Results   Component Value Date    ALT 13 05/12/2023    AST 19 05/12/2023    AST 19 03/29/2023    AST 21 03/23/2023     Lab Results   Component Value Date    CREATININE 0.9 05/12/2023    BUN 19 05/12/2023     05/12/2023    K 4.0 05/12/2023    CO2 28 05/12/2023    CO2 27 03/29/2023    CO2 26 03/23/2023     Lab Results   Component Value Date    HGB 13.5 05/12/2023    HCT 41.1 05/12/2023    HCT 46.0 03/29/2023    HCT 41.1 03/23/2023                         Assessment and Plan:     Encounter for cardiac risk counseling  Comments:  10 year cardiac risk including coronary calcium score is low at 2.7%, no indication for statin therapy    Essential hypertension  Comments:  Well controlled at home    Mixed hyperlipidemia  Comments:  Continue dietary therapy    Class 1 obesity due to excess calories with serious comorbidity and body mass index (BMI) of 33.0 to 33.9 in adult  Comments:  Begun on semaglutide today, status post gastric sleeve  See after visit summary         No follow-ups on file.          Future Appointments   Date Time Provider Department Center   5/19/2023 11:00 AM [facility] OIC-US1 MASTER [facility] ULTR [facility]   5/19/2023  1:00 PM HOME STUDY, SLEEP [facility] [place] SLEEP [facility]   6/19/2023  8:00 AM Priya Bah OD [facility] OPTO [place]   8/24/2023 10:00 AM Sabina Florentino MD [facility] BARIAT [facility]   9/22/2023  8:30 AM PRE-ADMIT, ENDO -[facility] ENDOPP4 [facility]

## 2023-05-18 ENCOUNTER — TELEPHONE (OUTPATIENT)
Dept: SLEEP MEDICINE | Facility: OTHER | Age: 63
End: 2023-05-18
Payer: COMMERCIAL

## 2023-05-19 ENCOUNTER — HOSPITAL ENCOUNTER (OUTPATIENT)
Dept: RADIOLOGY | Facility: HOSPITAL | Age: 63
Discharge: HOME | End: 2023-05-19
Attending: INTERNAL MEDICINE
Payer: COMMERCIAL

## 2023-05-19 DIAGNOSIS — E04.2 MULTIPLE THYROID NODULES: ICD-10-CM

## 2023-05-19 PROCEDURE — 76536 US EXAM OF HEAD AND NECK: CPT | Mod: 26,,, | Performed by: STUDENT IN AN ORGANIZED HEALTH CARE EDUCATION/TRAINING PROGRAM

## 2023-05-19 PROCEDURE — 76536 US EXAM OF HEAD AND NECK: CPT | Mod: TC

## 2023-05-19 PROCEDURE — 76536 US SOFT TISSUE HEAD NECK THYROID: ICD-10-PCS | Mod: 26,,, | Performed by: STUDENT IN AN ORGANIZED HEALTH CARE EDUCATION/TRAINING PROGRAM

## 2023-05-24 ENCOUNTER — PATIENT MESSAGE (OUTPATIENT)
Dept: INTERNAL MEDICINE | Facility: CLINIC | Age: 63
End: 2023-05-24
Payer: COMMERCIAL

## 2023-06-01 ENCOUNTER — PATIENT MESSAGE (OUTPATIENT)
Dept: ORTHOPEDICS | Facility: CLINIC | Age: 63
End: 2023-06-01
Payer: COMMERCIAL

## 2023-06-01 DIAGNOSIS — M54.2 CERVICALGIA: Primary | ICD-10-CM

## 2023-06-07 ENCOUNTER — PATIENT MESSAGE (OUTPATIENT)
Dept: BARIATRICS | Facility: CLINIC | Age: 63
End: 2023-06-07
Payer: COMMERCIAL

## 2023-06-07 ENCOUNTER — PATIENT MESSAGE (OUTPATIENT)
Dept: ORTHOPEDICS | Facility: CLINIC | Age: 63
End: 2023-06-07
Payer: COMMERCIAL

## 2023-06-09 ENCOUNTER — PATIENT MESSAGE (OUTPATIENT)
Dept: ORTHOPEDICS | Facility: CLINIC | Age: 63
End: 2023-06-09
Payer: COMMERCIAL

## 2023-06-09 ENCOUNTER — PATIENT MESSAGE (OUTPATIENT)
Dept: BARIATRICS | Facility: CLINIC | Age: 63
End: 2023-06-09
Payer: COMMERCIAL

## 2023-06-09 DIAGNOSIS — E66.9 OBESITY, CLASS I, BMI 30.0-34.9 (SEE ACTUAL BMI): ICD-10-CM

## 2023-06-13 ENCOUNTER — HOSPITAL ENCOUNTER (OUTPATIENT)
Dept: RADIOLOGY | Facility: HOSPITAL | Age: 63
Discharge: HOME | End: 2023-06-13
Attending: PHYSICIAN ASSISTANT
Payer: COMMERCIAL

## 2023-06-13 ENCOUNTER — PATIENT MESSAGE (OUTPATIENT)
Dept: BARIATRICS | Facility: CLINIC | Age: 63
End: 2023-06-13
Payer: COMMERCIAL

## 2023-06-13 ENCOUNTER — TELEPHONE (OUTPATIENT)
Dept: INTERNAL MEDICINE | Facility: CLINIC | Age: 63
End: 2023-06-13
Payer: COMMERCIAL

## 2023-06-13 VITALS — SYSTOLIC BLOOD PRESSURE: 120 MMHG | DIASTOLIC BLOOD PRESSURE: 76 MMHG

## 2023-06-13 DIAGNOSIS — M54.2 CERVICALGIA: ICD-10-CM

## 2023-06-13 PROCEDURE — 72050 X-RAY EXAM NECK SPINE 4/5VWS: CPT | Mod: 26,,, | Performed by: RADIOLOGY

## 2023-06-13 PROCEDURE — 72050 X-RAY EXAM NECK SPINE 4/5VWS: CPT | Mod: TC

## 2023-06-13 PROCEDURE — 72050 XR CERVICAL SPINE AP LAT WITH FLEX EXTEN: ICD-10-PCS | Mod: 26,,, | Performed by: RADIOLOGY

## 2023-06-13 RX ORDER — SEMAGLUTIDE 0.25 MG/.5ML
0.25 INJECTION, SOLUTION SUBCUTANEOUS
Qty: 2 ML | Refills: 0 | Status: SHIPPED | OUTPATIENT
Start: 2023-06-13 | End: 2023-08-25

## 2023-06-23 ENCOUNTER — OFFICE VISIT (OUTPATIENT)
Dept: OPTOMETRY | Facility: CLINIC | Age: 63
End: 2023-06-23
Payer: COMMERCIAL

## 2023-06-23 DIAGNOSIS — H25.13 NUCLEAR SCLEROSIS OF BOTH EYES: Primary | ICD-10-CM

## 2023-06-23 DIAGNOSIS — H02.883 MEIBOMIAN GLAND DYSFUNCTION (MGD) OF BOTH EYES: ICD-10-CM

## 2023-06-23 DIAGNOSIS — H02.886 MEIBOMIAN GLAND DYSFUNCTION (MGD) OF BOTH EYES: ICD-10-CM

## 2023-06-23 PROCEDURE — 3044F PR MOST RECENT HEMOGLOBIN A1C LEVEL <7.0%: ICD-10-PCS | Mod: CPTII,S$GLB,, | Performed by: OPTOMETRIST

## 2023-06-23 PROCEDURE — 92004 PR EYE EXAM, NEW PATIENT,COMPREHESV: ICD-10-PCS | Mod: S$GLB,,, | Performed by: OPTOMETRIST

## 2023-06-23 PROCEDURE — 99999 PR PBB SHADOW E&M-EST. PATIENT-LVL II: CPT | Mod: PBBFAC,,, | Performed by: OPTOMETRIST

## 2023-06-23 PROCEDURE — 92004 COMPRE OPH EXAM NEW PT 1/>: CPT | Mod: S$GLB,,, | Performed by: OPTOMETRIST

## 2023-06-23 PROCEDURE — 3044F HG A1C LEVEL LT 7.0%: CPT | Mod: CPTII,S$GLB,, | Performed by: OPTOMETRIST

## 2023-06-23 PROCEDURE — 1159F PR MEDICATION LIST DOCUMENTED IN MEDICAL RECORD: ICD-10-PCS | Mod: CPTII,S$GLB,, | Performed by: OPTOMETRIST

## 2023-06-23 PROCEDURE — 92015 PR REFRACTION: ICD-10-PCS | Mod: S$GLB,,, | Performed by: OPTOMETRIST

## 2023-06-23 PROCEDURE — 99999 PR PBB SHADOW E&M-EST. PATIENT-LVL II: ICD-10-PCS | Mod: PBBFAC,,, | Performed by: OPTOMETRIST

## 2023-06-23 PROCEDURE — 1159F MED LIST DOCD IN RCRD: CPT | Mod: CPTII,S$GLB,, | Performed by: OPTOMETRIST

## 2023-06-23 PROCEDURE — 92015 DETERMINE REFRACTIVE STATE: CPT | Mod: S$GLB,,, | Performed by: OPTOMETRIST

## 2023-06-23 NOTE — PROGRESS NOTES
HPI    62 y.o. is here for annual eye exam  Pt states last eye exam was a year ago  Pt wear sv gl for night time driving  Pt want to get an rx for gl  (-) Changes in vision   (+) Pain Pressure  (-) Irritation   (+) Itching Tearing  (-) Flashes  (+) Floaters  (+) Glasses wearer  (-) CL wearer  (-) Uses eye gtts  (-) Eye injury  (+) Eye surgery Lasik sx 20 years ago  (-)POHx  (+)FOHx Pt dad had glaucoma and macular degeneration   (-)DM  Last edited by Priya Bah, OD on 6/23/2023  9:59 AM.            Assessment /Plan     For exam results, see Encounter Report.    Nuclear sclerosis of both eyes    Meibomian gland dysfunction (MGD) of both eyes      1. Educated pt on findings. Not visually significant. No need for removal at this time. Monitor yearly.      Eyeglass Final Rx       Eyeglass Final Rx         Sphere Cylinder Axis    Right +0.50 +0.50 135    Left +0.25 +1.25 075      Type: DVO    Expiration Date: 6/23/2024              Eyeglass Final Rx #2         Sphere Cylinder Axis    Right +2.50 +0.50 135    Left +2.25 +1.25 075      Type: NVO    Expiration Date: 6/23/2024              Eyeglass Final Rx #3         Sphere Cylinder Axis    Right +1.50 +0.50 135    Left +1.25 +1.25 075      Type: Computer    Expiration Date: 6/23/2024                   2. Educated pt on condition. Pt to increase use of Refresh ATs and begin Delmis Mask qhs OU. If symptoms worsen or dont improve, RTC. Monitor.      RTC in 1 year for annual eye exam unless changes noted sooner.

## 2023-06-26 ENCOUNTER — HOSPITAL ENCOUNTER (OUTPATIENT)
Dept: RADIOLOGY | Facility: HOSPITAL | Age: 63
Discharge: HOME | End: 2023-06-26
Attending: PHYSICIAN ASSISTANT
Payer: COMMERCIAL

## 2023-06-26 PROCEDURE — 72141 MRI CERVICAL SPINE WITHOUT CONTRAST: ICD-10-PCS | Mod: 26,,, | Performed by: INTERNAL MEDICINE

## 2023-06-26 PROCEDURE — 72141 MRI NECK SPINE W/O DYE: CPT | Mod: 26,,, | Performed by: INTERNAL MEDICINE

## 2023-06-26 PROCEDURE — 72141 MRI NECK SPINE W/O DYE: CPT | Mod: TC

## 2023-07-03 ENCOUNTER — PATIENT MESSAGE (OUTPATIENT)
Dept: BARIATRICS | Facility: CLINIC | Age: 63
End: 2023-07-03
Payer: COMMERCIAL

## 2023-07-03 ENCOUNTER — PATIENT MESSAGE (OUTPATIENT)
Dept: OPTOMETRY | Facility: CLINIC | Age: 63
End: 2023-07-03
Payer: COMMERCIAL

## 2023-07-05 ENCOUNTER — PATIENT MESSAGE (OUTPATIENT)
Dept: ORTHOPEDICS | Facility: CLINIC | Age: 63
End: 2023-07-05
Payer: COMMERCIAL

## 2023-07-07 NOTE — TELEPHONE ENCOUNTER
Contacted the pharmacy. Spoke to staff member Althea. She was able to take a verbal medication refill with the approval of Dr. Florentino. Patient was informed,

## 2023-07-10 ENCOUNTER — OFFICE VISIT (OUTPATIENT)
Dept: ORTHOPEDICS | Facility: CLINIC | Age: 63
End: 2023-07-10
Payer: COMMERCIAL

## 2023-07-10 VITALS — HEIGHT: 65 IN | BODY MASS INDEX: 32.69 KG/M2 | WEIGHT: 196.19 LBS

## 2023-07-10 DIAGNOSIS — M54.12 CERVICAL RADICULOPATHY: Primary | ICD-10-CM

## 2023-07-10 PROCEDURE — 1159F PR MEDICATION LIST DOCUMENTED IN MEDICAL RECORD: ICD-10-PCS | Mod: CPTII,S$GLB,, | Performed by: PHYSICIAN ASSISTANT

## 2023-07-10 PROCEDURE — 3008F PR BODY MASS INDEX (BMI) DOCUMENTED: ICD-10-PCS | Mod: CPTII,S$GLB,, | Performed by: PHYSICIAN ASSISTANT

## 2023-07-10 PROCEDURE — 99999 PR PBB SHADOW E&M-EST. PATIENT-LVL III: CPT | Mod: PBBFAC,,, | Performed by: PHYSICIAN ASSISTANT

## 2023-07-10 PROCEDURE — 99213 PR OFFICE/OUTPT VISIT, EST, LEVL III, 20-29 MIN: ICD-10-PCS | Mod: S$GLB,,, | Performed by: PHYSICIAN ASSISTANT

## 2023-07-10 PROCEDURE — 3044F PR MOST RECENT HEMOGLOBIN A1C LEVEL <7.0%: ICD-10-PCS | Mod: CPTII,S$GLB,, | Performed by: PHYSICIAN ASSISTANT

## 2023-07-10 PROCEDURE — 3044F HG A1C LEVEL LT 7.0%: CPT | Mod: CPTII,S$GLB,, | Performed by: PHYSICIAN ASSISTANT

## 2023-07-10 PROCEDURE — 99213 OFFICE O/P EST LOW 20 MIN: CPT | Mod: S$GLB,,, | Performed by: PHYSICIAN ASSISTANT

## 2023-07-10 PROCEDURE — 99999 PR PBB SHADOW E&M-EST. PATIENT-LVL III: ICD-10-PCS | Mod: PBBFAC,,, | Performed by: PHYSICIAN ASSISTANT

## 2023-07-10 PROCEDURE — 1159F MED LIST DOCD IN RCRD: CPT | Mod: CPTII,S$GLB,, | Performed by: PHYSICIAN ASSISTANT

## 2023-07-10 PROCEDURE — 3008F BODY MASS INDEX DOCD: CPT | Mod: CPTII,S$GLB,, | Performed by: PHYSICIAN ASSISTANT

## 2023-07-10 NOTE — PROGRESS NOTES
"  DATE: 7/10/2023  PATIENT: Renae Hou    Attending Physician: Dean Zayas M.D.    HISTORY:  Renae Hou is a 62 y.o. female who returns to me today for follow up of neck pain.  She was last seen by me 10/20/2021.  She had a cervical NEERAJ 4/4/23 that gave her some relief for a few months.  Today she is doing well but notes increased neck and right arm pain.  There is numbness and tingling.  She has been leaving work early because of the pain and the pain wakes her up at night.  She is taking motrin regularly with little relief.     The Patient denies myelopathic symptoms such as handwriting changes or difficulty with buttons/coins/keys. Denies perineal paresthesias, bowel/bladder dysfunction.    PMH/PSH/FamHx/SocHx:  Unchanged from prior visit        EXAM:  Ht 5' 5" (1.651 m)   Wt 89 kg (196 lb 3.2 oz)   LMP 11/25/2012   BMI 32.65 kg/m²     Physical exam stable. Neuro exam stable.     IMAGING:    Today I personally re-reviewed AP, Lat and Flex/Ex  upright C-spine films that demonstrate C6/7 disc degeneration.    MRI cervical spine demonstrates disc degeneration with moderate stenosis at C4/5.       Body mass index is 32.65 kg/m².    Hemoglobin A1C   Date Value Ref Range Status   05/12/2023 5.8 (H) 4.0 - 5.6 % Final     Comment:     ADA Screening Guidelines:  5.7-6.4%  Consistent with prediabetes  >or=6.5%  Consistent with diabetes    High levels of fetal hemoglobin interfere with the HbA1C  assay. Heterozygous hemoglobin variants (HbS, HgC, etc)do  not significantly interfere with this assay.   However, presence of multiple variants may affect accuracy.     05/06/2022 5.5 4.0 - 5.6 % Final     Comment:     ADA Screening Guidelines:  5.7-6.4%  Consistent with prediabetes  >or=6.5%  Consistent with diabetes    High levels of fetal hemoglobin interfere with the HbA1C  assay. Heterozygous hemoglobin variants (HbS, HgC, etc)do  not significantly interfere with this assay.   However, presence of " multiple variants may affect accuracy.     04/06/2022 5.7 (H) 4.0 - 5.6 % Final     Comment:     ADA Screening Guidelines:  5.7-6.4%  Consistent with prediabetes  >or=6.5%  Consistent with diabetes    High levels of fetal hemoglobin interfere with the HbA1C  assay. Heterozygous hemoglobin variants (HbS, HgC, etc)do  not significantly interfere with this assay.   However, presence of multiple variants may affect accuracy.           ASSESSMENT/PLAN:    Renae was seen today for neck pain.    Diagnoses and all orders for this visit:    Cervical radiculopathy  -     Ambulatory referral/consult to Interventional RAD; Future  -     IR NEERAJ Cervical/THoracic w/ Img; Future        The patient would like to try one more injection before considering surgical options. I will discuss with Dr. Zayas when he returns as well.

## 2023-07-11 ENCOUNTER — PATIENT MESSAGE (OUTPATIENT)
Dept: ADMINISTRATIVE | Facility: OTHER | Age: 63
End: 2023-07-11
Payer: COMMERCIAL

## 2023-07-11 ENCOUNTER — PATIENT MESSAGE (OUTPATIENT)
Dept: BARIATRICS | Facility: CLINIC | Age: 63
End: 2023-07-11
Payer: COMMERCIAL

## 2023-07-11 DIAGNOSIS — E66.9 OBESITY, CLASS I, BMI 30.0-34.9 (SEE ACTUAL BMI): Primary | ICD-10-CM

## 2023-07-13 RX ORDER — PEN NEEDLE, DIABETIC 30 GX3/16"
NEEDLE, DISPOSABLE MISCELLANEOUS
Qty: 100 EACH | Refills: 0 | Status: SHIPPED | OUTPATIENT
Start: 2023-07-13 | End: 2023-09-01

## 2023-07-13 RX ORDER — LIRAGLUTIDE 6 MG/ML
3 INJECTION, SOLUTION SUBCUTANEOUS DAILY
Qty: 9 ML | Refills: 1 | Status: SHIPPED | OUTPATIENT
Start: 2023-07-13 | End: 2023-09-01

## 2023-07-18 ENCOUNTER — TELEPHONE (OUTPATIENT)
Dept: INTERVENTIONAL RADIOLOGY/VASCULAR | Facility: CLINIC | Age: 63
End: 2023-07-18
Payer: COMMERCIAL

## 2023-07-18 NOTE — TELEPHONE ENCOUNTER
Called and left pt message to return called at [phone] to schedule Cervical NEERAJ. Please forward call.

## 2023-07-19 ENCOUNTER — TELEPHONE (OUTPATIENT)
Dept: INTERVENTIONAL RADIOLOGY/VASCULAR | Facility: CLINIC | Age: 63
End: 2023-07-19
Payer: COMMERCIAL

## 2023-07-19 NOTE — TELEPHONE ENCOUNTER
Called and left pt message to return called at [phone] to schedule Cervical NEERAJ. Please forward call.

## 2023-07-30 ENCOUNTER — PATIENT MESSAGE (OUTPATIENT)
Dept: ORTHOPEDICS | Facility: CLINIC | Age: 63
End: 2023-07-30
Payer: COMMERCIAL

## 2023-08-02 ENCOUNTER — PATIENT MESSAGE (OUTPATIENT)
Dept: BARIATRICS | Facility: CLINIC | Age: 63
End: 2023-08-02
Payer: COMMERCIAL

## 2023-08-07 ENCOUNTER — LAB VISIT (OUTPATIENT)
Dept: LAB | Facility: HOSPITAL | Age: 63
End: 2023-08-07
Attending: INTERNAL MEDICINE
Payer: COMMERCIAL

## 2023-08-07 ENCOUNTER — TELEPHONE (OUTPATIENT)
Dept: INTERVENTIONAL RADIOLOGY/VASCULAR | Facility: HOSPITAL | Age: 63
End: 2023-08-07
Payer: COMMERCIAL

## 2023-08-07 DIAGNOSIS — M54.12 CERVICAL RADICULOPATHY: ICD-10-CM

## 2023-08-07 DIAGNOSIS — Z86.718 HISTORY OF DVT (DEEP VEIN THROMBOSIS): ICD-10-CM

## 2023-08-07 DIAGNOSIS — E61.1 LOW SERUM IRON: ICD-10-CM

## 2023-08-07 LAB
BASOPHILS # BLD AUTO: 0.07 K/UL (ref 0–0.2)
BASOPHILS NFR BLD: 1 % (ref 0–1.9)
DIFFERENTIAL METHOD: NORMAL
EOSINOPHIL # BLD AUTO: 0.5 K/UL (ref 0–0.5)
EOSINOPHIL NFR BLD: 6.6 % (ref 0–8)
ERYTHROCYTE [DISTWIDTH] IN BLOOD BY AUTOMATED COUNT: 12.4 % (ref 11.5–14.5)
FERRITIN SERPL-MCNC: 74 NG/ML (ref 20–300)
HCT VFR BLD AUTO: 42 % (ref 37–48.5)
HGB BLD-MCNC: 14.3 G/DL (ref 12–16)
IMM GRANULOCYTES # BLD AUTO: 0.01 K/UL (ref 0–0.04)
IMM GRANULOCYTES NFR BLD AUTO: 0.1 % (ref 0–0.5)
INR PPP: 1 (ref 0.8–1.2)
IRON SERPL-MCNC: 129 UG/DL (ref 30–160)
LYMPHOCYTES # BLD AUTO: 2.2 K/UL (ref 1–4.8)
LYMPHOCYTES NFR BLD: 31.8 % (ref 18–48)
MCH RBC QN AUTO: 30.4 PG (ref 27–31)
MCHC RBC AUTO-ENTMCNC: 34 G/DL (ref 32–36)
MCV RBC AUTO: 89 FL (ref 82–98)
MONOCYTES # BLD AUTO: 0.5 K/UL (ref 0.3–1)
MONOCYTES NFR BLD: 7.1 % (ref 4–15)
NEUTROPHILS # BLD AUTO: 3.7 K/UL (ref 1.8–7.7)
NEUTROPHILS NFR BLD: 53.4 % (ref 38–73)
NRBC BLD-RTO: 0 /100 WBC
PLATELET # BLD AUTO: 282 K/UL (ref 150–450)
PMV BLD AUTO: 9.8 FL (ref 9.2–12.9)
PROTHROMBIN TIME: 10.3 SEC (ref 9–12.5)
RBC # BLD AUTO: 4.7 M/UL (ref 4–5.4)
SATURATED IRON: 30 % (ref 20–50)
TOTAL IRON BINDING CAPACITY: 428 UG/DL (ref 250–450)
TRANSFERRIN SERPL-MCNC: 289 MG/DL (ref 200–375)
WBC # BLD AUTO: 6.94 K/UL (ref 3.9–12.7)

## 2023-08-07 PROCEDURE — 84466 ASSAY OF TRANSFERRIN: CPT | Performed by: INTERNAL MEDICINE

## 2023-08-07 PROCEDURE — 36415 COLL VENOUS BLD VENIPUNCTURE: CPT

## 2023-08-07 PROCEDURE — 85025 COMPLETE CBC W/AUTO DIFF WBC: CPT

## 2023-08-07 PROCEDURE — 82728 ASSAY OF FERRITIN: CPT | Performed by: INTERNAL MEDICINE

## 2023-08-07 PROCEDURE — 85610 PROTHROMBIN TIME: CPT

## 2023-08-07 PROCEDURE — 83540 ASSAY OF IRON: CPT | Performed by: INTERNAL MEDICINE

## 2023-08-08 ENCOUNTER — HOSPITAL ENCOUNTER (OUTPATIENT)
Dept: INTERVENTIONAL RADIOLOGY/VASCULAR | Facility: HOSPITAL | Age: 63
Discharge: HOME | End: 2023-08-08
Attending: PHYSICIAN ASSISTANT | Admitting: RADIOLOGY
Payer: COMMERCIAL

## 2023-08-08 VITALS
DIASTOLIC BLOOD PRESSURE: 78 MMHG | HEIGHT: 65 IN | RESPIRATION RATE: 14 BRPM | TEMPERATURE: 98 F | BODY MASS INDEX: 31.65 KG/M2 | OXYGEN SATURATION: 97 % | WEIGHT: 190 LBS | HEART RATE: 67 BPM | SYSTOLIC BLOOD PRESSURE: 132 MMHG

## 2023-08-08 DIAGNOSIS — M54.12 CERVICAL RADICULOPATHY: Primary | ICD-10-CM

## 2023-08-08 DIAGNOSIS — Z86.718 HISTORY OF DVT (DEEP VEIN THROMBOSIS): ICD-10-CM

## 2023-08-08 PROCEDURE — 64479 NJX AA&/STRD TFRM EPI C/T 1: CPT | Mod: 50 | Performed by: RADIOLOGY

## 2023-08-08 PROCEDURE — 25500020 PHARM REV CODE 255: Performed by: RADIOLOGY

## 2023-08-08 PROCEDURE — 64479 NJX AA&/STRD TFRM EPI C/T 1: CPT | Mod: 50,,, | Performed by: RADIOLOGY

## 2023-08-08 PROCEDURE — 63600175 PHARM REV CODE 636 W HCPCS: Performed by: RADIOLOGY

## 2023-08-08 PROCEDURE — 64479 IR ESI CERVICAL/THORACIC W/ IMG: ICD-10-PCS | Mod: 50,,, | Performed by: RADIOLOGY

## 2023-08-08 RX ORDER — FENTANYL CITRATE 50 UG/ML
INJECTION, SOLUTION INTRAMUSCULAR; INTRAVENOUS
Status: COMPLETED | OUTPATIENT
Start: 2023-08-08 | End: 2023-08-08

## 2023-08-08 RX ORDER — MIDAZOLAM HYDROCHLORIDE 1 MG/ML
INJECTION INTRAMUSCULAR; INTRAVENOUS
Status: COMPLETED | OUTPATIENT
Start: 2023-08-08 | End: 2023-08-08

## 2023-08-08 RX ORDER — ACETAMINOPHEN 325 MG/1
650 TABLET ORAL EVERY 4 HOURS PRN
Status: DISCONTINUED | OUTPATIENT
Start: 2023-08-08 | End: 2023-08-09 | Stop reason: HOSPADM

## 2023-08-08 RX ORDER — LIDOCAINE HYDROCHLORIDE 10 MG/ML
1 INJECTION, SOLUTION EPIDURAL; INFILTRATION; INTRACAUDAL; PERINEURAL ONCE
Status: DISCONTINUED | OUTPATIENT
Start: 2023-08-08 | End: 2023-08-09 | Stop reason: HOSPADM

## 2023-08-08 RX ORDER — ONDANSETRON 2 MG/ML
8 INJECTION INTRAMUSCULAR; INTRAVENOUS EVERY 6 HOURS PRN
Status: DISCONTINUED | OUTPATIENT
Start: 2023-08-08 | End: 2023-08-09 | Stop reason: HOSPADM

## 2023-08-08 RX ORDER — SODIUM CHLORIDE 9 MG/ML
INJECTION, SOLUTION INTRAVENOUS CONTINUOUS
Status: DISCONTINUED | OUTPATIENT
Start: 2023-08-08 | End: 2023-08-09 | Stop reason: HOSPADM

## 2023-08-08 RX ADMIN — FENTANYL CITRATE 50 MCG: 50 INJECTION, SOLUTION INTRAMUSCULAR; INTRAVENOUS at 08:08

## 2023-08-08 RX ADMIN — FENTANYL CITRATE 25 MCG: 50 INJECTION, SOLUTION INTRAMUSCULAR; INTRAVENOUS at 08:08

## 2023-08-08 RX ADMIN — MIDAZOLAM HYDROCHLORIDE 0.5 MG: 2 INJECTION, SOLUTION INTRAMUSCULAR; INTRAVENOUS at 08:08

## 2023-08-08 RX ADMIN — MIDAZOLAM HYDROCHLORIDE 1 MG: 2 INJECTION, SOLUTION INTRAMUSCULAR; INTRAVENOUS at 08:08

## 2023-08-08 RX ADMIN — IOHEXOL 5 ML: 180 INJECTION INTRAVENOUS at 08:08

## 2023-08-08 NOTE — BRIEF OP NOTE
Radiology Post-Procedure Note     Pre Op Diagnosis: Cervical radiculopathy     Post Op Diagnosis: Same     Procedure: Percutaneous Transforamenal Cervical NEREAJ bilateral C4-C5     Procedure performed by: Duc Aguirre MD     Written Informed Consent Obtained: From patient     Specimen Removed: NO     Estimated Blood Loss: Minimal     Findings: Contrast visualized in epidural space     Level injected: Bilatetal C4-C5  Needle used: 22 gauge  Dose:   20 mg Dexamethasone              4 mL Lidocaine 1% MPF     Patient tolerated procedure well.

## 2023-08-08 NOTE — H&P
H&P:  Renae Hou is a 62 y.o. female with medical history of cervical DDD - with multilevel posterior disc osteophyte with uncovertebral spur & neuroforaminal narrowing - with prior bilateral C4-5 transforaminal epidural steroid injection; presents for a repeat bilateral C4-5 epidural steroid management.     ROS:  10-point ROS otherwise negative    Exam:  General: AOx3, GCS [id number]  CNII-XII: Intact on detailed exam, PERRL, visual fields grossly intact, EOMi, facial sensation preserved, no facial assymetry, tongue/uvula/palate midline, shoulder shrug equal, no pronator drift  Extremities: 5/5 motor throughout, sensorium intact throughout, coordination intact throughout    General: Awake, Alert, Oriented  Head: Non-traumatic, normocephalic  Eyes: Pupils equal, EOMi  Neck: Supple, normal ROM, no tenderness to palpation  CVS: Normal rate and rhythm, distal pulses present  Pulm: Symmetric expansion, no respiratory distress  GI: Abdomen nondistended, nontender  MSK: Moves all extremities without restriction, atraumatic  Skin: Dry, intact  Psych: Normal thought content and cognition    A&P:  62F w/ medical history of cervical DDD - with multilevel posterior disc osteophyte with uncovertebral spur & neuroforaminal narrowing - with prior bilateral C4-5 transforaminal epidural steroid injection; presents for a repeat bilateral C4-5 epidural steroid management:    --Patient evaluated prior to procedure; anticipate moderate sedation          -ASA 2/Mallampati 2  --Plan for cervical NEERAJ   --All diagnostics and imaging reviewed  --Patient NPO since MN  --Risks & benefits of procedure explained in detail; patient consented and all questions answered    Dispo: Ongoing; anticipate home

## 2023-08-08 NOTE — PLAN OF CARE
Pt arrived to IR 4 for Cervical NEERAJ. Pt oriented to unit and staff. Plan of care reviewed with patient, patient verbalizes understanding. Comfort measures utilized. Pt safely transferred from stretcher to procedural table. Fall risk reviewed with patient, fall risk interventions maintained. Safety strap applied, positioner pillows utilized to minimize pressure points. Blankets applied. Pt prepped and draped utilizing standard sterile technique. Patient placed on continuous monitoring, as required by sedation policy. Timeouts completed utilizing standard universal time-out, per department and facility policy. RN to remain at bedside, continuous monitoring maintained. Pt resting comfortably. Denies pain/discomfort. Will continue to monitor. See flow sheets for monitoring, medication administration, and updates.

## 2023-08-08 NOTE — NURSING
Pt arrived to [facility] bay 6 s/p NEERAJ.  NAD noted.  Report received from DUSTIN Jesus.  DSG to left neck CDI.  Will continue to monitor.

## 2023-08-08 NOTE — DISCHARGE INSTRUCTIONS
For immediate concerns that are not emergent, you may call our radiology clinic at: [phone]. After hours, for urgent concerns: ask for the radiology resident on call at [phone].    Remove bandage after 24 hours and keep site clean, dry, open to air. You may shower after 24 hours.

## 2023-08-14 DIAGNOSIS — E61.1 LOW SERUM IRON: Primary | ICD-10-CM

## 2023-08-14 PROBLEM — Z90.3 H/O GASTRIC SLEEVE: Status: ACTIVE | Noted: 2023-08-14

## 2023-08-14 RX ORDER — FERROUS GLUCONATE 324(38)MG
TABLET ORAL
Qty: 30 TABLET | Refills: 12
Start: 2023-08-14

## 2023-08-14 RX ORDER — EPINEPHRINE 0.3 MG/.3ML
INJECTION SUBCUTANEOUS
COMMUNITY
End: 2023-12-04 | Stop reason: SDUPTHER

## 2023-08-14 RX ORDER — ERGOCALCIFEROL 1.25 MG/1
CAPSULE ORAL
COMMUNITY
End: 2023-08-25

## 2023-08-14 RX ORDER — GABAPENTIN 300 MG/1
CAPSULE ORAL
COMMUNITY
End: 2023-08-25

## 2023-08-14 RX ORDER — CLONIDINE HYDROCHLORIDE 0.2 MG/1
TABLET ORAL
COMMUNITY
End: 2023-08-25

## 2023-08-14 RX ORDER — CLONIDINE HYDROCHLORIDE 0.1 MG/1
TABLET ORAL
COMMUNITY
End: 2023-08-25

## 2023-08-14 RX ORDER — CLOTRIMAZOLE AND BETAMETHASONE DIPROPIONATE 10; .64 MG/G; MG/G
CREAM TOPICAL
COMMUNITY
End: 2023-08-25

## 2023-08-14 RX ORDER — HYDROCODONE BITARTRATE AND ACETAMINOPHEN 5; 325 MG/1; MG/1
TABLET ORAL
COMMUNITY
End: 2023-08-25

## 2023-08-15 ENCOUNTER — PATIENT MESSAGE (OUTPATIENT)
Dept: ORTHOPEDICS | Facility: CLINIC | Age: 63
End: 2023-08-15
Payer: COMMERCIAL

## 2023-08-16 ENCOUNTER — PATIENT MESSAGE (OUTPATIENT)
Dept: INTERNAL MEDICINE | Facility: CLINIC | Age: 63
End: 2023-08-16
Payer: COMMERCIAL

## 2023-08-24 ENCOUNTER — PATIENT MESSAGE (OUTPATIENT)
Dept: BARIATRICS | Facility: CLINIC | Age: 63
End: 2023-08-24
Payer: COMMERCIAL

## 2023-08-24 ENCOUNTER — OFFICE VISIT (OUTPATIENT)
Dept: URGENT CARE | Facility: CLINIC | Age: 63
End: 2023-08-24
Payer: COMMERCIAL

## 2023-08-24 VITALS
RESPIRATION RATE: 18 BRPM | WEIGHT: 190 LBS | HEART RATE: 91 BPM | SYSTOLIC BLOOD PRESSURE: 107 MMHG | OXYGEN SATURATION: 95 % | TEMPERATURE: 99 F | BODY MASS INDEX: 31.65 KG/M2 | HEIGHT: 65 IN | DIASTOLIC BLOOD PRESSURE: 76 MMHG

## 2023-08-24 DIAGNOSIS — J32.9 RHINOSINUSITIS: Primary | ICD-10-CM

## 2023-08-24 LAB
CTP QC/QA: YES
SARS-COV-2 AG RESP QL IA.RAPID: NEGATIVE

## 2023-08-24 PROCEDURE — 87811 SARS CORONAVIRUS 2 ANTIGEN POCT, MANUAL READ: ICD-10-PCS | Mod: QW,S$GLB,, | Performed by: NURSE PRACTITIONER

## 2023-08-24 PROCEDURE — 87811 SARS-COV-2 COVID19 W/OPTIC: CPT | Mod: QW,S$GLB,, | Performed by: NURSE PRACTITIONER

## 2023-08-24 PROCEDURE — 99213 OFFICE O/P EST LOW 20 MIN: CPT | Mod: S$GLB,,, | Performed by: NURSE PRACTITIONER

## 2023-08-24 PROCEDURE — 99213 PR OFFICE/OUTPT VISIT, EST, LEVL III, 20-29 MIN: ICD-10-PCS | Mod: S$GLB,,, | Performed by: NURSE PRACTITIONER

## 2023-08-24 RX ORDER — MONTELUKAST SODIUM 10 MG/1
10 TABLET ORAL NIGHTLY
Qty: 30 TABLET | Refills: 0 | Status: SHIPPED | OUTPATIENT
Start: 2023-08-24 | End: 2023-09-23

## 2023-08-24 NOTE — PROGRESS NOTES
"Subjective:      Patient ID: Renae Hou is a 62 y.o. female.    Vitals:  height is 5' 5" (1.651 m) and weight is 86.2 kg (190 lb). Her oral temperature is 98.9 °F (37.2 °C). Her blood pressure is 107/76 and her pulse is 91. Her respiration is 18 and oxygen saturation is 95%.     Chief Complaint: Sinus Problem    Patient started having symptoms on Tuesday. Pt has been having headaches,sore throat and mild body aches.       Sinus Problem  This is a new problem. The current episode started in the past 7 days. The problem has been gradually worsening since onset. There has been no fever. Her pain is at a severity of 0/10. She is experiencing no pain. Associated symptoms include headaches, neck pain and a sore throat. Pertinent negatives include no chills, congestion, coughing, diaphoresis, ear pain, hoarse voice, shortness of breath, sinus pressure, sneezing or swollen glands. Treatments tried: motrin. The treatment provided mild relief.       Constitution: Positive for fever. Negative for chills and sweating.   HENT:  Positive for sore throat. Negative for ear pain, congestion and sinus pressure.    Neck: Positive for neck pain.   Respiratory:  Negative for cough and shortness of breath.    Allergic/Immunologic: Negative for sneezing.   Neurological:  Positive for headaches.      Objective:     Physical Exam   HENT:   Head: Normocephalic.   Ears:   Right Ear: Tympanic membrane and external ear normal. impacted cerumen  Left Ear: Tympanic membrane, external ear and ear canal normal. impacted cerumen  Nose: Nose normal.   Mouth/Throat: Mucous membranes are moist. Oropharynx is clear.   Eyes: Pupils are equal, round, and reactive to light.   Neck: Neck supple.   Cardiovascular: Normal rate and regular rhythm.   Pulmonary/Chest: Effort normal and breath sounds normal.   Abdominal: Normal appearance.   Musculoskeletal:      Cervical back: She exhibits no tenderness.   Neurological: She is alert.   Skin: Skin is warm " and dry.   Psychiatric: Her behavior is normal.       Assessment:     1. Rhinosinusitis        Plan:   Ms. Hou presents for  cold symptoms onset 2 days ago. 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 Associated symptoms: headache, sore throat, neck pain and fever. She is currently afebrile. Has not taking any otc meds. Denies any SOB, chest tightness, loss of smell or taste, n/v or abd pain. POC COVID-negative    Rhinosinusitis  -     SARS Coronavirus 2 Antigen, POCT Manual Read  -     montelukast (SINGULAIR) 10 mg tablet; Take 1 tablet (10 mg total) by mouth every evening.  Dispense: 30 tablet; Refill: 0

## 2023-08-25 ENCOUNTER — OFFICE VISIT (OUTPATIENT)
Dept: INTERNAL MEDICINE | Facility: CLINIC | Age: 63
End: 2023-08-25
Payer: COMMERCIAL

## 2023-08-25 ENCOUNTER — LAB VISIT (OUTPATIENT)
Dept: LAB | Facility: HOSPITAL | Age: 63
End: 2023-08-25
Attending: INTERNAL MEDICINE
Payer: COMMERCIAL

## 2023-08-25 VITALS
BODY MASS INDEX: 31.82 KG/M2 | HEIGHT: 65 IN | DIASTOLIC BLOOD PRESSURE: 70 MMHG | SYSTOLIC BLOOD PRESSURE: 118 MMHG | WEIGHT: 191 LBS

## 2023-08-25 DIAGNOSIS — M54.12 CERVICAL RADICULOPATHY: ICD-10-CM

## 2023-08-25 DIAGNOSIS — E66.09 CLASS 1 OBESITY DUE TO EXCESS CALORIES WITH SERIOUS COMORBIDITY AND BODY MASS INDEX (BMI) OF 31.0 TO 31.9 IN ADULT: ICD-10-CM

## 2023-08-25 DIAGNOSIS — Z98.84 HISTORY OF BARIATRIC SURGERY: ICD-10-CM

## 2023-08-25 DIAGNOSIS — G47.33 OSA ON CPAP: ICD-10-CM

## 2023-08-25 DIAGNOSIS — R51.9 HEADACHE, UNSPECIFIED HEADACHE TYPE: ICD-10-CM

## 2023-08-25 DIAGNOSIS — G56.01 CARPAL TUNNEL SYNDROME OF RIGHT WRIST: ICD-10-CM

## 2023-08-25 DIAGNOSIS — R20.0 NUMBNESS: ICD-10-CM

## 2023-08-25 DIAGNOSIS — R51.9 HEADACHE, UNSPECIFIED HEADACHE TYPE: Primary | ICD-10-CM

## 2023-08-25 DIAGNOSIS — E04.2 MULTIPLE THYROID NODULES: ICD-10-CM

## 2023-08-25 PROBLEM — Z90.3 H/O GASTRIC SLEEVE: Status: RESOLVED | Noted: 2023-08-14 | Resolved: 2023-08-25

## 2023-08-25 LAB
CREAT SERPL-MCNC: 0.9 MG/DL (ref 0.5–1.4)
EST. GFR  (NO RACE VARIABLE): >60 ML/MIN/1.73 M^2
MAGNESIUM SERPL-MCNC: 2.2 MG/DL (ref 1.6–2.6)
VIT B12 SERPL-MCNC: 540 PG/ML (ref 210–950)

## 2023-08-25 PROCEDURE — 3044F HG A1C LEVEL LT 7.0%: CPT | Mod: CPTII,S$GLB,, | Performed by: INTERNAL MEDICINE

## 2023-08-25 PROCEDURE — 3008F PR BODY MASS INDEX (BMI) DOCUMENTED: ICD-10-PCS | Mod: CPTII,S$GLB,, | Performed by: INTERNAL MEDICINE

## 2023-08-25 PROCEDURE — 99215 PR OFFICE/OUTPT VISIT, EST, LEVL V, 40-54 MIN: ICD-10-PCS | Mod: S$GLB,,, | Performed by: INTERNAL MEDICINE

## 2023-08-25 PROCEDURE — 83735 ASSAY OF MAGNESIUM: CPT | Performed by: INTERNAL MEDICINE

## 2023-08-25 PROCEDURE — 3074F SYST BP LT 130 MM HG: CPT | Mod: CPTII,S$GLB,, | Performed by: INTERNAL MEDICINE

## 2023-08-25 PROCEDURE — 1159F MED LIST DOCD IN RCRD: CPT | Mod: CPTII,S$GLB,, | Performed by: INTERNAL MEDICINE

## 2023-08-25 PROCEDURE — 3008F BODY MASS INDEX DOCD: CPT | Mod: CPTII,S$GLB,, | Performed by: INTERNAL MEDICINE

## 2023-08-25 PROCEDURE — 3078F PR MOST RECENT DIASTOLIC BLOOD PRESSURE < 80 MM HG: ICD-10-PCS | Mod: CPTII,S$GLB,, | Performed by: INTERNAL MEDICINE

## 2023-08-25 PROCEDURE — 3044F PR MOST RECENT HEMOGLOBIN A1C LEVEL <7.0%: ICD-10-PCS | Mod: CPTII,S$GLB,, | Performed by: INTERNAL MEDICINE

## 2023-08-25 PROCEDURE — 1160F PR REVIEW ALL MEDS BY PRESCRIBER/CLIN PHARMACIST DOCUMENTED: ICD-10-PCS | Mod: CPTII,S$GLB,, | Performed by: INTERNAL MEDICINE

## 2023-08-25 PROCEDURE — 84630 ASSAY OF ZINC: CPT | Performed by: INTERNAL MEDICINE

## 2023-08-25 PROCEDURE — 3078F DIAST BP <80 MM HG: CPT | Mod: CPTII,S$GLB,, | Performed by: INTERNAL MEDICINE

## 2023-08-25 PROCEDURE — 36415 COLL VENOUS BLD VENIPUNCTURE: CPT | Performed by: INTERNAL MEDICINE

## 2023-08-25 PROCEDURE — 82565 ASSAY OF CREATININE: CPT | Performed by: INTERNAL MEDICINE

## 2023-08-25 PROCEDURE — 1159F PR MEDICATION LIST DOCUMENTED IN MEDICAL RECORD: ICD-10-PCS | Mod: CPTII,S$GLB,, | Performed by: INTERNAL MEDICINE

## 2023-08-25 PROCEDURE — 99999 PR PBB SHADOW E&M-EST. PATIENT-LVL IV: ICD-10-PCS | Mod: PBBFAC,,, | Performed by: INTERNAL MEDICINE

## 2023-08-25 PROCEDURE — 99999 PR PBB SHADOW E&M-EST. PATIENT-LVL IV: CPT | Mod: PBBFAC,,, | Performed by: INTERNAL MEDICINE

## 2023-08-25 PROCEDURE — 82607 VITAMIN B-12: CPT | Performed by: INTERNAL MEDICINE

## 2023-08-25 PROCEDURE — 3074F PR MOST RECENT SYSTOLIC BLOOD PRESSURE < 130 MM HG: ICD-10-PCS | Mod: CPTII,S$GLB,, | Performed by: INTERNAL MEDICINE

## 2023-08-25 PROCEDURE — 99215 OFFICE O/P EST HI 40 MIN: CPT | Mod: S$GLB,,, | Performed by: INTERNAL MEDICINE

## 2023-08-25 PROCEDURE — 1160F RVW MEDS BY RX/DR IN RCRD: CPT | Mod: CPTII,S$GLB,, | Performed by: INTERNAL MEDICINE

## 2023-08-25 RX ORDER — ERGOCALCIFEROL 1.25 MG/1
50000 CAPSULE ORAL
Qty: 12 CAPSULE | Refills: 3 | Status: SHIPPED | OUTPATIENT
Start: 2023-08-25 | End: 2024-03-02 | Stop reason: SDUPTHER

## 2023-08-25 RX ORDER — GABAPENTIN 100 MG/1
CAPSULE ORAL
Qty: 90 CAPSULE | Refills: 2 | Status: SHIPPED | OUTPATIENT
Start: 2023-08-25 | End: 2024-03-18

## 2023-08-25 NOTE — PROGRESS NOTES
Neurotology Clinic      Name: Matthew Benitez  MRN: [id number]  Age: 43 year old  : 1979  Referring provider: Minh Hatch MD of [facility]  23       Chief Complaint:   Consultation to establish care    History of Present Illness:   Matthew Benitez is a 43 year old female with a history of bilateral tympanic membrane perforations status post bilateral tympanic membrane repairs with Molt residual perforations who presents for consultation regarding establishing care upon a moved to [place].  Consultation was kindly requested by Dr. Minh Hatch at [facility].    Ms. Benitez has a history of eustachian tube dysfunction for which she underwent bilateral myringotomy and tube placement most recently as an adult.  She developed bilateral tympanic membrane perforations.  She subsequently underwent underwent cartilage tympanoplasties  (tragus) with bony canalplasty for large tympanic membrane perforations, starting with the left in early  followed by the right and late .  She experienced significant hearing improvement with the surgery.  At her most recent follow-up, she was noted to have very small bilateral residual perforations that were clean and dry.     About 2 to 3 times per ear she gets what she considers to be an ear infection characterized by achiness and pressure and, if it gets bad, drainage.  Benadryl can reverse the process. These episodes are usually treated with ear drops.  She has no specific concerns today about either ear and has not had drainage recently.     Review of Systems:   Pertinent items are noted in HPI or as in patient entered ROS below, remainder of complete ROS is negative.        No data to display                 Active Medications:     Current Outpatient Medications:     cetirizine (ZYRTEC) 10 MG tablet, Take 10 mg by mouth daily, Disp: , Rfl:     Ferrous Sulfate (IRON PO), Take 1 tablet by mouth daily, Disp: , Rfl:       Allergies:   Patient has no  Pain Medicine  Telemedicine Virtual Visit    The patient location is: [place]  The chief complaint leading to consultation is: neck pain  Visit type: Virtual visit with synchronous audio and video  Total time spent with patient: 15 mins  Each patient to whom he or she provides medical services by telemedicine is:  (1) informed of the relationship between the physician and patient and the respective role of any other health care provider with respect to management of the patient; and (2) notified that he or she may decline to receive medical services by telemedicine and may withdraw from such care at any time.        Chief Complaint:   Chief Complaint   Patient presents with    Neck Pain       History of Present Illness: Renae Hou is a 61 y.o. female referred by Mila Jones PA-C for low back.    Back pain has been present for over 30 years. SHe had a back surgery in the early 90's which helped for a long time. Back pain recurred in 2016, insidiously.  She had 2 injections in 2018 and unfortunately, one resulted in a soft tissue abscess near her spine. She then underwent a L4-S1 fusion with Dr. Zayas in April 2018. Her back pain improved again after her fusion in 2018, and then recurred 2019 insidiously.     Currently, the back pain is localized to the bilateral lumbosacral junction with radiation into the bilateral groin and left leg into the lateral calf with abnormal sensations/numbness in the left leg. Pain is described as aching, throbbing, grabbing, tight and numb. The pain is aggravated by laying down on her back. The pain is alleviated by repositioning and getting up and moving. She does feel weakness in the left leg and it feels like the knee gives out on her at times. She does get the numbness in the left leg. Denies bowel or bladder incontinence, but feels constipated. Denies saddle anesthesia. Denies recent fevers or infections. Denies significant weight loss    Neck pain has been  "known allergies.      Past Medical History:  No past medical history on file.  There is no problem list on file for this patient.       Past Surgical History:  No past surgical history on file.  Bilateral ear surgeries, as above    Family History:   No family history on file.      Social History:   Social History     Tobacco Use    Smoking status: Never    Smokeless tobacco: Never   Substance Use Topics    Alcohol use: Yes     Alcohol/week: 2.0 standard drinks of alcohol     Types: 2 Standard drinks or equivalent per week        Physical Exam:   /78   Pulse 54   Temp 97.7  F (36.5  C)   Ht 1.651 m (5' 5\")   Wt 86.2 kg (190 lb)   SpO2 98%   BMI 31.62 kg/m     Constitutional:  The patient was unaccompanied, well-groomed, and in no acute distress.     Skin: Normal:  warm and pink without rash   Neurologic: Alert and oriented x 3.  CN's III-XII within normal limits.  Voice normal.    Psychiatric: The patient's affect was calm, cooperative, and appropriate.     Communication:  Normal; communicates verbally, normal voice quality.   Respiratory: Breathing comfortably without stridor or exertion of accessory muscles.    Eyes: Pupils were equal and reactive.  Extraocular movement intact.     Ears: Pinnae and tragus non-tender. .        Otologic microscope exam:  Right ear: Healthy EAC skin.  Reconstructed tympanic membrane is intact, opaque, without retraction.    Left ear: Healthy EAC skin. Reconstructed tympanic membrane with small slit like opening anterior inferiorly between the edge of the cartilage graft and the annulus, rest appears healthy, no drainage or concerns.    Audiogram:  AUDIOGRAM: She underwent an audiogram today. This demonstrated:    Normal to mild conductive hearing loss bilaterally.   Tymps:   Right: WNL (A)  Left: abnormal (flat) with large ECV, consistent with a TM perforation.   Reflexes: Right ipsi and left contra absent, didn't test other conditions due to abnormal tymp on left.   Re: " present for over a year, not able to pinpoint when it started. She underwent a cervical epidural which resolved the pain for a long time. Pain is localized to the right paraspinals with radiation into the right shoulder down the right arm into the thumb. Pain is described as burning, tingling. The pain is aggravated by using her mouse at work, looking up. The pain is alleviated by rest, injections. She denies weakness in the arms. She does get numbness in the right arm.     Severity: Currently: 7/10   Typical Range: 5-10/10     Exacerbation: 10/10     Interval History (04/26/2022):  Renae Hou returns today for follow up.  At the last clinic visit, she was doing well after the NEERAJ.    Currently, the neck pain is worse.  Pain returned int he last 2 weeks. Pain localized to the right paraspinals, trapezius down the arm to the whole hand. She also has pain around the bra line on the right side. Pain seems to be worse at the end of the day. She has been taking ibuprofen, but recently not helping as much.     She is also noting some midline substernal pain, feels like pulling/tugging. She has been taking ibuprofen regularly. Pain seems to be worse when laying down at night. Denies worsening pain on exertion. She discussed this with Dr. Horowitz and had chest x-rays. No SOB, diaphoresis. No changes with eating or drinking.       Current Pain Scales:  Current: 5/10              Typical Range: 4-10/10                 Previous Interventions:  - 3/2/22: C7-T1 IL NEERAJ w/ 85-90% relief  - 11/30/21: BL C5 TFESI, >80% relief but only for a week  - 3/1/21: Right hip CSI (Eissa) did not help  - 8/11/20: Right C4-5 TF NEERAJ (IR) helped  - 3/16/18: Left L4-5 facet joint injection/aspiration (Dr. Cadet) did not help  - 3/2/18: Left L4-5 TF NEERAJ (Dr. Cadet) did not help    Previous Therapies:  PT/OT: yes  Relevant Surgery: yes    - 4/10/18: L4-S1 fusion with Dr. Zayas helped   - 1990s minor surgery for a disc that  1/3/23 thresholds largely improved compared to last test at outside clinic.    Right: Speech reception threshold is 10 dB with 100% word recognition. Tympanogram A type   Left: Speech reception threshold is 20 dB with 100% word recognition. Tympanogram B type     Audiogram was independently reviewed    Outside records from Dr. Hatch at [facility] were independently reviewed.  Key findings were summarized above.     Assessment and Plan:  Matthew Benitez is a 43 year old female with history of eustachian tube dysfunction and bilateral large tympanic membrane perforations, status post bilateral cartilage tympanoplasty with canalplasty in 2019.  Surgery resulted in significant hearing improvement bilaterally and the right tympanic membrane perforation noted at her last visit with Dr. Hatch has closed.  There is a stable small tympanic membrane perforation on the left which serve as pressure equalizer. No intervention needed today. I am happy to establish care with her and will be available to her as needed. We discussed the treatment of future left OM or OE with ototopical drops.     Thank you for the opportunity to participate in her care.    Thania Chavira MD  Otology & Neurotology  [facility]     helped  Previous Medications:   - NSAIDS: Tylenol. Meloxicam. Should avoid with gastric sleeve. Ibuprofen.   - Muscle Relaxants:    - TCAs:   - SNRIs:   - Topicals:   - Anticonvulsants: Gabapentin helped a little   - Opioids:     Current Pain Medications:  1. Tylenol     Blood Thinners: None    Full Medication List:    Current Outpatient Medications:     hydroCHLOROthiazide (HYDRODIURIL) 12.5 MG Tab, TAKE 1 TABLET(12.5 MG) BY MOUTH EVERY DAY, Disp: 90 tablet, Rfl: 1    omeprazole (PRILOSEC) 20 MG capsule, Take 1 capsule (20 mg total) by mouth once daily., Disp: 30 capsule, Rfl: 11    tiZANidine (ZANAFLEX) 4 MG tablet, Take 1 tablet (4 mg total) by mouth nightly as needed (pain, muscle spasm)., Disp: 30 tablet, Rfl: 2     Review of Systems:  Review of Systems   All other systems reviewed and are negative.      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    Surgical History:   has a past surgical history that includes Cholecystectomy; RECTOCELE REPAIR; THYROID NODULE REMOVAL; Bilateral salpingoophorectomy; Hysterectomy (12/17/2012); Back surgery (2002); Cyst Removal; Adenoidectomy (1995); Spine surgery (2018); Tonsillectomy (1995); Esophagogastroduodenoscopy (N/A, 5/3/2019); Insertion of inferior vena caval filter (Right, 7/19/2019); Laparoscopic sleeve gastrectomy (N/A, 7/23/2019); IVC filter retrieval (N/A, 12/20/2019); Epidural steroid  injection into cervical spine (N/A, 8/11/2020); Injection of joint (Left, 3/1/2021); Correction of hammer toe (Left, 8/5/2021); and Epidural steroid injection into cervical spine (N/A, 3/2/2022).    Family History:  family history includes Alcohol abuse in her sister; Cirrhosis in her sister; Deep vein thrombosis in her mother; Diabetes in her sister; Epilepsy in her sister; Heart disease in her father, maternal grandmother, and paternal grandmother; Heart disease (age of onset: 55) in her mother; Hyperlipidemia in her mother; Hypertension in her mother; Kidney disease in her maternal grandmother; No Known Problems in her daughter; Stroke in her father; Thyroid disease in her daughter and maternal grandmother.    Social History:   reports that she quit smoking about 34 years ago. Her smoking use included cigarettes. She has a 8.00 pack-year smoking history. She has never used smokeless tobacco. She reports that she does not drink alcohol and does not use drugs.    Physical Exam:  LMP 11/25/2012   GEN: No acute distress. Calm, comfortable  HENT: Normocephalic, atraumatic, moist mucous membranes  EYE: Anicteric sclera, non-injected.   CV: Non-diaphoretic.   RESP: Breathing comfortably. Chest expansion symmetric.  PSYCH: Pleasant mood and appropriate affect. Recent and remote memory intact.       Imaging:  - X-ray cervical spine 10/20/21:  There is straightening of the normal cervical lordosis.  Extension views demonstrate 3 mm of retrolisthesis of C3 on C4 suggesting a component of dynamic instability.  There are 3 mm of retrolisthesis of C4 on C5 which remains stable between flexion and extension views.  Odontoid process and C1 lateral masses are intact.  There is multilevel degenerative disc space narrowing most pronounced at C4-C5, C5-C6 and C6-C7.  There is multilevel uncovertebral joint spurring most pronounced at C4-C5.  Facet joints and spinous processes are well aligned.  Prevertebral soft tissues are normal.   Visualized lung apices are clear.    - MRI Cervical & Thoracic spine 11/4/20:  Cervical spine:  Straightening of the cervical lordosis.  The vertebral body heights are well maintained  There is mild disc space narrowing at C4-5, C5-6 and C6-C7.  No evidence of malignant bone marrow replacement process or infection.  There is a benign hemangioma within the T2 vertebrae.  Normal appearance of the craniocervical junction, cervical cord and upper thoracic cord.  C2-C3: Unremarkable  C3-C4: Minimal posterior disc osteophyte complex and left uncovertebral spur results in mild left foraminal narrowing.  The central canal is patent.  C4-C5: Posterior disc osteophyte complex and bilateral uncovertebral spur, there is no canal stenosis.  There is mild bilateral foraminal narrowing.  C5-C6: Posterior disc osteophyte complex and bilateral uncovertebral spur, there is no canal stenosis.  There is mild bilateral foraminal narrowing.  C6-C7: More prominent posterior disc osteophyte complex and uncovertebral spur, no greater than mild central canal narrowing.  There is mild left foraminal narrowing.  C7-T1: Unremarkable  The paraspinal soft tissues appear normal.  Prior partial thyroidectomy.  Thoracic spine:  The alignment is normal.  The vertebral body heights are well maintained.  The disc spaces are relatively well maintained.  There is abnormal edema involving the bilateral T2-T3 facet joints, new from 05/10/2019 exam.  This is likely degenerative.  Minimal posterior disc osteophyte complex at T5-6.  No canal stenosis at this level.  T7-T8 demonstrates mild facet joint osseous hypertrophy, no canal stenosis.  T10-T11 demonstrates moderate facet joint osseous hypertrophy and creates mild central canal stenosis.  The remainder of the thoracic spine appears within normal limits.  The paravertebral and paraspinal soft tissues appear normal.    - X-ray lumbar spine AP/Lat w/ flex/ext 10/14/20:  Vertebral bodies are intact.  Patient  has had a previous fusion with disc spacers and pedicle screws at the L4-L5 and L5-S1 level.  No instability in flexion extension.  No collapse or destruction is seen    - MRI Lumbar spine w/ & w/o 2/28/20:  Postsurgical change: L4-S1 posterior spinal fusion with L4-L5 laminectomies and intervertebral disc spacer placement at L4-L5 and L5-S1.  Alignment: Minimal anterolisthesis of L4 on L5 of 2-3 mm.  Vertebrae: Vertebral body heights are well maintained.  No marrow signal abnormality to suggest acute fracture or infiltrative marrow placement process.  Focal area of marrow heterogeneity noted within the posterior right iliac bone, possibly related to previous bone harvesting site.  Discs: Nonsurgical disc heights are relatively well maintained.  No significant disc space height loss or disc desiccation.  Cord: Normal caliber and signal.  Conus terminates at L1 vertebral level.  Enhancement: No abnormal contrast enhancement.  Degenerative findings:  T12-L1: Mild bilateral facet hypertrophy without significant spinal canal stenosis or neural foraminal narrowing.  L1-L2: Mild bilateral facet hypertrophy without significant spinal canal stenosis or neural foraminal narrowing.  L2-L3: Mild bilateral facet hypertrophy without significant spinal canal stenosis or neural foraminal narrowing.  L3-L4: Moderate bilateral facet hypertrophy without significant spinal canal stenosis or neural foraminal narrowing.  L4-L5: Mild facet hypertrophy without significant neural foraminal narrowing.  Spinal canal is decompressed posteriorly via laminectomy defect.  L5-S1: Mild facet hypertrophy without definite neural foraminal narrowing noting limited evaluation secondary to metallic artifact.  Spinal canal is decompressed posteriorly via laminectomy defect.  Paraspinal muscles & soft tissues: No significant abnormality.    Labs:  BMP  Lab Results   Component Value Date     04/06/2022    K 3.8 04/06/2022     04/06/2022    CO2  29 04/06/2022    BUN 21 04/06/2022    CREATININE 0.7 04/06/2022    CALCIUM 9.7 04/06/2022    ANIONGAP 11 04/06/2022    ESTGFRAFRICA >60.0 04/06/2022    EGFRNONAA >60.0 04/06/2022     Lab Results   Component Value Date    ALT 19 04/06/2022    AST 24 04/06/2022    ALKPHOS 78 04/06/2022    BILITOT 0.5 04/06/2022     Lab Results   Component Value Date     04/06/2022       Assessment:  Renae Hou is a 61 y.o. female with the following diagnoses based on history, exam, and imaging:    Problem List Items Addressed This Visit        Neuro    Cervical radiculopathy - Primary    Relevant Medications    tiZANidine (ZANAFLEX) 4 MG tablet    Other Relevant Orders    Ambulatory referral/consult to Physical/Occupational Therapy       GI    GERD (gastroesophageal reflux disease)    Relevant Medications    omeprazole (PRILOSEC) 20 MG capsule      Other Visit Diagnoses     Chronic neck pain        Relevant Medications    tiZANidine (ZANAFLEX) 4 MG tablet    Other Relevant Orders    Ambulatory referral/consult to Physical/Occupational Therapy    Cervical spondylosis        Relevant Medications    tiZANidine (ZANAFLEX) 4 MG tablet    Other Relevant Orders    Ambulatory referral/consult to Physical/Occupational Therapy          This is a pleasant 61 y.o. lady presenting with:     - Chronic neck pain: Right cervical radiculopathy, possibly left cervical radiculopathy as well.  MRI with multilevel bilateral spondylosis and foraminal narrowing.    - Right hand numbness: Mild right CTS on EMG/NCS from 10/8/21.   - Chronic bilateral low back pain: Prior fusion L4-S1. She does appear to have left S1 radiculopathy with decreased achilles reflex on that side, but not prominent leg pain and suspect this is residual from her prior surgery. These symptoms are currently still present but manageable to the patient.   - Left GTBursitis, currently mostly improved per patient. She says it comes and goes, but currently is not bothering her.    - Substernal chest pain: I suspect potentially related to GERD, and she has been using ibuprofen with a history of bariatric surgery (gastric sleeve). I want her to f/u with Dr. Horowitz as I do not feel this is related to her spine.   - Comorbidities: H/o bariatric surgery (gastric sleeve). Thyroid disease. H/o DVT. GERD. H/o epidural abscess. RENAE. Fatty liver.     Treatment Plan:   - PT/OT/HEP: PT referral placed today. Discussed proper posture and how this is effecting her pain.   - Procedures: schedule right carpal tunnel injection with US in clinic.   - Consider repeat C7-T1 IL NEERAJ in future.   - Medications:    - Hold any NSAIDs   - Start omeprazole 20 mg daily to see if this helps her stomach/substernal pain   - Start tizanidine 2-4 mg qHS for sleep and pain.  - Imaging: Reviewed  - Labs: Reviewed.      Follow Up: RTC for carpal tunnel injection    Latoya Quintanilla MD  Interventional Pain Medicine      Disclaimer: This note was partly generated using dictation software which may occasionally result in transcription errors.

## 2023-08-25 NOTE — PROGRESS NOTES
Patient ID: Renae Hou is a 62 y.o. female.    Chief Complaint: Follow-up      Assessment:       1. Headache, unspecified headache type    2. History of bariatric surgery: Gastric sleeve 2019    3. RENAE dx 2010, improved after weight loss; also seen 5/23- mild, positional per home sleep study    4. Numbness    5. Carpal tunnel syndrome of right wrist: see EMG 10/21    6. Cervical radiculopathy    7. Multiple thyroid nodules: stable 5/22, also 5/23 no further follow-up recommended per Radiology    8. Class 1 obesity due to excess calories with serious comorbidity and body mass index (BMI) of 31.0 to 31.9 in adult          Plan:       1. Headache, unspecified headache type  -     Ambulatory referral/consult to Neurology; Future; Expected date: 09/01/2023  -     MRI Brain W WO Contrast; Future; Expected date: 08/25/2023  -     Creatinine, serum; Future; Expected date: 08/25/2023    2. History of bariatric surgery: Gastric sleeve 2019  -     Magnesium; Future; Expected date: 08/25/2023  -     Vitamin B12; Future; Expected date: 08/25/2023  -     ZINC; Future; Expected date: 08/25/2023    3. RENAE dx 2010, improved after weight loss; also seen 5/23- mild, positional per home sleep study  Overview:  Diagnosed with sleep apnea about 2010   Was given CPAP machine although not using it   Lost weight and not using any more       4. Numbness  -     MRI Brain W WO Contrast; Future; Expected date: 08/25/2023  -     Creatinine, serum; Future; Expected date: 08/25/2023    5. Carpal tunnel syndrome of right wrist: see EMG 10/21  -     EMG W/ ULTRASOUND AND NERVE CONDUCTION TEST 2 Extremities; Future    6. Cervical radiculopathy  Overview:  Nov 2021 C45, 56, 67 disc and facet degen on Xray and MRI     Orders:  -     EMG W/ ULTRASOUND AND NERVE CONDUCTION TEST 2 Extremities; Future    7. Multiple thyroid nodules: stable 5/22, also 5/23 no further follow-up recommended per Radiology    8. Class 1 obesity due to excess calories  with serious comorbidity and body mass index (BMI) of 31.0 to 31.9 in adult  Overview:  Hx gastric sleeve 2019      Other orders  -     ergocalciferol (ERGOCALCIFEROL) 50,000 unit Cap; Take 1 capsule (50,000 Units total) by mouth every 7 days.  Dispense: 12 capsule; Refill: 3  -     gabapentin (NEURONTIN) 100 MG capsule; 1-3 capsules at bedtime  Dispense: 90 capsule; Refill: 2       Alarm sx and ED cautions reviewed  Brain MRI and Neuro assessment  EMG to evaluate if sx are more from CTS vs cervical  Additional labs  Consider repeat HST and sleep clinic; side sleeping recommended but this is hard with her neck and arm sx  I will review all studies and determine further tx depending on findings   Patient evaluated for over 45 minutes with this appoinment, including diagnostic testing and treatment.  All questions answered,  chart reviewed and care coordinated.         Subjective:   Follow-up multiple medical issues    Had her annual exam in May of this year.    C spine issues- see MRI this year; had NEERAJ in July.  She had it done but no real improvement.  Waking up at night, not sleeping well.   Some slight facial numbness on occasion.  Now is having headaches every day.  Numbness around the L side of her lips lasted a few minutes.  No slurred speech and no menatal status changes.  Similar headache issue 2020, MRI then normal.  Also pain behind R eye.    Headaches are daily.  This has been going on for 2 months.  Last for several minutes usually on R side.  Not pulsatile.  No photo or phonophobia.  Has had before with normal neuro eval and imaging years ago, feels the same but more frequent    Does have RENAE, mild positional 5/23 per HST.    Allergies and head cold; went to  yesterday and COVID negative.  Given Singulair and feeling better.    Recall sleeve surgery.  Vitamin deficiencies discussed.    Patient Active Problem List   Diagnosis    RENAE dx 2010, improved after weight loss; also seen 5/23- mild, positional  per home sleep study    Chronic constipation    Mixed hyperlipidemia    Pre-diabetes    Chronic pain    History of lumbar surgery    Essential hypertension    Fatty liver    Spondylolisthesis    Gastroesophageal reflux disease without esophagitis    Vaginal atrophy    Dyspareunia, female    Mixed stress and urge urinary incontinence    Rectocele, female    Cystocele, midline    History of DVT (deep vein thrombosis): 2018     Multiple thyroid nodules: stable 5/22, also 5/23 no further follow-up recommended per Radiology    Dysphonia    Cervical radiculopathy    Mallet toe of left foot    Chronic left-sided low back pain with left-sided sciatica    Carpal tunnel syndrome of right wrist: see EMG 10/21    Obesity due to excess calories with serious comorbidity    Tubular adenoma of colon: see colonoscopy 2022    Polyarthralgia    COVID 1/22; 12/22    Low serum iron    Decreased strength of lower extremity    Encounter for cardiac risk counseling    Osteopenia of multiple sites: see DEXA 5/23    History of bariatric surgery: Gastric sleeve 2019      Review of Systems   Constitutional:  Negative for fatigue.   HENT:  Negative for hearing loss.    Eyes:  Negative for visual disturbance.   Respiratory:  Negative for shortness of breath.         Does not think she snores currently   Cardiovascular:  Negative for chest pain.   Gastrointestinal:  Negative for abdominal pain, constipation and diarrhea.   Genitourinary:  Negative for dysuria, frequency and vaginal bleeding.   Musculoskeletal:  Positive for arthralgias and neck pain. Negative for joint swelling.   Skin:  Negative for rash.   Neurological:  Positive for numbness and headaches. Negative for weakness and light-headedness.        Both hands and arms numb especially when sleeping   Psychiatric/Behavioral:  Negative for sleep disturbance.          Objective:      Physical Exam  Vitals and nursing note reviewed.   Constitutional:        Appearance: She is well-developed.   HENT:      Head: Normocephalic and atraumatic.      Right Ear: External ear normal.      Left Ear: External ear normal.      Nose: Nose normal.      Mouth/Throat:      Pharynx: No oropharyngeal exudate.   Eyes:      General: No scleral icterus.     Extraocular Movements: Extraocular movements intact.      Conjunctiva/sclera: Conjunctivae normal.   Neck:      Thyroid: No thyromegaly.      Vascular: No JVD.      Comments: Thyroid scar  Cardiovascular:      Rate and Rhythm: Normal rate and regular rhythm.      Heart sounds: Normal heart sounds. No murmur heard.     No gallop.   Pulmonary:      Effort: Pulmonary effort is normal. No respiratory distress.      Breath sounds: Normal breath sounds. No wheezing.   Abdominal:      General: Bowel sounds are normal. There is no distension.      Palpations: Abdomen is soft. There is no mass.      Tenderness: There is no abdominal tenderness. There is no guarding or rebound.   Musculoskeletal:         General: No swelling or tenderness. Normal range of motion.      Cervical back: Normal range of motion and neck supple.   Lymphadenopathy:      Cervical: No cervical adenopathy.   Skin:     General: Skin is warm.      Findings: No erythema or rash.   Neurological:      General: No focal deficit present.      Mental Status: She is alert and oriented to person, place, and time.      Cranial Nerves: No cranial nerve deficit.      Motor: No weakness.      Coordination: Coordination normal.      Gait: Gait normal.      Deep Tendon Reflexes: Reflexes normal.   Psychiatric:         Behavior: Behavior normal.         Thought Content: Thought content normal.         Judgment: Judgment normal.           Health Maintenance Due   Topic Date Due    Colorectal Cancer Screening  10/04/2023

## 2023-08-28 LAB — ZINC SERPL-MCNC: 73 UG/DL (ref 60–130)

## 2023-09-01 ENCOUNTER — OFFICE VISIT (OUTPATIENT)
Dept: BARIATRICS | Facility: CLINIC | Age: 63
End: 2023-09-01
Payer: COMMERCIAL

## 2023-09-01 VITALS
DIASTOLIC BLOOD PRESSURE: 76 MMHG | BODY MASS INDEX: 31.49 KG/M2 | SYSTOLIC BLOOD PRESSURE: 131 MMHG | HEART RATE: 78 BPM | OXYGEN SATURATION: 100 % | HEIGHT: 65 IN | WEIGHT: 189 LBS

## 2023-09-01 DIAGNOSIS — Z98.84 S/P LAPAROSCOPIC SLEEVE GASTRECTOMY: ICD-10-CM

## 2023-09-01 DIAGNOSIS — E66.9 OBESITY, CLASS I, BMI 30.0-34.9 (SEE ACTUAL BMI): Primary | ICD-10-CM

## 2023-09-01 PROCEDURE — 3075F SYST BP GE 130 - 139MM HG: CPT | Mod: CPTII,S$GLB,, | Performed by: INTERNAL MEDICINE

## 2023-09-01 PROCEDURE — 1160F PR REVIEW ALL MEDS BY PRESCRIBER/CLIN PHARMACIST DOCUMENTED: ICD-10-PCS | Mod: CPTII,S$GLB,, | Performed by: INTERNAL MEDICINE

## 2023-09-01 PROCEDURE — 99214 PR OFFICE/OUTPT VISIT, EST, LEVL IV, 30-39 MIN: ICD-10-PCS | Mod: S$GLB,,, | Performed by: INTERNAL MEDICINE

## 2023-09-01 PROCEDURE — 3078F DIAST BP <80 MM HG: CPT | Mod: CPTII,S$GLB,, | Performed by: INTERNAL MEDICINE

## 2023-09-01 PROCEDURE — 99999 PR PBB SHADOW E&M-EST. PATIENT-LVL IV: CPT | Mod: PBBFAC,,, | Performed by: INTERNAL MEDICINE

## 2023-09-01 PROCEDURE — 1159F PR MEDICATION LIST DOCUMENTED IN MEDICAL RECORD: ICD-10-PCS | Mod: CPTII,S$GLB,, | Performed by: INTERNAL MEDICINE

## 2023-09-01 PROCEDURE — 3044F HG A1C LEVEL LT 7.0%: CPT | Mod: CPTII,S$GLB,, | Performed by: INTERNAL MEDICINE

## 2023-09-01 PROCEDURE — 3078F PR MOST RECENT DIASTOLIC BLOOD PRESSURE < 80 MM HG: ICD-10-PCS | Mod: CPTII,S$GLB,, | Performed by: INTERNAL MEDICINE

## 2023-09-01 PROCEDURE — 3008F PR BODY MASS INDEX (BMI) DOCUMENTED: ICD-10-PCS | Mod: CPTII,S$GLB,, | Performed by: INTERNAL MEDICINE

## 2023-09-01 PROCEDURE — 1160F RVW MEDS BY RX/DR IN RCRD: CPT | Mod: CPTII,S$GLB,, | Performed by: INTERNAL MEDICINE

## 2023-09-01 PROCEDURE — 3044F PR MOST RECENT HEMOGLOBIN A1C LEVEL <7.0%: ICD-10-PCS | Mod: CPTII,S$GLB,, | Performed by: INTERNAL MEDICINE

## 2023-09-01 PROCEDURE — 99999 PR PBB SHADOW E&M-EST. PATIENT-LVL IV: ICD-10-PCS | Mod: PBBFAC,,, | Performed by: INTERNAL MEDICINE

## 2023-09-01 PROCEDURE — 3008F BODY MASS INDEX DOCD: CPT | Mod: CPTII,S$GLB,, | Performed by: INTERNAL MEDICINE

## 2023-09-01 PROCEDURE — 99214 OFFICE O/P EST MOD 30 MIN: CPT | Mod: S$GLB,,, | Performed by: INTERNAL MEDICINE

## 2023-09-01 PROCEDURE — 3075F PR MOST RECENT SYSTOLIC BLOOD PRESS GE 130-139MM HG: ICD-10-PCS | Mod: CPTII,S$GLB,, | Performed by: INTERNAL MEDICINE

## 2023-09-01 PROCEDURE — 1159F MED LIST DOCD IN RCRD: CPT | Mod: CPTII,S$GLB,, | Performed by: INTERNAL MEDICINE

## 2023-09-01 RX ORDER — SEMAGLUTIDE 1.7 MG/.75ML
1.7 INJECTION, SOLUTION SUBCUTANEOUS
Qty: 9 ML | Refills: 0 | Status: SHIPPED | OUTPATIENT
Start: 2023-09-01 | End: 2023-11-09 | Stop reason: SDUPTHER

## 2023-09-01 NOTE — PROGRESS NOTES
"Subjective:       Patient ID: Renae Hou is a 62 y.o. female.    Chief Complaint: Follow-up    CC:  Pt here today for follow-up. Last seen nearly 1 year ago.  Has lost 15 lbs, net neg 42 lbs lbs since seen for consult in Jan 2019. 47% EWL from 7/23/19 Evy- sleeve   Started Saxenda last OV. Currently on 3 mg x 5 weeks. She is getting fullness with it. Had some nausea, so moved it to bedtime.   Tries to walk at work. No other exercise.   Has been eating well. Getting plenty of protein, eggs, yogurt.   States she has been eating more and has increased appetite. BP is good today.  Has recently been in contact with RD for information on diverticulosis.     Prev med hx :Had started phentermine. Last filled 6/1/22.  checked today   She does not exercise.   Eye exam up to date. NO glaucoma.   Review of Systems   Constitutional: Negative for chills and fever.   Respiratory: Negative for shortness of breath.         Uses CPAP,but not regularly. Mask is uncomfortable   Cardiovascular: Positive for leg swelling. Negative for chest pain.   Gastrointestinal: Positive for constipation. Negative for diarrhea.        Denies GERD   Genitourinary: Positive for urgency. Negative for difficulty urinating and dysuria.   Musculoskeletal: Positive for arthralgias. Negative for back pain.   Neurological: Negative for dizziness and light-headedness.   Psychiatric/Behavioral: Negative for dysphoric mood. The patient is not nervous/anxious.        Objective:     /76 (BP Location: Left arm, Patient Position: Sitting, BP Method: Large (Automatic))   Pulse 78   Ht 5' 5" (1.651 m)   Wt 85.7 kg (189 lb)   LMP 11/25/2012   SpO2 100%   BMI 31.45 kg/m²    Physical Exam   Constitutional: She is oriented to person, place, and time. She appears well-developed. No distress.   Obese   HENT:   Head: Normocephalic and atraumatic.   .   Eyes: EOM are normal.  No scleral icterus.   Neck: Normal range of motion. Neck supple.  " "  Cardiovascular: Normal rate   Pulmonary/Chest: Effort normal and breath sounds normal. No respiratory distress.   Musculoskeletal: Normal range of motion. She exhibits edema.   Neurological: She is alert and oriented to person, place, and time. No cranial nerve deficit.   Skin: Skin is warm and dry. No erythema.   Psychiatric: She has a normal mood and affect. Her behavior is normal. Judgment normal.   Vitals reviewed.      Assessment:       1. Obesity, Class I, BMI 30.0-34.9 (see actual BMI)    2. S/P laparoscopic sleeve gastrectomy            Plan:         Diagnoses and all orders for this visit:    Renae was seen today for follow-up.    Diagnoses and all orders for this visit:    Obesity, Class I, BMI 30.0-34.9 (see actual BMI)  -     semaglutide, weight loss, (WEGOVY) 1.7 mg/0.75 mL PnIj; Inject 1.7 mg into the skin every 7 days.    S/P laparoscopic sleeve gastrectomy        Start Wegovy 1.7 mg once a week.    Decrease portions as soon as you start wegovy. Avoid fried, rich or greasy foods.      Some nausea in the first 2 weeks is not unusual.     If you get pain across the upper abdomen and around to your back, please call the office.       Www[email or web address] to sign up for coupon card.       - To lose weight you want to cut 100% starchy carbohydrates out of your diet (bread, rice, pasta, potatoes, granola, flour, corn, peas, oatmeal, grits, tortillas, crackers, chips) and get  grams of protein.  Aim for 100 grams of protein daily.    - No soda, sweet tea, juices, sports drinks or lemonade     -Exercise daily. Exercise is one of those "bite the bullet" propositions. Sometimes you just have to get started. What I suggest is setting a timer for 10 minutes. Do whatever activity you plan to start for the 10 minutes. If the timer goes off and you want to stop, fine. You can stop. If you feel like you want to go on a little longer, you can go on. Do this a few times a week. Eventually you will likely decide to " keep going. Every 2 weeks, add 5 more minutes before you give yourself permission to stop.         Exercise should be some cardiovascular and some resistance training    Hurricane tips given.

## 2023-09-01 NOTE — PATIENT INSTRUCTIONS
"Start Wegovy 1.7 mg once a week.    Decrease portions as soon as you start wegovy. Avoid fried, rich or greasy foods.      Some nausea in the first 2 weeks is not unusual.     If you get pain across the upper abdomen and around to your back, please call the office.       [email or web address] to sign up for coupon card.       - To lose weight you want to cut 100% starchy carbohydrates out of your diet (bread, rice, pasta, potatoes, granola, flour, corn, peas, oatmeal, grits, tortillas, crackers, chips) and get  grams of protein.  Aim for 100 grams of protein daily.    - No soda, sweet tea, juices, sports drinks or lemonade     -Exercise daily. Exercise is one of those "bite the bullet" propositions. Sometimes you just have to get started. What I suggest is setting a timer for 10 minutes. Do whatever activity you plan to start for the 10 minutes. If the timer goes off and you want to stop, fine. You can stop. If you feel like you want to go on a little longer, you can go on. Do this a few times a week. Eventually you will likely decide to keep going. Every 2 weeks, add 5 more minutes before you give yourself permission to stop.         Exercise should be some cardiovascular and some resistance training      Exercises for diastasis recti    Start by performing these rehab exercises daily for at least 4-6 weeks before resuming traditional abdominal exercises like crunches.     1. Abdominal Drawing In    When getting in and out of bed, lifting or carrying or getting up from a chair, pull in your stomach as if you were trying to fit into a tight pair of jeans. You should be able to hold this abdominal muscle contraction and breathe normally for 10 seconds. This exercise will safely start to engage and strengthen your abdominal muscles without compromising them.    2. Belly Breathing    Lie flat on your back on a mat. Take a deep inhale, allowing your belly and then your chest to fully inflate. Then, slowly and forcefully " exhale, drawing your abdominal wall in as if you had a corset on as you do so.    3. Heel Slides With Alternating Arms    Lie flat on your back on a mat, with your knees bent and feet flat on the floor. Straighten your arms and raise them directly over your shoulders. Exhale, and slowly extend one leg out in front of you, letting it hover a few inches above the floor, and simultaneously extend the opposite arm back above the head, just off of the floor. Inhale and slowly return to start. Repeat on the opposite side. Work to keep your hips and core stable through the entire movement.    4. Quadruped Abdominal    Get on all fours (hands under your shoulders and knees under hips) and pull your shoulders wide and away from your ears to form a flat back. From here, take a slow, deep inhale, allowing your abdominal wall to relax and expand toward the floor. Then exhale, drawing the muscles up and in while maintaining a flat back.      Tips for preparing for hurricane season:    If you are on weight loss medications, please take your medication with you in case of evacuation. Injectable medications should be transported with an ice pack if unopened or room temperature if you have started to use them.   Hurricane supplies do not necessarily need to be junk food or high in carbs or sugar. Shelf stable and healthy choices to include in your supplies could include:  Canned/packets of tuna or chicken  Apples, oranges, banana, pears  Beef or turkey jerky  Sugar free pudding  Pickle, olives, dill relish (mix with the tuna or chicken!)  Low sodium or no salt added canned vegetables  If you get bread, get lite bread (40 calories a slice)  0 sugar sports drinks  Water  String cheese will be okay for a few days without refrigeration or in an ice chest.   Peanut or other nut butter.   Parmesan crisps  Pork skins  Protein shakes (20-30g protein, less than 5 g sugar)  Protein bars (15 or more g protein, less than 5 g sugar)  Don't  forget disposable forks, spoons, plates in your supplies  If evacuated, manage stress by taking walks, reading or meditating.   If eating out make better choices when possible.   Salads with a lean protein and limited dressing, cheese or other toppings  Grilled chicken sandwich or burger without the bun.   Skip the fries!  Order water or unsweetened tea instead of soda  Grocery stores with a deli, salad/food bar can be a good quick and affordable option to replace fast food

## 2023-09-06 ENCOUNTER — PATIENT MESSAGE (OUTPATIENT)
Dept: BARIATRICS | Facility: CLINIC | Age: 63
End: 2023-09-06
Payer: COMMERCIAL

## 2023-09-12 ENCOUNTER — PATIENT MESSAGE (OUTPATIENT)
Dept: BARIATRICS | Facility: CLINIC | Age: 63
End: 2023-09-12
Payer: COMMERCIAL

## 2023-09-15 ENCOUNTER — HOSPITAL ENCOUNTER (OUTPATIENT)
Dept: RADIOLOGY | Facility: HOSPITAL | Age: 63
Discharge: HOME | End: 2023-09-15
Attending: INTERNAL MEDICINE
Payer: COMMERCIAL

## 2023-09-15 ENCOUNTER — PATIENT MESSAGE (OUTPATIENT)
Dept: INTERNAL MEDICINE | Facility: CLINIC | Age: 63
End: 2023-09-15
Payer: COMMERCIAL

## 2023-09-15 DIAGNOSIS — R51.9 HEADACHE, UNSPECIFIED HEADACHE TYPE: ICD-10-CM

## 2023-09-15 DIAGNOSIS — R20.0 NUMBNESS: ICD-10-CM

## 2023-09-15 PROCEDURE — A9585 GADOBUTROL INJECTION: HCPCS | Performed by: INTERNAL MEDICINE

## 2023-09-15 PROCEDURE — 25500020 PHARM REV CODE 255: Performed by: INTERNAL MEDICINE

## 2023-09-15 PROCEDURE — 70553 MRI BRAIN STEM W/O & W/DYE: CPT | Mod: TC

## 2023-09-15 PROCEDURE — 70553 MRI BRAIN W WO CONTRAST: ICD-10-PCS | Mod: 26,,, | Performed by: STUDENT IN AN ORGANIZED HEALTH CARE EDUCATION/TRAINING PROGRAM

## 2023-09-15 PROCEDURE — 70553 MRI BRAIN STEM W/O & W/DYE: CPT | Mod: 26,,, | Performed by: STUDENT IN AN ORGANIZED HEALTH CARE EDUCATION/TRAINING PROGRAM

## 2023-09-15 RX ORDER — GADOBUTROL 604.72 MG/ML
9 INJECTION INTRAVENOUS
Status: COMPLETED | OUTPATIENT
Start: 2023-09-15 | End: 2023-09-15

## 2023-09-15 RX ADMIN — GADOBUTROL 9 ML: 604.72 INJECTION INTRAVENOUS at 09:09

## 2023-09-20 ENCOUNTER — PATIENT MESSAGE (OUTPATIENT)
Dept: ORTHOPEDICS | Facility: CLINIC | Age: 63
End: 2023-09-20
Payer: COMMERCIAL

## 2023-09-21 ENCOUNTER — PATIENT MESSAGE (OUTPATIENT)
Dept: BARIATRICS | Facility: CLINIC | Age: 63
End: 2023-09-21
Payer: COMMERCIAL

## 2023-09-22 ENCOUNTER — CLINICAL SUPPORT (OUTPATIENT)
Dept: ENDOSCOPY | Facility: HOSPITAL | Age: 63
End: 2023-09-22
Attending: INTERNAL MEDICINE
Payer: COMMERCIAL

## 2023-09-22 DIAGNOSIS — D12.6 TUBULAR ADENOMA OF COLON: ICD-10-CM

## 2023-09-23 ENCOUNTER — IMMUNIZATION (OUTPATIENT)
Dept: INTERNAL MEDICINE | Facility: CLINIC | Age: 63
End: 2023-09-23
Payer: COMMERCIAL

## 2023-09-23 ENCOUNTER — PATIENT MESSAGE (OUTPATIENT)
Dept: ORTHOPEDICS | Facility: CLINIC | Age: 63
End: 2023-09-23
Payer: COMMERCIAL

## 2023-09-23 PROCEDURE — 90686 FLU VACCINE (QUAD) GREATER THAN OR EQUAL TO 3YO PRESERVATIVE FREE IM: ICD-10-PCS | Mod: S$GLB,,, | Performed by: INTERNAL MEDICINE

## 2023-09-23 PROCEDURE — G0008 ADMIN INFLUENZA VIRUS VAC: HCPCS | Mod: S$GLB,,, | Performed by: INTERNAL MEDICINE

## 2023-09-23 PROCEDURE — 90686 IIV4 VACC NO PRSV 0.5 ML IM: CPT | Mod: S$GLB,,, | Performed by: INTERNAL MEDICINE

## 2023-09-23 PROCEDURE — G0008 FLU VACCINE (QUAD) GREATER THAN OR EQUAL TO 3YO PRESERVATIVE FREE IM: ICD-10-PCS | Mod: S$GLB,,, | Performed by: INTERNAL MEDICINE

## 2023-09-27 ENCOUNTER — TELEPHONE (OUTPATIENT)
Dept: ENDOSCOPY | Facility: HOSPITAL | Age: 63
End: 2023-09-27
Payer: COMMERCIAL

## 2023-09-27 NOTE — TELEPHONE ENCOUNTER
Spoke to patient to schedule procedure(s) Colonoscopy       Physician to perform procedure(s) Dr. ALEXIS Ureña  Date of Procedure (s) 10/9/23  Arrival Time 11:30 AM  Time of Procedure(s) 12:30 PM   Location of Procedure(s) [place] [address]  Type of Rx Prep sent to patient: Miralax  Instructions provided to patient via [facility]    Patient was informed on the following information and verbalized understanding. Screening questionnaire reviewed with patient and complete. If procedure requires anesthesia, a responsible adult needs to be present to accompany the patient home, patient cannot drive after receiving anesthesia. Appointment details are tentative, especially check-in time. Patient will receive a prep-op call 4 days prior to confirm check-in time for procedure. If applicable the patient should contact their pharmacy to verify Rx for procedure prep is ready for pick-up. Patient was advised to call the scheduling department at [phone] if pharmacy states no Rx is available. Patient was advised to call the endoscopy scheduling department if any questions or concerns arise.      [facility] Scheduling Department

## 2023-10-04 ENCOUNTER — PATIENT MESSAGE (OUTPATIENT)
Dept: BARIATRICS | Facility: CLINIC | Age: 63
End: 2023-10-04
Payer: COMMERCIAL

## 2023-10-06 ENCOUNTER — OFFICE VISIT (OUTPATIENT)
Dept: ORTHOPEDICS | Facility: CLINIC | Age: 63
End: 2023-10-06
Payer: COMMERCIAL

## 2023-10-06 VITALS — WEIGHT: 188.25 LBS | HEIGHT: 65 IN | BODY MASS INDEX: 31.36 KG/M2

## 2023-10-06 DIAGNOSIS — M54.12 CERVICAL RADICULOPATHY: Primary | ICD-10-CM

## 2023-10-06 PROCEDURE — 3008F PR BODY MASS INDEX (BMI) DOCUMENTED: ICD-10-PCS | Mod: CPTII,S$GLB,, | Performed by: ORTHOPAEDIC SURGERY

## 2023-10-06 PROCEDURE — 99214 PR OFFICE/OUTPT VISIT, EST, LEVL IV, 30-39 MIN: ICD-10-PCS | Mod: S$GLB,,, | Performed by: ORTHOPAEDIC SURGERY

## 2023-10-06 PROCEDURE — 3008F BODY MASS INDEX DOCD: CPT | Mod: CPTII,S$GLB,, | Performed by: ORTHOPAEDIC SURGERY

## 2023-10-06 PROCEDURE — 99999 PR PBB SHADOW E&M-EST. PATIENT-LVL III: CPT | Mod: PBBFAC,,, | Performed by: ORTHOPAEDIC SURGERY

## 2023-10-06 PROCEDURE — 3044F HG A1C LEVEL LT 7.0%: CPT | Mod: CPTII,S$GLB,, | Performed by: ORTHOPAEDIC SURGERY

## 2023-10-06 PROCEDURE — 99214 OFFICE O/P EST MOD 30 MIN: CPT | Mod: S$GLB,,, | Performed by: ORTHOPAEDIC SURGERY

## 2023-10-06 PROCEDURE — 3044F PR MOST RECENT HEMOGLOBIN A1C LEVEL <7.0%: ICD-10-PCS | Mod: CPTII,S$GLB,, | Performed by: ORTHOPAEDIC SURGERY

## 2023-10-06 PROCEDURE — 99999 PR PBB SHADOW E&M-EST. PATIENT-LVL III: ICD-10-PCS | Mod: PBBFAC,,, | Performed by: ORTHOPAEDIC SURGERY

## 2023-10-08 NOTE — ASSESSMENT & PLAN NOTE
57 y.o. female POD5 L4-S1 TLIF, possible infected facet     Pain control: multimodal  PT/OT: WBAT   DVT PPx: FCDs at all times when not ambulating  Continue IV abx per ID recs. FU OR cultures. Final ID recs pending. Will need IV abx at home  Heparin TID    Dispo: home once final ID recs in     History  Chief Complaint   Patient presents with    Abdominal Pain     Woke pt from sleep tonight; pain around umbilical area, Mom denies n/v/d, pt states last BM today and normal     10year-old female presents for evaluation of periumbilical abdominal pain that started suddenly around 1 AM.  Mother reports that patient went to bed without any difficulty and had no symptoms at that time. Denies associated vomiting or diarrhea. Normal bowel movement yesterday. No history of constipation issues. Vaccinations up-to-date. Normal oral intake. No recent weight loss noted. Patient currently denies pain and states it resolved shortly prior to arrival.        Prior to Admission Medications   Prescriptions Last Dose Informant Patient Reported? Taking? Pediatric Multiple Vit-C-FA (pediatric multivitamin) chewable tablet   Yes No   Sig: Chew 1 tablet daily      Facility-Administered Medications: None       Past Medical History:   Diagnosis Date    Acquired bowed legs 8/9/2018       History reviewed. No pertinent surgical history. Family History   Problem Relation Age of Onset    No Known Problems Mother     No Known Problems Father      I have reviewed and agree with the history as documented. E-Cigarette/Vaping     E-Cigarette/Vaping Substances     Social History     Tobacco Use    Smoking status: Never    Smokeless tobacco: Never       Review of Systems   Gastrointestinal:  Positive for abdominal pain. Physical Exam  Physical Exam  Vitals and nursing note reviewed. Constitutional:       General: She is active. She is not in acute distress. HENT:      Right Ear: Tympanic membrane normal.      Left Ear: Tympanic membrane normal.      Mouth/Throat:      Mouth: Mucous membranes are moist.   Eyes:      General:         Right eye: No discharge. Left eye: No discharge. Conjunctiva/sclera: Conjunctivae normal.   Cardiovascular:      Rate and Rhythm: Normal rate and regular rhythm.       Heart sounds: S1 normal and S2 normal. No murmur heard. Pulmonary:      Effort: Pulmonary effort is normal. No respiratory distress. Breath sounds: Normal breath sounds. No wheezing, rhonchi or rales. Abdominal:      General: Bowel sounds are normal.      Palpations: Abdomen is soft. Tenderness: There is abdominal tenderness in the periumbilical area. Musculoskeletal:         General: No swelling. Normal range of motion. Cervical back: Neck supple. Lymphadenopathy:      Cervical: No cervical adenopathy. Skin:     General: Skin is warm and dry. Capillary Refill: Capillary refill takes less than 2 seconds. Findings: No rash. Neurological:      Mental Status: She is alert.    Psychiatric:         Mood and Affect: Mood normal.         Vital Signs  ED Triage Vitals [10/08/23 0230]   Temperature Pulse Respirations Blood Pressure SpO2   98 °F (36.7 °C) 95 20 (!) 107/77 99 %      Temp src Heart Rate Source Patient Position - Orthostatic VS BP Location FiO2 (%)   Temporal Monitor Lying Right arm --      Pain Score       --           Vitals:    10/08/23 0230   BP: (!) 107/77   Pulse: 95   Patient Position - Orthostatic VS: Lying         Visual Acuity      ED Medications  Medications   ibuprofen (MOTRIN) oral suspension 176 mg (176 mg Oral Given 10/8/23 0310)   ondansetron (ZOFRAN) injection 2 mg (2 mg Intravenous Given 10/8/23 0346)   sodium chloride 0.9 % bolus 176 mL (0 mL Intravenous Stopped 10/8/23 0732)   iohexol (OMNIPAQUE) 240 MG/ML solution 38 mL (38 mL Intravenous Given 10/8/23 0501)   iohexol (OMNIPAQUE) 240 MG/ML solution 20 mL (20 mL Oral Given 10/8/23 0501)       Diagnostic Studies  Results Reviewed       Procedure Component Value Units Date/Time    UA w Reflex to Microscopic w Reflex to Culture [[id number]]     Lab Status: No result Specimen: Urine     UA w Reflex to Microscopic w Reflex to Culture [[id number]]  (Abnormal) Collected: 10/08/23 0704    Lab Status: Final result Specimen: Urine, Other Updated: 10/08/23 0750     Color, UA Light Yellow     Clarity, UA Clear     Specific Gravity, UA <1.005     pH, UA 7.0     Leukocytes, UA Negative     Nitrite, UA Negative     Protein, UA Negative mg/dl      Glucose, UA 70 (7/100%) mg/dl      Ketones, UA Negative mg/dl      Urobilinogen, UA <2.0 mg/dl      Bilirubin, UA Negative     Occult Blood, UA Negative     URINE COMMENT --    Urine culture [[id number]] Collected: 10/08/23 0704    Lab Status: In process Specimen: Urine, Other Updated: 10/08/23 0750    Fingerstick Glucose (POCT) [[id number]]  (Normal) Collected: 10/08/23 0702    Lab Status: Final result Updated: 10/08/23 0704     POC Glucose 91 mg/dl     Comprehensive metabolic panel [[id number]]  (Abnormal) Collected: 10/08/23 0310    Lab Status: Final result Specimen: Blood from Arm, Right Updated: 10/08/23 0334     Sodium 133 mmol/L      Potassium 3.2 mmol/L      Chloride 102 mmol/L      CO2 21 mmol/L      ANION GAP 10 mmol/L      BUN 14 mg/dL      Creatinine 0.39 mg/dL      Glucose 171 mg/dL      Calcium 8.9 mg/dL       U/L      ALT 42 U/L      Alkaline Phosphatase 291 U/L      Total Protein 6.4 g/dL      Albumin 4.0 g/dL      Total Bilirubin 0.38 mg/dL      eGFR --    Narrative: The reference range(s) associated with this test is specific to the age of this patient as referenced from [address] [place] [address] 951, 22nd Edition, 2021. Notes:     1. eGFR calculation is only valid for adults 18 years and older. 2. EGFR calculation cannot be performed for patients who are transgender, non-binary, or whose legal sex, sex at birth, and gender identity differ. Lipase [[id number]]  (Normal) Collected: 10/08/23 0310    Lab Status: Final result Specimen: Blood from Arm, Right Updated: 10/08/23 0334     Lipase 12 u/L     Narrative: The reference range(s) associated with this test is specific to the age of this patient as referenced from [address] [place] [address] 951, 22nd Edition, 2021.     CBC and differential [[id number]]  (Abnormal) Collected: 10/08/23 0310    Lab Status: Final result Specimen: Blood from Arm, Right Updated: 10/08/23 0318     WBC 21.71 Thousand/uL      RBC 4.66 Million/uL      Hemoglobin 12.6 g/dL      Hematocrit 38.7 %      MCV 83 fL      MCH 27.0 pg      MCHC 32.6 g/dL      RDW 12.6 %      MPV 9.1 fL      Platelets 473 Thousands/uL      nRBC 0 /100 WBCs      Neutrophils Relative 78 %      Immat GRANS % 1 %      Lymphocytes Relative 16 %      Monocytes Relative 4 %      Eosinophils Relative 1 %      Basophils Relative 0 %      Neutrophils Absolute 16.99 Thousands/µL      Immature Grans Absolute 0.19 Thousand/uL      Lymphocytes Absolute 3.41 Thousands/µL      Monocytes Absolute 0.78 Thousand/µL      Eosinophils Absolute 0.26 Thousand/µL      Basophils Absolute 0.08 Thousands/µL                    CT abdomen pelvis with contrast   Final Result by Mechelle Merino MD (10/08 6434)      No acute findings. No findings identified to explain abdominal pain. Workstation performed: [id number]                    Procedures  Procedures         ED Course                                             Medical Decision Making  10year-old female with periumbilical abdominal pain. Differential diagnosis includes but not limited to appendicitis, intussusception, ovarian pathology although low suspicion. Infectious etiology including urinary tract infection. Obtain labs, urinalysis and CT abdomen pelvis. Pain control as needed. Discussed all lab results with mother and importance of close follow-up. Also discussed with pediatric hospitalist.  Patient has complete resolution of symptoms and lab findings can be further evaluated as an outpatient. Message sent to PCP and patient provided with prescriptions for CMP, A1c, urinalysis. Amount and/or Complexity of Data Reviewed  Labs: ordered. Radiology: ordered. Risk  Prescription drug management.         Disposition  Final diagnoses: Abdominal pain   Glucosuria   Transaminitis   Leukocytosis     Time reflects when diagnosis was documented in both MDM as applicable and the Disposition within this note       Time User Action Codes Description Comment    10/8/2023  8:11 AM Sinmiquel Prasad Add [R10.9] Abdominal pain     10/8/2023  8:12 AM Sinda Prasad Add [R81] Glucosuria     10/8/2023  8:12 AM Sinda Prasad Add [R74.01] Transaminitis     10/8/2023  8:12 AM Sinda Prasad Add [E12.271] Leukocytosis           ED Disposition       ED Disposition   Discharge    Condition   Stable    Date/Time   Sun Oct 8, 2023  8:11 AM    Comment   Ayaan Salgado discharge to home/self care. Follow-up Information       Follow up With Specialties Details Why Contact Info Additional [facility], [address], Nurse Practitioner In 1 day  [id number] [phone]        [facility] Emergency Department Emergency Medicine  If symptoms worsen [address] [facility] [id number] [phone] [address] [facility] Emergency Department, [address] [facility], [address] [facility],[address]            Patient's Medications   Discharge Prescriptions    No medications on file       Outpatient Discharge Orders   Comprehensive metabolic panel   Standing Status: Future Standing Exp. Date: 10/08/24     Hemoglobin A1C   Standing Status: Future Standing Exp.  Date: 10/08/24     UA w Reflex to Microscopic w Reflex to Culture       PDMP Review       None            ED Provider  Electronically Signed by             Saba Parham DO  10/08/23 5384

## 2023-10-09 ENCOUNTER — ANESTHESIA (OUTPATIENT)
Dept: ENDOSCOPY | Facility: HOSPITAL | Age: 63
End: 2023-10-09
Payer: COMMERCIAL

## 2023-10-09 ENCOUNTER — ANESTHESIA EVENT (OUTPATIENT)
Dept: ENDOSCOPY | Facility: HOSPITAL | Age: 63
End: 2023-10-09
Payer: COMMERCIAL

## 2023-10-09 ENCOUNTER — HOSPITAL ENCOUNTER (OUTPATIENT)
Facility: HOSPITAL | Age: 63
Discharge: HOME | End: 2023-10-09
Attending: INTERNAL MEDICINE | Admitting: INTERNAL MEDICINE
Payer: COMMERCIAL

## 2023-10-09 VITALS
BODY MASS INDEX: 30.16 KG/M2 | OXYGEN SATURATION: 95 % | DIASTOLIC BLOOD PRESSURE: 69 MMHG | TEMPERATURE: 98 F | HEART RATE: 62 BPM | SYSTOLIC BLOOD PRESSURE: 114 MMHG | RESPIRATION RATE: 20 BRPM | HEIGHT: 65 IN | WEIGHT: 181 LBS

## 2023-10-09 DIAGNOSIS — Z86.010 HISTORY OF COLON POLYPS: ICD-10-CM

## 2023-10-09 PROCEDURE — 45385 COLONOSCOPY W/LESION REMOVAL: CPT | Mod: PT | Performed by: INTERNAL MEDICINE

## 2023-10-09 PROCEDURE — 25000003 PHARM REV CODE 250: Performed by: NURSE ANESTHETIST, CERTIFIED REGISTERED

## 2023-10-09 PROCEDURE — E9220 PRA ENDO ANESTHESIA: ICD-10-PCS | Mod: 33,,, | Performed by: NURSE ANESTHETIST, CERTIFIED REGISTERED

## 2023-10-09 PROCEDURE — 45385 COLONOSCOPY W/LESION REMOVAL: CPT | Mod: 33,,, | Performed by: INTERNAL MEDICINE

## 2023-10-09 PROCEDURE — 27201012 HC FORCEPS, HOT/COLD, DISP: Performed by: INTERNAL MEDICINE

## 2023-10-09 PROCEDURE — 63600175 PHARM REV CODE 636 W HCPCS: Performed by: NURSE ANESTHETIST, CERTIFIED REGISTERED

## 2023-10-09 PROCEDURE — E9220 PRA ENDO ANESTHESIA: HCPCS | Mod: 33,,, | Performed by: NURSE ANESTHETIST, CERTIFIED REGISTERED

## 2023-10-09 PROCEDURE — 25000003 PHARM REV CODE 250: Performed by: INTERNAL MEDICINE

## 2023-10-09 PROCEDURE — 88305 TISSUE EXAM BY PATHOLOGIST: CPT | Mod: 26,,, | Performed by: STUDENT IN AN ORGANIZED HEALTH CARE EDUCATION/TRAINING PROGRAM

## 2023-10-09 PROCEDURE — 37000008 HC ANESTHESIA 1ST 15 MINUTES: Performed by: INTERNAL MEDICINE

## 2023-10-09 PROCEDURE — 88305 TISSUE EXAM BY PATHOLOGIST: CPT | Performed by: STUDENT IN AN ORGANIZED HEALTH CARE EDUCATION/TRAINING PROGRAM

## 2023-10-09 PROCEDURE — 37000009 HC ANESTHESIA EA ADD 15 MINS: Performed by: INTERNAL MEDICINE

## 2023-10-09 PROCEDURE — 45385 PR COLONOSCOPY,REMV LESN,SNARE: ICD-10-PCS | Mod: 33,,, | Performed by: INTERNAL MEDICINE

## 2023-10-09 PROCEDURE — 88305 TISSUE EXAM BY PATHOLOGIST: ICD-10-PCS | Mod: 26,,, | Performed by: STUDENT IN AN ORGANIZED HEALTH CARE EDUCATION/TRAINING PROGRAM

## 2023-10-09 RX ORDER — PROPOFOL 10 MG/ML
VIAL (ML) INTRAVENOUS
Status: DISCONTINUED | OUTPATIENT
Start: 2023-10-09 | End: 2023-10-09

## 2023-10-09 RX ORDER — SODIUM CHLORIDE 9 MG/ML
INJECTION, SOLUTION INTRAVENOUS CONTINUOUS
Status: DISCONTINUED | OUTPATIENT
Start: 2023-10-09 | End: 2023-10-09 | Stop reason: HOSPADM

## 2023-10-09 RX ORDER — LIDOCAINE HYDROCHLORIDE 20 MG/ML
INJECTION INTRAVENOUS
Status: DISCONTINUED | OUTPATIENT
Start: 2023-10-09 | End: 2023-10-09

## 2023-10-09 RX ORDER — PROPOFOL 10 MG/ML
VIAL (ML) INTRAVENOUS CONTINUOUS PRN
Status: DISCONTINUED | OUTPATIENT
Start: 2023-10-09 | End: 2023-10-09

## 2023-10-09 RX ADMIN — LIDOCAINE HYDROCHLORIDE 80 MG: 20 INJECTION INTRAVENOUS at 12:10

## 2023-10-09 RX ADMIN — SODIUM CHLORIDE: 0.9 INJECTION, SOLUTION INTRAVENOUS at 12:10

## 2023-10-09 RX ADMIN — PROPOFOL 70 MG: 10 INJECTION, EMULSION INTRAVENOUS at 12:10

## 2023-10-09 RX ADMIN — PROPOFOL 150 MCG/KG/MIN: 10 INJECTION, EMULSION INTRAVENOUS at 12:10

## 2023-10-09 NOTE — PROVATION PATIENT INSTRUCTIONS
Discharge Summary/Instructions after an Endoscopic Procedure  Patient Name: Renae Hou  Patient MRN: [id number]  Patient YOB: 1960  Monday, October 9, 2023  Saul Ureña MD  Dear patient,  As a result of recent federal legislation (The Federal Cures Act), you may   receive lab or pathology results from your procedure in your [facility]   account before your physician is able to contact you. Your physician or   their representative will relay the results to you with their   recommendations at their soonest availability.  Thank you,  RESTRICTIONS:  During your procedure today, you received medications for sedation.  These   medications may affect your judgment, balance and coordination.  Therefore,   for 24 hours, you have the following restrictions:   - DO NOT drive a car, operate machinery, make legal/financial decisions,   sign important papers or drink alcohol.    ACTIVITY:  Today: no heavy lifting, straining or running due to procedural   sedation/anesthesia.  The following day: return to full activity including work.  DIET:  Eat and drink normally unless instructed otherwise.     TREATMENT FOR COMMON SIDE EFFECTS:  - Mild abdominal pain, nausea, belching, bloating or excessive gas:  rest,   eat lightly and use a heating pad.  - Sore Throat: treat with throat lozenges and/or gargle with warm salt   water.  - Because air was used during the procedure, expelling large amounts of air   from your rectum or belching is normal.  - If a bowel prep was taken, you may not have a bowel movement for 1-3 days.    This is normal.  SYMPTOMS TO WATCH FOR AND REPORT TO YOUR PHYSICIAN:  1. Abdominal pain or bloating, other than gas cramps.  2. Chest pain.  3. Back pain.  4. Signs of infection such as: chills or fever occurring within 24 hours   after the procedure.  5. Rectal bleeding, which would show as bright red, maroon, or black stools.   (A tablespoon of blood from the rectum is not serious, especially  if   hemorrhoids are present.)  6. Vomiting.  7. Weakness or dizziness.  GO DIRECTLY TO THE NEAREST EMERGENCY ROOM IF YOU HAVE ANY OF THE FOLLOWING:      Difficulty breathing              Chills and/or fever over 101 F   Persistent vomiting and/or vomiting blood   Severe abdominal pain   Severe chest pain   Black, tarry stools   Bleeding- more than one tablespoon   Any other symptom or condition that you feel may need urgent attention  Your doctor recommends these additional instructions:  If any biopsies were taken, your doctors clinic will contact you in 1 to 2   weeks with any results.  - Discharge patient to home.   - High fiber diet indefinitely.   - Continue present medications.   - Use fiber, for example Citrucel, Fibercon, Konsyl or Metamucil.   - Await pathology results.   - Repeat colonoscopy in 3 years for surveillance.   - Return to referring physician as previously scheduled.   - Patient has a contact number available for emergencies.  The signs and   symptoms of potential delayed complications were discussed with the   patient.  Return to normal activities tomorrow.  Written discharge   instructions were provided to the patient.  For questions, problems or results please call your physician - Saul Ureña MD at Work:  [phone].  [facility], EMERGENCY ROOM PHONE NUMBER: [phone]  IF A COMPLICATION OR EMERGENCY SITUATION ARISES AND YOU ARE UNABLE TO REACH   YOUR PHYSICIAN - GO DIRECTLY TO THE EMERGENCY ROOM.  Saul Ureña MD  10/9/2023 1:08:32 PM  This report has been verified and signed electronically.  Dear patient,  As a result of recent federal legislation (The Federal Cures Act), you may   receive lab or pathology results from your procedure in your [facility]   account before your physician is able to contact you. Your physician or   their representative will relay the results to you with their   recommendations at their soonest availability.  Thank  you,  PROVATION

## 2023-10-09 NOTE — TRANSFER OF CARE
"Anesthesia Transfer of Care Note    Patient: Renae Hou    Procedure(s) Performed: Procedure(s) (LRB):  COLONOSCOPY (N/A)    Patient location: GI    Anesthesia Type: general    Transport from OR: Transported from OR on room air with adequate spontaneous ventilation    Post pain: adequate analgesia    Post assessment: no apparent anesthetic complications and tolerated procedure well    Post vital signs: stable    Level of consciousness: awake    Nausea/Vomiting: no nausea/vomiting    Complications: none    Transfer of care protocol was followed      Last vitals:   Visit Vitals  /74   Pulse 78   Temp 36.7 °C (98.1 °F)   Resp 15   Ht 5' 5" (1.651 m)   Wt 82.1 kg (181 lb)   LMP 11/25/2012   SpO2 98%   Breastfeeding No   BMI 30.12 kg/m²     "

## 2023-10-09 NOTE — H&P
Short Stay Endoscopy History and Physical    PCP - Wendy Horowitz MD    Procedure - Colonoscopy  ASA - 2  Mallampati - per anesthesia  History of Anesthesia problems - no  Family history Anesthesia problems -  no     HPI:  This is a 62 y.o. female here for evaluation of :     Average Risk Screening: No  High risk screening: yes  History of polyps: yes  Anemia: No  Blood in stools: No  Diarrhea: No  Abdominal Pain: No    Review of Systems:  CONSTITUTIONAL: Denies weight change,  fatigue, fevers, chills, night sweats.  CARDIOVASCULAR: Denies chest pain, shortness of breath, orthopnea and edema.  RESPIRATORY: Denies cough, hemoptysis, dyspnea, and wheezing.  GI: See HPI.    Medical History:  Past Medical History:   Diagnosis Date    Abscess of paraspinous muscles 2018    Allergy 04/2018    Class 1 obesity due to excess calories in adult 07/23/2019    DVT (deep venous thrombosis)     left leg    Epidural abscess 2018 04/10/2018    Fatty liver     GERD (gastroesophageal reflux disease) 2015    History of major abdominal surgery 09/11/2015    Had Hysterectomy , cholecystectomy , rectocele repair    Hypertension     Lumbar radiculopathy     Menopause     Obesity     Obstructive sleep apnea on CPAP     no longer uses CPAP after weight loss from gastric sleeve    Thyroid disease     Thyroid nodules.       Surgical History:   Past Surgical History:   Procedure Laterality Date    ADENOIDECTOMY  1995    BACK SURGERY  2002    L4, L5    BILATERAL SALPINGOOPHORECTOMY      CHOLECYSTECTOMY      COLONOSCOPY N/A 6/13/2022    Procedure: COLONOSCOPY;  Surgeon: Saul Ureña MD;  Location: [address]);  Service: Endoscopy;  Laterality: N/A;  constipation protocol-extended Miralax prep - PEG allergy -okay with taking Miralax  fully vaccinated, prep instr portal -ml    COLONOSCOPY N/A 10/4/2022    Procedure: COLONOSCOPY;  Surgeon: Saul Ureña MD;  Location: [facility] ([address] [place] [address]);  Service: Endoscopy;  Laterality:  N/A;  Constipation protocol-Miralax prep-pt tolerated Miralax for last colonoscopy.  fully vaccinated, prep instr portal -ml    CORRECTION OF HAMMER TOE Left 8/5/2021    Procedure: CORRECTION, HAMMER TOE Left 4th mallet toe, DIP joitn fusion;  Surgeon: Guero Alvarez MD;  Location: [facility] OR;  Service: Orthopedics;  Laterality: Left;  K-wires    CYST REMOVAL      EPIDURAL STEROID INJECTION INTO CERVICAL SPINE N/A 8/11/2020    Procedure: Injection-steroid-epidural-cervical;  Surgeon: [facility] Diagnostic Provider;  Location: [facility] OR 2ND FLR;  Service: Radiology;  Laterality: N/A;  /Carla    EPIDURAL STEROID INJECTION INTO CERVICAL SPINE N/A 3/2/2022    Procedure: Injection-steroid-epidural-cervical C7-T1;  Surgeon: Latoya Quintanilla MD;  Location: [facility] PAIN MGT;  Service: Pain Management;  Laterality: N/A;    ESOPHAGOGASTRODUODENOSCOPY N/A 5/3/2019    Procedure: ESOPHAGOGASTRODUODENOSCOPY (EGD);  Surgeon: Carlin Sanchez MD;  Location: [facility] ENDO (4TH FLR);  Service: Endoscopy;  Laterality: N/A;    HYSTERECTOMY  12/17/2012    [facility] BS&O     INJECTION OF JOINT Left 3/1/2021    Procedure: INJECTION, JOINT LEFT HIP INTRA ARTICULAR CORTISONE INJECTION DIRECT REFERRAL;  Surgeon: Chuy Gregory MD;  Location: [facility] PAIN MGT;  Service: Pain Management;  Laterality: Left;  NEEDS CONSENT    INSERTION OF INFERIOR VENA CAVAL FILTER Right 7/19/2019    Procedure: IVC filter placement;  Surgeon: Rodney Rico MD;  Location: [facility] CATH LAB;  Service: Peripheral Vascular;  Laterality: Right;    IVC FILTER RETRIEVAL N/A 12/20/2019    Procedure: REMOVAL-FILTER-IVC;  Surgeon: Rodney Rico MD;  [facility];  Service: Peripheral Vascular;  Laterality: N/A;    LAPAROSCOPIC SLEEVE GASTRECTOMY N/A 7/23/2019    Procedure: GASTRECTOMY, SLEEVE, LAPAROSCOPIC, with intraop EGD [id number];  Surgeon: Duc Ratliff Jr., MD;  [facility];  Service: General;  Laterality: N/A;    RECTOCELE REPAIR      SPINE  SURGERY  2018    THYROID NODULE REMOVAL      TONSILLECTOMY         Family History:   Family History   Problem Relation Age of Onset    Thyroid disease Mother     Hypertension Mother     Hyperlipidemia Mother     Heart disease Mother 55        cad, valvular heart disease    Deep vein thrombosis Mother     Heart disease Father         MI    Stroke Father     Diabetes Sister     Alcohol abuse Sister     Cirrhosis Sister         alcoholic cirrhosis    Epilepsy Sister     Heart disease Daughter         SVT    Thyroid disease Daughter         Hashimoto's    Hashimoto's thyroiditis Daughter     No Known Problems Daughter     Heart disease Maternal Grandmother     Thyroid disease Maternal Grandmother     Kidney disease Maternal Grandmother     Heart disease Paternal Grandmother         MI    Breast cancer Neg Hx     Colon cancer Neg Hx     Ovarian cancer Neg Hx     Esophageal cancer Neg Hx        Social History:   Social History     Tobacco Use    Smoking status: Former     Current packs/day: 0.00     Types: Cigarettes     Quit date: 1988     Years since quittin.7    Smokeless tobacco: Never    Tobacco comments:     smoked socially decades ago - weekends only   Substance Use Topics    Alcohol use: Not Currently     Comment: yearly at most     Drug use: No       Allergies: Reviewed.    Medications:  No current facility-administered medications on file prior to encounter.     Current Outpatient Medications on File Prior to Encounter   Medication Sig Dispense Refill    EPINEPHrine (EPIPEN) 0.3 mg/0.3 mL AtIn       ergocalciferol (ERGOCALCIFEROL) 50,000 unit Cap Take 1 capsule (50,000 Units total) by mouth every 7 days. 12 capsule 3    ferrous gluconate (FERGON) 324 MG tablet Every other day with orange juice 30 tablet 12    gabapentin (NEURONTIN) 100 MG capsule 1-3 capsules at bedtime 90 capsule 2    hydroCHLOROthiazide (HYDRODIURIL) 12.5 MG Tab Take 1 tablet (12.5 mg total) by mouth once daily. 90 tablet 3     ibuprofen (ADVIL,MOTRIN) 100 MG tablet Take 600 mg by mouth every 6 (six) hours as needed for Temperature greater than.      semaglutide, weight loss, (WEGOVY) 1.7 mg/0.75 mL PnIj Inject 1.7 mg into the skin every 7 days. 9 mL 0       Physical Exam:  Vital Signs:   Vitals:    10/09/23 1146   BP: 123/74   Pulse: 78   Resp: 15   Temp: 98.1 °F (36.7 °C)     General Appearance: Well appearing in no acute distress  ENT: OP clear  Chest: CTA B  CV: RRR, no m/r/g  Abd: s/nt/nd/nabs  Ext: no edema    Labs:  Reviewed    IMPRESSION:    Hx of polyps    Plan:  I have explained the risks and benefits of colonoscopy to the patient including but not limited to bleeding, perforation, infection, and death. The patient wishes to proceed with colonoscopy.

## 2023-10-09 NOTE — ANESTHESIA PREPROCEDURE EVALUATION
10/09/2023  Renae Hou is a 62 y.o., female.    Last wegovy Sunday a week ago, no nausea or emesis    Procedure: COLONOSCOPY (Abdomen) - Ref by Jackie Wise-Wegovy- pt instructed to hold x 7 days prior procedure,  Miralax, portal - PC   10/2-precall complete/pt aware to hold wegovy x7 days-MS   Anesthesia type: Choice   Diagnosis: Tubular adenoma of colon [D12.6]   Pre-op diagnosis: Tubular adenoma of colon [D12.6]         Pre-operative evaluation for Procedure(s) (LRB):  COLONOSCOPY (N/A)    @[email or web address]@@    Encounter Diagnosis   Name Primary?    History of colon polyps        Review of patient's allergies indicates:   Allergen Reactions    Cathflo activase [alteplase] Anaphylaxis     Tightness in chest, raspy throat, restless legs, hotness all over    Heparin analogues      Restless legs, tightness in chest, and hot all over    Iodine Anaphylaxis, Hives, Shortness Of Breath, Itching, Swelling and Rash     Other reaction(s): Difficulty breathing  Neck swelling     Polyethylene glycol analogues Swelling    Polysorbate 80 Anaphylaxis, Itching and Shortness Of Breath    Xyzal [levocetirizine] Swelling    Latex Swelling             Medications Prior to Admission   Medication Sig Dispense Refill Last Dose    EPINEPHrine (EPIPEN) 0.3 mg/0.3 mL AtIn        ergocalciferol (ERGOCALCIFEROL) 50,000 unit Cap Take 1 capsule (50,000 Units total) by mouth every 7 days. 12 capsule 3     ferrous gluconate (FERGON) 324 MG tablet Every other day with orange juice 30 tablet 12     gabapentin (NEURONTIN) 100 MG capsule 1-3 capsules at bedtime 90 capsule 2     hydroCHLOROthiazide (HYDRODIURIL) 12.5 MG Tab Take 1 tablet (12.5 mg total) by mouth once daily. 90 tablet 3     ibuprofen (ADVIL,MOTRIN) 100 MG tablet Take 600 mg by mouth every 6 (six) hours as needed for Temperature greater than.        semaglutide, weight loss, (WEGOVY) 1.7 mg/0.75 mL PnIj Inject 1.7 mg into the skin every 7 days. 9 mL 0          sodium chloride 0.9%         No current facility-administered medications on file prior to encounter.     Current Outpatient Medications on File Prior to Encounter   Medication Sig Dispense Refill    EPINEPHrine (EPIPEN) 0.3 mg/0.3 mL AtIn       ergocalciferol (ERGOCALCIFEROL) 50,000 unit Cap Take 1 capsule (50,000 Units total) by mouth every 7 days. 12 capsule 3    ferrous gluconate (FERGON) 324 MG tablet Every other day with orange juice 30 tablet 12    gabapentin (NEURONTIN) 100 MG capsule 1-3 capsules at bedtime 90 capsule 2    hydroCHLOROthiazide (HYDRODIURIL) 12.5 MG Tab Take 1 tablet (12.5 mg total) by mouth once daily. 90 tablet 3    ibuprofen (ADVIL,MOTRIN) 100 MG tablet Take 600 mg by mouth every 6 (six) hours as needed for Temperature greater than.      semaglutide, weight loss, (WEGOVY) 1.7 mg/0.75 mL PnIj Inject 1.7 mg into the skin every 7 days. 9 mL 0       Past Medical History:  2018: Abscess of paraspinous muscles  04/2018: Allergy  07/23/2019: Class 1 obesity due to excess calories in adult  No date: DVT (deep venous thrombosis)      Comment:  left leg  04/10/2018: Epidural abscess 2018  No date: Fatty liver  2015: GERD (gastroesophageal reflux disease)  09/11/2015: History of major abdominal surgery      Comment:  Had Hysterectomy , cholecystectomy , rectocele repair  No date: Hypertension  No date: Lumbar radiculopathy  No date: Menopause  No date: Obesity  No date: Obstructive sleep apnea on CPAP      Comment:  no longer uses CPAP after weight loss from gastric                sleeve  No date: Thyroid disease      Comment:  Thyroid nodules.    Past Surgical History:   Procedure Laterality Date    ADENOIDECTOMY  1995    BACK SURGERY  2002    L4, L5    BILATERAL SALPINGOOPHORECTOMY      CHOLECYSTECTOMY      COLONOSCOPY N/A 6/13/2022    Procedure: COLONOSCOPY;  Surgeon:  Saul Ureña MD;  Location: [facility] (4TH FLR);  Service: Endoscopy;  Laterality: N/A;  constipation protocol-extended Miralax prep - PEG allergy -okay with taking Miralax  fully vaccinated, prep instr portal -ml    COLONOSCOPY N/A 10/4/2022    Procedure: COLONOSCOPY;  Surgeon: Saul Ureña MD;  Location: [facility] (4TH FLR);  Service: Endoscopy;  Laterality: N/A;  Constipation protocol-Miralax prep-pt tolerated Miralax for last colonoscopy.  fully vaccinated, prep instr portal -ml    CORRECTION OF HAMMER TOE Left 8/5/2021    Procedure: CORRECTION, HAMMER TOE Left 4th mallet toe, DIP joitn fusion;  Surgeon: Guero Alvarez MD;  Location: [facility] OR;  Service: Orthopedics;  Laterality: Left;  K-wires    CYST REMOVAL      EPIDURAL STEROID INJECTION INTO CERVICAL SPINE N/A 8/11/2020    Procedure: Injection-steroid-epidural-cervical;  Surgeon: [facility];  Location: [facility] 2ND FLR;  Service: Radiology;  Laterality: N/A;  /[place]    EPIDURAL STEROID INJECTION INTO CERVICAL SPINE N/A 3/2/2022    Procedure: Injection-steroid-epidural-cervical C7-T1;  Surgeon: Latoya Quintanilla MD;  Location: [facility] PAIN MGT;  Service: Pain Management;  Laterality: N/A;    ESOPHAGOGASTRODUODENOSCOPY N/A 5/3/2019    Procedure: ESOPHAGOGASTRODUODENOSCOPY (EGD);  Surgeon: Carlin Sanchez MD;  Location: [facility] (4TH FLR);  Service: Endoscopy;  Laterality: N/A;    HYSTERECTOMY  12/17/2012    [facility] BS&O     INJECTION OF JOINT Left 3/1/2021    Procedure: INJECTION, JOINT LEFT HIP INTRA ARTICULAR CORTISONE INJECTION DIRECT REFERRAL;  Surgeon: Chuy Gregory MD;  Location: [facility] [place] PAIN MGT;  Service: Pain Management;  Laterality: Left;  NEEDS CONSENT    INSERTION OF INFERIOR VENA CAVAL FILTER Right 7/19/2019    Procedure: IVC filter placement;  Surgeon: Rodney Rico MD;  Location: [facility] CATH LAB;  Service: Peripheral Vascular;  Laterality: Right;    IVC FILTER RETRIEVAL N/A 12/20/2019    Procedure:  "REMOVAL-FILTER-IVC;  Surgeon: Rodney Rico MD;  Location: [facility] OR [address] [place] [address];  Service: Peripheral Vascular;  Laterality: N/A;    LAPAROSCOPIC SLEEVE GASTRECTOMY N/A 2019    Procedure: GASTRECTOMY, SLEEVE, LAPAROSCOPIC, with intraop EGD [id number];  Surgeon: Duc Ratliff Jr., MD;  Location: [facility] OR [address] [place] [address];  Service: General;  Laterality: N/A;    RECTOCELE REPAIR      SPINE SURGERY  2018    THYROID NODULE REMOVAL      TONSILLECTOMY         Social History     Tobacco Use   Smoking Status Former    Current packs/day: 0.00    Types: Cigarettes    Quit date: 1988    Years since quittin.7   Smokeless Tobacco Never   Tobacco Comments    smoked socially decades ago - weekends only       Social History     Substance and Sexual Activity   Alcohol Use Not Currently    Comment: yearly at most        Physical Activity: Insufficiently Active (2023)    Exercise Vital Sign     Days of Exercise per Week: 2 days     Minutes of Exercise per Session: 30 min         No results for input(s): "HCT" in the last 72 hours.  No results for input(s): "PLT" in the last 72 hours.  No results for input(s): "K" in the last 72 hours.  No results for input(s): "CREATININE" in the last 72 hours.  No results for input(s): "GLU" in the last 72 hours.  No results for input(s): "PT" in the last 72 hours.                    Pre-op Assessment          Review of Systems  Anesthesia Hx:  No problems with previous Anesthesia    Hematology/Oncology:  Hematology Normal   Oncology Normal     Cardiovascular:   Hypertension, well controlled Denies MI.    Denies Angina.    Pulmonary:   Denies COPD.  Denies Asthma. (as child nly)  Denies Shortness of breath. Sleep Apnea (patinet denies.)    Renal/:   Denies Chronic Renal Disease.     Hepatic/GI:   GERD, well controlled Denies Liver Disease. (fatty without sequelae)    Neurological:   Denies TIA. Denies CVA. Denies Seizures.    Endocrine:   Denies Diabetes. On wegovy for " weight loss       Physical Exam  General: Well nourished, Cooperative, Alert and Oriented    Airway:  Mallampati: II   Mouth Opening: Normal  TM Distance: Normal  Tongue: Normal  Neck ROM: Normal ROM        Anesthesia Plan  Type of Anesthesia, risks & benefits discussed:    Anesthesia Type: Gen Natural Airway  Intra-op Monitoring Plan: Standard ASA Monitors  Post Op Pain Control Plan: IV/PO Opioids PRN  Induction:  IV  Informed Consent: Informed consent signed with the Patient and all parties understand the risks and agree with anesthesia plan.  All questions answered.   ASA Score: 2  Day of Surgery Review of History & Physical: H&P Update referred to the surgeon/provider.    Ready For Surgery From Anesthesia Perspective.     .    Present Prior to Hospital Arrival?: No; Placement Date: 07/23/19; Placement Time: 1424; Method of Intubation: Direct laryngoscopy; Inserted by: Anesthesia Resident; Airway Device: Endotracheal Tube; Mask Ventilation: Easy; Intubated: Postinduction; Blade: Panfilo #3; Airway Device Size: 7.0; Style: Cuffed; Cuff Inflation: Minimal occlusive pressure; Placement Verified By: Auscultation, Capnometry, Colorimetric EtCO2 device; Grade: Grade I; Complicating Factors: None; Intubation Findings: Positive EtCO2, Bilateral breath sounds, Atraumatic/Condition of teeth unchanged;  Depth of Insertion: 21; Securment: Lips; Complications: None; Breath Sounds: Equal Bilateral; Insertion Attempts: 1; Removal Date: 07/23/19;  Removal Time: 1547 11/2018  Summary     Left ventricle ejection fraction is low normal at 55%   Left ventricle shows concentric remodeling.   Normal LV diastolic function.   No wall motion abnormalities.   RV systolic function is normal.   The patient reported SOB (non-anginal) and chest discomfort during the stress test.   There were no arrhythmias during stress.   The test was stopped secondary to shortness of breath.   No pericardial effusion.   Overall, the  patient's exercise capacity was mildly impaired.   The EKG portion of this study is negative for myocardial ischemia.   The stress echo portion of this study is negative for myocardial ischemia.   No ischemia by ECG and echo despite the symptoms at rest that worsened with stress and later returned to the baseline level

## 2023-10-09 NOTE — ANESTHESIA POSTPROCEDURE EVALUATION
Anesthesia Post Evaluation    Patient: Renae Hou    Procedure(s) Performed: Procedure(s) (LRB):  COLONOSCOPY (N/A)    Final Anesthesia Type: general      Patient location during evaluation: PACU  Patient participation: Yes- Able to Participate  Level of consciousness: awake and alert  Post-procedure vital signs: reviewed and stable  Pain management: adequate  Airway patency: patent    PONV status at discharge: No PONV  Anesthetic complications: no      Cardiovascular status: blood pressure returned to baseline  Respiratory status: unassisted  Hydration status: euvolemic  Follow-up not needed.          Vitals Value Taken Time   /69 10/09/23 1336   Temp 36.6 °C (97.9 °F) 10/09/23 1312   Pulse 62 10/09/23 1336   Resp 20 10/09/23 1336   SpO2 95 % 10/09/23 1336         No case tracking events are documented in the log.      Pain/Ai Score: Ai Score: 10 (10/9/2023  1:21 PM)

## 2023-10-10 ENCOUNTER — PATIENT MESSAGE (OUTPATIENT)
Dept: BARIATRICS | Facility: CLINIC | Age: 63
End: 2023-10-10
Payer: COMMERCIAL

## 2023-10-11 ENCOUNTER — PATIENT MESSAGE (OUTPATIENT)
Dept: ORTHOPEDICS | Facility: CLINIC | Age: 63
End: 2023-10-11
Payer: COMMERCIAL

## 2023-10-15 ENCOUNTER — PATIENT MESSAGE (OUTPATIENT)
Dept: BARIATRICS | Facility: CLINIC | Age: 63
End: 2023-10-15
Payer: COMMERCIAL

## 2023-10-16 ENCOUNTER — PATIENT MESSAGE (OUTPATIENT)
Dept: ORTHOPEDICS | Facility: CLINIC | Age: 63
End: 2023-10-16
Payer: COMMERCIAL

## 2023-10-16 DIAGNOSIS — M54.12 CERVICAL RADICULOPATHY: Primary | ICD-10-CM

## 2023-10-16 LAB
FINAL PATHOLOGIC DIAGNOSIS: NORMAL
GROSS: NORMAL
Lab: NORMAL

## 2023-10-17 ENCOUNTER — OFFICE VISIT (OUTPATIENT)
Dept: ORTHOPEDICS | Facility: CLINIC | Age: 63
End: 2023-10-17
Payer: COMMERCIAL

## 2023-10-17 ENCOUNTER — HOSPITAL ENCOUNTER (OUTPATIENT)
Dept: RADIOLOGY | Facility: HOSPITAL | Age: 63
Discharge: HOME | End: 2023-10-17
Attending: ORTHOPAEDIC SURGERY
Payer: COMMERCIAL

## 2023-10-17 ENCOUNTER — PATIENT MESSAGE (OUTPATIENT)
Dept: ORTHOPEDICS | Facility: CLINIC | Age: 63
End: 2023-10-17
Payer: COMMERCIAL

## 2023-10-17 VITALS — BODY MASS INDEX: 30.16 KG/M2 | HEIGHT: 65 IN | WEIGHT: 181 LBS

## 2023-10-17 DIAGNOSIS — M79.672 FOOT PAIN, LEFT: ICD-10-CM

## 2023-10-17 DIAGNOSIS — M79.2 NEUROGENIC PAIN OF FOOT, LEFT: Primary | ICD-10-CM

## 2023-10-17 DIAGNOSIS — M72.2 PLANTAR FASCIAL FIBROMATOSIS OF LEFT FOOT: ICD-10-CM

## 2023-10-17 PROCEDURE — 1159F PR MEDICATION LIST DOCUMENTED IN MEDICAL RECORD: ICD-10-PCS | Mod: CPTII,S$GLB,, | Performed by: ORTHOPAEDIC SURGERY

## 2023-10-17 PROCEDURE — 3008F BODY MASS INDEX DOCD: CPT | Mod: CPTII,S$GLB,, | Performed by: ORTHOPAEDIC SURGERY

## 2023-10-17 PROCEDURE — 99213 OFFICE O/P EST LOW 20 MIN: CPT | Mod: S$GLB,,, | Performed by: ORTHOPAEDIC SURGERY

## 2023-10-17 PROCEDURE — 73630 X-RAY EXAM OF FOOT: CPT | Mod: 26,LT,, | Performed by: RADIOLOGY

## 2023-10-17 PROCEDURE — 3044F PR MOST RECENT HEMOGLOBIN A1C LEVEL <7.0%: ICD-10-PCS | Mod: CPTII,S$GLB,, | Performed by: ORTHOPAEDIC SURGERY

## 2023-10-17 PROCEDURE — 3008F PR BODY MASS INDEX (BMI) DOCUMENTED: ICD-10-PCS | Mod: CPTII,S$GLB,, | Performed by: ORTHOPAEDIC SURGERY

## 2023-10-17 PROCEDURE — 73630 XR FOOT COMPLETE 3 VIEW LEFT: ICD-10-PCS | Mod: 26,LT,, | Performed by: RADIOLOGY

## 2023-10-17 PROCEDURE — 99213 PR OFFICE/OUTPT VISIT, EST, LEVL III, 20-29 MIN: ICD-10-PCS | Mod: S$GLB,,, | Performed by: ORTHOPAEDIC SURGERY

## 2023-10-17 PROCEDURE — 99999 PR PBB SHADOW E&M-EST. PATIENT-LVL III: CPT | Mod: PBBFAC,,, | Performed by: ORTHOPAEDIC SURGERY

## 2023-10-17 PROCEDURE — 99999 PR PBB SHADOW E&M-EST. PATIENT-LVL III: ICD-10-PCS | Mod: PBBFAC,,, | Performed by: ORTHOPAEDIC SURGERY

## 2023-10-17 PROCEDURE — 73630 X-RAY EXAM OF FOOT: CPT | Mod: TC,LT

## 2023-10-17 PROCEDURE — 1159F MED LIST DOCD IN RCRD: CPT | Mod: CPTII,S$GLB,, | Performed by: ORTHOPAEDIC SURGERY

## 2023-10-17 PROCEDURE — 3044F HG A1C LEVEL LT 7.0%: CPT | Mod: CPTII,S$GLB,, | Performed by: ORTHOPAEDIC SURGERY

## 2023-10-17 NOTE — PROGRESS NOTES
Renae Hou  Returns today for follow-up.  This is a 63-year-old female who I operated on about two years ago for a left 4th mallet toe.  Her last visit with me was on 09/10/2021 for follow-up from her surgery and she was doing well.  She returns today and reports she is been having increasing numbness in her toes in the medial aspect of her ankle and hindfoot.  She also reports a lump on the bottom of her foot which does not really hurt when she is walking but is tender when she presses on it and sometimes at night.  She does have a past history significant for low back surgery and she reports that she had some left-sided radiculopathy prior to her surgery but she states it was after her back surgery that she began having problems with the left foot.    Examination:  She walks in today with a normal gait.  The left foot reveals no obvious swelling.  On sitting exam she is full motion of her ankle and subtalar joint without any pain.  She has normal function and strength of all the tendons about her ankle.  She does have a small nodular lesion on the plantar aspect of her foot consistent with a plantar fibroma which is tender to deep palpation.  She also has some tenderness over the abductor hallucis muscle in the distal tarsal tunnel.  There is no tenderness over the origin of the plantar fascia.  She has good distal pulses.    Imaging:  I ordered and reviewed standing x-ray of the left foot today.  Standing x-rays of the left foot reveal no bone or joint abnormalities.  She has good alignment on all three views.    Impression:  1. Neurogenic pain of foot, left  X-Ray Foot Complete Left    EMG W/ ULTRASOUND AND NERVE CONDUCTION TEST 2 Extremities      2. Plantar fascial fibromatosis of left foot  EMG W/ ULTRASOUND AND NERVE CONDUCTION TEST 2 Extremities            Recommendation:  The etiology of her pain is unclear to me.  Her symptoms seem to be neurogenic in nature and may be related to a tarsal tunnel  syndrome.  I am going to obtain EMG nerve conduction studies.  She will follow-up with results.

## 2023-10-27 NOTE — PROGRESS NOTES
The patient returns for follow-up.      Her chief complaint today is neck pain, bilateral periscapular pain, and bilateral upper extremity paresthesias at night, right-greater-than-left.  This has become progressively worse over the past few months.  She had an epidural steroid injection with very transient relief.      The patient also endorses decreasing  strength.    Today I reviewed new cervical spine radiographs, as well as recent MRI of the cervical spine demonstrate the following:  There is loss of disc height at C4-5, there is notable C6-7 spondylosis.  The MRI demonstrates notable C4-5 stenosis, right-greater-than-left.      Impression:  Cervical spondylosis with C4-5 stenosis.      Plan: Today we discussed options, I have ordered an EMG, I will see her back for results

## 2023-10-30 ENCOUNTER — PATIENT MESSAGE (OUTPATIENT)
Dept: ORTHOPEDICS | Facility: CLINIC | Age: 63
End: 2023-10-30
Payer: COMMERCIAL

## 2023-11-09 ENCOUNTER — PATIENT MESSAGE (OUTPATIENT)
Dept: BARIATRICS | Facility: CLINIC | Age: 63
End: 2023-11-09
Payer: COMMERCIAL

## 2023-11-09 DIAGNOSIS — E66.9 OBESITY, CLASS I, BMI 30.0-34.9 (SEE ACTUAL BMI): ICD-10-CM

## 2023-11-10 RX ORDER — SEMAGLUTIDE 1.7 MG/.75ML
1.7 INJECTION, SOLUTION SUBCUTANEOUS
Qty: 9 ML | Refills: 0 | Status: SHIPPED | OUTPATIENT
Start: 2023-11-10 | End: 2024-02-11 | Stop reason: SDUPTHER

## 2023-11-14 ENCOUNTER — PATIENT MESSAGE (OUTPATIENT)
Dept: BARIATRICS | Facility: CLINIC | Age: 63
End: 2023-11-14
Payer: COMMERCIAL

## 2023-11-14 ENCOUNTER — TELEPHONE (OUTPATIENT)
Dept: INTERNAL MEDICINE | Facility: CLINIC | Age: 63
End: 2023-11-14
Payer: COMMERCIAL

## 2023-11-14 DIAGNOSIS — Z01.818 PREOPERATIVE TESTING: Primary | ICD-10-CM

## 2023-11-14 NOTE — ANESTHESIA PAT ROS NOTE
11/14/2023  Renae Hou is a 63 y.o., female.      Pre-op Assessment          Review of Systems           Anesthesia Assessment: Preoperative EQUATION    Planned Procedure: Procedure(s) (LRB):  DISCECTOMY, SPINE, CERVICAL, ANTERIOR APPROACH, WITH FUSION C4/5 spinewave rep-less SNS:vocal/motors/SSEP (N/A)  Requested Anesthesia Type:General  Surgeon: Dean Zayas MD  Service: Orthopedics  Known or anticipated Date of Surgery:12/11/2023    Surgeon notes: reviewed    Electronic QUestionnaire Assessment completed via nurse interview with patient.        Triage considerations:       Previous anesthesia records:MAC and No problems  10/4/2023 COLONOSCOPY (Abdomen)   Airway:  Mallampati: I   Mouth Opening: Normal  TM Distance: Normal  Tongue: Normal  Neck ROM: Normal ROM      ** H/O GASTRIC SLEEVE**    Last PCP note: 3-6 months ago , within [facility]   Subspecialty notes: Cardiology: General, Neurosurgery, Ortho, BARIATRICS, OPTOMETRY, SPORTS MEDICINE    Other important co-morbidities: PER EPIC: GERD, HLD, HTN, Obesity, RENAE, and CERVICAL RADICULOPATHY, FATTY LIVER, OSTEOPENIA, H/O DVT       Tests already available:  Available tests,  3-6 months ago , 6-12 months ago , within [facility] . 6/26/2023 MRI CERVICAL SPINE W/O CONTRAST, 6/13/2023 XRAY CERVICAL SPINE AP/LAT W F/E, 5/12/2023 ETT            Instructions given. (See in Nurse's note)    Optimization:  Anesthesia Preop Clinic Assessment  Indicated    Medical Opinion Indicated             Plan:    Testing:  CMP, EKG, Hematology Profile, PT/INR, and T&S   Pre-anesthesia  visit       Visit focus: concerns in complex and/or prolonged anesthesia     Consultation:Patient's PCP for re-evaluation     Patient  has previously scheduled Medical Appointment:11/16 EMG, 12/6 DR ZAYAS    Navigation: Tests Scheduled. TBD             Consults scheduled.TBD              Results will be tracked by Preop Clinic.  12/6 Labs and EKG resulted and noted by Dr. Rhonda Leopold.   12/7 Medical clearance given by Dr Wendy Horowitz on 12/6:   PREOPERATIVE RISK ASSESSMENT AND RECOMMENDATIONS:  The patient presents for medical evaluation.  She is undergoing noncardiac surgery which carries a low risk of cardiac complications.   The patient has stable cardiac conditions.  Exercise capacity is good.  She has stable clinical risk factors for surgery.  She would be considered average risk for surgery.   CLEARANCE:  The patient is medically optimized for surgery.  No further testing or intervention is needed.   Laurie Cross RN BSN

## 2023-11-14 NOTE — TELEPHONE ENCOUNTER
----- Message from Laurie Cross RN sent at 11/14/2023 12:15 PM CST -----  Patient is scheduled for ACDF C4-5 on 12/11 with . (approximately 165 minutes of general anesthesia) She will need medical clearance. Please schedule a preop clearance appt.  Thanks!

## 2023-11-14 NOTE — PRE-PROCEDURE INSTRUCTIONS
Patient stated has not had any problem with anesthesia in the past. Will need medical clearance from your PCP, Dr. Wendy Horowitz.  She will make an appt. Will need poc appt ,labs, and EKG.  Our  will call to set up these appts.    Preop instructions given. Hold aspirin, aspirin containing products, nsaids(aleve, advil, motrin, ibuprofen, naprosyn, naproxen, voltaren, diclofenac), vitamins ( Ergocalciferol, Vitamin D) and supplements one week prior to surgery.     May take Tylenol.  Hold Wegovy one week prior to surgery.( Also sent to [facility] portal)  Verbalizes understanding.

## 2023-11-14 NOTE — TELEPHONE ENCOUNTER
Please contact her to schedule a preop appointment with me, I have to see her in person for this thank you

## 2023-11-15 ENCOUNTER — TELEPHONE (OUTPATIENT)
Dept: PREADMISSION TESTING | Facility: HOSPITAL | Age: 63
End: 2023-11-15
Payer: COMMERCIAL

## 2023-11-15 NOTE — TELEPHONE ENCOUNTER
----- Message from Laurie Cross RN sent at 11/14/2023 12:14 PM CST -----  Surgery 12/11  Please schedule poc, labs and EKG.  Thanks!

## 2023-11-16 ENCOUNTER — PROCEDURE VISIT (OUTPATIENT)
Dept: NEUROLOGY | Facility: CLINIC | Age: 63
End: 2023-11-16
Payer: COMMERCIAL

## 2023-11-16 DIAGNOSIS — M79.2 NEUROGENIC PAIN OF FOOT, LEFT: ICD-10-CM

## 2023-11-16 DIAGNOSIS — R20.0 LEFT LEG NUMBNESS: ICD-10-CM

## 2023-11-16 DIAGNOSIS — M79.601 PAIN IN BOTH UPPER EXTREMITIES: ICD-10-CM

## 2023-11-16 DIAGNOSIS — R20.0 BILATERAL HAND NUMBNESS: ICD-10-CM

## 2023-11-16 DIAGNOSIS — M79.602 PAIN IN BOTH UPPER EXTREMITIES: ICD-10-CM

## 2023-11-16 PROCEDURE — 95913 PR NERVE CONDUCTION STUDY; 13 OR MORE STUDIES: ICD-10-PCS | Mod: S$GLB,,, | Performed by: PSYCHIATRY & NEUROLOGY

## 2023-11-16 PROCEDURE — 95886 PR EMG COMPLETE, W/ NERVE CONDUCTION STUDIES, 5+ MUSCLES: ICD-10-PCS | Mod: S$GLB,,, | Performed by: PSYCHIATRY & NEUROLOGY

## 2023-11-16 PROCEDURE — 95913 NRV CNDJ TEST 13/> STUDIES: CPT | Mod: S$GLB,,, | Performed by: PSYCHIATRY & NEUROLOGY

## 2023-11-16 PROCEDURE — 99214 OFFICE O/P EST MOD 30 MIN: CPT | Mod: 25,S$GLB,, | Performed by: PSYCHIATRY & NEUROLOGY

## 2023-11-16 PROCEDURE — 99214 PR OFFICE/OUTPT VISIT, EST, LEVL IV, 30-39 MIN: ICD-10-PCS | Mod: 25,S$GLB,, | Performed by: PSYCHIATRY & NEUROLOGY

## 2023-11-16 PROCEDURE — 95886 MUSC TEST DONE W/N TEST COMP: CPT | Mod: S$GLB,,, | Performed by: PSYCHIATRY & NEUROLOGY

## 2023-11-16 NOTE — PROCEDURES
EMG W/ ULTRASOUND AND NERVE CONDUCTION TEST 2 Extremities    Date/Time: 11/16/2023 1:00 PM    Performed by: Husam Galindo MD  Authorized by: Guero Alvarez MD                                                                 [facility] [address] Neurology    Subjective:       Patient ID: Renae Hou is a 63 y.o. female here for a EMG focused evaluation for arm and leg pain. Previous visits and diagnostic evaluation reviewed.  Patient has a history of back surgery which she states was successful.  She does describe numbness involving the left lateral leg as well as the left foot.  She also describes progressive numbness of bilateral hands.  She also has neck pain.  Symptoms have been progressively worsening and severe at times.   HPI  Review of patient's allergies indicates:   Allergen Reactions    Cathflo activase [alteplase] Anaphylaxis     Tightness in chest, raspy throat, restless legs, hotness all over    Heparin analogues      Restless legs, tightness in chest, and hot all over    Polyethylene glycol analogues Swelling    Polysorbate 80 Anaphylaxis, Itching and Shortness Of Breath    Xyzal [levocetirizine] Swelling    Latex Swelling            There were no vitals filed for this visit.   Chief Complaint: No chief complaint on file.    Past Medical History:   Diagnosis Date    Abscess of paraspinous muscles 2018    Allergy 04/2018    Class 1 obesity due to excess calories in adult 07/23/2019    DVT (deep venous thrombosis)     left leg    Epidural abscess 2018 04/10/2018    Fatty liver     GERD (gastroesophageal reflux disease) 2015    History of major abdominal surgery 09/11/2015    Had Hysterectomy , cholecystectomy , rectocele repair    Hypertension     Lumbar radiculopathy     Menopause     Obesity     Obstructive sleep apnea on CPAP     no longer uses CPAP after weight loss from gastric sleeve    Thyroid disease     Thyroid nodules.      Social History     Socioeconomic History     Marital status:     Number of children: 2   Tobacco Use    Smoking status: Former     Current packs/day: 0.00     Types: Cigarettes     Quit date: 1988     Years since quittin.8    Smokeless tobacco: Never    Tobacco comments:     smoked socially decades ago - weekends only   Substance and Sexual Activity    Alcohol use: Not Currently     Comment: yearly at most     Drug use: No    Sexual activity: Yes     Partners: Male     Birth control/protection: See Surgical Hx, None     Comment: VINI BS&O 2012,     Social History Narrative    . 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     Social Determinants of Health     Financial Resource Strain: Low Risk  (2023)    Overall Financial Resource Strain (CARDIA)     Difficulty of Paying Living Expenses: Not hard at all   Food Insecurity: No Food Insecurity (2023)    Hunger Vital Sign     Worried About Running Out of Food in the Last Year: Never true     Ran Out of Food in the Last Year: Never true   Transportation Needs: No Transportation Needs (2023)    PRAPARE - Transportation     Lack of Transportation (Medical): No     Lack of Transportation (Non-Medical): No   Physical Activity: Insufficiently Active (2023)    Exercise Vital Sign     Days of Exercise per Week: 2 days     Minutes of Exercise per Session: 30 min   Stress: No Stress Concern Present (2023)    [place] [employer] - Occupational Stress Questionnaire     Feeling of Stress : Not at all   Social Connections: Unknown (2023)    Social Connection and Isolation Panel [NHANES]     Frequency of Communication with Friends and Family: More than three times a week     Frequency of Social Gatherings with Friends and Family: More than three times a week     Active Member of Clubs or Organizations: No     Attends Club or Organization Meetings: Never     Marital Status:    Housing Stability: Low Risk  (2023)    Housing Stability Vital Sign     Unable to  Pay for Housing in the Last Year: No     Number of Places Lived in the Last Year: 1     Unstable Housing in the Last Year: No      Review of Systems:   No Fever  No SOB  No vomiting  No visual disturbance      Objective:      Physical Exam    Constitutional: Patient appears well-nourished.   Head: Normocephalic and atraumatic.   Mouth/Throat: Oropharynx is clear and moist.   Pulmonary/Chest: Effort normal.   Abdominal: Soft.   Skin: Skin is warm and dry.      General:  Patient is alert and cooperative.  Affect:  Patient is appropriate to surroundings and environment.  Language:  Speech is fluent.  HEENT:  There are no outward signs of trauma to head or face.  Cranial Nerves:  Pupils are equal round and reactive to light. Extra-ocular movements are intact. Face, tongue, and palate are symmetrical.  Motor:  Patient exhibits normal strength testing in bilateral proximal and distal upper and lower extremities.  Reflexes:  Symmetrical in bilateral upper and lower extremities at the knees.  Absent left ankle reflex.  Gait:  Ambulation is independent without use of cane or walker without signs of ataxia or circumduction.  Cerebellar:  Normal finger to nose testing without dysmetria.  Sensory:  Intact to sensory modalities tested.  Musculoskeletal:  There is no severe tenderness to palpation and manipulation of cervical and lumbar spine regions.   Assessment:       We reviewed and discussed at length results of EMG performed today revealing evidence of mild chronic left L5 and S1 radiculopathies.  Specifically no significant evidence of tarsal tunnel syndrome on the left.  Normal study of bilateral upper extremities without significant evidence of cervical radiculopathy.  Specifically no significant evidence of carpal tunnel syndrome.  As discussed with the patient consider a repeat study in 8-10 months if hand numbness persists or worsens. These findings are available via media section of chart review.   1. Bilateral hand  numbness    2. Pain in both upper extremities    3. Neurogenic pain of foot, left    4. Left leg numbness              Plan:       We discussed treatment options at length. Recommend patient keep appointment with referring provider.         I spent a total of 35 minutes on the day of the visit. This includes face to face time and non-face to face time preparing to see the patient (eg, review of tests), obtaining and/or reviewing separately obtained history, documenting clinical information in the electronic or other health record, independently interpreting results and communicating results to the patient/family/caregiver, or care coordinator.    Husam Galindo MD, FAAN  11/16/2023   2:23 PM       A dictation device was used to produce this document. Use of such devices sometimes results in grammatical errors or replacement of words that sound similarly.

## 2023-11-20 ENCOUNTER — PATIENT MESSAGE (OUTPATIENT)
Dept: ORTHOPEDICS | Facility: CLINIC | Age: 63
End: 2023-11-20
Payer: COMMERCIAL

## 2023-12-04 ENCOUNTER — OFFICE VISIT (OUTPATIENT)
Dept: INTERNAL MEDICINE | Facility: CLINIC | Age: 63
End: 2023-12-04
Payer: COMMERCIAL

## 2023-12-04 VITALS
WEIGHT: 175 LBS | SYSTOLIC BLOOD PRESSURE: 118 MMHG | DIASTOLIC BLOOD PRESSURE: 60 MMHG | HEIGHT: 65 IN | BODY MASS INDEX: 29.16 KG/M2

## 2023-12-04 DIAGNOSIS — Z98.84 HISTORY OF BARIATRIC SURGERY: ICD-10-CM

## 2023-12-04 DIAGNOSIS — Z01.810 PREOP CARDIOVASCULAR EXAM: Primary | ICD-10-CM

## 2023-12-04 DIAGNOSIS — M54.12 CERVICAL RADICULOPATHY: ICD-10-CM

## 2023-12-04 DIAGNOSIS — J01.90 ACUTE BACTERIAL SINUSITIS: ICD-10-CM

## 2023-12-04 DIAGNOSIS — B96.89 ACUTE BACTERIAL SINUSITIS: ICD-10-CM

## 2023-12-04 PROBLEM — E66.09 OBESITY DUE TO EXCESS CALORIES WITH SERIOUS COMORBIDITY: Status: RESOLVED | Noted: 2022-04-08 | Resolved: 2023-12-04

## 2023-12-04 PROCEDURE — 3008F BODY MASS INDEX DOCD: CPT | Mod: CPTII,S$GLB,, | Performed by: INTERNAL MEDICINE

## 2023-12-04 PROCEDURE — 99999 PR PBB SHADOW E&M-EST. PATIENT-LVL III: ICD-10-PCS | Mod: PBBFAC,,, | Performed by: INTERNAL MEDICINE

## 2023-12-04 PROCEDURE — 3078F PR MOST RECENT DIASTOLIC BLOOD PRESSURE < 80 MM HG: ICD-10-PCS | Mod: CPTII,S$GLB,, | Performed by: INTERNAL MEDICINE

## 2023-12-04 PROCEDURE — 3008F PR BODY MASS INDEX (BMI) DOCUMENTED: ICD-10-PCS | Mod: CPTII,S$GLB,, | Performed by: INTERNAL MEDICINE

## 2023-12-04 PROCEDURE — 3044F HG A1C LEVEL LT 7.0%: CPT | Mod: CPTII,S$GLB,, | Performed by: INTERNAL MEDICINE

## 2023-12-04 PROCEDURE — 3074F PR MOST RECENT SYSTOLIC BLOOD PRESSURE < 130 MM HG: ICD-10-PCS | Mod: CPTII,S$GLB,, | Performed by: INTERNAL MEDICINE

## 2023-12-04 PROCEDURE — 3074F SYST BP LT 130 MM HG: CPT | Mod: CPTII,S$GLB,, | Performed by: INTERNAL MEDICINE

## 2023-12-04 PROCEDURE — 99215 PR OFFICE/OUTPT VISIT, EST, LEVL V, 40-54 MIN: ICD-10-PCS | Mod: S$GLB,,, | Performed by: INTERNAL MEDICINE

## 2023-12-04 PROCEDURE — 3044F PR MOST RECENT HEMOGLOBIN A1C LEVEL <7.0%: ICD-10-PCS | Mod: CPTII,S$GLB,, | Performed by: INTERNAL MEDICINE

## 2023-12-04 PROCEDURE — 3078F DIAST BP <80 MM HG: CPT | Mod: CPTII,S$GLB,, | Performed by: INTERNAL MEDICINE

## 2023-12-04 PROCEDURE — 99999 PR PBB SHADOW E&M-EST. PATIENT-LVL III: CPT | Mod: PBBFAC,,, | Performed by: INTERNAL MEDICINE

## 2023-12-04 PROCEDURE — 99215 OFFICE O/P EST HI 40 MIN: CPT | Mod: S$GLB,,, | Performed by: INTERNAL MEDICINE

## 2023-12-04 RX ORDER — AMOXICILLIN AND CLAVULANATE POTASSIUM 875; 125 MG/1; MG/1
1 TABLET, FILM COATED ORAL 2 TIMES DAILY
Qty: 14 TABLET | Refills: 0 | Status: SHIPPED | OUTPATIENT
Start: 2023-12-04 | End: 2024-01-08

## 2023-12-04 RX ORDER — EPINEPHRINE 0.3 MG/.3ML
1 INJECTION SUBCUTANEOUS ONCE
Qty: 1 EACH | Refills: 3 | Status: SHIPPED | OUTPATIENT
Start: 2023-12-04 | End: 2023-12-05

## 2023-12-04 NOTE — PROGRESS NOTES
CHIEF COMPLAINT:  The patient is here for preoperative medical optimization prior to surgery.    SURGICAL PROCEDURE: discectomy    SURGEON: Marquez    HISTORY OF PRESENT ILLNESS:  The reason for the visit today as a preoperative consultation prior to surgery.    The patient does not have any active cardiac conditions (does NOT have unstable coronary artery disease, decompensated heart failure, arrhythmia or severe valvular heart disease).    The patient has an exercise capacity of greater than 4 METS without symptoms.    The patient has no clinical risk factors of prior heart failure, stroke, TIA, renal or hepatic insufficiency.  The patient has no respiratory illness.  Chronic medical issues have been stable.    PAST MEDICAL HISTORY:  Patient Active Problem List   Diagnosis    RENAE dx 2010, improved after weight loss; also seen 5/23- mild, positional per home sleep study    Chronic constipation    Mixed hyperlipidemia    Pre-diabetes    Chronic pain    History of lumbar surgery    Essential hypertension    Fatty liver    Spondylolisthesis    Gastroesophageal reflux disease without esophagitis    Vaginal atrophy    Dyspareunia, female    Mixed stress and urge urinary incontinence    Rectocele, female    Cystocele, midline    History of DVT (deep vein thrombosis): 2018     Multiple thyroid nodules: stable 5/22, also 5/23 no further follow-up recommended per Radiology    Dysphonia    Cervical radiculopathy    Mallet toe of left foot    Chronic left-sided low back pain with left-sided sciatica    Carpal tunnel syndrome of right wrist: see EMG 10/21    Tubular adenoma of colon: see colonoscopy 2022    Polyarthralgia    COVID 1/22; 12/22    Low serum iron    Decreased strength of lower extremity    Encounter for cardiac risk counseling    Osteopenia of multiple sites: see DEXA 5/23    History of bariatric surgery: Gastric sleeve 2019        SOCIAL HISTORY:  Former smoker.  Rare ETOH    FAMILY HISTORY:  Family History    Problem Relation Age of Onset    Thyroid disease Mother     Hypertension Mother     Hyperlipidemia Mother     Heart disease Mother 55        cad, valvular heart disease    Deep vein thrombosis Mother     Heart disease Father         MI    Stroke Father     Diabetes Sister     Alcohol abuse Sister     Cirrhosis Sister         alcoholic cirrhosis    Epilepsy Sister     Heart disease Daughter         SVT    Thyroid disease Daughter         Hashimoto's    Hashimoto's thyroiditis Daughter     No Known Problems Daughter     Heart disease Maternal Grandmother     Thyroid disease Maternal Grandmother     Kidney disease Maternal Grandmother     Heart disease Paternal Grandmother         MI    Breast cancer Neg Hx     Colon cancer Neg Hx     Ovarian cancer Neg Hx     Esophageal cancer Neg Hx         MEDS AND ALLERGIES: Reviewed/reconciled as per MEDCARD and ALLERGY CARD.    REVIEW OF SYSTEMS:  GENERAL: Feels well, deliberate weight loss.  Sinus infection for several days  HEENT: No blurred vision, headache or ear pain.  CV: No chest pain, pressure, tightness or shortness of breath.  Respiratory: No cough or wheezing.  Gastrointestinal: No nausea, vomiting or diarrhea  Genitourinary: No dysuria or hematuria  Psych:  Denies depression, SI or HI.  No thought disorder.    PE:  HEENT: oropharynx clear.  TMs clear.  Neck: No JVD or bruits  Lungs: Clear to auscultation bilaterally  CV: Regular rate and rhythm without murmurs  Abdomen: Soft and nontender with no masses  Extremities: No edema, clubbing or cyanosis  Psych: Alert and oriented x3, no SI or HI.    DATA: Labs and EKG acceptable; ETT this year wnl    1. Preop cardiovascular exam    2. Cervical radiculopathy  Overview:  Nov 2021 C45, 56, 67 disc and facet degen on Xray and MRI       3. History of bariatric surgery: Gastric sleeve 2019    4. Acute bacterial sinusitis  -     amoxicillin-clavulanate 875-125mg (AUGMENTIN) 875-125 mg per tablet; Take 1 tablet by mouth 2 (two)  times daily.  Dispense: 14 tablet; Refill: 0    Other orders  -     EPINEPHrine (EPIPEN) 0.3 mg/0.3 mL AtIn; Inject 0.3 mLs (0.3 mg total) into the muscle once. for 1 dose  Dispense: 1 each; Refill: 3         PLAN:  1. Continue current medications, MEDCARD reconciled with the patient  2. Discontinue NSAIDs and aspirin 5-7 days prior to procedure  3. Hold Wegovy- 2 weeks (she has done)    PREOPERATIVE RISK ASSESSMENT AND RECOMMENDATIONS:  The patient presents for medical evaluation.  She is undergoing noncardiac surgery which carries a low risk of cardiac complications.    The patient has stable cardiac conditions.  Exercise capacity is good.  She has stable clinical risk factors for surgery.  She would be considered average risk for surgery.    CLEARANCE:  The patient is medically optimized for surgery.  No further testing or intervention is needed.

## 2023-12-05 ENCOUNTER — TELEPHONE (OUTPATIENT)
Dept: PREADMISSION TESTING | Facility: HOSPITAL | Age: 63
End: 2023-12-05
Payer: COMMERCIAL

## 2023-12-06 ENCOUNTER — OFFICE VISIT (OUTPATIENT)
Dept: OTOLARYNGOLOGY | Facility: CLINIC | Age: 63
End: 2023-12-06
Payer: COMMERCIAL

## 2023-12-06 ENCOUNTER — OFFICE VISIT (OUTPATIENT)
Dept: ORTHOPEDICS | Facility: CLINIC | Age: 63
End: 2023-12-06
Payer: COMMERCIAL

## 2023-12-06 ENCOUNTER — HOSPITAL ENCOUNTER (OUTPATIENT)
Dept: PREADMISSION TESTING | Facility: HOSPITAL | Age: 63
Discharge: HOME | End: 2023-12-06
Attending: ORTHOPAEDIC SURGERY
Payer: COMMERCIAL

## 2023-12-06 ENCOUNTER — HOSPITAL ENCOUNTER (OUTPATIENT)
Dept: CARDIOLOGY | Facility: CLINIC | Age: 63
Discharge: HOME | End: 2023-12-06
Payer: COMMERCIAL

## 2023-12-06 ENCOUNTER — ANESTHESIA EVENT (OUTPATIENT)
Dept: SURGERY | Facility: HOSPITAL | Age: 63
End: 2023-12-06
Payer: COMMERCIAL

## 2023-12-06 VITALS
DIASTOLIC BLOOD PRESSURE: 76 MMHG | WEIGHT: 180.56 LBS | BODY MASS INDEX: 30.05 KG/M2 | HEART RATE: 79 BPM | SYSTOLIC BLOOD PRESSURE: 117 MMHG

## 2023-12-06 VITALS
DIASTOLIC BLOOD PRESSURE: 76 MMHG | HEIGHT: 65 IN | WEIGHT: 179.88 LBS | BODY MASS INDEX: 29.97 KG/M2 | SYSTOLIC BLOOD PRESSURE: 117 MMHG | TEMPERATURE: 98 F | HEIGHT: 65 IN | WEIGHT: 180.56 LBS | OXYGEN SATURATION: 97 % | HEART RATE: 79 BPM | BODY MASS INDEX: 30.08 KG/M2

## 2023-12-06 DIAGNOSIS — E04.2 MULTIPLE THYROID NODULES: Primary | ICD-10-CM

## 2023-12-06 DIAGNOSIS — Z98.890 HISTORY OF THYROID SURGERY: ICD-10-CM

## 2023-12-06 DIAGNOSIS — M54.12 CERVICAL RADICULOPATHY: ICD-10-CM

## 2023-12-06 DIAGNOSIS — G95.9 CERVICAL MYELOPATHY: Primary | ICD-10-CM

## 2023-12-06 DIAGNOSIS — Z01.818 PREOPERATIVE TESTING: ICD-10-CM

## 2023-12-06 PROCEDURE — 3008F PR BODY MASS INDEX (BMI) DOCUMENTED: ICD-10-PCS | Mod: CPTII,S$GLB,, | Performed by: OTOLARYNGOLOGY

## 2023-12-06 PROCEDURE — 99999 PR PBB SHADOW E&M-EST. PATIENT-LVL III: ICD-10-PCS | Mod: PBBFAC,,, | Performed by: OTOLARYNGOLOGY

## 2023-12-06 PROCEDURE — 3078F PR MOST RECENT DIASTOLIC BLOOD PRESSURE < 80 MM HG: ICD-10-PCS | Mod: CPTII,S$GLB,, | Performed by: OTOLARYNGOLOGY

## 2023-12-06 PROCEDURE — 1159F PR MEDICATION LIST DOCUMENTED IN MEDICAL RECORD: ICD-10-PCS | Mod: CPTII,S$GLB,, | Performed by: OTOLARYNGOLOGY

## 2023-12-06 PROCEDURE — 99214 PR OFFICE/OUTPT VISIT, EST, LEVL IV, 30-39 MIN: ICD-10-PCS | Mod: S$GLB,,, | Performed by: ORTHOPAEDIC SURGERY

## 2023-12-06 PROCEDURE — 3074F PR MOST RECENT SYSTOLIC BLOOD PRESSURE < 130 MM HG: ICD-10-PCS | Mod: CPTII,S$GLB,, | Performed by: OTOLARYNGOLOGY

## 2023-12-06 PROCEDURE — 31575 PR LARYNGOSCOPY, FLEXIBLE; DIAGNOSTIC: ICD-10-PCS | Mod: S$GLB,,, | Performed by: OTOLARYNGOLOGY

## 2023-12-06 PROCEDURE — 3044F PR MOST RECENT HEMOGLOBIN A1C LEVEL <7.0%: ICD-10-PCS | Mod: CPTII,S$GLB,, | Performed by: OTOLARYNGOLOGY

## 2023-12-06 PROCEDURE — 3008F PR BODY MASS INDEX (BMI) DOCUMENTED: ICD-10-PCS | Mod: CPTII,S$GLB,, | Performed by: ORTHOPAEDIC SURGERY

## 2023-12-06 PROCEDURE — 93005 ELECTROCARDIOGRAM TRACING: CPT | Mod: S$GLB,,, | Performed by: ANESTHESIOLOGY

## 2023-12-06 PROCEDURE — 3008F BODY MASS INDEX DOCD: CPT | Mod: CPTII,S$GLB,, | Performed by: OTOLARYNGOLOGY

## 2023-12-06 PROCEDURE — 3078F DIAST BP <80 MM HG: CPT | Mod: CPTII,S$GLB,, | Performed by: OTOLARYNGOLOGY

## 2023-12-06 PROCEDURE — 99999 PR PBB SHADOW E&M-EST. PATIENT-LVL III: ICD-10-PCS | Mod: PBBFAC,,, | Performed by: ORTHOPAEDIC SURGERY

## 2023-12-06 PROCEDURE — 99999 PR PBB SHADOW E&M-EST. PATIENT-LVL III: CPT | Mod: PBBFAC,,, | Performed by: OTOLARYNGOLOGY

## 2023-12-06 PROCEDURE — 3044F PR MOST RECENT HEMOGLOBIN A1C LEVEL <7.0%: ICD-10-PCS | Mod: CPTII,S$GLB,, | Performed by: ORTHOPAEDIC SURGERY

## 2023-12-06 PROCEDURE — 3008F BODY MASS INDEX DOCD: CPT | Mod: CPTII,S$GLB,, | Performed by: ORTHOPAEDIC SURGERY

## 2023-12-06 PROCEDURE — 99215 OFFICE O/P EST HI 40 MIN: CPT | Mod: 25,S$GLB,, | Performed by: OTOLARYNGOLOGY

## 2023-12-06 PROCEDURE — 31575 DIAGNOSTIC LARYNGOSCOPY: CPT | Mod: S$GLB,,, | Performed by: OTOLARYNGOLOGY

## 2023-12-06 PROCEDURE — 99999 PR PBB SHADOW E&M-EST. PATIENT-LVL III: CPT | Mod: PBBFAC,,, | Performed by: ORTHOPAEDIC SURGERY

## 2023-12-06 PROCEDURE — 99215 PR OFFICE/OUTPT VISIT, EST, LEVL V, 40-54 MIN: ICD-10-PCS | Mod: 25,S$GLB,, | Performed by: OTOLARYNGOLOGY

## 2023-12-06 PROCEDURE — 3074F SYST BP LT 130 MM HG: CPT | Mod: CPTII,S$GLB,, | Performed by: OTOLARYNGOLOGY

## 2023-12-06 PROCEDURE — 93010 EKG 12-LEAD: ICD-10-PCS | Mod: S$GLB,,, | Performed by: INTERNAL MEDICINE

## 2023-12-06 PROCEDURE — 3044F HG A1C LEVEL LT 7.0%: CPT | Mod: CPTII,S$GLB,, | Performed by: ORTHOPAEDIC SURGERY

## 2023-12-06 PROCEDURE — 93010 ELECTROCARDIOGRAM REPORT: CPT | Mod: S$GLB,,, | Performed by: INTERNAL MEDICINE

## 2023-12-06 PROCEDURE — 1159F MED LIST DOCD IN RCRD: CPT | Mod: CPTII,S$GLB,, | Performed by: OTOLARYNGOLOGY

## 2023-12-06 PROCEDURE — 93005 EKG 12-LEAD: ICD-10-PCS | Mod: S$GLB,,, | Performed by: ANESTHESIOLOGY

## 2023-12-06 PROCEDURE — 3044F HG A1C LEVEL LT 7.0%: CPT | Mod: CPTII,S$GLB,, | Performed by: OTOLARYNGOLOGY

## 2023-12-06 PROCEDURE — 99214 OFFICE O/P EST MOD 30 MIN: CPT | Mod: S$GLB,,, | Performed by: ORTHOPAEDIC SURGERY

## 2023-12-06 NOTE — ANESTHESIA PREPROCEDURE EVALUATION
[facility]  Anesthesia Pre-Operative Evaluation         Patient Name: Renae Hou  YOB: 1960  MRN: [id number]    SUBJECTIVE:     Pre-operative evaluation for Procedure(s) (LRB):  DISCECTOMY, SPINE, CERVICAL, ANTERIOR APPROACH, WITH FUSION C4/5 spinewave rep-less SNS:vocal/motors/SSEP (N/A)     12/06/2023      Renae Hou is a 63 y.o. female w/ a significant PMHx of HTN, HLD, RENAE (improved following weight loss), s/p gastric sleeve 2019, fatty liver, spondylolithesis, cervical radiculopathy, chronic back pain, hx of DVT 2018 (Pt had LLE DVT following L4-S1 fusion in July 2018 and completed 6-month course of Xarelto in January 2019.  In anticipation of sleeve gastrectomy in July 2019, pt underwent IVC filter placement, which has since been removed also in 2019), and pre-diabetes (last A1c: 5.8).    Of note, pt had adverse reactions to Heparin and Alteplase in the past. Considering her hx of DVT, would recommend early mobility and mechanical DVT ppx early post-op as tolerated.    Patient now presents for the above procedure(s).    Last echo 9/28/2018:  CONCLUSIONS     1 - Normal left ventricular systolic function (EF 60-65%).     2 - No wall motion abnormalities.     3 - Normal left ventricular diastolic function.     4 - Normal right ventricular systolic function .     5 - The estimated PA systolic pressure is 19 mmHg.     6 - Mild tricuspid regurgitation.       LDA: None documented.       Prev airway:   Present Prior to Hospital Arrival?: No; Placement Date: 07/23/19; Placement Time: 1424; Method of Intubation: Direct laryngoscopy; Inserted by: Anesthesia Resident; Airway Device: Endotracheal Tube; Mask Ventilation: Easy; Intubated: Postinduction; Blade: Panfilo #3; Airway Device Size: 7.0; Style: Cuffed; Cuff Inflation: Minimal occlusive pressure; Placement Verified By: Auscultation, Capnometry, Colorimetric EtCO2 device; Grade: Grade I; Complicating Factors: None;  Intubation Findings: Positive EtCO2, Bilateral breath sounds, Atraumatic/Condition of teeth unchanged;  Depth of Insertion: 21; Securment: Lips; Complications: None; Breath Sounds: Equal Bilateral; Insertion Attempts: 1; Removal Date: 07/23/19;  Removal Time: 1547     Drips: None documented.      Patient Active Problem List   Diagnosis    RENAE dx 2010, improved after weight loss; also seen 5/23- mild, positional per home sleep study    Chronic constipation    Mixed hyperlipidemia    Pre-diabetes    Chronic pain    History of lumbar surgery    Essential hypertension    Fatty liver    Spondylolisthesis    Gastroesophageal reflux disease without esophagitis    Vaginal atrophy    Dyspareunia, female    Mixed stress and urge urinary incontinence    Rectocele, female    Cystocele, midline    History of DVT (deep vein thrombosis): 2018     Multiple thyroid nodules: stable 5/22, also 5/23 no further follow-up recommended per Radiology    Dysphonia    Cervical radiculopathy    Mallet toe of left foot    Chronic left-sided low back pain with left-sided sciatica    Carpal tunnel syndrome of right wrist: see EMG 10/21    Tubular adenoma of colon: see colonoscopy 2022    Polyarthralgia    COVID 1/22; 12/22    Low serum iron    Decreased strength of lower extremity    Encounter for cardiac risk counseling    Osteopenia of multiple sites: see DEXA 5/23    History of bariatric surgery: Gastric sleeve 2019       Review of patient's allergies indicates:   Allergen Reactions    Cathflo activase [alteplase] Anaphylaxis     Tightness in chest, raspy throat, restless legs, hotness all over    Heparin analogues      Restless legs, tightness in chest, and hot all over    Xyzal [levocetirizine] Swelling    Latex Swelling             Current Inpatient Medications:      Current Outpatient Medications on File Prior to Encounter   Medication Sig Dispense Refill    amoxicillin-clavulanate 875-125mg (AUGMENTIN) 875-125 mg per tablet Take 1 tablet  by mouth 2 (two) times daily. 14 tablet 0    EPINEPHrine (EPIPEN) 0.3 mg/0.3 mL AtIn Inject 0.3 mLs (0.3 mg total) into the muscle once. for 1 dose 1 each 3    ergocalciferol (ERGOCALCIFEROL) 50,000 unit Cap Take 1 capsule (50,000 Units total) by mouth every 7 days. 12 capsule 3    ferrous gluconate (FERGON) 324 MG tablet Every other day with orange juice 30 tablet 12    gabapentin (NEURONTIN) 100 MG capsule 1-3 capsules at bedtime 90 capsule 2    hydroCHLOROthiazide (HYDRODIURIL) 12.5 MG Tab Take 1 tablet (12.5 mg total) by mouth once daily. 90 tablet 3    ibuprofen (ADVIL,MOTRIN) 100 MG tablet Take 600 mg by mouth every 6 (six) hours as needed for Temperature greater than.      semaglutide, weight loss, (WEGOVY) 1.7 mg/0.75 mL PnIj Inject 1.7 mg into the skin every 7 days. (Patient taking differently: Inject 1.7 mg into the skin every 7 days. SUNDAYS) 9 mL 0     No current facility-administered medications on file prior to encounter.       Past Surgical History:   Procedure Laterality Date    ADENOIDECTOMY  1995    BACK SURGERY  2002    L4, L5    BILATERAL SALPINGOOPHORECTOMY      CHOLECYSTECTOMY      COLONOSCOPY N/A 6/13/2022    Procedure: COLONOSCOPY;  Surgeon: Saul Ureña MD;  Location: [address]);  Service: Endoscopy;  Laterality: N/A;  constipation protocol-extended Miralax prep - PEG allergy -okay with taking Miralax  fully vaccinated, prep instr portal -ml    COLONOSCOPY N/A 10/4/2022    Procedure: COLONOSCOPY;  Surgeon: Saul Ureña MD;  Location: [address]);  Service: Endoscopy;  Laterality: N/A;  Constipation protocol-Miralax prep-pt tolerated Miralax for last colonoscopy.  fully vaccinated, prep instr portal -ml    COLONOSCOPY N/A 10/9/2023    Procedure: COLONOSCOPY;  Surgeon: Saul Ureña MD;  Location: [address]);  Service: Endoscopy;  Laterality: N/A;  Ref by Dr JULIANA Horowitz, [facility]- pt instructed to hold x 7 days prior procedure,  Miralax, portal  - PC  10/2-precall complete/pt aware to hold wegovy x7 days-MS    CORRECTION OF HAMMER TOE Left 8/5/2021    Procedure: CORRECTION, HAMMER TOE Left 4th mallet toe, DIP joitn fusion;  Surgeon: Guero Alvarez MD;  Location: [facility] OR;  Service: Orthopedics;  Laterality: Left;  K-wires    CYST REMOVAL      EPIDURAL STEROID INJECTION INTO CERVICAL SPINE N/A 8/11/2020    Procedure: Injection-steroid-epidural-cervical;  Surgeon: [facility] Diagnostic Provider;  Location: [facility] 2ND FLR;  Service: Radiology;  Laterality: N/A;  /Carla    EPIDURAL STEROID INJECTION INTO CERVICAL SPINE N/A 3/2/2022    Procedure: Injection-steroid-epidural-cervical C7-T1;  Surgeon: Latoya Quintanilla MD;  Location: [facility] PAIN T;  Service: Pain Management;  Laterality: N/A;    ESOPHAGOGASTRODUODENOSCOPY N/A 5/3/2019    Procedure: ESOPHAGOGASTRODUODENOSCOPY (EGD);  Surgeon: Carlin Sanchez MD;  Location: [facility] (4TH FLR);  Service: Endoscopy;  Laterality: N/A;    HYSTERECTOMY  12/17/2012    [facility] BS&O     INJECTION OF JOINT Left 3/1/2021    Procedure: INJECTION, JOINT LEFT HIP INTRA ARTICULAR CORTISONE INJECTION DIRECT REFERRAL;  Surgeon: Chuy Gregory MD;  [facility];  Service: Pain Management;  Laterality: Left;  NEEDS CONSENT    INSERTION OF INFERIOR VENA CAVAL FILTER Right 7/19/2019    Procedure: IVC filter placement;  Surgeon: Rodney Rico MD;  Location: [facility] CATH LAB;  Service: Peripheral Vascular;  Laterality: Right;    IVC FILTER RETRIEVAL N/A 12/20/2019    Procedure: REMOVAL-FILTER-IVC;  Surgeon: Rodney Rico MD;  Location: [facility] OR 2ND FLR;  Service: Peripheral Vascular;  Laterality: N/A;    LAPAROSCOPIC SLEEVE GASTRECTOMY N/A 7/23/2019    Procedure: GASTRECTOMY, SLEEVE, LAPAROSCOPIC, with intraop EGD 89614;  Surgeon: Duc Ratliff Jr., MD;  Location: [facility] OR 2ND FLR;  Service: General;  Laterality: N/A;    RECTOCELE REPAIR      SPINE SURGERY  2018    THYROID NODULE REMOVAL      TONSILLECTOMY          Social History     Socioeconomic History    Marital status:     Number of children: 2   Tobacco Use    Smoking status: Former     Current packs/day: 0.00     Types: Cigarettes     Quit date: 1988     Years since quittin.9    Smokeless tobacco: Never    Tobacco comments:     smoked socially decades ago - weekends only   Substance and Sexual Activity    Alcohol use: Not Currently     Comment: yearly at most     Drug use: No    Sexual activity: Yes     Partners: Male     Birth control/protection: See Surgical Hx, None     Comment: [facility] BS&O 2012,     Social History Narrative    . 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     Social Determinants of Health     Financial Resource Strain: Low Risk  (2023)    Overall Financial Resource Strain (CARDIA)     Difficulty of Paying Living Expenses: Not hard at all   Food Insecurity: No Food Insecurity (2023)    Hunger Vital Sign     Worried About Running Out of Food in the Last Year: Never true     Ran Out of Food in the Last Year: Never true   Transportation Needs: No Transportation Needs (2023)    PRAPARE - Transportation     Lack of Transportation (Medical): No     Lack of Transportation (Non-Medical): No   Physical Activity: Insufficiently Active (2023)    Exercise Vital Sign     Days of Exercise per Week: 2 days     Minutes of Exercise per Session: 20 min   Stress: No Stress Concern Present (2023)    [employer] [employer] - Occupational Stress Questionnaire     Feeling of Stress : Not at all   Social Connections: Unknown (2023)    Social Connection and Isolation Panel [NHANES]     Frequency of Communication with Friends and Family: More than three times a week     Frequency of Social Gatherings with Friends and Family: Twice a week     Active Member of Clubs or Organizations: No     Attends Club or Organization Meetings: Never     Marital Status:    Housing Stability: Low Risk  (2023)     Housing Stability Vital Sign     Unable to Pay for Housing in the Last Year: No     Number of Places Lived in the Last Year: 1     Unstable Housing in the Last Year: No       OBJECTIVE:     Vital Signs Range (Last 24H):         Significant Labs:  Lab Results   Component Value Date    WBC 6.94 08/07/2023    HGB 14.3 08/07/2023    HCT 42.0 08/07/2023     08/07/2023    CHOL 212 (H) 05/12/2023    TRIG 127 05/12/2023    HDL 63 05/12/2023    ALT 13 05/12/2023    AST 19 05/12/2023     05/12/2023    K 4.0 05/12/2023     05/12/2023    CREATININE 0.9 08/25/2023    BUN 19 05/12/2023    CO2 28 05/12/2023    TSH 1.434 05/12/2023    INR 1.0 08/07/2023    HGBA1C 5.8 (H) 05/12/2023       Diagnostic Studies: No relevant studies.    EKG:   Results for orders placed or performed during the hospital encounter of 05/06/22   EKG 12-lead    Collection Time: 05/06/22  7:35 AM    Narrative    Test Reason : Z00.00,    Vent. Rate : 081 BPM     Atrial Rate : 081 BPM     P-R Int : 146 ms          QRS Dur : 084 ms      QT Int : 368 ms       P-R-T Axes : 049 020 049 degrees     QTc Int : 427 ms    Normal sinus rhythm with sinus arrhythmia  Normal ECG  When compared with ECG of 06-APR-2022 10:42,  Nonspecific T wave abnormality no longer evident in Anterior leads  Confirmed by Santi Ziegler MD ([id number]) on 5/6/2022 8:12:04 AM    Referred By: LATOYA SCOTT           Confirmed By:Santi Ziegler MD       2D ECHO:  TTE:  No results found. However, due to the size of the patient record, not all encounters were searched. Please check Results Review for a complete set of results.    JOB:  No results found. However, due to the size of the patient record, not all encounters were searched. Please check Results Review for a complete set of results.    ASSESSMENT/PLAN:           Pre-op Assessment    I have reviewed the Patient Summary Reports.     I have reviewed the Nursing Notes. I have reviewed the NPO Status.   I have reviewed the  Medications.     Review of Systems  Anesthesia Hx:  No problems with previous Anesthesia   History of prior surgery of interest to airway management or planning:          Denies Family Hx of Anesthesia complications.    Denies Personal Hx of Anesthesia complications.                    Social:  Former Smoker 36 years ago      Hematology/Oncology:  Hematology Normal                                     EENT/Dental:  EENT/Dental Normal           Cardiovascular:     Hypertension   Denies MI.  Denies CAD.    Denies CABG/stent.     Denies CHF.    hyperlipidemia                             Pulmonary:    Denies COPD.     Sleep Apnea                Renal/:  Renal/ Normal                 Hepatic/GI:     GERD Liver Disease,  S/p gastric sleeve          Musculoskeletal:  Musculoskeletal Normal                Neurological:  Denies TIA.  Denies CVA. Neuromuscular Disease,             Chronic Pain Syndrome                             Physical Exam  General: Well nourished, Cooperative, Alert and Oriented    Airway:  Mallampati: II / I  Mouth Opening: Normal  TM Distance: Normal  Tongue: Normal  Neck ROM: Normal ROM    Dental:  Intact        Anesthesia Plan  Type of Anesthesia, risks & benefits discussed:    Anesthesia Type: Gen ETT  Intra-op Monitoring Plan: Standard ASA Monitors  Post Op Pain Control Plan: multimodal analgesia and IV/PO Opioids PRN  Induction:  IV  Airway Plan: Direct and Video, Post-Induction  Informed Consent: Informed consent signed with the Patient and all parties understand the risks and agree with anesthesia plan.  All questions answered.   ASA Score: 3  Day of Surgery Review of History & Physical: H&P Update referred to the surgeon/provider.    Ready For Surgery From Anesthesia Perspective.     .

## 2023-12-06 NOTE — PROGRESS NOTES
Chief Complaint   Patient presents with    vc neck         63 y.o. female presents for evaluation of vocal cord function for upcoming anterior cervical spine surgery on 12/11/23. History of left thyroid lobectomy in 2010. No current voice issues.      Review of Systems     Constitutional: Negative for fatigue and unexpected weight change.   HENT: per HPI.  Eyes: Negative for visual disturbance.   Respiratory: Negative for shortness of breath, hemoptysis  Cardiovascular: Negative for chest pain and palpitations.   Genitourinary: Negative for dysuria and difficulty urinating.   Musculoskeletal: Negative for decreased ROM, back pain.   Skin: Negative for rash, sunburn, itching.   Neurological: Negative for dizziness and seizures.   Hematological: Negative for adenopathy. Does not bruise/bleed easily.   Psychiatric/Behavioral: Negative for agitation. The patient is not nervous/anxious.   Endocrine: Negative for rapid weight loss/weight gain, heat/cold intolerance.     Past Medical History:   Diagnosis Date    Abscess of paraspinous muscles 2018    Allergy 04/2018    Class 1 obesity due to excess calories in adult 07/23/2019    DVT (deep venous thrombosis)     left leg    Epidural abscess 2018 04/10/2018    Fatty liver     GERD (gastroesophageal reflux disease) 2015    History of major abdominal surgery 09/11/2015    Had Hysterectomy , cholecystectomy , rectocele repair    Hypertension     Lumbar radiculopathy     Menopause     Obesity     Obstructive sleep apnea on CPAP     no longer uses CPAP after weight loss from gastric sleeve    Thyroid disease     Thyroid nodules.       Past Surgical History:   Procedure Laterality Date    ADENOIDECTOMY  1995    BACK SURGERY  2002    L4, L5    BILATERAL SALPINGOOPHORECTOMY      CHOLECYSTECTOMY      COLONOSCOPY N/A 6/13/2022    Procedure: COLONOSCOPY;  Surgeon: Saul Ureña MD;  Location: [address];  Service: Endoscopy;  Laterality: N/A;  constipation  protocol-extended Miralax prep - PEG allergy -okay with taking Miralax  fully vaccinated, prep instr portal -ml    COLONOSCOPY N/A 10/4/2022    Procedure: COLONOSCOPY;  Surgeon: Saul Ureña MD;  Location: [facility] (4TH FLR);  Service: Endoscopy;  Laterality: N/A;  Constipation protocol-Miralax prep-pt tolerated Miralax for last colonoscopy.  fully vaccinated, prep instr portal -ml    COLONOSCOPY N/A 10/9/2023    Procedure: COLONOSCOPY;  Surgeon: Saul Ureña MD;  Location: [facility] (4TH FLR);  Service: Endoscopy;  Laterality: N/A;  Ref by Dr JULIANA Horowitz, [facility]- pt instructed to hold x 7 days prior procedure,  Miralax, portal - PC  10/2-precall complete/pt aware to hold wegovy x7 days-MS    CORRECTION OF HAMMER TOE Left 8/5/2021    Procedure: CORRECTION, HAMMER TOE Left 4th mallet toe, DIP joitn fusion;  Surgeon: Guero Alvarez MD;  Location: [facility] OR;  Service: Orthopedics;  Laterality: Left;  K-wires    CYST REMOVAL      EPIDURAL STEROID INJECTION INTO CERVICAL SPINE N/A 8/11/2020    Procedure: Injection-steroid-epidural-cervical;  Surgeon: [facility] Diagnostic Provider;  Location: [facility] 2ND FLR;  Service: Radiology;  Laterality: N/A;  /Carla    EPIDURAL STEROID INJECTION INTO CERVICAL SPINE N/A 3/2/2022    Procedure: Injection-steroid-epidural-cervical C7-T1;  Surgeon: Latoya Quintanilla MD;  Location: [facility] PAIN MGT;  Service: Pain Management;  Laterality: N/A;    ESOPHAGOGASTRODUODENOSCOPY N/A 5/3/2019    Procedure: ESOPHAGOGASTRODUODENOSCOPY (EGD);  Surgeon: Carlin Sanchez MD;  Location: [facility] (4TH FLR);  Service: Endoscopy;  Laterality: N/A;    HYSTERECTOMY  12/17/2012    DV BS&O     INJECTION OF JOINT Left 3/1/2021    Procedure: INJECTION, JOINT LEFT HIP INTRA ARTICULAR CORTISONE INJECTION DIRECT REFERRAL;  Surgeon: Chuy Gregory MD;  Location: [facility] PAIN MGT;  Service: Pain Management;  Laterality: Left;  NEEDS CONSENT    INSERTION OF INFERIOR VENA CAVAL FILTER Right  7/19/2019    Procedure: IVC filter placement;  Surgeon: Rodney Rico MD;  Location: [facility] CATH LAB;  Service: Peripheral Vascular;  Laterality: Right;    IVC FILTER RETRIEVAL N/A 12/20/2019    Procedure: REMOVAL-FILTER-IVC;  Surgeon: Rodney Rico MD;  [facility];  Service: Peripheral Vascular;  Laterality: N/A;    LAPAROSCOPIC SLEEVE GASTRECTOMY N/A 7/23/2019    Procedure: GASTRECTOMY, SLEEVE, LAPAROSCOPIC, with intraop EGD [id number];  Surgeon: Duc Ratliff Jr., MD;  [facility];  Service: General;  Laterality: N/A;    RECTOCELE REPAIR      SPINE SURGERY  2018    THYROID NODULE REMOVAL      TONSILLECTOMY  1995       family history includes Alcohol abuse in her sister; Cirrhosis in her sister; Deep vein thrombosis in her mother; Diabetes in her sister; Epilepsy in her sister; Hashimoto's thyroiditis in her daughter; Heart disease in her daughter, father, maternal grandmother, and paternal grandmother; Heart disease (age of onset: 55) in her mother; Hyperlipidemia in her mother; Hypertension in her mother; Kidney disease in her maternal grandmother; No Known Problems in her daughter; Stroke in her father; Thyroid disease in her daughter, maternal grandmother, and mother.    Pt  reports that she quit smoking about 35 years ago. Her smoking use included cigarettes. She has never used smokeless tobacco. She reports that she does not currently use alcohol. She reports that she does not use drugs.    Review of patient's allergies indicates:   Allergen Reactions    Cathflo activase [alteplase] Anaphylaxis     Tightness in chest, raspy throat, restless legs, hotness all over    Heparin analogues      Restless legs, tightness in chest, and hot all over    Xyzal [levocetirizine] Swelling    Latex Swelling              Physical Exam    Vitals:    12/06/23 1424   BP: 117/76   Pulse: 79     Body mass index is 30.05 kg/m².    Physical Exam  Vitals and nursing note reviewed.    Constitutional:       General: She is not in acute distress.     Appearance: She is well-developed. She is not diaphoretic.   HENT:      Head: Normocephalic and atraumatic.      Right Ear: Hearing and external ear normal. No decreased hearing noted.      Left Ear: Hearing and external ear normal. No decreased hearing noted.      Nose: Nose normal.      Mouth/Throat:      Mouth: Mucous membranes are moist. No oral lesions.      Tongue: No lesions.      Palate: No mass and lesions.      Pharynx: Oropharynx is clear. Uvula midline.   Eyes:      General: Lids are normal.         Right eye: No discharge.         Left eye: No discharge.      Conjunctiva/sclera: Conjunctivae normal.      Pupils: Pupils are equal, round, and reactive to light.   Neck:      Thyroid: No thyroid mass or thyromegaly.      Trachea: Trachea and phonation normal. No tracheal tenderness or tracheal deviation.     Cardiovascular:      Heart sounds: Normal heart sounds.   Pulmonary:      Breath sounds: Normal breath sounds. No stridor.   Abdominal:      Palpations: Abdomen is soft.   Musculoskeletal:      Cervical back: Normal range of motion and neck supple. No edema or erythema.   Lymphadenopathy:      Cervical: No cervical adenopathy.   Skin:     General: Skin is warm and dry.      Coloration: Skin is not pale.      Findings: No erythema or rash.   Neurological:      Mental Status: She is alert and oriented to person, place, and time.        Procedure: Flexible laryngoscopy  In order to fully examine the upper aerodigestive tract, including the larynx, in a patient with a hyperactive gag reflex, flexible endoscopy is required.  After explaining the procedure and obtaining verbal consent, a timeout was performed with the patient's participation according to the universal protocol. Both nasal cavities were anesthetized with 4% Xylocaine spray mixed with Jone-Synephrine. The flexible laryngoscope was inserted into the nasal cavity and advanced to  visualize the nasal cavity, nasopharynx, the posterior oropharynx, hypopharynx, and the endolarynx with the above findings noted. The scope was removed and the procedure terminated. The patient tolerated this procedure well without apparent complication.      Nasopharynx - the torus is clear. There are no lesions of the posterior wall.   Oropharynx - no lesions of the tongue base. There is no obvious fullness or asymmetry.  Hypopharynx - there are no lesions of the pyriform sinuses or postcricoid region   Larynx - there are no lesions of the supraglottic or glottic larynx. Vocal fold mobility is normal with complete closure.      Assessment     1. Multiple thyroid nodules    2. History of thyroid surgery    3. Cervical radiculopathy          Plan  In summary, Ms. Hou is a 63 year old female with history of left thyroid lobectomy undergoing ACDF next week. No voice complaints. FL with normal VC function today. Findings discussed with the patient and surgeon. All questions were answered. Return to clinic if new concerns occur.

## 2023-12-08 ENCOUNTER — TELEPHONE (OUTPATIENT)
Dept: ORTHOPEDICS | Facility: CLINIC | Age: 63
End: 2023-12-08
Payer: COMMERCIAL

## 2023-12-08 NOTE — TELEPHONE ENCOUNTER
Spoke to pt, informed herarrival time for surgery tomorrow is 0900 at the [place] location, [address]. No food or drink after midnight.  Pt pleased and verbalized understanding.

## 2023-12-10 RX ORDER — ACETAMINOPHEN 500 MG
1000 TABLET ORAL EVERY 8 HOURS
Qty: 60 TABLET | Refills: 0 | Status: SHIPPED | OUTPATIENT
Start: 2023-12-10 | End: 2024-01-08

## 2023-12-10 RX ORDER — OXYCODONE HYDROCHLORIDE 5 MG/1
5 TABLET ORAL EVERY 4 HOURS PRN
Qty: 30 TABLET | Refills: 0 | Status: SHIPPED | OUTPATIENT
Start: 2023-12-10 | End: 2024-01-08

## 2023-12-10 RX ORDER — METHOCARBAMOL 750 MG/1
750 TABLET, FILM COATED ORAL EVERY 8 HOURS PRN
Qty: 30 TABLET | Refills: 0 | Status: SHIPPED | OUTPATIENT
Start: 2023-12-10 | End: 2023-12-22

## 2023-12-10 NOTE — DISCHARGE INSTRUCTIONS
DR. SARA PRITCHARD'S POSTOPERATIVE INSTRUCTIONS -   ANTERIOR CERVICAL DISCECTOMY AND FUSION       Antibiotics: You do not need additional antibiotics at home.    NSAIDs: Please refrain from taking ibuprofen (Advil), naproxen (Aleve), and other non steroidal anti-inflammatory medications.    Wound Care: You may remove your dressing and shower 7 days after surgery. Until then please keep your wound clean and dry. Sponge baths are acceptable. Do not go in a pool or hot tub until seen in clinic. Please leave the small steri-strips covering your wound in place until they fall off naturally (2 weeks). You may notice clear suture ends hanging from the sides of your incision after the steri-strips are removed, it is ok to clip these with scissors.    Cervical Collar: If given a collar after surgery you should wear it at all times. The only times it may be removed are for sleeping, eating and showering.    Pain: We will use a multimodal approach for pain management after your surgery.  You will be given a prescription for pain medicine when you are discharged from the hospital.  You will also be given prescriptions for Robaxin (a muscle relaxer),  and Tylenol.  Please note: you will only be given ONE prescription for narcotics when you are discharged from the hospital.  This medication is for breakthrough pain only. This medication will not be refilled.  The other medications given to you may be refilled if needed.      Difficulty Breathing: This is uncommon after surgery and may represent dangerous swelling in your neck. If you experience difficulty breathing please call 911 immediately.    Infection: Signs of infection include increasing wound drainage and redness around the wound, as well as a temperature over 101.5 degrees. It is unnecessary to take your temperature on a routine basis. Please call the above number if you are concerned about an infection.    Driving and Work: It is ok to return to driving and work as long  as you are not taking narcotic pain medications or wearing your cervical collar. Please do not lift over 5 pounds or participate in exercise or sports until cleared by Dr. Zayas.    Deep Venous Thrombosis (Blood Clots): Symptoms include swelling in the legs and shortness of breath. Please call the office or proceed to the nearest emergency room if you have any of these symptoms.    Physical Therapy: The best physical therapy immediately after surgery is walking. Please try to walk as much as possible.    Follow-up: You will be scheduled for a follow-up appointment in 4 weeks with either Dr. Zayas or his physician assistant, Mila Crane PA-C.    Questions: During business hours please call [phone] for routine questions. For after hours questions please call [phone] and ask to speak with the Orthopaedic resident on call.    Disability: If you submitted short term disability paperwork for us to complete and would like to check the status, please call the Disability Department at [phone].  You may fax any necessary paperwork to [phone].

## 2023-12-10 NOTE — H&P
CHIEF COMPLAINT: neck pain and right arm pain    HISTORY:  Renae Hou is a 62 y.o. female who returns to me today for follow up of neck pain.  She was last seen by me 10/20/2021.  She had a cervical NEERAJ 4/4/23 that gave her some relief for a few months.  Today she is doing well but notes increased neck and right arm pain.  There is numbness and tingling.  She has been leaving work early because of the pain and the pain wakes her up at night.  She is taking motrin regularly with little relief.      The Patient denies myelopathic symptoms such as handwriting changes or difficulty with buttons/coins/keys. Denies perineal paresthesias, bowel/bladder dysfunction.    PAST MEDICAL/SURGICAL HISTORY:  Past Medical History:   Diagnosis Date    Abscess of paraspinous muscles 2018    Allergy 04/2018    Class 1 obesity due to excess calories in adult 07/23/2019    DVT (deep venous thrombosis)     left leg    Epidural abscess 2018 04/10/2018    Fatty liver     GERD (gastroesophageal reflux disease) 2015    History of major abdominal surgery 09/11/2015    Had Hysterectomy , cholecystectomy , rectocele repair    Hypertension     Lumbar radiculopathy     Menopause     Obesity     Obstructive sleep apnea on CPAP     no longer uses CPAP after weight loss from gastric sleeve    Thyroid disease     Thyroid nodules.     Past Surgical History:   Procedure Laterality Date    ADENOIDECTOMY  1995    BACK SURGERY  2002    L4, L5    BILATERAL SALPINGOOPHORECTOMY      CHOLECYSTECTOMY      COLONOSCOPY N/A 6/13/2022    Procedure: COLONOSCOPY;  Surgeon: Saul Ureña MD;  Location: [address]);  Service: Endoscopy;  Laterality: N/A;  constipation protocol-extended Miralax prep - PEG allergy -okay with taking Miralax  fully vaccinated, prep instr portal -ml    COLONOSCOPY N/A 10/4/2022    Procedure: COLONOSCOPY;  Surgeon: Saul Ureña MD;  Location: [address]);  Service: Endoscopy;  Laterality: N/A;  Constipation  protocol-Miralax prep-pt tolerated Miralax for last colonoscopy.  fully vaccinated, prep instr portal -ml    COLONOSCOPY N/A 10/9/2023    Procedure: COLONOSCOPY;  Surgeon: Saul Ureña MD;  Location: [facility] (4TH FLR);  Service: Endoscopy;  Laterality: N/A;  Ref by Dr JULIANA Horowitz, [facility]- pt instructed to hold x 7 days prior procedure,  Miralax, portal - PC  10/2-precall complete/pt aware to hold wegovy x7 days-MS    CORRECTION OF HAMMER TOE Left 8/5/2021    Procedure: CORRECTION, HAMMER TOE Left 4th mallet toe, DIP joitn fusion;  Surgeon: Guero Alvarez MD;  Location: [facility] OR;  Service: Orthopedics;  Laterality: Left;  K-wires    CYST REMOVAL      EPIDURAL STEROID INJECTION INTO CERVICAL SPINE N/A 8/11/2020    Procedure: Injection-steroid-epidural-cervical;  Surgeon: [facility];  Location: [facility] 2ND FLR;  Service: Radiology;  Laterality: N/A;  /[place]    EPIDURAL STEROID INJECTION INTO CERVICAL SPINE N/A 3/2/2022    Procedure: Injection-steroid-epidural-cervical C7-T1;  Surgeon: Latoya Quintanilla MD;  Location: [facility] PAIN MGT;  Service: Pain Management;  Laterality: N/A;    ESOPHAGOGASTRODUODENOSCOPY N/A 5/3/2019    Procedure: ESOPHAGOGASTRODUODENOSCOPY (EGD);  Surgeon: Carlin Sanchez MD;  Location: [facility] (4TH FLR);  Service: Endoscopy;  Laterality: N/A;    HYSTERECTOMY  12/17/2012    [facility] BS&O     INJECTION OF JOINT Left 3/1/2021    Procedure: INJECTION, JOINT LEFT HIP INTRA ARTICULAR CORTISONE INJECTION DIRECT REFERRAL;  Surgeon: Chuy Gregory MD;  Location: [facility] PAIN MGT;  Service: Pain Management;  Laterality: Left;  NEEDS CONSENT    INSERTION OF INFERIOR VENA CAVAL FILTER Right 7/19/2019    Procedure: IVC filter placement;  Surgeon: Rodney Rico MD;  Location: [facility] CATH LAB;  Service: Peripheral Vascular;  Laterality: Right;    IVC FILTER RETRIEVAL N/A 12/20/2019    Procedure: REMOVAL-FILTER-IVC;  Surgeon: Rodney Rico MD;  Location: [facility] OR 2ND FLR;   Service: Peripheral Vascular;  Laterality: N/A;    LAPAROSCOPIC SLEEVE GASTRECTOMY N/A 2019    Procedure: GASTRECTOMY, SLEEVE, LAPAROSCOPIC, with intraop EGD 60824;  Surgeon: Duc Ratliff Jr., MD;  Location: [facility] OR [address] [place] [address];  Service: General;  Laterality: N/A;    RECTOCELE REPAIR      SPINE SURGERY  2018    THYROID NODULE REMOVAL      TONSILLECTOMY         Current Medications: No current facility-administered medications for this encounter.    Current Outpatient Medications:     amoxicillin-clavulanate 875-125mg (AUGMENTIN) 875-125 mg per tablet, Take 1 tablet by mouth 2 (two) times daily., Disp: 14 tablet, Rfl: 0    EPINEPHrine (EPIPEN) 0.3 mg/0.3 mL AtIn, Inject 0.3 mLs (0.3 mg total) into the muscle once. for 1 dose, Disp: 1 each, Rfl: 3    ergocalciferol (ERGOCALCIFEROL) 50,000 unit Cap, Take 1 capsule (50,000 Units total) by mouth every 7 days., Disp: 12 capsule, Rfl: 3    ferrous gluconate (FERGON) 324 MG tablet, Every other day with orange juice, Disp: 30 tablet, Rfl: 12    gabapentin (NEURONTIN) 100 MG capsule, 1-3 capsules at bedtime, Disp: 90 capsule, Rfl: 2    hydroCHLOROthiazide (HYDRODIURIL) 12.5 MG Tab, Take 1 tablet (12.5 mg total) by mouth once daily., Disp: 90 tablet, Rfl: 3    ibuprofen (ADVIL,MOTRIN) 100 MG tablet, Take 600 mg by mouth every 6 (six) hours as needed for Temperature greater than., Disp: , Rfl:     semaglutide, weight loss, (WEGOVY) 1.7 mg/0.75 mL PnIj, Inject 1.7 mg into the skin every 7 days. (Patient taking differently: Inject 1.7 mg into the skin every 7 days. SUNDAYS), Disp: 9 mL, Rfl: 0    Social History:   Social History     Socioeconomic History    Marital status:     Number of children: 2   Tobacco Use    Smoking status: Former     Current packs/day: 0.00     Types: Cigarettes     Quit date: 1988     Years since quittin.9    Smokeless tobacco: Never    Tobacco comments:     smoked socially decades ago - weekends only   Substance and Sexual  Activity    Alcohol use: Not Currently     Comment: yearly at most     Drug use: No    Sexual activity: Yes     Partners: Male     Birth control/protection: See Surgical Hx, None     Comment: [facility] BS&O 12/17/2012,     Social History Narrative    . 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     Social Determinants of Health     Financial Resource Strain: Low Risk  (11/29/2023)    Overall Financial Resource Strain (CARDIA)     Difficulty of Paying Living Expenses: Not hard at all   Food Insecurity: No Food Insecurity (11/29/2023)    Hunger Vital Sign     Worried About Running Out of Food in the Last Year: Never true     Ran Out of Food in the Last Year: Never true   Transportation Needs: No Transportation Needs (11/29/2023)    PRAPARE - Transportation     Lack of Transportation (Medical): No     Lack of Transportation (Non-Medical): No   Physical Activity: Insufficiently Active (11/29/2023)    Exercise Vital Sign     Days of Exercise per Week: 2 days     Minutes of Exercise per Session: 20 min   Stress: No Stress Concern Present (11/29/2023)    [employer] [place] [employer] - Occupational Stress Questionnaire     Feeling of Stress : Not at all   Social Connections: Unknown (11/29/2023)    Social Connection and Isolation Panel [NHANES]     Frequency of Communication with Friends and Family: More than three times a week     Frequency of Social Gatherings with Friends and Family: Twice a week     Active Member of Clubs or Organizations: No     Attends Club or Organization Meetings: Never     Marital Status:    Housing Stability: Low Risk  (11/29/2023)    Housing Stability Vital Sign     Unable to Pay for Housing in the Last Year: No     Number of Places Lived in the Last Year: 1     Unstable Housing in the Last Year: No        EXAM:  LMP 11/25/2012     Gait: Normal station and gait, no difficulty with toe or heel walk.   Skin: Cervical skin negative for rashes, lesions, hairy patches and surgical scars.  Range of  motion: Cervical range of motion is acceptable. There is no tenderness to palpation.  Spinal Balance: Global saggital and coronal spinal balance acceptable, no significant for scoliosis and kyphosis.  Musculoskeletal: No pain with the range of motion of the bilateral shoulders and elbows. Normal bulk and contour of the bilateral hands.  Neurological: Normal strength and tone in all major motor groups in the bilateral upper and lower extremities. Normal sensation to light touch in the C5-T1 and L2-S1 dermatomes bilaterally.  Deep tendon reflexes symmetric in the bilateral upper and lower extremities.  Negative Inverted Radial Reflex and Zavala's bilaterally. Negative Babinski bilaterally.     IMAGING:   Today I reviewed new cervical spine radiographs, as well as recent MRI of the cervical spine demonstrate the following:  There is loss of disc height at C4-5, there is notable C6-7 spondylosis.  The MRI demonstrates notable C4-5 stenosis, right-greater-than-left.       There is no height or weight on file to calculate BMI.  Hemoglobin A1C   Date Value Ref Range Status   05/12/2023 5.8 (H) 4.0 - 5.6 % Final     Comment:     ADA Screening Guidelines:  5.7-6.4%  Consistent with prediabetes  >or=6.5%  Consistent with diabetes    High levels of fetal hemoglobin interfere with the HbA1C  assay. Heterozygous hemoglobin variants (HbS, HgC, etc)do  not significantly interfere with this assay.   However, presence of multiple variants may affect accuracy.     05/06/2022 5.5 4.0 - 5.6 % Final     Comment:     ADA Screening Guidelines:  5.7-6.4%  Consistent with prediabetes  >or=6.5%  Consistent with diabetes    High levels of fetal hemoglobin interfere with the HbA1C  assay. Heterozygous hemoglobin variants (HbS, HgC, etc)do  not significantly interfere with this assay.   However, presence of multiple variants may affect accuracy.     04/06/2022 5.7 (H) 4.0 - 5.6 % Final     Comment:     ADA Screening Guidelines:  5.7-6.4%   Consistent with prediabetes  >or=6.5%  Consistent with diabetes    High levels of fetal hemoglobin interfere with the HbA1C  assay. Heterozygous hemoglobin variants (HbS, HgC, etc)do  not significantly interfere with this assay.   However, presence of multiple variants may affect accuracy.         ASSESSMENT/PLAN:  Renae Hou is a 63 y.o. female with cervical radiculopathy that has failed conservative treatment. She is a candidate for a C4/5 ACDF and would like to proceed with surgery.

## 2023-12-11 ENCOUNTER — HOSPITAL ENCOUNTER (OUTPATIENT)
Facility: HOSPITAL | Age: 63
Discharge: HOME | End: 2023-12-12
Attending: ORTHOPAEDIC SURGERY | Admitting: ORTHOPAEDIC SURGERY
Payer: COMMERCIAL

## 2023-12-11 ENCOUNTER — ANESTHESIA (OUTPATIENT)
Dept: SURGERY | Facility: HOSPITAL | Age: 63
End: 2023-12-11
Payer: COMMERCIAL

## 2023-12-11 DIAGNOSIS — R07.89 CHEST TIGHTNESS: ICD-10-CM

## 2023-12-11 DIAGNOSIS — M54.12 CERVICAL RADICULOPATHY: ICD-10-CM

## 2023-12-11 PROCEDURE — 25000003 PHARM REV CODE 250: Performed by: NURSE ANESTHETIST, CERTIFIED REGISTERED

## 2023-12-11 PROCEDURE — D9220A PRA ANESTHESIA: ICD-10-PCS | Mod: CRNA,,, | Performed by: NURSE ANESTHETIST, CERTIFIED REGISTERED

## 2023-12-11 PROCEDURE — 63600175 PHARM REV CODE 636 W HCPCS: Performed by: NURSE ANESTHETIST, CERTIFIED REGISTERED

## 2023-12-11 PROCEDURE — D9220A PRA ANESTHESIA: ICD-10-PCS | Mod: ANES,,, | Performed by: ANESTHESIOLOGY

## 2023-12-11 PROCEDURE — 37000008 HC ANESTHESIA 1ST 15 MINUTES: Performed by: ORTHOPAEDIC SURGERY

## 2023-12-11 PROCEDURE — 20930 SP BONE ALGRFT MORSEL ADD-ON: CPT | Mod: ,,, | Performed by: ORTHOPAEDIC SURGERY

## 2023-12-11 PROCEDURE — 94761 N-INVAS EAR/PLS OXIMETRY MLT: CPT

## 2023-12-11 PROCEDURE — 25000003 PHARM REV CODE 250

## 2023-12-11 PROCEDURE — 63600175 PHARM REV CODE 636 W HCPCS: Performed by: ANESTHESIOLOGY

## 2023-12-11 PROCEDURE — 36000710: Performed by: ORTHOPAEDIC SURGERY

## 2023-12-11 PROCEDURE — 37000009 HC ANESTHESIA EA ADD 15 MINS: Performed by: ORTHOPAEDIC SURGERY

## 2023-12-11 PROCEDURE — C1729 CATH, DRAINAGE: HCPCS | Performed by: ORTHOPAEDIC SURGERY

## 2023-12-11 PROCEDURE — 99900035 HC TECH TIME PER 15 MIN (STAT)

## 2023-12-11 PROCEDURE — 27800903 OPTIME MED/SURG SUP & DEVICES OTHER IMPLANTS: Performed by: ORTHOPAEDIC SURGERY

## 2023-12-11 PROCEDURE — 22551 ARTHRD ANT NTRBDY CERVICAL: CPT | Mod: ,,, | Performed by: ORTHOPAEDIC SURGERY

## 2023-12-11 PROCEDURE — 20936 SP BONE AGRFT LOCAL ADD-ON: CPT | Mod: ,,, | Performed by: ORTHOPAEDIC SURGERY

## 2023-12-11 PROCEDURE — 22853 INSJ BIOMECHANICAL DEVICE: CPT | Mod: ,,, | Performed by: ORTHOPAEDIC SURGERY

## 2023-12-11 PROCEDURE — 63600175 PHARM REV CODE 636 W HCPCS

## 2023-12-11 PROCEDURE — 20936 PR AUTOGRAFT SPINE SURGERY LOCAL FROM SAME INCISION: ICD-10-PCS | Mod: ,,, | Performed by: ORTHOPAEDIC SURGERY

## 2023-12-11 PROCEDURE — C1889 IMPLANT/INSERT DEVICE, NOC: HCPCS | Performed by: ORTHOPAEDIC SURGERY

## 2023-12-11 PROCEDURE — 25000003 PHARM REV CODE 250: Performed by: ORTHOPAEDIC SURGERY

## 2023-12-11 PROCEDURE — 20930 PR ALLOGRAFT FOR SPINE SURGERY ONLY MORSELIZED: ICD-10-PCS | Mod: ,,, | Performed by: ORTHOPAEDIC SURGERY

## 2023-12-11 PROCEDURE — D9220A PRA ANESTHESIA: Mod: ANES,,, | Performed by: ANESTHESIOLOGY

## 2023-12-11 PROCEDURE — D9220A PRA ANESTHESIA: Mod: CRNA,,, | Performed by: NURSE ANESTHETIST, CERTIFIED REGISTERED

## 2023-12-11 PROCEDURE — 36000711: Performed by: ORTHOPAEDIC SURGERY

## 2023-12-11 PROCEDURE — C1713 ANCHOR/SCREW BN/BN,TIS/BN: HCPCS | Performed by: ORTHOPAEDIC SURGERY

## 2023-12-11 PROCEDURE — 22551 PR ARTHRODESIS ANT INTERBODY INC DISCECTOMY, CERVICAL BELOW C2: ICD-10-PCS | Mod: ,,, | Performed by: ORTHOPAEDIC SURGERY

## 2023-12-11 PROCEDURE — 71000033 HC RECOVERY, INTIAL HOUR: Performed by: ORTHOPAEDIC SURGERY

## 2023-12-11 PROCEDURE — 27201423 OPTIME MED/SURG SUP & DEVICES STERILE SUPPLY: Performed by: ORTHOPAEDIC SURGERY

## 2023-12-11 PROCEDURE — 22853 PR INSERT BIOMECH DEV W/INTERBODY ARTHRODESIS, EA CONTIGUOUS DEFECT: ICD-10-PCS | Mod: ,,, | Performed by: ORTHOPAEDIC SURGERY

## 2023-12-11 PROCEDURE — 22845 INSERT SPINE FIXATION DEVICE: CPT | Mod: 59,,, | Performed by: ORTHOPAEDIC SURGERY

## 2023-12-11 PROCEDURE — 71000039 HC RECOVERY, EACH ADD'L HOUR: Performed by: ORTHOPAEDIC SURGERY

## 2023-12-11 PROCEDURE — 25000003 PHARM REV CODE 250: Performed by: PHYSICIAN ASSISTANT

## 2023-12-11 PROCEDURE — C1769 GUIDE WIRE: HCPCS | Performed by: ORTHOPAEDIC SURGERY

## 2023-12-11 PROCEDURE — 22845 PR ANTERIOR INSTRUMENTATION 2-3 VERTEBRAL SEGMENTS: ICD-10-PCS | Mod: 59,,, | Performed by: ORTHOPAEDIC SURGERY

## 2023-12-11 DEVICE — PUTTY OSTEOSELECT IN SYR 1CC: Type: IMPLANTABLE DEVICE | Site: SPINE CERVICAL | Status: FUNCTIONAL

## 2023-12-11 DEVICE — SPACER ACCENT CERV 12X14X6MM: Type: IMPLANTABLE DEVICE | Site: SPINE CERVICAL | Status: FUNCTIONAL

## 2023-12-11 DEVICE — SCREW DEFENDER SPNE 14X4MM: Type: IMPLANTABLE DEVICE | Site: SPINE CERVICAL | Status: FUNCTIONAL

## 2023-12-11 DEVICE — IMPLANTABLE DEVICE: Type: IMPLANTABLE DEVICE | Site: SPINE CERVICAL | Status: FUNCTIONAL

## 2023-12-11 RX ORDER — ACETAMINOPHEN 500 MG
500 TABLET ORAL EVERY 6 HOURS PRN
Status: ON HOLD | COMMUNITY
End: 2023-12-11 | Stop reason: HOSPADM

## 2023-12-11 RX ORDER — ACETAMINOPHEN 500 MG
1000 TABLET ORAL EVERY 8 HOURS
Status: DISCONTINUED | OUTPATIENT
Start: 2023-12-11 | End: 2023-12-12 | Stop reason: HOSPADM

## 2023-12-11 RX ORDER — HYDROCHLOROTHIAZIDE 12.5 MG/1
12.5 TABLET ORAL DAILY
Status: DISCONTINUED | OUTPATIENT
Start: 2023-12-12 | End: 2023-12-12 | Stop reason: HOSPADM

## 2023-12-11 RX ORDER — DEXAMETHASONE SODIUM PHOSPHATE 4 MG/ML
INJECTION, SOLUTION INTRA-ARTICULAR; INTRALESIONAL; INTRAMUSCULAR; INTRAVENOUS; SOFT TISSUE
Status: DISCONTINUED | OUTPATIENT
Start: 2023-12-11 | End: 2023-12-11

## 2023-12-11 RX ORDER — FAMOTIDINE 10 MG/ML
INJECTION INTRAVENOUS
Status: DISCONTINUED | OUTPATIENT
Start: 2023-12-11 | End: 2023-12-11

## 2023-12-11 RX ORDER — KETAMINE HCL IN 0.9 % NACL 50 MG/5 ML
SYRINGE (ML) INTRAVENOUS
Status: DISCONTINUED | OUTPATIENT
Start: 2023-12-11 | End: 2023-12-11

## 2023-12-11 RX ORDER — METHOCARBAMOL 750 MG/1
750 TABLET, FILM COATED ORAL EVERY 8 HOURS
Status: DISCONTINUED | OUTPATIENT
Start: 2023-12-11 | End: 2023-12-12 | Stop reason: HOSPADM

## 2023-12-11 RX ORDER — BUPIVACAINE HYDROCHLORIDE AND EPINEPHRINE 5; 5 MG/ML; UG/ML
INJECTION, SOLUTION EPIDURAL; INTRACAUDAL; PERINEURAL
Status: DISCONTINUED | OUTPATIENT
Start: 2023-12-11 | End: 2023-12-11 | Stop reason: HOSPADM

## 2023-12-11 RX ORDER — METHOCARBAMOL 750 MG/1
750 TABLET, FILM COATED ORAL EVERY 8 HOURS
Status: DISCONTINUED | OUTPATIENT
Start: 2023-12-11 | End: 2023-12-11

## 2023-12-11 RX ORDER — AMOXICILLIN 250 MG
1 CAPSULE ORAL 2 TIMES DAILY
Status: DISCONTINUED | OUTPATIENT
Start: 2023-12-11 | End: 2023-12-12 | Stop reason: HOSPADM

## 2023-12-11 RX ORDER — OXYCODONE HYDROCHLORIDE 5 MG/1
5 TABLET ORAL
Status: DISCONTINUED | OUTPATIENT
Start: 2023-12-11 | End: 2023-12-11 | Stop reason: HOSPADM

## 2023-12-11 RX ORDER — MORPHINE SULFATE 2 MG/ML
2 INJECTION, SOLUTION INTRAMUSCULAR; INTRAVENOUS EVERY 4 HOURS PRN
Status: DISCONTINUED | OUTPATIENT
Start: 2023-12-11 | End: 2023-12-12 | Stop reason: HOSPADM

## 2023-12-11 RX ORDER — PROPOFOL 10 MG/ML
VIAL (ML) INTRAVENOUS CONTINUOUS PRN
Status: DISCONTINUED | OUTPATIENT
Start: 2023-12-11 | End: 2023-12-11

## 2023-12-11 RX ORDER — PREGABALIN 75 MG/1
75 CAPSULE ORAL
Status: COMPLETED | OUTPATIENT
Start: 2023-12-11 | End: 2023-12-11

## 2023-12-11 RX ORDER — FENTANYL CITRATE 50 UG/ML
INJECTION, SOLUTION INTRAMUSCULAR; INTRAVENOUS
Status: DISCONTINUED | OUTPATIENT
Start: 2023-12-11 | End: 2023-12-11

## 2023-12-11 RX ORDER — HYDROMORPHONE HYDROCHLORIDE 1 MG/ML
0.2 INJECTION, SOLUTION INTRAMUSCULAR; INTRAVENOUS; SUBCUTANEOUS EVERY 5 MIN PRN
Status: DISCONTINUED | OUTPATIENT
Start: 2023-12-11 | End: 2023-12-11 | Stop reason: HOSPADM

## 2023-12-11 RX ORDER — CELECOXIB 200 MG/1
400 CAPSULE ORAL
Status: DISPENSED | OUTPATIENT
Start: 2023-12-11 | End: 2023-12-11

## 2023-12-11 RX ORDER — ROCURONIUM BROMIDE 10 MG/ML
INJECTION, SOLUTION INTRAVENOUS
Status: DISCONTINUED | OUTPATIENT
Start: 2023-12-11 | End: 2023-12-11

## 2023-12-11 RX ORDER — SUCCINYLCHOLINE CHLORIDE 20 MG/ML
INJECTION INTRAMUSCULAR; INTRAVENOUS
Status: DISCONTINUED | OUTPATIENT
Start: 2023-12-11 | End: 2023-12-11

## 2023-12-11 RX ORDER — SODIUM CHLORIDE 0.9 % (FLUSH) 0.9 %
3 SYRINGE (ML) INJECTION
Status: DISCONTINUED | OUTPATIENT
Start: 2023-12-11 | End: 2023-12-11 | Stop reason: HOSPADM

## 2023-12-11 RX ORDER — OXYCODONE HYDROCHLORIDE 5 MG/1
5 TABLET ORAL EVERY 4 HOURS PRN
Status: DISCONTINUED | OUTPATIENT
Start: 2023-12-11 | End: 2023-12-12 | Stop reason: HOSPADM

## 2023-12-11 RX ORDER — PROPOFOL 10 MG/ML
VIAL (ML) INTRAVENOUS
Status: DISCONTINUED | OUTPATIENT
Start: 2023-12-11 | End: 2023-12-11

## 2023-12-11 RX ORDER — METHOCARBAMOL 500 MG/1
1000 TABLET, FILM COATED ORAL
Status: COMPLETED | OUTPATIENT
Start: 2023-12-11 | End: 2023-12-11

## 2023-12-11 RX ORDER — CEFAZOLIN SODIUM 1 G/3ML
INJECTION, POWDER, FOR SOLUTION INTRAMUSCULAR; INTRAVENOUS
Status: DISCONTINUED | OUTPATIENT
Start: 2023-12-11 | End: 2023-12-11

## 2023-12-11 RX ORDER — MUPIROCIN 20 MG/G
OINTMENT TOPICAL
Status: DISCONTINUED | OUTPATIENT
Start: 2023-12-11 | End: 2023-12-11 | Stop reason: HOSPADM

## 2023-12-11 RX ORDER — MIDAZOLAM HYDROCHLORIDE 1 MG/ML
INJECTION, SOLUTION INTRAMUSCULAR; INTRAVENOUS
Status: DISCONTINUED | OUTPATIENT
Start: 2023-12-11 | End: 2023-12-11

## 2023-12-11 RX ORDER — OXYCODONE HYDROCHLORIDE 10 MG/1
10 TABLET ORAL EVERY 4 HOURS PRN
Status: DISCONTINUED | OUTPATIENT
Start: 2023-12-11 | End: 2023-12-12 | Stop reason: HOSPADM

## 2023-12-11 RX ORDER — ACETAMINOPHEN 500 MG
1000 TABLET ORAL
Status: COMPLETED | OUTPATIENT
Start: 2023-12-11 | End: 2023-12-11

## 2023-12-11 RX ORDER — TALC
6 POWDER (GRAM) TOPICAL NIGHTLY PRN
Status: DISCONTINUED | OUTPATIENT
Start: 2023-12-11 | End: 2023-12-12 | Stop reason: HOSPADM

## 2023-12-11 RX ORDER — DEXMEDETOMIDINE HYDROCHLORIDE 100 UG/ML
INJECTION, SOLUTION INTRAVENOUS
Status: DISCONTINUED | OUTPATIENT
Start: 2023-12-11 | End: 2023-12-11

## 2023-12-11 RX ORDER — LIDOCAINE HYDROCHLORIDE 20 MG/ML
INJECTION INTRAVENOUS
Status: DISCONTINUED | OUTPATIENT
Start: 2023-12-11 | End: 2023-12-11

## 2023-12-11 RX ORDER — PHENYLEPHRINE HYDROCHLORIDE 10 MG/ML
INJECTION INTRAVENOUS
Status: DISCONTINUED | OUTPATIENT
Start: 2023-12-11 | End: 2023-12-11

## 2023-12-11 RX ORDER — HALOPERIDOL 5 MG/ML
0.5 INJECTION INTRAMUSCULAR EVERY 10 MIN PRN
Status: DISCONTINUED | OUTPATIENT
Start: 2023-12-11 | End: 2023-12-11 | Stop reason: HOSPADM

## 2023-12-11 RX ORDER — ONDANSETRON 2 MG/ML
INJECTION INTRAMUSCULAR; INTRAVENOUS
Status: DISCONTINUED | OUTPATIENT
Start: 2023-12-11 | End: 2023-12-11

## 2023-12-11 RX ORDER — POLYETHYLENE GLYCOL 3350 17 G/17G
17 POWDER, FOR SOLUTION ORAL DAILY
Status: DISCONTINUED | OUTPATIENT
Start: 2023-12-11 | End: 2023-12-12 | Stop reason: HOSPADM

## 2023-12-11 RX ADMIN — PHENYLEPHRINE HYDROCHLORIDE 200 MCG: 10 INJECTION INTRAVENOUS at 09:12

## 2023-12-11 RX ADMIN — ONDANSETRON 4 MG: 2 INJECTION INTRAMUSCULAR; INTRAVENOUS at 09:12

## 2023-12-11 RX ADMIN — MORPHINE SULFATE 2 MG: 2 INJECTION, SOLUTION INTRAMUSCULAR; INTRAVENOUS at 08:12

## 2023-12-11 RX ADMIN — OXYCODONE HYDROCHLORIDE 10 MG: 10 TABLET ORAL at 07:12

## 2023-12-11 RX ADMIN — PROPOFOL 150 MCG/KG/MIN: 10 INJECTION, EMULSION INTRAVENOUS at 09:12

## 2023-12-11 RX ADMIN — PHENYLEPHRINE HYDROCHLORIDE 200 MCG: 10 INJECTION INTRAVENOUS at 10:12

## 2023-12-11 RX ADMIN — METHOCARBAMOL 750 MG: 750 TABLET ORAL at 02:12

## 2023-12-11 RX ADMIN — HYDROMORPHONE HYDROCHLORIDE 0.2 MG: 1 INJECTION, SOLUTION INTRAMUSCULAR; INTRAVENOUS; SUBCUTANEOUS at 11:12

## 2023-12-11 RX ADMIN — OXYCODONE HYDROCHLORIDE 10 MG: 10 TABLET ORAL at 11:12

## 2023-12-11 RX ADMIN — METHOCARBAMOL 1000 MG: 500 TABLET ORAL at 07:12

## 2023-12-11 RX ADMIN — HYDROMORPHONE HYDROCHLORIDE 0.2 MG: 1 INJECTION, SOLUTION INTRAMUSCULAR; INTRAVENOUS; SUBCUTANEOUS at 10:12

## 2023-12-11 RX ADMIN — SODIUM CHLORIDE 0.2 MCG/KG/MIN: 9 INJECTION, SOLUTION INTRAVENOUS at 09:12

## 2023-12-11 RX ADMIN — DEXAMETHASONE SODIUM PHOSPHATE 8 MG: 4 INJECTION, SOLUTION INTRAMUSCULAR; INTRAVENOUS at 09:12

## 2023-12-11 RX ADMIN — ACETAMINOPHEN 1000 MG: 500 TABLET ORAL at 02:12

## 2023-12-11 RX ADMIN — PROPOFOL 150 MG: 10 INJECTION, EMULSION INTRAVENOUS at 09:12

## 2023-12-11 RX ADMIN — FAMOTIDINE 20 MG: 10 INJECTION, SOLUTION INTRAVENOUS at 09:12

## 2023-12-11 RX ADMIN — POLYETHYLENE GLYCOL 3350 17 G: 17 POWDER, FOR SOLUTION ORAL at 02:12

## 2023-12-11 RX ADMIN — Medication 20 MG: at 09:12

## 2023-12-11 RX ADMIN — METHOCARBAMOL 750 MG: 750 TABLET ORAL at 09:12

## 2023-12-11 RX ADMIN — ROCURONIUM BROMIDE 10 MG: 10 INJECTION INTRAVENOUS at 09:12

## 2023-12-11 RX ADMIN — LIDOCAINE HYDROCHLORIDE 100 MG: 20 INJECTION INTRAVENOUS at 09:12

## 2023-12-11 RX ADMIN — SUCCINYLCHOLINE CHLORIDE 140 MG: 20 INJECTION, SOLUTION INTRAMUSCULAR; INTRAVENOUS at 09:12

## 2023-12-11 RX ADMIN — MUPIROCIN: 20 OINTMENT TOPICAL at 07:12

## 2023-12-11 RX ADMIN — PHENYLEPHRINE HYDROCHLORIDE 100 MCG: 10 INJECTION INTRAVENOUS at 09:12

## 2023-12-11 RX ADMIN — ACETAMINOPHEN 1000 MG: 500 TABLET ORAL at 07:12

## 2023-12-11 RX ADMIN — CEFAZOLIN 2 G: 2 INJECTION, POWDER, FOR SOLUTION INTRAMUSCULAR; INTRAVENOUS at 05:12

## 2023-12-11 RX ADMIN — PREGABALIN 75 MG: 75 CAPSULE ORAL at 07:12

## 2023-12-11 RX ADMIN — ACETAMINOPHEN 1000 MG: 500 TABLET ORAL at 09:12

## 2023-12-11 RX ADMIN — SODIUM CHLORIDE: 0.9 INJECTION, SOLUTION INTRAVENOUS at 09:12

## 2023-12-11 RX ADMIN — SENNOSIDES AND DOCUSATE SODIUM 1 TABLET: 8.6; 5 TABLET ORAL at 09:12

## 2023-12-11 RX ADMIN — CEFAZOLIN 2 G: 330 INJECTION, POWDER, FOR SOLUTION INTRAMUSCULAR; INTRAVENOUS at 09:12

## 2023-12-11 RX ADMIN — DEXMEDETOMIDINE 8 MCG: 100 INJECTION, SOLUTION, CONCENTRATE INTRAVENOUS at 09:12

## 2023-12-11 RX ADMIN — FENTANYL CITRATE 100 MCG: 50 INJECTION, SOLUTION INTRAMUSCULAR; INTRAVENOUS at 09:12

## 2023-12-11 RX ADMIN — SODIUM CHLORIDE, SODIUM GLUCONATE, SODIUM ACETATE, POTASSIUM CHLORIDE, MAGNESIUM CHLORIDE, SODIUM PHOSPHATE, DIBASIC, AND POTASSIUM PHOSPHATE: .53; .5; .37; .037; .03; .012; .00082 INJECTION, SOLUTION INTRAVENOUS at 09:12

## 2023-12-11 RX ADMIN — MIDAZOLAM HYDROCHLORIDE 2 MG: 1 INJECTION, SOLUTION INTRAMUSCULAR; INTRAVENOUS at 09:12

## 2023-12-11 NOTE — PLAN OF CARE
Dr Dawn contacted to see if patient requires a cervical collar when OOB. Per MD, no collar needed.

## 2023-12-11 NOTE — TRANSFER OF CARE
"Anesthesia Transfer of Care Note    Patient: Renae Hou    Procedure(s) Performed: Procedure(s) (LRB):  DISCECTOMY, SPINE, CERVICAL, ANTERIOR APPROACH, WITH FUSION C4/5 spinewave rep-less SNS:vocal/motors/SSEP (N/A)    Patient location: PACU    Anesthesia Type: general    Transport from OR: Transported from OR on 100% O2 by closed face mask with adequate spontaneous ventilation    Post pain: adequate analgesia    Post assessment: no apparent anesthetic complications and tolerated procedure well    Post vital signs: stable    Level of consciousness: awake    Nausea/Vomiting: no nausea/vomiting    Complications: none    Transfer of care protocol was followed    Last vitals: Visit Vitals  /71 (BP Location: Right arm, Patient Position: Lying)   Pulse 70   Temp 37.1 °C (98.8 °F) (Temporal)   Resp 16   Ht 5' 5" (1.651 m)   Wt 79.8 kg (176 lb)   LMP 11/25/2012   SpO2 98%   Breastfeeding No   BMI 29.29 kg/m²     "

## 2023-12-11 NOTE — NURSING TRANSFER
Nursing Transfer Note      12/11/2023   12:16 PM     Nurse giving handoff:Yoana  Nurse receiving handoff:Beth    Reason patient is being transferred: Cervical Discectomy    Transfer : 315    Transfer via stretcher    Transported by Rns X 2     Patient belongings transferred with patient: y    Chart send with patient: Y    Patient reassessed at: 12/11/23 1206

## 2023-12-11 NOTE — PROGRESS NOTES
Pre op complete. Waiting on surgical consent, blood consent, anesthesia consent, site josé antonio, update. Pt's  at bedside; he will bring home plastic bag, overnight bag will stay in locker. Pt resting comfortably with all questions addressed at this time and call light in reach.

## 2023-12-11 NOTE — ANESTHESIA PROCEDURE NOTES
Intubation    Date/Time: 12/11/2023 9:19 AM    Performed by: Terri Figueroa CRNA  Authorized by: Jatin Pascual MD    Intubation:     Induction:  Intravenous    Intubated:  Postinduction    Mask Ventilation:  Easy mask    Attempts:  1    Attempted By:  CRNA    Method of Intubation:  Video laryngoscopy    Blade:  Garcia 3    Laryngeal View Grade: Grade I - full view of cords      Difficult Airway Encountered?: No      Complications:  None    Airway Device:  EMG ETT (NIMS)    Airway Device Size:  7.0    Style/Cuff Inflation:  Cuffed (inflated to minimal occlusive pressure)    Inflation Amount (mL):  6    Tube secured:  22    Secured at:  The lips    Placement Verified By:  Capnometry    Complicating Factors:  None    Findings Post-Intubation:  BS equal bilateral and atraumatic/condition of teeth unchanged

## 2023-12-11 NOTE — ANESTHESIA POSTPROCEDURE EVALUATION
Anesthesia Post Evaluation    Patient: Renae Hou    Procedure(s) Performed: Procedure(s) (LRB):  DISCECTOMY, SPINE, CERVICAL, ANTERIOR APPROACH, WITH FUSION C4/5 spinewave rep-less SNS:vocal/motors/SSEP (N/A)    Final Anesthesia Type: general      Patient location during evaluation: PACU  Patient participation: Yes- Able to Participate  Level of consciousness: awake and alert and oriented  Post-procedure vital signs: reviewed and stable  Pain management: adequate  Airway patency: patent  RENAE mitigation strategies: Intraoperative administration of CPAP, nasopharyngeal airway, or oral appliance during sedation, Multimodal analgesia, Extubation while patient is awake, Verification of full reversal of neuromuscular block and Extubation and recovery carried out in lateral, semiupright, or other nonsupine position  PONV status at discharge: No PONV  Anesthetic complications: no      Cardiovascular status: hemodynamically stable  Respiratory status: unassisted  Hydration status: euvolemic  Follow-up not needed.              Vitals Value Taken Time   /89 12/11/23 1241   Temp 36.8 °C (98.2 °F) 12/11/23 1241   Pulse 69 12/11/23 1241   Resp 18 12/11/23 1241   SpO2 94 % 12/11/23 1241         Event Time   Out of Recovery 12:20:00         Pain/Ai Score: Pain Rating Prior to Med Admin: 7 (12/11/2023 12:08 PM)  Pain Rating Post Med Admin: 7 (12/11/2023 12:08 PM)  Ai Score: 10 (12/11/2023 11:00 AM)

## 2023-12-11 NOTE — NURSING
Nurses Note -- 4 Eyes      12/11/2023   3:42 PM      Skin assessed during: Admit      [x] No Altered Skin Integrity Present    [x]Prevention Measures Documented      [] Yes- Altered Skin Integrity Present or Discovered   [] LDA Added if Not in Epic (Describe Wound)   [] New Altered Skin Integrity was Present on Admit and Documented in LDA   [] Wound Image Taken    Wound Care Consulted? No    Attending Nurse:  Sonal Bella RN/Staff Member:   Nimo

## 2023-12-11 NOTE — NURSING
Pt arrived to unit via hospital bed from PACU.  Pt aaox4, vss, no s/s of distress noted.  Dressing to anterior neck cdi.  ANDREA drain intact and charged.  Good strengths and pulses in all extremities. Pt instructed to call for assistance when getting up and she verbalized understanding.  Call light placed within reach.  Daughter at bedside.

## 2023-12-11 NOTE — PROGRESS NOTES
Notified Dr. Pascual, Anesthesia regarding pain control for patient he okayed giving pt more dilaudid after reviewing pt's meds.

## 2023-12-11 NOTE — PLAN OF CARE
Problem: Fall Injury Risk  Goal: Absence of Fall and Fall-Related Injury  Intervention: Identify and Manage Contributors  Flowsheets (Taken 12/11/2023 1540)  Medication Review/Management: medications reviewed     Problem: Fall Injury Risk  Goal: Absence of Fall and Fall-Related Injury  Intervention: Promote Injury-Free Environment  Flowsheets (Taken 12/11/2023 1540)  Safety Promotion/Fall Prevention:   assistive device/personal item within reach   Fall Risk reviewed with patient/family   lighting adjusted   nonskid shoes/socks when out of bed   side rails raised x 2   instructed to call staff for mobility     Problem: Bleeding (Spinal Surgery)  Goal: Absence of Bleeding  Intervention: Monitor and Manage Bleeding  Flowsheets (Taken 12/11/2023 1540)  Bleeding Management: dressing monitored     Problem: Infection (Spinal Surgery)  Goal: Absence of Infection Signs and Symptoms  Intervention: Prevent or Manage Infection  Flowsheets (Taken 12/11/2023 1540)  Infection Management: aseptic technique maintained     Problem: Pain (Spinal Surgery)  Goal: Acceptable Pain Control  Intervention: Prevent or Manage Pain  Flowsheets (Taken 12/11/2023 1540)  Pain Management Interventions:   care clustered   cold applied   pain management plan reviewed with patient/caregiver

## 2023-12-11 NOTE — OP NOTE
DATE OF PROCEDURE: 12/11/2023.     SURGEON: Dean Zayas M.D.     FIRST ASSISTANT SURGEON: Veto Hope M.D., PGY-3     PREOPERATIVE DIAGNOSIS: Cervical spondylitic myelopathy.     POSTOPERATIVE DIAGNOSIS: Cervical spondylitic myelopathy.     PROCEDURES PERFORMED:  1. Anterior cervical diskectomy and instrumented spinal fusion, including decompression of neurological elements, C4-5.  2. Application of carbon fiber reinforced polyetheretherketone titanium intervertebral spacer to C4-5 disk space.  3. Anterior instrumentation, C4-5.  4. Cadaveric and local bone grafting.     ANESTHESIA: General endotracheal anesthesia.     ESTIMATED BLOOD LOSS:  25 mL.     IMPLANTS:  Spine wave.     SPECIMENS: None.     FINDINGS:  None.     DRAINS: One deep.     COMPLICATIONS: None.     SPONGE AND NEEDLE COUNT: Correct x2.     NEUROLOGICAL MONITORING: Motor evoked potentials, somatosensory evoked    potential, free-running EMG, and vocal fold monitoring.  There were no changes to preincisional MEP and SSEP baselines.  There was intermittent recurrent laryngeal nerve activity that responded to decreasing air in the ETT balloon and no significant EMG activity.     REASON FOR OPERATION AND BRIEF HISTORY AND PHYSICAL: Renae Edmond a    63 y.o. female with cervical spondylitic myelopathy secondary to C4-5 stenosis. They have failed conservative management and are here for surgery today.      DESCRIPTION OF INFORMED CONSENT: Please see my last clinic note for a full    description of the informed consent process that I had with the patient.     DESCRIPTION OF PROCEDURE: The patient was met in the preoperative area where    their right-sided neck was marked as the operative site. Subsequently, they were    brought to the Operating Room where they was placed under general endotracheal    anesthesia. Sequential compression devices were placed in the patient's    bilateral calves and run throughout the case. A Blackmon catheter was not  placed. At  this point, a shoulder roll was placed and the patient's neck was gently    hyperextended. The neck was stacked in multiple stack sheets as well as a gel    roll. At this point, the patient's anterior cervical spine was prepped and    draped in a normal sterile fashion.     A full timeout was then called, identifying the patient, the procedural site and  levels, the availability of all instruments and implants, and no specific    nursing, surgical, anesthetic, or neurological monitoring concerns.  All present were empowered to identify any concerns at any time. Finally, it  was safe to proceed with surgery and the patient was given weight-appropriate    dose of Ancef by the Anesthesia Service as well as 8 mg of Decadron.     I then infiltrated my planned incision with 10 mL of 0.5% Marcaine with    epinephrine.     I then made a transverse incision centered over the patient's anterior cervical    spine  and carried dissection down through skin and    subcutaneous tissues using a combination of sharp dissection and electrocautery    where necessary. The platysma was identified and transected in line with the    skin incision and subplatysmal flaps were elevated. At this point, I performed a standard Rutherford Lema approach tothe anterior cervical spine. Any large bridging veins or arteries were tied and ligated.  Small veins were coagulated with bipolar electrocautery. At this point, I  visualized the anterior cervical spine. Peanut dissection allowed me to    visualize the vertebral body. I placed a needle in what I took to be the C5 body     took a lateral radiograph and thus confirmed my  level. I then finalized my exposure. At the conclusion of my exposure, I could see from the inferior half of the C4 vertebrae to the superior half of the C5 vertebra  and the disk space from uncinate process to uncinate process.     I then performed a subtotal C4-5 diskectomy under loupe magnification using a disk     knife as well as straight and angled curettes, Kerrison punches, and pituitary    rongeurs.     Under microscopic visulalization I drilled down the    posterior aspect of the disk space with a high-speed drill to reveal the    posterior longitudinal ligament. I used a 4-0 forward-angle curette to enter    the median raphe of the ligament, followed by #1 and #2 Kerrison punches to    resect the posterior longitudinal ligament. At the end of my neurological    decompression, I could see dura from uncinate process to uncinate process.    Epidural hemostasis was finalized with patties and FloSeal. The wound was then    thoroughly irrigated. The disk space was sized for a size 6 spacer and this was  filled with 1 mL of DBX putty as well as locally harvested bone and gently impacted into place. An anterior  plate was then applied in the standard fashion, spanning C4-5.     AP and lateral  radiographs were taken, demonstrating correct level as well as adequate positionof all implants. The wound was then thoroughly irrigated. The plate screw    capture mechanism was then locked with the torque wrench. A deep drain was placed. The platysma was  closed with 3-0 Vicryl, followed by 4-0 and 5-0 Monocryl for the skin. A 2-0  silk suture was used to secure down the drain. A soft dressing was placed. The  patient was subsequently transferred to his hospital bed, awoken, and    transferred to the Recovery Room in stable condition.

## 2023-12-12 ENCOUNTER — PATIENT MESSAGE (OUTPATIENT)
Dept: BARIATRICS | Facility: CLINIC | Age: 63
End: 2023-12-12
Payer: COMMERCIAL

## 2023-12-12 VITALS
TEMPERATURE: 99 F | SYSTOLIC BLOOD PRESSURE: 114 MMHG | OXYGEN SATURATION: 98 % | WEIGHT: 176 LBS | RESPIRATION RATE: 18 BRPM | HEART RATE: 64 BPM | HEIGHT: 65 IN | BODY MASS INDEX: 29.32 KG/M2 | DIASTOLIC BLOOD PRESSURE: 68 MMHG

## 2023-12-12 LAB
ALLENS TEST: NORMAL
DELSYS: NORMAL
POC CARDIAC TROPONIN I: 0 NG/ML (ref 0–0.08)
SAMPLE: NORMAL
SITE: NORMAL

## 2023-12-12 PROCEDURE — 97165 OT EVAL LOW COMPLEX 30 MIN: CPT

## 2023-12-12 PROCEDURE — 25000003 PHARM REV CODE 250: Performed by: PHYSICIAN ASSISTANT

## 2023-12-12 PROCEDURE — 25000003 PHARM REV CODE 250: Performed by: ORTHOPAEDIC SURGERY

## 2023-12-12 PROCEDURE — 97161 PT EVAL LOW COMPLEX 20 MIN: CPT

## 2023-12-12 PROCEDURE — 63600175 PHARM REV CODE 636 W HCPCS

## 2023-12-12 PROCEDURE — 97535 SELF CARE MNGMENT TRAINING: CPT

## 2023-12-12 PROCEDURE — 99900035 HC TECH TIME PER 15 MIN (STAT)

## 2023-12-12 PROCEDURE — 25000003 PHARM REV CODE 250

## 2023-12-12 RX ORDER — MAG HYDROX/ALUMINUM HYD/SIMETH 200-200-20
30 SUSPENSION, ORAL (FINAL DOSE FORM) ORAL
Status: COMPLETED | OUTPATIENT
Start: 2023-12-12 | End: 2023-12-12

## 2023-12-12 RX ADMIN — OXYCODONE HYDROCHLORIDE 5 MG: 5 TABLET ORAL at 02:12

## 2023-12-12 RX ADMIN — METHOCARBAMOL 750 MG: 750 TABLET ORAL at 05:12

## 2023-12-12 RX ADMIN — ALUMINUM HYDROXIDE, MAGNESIUM HYDROXIDE, AND SIMETHICONE 30 ML: 200; 200; 20 SUSPENSION ORAL at 03:12

## 2023-12-12 RX ADMIN — ACETAMINOPHEN 1000 MG: 500 TABLET ORAL at 05:12

## 2023-12-12 RX ADMIN — HYDROCHLOROTHIAZIDE 12.5 MG: 12.5 TABLET ORAL at 08:12

## 2023-12-12 RX ADMIN — CEFAZOLIN 2 G: 2 INJECTION, POWDER, FOR SOLUTION INTRAMUSCULAR; INTRAVENOUS at 02:12

## 2023-12-12 NOTE — PLAN OF CARE
Problem: Adult Inpatient Plan of Care  Goal: Absence of Hospital-Acquired Illness or Injury  Outcome: Ongoing, Progressing  Intervention: Identify and Manage Fall Risk  Flowsheets (Taken 12/12/2023 0540)  Safety Promotion/Fall Prevention:   assistive device/personal item within reach   Fall Risk reviewed with patient/family   medications reviewed   nonskid shoes/socks when out of bed   side rails raised x 2   instructed to call staff for mobility  Intervention: Prevent and Manage VTE (Venous Thromboembolism) Risk  Flowsheets (Taken 12/12/2023 0540)  Activity Management: Ambulated to bathroom - L4  VTE Prevention/Management:   dorsiflexion/plantar flexion performed   fluids promoted   remove, assess skin, and reapply foot pump   intravenous hydration  Range of Motion: active ROM (range of motion) encouraged  Intervention: Prevent Infection  Flowsheets (Taken 12/12/2023 0540)  Infection Prevention:   hand hygiene promoted   rest/sleep promoted   single patient room provided

## 2023-12-12 NOTE — ASSESSMENT & PLAN NOTE
Renaedipti Hou is a 63 y.o. female s/p C4/5 ACDF on 12/11/23. Doing well this morning.    Plan:  Pain control: multimodal  PT/OT: WBAT in C-collar  DVT PPx: No chemical prophylaxis, SCDs at all times when not ambulating  Abx: postop Ancef  Drain: 15 mL out overnight, removed 12/12    Dispo: home today after PT/OT and X-ray C-spine

## 2023-12-12 NOTE — PT/OT/SLP EVAL
"Physical Therapy Evaluation and Discharge Note    Patient Name:  Renae Hou   MRN:  [id number]  Admitting Diagnosis:  Cervical radiculopathy   Recent Surgery: Procedure(s) (LRB):  DISCECTOMY, SPINE, CERVICAL, ANTERIOR APPROACH, WITH FUSION C4/5 spinewave rep-less SNS:vocal/motors/SSEP (N/A) 1 Day Post-Op  Admit Date: 12/11/2023  Length of Stay: 0 days    Recommendations:     Discharge Recommendations:  No Therapy Indicated   Discharge Equipment Recommendations: none   Justification for Equipment: N/A  Barriers to discharge: None    Assessment:     Renae Hou is a 63 y.o. female admitted with a medical diagnosis of Cervical radiculopathy. .  At this time, patient is functioning at their prior level of function and does not require further acute PT services.     Highest level of mobility achieved this visit: ambulates 250ft + 4 stairs with supervision and no A/D    Treatment Tolerated: Excellent    Plan:     During this hospitalization, patient does not require further acute PT services.  Please re-consult if situation changes.      Subjective     RN notified prior to session. No family present upon PT entrance into room.    Chief Complaint: none  Patient/Family Comments/goals: "I'm going to make my  do all the [place] cooking"  Pain/Comfort:  Pain Rating 1: 1/10  Location - Orientation 1: generalized  Location 1: neck  Pain Addressed 1: Pre-medicate for activity, Reposition, Distraction    Social History:  Residence: lives with their spouse 2-story house with 4 LAINA and DOM HR (pt stays on 1st floor).  Support available: Spouse  Equipment Used: none  Equipment Owned (not using): None  Prior level of function: independent  Work: Employed.   Drive: yes.     Objective:     Additional staff present: none    Patient found up in chair with  (no active lines) upon PT entry to room.    General Precautions: Standard, fall   Orthopedic Precautions:spinal precautions   Braces: N/A   Body mass index is 29.29 " "kg/m².  Oxygen Device: Room Air  Vitals: /75 (BP Location: Right arm, Patient Position: Lying)   Pulse 95   Temp 98.2 °F (36.8 °C)   Resp 18   Ht 5' 5" (1.651 m)   Wt 79.8 kg (176 lb)   LMP 11/25/2012   SpO2 98%   Breastfeeding No   BMI 29.29 kg/m²     Exam:  Cognition:   Alert and Cooperative  Command following: Follows multistep verbal commands  Fluency: clear/fluent  Skin Integrity: Visible skin intact  Edema: None noted  Sensation: Intact to light touch  Hearing: Intact  Vision:  Intact  Coordination: grossly intact  Range of Motion:  RLE: WFL  LLE: WFL  Strength Exam:  Bilateral Lower Extremity Strength: grossly WFL    Outcome Measures:  AM-PAC 6 CLICK MOBILITY  Turning over in bed (including adjusting bedclothes, sheets and blankets)?: 4  Sitting down on and standing up from a chair with arms (e.g., wheelchair, bedside commode, etc.): 4  Moving from lying on back to sitting on the side of the bed?: 4  Moving to and from a bed to a chair (including a wheelchair)?: 4  Need to walk in hospital room?: 4  Climbing 3-5 steps with a railing?: 4  Basic Mobility Total Score: 24     Functional Mobility:    Bed Mobility:   Pt found/returned to bedside chair    Transfers:   Sit <> Stand Transfer: supervision with no assistive device   Stand <> Sit Transfer: supervision with no assistive device   [id number] from bedside chair    Standing Balance:  Assistance Level Required: Supervision  Patient used: no assistive device   Time: 10 min  Postural deviations noted: no deviations noted    Gait:   Patient ambulated: 250ft   Patient required: supervision  Patient used:  no assistive device   Gait Pattern observed: swing-through gait  Gait Deviation(s): steady gait    Stairs:  Pt ascended/descended 4" curb step with No Assistive Device with no handrails with Supervision or Set-up Assistance.   Pt ascended/descended  4 stair(s) with No Assistive Device with left handrail with Supervision or Set-up Assistance. "     Education:  Time provided for education, counseling and discussion of health disposition in regards to patient's current status  All questions answered within PT scope of practice and to patient's satisfaction    Patient left up in chair with call button in reach.    History:     Past Medical History:   Diagnosis Date    Abscess of paraspinous muscles 2018    Allergy 04/2018    Class 1 obesity due to excess calories in adult 07/23/2019    DVT (deep venous thrombosis)     left leg    Epidural abscess 2018 04/10/2018    Fatty liver     GERD (gastroesophageal reflux disease) 2015    History of major abdominal surgery 09/11/2015    Had Hysterectomy , cholecystectomy , rectocele repair    Hypertension     Lumbar radiculopathy     Menopause     Obesity     Obstructive sleep apnea on CPAP     no longer uses CPAP after weight loss from gastric sleeve    Thyroid disease     Thyroid nodules.       Past Surgical History:   Procedure Laterality Date    ADENOIDECTOMY  1995    BACK SURGERY  2002    L4, L5    BILATERAL SALPINGOOPHORECTOMY      CHOLECYSTECTOMY      COLONOSCOPY N/A 6/13/2022    Procedure: COLONOSCOPY;  Surgeon: Saul Ureña MD;  [facility]  Service: Endoscopy;  Laterality: N/A;  constipation protocol-extended Miralax prep - PEG allergy -okay with taking Miralax  fully vaccinated, prep instr portal -ml    COLONOSCOPY N/A 10/4/2022    Procedure: COLONOSCOPY;  Surgeon: Saul Ureña MD;  Location: [facility] ([facility] FLR);  Service: Endoscopy;  Laterality: N/A;  Constipation protocol-Miralax prep-pt tolerated Miralax for last colonoscopy.  fully vaccinated, prep instr portal -ml    COLONOSCOPY N/A 10/9/2023    Procedure: COLONOSCOPY;  Surgeon: Saul Ureña MD;  [facility]  Service: Endoscopy;  Laterality: N/A;  Ref by Dr JULIANA Horowitz, Jackie-Wegovy- pt instructed to hold x 7 days prior procedure,  Miralax, portal - PC  10/2-precall complete/pt aware to hold wegovy  x7 days-MS    CORRECTION OF HAMMER TOE Left 8/5/2021    Procedure: CORRECTION, HAMMER TOE Left 4th mallet toe, DIP joitn fusion;  Surgeon: Guero Alvarez MD;  Location: [facility] OR;  Service: Orthopedics;  Laterality: Left;  K-wires    CYST REMOVAL      EPIDURAL STEROID INJECTION INTO CERVICAL SPINE N/A 8/11/2020    Procedure: Injection-steroid-epidural-cervical;  Surgeon: [facility];  Location: [facility] 2ND FLR;  Service: Radiology;  Laterality: N/A;  /Carla    EPIDURAL STEROID INJECTION INTO CERVICAL SPINE N/A 3/2/2022    Procedure: Injection-steroid-epidural-cervical C7-T1;  Surgeon: Latoya Quintanilla MD;  Location: [facility] PAIN MGT;  Service: Pain Management;  Laterality: N/A;    ESOPHAGOGASTRODUODENOSCOPY N/A 5/3/2019    Procedure: ESOPHAGOGASTRODUODENOSCOPY (EGD);  Surgeon: Carlin Sanchez MD;  Location: [facility] (4TH FLR);  Service: Endoscopy;  Laterality: N/A;    HYSTERECTOMY  12/17/2012    [facility] BS&O     INJECTION OF JOINT Left 3/1/2021    Procedure: INJECTION, JOINT LEFT HIP INTRA ARTICULAR CORTISONE INJECTION DIRECT REFERRAL;  Surgeon: Chuy Gregory MD;  Location: [facility] PAIN MGT;  Service: Pain Management;  Laterality: Left;  NEEDS CONSENT    INSERTION OF INFERIOR VENA CAVAL FILTER Right 7/19/2019    Procedure: IVC filter placement;  Surgeon: Rodney Rico MD;  Location: [facility] CATH LAB;  Service: Peripheral Vascular;  Laterality: Right;    IVC FILTER RETRIEVAL N/A 12/20/2019    Procedure: REMOVAL-FILTER-IVC;  Surgeon: Rodney Rico MD;  [address]  Service: Peripheral Vascular;  Laterality: N/A;    LAPAROSCOPIC SLEEVE GASTRECTOMY N/A 7/23/2019    Procedure: GASTRECTOMY, SLEEVE, LAPAROSCOPIC, with intraop EGD [id number];  Surgeon: Duc Ratliff Jr., MD;  Location: [facility] OR 2ND FLR;  Service: General;  Laterality: N/A;    RECTOCELE REPAIR      SPINE SURGERY  2018    THYROID NODULE REMOVAL      TONSILLECTOMY  1995       Time Tracking:     PT Received On:  12/12/23  PT Start Time: 0842     PT Stop Time: 0853  PT Total Time (min): 11 min     Billable Minutes: Evaluation 1 procedure    Jorden Donaldson PT, DPT  12/12/2023

## 2023-12-12 NOTE — SUBJECTIVE & OBJECTIVE
"Principal Problem:Cervical radiculopathy    Principal Orthopedic Problem: same, s/p C4/5 ACDF    Interval History: Patient complained of chest tightness overnight. EKG within normal limits. Troponin 0. Tightness improved this morning. Pain well controlled. No numbness/tingling in the bilateral upper extremities. No additional complaints from the patient this morning. Ambulating and voiding without issue.    Review of patient's allergies indicates:   Allergen Reactions    Cathflo activase [alteplase] Anaphylaxis     Tightness in chest, raspy throat, restless legs, hotness all over    Heparin analogues      Restless legs, tightness in chest, and hot all over    Xyzal [levocetirizine] Swelling    Latex Swelling             Current Facility-Administered Medications   Medication    acetaminophen tablet 1,000 mg    hydroCHLOROthiazide tablet 12.5 mg    melatonin tablet 6 mg    methocarbamoL tablet 750 mg    morphine injection 2 mg    oxyCODONE immediate release tablet 5 mg    oxyCODONE immediate release tablet Tab 10 mg    polyethylene glycol packet 17 g    senna-docusate 8.6-50 mg per tablet 1 tablet     Objective:     Vital Signs (Most Recent):  Temp: 98.2 °F (36.8 °C) (12/12/23 0430)  Pulse: 71 (12/12/23 0430)  Resp: 18 (12/12/23 0430)  BP: 113/71 (12/12/23 0430)  SpO2: 97 % (12/12/23 0430) Vital Signs (24h Range):  Temp:  [97.8 °F (36.6 °C)-98.8 °F (37.1 °C)] 98.2 °F (36.8 °C)  Pulse:  [53-83] 71  Resp:  [13-25] 18  SpO2:  [94 %-99 %] 97 %  BP: (104-141)/(64-91) 113/71     Weight: 79.8 kg (176 lb)  Height: 5' 5" (165.1 cm)  Body mass index is 29.29 kg/m².      Intake/Output Summary (Last 24 hours) at 12/12/2023 0611  Last data filed at 12/12/2023 0309  Gross per 24 hour   Intake 2340 ml   Output 1615 ml   Net 725 ml        Ortho/SPM Exam  Gen: NAD, sitting comfortably in bed  CV: regular rate  Resp: non-labored respirations    Neck/Spine:  Dressing clean, dry, and intact  No notable hematoma  Drain output " serosanguinous  Neurovascularly intact distally       Significant Labs: All pertinent labs within the past 24 hours have been reviewed.    Significant Imaging: I have reviewed and interpreted all pertinent imaging results/findings.

## 2023-12-12 NOTE — PROGRESS NOTES
The patient presents for preop.  She has symptomatic cervical myeloradiculopathy and we are planning a C4-5 ACDF.    She has had a prior thyroidectomy and we are planning to have ENT perform a direct laryngoscopy prior to surgery to confirm vocal fold mobility     I had a sit down discussion with the patient regarding the planned procedure. We specifically discussed the risks, benefits, and alternatives to surgery. We discussed the surgical procedure including the skin incision, nerve decompression, bone fusion, allograft and/or iliac crest bone graft, and surgical implants including plates and interbody spacers as indicated: they understand the risks include but are not limited to death, paralysis, blindness, bleeding, infection, damage to arteries, veins and nerves, tracheal injury, esophageal injury, vertebral artery injury, dysphagia, spinal fluid leak, continued or worsening pain, no improvement in symptoms, non-union, and the possible need for more surgery in the future, as well as the possibility other unforseen and unknown complications. We talked about expected hospital stay and recovery period. All questions were answered; they understand and wish to proceed.

## 2023-12-12 NOTE — DISCHARGE SUMMARY
[facility])  Orthopedics  Discharge Summary      Patient Name: Renae Hou  MRN: [id number]  Admission Date: 12/11/2023  Hospital Length of Stay: 0 days  Discharge Date and Time: 12/12/2023 10:48 AM  Attending Physician: No att. providers found   Discharging Provider: Veto Sullivan MD  Primary Care Provider: Wendy Horowitz MD    HPI: Renae Hou is a 62 y.o. female who returns to me today for follow up of neck pain.  She was last seen by me 10/20/2021.  She had a cervical NEERAJ 4/4/23 that gave her some relief for a few months.  Today she is doing well but notes increased neck and right arm pain.  There is numbness and tingling.  She has been leaving work early because of the pain and the pain wakes her up at night.  She is taking motrin regularly with little relief.      The Patient denies myelopathic symptoms such as handwriting changes or difficulty with buttons/coins/keys. Denies perineal paresthesias, bowel/bladder dysfunction.    Procedure(s) (LRB):  DISCECTOMY, SPINE, CERVICAL, ANTERIOR APPROACH, WITH FUSION C4/5 spinewave rep-less SNS:vocal/motors/SSEP (N/A)      Hospital Course: On 12/11/23, the patient arrived to the [facility] for pre-operative management.  Upon completion of the pre-operative preparation, the patient was taken back to the operative theatre. The above procedure was performed without complication and the patient was transported to the post anesthesia care unit in stable condition.  After appropriate recovery from the anaesthetic agents used during the surgery, the patient was then transported to the inpatient floor.  She had some chest tightness that evening.  An EKG and troponin were obtained and found to be within normal limits.  Her tightness later resolved.  The interim of the hospital stay from arrival on the floor up to discharge has been uncomplicated. The patient has tolerated regular diet.  The patient's pain has been controlled using a  multimodal approach. Currently, the patient's pain is well controlled on an oral regimen.  The patient has been voiding without difficulty.  The patient began participation in physical therapy after surgery and has progressed throughout the entire hospital stay.  Currently, the patient's progress is sufficient to allow the them to be discharged to home safely.  The patient agrees with this assessment and desires a discharge today.          Significant Diagnostic Studies: No pertinent studies.    Pending Diagnostic Studies:       None          Final Active Diagnoses:    Diagnosis Date Noted POA    PRINCIPAL PROBLEM:  Cervical radiculopathy [M54.12] 08/11/2020 Yes      Problems Resolved During this Admission:      Discharged Condition: good    Disposition: Home or Self Care    Follow Up: In clinic in 4 weeks    Patient Instructions: Typed instructions given    Medications:  Reconciled Home Medications:      Medication List        START taking these medications      methocarbamoL 750 MG Tab  Commonly known as: ROBAXIN  Take 1 tablet (750 mg total) by mouth every 8 (eight) hours as needed (muscle spasms).     oxyCODONE 5 MG immediate release tablet  Commonly known as: ROXICODONE  Take 1 tablet (5 mg total) by mouth every 4 (four) hours as needed for Pain.            CHANGE how you take these medications      acetaminophen 500 MG tablet  Commonly known as: TYLENOL  Take 2 tablets (1,000 mg total) by mouth every 8 (eight) hours.  What changed:   how much to take  when to take this  reasons to take this            CONTINUE taking these medications      amoxicillin-clavulanate 875-125mg 875-125 mg per tablet  Commonly known as: AUGMENTIN  Take 1 tablet by mouth 2 (two) times daily.     EPINEPHrine 0.3 mg/0.3 mL Atin  Commonly known as: EPIPEN  Inject 0.3 mLs (0.3 mg total) into the muscle once. for 1 dose     ergocalciferol 50,000 unit Cap  Commonly known as: ERGOCALCIFEROL  Take 1 capsule (50,000 Units total) by mouth  every 7 days.     ferrous gluconate 324 MG tablet  Commonly known as: FERGON  Every other day with orange juice     gabapentin 100 MG capsule  Commonly known as: NEURONTIN  1-3 capsules at bedtime     hydroCHLOROthiazide 12.5 MG Tab  Commonly known as: HYDRODIURIL  Take 1 tablet (12.5 mg total) by mouth once daily.     ibuprofen 100 MG tablet  Commonly known as: ADVIL,MOTRIN  Take 600 mg by mouth every 6 (six) hours as needed for Temperature greater than.     WEGOVY 1.7 mg/0.75 mL Pnij  Generic drug: semaglutide (weight loss)  Inject 1.7 mg into the skin every 7 days.              Veto Sullivan MD  Orthopedics  [facility])

## 2023-12-12 NOTE — NURSING
Spoke with Dr. Rodriguez regarding pt c/o chest tightness and EKG and troponin results.  No new orders received at this time.  Will cont to monitor.

## 2023-12-12 NOTE — PROGRESS NOTES
[facility])  Orthopedics  Progress Note    Patient Name: Renae Hou  MRN: [id number]  Admission Date: 12/11/2023  Hospital Length of Stay: 0 days  Attending Provider: Dean Zayas MD  Primary Care Provider: Wendy Horowitz MD  Follow-up For: Procedure(s) (LRB):  DISCECTOMY, SPINE, CERVICAL, ANTERIOR APPROACH, WITH FUSION C4/5 spinewave rep-less SNS:vocal/motors/SSEP (N/A)    Post-Operative Day: 1 Day Post-Op  Subjective:     Principal Problem:Cervical radiculopathy    Principal Orthopedic Problem: same, s/p C4/5 ACDF    Interval History: Patient complained of chest tightness overnight. EKG within normal limits. Troponin 0. Tightness improved this morning. Pain well controlled. No numbness/tingling in the bilateral upper extremities. No additional complaints from the patient this morning. Ambulating and voiding without issue.    Review of patient's allergies indicates:   Allergen Reactions    Cathflo activase [alteplase] Anaphylaxis     Tightness in chest, raspy throat, restless legs, hotness all over    Heparin analogues      Restless legs, tightness in chest, and hot all over    Xyzal [levocetirizine] Swelling    Latex Swelling             Current Facility-Administered Medications   Medication    acetaminophen tablet 1,000 mg    hydroCHLOROthiazide tablet 12.5 mg    melatonin tablet 6 mg    methocarbamoL tablet 750 mg    morphine injection 2 mg    oxyCODONE immediate release tablet 5 mg    oxyCODONE immediate release tablet Tab 10 mg    polyethylene glycol packet 17 g    senna-docusate 8.6-50 mg per tablet 1 tablet     Objective:     Vital Signs (Most Recent):  Temp: 98.2 °F (36.8 °C) (12/12/23 0430)  Pulse: 71 (12/12/23 0430)  Resp: 18 (12/12/23 0430)  BP: 113/71 (12/12/23 0430)  SpO2: 97 % (12/12/23 0430) Vital Signs (24h Range):  Temp:  [97.8 °F (36.6 °C)-98.8 °F (37.1 °C)] 98.2 °F (36.8 °C)  Pulse:  [53-83] 71  Resp:  [13-25] 18  SpO2:  [94 %-99 %] 97 %  BP: (104-141)/(64-91) 113/71  "    Weight: 79.8 kg (176 lb)  Height: 5' 5" (165.1 cm)  Body mass index is 29.29 kg/m².      Intake/Output Summary (Last 24 hours) at 12/12/2023 0611  Last data filed at 12/12/2023 0309  Gross per 24 hour   Intake 2340 ml   Output 1615 ml   Net 725 ml        Ortho/SPM Exam  Gen: NAD, sitting comfortably in bed  CV: regular rate  Resp: non-labored respirations    Neck/Spine:  Dressing clean, dry, and intact  No notable hematoma  Drain output serosanguinous  Neurovascularly intact distally       Significant Labs: All pertinent labs within the past 24 hours have been reviewed.    Significant Imaging: I have reviewed and interpreted all pertinent imaging results/findings.  Assessment/Plan:     * Cervical radiculopathy  Renae Hou is a 63 y.o. female s/p C4/5 ACDF on 12/11/23. Doing well this morning.    Plan:  Pain control: multimodal  PT/OT: WBAT in C-collar  DVT PPx: No chemical prophylaxis, SCDs at all times when not ambulating  Abx: postop Ancef  Drain: 15 mL out overnight, removed 12/12    Dispo: home today after PT/OT and X-ray C-spine            Veto Sullivan MD  Orthopedics  [place] [facility] ([facility]    "

## 2023-12-12 NOTE — NURSING
"0300:  Pt with c/o "chest tightness" and "sore throat".  VSS COY Perdomo at bedside.  EKG performed showed sinus bradycardia.  New order received and noted to give Maalox and reassess in one hour.    0415:  Received Maalox and still has c/o "chest tightness that goes to my back bone."  COY Perdomo made aware.  New order received and noted to draw Troponin level.  No apparent distress noted.  Will continue to monitor.    0443:  Troponin 0.00.  Ortho on-call paged.  Awaiting return call.  "

## 2023-12-12 NOTE — NURSING
Plan of care reviewed with pt, verbalized understanding. Medications and possible side effects reviewed and administered as ordered.  Purposeful rounding completed.  Safety precautions maintained.  Call light placed within reach.

## 2023-12-12 NOTE — PLAN OF CARE
Pt discharged from unit.  Pt aaox4, vss, no s/s of distress noted.  Dressing to anterior neck remains cdi. IV removed w/ catheter intact, no redness or swelling noted to area.  Discharge instructions given to pt and placed in d/c folder, pt verbalized understanding.  Home meds and f/u appts reviewed as well. Meds delivered to bs prior to discharge.  Pt left unit via w/c and was accompanied to front of hospital to meet ride. All personal belongings sent with pt.

## 2023-12-12 NOTE — PLAN OF CARE
Problem: Fall Injury Risk  Goal: Absence of Fall and Fall-Related Injury  Intervention: Promote Injury-Free Environment  Flowsheets (Taken 12/12/2023 0747)  Safety Promotion/Fall Prevention:   assistive device/personal item within reach   Fall Risk reviewed with patient/family   lighting adjusted   nonskid shoes/socks when out of bed   side rails raised x 2   instructed to call staff for mobility     Problem: Bleeding (Spinal Surgery)  Goal: Absence of Bleeding  Intervention: Monitor and Manage Bleeding  Flowsheets (Taken 12/12/2023 0747)  Bleeding Management: dressing monitored     Problem: Infection (Spinal Surgery)  Goal: Absence of Infection Signs and Symptoms  Intervention: Prevent or Manage Infection  Flowsheets (Taken 12/12/2023 0747)  Infection Management: aseptic technique maintained     Problem: Pain (Spinal Surgery)  Goal: Acceptable Pain Control  Intervention: Prevent or Manage Pain  Flowsheets (Taken 12/12/2023 0747)  Pain Management Interventions:   care clustered   cold applied   pain management plan reviewed with patient/caregiver   Plan of care reviewed with pt, verbalized understanding. Medications and possible side effects reviewed and administered as ordered.  Purposeful rounding completed.  Safety precautions maintained.  Call light placed within reach.  Preparing for discharge.

## 2023-12-12 NOTE — PLAN OF CARE
[place] [facility])  Discharge Final Note    Primary Care Provider: Wendy Horowitz MD    Expected Discharge Date: 12/12/2023    Final Discharge Note (most recent)       Final Note - 12/12/23 1514          Final Note    Assessment Type Final Discharge Note     Anticipated Discharge Disposition Home or Self Care     What phone number can be called within the next 1-3 days to see how you are doing after discharge? --   [phone]                    Important Message from Medicare           Future Appointments   Date Time Provider Department Center   1/8/2024 10:00 AM [facility]   1/8/2024 11:00 AM Mila Crane PA-C [facility] SPINE Jonathan Abdiaziz Ort     Patient discharged home to care of family on 12/12/23.    Ruby Ryan RNCM  Case Management  [facility]  [phone]

## 2023-12-12 NOTE — PT/OT/SLP EVAL
Occupational Therapy   Evaluation and Discharge Note    Name: Renae Hou  MRN: [id number]  Admitting Diagnosis: Cervical radiculopathy  Recent Surgery: Procedure(s) (LRB):  DISCECTOMY, SPINE, CERVICAL, ANTERIOR APPROACH, WITH FUSION C4/5 spinewave rep-less SNS:vocal/motors/SSEP (N/A) 1 Day Post-Op    Recommendations:     Discharge Recommendations: No Therapy Indicated  Discharge Equipment Recommendations: none  Barriers to discharge:  None    Assessment:     Renae Hou is a 63 y.o. female with a medical diagnosis of Cervical radiculopathy. Pt educated on spinal precautions. She completed all seated ADLs with Mod (I) and functional mobility/transfers with SPV. At this time, patient is functioning at their prior level of function and does not require further acute OT services. Pt is appropriate for discharge home.    Plan:     During this hospitalization, patient does not require further acute OT services.  Please re-consult if situation changes.    Plan of Care Reviewed with: patient    Subjective     Chief Complaint: neck discomfort  Patient/Family Comments/goals: to get dressed and d/c home    Occupational Profile:  Living Environment: Pt lives with her spouse and 12 year old granddaughter in a 2SH with 4 LAINA and (L) HR. Pt's bedroom/bathroom are on the 1st floor with a walk-in shower and built in seat.  Previous level of function: independent  Roles and Routines: (+) drives and works at [employer]  Equipment Used at home: none  Assistance upon Discharge:     Pain/Comfort:  Pain Rating 1: 1/10  Location - Orientation 1: anterior  Location 1: neck  Pain Addressed 1: Pre-medicate for activity, Reposition, Distraction  Pain Rating Post-Intervention 1: 1/10    Patients cultural, spiritual, Yazidi conflicts given the current situation: no    Objective:     Communicated with: RN prior to session.  Patient found HOB elevated with  (no lines attached) upon OT entry to room.    General Precautions: Standard,  fall  Orthopedic Precautions: spinal precautions  Braces: N/A  Respiratory Status: Room air     Occupational Performance:    Bed Mobility:    Patient completed Scooting/Bridging with supervision  Patient completed Supine to Sit with supervision    Functional Mobility/Transfers:  Patient completed Sit <> Stand Transfer with supervision  with  no assistive device   Functional Mobility: Pt engaged in functional mobility to simulate household/community distances in room ~30 ft with SPV using no AD in order to maximize functional endurance and standing balance required for engagement in occupations of choice   No LOB or SOB noted    Activities of Daily Living:  Upper Body Dressing: modified independence to don button up shirt and fasten buttons seated on edge of bed  Lower Body Dressing: modified independence to thread b/l feet through pants and manage above hips; Mod (I) to doff non-skid socks and don socks/shoes edge of bed    Cognitive/Visual Perceptual:  Cognitive/Psychosocial Skills:     -       Oriented to: Person, Place, Time, and Situation   -       Follows Commands/attention:Follows multistep  commands  -       Safety awareness/insight to disability: intact   -       Mood/Affect/Coping skills/emotional control: Cooperative and Pleasant    Physical Exam:  Sensation:    -       Intact  light/touch B UE  Upper Extremity Range of Motion:     -       Right Upper Extremity: WFL  -       Left Upper Extremity: WFL  Upper Extremity Strength:    -       Right Upper Extremity: WFL  -       Left Upper Extremity: WFL   Strength:    -       Right Upper Extremity: WFL  -       Left Upper Extremity: WFL  Fine Motor Coordination:    -       Intact  Left hand thumb/finger opposition skills, Right hand thumb/finger opposition skills, Left hand, manipulation of objects, and Right hand, manipulation of objects    AMPAC 6 Click ADL:  AMPAC Total Score: 24    Treatment & Education:  -Education provided regarding spinal precautions  for use during functional tasks/mobility/transfers   -Pt educated to dress surgical site first and further dressing techniques s/p surgery  -Pt educated on role of OT and plan of care s/p surgery, white board updated     Patient left sitting edge of bed with call button in reach and RN notified    GOALS:   Multidisciplinary Problems       Occupational Therapy Goals       Pt is currently functioning at their baseline in ADLs and functional mobility. Therefore, further skilled OT intervention is not warranted at this time. Pt is appropriate to discharge home. Please re-consult if pt's functional status changes.                     History:     Past Medical History:   Diagnosis Date    Abscess of paraspinous muscles 2018    Allergy 04/2018    Class 1 obesity due to excess calories in adult 07/23/2019    DVT (deep venous thrombosis)     left leg    Epidural abscess 2018 04/10/2018    Fatty liver     GERD (gastroesophageal reflux disease) 2015    History of major abdominal surgery 09/11/2015    Had Hysterectomy , cholecystectomy , rectocele repair    Hypertension     Lumbar radiculopathy     Menopause     Obesity     Obstructive sleep apnea on CPAP     no longer uses CPAP after weight loss from gastric sleeve    Thyroid disease     Thyroid nodules.         Past Surgical History:   Procedure Laterality Date    ADENOIDECTOMY  1995    ANTERIOR CERVICAL DISCECTOMY W/ FUSION N/A 12/11/2023    Procedure: DISCECTOMY, SPINE, CERVICAL, ANTERIOR APPROACH, WITH FUSION C4/5 spinewave rep-less SNS:vocal/motors/SSEP;  Surgeon: Dean Zayas MD;  Location: [facility];  Service: Orthopedics;  Laterality: N/A;    BACK SURGERY  2002    L4, L5    BILATERAL SALPINGOOPHORECTOMY      CHOLECYSTECTOMY      COLONOSCOPY N/A 6/13/2022    Procedure: COLONOSCOPY;  Surgeon: Saul Ureña MD;  Location: [address]);  Service: Endoscopy;  Laterality: N/A;  constipation protocol-extended Miralax prep - PEG allergy -okay with taking  Miralax  fully vaccinated, prep instr portal -ml    COLONOSCOPY N/A 10/4/2022    Procedure: COLONOSCOPY;  Surgeon: Saul Ureña MD;  Location: [facility] (4TH FLR);  Service: Endoscopy;  Laterality: N/A;  Constipation protocol-Miralax prep-pt tolerated Miralax for last colonoscopy.  fully vaccinated, prep instr portal -ml    COLONOSCOPY N/A 10/9/2023    Procedure: COLONOSCOPY;  Surgeon: Saul Ureña MD;  Location: [facility] (4TH FLR);  Service: Endoscopy;  Laterality: N/A;  Ref by Dr JULIANA Horowitz, [facility]- pt instructed to hold x 7 days prior procedure,  Miralax, portal - PC  10/2-precall complete/pt aware to hold wegovy x7 days-MS    CORRECTION OF HAMMER TOE Left 8/5/2021    Procedure: CORRECTION, HAMMER TOE Left 4th mallet toe, DIP joitn fusion;  Surgeon: Guero Alvarez MD;  Location: [facility] OR;  Service: Orthopedics;  Laterality: Left;  K-wires    CYST REMOVAL      EPIDURAL STEROID INJECTION INTO CERVICAL SPINE N/A 8/11/2020    Procedure: Injection-steroid-epidural-cervical;  Surgeon: [facility];  Location: [facility] 2ND FLR;  Service: Radiology;  Laterality: N/A;  /Carla    EPIDURAL STEROID INJECTION INTO CERVICAL SPINE N/A 3/2/2022    Procedure: Injection-steroid-epidural-cervical C7-T1;  Surgeon: Latoya Quintanilla MD;  Location: [facility] PAIN MGT;  Service: Pain Management;  Laterality: N/A;    ESOPHAGOGASTRODUODENOSCOPY N/A 5/3/2019    Procedure: ESOPHAGOGASTRODUODENOSCOPY (EGD);  Surgeon: Carlin Sanchez MD;  Location: [facility] (4TH FLR);  Service: Endoscopy;  Laterality: N/A;    HYSTERECTOMY  12/17/2012    DV BS&O     INJECTION OF JOINT Left 3/1/2021    Procedure: INJECTION, JOINT LEFT HIP INTRA ARTICULAR CORTISONE INJECTION DIRECT REFERRAL;  Surgeon: Chuy Gregory MD;  Location: [facility] PAIN MGT;  Service: Pain Management;  Laterality: Left;  NEEDS CONSENT    INSERTION OF INFERIOR VENA CAVAL FILTER Right 7/19/2019    Procedure: IVC filter placement;  Surgeon: Rodney SALCEDO  MD Trisha;  Location: [facility] CATH LAB;  Service: Peripheral Vascular;  Laterality: Right;    IVC FILTER RETRIEVAL N/A 12/20/2019    Procedure: REMOVAL-FILTER-IVC;  Surgeon: Rodney Rico MD;  Location: [facility] OR [address] [place] [address];  Service: Peripheral Vascular;  Laterality: N/A;    LAPAROSCOPIC SLEEVE GASTRECTOMY N/A 7/23/2019    Procedure: GASTRECTOMY, SLEEVE, LAPAROSCOPIC, with intraop EGD 28014;  Surgeon: Duc Ratliff Jr., MD;  Location: [address];  Service: General;  Laterality: N/A;    RECTOCELE REPAIR      SPINE SURGERY  2018    THYROID NODULE REMOVAL      TONSILLECTOMY  1995       Time Tracking:     OT Date of Treatment: 12/12/23  OT Start Time: 0812  OT Stop Time: 0828  OT Total Time (min): 16 min    Billable Minutes:Evaluation 8  Self Care/Home Management 8    12/12/2023

## 2023-12-13 ENCOUNTER — PATIENT MESSAGE (OUTPATIENT)
Dept: BARIATRICS | Facility: CLINIC | Age: 63
End: 2023-12-13
Payer: COMMERCIAL

## 2023-12-26 ENCOUNTER — TELEPHONE (OUTPATIENT)
Dept: OPTOMETRY | Facility: CLINIC | Age: 63
End: 2023-12-26
Payer: COMMERCIAL

## 2023-12-29 ENCOUNTER — TELEPHONE (OUTPATIENT)
Dept: BARIATRICS | Facility: CLINIC | Age: 63
End: 2023-12-29
Payer: COMMERCIAL

## 2023-12-29 ENCOUNTER — PATIENT MESSAGE (OUTPATIENT)
Dept: BARIATRICS | Facility: CLINIC | Age: 63
End: 2023-12-29
Payer: COMMERCIAL

## 2024-01-04 ENCOUNTER — OFFICE VISIT (OUTPATIENT)
Dept: OPTOMETRY | Facility: CLINIC | Age: 64
End: 2024-01-04
Payer: COMMERCIAL

## 2024-01-04 ENCOUNTER — OFFICE VISIT (OUTPATIENT)
Dept: BARIATRICS | Facility: CLINIC | Age: 64
End: 2024-01-04
Payer: COMMERCIAL

## 2024-01-04 VITALS
OXYGEN SATURATION: 99 % | HEART RATE: 67 BPM | DIASTOLIC BLOOD PRESSURE: 69 MMHG | SYSTOLIC BLOOD PRESSURE: 119 MMHG | BODY MASS INDEX: 28.98 KG/M2 | WEIGHT: 174.19 LBS

## 2024-01-04 DIAGNOSIS — R63.4 WEIGHT LOSS: Primary | ICD-10-CM

## 2024-01-04 DIAGNOSIS — K21.9 GASTROESOPHAGEAL REFLUX DISEASE, UNSPECIFIED WHETHER ESOPHAGITIS PRESENT: ICD-10-CM

## 2024-01-04 DIAGNOSIS — E78.5 HYPERLIPIDEMIA, UNSPECIFIED HYPERLIPIDEMIA TYPE: ICD-10-CM

## 2024-01-04 DIAGNOSIS — Z98.84 S/P LAPAROSCOPIC SLEEVE GASTRECTOMY: ICD-10-CM

## 2024-01-04 DIAGNOSIS — R51.9 HEADACHE CAUSING FREQUENT AWAKENING FROM SLEEP: Primary | ICD-10-CM

## 2024-01-04 DIAGNOSIS — H47.333 PSEUDOPAPILLEDEMA OF BOTH OPTIC DISCS: ICD-10-CM

## 2024-01-04 DIAGNOSIS — I10 ESSENTIAL HYPERTENSION: ICD-10-CM

## 2024-01-04 PROCEDURE — 99214 OFFICE O/P EST MOD 30 MIN: CPT | Mod: S$GLB,,, | Performed by: OPTOMETRIST

## 2024-01-04 PROCEDURE — 99999 PR PBB SHADOW E&M-EST. PATIENT-LVL IV: CPT | Mod: PBBFAC,,, | Performed by: PHYSICIAN ASSISTANT

## 2024-01-04 PROCEDURE — 99213 OFFICE O/P EST LOW 20 MIN: CPT | Mod: S$GLB,,, | Performed by: PHYSICIAN ASSISTANT

## 2024-01-04 PROCEDURE — 1160F RVW MEDS BY RX/DR IN RCRD: CPT | Mod: CPTII,S$GLB,, | Performed by: PHYSICIAN ASSISTANT

## 2024-01-04 PROCEDURE — 3008F BODY MASS INDEX DOCD: CPT | Mod: CPTII,S$GLB,, | Performed by: PHYSICIAN ASSISTANT

## 2024-01-04 PROCEDURE — 92133 CPTRZD OPH DX IMG PST SGM ON: CPT | Mod: S$GLB,,, | Performed by: OPTOMETRIST

## 2024-01-04 PROCEDURE — 3074F SYST BP LT 130 MM HG: CPT | Mod: CPTII,S$GLB,, | Performed by: PHYSICIAN ASSISTANT

## 2024-01-04 PROCEDURE — 99999 PR PBB SHADOW E&M-EST. PATIENT-LVL II: CPT | Mod: PBBFAC,,, | Performed by: OPTOMETRIST

## 2024-01-04 PROCEDURE — 1159F MED LIST DOCD IN RCRD: CPT | Mod: CPTII,S$GLB,, | Performed by: PHYSICIAN ASSISTANT

## 2024-01-04 PROCEDURE — 3078F DIAST BP <80 MM HG: CPT | Mod: CPTII,S$GLB,, | Performed by: PHYSICIAN ASSISTANT

## 2024-01-04 NOTE — PROGRESS NOTES
Subjective:     HPI: Renae Hou is a 63 y.o. female wih GERD, obesity s/p sleeve, HTN, migraines who was self-referred for  retro-orbital pressure .    Patient reports developing right-sided headache about 6 months ago.  Patient describes headache as retro-orbital pressure sensation lasting from 2 hours to 2 days.  Associated symptoms include phonophobia and symptoms photophobia.  Patient denies any associated watery eyes, rhinorrhea.  Patient denies any nasal obstruction but does report feeling a fullness in her nasal cavity.  Patient describes this as a swollen sensation but denies any significant mucus when she blows her nose.    Current sinonasal medications include flonase prn.  .    She recalls previously having allergy testing, which was reportedly positive for grasses, pollens.  She does not recall a prior history of nasal trauma.  She has not had sinonasal surgery.    She has had a tonsillectomy.  She is not a tobacco smoker.     SNOT-22 score: : (P) 49  NOSE score:: (P) 50%  ETDQ-7 score:: (P) 5.7    Past Medical/Past Surgical History  Past Medical History:   Diagnosis Date    Abscess of paraspinous muscles 2018    Allergy 04/2018    Class 1 obesity due to excess calories in adult 07/23/2019    DVT (deep venous thrombosis)     left leg    Epidural abscess 2018 04/10/2018    Fatty liver     GERD (gastroesophageal reflux disease) 2015    History of major abdominal surgery 09/11/2015    Had Hysterectomy , cholecystectomy , rectocele repair    Hypertension     Lumbar radiculopathy     Menopause     Obesity     Obstructive sleep apnea on CPAP     no longer uses CPAP after weight loss from gastric sleeve    Thyroid disease     Thyroid nodules.     She has a past surgical history that includes Cholecystectomy; RECTOCELE REPAIR; THYROID NODULE REMOVAL; Bilateral salpingoophorectomy; Hysterectomy (12/17/2012); Back surgery (2002); Cyst Removal; Adenoidectomy (1995); Spine surgery (2018); Tonsillectomy (1995);  Esophagogastroduodenoscopy (N/A, 5/3/2019); Insertion of inferior vena caval filter (Right, 7/19/2019); Laparoscopic sleeve gastrectomy (N/A, 7/23/2019); IVC filter retrieval (N/A, 12/20/2019); Epidural steroid injection into cervical spine (N/A, 8/11/2020); Injection of joint (Left, 3/1/2021); Correction of hammer toe (Left, 8/5/2021); Epidural steroid injection into cervical spine (N/A, 3/2/2022); Colonoscopy (N/A, 6/13/2022); Colonoscopy (N/A, 10/4/2022); Colonoscopy (N/A, 10/9/2023); and Anterior cervical discectomy w/ fusion (N/A, 12/11/2023).    Family History/Social History  Her family history includes Alcohol abuse in her sister; Cirrhosis in her sister; Deep vein thrombosis in her mother; Diabetes in her sister; Epilepsy in her sister; Hashimoto's thyroiditis in her daughter; Heart disease in her daughter, father, maternal grandmother, and paternal grandmother; Heart disease (age of onset: 55) in her mother; Hyperlipidemia in her mother; Hypertension in her mother; Kidney disease in her maternal grandmother; No Known Problems in her daughter; Stroke in her father; Thyroid disease in her daughter, maternal grandmother, and mother.  She reports that she quit smoking about 36 years ago. Her smoking use included cigarettes. She has never used smokeless tobacco. She reports that she does not currently use alcohol. She reports that she does not use drugs.    Allergies/Immunizations  She is allergic to cathflo activase [alteplase], heparin analogues, xyzal [levocetirizine], and latex.  Immunization History   Administered Date(s) Administered    COVID-19, MRNA, LN-S, PF (Pfizer) (Purple Cap) 08/09/2021, 09/08/2021    Influenza 07/01/2019    Influenza - Quadrivalent - PF *Preferred* (6 months and older) 09/28/2018, 10/29/2019, 10/14/2020, 10/08/2021, 09/22/2022, 09/23/2023    Pneumococcal Conjugate - 13 Valent 12/18/2019, 12/18/2019    Pneumococcal Polysaccharide - 23 Valent 08/14/2020    Tdap 08/12/2016    Zoster  Recombinant 05/15/2020, 05/15/2020, 08/13/2020        Medications   acetaminophen  amoxicillin-clavulanate 875-125mg  ergocalciferol Cap  ferrous gluconate  gabapentin  hydroCHLOROthiazide Tab  ibuprofen  oxyCODONE  WEGOVY Pnij     Review of Systems     Constitutional: Negative for appetite change, chills, fatigue, fever and unexpected weight loss.      HENT: Positive for facial swelling, mouth sores, sinus infection, sinus pressure, sore throat and trouble swallowing.      Eyes:  Positive for photophobia.     Respiratory:  Positive for shortness of breath.      Cardiovascular:  Positive for chest pain.     Gastrointestinal:  Negative for abdominal pain, acid reflux, constipation, diarrhea, heartburn and vomiting.     Genitourinary: Negative for difficulty urinating, sexual problems and frequent urination.     Musc: Negative for aching joints, aching muscles, back pain and neck pain.     Allergy: Positive for seasonal allergies.     Endocrine: Positive for cold intolerance.     Neurological: Positive for dizziness, headaches and light-headedness.     Hematologic: Negative for bruises/bleeds easily and swollen glands.      Psychiatric: Negative for decreased concentration, depression, nervous/anxious and sleep disturbance.            Objective:     /84 (BP Location: Left arm, Patient Position: Sitting, BP Method: Large (Automatic))   Pulse 81   Wt 79.4 kg (175 lb 0.7 oz)   LMP 11/25/2012   BMI 29.13 kg/m²        Constitutional:   She appears well-developed and well-nourished. Normal speech.      Head:  Normocephalic and atraumatic. Facial strength is normal.      Ears:    Right Ear: No drainage or swelling. Tympanic membrane is not perforated and not erythematous. No middle ear effusion.   Left Ear: No drainage or swelling. Tympanic membrane is not perforated and not erythematous.  No middle ear effusion.     Nose:  No mucosal edema, rhinorrhea, septal deviation or polyps.  No foreign bodies. Turbinate  hypertrophy.  Turbinates normal and no turbinate masses.  Right sinus exhibits no maxillary sinus tenderness and no frontal sinus tenderness. Left sinus exhibits no maxillary sinus tenderness and no frontal sinus tenderness.     Mouth/Throat  Oropharynx clear and moist without lesions or asymmetry, normal uvula midline and lips, teeth, and gums normal. No trismus or mucous membrane lesions. No oropharyngeal exudate, posterior oropharyngeal edema or posterior oropharyngeal erythema. Tonsils not present.      Neck:  Phonation normal. No thyroid mass and no thyromegaly present.     She has no cervical adenopathy.   Post surgical scar near thyroid cartilage     Pulmonary/Chest:   Effort normal.     Psychiatric:   She has a normal mood and affect. Her speech is normal and behavior is normal.       Procedure    Nasal endoscopy performed.  See procedure note.     L NV     L MT     L PC     R NV     R MT     R PC      Data Reviewed  I personally reviewed the chart, including any outside records, and pertinent data below:    I reviewed the following notes: ENT, ortho    WBC (K/uL)   Date Value   12/06/2023 7.44     Eosinophil % (%)   Date Value   08/07/2023 6.6     Eos # (K/uL)   Date Value   08/07/2023 0.5     Platelets (K/uL)   Date Value   12/06/2023 316     Glucose (mg/dL)   Date Value   12/06/2023 102     IgE (IU/mL)   Date Value   02/07/2019 <35       I independently reviewed the images of the MRI Brain dated 9/15/23. Pertinent sinus findings include overall patent sinuses    Assessment & Plan:     1. Migraine without status migrainosus, not intractable, unspecified migraine type  -     symptoms are secondary to migraine  - increased nasal fullness maybe secondary to rhinitis noted on nasal endoscopy today.  See below  - sumatriptan (IMITREX) 50 MG tablet; Take 1 tablet (50 mg total) by mouth every 2 (two) hours as needed (headaches).  Dispense: 9 tablet; Refill: 0  -     patient with Neurology referral plans to follow  up with neurologist  - discussed side effects of Triptan meds.  Patient denies any cardiac history    2. Seasonal allergic rhinitis due to pollen  -     Prescribed patient to RESTART fluticasone propionate & USE azelastine PRN ; also educated patient on how to properly use nasal sprays  - Use saline rinse daily  - azelastine (ASTELIN) 137 mcg (0.1 %) nasal spray; Spray 1 spray (137 mcg total) by Nasal route 2 (two) times daily.  Dispense: 30 mL; Refill: 11    She will Follow up if symptoms worsen or fail to improve.  I had a discussion with the patient regarding her condition and the further workup and management options.    All questions were answered, and the patient is in agreement with the above.     Disclaimer:  This note may have been prepared utilizing voice recognition software which may result in occasional typographical errors in the text such as sound alike words.   If further clarification is needed, please contact the ENT department of [facility].

## 2024-01-04 NOTE — PROGRESS NOTES
Subjective:       Patient ID: Renae Hou is a 63 y.o. female.    Chief Complaint: No chief complaint on file.      HPI: Patient presents for 4 year post op follow up bariatric surgery    States that she is having some pressure behind her eyes causing headache  Seeing neurology ent and ophthalmology                                                                  EXERCISE and VITAMINS: Reviewed in bariatric assessment.    Adherent to vitamin regimen                                                                             DIET:  Regular diet                                                      2 meals      2 snacks   Fruit and cheese yogurt boiled eggs                                                                             MEDICATIONS AND ALLERGIES:  Reviewed in Navigator.     Review of Systems   Constitutional:  Negative for fatigue and fever.   HENT:  Negative for tinnitus and trouble swallowing.    Eyes:  Negative for visual disturbance.   Respiratory:  Negative for cough, choking and shortness of breath.    Cardiovascular:  Negative for chest pain, palpitations and leg swelling.   Gastrointestinal:  Negative for abdominal pain, constipation, diarrhea, nausea and vomiting.   Musculoskeletal:  Negative for neck pain.   Skin:  Negative for rash.   Neurological:  Negative for dizziness, light-headedness and headaches.   Psychiatric/Behavioral:  Negative for dysphoric mood, sleep disturbance and suicidal ideas. The patient is not nervous/anxious.        Objective:      Physical Exam  Vitals reviewed.   Constitutional:       Appearance: She is obese.   HENT:      Head: Normocephalic and atraumatic.      Mouth/Throat:      Mouth: Mucous membranes are dry.   Eyes:      Extraocular Movements: Extraocular movements intact.      Conjunctiva/sclera: Conjunctivae normal.      Pupils: Pupils are equal, round, and reactive to light.   Cardiovascular:      Rate and Rhythm: Normal rate and regular rhythm.      Pulses:  Normal pulses.      Heart sounds: Normal heart sounds. No murmur heard.  Pulmonary:      Effort: Pulmonary effort is normal. No respiratory distress.      Breath sounds: Normal breath sounds.   Abdominal:      General: Bowel sounds are normal.      Palpations: Abdomen is soft.      Tenderness: There is no abdominal tenderness.   Musculoskeletal:         General: No swelling. Normal range of motion.      Cervical back: Normal range of motion and neck supple.   Skin:     General: Skin is warm.      Capillary Refill: Capillary refill takes less than 2 seconds.      Coloration: Skin is not jaundiced.   Neurological:      General: No focal deficit present.      Mental Status: She is alert and oriented to person, place, and time.   Psychiatric:         Mood and Affect: Mood normal.         Behavior: Behavior normal.         Thought Content: Thought content normal.         Judgment: Judgment normal.       Assessment:       -  Good weight loss s/p sleeve   -  Co Morbidities: htn hld  -  Good diet.  -  no formal exercise.  -  Good vitamin.  -  Not at risk for fall or abuse.    Plan:         - Patient to follow bariatric diet plan.  - Patient to exercise on a regular basis.  - Patient to take bariatric vitamin regimen: MVI, calcium citrate, and Vitamin B12 as directed.  - RTC as scheduled.  - Call the office for any issues.

## 2024-01-04 NOTE — PATIENT INSTRUCTIONS
Meal Ideas for Regular Bariatric Diet  *Recipes and products available at [email or web address]      Breakfast: (15-20g protein)    - Egg white omelet: 2 egg whites or ½ cup Egg Beaters. (Optional proteins: cheese, shrimp, black beans, chicken, sliced turkey) (Optional veggies: tomatoes, salsa, spinach, mushrooms, onions, green peppers, or small slice avocado)     - Egg and sausage: 1 egg or ¼ cup Egg Beaters (any variety), with 1 pastor or 2 links of Turkey sausage or Veggie breakfast sausage (Silicon Cloud or Captify)    - Crust-less breakfast quiche: To make a glass pie dish, mix 4oz part skim Ricotta, 1 cup skim milk, and 2 eggs as your base. Add protein: shredded cheese, sliced lean ham or turkey, turkey grubbs/sausage. Add veggies: tomato, onion, green onion, mushroom, green pepper, spinach, etc.    - Yogurt parfait: Mix 1 - 6oz container Dannon Light N Fit vanilla yogurt, with ¼ cup crushed unsalted nuts    - Cottage cheese and fruit: ½ cup part-skim cottage cheese or ricotta cheese topped with fresh fruit or sugar free preserves     - Yahaira Vargas's Vanilla Egg custard* (add 2 Tbsp instant coffee granules to make Cappuccino Custard*)    - Hi-Protein café latte (skim milk, decaf coffee, 1 scoop protein powder). Optional to add Sugar free syrup or extract flavoring.    - Breakfast Lox: spread fat free cream cheese on slices of smoked salmon. Serve over scrambled or egg over easy (sauteed with nonstick cookspray) OR on a cucumber slice    - Eggwhich: Scramble or cook 1 large egg over easy using nonstick cookspray. Place between 2 slices of [place] grubbs and low fat cheese.     Lunch: (20-30g protein)    - ½ cup Black bean soup (Homemade or Progresso), with ¼ cup shredded low-fat cheese. Top with chopped tomato or fresh salsa.     - Lean deli turkey breast and low-fat sliced cheese, mustard or light gonzalez to moisten, rolled up together, or wrapped in a Cristian lettuce leaf    - Chicken salad made from dinner  leftovers, moisten with low-fat salad dressing or light gonzalez. Also try leftover salmon, shrimp, tuna or boiled eggs. Serve ½ cup over dark green salad    - Fat-free canned refried beans, topped with ¼ cup shredded low-fat cheese. Top with chopped tomato or fresh salsa.     - Greek salad: Top mixed greens with 1-2oz grilled chicken, tomatoes, red onions, 2-3 kalamata olives, and sprinkle lightly with feta cheese. Spritz with Balsamic vinegar to taste.     - Crust-less lunch quiche: To make a glass pie dish, mix 4oz part skim Ricotta, 1 cup skim milk, and 2 eggs as your base. Add protein: shredded cheese, sliced lean ham or turkey, shrimp, chicken. Add veggies: tomato, onion, green onion, mushroom, green pepper, spinach, artichoke, broccoli, etc.    - Pizza bake: spread a  alanna evan mushroom with tomato sauce, low-fat shredded mozzarella and turkey pepperoni or [place] grubbs. Add any veggies. Roast for 10-15 minutes, until cheese melted.     - Cucumber crab bites: Spread ¼ cup crab dip (lump crabmeat + light cream cheese and green onions) over sliced cucumber.     - Chicken with light spinach and artichoke dip*: Puree in : 6oz cooked and drained spinach, 2 cloves garlic, 1 can cannelloni beans, ½ cup chopped green onions, 1 can drained artichoke hearts (not marinated in oil), lemon juice and basil. Mix in 2oz chopped up chicken.    Supper: (20-30g protein)    - Serve grilled fish over dark green salad tossed with low-fat dressing, served with grilled asparagus pendleton     - Rotisserie chicken salad: served with sliced strawberries, walnuts, fat-free feta cheese crumbles and 1 tbsp Bernals Own Light Raspberry [place] Vinaigrette    - Shrimp cocktail: Dip cold boiled shrimp in homemade low-sugar cocktail sauce (1/2 cup Tfif One Carb ketchup, 2 tbsp horseradish, 1/4 tsp hot sauce, 1 tsp Worcestershire sauce, 1 tbsp freshly-squeezed lemon juice). Serve with dark green salad, walnuts, and crumbled blue  cheese drizzled with olive oil and Balsamic vinegar    - Tuna Melt: Spread tuna salad onto 2 thick slices of tomato. Top with low-fat cheese and broil until cheese is melted. May also be made with chicken salad of shrimp salad. [place] with different types of cheeses.    - Chicken or beef fajitas (no tortilla, rice, beans, chips). Top meat and veggies w/ fresh salsa, fat free sour cream.     - Homemade low-fat Chili using extra lean ground beef or ground turkey. Top with shredded cheese and salsa as desired. May add dollop fat-free sour cream if desired    - Chicken parmesan: Top chicken breast w/ low sugar marinara sauce, mozzarella cheese and bake until chicken reaches 165*.  Serve w/ spaghetti SQUASH or Singaporean cut green beans    - Dinner Omelet with shrimp or chicken and onion, green peppers and chives.    - No noodle lasagna: Use sliced zucchini or eggplant in place of noodles.  Layer with part skim ricotta cheese and low sugar meat sauce (use very lean ground beef or ground turkey).    - Mexican chicken bake: Bake chunks of chicken breast or thigh with taco seasoning, Pace brand enchilada sauce, green onions and low-fat cheese. Serve with ¼ cup black beans or fat free refried beans topped with chopped tomatoes or salsa.    - Giuseppe frozen meatballs, simmered in Classico Marinara sauce. Different flavors of salsa or spaghetti sauce create different dishes! Sprinkle with parmesan cheese. Serve with grilled or steamed veggies, or a dark green salad.    - Simmer boneless skinless chicken thigh chunks in Classico Marinara sauce or roasted salsa until tender with chopped onion, bell pepper, garlic, mushrooms, spinach, etc.     - Hamburger or veggie burger, without the bun, dressed the way you like. Served with grilled or steamed veggies.    - Eggplant parmesan: Bake slices of eggplant at 350 degrees for 15 minutes. Layer tomato sauce, sliced eggplant and low-fat mozzarella cheese in a baking dish and cover with  foil. Bake 30-40 more minutes or until bubbly. Uncover and bake at 400 degrees for about 15 more minutes, or until top is slightly crisp.    - Fish tacos: grilled/baked white fish, wrapped in Cristian lettuce leaf, topped with salsa, shredded low-fat cheese, and light coleslaw.    - Chicken hilton: Sprinkle chicken w/ 1 tsp of hidden [place] ranch dip mix. Then grill chicken and top with black beans, salsa and 1 tsp fat free sour cream.     - Cauliflower pizza crust: Use cauliflower as crust (see recipe on pinterest, no flour!). Top w/ low fat cheese, turkey pepperoni and veggies and bake again    - chicken or turkey crust pizza: use ground chicken or turkey instead of cauliflower, spread in [place] and bake at 350 for about 20-30 minutes(may want to add garlic, black pepper, oregano and other herbs to ground meat mixture).  Remove and top w/ low fat cheese, turkey pepperoni and veggies and bake again for another 10 minutes or until cheese is browned.     Snacks: (100-200 calories; >5g protein)    - 1 low-fat cheese stick with 8 cherry tomatoes or 1 serving fresh fruit  - 4 thin slices fat-free turkey breast and 1 slice low-fat cheese  - 4 thin slices fat-free honey ham with wedge of melon  - 6-8 edamame pods (equivalent to about 1/4 cup edamame without pods).   - 1/4 cup unsalted nuts with ½ cup fruit  - 6-oz container Dannon Light n Fit vanilla yogurt, topped with 1oz unsalted nuts         - apple, celery or baby carrots spread with 2 Tbsp PB2  - apple slices with 1 oz slice low-fat cheese  - Apple slices dipped in 2 Tbsp of PB2  - celery, cucumber, bell pepper or baby carrots dipped in ¼ cup hummus bean spread or light spinach and artichoke dip (*recipe in lunch section)  - celery, cucumber, baby carrots dipped in high protein greek yogurt (Mix 16 oz plain greek yogurt + 1 packet of hidden [place] ranch dip mix)  - Jero Links Beef Steak - 14g protein! (similar to beef jerky)  - 2 wedges Laughing Cow - Light Herb  & Garlic Cheese with sliced cucumber or green bell pepper  - 1/2 cup low-fat cottage cheese with ¼ cup fruit or ¼ cup salsa  - RTD Protein drinks: Atkins, Low Carb Slim Fast, EAS light, Muscle Milk Light, etc.  - Homemade Protein drinks: GNC Soy95, Isopure, Nectar, UNJURY, Whey Gourmet, etc. Mix 1 scoop powder with 8oz skim/1% milk or light soymilk.  - Protein bars: Atkins, EAS, Pure Protein, Think Thin, Detour, etc. Must have 0-4 grams sugar - Read the label.    Takeout Options: No more than twice/week  Deli - Salads (no pasta or rice), meats, cheeses. Roasted chicken. Lox (salmon)    Mexican - Platters which don't include tortillas, chips, or rice. Go easy on the beans. Example: Fajitas without the tortillas. Ask the  not to bring chips to the table if they are too tempting.    Greek - Meat or fish and vegetable, but no bread or rice. Including hummus, baba ganoush, etc, is OK. Most sit-down Greek restaurants can provide you with cucumber slices for dipping instead of peyton bread.    Fast Food (Avoid as much as possible) - Salads (no croutons and limit salad dressing to 2 tbsp), grilled chicken sandwich without the bun and ask for no gonzalez. Normas low fat chili or Taco Bell pintos and cheese.    BBQ - The meats are fine if you ask for sauces on the side, but most of the traditional side dishes are loaded with carbs. Basim slaw, baked beans and BBQ sauce are typically made with sugar.    Chinese - Nothing deep-fried, no rice or noodles. Many Chinese sauces have starch and sugar in them, so you'll have to use your judgement. If you find that these sauces trigger cravings, or cause Dumping, you can ask for the sauce to be made without sugar or just use soy sauce.

## 2024-01-05 ENCOUNTER — OFFICE VISIT (OUTPATIENT)
Dept: OTOLARYNGOLOGY | Facility: CLINIC | Age: 64
End: 2024-01-05
Payer: COMMERCIAL

## 2024-01-05 VITALS
DIASTOLIC BLOOD PRESSURE: 84 MMHG | WEIGHT: 175.06 LBS | HEART RATE: 81 BPM | SYSTOLIC BLOOD PRESSURE: 121 MMHG | BODY MASS INDEX: 29.13 KG/M2

## 2024-01-05 DIAGNOSIS — G43.909 MIGRAINE WITHOUT STATUS MIGRAINOSUS, NOT INTRACTABLE, UNSPECIFIED MIGRAINE TYPE: Primary | ICD-10-CM

## 2024-01-05 DIAGNOSIS — J30.1 SEASONAL ALLERGIC RHINITIS DUE TO POLLEN: ICD-10-CM

## 2024-01-05 PROCEDURE — 3074F SYST BP LT 130 MM HG: CPT | Mod: CPTII,S$GLB,, | Performed by: PHYSICIAN ASSISTANT

## 2024-01-05 PROCEDURE — 1159F MED LIST DOCD IN RCRD: CPT | Mod: CPTII,S$GLB,, | Performed by: PHYSICIAN ASSISTANT

## 2024-01-05 PROCEDURE — 3079F DIAST BP 80-89 MM HG: CPT | Mod: CPTII,S$GLB,, | Performed by: PHYSICIAN ASSISTANT

## 2024-01-05 PROCEDURE — 99999 PR PBB SHADOW E&M-EST. PATIENT-LVL IV: CPT | Mod: PBBFAC,,, | Performed by: PHYSICIAN ASSISTANT

## 2024-01-05 PROCEDURE — 31231 NASAL ENDOSCOPY DX: CPT | Mod: S$GLB,,, | Performed by: PHYSICIAN ASSISTANT

## 2024-01-05 PROCEDURE — 3008F BODY MASS INDEX DOCD: CPT | Mod: CPTII,S$GLB,, | Performed by: PHYSICIAN ASSISTANT

## 2024-01-05 PROCEDURE — 99214 OFFICE O/P EST MOD 30 MIN: CPT | Mod: 25,S$GLB,, | Performed by: PHYSICIAN ASSISTANT

## 2024-01-05 RX ORDER — SUMATRIPTAN 50 MG/1
50 TABLET, FILM COATED ORAL
Qty: 9 TABLET | Refills: 0 | Status: SHIPPED | OUTPATIENT
Start: 2024-01-05 | End: 2024-02-04

## 2024-01-05 RX ORDER — AZELASTINE 1 MG/ML
1 SPRAY, METERED NASAL 2 TIMES DAILY
Qty: 30 ML | Refills: 11 | Status: SHIPPED | OUTPATIENT
Start: 2024-01-05 | End: 2025-01-04

## 2024-01-05 NOTE — PROCEDURES
"Nasal/sinus endoscopy    Date/Time: 1/5/2024 9:00 AM    Time out: Immediately prior to procedure a "time out" was called to verify the correct patient, procedure, equipment, support staff and site/side marked as required.    Performed by: Patrice Leavitt PA-C  Authorized by: Patrice Leavitt PA-C    Consent Done?:  Yes (Verbal)  Anesthesia:     Local anesthetic:  Lidocaine 2% and Jone-Synephrine 1/2%    Type of Endoscope:  Flexible    Patient tolerance:  Patient tolerated the procedure well with no immediate complications  Nose:     Procedure Performed:  Nasal Endoscopy  External:      No external nasal deformity  Intranasal:      Mucosa no polyps     Mucosa ulcers not present     No mucosa lesions present     Enlarged turbinates     No septum gross deformity  Nasopharynx:      No mucosa lesions     Adenoids not present     Posterior choanae patent     Eustachian tube patent     Pale, edematous ITs (posteriorly) with clear aerated mucous in BL PCs    "

## 2024-01-05 NOTE — PATIENT INSTRUCTIONS
[facility] Sinus rinse   Try purchasing this nasal rinse below.  Its over the counter and can use several times daily without any side effects, but please use at least 1-2 times daily.  Use it before applying your nasal spray medications.  This spray can help wash away mucus and crusting and allow for better absorption of the medications.  Please use the nasal saline spray about 15 minutes before applying your medicated nasal sprays. This bottle uses distilled water (NOT tap water).  The Instructions in the box are detailed so please read them before using.          Astelin (Azelastine)  This is a nasal spray that primarily treats nasal drainage/runny nose (rhinorrhea) and nasal itching.    This works fairly quickly after onset and can be used as needed (does not have to be used as a scheduled medication).  Use as directed, up to 2 times daily.    Nasal Steroid Spray (Flonase)  You have been prescribed or instructed to take a nasal steroid spray (Flonase). Some symptoms will experience relief within a few days; however, it may take 2-3 weeks to begin to see improvement. This medication needs to be taken consistently to see results.    Use as directed and please be aware the Flonase takes 7-10 days of consistent use before becoming effective- so try to be patient :)    Helpful hints for maximizing nasal spray medication into the nose  - Use the opposite hand to spray the nostril (example: right hand for left nostril). This will help avoid spraying the medication onto the septum (the area that divides the left and right nasal cavity.)  - Tilt the bottle so that it is facing at a slight angle up or straight back, but avoid pointing the bottle straight up while spraying.   - Gently sniff (do not snort) a few seconds after spraying.    Pt calling in reference to referral for phys therapy through [facility].  He wants to speak to Jessie Olivares, please call [phone]

## 2024-01-07 ENCOUNTER — PATIENT MESSAGE (OUTPATIENT)
Dept: ADMINISTRATIVE | Facility: OTHER | Age: 64
End: 2024-01-07
Payer: COMMERCIAL

## 2024-01-08 ENCOUNTER — OFFICE VISIT (OUTPATIENT)
Dept: ORTHOPEDICS | Facility: CLINIC | Age: 64
End: 2024-01-08
Payer: COMMERCIAL

## 2024-01-08 ENCOUNTER — HOSPITAL ENCOUNTER (OUTPATIENT)
Dept: RADIOLOGY | Facility: HOSPITAL | Age: 64
Discharge: HOME | End: 2024-01-08
Attending: ORTHOPAEDIC SURGERY
Payer: COMMERCIAL

## 2024-01-08 ENCOUNTER — PATIENT MESSAGE (OUTPATIENT)
Dept: OPTOMETRY | Facility: CLINIC | Age: 64
End: 2024-01-08
Payer: COMMERCIAL

## 2024-01-08 DIAGNOSIS — Z98.890 S/P SPINAL SURGERY: ICD-10-CM

## 2024-01-08 DIAGNOSIS — Z98.1 S/P CERVICAL SPINAL FUSION: Primary | ICD-10-CM

## 2024-01-08 PROCEDURE — 72040 X-RAY EXAM NECK SPINE 2-3 VW: CPT | Mod: TC

## 2024-01-08 PROCEDURE — 99999 PR PBB SHADOW E&M-EST. PATIENT-LVL III: CPT | Mod: PBBFAC,,, | Performed by: PHYSICIAN ASSISTANT

## 2024-01-08 PROCEDURE — 99024 POSTOP FOLLOW-UP VISIT: CPT | Mod: S$GLB,,, | Performed by: PHYSICIAN ASSISTANT

## 2024-01-08 PROCEDURE — 1159F MED LIST DOCD IN RCRD: CPT | Mod: CPTII,S$GLB,, | Performed by: PHYSICIAN ASSISTANT

## 2024-01-08 PROCEDURE — 72040 X-RAY EXAM NECK SPINE 2-3 VW: CPT | Mod: 26,,, | Performed by: RADIOLOGY

## 2024-01-08 NOTE — PROGRESS NOTES
Date: 01/08/2024    Supervising Physician: Dean Zayas M.D.    Date of Surgery: 12/11/2023    Procedure: C4/5 ACDF    History: Renae Hou is seen today for follow-up following the above listed procedure. Overall the patient is doing well but today notes she does not have any pain.  She is very pleased.   she denies fever, chills, and sweats since the time of the surgery.       Exam:  Incision is healing well, clean, dry and intact.   There is no sign of infection. Neuro exam is stable. No signs of DVT.    Radiographs: imaging today shows hardware in place, no evidence of failure.     Assessment/Plan: 4 weeks post op.    Doing well postoperatively. No lifting over 10 lbs.     I will plan to see the patient back for the next postop visit in 2 months.     Thank you for the opportunity to participate in this patient's care. Please give me a call if there are any concerns or questions.

## 2024-01-09 ENCOUNTER — PATIENT MESSAGE (OUTPATIENT)
Dept: BARIATRICS | Facility: CLINIC | Age: 64
End: 2024-01-09
Payer: COMMERCIAL

## 2024-01-10 ENCOUNTER — PATIENT MESSAGE (OUTPATIENT)
Dept: ORTHOPEDICS | Facility: CLINIC | Age: 64
End: 2024-01-10
Payer: COMMERCIAL

## 2024-01-11 ENCOUNTER — PATIENT MESSAGE (OUTPATIENT)
Dept: NEUROLOGY | Facility: CLINIC | Age: 64
End: 2024-01-11
Payer: COMMERCIAL

## 2024-01-11 NOTE — PROGRESS NOTES
HPI    Pt is here today for a problem visit. Patient reports discomfort due to   feeling as though she has pressure built up behind OD. States that her   pain level is a 10 and that the pain wakes her up in the middle of the   night. States that she notices that the pain is more intense when she is   laying flat on her back. Reports headaches associated with the pain and   that her vision becomes darker OD. Patient states that symptoms have been   present for the past six months.   DLS: 6/23/2023 Dr. Bah  (-)Flashes   (+)Floaters   (-)Diplopia   (+)Headaches   (-)Itching   (-)Tearing  (-)Burning  (-)Dryness   (+)Photophobia  (+)Glare   (+)Blurred VA  Past Eye Sx: Lasik 20+ years ago  Eye Meds: No gtts  Last edited by Priya Bah, OD on 1/11/2024  5:50 PM.            Assessment /Plan     For exam results, see Encounter Report.    Headache causing frequent awakening from sleep    Pseudopapilledema of both optic discs  -     Cancel: OCT, Optic Nerve - OU - Both Eyes  -     OCT, Optic Nerve - OU - Both Eyes      1-2. Educated pt on today's findings: small, crowded discs OU (-) edema. No lindsey ocular pathology to account for pt's retro-orbital symptoms OD. Pt to follow up with ENT and Neurology as scheduled. Will consult neuro-ophthalmologist Dr. Sifuentes as pt has a previous MRI on file.    RTC PRN

## 2024-01-16 DIAGNOSIS — Z00.00 ROUTINE MEDICAL EXAM: Primary | ICD-10-CM

## 2024-01-26 ENCOUNTER — LAB VISIT (OUTPATIENT)
Dept: LAB | Facility: HOSPITAL | Age: 64
End: 2024-01-26
Attending: INTERNAL MEDICINE
Payer: COMMERCIAL

## 2024-01-26 DIAGNOSIS — E61.1 LOW SERUM IRON: ICD-10-CM

## 2024-01-26 LAB
BASOPHILS # BLD AUTO: 0.06 K/UL (ref 0–0.2)
BASOPHILS NFR BLD: 0.9 % (ref 0–1.9)
DIFFERENTIAL METHOD BLD: NORMAL
EOSINOPHIL # BLD AUTO: 0.3 K/UL (ref 0–0.5)
EOSINOPHIL NFR BLD: 4.7 % (ref 0–8)
ERYTHROCYTE [DISTWIDTH] IN BLOOD BY AUTOMATED COUNT: 13 % (ref 11.5–14.5)
FERRITIN SERPL-MCNC: 128 NG/ML (ref 20–300)
HCT VFR BLD AUTO: 39.1 % (ref 37–48.5)
HGB BLD-MCNC: 13.4 G/DL (ref 12–16)
IMM GRANULOCYTES # BLD AUTO: 0.01 K/UL (ref 0–0.04)
IMM GRANULOCYTES NFR BLD AUTO: 0.2 % (ref 0–0.5)
IRON SERPL-MCNC: 105 UG/DL (ref 30–160)
LYMPHOCYTES # BLD AUTO: 2.6 K/UL (ref 1–4.8)
LYMPHOCYTES NFR BLD: 40.5 % (ref 18–48)
MCH RBC QN AUTO: 30.9 PG (ref 27–31)
MCHC RBC AUTO-ENTMCNC: 34.3 G/DL (ref 32–36)
MCV RBC AUTO: 90 FL (ref 82–98)
MONOCYTES # BLD AUTO: 0.3 K/UL (ref 0.3–1)
MONOCYTES NFR BLD: 4.6 % (ref 4–15)
NEUTROPHILS # BLD AUTO: 3.1 K/UL (ref 1.8–7.7)
NEUTROPHILS NFR BLD: 49.1 % (ref 38–73)
NRBC BLD-RTO: 0 /100 WBC
PLATELET # BLD AUTO: 273 K/UL (ref 150–450)
PMV BLD AUTO: 10.3 FL (ref 9.2–12.9)
RBC # BLD AUTO: 4.34 M/UL (ref 4–5.4)
SATURATED IRON: 27 % (ref 20–50)
TOTAL IRON BINDING CAPACITY: 388 UG/DL (ref 250–450)
TRANSFERRIN SERPL-MCNC: 262 MG/DL (ref 200–375)
WBC # BLD AUTO: 6.35 K/UL (ref 3.9–12.7)

## 2024-01-26 PROCEDURE — 85025 COMPLETE CBC W/AUTO DIFF WBC: CPT | Performed by: INTERNAL MEDICINE

## 2024-01-26 PROCEDURE — 83540 ASSAY OF IRON: CPT | Performed by: INTERNAL MEDICINE

## 2024-01-26 PROCEDURE — 82728 ASSAY OF FERRITIN: CPT | Performed by: INTERNAL MEDICINE

## 2024-01-26 PROCEDURE — 36415 COLL VENOUS BLD VENIPUNCTURE: CPT | Performed by: INTERNAL MEDICINE

## 2024-02-08 ENCOUNTER — PATIENT MESSAGE (OUTPATIENT)
Dept: ORTHOPEDICS | Facility: CLINIC | Age: 64
End: 2024-02-08
Payer: COMMERCIAL

## 2024-02-11 DIAGNOSIS — E66.9 OBESITY, CLASS I, BMI 30.0-34.9 (SEE ACTUAL BMI): ICD-10-CM

## 2024-02-11 RX ORDER — SEMAGLUTIDE 1.7 MG/.75ML
1.7 INJECTION, SOLUTION SUBCUTANEOUS
Qty: 9 ML | Refills: 0 | Status: CANCELLED | OUTPATIENT
Start: 2024-02-11

## 2024-02-14 ENCOUNTER — PATIENT MESSAGE (OUTPATIENT)
Dept: BARIATRICS | Facility: CLINIC | Age: 64
End: 2024-02-14
Payer: COMMERCIAL

## 2024-02-14 DIAGNOSIS — E66.9 OBESITY, CLASS I, BMI 30.0-34.9 (SEE ACTUAL BMI): ICD-10-CM

## 2024-02-14 RX ORDER — SEMAGLUTIDE 1.7 MG/.75ML
1.7 INJECTION, SOLUTION SUBCUTANEOUS
Qty: 9 ML | Refills: 0 | Status: CANCELLED | OUTPATIENT
Start: 2024-02-11

## 2024-02-15 ENCOUNTER — PATIENT MESSAGE (OUTPATIENT)
Dept: BARIATRICS | Facility: CLINIC | Age: 64
End: 2024-02-15
Payer: COMMERCIAL

## 2024-02-16 ENCOUNTER — TELEPHONE (OUTPATIENT)
Dept: ORTHOPEDICS | Facility: CLINIC | Age: 64
End: 2024-02-16
Payer: COMMERCIAL

## 2024-02-16 ENCOUNTER — PATIENT MESSAGE (OUTPATIENT)
Dept: BARIATRICS | Facility: CLINIC | Age: 64
End: 2024-02-16
Payer: COMMERCIAL

## 2024-02-16 NOTE — TELEPHONE ENCOUNTER
Spoke to patient regarding rescheduling her appointment. Patient scheduled for x-ray for 8:00 am at the [facility] location. And appointment to see Sherry Crane  for 8:15 am on 03/12/2024. Patient stated thank you. Thanks.

## 2024-02-16 NOTE — TELEPHONE ENCOUNTER
I called patient and left a message we need to reschedule her appointment with Mila. Mila will not be in clinic on 03-.

## 2024-02-16 NOTE — TELEPHONE ENCOUNTER
Spoke to patient regarding message. Patient stated that her appointment is scheduled for 03/11/2024, not Monday 02/19/2024. Stated thank you. Thanks.

## 2024-02-18 ENCOUNTER — PATIENT MESSAGE (OUTPATIENT)
Dept: ADMINISTRATIVE | Facility: OTHER | Age: 64
End: 2024-02-18
Payer: COMMERCIAL

## 2024-02-20 ENCOUNTER — PATIENT MESSAGE (OUTPATIENT)
Dept: BARIATRICS | Facility: CLINIC | Age: 64
End: 2024-02-20
Payer: COMMERCIAL

## 2024-02-29 ENCOUNTER — PATIENT MESSAGE (OUTPATIENT)
Dept: BARIATRICS | Facility: CLINIC | Age: 64
End: 2024-02-29
Payer: COMMERCIAL

## 2024-03-01 ENCOUNTER — OFFICE VISIT (OUTPATIENT)
Dept: SPORTS MEDICINE | Facility: CLINIC | Age: 64
End: 2024-03-01
Payer: COMMERCIAL

## 2024-03-01 ENCOUNTER — HOSPITAL ENCOUNTER (OUTPATIENT)
Dept: RADIOLOGY | Facility: HOSPITAL | Age: 64
Discharge: HOME | End: 2024-03-01
Attending: PHYSICIAN ASSISTANT
Payer: COMMERCIAL

## 2024-03-01 VITALS — BODY MASS INDEX: 27.95 KG/M2 | WEIGHT: 167.75 LBS | HEIGHT: 65 IN

## 2024-03-01 DIAGNOSIS — M54.16 LUMBAR RADICULOPATHY, ACUTE: ICD-10-CM

## 2024-03-01 DIAGNOSIS — M25.512 LEFT SHOULDER PAIN, UNSPECIFIED CHRONICITY: ICD-10-CM

## 2024-03-01 DIAGNOSIS — G57.02 PIRIFORMIS SYNDROME OF LEFT SIDE: ICD-10-CM

## 2024-03-01 DIAGNOSIS — M25.562 LEFT MEDIAL KNEE PAIN: ICD-10-CM

## 2024-03-01 DIAGNOSIS — M25.552 ACUTE HIP PAIN, LEFT: Primary | ICD-10-CM

## 2024-03-01 DIAGNOSIS — M16.12 OSTEOARTHRITIS OF LEFT HIP, UNSPECIFIED OSTEOARTHRITIS TYPE: ICD-10-CM

## 2024-03-01 PROCEDURE — 99214 OFFICE O/P EST MOD 30 MIN: CPT | Mod: S$GLB,,, | Performed by: PHYSICIAN ASSISTANT

## 2024-03-01 PROCEDURE — 99999 PR PBB SHADOW E&M-EST. PATIENT-LVL IV: CPT | Mod: PBBFAC,,, | Performed by: PHYSICIAN ASSISTANT

## 2024-03-01 PROCEDURE — 73502 X-RAY EXAM HIP UNI 2-3 VIEWS: CPT | Mod: 26,LT,, | Performed by: RADIOLOGY

## 2024-03-01 PROCEDURE — 1159F MED LIST DOCD IN RCRD: CPT | Mod: CPTII,S$GLB,, | Performed by: PHYSICIAN ASSISTANT

## 2024-03-01 PROCEDURE — 3008F BODY MASS INDEX DOCD: CPT | Mod: CPTII,S$GLB,, | Performed by: PHYSICIAN ASSISTANT

## 2024-03-01 PROCEDURE — 1160F RVW MEDS BY RX/DR IN RCRD: CPT | Mod: CPTII,S$GLB,, | Performed by: PHYSICIAN ASSISTANT

## 2024-03-01 PROCEDURE — 73502 X-RAY EXAM HIP UNI 2-3 VIEWS: CPT | Mod: TC,LT

## 2024-03-02 NOTE — TELEPHONE ENCOUNTER
Care Due:                  Date            Visit Type   Department     Provider  --------------------------------------------------------------------------------                                             [facility] INTERNAL  Last Visit: 12-      PRE-OP       MEDICINE       Wendy Horowitz                              ADMJACKIE                               REVIEW EXEC   [facility] INTERNAL  Next Visit: 05-      CHECK        MEDICINE       Wendy Horowitz                                                            Last  Test          Frequency    Reason                     Performed    Due Date  --------------------------------------------------------------------------------    Vitamin D...  12 months..  ergocalciferol...........  05- 05-    [facility] Embedded Care Due Messages. Reference number: [id number].   3/02/2024 1:16:58 AM CST

## 2024-03-04 RX ORDER — ERGOCALCIFEROL 1.25 MG/1
50000 CAPSULE ORAL
Qty: 12 CAPSULE | Refills: 3 | Status: SHIPPED | OUTPATIENT
Start: 2024-03-04

## 2024-03-04 NOTE — TELEPHONE ENCOUNTER
Refill Routing Note   Medication(s) are not appropriate for processing by [facility] for the following reason(s):        Outside of protocol    [facility] action(s):  Route     Requires labs : Yes             Appointments  past 12m or future 3m with PCP    Date Provider   Last Visit   12/4/2023 Wendy Horowitz MD   Next Visit   5/10/2024 Wendy Horowitz MD   ED visits in past 90 days: 0        Note composed:8:01 AM 03/04/2024

## 2024-03-05 ENCOUNTER — CLINICAL SUPPORT (OUTPATIENT)
Dept: REHABILITATION | Facility: HOSPITAL | Age: 64
End: 2024-03-05
Payer: COMMERCIAL

## 2024-03-05 DIAGNOSIS — M16.12 OSTEOARTHRITIS OF LEFT HIP, UNSPECIFIED OSTEOARTHRITIS TYPE: ICD-10-CM

## 2024-03-05 DIAGNOSIS — G57.02 PIRIFORMIS SYNDROME OF LEFT SIDE: ICD-10-CM

## 2024-03-05 DIAGNOSIS — M25.552 ACUTE HIP PAIN, LEFT: ICD-10-CM

## 2024-03-05 PROCEDURE — 97162 PT EVAL MOD COMPLEX 30 MIN: CPT

## 2024-03-05 PROCEDURE — 97112 NEUROMUSCULAR REEDUCATION: CPT

## 2024-03-05 PROCEDURE — 97140 MANUAL THERAPY 1/> REGIONS: CPT

## 2024-03-05 NOTE — PLAN OF CARE
[facility]  Physical Therapy Initial Evaluation    Name: Renae Hou  [facility] Number: [id number]    Therapy Diagnosis:   Encounter Diagnoses   Name Primary?    Acute hip pain, left     Piriformis syndrome of left side     Osteoarthritis of left hip, unspecified osteoarthritis type      Physician: Santi Larson III, *    Physician Orders: PT Eval and Treat  Medical Diagnosis from Referral: M25.552 (ICD-10-CM) - Acute hip pain, leftG57.02 (ICD-10-CM) - Piriformis syndrome of left sideM16.12 (ICD-10-CM) - Osteoarthritis of left hip, unspecified osteoarthritis type  Evaluation Date: 3/5/2024  Authorization Period Expiration: 3/1/2025  Plan of Care Expiration: 6/22/2024  Progress Note Due: 4/5/2024  FOTO: 1/1  Visit # / Visits authorized: 1/ 1    Time In: 1100  Time Out: 1152  Total Billable Time: 52 minutes    Precautions: Standard    Subjective     Date of onset: a few weeks ago   History of current condition - Renae reports: she was walking up the stairs and twisted to look behind her and felt severe pain in hip, it started to get better but today she reports a significant increase in her pain insidious onset. She notes she went to bed last night without pain but was laying on her side and felt increase in pain that woke her up. Notes she has been in pain since and it is hard to walk. Also notes that on her L side she has numbness along lower lateral leg that she has had a nerve study for that was postive but has not been back to the doctor for.      Imaging Mild DJD. No significant hip joint space narrowing. No fracture or bone destruction identified. Postoperative changes noted in the lumbar spine as before     Prior Therapy: yes with good results  Exercise Routine/Sport Participation: none  Social History: lives at home  Occupation: desk work at [employer]  Prior Level of Function: unrestricted  Current Level of Function: pain with walking, sitting, laying down, twisting      Pain:  Current 4/10, worst 8/10, best 0/10   Location: left lateral hip into groin and thigh   Description: Aching, Tight, and Sharp  Aggravating Factors: Sitting, Standing, Laying, Walking, Night Time, Morning, Extension, Lifting, and Getting out of bed/chair  Easing Factors: heating pad and rest    Pts goals: improve pain       Medical History:   Past Medical History:   Diagnosis Date    Abscess of paraspinous muscles 2018    Allergy 04/2018    Class 1 obesity due to excess calories in adult 07/23/2019    DVT (deep venous thrombosis)     left leg    Epidural abscess 2018 04/10/2018    Fatty liver     GERD (gastroesophageal reflux disease) 2015    History of major abdominal surgery 09/11/2015    Had Hysterectomy , cholecystectomy , rectocele repair    Hypertension     Lumbar radiculopathy     Menopause     Obesity     Obstructive sleep apnea on CPAP     no longer uses CPAP after weight loss from gastric sleeve    Thyroid disease     Thyroid nodules.       Surgical History:   Renae Hou  has a past surgical history that includes Cholecystectomy; RECTOCELE REPAIR; THYROID NODULE REMOVAL; Bilateral salpingoophorectomy; Hysterectomy (12/17/2012); Back surgery (2002); Cyst Removal; Adenoidectomy (1995); Spine surgery (2018); Tonsillectomy (1995); Esophagogastroduodenoscopy (N/A, 5/3/2019); Insertion of inferior vena caval filter (Right, 7/19/2019); Laparoscopic sleeve gastrectomy (N/A, 7/23/2019); IVC filter retrieval (N/A, 12/20/2019); Epidural steroid injection into cervical spine (N/A, 8/11/2020); Injection of joint (Left, 3/1/2021); Correction of hammer toe (Left, 8/5/2021); Epidural steroid injection into cervical spine (N/A, 3/2/2022); Colonoscopy (N/A, 6/13/2022); Colonoscopy (N/A, 10/4/2022); Colonoscopy (N/A, 10/9/2023); and Anterior cervical discectomy w/ fusion (N/A, 12/11/2023).    Medications:   Renae has a current medication list which includes the following prescription(s): azelastine,  epinephrine, ergocalciferol, ferrous gluconate, gabapentin, hydrochlorothiazide, wegovy, and sumatriptan.    Allergies:   Review of patient's allergies indicates:   Allergen Reactions    Cathflo activase [alteplase] Anaphylaxis     Tightness in chest, raspy throat, restless legs, hotness all over    Heparin analogues      Restless legs, tightness in chest, and hot all over    Xyzal [levocetirizine] Swelling    Latex Swelling              Objective     Posture: hip ADD L; femoral add/IR B     Gait: sig trendelenburg, antalgic, dec hip extension B     Hip Passive Range of Motion:   Right  Left    Flexion 110 110   Extension 20 20 *pain    Ext. Rotation 45 45 *pain   Int. Rotation 20 15* pain     Knee Passive Range of Motion: 5-0-135 B     Hip Active ROM:   Flexion R 90; L 90   Extension R 5; L 0     Standing Thoracolumbar Range of Motion:    % Observation Pain   Flexion 50 Gowers sign (+) Yes in low back    Extension 75 n Yes low back    Right Rotation 50  N   Left Rotation 50 n n   Right Sidebend 100 n n   Left Sidebend 100 n n       Lower Extremity Strength   Right  Left    Quadriceps: 4/5 4/5   Hamstring at 90 de/5 4-/5   Hamstrings at 15 de/5 5/5   Iliopsoas (sitting): 5/5 3/5   Hip extension:  4/5 3/5   PGM: 3/5 3/5   Hip Internal Rotation  4/5 3/5   Hip External Rotation 5/5 5/5       Myotomes Right Left Comments   L2 5/5    5/5       L3 5/5    5/5       L4 5/5    5/5       L5 5/5    5/5       S1 5/5    5/5       S2 5/5    5/5           Dermatomes Right Left Comments   L2 Intact   Intact    L3 Intact Intact    L4 Intact Intact    L5 Intact Impaired: diminished/absent    S1 Intact Intact    S2 Intact Intact          Reflexes Positive Right  Positive Left Comments   Patella 2+ 2+    Achilles 0 0 Unable to elicit reflex ; no drop foot noted, good gastroc strength       Special Tests:    Right  Left    JUAN JOSÉ - +   JADEN - +   Hip Scour - +   Log Roll - +   Prone LE Extension + +     SIJ  Right  Left    Thigh  Thrust - -   Compression - -   Distraction - -   Sacral Thrust - -      MSI Right  Left    Sitting Knee Ext - +   Hand Heel Rock + +       Neural Tension Testing:   Slump:-   SLR: + common peroneal nerve   Femoral Nerve Glide: -      Joint Mobility: hypermobile L3; Hypomobile thoracic spine     Palpation: ttp at lateral glutes and L3/SIJ L      Flexibility:   Ely's test: R - ; L +    Hamstrings: R + ; L  +   Jayce Test Right  Left    Iliopsoas - +   Rectus Femoris  - +           CMS Impairment/Limitation/Restriction for FOTO LEFS Survey    Therapist reviewed FOTO scores for Renae Hou on 3/5/2024.   FOTO documents entered into [email or web address] - see Media section.    Limitation Score: 53%  Category: Mobility       Treatment     Treatment Time In: 1140   Treatment Time Out: 1200  Total Treatment time separate from Evaluation: 20 minutes     Renae received the treatments listed below:      Manual therapy techniques: Joint mobilizations were applied to the: Hip for 08 minutes, including:  Hip lateral distraction grade II-III   Hip inf mobs grade II-III    Neuromuscular re-education activities to improve: motor control for 12 minutes. The following activities were included:  Hand Heel Rocks 20x   Pretzel 10x   Thoracic rotations sidelying 10x B          Home Exercises and Patient Education Provided     Education provided:   - Exercise routine will be important for overall health and improvement in PT with pain   - Prognosis, activity modification, goals for therapy, role of therapy for care, exercises/HEP    Written Home Exercises Provided: yes.  Exercises were reviewed and Renae was able to demonstrate them prior to the end of the session.   Pt received a written copy of exercises to perform at home. Renae demonstrated good  understanding of the education provided.     See EMR under patient instructions for exercises given.     Assessment     Renae is a 63 y.o. female referred to outpatient Physical Therapy with  acute L hip pain that has not improved in the past few weeks. She presents with dec hip ROM, pain with hip ROM, decreased strength of hip intrinsics and AGMR movement of the femoral head, impacting her toleracne to activity.       Pt will benefit from skilled outpatient Physical Therapy to address the deficits stated above and in the chart below, provide pt/family education, and to maximize pt's level of independence. Pt prognosis is Good.     Plan of care discussed with patient: Yes  Pt's spiritual, cultural and educational needs considered and patient is agreeable to the plan of care and goals as stated below:       Anticipated Barriers for therapy: none      Medical Necessity is demonstrated by the following  History  Co-morbidities and personal factors that may impact the plan of care [] LOW: no personal factors / co-morbidities  [x] MODERATE: 1-2 personal factors / co-morbidities  [] HIGH: 3+ personal factors / co-morbidities    Moderate / High Support Documentation:   Co-morbidities affecting plan of care:     Personal Factors:   lifestyle     Examination  Body Structures and Functions, activity limitations and participation restrictions that may impact the plan of care [] LOW: addressing 1-2 elements  [x] MODERATE: 3+ elements  [] HIGH: 4+ elements (please support below)    Moderate / High Support Documentation: lateral lower leg numbness     Clinical Presentation [] LOW: stable  [x] MODERATE: Evolving  [] HIGH: Unstable     Decision Making/ Complexity Score: moderate       Goals:  Short Term Goals: 2-4 weeks  1. Pt will be compliant with HEP 50% of prescribed amount.   2. The pt to demo improvement in hip ROM to be pain free L equals R   3.  The patient to demo 1/5 grade improvement in MMT to demo improved neuro ms control     Long Term Goals: 12-20 weeks   Pt will be compliant with % of prescribed amount.   The patient to demonstrate completing SL step ups 20x with L LE without pain in hip   The  patient to demonstrate ability to walk for 10 min or more without hip pain   The pt will report full participation in ADLs and IADLs without restrictions related to L hip.     Plan   Plan of care Certification: 3/5/2024 to 7/22/2024.    Outpatient Physical Therapy 2 times weekly for 20 weeks to include the following interventions: Manual Therapy, Moist Heat/ Ice, Neuromuscular Re-ed, Patient Education, Therapeutic Activities, and Therapeutic Exercise.     Marilee Bailey, PT , DPT, SCS, FAAMOMPT

## 2024-03-06 ENCOUNTER — PATIENT MESSAGE (OUTPATIENT)
Dept: BARIATRICS | Facility: CLINIC | Age: 64
End: 2024-03-06
Payer: COMMERCIAL

## 2024-03-06 NOTE — PROGRESS NOTES
.CC: left hip pain    HPI:   Renae Hou is a pleasant 63 y.o. patient who reports to clinic with left hip pain. No trauma, no mech sxs/instabilty.    PMHx of lumbar fusion to L4, 5, S1.     About 2 weeks ago she turned and felt pain in her posterior hip and low back. She feels aching and grinding pain that radiates down her leg is a sciatic nerve fashion intermittently. Pain also in lateral hip some. Pain also radiates down to her medial left knee.     Her previous right knee pain that I saw her for that was unusual has resolved.     Today the patient rates pain at a 4/10 on visual analog scale.      Affecting ADLs and exercising    She has been taking NSAIDs, tylenol, and activity modifications since pain onset with little relief.     Walking and certain movement activity makes pain worse    Review of Systems   Constitution: Negative. Negative for chills, fever and night sweats.   HENT: Negative for congestion and headaches.    Eyes: Negative for blurred vision, left vision loss and right vision loss.   Cardiovascular: Negative for chest pain and syncope.   Respiratory: Negative for cough and shortness of breath.    Endocrine: Negative for polydipsia, polyphagia and polyuria.   Hematologic/Lymphatic: Negative for bleeding problem. Does not bruise/bleed easily.   Skin: Negative for dry skin, itching and rash.   Musculoskeletal: Negative for falls and muscle weakness.   Gastrointestinal: Negative for abdominal pain and bowel incontinence.   Genitourinary: Negative for bladder incontinence and nocturia.   Neurological: Negative for disturbances in coordination, loss of balance and seizures.   Psychiatric/Behavioral: Negative for depression. The patient does not have insomnia.    Allergic/Immunologic: Negative for hives and persistent infections.   All other systems negative.    PAST MEDICAL HISTORY:   Past Medical History:   Diagnosis Date    Abscess of paraspinous muscles 2018    Allergy 04/2018    Class 1  obesity due to excess calories in adult 07/23/2019    DVT (deep venous thrombosis)     left leg    Epidural abscess 2018 04/10/2018    Fatty liver     GERD (gastroesophageal reflux disease) 2015    History of major abdominal surgery 09/11/2015    Had Hysterectomy , cholecystectomy , rectocele repair    Hypertension     Lumbar radiculopathy     Menopause     Obesity     Obstructive sleep apnea on CPAP     no longer uses CPAP after weight loss from gastric sleeve    Thyroid disease     Thyroid nodules.     PAST SURGICAL HISTORY:   Past Surgical History:   Procedure Laterality Date    ADENOIDECTOMY  1995    ANTERIOR CERVICAL DISCECTOMY W/ FUSION N/A 12/11/2023    Procedure: DISCECTOMY, SPINE, CERVICAL, ANTERIOR APPROACH, WITH FUSION C4/5 spinewave rep-less SNS:vocal/motors/SSEP;  Surgeon: Dean Zayas MD;  Location: [facility];  Service: Orthopedics;  Laterality: N/A;    BACK SURGERY  2002    L4, L5    BILATERAL SALPINGOOPHORECTOMY      CHOLECYSTECTOMY      COLONOSCOPY N/A 6/13/2022    Procedure: COLONOSCOPY;  Surgeon: Saul Ureña MD;  [facility]  Service: Endoscopy;  Laterality: N/A;  constipation protocol-extended Miralax prep - PEG allergy -okay with taking Miralax  fully vaccinated, prep instr portal -ml    COLONOSCOPY N/A 10/4/2022    Procedure: COLONOSCOPY;  Surgeon: Saul Ureña MD;  [facility]  Service: Endoscopy;  Laterality: N/A;  Constipation protocol-Miralax prep-pt tolerated Miralax for last colonoscopy.  fully vaccinated, prep instr portal -ml    COLONOSCOPY N/A 10/9/2023    Procedure: COLONOSCOPY;  Surgeon: Saul Ureña MD;  [address]  Service: Endoscopy;  Laterality: N/A;  Ref by Dr JULIANA Horowitz, [facility]-Wegovy- pt instructed to hold x 7 days prior procedure,  Miralax, portal - PC  10/2-precall complete/pt aware to hold wegovy x7 days-MS    CORRECTION OF HAMMER TOE Left 8/5/2021    Procedure: CORRECTION, HAMMER TOE Left  4th mallet toe, DIP joitn fusion;  Surgeon: Guero Alvarez MD;  [facility] OR;  Service: Orthopedics;  Laterality: Left;  K-wires    CYST REMOVAL      EPIDURAL STEROID INJECTION INTO CERVICAL SPINE N/A 8/11/2020    Procedure: Injection-steroid-epidural-cervical;  Surgeon: April Diagnostic Provider;  [address];  Service: Radiology;  Laterality: N/A;  /[place]    EPIDURAL STEROID INJECTION INTO CERVICAL SPINE N/A 3/2/2022    Procedure: Injection-steroid-epidural-cervical C7-T1;  Surgeon: Latoya Quintanilla MD;  Location: [facility] PAIN MGT;  Service: Pain Management;  Laterality: N/A;    ESOPHAGOGASTRODUODENOSCOPY N/A 5/3/2019    Procedure: ESOPHAGOGASTRODUODENOSCOPY (EGD);  Surgeon: Carlin Sanchez MD;  Location: [facility] (4TH FLR);  Service: Endoscopy;  Laterality: N/A;    HYSTERECTOMY  12/17/2012    [facility] BS&O     INJECTION OF JOINT Left 3/1/2021    Procedure: INJECTION, JOINT LEFT HIP INTRA ARTICULAR CORTISONE INJECTION DIRECT REFERRAL;  Surgeon: Chuy Gregory MD;  Location: [facility] PAIN MGT;  Service: Pain Management;  Laterality: Left;  NEEDS CONSENT    INSERTION OF INFERIOR VENA CAVAL FILTER Right 7/19/2019    Procedure: IVC filter placement;  Surgeon: Rodney Rico MD;  Location: [facility] CATH LAB;  Service: Peripheral Vascular;  Laterality: Right;    IVC FILTER RETRIEVAL N/A 12/20/2019    Procedure: REMOVAL-FILTER-IVC;  Surgeon: Rodney Rico MD;  [address];  Service: Peripheral Vascular;  Laterality: N/A;    LAPAROSCOPIC SLEEVE GASTRECTOMY N/A 7/23/2019    Procedure: GASTRECTOMY, SLEEVE, LAPAROSCOPIC, with intraop EGD 84584;  Surgeon: Duc Ratliff Jr., MD;  [address];  Service: General;  Laterality: N/A;    RECTOCELE REPAIR      SPINE SURGERY  2018    THYROID NODULE REMOVAL      TONSILLECTOMY  1995     FAMILY HISTORY:   Family History   Problem Relation Age of Onset    Thyroid disease Mother     Hypertension Mother     Hyperlipidemia Mother      Heart disease Mother 55        cad, valvular heart disease    Deep vein thrombosis Mother     Heart disease Father         MI    Stroke Father     Diabetes Sister     Alcohol abuse Sister     Cirrhosis Sister         alcoholic cirrhosis    Epilepsy Sister     Heart disease Daughter         SVT    Thyroid disease Daughter         Hashimoto's    Hashimoto's thyroiditis Daughter     No Known Problems Daughter     Heart disease Maternal Grandmother     Thyroid disease Maternal Grandmother     Kidney disease Maternal Grandmother     Heart disease Paternal Grandmother         MI    Breast cancer Neg Hx     Colon cancer Neg Hx     Ovarian cancer Neg Hx     Esophageal cancer Neg Hx      SOCIAL HISTORY:   Social History     Socioeconomic History    Marital status:     Number of children: 2   Tobacco Use    Smoking status: Former     Current packs/day: 0.00     Types: Cigarettes     Quit date: 1988     Years since quittin.2    Smokeless tobacco: Never    Tobacco comments:     smoked socially decades ago - weekends only   Substance and Sexual Activity    Alcohol use: Not Currently     Comment: yearly at most     Drug use: No    Sexual activity: Yes     Partners: Male     Birth control/protection: See Surgical Hx, None     Comment: VINI BS&O 2012,     Social History Narrative    . 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     Social Determinants of Health     Financial Resource Strain: Low Risk  (2023)    Overall Financial Resource Strain (CARDIA)     Difficulty of Paying Living Expenses: Not hard at all   Food Insecurity: No Food Insecurity (2023)    Hunger Vital Sign     Worried About Running Out of Food in the Last Year: Never true     Ran Out of Food in the Last Year: Never true   Transportation Needs: No Transportation Needs (2023)    PRAPARE - Transportation     Lack of Transportation (Medical): No     Lack of Transportation (Non-Medical): No   Physical Activity: Insufficiently Active  (12/11/2023)    Exercise Vital Sign     Days of Exercise per Week: 2 days     Minutes of Exercise per Session: 20 min   Stress: No Stress Concern Present (12/11/2023)    [employer] [place] [employer] - Occupational Stress Questionnaire     Feeling of Stress : Not at all   Social Connections: Unknown (12/11/2023)    Social Connection and Isolation Panel [NHANES]     Frequency of Communication with Friends and Family: More than three times a week     Frequency of Social Gatherings with Friends and Family: Twice a week     Active Member of Clubs or Organizations: No     Attends Club or Organization Meetings: Never     Marital Status:    Housing Stability: Low Risk  (12/11/2023)    Housing Stability Vital Sign     Unable to Pay for Housing in the Last Year: No     Number of Places Lived in the Last Year: 1     Unstable Housing in the Last Year: No       MEDICATIONS:   Current Outpatient Medications:     azelastine (ASTELIN) 137 mcg (0.1 %) nasal spray, Spray 1 spray (137 mcg total) by Nasal route 2 (two) times daily., Disp: 30 mL, Rfl: 11    ferrous gluconate (FERGON) 324 MG tablet, Every other day with orange juice, Disp: 30 tablet, Rfl: 12    gabapentin (NEURONTIN) 100 MG capsule, take 1-3 capsules by mouth at bedtime, Disp: 90 capsule, Rfl: 2    hydroCHLOROthiazide (HYDRODIURIL) 12.5 MG Tab, Take 1 tablet (12.5 mg total) by mouth once daily., Disp: 90 tablet, Rfl: 3    semaglutide, weight loss, (WEGOVY) 1.7 mg/0.75 mL PnIj, Inject 1.7 mg into the skin every 7 days., Disp: 9 mL, Rfl: 0    EPINEPHrine (EPIPEN) 0.3 mg/0.3 mL AtIn, Inject 0.3 mLs (0.3 mg total) into the muscle once. for 1 dose, Disp: 1 each, Rfl: 3    ergocalciferol (ERGOCALCIFEROL) 50,000 unit Cap, Take 1 capsule (50,000 Units total) by mouth every 7 days., Disp: 12 capsule, Rfl: 3    sumatriptan (IMITREX) 50 MG tablet, Take 1 tablet (50 mg total) by mouth every 2 (two) hours as needed (headaches)., Disp: 9 tablet, Rfl: 0  ALLERGIES:  "  Review of patient's allergies indicates:   Allergen Reactions    Cathflo activase [alteplase] Anaphylaxis     Tightness in chest, raspy throat, restless legs, hotness all over    Heparin analogues      Restless legs, tightness in chest, and hot all over    Xyzal [levocetirizine] Swelling    Latex Swelling             VITAL SIGNS: Ht 5' 5" (1.651 m)   Wt 76.1 kg (167 lb 12.3 oz)   LMP 11/25/2012   BMI 27.92 kg/m²        PHYSICAL EXAM /  HIP  PHYSICAL EXAMINATION  General:  The patient is alert and oriented x 3.  Mood is pleasant.  Observation of ears, eyes and nose reveal no gross abnormalities.  HEENT: NCAT, sclera nonicteric  Lungs: Respirations are equal and unlabored..    left HIP EXAMINATION     OBSERVATION / INSPECTION  Gait:   Nonantalgic   Alignment:  Neutral   Scars:   None   Muscle atrophy: None   Effusion:  None   Warmth:  None   Discoloration:   None   Leg lengths:   Equal   Pelvis:   Level     TENDERNESS / CREPITUS (T/C):      T / C  Trochanteric bursa   - / -  Piriformis    + / -  SI joint    - / -  Psoas tendon   - / -  Rectus insertion  - / -  Adductor insertion  - / -  Pubic symphysis  - / -  IT band                                   - / -  Gluteus tendons                     + / -    ROM: (* = pain)    Flexion:    120 degrees  External rotation: 40 degrees  Internal rotation with axial load: 40 degrees bilaterally   Internal rotation without axial load: 50 degrees bilaterally  Abduction:  45 degrees  Adduction:   20 degrees    SPECIAL TESTS:  Pain w/ forced internal rotation (FADIR): -   Pain w/ forced external rotation (JADEN): Absent   Circumduction test:    -  Stinchfield test:    Negative   Log roll:      Negative   Snapping hip (internal):   Negative   Sit-up pain:     Negative   Resisted sit-up pain:    Negative   Resisted sit-up with adductor contraction pain:  Negative   Step-down test:    +  Trendelenburg test:    Negative  Bridge test     +     EXTREMITY NEURO-VASCULAR EXAMINATION: "   Sensation:  Grossly intact to light touch all dermatomal regions.   Motor Function:  Fully intact motor function at hip, knee, foot and ankle    DTRs;  quadriceps and  achilles 2+.  No clonus and downgoing Babinski.    Vascular status:  DP and PT pulses 2+, brisk capillary refill, symmetric.    Skin:  intact, compartments soft.    OTHER FINDINGS:  + pain with piriformis stretching.   No joint line TTP of knee. No knee pain with flexion and extension. No MCL pain or laxity.     XRAYs:   2 hip views were independently reviewed and interpreted by myself.  No fracture, subluxation, or other joint abnormality is identified.  Mild DJD seen    ASSESSMENT:    1. left hip pain, acute  2. Piriformis syndrome  3. Mild hip DJD  4. Hip tendinitis   hip abd/core weakness    PLAN:  1. PT with Evaristo Sorto if possible at any location. PT for right hip piriformis pain, hip osteoarthritis, and medial knee pain from hip, core, abd weakness. Eval and treat with HEP. Piriformis syndrome going on too   2. NSAIDS prn  3. RTC if pain is not improve or if pain worsens.     All questions were answered, pt will contact us for questions or concerns in the interim.

## 2024-03-11 ENCOUNTER — HOSPITAL ENCOUNTER (OUTPATIENT)
Dept: RADIOLOGY | Facility: HOSPITAL | Age: 64
Discharge: HOME | End: 2024-03-11
Attending: PHYSICIAN ASSISTANT
Payer: COMMERCIAL

## 2024-03-11 DIAGNOSIS — Z98.1 S/P CERVICAL SPINAL FUSION: ICD-10-CM

## 2024-03-11 PROCEDURE — 72040 X-RAY EXAM NECK SPINE 2-3 VW: CPT | Mod: 26,,, | Performed by: RADIOLOGY

## 2024-03-11 PROCEDURE — 72040 X-RAY EXAM NECK SPINE 2-3 VW: CPT | Mod: TC

## 2024-03-12 ENCOUNTER — OFFICE VISIT (OUTPATIENT)
Dept: ORTHOPEDICS | Facility: CLINIC | Age: 64
End: 2024-03-12
Payer: COMMERCIAL

## 2024-03-12 VITALS — WEIGHT: 167.5 LBS | BODY MASS INDEX: 27.91 KG/M2 | HEIGHT: 65 IN

## 2024-03-12 DIAGNOSIS — Z98.1 S/P LUMBAR FUSION: ICD-10-CM

## 2024-03-12 DIAGNOSIS — Z98.1 S/P CERVICAL SPINAL FUSION: Primary | ICD-10-CM

## 2024-03-12 DIAGNOSIS — M54.16 LUMBAR RADICULOPATHY, CHRONIC: ICD-10-CM

## 2024-03-12 PROCEDURE — 3008F BODY MASS INDEX DOCD: CPT | Mod: CPTII,S$GLB,, | Performed by: PHYSICIAN ASSISTANT

## 2024-03-12 PROCEDURE — 99213 OFFICE O/P EST LOW 20 MIN: CPT | Mod: S$GLB,,, | Performed by: PHYSICIAN ASSISTANT

## 2024-03-12 PROCEDURE — 1159F MED LIST DOCD IN RCRD: CPT | Mod: CPTII,S$GLB,, | Performed by: PHYSICIAN ASSISTANT

## 2024-03-12 PROCEDURE — 99999 PR PBB SHADOW E&M-EST. PATIENT-LVL III: CPT | Mod: PBBFAC,,, | Performed by: PHYSICIAN ASSISTANT

## 2024-03-12 NOTE — PROGRESS NOTES
Date: 03/12/2024    Supervising Physician: Dean Zayas M.D.    Date of Surgery: 12/11/2023    Procedure: C4/5 ACDF    History: Renae Hou is seen today for follow-up following the above listed procedure. Overall the patient is doing well but today notes she does not have any pain.  She is very pleased.  She is having some left lower extremity pain and low back pain.  She is s/p L4-S1 TLIF in 2018.      Exam:  Incision is healing well.   There is no sign of infection. Neuro exam is stable. No signs of DVT.    Radiographs: imaging today shows hardware in place, no evidence of failure.     Assessment/Plan: 4 months post op.    Doing well postoperatively. She may progress activities as tolerated.    I will get a new MRI and new lumbar xrays and call with results.     Thank you for the opportunity to participate in this patient's care. Please give me a call if there are any concerns or questions.

## 2024-03-18 ENCOUNTER — CLINICAL SUPPORT (OUTPATIENT)
Dept: REHABILITATION | Facility: HOSPITAL | Age: 64
End: 2024-03-18
Payer: COMMERCIAL

## 2024-03-18 ENCOUNTER — OFFICE VISIT (OUTPATIENT)
Dept: OBSTETRICS AND GYNECOLOGY | Facility: CLINIC | Age: 64
End: 2024-03-18
Payer: COMMERCIAL

## 2024-03-18 VITALS — DIASTOLIC BLOOD PRESSURE: 80 MMHG | WEIGHT: 166 LBS | SYSTOLIC BLOOD PRESSURE: 122 MMHG | BODY MASS INDEX: 27.62 KG/M2

## 2024-03-18 DIAGNOSIS — M25.552 ACUTE HIP PAIN, LEFT: Primary | ICD-10-CM

## 2024-03-18 DIAGNOSIS — Z01.419 ENCOUNTER FOR GYNECOLOGICAL EXAMINATION: Primary | ICD-10-CM

## 2024-03-18 PROCEDURE — 97110 THERAPEUTIC EXERCISES: CPT | Mod: CQ

## 2024-03-18 PROCEDURE — 3079F DIAST BP 80-89 MM HG: CPT | Mod: CPTII,S$GLB,, | Performed by: OBSTETRICS & GYNECOLOGY

## 2024-03-18 PROCEDURE — 1159F MED LIST DOCD IN RCRD: CPT | Mod: CPTII,S$GLB,, | Performed by: OBSTETRICS & GYNECOLOGY

## 2024-03-18 PROCEDURE — 99396 PREV VISIT EST AGE 40-64: CPT | Mod: S$GLB,,, | Performed by: OBSTETRICS & GYNECOLOGY

## 2024-03-18 PROCEDURE — 97112 NEUROMUSCULAR REEDUCATION: CPT | Mod: CQ

## 2024-03-18 PROCEDURE — 99459 PELVIC EXAMINATION: CPT | Mod: S$GLB,,, | Performed by: OBSTETRICS & GYNECOLOGY

## 2024-03-18 PROCEDURE — 97140 MANUAL THERAPY 1/> REGIONS: CPT | Mod: CQ

## 2024-03-18 PROCEDURE — 99999 PR PBB SHADOW E&M-EST. PATIENT-LVL III: CPT | Mod: PBBFAC,,, | Performed by: OBSTETRICS & GYNECOLOGY

## 2024-03-18 PROCEDURE — 3074F SYST BP LT 130 MM HG: CPT | Mod: CPTII,S$GLB,, | Performed by: OBSTETRICS & GYNECOLOGY

## 2024-03-18 PROCEDURE — 3008F BODY MASS INDEX DOCD: CPT | Mod: CPTII,S$GLB,, | Performed by: OBSTETRICS & GYNECOLOGY

## 2024-03-18 NOTE — PROGRESS NOTES
Subjective:       Patient ID: Renae Hou is a 63 y.o. female.    Chief Complaint:  Well Woman      History of Present Illness  - here for annual. No complaints.   - mammogram is done with executive health program.    Past Medical History:   Diagnosis Date    Abscess of paraspinous muscles 2018    Allergy 04/2018    Class 1 obesity due to excess calories in adult 07/23/2019    DVT (deep venous thrombosis)     left leg    Epidural abscess 2018 04/10/2018    Fatty liver     GERD (gastroesophageal reflux disease) 2015    History of major abdominal surgery 09/11/2015    Had Hysterectomy , cholecystectomy , rectocele repair    Hypertension     Lumbar radiculopathy     Menopause     Obesity     Obstructive sleep apnea on CPAP     no longer uses CPAP after weight loss from gastric sleeve    Thyroid disease     Thyroid nodules.       Past Surgical History:   Procedure Laterality Date    ADENOIDECTOMY  1995    ANTERIOR CERVICAL DISCECTOMY W/ FUSION N/A 12/11/2023    Procedure: DISCECTOMY, SPINE, CERVICAL, ANTERIOR APPROACH, WITH FUSION C4/5 spinewave rep-less SNS:vocal/motors/SSEP;  Surgeon: Dean Zayas MD;  Location: [facility];  Service: Orthopedics;  Laterality: N/A;    BACK SURGERY  2002    L4, L5    BILATERAL SALPINGOOPHORECTOMY      CHOLECYSTECTOMY      COLONOSCOPY N/A 6/13/2022    Procedure: COLONOSCOPY;  Surgeon: Saul Ureña MD;  Location: [address]);  Service: Endoscopy;  Laterality: N/A;  constipation protocol-extended Miralax prep - PEG allergy -okay with taking Miralax  fully vaccinated, prep instr portal -ml    COLONOSCOPY N/A 10/4/2022    Procedure: COLONOSCOPY;  Surgeon: Saul Ureña MD;  Location: [address]);  Service: Endoscopy;  Laterality: N/A;  Constipation protocol-Miralax prep-pt tolerated Miralax for last colonoscopy.  fully vaccinated, prep instr portal -ml    COLONOSCOPY N/A 10/9/2023    Procedure: COLONOSCOPY;  Surgeon: Saul Ureña MD;  Location:  [facility] ENDO (4TH FLR);  Service: Endoscopy;  Laterality: N/A;  Ref by Dr JULIANA Horowitz, WLMeds-Wegovy- pt instructed to hold x 7 days prior procedure,  Miralax, portal - PC  10/2-precall complete/pt aware to hold wegovy x7 days-MS    CORRECTION OF HAMMER TOE Left 8/5/2021    Procedure: CORRECTION, HAMMER TOE Left 4th mallet toe, DIP joitn fusion;  Surgeon: Guero Alvarez MD;  Location: [facility] OR;  Service: Orthopedics;  Laterality: Left;  K-wires    CYST REMOVAL      EPIDURAL STEROID INJECTION INTO CERVICAL SPINE N/A 8/11/2020    Procedure: Injection-steroid-epidural-cervical;  Surgeon: [facility] Diagnostic Provider;  Location: [facility] 2ND FLR;  Service: Radiology;  Laterality: N/A;  /[place]    EPIDURAL STEROID INJECTION INTO CERVICAL SPINE N/A 3/2/2022    Procedure: Injection-steroid-epidural-cervical C7-T1;  Surgeon: Latoya Quintnailla MD;  Location: [facility] PAIN MGT;  Service: Pain Management;  Laterality: N/A;    ESOPHAGOGASTRODUODENOSCOPY N/A 5/3/2019    Procedure: ESOPHAGOGASTRODUODENOSCOPY (EGD);  Surgeon: Carlin Sanchez MD;  Location: [facility] (4TH FLR);  Service: Endoscopy;  Laterality: N/A;    HYSTERECTOMY  12/17/2012    [facility] BS&O     INJECTION OF JOINT Left 3/1/2021    Procedure: INJECTION, JOINT LEFT HIP INTRA ARTICULAR CORTISONE INJECTION DIRECT REFERRAL;  Surgeon: Chuy Gregory MD;  Location: [facility] PAIN MGT;  Service: Pain Management;  Laterality: Left;  NEEDS CONSENT    INSERTION OF INFERIOR VENA CAVAL FILTER Right 7/19/2019    Procedure: IVC filter placement;  Surgeon: Rodney Rico MD;  Location: [facility] CATH LAB;  Service: Peripheral Vascular;  Laterality: Right;    IVC FILTER RETRIEVAL N/A 12/20/2019    Procedure: REMOVAL-FILTER-IVC;  Surgeon: Rodney Rico MD;  Location: [facility] 2ND FLR;  Service: Peripheral Vascular;  Laterality: N/A;    LAPAROSCOPIC SLEEVE GASTRECTOMY N/A 7/23/2019    Procedure: GASTRECTOMY, SLEEVE, LAPAROSCOPIC, with intraop EGD [id number];  Surgeon: Duc Ratliff Jr.,  MD;  Location: [facility] OR [address] [place] [address];  Service: General;  Laterality: N/A;    RECTOCELE REPAIR      SPINE SURGERY  2018    THYROID NODULE REMOVAL      TONSILLECTOMY          Family History   Problem Relation Age of Onset    Thyroid disease Mother     Hypertension Mother     Hyperlipidemia Mother     Heart disease Mother 55        cad, valvular heart disease    Deep vein thrombosis Mother     Heart disease Father         MI    Stroke Father     Diabetes Sister     Alcohol abuse Sister     Cirrhosis Sister         alcoholic cirrhosis    Epilepsy Sister     Heart disease Daughter         SVT    Thyroid disease Daughter         Hashimoto's    Hashimoto's thyroiditis Daughter     No Known Problems Daughter     Heart disease Maternal Grandmother     Thyroid disease Maternal Grandmother     Kidney disease Maternal Grandmother     Heart disease Paternal Grandmother         MI    Breast cancer Neg Hx     Colon cancer Neg Hx     Ovarian cancer Neg Hx     Esophageal cancer Neg Hx         Social History     Socioeconomic History    Marital status:     Number of children: 2   Tobacco Use    Smoking status: Former     Current packs/day: 0.00     Types: Cigarettes     Quit date: 1988     Years since quittin.2    Smokeless tobacco: Never    Tobacco comments:     smoked socially decades ago - weekends only   Substance and Sexual Activity    Alcohol use: Not Currently     Comment: yearly at most     Drug use: No    Sexual activity: Yes     Partners: Male     Birth control/protection: See Surgical Hx, None     Comment: VINI BS&O 2012,     Social History Narrative    . 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     Social Determinants of Health     Financial Resource Strain: Low Risk  (2023)    Overall Financial Resource Strain (CARDIA)     Difficulty of Paying Living Expenses: Not hard at all   Food Insecurity: No Food Insecurity (2023)    Hunger Vital Sign     Worried About Running Out of Food in the Last Year:  Never true     Ran Out of Food in the Last Year: Never true   Transportation Needs: No Transportation Needs (12/11/2023)    PRAPARE - Transportation     Lack of Transportation (Medical): No     Lack of Transportation (Non-Medical): No   Physical Activity: Insufficiently Active (12/11/2023)    Exercise Vital Sign     Days of Exercise per Week: 2 days     Minutes of Exercise per Session: 20 min   Stress: No Stress Concern Present (12/11/2023)    [employer] [place] [employer] - Occupational Stress Questionnaire     Feeling of Stress : Not at all   Social Connections: Unknown (12/11/2023)    Social Connection and Isolation Panel [NHANES]     Frequency of Communication with Friends and Family: More than three times a week     Frequency of Social Gatherings with Friends and Family: Twice a week     Active Member of Clubs or Organizations: No     Attends Club or Organization Meetings: Never     Marital Status:    Housing Stability: Low Risk  (12/11/2023)    Housing Stability Vital Sign     Unable to Pay for Housing in the Last Year: No     Number of Places Lived in the Last Year: 1     Unstable Housing in the Last Year: No           Objective:     Vitals:    03/18/24 1342   BP: 122/80   Weight: 75.3 kg (166 lb 0.1 oz)       Physical Exam:   Constitutional: She is oriented to person, place, and time. She appears well-developed and well-nourished.        Pulmonary/Chest: Right breast exhibits no mass, no nipple discharge, no skin change, no tenderness and no swelling. Left breast exhibits no mass, no nipple discharge, no skin change, no tenderness and no swelling. Breasts are symmetrical.        Abdominal: Soft. She exhibits no distension. There is no abdominal tenderness.     Genitourinary:    Vagina normal.   There is no tenderness or lesion on the right labia. There is no tenderness or lesion on the left labia. Right adnexum displays no mass, no tenderness and no fullness. Left adnexum displays no mass,  no tenderness and no fullness. No vaginal discharge in the vagina. Cervix is absent.Uterus is absent.           Musculoskeletal: Moves all extremeties.       Neurological: She is alert and oriented to person, place, and time.     Psychiatric: She has a normal mood and affect.      A female chaperone was present for exam.    Assessment/ Plan:          Renae was seen today for well woman.    Diagnoses and all orders for this visit:    Encounter for gynecological examination        Follow up in about 1 year (around 3/18/2025) for annual exam.    As of April 1, 2021, the Cures Act has been passed nationally. This new law requires that all doctors progress notes, lab results, pathology reports and radiology reports be released IMMEDIATELY to the patient in the patient portal. That means that the results are released to you at the EXACT same time they are released to me. Therefore, with all of the patients that I have I am not able to reply to each patient exactly when the results come in. So there will be a delay from when you see the results to when I see them and have time to come up with a response to send you. Also I only see these results when I am on the computer at work. So if the results come in over the weekend or after 5 pm of a work day, I will not see them until the next business day. As you can tell, this is a challenge as a physician to give every patient the quick response they hope for and deserve. So please be patient!   Thanks for your understanding and patience.

## 2024-03-18 NOTE — PROGRESS NOTES
"[facility]   Physical Therapy Treatment Note     Name: Renae SCHWARTZ Ameya  [facility] Number: [id number]    Therapy Diagnosis:   Encounter Diagnosis   Name Primary?    Acute hip pain, left Yes     Physician: Santi Larson III, *    Visit Date: 3/18/2024    FOTO 1st:   FOTO 3rd:  FOTO 10th:    Physician Orders: PT Eval and Treat  Medical Diagnosis from Referral: M25.552 (ICD-10-CM) - Acute hip pain, leftG57.02 (ICD-10-CM) - Piriformis syndrome of left sideM16.12 (ICD-10-CM) - Osteoarthritis of left hip, unspecified osteoarthritis type  Evaluation Date: 3/5/2024  Authorization Period Expiration: 3/1/2025  Plan of Care Expiration: 6/22/2024  Progress Note Due: 4/5/2024  FOTO: 1/1  Visit # / Visits authorized: 1/ 10     Time In: 0900  Time Out: 1000  Total Billable Time: 60 minutes     Precautions: Standard        SUBJECTIVE     Pt reports: Got in touch with MD about nerve pain. Ordering an MRI    She was compliant with home exercise program.  Response to previous treatment: none  Functional change: none    Pain: 5/10  Location: left Hip      OBJECTIVE     Objective Measures updated at progress report unless specified.     Treatment       Renae received the treatments listed below:      Therapeutic exercises to develop strength, endurance, ROM, and posture for 12 minutes including:  Bike x 7 min lvl 5  Hand heel rocks 30x    Manual therapy techniques: Joint mobilizations were applied to the: L hip for 10 minutes, including:  Hip mobs lateral and inferior with belt    Therapeutic activities to improve functional performance for 00  minutes, including:      Neuromuscular re-education activities to improve: Balance, Coordination, Kinesthetic, and Proprioception for 30 minutes. The following activities were included:  T/S rotations 20 B  Pretzel ER 5" hold 3x12  Bridges YTB 4x10  SL hip abduction 5x5 B          Patient Education and Home Exercises     Home Exercises Provided and Patient Education " Provided     Education provided:   - COnt HEP    Written Home Exercises Provided: Patient instructed to cont prior HEP. Exercises were reviewed and Renae was able to demonstrate them prior to the end of the session.  Renae demonstrated good  understanding of the education provided. See EMR under Patient Instructions for exercises provided during therapy sessions    ASSESSMENT     Pt was performing pretzel exercise at home wrong but verbalized understanding today. Releaf with joint mobs. Cueing needed to prevent hip flexion with with sidelying hip abd. Only able to perform 7 min on bike 2* B knee pain. Will try to progress next tx.    Renae Is progressing well towards her goals.     Pt prognosis is Good.     Pt will continue to benefit from skilled outpatient physical therapy to address the deficits listed in the problem list box on initial evaluation, provide pt/family education and to maximize pt's level of independence in the home and community environment.     Pt's spiritual, cultural and educational needs considered and pt agreeable to plan of care and goals.     Anticipated barriers to physical therapy: none    Goals:   Short Term Goals: 2-4 weeks  1. Pt will be compliant with HEP 50% of prescribed amount.   2. The pt to demo improvement in hip ROM to be pain free L equals R   3.  The patient to demo 1/5 grade improvement in MMT to demo improved neuro ms control      Long Term Goals: 12-20 weeks   Pt will be compliant with % of prescribed amount.   The patient to demonstrate completing SL step ups 20x with L LE without pain in hip   The patient to demonstrate ability to walk for 10 min or more without hip pain   The pt will report full participation in ADLs and IADLs without restrictions related to L hip    PLAN     Cont with POC to improve L LE and hip strength.     Anneliese Canada, PTA

## 2024-03-20 NOTE — PROGRESS NOTES
[facility] - NEUROLOGY 7TH [facility], [place]    Date: 3/21/24  Patient Name: Renae Hou   MRN: [id number]   PCP: Wendy Horowitz  Referring Provider: Wendy Horowitz MD    Assessment:   Renae Hou is a 63 y.o. female presenting for evaluation of headaches.  By description, consistent with migraine without aura.      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  - Start elavil for prophylaxis  - Imitrex PRN  - Counseled about lifestyle modifications to help with headache  - Follow up 6-8 weeks.          Other Visit Diagnoses       Headache, unspecified headache type                Betsy Garner MD    Patient note was created using MModal Dictation.  Any errors in syntax or even information may not have been identified and edited on initial review prior to signing this note.  Subjective:   Patient seen in consultation at the request of Wendy Horowitz MD for the evaluation of headaches. A copy of this note will be sent to the referring physician.        HPI:   Ms. Renae Hou is a 63 y.o. female presenting for evaluation of headaches.    Per chart review, she did see Dr Chau in the past for paresthesias of the upper extremities. EMG at that time showed mild carpal tunnel syndrome.     The patient reports headache history when she was younger that she felt were sinus related.  Last June headaches started to increase, she would have to leave work early.  Somewhat improved since neck surgery.  R sided, throbbing, photophobia, phonophobia, and dizziness associated.  She also gets headaches in the posterior occipital region. Headache frequency can vary between 1x per day to 2x per week.  Currently more along the lines  of a few times a week.  Triggers include weather changes, maybe stress.     Taking motrin PRN, using ice packs.  She has not tried the imitrex.    In her family, her granddaughter gets migraines but no one else does.      ROS: numbness left side of body.  Occasionally feels a little weak in her left leg.  Left hand still gets some finger numbness.     PAST MEDICAL HISTORY:  Past Medical History:   Diagnosis Date    Abscess of paraspinous muscles 2018    Allergy 04/2018    Class 1 obesity due to excess calories in adult 07/23/2019    DVT (deep venous thrombosis)     left leg    Epidural abscess 2018 04/10/2018    Fatty liver     GERD (gastroesophageal reflux disease) 2015    History of major abdominal surgery 09/11/2015    Had Hysterectomy , cholecystectomy , rectocele repair    Hypertension     Lumbar radiculopathy     Menopause     Obesity     Obstructive sleep apnea on CPAP     no longer uses CPAP after weight loss from gastric sleeve    Thyroid disease     Thyroid nodules.       PAST SURGICAL HISTORY:  Past Surgical History:   Procedure Laterality Date    ADENOIDECTOMY  1995    ANTERIOR CERVICAL DISCECTOMY W/ FUSION N/A 12/11/2023    Procedure: DISCECTOMY, SPINE, CERVICAL, ANTERIOR APPROACH, WITH FUSION C4/5 spinewave rep-less SNS:vocal/motors/SSEP;  Surgeon: Dean Zayas MD;  Location: [facility];  Service: Orthopedics;  Laterality: N/A;    BACK SURGERY  2002    L4, L5    BILATERAL SALPINGOOPHORECTOMY      CHOLECYSTECTOMY      COLONOSCOPY N/A 6/13/2022    Procedure: COLONOSCOPY;  Surgeon: Saul Ureña MD;  Location: [address]);  Service: Endoscopy;  Laterality: N/A;  constipation protocol-extended Miralax prep - PEG allergy -okay with taking Miralax  fully vaccinated, prep instr portal -ml    COLONOSCOPY N/A 10/4/2022    Procedure: COLONOSCOPY;  Surgeon: Saul Ureña MD;  Location: [address]);  Service: Endoscopy;  Laterality: N/A;  Constipation protocol-Miralax prep-pt  tolerated Miralax for last colonoscopy.  fully vaccinated, prep instr portal -ml    COLONOSCOPY N/A 10/9/2023    Procedure: COLONOSCOPY;  Surgeon: Saul Ureña MD;  Location: [facility] ENDO (4TH FLR);  Service: Endoscopy;  Laterality: N/A;  Ref by Dr JULIANA Horowitz, [facility]- pt instructed to hold x 7 days prior procedure,  Miralax, portal - PC  10/2-precall complete/pt aware to hold wegovy x7 days-MS    CORRECTION OF HAMMER TOE Left 8/5/2021    Procedure: CORRECTION, HAMMER TOE Left 4th mallet toe, DIP joitn fusion;  Surgeon: Guero Alvarez MD;  Location: [facility] OR;  Service: Orthopedics;  Laterality: Left;  K-wires    CYST REMOVAL      EPIDURAL STEROID INJECTION INTO CERVICAL SPINE N/A 8/11/2020    Procedure: Injection-steroid-epidural-cervical;  Surgeon: [facility];  [address]  Service: Radiology;  Laterality: N/A;  /[place]    EPIDURAL STEROID INJECTION INTO CERVICAL SPINE N/A 3/2/2022    Procedure: Injection-steroid-epidural-cervical C7-T1;  Surgeon: Latoya Quintanilla MD;  Location: [facility] PAIN MGT;  Service: Pain Management;  Laterality: N/A;    ESOPHAGOGASTRODUODENOSCOPY N/A 5/3/2019    Procedure: ESOPHAGOGASTRODUODENOSCOPY (EGD);  Surgeon: Carlin Sanchez MD;  [facility]  Service: Endoscopy;  Laterality: N/A;    HYSTERECTOMY  12/17/2012    [facility] BS[facility]     INJECTION OF JOINT Left 3/1/2021    Procedure: INJECTION, JOINT LEFT HIP INTRA ARTICULAR CORTISONE INJECTION DIRECT REFERRAL;  Surgeon: Chuy Gregory MD;  Location: [facility] PAIN MGT;  Service: Pain Management;  Laterality: Left;  NEEDS CONSENT    INSERTION OF INFERIOR VENA CAVAL FILTER Right 7/19/2019    Procedure: IVC filter placement;  Surgeon: Rodney Rico MD;  Location: [facility] CATH LAB;  Service: Peripheral Vascular;  Laterality: Right;    IVC FILTER RETRIEVAL N/A 12/20/2019    Procedure: REMOVAL-FILTER-IVC;  Surgeon: Rodney Rico MD;  Location: [address] FLR;  Service: Peripheral  Vascular;  Laterality: N/A;    LAPAROSCOPIC SLEEVE GASTRECTOMY N/A 7/23/2019    Procedure: GASTRECTOMY, SLEEVE, LAPAROSCOPIC, with intraop EGD [id number];  Surgeon: Duc Ratliff Jr., MD;  Location: [facility] OR [address] [place] [address];  Service: General;  Laterality: N/A;    RECTOCELE REPAIR      SPINE SURGERY  2018    THYROID NODULE REMOVAL      TONSILLECTOMY  1995       CURRENT MEDS:  Current Outpatient Medications   Medication Sig Dispense Refill    EPINEPHrine (EPIPEN) 0.3 mg/0.3 mL AtIn Inject 0.3 mLs (0.3 mg total) into the muscle once. for 1 dose 1 each 3    ergocalciferol (ERGOCALCIFEROL) 50,000 unit Cap Take 1 capsule (50,000 Units total) by mouth every 7 days. 12 capsule 3    ferrous gluconate (FERGON) 324 MG tablet Every other day with orange juice 30 tablet 12    hydroCHLOROthiazide (HYDRODIURIL) 12.5 MG Tab Take 1 tablet (12.5 mg total) by mouth once daily. 90 tablet 3    semaglutide, weight loss, (WEGOVY) 1.7 mg/0.75 mL PnIj Inject 1.7 mg into the skin every 7 days. 9 mL 0    sumatriptan (IMITREX) 50 MG tablet Take 1 tablet (50 mg total) by mouth every 2 (two) hours as needed (headaches). 9 tablet 0     No current facility-administered medications for this visit.       ALLERGIES:  Review of patient's allergies indicates:   Allergen Reactions    Cathflo activase [alteplase] Anaphylaxis     Tightness in chest, raspy throat, restless legs, hotness all over    Heparin analogues      Restless legs, tightness in chest, and hot all over    Xyzal [levocetirizine] Swelling    Latex Swelling             FAMILY HISTORY:  Family History   Problem Relation Age of Onset    Thyroid disease Mother     Hypertension Mother     Hyperlipidemia Mother     Heart disease Mother 55        cad, valvular heart disease    Deep vein thrombosis Mother     Heart disease Father         MI    Stroke Father     Diabetes Sister     Alcohol abuse Sister     Cirrhosis Sister         alcoholic cirrhosis    Epilepsy Sister     Heart disease Daughter          SVT    Thyroid disease Daughter         Hashimoto's    Hashimoto's thyroiditis Daughter     No Known Problems Daughter     Heart disease Maternal Grandmother     Thyroid disease Maternal Grandmother     Kidney disease Maternal Grandmother     Heart disease Paternal Grandmother         MI    Breast cancer Neg Hx     Colon cancer Neg Hx     Ovarian cancer Neg Hx     Esophageal cancer Neg Hx        SOCIAL HISTORY:  Social History     Tobacco Use    Smoking status: Former     Current packs/day: 0.00     Types: Cigarettes     Quit date: 1988     Years since quittin.2    Smokeless tobacco: Never    Tobacco comments:     smoked socially decades ago - weekends only   Substance Use Topics    Alcohol use: Not Currently     Comment: yearly at most     Drug use: No       Review of Systems:  12 system review of systems is negative except for the symptoms mentioned in HPI.      Objective:     Vitals:    24 1047   BP: 121/86   Pulse: 74   Weight: 75.4 kg (166 lb 1.9 oz)     General: NAD, well nourished   Eyes: no tearing, discharge, no erythema   ENT: moist mucous membranes of the oral cavity, nares patent    Neck: Supple, full range of motion  Cardiovascular: Warm and well perfused, pulses equal and symmetrical  Lungs: Normal work of breathing, normal chest wall excursions  Skin: No rash, lesions, or breakdown on exposed skin  Psychiatry: Mood and affect are appropriate   Abdomen: soft, non tender, non distended  Extremeties: No cyanosis, clubbing or edema.    Neurological   MENTAL STATUS: Alert and oriented to person, place, and time. Attention and concentration within normal limits. Speech without dysarthria, able to name and repeat without difficulty. Recent and remote memory within normal limits   CRANIAL NERVES: Visual fields intact. PERRL. EOMI. Facial sensation intact. Face symmetrical. Hearing grossly intact. Full shoulder shrug bilaterally. Tongue protrudes midline   SENSORY: Sensation is intact to light  touch throughout.   MOTOR: Normal bulk and tone. No pronator drift.  5/5 deltoid, biceps, triceps, interosseous, hand  bilaterally. 5/5 iliopsoas, knee extension/flexion, foot dorsi/plantarflexion bilaterally.    REFLEXES: Symmetric and 2+ throughout. Toes down going bilaterally.   CEREBELLAR/COORDINATION/GAIT: Gait steady with normal arm swing and stride length.  Heel to shin intact. Finger to nose intact. Normal rapid alternating movements.

## 2024-03-21 ENCOUNTER — TELEPHONE (OUTPATIENT)
Dept: NEUROLOGY | Facility: CLINIC | Age: 64
End: 2024-03-21

## 2024-03-21 ENCOUNTER — OFFICE VISIT (OUTPATIENT)
Dept: NEUROLOGY | Facility: CLINIC | Age: 64
End: 2024-03-21
Payer: COMMERCIAL

## 2024-03-21 VITALS
DIASTOLIC BLOOD PRESSURE: 86 MMHG | BODY MASS INDEX: 27.64 KG/M2 | HEART RATE: 74 BPM | WEIGHT: 166.13 LBS | SYSTOLIC BLOOD PRESSURE: 121 MMHG

## 2024-03-21 DIAGNOSIS — R51.9 HEADACHE, UNSPECIFIED HEADACHE TYPE: ICD-10-CM

## 2024-03-21 DIAGNOSIS — G43.709 CHRONIC MIGRAINE WITHOUT AURA WITHOUT STATUS MIGRAINOSUS, NOT INTRACTABLE: ICD-10-CM

## 2024-03-21 DIAGNOSIS — G99.2 MYELOPATHY IN DISEASES CLASSIFIED ELSEWHERE: Primary | ICD-10-CM

## 2024-03-21 PROCEDURE — 3079F DIAST BP 80-89 MM HG: CPT | Mod: CPTII,S$GLB,, | Performed by: STUDENT IN AN ORGANIZED HEALTH CARE EDUCATION/TRAINING PROGRAM

## 2024-03-21 PROCEDURE — 1159F MED LIST DOCD IN RCRD: CPT | Mod: CPTII,S$GLB,, | Performed by: STUDENT IN AN ORGANIZED HEALTH CARE EDUCATION/TRAINING PROGRAM

## 2024-03-21 PROCEDURE — 1160F RVW MEDS BY RX/DR IN RCRD: CPT | Mod: CPTII,S$GLB,, | Performed by: STUDENT IN AN ORGANIZED HEALTH CARE EDUCATION/TRAINING PROGRAM

## 2024-03-21 PROCEDURE — 3074F SYST BP LT 130 MM HG: CPT | Mod: CPTII,S$GLB,, | Performed by: STUDENT IN AN ORGANIZED HEALTH CARE EDUCATION/TRAINING PROGRAM

## 2024-03-21 PROCEDURE — 99999 PR PBB SHADOW E&M-EST. PATIENT-LVL III: CPT | Mod: PBBFAC,,, | Performed by: STUDENT IN AN ORGANIZED HEALTH CARE EDUCATION/TRAINING PROGRAM

## 2024-03-21 PROCEDURE — 3008F BODY MASS INDEX DOCD: CPT | Mod: CPTII,S$GLB,, | Performed by: STUDENT IN AN ORGANIZED HEALTH CARE EDUCATION/TRAINING PROGRAM

## 2024-03-21 PROCEDURE — 99212 OFFICE O/P EST SF 10 MIN: CPT | Mod: S$GLB,,, | Performed by: STUDENT IN AN ORGANIZED HEALTH CARE EDUCATION/TRAINING PROGRAM

## 2024-03-21 RX ORDER — AMITRIPTYLINE HYDROCHLORIDE 25 MG/1
25 TABLET, FILM COATED ORAL NIGHTLY
Qty: 30 TABLET | Refills: 11 | Status: SHIPPED | OUTPATIENT
Start: 2024-03-21 | End: 2024-05-10

## 2024-03-21 NOTE — PATIENT INSTRUCTIONS
VISIT FOLLOW UP    It was nice to see you today.  Here is what we discussed at your visit:    You were seen today for headaches and nerve pain.  Your headaches sound like migraines.   Try the imitrex as needed.  If you get a really bad headache, take it as soon as possible.   To prevent migraines, we will start a medication called elavil (amitriptyline).  Take 25 mg at night.  This medication can cause sleepiness and dry mouth so keep an eye out for those possible side effects.   Please send me a message in about 4 weeks to update me on how you are doing on the medication.  Follow up 6-8 weeks.    Dr. CHOI's contact information: office phone [phone], or contact via Chirpme

## 2024-03-21 NOTE — ASSESSMENT & PLAN NOTE
Headache consistent with migraine, MRI brain normal.  - Start elavil for prophylaxis  - Imitrex PRN  - Counseled about lifestyle modifications to help with headache  - Follow up 6-8 weeks.

## 2024-03-25 ENCOUNTER — CLINICAL SUPPORT (OUTPATIENT)
Dept: REHABILITATION | Facility: HOSPITAL | Age: 64
End: 2024-03-25
Payer: COMMERCIAL

## 2024-03-25 DIAGNOSIS — R29.898 DECREASED STRENGTH OF LOWER EXTREMITY: Primary | ICD-10-CM

## 2024-03-25 PROCEDURE — 97112 NEUROMUSCULAR REEDUCATION: CPT

## 2024-03-25 PROCEDURE — 97110 THERAPEUTIC EXERCISES: CPT

## 2024-03-25 NOTE — PROGRESS NOTES
"[facility]   Physical Therapy Treatment Note     Name: Renae SCHWARTZ Ameya  [facility] Number: [id number]    Therapy Diagnosis:   No diagnosis found.    Physician: Santi Larson III, *    Visit Date: 3/25/2024    FOTO 1st:   FOTO 3rd:  FOTO 10th:    Physician Orders: PT Eval and Treat  Medical Diagnosis from Referral: M25.552 (ICD-10-CM) - Acute hip pain, leftG57.02 (ICD-10-CM) - Piriformis syndrome of left sideM16.12 (ICD-10-CM) - Osteoarthritis of left hip, unspecified osteoarthritis type  Evaluation Date: 3/5/2024  Authorization Period Expiration: 3/1/2025  Plan of Care Expiration: 6/22/2024  Progress Note Due: 4/5/2024  FOTO: 1/1  Visit # / Visits authorized: 2/ 10     Time In: 0800  Time Out: 0900  Total Billable Time: 60 minutes     Precautions: Standard        SUBJECTIVE     Pt reports: her hip is feeling much better, no longer having the grinding or pain with walking.     She was compliant with home exercise program.  Response to previous treatment: none  Functional change: none    Pain: 5/10  Location: left Hip      OBJECTIVE     Objective Measures updated at progress report unless specified.     Treatment       Renae received the treatments listed below:      Therapeutic exercises to develop strength, endurance, ROM, and posture for 12 minutes including:  Bike x 7 min lvl 5  Hand heel rocks 30x    Manual therapy techniques: Joint mobilizations were applied to the: L hip for 00 minutes, including:  Hip mobs lateral and inferior with belt    Therapeutic activities to improve functional performance for 00  minutes, including:      Neuromuscular re-education activities to improve: Balance, Coordination, Kinesthetic, and Proprioception for 30 minutes. The following activities were included:  T/S rotations 20 B  Pretzel ER 5" hold 3x12  Bridges YTB 4x10  SL hip abduction 5x5 B- NPT   Paloff Press 3x15 otb           Patient Education and Home Exercises     Home Exercises Provided and " Patient Education Provided     Education provided:   - COnt HEP    Written Home Exercises Provided: Patient instructed to cont prior HEP. Exercises were reviewed and Renae was able to demonstrate them prior to the end of the session.  Renae demonstrated good  understanding of the education provided. See EMR under Patient Instructions for exercises provided during therapy sessions    ASSESSMENT     The patient required cueing for bridges but good carryover noted. Able to complete all therex without hip pain and with proper ms activation. Advised that due to her travel in April, next visit might be her last in PT and to cont to work on her exercises independently to determine if she needs care post trips for work. The patient noted that she would try but it might be hard. Discussed importance of finding a gym routine or use of .     Renae Is progressing well towards her goals.     Pt prognosis is Good.     Pt will continue to benefit from skilled outpatient physical therapy to address the deficits listed in the problem list box on initial evaluation, provide pt/family education and to maximize pt's level of independence in the home and community environment.     Pt's spiritual, cultural and educational needs considered and pt agreeable to plan of care and goals.     Anticipated barriers to physical therapy: none    Goals:   Short Term Goals: 2-4 weeks  1. Pt will be compliant with HEP 50% of prescribed amount.   2. The pt to demo improvement in hip ROM to be pain free L equals R   3.  The patient to demo 1/5 grade improvement in MMT to demo improved neuro ms control      Long Term Goals: 12-20 weeks   Pt will be compliant with % of prescribed amount.   The patient to demonstrate completing SL step ups 20x with L LE without pain in hip   The patient to demonstrate ability to walk for 10 min or more without hip pain   The pt will report full participation in ADLs and IADLs without  restrictions related to L hip    PLAN     Cont with POC to improve L LE and hip strength.     Marilee Bailey, PT

## 2024-03-26 DIAGNOSIS — M54.12 CERVICAL RADICULOPATHY: Primary | ICD-10-CM

## 2024-03-30 DIAGNOSIS — I10 ESSENTIAL HYPERTENSION: ICD-10-CM

## 2024-03-30 DIAGNOSIS — Z87.898 HISTORY OF PERIPHERAL EDEMA: ICD-10-CM

## 2024-03-30 NOTE — TELEPHONE ENCOUNTER
No care due was identified.  Health [facility] Embedded Care Due Messages. Reference number: [id number].   3/30/2024 7:06:08 AM CDT

## 2024-03-31 RX ORDER — HYDROCHLOROTHIAZIDE 12.5 MG/1
12.5 TABLET ORAL
Qty: 90 TABLET | Refills: 2 | Status: SHIPPED | OUTPATIENT
Start: 2024-03-31 | End: 2024-04-12 | Stop reason: SDUPTHER

## 2024-04-01 ENCOUNTER — HOSPITAL ENCOUNTER (OUTPATIENT)
Dept: RADIOLOGY | Facility: HOSPITAL | Age: 64
Discharge: HOME | End: 2024-04-01
Attending: PHYSICIAN ASSISTANT
Payer: COMMERCIAL

## 2024-04-01 DIAGNOSIS — M54.16 LUMBAR RADICULOPATHY, CHRONIC: ICD-10-CM

## 2024-04-01 LAB
CREAT SERPL-MCNC: 0.9 MG/DL (ref 0.5–1.4)
SAMPLE: NORMAL

## 2024-04-01 PROCEDURE — 72158 MRI LUMBAR SPINE W/O & W/DYE: CPT | Mod: 26,,, | Performed by: INTERNAL MEDICINE

## 2024-04-01 PROCEDURE — A9585 GADOBUTROL INJECTION: HCPCS | Performed by: PHYSICIAN ASSISTANT

## 2024-04-01 PROCEDURE — 25500020 PHARM REV CODE 255: Performed by: PHYSICIAN ASSISTANT

## 2024-04-01 PROCEDURE — 72158 MRI LUMBAR SPINE W/O & W/DYE: CPT | Mod: TC

## 2024-04-01 RX ORDER — GADOBUTROL 604.72 MG/ML
7 INJECTION INTRAVENOUS
Status: COMPLETED | OUTPATIENT
Start: 2024-04-01 | End: 2024-04-01

## 2024-04-01 RX ADMIN — GADOBUTROL 7 ML: 604.72 INJECTION INTRAVENOUS at 12:04

## 2024-04-01 NOTE — TELEPHONE ENCOUNTER
Refill Decision Note   Renae Hou  is requesting a refill authorization.  Brief Assessment and Rationale for Refill:  Approve     Medication Therapy Plan:         Comments:     Note composed:7:32 PM 03/31/2024

## 2024-04-02 ENCOUNTER — OFFICE VISIT (OUTPATIENT)
Dept: ORTHOPEDICS | Facility: CLINIC | Age: 64
End: 2024-04-02
Payer: COMMERCIAL

## 2024-04-02 DIAGNOSIS — Z98.1 S/P CERVICAL SPINAL FUSION: Primary | ICD-10-CM

## 2024-04-02 DIAGNOSIS — M54.16 LUMBAR RADICULOPATHY, CHRONIC: ICD-10-CM

## 2024-04-02 PROCEDURE — 99441 PR PHYSICIAN TELEPHONE EVALUATION 5-10 MIN: CPT | Mod: 95,,, | Performed by: PHYSICIAN ASSISTANT

## 2024-04-02 RX ORDER — METHOCARBAMOL 750 MG/1
750 TABLET, FILM COATED ORAL 3 TIMES DAILY PRN
Qty: 60 TABLET | Refills: 0 | Status: SHIPPED | OUTPATIENT
Start: 2024-04-02

## 2024-04-02 NOTE — PROGRESS NOTES
Established Patient - Audio Only Telehealth Visit     The patient location is: [place]  The chief complaint leading to consultation is: MRI results  Visit type: Virtual visit with audio only (telephone)  Total time spent with patient: 10 minutes       The reason for the audio only service rather than synchronous audio and video virtual visit was related to technical difficulties or patient preference/necessity.     Each patient to whom I provide medical services by telemedicine is:  (1) informed of the relationship between the physician and patient and the respective role of any other health care provider with respect to management of the patient; and (2) notified that they may decline to receive medical services by telemedicine and may withdraw from such care at any time. Patient verbally consented to receive this service via voice-only telephone call.       HPI: the patient presents for MRI results.  She also reports some left shoulder pain.    MRI lumbar spine demonstrates mild stenosis at L3/4.       Assessment and plan:  she does not want to try an injection at this time.  She cannot tolerate gabapentin or lyrica.  Robaxin sent to the pharmacy.  She will follow up if she changes her mind.                        This service was not originating from a related E/M service provided within the previous 7 days nor will  to an E/M service or procedure within the next 24 hours or my soonest available appointment.  Prevailing standard of care was able to be met in this audio-only visit.

## 2024-04-03 ENCOUNTER — PATIENT MESSAGE (OUTPATIENT)
Dept: BARIATRICS | Facility: CLINIC | Age: 64
End: 2024-04-03
Payer: COMMERCIAL

## 2024-04-09 ENCOUNTER — PATIENT MESSAGE (OUTPATIENT)
Dept: BARIATRICS | Facility: CLINIC | Age: 64
End: 2024-04-09
Payer: COMMERCIAL

## 2024-04-12 DIAGNOSIS — I10 ESSENTIAL HYPERTENSION: ICD-10-CM

## 2024-04-12 DIAGNOSIS — Z87.898 HISTORY OF PERIPHERAL EDEMA: ICD-10-CM

## 2024-04-12 NOTE — TELEPHONE ENCOUNTER
No care due was identified.  Health [facility] Embedded Care Due Messages. Reference number: [id number].   4/12/2024 6:22:49 AM CDT

## 2024-04-13 RX ORDER — HYDROCHLOROTHIAZIDE 12.5 MG/1
12.5 TABLET ORAL DAILY
Qty: 90 TABLET | Refills: 2 | Status: SHIPPED | OUTPATIENT
Start: 2024-04-13 | End: 2024-05-12 | Stop reason: SDUPTHER

## 2024-04-13 NOTE — TELEPHONE ENCOUNTER
Refill Decision Note   Renae Hou  is requesting a refill authorization.  Brief Assessment and Rationale for Refill:  Approve     Medication Therapy Plan:        Comments:     Note composed:12:37 AM 04/13/2024

## 2024-04-16 ENCOUNTER — PATIENT MESSAGE (OUTPATIENT)
Dept: BARIATRICS | Facility: CLINIC | Age: 64
End: 2024-04-16
Payer: COMMERCIAL

## 2024-04-19 ENCOUNTER — TELEPHONE (OUTPATIENT)
Dept: PAIN MEDICINE | Facility: CLINIC | Age: 64
End: 2024-04-19
Payer: COMMERCIAL

## 2024-04-19 ENCOUNTER — PATIENT MESSAGE (OUTPATIENT)
Dept: PAIN MEDICINE | Facility: CLINIC | Age: 64
End: 2024-04-19
Payer: COMMERCIAL

## 2024-04-19 NOTE — TELEPHONE ENCOUNTER
I called patient and left a voicemail and tried reaching out through the portal for a follow up visit.

## 2024-05-06 DIAGNOSIS — E66.9 OBESITY, CLASS I, BMI 30.0-34.9 (SEE ACTUAL BMI): ICD-10-CM

## 2024-05-06 RX ORDER — SEMAGLUTIDE 1.7 MG/.75ML
1.7 INJECTION, SOLUTION SUBCUTANEOUS
Qty: 9 ML | Refills: 0 | Status: CANCELLED | OUTPATIENT
Start: 2024-05-06

## 2024-05-10 ENCOUNTER — HOSPITAL ENCOUNTER (OUTPATIENT)
Dept: RADIOLOGY | Facility: HOSPITAL | Age: 64
Discharge: HOME | End: 2024-05-10
Attending: INTERNAL MEDICINE
Payer: COMMERCIAL

## 2024-05-10 ENCOUNTER — CLINICAL SUPPORT (OUTPATIENT)
Dept: INTERNAL MEDICINE | Facility: CLINIC | Age: 64
End: 2024-05-10
Payer: COMMERCIAL

## 2024-05-10 ENCOUNTER — HOSPITAL ENCOUNTER (OUTPATIENT)
Dept: CARDIOLOGY | Facility: CLINIC | Age: 64
Discharge: HOME | End: 2024-05-10
Payer: COMMERCIAL

## 2024-05-10 ENCOUNTER — OFFICE VISIT (OUTPATIENT)
Dept: INTERNAL MEDICINE | Facility: CLINIC | Age: 64
End: 2024-05-10
Payer: COMMERCIAL

## 2024-05-10 VITALS
WEIGHT: 158 LBS | DIASTOLIC BLOOD PRESSURE: 70 MMHG | HEIGHT: 65 IN | SYSTOLIC BLOOD PRESSURE: 125 MMHG | BODY MASS INDEX: 26.33 KG/M2

## 2024-05-10 DIAGNOSIS — D12.6 TUBULAR ADENOMA OF COLON: ICD-10-CM

## 2024-05-10 DIAGNOSIS — Z00.00 ANNUAL PHYSICAL EXAM: Primary | ICD-10-CM

## 2024-05-10 DIAGNOSIS — I10 ESSENTIAL HYPERTENSION: ICD-10-CM

## 2024-05-10 DIAGNOSIS — Z00.00 ROUTINE GENERAL MEDICAL EXAMINATION AT A HEALTH CARE FACILITY: Primary | ICD-10-CM

## 2024-05-10 DIAGNOSIS — Z86.718 HISTORY OF DVT (DEEP VEIN THROMBOSIS): ICD-10-CM

## 2024-05-10 DIAGNOSIS — E04.2 MULTIPLE THYROID NODULES: ICD-10-CM

## 2024-05-10 DIAGNOSIS — Z00.00 ROUTINE MEDICAL EXAM: ICD-10-CM

## 2024-05-10 DIAGNOSIS — G47.33 OSA ON CPAP: ICD-10-CM

## 2024-05-10 DIAGNOSIS — Z98.84 HISTORY OF BARIATRIC SURGERY: ICD-10-CM

## 2024-05-10 DIAGNOSIS — E78.2 MIXED HYPERLIPIDEMIA: ICD-10-CM

## 2024-05-10 DIAGNOSIS — R73.03 PRE-DIABETES: ICD-10-CM

## 2024-05-10 DIAGNOSIS — M85.89 OSTEOPENIA OF MULTIPLE SITES: ICD-10-CM

## 2024-05-10 PROBLEM — Z71.89 ENCOUNTER FOR CARDIAC RISK COUNSELING: Status: RESOLVED | Noted: 2023-05-17 | Resolved: 2024-05-10

## 2024-05-10 LAB
25(OH)D3+25(OH)D2 SERPL-MCNC: 57 NG/ML (ref 30–96)
ALBUMIN SERPL BCP-MCNC: 3.9 G/DL (ref 3.5–5.2)
ALP SERPL-CCNC: 67 U/L (ref 55–135)
ALT SERPL W/O P-5'-P-CCNC: 12 U/L (ref 10–44)
ANION GAP SERPL CALC-SCNC: 8 MMOL/L (ref 8–16)
AST SERPL-CCNC: 17 U/L (ref 10–40)
BILIRUB SERPL-MCNC: 0.7 MG/DL (ref 0.1–1)
BUN SERPL-MCNC: 20 MG/DL (ref 8–23)
CALCIUM SERPL-MCNC: 10 MG/DL (ref 8.7–10.5)
CHLORIDE SERPL-SCNC: 106 MMOL/L (ref 95–110)
CHOLEST SERPL-MCNC: 212 MG/DL (ref 120–199)
CHOLEST/HDLC SERPL: 3.4 {RATIO} (ref 2–5)
CO2 SERPL-SCNC: 28 MMOL/L (ref 23–29)
CREAT SERPL-MCNC: 0.8 MG/DL (ref 0.5–1.4)
ERYTHROCYTE [DISTWIDTH] IN BLOOD BY AUTOMATED COUNT: 12.7 % (ref 11.5–14.5)
EST. GFR  (NO RACE VARIABLE): >60 ML/MIN/1.73 M^2
ESTIMATED AVG GLUCOSE: 108 MG/DL (ref 68–131)
GLUCOSE SERPL-MCNC: 93 MG/DL (ref 70–110)
HBA1C MFR BLD: 5.4 % (ref 4–5.6)
HCT VFR BLD AUTO: 39.7 % (ref 37–48.5)
HDLC SERPL-MCNC: 62 MG/DL (ref 40–75)
HDLC SERPL: 29.2 % (ref 20–50)
HGB BLD-MCNC: 13.4 G/DL (ref 12–16)
LDLC SERPL CALC-MCNC: 133.4 MG/DL (ref 63–159)
MCH RBC QN AUTO: 30.5 PG (ref 27–31)
MCHC RBC AUTO-ENTMCNC: 33.8 G/DL (ref 32–36)
MCV RBC AUTO: 90 FL (ref 82–98)
NONHDLC SERPL-MCNC: 150 MG/DL
OHS QRS DURATION: 80 MS
OHS QTC CALCULATION: 390 MS
PLATELET # BLD AUTO: 266 K/UL (ref 150–450)
PMV BLD AUTO: 9.7 FL (ref 9.2–12.9)
POTASSIUM SERPL-SCNC: 3.8 MMOL/L (ref 3.5–5.1)
PROT SERPL-MCNC: 6.8 G/DL (ref 6–8.4)
RBC # BLD AUTO: 4.39 M/UL (ref 4–5.4)
SODIUM SERPL-SCNC: 142 MMOL/L (ref 136–145)
TRIGL SERPL-MCNC: 83 MG/DL (ref 30–150)
TSH SERPL DL<=0.005 MIU/L-ACNC: 0.89 UIU/ML (ref 0.4–4)
WBC # BLD AUTO: 5.46 K/UL (ref 3.9–12.7)

## 2024-05-10 PROCEDURE — 80053 COMPREHEN METABOLIC PANEL: CPT | Performed by: INTERNAL MEDICINE

## 2024-05-10 PROCEDURE — 77063 BREAST TOMOSYNTHESIS BI: CPT | Mod: TC

## 2024-05-10 PROCEDURE — 1159F MED LIST DOCD IN RCRD: CPT | Mod: CPTII,S$GLB,, | Performed by: INTERNAL MEDICINE

## 2024-05-10 PROCEDURE — 85027 COMPLETE CBC AUTOMATED: CPT | Performed by: INTERNAL MEDICINE

## 2024-05-10 PROCEDURE — 93005 ELECTROCARDIOGRAM TRACING: CPT | Mod: S$GLB,,, | Performed by: INTERNAL MEDICINE

## 2024-05-10 PROCEDURE — 99999 PR PBB SHADOW E&M-EST. PATIENT-LVL IV: CPT | Mod: PBBFAC,,, | Performed by: INTERNAL MEDICINE

## 2024-05-10 PROCEDURE — 83036 HEMOGLOBIN GLYCOSYLATED A1C: CPT | Performed by: INTERNAL MEDICINE

## 2024-05-10 PROCEDURE — 84443 ASSAY THYROID STIM HORMONE: CPT | Performed by: INTERNAL MEDICINE

## 2024-05-10 PROCEDURE — 99999 PR PBB SHADOW E&M-EST. PATIENT-LVL I: CPT | Mod: PBBFAC,,,

## 2024-05-10 PROCEDURE — 82306 VITAMIN D 25 HYDROXY: CPT | Performed by: INTERNAL MEDICINE

## 2024-05-10 PROCEDURE — 77063 BREAST TOMOSYNTHESIS BI: CPT | Mod: 26,,, | Performed by: RADIOLOGY

## 2024-05-10 PROCEDURE — 93010 ELECTROCARDIOGRAM REPORT: CPT | Mod: S$GLB,,, | Performed by: INTERNAL MEDICINE

## 2024-05-10 PROCEDURE — 99396 PREV VISIT EST AGE 40-64: CPT | Mod: S$GLB,,, | Performed by: INTERNAL MEDICINE

## 2024-05-10 PROCEDURE — 3044F HG A1C LEVEL LT 7.0%: CPT | Mod: CPTII,S$GLB,, | Performed by: INTERNAL MEDICINE

## 2024-05-10 PROCEDURE — 3078F DIAST BP <80 MM HG: CPT | Mod: CPTII,S$GLB,, | Performed by: INTERNAL MEDICINE

## 2024-05-10 PROCEDURE — 80061 LIPID PANEL: CPT | Performed by: INTERNAL MEDICINE

## 2024-05-10 PROCEDURE — 3008F BODY MASS INDEX DOCD: CPT | Mod: CPTII,S$GLB,, | Performed by: INTERNAL MEDICINE

## 2024-05-10 PROCEDURE — 3074F SYST BP LT 130 MM HG: CPT | Mod: CPTII,S$GLB,, | Performed by: INTERNAL MEDICINE

## 2024-05-10 PROCEDURE — 77067 SCR MAMMO BI INCL CAD: CPT | Mod: 26,,, | Performed by: RADIOLOGY

## 2024-05-10 NOTE — LETTER
May 10, 2024    Renae Hou  [address] [facility] [address]      Dear Renae Hou,    On 5/10/2024, it was a pleasure to see you and perform your Executive Health evaluation. Your Primary Care physician is Wendy Horowitz MD. At the time of this visit, you are 63 y.o..  You denied any specific complaints or concerns during todays visit.        YOUR PAST MEDICAL HISTORY:  has a past medical history of Abscess of paraspinous muscles, Allergy, Class 1 obesity due to excess calories in adult, DVT (deep venous thrombosis), Epidural abscess 2018, Fatty liver, GERD (gastroesophageal reflux disease), History of major abdominal surgery, Hypertension, Lumbar radiculopathy, Menopause, Obesity, Obstructive sleep apnea on CPAP, and Thyroid disease..    YOUR PAST SURGICAL HISTORY:  has a past surgical history that includes Cholecystectomy; RECTOCELE REPAIR; THYROID NODULE REMOVAL; Bilateral salpingoophorectomy; Hysterectomy (12/17/2012); Back surgery (2002); Cyst Removal; Adenoidectomy (1995); Spine surgery (2018); Tonsillectomy (1995); Esophagogastroduodenoscopy (N/A, 5/3/2019); Insertion of inferior vena caval filter (Right, 7/19/2019); Laparoscopic sleeve gastrectomy (N/A, 7/23/2019); IVC filter retrieval (N/A, 12/20/2019); Epidural steroid injection into cervical spine (N/A, 8/11/2020); Injection of joint (Left, 3/1/2021); Correction of hammer toe (Left, 8/5/2021); Epidural steroid injection into cervical spine (N/A, 3/2/2022); Colonoscopy (N/A, 6/13/2022); Colonoscopy (N/A, 10/4/2022); Colonoscopy (N/A, 10/9/2023); and Anterior cervical discectomy w/ fusion (N/A, 12/11/2023)..    YOUR CURRENT MEDICATIONS:   Current Outpatient Medications:     EPINEPHrine (EPIPEN) 0.3 mg/0.3 mL AtIn, Inject 0.3 mLs (0.3 mg total) into the muscle once. for 1 dose, Disp: 1 each, Rfl: 3    ergocalciferol (ERGOCALCIFEROL) 50,000 unit Cap, Take 1 capsule (50,000 Units total) by mouth every 7 days., Disp: 12 capsule, Rfl: 3     ferrous gluconate (FERGON) 324 MG tablet, Every other day with orange juice, Disp: 30 tablet, Rfl: 12    hydroCHLOROthiazide (HYDRODIURIL) 12.5 MG Tab, Take 1 tablet (12.5 mg total) by mouth once daily., Disp: 90 tablet, Rfl: 2    methocarbamoL (ROBAXIN) 750 MG Tab, Take 1 tablet (750 mg total) by mouth 3 (three) times daily as needed (spasms). As needed for muscle spasms, Disp: 60 tablet, Rfl: 0    semaglutide, weight loss, (WEGOVY) 1.7 mg/0.75 mL PnIj, Inject 1.7 mg into the skin every 7 days., Disp: 9 mL, Rfl: 0    sumatriptan (IMITREX) 50 MG tablet, Take 1 tablet (50 mg total) by mouth every 2 (two) hours as needed (headaches)., Disp: 9 tablet, Rfl: 0    RSVPreF3 antigen-AS01E, PF, (AREXVY) 120 mcg/0.5 mL SusR vaccine, Inject 0.5 mLs into the muscle once. for 1 dose, Disp: 1 each, Rfl: 0.    YOUR KNOWN DRUG ALLERGIES:  is allergic to cathflo activase [alteplase], heparin analogues, xyzal [levocetirizine], and latex.    YOUR SOCIAL HISTORY:  reports that she quit smoking about 36 years ago. Her smoking use included cigarettes. She has never used smokeless tobacco. She reports that she does not currently use alcohol. She reports that she does not use drugs..    YOUR FAMILY HISTORY: family history includes Alcohol abuse in her sister; Cirrhosis in her sister; Deep vein thrombosis in her mother; Diabetes in her sister; Epilepsy in her sister; Hashimoto's thyroiditis in her daughter; Heart disease in her daughter, father, maternal grandmother, and paternal grandmother; Heart disease (age of onset: 55) in her mother; Hyperlipidemia in her mother; Hypertension in her mother; Kidney disease in her maternal grandmother; No Known Problems in her daughter; Stroke in her father; Thyroid disease in her daughter, maternal grandmother, and mother..     YOUR ROUTINE HEALTH MAINTENANCE includes   Health Maintenance   Topic Date Due    Mammogram  05/12/2024    DEXA Scan  05/12/2025    TETANUS VACCINE  08/12/2026    Colorectal  "Cancer Screening  10/09/2026    Lipid Panel  05/10/2029    Hepatitis C Screening  Completed    Shingles Vaccine  Completed   .    PHYSICAL EXAMINATION:  Vitals:    05/10/24 0855   BP: 125/70   BP Location: Right arm   Patient Position: Sitting   BP Method: Medium (Manual)   Weight: 71.7 kg (158 lb)   Height: 5' 5" (1.651 m)       These are normal vital signs. Your physical examination did not reveal any additional significant abnormal findings.    YOUR BLOOD WORK AND ADDITIONAL TESTS:  Labs acceptable other than elevated lipids which we discussed; your CT score was quite low a few years ago.  Please continue to work on a plant based, portion controlled eating plan, exercise and maintenance of ideal weight.    EKG was acceptable.    Mammogram is still pending at the time of this letter and will be reviewed under separate cover.    I recommend an annual flu shot and the RSV vaccine.  You discussed that you want to wait on a COVID booster currently.    In summary, you are overall in excellent health.     It was a pleasure to see you and perform this Executive Health evaluation. If I can be of any further assistance, or if you have any additional questions or concerns, please do not hesitate to let me know.    Sincerely,      Wendy Horowitz MD        "

## 2024-05-10 NOTE — PROGRESS NOTES
Patient ID: Renae Hou is a 63 y.o. female.    Chief Complaint: Executive Health      Assessment:       1. Annual physical exam    2. Mixed hyperlipidemia    3. Essential hypertension    4. History of DVT (deep vein thrombosis): 2018     5. Pre-diabetes    6. Multiple thyroid nodules: stable 5/22, also 5/23 no further follow-up recommended per Radiology    7. History of bariatric surgery: Gastric sleeve 2019    8. Tubular adenoma of colon: see colonoscopy 2022    9. RENAE dx 2010, improved after weight loss; also seen 5/23- mild, positional per home sleep study    10. Osteopenia of multiple sites: see DEXA 5/23          Plan:           1. Annual physical exam  -     CBC Auto Differential; Future; Expected date: 11/10/2024  -     Iron and TIBC; Future; Expected date: 11/10/2024  -     Ferritin; Future; Expected date: 11/10/2024  -     Vitamin D; Future; Expected date: 11/06/2024  -     Vitamin B12; Future; Expected date: 11/07/2024    2. Mixed hyperlipidemia    3. Essential hypertension  Overview:  BP elevation in March   Had head ache a few days ago      4. History of DVT (deep vein thrombosis): 2018     5. Pre-diabetes  Overview:  Had a history of Prediabetes   Last A1c from 4/6/2018 - 5.8      6. Multiple thyroid nodules: stable 5/22, also 5/23 no further follow-up recommended per Radiology    7. History of bariatric surgery: Gastric sleeve 2019    8. Tubular adenoma of colon: see colonoscopy 2022    9. RENAE dx 2010, improved after weight loss; also seen 5/23- mild, positional per home sleep study  Overview:  Diagnosed with sleep apnea about 2010   Was given CPAP machine although not using it   Lost weight and not using any more       10. Osteopenia of multiple sites: see DEXA 5/23       EKG and labs acceptable  Continue current regimen  RSV vaccine recommended  She declines COVID booster  Subjective:   EH PE    Doing well.  BP stable.  Deliberate weight loss, on Wegovy.  Follows in Bariatrics.    Mammogram  scheduled today.    The 10-year ASCVD risk score ([employer]hou [employer], et al., 2019) is: 5.6%.  CT score 1 in 2021    Values used to calculate the score:      Age: 63 years      Sex: Female      Is Non- : No      Diabetic: No      Tobacco smoker: No      Systolic Blood Pressure: 125 mmHg      Is BP treated: Yes      HDL Cholesterol: 62 mg/dL      Total Cholesterol: 212 mg/dL     Patient Active Problem List:     RENAE dx 2010, improved after weight loss; also seen 5/23- mild, positional per home sleep study     Chronic constipation     Mixed hyperlipidemia     Pre-diabetes     Chronic pain     History of lumbar surgery     Essential hypertension     Fatty liver     Spondylolisthesis     Gastroesophageal reflux disease without esophagitis     Vaginal atrophy     Dyspareunia, female     Mixed stress and urge urinary incontinence     Rectocele, female     Cystocele, midline     History of DVT (deep vein thrombosis): 2018      Multiple thyroid nodules: stable 5/22, also 5/23 no further follow-up recommended per Radiology     Dysphonia     Cervical radiculopathy     Mallet toe of left foot     Chronic left-sided low back pain with left-sided sciatica     Carpal tunnel syndrome of right wrist: see EMG 10/21     Tubular adenoma of colon: see colonoscopy 2022     Polyarthralgia     COVID 1/22; 12/22     Low serum iron     Decreased strength of lower extremity     Osteopenia of multiple sites: see DEXA 5/23     History of bariatric surgery: Gastric sleeve 2019     Myelopathy in diseases classified elsewhere     Chronic migraine without aura without status migrainosus, not intractable           Review of Systems   Constitutional:  Negative for fatigue.        Deliberate weight loss  Energy level is better  On iron 1-2 x weekly   HENT:  Negative for hearing loss.    Eyes:  Negative for visual disturbance.   Respiratory:  Negative for shortness of breath.    Cardiovascular:  Negative for chest pain.    Gastrointestinal:  Negative for abdominal pain, constipation and diarrhea.   Genitourinary:  Negative for dysuria, frequency and vaginal bleeding.   Musculoskeletal:  Negative for joint swelling.        Status post disc surgery last year   Skin:  Negative for rash.   Neurological:  Negative for weakness, light-headedness and headaches.   Psychiatric/Behavioral:  Negative for sleep disturbance.          Objective:      Physical Exam  Vitals and nursing note reviewed.   Constitutional:       Appearance: She is well-developed.   HENT:      Head: Normocephalic and atraumatic.      Right Ear: External ear normal.      Left Ear: External ear normal.      Nose: Nose normal.      Mouth/Throat:      Pharynx: No oropharyngeal exudate.   Eyes:      General: No scleral icterus.     Extraocular Movements: Extraocular movements intact.      Conjunctiva/sclera: Conjunctivae normal.   Neck:      Thyroid: No thyromegaly.      Vascular: No JVD.   Cardiovascular:      Rate and Rhythm: Normal rate and regular rhythm.      Heart sounds: Normal heart sounds. No murmur heard.     No gallop.   Pulmonary:      Effort: Pulmonary effort is normal. No respiratory distress.      Breath sounds: Normal breath sounds. No wheezing.   Abdominal:      General: Bowel sounds are normal. There is no distension.      Palpations: Abdomen is soft. There is no mass.      Tenderness: There is no abdominal tenderness. There is no guarding or rebound.   Musculoskeletal:         General: No tenderness. Normal range of motion.      Cervical back: Normal range of motion and neck supple.   Lymphadenopathy:      Cervical: No cervical adenopathy.   Skin:     General: Skin is warm.      Findings: No erythema or rash.   Neurological:      General: No focal deficit present.      Mental Status: She is alert and oriented to person, place, and time.      Cranial Nerves: No cranial nerve deficit.      Coordination: Coordination normal.   Psychiatric:         Behavior:  Behavior normal.         Thought Content: Thought content normal.         Judgment: Judgment normal.             Health Maintenance Due   Topic Date Due    RSV Vaccine (Age 60+ and Pregnant patients) (1 - 1-dose 60+ series) Never done    Mammogram  05/12/2024

## 2024-05-12 DIAGNOSIS — Z87.898 HISTORY OF PERIPHERAL EDEMA: ICD-10-CM

## 2024-05-12 DIAGNOSIS — I10 ESSENTIAL HYPERTENSION: ICD-10-CM

## 2024-05-12 NOTE — TELEPHONE ENCOUNTER
No care due was identified.  [facility] Embedded Care Due Messages. Reference number: [id number].   5/12/2024 7:45:44 AM CDT

## 2024-05-13 RX ORDER — HYDROCHLOROTHIAZIDE 12.5 MG/1
12.5 TABLET ORAL DAILY
Qty: 90 TABLET | Refills: 2 | Status: SHIPPED | OUTPATIENT
Start: 2024-05-13

## 2024-05-14 ENCOUNTER — PATIENT MESSAGE (OUTPATIENT)
Dept: BARIATRICS | Facility: CLINIC | Age: 64
End: 2024-05-14
Payer: COMMERCIAL

## 2024-05-16 ENCOUNTER — PATIENT MESSAGE (OUTPATIENT)
Dept: BARIATRICS | Facility: CLINIC | Age: 64
End: 2024-05-16
Payer: COMMERCIAL

## 2024-05-16 DIAGNOSIS — E66.9 OBESITY, CLASS I, BMI 30.0-34.9 (SEE ACTUAL BMI): ICD-10-CM

## 2024-05-16 RX ORDER — SEMAGLUTIDE 1.7 MG/.75ML
1.7 INJECTION, SOLUTION SUBCUTANEOUS
Qty: 9 ML | Refills: 0 | Status: CANCELLED | OUTPATIENT
Start: 2024-05-16

## 2024-05-16 NOTE — TELEPHONE ENCOUNTER
If Sabina Kanu is here Monday, (or on a Friday) please move this patient to her schedule. 11 am is not a slot on my schedule.

## 2024-05-20 ENCOUNTER — OFFICE VISIT (OUTPATIENT)
Dept: BARIATRICS | Facility: CLINIC | Age: 64
End: 2024-05-20
Payer: COMMERCIAL

## 2024-05-20 VITALS
SYSTOLIC BLOOD PRESSURE: 124 MMHG | DIASTOLIC BLOOD PRESSURE: 77 MMHG | OXYGEN SATURATION: 99 % | BODY MASS INDEX: 26.54 KG/M2 | HEART RATE: 66 BPM | WEIGHT: 159.31 LBS | HEIGHT: 65 IN

## 2024-05-20 DIAGNOSIS — E66.9 OBESITY, CLASS I, BMI 30.0-34.9 (SEE ACTUAL BMI): ICD-10-CM

## 2024-05-20 PROCEDURE — 3008F BODY MASS INDEX DOCD: CPT | Mod: CPTII,S$GLB,, | Performed by: NURSE PRACTITIONER

## 2024-05-20 PROCEDURE — 3078F DIAST BP <80 MM HG: CPT | Mod: CPTII,S$GLB,, | Performed by: NURSE PRACTITIONER

## 2024-05-20 PROCEDURE — 99213 OFFICE O/P EST LOW 20 MIN: CPT | Mod: S$GLB,,, | Performed by: NURSE PRACTITIONER

## 2024-05-20 PROCEDURE — 99999 PR PBB SHADOW E&M-EST. PATIENT-LVL IV: CPT | Mod: PBBFAC,,, | Performed by: NURSE PRACTITIONER

## 2024-05-20 PROCEDURE — 3044F HG A1C LEVEL LT 7.0%: CPT | Mod: CPTII,S$GLB,, | Performed by: NURSE PRACTITIONER

## 2024-05-20 PROCEDURE — 3074F SYST BP LT 130 MM HG: CPT | Mod: CPTII,S$GLB,, | Performed by: NURSE PRACTITIONER

## 2024-05-20 PROCEDURE — 1160F RVW MEDS BY RX/DR IN RCRD: CPT | Mod: CPTII,S$GLB,, | Performed by: NURSE PRACTITIONER

## 2024-05-20 PROCEDURE — 1159F MED LIST DOCD IN RCRD: CPT | Mod: CPTII,S$GLB,, | Performed by: NURSE PRACTITIONER

## 2024-05-20 RX ORDER — SEMAGLUTIDE 1.7 MG/.75ML
1.7 INJECTION, SOLUTION SUBCUTANEOUS
Qty: 9 ML | Refills: 3 | Status: SHIPPED | OUTPATIENT
Start: 2024-05-20

## 2024-05-20 RX ORDER — SEMAGLUTIDE 1.7 MG/.75ML
1.7 INJECTION, SOLUTION SUBCUTANEOUS
Qty: 9 ML | Refills: 0 | Status: SHIPPED | OUTPATIENT
Start: 2024-05-20 | End: 2024-05-20

## 2024-05-20 NOTE — PROGRESS NOTES
"Subjective:       Patient ID: Renae Hou is a 63 y.o. female.    Chief Complaint: Follow-up    CC:  Pt here today for follow-up. Has lost 30 lbs, net neg 71.7 lbs lbs, 80 % EWL. Evy- sleeve 7/2019  Started Wegovy 2/2024 doing well no SE. She is getting fullness with it.   Tries to walk at work. Has been eating well. Getting plenty of protein, eggs, yogurt.     Prev med hx :Had started phentermine. Last filled 6/1/22.  checked today   She does not exercise.   Eye exam up to date. NO glaucoma.       BMR 1315  PBF 39.5%      Review of Systems   Constitutional: Negative for chills and fever.   Respiratory: Negative for shortness of breath.     Cardiovascular:  Negative for chest pain.   Gastrointestinal: Negative for diarrhea or constipation        Denies GERD   Genitourinary: Negative for difficulty urinating and dysuria.   Musculoskeletal:. Negative for back pain  Neurological: Negative for dizziness and light-headedness.   Psychiatric/Behavioral: Negative for dysphoric mood. The patient is not nervous/anxious.        Objective:     /77   Pulse 66   Ht 5' 5" (1.651 m)   Wt 72.3 kg (159 lb 4.8 oz)   LMP 11/25/2012   SpO2 99%   BMI 26.51 kg/m²    Physical Exam   Constitutional: She is oriented to person, place, and time. She appears well-developed. No distress.   Obese   HENT:   Head: Normocephalic and atraumatic.   .   Eyes: EOM are normal.  No scleral icterus.   Neck: Normal range of motion. Neck supple.    Cardiovascular: Normal rate   Pulmonary/Chest: Effort normal and breath sounds normal. No respiratory distress.   Musculoskeletal: Normal range of motion.   Neurological: She is alert and oriented to person, place, and time. No cranial nerve deficit.   Skin: Skin is warm and dry. No erythema.   Psychiatric: She has a normal mood and affect. Her behavior is normal. Judgment normal.   Vitals reviewed.      Assessment:       1. Obesity, Class I, BMI 30.0-34.9 (see actual BMI)          " "  Plan:         Diagnoses and all orders for this visit:    Renae was seen today for follow-up.    Diagnoses and all orders for this visit:    Obesity, Class I, BMI 30.0-34.9 (see actual BMI)  -     Discontinue: semaglutide, weight loss, (WEGOVY) 1.7 mg/0.75 mL PnIj; Inject 1.7 mg into the skin every 7 days.  -     semaglutide, weight loss, (WEGOVY) 1.7 mg/0.75 mL PnIj; Inject 1.7 mg into the skin every 7 days.        Continue Wegovy 1.7 mg once a week.    Decrease portions as soon as you start wegovy. Avoid fried, rich or greasy foods.     If you get pain across the upper abdomen and around to your back, please call the office.     [email or web address] to sign up for coupon card.     - To lose weight you want to cut 100% starchy carbohydrates out of your diet (bread, rice, pasta, potatoes, granola, flour, corn, peas, oatmeal, grits, tortillas, crackers, chips) and get  grams of protein.  Aim for 100 grams of protein daily.    - No soda, sweet tea, juices, sports drinks or lemonade     -Exercise daily. Exercise is one of those "bite the bullet" propositions. Sometimes you just have to get started. What I suggest is setting a timer for 10 minutes. Do whatever activity you plan to start for the 10 minutes. If the timer goes off and you want to stop, fine. You can stop. If you feel like you want to go on a little longer, you can go on. Do this a few times a week. Eventually you will likely decide to keep going. Every 2 weeks, add 5 more minutes before you give yourself permission to stop.     Exercise should be some cardiovascular and some resistance training    Increase protein     Hurricane tips given.       "

## 2024-05-20 NOTE — PATIENT INSTRUCTIONS
Meal Ideas for Regular Bariatric Diet  *Recipes and products available at [email or web address]      Breakfast: (15-20g protein)    - Egg white omelet: 2 egg whites or ½ cup Egg Beaters. (Optional proteins: cheese, shrimp, black beans, chicken, sliced turkey) (Optional veggies: tomatoes, salsa, spinach, mushrooms, onions, green peppers, or small slice avocado)     - Egg and sausage: 1 egg or ¼ cup Egg Beaters (any variety), with 1 pastor or 2 links of Turkey sausage or Veggie breakfast sausage (Pianpian or Rank By Search)    - Crust-less breakfast quiche: To make a glass pie dish, mix 4oz part skim Ricotta, 1 cup skim milk, and 2 eggs as your base. Add protein: shredded cheese, sliced lean ham or turkey, turkey grubbs/sausage. Add veggies: tomato, onion, green onion, mushroom, green pepper, spinach, etc.    - Yogurt parfait: Mix 1 - 6oz container Dannon Light N Fit vanilla yogurt, with ¼ cup crushed unsalted nuts    - Cottage cheese and fruit: ½ cup part-skim cottage cheese or ricotta cheese topped with fresh fruit or sugar free preserves     - Yahaira Vargas's Vanilla Egg custard* (add 2 Tbsp instant coffee granules to make Cappuccino Custard*)    - Hi-Protein café latte (skim milk, decaf coffee, 1 scoop protein powder). Optional to add Sugar free syrup or extract flavoring.    - Breakfast Lox: spread fat free cream cheese on slices of smoked salmon. Serve over scrambled or egg over easy (sauteed with nonstick cookspray) OR on a cucumber slice    - Eggwhich: Scramble or cook 1 large egg over easy using nonstick cookspray. Place between 2 slices of Wallisian grubbs and low fat cheese.     Lunch: (20-30g protein)    - ½ cup Black bean soup (Homemade or Progresso), with ¼ cup shredded low-fat cheese. Top with chopped tomato or fresh salsa.     - Lean deli turkey breast and low-fat sliced cheese, mustard or light gonzalez to moisten, rolled up together, or wrapped in a Cristian lettuce leaf    - Chicken salad made from dinner  leftovers, moisten with low-fat salad dressing or light gonzalez. Also try leftover salmon, shrimp, tuna or boiled eggs. Serve ½ cup over dark green salad    - Fat-free canned refried beans, topped with ¼ cup shredded low-fat cheese. Top with chopped tomato or fresh salsa.     - Greek salad: Top mixed greens with 1-2oz grilled chicken, tomatoes, red onions, 2-3 kalamata olives, and sprinkle lightly with feta cheese. Spritz with Balsamic vinegar to taste.     - Crust-less lunch quiche: To make a glass pie dish, mix 4oz part skim Ricotta, 1 cup skim milk, and 2 eggs as your base. Add protein: shredded cheese, sliced lean ham or turkey, shrimp, chicken. Add veggies: tomato, onion, green onion, mushroom, green pepper, spinach, artichoke, broccoli, etc.    - Pizza bake: spread a  alanna evan mushroom with tomato sauce, low-fat shredded mozzarella and turkey pepperoni or [place] grubbs. Add any veggies. Roast for 10-15 minutes, until cheese melted.     - Cucumber crab bites: Spread ¼ cup crab dip (lump crabmeat + light cream cheese and green onions) over sliced cucumber.     - Chicken with light spinach and artichoke dip*: Puree in : 6oz cooked and drained spinach, 2 cloves garlic, 1 can cannelloni beans, ½ cup chopped green onions, 1 can drained artichoke hearts (not marinated in oil), lemon juice and basil. Mix in 2oz chopped up chicken.    Supper: (20-30g protein)    - Serve grilled fish over dark green salad tossed with low-fat dressing, served with grilled asparagus pendleton     - Rotisserie chicken salad: served with sliced strawberries, walnuts, fat-free feta cheese crumbles and 1 tbsp Bernals Own Light Raspberry Lowes Vinaigrette    - Shrimp cocktail: Dip cold boiled shrimp in homemade low-sugar cocktail sauce (1/2 cup Tiff One Carb ketchup, 2 tbsp horseradish, 1/4 tsp hot sauce, 1 tsp Worcestershire sauce, 1 tbsp freshly-squeezed lemon juice). Serve with dark green salad, walnuts, and crumbled blue  cheese drizzled with olive oil and Balsamic vinegar    - Tuna Melt: Spread tuna salad onto 2 thick slices of tomato. Top with low-fat cheese and broil until cheese is melted. May also be made with chicken salad of shrimp salad. Applegate with different types of cheeses.    - Chicken or beef fajitas (no tortilla, rice, beans, chips). Top meat and veggies w/ fresh salsa, fat free sour cream.     - Homemade low-fat Chili using extra lean ground beef or ground turkey. Top with shredded cheese and salsa as desired. May add dollop fat-free sour cream if desired    - Chicken parmesan: Top chicken breast w/ low sugar marinara sauce, mozzarella cheese and bake until chicken reaches 165*.  Serve w/ spaghetti SQUASH or Afghan cut green beans    - Dinner Omelet with shrimp or chicken and onion, green peppers and chives.    - No noodle lasagna: Use sliced zucchini or eggplant in place of noodles.  Layer with part skim ricotta cheese and low sugar meat sauce (use very lean ground beef or ground turkey).    - Mexican chicken bake: Bake chunks of chicken breast or thigh with taco seasoning, Pace brand enchilada sauce, green onions and low-fat cheese. Serve with ¼ cup black beans or fat free refried beans topped with chopped tomatoes or salsa.    - Giuseppe frozen meatballs, simmered in Classico Marinara sauce. Different flavors of salsa or spaghetti sauce create different dishes! Sprinkle with parmesan cheese. Serve with grilled or steamed veggies, or a dark green salad.    - Simmer boneless skinless chicken thigh chunks in Classico Marinara sauce or roasted salsa until tender with chopped onion, bell pepper, garlic, mushrooms, spinach, etc.     - Hamburger or veggie burger, without the bun, dressed the way you like. Served with grilled or steamed veggies.    - Eggplant parmesan: Bake slices of eggplant at 350 degrees for 15 minutes. Layer tomato sauce, sliced eggplant and low-fat mozzarella cheese in a baking dish and cover with  foil. Bake 30-40 more minutes or until bubbly. Uncover and bake at 400 degrees for about 15 more minutes, or until top is slightly crisp.    - Fish tacos: grilled/baked white fish, wrapped in Cristian lettuce leaf, topped with salsa, shredded low-fat cheese, and light coleslaw.    - Chicken hilton: Sprinkle chicken w/ 1 tsp of hidden valley ranch dip mix. Then grill chicken and top with black beans, salsa and 1 tsp fat free sour cream.     - Cauliflower pizza crust: Use cauliflower as crust (see recipe on pinterest, no flour!). Top w/ low fat cheese, turkey pepperoni and veggies and bake again    - chicken or turkey crust pizza: use ground chicken or turkey instead of cauliflower, spread in [place] and bake at 350 for about 20-30 minutes(may want to add garlic, black pepper, oregano and other herbs to ground meat mixture).  Remove and top w/ low fat cheese, turkey pepperoni and veggies and bake again for another 10 minutes or until cheese is browned.     Snacks: (100-200 calories; >5g protein)    - 1 low-fat cheese stick with 8 cherry tomatoes or 1 serving fresh fruit  - 4 thin slices fat-free turkey breast and 1 slice low-fat cheese  - 4 thin slices fat-free honey ham with wedge of melon  - 6-8 edamame pods (equivalent to about 1/4 cup edamame without pods).   - 1/4 cup unsalted nuts with ½ cup fruit  - 6-oz container Dannon Light n Fit vanilla yogurt, topped with 1oz unsalted nuts         - apple, celery or baby carrots spread with 2 Tbsp PB2  - apple slices with 1 oz slice low-fat cheese  - Apple slices dipped in 2 Tbsp of PB2  - celery, cucumber, bell pepper or baby carrots dipped in ¼ cup hummus bean spread or light spinach and artichoke dip (*recipe in lunch section)  - celery, cucumber, baby carrots dipped in high protein greek yogurt (Mix 16 oz plain greek yogurt + 1 packet of hidden valley ranch dip mix)  - Jero Links Beef Steak - 14g protein! (similar to beef jerky)  - 2 wedges Laughing Cow - Light Herb  & Garlic Cheese with sliced cucumber or green bell pepper  - 1/2 cup low-fat cottage cheese with ¼ cup fruit or ¼ cup salsa  - RTD Protein drinks: Atkins, Low Carb Slim Fast, EAS light, Muscle Milk Light, etc.  - Homemade Protein drinks: GNC Soy95, Isopure, Nectar, UNJURY, Whey Gourmet, etc. Mix 1 scoop powder with 8oz skim/1% milk or light soymilk.  - Protein bars: Atkins, EAS, Pure Protein, Think Thin, Detour, etc. Must have 0-4 grams sugar - Read the label.    Takeout Options: No more than twice/week  Deli - Salads (no pasta or rice), meats, cheeses. Roasted chicken. Lox (salmon)    Mexican - Platters which don't include tortillas, chips, or rice. Go easy on the beans. Example: Fajitas without the tortillas. Ask the  not to bring chips to the table if they are too tempting.    Greek - Meat or fish and vegetable, but no bread or rice. Including hummus, baba ganoush, etc, is OK. Most sit-down Greek restaurants can provide you with cucumber slices for dipping instead of peyton bread.    Fast Food (Avoid as much as possible) - Salads (no croutons and limit salad dressing to 2 tbsp), grilled chicken sandwich without the bun and ask for no gonzalez. Normas low fat chili or Taco Bell pintos and cheese.    BBQ - The meats are fine if you ask for sauces on the side, but most of the traditional side dishes are loaded with carbs. Basim slaw, baked beans and BBQ sauce are typically made with sugar.    Chinese - Nothing deep-fried, no rice or noodles. Many Chinese sauces have starch and sugar in them, so you'll have to use your judgement. If you find that these sauces trigger cravings, or cause Dumping, you can ask for the sauce to be made without sugar or just use soy sauce.

## 2024-06-08 ENCOUNTER — PATIENT MESSAGE (OUTPATIENT)
Dept: ORTHOPEDICS | Facility: CLINIC | Age: 64
End: 2024-06-08
Payer: COMMERCIAL

## 2024-06-10 ENCOUNTER — PATIENT MESSAGE (OUTPATIENT)
Dept: INTERNAL MEDICINE | Facility: CLINIC | Age: 64
End: 2024-06-10
Payer: COMMERCIAL

## 2024-06-11 ENCOUNTER — PATIENT MESSAGE (OUTPATIENT)
Dept: BARIATRICS | Facility: CLINIC | Age: 64
End: 2024-06-11
Payer: COMMERCIAL

## 2024-06-17 ENCOUNTER — OFFICE VISIT (OUTPATIENT)
Dept: ORTHOPEDICS | Facility: CLINIC | Age: 64
End: 2024-06-17
Payer: COMMERCIAL

## 2024-06-17 VITALS — WEIGHT: 156.5 LBS | HEIGHT: 65 IN | BODY MASS INDEX: 26.08 KG/M2

## 2024-06-17 DIAGNOSIS — M54.12 CERVICAL RADICULOPATHY: Primary | ICD-10-CM

## 2024-06-17 PROCEDURE — 3008F BODY MASS INDEX DOCD: CPT | Mod: CPTII,S$GLB,, | Performed by: PHYSICIAN ASSISTANT

## 2024-06-17 PROCEDURE — 99999 PR PBB SHADOW E&M-EST. PATIENT-LVL III: CPT | Mod: PBBFAC,,, | Performed by: PHYSICIAN ASSISTANT

## 2024-06-17 PROCEDURE — 3044F HG A1C LEVEL LT 7.0%: CPT | Mod: CPTII,S$GLB,, | Performed by: PHYSICIAN ASSISTANT

## 2024-06-17 PROCEDURE — 1159F MED LIST DOCD IN RCRD: CPT | Mod: CPTII,S$GLB,, | Performed by: PHYSICIAN ASSISTANT

## 2024-06-17 PROCEDURE — 99213 OFFICE O/P EST LOW 20 MIN: CPT | Mod: S$GLB,,, | Performed by: PHYSICIAN ASSISTANT

## 2024-06-17 NOTE — PROGRESS NOTES
Date: 06/17/2024    Supervising Physician: Dean Zayas M.D.    Date of Surgery: 12/11/2023    Procedure: C4/5 ACDF    History: Renae Hou is seen today for follow-up following the above listed procedure. Overall the patient is doing well but today notes she is having left shoulder pain, right periscapular pain and left hand numbness, in the ring and pinky fingers.  This is worse at night.       Exam:  Incision is healing well.   There is no sign of infection. Neuro exam is stable. No signs of DVT.    Radiographs: imaging previously shows hardware in place, no evidence of failure.     Assessment/Plan:     I will get a new MRI. She will follow up with Dr. Zayas for results.           Thank you for the opportunity to participate in this patient's care. Please give me a call if there are any concerns or questions.

## 2024-06-29 ENCOUNTER — PATIENT MESSAGE (OUTPATIENT)
Dept: ORTHOPEDICS | Facility: CLINIC | Age: 64
End: 2024-06-29
Payer: COMMERCIAL

## 2024-07-03 ENCOUNTER — PATIENT MESSAGE (OUTPATIENT)
Dept: BARIATRICS | Facility: CLINIC | Age: 64
End: 2024-07-03
Payer: COMMERCIAL

## 2024-07-05 ENCOUNTER — HOSPITAL ENCOUNTER (OUTPATIENT)
Dept: RADIOLOGY | Facility: HOSPITAL | Age: 64
Discharge: HOME | End: 2024-07-05
Attending: PHYSICIAN ASSISTANT
Payer: COMMERCIAL

## 2024-07-05 DIAGNOSIS — M54.12 CERVICAL RADICULOPATHY: ICD-10-CM

## 2024-07-05 PROCEDURE — 72141 MRI NECK SPINE W/O DYE: CPT | Mod: 26,,, | Performed by: RADIOLOGY

## 2024-07-05 PROCEDURE — 72141 MRI NECK SPINE W/O DYE: CPT | Mod: TC

## 2024-07-09 ENCOUNTER — PATIENT MESSAGE (OUTPATIENT)
Dept: BARIATRICS | Facility: CLINIC | Age: 64
End: 2024-07-09
Payer: COMMERCIAL

## 2024-07-09 ENCOUNTER — PATIENT MESSAGE (OUTPATIENT)
Dept: ADMINISTRATIVE | Facility: OTHER | Age: 64
End: 2024-07-09
Payer: COMMERCIAL

## 2024-07-14 NOTE — PROGRESS NOTES
[facility]   PROGRESS NOTE  [phone]      Attending Physician: Naveed Munson MD  Reason for Consultation/Chief Complaint: CAD    Subjective     Patient was sleeping in bed upon entering room and wearing BiPAP.    Reports that his chest pain has improved.  He denies any shortness of breath.      CURRENT Medications:  isosorbide mononitrate (IMDUR) extended release tablet 30 mg, Daily  sodium chloride flush 0.9 % injection 5-40 mL, 2 times per day  sodium chloride flush 0.9 % injection 5-40 mL, PRN  0.9 % sodium chloride infusion, PRN  docusate sodium (COLACE) capsule 100 mg, Daily  heparin (porcine) injection 3,600 Units, PRN  epoetin siddharth-epbx (RETACRIT) injection 10,000 Units, Once per day on Tue Thu Sat  heparin (porcine) injection 5,000 Units, 3 times per day  oxyCODONE (ROXICODONE) immediate release tablet 10 mg, Q4H PRN  busPIRone (BUSPAR) tablet 10 mg, TID  calcium acetate (PHOSLO) capsule 1,334 mg, TID  DULoxetine (CYMBALTA) extended release capsule 60 mg, Daily  insulin glargine (LANTUS) injection vial 15 Units, Nightly  ipratropium 0.5 mg-albuterol 2.5 mg (DUONEB) nebulizer solution 1 Dose, Q4H PRN  metoprolol succinate (TOPROL XL) extended release tablet 25 mg, Daily  pravastatin (PRAVACHOL) tablet 40 mg, Daily  pregabalin (LYRICA) capsule 25 mg, Daily  QUEtiapine (SEROQUEL) tablet 50 mg, Nightly  sacubitril-valsartan (ENTRESTO) 24-26 MG per tablet 1 tablet, BID  traZODone (DESYREL) tablet 50 mg, Daily  venlafaxine (EFFEXOR XR) extended release capsule 75 mg, Daily  aspirin chewable tablet 81 mg, Daily  [Held by provider] clopidogrel (PLAVIX) tablet 75 mg, Daily  pantoprazole (PROTONIX) tablet 40 mg, BID AC  sodium chloride flush 0.9 % injection 5-40 mL, 2 times per day  sodium chloride flush 0.9 % injection 5-40 mL, PRN  0.9 % sodium chloride infusion, PRN  glucose chewable tablet 16 g, PRN  dextrose bolus 10% 125 mL, PRN   Or  dextrose bolus 10% 250 mL, PRN  glucagon  Patient's chart was reviewed.   Requested updates within Care Everywhere.  Immunizations reconciled.    Health Maintenance was updated.       Stress 7/5/24:      Stress Combined Conclusion: This is an abnormal pharmacological myocardial perfusion study. Findings suggest a high risk of cardiac events.    Perfusion Comments: LV perfusion is abnormal.    Perfusion Defect: There are multiple perfusion defects. There is a moderate sized perfusion defect involving the apical cap, apicolateral wall, and apical anterior wall of moderate to severe intensity that is reversible consistent with myocardial ischemia.There is a moderate to large sized perfusion defect involving the anterior wall that appears reversibile consistent with myocardial ischemia. Additionally, there is a large sized perfusion defect involving the inferior wall of moderate intensity with partial reversibility. 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    Stress Function: Left ventricular size appears mild-moderately dilated. There is severe global hypokinesis. Left ventricular function is moderate-severely reduced with calculated LVEF of 37%.    Perfusion Conclusion: There is no evidence of transient ischemic dilation (TID).    Image quality is good.    Stress Test: A pharmacological stress test was performed using regadenoson (Lexiscan). 100 mg of aminophylline given as a reversal agent. The patient reported chest discomfort, dizziness and dyspnea during the stress test. Symptoms began during stress and ended during recovery. The patient reached the end of the protocol.    ECG: The stress ECG was not diagnostic due to resting ST-T abnormalities, failure to achieve target heart rate and pharmacologic protocol.    LHC/RHC 7/10/24:  FINDINGS      HEMODYNAMICS     CHAMBER PRESSURE SATURATION   RA 11 71%   RV 72/10     PA 71/24 73%   PW 20 with V waves to 29     AORTA 169/66 100%      NANCY CARDIAC OUTPUT 8.1  L/min                LVGRAM     LVEDP 22   GRADIENT ACROSS AORTIC VAVLE Less than 10 mmHg   LV FUNCTION EF 20%   WALL MOTION Severe global hypokinesis

## 2024-07-19 ENCOUNTER — OFFICE VISIT (OUTPATIENT)
Dept: ORTHOPEDICS | Facility: CLINIC | Age: 64
End: 2024-07-19
Payer: COMMERCIAL

## 2024-07-19 VITALS — WEIGHT: 156.5 LBS | HEIGHT: 65 IN | BODY MASS INDEX: 26.08 KG/M2

## 2024-07-19 DIAGNOSIS — M54.12 CERVICAL RADICULOPATHY: Primary | ICD-10-CM

## 2024-07-19 PROCEDURE — 3008F BODY MASS INDEX DOCD: CPT | Mod: CPTII,S$GLB,, | Performed by: ORTHOPAEDIC SURGERY

## 2024-07-19 PROCEDURE — 99214 OFFICE O/P EST MOD 30 MIN: CPT | Mod: S$GLB,,, | Performed by: ORTHOPAEDIC SURGERY

## 2024-07-19 PROCEDURE — 99999 PR PBB SHADOW E&M-EST. PATIENT-LVL III: CPT | Mod: PBBFAC,,, | Performed by: ORTHOPAEDIC SURGERY

## 2024-07-19 PROCEDURE — 3044F HG A1C LEVEL LT 7.0%: CPT | Mod: CPTII,S$GLB,, | Performed by: ORTHOPAEDIC SURGERY

## 2024-07-19 PROCEDURE — 1159F MED LIST DOCD IN RCRD: CPT | Mod: CPTII,S$GLB,, | Performed by: ORTHOPAEDIC SURGERY

## 2024-07-19 NOTE — PROGRESS NOTES
Date of Surgery: 12/11/2023    Procedure: C4/5 ACDF    History: Renae Hou is seen today for follow-up following the above listed procedure. Overall the patient is doing well but today notes she is having left shoulder pain, right periscapular pain and left hand numbness, in the ring and pinky fingers.  This is worse at night.  At her last visit we ordered an MRI of the cervical spine and she is here today to review results.    Exam:  Her transverse cervical incision is well healed.  There is no sign of infection. Neuro exam is stable. No signs of DVT.    Radiographs:  Today reviewed the recent MRI images demonstrate status post C4-5 ACDF.  There is minimal multilevel cervical spondylosis.    Assessment/Plan:     Periscapular pain, today we discussed options, her MRI does not demonstrate any operative pathology.  I have recommended a repeat cervical epidural steroid injection with interventional radiology and continuing anti-inflammatories and methocarbamol as needed.

## 2024-07-24 DIAGNOSIS — E04.2 MULTIPLE THYROID NODULES: Primary | ICD-10-CM

## 2024-07-26 ENCOUNTER — PATIENT MESSAGE (OUTPATIENT)
Dept: ORTHOPEDICS | Facility: CLINIC | Age: 64
End: 2024-07-26
Payer: COMMERCIAL

## 2024-07-31 ENCOUNTER — HOSPITAL ENCOUNTER (OUTPATIENT)
Dept: RADIOLOGY | Facility: HOSPITAL | Age: 64
Discharge: HOME | End: 2024-07-31
Attending: ORTHOPAEDIC SURGERY
Payer: COMMERCIAL

## 2024-07-31 DIAGNOSIS — E04.2 MULTIPLE THYROID NODULES: ICD-10-CM

## 2024-07-31 PROCEDURE — 76536 US EXAM OF HEAD AND NECK: CPT | Mod: TC

## 2024-07-31 PROCEDURE — 76536 US EXAM OF HEAD AND NECK: CPT | Mod: 26,,, | Performed by: RADIOLOGY

## 2024-08-01 ENCOUNTER — PATIENT MESSAGE (OUTPATIENT)
Dept: ORTHOPEDICS | Facility: CLINIC | Age: 64
End: 2024-08-01
Payer: COMMERCIAL

## 2024-08-05 ENCOUNTER — PATIENT MESSAGE (OUTPATIENT)
Dept: ORTHOPEDICS | Facility: CLINIC | Age: 64
End: 2024-08-05
Payer: COMMERCIAL

## 2024-08-07 NOTE — ANESTHESIA POSTPROCEDURE EVALUATION
Anesthesia Post Evaluation    Patient: Renae Hou    Procedure(s) Performed: Procedure(s) (LRB):  GASTRECTOMY, SLEEVE, LAPAROSCOPIC, with intraop EGD [id number] (N/A)    Final Anesthesia Type: general  Patient location during evaluation: PACU  Patient participation: Yes- Able to Participate  Level of consciousness: awake and alert and oriented  Post-procedure vital signs: reviewed and stable  Pain management: adequate  Airway patency: patent  PONV status at discharge: No PONV  Anesthetic complications: no      Cardiovascular status: blood pressure returned to baseline  Respiratory status: unassisted  Hydration status: euvolemic  Follow-up not needed.          Vitals Value Taken Time   /94 7/23/2019  6:02 PM   Temp 36.4 °C (97.5 °F) 7/23/2019  4:00 PM   Pulse 86 7/23/2019  6:04 PM   Resp 17 7/23/2019  6:04 PM   SpO2 98 % 7/23/2019  6:04 PM   Vitals shown include unvalidated device data.      No case tracking events are documented in the log.      Pain/Ai Score: Pain Rating Prior to Med Admin: 8 (7/23/2019  4:15 PM)  Pain Rating Post Med Admin: 6 (7/23/2019  5:15 PM)  Ai Score: 10 (7/23/2019  5:15 PM)         Yes

## 2024-08-12 ENCOUNTER — PATIENT MESSAGE (OUTPATIENT)
Dept: BARIATRICS | Facility: CLINIC | Age: 64
End: 2024-08-12
Payer: COMMERCIAL

## 2024-08-23 ENCOUNTER — OFFICE VISIT (OUTPATIENT)
Dept: SPORTS MEDICINE | Facility: CLINIC | Age: 64
End: 2024-08-23
Payer: COMMERCIAL

## 2024-08-23 ENCOUNTER — HOSPITAL ENCOUNTER (OUTPATIENT)
Dept: RADIOLOGY | Facility: HOSPITAL | Age: 64
Discharge: HOME | End: 2024-08-23
Attending: PHYSICIAN ASSISTANT
Payer: COMMERCIAL

## 2024-08-23 VITALS
DIASTOLIC BLOOD PRESSURE: 77 MMHG | BODY MASS INDEX: 26.08 KG/M2 | WEIGHT: 156.5 LBS | HEIGHT: 65 IN | SYSTOLIC BLOOD PRESSURE: 118 MMHG

## 2024-08-23 DIAGNOSIS — G89.29 CHRONIC RIGHT SHOULDER PAIN: Primary | ICD-10-CM

## 2024-08-23 DIAGNOSIS — M25.511 CHRONIC RIGHT SHOULDER PAIN: Primary | ICD-10-CM

## 2024-08-23 DIAGNOSIS — M25.511 RIGHT SHOULDER PAIN, UNSPECIFIED CHRONICITY: ICD-10-CM

## 2024-08-23 DIAGNOSIS — M89.8X1 PAIN OF RIGHT SCAPULA: ICD-10-CM

## 2024-08-23 PROCEDURE — 1160F RVW MEDS BY RX/DR IN RCRD: CPT | Mod: CPTII,S$GLB,, | Performed by: PHYSICIAN ASSISTANT

## 2024-08-23 PROCEDURE — 73030 X-RAY EXAM OF SHOULDER: CPT | Mod: 26,RT,, | Performed by: RADIOLOGY

## 2024-08-23 PROCEDURE — 99214 OFFICE O/P EST MOD 30 MIN: CPT | Mod: S$GLB,,, | Performed by: PHYSICIAN ASSISTANT

## 2024-08-23 PROCEDURE — 3008F BODY MASS INDEX DOCD: CPT | Mod: CPTII,S$GLB,, | Performed by: PHYSICIAN ASSISTANT

## 2024-08-23 PROCEDURE — 73030 X-RAY EXAM OF SHOULDER: CPT | Mod: TC,RT

## 2024-08-23 PROCEDURE — 3078F DIAST BP <80 MM HG: CPT | Mod: CPTII,S$GLB,, | Performed by: PHYSICIAN ASSISTANT

## 2024-08-23 PROCEDURE — 3044F HG A1C LEVEL LT 7.0%: CPT | Mod: CPTII,S$GLB,, | Performed by: PHYSICIAN ASSISTANT

## 2024-08-23 PROCEDURE — 3074F SYST BP LT 130 MM HG: CPT | Mod: CPTII,S$GLB,, | Performed by: PHYSICIAN ASSISTANT

## 2024-08-23 PROCEDURE — 1159F MED LIST DOCD IN RCRD: CPT | Mod: CPTII,S$GLB,, | Performed by: PHYSICIAN ASSISTANT

## 2024-08-23 PROCEDURE — 99999 PR PBB SHADOW E&M-EST. PATIENT-LVL IV: CPT | Mod: PBBFAC,,, | Performed by: PHYSICIAN ASSISTANT

## 2024-09-03 ENCOUNTER — PATIENT MESSAGE (OUTPATIENT)
Dept: BARIATRICS | Facility: CLINIC | Age: 64
End: 2024-09-03
Payer: COMMERCIAL

## 2024-09-04 NOTE — PROGRESS NOTES
CC: right shoulder pain ([facility] - [facility])     63 y.o. Female who reports that the pain is moderate and not responding to any conservative care.      She reports upper medial scapula border pain that she describes as throbbing. Pain is constant and not improved with motrin and robaxin. She has a history of cervical fusion and previously saw Dr. Zayas for cervical evaluation. They recommended cervical injection.     Pain in area of C6-C7 dermatome.    She denies numbness, tingling, paresthesias, or arm weakness.     She reports that the pain is worse with overhead activity. It also bothers her at night.    Is affecting ADLs.     Review of Systems   Constitution: Negative. Negative for chills, fever and night sweats.   HENT: Negative for congestion and headaches.    Eyes: Negative for blurred vision, left vision loss and right vision loss.   Cardiovascular: Negative for chest pain and syncope.   Respiratory: Negative for cough and shortness of breath.    Endocrine: Negative for polydipsia, polyphagia and polyuria.   Hematologic/Lymphatic: Negative for bleeding problem. Does not bruise/bleed easily.   Skin: Negative for dry skin, itching and rash.   Musculoskeletal: Negative for falls and muscle weakness.   Gastrointestinal: Negative for abdominal pain and bowel incontinence.   Genitourinary: Negative for bladder incontinence and nocturia.   Neurological: Negative for disturbances in coordination, loss of balance and seizures.   Psychiatric/Behavioral: Negative for depression. The patient does not have insomnia.    Allergic/Immunologic: Negative for hives and persistent infections.     PAST MEDICAL HISTORY:   Past Medical History:   Diagnosis Date    Abscess of paraspinous muscles 2018    Allergy 04/2018    Class 1 obesity due to excess calories in adult 07/23/2019    DVT (deep venous thrombosis)     left leg    Epidural abscess 2018 04/10/2018    Fatty liver     GERD (gastroesophageal reflux disease)  2015    History of major abdominal surgery 09/11/2015    Had Hysterectomy , cholecystectomy , rectocele repair    Hypertension     Lumbar radiculopathy     Menopause     Obesity     Obstructive sleep apnea on CPAP     no longer uses CPAP after weight loss from gastric sleeve    Thyroid disease     Thyroid nodules.     PAST SURGICAL HISTORY:   Past Surgical History:   Procedure Laterality Date    ADENOIDECTOMY  1995    ANTERIOR CERVICAL DISCECTOMY W/ FUSION N/A 12/11/2023    Procedure: DISCECTOMY, SPINE, CERVICAL, ANTERIOR APPROACH, WITH FUSION C4/5 spinewave rep-less SNS:vocal/motors/SSEP;  Surgeon: Dean Zayas MD;  Location: [facility] OR;  Service: Orthopedics;  Laterality: N/A;    BACK SURGERY  2002    L4, L5    BILATERAL SALPINGOOPHORECTOMY      CHOLECYSTECTOMY      COLONOSCOPY N/A 6/13/2022    Procedure: COLONOSCOPY;  Surgeon: Saul Ureña MD;  [facility]  Service: Endoscopy;  Laterality: N/A;  constipation protocol-extended Miralax prep - PEG allergy -okay with taking Miralax  fully vaccinated, prep instr portal -ml    COLONOSCOPY N/A 10/4/2022    Procedure: COLONOSCOPY;  Surgeon: Saul Ureña MD;  [facility]  Service: Endoscopy;  Laterality: N/A;  Constipation protocol-Miralax prep-pt tolerated Miralax for last colonoscopy.  fully vaccinated, prep instr portal -ml    COLONOSCOPY N/A 10/9/2023    Procedure: COLONOSCOPY;  Surgeon: Saul Ureña MD;  [facility]  Service: Endoscopy;  Laterality: N/A;  Ref by Jackie Wise-Camgovy- pt instructed to hold x 7 days prior procedure,  Miralax, portal - PC  10/2-precall complete/pt aware to hold wegovy x7 days-MS    CORRECTION OF HAMMER TOE Left 8/5/2021    Procedure: CORRECTION, HAMMER TOE Left 4th mallet toe, DIP joitn fusion;  Surgeon: Guero Alvarez MD;  Location: [facility] OR;  Service: Orthopedics;  Laterality: Left;  K-wires    CYST REMOVAL      EPIDURAL STEROID INJECTION INTO  CERVICAL SPINE N/A 8/11/2020    Procedure: Injection-steroid-epidural-cervical;  Surgeon: [facility];  [facility];  Service: Radiology;  Laterality: N/A;  /[place]    EPIDURAL STEROID INJECTION INTO CERVICAL SPINE N/A 3/2/2022    Procedure: Injection-steroid-epidural-cervical C7-T1;  Surgeon: Latoya Quintanilla MD;  Location: [facility] PAIN MGT;  Service: Pain Management;  Laterality: N/A;    ESOPHAGOGASTRODUODENOSCOPY N/A 5/3/2019    Procedure: ESOPHAGOGASTRODUODENOSCOPY (EGD);  Surgeon: Carlin Sanchez MD;  Location: [facility] ENDO (4TH FLR);  Service: Endoscopy;  Laterality: N/A;    HYSTERECTOMY  12/17/2012    [facility] BS&O     INJECTION OF JOINT Left 3/1/2021    Procedure: INJECTION, JOINT LEFT HIP INTRA ARTICULAR CORTISONE INJECTION DIRECT REFERRAL;  Surgeon: Chuy Gregory MD;  Location: [facility] PAIN MGT;  Service: Pain Management;  Laterality: Left;  NEEDS CONSENT    INSERTION OF INFERIOR VENA CAVAL FILTER Right 7/19/2019    Procedure: IVC filter placement;  Surgeon: Rodney Rico MD;  Location: [facility] CATH LAB;  Service: Peripheral Vascular;  Laterality: Right;    IVC FILTER RETRIEVAL N/A 12/20/2019    Procedure: REMOVAL-FILTER-IVC;  Surgeon: Rodney Rico MD;  Location: [facility] OR 2ND FLR;  Service: Peripheral Vascular;  Laterality: N/A;    LAPAROSCOPIC SLEEVE GASTRECTOMY N/A 7/23/2019    Procedure: GASTRECTOMY, SLEEVE, LAPAROSCOPIC, with intraop EGD 35086;  Surgeon: Duc Ratliff Jr., MD;  [facility];  Service: General;  Laterality: N/A;    RECTOCELE REPAIR      SPINE SURGERY  2018    THYROID NODULE REMOVAL      TONSILLECTOMY  1995     FAMILY HISTORY:   Family History   Problem Relation Name Age of Onset    Thyroid disease Mother Mother     Hypertension Mother Mother     Hyperlipidemia Mother Mother     Heart disease Mother Mother 55        cad, valvular heart disease    Deep vein thrombosis Mother Mother     Heart disease Father Gavin Ming         MI    Stroke  Father Gavin Ming     Diabetes Sister Sister     Alcohol abuse Sister Sister     Cirrhosis Sister Sister         alcoholic cirrhosis    Epilepsy Sister Sister     Heart disease Daughter Sabrina         SVT    Thyroid disease Daughter Sabrina         Hashimoto's    Hashimoto's thyroiditis Daughter Sabrina     No Known Problems Daughter Lori     Heart disease Maternal Grandmother      Thyroid disease Maternal Grandmother      Kidney disease Maternal Grandmother      Heart disease Paternal Grandmother          MI    Breast cancer Neg Hx      Colon cancer Neg Hx      Ovarian cancer Neg Hx      Esophageal cancer Neg Hx       SOCIAL HISTORY:   Social History     Socioeconomic History    Marital status:     Number of children: 2   Tobacco Use    Smoking status: Former     Current packs/day: 0.00     Types: Cigarettes     Quit date: 1988     Years since quittin.7    Smokeless tobacco: Never    Tobacco comments:     smoked socially decades ago - weekends only   Substance and Sexual Activity    Alcohol use: Not Currently     Comment: yearly at most     Drug use: No    Sexual activity: Yes     Partners: Male     Birth control/protection: See Surgical Hx, None     Comment: [facility] BS&O 2012,     Social History Narrative    . 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     Social Determinants of Health     Financial Resource Strain: Low Risk  (2023)    Overall Financial Resource Strain (CARDIA)     Difficulty of Paying Living Expenses: Not hard at all   Food Insecurity: No Food Insecurity (2023)    Hunger Vital Sign     Worried About Running Out of Food in the Last Year: Never true     Ran Out of Food in the Last Year: Never true   Transportation Needs: No Transportation Needs (2023)    PRAPARE - Transportation     Lack of Transportation (Medical): No     Lack of Transportation (Non-Medical): No   Physical Activity: Insufficiently Active (2023)    Exercise Vital Sign     Days of Exercise per Week:  "2 days     Minutes of Exercise per Session: 20 min   Stress: No Stress Concern Present (12/11/2023)    [employer] [place] [employer] - Occupational Stress Questionnaire     Feeling of Stress : Not at all   Housing Stability: Low Risk  (12/11/2023)    Housing Stability Vital Sign     Unable to Pay for Housing in the Last Year: No     Number of Places Lived in the Last Year: 1     Unstable Housing in the Last Year: No       MEDICATIONS:   Current Outpatient Medications:     ergocalciferol (ERGOCALCIFEROL) 50,000 unit Cap, Take 1 capsule (50,000 Units total) by mouth every 7 days., Disp: 12 capsule, Rfl: 3    ferrous gluconate (FERGON) 324 MG tablet, Every other day with orange juice, Disp: 30 tablet, Rfl: 12    hydroCHLOROthiazide (HYDRODIURIL) 12.5 MG Tab, Take 1 tablet (12.5 mg total) by mouth once daily., Disp: 90 tablet, Rfl: 2    methocarbamoL (ROBAXIN) 750 MG Tab, Take 1 tablet (750 mg total) by mouth 3 (three) times daily as needed (spasms). As needed for muscle spasms, Disp: 60 tablet, Rfl: 0    semaglutide, weight loss, (WEGOVY) 1.7 mg/0.75 mL PnIj, Inject 1.7 mg into the skin every 7 days., Disp: 9 mL, Rfl: 3    EPINEPHrine (EPIPEN) 0.3 mg/0.3 mL AtIn, Inject 0.3 mLs (0.3 mg total) into the muscle once. for 1 dose (Patient not taking: Reported on 5/20/2024), Disp: 1 each, Rfl: 3  ALLERGIES:   Review of patient's allergies indicates:   Allergen Reactions    Cathflo activase [alteplase] Anaphylaxis     Tightness in chest, raspy throat, restless legs, hotness all over    Heparin analogues      Restless legs, tightness in chest, and hot all over    Xyzal [levocetirizine] Swelling    Latex Swelling             VITAL SIGNS: /77   Ht 5' 5" (1.651 m)   Wt 71 kg (156 lb 8.4 oz)   LMP 11/25/2012   BMI 26.05 kg/m²      PHYSICAL EXAMINATION:  General:  The patient is alert and oriented x 3.  Mood is pleasant.  Observation of ears, eyes and nose reveal no gross abnormalities.  No labored breathing " observed.  Gait is coordinated. Patient can toe walk and heel walk without difficulty.      right SHOULDER / UPPER EXTREMITY EXAM    OBSERVATION:     Swelling  none  Deformity  none   Discoloration  none   Scapular winging none   Scars   none  Atrophy  none    TENDERNESS / CREPITUS (T/C):          T/C      T/C   Clavicle   -/-  SUPRAspinatus    -/-     AC Jt.    -/-  INFRAspinatus  -/-    SC Jt.    -/-  Deltoid    -/-      G. Tuberosity  -/-  LH BICEP groove  -/-   Acromion:  -/-  Midline Neck   -/-     Scapular Spine -/-  Trapezium   -/-   SMA Scapula  -/-  GH jt. line - post  -/-     Scapulothoracic  -/-         ROM: (* = with pain)  Right shoulder   Left shoulder        AROM (PROM)   AROM (PROM)   FE    170° (175°)     170° (175°)     ER at 0°    60°  (65°)    60°  (65°)   ER at 90° ABD  90°  (90°)    90°  (90°)   IR at 90°  ABD   NA  (40°)     NA  (40°)      IR (spine level)   T10     T10    STRENGTH: (* = with pain) RIGHT SHOULDER  LEFT SHOULDER   SCAPTION   5/5    5/5    IR    5/5    5/5   ER    5/5    5/5   BICEPS   5/5    5/5   Deltoid    5/5    5/5     SIGNS:  Painful side       NEER   +    OBLUES  neg    CLEANING   neg    SPEEDS  neg     DROP ARM   neg   BELLY PRESS neg   Superior escape none    LIFT-OFF  neg   X-Body ADD    neg    MOVING VALGUS neg        STABILITY TESTING    RIGHT SHOULDER   LEFT SHOULDER     Translation     Anterior  up face     up face    Posterior  up face    up face    Sulcus   < 10mm    < 10 mm     Signs   Apprehension   neg      neg       Relocation   no change     no change      Jerk test  neg     neg    EXTREMITY NEURO-VASCULAR EXAM    Sensation grossly intact to light touch all dermatomal regions.    DTR 2+ Biceps, Triceps, BR and Negative Rosangelas sign   Grossly intact motor function at Elbow, Wrist and Hand   Distal pulses radial and ulnar 2+, brisk cap refill, symmetric.      NECK:  Painless FROM and spinous processes non-tender. Negative Spurlings sign.      OTHER  FINDINGS:  + scapular dyskinesia    XRAYS:  Shoulder 3 view series right,  were obtained and reviewed  No convincing fracture or dislocation is noted. The osseous structures appear well mineralized and well aligned      ASSESSMENT:   right:  1. Shoulder pain, chronic   2.   Shoulder blade pain  3.   Scapular dyskinesia      PLAN:    Difficult to determine if her pain is from scapular dyskinesias or of cervical origin. Discussed her trying PT for scapula dyskinesia or proceeding with cervical injection to see if either helps.     2. PT for scapular stabilization: Scapular dyskinesia   Scapular stabilization - [place] protocol, 1-3x/week x 6-8 weeks with HEP as needed.     RTC as needed or if symptoms change or worsen.       All questions were answered, pt will contact us for questions or concerns in the interim.

## 2024-09-05 ENCOUNTER — PATIENT MESSAGE (OUTPATIENT)
Dept: BARIATRICS | Facility: CLINIC | Age: 64
End: 2024-09-05
Payer: COMMERCIAL

## 2024-09-10 ENCOUNTER — PATIENT MESSAGE (OUTPATIENT)
Dept: BARIATRICS | Facility: CLINIC | Age: 64
End: 2024-09-10

## 2024-09-10 ENCOUNTER — CLINICAL SUPPORT (OUTPATIENT)
Dept: REHABILITATION | Facility: HOSPITAL | Age: 64
End: 2024-09-10
Payer: COMMERCIAL

## 2024-09-10 ENCOUNTER — OFFICE VISIT (OUTPATIENT)
Dept: BARIATRICS | Facility: CLINIC | Age: 64
End: 2024-09-10
Payer: COMMERCIAL

## 2024-09-10 VITALS
HEART RATE: 77 BPM | DIASTOLIC BLOOD PRESSURE: 72 MMHG | BODY MASS INDEX: 25.54 KG/M2 | WEIGHT: 153.31 LBS | HEIGHT: 65 IN | SYSTOLIC BLOOD PRESSURE: 102 MMHG | OXYGEN SATURATION: 99 %

## 2024-09-10 DIAGNOSIS — M25.511 CHRONIC RIGHT SHOULDER PAIN: ICD-10-CM

## 2024-09-10 DIAGNOSIS — G89.29 CHRONIC RIGHT SHOULDER PAIN: ICD-10-CM

## 2024-09-10 DIAGNOSIS — E66.9 OBESITY, CLASS I, BMI 30.0-34.9 (SEE ACTUAL BMI): ICD-10-CM

## 2024-09-10 PROCEDURE — 3008F BODY MASS INDEX DOCD: CPT | Mod: CPTII,S$GLB,, | Performed by: INTERNAL MEDICINE

## 2024-09-10 PROCEDURE — 3074F SYST BP LT 130 MM HG: CPT | Mod: CPTII,S$GLB,, | Performed by: INTERNAL MEDICINE

## 2024-09-10 PROCEDURE — 99212 OFFICE O/P EST SF 10 MIN: CPT | Mod: S$GLB,,, | Performed by: INTERNAL MEDICINE

## 2024-09-10 PROCEDURE — 3044F HG A1C LEVEL LT 7.0%: CPT | Mod: CPTII,S$GLB,, | Performed by: INTERNAL MEDICINE

## 2024-09-10 PROCEDURE — 1159F MED LIST DOCD IN RCRD: CPT | Mod: CPTII,S$GLB,, | Performed by: INTERNAL MEDICINE

## 2024-09-10 PROCEDURE — 97110 THERAPEUTIC EXERCISES: CPT | Performed by: PHYSICAL THERAPIST

## 2024-09-10 PROCEDURE — 3078F DIAST BP <80 MM HG: CPT | Mod: CPTII,S$GLB,, | Performed by: INTERNAL MEDICINE

## 2024-09-10 PROCEDURE — 97161 PT EVAL LOW COMPLEX 20 MIN: CPT | Performed by: PHYSICAL THERAPIST

## 2024-09-10 PROCEDURE — 1160F RVW MEDS BY RX/DR IN RCRD: CPT | Mod: CPTII,S$GLB,, | Performed by: INTERNAL MEDICINE

## 2024-09-10 PROCEDURE — 99999 PR PBB SHADOW E&M-EST. PATIENT-LVL IV: CPT | Mod: PBBFAC,,, | Performed by: INTERNAL MEDICINE

## 2024-09-10 RX ORDER — SEMAGLUTIDE 1.7 MG/.75ML
1.7 INJECTION, SOLUTION SUBCUTANEOUS
Qty: 9 ML | Refills: 1 | Status: SHIPPED | OUTPATIENT
Start: 2024-09-10

## 2024-09-10 NOTE — PATIENT INSTRUCTIONS
Continue Wegovy 1.7 mg once a week.    Decrease portions as soon as you start wegovy. Avoid fried, rich or greasy foods.     If you get pain across the upper abdomen and around to your back, please call the office.     [email or web address] to sign up for coupon card.     - To lose weight you want to cut 100% starchy carbohydrates out of your diet (bread, rice, pasta, potatoes, granola, flour, corn, peas, oatmeal, grits, tortillas, crackers, chips) and get  grams of protein.  Aim for 100 grams of protein daily.    - No soda, sweet tea, juices, sports drinks or lemonade     -Exercise daily. Add some type of resistance training 2-3 days a week. These can be body weight exercises, light weight or elastic bands. [employer] and [employer] are great sources for free work out plans and videos.       Tips for preparing for hurricane season:    If you are on weight loss medications, please take your medication with you in case of evacuation. Injectable medications should be transported with an ice pack if unopened or room temperature if you have started to use them.   Hurricane supplies do not necessarily need to be junk food or high in carbs or sugar. Shelf stable and healthy choices to include in your supplies could include:  Canned/packets of tuna or chicken  Apples, oranges, banana, pears  Beef or turkey jerky  Sugar free pudding  Pickle, olives, dill relish (mix with the tuna or chicken!)  Low sodium or no salt added canned vegetables  If you get bread, get lite bread (40 calories a slice)  0 sugar sports drinks  Water  String cheese will be okay for a few days without refrigeration or in an ice chest.   Peanut or other nut butter.   Parmesan crisps  Pork skins  Protein shakes (20-30g protein, less than 5 g sugar)  Protein bars (15 or more g protein, less than 5 g sugar)  Don't forget disposable forks, spoons, plates in your supplies  If evacuated, manage stress by taking walks, reading or meditating.   If eating out make  better choices when possible.   Salads with a lean protein and limited dressing, cheese or other toppings  Grilled chicken sandwich or burger without the bun.   Skip the fries!  Order water or unsweetened tea instead of soda  Grocery stores with a deli, salad/food bar can be a good quick and affordable option to replace fast food

## 2024-09-10 NOTE — PROGRESS NOTES
"Subjective:       Patient ID: Renae Hou is a 63 y.o. female.    Chief Complaint: Follow-up    CC:  Pt here today for follow-up. Seen by Sabina Bobby in May 2024.Has lost 30 lbs, net neg 77.7 lbs lbs, 80 % EWL. [place]- sleeve 7/2019  Started Wegovy 2/2024 doing well no SE. Currently on 1.7 mg weekly. She is getting fullness with it.   Tries to walk at work. Has been eating well. Getting plenty of protein, eggs, yogurt.     Prev med hx :Had started phentermine. Last filled 6/1/22.  checked today   She does not exercise.   Eye exam up to date. NO glaucoma.       New    BMR: 1302  PBF: 37.9%    Has lost 6 lbs, (-0.5 lbs muscle, -5 lbs fat)    INitial done May 2024.   BMR 1315  PBF 39.5%      Review of Systems   Constitutional: Negative for chills and fever.   Respiratory: Negative for shortness of breath.     Cardiovascular:  Negative for chest pain.   Gastrointestinal: Negative for diarrhea or constipation        Denies GERD   Genitourinary: Negative for difficulty urinating and dysuria.   Musculoskeletal:. Negative for back pain  Neurological: Negative for dizziness and light-headedness.   Psychiatric/Behavioral: Negative for dysphoric mood. The patient is not nervous/anxious.        Objective:     /72 (BP Location: Left arm, Patient Position: Sitting, BP Method: Large (Manual))   Pulse 77   Ht 5' 5" (1.651 m)   Wt 69.5 kg (153 lb 4.8 oz)   LMP 11/25/2012   SpO2 99%   BMI 25.51 kg/m²    Physical Exam   Constitutional: She is oriented to person, place, and time. She appears well-developed. No distress.   Obese   HENT:   Head: Normocephalic and atraumatic.   .   Eyes: EOM are normal.  No scleral icterus.   Neck: Normal range of motion. Neck supple.    Cardiovascular: Normal rate   Pulmonary/Chest: Effort normal and breath sounds normal. No respiratory distress.   Musculoskeletal: Normal range of motion.   Neurological: She is alert and oriented to person, place, and time. No cranial nerve " "deficit.   Skin: Skin is warm and dry. No erythema.   Psychiatric: She has a normal mood and affect. Her behavior is normal. Judgment normal.   Vitals reviewed.      Assessment:       1. Obesity, Class I, BMI 30.0-34.9 (see actual BMI)              Plan:         Diagnoses and all orders for this visit:    Renae was seen today for follow-up.    Diagnoses and all orders for this visit:    Obesity, Class I, BMI 30.0-34.9 (see actual BMI)  -     semaglutide, weight loss, (WEGOVY) 1.7 mg/0.75 mL PnIj; Inject 1.7 mg into the skin every 7 days.          Continue Wegovy 1.7 mg once a week.    Decrease portions as soon as you start wegovy. Avoid fried, rich or greasy foods.     If you get pain across the upper abdomen and around to your back, please call the office.     [email or web address] to sign up for coupon card.     - To lose weight you want to cut 100% starchy carbohydrates out of your diet (bread, rice, pasta, potatoes, granola, flour, corn, peas, oatmeal, grits, tortillas, crackers, chips) and get  grams of protein.  Aim for 100 grams of protein daily.    - No soda, sweet tea, juices, sports drinks or lemonade     -Exercise daily. Add some type of resistance training 2-3 days a week. These can be body weight exercises, light weight or elastic bands. [employer] and [employer] are great sources for free work out plans and videos.     Exercise is one of those "bite the bullet" propositions. Sometimes you just have to get started. What I suggest is setting a timer for 10 minutes. Do whatever activity you plan to start for the 10 minutes. If the timer goes off and you want to stop, fine. You can stop. If you feel like you want to go on a little longer, you can go on. Do this a few times a week. Eventually you will likely decide to keep going. Every 2 weeks, add 5 more minutes before you give yourself permission to stop.           Hurricane tips given.         "

## 2024-09-10 NOTE — PROGRESS NOTES
[facility]   Physical Therapy Initial Evaluation      Name: Renae Hou  [facility] Number: [id number]    Therapy Diagnosis: No diagnosis found.     Physician: Santi Larson III, *    Physician Orders: PT Eval and Treat   Medical Diagnosis from Referral: M25.511,G89.29 (ICD-10-CM) - Chronic right shoulder pain   Evaluation Date: 9/10/2024  Authorization Period Expiration: 9/4/24  Plan of Care Expiration: 12/31/24  Progress Note Due: 9/31/24  Visit # / Visits authorized: 1/ 1   FOTO: 1/1    Precautions: Standard     Time In: 1000  Time Out: 1100  Total Appointment Time (timed & untimed codes): 60 minutes    Subjective     Date of onset: 3 months    History of current condition - Renae reports: pt reports onset of pain start about 3 months ago after pulling weeds. Locates pain posterior R shoulder medial to scapula. Has increased pain with pulling weeds and with sleeping on her sides. Does not feel much pain when sitting upright or throughout the day. Prior hx of C4-5 fusion. Currently denies any radiating symptoms including pain, N/T, no subjective weakness that is new. She is LHD. 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 No regular exercise routine.    Falls: none    Imaging: :   EXAMINATION:  XR SHOULDER COMPLETE 2 OR MORE VIEWS RIGHT     CLINICAL HISTORY:  Pain in right shoulder     TECHNIQUE:  Two or three views of the right shoulder were performed.     COMPARISON:  October 20, 2021     FINDINGS:  No fracture or malalignment.  Preserved glenohumeral articulation.  Minimal spurring about the acromioclavicular articulation.  No findings calcific tendonitis.  Right shoulder findings unchanged to earlier exam.  There are new clips projecting about the lower right neck soft tissues and partially visualize cervical spine fixation hardware     Impression:     As above     Narrative & Impression  EXAMINATION:  MRI CERVICAL SPINE WITHOUT CONTRAST     CLINICAL HISTORY:  Cervical radiculopathy, no red  flags; Radiculopathy, cervical region     TECHNIQUE:  Multiplanar, multisequence MR images of the cervical spine were performed utilizing no IV contrast.     COMPARISON:  Cervical spine radiograph 03/11/2024, MRI cervical spine 06/26/2023     FINDINGS:  Postoperative changes of C4-C5 ACDF.     C1-C2: Dens is intact.  Pre dens space is maintained.     Alignment: Straightening of the normal cervical lordosis.     Vertebrae: No fracture or marrow infiltrative process.  T2 vertebral body hemangioma.     Discs: Multilevel disc desiccation and height loss.  No endplate edema.     Cord: Normal.     Skull base and craniocervical junction: Normal.     Degenerative findings:     C2-C3: Posterior disc osteophyte complex.  No spinal canal stenosis or neural foraminal narrowing.     C3-C4: Posterior disc osteophyte complex, uncovertebral spurring, and facet arthropathy.  Findings contribute to mild spinal canal stenosis and mild bilateral neural foraminal narrowing.     C4-C5: Operative level.  Alignment anatomic.  No residual marrow edema or surrounding soft tissue inflammatory changes.  No fluid collections.     C5-C6: Posterior disc osteophyte complex, uncovertebral spurring, and facet arthropathy.  Findings contribute to mild spinal canal stenosis and mild right neural foraminal narrowing.     C6-C7: Posterior disc osteophyte complex and uncovertebral spurring.  Findings contribute to mild left neural foraminal narrowing.  No spinal canal stenosis.     C7-T1: No spinal canal stenosis or neural foraminal narrowing.     Paraspinal muscles & soft tissues: Left thyroid lobe is surgically absent.  Few subcentimeter T2 hyperintense right thyroid lobe nodules, similar to prior.     Impression:     1. Postoperative changes of C4-C5 ACDF, as above.  No acute findings.  2. Cervical spondylosis, contributing to mild spinal canal stenosis at C3-4 and C5-6 and mild neural foraminal narrowing, as above.     Prior Therapy: PT for hip,  neck, back  Social History:  lives with their family  Occupation: [employer] employee  Prior Level of Function: independent  Current Level of Function: limited IADLS secondary to neck/shoulder pain    Pain:  Current 0/10, worst 10/10, best 0/10   Location: right shoulder posterior    Description: Aching, Dull, and Sharp  Aggravating Factors: weeding, sleeping on side, desk work  Easing Factors: rest, meds, posture    Patients goals: decrease pain     Medical History:   Past Medical History:   Diagnosis Date    Abscess of paraspinous muscles 2018    Allergy 04/2018    Class 1 obesity due to excess calories in adult 07/23/2019    DVT (deep venous thrombosis)     left leg    Epidural abscess 2018 04/10/2018    Fatty liver     GERD (gastroesophageal reflux disease) 2015    History of major abdominal surgery 09/11/2015    Had Hysterectomy , cholecystectomy , rectocele repair    Hypertension     Lumbar radiculopathy     Menopause     Obesity     Obstructive sleep apnea on CPAP     no longer uses CPAP after weight loss from gastric sleeve    Thyroid disease     Thyroid nodules.       Surgical History:   Renae Hou  has a past surgical history that includes Cholecystectomy; RECTOCELE REPAIR; THYROID NODULE REMOVAL; Bilateral salpingoophorectomy; Hysterectomy (12/17/2012); Back surgery (2002); Cyst Removal; Adenoidectomy (1995); Spine surgery (2018); Tonsillectomy (1995); Esophagogastroduodenoscopy (N/A, 5/3/2019); Insertion of inferior vena caval filter (Right, 7/19/2019); Laparoscopic sleeve gastrectomy (N/A, 7/23/2019); IVC filter retrieval (N/A, 12/20/2019); Epidural steroid injection into cervical spine (N/A, 8/11/2020); Injection of joint (Left, 3/1/2021); Correction of hammer toe (Left, 8/5/2021); Epidural steroid injection into cervical spine (N/A, 3/2/2022); Colonoscopy (N/A, 6/13/2022); Colonoscopy (N/A, 10/4/2022); Colonoscopy (N/A, 10/9/2023); and Anterior cervical discectomy w/ fusion (N/A,  12/11/2023).    Medications:   Renae has a current medication list which includes the following prescription(s): epinephrine, ergocalciferol, ferrous gluconate, hydrochlorothiazide, methocarbamol, and wegovy.    Allergies:   Review of patient's allergies indicates:   Allergen Reactions    Cathflo activase [alteplase] Anaphylaxis     Tightness in chest, raspy throat, restless legs, hotness all over    Heparin analogues      Restless legs, tightness in chest, and hot all over    Xyzal [levocetirizine] Swelling    Latex Swelling              Objective      Observation: pt presents in gym. No distress noted. Scar anterior neck from ACDF    Posture: FHRS     Cervical Range of Motion:    Percent Pain   Flexion 100 stretch     Extension 100 none     Right Rotation 75 Familiar pain     Left Rotation 100 none     Right Side Bending 60 none   Left Side Bending 60 none      Shoulder Range of Motion:   Shoulder Left Right   Flexion 170 170   Abduction 90 90   ER at 90 90 90   Irat 90 50 50     Strength:  Cervical MMT   Flexion 4   Extension 5   Right Side Bend 5   Left Side Bend 5     Upper Extremity Strength  (R) UE  (L) UE    Shoulder flexion: 5/5 Shoulder flexion: 5/5   Shoulder Abduction: 5/5 Shoulder abduction: 5/5   Shoulder ER 5/5 Shoulder ER 5/5   Shoulder IR 5/5 Shoulder IR 5/5   Elbow flexion: 5/5 Elbow flexion: 5/5   Elbow extension: 5/5 Elbow extension: 5/5   Wrist flexion: 5/5 Wrist flexion: 5/5   Wrist extension: 5/5 Wrist extension: 5/5    5/5 : 5/5   Lower Trap 3+/5 Lower Trap 4-/5   Middle Trap 3+/5 Middle Trap 4-/5   Rhomboids 3+/5 Rhomboids 4-/5         Special Tests:  Distraction + relief   Compression -   Spurlings Localized pain R side C5-6   Sharp-Lacey -   VA test nt   Lateral Flexion Alar Ligament -   DNF test 20 second (30 goal)     Negative R shoulder Neer, Bicep load, empty can, Obrein    Upper Limb Neurodynamic testing:  UNTT -  MNTT -  RNTT-    Joint Mobility: ,    PA's pain noted  locally and in posterior shoulder at C4-5-6,         Thoracic mobility: decreased thoracic rotation noted on right    Palpation: tenderness to R side thoracic paraspinals along C 3-T1. No pain noted along thoracic paraspinals, Levator, upper trap, mid trap, lower trap      UPPER EXTREMITY NEURO-VASCULAR EXAM   Sensation grossly intact to light touch all dermatomal regions.   DTR 2+ Biceps, Triceps, BR and Negative Rosangelas sign  Grossly intact motor function at Elbow, Wrist and Hand  Distal pulses radial and ulnar 2+, brisk cap refill, symmetric.      Limitation/Restriction for FOTO  Survey    Therapist reviewed FOTO scores for Renae Hou on 9/10/2024.   FOTO documents entered into [employer] - see Media section.    Limitation Score: see media         Treatment     Total Treatment time (time-based codes) separate from Evaluation: 25 minutes      Renae received the treatments listed below:      therapeutic exercises to develop strength and ROM for 25 minutes including:  Chin tuck, thoracic rotation, prone mid trap  .      Patient Education and Home Exercises     Education provided:   - reviewed hep    Written Home Exercises Provided: yes. Exercises were reviewed and Renae was able to demonstrate them prior to the end of the session.  Renae demonstrated good  understanding of the education provided. See EMR under Patient Instructions for exercises provided during therapy sessions.    Assessment     Renae is a 63 y.o. female referred to outpatient Physical Therapy with a medical diagnosis of M25.511,G89.29 (ICD-10-CM) - Chronic right shoulder pain . Patient presents with pain and limitation in R cervical rotation, thoracic rotation, weakness of deep cervical flexors, familiar pain with palpation of cervical structures, weakness of parascapular musculature which is limiting functional mobility. Pt would benefit from skilled PT in order to maximize function.     Patient prognosis is good.   Patient will benefit  from skilled outpatient Physical Therapy to address the deficits stated above and in the chart below, provide patient /family education, and to maximize patientt's level of independence.     Plan of care discussed with patient: Yes  Patient's spiritual, cultural and educational needs considered and patient is agreeable to the plan of care and goals as stated below:     Anticipated Barriers for therapy: none    Medical Necessity is demonstrated by the following  History  Co-morbidities and personal factors that may impact the plan of care [x] LOW: no personal factors / co-morbidities  [] MODERATE: 1-2 personal factors / co-morbidities  [] HIGH: 3+ personal factors / co-morbidities     Moderate / High Support Documentation:   Co-morbidities affecting plan of care: NA     Personal Factors:   no deficits      Examination  Body Structures and Functions, activity limitations and participation restrictions that may impact the plan of care [x] LOW: addressing 1-2 elements  [] MODERATE: 3+ elements  [] HIGH: 4+ elements (please support below)     Moderate / High Support Documentation: NA      Clinical Presentation [x] LOW: stable  [] MODERATE: Evolving  [] HIGH: Unstable      Decision Making/ Complexity Score: low         Goals:  Short Term Goals: 6 weeks   Pt independent in initial hep  Pain 0-2/10 at worst  Pt able to resume weeding garden at 50% of typical time  Pt reports 50% improvement in function    Long Term Goals: 12 weeks   Pt independent in d/c hep  Full AROM cervical  Pain 0/10  Pt able to return to gardening without limitations  Pt reports 90% improvement in function  Plan     Plan of care Certification: 9/10/2024 to 12/31/24.    Outpatient Physical Therapy 1-3 times weekly for 12 weeks to include the following interventions: Electrical Stimulation DN, Manual Therapy, Moist Heat/ Ice, Neuromuscular Re-ed, Patient Education, Therapeutic Activities, and Therapeutic Exercise.     César Sharma, PT

## 2024-09-18 ENCOUNTER — PATIENT MESSAGE (OUTPATIENT)
Dept: ORTHOPEDICS | Facility: CLINIC | Age: 64
End: 2024-09-18
Payer: COMMERCIAL

## 2024-09-27 ENCOUNTER — TELEPHONE (OUTPATIENT)
Dept: OPTOMETRY | Facility: CLINIC | Age: 64
End: 2024-09-27
Payer: COMMERCIAL

## 2024-10-01 ENCOUNTER — PATIENT MESSAGE (OUTPATIENT)
Dept: BARIATRICS | Facility: CLINIC | Age: 64
End: 2024-10-01
Payer: COMMERCIAL

## 2024-10-08 ENCOUNTER — PATIENT MESSAGE (OUTPATIENT)
Dept: BARIATRICS | Facility: CLINIC | Age: 64
End: 2024-10-08
Payer: COMMERCIAL

## 2024-10-11 ENCOUNTER — PATIENT MESSAGE (OUTPATIENT)
Dept: INTERVENTIONAL RADIOLOGY/VASCULAR | Facility: HOSPITAL | Age: 64
End: 2024-10-11
Payer: COMMERCIAL

## 2024-10-11 ENCOUNTER — LAB VISIT (OUTPATIENT)
Dept: LAB | Facility: HOSPITAL | Age: 64
End: 2024-10-11
Payer: COMMERCIAL

## 2024-10-11 ENCOUNTER — PATIENT MESSAGE (OUTPATIENT)
Dept: OTHER | Facility: OTHER | Age: 64
End: 2024-10-11
Payer: COMMERCIAL

## 2024-10-11 DIAGNOSIS — M54.12 CERVICAL RADICULOPATHY: ICD-10-CM

## 2024-10-11 LAB
BASOPHILS # BLD AUTO: 0.06 K/UL (ref 0–0.2)
BASOPHILS NFR BLD: 1.3 % (ref 0–1.9)
DIFFERENTIAL METHOD BLD: NORMAL
EOSINOPHIL # BLD AUTO: 0.3 K/UL (ref 0–0.5)
EOSINOPHIL NFR BLD: 6 % (ref 0–8)
ERYTHROCYTE [DISTWIDTH] IN BLOOD BY AUTOMATED COUNT: 12.9 % (ref 11.5–14.5)
HCT VFR BLD AUTO: 39.3 % (ref 37–48.5)
HGB BLD-MCNC: 13 G/DL (ref 12–16)
IMM GRANULOCYTES # BLD AUTO: 0.01 K/UL (ref 0–0.04)
IMM GRANULOCYTES NFR BLD AUTO: 0.2 % (ref 0–0.5)
INR PPP: 1 (ref 0.8–1.2)
LYMPHOCYTES # BLD AUTO: 1.7 K/UL (ref 1–4.8)
LYMPHOCYTES NFR BLD: 36.9 % (ref 18–48)
MCH RBC QN AUTO: 30.6 PG (ref 27–31)
MCHC RBC AUTO-ENTMCNC: 33.1 G/DL (ref 32–36)
MCV RBC AUTO: 93 FL (ref 82–98)
MONOCYTES # BLD AUTO: 0.3 K/UL (ref 0.3–1)
MONOCYTES NFR BLD: 6 % (ref 4–15)
NEUTROPHILS # BLD AUTO: 2.3 K/UL (ref 1.8–7.7)
NEUTROPHILS NFR BLD: 49.6 % (ref 38–73)
NRBC BLD-RTO: 0 /100 WBC
PLATELET # BLD AUTO: 268 K/UL (ref 150–450)
PMV BLD AUTO: 9.2 FL (ref 9.2–12.9)
PROTHROMBIN TIME: 10.7 SEC (ref 9–12.5)
RBC # BLD AUTO: 4.25 M/UL (ref 4–5.4)
WBC # BLD AUTO: 4.63 K/UL (ref 3.9–12.7)

## 2024-10-11 PROCEDURE — 85025 COMPLETE CBC W/AUTO DIFF WBC: CPT

## 2024-10-11 PROCEDURE — 36415 COLL VENOUS BLD VENIPUNCTURE: CPT

## 2024-10-11 PROCEDURE — 85610 PROTHROMBIN TIME: CPT

## 2024-10-11 NOTE — NURSING
Pre-procedure call complete.  2 patient identifier used (name and ).      No food after midnight (GLP-1 last dose 2 weeks ago).  You may drink clear liquids (sports drinks, clear juices) until 2 hours before arrival time.  Clear liquids include only water, black coffee (no creamer), clear oral rehydration drinks, clear sports drinks and clear fruit juices.  Clear liquids do NOT include anything with pulp or food particles (chicken broth, ice cream, yogurt, jello, etc).  NO orange juice, pulpy juices, or apple cider.  If you can read newsprint through the liquid, it qualifies as clear.  IF UNSURE, drink water instead.      Have NOTHING BY MOUTH 2 hours before arrival time.  Medication the morning of your procedure may be taken with a sip of water     Pt aware will need someone to provide transport home and monitor pt 8 hours post procedure.  No driving for at least 24 hours after procedure.   Patient advised to take blood pressure medications with a sip of water morning of procedure.  Patient verbalized aware of which medications to take.  Do not take sleep medication (including OTC) the night before procedure.  Arrival time and location given.  Expected length of stay reviewed.  Covid screening completed.  Pt verbalized understanding of all pre-procedure instructions.  Written instructions and directions sent to patient in psicofxphart/portal.

## 2024-10-12 ENCOUNTER — PATIENT MESSAGE (OUTPATIENT)
Dept: OTHER | Facility: OTHER | Age: 64
End: 2024-10-12
Payer: COMMERCIAL

## 2024-10-14 ENCOUNTER — LAB VISIT (OUTPATIENT)
Dept: LAB | Facility: HOSPITAL | Age: 64
End: 2024-10-14
Attending: INTERNAL MEDICINE
Payer: COMMERCIAL

## 2024-10-14 DIAGNOSIS — Z98.1 S/P LUMBAR FUSION: ICD-10-CM

## 2024-10-14 DIAGNOSIS — Z00.00 ANNUAL PHYSICAL EXAM: ICD-10-CM

## 2024-10-14 LAB
CREAT SERPL-MCNC: 0.8 MG/DL (ref 0.5–1.4)
EST. GFR  (NO RACE VARIABLE): >60 ML/MIN/1.73 M^2
FERRITIN SERPL-MCNC: 139 NG/ML (ref 20–300)
IRON SERPL-MCNC: 122 UG/DL (ref 30–160)
SATURATED IRON: 30 % (ref 20–50)
TOTAL IRON BINDING CAPACITY: 407 UG/DL (ref 250–450)
TRANSFERRIN SERPL-MCNC: 275 MG/DL (ref 200–375)

## 2024-10-14 PROCEDURE — 82306 VITAMIN D 25 HYDROXY: CPT | Performed by: INTERNAL MEDICINE

## 2024-10-14 PROCEDURE — 82728 ASSAY OF FERRITIN: CPT | Performed by: INTERNAL MEDICINE

## 2024-10-14 PROCEDURE — 36415 COLL VENOUS BLD VENIPUNCTURE: CPT | Performed by: INTERNAL MEDICINE

## 2024-10-14 PROCEDURE — 82607 VITAMIN B-12: CPT | Performed by: INTERNAL MEDICINE

## 2024-10-14 PROCEDURE — 82565 ASSAY OF CREATININE: CPT | Performed by: PHYSICIAN ASSISTANT

## 2024-10-14 PROCEDURE — 83540 ASSAY OF IRON: CPT | Performed by: INTERNAL MEDICINE

## 2024-10-15 ENCOUNTER — HOSPITAL ENCOUNTER (OUTPATIENT)
Dept: INTERVENTIONAL RADIOLOGY/VASCULAR | Facility: HOSPITAL | Age: 64
Discharge: HOME | End: 2024-10-15
Attending: ORTHOPAEDIC SURGERY
Payer: COMMERCIAL

## 2024-10-15 VITALS
SYSTOLIC BLOOD PRESSURE: 126 MMHG | WEIGHT: 150 LBS | DIASTOLIC BLOOD PRESSURE: 82 MMHG | RESPIRATION RATE: 16 BRPM | HEART RATE: 62 BPM | HEIGHT: 65 IN | BODY MASS INDEX: 24.99 KG/M2 | OXYGEN SATURATION: 98 % | TEMPERATURE: 98 F

## 2024-10-15 DIAGNOSIS — M54.12 CERVICAL RADICULOPATHY: Primary | ICD-10-CM

## 2024-10-15 LAB
25(OH)D3+25(OH)D2 SERPL-MCNC: 64 NG/ML (ref 30–96)
VIT B12 SERPL-MCNC: 440 PG/ML (ref 210–950)

## 2024-10-15 PROCEDURE — 25500020 PHARM REV CODE 255: Performed by: RADIOLOGY

## 2024-10-15 PROCEDURE — 64479 NJX AA&/STRD TFRM EPI C/T 1: CPT | Performed by: RADIOLOGY

## 2024-10-15 PROCEDURE — 63600175 PHARM REV CODE 636 W HCPCS: Performed by: RADIOLOGY

## 2024-10-15 RX ORDER — SODIUM CHLORIDE 9 MG/ML
INJECTION, SOLUTION INTRAVENOUS CONTINUOUS
Status: DISCONTINUED | OUTPATIENT
Start: 2024-10-15 | End: 2024-10-16 | Stop reason: HOSPADM

## 2024-10-15 RX ORDER — FENTANYL CITRATE 50 UG/ML
INJECTION, SOLUTION INTRAMUSCULAR; INTRAVENOUS
Status: COMPLETED | OUTPATIENT
Start: 2024-10-15 | End: 2024-10-15

## 2024-10-15 RX ORDER — MIDAZOLAM HYDROCHLORIDE 1 MG/ML
INJECTION, SOLUTION INTRAMUSCULAR; INTRAVENOUS
Status: COMPLETED | OUTPATIENT
Start: 2024-10-15 | End: 2024-10-15

## 2024-10-15 RX ORDER — DEXAMETHASONE SODIUM PHOSPHATE 10 MG/ML
INJECTION INTRAMUSCULAR; INTRAVENOUS
Status: COMPLETED | OUTPATIENT
Start: 2024-10-15 | End: 2024-10-15

## 2024-10-15 RX ORDER — LIDOCAINE HYDROCHLORIDE 10 MG/ML
1 INJECTION, SOLUTION EPIDURAL; INFILTRATION; INTRACAUDAL; PERINEURAL ONCE
Status: DISCONTINUED | OUTPATIENT
Start: 2024-10-15 | End: 2024-10-16 | Stop reason: HOSPADM

## 2024-10-15 RX ADMIN — FENTANYL CITRATE 50 MCG: 50 INJECTION, SOLUTION INTRAMUSCULAR; INTRAVENOUS at 10:10

## 2024-10-15 RX ADMIN — IOHEXOL 5 ML: 180 INJECTION INTRAVENOUS at 10:10

## 2024-10-15 RX ADMIN — DEXAMETHASONE SODIUM PHOSPHATE 10 MG: 10 INJECTION INTRAMUSCULAR; INTRAVENOUS at 10:10

## 2024-10-15 RX ADMIN — MIDAZOLAM HYDROCHLORIDE 1 MG: 2 INJECTION, SOLUTION INTRAMUSCULAR; INTRAVENOUS at 10:10

## 2024-10-15 NOTE — DISCHARGE INSTRUCTIONS
For scheduling: Call Latoya    at [phone]    For questions or concerns call: KEISHA MON-FRI 8 AM- 5PM [phone]. Radiology resident on call [phone].    For immediate concerns that are not emergent, you may call our radiology clinic at: [phone]

## 2024-10-15 NOTE — PROCEDURES
Interventional Neuroradiology Post-Procedure Note    Pre Op Diagnosis: Neck pain    Post Op Diagnosis: Same    Procedure: Cervical NEERAJ    Procedure performed by: Duc Aguirre MD; Moises Lacy MD    Written Informed Consent Obtained: Yes    Specimen Removed: NO    Estimated Blood Loss: Minimal    Findings:   Using usual sterile technique, lidocaine local anesthesia, and fluoroscopic guidance, a 22 gauge needle was inserted from a interlaminar approach into the epidural space at C6 level.  1 cc of Omnipaque 180 contrast was injected confirming position.  Injection was then performed with Dexamethasone 10 mg mixed with lidocaine 1% 2 cc.  Patient tolerated this well. Needle was removed and hemostasis was achieved.      Level injected: C6  Needle used: 22 gauge  Dose:  10 mg Dexamethasone   2 mL Lidocaine 1% MPF    Patient tolerated procedure well.                    Moises Lacy MD, A  Fellow, NeuroEndovascular Surgery, [facility]  Neurologist, [facility] [place], [place]

## 2024-10-15 NOTE — PLAN OF CARE
Pt arrived to 4 for c6 TF lisy. Pt oriented to unit and staff, Pt safely transferred from stretcher to procedural table. Fall risk reviewed and comfort measures utilized with interventions. Safety strap applied, position pillows to minimize pressure points. Blankets applied. Pt prepped and draped utilizing standard sterile technique. Patient placed on continuous monitoring, as required by sedation policy. Timeouts implemented utilizing standard universal time-out per department and facility policy. \RN to remain at bedside with continuous monitoring. Pt resting comfortably. Denies pain/discomfort. Will continue to monitor. See flow sheets for monitoring, medication administration, and updates. patient verbalizes understanding.

## 2024-10-15 NOTE — H&P
Interventional Neuroradiology Pre-Procedure Note    Chief Complaint: Neck pain    History of Present Illness:  Renae Hou is a 63 y.o. female with h/o neck pain and s/p C4-C5 ACDF with persistent neck pain is referred for interlaminar C6 injection NEERAJ.       Past Medical History:   Diagnosis Date    Abscess of paraspinous muscles 2018    Allergy 04/2018    Class 1 obesity due to excess calories in adult 07/23/2019    DVT (deep venous thrombosis)     left leg    Epidural abscess 2018 04/10/2018    Fatty liver     GERD (gastroesophageal reflux disease) 2015    History of major abdominal surgery 09/11/2015    Had Hysterectomy , cholecystectomy , rectocele repair    Hypertension     Lumbar radiculopathy     Menopause     Obesity     Obstructive sleep apnea on CPAP     no longer uses CPAP after weight loss from gastric sleeve    Thyroid disease     Thyroid nodules.     Past Surgical History:   Procedure Laterality Date    ADENOIDECTOMY  1995    ANTERIOR CERVICAL DISCECTOMY W/ FUSION N/A 12/11/2023    Procedure: DISCECTOMY, SPINE, CERVICAL, ANTERIOR APPROACH, WITH FUSION C4/5 spinewave rep-less SNS:vocal/motors/SSEP;  Surgeon: Dean Zayas MD;  Location: [facility];  Service: Orthopedics;  Laterality: N/A;    BACK SURGERY  2002    L4, L5    BILATERAL SALPINGOOPHORECTOMY      CHOLECYSTECTOMY      COLONOSCOPY N/A 6/13/2022    Procedure: COLONOSCOPY;  Surgeon: Saul Ureña MD;  Location: [address]);  Service: Endoscopy;  Laterality: N/A;  constipation protocol-extended Miralax prep - PEG allergy -okay with taking Miralax  fully vaccinated, prep instr portal -ml    COLONOSCOPY N/A 10/4/2022    Procedure: COLONOSCOPY;  Surgeon: Saul Ureña MD;  Location: [address]);  Service: Endoscopy;  Laterality: N/A;  Constipation protocol-Miralax prep-pt tolerated Miralax for last colonoscopy.  fully vaccinated, prep instr portal -ml    COLONOSCOPY N/A 10/9/2023    Procedure: COLONOSCOPY;   Surgeon: Saul Ureña MD;  Location: [facility] ENDO (4TH FLR);  Service: Endoscopy;  Laterality: N/A;  Ref by Dr JULIANA Horowitz, [facility]- pt instructed to hold x 7 days prior procedure,  Miralax, portal - PC  10/2-precall complete/pt aware to hold wegovy x7 days-MS    CORRECTION OF HAMMER TOE Left 8/5/2021    Procedure: CORRECTION, HAMMER TOE Left 4th mallet toe, DIP joitn fusion;  Surgeon: Guero Alvarez MD;  Location: [facility] OR;  Service: Orthopedics;  Laterality: Left;  [place]-wires    CYST REMOVAL      EPIDURAL STEROID INJECTION INTO CERVICAL SPINE N/A 8/11/2020    Procedure: Injection-steroid-epidural-cervical;  Surgeon: [facility] Diagnostic Provider;  Location: [facility] 2ND FLR;  Service: Radiology;  Laterality: N/A;  /[place]    EPIDURAL STEROID INJECTION INTO CERVICAL SPINE N/A 3/2/2022    Procedure: Injection-steroid-epidural-cervical C7-T1;  Surgeon: Latoya Quintanilla MD;  Location: [facility] PAIN MGT;  Service: Pain Management;  Laterality: N/A;    ESOPHAGOGASTRODUODENOSCOPY N/A 5/3/2019    Procedure: ESOPHAGOGASTRODUODENOSCOPY (EGD);  Surgeon: Carlin Sanchez MD;  Location: [facility] (4TH FLR);  Service: Endoscopy;  Laterality: N/A;    HYSTERECTOMY  12/17/2012    DV BS&O     INJECTION OF JOINT Left 3/1/2021    Procedure: INJECTION, JOINT LEFT HIP INTRA ARTICULAR CORTISONE INJECTION DIRECT REFERRAL;  Surgeon: Chuy Gregory MD;  Location: [facility] PAIN MGT;  Service: Pain Management;  Laterality: Left;  NEEDS CONSENT    INSERTION OF INFERIOR VENA CAVAL FILTER Right 7/19/2019    Procedure: IVC filter placement;  Surgeon: Rodney Rico MD;  Location: [facility] CATH LAB;  Service: Peripheral Vascular;  Laterality: Right;    IVC FILTER RETRIEVAL N/A 12/20/2019    Procedure: REMOVAL-FILTER-IVC;  Surgeon: Rodney Rico MD;  Location: [facility] OR 2ND FLR;  Service: Peripheral Vascular;  Laterality: N/A;    LAPAROSCOPIC SLEEVE GASTRECTOMY N/A 7/23/2019    Procedure: GASTRECTOMY, SLEEVE, LAPAROSCOPIC, with intraop EGD  [id number];  Surgeon: Duc Ratliff Jr., MD;  Location: [facility] OR [address] [place] [address];  Service: General;  Laterality: N/A;    RECTOCELE REPAIR      SPINE SURGERY  2018    THYROID NODULE REMOVAL      TONSILLECTOMY  1995       Home Meds:   Prior to Admission medications    Medication Sig Start Date End Date Taking? Authorizing Provider   hydroCHLOROthiazide (HYDRODIURIL) 12.5 MG Tab Take 1 tablet (12.5 mg total) by mouth once daily. 8/8/24  Yes Wendy Horowitz MD   EPINEPHrine (EPIPEN) 0.3 mg/0.3 mL AtIn Inject 0.3 mLs (0.3 mg total) into the muscle once. for 1 dose  Patient not taking: Reported on 5/20/2024 12/4/23 5/10/24  Wendy Horowitz MD   ergocalciferol (ERGOCALCIFEROL) 50,000 unit Cap Take 1 capsule (50,000 Units total) by mouth every 7 days. 3/4/24   Wendy Horowitz MD   ferrous gluconate (FERGON) 324 MG tablet Every other day with orange juice 8/14/23   Wendy Horowitz MD   methocarbamoL (ROBAXIN) 750 MG Tab Take 1 tablet (750 mg total) by mouth 3 (three) times daily as needed (spasms). As needed for muscle spasms 4/2/24   Mila Crane PA-C   semaglutide, weight loss, (WEGOVY) 1.7 mg/0.75 mL PnIj Inject 1.7 mg into the skin every 7 days. 9/10/24   Sabina Florentino MD     Anticoagulants/Antiplatelets: no anticoagulation    Allergies:   Review of patient's allergies indicates:   Allergen Reactions    Cathflo activase [alteplase] Anaphylaxis     Tightness in chest, raspy throat, restless legs, hotness all over    Heparin analogues      Restless legs, tightness in chest, and hot all over    Xyzal [levocetirizine] Swelling    Latex Swelling           Sedation History:  have not been any systemic reactions    Review of Systems:   Hematological: negative  no known coagulopathies  Respiratory: no cough, shortness of breath, or wheezing  no shortness of breath  Cardiovascular: no chest pain or dyspnea on exertion  no chest pain  Gastrointestinal: no abdominal pain, change in bowel habits, or black or bloody  stools  no abdominal pain  Genito-Urinary: no dysuria, trouble voiding, or hematuria  no dysuria  Musculoskeletal: +neck pain  Neurological: no TIA or stroke symptoms         OBJECTIVE:     Vital Signs (Most Recent)       Physical Exam:  ASA: 2  Mallampati: 2    General: no acute distress  Mental Status: alert and oriented to person, place and time  HEENT: normocephalic, atraumatic  Chest: unlabored breathing  Heart: regular heart rate  Abdomen: nondistended  Extremity: moves all extremities    Laboratory  Lab Results   Component Value Date    INR 1.0 10/11/2024       Lab Results   Component Value Date    WBC 4.63 10/11/2024    HGB 13.0 10/11/2024    HCT 39.3 10/11/2024    MCV 93 10/11/2024     10/11/2024      Lab Results   Component Value Date    GLU 93 05/10/2024     05/10/2024    K 3.8 05/10/2024     05/10/2024    CO2 28 05/10/2024    BUN 20 05/10/2024    CREATININE 0.8 10/14/2024    CALCIUM 10.0 05/10/2024    MG 2.2 08/25/2023    ALT 12 05/10/2024    AST 17 05/10/2024    ALBUMIN 3.9 05/10/2024    BILITOT 0.7 05/10/2024    BILIDIR 0.2 10/05/2018       ASSESSMENT/PLAN:   -Sedation Plan: Up to moderate   -Patient will undergo: fluoroscopy guided interlaminar steroid & lidocaine injection at C6 level                  Moises Lacy MD, MHA  Fellow, NeuroEndovascular Surgery, [facility]  Neurologist, [facility] [place], [place]

## 2024-11-02 ENCOUNTER — PATIENT MESSAGE (OUTPATIENT)
Dept: BARIATRICS | Facility: CLINIC | Age: 64
End: 2024-11-02
Payer: COMMERCIAL

## 2024-11-11 ENCOUNTER — OFFICE VISIT (OUTPATIENT)
Dept: OTOLARYNGOLOGY | Facility: CLINIC | Age: 64
End: 2024-11-11
Payer: COMMERCIAL

## 2024-11-11 ENCOUNTER — CLINICAL SUPPORT (OUTPATIENT)
Dept: OTOLARYNGOLOGY | Facility: CLINIC | Age: 64
End: 2024-11-11
Payer: COMMERCIAL

## 2024-11-11 VITALS
WEIGHT: 154 LBS | DIASTOLIC BLOOD PRESSURE: 62 MMHG | HEART RATE: 63 BPM | BODY MASS INDEX: 25.63 KG/M2 | SYSTOLIC BLOOD PRESSURE: 96 MMHG

## 2024-11-11 DIAGNOSIS — H93.13 TINNITUS OF BOTH EARS: Primary | ICD-10-CM

## 2024-11-11 DIAGNOSIS — H91.93 HIGH FREQUENCY HEARING LOSS OF BOTH EARS: ICD-10-CM

## 2024-11-11 DIAGNOSIS — H90.3 SENSORINEURAL HEARING LOSS (SNHL) OF BOTH EARS: ICD-10-CM

## 2024-11-11 PROCEDURE — 99999 PR PBB SHADOW E&M-EST. PATIENT-LVL II: CPT | Mod: PBBFAC,,,

## 2024-11-11 PROCEDURE — 99999 PR PBB SHADOW E&M-EST. PATIENT-LVL III: CPT | Mod: PBBFAC,,, | Performed by: NURSE PRACTITIONER

## 2024-11-11 PROCEDURE — 3044F HG A1C LEVEL LT 7.0%: CPT | Mod: CPTII,S$GLB,, | Performed by: NURSE PRACTITIONER

## 2024-11-11 PROCEDURE — 92567 TYMPANOMETRY: CPT | Mod: S$GLB,,,

## 2024-11-11 PROCEDURE — 3008F BODY MASS INDEX DOCD: CPT | Mod: CPTII,S$GLB,, | Performed by: NURSE PRACTITIONER

## 2024-11-11 PROCEDURE — 1160F RVW MEDS BY RX/DR IN RCRD: CPT | Mod: CPTII,S$GLB,, | Performed by: NURSE PRACTITIONER

## 2024-11-11 PROCEDURE — 92557 COMPREHENSIVE HEARING TEST: CPT | Mod: S$GLB,,,

## 2024-11-11 PROCEDURE — 1159F MED LIST DOCD IN RCRD: CPT | Mod: CPTII,S$GLB,, | Performed by: NURSE PRACTITIONER

## 2024-11-11 PROCEDURE — 3078F DIAST BP <80 MM HG: CPT | Mod: CPTII,S$GLB,, | Performed by: NURSE PRACTITIONER

## 2024-11-11 PROCEDURE — 3074F SYST BP LT 130 MM HG: CPT | Mod: CPTII,S$GLB,, | Performed by: NURSE PRACTITIONER

## 2024-11-11 PROCEDURE — 99214 OFFICE O/P EST MOD 30 MIN: CPT | Mod: S$GLB,,, | Performed by: NURSE PRACTITIONER

## 2024-11-11 NOTE — PROGRESS NOTES
Chief Complaint   Patient presents with    Tinnitus     Bilateral xyears    Hearing Loss   .     HPI: Patient is a very pleasant 64 y.o. female who is self-referred for evaluation of tinnitus.  She reports tinnitus that has been gradually progressing over the last several year(s).  She  notes that it is primarily bilateral. She  states that the tinnitus does interfere with sleep. She also reports hearing difficulty hearing with background noise that has been gradually progressing over the last several years.  She has not noted any difference in hearing between the ears, with either ear being the better hearing ear. She  has not had any recent issues with ear pain or ear drainage.   She denies any history of significant loud noise exposure.       Past Medical History:   Diagnosis Date    Abscess of paraspinous muscles 2018    Allergy 2018    Class 1 obesity due to excess calories in adult 2019    DVT (deep venous thrombosis)     left leg    Epidural abscess 2018 04/10/2018    Fatty liver     GERD (gastroesophageal reflux disease)     History of major abdominal surgery 2015    Had Hysterectomy , cholecystectomy , rectocele repair    Hypertension     Lumbar radiculopathy     Menopause     Obesity     Obstructive sleep apnea on CPAP     no longer uses CPAP after weight loss from gastric sleeve    Thyroid disease     Thyroid nodules.     Social History     Socioeconomic History    Marital status:     Number of children: 2   Tobacco Use    Smoking status: Former     Current packs/day: 0.00     Types: Cigarettes     Quit date: 1988     Years since quittin.8    Smokeless tobacco: Never    Tobacco comments:     smoked socially decades ago - weekends only   Substance and Sexual Activity    Alcohol use: Not Currently     Comment: yearly at most     Drug use: No    Sexual activity: Yes     Partners: Male     Birth control/protection: See Surgical Hx, None     Comment: VINI BS&O 2012,      Social History Narrative    . 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     Social Drivers of Health     Financial Resource Strain: Low Risk  (12/11/2023)    Overall Financial Resource Strain (CARDIA)     Difficulty of Paying Living Expenses: Not hard at all   Food Insecurity: No Food Insecurity (12/11/2023)    Hunger Vital Sign     Worried About Running Out of Food in the Last Year: Never true     Ran Out of Food in the Last Year: Never true   Transportation Needs: No Transportation Needs (12/11/2023)    PRAPARE - Transportation     Lack of Transportation (Medical): No     Lack of Transportation (Non-Medical): No   Physical Activity: Insufficiently Active (12/11/2023)    Exercise Vital Sign     Days of Exercise per Week: 2 days     Minutes of Exercise per Session: 20 min   Stress: No Stress Concern Present (12/11/2023)    [employer] [employer] - Occupational Stress Questionnaire     Feeling of Stress : Not at all   Housing Stability: Low Risk  (12/11/2023)    Housing Stability Vital Sign     Unable to Pay for Housing in the Last Year: No     Number of Places Lived in the Last Year: 1     Unstable Housing in the Last Year: No     Past Surgical History:   Procedure Laterality Date    ADENOIDECTOMY  1995    ANTERIOR CERVICAL DISCECTOMY W/ FUSION N/A 12/11/2023    Procedure: DISCECTOMY, SPINE, CERVICAL, ANTERIOR APPROACH, WITH FUSION C4/5 spinewave rep-less SNS:vocal/motors/SSEP;  Surgeon: Dean Zayas MD;  Location: [facility];  Service: Orthopedics;  Laterality: N/A;    BACK SURGERY  2002    L4, L5    BILATERAL SALPINGOOPHORECTOMY      CHOLECYSTECTOMY      COLONOSCOPY N/A 6/13/2022    Procedure: COLONOSCOPY;  Surgeon: Saul Ureña MD;  Location: [address]);  Service: Endoscopy;  Laterality: N/A;  constipation protocol-extended Miralax prep - PEG allergy -okay with taking Miralax  fully vaccinated, prep instr portal -ml    COLONOSCOPY N/A 10/4/2022    Procedure: COLONOSCOPY;  Surgeon:  Saul Ureña MD;  Location: [facility] (4TH FLR);  Service: Endoscopy;  Laterality: N/A;  Constipation protocol-Miralax prep-pt tolerated Miralax for last colonoscopy.  fully vaccinated, prep instr portal -ml    COLONOSCOPY N/A 10/9/2023    Procedure: COLONOSCOPY;  Surgeon: Saul Ureña MD;  Location: [facility] (4TH FLR);  Service: Endoscopy;  Laterality: N/A;  Ref by Dr JULIANA Horowitz, [facility]- pt instructed to hold x 7 days prior procedure,  Miralax, portal - PC  10/2-precall complete/pt aware to hold wegovy x7 days-MS    CORRECTION OF HAMMER TOE Left 8/5/2021    Procedure: CORRECTION, HAMMER TOE Left 4th mallet toe, DIP joitn fusion;  Surgeon: Guero Alvarez MD;  Location: [facility];  Service: Orthopedics;  Laterality: Left;  K-wires    CYST REMOVAL      EPIDURAL STEROID INJECTION INTO CERVICAL SPINE N/A 8/11/2020    Procedure: Injection-steroid-epidural-cervical;  Surgeon: [facility];  Location: [facility] 2ND FLR;  Service: Radiology;  Laterality: N/A;  /Carla    EPIDURAL STEROID INJECTION INTO CERVICAL SPINE N/A 3/2/2022    Procedure: Injection-steroid-epidural-cervical C7-T1;  Surgeon: Latoya Quintanilla MD;  Location: [facility] PAIN T;  Service: Pain Management;  Laterality: N/A;    ESOPHAGOGASTRODUODENOSCOPY N/A 5/3/2019    Procedure: ESOPHAGOGASTRODUODENOSCOPY (EGD);  Surgeon: Carlin Sanchez MD;  Location: [facility] (4TH FLR);  Service: Endoscopy;  Laterality: N/A;    HYSTERECTOMY  12/17/2012    DV BS&O     INJECTION OF JOINT Left 3/1/2021    Procedure: INJECTION, JOINT LEFT HIP INTRA ARTICULAR CORTISONE INJECTION DIRECT REFERRAL;  Surgeon: Chuy Gregory MD;  Location: [facility] PAIN MGT;  Service: Pain Management;  Laterality: Left;  NEEDS CONSENT    INSERTION OF INFERIOR VENA CAVAL FILTER Right 7/19/2019    Procedure: IVC filter placement;  Surgeon: Rodney Rico MD;  Location: [facility] CATH LAB;  Service: Peripheral Vascular;  Laterality: Right;    IVC FILTER RETRIEVAL N/A  12/20/2019    Procedure: REMOVAL-FILTER-IVC;  Surgeon: Rodney Rico MD;  Location: [facility] OR 2ND FLR;  Service: Peripheral Vascular;  Laterality: N/A;    LAPAROSCOPIC SLEEVE GASTRECTOMY N/A 7/23/2019    Procedure: GASTRECTOMY, SLEEVE, LAPAROSCOPIC, with intraop EGD 42685;  Surgeon: Duc Ratliff Jr., MD;  Location: [facility] OR 2ND FLR;  Service: General;  Laterality: N/A;    RECTOCELE REPAIR      SPINE SURGERY  2018    THYROID NODULE REMOVAL      TONSILLECTOMY  1995     Family History   Problem Relation Name Age of Onset    Thyroid disease Mother Mother     Hypertension Mother Mother     Hyperlipidemia Mother Mother     Heart disease Mother Mother 55        cad, valvular heart disease    Deep vein thrombosis Mother Mother     Heart disease Father Gavin Ming         MI    Stroke Father Gavin Ming     Diabetes Sister Sister     Alcohol abuse Sister Sister     Cirrhosis Sister Sister         alcoholic cirrhosis    Epilepsy Sister Sister     Heart disease Daughter Sabrina         SVT    Thyroid disease Daughter Sabrina         Hashimoto's    Hashimoto's thyroiditis Daughter Sabrina     No Known Problems Daughter Lori     Heart disease Maternal Grandmother      Thyroid disease Maternal Grandmother      Kidney disease Maternal Grandmother      Heart disease Paternal Grandmother          MI    Breast cancer Neg Hx      Colon cancer Neg Hx      Ovarian cancer Neg Hx      Esophageal cancer Neg Hx             Review of Systems  General: negative for chills, fever or weight loss  Psychological: negative for mood changes or depression  Ophthalmic: negative for blurry vision, photophobia or eye pain  ENT: see HPI  Respiratory: no cough, shortness of breath, or wheezing  Cardiovascular: no chest pain or dyspnea on exertion  Gastrointestinal: no abdominal pain, change in bowel habits, or black/ bloody stools  Musculoskeletal: negative for gait disturbance or muscular weakness  Neurological: no syncope or seizures; no  ataxia  Dermatological: negative for puritis,  rash and jaundice  Hematologic/lymphatic: no easy bruising, no new lumps or bumps      Physical Exam:    Vitals:    11/11/24 0813   BP: 96/62   Pulse: 63       Constitutional: Well appearing / communicating without difficutly.  NAD.  Eyes: EOM I Bilaterally  Head/Face: Normocephalic.  Negative paranasal sinus pressure/tenderness.  Salivary glands WNL.  House Brackmann I Bilaterally.    Right Ear: Auricle normal appearance. External Auditory Canal within normal limits no lesions or masses,TM w/o masses/lesions/perforations. TM mobility noted.   Left Ear: Auricle normal appearance. External Auditory Canal within normal limits no lesions or masses,TM w/o masses/lesions/perforations. TM mobility noted.  Nose: No gross nasal septal deviation. Inferior Turbinates 3+ bilaterally. No septal perforation. No masses/lesions. External nasal skin appears normal without masses/lesions.  Oral Cavity: Gingiva/lips within normal limits.  Dentition/gingiva healthy appearing. Mucus membranes moist. Floor of mouth soft, no masses palpated. Oral Tongue mobile. Hard Palate appears normal.    Oropharynx: Base of tongue appears normal. No masses/lesions noted. Tonsillar fossa/pharyngeal wall without lesions. Posterior oropharynx WNL.  Soft palate without masses. Midline uvula.   Neck/Lymphatic: No LAD I-VI bilaterally.  No thyromegaly.  No masses noted on exam.    Mirror laryngoscopy/nasopharyngoscopy: Active gag reflex.  Unable to perform.    Neuro/Psychiatric: AOx3.  Normal mood and affect.   Cardiovascular: Normal carotid pulses bilaterally, no increasing jugular venous distention noted at cervical region bilaterally.    Respiratory: Normal respiratory effort, no stridor, no retractions noted.    Diagnostic Studies:  Audiogram reviewed personally by myself and in detail with the patient today.   Pure tone testing revealed normal hearing through 4000 Hz sloping to a mild high frequency  hearing loss, bilaterally. Speech reception thresholds were obtained at 15 dBHL in the right ear and 15 dBHL in the left ear. Speech discrimination scores were 100% in the right ear and 100% in the left ear. Tympanometry revealed Type A tympanograms in both ears.             Assessment:    ICD-10-CM ICD-9-CM    1. Tinnitus of both ears  H93.13 388.30       2. Sensorineural hearing loss (SNHL) of both ears  H90.3 389.18         The primary encounter diagnosis was Tinnitus of both ears. A diagnosis of Sensorineural hearing loss (SNHL) of both ears was also pertinent to this visit.      Plan:  No orders of the defined types were placed in this encounter.      We reviewed her audiogram together in detail.  We also discussed that tinnitus is most often caused by a hearing loss, and that as the hair cells are damaged, either genetic or as a result of loud noise exposure, they then cause tinnitus.  Tinnitus tends to be louder in times of stress and fatigue, and may decrease with time.  Hearing aids are helpful if patient is a good hearing aid candidate. Sound machines may also be an effective masking technique if needed at night. I will give the patient a comprehensive guide on managing tinnitus today with reference materials to further learn about tinnitus and coping mechanisms.     We also discussed the use hearing protection when exposed to loud noise. Recommend audiogram in 1 year.        Kathi Shook NP      Answers submitted by the patient for this visit:  Review of Symptoms Questionnaire  (Submitted on 11/8/2024)  None of these: Yes  hearing loss: Yes  ear pain: Yes  sinus pressure : Yes  None of these : Yes  None of these: Yes  None of these : Yes  None of these: Yes  None of these: Yes  None of these: Yes  None of these : Yes  None of these: Yes  Cold all of the time? : Yes  dizziness: Yes  Light-headedness: Yes  None of these: Yes  None of these: Yes

## 2024-11-11 NOTE — PATIENT INSTRUCTIONS
"Avoid anxiety or stress, as these stimulate an already sensitive hearing system.  Have adequate rest and avoid fatigue.  Avoid the use of stimulants to the nervous system, including coffee (caffeine), alcohol, and smoking (nicotine).  Sleep with your head propped up in an elevated position. This may usually be accomplished with the use of one or two extra pillows. This lessens head congestion, and tinnitus may become less noticeable.  Be aware that tinnitus is usually more noticeable after retiring for the night and the surroundings are quieter. Any noise in the room, such as a ticking clock or softly playing radio, helps to mask tinnitus and make it less irritating.  Use a tinnitus masker if you find this helpful  Some people benefit by using a hearing aid as it amplifies outside noise (like masking)  Avoid situations that can further damage hearing (excessive noise), and protect your ears from injury and occupational hazards. Use protective ear wear when appropriate.  Some people receive considerably relief in alternative therapies  Counseling may be beneficial, especially if people are afraid that they have a serious or progressive disease, such as a brain tumor. Some people worry they may have a mental illness, because the noise is "in their head." Reassurance by a specialist helps to calm such fears and anxieties.       WHAT IS TINNITUS?  The perception of sound heard in one or both sides of the head. Some refer to it as a ringing, noise, buzzing, roaring, clicking, wooshing, crickets, heartbeat, music, or other noises. There are varying levels of severity. The tinnitus may be constant or periodic. Common complaints include difficulty sleeping, struggling to understand other's speech, depression, and problems focusing.  Tinnitus is a symptom, not a disease. It affects 10-15% of adults in the [place].    WHAT CAN YOU DO?  You should seek medical care if you suspect you have tinnitus and tell your physician " if your symptoms are affecting your daily life. Tinnitus may cause anxiety, depression, insomnia, negative emotions, and withdrawal. It's okay to seek help as your symptoms may be managed, and common.    HOW IS TINNITUS DIAGNOSED?  A doctor can diagnose tinnitus after a physical examination and interview. The examination may rule out conditions causing the tinnitus such as wax impaction or fluid behind the eardrum. Tinnitus may also be related to hearing loss, especially hearing loss from frequent noise exposure. A hearing test may be performed if you are experiencing tinnitus.    TREATMENT FOR TINNITUS?  Treatment may improve on its own but if it doesn't there are several ways to manage your symptoms although there is no cure. Possible treatment options include amplification (hearing aids), sound therapy (maskers,white noise,etc.), TMJ treatment, cognitive behavioral therapy, relaxation exercises, and managing your medications.  There is not enough evidence to suggest that over the counter products help patients with tinnitus. Acupuncture can neither be recommended or discouraged due to lack of evidence as well.    Source: American Academy of Otolaryngology-Head And Neck Surgery    ORGANIZATIONS AND LINKS FOR TINNITUS AND HEARING LOSS    American Academy of Audiology      www.audiology.org  American Tinnitus Associate        www.fernando.org  American Academy of Otolaryngology, Head & Neck Surgery www.entnet.org  American Auditory Society     www.amauditorysoc.org  Better Hearing [place]      www.betterhearing.org  EarHelp        www.earhelp.co.uk/  Hearing Education and Awareness for [place] (HEAR)  www.hearnet.ViViFi  Hearing Loss Association of [place]    www.hearingloss.com   Hear-It        www.hear-it.org  Healthy Hearing       www.[employer].ViViFi   Sight and Hearing Association     www.sightandhearing.org

## 2024-11-11 NOTE — PROGRESS NOTES
Renae Hou, a 64 y.o. female was seen today in the clinic for an audiologic evaluation. The patient's primary complaint was worsening tinnitus.  Ms. Hou reports decreased hearing perception with significant difficulty hearing in the presence of background noise. She denies ear pain, drainage and dizziness. No history of noise exposure was reported.    Pure tone testing revealed normal hearing through 4000 Hz sloping to a mild high frequency hearing loss, bilaterally. Speech reception thresholds were obtained at 15 dBHL in the right ear and 15 dBHL in the left ear. Speech discrimination scores were 100% in the right ear and 100% in the left ear. Tympanometry revealed Type A tympanograms in both ears.     Recommendations:  Otologic evaluation  Annual audiologic evaluation  Hearing protection in noise  Consider tinnitus evaluation

## 2024-11-12 ENCOUNTER — PATIENT MESSAGE (OUTPATIENT)
Dept: BARIATRICS | Facility: CLINIC | Age: 64
End: 2024-11-12
Payer: COMMERCIAL

## 2024-11-17 DIAGNOSIS — I10 ESSENTIAL HYPERTENSION: ICD-10-CM

## 2024-11-17 DIAGNOSIS — Z87.898 HISTORY OF PERIPHERAL EDEMA: ICD-10-CM

## 2024-11-17 NOTE — TELEPHONE ENCOUNTER
No care due was identified.  [facility] Embedded Care Due Messages. Reference number: [id number].   11/17/2024 5:13:32 AM CST

## 2024-11-18 RX ORDER — HYDROCHLOROTHIAZIDE 12.5 MG/1
12.5 TABLET ORAL DAILY
Qty: 90 TABLET | Refills: 1 | Status: SHIPPED | OUTPATIENT
Start: 2024-11-18

## 2024-11-18 NOTE — TELEPHONE ENCOUNTER
Refill Decision Note   Renae Hou  is requesting a refill authorization.  Brief Assessment and Rationale for Refill:  Approve     Medication Therapy Plan:         Comments:     Note composed:10:54 AM 11/18/2024

## 2024-12-03 ENCOUNTER — PATIENT MESSAGE (OUTPATIENT)
Dept: BARIATRICS | Facility: CLINIC | Age: 64
End: 2024-12-03
Payer: COMMERCIAL

## 2024-12-10 ENCOUNTER — PATIENT MESSAGE (OUTPATIENT)
Dept: BARIATRICS | Facility: CLINIC | Age: 64
End: 2024-12-10
Payer: COMMERCIAL

## 2024-12-20 ENCOUNTER — PATIENT MESSAGE (OUTPATIENT)
Dept: BARIATRICS | Facility: CLINIC | Age: 64
End: 2024-12-20
Payer: COMMERCIAL

## 2024-12-20 DIAGNOSIS — E66.811 OBESITY, CLASS I, BMI 30.0-34.9 (SEE ACTUAL BMI): ICD-10-CM

## 2024-12-23 RX ORDER — SEMAGLUTIDE 1.7 MG/.75ML
1.7 INJECTION, SOLUTION SUBCUTANEOUS
Qty: 9 ML | Refills: 1 | Status: SHIPPED | OUTPATIENT
Start: 2024-12-23

## 2024-12-27 ENCOUNTER — PATIENT MESSAGE (OUTPATIENT)
Dept: INTERNAL MEDICINE | Facility: CLINIC | Age: 64
End: 2024-12-27
Payer: COMMERCIAL

## 2024-12-27 NOTE — TELEPHONE ENCOUNTER
Pt is requesting a appt for severe hand pain that tender and swollen. Pt states she can't get in with sports medicine until feb. Please assist

## 2024-12-31 ENCOUNTER — OFFICE VISIT (OUTPATIENT)
Dept: INTERNAL MEDICINE | Facility: CLINIC | Age: 64
End: 2024-12-31
Payer: COMMERCIAL

## 2024-12-31 VITALS
OXYGEN SATURATION: 98 % | HEART RATE: 78 BPM | BODY MASS INDEX: 25.3 KG/M2 | HEIGHT: 65 IN | SYSTOLIC BLOOD PRESSURE: 120 MMHG | DIASTOLIC BLOOD PRESSURE: 64 MMHG | WEIGHT: 151.88 LBS

## 2024-12-31 DIAGNOSIS — G51.8 FACIAL NEURALGIA: Primary | ICD-10-CM

## 2024-12-31 DIAGNOSIS — M79.641 RIGHT HAND PAIN: ICD-10-CM

## 2024-12-31 PROCEDURE — 3078F DIAST BP <80 MM HG: CPT | Mod: CPTII,S$GLB,, | Performed by: NURSE PRACTITIONER

## 2024-12-31 PROCEDURE — 3044F HG A1C LEVEL LT 7.0%: CPT | Mod: CPTII,S$GLB,, | Performed by: NURSE PRACTITIONER

## 2024-12-31 PROCEDURE — 99999 PR PBB SHADOW E&M-EST. PATIENT-LVL IV: CPT | Mod: PBBFAC,,, | Performed by: NURSE PRACTITIONER

## 2024-12-31 PROCEDURE — 99213 OFFICE O/P EST LOW 20 MIN: CPT | Mod: S$GLB,,, | Performed by: NURSE PRACTITIONER

## 2024-12-31 PROCEDURE — 3008F BODY MASS INDEX DOCD: CPT | Mod: CPTII,S$GLB,, | Performed by: NURSE PRACTITIONER

## 2024-12-31 PROCEDURE — 3074F SYST BP LT 130 MM HG: CPT | Mod: CPTII,S$GLB,, | Performed by: NURSE PRACTITIONER

## 2024-12-31 PROCEDURE — 1159F MED LIST DOCD IN RCRD: CPT | Mod: CPTII,S$GLB,, | Performed by: NURSE PRACTITIONER

## 2024-12-31 RX ORDER — METHYLPREDNISOLONE 4 MG/1
TABLET ORAL
Qty: 21 EACH | Refills: 0 | Status: SHIPPED | OUTPATIENT
Start: 2024-12-31 | End: 2025-01-21

## 2024-12-31 RX ORDER — GABAPENTIN 100 MG/1
100 CAPSULE ORAL NIGHTLY PRN
Qty: 20 CAPSULE | Refills: 0 | Status: SHIPPED | OUTPATIENT
Start: 2024-12-31 | End: 2025-12-31

## 2024-12-31 NOTE — PROGRESS NOTES
INTERNAL MEDICINE PROGRESS/URGENT CARE NOTE    CHIEF COMPLAINT     Chief Complaint   Patient presents with    Hand Pain       HPI     Renae LANA Hou is a 64 y.o. female who presents for an urgent/follow up visit today.    Started about 10 days ago with pain and swelling to R 4th finger and base of thumb. No injury or trauma. No redness. Tried NSAIDs without relief.   No tingling or numbness or weakness to area.     She has a hx of left facial neuralgia. Has been worked up by neurology previously. Tried gabapentin but was too strong for her. States feels like neuralgia is reoccurring at night mostly for now. Denies any other neuro symptoms, no vision changes or associated headaches.   Stabbing/burning pain in left cheek area. Worse if she touches it. No rash.      Patient Active Problem List   Diagnosis    RENAE dx 2010, improved after weight loss; also seen 5/23- mild, positional per home sleep study    Chronic constipation    Mixed hyperlipidemia    Pre-diabetes    Chronic pain    History of lumbar surgery    Essential hypertension    Fatty liver    Spondylolisthesis    Gastroesophageal reflux disease without esophagitis    Vaginal atrophy    Dyspareunia, female    Mixed stress and urge urinary incontinence    Rectocele, female    Cystocele, midline    History of DVT (deep vein thrombosis): 2018     Multiple thyroid nodules: stable 5/22, also 5/23 no further follow-up recommended per Radiology    Dysphonia    Cervical radiculopathy    Mallet toe of left foot    Chronic left-sided low back pain with left-sided sciatica    Carpal tunnel syndrome of right wrist: see EMG 10/21    Tubular adenoma of colon: see colonoscopy 2022    Polyarthralgia    COVID 1/22; 12/22    Low serum iron    Decreased strength of lower extremity    Osteopenia of multiple sites: see DEXA 5/23    History of bariatric surgery: Gastric sleeve 2019    Myelopathy in diseases classified elsewhere    Chronic migraine without aura without status  migrainosus, not intractable        Past Medical History:  Past Medical History:   Diagnosis Date    Abscess of paraspinous muscles 2018    Allergy 04/2018    Class 1 obesity due to excess calories in adult 07/23/2019    DVT (deep venous thrombosis)     left leg    Epidural abscess 2018 04/10/2018    Fatty liver     GERD (gastroesophageal reflux disease) 2015    History of major abdominal surgery 09/11/2015    Had Hysterectomy , cholecystectomy , rectocele repair    Hypertension     Lumbar radiculopathy     Menopause     Obesity     Obstructive sleep apnea on CPAP     no longer uses CPAP after weight loss from gastric sleeve    Thyroid disease     Thyroid nodules.        Past Surgical History:  Past Surgical History:   Procedure Laterality Date    ADENOIDECTOMY  1995    ANTERIOR CERVICAL DISCECTOMY W/ FUSION N/A 12/11/2023    Procedure: DISCECTOMY, SPINE, CERVICAL, ANTERIOR APPROACH, WITH FUSION C4/5 spinewave rep-less SNS:vocal/motors/SSEP;  Surgeon: Dean Zayas MD;  Location: [facility];  Service: Orthopedics;  Laterality: N/A;    BACK SURGERY  2002    L4, L5    BILATERAL SALPINGOOPHORECTOMY      CHOLECYSTECTOMY      COLONOSCOPY N/A 6/13/2022    Procedure: COLONOSCOPY;  Surgeon: Saul Ureña MD;  Location: [address]);  Service: Endoscopy;  Laterality: N/A;  constipation protocol-extended Miralax prep - PEG allergy -okay with taking Miralax  fully vaccinated, prep instr portal -ml    COLONOSCOPY N/A 10/4/2022    Procedure: COLONOSCOPY;  Surgeon: Saul Ureña MD;  Location: [address]);  Service: Endoscopy;  Laterality: N/A;  Constipation protocol-Miralax prep-pt tolerated Miralax for last colonoscopy.  fully vaccinated, prep instr portal -ml    COLONOSCOPY N/A 10/9/2023    Procedure: COLONOSCOPY;  Surgeon: Saul Ureña MD;  Location: [address]);  Service: Endoscopy;  Laterality: N/A;  Ref by Je Wise- pt instructed to hold x 7 days prior  procedure,  Miralax, portal - PC  10/2-precall complete/pt aware to hold wegovy x7 days-MS    CORRECTION OF HAMMER TOE Left 8/5/2021    Procedure: CORRECTION, HAMMER TOE Left 4th mallet toe, DIP joitn fusion;  Surgeon: Guero Alvarez MD;  Location: [facility] OR;  Service: Orthopedics;  Laterality: Left;  [place]-wires    CYST REMOVAL      EPIDURAL STEROID INJECTION INTO CERVICAL SPINE N/A 8/11/2020    Procedure: Injection-steroid-epidural-cervical;  Surgeon: [facility] Provider;  Location: [facility] OR 2ND FLR;  Service: Radiology;  Laterality: N/A;  /[place]    EPIDURAL STEROID INJECTION INTO CERVICAL SPINE N/A 3/2/2022    Procedure: Injection-steroid-epidural-cervical C7-T1;  Surgeon: Latoya Quintanlila MD;  Location: [facility] PAIN MGT;  Service: Pain Management;  Laterality: N/A;    ESOPHAGOGASTRODUODENOSCOPY N/A 5/3/2019    Procedure: ESOPHAGOGASTRODUODENOSCOPY (EGD);  Surgeon: Carlin Sanchez MD;  Location: [facility] ENDO (4TH FLR);  Service: Endoscopy;  Laterality: N/A;    HYSTERECTOMY  12/17/2012    [facility] BS&O     INJECTION OF JOINT Left 3/1/2021    Procedure: INJECTION, JOINT LEFT HIP INTRA ARTICULAR CORTISONE INJECTION DIRECT REFERRAL;  Surgeon: Chuy Gregory MD;  Location: [facility] PAIN MGT;  Service: Pain Management;  Laterality: Left;  NEEDS CONSENT    INSERTION OF INFERIOR VENA CAVAL FILTER Right 7/19/2019    Procedure: IVC filter placement;  Surgeon: Rodney Rico MD;  Location: [facility] CATH LAB;  Service: Peripheral Vascular;  Laterality: Right;    IVC FILTER RETRIEVAL N/A 12/20/2019    Procedure: REMOVAL-FILTER-IVC;  Surgeon: Rodney Rico MD;  Location: [facility] OR 2ND FLR;  Service: Peripheral Vascular;  Laterality: N/A;    LAPAROSCOPIC SLEEVE GASTRECTOMY N/A 7/23/2019    Procedure: GASTRECTOMY, SLEEVE, LAPAROSCOPIC, with intraop EGD 55521;  Surgeon: Duc Ratliff Jr., MD;  Location: [facility] OR 2ND FLR;  Service: General;  Laterality: N/A;    RECTOCELE REPAIR      SPINE SURGERY  2018    THYROID NODULE  "REMOVAL      TONSILLECTOMY  1995        Allergies:  Review of patient's allergies indicates:   Allergen Reactions    Cathflo activase [alteplase] Anaphylaxis     Tightness in chest, raspy throat, restless legs, hotness all over    Heparin analogues      Restless legs, tightness in chest, and hot all over    Xyzal [levocetirizine] Swelling    Latex Swelling             Home Medications:    Current Outpatient Medications:     ergocalciferol (ERGOCALCIFEROL) 50,000 unit Cap, Take 1 capsule (50,000 Units total) by mouth every 7 days., Disp: 12 capsule, Rfl: 3    ferrous gluconate (FERGON) 324 MG tablet, Every other day with orange juice, Disp: 30 tablet, Rfl: 12    hydroCHLOROthiazide (HYDRODIURIL) 12.5 MG Tab, Take 1 tablet (12.5 mg total) by mouth once daily., Disp: 90 tablet, Rfl: 1    semaglutide, weight loss, (WEGOVY) 1.7 mg/0.75 mL PnIj, Inject 1.7 mg into the skin every 7 days., Disp: 9 mL, Rfl: 1    EPINEPHrine (EPIPEN) 0.3 mg/0.3 mL AtIn, Inject 0.3 mLs (0.3 mg total) into the muscle once. for 1 dose (Patient not taking: Reported on 5/20/2024), Disp: 1 each, Rfl: 3    gabapentin (NEURONTIN) 100 MG capsule, Take 1 capsule (100 mg total) by mouth nightly as needed (facial pain)., Disp: 20 capsule, Rfl: 0    methylPREDNISolone (MEDROL DOSEPACK) 4 mg tablet, use as directed, Disp: 21 each, Rfl: 0     Review of Systems:  Review of Systems   Constitutional:  Negative for fever.   HENT:  Negative for congestion, dental problem, ear pain and facial swelling.    Eyes:  Negative for visual disturbance.   Respiratory:  Negative for cough and shortness of breath.    Cardiovascular:  Negative for chest pain.   Musculoskeletal:  Positive for arthralgias.   Neurological:  Negative for weakness and headaches.         PHYSICAL EXAM     Vitals:    12/31/24 1323   BP: 120/64   BP Location: Left arm   Patient Position: Sitting   Pulse: 78   SpO2: 98%   Weight: 68.9 kg (151 lb 14.4 oz)   Height: 5' 5" (1.651 m)      Body mass index " is 25.28 kg/m².     Physical Exam  Vitals reviewed.   Constitutional:       Appearance: Normal appearance.   HENT:      Head: Normocephalic.      Jaw: No tenderness or pain on movement.      Right Ear: Tympanic membrane normal.      Left Ear: Tympanic membrane normal.      Mouth/Throat:      Mouth: Mucous membranes are moist.      Pharynx: Oropharynx is clear.   Eyes:      Conjunctiva/sclera: Conjunctivae normal.      Pupils: Pupils are equal, round, and reactive to light.   Cardiovascular:      Rate and Rhythm: Normal rate and regular rhythm.   Pulmonary:      Effort: Pulmonary effort is normal.      Breath sounds: Normal breath sounds.   Musculoskeletal:      Comments: Tenderness and mild swelling to 4th PIP of left and base of thumb on left. No erythema. Pain is reproducible with rom.    Skin:     General: Skin is warm and dry.   Neurological:      General: No focal deficit present.      Mental Status: She is alert and oriented to person, place, and time.      Cranial Nerves: Cranial nerves 2-12 are intact. No dysarthria or facial asymmetry.      Motor: Motor function is intact.   Psychiatric:         Mood and Affect: Mood normal.         Behavior: Behavior normal.         LABS     Lab Results   Component Value Date    HGBA1C 5.4 05/10/2024     CMP  Sodium   Date Value Ref Range Status   05/10/2024 142 136 - 145 mmol/L Final     Potassium   Date Value Ref Range Status   05/10/2024 3.8 3.5 - 5.1 mmol/L Final     Chloride   Date Value Ref Range Status   05/10/2024 106 95 - 110 mmol/L Final     CO2   Date Value Ref Range Status   05/10/2024 28 23 - 29 mmol/L Final     Glucose   Date Value Ref Range Status   05/10/2024 93 70 - 110 mg/dL Final     BUN   Date Value Ref Range Status   05/10/2024 20 8 - 23 mg/dL Final     Creatinine   Date Value Ref Range Status   10/14/2024 0.8 0.5 - 1.4 mg/dL Final     Calcium   Date Value Ref Range Status   05/10/2024 10.0 8.7 - 10.5 mg/dL Final     Total Protein   Date Value Ref  Range Status   05/10/2024 6.8 6.0 - 8.4 g/dL Final     Albumin   Date Value Ref Range Status   05/10/2024 3.9 3.5 - 5.2 g/dL Final     Total Bilirubin   Date Value Ref Range Status   05/10/2024 0.7 0.1 - 1.0 mg/dL Final     Comment:     For infants and newborns, interpretation of results should be based  on gestational age, weight and in agreement with clinical  observations.    Premature Infant recommended reference ranges:  Up to 24 hours.............<8.0 mg/dL  Up to 48 hours............<12.0 mg/dL  3-5 days..................<15.0 mg/dL  6-29 days.................<15.0 mg/dL       Alkaline Phosphatase   Date Value Ref Range Status   05/10/2024 67 55 - 135 U/L Final     AST   Date Value Ref Range Status   05/10/2024 17 10 - 40 U/L Final     ALT   Date Value Ref Range Status   05/10/2024 12 10 - 44 U/L Final     Anion Gap   Date Value Ref Range Status   05/10/2024 8 8 - 16 mmol/L Final     eGFR if    Date Value Ref Range Status   05/06/2022 >60.0 >60 mL/min/1.73 m^2 Final     eGFR if non    Date Value Ref Range Status   05/06/2022 >60.0 >60 mL/min/1.73 m^2 Final     Comment:     Calculation used to obtain the estimated glomerular filtration  rate (eGFR) is the CKD-EPI equation.        Lab Results   Component Value Date    WBC 4.63 10/11/2024    HGB 13.0 10/11/2024    HCT 39.3 10/11/2024    MCV 93 10/11/2024     10/11/2024     Lab Results   Component Value Date    CHOL 212 (H) 05/10/2024    CHOL 212 (H) 05/12/2023    CHOL 218 (H) 05/06/2022     Lab Results   Component Value Date    HDL 62 05/10/2024    HDL 63 05/12/2023    HDL 66 05/06/2022     Lab Results   Component Value Date    LDLCALC 133.4 05/10/2024    LDLCALC 123.6 05/12/2023    LDLCALC 125.6 05/06/2022     Lab Results   Component Value Date    TRIG 83 05/10/2024    TRIG 127 05/12/2023    TRIG 132 05/06/2022     Lab Results   Component Value Date    CHOLHDL 29.2 05/10/2024    CHOLHDL 29.7 05/12/2023    CHOLHDL 30.3  05/06/2022     Lab Results   Component Value Date    TSH 0.893 05/10/2024    U2IPUSQ 97 10/05/2018    E9OTAAU 7.9 10/05/2018       ASSESSMENT     1. Facial neuralgia    2. Right hand pain           PLAN  Facial neuralgia- chronic issue.  Flare currently. No other neuro changes. Medrol may help and can try gabapentin sparingly only at night. F/u with PCP or neuro if no improvement.   Sounds like arthritis. NSAIDs not helping. Xray and trial medrol pack. She has follow up scheduled with ortho already.     1. Right hand pain  - methylPREDNISolone (MEDROL DOSEPACK) 4 mg tablet; use as directed  Dispense: 21 each; Refill: 0  - X-Ray Hand Complete Right; Future    2. Facial neuralgia  - methylPREDNISolone (MEDROL DOSEPACK) 4 mg tablet; use as directed  Dispense: 21 each; Refill: 0  - gabapentin (NEURONTIN) 100 MG capsule; Take 1 capsule (100 mg total) by mouth nightly as needed (facial pain).  Dispense: 20 capsule; Refill: 0       Follow up with PCP     Patient was counseled on when to seek emergent care. Patient's plan/treatment was discussed including medications and possible side effects. Verbalized understanding of all instructions.     This note was partly generated with [employer] voice recognition software. I apologize for any possible typographical errors.          MELISSA Booth  Department of Internal Medicine - [facility]  12/31/2024

## 2025-01-02 ENCOUNTER — HOSPITAL ENCOUNTER (OUTPATIENT)
Dept: RADIOLOGY | Facility: HOSPITAL | Age: 65
Discharge: HOME | End: 2025-01-02
Attending: NURSE PRACTITIONER
Payer: COMMERCIAL

## 2025-01-02 ENCOUNTER — PATIENT MESSAGE (OUTPATIENT)
Dept: INTERNAL MEDICINE | Facility: CLINIC | Age: 65
End: 2025-01-02
Payer: COMMERCIAL

## 2025-01-02 DIAGNOSIS — M79.641 RIGHT HAND PAIN: ICD-10-CM

## 2025-01-02 PROCEDURE — 73130 X-RAY EXAM OF HAND: CPT | Mod: TC,RT

## 2025-01-02 PROCEDURE — 73130 X-RAY EXAM OF HAND: CPT | Mod: 26,RT,, | Performed by: RADIOLOGY

## 2025-01-03 ENCOUNTER — PATIENT MESSAGE (OUTPATIENT)
Dept: ADMINISTRATIVE | Facility: OTHER | Age: 65
End: 2025-01-03
Payer: COMMERCIAL

## 2025-01-06 ENCOUNTER — PATIENT MESSAGE (OUTPATIENT)
Dept: BARIATRICS | Facility: CLINIC | Age: 65
End: 2025-01-06
Payer: COMMERCIAL

## 2025-01-08 DIAGNOSIS — Z00.00 ROUTINE GENERAL MEDICAL EXAMINATION AT A HEALTH CARE FACILITY: Primary | ICD-10-CM

## 2025-01-08 DIAGNOSIS — Z00.00 ROUTINE MEDICAL EXAM: Primary | ICD-10-CM

## 2025-01-09 ENCOUNTER — TELEPHONE (OUTPATIENT)
Dept: BARIATRICS | Facility: CLINIC | Age: 65
End: 2025-01-09
Payer: COMMERCIAL

## 2025-01-09 NOTE — TELEPHONE ENCOUNTER
Called pt to schedule pot op follow up. VM left.    Ira Chahal, LCSW  Department of Surgery/ Bariatric Surgery LCSW

## 2025-01-14 ENCOUNTER — PATIENT MESSAGE (OUTPATIENT)
Dept: BARIATRICS | Facility: CLINIC | Age: 65
End: 2025-01-14
Payer: COMMERCIAL

## 2025-01-14 ENCOUNTER — PATIENT MESSAGE (OUTPATIENT)
Dept: FAMILY MEDICINE | Facility: CLINIC | Age: 65
End: 2025-01-14
Payer: COMMERCIAL

## 2025-01-16 ENCOUNTER — PATIENT MESSAGE (OUTPATIENT)
Dept: OTHER | Facility: OTHER | Age: 65
End: 2025-01-16
Payer: COMMERCIAL

## 2025-01-17 ENCOUNTER — PATIENT MESSAGE (OUTPATIENT)
Dept: OPTOMETRY | Facility: CLINIC | Age: 65
End: 2025-01-17
Payer: COMMERCIAL

## 2025-01-17 ENCOUNTER — PATIENT MESSAGE (OUTPATIENT)
Dept: BARIATRICS | Facility: CLINIC | Age: 65
End: 2025-01-17
Payer: COMMERCIAL

## 2025-01-22 ENCOUNTER — TELEPHONE (OUTPATIENT)
Dept: BARIATRICS | Facility: CLINIC | Age: 65
End: 2025-01-22
Payer: COMMERCIAL

## 2025-01-22 NOTE — TELEPHONE ENCOUNTER
----- Message from Meenakshi sent at 1/22/2025 10:20 AM CST -----  Name of Caller:     Nature of Call: Returning a missed call    Best Call Back Number:[phone]     Additional Information:NICOLE JACOB calling regarding Patient Advice for returning a missed call from Keli. Please call back to assist

## 2025-01-22 NOTE — TELEPHONE ENCOUNTER
Pt called to reschedule [facility] med appt d/t inclement weather. No answer, LVM w/cb name and number. Will send portal message.

## 2025-01-23 ENCOUNTER — OFFICE VISIT (OUTPATIENT)
Dept: BARIATRICS | Facility: CLINIC | Age: 65
End: 2025-01-23
Payer: COMMERCIAL

## 2025-01-23 ENCOUNTER — PATIENT MESSAGE (OUTPATIENT)
Dept: BARIATRICS | Facility: CLINIC | Age: 65
End: 2025-01-23

## 2025-01-23 DIAGNOSIS — Z98.84 S/P LAPAROSCOPIC SLEEVE GASTRECTOMY: Primary | ICD-10-CM

## 2025-01-23 DIAGNOSIS — E66.3 OVERWEIGHT (BMI 25.0-29.9): ICD-10-CM

## 2025-01-23 DIAGNOSIS — E66.811 OBESITY, CLASS I, BMI 30.0-34.9 (SEE ACTUAL BMI): ICD-10-CM

## 2025-01-23 RX ORDER — SEMAGLUTIDE 1.7 MG/.75ML
1.7 INJECTION, SOLUTION SUBCUTANEOUS
Qty: 9 ML | Refills: 1 | Status: SHIPPED | OUTPATIENT
Start: 2025-01-23

## 2025-01-23 NOTE — PROGRESS NOTES
Subjective:       Patient ID: Rneae Hou is a 64 y.o. female.    Chief Complaint: No chief complaint on file.  The patient location is: home  The chief complaint leading to consultation is: follow up     Visit type: audiovisual    Face to Face time with patient: 7 min  20 minutes of total time spent on the encounter, which includes face to face time and non-face to face time preparing to see the patient (eg, review of tests), Obtaining and/or reviewing separately obtained history, Documenting clinical information in the electronic or other health record, Independently interpreting results (not separately reported) and communicating results to the patient/family/caregiver, or Care coordination (not separately reported).         Each patient to whom he or she provides medical services by telemedicine is:  (1) informed of the relationship between the physician and patient and the respective role of any other health care provider with respect to management of the patient; and (2) notified that he or she may decline to receive medical services by telemedicine and may withdraw from such care at any time.    Notes:    CC:  Pt here today for follow-up. Seen by Sabina Bobby in May 2024.Has lost 30 lbs, net neg 77.7 lbs lbs, 80 % EWL. Evy- sleeve 7/2019  Started Wegovy 2/2024 doing well no SE. Currently on 1.7 mg weekly. She is getting fullness with it.   Tries to walk at work. Has been eating well. Getting plenty of protein, eggs, yogurt.   States has been walking. Wearing ankle weights.   Prev med hx :Had started phentermine. Last filled 6/1/22.  checked today     Eye exam up to date. NO glaucoma.       Prev 153#. Current home weight : 148#  Has lost 6 lbs, (-0.5 lbs muscle, -5 lbs fat)  New    BMR: 1302  PBF: 37.9%      INitial done May 2024.   BMR 1315  PBF 39.5%      Review of Systems   Constitutional: Negative for chills and fever.   Respiratory: Negative for shortness of breath.     Cardiovascular:   Negative for chest pain.   Gastrointestinal: Negative for diarrhea or constipation        Denies GERD   Genitourinary: Negative for difficulty urinating and dysuria.   Musculoskeletal:. Negative for back pain  Neurological: Negative for dizziness and light-headedness.   Psychiatric/Behavioral: Negative for dysphoric mood. The patient is not nervous/anxious.        Objective:     LMP 11/25/2012    Physical Exam   Constitutional: She is oriented to person, place, and time. She appears well-developed. No distress.   Obese   HENT:   Head: Normocephalic and atraumatic.   .     Pulmonary/Chest: Effort normal     Neurological: She is alert and oriented to person, place, and time.  Skin: Skin is warm and dry. No erythema.   Psychiatric: She has a normal mood and affect. Her behavior is normal. Judgment normal.   Vitals reviewed.      Assessment:       1. S/P laparoscopic sleeve gastrectomy    2. Obesity, Class I, BMI 30.0-34.9 (see actual BMI)    3. Overweight (BMI 25.0-29.9)                Plan:         Diagnoses and all orders for this visit:    Diagnoses and all orders for this visit:    S/P laparoscopic sleeve gastrectomy    Obesity, Class I, BMI 30.0-34.9 (see actual BMI)  -     semaglutide, weight loss, (WEGOVY) 1.7 mg/0.75 mL PnIj; Inject 1.7 mg into the skin every 7 days.    Overweight (BMI 25.0-29.9)        Pt has lost 18.9% TBW on wegovy.     Continue Wegovy 1.7 mg once a week.    Decrease portions as soon as you start wegovy. Avoid fried, rich or greasy foods.     If you get pain across the upper abdomen and around to your back, please call the office.     [email or web address] to sign up for coupon card.     - To lose weight you want to cut 100% starchy carbohydrates out of your diet (bread, rice, pasta, potatoes, granola, flour, corn, peas, oatmeal, grits, tortillas, crackers, chips) and get  grams of protein.  Aim for 100 grams of protein daily.    - No soda, sweet tea, juices, sports drinks or lemonade      -Exercise daily. Add some type of resistance training 2-3 days a week. These can be body weight exercises, light weight or elastic bands. [employer] and [employer] are great sources for free work out plans and videos.         Weight training handouts given.

## 2025-01-23 NOTE — PATIENT INSTRUCTIONS
Continue Wegovy 1.7 mg once a week.    Decrease portions as soon as you start wegovy. Avoid fried, rich or greasy foods.     If you get pain across the upper abdomen and around to your back, please call the office.     Www[email or web address] to sign up for coupon card.     - To lose weight you want to cut 100% starchy carbohydrates out of your diet (bread, rice, pasta, potatoes, granola, flour, corn, peas, oatmeal, grits, tortillas, crackers, chips) and get  grams of protein.  Aim for 100 grams of protein daily.    - No soda, sweet tea, juices, sports drinks or lemonade     -Exercise daily. Add some type of resistance training 2-3 days a week. These can be body weight exercises, light weight or elastic bands. [employer] and [employer] are great sources for free work out plans and videos.       Sample Weight Training Plan ( adapted from [facility]):    1.Goblet Squat  How to:    Stand with feet hip-width apart and hold a weight vertically in front of chest, elbows pointing toward the floor.  Push hips back and bend knees to lower into a squat.  Drive through heels to stand back up to starting position. That's 1 rep. Complete three to five reps with a heavy weight.      2.Bent-Over Row  How to:    With a dumbbell in each hand and feet under hips, hinge at hips with knees slightly bent and arms just in front of legs.  Drive one elbow back toward hips, feeling shoulder blades squeeze together, pulling weight toward side body.  Slowly lower weight back down, then repeat with other arm. That's 1 rep. Complete three to five reps with a medium-heavy weight.        3.Lateral Lunge  How to:    Holding a weight at chest or dumbbells at each side, stand up straight with feet hip-width apart.  Take a large step to the right, sit hips back, and lower down until right knee is nearly parallel with the floor. Your left leg should be straight.  Return to start. That's 1 rep. Complete 10 reps on each side.      4.Chest  Press  How to:    Lie flat on back, or on a bench, with feet flat on the ground. With a dumbbell in each hand, extend arms directly over shoulders, palms facing toward feet.  Squeeze shoulder blades together and slowly bend elbows, lowering the weights out to the side, parallel with shoulders, until elbows form 90-degree angles.  Slowly drive the dumbbells back up to start, squeezing shoulder blades the entire time. Thats 1 rep. Complete three to five reps with a medium-heavy weight.      5.Split Stance Shoulder Press    How to:    Grab a pair of dumbbells or a resistance band. Stagger stance into a wide step, one foot forward and one back with hips squared, and hold the weights or band just above shoulders, elbows close to sides.  Leaning forward ever so slightly, bend both knees, and press through front heel while simultaneously lifting the weights or band to the pat, keeping elbows forward and arms in line with your ears.  Lower weights or band back to shoulders. That's 1 rep. Do 10 reps, alternating side for each set.        6.Alternating Reverse Lunge To Bicep Curl  How to:    Grab a pair of dumbbells and hold them at arm's length next to sides, palms facing each other. Stand tall with feet hip-width apart.  Step backward with right leg and lower body until front knee is bent 90 degrees. At the same time as you lunge, curl both dumbbells up to shoulders.  Lower the dumbbells as you return to the starting position. Step back with the other leg and repeat.That's 1 rep. Complete 12 reps with a medium weight.

## 2025-01-24 ENCOUNTER — OFFICE VISIT (OUTPATIENT)
Dept: BARIATRICS | Facility: CLINIC | Age: 65
End: 2025-01-24
Payer: COMMERCIAL

## 2025-01-24 VITALS — HEIGHT: 65 IN | WEIGHT: 147 LBS | BODY MASS INDEX: 24.49 KG/M2

## 2025-01-24 DIAGNOSIS — R63.4 WEIGHT LOSS: Primary | ICD-10-CM

## 2025-01-24 DIAGNOSIS — I10 ESSENTIAL HYPERTENSION: ICD-10-CM

## 2025-01-24 DIAGNOSIS — Z98.84 S/P LAPAROSCOPIC SLEEVE GASTRECTOMY: ICD-10-CM

## 2025-01-24 DIAGNOSIS — K21.9 GASTROESOPHAGEAL REFLUX DISEASE, UNSPECIFIED WHETHER ESOPHAGITIS PRESENT: ICD-10-CM

## 2025-01-24 DIAGNOSIS — E78.5 HYPERLIPIDEMIA, UNSPECIFIED HYPERLIPIDEMIA TYPE: ICD-10-CM

## 2025-01-24 NOTE — PROGRESS NOTES
Subjective:      Subjective:       Patient ID: Renae Hou is a 64 y.o. female.    Chief Complaint: No chief complaint on file.  The patient location is: [place]  The chief complaint leading to consultation is: follow up     Visit type: audiovisual    Face to Face time with patient: 15  30 minutes of total time spent on the encounter, which includes face to face time and non-face to face time preparing to see the patient (eg, review of tests), Obtaining and/or reviewing separately obtained history, Documenting clinical information in the electronic or other health record, Independently interpreting results (not separately reported) and communicating results to the patient/family/caregiver, or Care coordination (not separately reported).         Each patient to whom he or she provides medical services by telemedicine is:  (1) informed of the relationship between the physician and patient and the respective role of any other health care provider with respect to management of the patient; and (2) notified that he or she may decline to receive medical services by telemedicine and may withdraw from such care at any time.    Notes:      HPI: Patient presents for 5 year post op follow up bariatric surgery  States that she is doing  well no complaints   Weighs around 147 lbs now   Taking vitamins except the b vitamins( no b12 or b1)   Makes her feel yucky    Overall happy                                                                    EXERCISE and VITAMINS: Reviewed in bariatric assessment.    Adherent to vitamin regimen                                                                             DIET:  Regular diet                                                      2 meals      2 snacks   Fruit and cheese yogurt boiled fish chicken                                                                              MEDICATIONS AND ALLERGIES:  Reviewed in Navigator.     Review of Systems   Constitutional:  Negative for fatigue  and fever.   HENT:  Negative for tinnitus and trouble swallowing.    Eyes:  Negative for visual disturbance.   Respiratory:  Negative for cough, choking and shortness of breath.    Cardiovascular:  Negative for chest pain, palpitations and leg swelling.   Gastrointestinal:  Negative for abdominal pain, constipation, diarrhea, nausea and vomiting.   Musculoskeletal:  Negative for neck pain.   Skin:  Negative for rash.   Neurological:  Negative for dizziness, light-headedness and headaches.   Psychiatric/Behavioral:  Negative for dysphoric mood, sleep disturbance and suicidal ideas. The patient is not nervous/anxious.        Objective:      Physical Exam  Vitals reviewed.   Constitutional:       Appearance: She is obese.   HENT:      Head: Normocephalic and atraumatic.      Mouth/Throat:      Mouth: Mucous membranes are dry.   Eyes:      Extraocular Movements: Extraocular movements intact.      Conjunctiva/sclera: Conjunctivae normal.   Pulmonary:      Effort: Pulmonary effort is normal. No respiratory distress.   Musculoskeletal:      Cervical back: Normal range of motion and neck supple.   Neurological:      General: No focal deficit present.      Mental Status: She is alert and oriented to person, place, and time.   Psychiatric:         Mood and Affect: Mood normal.         Behavior: Behavior normal.         Thought Content: Thought content normal.         Judgment: Judgment normal.       Assessment:       -  Good weight loss s/p sleeve   -  Co Morbidities: htn hld  -  Good diet.  -  no formal exercise.  -  Good vitamin.  -  Not at risk for fall or abuse.    Plan:         - Patient to follow bariatric diet plan.  - Patient to exercise on a regular basis.  - Patient to take bariatric vitamin regimen: MVI, calcium citrate, and Vitamin B12 as directed.  - RTC as scheduled.  - Call the office for any issues.

## 2025-01-24 NOTE — PATIENT INSTRUCTIONS
BARIATRIC VITAMIN/MINERALS RECOMMENDATIONS  Prices subject to change  1/6/2022   Multivitamin with 18 mg of iron - 1 chewable or tablet 2x/day   Flintstones Complete (*chewable, not gummies)     [facility]: $5.92 for 60 tablets  [facility]: $9.46 for 60 tablets   [facility]: $10.29 for 60 tablets   [facility]: $16.70 for 180 tablets           OR   Centrum multivitamin      [facility]: $18.99 for 300 count   [facility]: $13.99 for 200 count   [facility]: $18.93 for 200 count             Calcium citrate with vitamin D: daily requirement 0438-0849 mg per day - take in 500 mg (3x/day) or 600 mg doses (2x/day)     Calcium citrate (GNC) soft chews     [facility]: $19.99 (for 30 chews, 500 mg each)                       OR    Calcium citrate (Bariatric Advantage)   [90 chewy bites, 500 mg each]     [facility]: $45.54 - $48.54    Bariatric Advantage: $35.75 -$36.75        OR   Calcium citrate (Celebrate)    [90 count, 500 mg each]    [facility]: $39.95   Celebrate nutritional supplements: $39.95        OR   Calcium + vitamin D3 (Nature's Way)  [1 Tbsp 3x/day]    [facility]: $6.99 (16 fl oz) (nature's way)  [facility]: $6.99 (16 fl oz)   [facility]: $19.65 (2- 16 fl oz)         OR   Calcium citrate + D3 (equate)   [3 tablets 2x/day]    [facility]: $6.98 (200 count)               Super B-complex with at least 50 mg of thiamin OR 50 mg thiamin (B-1)     Lohn Super B-Complex    [facility]: $7.88 (250 tablets)        OR   ez Melts B1, 25 mg  [90 tablets, 25 mg each]    [facility]: $15.99 (90 tablets, 25 mg each)   [facility]: $15.99 (90 tablets, 25 mg each)            Sublingual (under tongue) vitamin B-12  500 mcg per day OR 1000 mcg every other day OR 2500 mcg per week OR 5000 mcg every 2 weeks     Ez melts B12  [90 dissolvable tablets, 2,500 mcg each]    [facility]: $17.99   [facility]: $17.99        OR   Nature Made B12     [facility]: $11.18 (50 tablets, 1000 mcg each)   [facility]: $9.99 (100 tablets, 1000 mcg each)                                         Bariatric Vitamins  available at [facility]    Multivitamin with 18 mg of iron, 1 chewable tablet 2x/day     Leader's Children's Multivitamin with Iron    [facility]: $4.19 for 30 chewable tablets          Calcium citrate with vitamin D: daily requirement 0123-5174 mg per day - take in 500 mg (3x/day) or 600 mg doses (2x/day)     Citracal Calcium Citrate + Vitamin D petites    [facility]: $8.35 for 100 tablets          Super B-complex with at least 50 mg of thiamin or 50 mg thiamin     Nature's Truth B-1, 100 mg    [facility]: $5.42 for 100 tablets

## 2025-02-06 ENCOUNTER — TELEPHONE (OUTPATIENT)
Dept: BARIATRICS | Facility: CLINIC | Age: 65
End: 2025-02-06
Payer: COMMERCIAL

## 2025-02-06 DIAGNOSIS — Z98.84 S/P LAPAROSCOPIC SLEEVE GASTRECTOMY: Primary | ICD-10-CM

## 2025-02-06 NOTE — TELEPHONE ENCOUNTER
Received message from Saray Jerry RN to schedule pt's B1 and place 1 year recall.  Recall placed.    Called pt and scheduled her B1 lab with her.

## 2025-02-07 ENCOUNTER — LAB VISIT (OUTPATIENT)
Dept: LAB | Facility: HOSPITAL | Age: 65
End: 2025-02-07
Attending: PHYSICIAN ASSISTANT
Payer: COMMERCIAL

## 2025-02-07 DIAGNOSIS — I10 ESSENTIAL HYPERTENSION: ICD-10-CM

## 2025-02-07 DIAGNOSIS — Z98.84 S/P LAPAROSCOPIC SLEEVE GASTRECTOMY: ICD-10-CM

## 2025-02-07 DIAGNOSIS — Z87.898 HISTORY OF PERIPHERAL EDEMA: ICD-10-CM

## 2025-02-07 PROCEDURE — 84425 ASSAY OF VITAMIN B-1: CPT | Performed by: PHYSICIAN ASSISTANT

## 2025-02-07 PROCEDURE — 36415 COLL VENOUS BLD VENIPUNCTURE: CPT | Performed by: PHYSICIAN ASSISTANT

## 2025-02-07 RX ORDER — HYDROCHLOROTHIAZIDE 12.5 MG/1
12.5 TABLET ORAL DAILY
Qty: 90 TABLET | Refills: 1 | Status: SHIPPED | OUTPATIENT
Start: 2025-02-07

## 2025-02-07 NOTE — TELEPHONE ENCOUNTER
Provider Staff:  Action required for this patient    Requires labs      Please see care gap opportunities below in Care Due Message.    Thanks!  [facility]     Appointments      Date Provider   Last Visit   5/10/2024 Wendy Horowitz MD   Next Visit   4/15/2025 Wendy Horowitz MD     Refill Decision Note   Renae Hou  is requesting a refill authorization.  Brief Assessment and Rationale for Refill:  Approve     Medication Therapy Plan:         Comments:     Note composed:11:28 AM 02/07/2025

## 2025-02-07 NOTE — TELEPHONE ENCOUNTER
Care Due:                  Date            Visit Type   Department     Provider  --------------------------------------------------------------------------------                                ADMIT                               REVIEW EXEC   [facility] INTERNAL  Last Visit: 05-      CHECK        MEDICINE       Wendy Horowitz                               -                              PRIMARY      [facility] FAMILY  Next Visit: 04-      CARE ([facility])   MEDICINE       Wendy Horowitz                                                            Last  Test          Frequency    Reason                     Performed    Due Date  --------------------------------------------------------------------------------    CMP.........  12 months..  ergocalciferol,            05-   05-                             hydroCHLOROthiazide......    [employer] Embedded Care Due Messages. Reference number: [id number].   2/07/2025 8:11:00 AM CST

## 2025-02-10 ENCOUNTER — PATIENT MESSAGE (OUTPATIENT)
Dept: BARIATRICS | Facility: CLINIC | Age: 65
End: 2025-02-10
Payer: COMMERCIAL

## 2025-02-12 LAB — VIT B1 BLD-MCNC: 69 UG/L (ref 38–122)

## 2025-02-17 ENCOUNTER — OFFICE VISIT (OUTPATIENT)
Dept: OPTOMETRY | Facility: CLINIC | Age: 65
End: 2025-02-17
Payer: COMMERCIAL

## 2025-02-17 DIAGNOSIS — H04.123 DRY EYE SYNDROME OF BILATERAL LACRIMAL GLANDS: Primary | ICD-10-CM

## 2025-02-17 PROCEDURE — G2211 COMPLEX E/M VISIT ADD ON: HCPCS | Mod: S$GLB,,, | Performed by: OPTOMETRIST

## 2025-02-17 PROCEDURE — 99214 OFFICE O/P EST MOD 30 MIN: CPT | Mod: S$GLB,,, | Performed by: OPTOMETRIST

## 2025-02-17 RX ORDER — LIFITEGRAST 50 MG/ML
1 SOLUTION/ DROPS OPHTHALMIC 2 TIMES DAILY
Qty: 180 EACH | Refills: 3 | Status: SHIPPED | OUTPATIENT
Start: 2025-02-17 | End: 2026-02-17

## 2025-02-17 NOTE — PROGRESS NOTES
HPI    Pt is here today for routine eye exam. Denies pain/discomfort.  DLS: 1/4/2024 Dr. Bah  (-)Flashes   (-)Floaters   (-)Diplopia   (+)Headaches   (+)Itching   (-)Tearing  (+)Burning  (+)Dryness   (+)Photophobia  (+)Glare   (+)Blurred VA  Past Eye Sx: LASIK 25 Years Ago  Eye Meds: OTC Allergy Relief PRN OU    Last edited by Priya Bah, OD on 2/17/2025  3:15 PM.            Assessment /Plan     For exam results, see Encounter Report.    Dry eye syndrome of bilateral lacrimal glands  -     lifitegrast (XIIDRA) 5 % Dpet; Apply 1 drop to eye 2 (two) times daily.  Dispense: 180 each; Refill: 3      Educated pt on today's findings--holding off on refraction/CL fit due to SABRINA OU. Pt previously had to discontinue wearing CLs due to dryness. Educated pt on chronic nature of condition and need for long term prescription therapy for best management. Rx Xiidra bid OU long term. Pt to also use Refresh Plus PF PRN    RTC x 1 month for dry eye recheck with refraction and CL fit    Today's visit is associated with current and anticipated ongoing medical care related to this patient's single serious/complex condition (cornea). Follow up is to be continued indefinitely to monitor the condition.

## 2025-03-04 ENCOUNTER — PATIENT MESSAGE (OUTPATIENT)
Dept: ORTHOPEDICS | Facility: CLINIC | Age: 65
End: 2025-03-04
Payer: COMMERCIAL

## 2025-03-05 ENCOUNTER — TELEPHONE (OUTPATIENT)
Dept: ORTHOPEDICS | Facility: CLINIC | Age: 65
End: 2025-03-05
Payer: COMMERCIAL

## 2025-03-05 DIAGNOSIS — M51.369 DEGENERATION OF INTERVERTEBRAL DISC OF LUMBAR REGION, UNSPECIFIED WHETHER PAIN PRESENT: Primary | ICD-10-CM

## 2025-03-06 ENCOUNTER — TELEPHONE (OUTPATIENT)
Dept: FAMILY MEDICINE | Facility: CLINIC | Age: 65
End: 2025-03-06
Payer: COMMERCIAL

## 2025-03-06 ENCOUNTER — TELEPHONE (OUTPATIENT)
Dept: INTERNAL MEDICINE | Facility: CLINIC | Age: 65
End: 2025-03-06
Payer: COMMERCIAL

## 2025-03-06 NOTE — TELEPHONE ENCOUNTER
Left VM to contact me to discuss request for order for titers. My direct contact information left in VM

## 2025-03-06 NOTE — TELEPHONE ENCOUNTER
Spoke w/ pt :Pt request MMR titers during  lab draw [facility]t on 5/9/2025. Advised pt request  for MMR has been documented on  patient paperwork for 5/9/25 & MMR titers will be drawn on this date.  Pt verbalized understanding/agreed to above.

## 2025-03-06 NOTE — TELEPHONE ENCOUNTER
Pt called requesting titters      call from Renae Hou [id number].  She would like a titer test added to her visit on 05/09/25.  She is wanting to know if she is up to date on all vaccines.  Can you assist?

## 2025-03-10 ENCOUNTER — PATIENT MESSAGE (OUTPATIENT)
Dept: BARIATRICS | Facility: CLINIC | Age: 65
End: 2025-03-10
Payer: COMMERCIAL

## 2025-03-11 ENCOUNTER — PATIENT MESSAGE (OUTPATIENT)
Dept: FAMILY MEDICINE | Facility: CLINIC | Age: 65
End: 2025-03-11
Payer: COMMERCIAL

## 2025-03-17 ENCOUNTER — OFFICE VISIT (OUTPATIENT)
Dept: ORTHOPEDICS | Facility: CLINIC | Age: 65
End: 2025-03-17
Payer: COMMERCIAL

## 2025-03-17 ENCOUNTER — HOSPITAL ENCOUNTER (OUTPATIENT)
Dept: RADIOLOGY | Facility: HOSPITAL | Age: 65
Discharge: HOME | End: 2025-03-17
Attending: PHYSICIAN ASSISTANT
Payer: COMMERCIAL

## 2025-03-17 VITALS — BODY MASS INDEX: 24.43 KG/M2 | WEIGHT: 146.81 LBS

## 2025-03-17 DIAGNOSIS — Z98.1 S/P LUMBAR FUSION: ICD-10-CM

## 2025-03-17 DIAGNOSIS — M51.369 DEGENERATION OF INTERVERTEBRAL DISC OF LUMBAR REGION, UNSPECIFIED WHETHER PAIN PRESENT: ICD-10-CM

## 2025-03-17 DIAGNOSIS — M54.16 LUMBAR RADICULOPATHY: Primary | ICD-10-CM

## 2025-03-17 PROCEDURE — 72110 X-RAY EXAM L-2 SPINE 4/>VWS: CPT | Mod: TC

## 2025-03-17 PROCEDURE — 1159F MED LIST DOCD IN RCRD: CPT | Mod: CPTII,S$GLB,, | Performed by: PHYSICIAN ASSISTANT

## 2025-03-17 PROCEDURE — 99999 PR PBB SHADOW E&M-EST. PATIENT-LVL III: CPT | Mod: PBBFAC,,, | Performed by: PHYSICIAN ASSISTANT

## 2025-03-17 PROCEDURE — 3008F BODY MASS INDEX DOCD: CPT | Mod: CPTII,S$GLB,, | Performed by: PHYSICIAN ASSISTANT

## 2025-03-17 PROCEDURE — 99213 OFFICE O/P EST LOW 20 MIN: CPT | Mod: S$GLB,,, | Performed by: PHYSICIAN ASSISTANT

## 2025-03-17 PROCEDURE — 72110 X-RAY EXAM L-2 SPINE 4/>VWS: CPT | Mod: 26,,, | Performed by: INTERNAL MEDICINE

## 2025-03-17 RX ORDER — METHYLPREDNISOLONE 4 MG/1
TABLET ORAL
Qty: 21 EACH | Refills: 0 | Status: SHIPPED | OUTPATIENT
Start: 2025-03-17

## 2025-03-17 NOTE — PROGRESS NOTES
DATE: 3/17/2025  PATIENT: Renae Hou    Attending Physician: Julius Cassidy M.D.    HISTORY:  Renae Hou is a 64 y.o. female who returns to me today for follow up.  She was last seen by me 6/17/2024.  Today she is doing well but notes increased low back and left leg pain for the last couple weeks. She says she felt a pop in her back.  There is occasional numbness and tingling.  The pain is worse with activity and walking.      The Patient denies myelopathic symptoms such as handwriting changes or difficulty with buttons/coins/keys. Denies perineal paresthesias, bowel/bladder dysfunction.      EXAM:  Wt 66.6 kg (146 lb 13.2 oz)   LMP 11/25/2012   BMI 24.43 kg/m²     My physical examination was notable for the following findings:     Normal station and gait, no difficulty with toe or heel walk.   Dorsal lumbar skin negative for rashes, lesions, hairy patches. There is minimal lumbar tenderness to palpation.  Lumbar range of motion is acceptable.  Global saggital and coronal spinal balance acceptable, not significant for scoliosis and kyphosis.  No pain with the range of motion of the bilateral hips. No trochanteric tenderness to palpation.  Bilateral lower extremities warm and well perfused, dorsalis pedis pulses 2+ bilaterally.  Normal strength and tone in all major motor groups in the bilateral lower extremities. Normal sensation to light touch in the L2-S1 dermatomes bilaterally.  Deep tendon reflexes symmetric 2+ in the bilateral lower extremities.  Negative Babinski bilaterally. Straight leg raise negative bilaterally.      IMAGING:    Today I personally reviewed AP, Lat and Flex/Ex  upright L-spine that demonstrate hardware in place, no evidence of failure.     Body mass index is 24.43 kg/m².    Hemoglobin A1C   Date Value Ref Range Status   05/10/2024 5.4 4.0 - 5.6 % Final     Comment:     ADA Screening Guidelines:  5.7-6.4%  Consistent with prediabetes  >or=6.5%  Consistent with  diabetes    High levels of fetal hemoglobin interfere with the HbA1C  assay. Heterozygous hemoglobin variants (HbS, HgC, etc)do  not significantly interfere with this assay.   However, presence of multiple variants may affect accuracy.     05/12/2023 5.8 (H) 4.0 - 5.6 % Final     Comment:     ADA Screening Guidelines:  5.7-6.4%  Consistent with prediabetes  >or=6.5%  Consistent with diabetes    High levels of fetal hemoglobin interfere with the HbA1C  assay. Heterozygous hemoglobin variants (HbS, HgC, etc)do  not significantly interfere with this assay.   However, presence of multiple variants may affect accuracy.     05/06/2022 5.5 4.0 - 5.6 % Final     Comment:     ADA Screening Guidelines:  5.7-6.4%  Consistent with prediabetes  >or=6.5%  Consistent with diabetes    High levels of fetal hemoglobin interfere with the HbA1C  assay. Heterozygous hemoglobin variants (HbS, HgC, etc)do  not significantly interfere with this assay.   However, presence of multiple variants may affect accuracy.           ASSESSMENT/PLAN:    Diagnoses and all orders for this visit:    Lumbar radiculopathy    S/P lumbar fusion    Other orders  -     methylPREDNISolone (MEDROL DOSEPACK) 4 mg tablet; Take as directed        Medrol dose pack sent to the pharmacy.  Follow up in 2 weeks if symptoms persist. We may consider a new MRI.

## 2025-03-21 ENCOUNTER — TELEPHONE (OUTPATIENT)
Dept: ORTHOPEDICS | Facility: CLINIC | Age: 65
End: 2025-03-21
Payer: COMMERCIAL

## 2025-03-21 NOTE — TELEPHONE ENCOUNTER
Right palmar mass has resolved    New problem right ring PIP joint oa    Jayme Hoover MS, OTC   Sports Medicine Assistant   [facility]  (P) [phone]  (F) [phone]

## 2025-03-21 NOTE — PROGRESS NOTES
Renae M Ameya presents for follow up evaluation of   Encounter Diagnoses   Name Primary?    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    HPI:  Patient presents with concerns about pain and swelling in her left hand, particularly in the knuckle of one finger. Symptoms began around [place] time.  Patient reports difficulty bending the affected finger due to swelling. The knuckle remains swollen, preventing her from wearing rings on that finger. She is left-handed and uses her left hand frequently.    Pain is localized to the knuckle and does not extend into the palm. Patient denies any clicking sensation in the joint. Symptoms are exacerbated by activities such as typing, which she does frequently.    Dr. Horowitz prescribed steroids. Symptoms improved with steroid treatment but recurred once the medication was discontinued.    She reports numbness in her right arm, extending from her shoulder down the entire arm. Patient has a history of mild right carpal tunnel syndrome, diagnosed a few years ago through a nerve study. She reports numbness in her hand nightly. She had previously been advised to wear a brace but found it uncomfortable and discontinued use.    PREVIOUS TREATMENTS:  - Steroid (oral): Prescribed by primary care physician around [place] time, provided temporary relief  - Wrist brace: Discontinued due to discomfort    MEDICATIONS:  - Oral steroid: Prescribed by primary care physician around [place] time, provided temporary relief, symptoms returned after completing the course    WORK STATUS:  Works for [employer]    Initial HPI 12/14/2021:  Patient is a 61 y.o. left hand dominant female who presents today with complaints of right hand mass, left thumb pain and numbness and  tingling in the hands.  She states that she has noticed the right hand mass for the past month. It gets bigger and smaller and has bluish discoloration. She denies trauma on the right. On the left hand, she states that she was walking this week and felt a pop in the left basilar thumb. She then noticed bruising and pain.       She has had numbness is in the hands for a few years, but is most prominent in the thumb, index, and long finger on the right hand. The patient reports pain at night and worsening of symptoms. She had an EMG/NCS 10/08/2021 which showed mild right carpal tunnel syndrome. She also reports neck pain and radiation to the right shoulder since 2014.    Vitals:    03/24/25 0856   PainSc:   5   PainLoc: Finger       PE:    AA&O x 4.  NAD  HEENT:  NCAT, sclera nonicteric  Lungs:  Respirations are equal and unlabored.  CV:  2+ bilateral upper and lower extremity pulses.  MSK:   Positive compression, Tinel's and Phalen's right wrist, tender to palpation right ring finger PIP joint, PIP range of motion 0-90 degrees, synovitis right ring PIP joint. Neurovascularly intact bilaterally.  5/5 thenar and intrinsic musculature strength.    Otherwise bilateral full range of motion hands, wrists and elbows.      Diagnostic studies and other clinical records review:  X-rays AP, lateral and oblique right hand taken today are independently reviewed by me and shows right ring PIP mild arthritis, Eaton stage II basilar thumb arthritis.     Assessment/Plan:   Encounter Diagnoses   Name Primary?    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 We discussed the working diagnosis as well as several other potential alternative diagnoses. Treatment options were discussed, both conservative and surgical. Conservative treatment options would include things such as  activity modifications, workplace modifications, a period of rest, oral vs topical OTC and prescription anti-inflammatory medications, occupational therapy, splinting/bracing, immobilization, corticosteroid injections, and others. Surgical options were discussed as well.     -I have offered her a selective injection. I have explained the risks, benefits, and alternatives of the procedure in detail.  The patient voices understanding and all questions have been answered. The patient agrees to proceed as planned. So after a sterile prep of the skin in the normal fashion the right carpal tunnel and right, right ring finger PIP joint injected using a 25 gauge needle with a combination of 1cc 1% plain lidocaine and 40 mg of methylprednisolone.  The patient is cautioned and immediate relief of pain is secondary to the local anesthetic and will be temporary.  After the anesthetic wears off there may be a increase in pain that may last for a few hours or a few days and they should use ice to help alleviate this flair up of pain. Patient tolerated the procedure well.    Will call in 2 weeks to follow up for steroid injection efficacy or sooner for any worsening of symptoms  Call with any questions/concerns in the interim           Leanna Meraz MD    Please be aware that this note has been generated with the assistance of [email or web address] voice-to-text.  Please excuse any spelling or grammatical errors.  This note was generated with the assistance of ambient listening technology. Verbal consent was obtained by the patient and accompanying visitor(s) for the recording of patient appointment to facilitate this note. I attest to having reviewed and edited the generated note for accuracy, though some syntax or spelling errors may persist. Please contact the author of this note for any clarification.

## 2025-03-24 ENCOUNTER — OFFICE VISIT (OUTPATIENT)
Dept: ORTHOPEDICS | Facility: CLINIC | Age: 65
End: 2025-03-24
Payer: COMMERCIAL

## 2025-03-24 DIAGNOSIS — M79.641 CHRONIC PAIN OF RIGHT HAND: ICD-10-CM

## 2025-03-24 DIAGNOSIS — G56.01 CARPAL TUNNEL SYNDROME OF RIGHT WRIST: ICD-10-CM

## 2025-03-24 DIAGNOSIS — G89.29 CHRONIC PAIN OF RIGHT HAND: ICD-10-CM

## 2025-03-24 DIAGNOSIS — M15.2 OSTEOARTHRITIS OF PROXIMAL INTERPHALANGEAL (PIP) JOINT OF RIGHT RING FINGER: Primary | ICD-10-CM

## 2025-03-24 PROCEDURE — 20526 THER INJECTION CARP TUNNEL: CPT | Mod: XS,51,RT,S$GLB | Performed by: ORTHOPAEDIC SURGERY

## 2025-03-24 PROCEDURE — 99204 OFFICE O/P NEW MOD 45 MIN: CPT | Mod: 25,S$GLB,, | Performed by: ORTHOPAEDIC SURGERY

## 2025-03-24 PROCEDURE — 99999 PR PBB SHADOW E&M-EST. PATIENT-LVL III: CPT | Mod: PBBFAC,,, | Performed by: ORTHOPAEDIC SURGERY

## 2025-03-24 PROCEDURE — 20600 DRAIN/INJ JOINT/BURSA W/O US: CPT | Mod: RT,S$GLB,, | Performed by: ORTHOPAEDIC SURGERY

## 2025-03-24 PROCEDURE — 1159F MED LIST DOCD IN RCRD: CPT | Mod: CPTII,S$GLB,, | Performed by: ORTHOPAEDIC SURGERY

## 2025-03-24 PROCEDURE — 1160F RVW MEDS BY RX/DR IN RCRD: CPT | Mod: CPTII,S$GLB,, | Performed by: ORTHOPAEDIC SURGERY

## 2025-03-24 RX ORDER — METHYLPREDNISOLONE ACETATE 40 MG/ML
40 INJECTION, SUSPENSION INTRA-ARTICULAR; INTRALESIONAL; INTRAMUSCULAR; SOFT TISSUE
Status: DISCONTINUED | OUTPATIENT
Start: 2025-03-24 | End: 2025-03-24 | Stop reason: HOSPADM

## 2025-03-24 RX ADMIN — METHYLPREDNISOLONE ACETATE 40 MG: 40 INJECTION, SUSPENSION INTRA-ARTICULAR; INTRALESIONAL; INTRAMUSCULAR; SOFT TISSUE at 09:03

## 2025-03-24 NOTE — PROCEDURES
Carpal Tunnel: R carpal tunnel    Date/Time: 3/24/2025 9:00 AM    Performed by: Leanna Meraz MD  Authorized by: Leanna Meraz MD    Consent Done?:  Yes (Verbal)  Indications:  Pain  Timeout: prior to procedure the correct patient, procedure, and site was verified    Prep: patient was prepped and draped in usual sterile fashion      Local anesthesia used?: Yes    Anesthesia:  Local infiltration  Local anesthetic:  Lidocaine 1% without epinephrine  Location:  Wrist  Site:  R carpal tunnel  Needle size:  25 G  Medications:  40 mg methylPREDNISolone acetate 40 mg/mL  Patient tolerance:  Patient tolerated the procedure well with no immediate complications  Small Joint Aspiration/Injection: R ring PIP    Date/Time: 3/24/2025 9:00 AM    Performed by: Leanna Meraz MD  Authorized by: Leanna Meraz MD    Consent Done?:  Yes (Verbal)  Indications:  Pain  Timeout: prior to procedure the correct patient, procedure, and site was verified    Prep: patient was prepped and draped in usual sterile fashion      Local anesthesia used?: Yes    Anesthesia:  Local infiltration  Local anesthetic:  Lidocaine 1% without epinephrine  Location:  Ring finger  Site:  R ring PIP  Needle size:  25 G  Medications:  40 mg methylPREDNISolone acetate 40 mg/mL  Patient tolerance:  Patient tolerated the procedure well with no immediate complications

## 2025-03-29 NOTE — TELEPHONE ENCOUNTER
No care due was identified.  [facility] Embedded Care Due Messages. Reference number: [id number].   3/29/2025 6:52:11 AM CDT

## 2025-03-31 RX ORDER — ERGOCALCIFEROL 1.25 MG/1
50000 CAPSULE ORAL
Qty: 12 CAPSULE | Refills: 3 | Status: SHIPPED | OUTPATIENT
Start: 2025-03-31

## 2025-04-08 ENCOUNTER — PATIENT MESSAGE (OUTPATIENT)
Dept: BARIATRICS | Facility: CLINIC | Age: 65
End: 2025-04-08
Payer: COMMERCIAL

## 2025-04-13 DIAGNOSIS — G51.8 FACIAL NEURALGIA: ICD-10-CM

## 2025-04-14 RX ORDER — GABAPENTIN 100 MG/1
100 CAPSULE ORAL NIGHTLY PRN
Qty: 20 CAPSULE | Refills: 0 | Status: SHIPPED | OUTPATIENT
Start: 2025-04-14 | End: 2026-04-14

## 2025-04-21 ENCOUNTER — PATIENT MESSAGE (OUTPATIENT)
Dept: ORTHOPEDICS | Facility: CLINIC | Age: 65
End: 2025-04-21
Payer: COMMERCIAL

## 2025-04-21 DIAGNOSIS — M54.16 LUMBAR RADICULOPATHY, CHRONIC: Primary | ICD-10-CM

## 2025-04-25 ENCOUNTER — TELEPHONE (OUTPATIENT)
Dept: OPTOMETRY | Facility: CLINIC | Age: 65
End: 2025-04-25
Payer: COMMERCIAL

## 2025-04-25 ENCOUNTER — LAB VISIT (OUTPATIENT)
Dept: LAB | Facility: HOSPITAL | Age: 65
End: 2025-04-25
Attending: PHYSICIAN ASSISTANT
Payer: COMMERCIAL

## 2025-04-25 DIAGNOSIS — M54.16 LUMBAR RADICULOPATHY, CHRONIC: ICD-10-CM

## 2025-04-25 LAB
CREAT SERPL-MCNC: 0.8 MG/DL (ref 0.5–1.4)
GFR SERPLBLD CREATININE-BSD FMLA CKD-EPI: >60 ML/MIN/1.73/M2

## 2025-04-25 PROCEDURE — 82565 ASSAY OF CREATININE: CPT

## 2025-04-25 PROCEDURE — 36415 COLL VENOUS BLD VENIPUNCTURE: CPT

## 2025-04-25 NOTE — PROGRESS NOTES
Digital Medicine: Health  Follow-Up      Follow Up  Follow-up reason(s): reading review and routine education      Readings are trending down due to lifestyle change and Pt had bariatric surgery on 7/23.    Routine Education Topics: eating patterns and physical activity          Diet:   Patient reports eating or drinking the following: fruit, water and fresh vegetablesShe has the following dietary restrictions: bariatric surgery post-op diet progression     Barriers to a Healthy Diet: no barriers to healthy eating    Pt is doing well following a low sodium diet. She is in the soft food diet stage of her post-op bariatric surgery diet. She is happy with her wt loss success.     Physical Activity:   When asked if exercising, patient responded: no    She identified the following barriers to physical activity: weather    Pt states that she has not been exercising 2/2 the heat, but that she plans to start walking regularly once it cools down.    Intervention(s): goal setting and exercise ideas       SDOH: Deferred.    INTERVENTION(S)  recommend physical activity and encouragement/support    PLAN  patient amenable to changes      There are no preventive care reminders to display for this patient.    Last 5 Patient Entered Readings                                      Current 30 Day Average: 108/69     Recent Readings 10/3/2019 10/1/2019 9/30/2019 9/29/2019 9/28/2019    SBP (mmHg) 109 114 102 103 122    DBP (mmHg) 70 76 62 72 79    Pulse 86 86 76 67 88                 ,

## 2025-04-25 NOTE — TELEPHONE ENCOUNTER
----- Message from Patricia sent at 4/25/2025  3:32 PM CDT -----  Regarding: Reschedule  Contact: [phone]  Reschedule Existing Appointment Current appt date:4/30 Type of appt :[facility] Physician:Prashant  Reason for rescheduling:Conflict with another event  Caller:Renae Hou  Contact Preference [phone] 5/9 anytime after 11a

## 2025-04-30 ENCOUNTER — OFFICE VISIT (OUTPATIENT)
Dept: OPTOMETRY | Facility: CLINIC | Age: 65
End: 2025-04-30
Payer: COMMERCIAL

## 2025-04-30 DIAGNOSIS — Z46.0 FITTING AND ADJUSTMENT OF SPECTACLES AND CONTACT LENSES: Primary | ICD-10-CM

## 2025-04-30 DIAGNOSIS — H04.123 DRY EYE SYNDROME OF BILATERAL LACRIMAL GLANDS: Primary | ICD-10-CM

## 2025-04-30 DIAGNOSIS — Z97.3 WEARS CONTACT LENSES: ICD-10-CM

## 2025-04-30 PROCEDURE — 92015 DETERMINE REFRACTIVE STATE: CPT | Mod: ,,, | Performed by: OPTOMETRIST

## 2025-04-30 PROCEDURE — 92310 CONTACT LENS FITTING OU: CPT | Mod: CSM,,, | Performed by: OPTOMETRIST

## 2025-04-30 PROCEDURE — 99499 UNLISTED E&M SERVICE: CPT | Mod: S$GLB,,, | Performed by: OPTOMETRIST

## 2025-04-30 PROCEDURE — 99213 OFFICE O/P EST LOW 20 MIN: CPT | Mod: ,,, | Performed by: OPTOMETRIST

## 2025-05-01 NOTE — PROGRESS NOTES
HPI    Patient is here today for dry eye eval and cl fit. States that OD has been   occasionally bothering her as if someone is hitting her in the back of her   eye. States that she doesn't feel it today, nor does it hurt when moving   her eye, but its a throb that occasionally comes in the back. Dryness has   not been bothering her nor is she having burning anymore. She prefers the   Refresh drops over the Xiidra due to the Xiidra burning her eyes. Patient   would like cl rx. States that she has worn them previously.   Refresh once daily OU   Last edited by Priya Bah, OD on 4/30/2025  3:36 PM.            Assessment /Plan     For exam results, see Encounter Report.    Dry eye syndrome of bilateral lacrimal glands    Wears contact lenses      1-2. Updated CL Rx. Initially good comfort and vision. Given CL trials to take home. If happy with comfort and vision of trial lenses, may order annual supply. If issues arise, RTC for CL f/u. Reviewed proper CL care and hygiene. Monitor yearly unless changes noted sooner.      Contact Lens Current Rx       Current Contact Lens Rx (Trial Lens, Dispensed)         Brand Base Curve Diameter Sphere Cylinder [place] Lens Dist [facility] Centration    Right Dailies Total 1 8.5 14.1 +0.50 Sphere   20/20 Well-centered    Left Dailies Total 1 for Astigmatism 8.6 14.5 +2.00 -1.25 170 disp due to stock 20/20- [facility]-centered        Movement Rotation Over-Sphere Over-Cyl Over-Dist VA    Right Adequate        Left Adequate Centers -0.50 Sphere 20/20              Current Contact Lens Rx #2 (Ordered)         Brand Base Curve Diameter Sphere Cylinder Jefferson Lens Dist VA Centration    Right Dailies Total 1 8.5 14.1 +0.50 Sphere        Left Dailies Total 1 for Astigmatism 8.6 14.5 +1.50 -1.25 170           Movement Rotation Over-Sphere Over-Cyl Over-Dist VA    Right         Left                        RTC in 1 year for annual eye exam unless changes noted sooner.

## 2025-05-02 ENCOUNTER — HOSPITAL ENCOUNTER (OUTPATIENT)
Dept: RADIOLOGY | Facility: HOSPITAL | Age: 65
Discharge: HOME | End: 2025-05-02
Attending: PHYSICIAN ASSISTANT
Payer: COMMERCIAL

## 2025-05-02 DIAGNOSIS — M54.16 LUMBAR RADICULOPATHY, CHRONIC: ICD-10-CM

## 2025-05-02 PROCEDURE — A9585 GADOBUTROL INJECTION: HCPCS | Performed by: PHYSICIAN ASSISTANT

## 2025-05-02 PROCEDURE — 25500020 PHARM REV CODE 255: Performed by: PHYSICIAN ASSISTANT

## 2025-05-02 PROCEDURE — 72158 MRI LUMBAR SPINE W/O & W/DYE: CPT | Mod: 26,,, | Performed by: RADIOLOGY

## 2025-05-02 PROCEDURE — 72158 MRI LUMBAR SPINE W/O & W/DYE: CPT | Mod: TC

## 2025-05-02 RX ORDER — GADOBUTROL 604.72 MG/ML
7 INJECTION INTRAVENOUS
Status: COMPLETED | OUTPATIENT
Start: 2025-05-02 | End: 2025-05-02

## 2025-05-02 RX ADMIN — GADOBUTROL 7 ML: 604.72 INJECTION INTRAVENOUS at 09:05

## 2025-05-05 ENCOUNTER — OFFICE VISIT (OUTPATIENT)
Dept: ORTHOPEDICS | Facility: CLINIC | Age: 65
End: 2025-05-05
Payer: COMMERCIAL

## 2025-05-05 DIAGNOSIS — M48.062 SPINAL STENOSIS OF LUMBAR REGION WITH NEUROGENIC CLAUDICATION: ICD-10-CM

## 2025-05-05 DIAGNOSIS — Z98.1 S/P LUMBAR FUSION: Primary | ICD-10-CM

## 2025-05-05 PROCEDURE — 98004 SYNCH AUDIO-VIDEO EST SF 10: CPT | Mod: 95,,, | Performed by: PHYSICIAN ASSISTANT

## 2025-05-05 NOTE — PROGRESS NOTES
The patient location is: [place]  The chief complaint leading to consultation is: MRI results    Visit type: audiovisual    Face to Face time with patient: 5 minutes  10 minutes of total time spent on the encounter, which includes face to face time and non-face to face time preparing to see the patient (eg, review of tests), Obtaining and/or reviewing separately obtained history, Documenting clinical information in the electronic or other health record, Independently interpreting results (not separately reported) and communicating results to the patient/family/caregiver, or Care coordination (not separately reported).         Each patient to whom he or she provides medical services by telemedicine is:  (1) informed of the relationship between the physician and patient and the respective role of any other health care provider with respect to management of the patient; and (2) notified that he or she may decline to receive medical services by telemedicine and may withdraw from such care at any time.    Notes:     The patient presents for MRI results.    MRI lumbar spine demonstrates mild to moderate stenosis at L3/4.     A/P:  Plan for TFESI with Dr. Aguirre.

## 2025-05-09 ENCOUNTER — HOSPITAL ENCOUNTER (OUTPATIENT)
Dept: RADIOLOGY | Facility: HOSPITAL | Age: 65
Discharge: HOME | End: 2025-05-09
Attending: INTERNAL MEDICINE
Payer: COMMERCIAL

## 2025-05-09 ENCOUNTER — CLINICAL SUPPORT (OUTPATIENT)
Dept: INTERNAL MEDICINE | Facility: CLINIC | Age: 65
End: 2025-05-09
Attending: INTERNAL MEDICINE
Payer: COMMERCIAL

## 2025-05-09 ENCOUNTER — HOSPITAL ENCOUNTER (OUTPATIENT)
Dept: CARDIOLOGY | Facility: CLINIC | Age: 65
Discharge: HOME | End: 2025-05-09
Payer: COMMERCIAL

## 2025-05-09 DIAGNOSIS — Z00.00 ROUTINE GENERAL MEDICAL EXAMINATION AT A HEALTH CARE FACILITY: ICD-10-CM

## 2025-05-09 DIAGNOSIS — Z00.00 ROUTINE MEDICAL EXAM: ICD-10-CM

## 2025-05-09 LAB
25(OH)D3+25(OH)D2 SERPL-MCNC: 68 NG/ML (ref 30–96)
ALBUMIN SERPL BCP-MCNC: 3.8 G/DL (ref 3.5–5.2)
ALP SERPL-CCNC: 82 UNIT/L (ref 40–150)
ALT SERPL W/O P-5'-P-CCNC: 97 UNIT/L (ref 10–44)
ANION GAP (OHS): 7 MMOL/L (ref 8–16)
AST SERPL-CCNC: 78 UNIT/L (ref 11–45)
BILIRUB SERPL-MCNC: 0.5 MG/DL (ref 0.1–1)
BUN SERPL-MCNC: 26 MG/DL (ref 8–23)
CALCIUM SERPL-MCNC: 9.5 MG/DL (ref 8.7–10.5)
CHLORIDE SERPL-SCNC: 106 MMOL/L (ref 95–110)
CHOLEST SERPL-MCNC: 197 MG/DL (ref 120–199)
CHOLEST/HDLC SERPL: 2.9 {RATIO} (ref 2–5)
CO2 SERPL-SCNC: 30 MMOL/L (ref 23–29)
CREAT SERPL-MCNC: 0.7 MG/DL (ref 0.5–1.4)
EAG (OHS): 105 MG/DL (ref 68–131)
ERYTHROCYTE [DISTWIDTH] IN BLOOD BY AUTOMATED COUNT: 12.9 % (ref 11.5–14.5)
GFR SERPLBLD CREATININE-BSD FMLA CKD-EPI: >60 ML/MIN/1.73/M2
GLUCOSE SERPL-MCNC: 90 MG/DL (ref 70–110)
HBA1C MFR BLD: 5.3 % (ref 4–5.6)
HCT VFR BLD AUTO: 39.5 % (ref 37–48.5)
HDLC SERPL-MCNC: 69 MG/DL (ref 40–75)
HDLC SERPL: 35 % (ref 20–50)
HGB BLD-MCNC: 13.5 GM/DL (ref 12–16)
LDLC SERPL CALC-MCNC: 115.2 MG/DL (ref 63–159)
MCH RBC QN AUTO: 31.3 PG (ref 27–31)
MCHC RBC AUTO-ENTMCNC: 34.2 G/DL (ref 32–36)
MCV RBC AUTO: 91 FL (ref 82–98)
MUMPS IGG (OHS): <5 AU/ML
MUMPS IGG INTERPRETATION (OHS): NEGATIVE
NONHDLC SERPL-MCNC: 128 MG/DL
OHS QRS DURATION: 82 MS
OHS QTC CALCULATION: 401 MS
PLATELET # BLD AUTO: 252 K/UL (ref 150–450)
PMV BLD AUTO: 9.3 FL (ref 9.2–12.9)
POTASSIUM SERPL-SCNC: 4.1 MMOL/L (ref 3.5–5.1)
PROT SERPL-MCNC: 6.7 GM/DL (ref 6–8.4)
RBC # BLD AUTO: 4.32 M/UL (ref 4–5.4)
RUBEOLA (MEASLES) IGG (OHS): 19.7 AU/ML
RUBEOLA IGG INTERP. (OHS): POSITIVE
SODIUM SERPL-SCNC: 143 MMOL/L (ref 136–145)
TRIGL SERPL-MCNC: 64 MG/DL (ref 30–150)
TSH SERPL-ACNC: 1.13 UIU/ML (ref 0.4–4)
WBC # BLD AUTO: 5.65 K/UL (ref 3.9–12.7)

## 2025-05-09 PROCEDURE — 84075 ASSAY ALKALINE PHOSPHATASE: CPT

## 2025-05-09 PROCEDURE — 84443 ASSAY THYROID STIM HORMONE: CPT

## 2025-05-09 PROCEDURE — 77063 BREAST TOMOSYNTHESIS BI: CPT | Mod: 26,,, | Performed by: RADIOLOGY

## 2025-05-09 PROCEDURE — 93010 ELECTROCARDIOGRAM REPORT: CPT | Mod: S$GLB,,, | Performed by: INTERNAL MEDICINE

## 2025-05-09 PROCEDURE — 82306 VITAMIN D 25 HYDROXY: CPT

## 2025-05-09 PROCEDURE — 86765 RUBEOLA ANTIBODY: CPT

## 2025-05-09 PROCEDURE — 77063 BREAST TOMOSYNTHESIS BI: CPT | Mod: TC

## 2025-05-09 PROCEDURE — 83036 HEMOGLOBIN GLYCOSYLATED A1C: CPT

## 2025-05-09 PROCEDURE — 93005 ELECTROCARDIOGRAM TRACING: CPT | Mod: S$GLB,,, | Performed by: INTERNAL MEDICINE

## 2025-05-09 PROCEDURE — 99999 PR PBB SHADOW E&M-EST. PATIENT-LVL II: CPT | Mod: PBBFAC,,,

## 2025-05-09 PROCEDURE — 77067 SCR MAMMO BI INCL CAD: CPT | Mod: 26,,, | Performed by: RADIOLOGY

## 2025-05-09 PROCEDURE — 86735 MUMPS ANTIBODY: CPT

## 2025-05-09 PROCEDURE — 85027 COMPLETE CBC AUTOMATED: CPT

## 2025-05-09 PROCEDURE — 80061 LIPID PANEL: CPT

## 2025-05-13 ENCOUNTER — OFFICE VISIT (OUTPATIENT)
Dept: FAMILY MEDICINE | Facility: CLINIC | Age: 65
End: 2025-05-13
Payer: COMMERCIAL

## 2025-05-13 ENCOUNTER — PATIENT MESSAGE (OUTPATIENT)
Dept: BARIATRICS | Facility: CLINIC | Age: 65
End: 2025-05-13
Payer: COMMERCIAL

## 2025-05-13 VITALS
SYSTOLIC BLOOD PRESSURE: 125 MMHG | BODY MASS INDEX: 24.16 KG/M2 | WEIGHT: 145 LBS | DIASTOLIC BLOOD PRESSURE: 65 MMHG | HEIGHT: 65 IN

## 2025-05-13 DIAGNOSIS — K76.0 FATTY LIVER: ICD-10-CM

## 2025-05-13 DIAGNOSIS — Z13.6 ENCOUNTER FOR SCREENING FOR CARDIOVASCULAR DISORDERS: ICD-10-CM

## 2025-05-13 DIAGNOSIS — E78.2 MIXED HYPERLIPIDEMIA: ICD-10-CM

## 2025-05-13 DIAGNOSIS — Z98.84 HISTORY OF BARIATRIC SURGERY: ICD-10-CM

## 2025-05-13 DIAGNOSIS — I10 ESSENTIAL HYPERTENSION: ICD-10-CM

## 2025-05-13 DIAGNOSIS — M85.89 OSTEOPENIA OF MULTIPLE SITES: ICD-10-CM

## 2025-05-13 DIAGNOSIS — Z00.00 ANNUAL PHYSICAL EXAM: Primary | ICD-10-CM

## 2025-05-13 DIAGNOSIS — R74.8 ELEVATED LIVER ENZYMES: ICD-10-CM

## 2025-05-13 DIAGNOSIS — Z90.49 S/P CHOLECYSTECTOMY: ICD-10-CM

## 2025-05-13 PROBLEM — G89.29 CHRONIC PAIN: Status: RESOLVED | Noted: 2018-03-02 | Resolved: 2025-05-13

## 2025-05-13 PROCEDURE — 3074F SYST BP LT 130 MM HG: CPT | Mod: CPTII,S$GLB,, | Performed by: INTERNAL MEDICINE

## 2025-05-13 PROCEDURE — 99396 PREV VISIT EST AGE 40-64: CPT | Mod: S$GLB,,, | Performed by: INTERNAL MEDICINE

## 2025-05-13 PROCEDURE — 3078F DIAST BP <80 MM HG: CPT | Mod: CPTII,S$GLB,, | Performed by: INTERNAL MEDICINE

## 2025-05-13 PROCEDURE — 3044F HG A1C LEVEL LT 7.0%: CPT | Mod: CPTII,S$GLB,, | Performed by: INTERNAL MEDICINE

## 2025-05-13 PROCEDURE — 3008F BODY MASS INDEX DOCD: CPT | Mod: CPTII,S$GLB,, | Performed by: INTERNAL MEDICINE

## 2025-05-13 PROCEDURE — 1159F MED LIST DOCD IN RCRD: CPT | Mod: CPTII,S$GLB,, | Performed by: INTERNAL MEDICINE

## 2025-05-13 PROCEDURE — 99999 PR PBB SHADOW E&M-EST. PATIENT-LVL IV: CPT | Mod: PBBFAC,,, | Performed by: INTERNAL MEDICINE

## 2025-05-13 RX ORDER — ERGOCALCIFEROL 1.25 MG/1
CAPSULE ORAL
Qty: 12 CAPSULE | Refills: 3 | Status: SHIPPED | OUTPATIENT
Start: 2025-05-13

## 2025-05-13 NOTE — PROGRESS NOTES
Patient ID: Renae Hou is a 64 y.o. female.    Chief Complaint: Executive Health      Assessment:       1. Annual physical exam    2. History of bariatric surgery: Gastric sleeve 2019    3. Mixed hyperlipidemia    4. Essential hypertension    5. S/P cholecystectomy@ 2006    6. Elevated liver enzymes    7. Fatty liver: seen on u/s 2020    8. Osteopenia of multiple sites: see DEXA 5/23    9. Encounter for screening for cardiovascular disorders       Plan:           1. Annual physical exam  -     US Carotid Bilateral; Future; Expected date: 05/13/2025    2. History of bariatric surgery: Gastric sleeve 2019:  Stable with acceptable labs aside from elevated liver labs, continue regimen    3. Mixed hyperlipidemia:  Will obtain repeat coronary CT for risk stratification.  EKG today is acceptable.  She would like to get a baseline carotid ultrasound which I think is reasonable.  High-fiber, plant based eating, exercise  The 10-year ASCVD risk score (Zhou [employer], et al., 2019) is: 5.7%    Values used to calculate the score:      Age: 64 years      Sex: Female      Is Non- : No      Diabetic: No      Tobacco smoker: No      Systolic Blood Pressure: 125 mmHg      Is BP treated: Yes      HDL Cholesterol: 69 mg/dL      Total Cholesterol: 197 mg/dL     CT score 1 2021      4. Essential hypertension:  Stable on regimen.  Continue with low-sodium eating, exercise as tolerated.  Call if readings greater than 130/80 on a regular basis.    5. S/P cholecystectomy@ 2006    6. Elevated liver enzymes; history fatty liver in the past.  Seldom drinks alcohol.  Maybe related to recent viral syndrome.  Will recheck labs within the next 4-6 weeks.  Also could be related to Wegovy.  May need to hold this medication, discussed.  -     Hepatic Function Panel; Future; Expected date: 05/13/2025    7. Fatty liver: seen on u/s 2020    8. Osteopenia of multiple sites: see DEXA 5/23  -     DXA Bone Density Axial Skeleton 1  or more sites; Future; Expected date: 2025    9. Encounter for screening for cardiovascular disorders  -     CT Cardiac Scoring; Future; Expected date: 2025    Other orders  -     ergocalciferol (VITAMIN D2) 50,000 unit Cap; Take 1 capsule by mouth Every 14 days  Dispense: 12 capsule; Refill: 3       EKG acceptable  Not immune to rubella and mumps, she will get MMR  I will review all studies and determine further tx depending on findings      Subjective:   History of Present Illness    CHIEF COMPLAINT:  Patient presents today for Executive Physical exam.    HISTORY OF PRESENT ILLNESS:  She reports upper respiratory symptoms that began on Saturday, including coughing at night and dizziness upon waking yesterday and this morning. She notes possible exposure at work from a coworker who frequently comes to work while ill and sneezes without covering his face.  She has not had fever, chills or sweats.  Mucus is primarily clear.  She has not had shortness a breath.  Left ear is bothering her a little bit.  Denies GI or  symptoms.    PAST MEDICAL HISTORY:  She has a history of fatty liver and osteopenia. She underwent back surgery in 2017 which was complicated by spinal abscess and infection. She had a cholecystectomy around 0166-2005.  She has had gastric sleeve surgery as well.    CURRENT MEDICATIONS:  She is currently taking Wegovy and hydrochlorothiazide. She takes gabapentin as needed for severe symptoms but reports experiencing irritability the morning after taking it at night.    FAMILY HISTORY:  Mother is  with history of hypertension, hyperlipidemia, and valvular heart disease. Father is  with history of myocardial infarction, hypotension, arthritis, back problems, and dementia. Daughter has vitiligo, Hashimoto's thyroiditis, and SVT.    SOCIAL HISTORY:  She quit smoking 37 years ago and reports minimal alcohol consumption, having one drink on a social occasion last  week.    LABS:  Vitamin D level is within normal range. A1c is 5.3. Thyroid function tests are normal. Cholesterol is 197 with HDL 69 and . Complete blood count is normal. Liver function tests are elevated. She is not immune to Rubella and Mumps.      ROS:  General: -fever, -chills, -fatigue, -weight gain, -weight loss  Eyes: -vision changes, -redness, -discharge  ENT: -ear pain, -nasal congestion, -sore throat  Cardiovascular: -chest pain, -palpitations, -lower extremity edema  Respiratory: +cough, -shortness of breath  Gastrointestinal: -abdominal pain, +nausea, -vomiting, -diarrhea, -constipation, -blood in stool  Genitourinary: -dysuria, -hematuria, -frequency  Musculoskeletal: -joint pain, -muscle pain, +bone pain, +back pain, +limb pain  Skin: -rash, -lesion, +excessive sweating  Neurological: -headache, +dizziness, -numbness, -tingling  Psychiatric: -anxiety, -depression, -sleep difficulty          Patient Active Problem List  Diagnosis    RENAE dx 2010, improved after weight loss; also seen 5/23- mild, positional per home sleep study    Chronic constipation    Mixed hyperlipidemia    Pre-diabetes    History of lumbar surgery    Essential hypertension    Fatty liver: seen on u/s 2020    Spondylolisthesis    Gastroesophageal reflux disease without esophagitis    Vaginal atrophy    Dyspareunia, female    Mixed stress and urge urinary incontinence    Rectocele, female    Cystocele, midline    History of DVT (deep vein thrombosis): 2018     Multiple thyroid nodules: stable 5/22, also 5/23 no further follow-up recommended per Radiology    Dysphonia    Cervical radiculopathy    Mallet toe of left foot    Chronic left-sided low back pain with left-sided sciatica    Carpal tunnel syndrome of right wrist: see EMG 10/21    Tubular adenoma of colon: see colonoscopy 2022    Polyarthralgia    COVID 1/22; 12/22    Low serum iron    Decreased strength of lower extremity    Osteopenia of multiple sites: see DEXA 5/23     History of bariatric surgery: Gastric sleeve 2019    Myelopathy in diseases classified elsewhere    Chronic migraine without aura without status migrainosus, not intractable    S/P cholecystectomy@ 2006          Objective:      Physical Exam  Vitals and nursing note reviewed.   Constitutional:       Appearance: She is well-developed.   HENT:      Head: Normocephalic and atraumatic.      Right Ear: External ear normal.      Left Ear: External ear normal.      Nose: Nose normal.      Mouth/Throat:      Pharynx: No oropharyngeal exudate.   Eyes:      General: No scleral icterus.     Extraocular Movements: Extraocular movements intact.      Conjunctiva/sclera: Conjunctivae normal.   Neck:      Thyroid: No thyromegaly.      Vascular: No JVD.   Cardiovascular:      Rate and Rhythm: Normal rate and regular rhythm.      Heart sounds: Normal heart sounds. No murmur heard.     No gallop.   Pulmonary:      Effort: Pulmonary effort is normal. No respiratory distress.      Breath sounds: Normal breath sounds. No wheezing.   Abdominal:      General: Bowel sounds are normal. There is no distension.      Palpations: Abdomen is soft. There is no mass.      Tenderness: There is no abdominal tenderness. There is no guarding or rebound.   Musculoskeletal:         General: No tenderness. Normal range of motion.      Cervical back: Normal range of motion and neck supple.   Lymphadenopathy:      Cervical: No cervical adenopathy.   Skin:     General: Skin is warm.      Findings: No erythema or rash.   Neurological:      General: No focal deficit present.      Mental Status: She is alert and oriented to person, place, and time.      Cranial Nerves: No cranial nerve deficit.      Coordination: Coordination normal.   Psychiatric:         Behavior: Behavior normal.         Thought Content: Thought content normal.         Judgment: Judgment normal.             Health Maintenance Due   Topic Date Due    COVID-19 Vaccine (3 - 2024-25 season)  09/01/2024        This note was generated with the assistance of ambient listening technology. Verbal consent was obtained by the patient and accompanying visitor(s) for the recording of patient appointment to facilitate this note. I attest to having reviewed and edited the generated note for accuracy, though some syntax or spelling errors may persist. Please contact the author of this note for any clarification.

## 2025-05-13 NOTE — LETTER
May 13, 2025    Renae Hou  [address] [address]             [facility]  [address]  [address]  [facility] [address]  Phone: [phone]  Fax: [phone] Dear Mrs. Hou:    Thank you for allowing me to serve you and perform your Executive Health exam on 5/13/2025.  This letter will serve a brief summary of the history, physical findings, and laboratory/studies performed and recommendations at that time.    Reason for Visit: Executive Health Preventive Physical Examination    Past Medical History:   Diagnosis Date    Abscess of paraspinous muscles 2018    Allergy 04/2018    Class 1 obesity due to excess calories in adult 07/23/2019    DVT (deep venous thrombosis)     left leg    Epidural abscess 2018 04/10/2018    Fatty liver     GERD (gastroesophageal reflux disease) 2015    History of major abdominal surgery 09/11/2015    Had Hysterectomy , cholecystectomy , rectocele repair    Hypertension     Lumbar radiculopathy     Menopause     Obesity     Obstructive sleep apnea on CPAP     no longer uses CPAP after weight loss from gastric sleeve    Thyroid disease     Thyroid nodules.       Past Surgical History:   Procedure Laterality Date    ADENOIDECTOMY  1995    ANTERIOR CERVICAL DISCECTOMY W/ FUSION N/A 12/11/2023    Procedure: DISCECTOMY, SPINE, CERVICAL, ANTERIOR APPROACH, WITH FUSION C4/5 spinewave rep-less SNS:vocal/motors/SSEP;  Surgeon: Dean Zayas MD;  Location: [facility];  Service: Orthopedics;  Laterality: N/A;    BACK SURGERY  2002    L4, L5    BILATERAL SALPINGOOPHORECTOMY      CHOLECYSTECTOMY      COLONOSCOPY N/A 6/13/2022    Procedure: COLONOSCOPY;  Surgeon: Saul Ureña MD;  Location: [address]);  Service: Endoscopy;  Laterality: N/A;  constipation protocol-extended Miralax prep - PEG allergy -okay with taking Miralax  fully vaccinated, prep instr portal -ml    COLONOSCOPY N/A 10/4/2022    Procedure: COLONOSCOPY;  Surgeon: Saul GAMBINO  MD Niranjan;  Location: [facility] (4TH FLR);  Service: Endoscopy;  Laterality: N/A;  Constipation protocol-Miralax prep-pt tolerated Miralax for last colonoscopy.  fully vaccinated, prep instr portal -ml    COLONOSCOPY N/A 10/9/2023    Procedure: COLONOSCOPY;  Surgeon: Saul Ureña MD;  Location: [facility] (4TH FLR);  Service: Endoscopy;  Laterality: N/A;  Ref by Jackie Wise-Wegovy- pt instructed to hold x 7 days prior procedure,  Miralax, portal - PC  10/2-precall complete/pt aware to hold wegovy x7 days-MS    CORRECTION OF HAMMER TOE Left 8/5/2021    Procedure: CORRECTION, HAMMER TOE Left 4th mallet toe, DIP joitn fusion;  Surgeon: Guero Alvarez MD;  Location: [facility] OR;  Service: Orthopedics;  Laterality: Left;  K-wires    CYST REMOVAL      EPIDURAL STEROID INJECTION INTO CERVICAL SPINE N/A 8/11/2020    Procedure: Injection-steroid-epidural-cervical;  Surgeon: [facility];  Location: [facility] 2ND FLR;  Service: Radiology;  Laterality: N/A;  /Carla    EPIDURAL STEROID INJECTION INTO CERVICAL SPINE N/A 3/2/2022    Procedure: Injection-steroid-epidural-cervical C7-T1;  Surgeon: Latoya Quintanilla MD;  Location: [facility] PAIN MGT;  Service: Pain Management;  Laterality: N/A;    ESOPHAGOGASTRODUODENOSCOPY N/A 5/3/2019    Procedure: ESOPHAGOGASTRODUODENOSCOPY (EGD);  Surgeon: Carlin Sanchez MD;  Location: [facility] (4TH FLR);  Service: Endoscopy;  Laterality: N/A;    HYSTERECTOMY  12/17/2012    DV BS&O     INJECTION OF JOINT Left 3/1/2021    Procedure: INJECTION, JOINT LEFT HIP INTRA ARTICULAR CORTISONE INJECTION DIRECT REFERRAL;  Surgeon: Chuy Gregory MD;  Location: [facility] PAIN MGT;  Service: Pain Management;  Laterality: Left;  NEEDS CONSENT    INSERTION OF INFERIOR VENA CAVAL FILTER Right 7/19/2019    Procedure: IVC filter placement;  Surgeon: Rodney Rico MD;  Location: [facility] CATH LAB;  Service: Peripheral Vascular;  Laterality: Right;    IVC FILTER RETRIEVAL N/A 12/20/2019     Procedure: REMOVAL-FILTER-IVC;  Surgeon: Rodney Rico MD;  [facility];  Service: Peripheral Vascular;  Laterality: N/A;    LAPAROSCOPIC SLEEVE GASTRECTOMY N/A 2019    Procedure: GASTRECTOMY, SLEEVE, LAPAROSCOPIC, with intraop EGD [id number];  Surgeon: Duc Ratliff Jr., MD;  Location: [facility] OR [address] [place] [address];  Service: General;  Laterality: N/A;    RECTOCELE REPAIR      SPINE SURGERY  2018    THYROID NODULE REMOVAL      TONSILLECTOMY         Family History   Problem Relation Name Age of Onset    Thyroid disease Mother Mother     Hypertension Mother Mother     Hyperlipidemia Mother Mother     Heart disease Mother Mother 55        cad, valvular heart disease    Deep vein thrombosis Mother Mother     Heart disease Father Gavin Ming         MI    Stroke Father Gavin Ming     Diabetes Sister Sister     Alcohol abuse Sister Sister     Cirrhosis Sister Sister         alcoholic cirrhosis    Epilepsy Sister Sister     Heart disease Daughter Sabrina         SVT    Thyroid disease Daughter Sabrina         Hashimoto's    Hashimoto's thyroiditis Daughter Sabrina     No Known Problems Daughter Lori     Heart disease Maternal Grandmother      Thyroid disease Maternal Grandmother      Kidney disease Maternal Grandmother      Heart disease Paternal Grandmother          MI    Breast cancer Neg Hx      Colon cancer Neg Hx      Ovarian cancer Neg Hx      Esophageal cancer Neg Hx         Social History     Socioeconomic History    Marital status:     Number of children: 2   Tobacco Use    Smoking status: Former     Current packs/day: 0.00     Types: Cigarettes     Quit date: 1988     Years since quittin.3    Smokeless tobacco: Never    Tobacco comments:     smoked socially decades ago - weekends only   Substance and Sexual Activity    Alcohol use: Not Currently     Comment: yearly at most     Drug use: No    Sexual activity: Yes     Partners: Male     Birth control/protection: See Surgical  Hx, None     Comment: VINI BS&O 12/17/2012,     Social History Narrative    . 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     Social Drivers of Health     Financial Resource Strain: Low Risk  (12/30/2024)    Overall Financial Resource Strain (CARDIA)     Difficulty of Paying Living Expenses: Not hard at all   Food Insecurity: No Food Insecurity (12/30/2024)    Hunger Vital Sign     Worried About Running Out of Food in the Last Year: Never true     Ran Out of Food in the Last Year: Never true   Transportation Needs: No Transportation Needs (12/11/2023)    PRAPARE - Transportation     Lack of Transportation (Medical): No     Lack of Transportation (Non-Medical): No   Physical Activity: Unknown (12/30/2024)    Exercise Vital Sign     Days of Exercise per Week: Patient declined   Stress: No Stress Concern Present (12/30/2024)    [employer] [place] [employer] - Occupational Stress Questionnaire     Feeling of Stress : Not at all   Housing Stability: Low Risk  (12/11/2023)    Housing Stability Vital Sign     Unable to Pay for Housing in the Last Year: No     Number of Places Lived in the Last Year: 1     Unstable Housing in the Last Year: No        Review of patient's allergies indicates:   Allergen Reactions    Cathflo activase [alteplase] Anaphylaxis     Tightness in chest, raspy throat, restless legs, hotness all over    Heparin analogues      Restless legs, tightness in chest, and hot all over    Xyzal [levocetirizine] Swelling    Latex Swelling               Current Outpatient Medications:     ferrous gluconate (FERGON) 324 MG tablet, Every other day with orange juice, Disp: 30 tablet, Rfl: 12    gabapentin (NEURONTIN) 100 MG capsule, Take 1 capsule (100 mg total) by mouth nightly as needed (facial pain)., Disp: 20 capsule, Rfl: 0    hydroCHLOROthiazide 12.5 MG Tab, Take 1 tablet (12.5 mg total) by mouth once daily., Disp: 90 tablet, Rfl: 1    lifitegrast (XIIDRA) 5 % Dpet, Apply 1 drop to eye 2 (two) times daily.,  Disp: 180 each, Rfl: 3    semaglutide, weight loss, (WEGOVY) 1.7 mg/0.75 mL PnIj, Inject 1.7 mg into the skin every 7 days., Disp: 9 mL, Rfl: 1    EPINEPHrine (EPIPEN) 0.3 mg/0.3 mL AtIn, Inject 0.3 mLs (0.3 mg total) into the muscle once. for 1 dose, Disp: 1 each, Rfl: 3    ergocalciferol (VITAMIN D2) 50,000 unit Cap, Take 1 capsule by mouth Every 14 days, Disp: 12 capsule, Rfl: 3     Review of Systems  Review of Systems - Negative except for recent upper respiratory issues.    Physical Exam:  Unremarkable, please see detailed notes from your office visit today.    Labs:  Overall acceptable other than slightly elevated cholesterol, elevated liver labs and lack of immunity to mumps and rubella.  EKG was acceptable.     Assessment/Recommendations:  Routine Health Maintenance:  Consideration could be given to a COVID booster.    Liver labs are out of range which could be related to fatty liver, or recent illness, or we go the.  I would recommend consideration of holding Wegovy.  Labs need to be rechecked was in the next 4 weeks or so.  If liver labs continue to be elevated, it will be necessary to stop the Wegovy completely and do an abdominal ultrasound and follow-up.  We discussed getting a repeat CT for risk stratification as well as a carotid ultrasound, those will be scheduled for you at a later point  Please catch up your MMR vaccines since you are not immune to mumps and rubella  If you are sinus issues denied improved spontaneously within the next week, let me know as we could consider some antibiotics.    At this time, you appear to be in good medical condition.  I look forward to seeing you again next year.  Please contact me should you have any questions or concerns regarding physical findings, or my recommendations.    Sincerely,    Wendy Horowitz MD

## 2025-05-22 ENCOUNTER — RESULTS FOLLOW-UP (OUTPATIENT)
Dept: FAMILY MEDICINE | Facility: CLINIC | Age: 65
End: 2025-05-22

## 2025-05-22 ENCOUNTER — HOSPITAL ENCOUNTER (OUTPATIENT)
Dept: RADIOLOGY | Facility: HOSPITAL | Age: 65
Discharge: HOME | End: 2025-05-22
Attending: INTERNAL MEDICINE
Payer: COMMERCIAL

## 2025-05-22 DIAGNOSIS — Z13.6 ENCOUNTER FOR SCREENING FOR CARDIOVASCULAR DISORDERS: ICD-10-CM

## 2025-05-22 DIAGNOSIS — Z00.00 ANNUAL PHYSICAL EXAM: ICD-10-CM

## 2025-05-22 PROCEDURE — 93880 EXTRACRANIAL BILAT STUDY: CPT | Mod: TC

## 2025-05-22 PROCEDURE — 75571 CT HRT W/O DYE W/CA TEST: CPT | Mod: TC

## 2025-05-22 PROCEDURE — 93880 EXTRACRANIAL BILAT STUDY: CPT | Mod: 26,,, | Performed by: RADIOLOGY

## 2025-05-23 ENCOUNTER — HOSPITAL ENCOUNTER (OUTPATIENT)
Dept: RADIOLOGY | Facility: HOSPITAL | Age: 65
Discharge: HOME | End: 2025-05-23
Attending: INTERNAL MEDICINE
Payer: COMMERCIAL

## 2025-05-23 DIAGNOSIS — M85.89 OSTEOPENIA OF MULTIPLE SITES: ICD-10-CM

## 2025-05-23 PROCEDURE — 77080 DXA BONE DENSITY AXIAL: CPT | Mod: 26,,, | Performed by: INTERNAL MEDICINE

## 2025-05-23 PROCEDURE — 77080 DXA BONE DENSITY AXIAL: CPT | Mod: TC

## 2025-05-30 ENCOUNTER — HOSPITAL ENCOUNTER (OUTPATIENT)
Dept: INTERVENTIONAL RADIOLOGY/VASCULAR | Facility: HOSPITAL | Age: 65
Discharge: HOME | End: 2025-05-30
Attending: PHYSICIAN ASSISTANT
Payer: COMMERCIAL

## 2025-05-30 VITALS
HEART RATE: 93 BPM | RESPIRATION RATE: 16 BRPM | SYSTOLIC BLOOD PRESSURE: 122 MMHG | DIASTOLIC BLOOD PRESSURE: 75 MMHG | OXYGEN SATURATION: 100 %

## 2025-05-30 DIAGNOSIS — M48.062 SPINAL STENOSIS OF LUMBAR REGION WITH NEUROGENIC CLAUDICATION: ICD-10-CM

## 2025-05-30 PROCEDURE — 63600175 PHARM REV CODE 636 W HCPCS: Mod: JZ,TB | Performed by: RADIOLOGY

## 2025-05-30 PROCEDURE — 64483 NJX AA&/STRD TFRM EPI L/S 1: CPT | Mod: LT | Performed by: RADIOLOGY

## 2025-05-30 RX ORDER — METHYLPREDNISOLONE ACETATE 40 MG/ML
INJECTION, SUSPENSION INTRA-ARTICULAR; INTRALESIONAL; INTRAMUSCULAR; SOFT TISSUE
Status: COMPLETED | OUTPATIENT
Start: 2025-05-30 | End: 2025-05-30

## 2025-05-30 RX ADMIN — METHYLPREDNISOLONE ACETATE 40 MG: 40 INJECTION, SUSPENSION INTRA-ARTICULAR; INTRALESIONAL; INTRAMUSCULAR; SOFT TISSUE at 11:05

## 2025-05-30 NOTE — H&P
Radiology Minor Procedure Note    Procedure Requested: Transforaminal NEERAJ    History of Present Illness:  Renae Hou is a 64 y.o. female with history of prior lumbar spine surgery with radiculopathy referred for Left L3-4 TF NEERAJ.  Past Medical History:   Diagnosis Date    Abscess of paraspinous muscles 2018    Allergy 04/2018    Class 1 obesity due to excess calories in adult 07/23/2019    DVT (deep venous thrombosis)     left leg    Epidural abscess 2018 04/10/2018    Fatty liver     GERD (gastroesophageal reflux disease) 2015    History of major abdominal surgery 09/11/2015    Had Hysterectomy , cholecystectomy , rectocele repair    Hypertension     Lumbar radiculopathy     Menopause     Obesity     Obstructive sleep apnea on CPAP     no longer uses CPAP after weight loss from gastric sleeve    Thyroid disease     Thyroid nodules.     Past Surgical History:   Procedure Laterality Date    ADENOIDECTOMY  1995    ANTERIOR CERVICAL DISCECTOMY W/ FUSION N/A 12/11/2023    Procedure: DISCECTOMY, SPINE, CERVICAL, ANTERIOR APPROACH, WITH FUSION C4/5 spinewave rep-less SNS:vocal/motors/SSEP;  Surgeon: Dean Zayas MD;  Location: [facility];  Service: Orthopedics;  Laterality: N/A;    BACK SURGERY  2002    L4, L5    BILATERAL SALPINGOOPHORECTOMY      CHOLECYSTECTOMY      COLONOSCOPY N/A 6/13/2022    Procedure: COLONOSCOPY;  Surgeon: Saul Ureña MD;  Location: [address]);  Service: Endoscopy;  Laterality: N/A;  constipation protocol-extended Miralax prep - PEG allergy -okay with taking Miralax  fully vaccinated, prep instr portal -ml    COLONOSCOPY N/A 10/4/2022    Procedure: COLONOSCOPY;  Surgeon: Saul Ureña MD;  Location: [address]);  Service: Endoscopy;  Laterality: N/A;  Constipation protocol-Miralax prep-pt tolerated Miralax for last colonoscopy.  fully vaccinated, prep instr portal -ml    COLONOSCOPY N/A 10/9/2023    Procedure: COLONOSCOPY;  Surgeon: Saul Ureña MD;   Location: [facility] ENDO (4TH FLR);  Service: Endoscopy;  Laterality: N/A;  Ref by Dr JULIANA Horowitz, [facility]- pt instructed to hold x 7 days prior procedure,  Miralax, portal - PC  10/2-precall complete/pt aware to hold wegovy x7 days-MS    CORRECTION OF HAMMER TOE Left 8/5/2021    Procedure: CORRECTION, HAMMER TOE Left 4th mallet toe, DIP joitn fusion;  Surgeon: Guero Alvarez MD;  Location: [facility] OR;  Service: Orthopedics;  Laterality: Left;  K-wires    CYST REMOVAL      EPIDURAL STEROID INJECTION INTO CERVICAL SPINE N/A 8/11/2020    Procedure: Injection-steroid-epidural-cervical;  Surgeon: [facility] Diagnostic Provider;  Location: [facility] 2ND FLR;  Service: Radiology;  Laterality: N/A;  /Carla    EPIDURAL STEROID INJECTION INTO CERVICAL SPINE N/A 3/2/2022    Procedure: Injection-steroid-epidural-cervical C7-T1;  Surgeon: Latoya Quintanilla MD;  Location: [facility] PAIN MGT;  Service: Pain Management;  Laterality: N/A;    ESOPHAGOGASTRODUODENOSCOPY N/A 5/3/2019    Procedure: ESOPHAGOGASTRODUODENOSCOPY (EGD);  Surgeon: Cariln Sanchez MD;  Location: [facility] (4TH FLR);  Service: Endoscopy;  Laterality: N/A;    HYSTERECTOMY  12/17/2012    [facility] BS&O     INJECTION OF JOINT Left 3/1/2021    Procedure: INJECTION, JOINT LEFT HIP INTRA ARTICULAR CORTISONE INJECTION DIRECT REFERRAL;  Surgeon: Chuy Gregory MD;  Location: [facility] PAIN MGT;  Service: Pain Management;  Laterality: Left;  NEEDS CONSENT    INSERTION OF INFERIOR VENA CAVAL FILTER Right 7/19/2019    Procedure: IVC filter placement;  Surgeon: Rodney Rico MD;  Location: [facility] CATH LAB;  Service: Peripheral Vascular;  Laterality: Right;    IVC FILTER RETRIEVAL N/A 12/20/2019    Procedure: REMOVAL-FILTER-IVC;  Surgeon: Rodney Rico MD;  Location: [facility] 2ND FLR;  Service: Peripheral Vascular;  Laterality: N/A;    LAPAROSCOPIC SLEEVE GASTRECTOMY N/A 7/23/2019    Procedure: GASTRECTOMY, SLEEVE, LAPAROSCOPIC, with intraop EGD [id number];  Surgeon: Duc MAHMOOD  Evy Villafuerte MD;  Location: [facility] OR [address] [place] [address];  Service: General;  Laterality: N/A;    RECTOCELE REPAIR      SPINE SURGERY  2018    THYROID NODULE REMOVAL      TONSILLECTOMY  1995       Allergies:   Review of patient's allergies indicates:   Allergen Reactions    Cathflo activase [alteplase] Anaphylaxis     Tightness in chest, raspy throat, restless legs, hotness all over    Heparin analogues      Restless legs, tightness in chest, and hot all over    Xyzal [levocetirizine] Swelling    Latex Swelling             Current Inpatient Meds:   Home Meds:   Prior to Admission medications    Medication Sig Start Date End Date Taking? Authorizing Provider   EPINEPHrine (EPIPEN) 0.3 mg/0.3 mL AtIn Inject 0.3 mLs (0.3 mg total) into the muscle once. for 1 dose 12/4/23 11/27/24  Wendy Horowitz MD   ergocalciferol (VITAMIN D2) 50,000 unit Cap Take 1 capsule by mouth Every 14 days 5/13/25   Wendy Horowitz MD   ferrous gluconate (FERGON) 324 MG tablet Every other day with orange juice 8/14/23   Wendy Horowitz MD   gabapentin (NEURONTIN) 100 MG capsule Take 1 capsule (100 mg total) by mouth nightly as needed (facial pain). 4/14/25 4/14/26  Priya Humphrey NP   hydroCHLOROthiazide 12.5 MG Tab Take 1 tablet (12.5 mg total) by mouth once daily. 2/7/25   Wendy Horowitz MD   lifitegrast (XIIDRA) 5 % Dpet Apply 1 drop to eye 2 (two) times daily. 2/17/25 2/17/26  Priya Bah OD   measles, mumps and rubella vaccine (MMR) 1,000-12,500 TCID50/0.5 mL injection Inject into the skin. 5/23/25   Shaun Mensah, CassiaD   semaglutide, weight loss, (WEGOVY) 1.7 mg/0.75 mL PnIj Inject 1.7 mg into the skin every 7 days. 1/23/25   Sabina Florentino MD      Anticoagulants/Antiplatelets: no anticoagulation    Labs:  Lab Results   Component Value Date    INR 1.0 10/11/2024       Lab Results   Component Value Date    WBC 5.65 05/09/2025    HGB 13.5 05/09/2025    HCT 39.5 05/09/2025    MCV 91 05/09/2025     05/09/2025       Lab Results   Component Value Date    GLU 90 05/09/2025     05/09/2025    K 4.1 05/09/2025     05/09/2025    CO2 30 (H) 05/09/2025    BUN 26 (H) 05/09/2025    CREATININE 0.7 05/09/2025    CALCIUM 9.5 05/09/2025    MG 2.2 08/25/2023    ALT 32 05/23/2025    AST 28 05/23/2025    ALBUMIN 3.4 (L) 05/23/2025       Objective:  Vitals: Pulse: 93 (05/30/25 1039)  Resp: 16 (05/30/25 1039)  BP: 122/75 (05/30/25 1039)  SpO2: 100 % (05/30/25 1039)   PE:  Strength: equal bilaterally    Plan: Left L3-4 Transforaminal NEERAJ. Informed consent obtained. Local Anesthesia.    Dean Hinton MD  Department of Radiology

## 2025-05-30 NOTE — PLAN OF CARE
Pt arrived to IR room 4 for NEERAJ. Pt oriented to unit and staff. Plan of care reviewed with patient, patient verbalizes understanding. Comfort measures utilized. Pt safely transferred from stretcher to procedural table. Fall risk reviewed with patient, fall risk interventions maintained. Blankets applied. Pt prepped and draped utilizing standard sterile technique. Timeouts completed utilizing standard universal time-out, per department and facility policy. RN to remain at bedside, continuous monitoring maintained. Pt resting comfortably. Denies pain/discomfort. Will continue to monitor. See flow sheets for monitoring, medication administration, and updates.

## 2025-05-30 NOTE — DISCHARGE INSTRUCTIONS
Patient Discharge Instructions for Spine Injections    About your injection:    Your injection today was at the level Left L3-L4    You received Depomedrol 40mg        Special instructions:    No driving today.   You may remove the band-aid tomorrow.   No tub baths, swimming, or hot tubs for 2 days. Showers are permitted.   You may experience some numbness/tingling at your legs/feet. This is often expected and should subside within several hours.    You may experience some discomfort at the site, but it should not be excruciating. It is ok to take over the counter pain reliever, if needed. It is also ok to use an ice pack if needed. Avoid heating pad use for 2 days.    It may take up to a week for you to notice any positive results from this procedure. Please inform your ordering physician if your symptoms return or get worse.    When to call:    Severe pain or headache  Loss of bowel/bladder control  Fever or chills  Redness or swelling around the injection site    Contact information:  For immediate concerns that are not emergent, you may call our interventional radiology nurses at [phone]     ** After hours and weekends: Call the paging  at [phone] and ask for the Radiology Resident on call**

## 2025-05-30 NOTE — PROCEDURES
Radiology Post-Procedure Note    Pre Op Diagnosis: LBP and radiculopathy    Post Op Diagnosis: Same    Procedure: Lumbar Transforaminal NEERAJ    Procedure performed by: Dean Hinton MD    Written Informed Consent Obtained: Yes    Specimen Removed: NO    Estimated Blood Loss: Minimal    Findings:     Level injected: Left L3-L4  Needle used: 22 gauge  Dose:  40 mg Depo-methylprednisolone   2 mL  Bupivicaine 0.25% MPF    Patient tolerated procedure well.    Dean Hinton MD  Department of Radiology

## 2025-06-03 ENCOUNTER — OFFICE VISIT (OUTPATIENT)
Dept: BARIATRICS | Facility: CLINIC | Age: 65
End: 2025-06-03
Payer: COMMERCIAL

## 2025-06-03 ENCOUNTER — PATIENT MESSAGE (OUTPATIENT)
Dept: BARIATRICS | Facility: CLINIC | Age: 65
End: 2025-06-03

## 2025-06-03 VITALS
DIASTOLIC BLOOD PRESSURE: 68 MMHG | SYSTOLIC BLOOD PRESSURE: 124 MMHG | HEART RATE: 85 BPM | OXYGEN SATURATION: 98 % | HEIGHT: 66 IN | BODY MASS INDEX: 23.74 KG/M2 | WEIGHT: 147.69 LBS

## 2025-06-03 DIAGNOSIS — E66.811 OBESITY, CLASS I, BMI 30.0-34.9 (SEE ACTUAL BMI): ICD-10-CM

## 2025-06-03 PROCEDURE — 99213 OFFICE O/P EST LOW 20 MIN: CPT | Mod: S$GLB,,, | Performed by: INTERNAL MEDICINE

## 2025-06-03 PROCEDURE — 3074F SYST BP LT 130 MM HG: CPT | Mod: CPTII,S$GLB,, | Performed by: INTERNAL MEDICINE

## 2025-06-03 PROCEDURE — 1160F RVW MEDS BY RX/DR IN RCRD: CPT | Mod: CPTII,S$GLB,, | Performed by: INTERNAL MEDICINE

## 2025-06-03 PROCEDURE — 3044F HG A1C LEVEL LT 7.0%: CPT | Mod: CPTII,S$GLB,, | Performed by: INTERNAL MEDICINE

## 2025-06-03 PROCEDURE — 1159F MED LIST DOCD IN RCRD: CPT | Mod: CPTII,S$GLB,, | Performed by: INTERNAL MEDICINE

## 2025-06-03 PROCEDURE — 3008F BODY MASS INDEX DOCD: CPT | Mod: CPTII,S$GLB,, | Performed by: INTERNAL MEDICINE

## 2025-06-03 PROCEDURE — 3078F DIAST BP <80 MM HG: CPT | Mod: CPTII,S$GLB,, | Performed by: INTERNAL MEDICINE

## 2025-06-03 PROCEDURE — 99999 PR PBB SHADOW E&M-EST. PATIENT-LVL IV: CPT | Mod: PBBFAC,,, | Performed by: INTERNAL MEDICINE

## 2025-06-03 RX ORDER — SEMAGLUTIDE 1.7 MG/.75ML
1.7 INJECTION, SOLUTION SUBCUTANEOUS
Qty: 9 ML | Refills: 1 | Status: SHIPPED | OUTPATIENT
Start: 2025-06-03

## 2025-06-03 RX ORDER — CALCIUM CARBONATE 500(1250)
1 TABLET ORAL DAILY
COMMUNITY

## 2025-06-23 ENCOUNTER — PATIENT MESSAGE (OUTPATIENT)
Dept: ORTHOPEDICS | Facility: CLINIC | Age: 65
End: 2025-06-23
Payer: COMMERCIAL

## 2025-07-02 ENCOUNTER — PATIENT MESSAGE (OUTPATIENT)
Dept: ADMINISTRATIVE | Facility: OTHER | Age: 65
End: 2025-07-02
Payer: COMMERCIAL

## 2025-07-06 ENCOUNTER — PATIENT MESSAGE (OUTPATIENT)
Dept: ORTHOPEDICS | Facility: CLINIC | Age: 65
End: 2025-07-06
Payer: COMMERCIAL

## 2025-07-07 ENCOUNTER — PATIENT MESSAGE (OUTPATIENT)
Dept: BARIATRICS | Facility: CLINIC | Age: 65
End: 2025-07-07
Payer: COMMERCIAL

## 2025-07-07 NOTE — TELEPHONE ENCOUNTER
Scheduled patient an appointment for right ring finger PIP joint follow up for possible injection.    Jayme Hoover MS, OTC   Sports Medicine Assistant   [facility]  (P) [phone]  (F) [phone]

## 2025-07-08 ENCOUNTER — PATIENT MESSAGE (OUTPATIENT)
Dept: BARIATRICS | Facility: CLINIC | Age: 65
End: 2025-07-08
Payer: COMMERCIAL

## 2025-07-08 ENCOUNTER — OFFICE VISIT (OUTPATIENT)
Dept: ORTHOPEDICS | Facility: CLINIC | Age: 65
End: 2025-07-08
Payer: COMMERCIAL

## 2025-07-08 DIAGNOSIS — Z98.1 S/P LUMBAR FUSION: Primary | ICD-10-CM

## 2025-07-08 PROCEDURE — 3044F HG A1C LEVEL LT 7.0%: CPT | Mod: CPTII,95,, | Performed by: PHYSICIAN ASSISTANT

## 2025-07-08 PROCEDURE — 98004 SYNCH AUDIO-VIDEO EST SF 10: CPT | Mod: 95,,, | Performed by: PHYSICIAN ASSISTANT

## 2025-07-10 DIAGNOSIS — M15.2 OSTEOARTHRITIS OF PROXIMAL INTERPHALANGEAL (PIP) JOINT OF RIGHT RING FINGER: Primary | ICD-10-CM

## 2025-07-14 ENCOUNTER — PATIENT MESSAGE (OUTPATIENT)
Facility: CLINIC | Age: 65
End: 2025-07-14
Payer: COMMERCIAL

## 2025-07-16 ENCOUNTER — OFFICE VISIT (OUTPATIENT)
Dept: ORTHOPEDICS | Facility: CLINIC | Age: 65
End: 2025-07-16
Payer: COMMERCIAL

## 2025-07-16 ENCOUNTER — HOSPITAL ENCOUNTER (OUTPATIENT)
Dept: RADIOLOGY | Facility: OTHER | Age: 65
Discharge: HOME | End: 2025-07-16
Attending: ORTHOPAEDIC SURGERY
Payer: COMMERCIAL

## 2025-07-16 DIAGNOSIS — M79.641 CHRONIC PAIN OF RIGHT HAND: ICD-10-CM

## 2025-07-16 DIAGNOSIS — G56.01 CARPAL TUNNEL SYNDROME OF RIGHT WRIST: ICD-10-CM

## 2025-07-16 DIAGNOSIS — M15.2 OSTEOARTHRITIS OF PROXIMAL INTERPHALANGEAL (PIP) JOINT OF RIGHT RING FINGER: Primary | ICD-10-CM

## 2025-07-16 DIAGNOSIS — G89.29 CHRONIC PAIN OF RIGHT HAND: ICD-10-CM

## 2025-07-16 DIAGNOSIS — M15.2 OSTEOARTHRITIS OF PROXIMAL INTERPHALANGEAL (PIP) JOINT OF RIGHT RING FINGER: ICD-10-CM

## 2025-07-16 PROCEDURE — 1159F MED LIST DOCD IN RCRD: CPT | Mod: CPTII,S$GLB,, | Performed by: ORTHOPAEDIC SURGERY

## 2025-07-16 PROCEDURE — 1160F RVW MEDS BY RX/DR IN RCRD: CPT | Mod: CPTII,S$GLB,, | Performed by: ORTHOPAEDIC SURGERY

## 2025-07-16 PROCEDURE — 73130 X-RAY EXAM OF HAND: CPT | Mod: 26,RT,, | Performed by: RADIOLOGY

## 2025-07-16 PROCEDURE — 99999 PR PBB SHADOW E&M-EST. PATIENT-LVL III: CPT | Mod: PBBFAC,,, | Performed by: ORTHOPAEDIC SURGERY

## 2025-07-16 PROCEDURE — 73130 X-RAY EXAM OF HAND: CPT | Mod: TC,FY,RT

## 2025-07-16 PROCEDURE — 99214 OFFICE O/P EST MOD 30 MIN: CPT | Mod: 25,S$GLB,, | Performed by: ORTHOPAEDIC SURGERY

## 2025-07-16 PROCEDURE — 3044F HG A1C LEVEL LT 7.0%: CPT | Mod: CPTII,S$GLB,, | Performed by: ORTHOPAEDIC SURGERY

## 2025-07-16 PROCEDURE — 20600 DRAIN/INJ JOINT/BURSA W/O US: CPT | Mod: F8,S$GLB,, | Performed by: ORTHOPAEDIC SURGERY

## 2025-07-16 RX ADMIN — METHYLPREDNISOLONE ACETATE 40 MG: 40 INJECTION, SUSPENSION INTRA-ARTICULAR; INTRALESIONAL; INTRAMUSCULAR; SOFT TISSUE at 01:07

## 2025-07-16 NOTE — PROGRESS NOTES
Renae Hou presents for follow up evaluation of   Encounter Diagnoses   Name Primary?    Osteoarthritis of proximal interphalangeal (PIP) joint of right ring finger Yes    Carpal tunnel syndrome of right wrist     Chronic pain of right hand      History of Present Illness    HPI:  Patient presents for evaluation of finger pain and numbness. She received an injection in her finger in January or March, which provided relief until last weekend when the pain recurred. The pain in her ring finger is affecting her daily activities. She also reports numbness, for which she previously received a carpal tunnel injection. She notes increased awareness of the numbness but is unsure if it is related to her lower back problems.    Her lower back pain started after a recent incident while washing her car. She felt a popping sensation in her back upon leaning over. She reports constant clicking in her back and difficulty moving, especially after increased activity. XRs were reportedly normal. She had a recent interventional injection for her back, which typically lasts a year but has been less effective this time.    She describes restless legs at night, particularly when lying on her back.        Vitals:    07/16/25 1346   PainSc:   7   PainLoc: Hand       PE:    AA&O x 4.  NAD  HEENT:  NCAT, sclera nonicteric  Lungs:  Respirations are equal and unlabored.  CV:  2+ bilateral upper and lower extremity pulses.      MSK: Swelling and tenderness to palpation right ring PIP joint. + compression, Tinels and Phalens right wrist. Neurovascularly intact bilaterally.  5/5 thenar and intrinsic musculature strength.  Full range of motion hands, wrists and elbows.    Diagnostic studies and other clinical records review:  X-rays AP, lateral and oblique right hand taken today are independently reviewed by me and shows Eaton stage II basilar thumb and right ring and long PIP arthritis.     Assessment/Plan:   Encounter Diagnoses   Name  Primary?    Osteoarthritis of proximal interphalangeal (PIP) joint of right ring finger Yes    Carpal tunnel syndrome of right wrist     Chronic pain of right hand      The patient and I had a thorough discussion today. We discussed the working diagnosis as well as several other potential alternative diagnoses. Treatment options were discussed, both conservative and surgical. Conservative treatment options would include things such as activity modifications, workplace modifications, a period of rest, oral vs topical OTC and prescription anti-inflammatory medications, occupational therapy, splinting/bracing, immobilization, corticosteroid injections, and others. Surgical options were discussed as well.    -I have offered her a selective injection. I have explained the risks, benefits, and alternatives of the procedure in detail.  The patient voices understanding and all questions have been answered. The patient agrees to proceed as planned. So after a sterile prep of the skin in the normal fashion the right ring PIP joint injected using a 25 gauge needle with a combination of 1cc 1% plain lidocaine and 40 mg of methylprednisolone.  The patient is cautioned and immediate relief of pain is secondary to the local anesthetic and will be temporary.  After the anesthetic wears off there may be a increase in pain that may last for a few hours or a few days and they should use ice to help alleviate this flair up of pain. Patient tolerated the procedure well.    F/U prn  Call with any questions/concerns in the interim        Leanna Meraz MD    Please be aware that this note has been generated with the assistance of [employer] voice-to-text.  Please excuse any spelling or grammatical errors.  This note was generated with the assistance of ambient listening technology. Verbal consent was obtained by the patient and accompanying visitor(s) for the recording of patient appointment to facilitate this note. I attest to having  reviewed and edited the generated note for accuracy, though some syntax or spelling errors may persist. Please contact the author of this note for any clarification.

## 2025-07-24 RX ORDER — METHYLPREDNISOLONE ACETATE 40 MG/ML
40 INJECTION, SUSPENSION INTRA-ARTICULAR; INTRALESIONAL; INTRAMUSCULAR; SOFT TISSUE
Status: DISCONTINUED | OUTPATIENT
Start: 2025-07-16 | End: 2025-07-24 | Stop reason: HOSPADM

## 2025-07-24 NOTE — PROCEDURES
Small Joint Aspiration/Injection: R ring PIP    Date/Time: 7/16/2025 1:45 PM    Performed by: Leanna Meraz MD  Authorized by: Leanna Meraz MD    Timeout: prior to procedure the correct patient, procedure, and site was verified    Prep: patient was prepped and draped in usual sterile fashion      Local anesthesia used?: Yes    Anesthesia:  Local infiltration  Local anesthetic:  Lidocaine 1% without epinephrine  Location:  Ring finger  Site:  R ring PIP  Needle size:  25 G  Medications:  40 mg methylPREDNISolone acetate 40 mg/mL  Patient tolerance:  Patient tolerated the procedure well with no immediate complications

## 2025-08-12 ENCOUNTER — PATIENT MESSAGE (OUTPATIENT)
Dept: BARIATRICS | Facility: CLINIC | Age: 65
End: 2025-08-12
Payer: COMMERCIAL

## 2025-08-13 ENCOUNTER — OFFICE VISIT (OUTPATIENT)
Dept: ORTHOPEDICS | Facility: CLINIC | Age: 65
End: 2025-08-13
Payer: COMMERCIAL

## 2025-08-13 DIAGNOSIS — M48.07 SPINAL STENOSIS, LUMBOSACRAL REGION: ICD-10-CM

## 2025-08-13 DIAGNOSIS — M54.16 LUMBAR RADICULOPATHY: Primary | ICD-10-CM

## 2025-08-13 DIAGNOSIS — M51.362 DEGENERATION OF INTERVERTEBRAL DISC OF LUMBAR REGION WITH DISCOGENIC BACK PAIN AND LOWER EXTREMITY PAIN: ICD-10-CM

## 2025-08-13 DIAGNOSIS — Z98.1 HISTORY OF LUMBAR FUSION: ICD-10-CM

## 2025-08-13 DIAGNOSIS — M47.816 LUMBAR SPONDYLOSIS: ICD-10-CM

## 2025-08-13 PROCEDURE — 99999 PR PBB SHADOW E&M-EST. PATIENT-LVL III: CPT | Mod: PBBFAC,,, | Performed by: ORTHOPAEDIC SURGERY

## 2025-08-15 ENCOUNTER — TELEPHONE (OUTPATIENT)
Dept: ORTHOPEDICS | Facility: CLINIC | Age: 65
End: 2025-08-15
Payer: COMMERCIAL

## 2025-08-17 DIAGNOSIS — Z87.898 HISTORY OF PERIPHERAL EDEMA: ICD-10-CM

## 2025-08-17 DIAGNOSIS — I10 ESSENTIAL HYPERTENSION: ICD-10-CM

## 2025-08-18 RX ORDER — HYDROCHLOROTHIAZIDE 12.5 MG/1
12.5 TABLET ORAL DAILY
Qty: 90 TABLET | Refills: 2 | Status: SHIPPED | OUTPATIENT
Start: 2025-08-18

## 2025-08-19 ENCOUNTER — HOSPITAL ENCOUNTER (OUTPATIENT)
Dept: RADIOLOGY | Facility: HOSPITAL | Age: 65
Discharge: HOME | End: 2025-08-19
Attending: ORTHOPAEDIC SURGERY
Payer: COMMERCIAL

## 2025-08-19 DIAGNOSIS — M47.816 LUMBAR SPONDYLOSIS: ICD-10-CM

## 2025-08-19 DIAGNOSIS — M48.07 SPINAL STENOSIS, LUMBOSACRAL REGION: ICD-10-CM

## 2025-08-19 PROCEDURE — 72082 X-RAY EXAM ENTIRE SPI 2/3 VW: CPT | Mod: 26,,, | Performed by: RADIOLOGY

## 2025-08-19 PROCEDURE — 72082 X-RAY EXAM ENTIRE SPI 2/3 VW: CPT | Mod: TC

## 2025-08-19 PROCEDURE — 72131 CT LUMBAR SPINE W/O DYE: CPT | Mod: TC

## 2025-08-19 PROCEDURE — 72131 CT LUMBAR SPINE W/O DYE: CPT | Mod: 26,,, | Performed by: INTERNAL MEDICINE

## 2025-08-27 ENCOUNTER — OFFICE VISIT (OUTPATIENT)
Dept: URGENT CARE | Facility: CLINIC | Age: 65
End: 2025-08-27
Payer: COMMERCIAL

## 2025-08-27 VITALS
WEIGHT: 147 LBS | BODY MASS INDEX: 23.63 KG/M2 | SYSTOLIC BLOOD PRESSURE: 111 MMHG | RESPIRATION RATE: 16 BRPM | HEIGHT: 66 IN | HEART RATE: 75 BPM | TEMPERATURE: 99 F | OXYGEN SATURATION: 97 % | DIASTOLIC BLOOD PRESSURE: 77 MMHG

## 2025-08-27 DIAGNOSIS — J30.9 ALLERGIC RHINITIS WITH POSTNASAL DRIP: ICD-10-CM

## 2025-08-27 DIAGNOSIS — R50.9 FEVER, UNSPECIFIED FEVER CAUSE: ICD-10-CM

## 2025-08-27 DIAGNOSIS — I10 ESSENTIAL HYPERTENSION: ICD-10-CM

## 2025-08-27 DIAGNOSIS — J06.9 VIRAL URI WITH COUGH: Primary | ICD-10-CM

## 2025-08-27 DIAGNOSIS — R09.82 ALLERGIC RHINITIS WITH POSTNASAL DRIP: ICD-10-CM

## 2025-08-27 DIAGNOSIS — H65.93 MIDDLE EAR EFFUSION, BILATERAL: ICD-10-CM

## 2025-08-27 LAB
CTP QC/QA: YES
CTP QC/QA: YES
POC MOLECULAR INFLUENZA A AGN: NEGATIVE
POC MOLECULAR INFLUENZA B AGN: NEGATIVE
SARS-COV+SARS-COV-2 AG RESP QL IA.RAPID: NEGATIVE

## 2025-08-27 PROCEDURE — 99214 OFFICE O/P EST MOD 30 MIN: CPT | Mod: S$GLB,,, | Performed by: PHYSICIAN ASSISTANT

## 2025-08-27 PROCEDURE — 87502 INFLUENZA DNA AMP PROBE: CPT | Mod: QW,S$GLB,, | Performed by: PHYSICIAN ASSISTANT

## 2025-08-27 PROCEDURE — 87811 SARS-COV-2 COVID19 W/OPTIC: CPT | Mod: QW,S$GLB,, | Performed by: PHYSICIAN ASSISTANT

## 2025-08-27 RX ORDER — CETIRIZINE HYDROCHLORIDE 5 MG/1
5 TABLET ORAL EVERY 12 HOURS PRN
Qty: 30 TABLET | Refills: 0 | Status: SHIPPED | OUTPATIENT
Start: 2025-08-27

## 2025-08-27 RX ORDER — BENZONATATE 100 MG/1
200 CAPSULE ORAL 3 TIMES DAILY PRN
Qty: 30 CAPSULE | Refills: 0 | Status: SHIPPED | OUTPATIENT
Start: 2025-08-27

## 2025-08-27 RX ORDER — AZELASTINE 1 MG/ML
1 SPRAY, METERED NASAL 2 TIMES DAILY PRN
Qty: 30 ML | Refills: 0 | Status: SHIPPED | OUTPATIENT
Start: 2025-08-27

## 2025-08-27 RX ORDER — FLUTICASONE PROPIONATE 50 MCG
1 SPRAY, SUSPENSION (ML) NASAL 2 TIMES DAILY PRN
Qty: 16 G | Refills: 0 | Status: SHIPPED | OUTPATIENT
Start: 2025-08-27

## 2025-08-28 ENCOUNTER — TELEPHONE (OUTPATIENT)
Dept: ORTHOPEDICS | Facility: CLINIC | Age: 65
End: 2025-08-28
Payer: COMMERCIAL

## 2025-09-03 ENCOUNTER — OFFICE VISIT (OUTPATIENT)
Dept: ORTHOPEDICS | Facility: CLINIC | Age: 65
End: 2025-09-03
Payer: COMMERCIAL

## 2025-09-03 ENCOUNTER — PATIENT MESSAGE (OUTPATIENT)
Dept: ORTHOPEDICS | Facility: CLINIC | Age: 65
End: 2025-09-03

## 2025-09-03 DIAGNOSIS — Z98.1 HISTORY OF LUMBAR FUSION: Primary | ICD-10-CM

## 2025-09-03 PROCEDURE — 3044F HG A1C LEVEL LT 7.0%: CPT | Mod: CPTII,95,, | Performed by: PHYSICIAN ASSISTANT

## 2025-09-03 PROCEDURE — 98004 SYNCH AUDIO-VIDEO EST SF 10: CPT | Mod: 95,,, | Performed by: PHYSICIAN ASSISTANT

## (undated) DEVICE — TROCAR ENDOPATH XCEL 5X100MM

## (undated) DEVICE — KIT PROBE COVER WITH GEL

## (undated) DEVICE — SUT ETHILON 3-0 PS2 18 BLK

## (undated) DEVICE — COVER CAMERA OPERATING ROOM

## (undated) DEVICE — SHEARS HARMONIC 5CM 36CM

## (undated) DEVICE — STOCKINETTE DBL PLY ST 4X

## (undated) DEVICE — KIT INTRO MICRO NIT VSI 4FR

## (undated) DEVICE — SYR IRRIGATION BULB STER 60ML

## (undated) DEVICE — KIT SURGIFLO EVITHROM

## (undated) DEVICE — DRAPE C-ARM ELAS CLIP 42X120IN

## (undated) DEVICE — DRESSING TRANS 4X4 TEGADERM

## (undated) DEVICE — BLADE SAGITTAL FINE 5.5 X 18.5

## (undated) DEVICE — BLADE SURG STAINLESS STEEL #11

## (undated) DEVICE — DRAPE T THYROID STERILE

## (undated) DEVICE — SEE MEDLINE ITEM 157150

## (undated) DEVICE — BLADE SCALP OPHTL RND TIP

## (undated) DEVICE — DRESSING ADH ISLAND 3.6 X 14

## (undated) DEVICE — DIFFUSER

## (undated) DEVICE — BANDAGE ADHESIVE

## (undated) DEVICE — STAPLER ECHELON FLEX 60MM 44CM

## (undated) DEVICE — DRESSING LEUKOPLAST FLEX 1X3IN

## (undated) DEVICE — 12MM DRILL BIT

## (undated) DEVICE — MARKER SKIN STND TIP BLUE BARR

## (undated) DEVICE — ADHESIVE MASTISOL VIAL 48/BX

## (undated) DEVICE — SEE MEDLINE ITEM 146347

## (undated) DEVICE — STOCKINETTE TUBULAR 2PL 6 X 4

## (undated) DEVICE — SEE MEDLINE ITEM 152487

## (undated) DEVICE — TAP 5MM SPINAL POWER

## (undated) DEVICE — DRESSING ABSRBNT ISLAND 3.6X8

## (undated) DEVICE — SUT MCRYL PLUS 4-0 PS2 27IN

## (undated) DEVICE — COVER PROXIMA MAYO STAND

## (undated) DEVICE — DRESSING XEROFORM FOIL PK 1X8

## (undated) DEVICE — BUR BONE CUT MICRO TPS 3X3.8MM

## (undated) DEVICE — DRAPE ABDOMINAL TIBURON 14X11

## (undated) DEVICE — SYR 10CC LUER LOCK

## (undated) DEVICE — GAUZE SPONGE 4X4 12PLY

## (undated) DEVICE — SEE MEDLINE ITEM 146298

## (undated) DEVICE — KIT DRAIN HEMOVAC 3/16IN 400ML

## (undated) DEVICE — COVER TABLE HVY DTY 60X90IN

## (undated) DEVICE — APPLICATOR CHLORAPREP ORN 26ML

## (undated) DEVICE — DRESSING MEPILEX BORDER 4 X 4

## (undated) DEVICE — UNDERGLOVES BIOGEL PI SIZE 8

## (undated) DEVICE — DRAPE C-ARMOR EQUIPMENT COVER

## (undated) DEVICE — TUBING HF INSUFFLATION W/ FLTR

## (undated) DEVICE — COVER LIGHT HANDLE 80/CA

## (undated) DEVICE — CORD BIPOLAR 12 FOOT

## (undated) DEVICE — GAUZE SPONGE PEANUT STRL

## (undated) DEVICE — KIT SURGIFLO HEMOSTATIC MATRIX

## (undated) DEVICE — NDL HYPO REG 25G X 1 1/2

## (undated) DEVICE — NDL SPINAL 18GX3.5 SPINOCAN

## (undated) DEVICE — CLOSURE SKIN 1X5 STERI-STRIP

## (undated) DEVICE — SEALER AQUAMANTYS 2.3 BIPOLAR

## (undated) DEVICE — SEE MEDLINE ITEM 156905

## (undated) DEVICE — PAD CAST SPECIALIST STRL 4

## (undated) DEVICE — SUT 5-0 VICRYL / P-3 (J493)

## (undated) DEVICE — ELECTRODE REM PLYHSV RETURN 9

## (undated) DEVICE — GLOVE BIOGEL PI MICRO SZ 6.5

## (undated) DEVICE — CARTRIDGE OIL

## (undated) DEVICE — DRESSING AQUACEL FOAM 5 X 5

## (undated) DEVICE — TROCAR ENDOPATH XCEL 12MM 10CM

## (undated) DEVICE — SEE MEDLINE ITEM 146308

## (undated) DEVICE — TUBE FRAZIER 5MM 2FT SOFT TIP

## (undated) DEVICE — SOL 9P NACL IRR PIC IL

## (undated) DEVICE — DRESSING AQUACEL FOAM 3 X 3

## (undated) DEVICE — SEE MEDLINE ITEM 157116

## (undated) DEVICE — Device

## (undated) DEVICE — EVACUATOR WOUND BULB 100CC

## (undated) DEVICE — BANDAGE ESMARK 6X12

## (undated) DEVICE — SYS CLSR DERMABOND PRINEO 22CM

## (undated) DEVICE — RELOAD ECHELON FLEX GRN 60MM

## (undated) DEVICE — KIT MICROINTRO 4F .018X40X7CM

## (undated) DEVICE — PACK SET UP CONVERTORS

## (undated) DEVICE — SEE MEDLINE ITEM 157117

## (undated) DEVICE — COVERS PROBE NR-48 STERILE

## (undated) DEVICE — SUT VICRYL+ 1 CT1 18IN

## (undated) DEVICE — PIN DISTRACTION DISP 14MM
Type: IMPLANTABLE DEVICE | Site: SPINE CERVICAL | Status: NON-FUNCTIONAL
Removed: 2023-12-11

## (undated) DEVICE — SHOE POST-OP WOMEN/MED

## (undated) DEVICE — SOL NS 1000CC

## (undated) DEVICE — SUT 2-0 SILK 30IN BLK BRAID

## (undated) DEVICE — NDL 22GA X1 1/2 REG BEVEL

## (undated) DEVICE — KIT EVACUATOR 3-SPRING 1/8 DRN

## (undated) DEVICE — SUT 0 VICRYL / UR6 (J603)

## (undated) DEVICE — DRESSING AQUACEL SACRAL 9 X 9

## (undated) DEVICE — RELOAD ECHELON FLEX BLU 60MM

## (undated) DEVICE — TROCAR ENDOPATH XCEL 12X100MM

## (undated) DEVICE — CLOSURE SKIN STERI STRIP 1/2X4

## (undated) DEVICE — CORD FOR BIPOLAR FORCEPS 12

## (undated) DEVICE — DRAPE THYROID WITH ARMBOARD

## (undated) DEVICE — SUT VICRYL PLUS 2-0 CT1 18

## (undated) DEVICE — SPONGE LAP 4X18 PREWASHED

## (undated) DEVICE — SEE MEDLINE ITEM 156902

## (undated) DEVICE — SPONGE DERMACEA 4X4IN 12PLY

## (undated) DEVICE — OMNIPAQUE 350 200ML

## (undated) DEVICE — TRAY FOLEY 16FR INFECTION CONT

## (undated) DEVICE — CHLORAPREP 10.5 ML APPLICATOR

## (undated) DEVICE — SET DECANTER MEDICHOICE

## (undated) DEVICE — KIT SNARE RETRIEVAL 20MM

## (undated) DEVICE — GLOVE BIOGEL PI MICRO SZ 7.5

## (undated) DEVICE — DRAPE STERI-DRAPE 1000 17X11IN

## (undated) DEVICE — SUT VICRYL CTD 2-0 GI 27 SH

## (undated) DEVICE — APPLIER LIGACLIP SM 9.38IN

## (undated) DEVICE — SEE MEDLINE ITEM 156894

## (undated) DEVICE — SYR DISP LL 5CC

## (undated) DEVICE — SUT VICRYL 3-0 27 SH

## (undated) DEVICE — SPONGE GAUZE 16PLY 4X4

## (undated) DEVICE — NDL 20GX1-1/2IN IB

## (undated) DEVICE — SUT MONOCRYL 5-0 P-3 UND 18

## (undated) DEVICE — BURR ROUND PRECISION 7.5MM

## (undated) DEVICE — DRAPE TOP 53X102IN

## (undated) DEVICE — GUIDE WIRE WHOLLY FLOPPY

## (undated) DEVICE — TRAY MINOR ORTHO

## (undated) DEVICE — TUBE EMG NIM 7.0MM TRIVANTAGE

## (undated) DEVICE — GUIDEWIRE .035 175CM VERSACORE

## (undated) DEVICE — BANDAGE DERMACEA STRETCH 4X1IN

## (undated) DEVICE — UNDERGLOVES BIOGEL PI SZ 7 LF

## (undated) DEVICE — KIT IRR SUCTION HND PIECE

## (undated) DEVICE — DRAIN CHANNEL ROUND 10FR

## (undated) DEVICE — SEE MEDLINE ITEM 146417

## (undated) DEVICE — CANNULA ENDOPATH XCEL 5X100MM

## (undated) DEVICE — DRAPE C ARM 42 X 120 10/BX